# Patient Record
Sex: MALE | Race: WHITE | NOT HISPANIC OR LATINO | Employment: OTHER | URBAN - METROPOLITAN AREA
[De-identification: names, ages, dates, MRNs, and addresses within clinical notes are randomized per-mention and may not be internally consistent; named-entity substitution may affect disease eponyms.]

---

## 2019-02-23 ENCOUNTER — HOSPITAL ENCOUNTER (EMERGENCY)
Facility: HOSPITAL | Age: 44
Discharge: HOME | End: 2019-02-24
Attending: EMERGENCY MEDICINE
Payer: MEDICAID

## 2019-02-23 DIAGNOSIS — F10.20 ALCOHOL USE DISORDER, SEVERE, DEPENDENCE: ICD-10-CM

## 2019-02-23 DIAGNOSIS — F10.920 ALCOHOLIC INTOXICATION WITHOUT COMPLICATION: ICD-10-CM

## 2019-02-23 DIAGNOSIS — F41.1 GAD (GENERALIZED ANXIETY DISORDER): ICD-10-CM

## 2019-02-23 DIAGNOSIS — F10.930 ALCOHOL WITHDRAWAL SYNDROME WITHOUT COMPLICATION: ICD-10-CM

## 2019-02-23 DIAGNOSIS — F39 AFFECTIVE DISORDER: ICD-10-CM

## 2019-02-23 DIAGNOSIS — Z72.0 TOBACCO ABUSE: ICD-10-CM

## 2019-02-23 DIAGNOSIS — R74.8 ELEVATED LIVER ENZYMES: ICD-10-CM

## 2019-02-23 DIAGNOSIS — F10.10 ALCOHOL ABUSE: ICD-10-CM

## 2019-02-23 DIAGNOSIS — R45.851 SUICIDAL IDEATION: Primary | ICD-10-CM

## 2019-02-23 LAB
ALBUMIN SERPL BCP-MCNC: 3.5 G/DL
ALP SERPL-CCNC: 142 U/L
ALT SERPL W/O P-5'-P-CCNC: 64 U/L
AMMONIA PLAS-SCNC: 49 UMOL/L
AMPHET+METHAMPHET UR QL: NEGATIVE
ANION GAP SERPL CALC-SCNC: 14 MMOL/L
APTT BLDCRRT: 30.8 SEC
AST SERPL-CCNC: 201 U/L
BARBITURATES UR QL SCN>200 NG/ML: NEGATIVE
BASOPHILS # BLD AUTO: 0.07 K/UL
BASOPHILS NFR BLD: 1.9 %
BENZODIAZ UR QL SCN>200 NG/ML: NEGATIVE
BILIRUB SERPL-MCNC: 3.8 MG/DL
BILIRUB UR QL STRIP: NEGATIVE
BUN SERPL-MCNC: 14 MG/DL
BZE UR QL SCN: NEGATIVE
CALCIUM SERPL-MCNC: 9.5 MG/DL
CANNABINOIDS UR QL SCN: NEGATIVE
CHLORIDE SERPL-SCNC: 108 MMOL/L
CLARITY UR: CLEAR
CO2 SERPL-SCNC: 21 MMOL/L
COLOR UR: YELLOW
CREAT SERPL-MCNC: 0.7 MG/DL
CREAT UR-MCNC: 16.9 MG/DL
DIFFERENTIAL METHOD: ABNORMAL
EOSINOPHIL # BLD AUTO: 0 K/UL
EOSINOPHIL NFR BLD: 0.5 %
ERYTHROCYTE [DISTWIDTH] IN BLOOD BY AUTOMATED COUNT: 13.5 %
EST. GFR  (AFRICAN AMERICAN): >60 ML/MIN/1.73 M^2
EST. GFR  (NON AFRICAN AMERICAN): >60 ML/MIN/1.73 M^2
ETHANOL SERPL-MCNC: 430 MG/DL
GLUCOSE SERPL-MCNC: 109 MG/DL
GLUCOSE UR QL STRIP: NEGATIVE
HCT VFR BLD AUTO: 42.2 %
HGB BLD-MCNC: 14.4 G/DL
HGB UR QL STRIP: NEGATIVE
INR PPP: 1.2
KETONES UR QL STRIP: NEGATIVE
LEUKOCYTE ESTERASE UR QL STRIP: NEGATIVE
LYMPHOCYTES # BLD AUTO: 1.6 K/UL
LYMPHOCYTES NFR BLD: 42.4 %
MCH RBC QN AUTO: 35 PG
MCHC RBC AUTO-ENTMCNC: 34.1 G/DL
MCV RBC AUTO: 102 FL
METHADONE UR QL SCN>300 NG/ML: NEGATIVE
MONOCYTES # BLD AUTO: 0.6 K/UL
MONOCYTES NFR BLD: 16.1 %
NEUTROPHILS # BLD AUTO: 1.5 K/UL
NEUTROPHILS NFR BLD: 39.1 %
NITRITE UR QL STRIP: NEGATIVE
OPIATES UR QL SCN: NEGATIVE
PCP UR QL SCN>25 NG/ML: NEGATIVE
PH UR STRIP: 7 [PH] (ref 5–8)
PLATELET # BLD AUTO: 59 K/UL
PMV BLD AUTO: 11.7 FL
POTASSIUM SERPL-SCNC: 4.3 MMOL/L
PROT SERPL-MCNC: 7.7 G/DL
PROT UR QL STRIP: NEGATIVE
PROTHROMBIN TIME: 12.6 SEC
RBC # BLD AUTO: 4.12 M/UL
SODIUM SERPL-SCNC: 143 MMOL/L
SP GR UR STRIP: <=1.005 (ref 1–1.03)
TOXICOLOGY INFORMATION: ABNORMAL
URN SPEC COLLECT METH UR: ABNORMAL
UROBILINOGEN UR STRIP-ACNC: 1 EU/DL
WBC # BLD AUTO: 3.73 K/UL

## 2019-02-23 PROCEDURE — 85025 COMPLETE CBC W/AUTO DIFF WBC: CPT

## 2019-02-23 PROCEDURE — 86703 HIV-1/HIV-2 1 RESULT ANTBDY: CPT

## 2019-02-23 PROCEDURE — 25000003 PHARM REV CODE 250: Performed by: EMERGENCY MEDICINE

## 2019-02-23 PROCEDURE — 80307 DRUG TEST PRSMV CHEM ANLYZR: CPT

## 2019-02-23 PROCEDURE — 80053 COMPREHEN METABOLIC PANEL: CPT

## 2019-02-23 PROCEDURE — 96361 HYDRATE IV INFUSION ADD-ON: CPT

## 2019-02-23 PROCEDURE — 82140 ASSAY OF AMMONIA: CPT

## 2019-02-23 PROCEDURE — 85610 PROTHROMBIN TIME: CPT

## 2019-02-23 PROCEDURE — 85730 THROMBOPLASTIN TIME PARTIAL: CPT

## 2019-02-23 PROCEDURE — 80320 DRUG SCREEN QUANTALCOHOLS: CPT

## 2019-02-23 PROCEDURE — 99285 EMERGENCY DEPT VISIT HI MDM: CPT | Mod: 25

## 2019-02-23 PROCEDURE — 81003 URINALYSIS AUTO W/O SCOPE: CPT | Mod: 59

## 2019-02-23 RX ORDER — CYCLOBENZAPRINE HCL 10 MG
10 TABLET ORAL 3 TIMES DAILY PRN
Status: ON HOLD | COMMUNITY
End: 2020-09-22

## 2019-02-23 RX ORDER — ALPRAZOLAM 1 MG/1
1 TABLET ORAL 3 TIMES DAILY PRN
Status: ON HOLD | COMMUNITY
End: 2020-09-22

## 2019-02-23 RX ORDER — LIDOCAINE AND PRILOCAINE 25; 25 MG/G; MG/G
CREAM TOPICAL ONCE
Status: ON HOLD | COMMUNITY
End: 2020-09-22

## 2019-02-23 RX ORDER — DICLOFENAC SODIUM 1 MG/ML
1 SOLUTION/ DROPS OPHTHALMIC 4 TIMES DAILY
Status: ON HOLD | COMMUNITY
End: 2020-09-22

## 2019-02-23 RX ORDER — FLUTICASONE PROPIONATE 50 UG/1
POWDER, METERED RESPIRATORY (INHALATION)
Status: ON HOLD | COMMUNITY
End: 2020-09-22

## 2019-02-23 RX ORDER — LORAZEPAM 0.5 MG/1
0.5 TABLET ORAL
Status: COMPLETED | OUTPATIENT
Start: 2019-02-23 | End: 2019-02-23

## 2019-02-23 RX ORDER — LANOLIN ALCOHOL/MO/W.PET/CERES
100 CREAM (GRAM) TOPICAL DAILY
Status: ON HOLD | COMMUNITY
End: 2020-10-21 | Stop reason: HOSPADM

## 2019-02-23 RX ORDER — NADOLOL 40 MG/1
40 TABLET ORAL DAILY
Status: ON HOLD | COMMUNITY
End: 2020-09-22

## 2019-02-23 RX ORDER — KETOROLAC TROMETHAMINE 10 MG/1
10 TABLET, FILM COATED ORAL EVERY 6 HOURS
Status: ON HOLD | COMMUNITY
End: 2020-09-22

## 2019-02-23 RX ORDER — MONTELUKAST SODIUM 10 MG/1
10 TABLET ORAL NIGHTLY
Status: ON HOLD | COMMUNITY
End: 2020-09-22

## 2019-02-23 RX ORDER — NAPROXEN 500 MG/1
500 TABLET ORAL 2 TIMES DAILY
Status: ON HOLD | COMMUNITY
End: 2020-09-22

## 2019-02-23 RX ORDER — ONDANSETRON 4 MG/1
4 TABLET, FILM COATED ORAL 2 TIMES DAILY
Status: ON HOLD | COMMUNITY
End: 2020-09-22

## 2019-02-23 RX ORDER — LOSARTAN POTASSIUM 25 MG/1
25 TABLET ORAL DAILY
Status: ON HOLD | COMMUNITY
End: 2020-09-22

## 2019-02-23 RX ORDER — AZELASTINE HYDROCHLORIDE 0.5 MG/ML
2 SOLUTION/ DROPS OPHTHALMIC 2 TIMES DAILY
Status: ON HOLD | COMMUNITY
End: 2020-09-22

## 2019-02-23 RX ADMIN — LORAZEPAM 0.5 MG: 0.5 TABLET ORAL at 06:02

## 2019-02-23 RX ADMIN — SODIUM CHLORIDE 1000 ML: 0.9 INJECTION, SOLUTION INTRAVENOUS at 04:02

## 2019-02-23 NOTE — ED NOTES
Pt's name called to dispo to draw HIV screen, but there was no answer. Will recall pt's name again shortly.

## 2019-02-23 NOTE — ED PROVIDER NOTES
SCRIBE #1 NOTE: I, Na Silva, am scribing for, and in the presence of, Daniel Helm Jr., MD. I have scribed the HPI, ROS, and PEx.     SCRIBE #2 NOTE: I, Kadie Castro, am scribing for, and in the presence of,  Stephane Jerry MD. I have scribed the remaining portions of the note not scribed by Scribe #1.     History      Chief Complaint   Patient presents with    Cirrhosis     hx alcoholix cirrhosis of liver. +sclera jaundice    Drug / Alcohol Assessment     hx alcohol abuse. had drink before coming to ER       Review of patient's allergies indicates:   Allergen Reactions    Latex, natural rubber         HPI   HPI    2/23/2019, 3:26 PM   History obtained from the patient      History of Present Illness: Jordan Altman is a 43 y.o. male patient with PMHx alcoholic cirrhosis of liver and alcohol abuse who presents to the Emergency Department for alcohol assessment. Patient states he had medical detoxification done 2 yrs ago at [facility] and wants another one today. Patient is asymptomatic. Patient denies any fever, chills, SOB, n/v/d, abd pain, CP, back pain, diaphoresis, and all other sxs at this time. No prior Tx reported. Patient admits to drinking alcohol today. No further complaints or concerns at this time.          Arrival mode: Personal vehicle    PCP: Primary Doctor No       Past Medical History:  Past Medical History:   Diagnosis Date    Hypertension        Past Surgical History:  History reviewed. No pertinent surgical history.      Family History:  History reviewed. No pertinent family history.    Social History:  Social History     Tobacco Use    Smoking status: Current Some Day Smoker    Smokeless tobacco: Current User   Substance and Sexual Activity    Alcohol use: Yes     Alcohol/week: 3.0 - 6.0 oz     Types: 5 - 10 Shots of liquor per week    Drug use: Yes     Frequency: 3.0 times per week     Types: Marijuana    Sexual activity: Yes       ROS   Review of Systems   Constitutional:  Negative for activity change, chills, diaphoresis and fever.   HENT: Negative for congestion, rhinorrhea, sneezing, sore throat and trouble swallowing.    Eyes: Negative for pain.   Respiratory: Negative for cough, chest tightness, shortness of breath, wheezing and stridor.    Cardiovascular: Negative for chest pain, palpitations and leg swelling.   Gastrointestinal: Negative for abdominal distention, abdominal pain, constipation, diarrhea, nausea and vomiting.   Genitourinary: Negative for difficulty urinating, dysuria, frequency and urgency.   Musculoskeletal: Negative for arthralgias, back pain, myalgias, neck pain and neck stiffness.   Skin: Negative for pallor, rash and wound.   Neurological: Negative for dizziness, syncope, weakness, light-headedness, numbness and headaches.   Hematological: Does not bruise/bleed easily.   All other systems reviewed and are negative.    Physical Exam      Initial Vitals [02/23/19 1501]   BP Pulse Resp Temp SpO2   (!) 172/96 101 18 98.2 °F (36.8 °C) (!) 94 %      MAP       --          Physical Exam  Nursing Notes and Vital Signs Reviewed.  Constitutional: Patient is in no acute distress. Well-developed and well-nourished. Smells of alcohol.  Head: Atraumatic. Normocephalic.  Eyes: PERRL. EOM intact. Conjunctivae are not pale. No scleral icterus.  ENT: Mucous membranes are moist. Oropharynx is clear and symmetric.    Neck: Supple. Full ROM. No lymphadenopathy.  Cardiovascular: Regular rate. Regular rhythm. No murmurs, rubs, or gallops. Distal pulses are 2+ and symmetric.  Pulmonary/Chest: No respiratory distress. Clear to auscultation bilaterally. No wheezing or rales.  Abdominal: Soft and non-distended.  There is no tenderness.  No rebound, guarding, or rigidity. Good bowel sounds.  Genitourinary: No CVA tenderness  Musculoskeletal: Moves all extremities. No obvious deformities. No edema. No calf tenderness.  Skin: Warm and dry.  Neurological:  Alert, awake, and appropriate.   "Normal speech. No acute focal neurological deficits are appreciated.  Psychiatric: Normal affect. Good eye contact. Appropriate in content.      ED Course    Procedures  ED Vital Signs:  Vitals:    02/23/19 1501 02/23/19 1602 02/23/19 1718 02/23/19 1750   BP: (!) 172/96 137/82 (!) 157/89 119/67   Pulse: 101 87 76 82   Resp: 18      Temp: 98.2 °F (36.8 °C)      TempSrc: Oral      SpO2: (!) 94% (!) 94% 96% 95%   Weight: 114.9 kg (253 lb 6.7 oz)      Height: 5' 11" (1.803 m)       02/23/19 1820 02/23/19 2300 02/24/19 0255   BP: 136/81 132/77 (!) 159/86   Pulse: 82 81 67   Resp: 16 17    Temp:  98.1 °F (36.7 °C) 98.5 °F (36.9 °C)   TempSrc:  Oral Oral   SpO2: 95% 97% 97%   Weight:      Height:          Abnormal Lab Results:  Labs Reviewed   CBC W/ AUTO DIFFERENTIAL - Abnormal; Notable for the following components:       Result Value    WBC 3.73 (*)     RBC 4.12 (*)      (*)     MCH 35.0 (*)     Platelets 59 (*)     Gran # (ANC) 1.5 (*)     Mono% 16.1 (*)     All other components within normal limits   COMPREHENSIVE METABOLIC PANEL - Abnormal; Notable for the following components:    CO2 21 (*)     Total Bilirubin 3.8 (*)     Alkaline Phosphatase 142 (*)      (*)     ALT 64 (*)     All other components within normal limits   PROTIME-INR - Abnormal; Notable for the following components:    Prothrombin Time 12.6 (*)     All other components within normal limits   ALCOHOL,MEDICAL (ETHANOL) - Abnormal; Notable for the following components:    Alcohol, Medical, Serum 430 (*)     All other components within normal limits    Narrative:     ALC critical result(s) called and verbal readback obtained from Moon Rizo RN, 02/23/2019 18:08   URINALYSIS - Abnormal; Notable for the following components:    Specific Gravity, UA <=1.005 (*)     All other components within normal limits   DRUG SCREEN PANEL, URINE EMERGENCY - Abnormal; Notable for the following components:    Creatinine, Random Ur 16.9 (*)     All " other components within normal limits   ALCOHOL,MEDICAL (ETHANOL) - Abnormal; Notable for the following components:    Alcohol, Medical, Serum 227 (*)     All other components within normal limits   APTT   AMMONIA   HIV 1 / 2 ANTIBODY        All Lab Results:  Results for orders placed or performed during the hospital encounter of 02/23/19   CBC auto differential   Result Value Ref Range    WBC 3.73 (L) 3.90 - 12.70 K/uL    RBC 4.12 (L) 4.60 - 6.20 M/uL    Hemoglobin 14.4 14.0 - 18.0 g/dL    Hematocrit 42.2 40.0 - 54.0 %     (H) 82 - 98 fL    MCH 35.0 (H) 27.0 - 31.0 pg    MCHC 34.1 32.0 - 36.0 g/dL    RDW 13.5 11.5 - 14.5 %    Platelets 59 (L) 150 - 350 K/uL    MPV 11.7 9.2 - 12.9 fL    Gran # (ANC) 1.5 (L) 1.8 - 7.7 K/uL    Lymph # 1.6 1.0 - 4.8 K/uL    Mono # 0.6 0.3 - 1.0 K/uL    Eos # 0.0 0.0 - 0.5 K/uL    Baso # 0.07 0.00 - 0.20 K/uL    Gran% 39.1 38.0 - 73.0 %    Lymph% 42.4 18.0 - 48.0 %    Mono% 16.1 (H) 4.0 - 15.0 %    Eosinophil% 0.5 0.0 - 8.0 %    Basophil% 1.9 0.0 - 1.9 %    Differential Method Automated    Comprehensive metabolic panel   Result Value Ref Range    Sodium 143 136 - 145 mmol/L    Potassium 4.3 3.5 - 5.1 mmol/L    Chloride 108 95 - 110 mmol/L    CO2 21 (L) 23 - 29 mmol/L    Glucose 109 70 - 110 mg/dL    BUN, Bld 14 6 - 20 mg/dL    Creatinine 0.7 0.5 - 1.4 mg/dL    Calcium 9.5 8.7 - 10.5 mg/dL    Total Protein 7.7 6.0 - 8.4 g/dL    Albumin 3.5 3.5 - 5.2 g/dL    Total Bilirubin 3.8 (H) 0.1 - 1.0 mg/dL    Alkaline Phosphatase 142 (H) 55 - 135 U/L     (H) 10 - 40 U/L    ALT 64 (H) 10 - 44 U/L    Anion Gap 14 8 - 16 mmol/L    eGFR if African American >60 >60 mL/min/1.73 m^2    eGFR if non African American >60 >60 mL/min/1.73 m^2   APTT   Result Value Ref Range    aPTT 30.8 21.0 - 32.0 sec   Protime-INR   Result Value Ref Range    Prothrombin Time 12.6 (H) 9.0 - 12.5 sec    INR 1.2 0.8 - 1.2   Ethanol   Result Value Ref Range    Alcohol, Medical, Serum 430 (HH) <10 mg/dL   Ammonia    Result Value Ref Range    Ammonia 49 10 - 50 umol/L   Urinalysis   Result Value Ref Range    Specimen UA Urine, Clean Catch     Color, UA Yellow Yellow, Straw, Mela    Appearance, UA Clear Clear    pH, UA 7.0 5.0 - 8.0    Specific Gravity, UA <=1.005 (A) 1.005 - 1.030    Protein, UA Negative Negative    Glucose, UA Negative Negative    Ketones, UA Negative Negative    Bilirubin (UA) Negative Negative    Occult Blood UA Negative Negative    Nitrite, UA Negative Negative    Urobilinogen, UA 1.0 <2.0 EU/dL    Leukocytes, UA Negative Negative   Drug screen panel, emergency   Result Value Ref Range    Benzodiazepines Negative     Methadone metabolites Negative     Cocaine (Metab.) Negative     Opiate Scrn, Ur Negative     Barbiturate Screen, Ur Negative     Amphetamine Screen, Ur Negative     THC Negative     Phencyclidine Negative     Creatinine, Random Ur 16.9 (L) 23.0 - 375.0 mg/dL    Toxicology Information SEE COMMENT    Ethanol   Result Value Ref Range    Alcohol, Medical, Serum 227 (H) <10 mg/dL         Imaging Results:  Imaging Results    None                 The Emergency Provider reviewed the vital signs and test results, which are outlined above.    ED Discussion     4:00 PM: Dr. Helm transfers care of pt to Dr. Jerry, pending lab results.    6:00 PM: Dr. Jerry evaluated pt at this time. Pt reports he is suicidal and wants help detoxing. D/w pt all pertinent results. D/w pt any concerns expressed at this time. Answered all questions. Pt expresses understanding at this time.    6:30 PM: The PEC hold has been issued by Dr. Jerry at this time for SI.    6:33 PM: Pt transferred to [place] [address].    ED Medication(s):  Medications   lorazepam (ATIVAN) injection 1 mg (not administered)   sodium chloride 0.9% bolus 1,000 mL (0 mLs Intravenous Stopped 2/23/19 1903)   LORazepam tablet 0.5 mg (0.5 mg Oral Given 2/23/19 1823)             Medical Decision Making              Scribe Attestation:   Scribe #1: I performed  the above scribed service and the documentation accurately describes the services I performed. I attest to the accuracy of the note.    Attending:   Physician Attestation Statement for Scribe #1: I, Daniel Helm Jr., MD, personally performed the services described in this documentation, as scribed by Na Silva, in my presence, and it is both accurate and complete.       Scribe Attestation:   Scribe #2: I performed the above scribed service and the documentation accurately describes the services I performed. I attest to the accuracy of the note.    Attending Attestation:           Physician Attestation for Scribe:    Physician Attestation Statement for Scribe #2: I, Stephane Jerry MD, reviewed documentation, as scribed by Kadie Castro in my presence, and it is both accurate and complete. I also acknowledge and confirm the content of the note done by Scribe #1.          Clinical Impression       ICD-10-CM ICD-9-CM   1. Suicidal ideation R45.851 V62.84   2. Alcohol abuse F10.10 305.00   3. Alcoholic intoxication without complication F10.920 305.00       Disposition:   Disposition: Discharged  Condition: Stable         Stephane Jerry Jr., MD  02/24/19 1611

## 2019-02-24 VITALS
HEIGHT: 71 IN | OXYGEN SATURATION: 100 % | DIASTOLIC BLOOD PRESSURE: 80 MMHG | TEMPERATURE: 99 F | BODY MASS INDEX: 35.48 KG/M2 | SYSTOLIC BLOOD PRESSURE: 145 MMHG | WEIGHT: 253.44 LBS | RESPIRATION RATE: 18 BRPM | HEART RATE: 62 BPM

## 2019-02-24 LAB
ETHANOL SERPL-MCNC: 106 MG/DL
ETHANOL SERPL-MCNC: 227 MG/DL

## 2019-02-24 PROCEDURE — 96367 TX/PROPH/DG ADDL SEQ IV INF: CPT

## 2019-02-24 PROCEDURE — 96366 THER/PROPH/DIAG IV INF ADDON: CPT

## 2019-02-24 PROCEDURE — 80320 DRUG SCREEN QUANTALCOHOLS: CPT

## 2019-02-24 PROCEDURE — 99283 PR EMERGENCY DEPT VISIT,LEVEL III: ICD-10-PCS | Mod: ,,, | Performed by: PSYCHIATRY & NEUROLOGY

## 2019-02-24 PROCEDURE — 99283 EMERGENCY DEPT VISIT LOW MDM: CPT | Mod: ,,, | Performed by: PSYCHIATRY & NEUROLOGY

## 2019-02-24 PROCEDURE — 80320 DRUG SCREEN QUANTALCOHOLS: CPT | Mod: 91

## 2019-02-24 PROCEDURE — 25000003 PHARM REV CODE 250: Performed by: EMERGENCY MEDICINE

## 2019-02-24 PROCEDURE — 96375 TX/PRO/DX INJ NEW DRUG ADDON: CPT

## 2019-02-24 PROCEDURE — 96365 THER/PROPH/DIAG IV INF INIT: CPT

## 2019-02-24 PROCEDURE — 63600175 PHARM REV CODE 636 W HCPCS: Performed by: EMERGENCY MEDICINE

## 2019-02-24 RX ORDER — THIAMINE HCL 100 MG
100 TABLET ORAL DAILY
Status: DISCONTINUED | OUTPATIENT
Start: 2019-02-24 | End: 2019-02-24 | Stop reason: HOSPADM

## 2019-02-24 RX ORDER — ONDANSETRON 2 MG/ML
4 INJECTION INTRAMUSCULAR; INTRAVENOUS
Status: COMPLETED | OUTPATIENT
Start: 2019-02-24 | End: 2019-02-24

## 2019-02-24 RX ORDER — DIAZEPAM 5 MG/1
10 TABLET ORAL
Status: COMPLETED | OUTPATIENT
Start: 2019-02-24 | End: 2019-02-24

## 2019-02-24 RX ORDER — OXAZEPAM 30 MG/1
CAPSULE, GELATIN COATED ORAL
Qty: 10 CAPSULE | Refills: 0 | Status: ON HOLD | OUTPATIENT
Start: 2019-02-24 | End: 2020-09-22

## 2019-02-24 RX ORDER — IBUPROFEN 600 MG/1
600 TABLET ORAL
Status: COMPLETED | OUTPATIENT
Start: 2019-02-24 | End: 2019-02-24

## 2019-02-24 RX ADMIN — DIAZEPAM 10 MG: 5 TABLET ORAL at 12:02

## 2019-02-24 RX ADMIN — Medication 100 MG: at 07:02

## 2019-02-24 RX ADMIN — DIAZEPAM 10 MG: 5 TABLET ORAL at 07:02

## 2019-02-24 RX ADMIN — IBUPROFEN 600 MG: 600 TABLET, FILM COATED ORAL at 03:02

## 2019-02-24 RX ADMIN — THIAMINE HYDROCHLORIDE: 100 INJECTION, SOLUTION INTRAMUSCULAR; INTRAVENOUS at 08:02

## 2019-02-24 RX ADMIN — PROMETHAZINE HYDROCHLORIDE 12.5 MG: 25 INJECTION INTRAMUSCULAR; INTRAVENOUS at 11:02

## 2019-02-24 RX ADMIN — LORAZEPAM 1 MG: 2 INJECTION INTRAMUSCULAR; INTRAVENOUS at 03:02

## 2019-02-24 RX ADMIN — ONDANSETRON 4 MG: 2 INJECTION INTRAMUSCULAR; INTRAVENOUS at 07:02

## 2019-02-24 NOTE — ED NOTES
Pt resting in bed, no acute distress noted. Bed in low and locked position. Room secured per PEC protocol. Pt's belongings secured and pt in grey gown and yellow socks. Pt being directly monitored by nitza Mcdonald at this time. Will continue to monitor.

## 2019-02-24 NOTE — ED NOTES
[facility] called stating Dr Martines will call in approx 5-10 min to tele psych pt/. Dr Arce IS aware of pts alcohol level

## 2019-02-24 NOTE — ED NOTES
Received report from MILADIS Buitrago Pt. In NAD, VSS, RR equal and unlabored. Will continue to monitor pt.

## 2019-02-24 NOTE — PROVIDER PROGRESS NOTES - EMERGENCY DEPT.
Encounter Date: 2/23/2019    ED Physician Progress Notes       SCRIBE NOTE: Na BUSTOS, am scribing for, and in the presence of,  Claudia Cortez DO.  Physician Statement: IClaudia DO, personally performed the services described in this documentation as scribed by Na Silva in my presence, and it is both accurate and complete.          7:03 AM: Dr. Cortez evaluated patient at this time. Patient still smells of alcohol. Patient denies SI but states he is depressed. Currently c/o nausea but no vomiting. Slight hand tremors. No diaphoresis. Regular rate. Regular rhythm. No murmurs, rubs, or gallops. Distal pulses are 2+ and symmetric. No respiratory distress. Clear to auscultation bilaterally. No wheezing or rales. Ciwa-ar score of 7. Patient had total bili 6.5, sgot of 361, and sgpt of 130 in 10/2017.     8:17 AM: Re-evaluated pt. Pt is resting comfortably and is in no acute distress. Pt states he is feeling better. D/w pt all pertinent results. D/w pt any concerns expressed at this time. Answered all questions. Pt expresses understanding at this time.    11:52 AM: Re-evaluated patient. Patient was easily awakened and oriented. He had 1 episode of emesis in the ED. Patient still has hand tremors. No further concerns or complaints at this time.    2:15 PM: Dr. Cortez discussed the pt's case with DAVID Gomez (hospital medicine) who states she does not feel patient wants admission. She recommends Serax.     2:37 PM: Re-evaluated patient at this time. Patient is alert, awake, and oriented. Still has some shaking. Patient c/o back pain and requesting meds.     3:21 PM:  Patient evaluated by Psychiatry.  Patient denies suicidal ideation.  Therefore patient no longer a threat to himself.  Psychiatrist recommends treatment for alcohol detoxification.  Did discuss with Hospital Medicine who recommends outpatient treatment.  Patient is demonstrating mild symptoms of alcohol withdrawal.  When  to detail about signs and symptoms to return to the emergency department.  Patient felt comfortable with treatment plan.  I did discuss with him that the psychiatrist did recommend a SSRI after patient treated for alcohol withdrawal.  Patient verbalized importance of following up with outpatient psychiatrist start SSRI.  Re-evaluated patient at this time. No diaphoresis, is able to tolerate PO, no tremors, and appears cognitively intact. Educated patient about signs and symptoms which suggest he return to the ED. Discussed with pt all pertinent ED information and results. Discussed pt dx and plan of tx. All questions and concerns were addressed at this time. Pt expresses understanding of information and instructions, and is comfortable with plan to discharge. Pt is stable for discharge.    I discussed with patient that evaluation in the ED does not suggest any emergent or life threatening medical conditions requiring immediate intervention beyond what was provided in the ED, and I believe patient is safe for discharge.  Regardless, an unremarkable evaluation in the ED does not preclude the development or presence of a serious of life threatening condition. As such, patient was instructed to return immediately for any worsening or change in current symptoms.    Current Discharge Medication List      START taking these medications    Details   oxazepam (SERAX) 30 MG capsule Day 1: Take 1 tablet mouth every 6 hours as needed for anxiety/sweating/tremors..  Day 2:  Take 1 tablet by mouth every 8 hours as needed for anxiety/sweating/tremors.  Day 3:  Take 1 tablet by mouth every 12 hours as needed for anxiety/sweating/tremors.  Day 4:  Take 1 tablet by mouth as night.  Qty: 10 capsule, Refills: 0           Follow-up Information     [facility] In 2 days.    Why:  Return to emergency department for:  Vomiting, sweating, hallucinations, bugs crawling on skin, headache, difficulty concentrating, unable to take  medications or other concerns.  Contact information:  [address] [facility]  [place] [id number] [phone]                   Additional MDM:   Smoking Cessation: The patient is a smoker. The patient was counseled on smoking cessation for: 3 minutes. The patient was counseled on tobacco related  health complications.       Scribe Attestation:   Scribe #1: I performed the above scribed service and the documentation accurately describes the services I performed. I attest to the accuracy of the note.    Attending:   Physician Attestation Statement for Scribe #1: I, Claudia Cortez DO, personally performed the services described in this documentation, as scribed by Na Silva, in my presence, and it is both accurate and complete.       ICD-10-CM ICD-9-CM   1. Suicidal ideation R45.851 V62.84   2. Alcohol abuse F10.10 305.00   3. Alcoholic intoxication without complication F10.920 305.00   4. Alcohol use disorder, severe, dependence F10.20 303.90   5. Alcohol withdrawal syndrome without complication F10.230 291.81   6. Affective disorder F39 296.90   7. MILDRED (generalized anxiety disorder) F41.1 300.02   8. Elevated liver enzymes R74.8 790.5   9. Tobacco abuse Z72.0 305.1     Disposition:   Disposition: Discharged  Condition: Stable

## 2019-02-24 NOTE — ED NOTES
Called placed to [facility] regarding telepsych. Representative ([place]) stated that she will contact the physician and call us back.

## 2019-02-24 NOTE — CONSULTS
Tele-Consultation to Emergency Department from Psychiatry    Please see previous notes:    Patient agreeable to consultation via telepsychiatry.    Consultation started: 2/24/2019 at 1247 pm  The chief complaint leading to psychiatric consultation is: SI and ETOHism  This consultation was requested by Stephane Liang MD, the Emergency Department attending physician.  The location of the consulting psychiatrist is [place].  The patient location is [facility].  The patient arrived at the ED at: yesterday around 1800    Also present with the patient at the time of the consultation: tech    Patient Identification:  Jordan Altman is a 43 y.o. male.    Patient information was obtained from patient and past medical records.  Patient presented voluntarily to the Emergency Department ambulatory.    History of Present Illness:  43 year old gentleman with etohic cirrohsis and a hx of requiring inpatient etoh detox presented to the ED yesterday requesting the same plus reporting SI.  Initial BAL was over 400 so he has been monitored for that over 12 hours now  - trending down - current etoh pending but should be under 100 at this point based on trend.  Has needed benzo to manage tremor and emesis.  Vitals ok for now.  Received valium 10mg this am and at noon.    On interview:  His brother is at the bedside.  He states that he is here to get help with his alcoholism and because his niece attempted suicide several days ago.  He denies having SI himself.  No hx of suicide attempts.  No hx of psych hospitalization.  No AH.  Has tinnitus.  Hospitalization at [facility] 2 years ago on the medical side for etoh detox followed by a 2 month 5 day per week IOP for etoh use disorder.  Achieved 8 months of sobriety with that.    Drinking 5th per day of vodka he states.  No other substances.  Drinking this much for at least one year.  No hx of seizure.  No hx of DTs.  States that after he stopped drinking last time he had tremor for  several months after stopped drinking 2 years ago.  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    Has depression since he had to get rid of his dog a little over a year ago - the pit bull wasn't allowed at their apartment.  No medication for depression.  Hx of 2 trials.  States that both made him lethargic and he stopped them and stated that he didn't benefit.  States that he also has anxiety.  Worry thoughts and physically experienced anxiety.  'certain coping mechanisms weren't working for him'.  No therapist or psychiatrist.    He made an agreement with his niece that he'd try to stop drinking if she tried to stay alive.  That was the impetous for presenting now. He is interested in 28 day programs for rehab and his insurance company is looking into those now.  His brother endorses the need for this strongly.  The patient doesn't feel the need for inpatient psych.  He is open to trying an rx for low mood again 'but I can't promise, I don't like taking medication'.  We discussed the time course, side effects and what to expect from an SRI trial.      Psychiatric History:   Hospitalization: No  Medication Trials: Yes  Suicide Attempts: no  Violence: no  Depression: yes  Jacklyn: no  AH's: no  Delusions: no    Review of Systems:  Negative except nausea and tremor    Past Medical History:   Past Medical History:   Diagnosis Date    Hypertension         Seizures: no  Head trauma/l.o.c.: no  Wish to become pregnant[if female of childbearing age]: no    Allergies:    Review of patient's allergies indicates:   Allergen Reactions    Latex, natural rubber        Medications in ER:   Medications   lorazepam (ATIVAN) injection 1 mg (not administered)   thiamine tablet 100 mg (100 mg Oral Given 2/24/19 4779)   diazePAM tablet 10 mg (not administered)   sodium chloride 0.9% bolus 1,000 mL (0 mLs Intravenous Stopped 2/23/19 1903)   LORazepam tablet 0.5 mg (0.5 mg Oral Given 2/23/19 1823)   sodium  "chloride 0.9% 1,000 mL with mvi, adult no.4 with vit K 3,300 unit- 150 mcg/10 mL 10 mL, thiamine 100 mg, folic acid 1 mg infusion ( Intravenous New Bag 2/24/19 0805)   ondansetron injection 4 mg (4 mg Intravenous Given 2/24/19 0734)   diazePAM tablet 10 mg (10 mg Oral Given 2/24/19 0734)   promethazine (PHENERGAN) 12.5 mg in dextrose 5 % 50 mL IVPB (0 mg Intravenous Stopped 2/24/19 1152)       Medications at home: see chart    Substance Abuse History:   Alchohol: see above  Drug: denies     Legal History:   Past charges/incarcerations: dui x 1   Pending charges: no    Family Psychiatric History: unk    Social History:    w/ children.  His wife is supporting him currently.  She and the children aren't living w/ him currently but they are together as a couple still.      Current Evaluation:     Constitutional  Vitals:  Vitals:    02/23/19 1501 02/23/19 1602 02/23/19 1718 02/23/19 1750   BP: (!) 172/96 137/82 (!) 157/89 119/67   Pulse: 101 87 76 82   Resp: 18      Temp: 98.2 °F (36.8 °C)      TempSrc: Oral      SpO2: (!) 94% (!) 94% 96% 95%   Weight: 114.9 kg (253 lb 6.7 oz)      Height: 5' 11" (1.803 m)       02/23/19 1820 02/23/19 2300 02/24/19 0255 02/24/19 0712   BP: 136/81 132/77 (!) 159/86 (!) 169/93   Pulse: 82 81 67 73   Resp: 16 17  20   Temp:  98.1 °F (36.7 °C) 98.5 °F (36.9 °C)    TempSrc:  Oral Oral    SpO2: 95% 97% 97% 95%   Weight:       Height:        02/24/19 1117 02/24/19 1130 02/24/19 1132 02/24/19 1134   BP: (!) 166/88 (!) 158/92 (!) 159/92 (!) 150/96   Pulse: 80 72 84 109   Resp: 20      Temp: 98.4 °F (36.9 °C)      TempSrc: Oral      SpO2: 95%      Weight:       Height:          General:  unremarkable, age appropriate     Musculoskeletal  Muscle Strength/Tone:   moving arms normally   Gait & Station:   sitting on stretcher     Psychiatric  Level of Consciousness: alert  Orientation: oriented to person, place and time  Grooming: in hospital gown  Psychomotor Behavior: no agitation  Speech: " normal in rate, rhythm and volume  Language: uses words appropriately  Mood: depressed  Affect: fairly midline  Thought Process: linear and goal directed  Associations: intact  Thought Content: denies si and hi  Memory: intact  Attention: intact to interview  Fund of Knowledge: appears adequate  Insight: fair  Judgement: fair    Relevant Elements of Neurological Exam: no abnormality of posture noted    Assessment - Diagnosis - Goals:     Diagnosis/Impression: severe ETOH use disorder, MILDRED, etoh induced depressive d/o.  Chart states he made a suicidal statemtnt while etoh level was over 400.  He is not suicidal now and is convincing regarding this.  The mood disorder doesn't require inpatient psych hospitalization.  An SRI trial is warrented - delay start until finished w/ benzos for etoh withdrawal.  28 day rehab or IOP (first favored) is recommended based on his willingness and availability.  Will need medical clearance / finish inpatient portion of etoh withdrawal protocol prior.  Might need med admit for that.      Rec: as above.  Would discharge w/ lexapro 5mg x 1 week then 10mg daily after medically cleared and done w/ withdrawal portion requiring hospital level of care.  Then rehab.       Time with patient: 30 min    More than 50% of the time was spent counseling/coordinating care    Laboratory Data:   Labs Reviewed   CBC W/ AUTO DIFFERENTIAL - Abnormal; Notable for the following components:       Result Value    WBC 3.73 (*)     RBC 4.12 (*)      (*)     MCH 35.0 (*)     Platelets 59 (*)     Gran # (ANC) 1.5 (*)     Mono% 16.1 (*)     All other components within normal limits   COMPREHENSIVE METABOLIC PANEL - Abnormal; Notable for the following components:    CO2 21 (*)     Total Bilirubin 3.8 (*)     Alkaline Phosphatase 142 (*)      (*)     ALT 64 (*)     All other components within normal limits   PROTIME-INR - Abnormal; Notable for the following components:    Prothrombin Time 12.6 (*)      All other components within normal limits   ALCOHOL,MEDICAL (ETHANOL) - Abnormal; Notable for the following components:    Alcohol, Medical, Serum 430 (*)     All other components within normal limits    Narrative:     ALC critical result(s) called and verbal readback obtained from Moon Rizo RN, 02/23/2019 18:08   URINALYSIS - Abnormal; Notable for the following components:    Specific Gravity, UA <=1.005 (*)     All other components within normal limits   DRUG SCREEN PANEL, URINE EMERGENCY - Abnormal; Notable for the following components:    Creatinine, Random Ur 16.9 (*)     All other components within normal limits   ALCOHOL,MEDICAL (ETHANOL) - Abnormal; Notable for the following components:    Alcohol, Medical, Serum 227 (*)     All other components within normal limits   APTT   AMMONIA   HIV 1 / 2 ANTIBODY   ALCOHOL,MEDICAL (ETHANOL)         Consulting clinician was informed of the encounter and consult note.    Consultation ended: 2/24/2019 at **

## 2019-02-24 NOTE — ED NOTES
Pt belongings inventory:  Black tennis shoes, cellular device in wallet case containing 4 assorted cards, also cash total value of $8.   Red and black backpack containin. clipboard case with documents,   2. clipboard notepad with writing,   3. packing envelopes with assorted paperwork.  4. Assorted toiletries  5. Set of keys  6. Prayer beads  7. Medications including: 15 Micrograms of flonase  Nasal spray, Azelastine HCL 0.05% optomic solution, diclofenac sodium topical gel, 1% 100g, Lidocaine 2.5% and prilocane 2.5% cream 30 grams, 5 tablets of prilosec 20 mg, tums, Vicks vapo inhaler decongestant, Olbas inhaler decongestant, Banana Bag oral solution 0.23 oz,  8. Black [place] pocketknife  9. Assorted writing utensils  10. Black hat  11. Brown hooded sweatshirt  12. Small white container with assorted bandages  13. Pair of cargo shorts  14. Blue shirt  15. Pair of socks  16. Blue emu super strength topical cream  17. two white chargers and a surge protector power strip  18. Black head phones  19. Black glasses case containing black rimmed glasses  20. Grey shorts  21. Green towel  22. Black shorts  23. Black boxer underwear  24. Black t-shirt  25. Maroon underwear  26. Secondary cellular device  27. Portable battery pack  28. Red and black speaker  29. Three pairs of socks    All items to be locked in [facility] locker# 28

## 2019-02-24 NOTE — ED NOTES
Pt resting in bed, no acute distress noted. Bed in low and locked position. Room secured per [facility] protocol. Pt's belongings secured and pt in grey gown and yellow socks. Pt being directly monitored by nitza Moser at this time. Will continue to monitor.

## 2019-02-24 NOTE — DISCHARGE INSTRUCTIONS
[facility]  [address] [place] [address]    Phone [phone] for appointment.  Call for appointment 1-2 days.    Gastroenterology for alcoholic hepatitis.  Return to emergency department for yellowing of skin, confusion, bleeding, or other concerns

## 2019-02-25 LAB — HIV 1+2 AB+HIV1 P24 AG SERPL QL IA: NEGATIVE

## 2020-03-28 ENCOUNTER — HOSPITAL ENCOUNTER (EMERGENCY)
Facility: HOSPITAL | Age: 45
Discharge: HOME | End: 2020-03-28
Attending: EMERGENCY MEDICINE
Payer: MEDICAID

## 2020-03-28 VITALS
WEIGHT: 286.69 LBS | DIASTOLIC BLOOD PRESSURE: 83 MMHG | HEART RATE: 65 BPM | RESPIRATION RATE: 18 BRPM | HEIGHT: 72 IN | TEMPERATURE: 99 F | SYSTOLIC BLOOD PRESSURE: 141 MMHG | OXYGEN SATURATION: 98 % | BODY MASS INDEX: 38.83 KG/M2

## 2020-03-28 DIAGNOSIS — R07.81 RIB PAIN: ICD-10-CM

## 2020-03-28 DIAGNOSIS — S29.011A MUSCLE STRAIN OF CHEST WALL, INITIAL ENCOUNTER: Primary | ICD-10-CM

## 2020-03-28 DIAGNOSIS — S22.32XA CLOSED FRACTURE OF ONE RIB OF LEFT SIDE, INITIAL ENCOUNTER: ICD-10-CM

## 2020-03-28 PROCEDURE — 99284 EMERGENCY DEPT VISIT MOD MDM: CPT | Mod: 25

## 2020-03-28 RX ORDER — CYCLOBENZAPRINE HCL 10 MG
10 TABLET ORAL 3 TIMES DAILY PRN
Qty: 15 TABLET | Refills: 0 | Status: SHIPPED | OUTPATIENT
Start: 2020-03-28 | End: 2020-04-02

## 2020-03-28 RX ORDER — DICLOFENAC SODIUM 50 MG/1
50 TABLET, DELAYED RELEASE ORAL 2 TIMES DAILY
Qty: 10 TABLET | Refills: 0 | Status: SHIPPED | OUTPATIENT
Start: 2020-03-28 | End: 2020-04-02

## 2020-03-28 RX ORDER — TRAMADOL HYDROCHLORIDE 50 MG/1
50 TABLET ORAL EVERY 6 HOURS PRN
Qty: 12 TABLET | Refills: 0 | Status: ON HOLD | OUTPATIENT
Start: 2020-03-28 | End: 2020-09-22

## 2020-03-28 NOTE — ED PROVIDER NOTES
SCRIBE #1 NOTE: I, Moon Nola, am scribing for, and in the presence of, James Mcdonnell NP. I have scribed the entire note.       History     Chief Complaint   Patient presents with    Muscle Pain     pulled something on left abd side helping his son move yesterday     Review of patient's allergies indicates:   Allergen Reactions    Latex, natural rubber          History of Present Illness     HPI    3/28/2020, 2:38 PM  History obtained from the patient      History of Present Illness: Jordan Altman is a 44 y.o. male patient with a PMHx of HTN who presents to the Emergency Department for evaluation of pain to mid left chest wall which onset suddenly today. Pt reports he was helping his family move heavy boxes today. Symptoms are constant and moderate in severity. Sxs exacerbated by certain movements and by palpation to chest wall. No mitigating factors reported. Pt wants to make sure sxs are due to possible muscle strain and not a fractured rib. No associated sxs reported. Patient denies any fever, chills, numbness, weakness, n/v/d, abd pain, SOB, palpitations, and all other sxs at this time. No prior tx. No further complaints or concerns at this time.       Arrival mode: Personal vehicle     PCP: SHAHEEN Jaime        Past Medical History:  Past Medical History:   Diagnosis Date    Hypertension        Past Surgical History:  Past surgical history reviewed. No pertinent past surgical history.     Family History:  Family history reviewed. No pertinent family history.    Social History:  Social History     Tobacco Use    Smoking status: Current Some Day Smoker    Smokeless tobacco: Current User   Substance and Sexual Activity    Alcohol use: Yes     Alcohol/week: 112.0 standard drinks     Types: 112 Shots of liquor per week     Comment: fifth of vodka a day     Drug use: Yes     Frequency: 3.0 times per week     Types: Marijuana    Sexual activity: Yes        Review of Systems     Review of  Systems   Constitutional: Negative for chills and fever.   HENT: Negative for sore throat.    Respiratory: Negative for shortness of breath.    Cardiovascular: Negative for chest pain and palpitations.   Gastrointestinal: Negative for abdominal pain, diarrhea, nausea and vomiting.   Genitourinary: Negative for dysuria.   Musculoskeletal: Negative for back pain.        (+) pain to mid left chest wall   Skin: Negative for rash.   Neurological: Negative for weakness and numbness.   Hematological: Does not bruise/bleed easily.   All other systems reviewed and are negative.     Physical Exam     Initial Vitals [03/28/20 1434]   BP Pulse Resp Temp SpO2   (!) 159/86 68 18 99.1 °F (37.3 °C) 96 %      MAP       --          Physical Exam  Nursing Notes and Vital Signs Reviewed.  Constitutional: Patient is in no acute distress. Well-developed and well-nourished.  Head: Atraumatic. Normocephalic.  Eyes: PERRL. EOM intact. Conjunctivae are not pale. No scleral icterus.  ENT: Mucous membranes are moist. Oropharynx is clear and symmetric.    Neck: Supple. Full ROM. No lymphadenopathy.  Cardiovascular: Regular rate. Regular rhythm. No murmurs, rubs, or gallops. Distal pulses are 2+ and symmetric.  Pulmonary/Chest: No respiratory distress. Clear to auscultation bilaterally. No wheezing or rales.  Abdominal: Soft and non-distended.  There is no tenderness.  No rebound, guarding, or rigidity. Good bowel sounds.  Genitourinary: No CVA tenderness  Musculoskeletal: Moves all extremities. No obvious deformities. No edema. No calf tenderness. Left mid chest wall ttp. No crepitus.   Skin: Warm and dry.  Neurological:  Alert, awake, and appropriate.  Normal speech.  No acute focal neurological deficits are appreciated.  Psychiatric: Normal affect. Good eye contact. Appropriate in content.     ED Course   Procedures  ED Vital Signs:  Vitals:    03/28/20 1434   BP: (!) 159/86   Pulse: 68   Resp: 18   Temp: 99.1 °F (37.3 °C)   TempSrc: Oral    SpO2: 96%   Weight: 130 kg (286 lb 11.3 oz)   Height: 6' (1.829 m)       Abnormal Lab Results:  Labs Reviewed   HIV 1 / 2 ANTIBODY        Imaging Results:  Imaging Results          X-Ray Ribs 2 View Left (Final result)  Result time 03/28/20 15:14:14    Final result by Ottoniel North Jr., MD (03/28/20 15:14:14)                 Impression:      Questionable fracture of the left anterior 6th rib.      Electronically signed by: Ottoniel North Jr., MD  Date:    03/28/2020  Time:    15:14             Narrative:    EXAMINATION:  XR RIBS 2 VIEW LEFT    CLINICAL HISTORY:  Pleurodynia    TECHNIQUE:  Two views of the left ribs were performed.    COMPARISON:  None.    FINDINGS:  Slight offset of the cortex on one view of the anterolateral 6th rib.  Remaining ribs are intact.  Left lung is clear.                               X-Ray Chest PA And Lateral (Final result)  Result time 03/28/20 15:18:02    Final result by Ottoniel North Jr., MD (03/28/20 15:18:02)                 Impression:      1. No acute chest findings.  2.  Large calcified subcarinal lymph nodes likely from previous granulomatous infection.      Electronically signed by: Ottoniel North Jr., MD  Date:    03/28/2020  Time:    15:18             Narrative:    EXAMINATION:  XR CHEST PA AND LATERAL    CLINICAL HISTORY:  Pleurodynia    COMPARISON:  None    FINDINGS:  Scarring in the left lower lobe with likely chronic blunting left costophrenic angle.  Remaining lungs are clear.  Cardiac silhouette is normal in size.  Large calcified subcarinal lymph node may be from previous granulomatous infection..  The bones and remaining soft tissues are within normal limits.  No effusion or pneumothorax.                                    The Emergency Provider reviewed the vital signs and test results, which are outlined above.     ED Discussion     3:36 PM: Reassessed pt at this time. Discussed with pt all pertinent ED information and results. Discussed pt dx and plan of tx.  Gave pt all f/u and return to the ED instructions. All questions and concerns were addressed at this time. Pt expresses understanding of information and instructions, and is comfortable with plan to discharge. Pt is stable for discharge.    I discussed with patient and/or family/caretaker that evaluation in the ED does not suggest any emergent or life threatening medical conditions requiring immediate intervention beyond what was provided in the ED, and I believe patient is safe for discharge.  Regardless, an unremarkable evaluation in the ED does not preclude the development or presence of a serious of life threatening condition. As such, patient was instructed to return immediately for any worsening or change in current symptoms.    Pt provided with Incentive spirometer. Instructions provided and pt verbalized understanding      Medical Decision Making:   Clinical Tests:   Radiological Study: Reviewed and Ordered           ED Medication(s):  Medications - No data to display    New Prescriptions    CYCLOBENZAPRINE (FLEXERIL) 10 MG TABLET    Take 1 tablet (10 mg total) by mouth 3 (three) times daily as needed.    DICLOFENAC (VOLTAREN) 50 MG EC TABLET    Take 1 tablet (50 mg total) by mouth 2 (two) times daily. for 5 days    TRAMADOL (ULTRAM) 50 MG TABLET    Take 1 tablet (50 mg total) by mouth every 6 (six) hours as needed.       Follow-up Information     SHAHEEN Jaime.    Specialty:  Family Medicine  Why:  As needed  Contact information:  [id number] [facility] MEDICINE & AFTER HOURS  [facility] [address] [phone]                       Scribe Attestation:   Scribe #1: I performed the above scribed service and the documentation accurately describes the services I performed. I attest to the accuracy of the note.     Attending:   Physician Attestation Statement for Scribe #1: I, James Mcdonnell NP, personally performed the services described in this documentation, as scribed by Moon  Nola, in my presence, and it is both accurate and complete.           Clinical Impression       ICD-10-CM ICD-9-CM   1. Muscle strain of chest wall, initial encounter S29.011A 848.8   2. Rib pain R07.81 786.50   3. Closed fracture of one rib of left side, initial encounter S22.32XA 807.01       Disposition:   Disposition: Discharged  Condition: Stable       James Mcdonnell NP  03/28/20 0399

## 2020-09-09 ENCOUNTER — TELEPHONE (OUTPATIENT)
Dept: TRANSPLANT | Facility: CLINIC | Age: 45
End: 2020-09-09

## 2020-09-09 NOTE — TELEPHONE ENCOUNTER
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    Referral completed and forwarded to [facility].          Insurance: [employer] in EPIC

## 2020-09-10 ENCOUNTER — TELEPHONE (OUTPATIENT)
Dept: TRANSPLANT | Facility: CLINIC | Age: 45
End: 2020-09-10

## 2020-09-10 NOTE — TELEPHONE ENCOUNTER
----- Message from Luis Thurston sent at 9/10/2020  2:15 PM CDT -----    Returned call to pt to let him know that Dr. Titus would like him to start liver txp work up asap. He said that he will call his SO and will let me know. Also told him that per his insur co he will need to get est with a dentist office. Per pt sobriety date as of 8/4/2020.  .  ----- Message -----  From: Stephane Rivero  Sent: 9/10/2020   1:48 PM CDT  To: [facility] Liver Referral Pool    Pt returning missed call          [phone] (M)

## 2020-09-11 ENCOUNTER — TELEPHONE (OUTPATIENT)
Dept: GASTROENTEROLOGY | Facility: CLINIC | Age: 45
End: 2020-09-11

## 2020-09-11 NOTE — TELEPHONE ENCOUNTER
----- Message from Rhea Davis sent at 9/11/2020  8:49 AM CDT -----  Regarding: RE: Appointment with Dr. Titus  Early October. She sent him for urgent eval from [facility] with MELD  37. He hasn't scheduled yet for full testing he is calling back to give dates.  Schedule him, and if we need something sooner,I am sure we can get him in with her.   ----- Message -----  From: Vee Friedman LPN  Sent: 9/10/2020   2:50 PM CDT  To: Rhea Davis  Subject: Appointment with Dr. Tacho Wilkerson,    I hope that your day has been going well.    I see that you have been speaking with patient. How soon do I need to schedule a clinic appointment for patient to be seen by Dr. Titus?    Is October too far? If so, I will see where we can fit him in.    Thanks  ----- Message -----  From: Luis Thurston  Sent: 9/10/2020   2:23 PM CDT  To: Tacho YEAGER Staff    This is a pt of Dr. Titus at [facility] that she would like to start liver txp work up. He needs an appt to see her. Can yall get him vivek?

## 2020-09-15 DIAGNOSIS — Z76.82 ORGAN TRANSPLANT CANDIDATE: Primary | ICD-10-CM

## 2020-09-15 DIAGNOSIS — K76.0 FATTY LIVER: ICD-10-CM

## 2020-09-15 NOTE — PROGRESS NOTES
Pt called again re need to set up testing  Phone stated call can not be complete at this time.     lvm on brother's phone.       Pt needs to be booked for stat eval.

## 2020-09-16 ENCOUNTER — TELEPHONE (OUTPATIENT)
Dept: TRANSPLANT | Facility: CLINIC | Age: 45
End: 2020-09-16

## 2020-09-17 ENCOUNTER — TELEPHONE (OUTPATIENT)
Dept: TRANSPLANT | Facility: CLINIC | Age: 45
End: 2020-09-17

## 2020-09-17 NOTE — TELEPHONE ENCOUNTER
----- Message from Luis Thurston sent at 9/17/2020  5:09 PM CDT -----    Message sent to addiction psychiatry for an appt re: liver txp work up.  .

## 2020-09-18 NOTE — PLAN OF CARE
(Physician in Lead of Transfers)   Outside Transfer Acceptance Note / Regional Referral Center    Name: Jordan Altman    Transferring Physician:     Accepting Physician: ELVIA Matson MD    Date of Acceptance:  September 18, 2020     Transferring Facility: [facility]    Destination Facility and Admitting Physician:  [facility]/Med-tele    Reason for Transfer:  Needs hepatology evaluation (not available at current facility)    Report from Transferring Physician/Hospital course:  45-year-old male with a history of alcoholic cirrhosis and hypertension who presented to the emergency department on September 17 with shortness of breath.  He had an admission in August of this year for alcoholic hepatitis and another admission August 25-September 1 with decompensated cirrhosis complicated by peritonitis.  The day prior to presentation he developed an acute onset of shortness of breath associated with diffuse abdominal pain worse in the right upper quadrant.  He developed increasing abdominal swelling.  This was associated with nausea and vomiting.  He had no confusion and no chest pain.  He has been taking lactulose, Lasix, and spironolactone.  He was admitted with decompensated cirrhosis, acute kidney injury, hyponatremia, thrombocytopenia, anemia, and metabolic acidosis.  Labs on September 17 noted white blood cell count 14.6, hemoglobin 12.7, hematocrit 39.1, platelet count 82, , ALT 69, total bilirubin 34.5, INR 2.3, sodium 126, potassium 4.1, CO2 18, chloride 93, BUN 40, creatinine 2.03.  Chest x-ray on September 17 showed no airspace disease detected.  No pleural effusion or pneumothorax identified.  Mildly diminished lung volumes.  EKG had no ischemic changes noted.  He was seen by Gastroenterology during his stay.  They felt that there were multiple possible etiologies for the right upper quadrant pain and dyspnea.  There was concern for possible hepatorenal  syndrome, and he was treated with albumin.  His MELD score noted on their consultation was 37. He is currently on antibiotics. [facility] spoke with Dr. Montanez. Hospital Medicine was asked to admit.    Labs today included , ALT 50, total bilirubin 31.3, BUN 46, creatinine 1.93, glucose 68, sodium 125, potassium 4.8, CO2 15, chloride 95, phosphorus 4.4.    He is currently stable in a med/surg bed.  The sending physician felt the patient was stable for transfer.    VS:  Temperature 97.3°, pulse 98, blood pressure 119/60, oxygen saturation 97%    Labs: see above, COVID negative on September 17 (visible in care everywhere)    Radiographs: see above and results in Care Everywhere    To Do List: Admit to   Recheck labs to follow renal function, electrolytes, liver enzymes, coagulation status and cell counts  Consult hepatology      Upon patient arrival to floor, please send SecureChat to [facility] or call extension [phone] (if no answer, this will flip to a beeper, so enter your call back number) for Hospital Medicine admit team assignment and for additional admit orders for the patient.  Do not page the attending physician associated with the patient on arrival (this physician may not be on duty at the time of arrival).  Rather, always call [phone] to reach the triage physician for orders and team assignment.      ELVIA Matson MD  Hospital Medicine Staff  Cell: [phone]

## 2020-09-19 ENCOUNTER — HOSPITAL ENCOUNTER (INPATIENT)
Facility: HOSPITAL | Age: 45
LOS: 13 days | Discharge: HOME | DRG: 432 | End: 2020-10-02
Attending: INTERNAL MEDICINE | Admitting: INTERNAL MEDICINE
Payer: MEDICAID

## 2020-09-19 DIAGNOSIS — Z01.818 ENCOUNTER FOR PRE-TRANSPLANT EVALUATION FOR LIVER TRANSPLANT: ICD-10-CM

## 2020-09-19 DIAGNOSIS — R07.9 CHEST PAIN: ICD-10-CM

## 2020-09-19 DIAGNOSIS — K80.50 CHOLEDOCHOLITHIASIS: ICD-10-CM

## 2020-09-19 DIAGNOSIS — K72.90 LIVER FAILURE: ICD-10-CM

## 2020-09-19 DIAGNOSIS — F10.20 ALCOHOL USE DISORDER, SEVERE, DEPENDENCE: Chronic | ICD-10-CM

## 2020-09-19 DIAGNOSIS — Z76.82 ORGAN TRANSPLANT CANDIDATE: ICD-10-CM

## 2020-09-19 DIAGNOSIS — K74.60 DECOMPENSATED HEPATIC CIRRHOSIS: ICD-10-CM

## 2020-09-19 DIAGNOSIS — Z01.818 PRE-TRANSPLANT EVALUATION FOR LIVER TRANSPLANT: ICD-10-CM

## 2020-09-19 DIAGNOSIS — K70.31 ASCITES DUE TO ALCOHOLIC CIRRHOSIS: ICD-10-CM

## 2020-09-19 DIAGNOSIS — I27.20 PULMONARY HTN: Primary | ICD-10-CM

## 2020-09-19 DIAGNOSIS — K72.90 DECOMPENSATED HEPATIC CIRRHOSIS: ICD-10-CM

## 2020-09-19 PROBLEM — K76.82 HEPATIC ENCEPHALOPATHY: Status: ACTIVE | Noted: 2020-09-19

## 2020-09-19 PROBLEM — E87.1 HYPONATREMIA: Status: ACTIVE | Noted: 2020-09-19

## 2020-09-19 PROBLEM — N17.9 AKI (ACUTE KIDNEY INJURY): Status: ACTIVE | Noted: 2020-09-19

## 2020-09-19 PROBLEM — D69.6 THROMBOCYTOPENIA: Status: ACTIVE | Noted: 2020-09-19

## 2020-09-19 PROBLEM — D68.9 COAGULOPATHY: Status: ACTIVE | Noted: 2020-09-19

## 2020-09-19 PROBLEM — K76.6 PORTAL HYPERTENSION: Status: ACTIVE | Noted: 2020-09-19

## 2020-09-19 LAB
25(OH)D3+25(OH)D2 SERPL-MCNC: 38 NG/ML (ref 30–96)
A1AT SERPL-MCNC: 92 MG/DL (ref 100–190)
ABO + RH BLD: NORMAL
AFP SERPL-MCNC: 1.4 NG/ML (ref 0–8.4)
ALBUMIN SERPL BCP-MCNC: 3.7 G/DL (ref 3.5–5.2)
ALP SERPL-CCNC: 127 U/L (ref 55–135)
ALT SERPL W/O P-5'-P-CCNC: 41 U/L (ref 10–44)
AMMONIA PLAS-SCNC: 58 UMOL/L (ref 10–50)
AMPHET+METHAMPHET UR QL: NEGATIVE
ANION GAP SERPL CALC-SCNC: 10 MMOL/L (ref 8–16)
ANION GAP SERPL CALC-SCNC: 12 MMOL/L (ref 8–16)
AST SERPL-CCNC: 85 U/L (ref 10–40)
BARBITURATES UR QL SCN>200 NG/ML: NEGATIVE
BASOPHILS # BLD AUTO: 0.03 K/UL (ref 0–0.2)
BASOPHILS NFR BLD: 0.3 % (ref 0–1.9)
BENZODIAZ UR QL SCN>200 NG/ML: NEGATIVE
BILIRUB SERPL-MCNC: 28.6 MG/DL (ref 0.1–1)
BILIRUB UR QL STRIP: ABNORMAL
BLD GP AB SCN CELLS X3 SERPL QL: NORMAL
BUN SERPL-MCNC: 60 MG/DL (ref 6–20)
BUN SERPL-MCNC: 62 MG/DL (ref 6–20)
BZE UR QL SCN: NEGATIVE
CALCIUM SERPL-MCNC: 8.6 MG/DL (ref 8.7–10.5)
CALCIUM SERPL-MCNC: 8.9 MG/DL (ref 8.7–10.5)
CANNABINOIDS UR QL SCN: NEGATIVE
CERULOPLASMIN SERPL-MCNC: 15 MG/DL (ref 15–45)
CHLORIDE SERPL-SCNC: 93 MMOL/L (ref 95–110)
CHLORIDE SERPL-SCNC: 95 MMOL/L (ref 95–110)
CLARITY UR REFRACT.AUTO: CLEAR
CO2 SERPL-SCNC: 18 MMOL/L (ref 23–29)
CO2 SERPL-SCNC: 20 MMOL/L (ref 23–29)
COLOR UR AUTO: ABNORMAL
CREAT SERPL-MCNC: 2.3 MG/DL (ref 0.5–1.4)
CREAT SERPL-MCNC: 2.4 MG/DL (ref 0.5–1.4)
CREAT UR-MCNC: 95 MG/DL (ref 23–375)
DIFFERENTIAL METHOD: ABNORMAL
EOSINOPHIL # BLD AUTO: 0.2 K/UL (ref 0–0.5)
EOSINOPHIL NFR BLD: 1.9 % (ref 0–8)
ERYTHROCYTE [DISTWIDTH] IN BLOOD BY AUTOMATED COUNT: 16.9 % (ref 11.5–14.5)
EST. GFR  (AFRICAN AMERICAN): 35.8 ML/MIN/1.73 M^2
EST. GFR  (AFRICAN AMERICAN): 37.7 ML/MIN/1.73 M^2
EST. GFR  (NON AFRICAN AMERICAN): 31 ML/MIN/1.73 M^2
EST. GFR  (NON AFRICAN AMERICAN): 32.6 ML/MIN/1.73 M^2
ETHANOL UR-MCNC: <10 MG/DL
FERRITIN SERPL-MCNC: 2907 NG/ML (ref 20–300)
GLUCOSE SERPL-MCNC: 111 MG/DL (ref 70–110)
GLUCOSE SERPL-MCNC: 81 MG/DL (ref 70–110)
GLUCOSE UR QL STRIP: NEGATIVE
HCT VFR BLD AUTO: 26 % (ref 40–54)
HGB BLD-MCNC: 9 G/DL (ref 14–18)
HGB UR QL STRIP: NEGATIVE
IMM GRANULOCYTES # BLD AUTO: 0.16 K/UL (ref 0–0.04)
IMM GRANULOCYTES NFR BLD AUTO: 1.7 % (ref 0–0.5)
INR PPP: 2.4 (ref 0.8–1.2)
KETONES UR QL STRIP: NEGATIVE
LEUKOCYTE ESTERASE UR QL STRIP: NEGATIVE
LIPASE SERPL-CCNC: 63 U/L (ref 4–60)
LYMPHOCYTES # BLD AUTO: 0.9 K/UL (ref 1–4.8)
LYMPHOCYTES NFR BLD: 9.3 % (ref 18–48)
MAGNESIUM SERPL-MCNC: 2.2 MG/DL (ref 1.6–2.6)
MCH RBC QN AUTO: 35 PG (ref 27–31)
MCHC RBC AUTO-ENTMCNC: 34.6 G/DL (ref 32–36)
MCV RBC AUTO: 101 FL (ref 82–98)
METHADONE UR QL SCN>300 NG/ML: NEGATIVE
MONOCYTES # BLD AUTO: 1.2 K/UL (ref 0.3–1)
MONOCYTES NFR BLD: 13 % (ref 4–15)
NEUTROPHILS # BLD AUTO: 6.8 K/UL (ref 1.8–7.7)
NEUTROPHILS NFR BLD: 73.8 % (ref 38–73)
NITRITE UR QL STRIP: NEGATIVE
NRBC BLD-RTO: 0 /100 WBC
OPIATES UR QL SCN: NORMAL
OSMOLALITY SERPL: 277 MOSM/KG (ref 280–300)
OSMOLALITY SERPL: 278 MOSM/KG (ref 280–300)
OSMOLALITY UR: 274 MOSM/KG (ref 50–1200)
PCP UR QL SCN>25 NG/ML: NEGATIVE
PH UR STRIP: 5 [PH] (ref 5–8)
PHOSPHATE SERPL-MCNC: 3.6 MG/DL (ref 2.7–4.5)
PLATELET # BLD AUTO: 42 K/UL (ref 150–350)
PMV BLD AUTO: 11.1 FL (ref 9.2–12.9)
POTASSIUM SERPL-SCNC: 4 MMOL/L (ref 3.5–5.1)
POTASSIUM SERPL-SCNC: 4.1 MMOL/L (ref 3.5–5.1)
PROCALCITONIN SERPL IA-MCNC: 1.76 NG/ML
PROT SERPL-MCNC: 5.5 G/DL (ref 6–8.4)
PROT UR QL STRIP: NEGATIVE
PROTHROMBIN TIME: 25.8 SEC (ref 9–12.5)
RBC # BLD AUTO: 2.57 M/UL (ref 4.6–6.2)
RPR SER QL: NORMAL
SODIUM SERPL-SCNC: 123 MMOL/L (ref 136–145)
SODIUM SERPL-SCNC: 125 MMOL/L (ref 136–145)
SODIUM UR-SCNC: 22 MMOL/L (ref 20–250)
SP GR UR STRIP: 1.01 (ref 1–1.03)
T4 FREE SERPL-MCNC: 0.82 NG/DL (ref 0.71–1.51)
TOXICOLOGY INFORMATION: NORMAL
TSH SERPL DL<=0.005 MIU/L-ACNC: 0.94 UIU/ML (ref 0.4–4)
URN SPEC COLLECT METH UR: ABNORMAL
VIT B12 SERPL-MCNC: >2000 PG/ML (ref 210–950)
WBC # BLD AUTO: 9.17 K/UL (ref 3.9–12.7)

## 2020-09-19 PROCEDURE — 99233 PR SUBSEQUENT HOSPITAL CARE,LEVL III: ICD-10-PCS | Mod: NTX,,, | Performed by: INTERNAL MEDICINE

## 2020-09-19 PROCEDURE — 82140 ASSAY OF AMMONIA: CPT | Mod: NTX

## 2020-09-19 PROCEDURE — 84100 ASSAY OF PHOSPHORUS: CPT | Mod: NTX

## 2020-09-19 PROCEDURE — 85025 COMPLETE CBC W/AUTO DIFF WBC: CPT | Mod: NTX

## 2020-09-19 PROCEDURE — 83930 ASSAY OF BLOOD OSMOLALITY: CPT | Mod: NTX

## 2020-09-19 PROCEDURE — 87040 BLOOD CULTURE FOR BACTERIA: CPT | Mod: 59,NTX

## 2020-09-19 PROCEDURE — 25000003 PHARM REV CODE 250: Mod: NTX | Performed by: HOSPITALIST

## 2020-09-19 PROCEDURE — 86235 NUCLEAR ANTIGEN ANTIBODY: CPT | Mod: NTX

## 2020-09-19 PROCEDURE — 82728 ASSAY OF FERRITIN: CPT | Mod: NTX

## 2020-09-19 PROCEDURE — 87799 DETECT AGENT NOS DNA QUANT: CPT | Mod: NTX

## 2020-09-19 PROCEDURE — 82607 VITAMIN B-12: CPT | Mod: NTX

## 2020-09-19 PROCEDURE — 80053 COMPREHEN METABOLIC PANEL: CPT | Mod: NTX

## 2020-09-19 PROCEDURE — 81003 URINALYSIS AUTO W/O SCOPE: CPT | Mod: NTX

## 2020-09-19 PROCEDURE — 82105 ALPHA-FETOPROTEIN SERUM: CPT | Mod: NTX

## 2020-09-19 PROCEDURE — 83735 ASSAY OF MAGNESIUM: CPT | Mod: NTX

## 2020-09-19 PROCEDURE — 82103 ALPHA-1-ANTITRYPSIN TOTAL: CPT | Mod: NTX

## 2020-09-19 PROCEDURE — P9047 ALBUMIN (HUMAN), 25%, 50ML: HCPCS | Mod: JG,NTX | Performed by: HOSPITALIST

## 2020-09-19 PROCEDURE — 80074 ACUTE HEPATITIS PANEL: CPT | Mod: NTX

## 2020-09-19 PROCEDURE — 63600175 PHARM REV CODE 636 W HCPCS: Mod: NTX | Performed by: HOSPITALIST

## 2020-09-19 PROCEDURE — 83935 ASSAY OF URINE OSMOLALITY: CPT | Mod: NTX

## 2020-09-19 PROCEDURE — 20600001 HC STEP DOWN PRIVATE ROOM: Mod: NTX

## 2020-09-19 PROCEDURE — 85610 PROTHROMBIN TIME: CPT | Mod: NTX

## 2020-09-19 PROCEDURE — 86905 BLOOD TYPING RBC ANTIGENS: CPT | Mod: NTX

## 2020-09-19 PROCEDURE — 86901 BLOOD TYPING SEROLOGIC RH(D): CPT | Mod: NTX

## 2020-09-19 PROCEDURE — 80307 DRUG TEST PRSMV CHEM ANLYZR: CPT | Mod: NTX

## 2020-09-19 PROCEDURE — 86703 HIV-1/HIV-2 1 RESULT ANTBDY: CPT | Mod: NTX

## 2020-09-19 PROCEDURE — 36415 COLL VENOUS BLD VENIPUNCTURE: CPT | Mod: NTX

## 2020-09-19 PROCEDURE — 82306 VITAMIN D 25 HYDROXY: CPT | Mod: NTX

## 2020-09-19 PROCEDURE — 80321 ALCOHOLS BIOMARKERS 1OR 2: CPT | Mod: NTX

## 2020-09-19 PROCEDURE — 99223 1ST HOSP IP/OBS HIGH 75: CPT | Mod: NTX,,, | Performed by: HOSPITALIST

## 2020-09-19 PROCEDURE — 99233 SBSQ HOSP IP/OBS HIGH 50: CPT | Mod: NTX,,, | Performed by: INTERNAL MEDICINE

## 2020-09-19 PROCEDURE — 84439 ASSAY OF FREE THYROXINE: CPT | Mod: NTX

## 2020-09-19 PROCEDURE — 83690 ASSAY OF LIPASE: CPT | Mod: NTX

## 2020-09-19 PROCEDURE — 86592 SYPHILIS TEST NON-TREP QUAL: CPT | Mod: NTX

## 2020-09-19 PROCEDURE — 82390 ASSAY OF CERULOPLASMIN: CPT | Mod: NTX

## 2020-09-19 PROCEDURE — 84300 ASSAY OF URINE SODIUM: CPT | Mod: NTX

## 2020-09-19 PROCEDURE — 86256 FLUORESCENT ANTIBODY TITER: CPT | Mod: 91,NTX

## 2020-09-19 PROCEDURE — 99223 PR INITIAL HOSPITAL CARE,LEVL III: ICD-10-PCS | Mod: NTX,,, | Performed by: HOSPITALIST

## 2020-09-19 PROCEDURE — 80048 BASIC METABOLIC PNL TOTAL CA: CPT | Mod: NTX

## 2020-09-19 PROCEDURE — 84145 PROCALCITONIN (PCT): CPT | Mod: NTX

## 2020-09-19 PROCEDURE — 84443 ASSAY THYROID STIM HORMONE: CPT | Mod: NTX

## 2020-09-19 RX ORDER — SODIUM CHLORIDE 0.9 % (FLUSH) 0.9 %
10 SYRINGE (ML) INJECTION
Status: DISCONTINUED | OUTPATIENT
Start: 2020-09-19 | End: 2020-09-29

## 2020-09-19 RX ORDER — IBUPROFEN 200 MG
24 TABLET ORAL
Status: DISCONTINUED | OUTPATIENT
Start: 2020-09-19 | End: 2020-10-02 | Stop reason: HOSPADM

## 2020-09-19 RX ORDER — IBUPROFEN 200 MG
16 TABLET ORAL
Status: DISCONTINUED | OUTPATIENT
Start: 2020-09-19 | End: 2020-10-02 | Stop reason: HOSPADM

## 2020-09-19 RX ORDER — NADOLOL 20 MG/1
40 TABLET ORAL DAILY
Status: DISCONTINUED | OUTPATIENT
Start: 2020-09-19 | End: 2020-09-19

## 2020-09-19 RX ORDER — GLUCAGON 1 MG
1 KIT INJECTION
Status: DISCONTINUED | OUTPATIENT
Start: 2020-09-19 | End: 2020-10-02 | Stop reason: HOSPADM

## 2020-09-19 RX ORDER — ONDANSETRON 2 MG/ML
4 INJECTION INTRAMUSCULAR; INTRAVENOUS EVERY 8 HOURS PRN
Status: DISCONTINUED | OUTPATIENT
Start: 2020-09-19 | End: 2020-10-02 | Stop reason: HOSPADM

## 2020-09-19 RX ORDER — ALBUMIN HUMAN 250 G/1000ML
25 SOLUTION INTRAVENOUS EVERY 8 HOURS
Status: COMPLETED | OUTPATIENT
Start: 2020-09-19 | End: 2020-09-20

## 2020-09-19 RX ORDER — FOLIC ACID 1 MG/1
1 TABLET ORAL DAILY
Status: DISCONTINUED | OUTPATIENT
Start: 2020-09-19 | End: 2020-10-02 | Stop reason: HOSPADM

## 2020-09-19 RX ORDER — CEFTRIAXONE 1 G/1
1 INJECTION, POWDER, FOR SOLUTION INTRAMUSCULAR; INTRAVENOUS
Status: DISCONTINUED | OUTPATIENT
Start: 2020-09-19 | End: 2020-09-20

## 2020-09-19 RX ORDER — ACETAMINOPHEN 500 MG
500 TABLET ORAL EVERY 6 HOURS PRN
Status: DISCONTINUED | OUTPATIENT
Start: 2020-09-19 | End: 2020-09-28

## 2020-09-19 RX ORDER — THIAMINE HCL 100 MG
100 TABLET ORAL DAILY
Status: DISCONTINUED | OUTPATIENT
Start: 2020-09-19 | End: 2020-10-02 | Stop reason: HOSPADM

## 2020-09-19 RX ORDER — IPRATROPIUM BROMIDE AND ALBUTEROL SULFATE 2.5; .5 MG/3ML; MG/3ML
3 SOLUTION RESPIRATORY (INHALATION) EVERY 4 HOURS PRN
Status: DISCONTINUED | OUTPATIENT
Start: 2020-09-19 | End: 2020-10-02 | Stop reason: HOSPADM

## 2020-09-19 RX ORDER — LACTULOSE 10 G/15ML
20 SOLUTION ORAL 3 TIMES DAILY
Status: DISCONTINUED | OUTPATIENT
Start: 2020-09-19 | End: 2020-09-23

## 2020-09-19 RX ORDER — OXYCODONE HYDROCHLORIDE 5 MG/1
5 TABLET ORAL EVERY 4 HOURS PRN
Status: DISCONTINUED | OUTPATIENT
Start: 2020-09-19 | End: 2020-09-26

## 2020-09-19 RX ADMIN — SODIUM CHLORIDE 500 ML: 0.9 INJECTION, SOLUTION INTRAVENOUS at 05:09

## 2020-09-19 RX ADMIN — LACTULOSE 20 G: 20 SOLUTION ORAL at 10:09

## 2020-09-19 RX ADMIN — OXYCODONE 5 MG: 5 TABLET ORAL at 08:09

## 2020-09-19 RX ADMIN — PHYTONADIONE 10 MG: 10 INJECTION, EMULSION INTRAMUSCULAR; INTRAVENOUS; SUBCUTANEOUS at 10:09

## 2020-09-19 RX ADMIN — LACTULOSE 20 G: 20 SOLUTION ORAL at 08:09

## 2020-09-19 RX ADMIN — LACTULOSE 20 G: 20 SOLUTION ORAL at 02:09

## 2020-09-19 RX ADMIN — FOLIC ACID 1 MG: 1 TABLET ORAL at 10:09

## 2020-09-19 RX ADMIN — CEFTRIAXONE SODIUM 1 G: 1 INJECTION, POWDER, FOR SOLUTION INTRAMUSCULAR; INTRAVENOUS at 05:09

## 2020-09-19 RX ADMIN — Medication 100 MG: at 10:09

## 2020-09-19 RX ADMIN — OXYCODONE 5 MG: 5 TABLET ORAL at 02:09

## 2020-09-19 RX ADMIN — ALBUMIN (HUMAN) 25 G: 12.5 SOLUTION INTRAVENOUS at 05:09

## 2020-09-19 RX ADMIN — ALBUMIN (HUMAN) 25 G: 12.5 SOLUTION INTRAVENOUS at 10:09

## 2020-09-19 RX ADMIN — ALBUMIN (HUMAN) 25 G: 12.5 SOLUTION INTRAVENOUS at 02:09

## 2020-09-19 NOTE — HPI
Mr Altman is a 45 year old man with history of ETOH ELSD decompensations including ascites, HE, SBP (8/2020), HTN, obesity, c. Diff, who is presenting from OSH due to decompensated ESLD.    He presented to OSH ([facility]) with 3 days of abdominal distension, pain and dyspnea. Was admitted to OSH on 9/17. Workup at OSH notable for JENN, hyponatremia.  On 9/17 labs with WBC 14, Hgb 12.7, , ALT 69, Tb 34, INR 2.3, Na 126, Crt 2.0. He was started on HRS therapy and empiric meropenem at transferred to [facility] for further evaluation.    He was hospitalized 8/2020 due to alcoholic hepatitis complicated by SBP and strep mitis bacteremia. Received steroids in hospital but not continued due to SBP. He was seen for hep follow-up 9/8 (Dr. Titus) with MELD of 36. Was referred for [facility] transplant evaluation (cleared, not yet started). Seen in follow-up 9/15 and doing well, endorsed ongoing distension and pruritis. Endorsed ongoing abstinence since 7/25/20.    Reports he presented outside hospital due to 3-4 days abdominal distention, pain and dyspnea.  Endorses some confusion though has been compliant with his lactulose.  Denies any fevers chills or sweats.  Denies any overt signs of bleeding.  Endorsed ongoing absence from alcohol.    Social History  - Herbal / OTC / Online medications: Denies  - APAP: Denies  - ETOH: 1-2 pint vodka/whiskey per day since 16 years old, quit 7/25/20  - Prior ETOH-related hospitalizations: See above  - Incarcerations/Law issues due to ETOH:  DUI 9 years ago.  - ETOH Rehab/AA: Previously completed rehab  - Drug use: Denies  - Smoking: Never smoker  - Occupation:   - Live with:  Girlfriend  - Family:  1 child 21 not living with them  - Functional status: Fully autonomous prior to admission    Past Surgical History  - No history of abdominal surgeries    Family History  - No family history of liver disease    EGD (2017) no varices

## 2020-09-19 NOTE — SUBJECTIVE & OBJECTIVE
Review of Systems   Constitutional: Positive for appetite change and fatigue. Negative for activity change, chills, diaphoresis, fever and unexpected weight change.   HENT: Negative for sore throat and trouble swallowing.    Eyes: Negative for visual disturbance.   Respiratory: Negative for chest tightness and shortness of breath.    Cardiovascular: Negative for chest pain and leg swelling.   Gastrointestinal: Positive for abdominal distention. Negative for abdominal pain, anal bleeding, blood in stool, constipation, diarrhea, nausea and vomiting.   Genitourinary: Negative for dysuria and hematuria.   Musculoskeletal: Negative for arthralgias and myalgias.   Skin: Negative for rash.   Neurological: Negative for dizziness, weakness, light-headedness and headaches.   Psychiatric/Behavioral: Positive for confusion. Negative for agitation.       Past Medical History:   Diagnosis Date    Decompensated hepatic cirrhosis     Hypertension     SBP (spontaneous bacterial peritonitis)        Past Surgical History:   Procedure Laterality Date    THORACENTESIS  2011       Family history of liver disease: No    Review of patient's allergies indicates:   Allergen Reactions    Latex, natural rubber        Tobacco Use    Smoking status: Current Some Day Smoker    Smokeless tobacco: Current User   Substance and Sexual Activity    Alcohol use: Yes     Alcohol/week: 112.0 standard drinks     Types: 112 Shots of liquor per week     Comment: fifth of vodka a day. LAST DRINK 7/25/2020 per pt     Drug use: Yes     Frequency: 3.0 times per week     Types: Marijuana    Sexual activity: Yes       Medications Prior to Admission   Medication Sig Dispense Refill Last Dose    ALPRAZolam (XANAX) 1 MG tablet Take 1 mg by mouth 3 (three) times daily as needed for Anxiety.       azelastine (OPTIVAR) 0.05 % ophthalmic solution 2 drops 2 (two) times daily.       cyclobenzaprine (FLEXERIL) 10 MG tablet Take 10 mg by mouth 3 (three) times  daily as needed for Muscle spasms.       diclofenac (VOLTAREN) 0.1 % ophthalmic solution 1 drop 4 (four) times daily.       fluticasone (FLOVENT DISKUS) 50 mcg/actuation DsDv Inhale into the lungs. Controller       ketorolac (TORADOL) 10 mg tablet Take 10 mg by mouth every 6 (six) hours.       lidocaine-prilocaine (EMLA) cream Apply topically once.       losartan (COZAAR) 25 MG tablet Take 25 mg by mouth once daily.       montelukast (SINGULAIR) 10 mg tablet Take 10 mg by mouth every evening.       nadolol (CORGARD) 40 MG tablet Take 40 mg by mouth once daily.       naproxen (NAPROSYN) 500 MG tablet Take 500 mg by mouth 2 (two) times daily.       ondansetron (ZOFRAN) 4 MG tablet Take 4 mg by mouth 2 (two) times daily.       oxazepam (SERAX) 30 MG capsule Day 1: Take 1 tablet mouth every 6 hours as needed for anxiety/sweating/tremors..  Day 2:  Take 1 tablet by mouth every 8 hours as needed for anxiety/sweating/tremors.  Day 3:  Take 1 tablet by mouth every 12 hours as needed for anxiety/sweating/tremors.  Day 4:  Take 1 tablet by mouth as night. 10 capsule 0     thiamine 100 MG tablet Take 100 mg by mouth once daily.       traMADoL (ULTRAM) 50 mg tablet Take 1 tablet (50 mg total) by mouth every 6 (six) hours as needed. 12 tablet 0        Objective:     Vital Signs (Most Recent):  Temp: 97.9 °F (36.6 °C) (09/19/20 1131)  Pulse: 91 (09/19/20 1131)  Resp: 16 (09/19/20 1131)  BP: (!) 125/56 (09/19/20 1131)  SpO2: 96 % (09/19/20 1131) Vital Signs (24h Range):  Temp:  [97.8 °F (36.6 °C)-98.7 °F (37.1 °C)] 97.9 °F (36.6 °C)  Pulse:  [88-95] 91  Resp:  [16-20] 16  SpO2:  [90 %-97 %] 96 %  BP: (109-136)/(53-80) 125/56     Weight: 120.2 kg (264 lb 15.9 oz) (09/19/20 0050)  Body mass index is 35.94 kg/m².    Physical Exam  Vitals signs and nursing note reviewed.   Constitutional:       General: He is not in acute distress.     Appearance: He is not diaphoretic.      Comments: Calm and cooperative, no distress.    HENT:      Head: Normocephalic and atraumatic.      Mouth/Throat:      Pharynx: No oropharyngeal exudate.   Eyes:      General: Scleral icterus present.      Conjunctiva/sclera: Conjunctivae normal.   Neck:      Vascular: No JVD.   Cardiovascular:      Rate and Rhythm: Normal rate and regular rhythm.      Heart sounds: Normal heart sounds.   Pulmonary:      Effort: Pulmonary effort is normal. No respiratory distress.      Breath sounds: Normal breath sounds.   Abdominal:      General: Bowel sounds are normal. There is distension.      Palpations: Abdomen is soft. There is no mass.      Tenderness: There is no abdominal tenderness. There is no guarding or rebound.      Comments: Distended, non-tender abdomen   Musculoskeletal:         General: No tenderness.   Lymphadenopathy:      Cervical: No cervical adenopathy.   Skin:     General: Skin is warm.      Capillary Refill: Capillary refill takes less than 2 seconds.      Coloration: Skin is jaundiced.      Comments: Many tattoos   Neurological:      Mental Status: He is alert and oriented to person, place, and time.      Comments: No asterixis   Psychiatric:         Behavior: Behavior normal.         Thought Content: Thought content normal.         Judgment: Judgment normal.         MELD-Na score: 38 at 9/19/2020  1:39 AM  MELD score: 37 at 9/19/2020  1:39 AM  Calculated from:  Serum Creatinine: 2.4 mg/dL at 9/19/2020  1:38 AM  Serum Sodium: 123 mmol/L (Rounded to 125 mmol/L) at 9/19/2020  1:38 AM  Total Bilirubin: 28.6 mg/dL at 9/19/2020  1:38 AM  INR(ratio): 2.4 at 9/19/2020  1:39 AM  Age: 45 years 4 months    Significant Labs:  Labs within the past month have been reviewed.    Significant Imaging:  Labs: Reviewed

## 2020-09-19 NOTE — Clinical Note
The PA catheter is repositioned to the main pulmonary artery. Hemodynamics performed. O2 saturation measured.

## 2020-09-19 NOTE — CONSULTS
[facility]  Hepatology  Consult Note    Patient Name: Jordan Altman  MRN: [id number]  Admission Date: 9/19/2020  Hospital Length of Stay: 0 days  Attending Provider: Lise Gonsalez MD   Primary Care Physician: SHAHEEN Jaime  Principal Problem:Decompensated hepatic cirrhosis    Inpatient consult to Hepatology  Consult performed by: Isauro Case MD  Consult ordered by: Arslan Patterson,         Subjective:     Transplant status: No    HPI:  Mr Altman is a 45 year old man with history of ETOH ELSD decompensations including ascites, HE, SBP (8/2020), HTN, obesity, c. Diff, who is presenting from OSH due to decompensated ESLD.    He presented to OSH ([facility]) with 3 days of abdominal distension, pain and dyspnea. Was admitted to OSH on 9/17. Workup at OSH notable for JENN, hyponatremia.  On 9/17 labs with WBC 14, Hgb 12.7, , ALT 69, Tb 34, INR 2.3, Na 126, Crt 2.0. He was started on HRS therapy and empiric meropenem at transferred [place] [facility] [place].    He was hospitalized 8/2020 due to alcoholic hepatitis complicated by SBP and strep mitis bacteremia. Received steroids in hospital but not continued due to SBP. He was seen for hep follow-up 9/8 (Dr. Titus) with MELD of 36. Was referred for [facility] [place] evaluation (cleared, not yet started). Seen in follow-up 9/15 and doing well, endorsed ongoing distension and pruritis. Endorsed ongoing abstinence since 7/25/20.    Reports he presented outside hospital due to 3-4 days abdominal distention, pain and dyspnea.  Endorses some confusion though has been compliant with his lactulose.  Denies any fevers chills or sweats.  Denies any overt signs of bleeding.  Endorsed ongoing absence from alcohol.    Social History  - Herbal / OTC / Online medications: Denies  - APAP: Denies  - ETOH: 1-2 pint vodka/whiskey per day since 16 years old, quit 7/25/20  - Prior ETOH-related hospitalizations: See above  -  Incarcerations/Law issues due to ETOH:  DUI 9 years ago.  - ETOH Rehab/AA: Previously completed rehab  - Drug use: Denies  - Smoking: Never smoker  - Occupation:   - Live with:  Girlfriend  - Family:  1 child 21 not living with them  - Functional status: Fully autonomous prior to admission    Past Surgical History  - No history of abdominal surgeries    Family History  - No family history of liver disease    EGD (2017) no varices      Review of Systems   Constitutional: Positive for appetite change and fatigue. Negative for activity change, chills, diaphoresis, fever and unexpected weight change.   HENT: Negative for sore throat and trouble swallowing.    Eyes: Negative for visual disturbance.   Respiratory: Negative for chest tightness and shortness of breath.    Cardiovascular: Negative for chest pain and leg swelling.   Gastrointestinal: Positive for abdominal distention. Negative for abdominal pain, anal bleeding, blood in stool, constipation, diarrhea, nausea and vomiting.   Genitourinary: Negative for dysuria and hematuria.   Musculoskeletal: Negative for arthralgias and myalgias.   Skin: Negative for rash.   Neurological: Negative for dizziness, weakness, light-headedness and headaches.   Psychiatric/Behavioral: Positive for confusion. Negative for agitation.       Past Medical History:   Diagnosis Date    Decompensated hepatic cirrhosis     Hypertension     SBP (spontaneous bacterial peritonitis)        Past Surgical History:   Procedure Laterality Date    THORACENTESIS  2011       Family history of liver disease: No    Review of patient's allergies indicates:   Allergen Reactions    Latex, natural rubber        Tobacco Use    Smoking status: Current Some Day Smoker    Smokeless tobacco: Current User   Substance and Sexual Activity    Alcohol use: Yes     Alcohol/week: 112.0 standard drinks     Types: 112 Shots of liquor per week     Comment: fifth of vodka a day. LAST DRINK 7/25/2020  per pt     Drug use: Yes     Frequency: 3.0 times per week     Types: Marijuana    Sexual activity: Yes       Medications Prior to Admission   Medication Sig Dispense Refill Last Dose    ALPRAZolam (XANAX) 1 MG tablet Take 1 mg by mouth 3 (three) times daily as needed for Anxiety.       azelastine (OPTIVAR) 0.05 % ophthalmic solution 2 drops 2 (two) times daily.       cyclobenzaprine (FLEXERIL) 10 MG tablet Take 10 mg by mouth 3 (three) times daily as needed for Muscle spasms.       diclofenac (VOLTAREN) 0.1 % ophthalmic solution 1 drop 4 (four) times daily.       fluticasone (FLOVENT DISKUS) 50 mcg/actuation DsDv Inhale into the lungs. Controller       ketorolac (TORADOL) 10 mg tablet Take 10 mg by mouth every 6 (six) hours.       lidocaine-prilocaine (EMLA) cream Apply topically once.       losartan (COZAAR) 25 MG tablet Take 25 mg by mouth once daily.       montelukast (SINGULAIR) 10 mg tablet Take 10 mg by mouth every evening.       nadolol (CORGARD) 40 MG tablet Take 40 mg by mouth once daily.       naproxen (NAPROSYN) 500 MG tablet Take 500 mg by mouth 2 (two) times daily.       ondansetron (ZOFRAN) 4 MG tablet Take 4 mg by mouth 2 (two) times daily.       oxazepam (SERAX) 30 MG capsule Day 1: Take 1 tablet mouth every 6 hours as needed for anxiety/sweating/tremors..  Day 2:  Take 1 tablet by mouth every 8 hours as needed for anxiety/sweating/tremors.  Day 3:  Take 1 tablet by mouth every 12 hours as needed for anxiety/sweating/tremors.  Day 4:  Take 1 tablet by mouth as night. 10 capsule 0     thiamine 100 MG tablet Take 100 mg by mouth once daily.       traMADoL (ULTRAM) 50 mg tablet Take 1 tablet (50 mg total) by mouth every 6 (six) hours as needed. 12 tablet 0        Objective:     Vital Signs (Most Recent):  Temp: 97.9 °F (36.6 °C) (09/19/20 1131)  Pulse: 91 (09/19/20 1131)  Resp: 16 (09/19/20 1131)  BP: (!) 125/56 (09/19/20 1131)  SpO2: 96 % (09/19/20 1131) Vital Signs (24h  Range):  Temp:  [97.8 °F (36.6 °C)-98.7 °F (37.1 °C)] 97.9 °F (36.6 °C)  Pulse:  [88-95] 91  Resp:  [16-20] 16  SpO2:  [90 %-97 %] 96 %  BP: (109-136)/(53-80) 125/56     Weight: 120.2 kg (264 lb 15.9 oz) (09/19/20 0050)  Body mass index is 35.94 kg/m².    Physical Exam  Vitals signs and nursing note reviewed.   Constitutional:       General: He is not in acute distress.     Appearance: He is not diaphoretic.      Comments: Calm and cooperative, no distress.   HENT:      Head: Normocephalic and atraumatic.      Mouth/Throat:      Pharynx: No oropharyngeal exudate.   Eyes:      General: Scleral icterus present.      Conjunctiva/sclera: Conjunctivae normal.   Neck:      Vascular: No JVD.   Cardiovascular:      Rate and Rhythm: Normal rate and regular rhythm.      Heart sounds: Normal heart sounds.   Pulmonary:      Effort: Pulmonary effort is normal. No respiratory distress.      Breath sounds: Normal breath sounds.   Abdominal:      General: Bowel sounds are normal. There is distension.      Palpations: Abdomen is soft. There is no mass.      Tenderness: There is no abdominal tenderness. There is no guarding or rebound.      Comments: Distended, non-tender abdomen   Musculoskeletal:         General: No tenderness.   Lymphadenopathy:      Cervical: No cervical adenopathy.   Skin:     General: Skin is warm.      Capillary Refill: Capillary refill takes less than 2 seconds.      Coloration: Skin is jaundiced.      Comments: Many tattoos   Neurological:      Mental Status: He is alert and oriented to person, place, and time.      Comments: No asterixis   Psychiatric:         Behavior: Behavior normal.         Thought Content: Thought content normal.         Judgment: Judgment normal.         MELD-Na score: 38 at 9/19/2020  1:39 AM  MELD score: 37 at 9/19/2020  1:39 AM  Calculated from:  Serum Creatinine: 2.4 mg/dL at 9/19/2020  1:38 AM  Serum Sodium: 123 mmol/L (Rounded to 125 mmol/L) at 9/19/2020  1:38 AM  Total  Bilirubin: 28.6 mg/dL at 9/19/2020  1:38 AM  INR(ratio): 2.4 at 9/19/2020  1:39 AM  Age: 45 years 4 months    Significant Labs:  Labs within the past month have been reviewed.    Significant Imaging:  Labs: Reviewed    Assessment/Plan:     * Decompensated hepatic cirrhosis  Mr Altman is a 45 year old man with history of ETOH ELSD decompensations including ascites, HE, SBP (8/2020), HTN, obesity, c. Diff, who is presenting from OSH due to decompensated ESLD.    Recently hospitalized (8/2020) due to alc hep (last drink 7/25), complicated by SBP and bacteremia (strep mitis) presenting from OSH due to concern for abdominal distension/pain and jaundice, with MELD 38 (36 on 9/8). Will workup for reversible causes including infection and may require inpatient transplant evaluation. Endorses abstinence since 7/2020 and appears enzymes and bili (Max 42 8/12) continue to decline.    Plan  - infectious workup, empiric antibiotics  - hx of SBP. diagnostic paracentesis.  - elevated INR. Trial of vitamin K x3 days  - JENN 2.4 (1.3 8/31, baseline normal 3/2020), concern for HRS. If not improving nephro consult. HRS therapy  - TTE  - mental status appropriate, continue lactulose  - ETOH abuse. Endorses abstinence since 7/2020. Addiction psych. [facility] sent  - HCC screening. RUQ pending, AFP negative  - variceal status. Unknown, last EGD reportedly negative 2017  - transplant. Pending outpatient evaluation, will likely require inpatient evaluation          Thank you for your consult. I will follow-up with patient. Please contact us if you have any additional questions.    Isauro Case MD  Hepatology  [facility]

## 2020-09-19 NOTE — PLAN OF CARE
Problem: Adult Inpatient Plan of Care  Goal: Plan of Care Review  Outcome: Ongoing, Progressing   POC reviewed with pt. VSS. Went for ultrasound today. No acute changes. Safety maintained. Will continue to monitor.

## 2020-09-19 NOTE — ASSESSMENT & PLAN NOTE
Mr Agapito is a 45 year old man with history of ETOH ELSD decompensations including ascites, HE, SBP (8/2020), HTN, obesity, c. Diff, who is presenting from OSH due to decompensated ESLD.    Recently hospitalized (8/2020) due to alc hep (last drink 7/25), complicated by SBP and bacteremia (strep mitis) presenting from OSH due to concern for abdominal distension/pain and jaundice, with MELD 38 (36 on 9/8). Will workup for reversible causes including infection and may require inpatient transplant evaluation. Endorses abstinence since 7/2020 and appears enzymes and bili (Max 42 8/12) continue to decline.    Plan  - infectious workup, empiric antibiotics  - hx of SBP. diagnostic paracentesis.  - elevated INR. Trial of vitamin K x3 days  - JENN 2.4 (1.3 8/31, baseline normal 3/2020), concern for HRS. If not improving nephro consult. HRS therapy  - TTE  - mental status appropriate, continue lactulose  - ETOH abuse. Endorses abstinence since 7/2020. Addiction psych. [facility] sent  - HCC screening. RUQ pending, AFP negative  - variceal status. Unknown, last EGD reportedly negative 2017  - transplant. Pending outpatient evaluation, will likely require inpatient evaluation

## 2020-09-19 NOTE — H&P
[facility]    History & Physical      Patient Name: Jordan Altman  MRN: [id number]  Admission Date: 9/19/2020  Attending Physician: Lise Gonsalez MD   Primary Care Provider: Zohra Walters, [facility] Medicine Team: Networked reference to record PCT  Arslan Patterson DO     Patient information was obtained from patient and ER records.     Subjective:     Principal Problem:Decompensated hepatic cirrhosis    Chief Complaint: No chief complaint on file.       HPI:     Mr. Altman is a 45 year old male with PMH of alcohol abuse, alcoholic cirrhosis diagnosed one year ago and complicated by ascites, portal hypertension, hepatic encephalopathy, thrombocytopenia, coagulopathy presents as a transfer from [facility] for management of acute decompensated cirrhosis. He was admitted to OSH 3 days ago with abdominal distention with pain and SOB. Patient reports he quit drinking in July of this year when he was admitted to hospital for alcoholic hepatitis. He had another admission August 25-September 1 with decompensated cirrhosis complicated by peritonitis. Patient notes 1.7 liter paracentesis last week on 9/15 and was negative for infection.     He has been taking lactulose, Lasix, and spironolactone.  He was found to have decompensated cirrhosis, acute kidney injury, hyponatremia, thrombocytopenia, anemia, and metabolic acidosis on admission.  Labs on September 17 noted white blood cell count 14.6, hemoglobin 12.7, hematocrit 39.1, platelet count 82, , ALT 69, total bilirubin 34.5, INR 2.3, sodium 126, potassium 4.1, CO2 18, chloride 93, BUN 40, creatinine 2.03. COVID negative.  Chest x-ray on September 17 showed no airspace disease or effusion. He was seen by Gastroenterology during his stay and was concern for possible hepatorenal syndrome, and he was treated with albumin. He was empirically started on meropenem.    During my interview patient has severe jaundice, appears  chronically ill but not in distress. He endorses pain across upper abdomen but denies fever, chills, chest pain, dark stool or bleeding from other sites. States he used to drink 1-2 pint of vodka, whisky daily since age 16 until July of this year. Denies tobacco, IVDU. He worked as  prior to COVID pandemic and has numerous tattoos all over his body.            Past Medical History:   Diagnosis Date    Decompensated hepatic cirrhosis     Hypertension     SBP (spontaneous bacterial peritonitis)        Past Surgical History:   Procedure Laterality Date    THORACENTESIS  2011       Review of patient's allergies indicates:   Allergen Reactions    Latex, natural rubber        Current Facility-Administered Medications on File Prior to Encounter   Medication    [DISCONTINUED] albumin human 25% bottle    [DISCONTINUED] GENERIC EXTERNAL MEDICATION    [DISCONTINUED] GENERIC EXTERNAL MEDICATION    [DISCONTINUED] GENERIC EXTERNAL MEDICATION    [DISCONTINUED] GENERIC EXTERNAL MEDICATION    [DISCONTINUED] GENERIC EXTERNAL MEDICATION    [DISCONTINUED] multivitamin with folic acid 400 mcg Tab    [DISCONTINUED] ondansetron disintegrating tablet    [DISCONTINUED] oxyCODONE immediate release tablet    [DISCONTINUED] thiamine mononitrate (vit B1) tablet     Current Outpatient Medications on File Prior to Encounter   Medication Sig    ALPRAZolam (XANAX) 1 MG tablet Take 1 mg by mouth 3 (three) times daily as needed for Anxiety.    azelastine (OPTIVAR) 0.05 % ophthalmic solution 2 drops 2 (two) times daily.    cyclobenzaprine (FLEXERIL) 10 MG tablet Take 10 mg by mouth 3 (three) times daily as needed for Muscle spasms.    diclofenac (VOLTAREN) 0.1 % ophthalmic solution 1 drop 4 (four) times daily.    fluticasone (FLOVENT DISKUS) 50 mcg/actuation DsDv Inhale into the lungs. Controller    ketorolac (TORADOL) 10 mg tablet Take 10 mg by mouth every 6 (six) hours.    lidocaine-prilocaine (EMLA) cream Apply  topically once.    losartan (COZAAR) 25 MG tablet Take 25 mg by mouth once daily.    montelukast (SINGULAIR) 10 mg tablet Take 10 mg by mouth every evening.    nadolol (CORGARD) 40 MG tablet Take 40 mg by mouth once daily.    naproxen (NAPROSYN) 500 MG tablet Take 500 mg by mouth 2 (two) times daily.    ondansetron (ZOFRAN) 4 MG tablet Take 4 mg by mouth 2 (two) times daily.    oxazepam (SERAX) 30 MG capsule Day 1: Take 1 tablet mouth every 6 hours as needed for anxiety/sweating/tremors..  Day 2:  Take 1 tablet by mouth every 8 hours as needed for anxiety/sweating/tremors.  Day 3:  Take 1 tablet by mouth every 12 hours as needed for anxiety/sweating/tremors.  Day 4:  Take 1 tablet by mouth as night.    thiamine 100 MG tablet Take 100 mg by mouth once daily.    traMADoL (ULTRAM) 50 mg tablet Take 1 tablet (50 mg total) by mouth every 6 (six) hours as needed.     Family History     Problem Relation (Age of Onset)    COPD Mother        Tobacco Use    Smoking status: Current Some Day Smoker    Smokeless tobacco: Current User   Substance and Sexual Activity    Alcohol use: Yes     Alcohol/week: 112.0 standard drinks     Types: 112 Shots of liquor per week     Comment: fifth of vodka a day. LAST DRINK 7/25/2020 per pt     Drug use: Yes     Frequency: 3.0 times per week     Types: Marijuana    Sexual activity: Yes     Review of Systems   Constitutional: Positive for fatigue and unexpected weight change (weight loss ). Negative for activity change, appetite change, chills, diaphoresis and fever.   HENT: Negative for congestion, dental problem, drooling, ear discharge, ear pain, facial swelling, hearing loss, mouth sores, nosebleeds, postnasal drip, rhinorrhea, sinus pressure, sneezing, sore throat, tinnitus, trouble swallowing and voice change.    Eyes: Negative for photophobia, pain, discharge, redness, itching and visual disturbance.   Respiratory: Positive for shortness of breath. Negative for apnea, cough,  choking, chest tightness, wheezing and stridor.    Cardiovascular: Positive for leg swelling. Negative for chest pain and palpitations.   Gastrointestinal: Positive for abdominal distention and abdominal pain. Negative for anal bleeding, blood in stool, constipation, diarrhea, nausea, rectal pain and vomiting.   Endocrine: Negative for cold intolerance, heat intolerance, polydipsia, polyphagia and polyuria.   Genitourinary: Negative for decreased urine volume, difficulty urinating, discharge, dysuria, enuresis, flank pain, frequency, genital sores, hematuria, penile pain, penile swelling, scrotal swelling, testicular pain and urgency.   Musculoskeletal: Negative for arthralgias, back pain, gait problem, joint swelling, myalgias, neck pain and neck stiffness.   Skin: Positive for color change (jaundice with itching ). Negative for pallor, rash and wound.   Allergic/Immunologic: Negative for environmental allergies, food allergies and immunocompromised state.   Neurological: Positive for weakness (generalized weakness ). Negative for dizziness, tremors, seizures, syncope, facial asymmetry, speech difficulty, light-headedness, numbness and headaches.   Hematological: Negative for adenopathy. Bruises/bleeds easily.   Psychiatric/Behavioral: Negative for agitation, behavioral problems, confusion, decreased concentration, dysphoric mood, hallucinations, self-injury, sleep disturbance and suicidal ideas. The patient is not nervous/anxious and is not hyperactive.      Objective:     Vital Signs (Most Recent):  Temp: 98.5 °F (36.9 °C) (09/19/20 0521)  Pulse: 88 (09/19/20 0521)  Resp: 20 (09/19/20 0521)  BP: (!) 112/53 (09/19/20 0521)  SpO2: (!) 94 % (09/19/20 0521) Vital Signs (24h Range):  Temp:  [97.3 °F (36.3 °C)-98.7 °F (37.1 °C)] 98.5 °F (36.9 °C)  Pulse:  [88-98] 88  Resp:  [16-20] 20  SpO2:  [90 %-97 %] 94 %  BP: (109-135)/(53-78) 112/53     Weight: 120.2 kg (264 lb 15.9 oz)  Body mass index is 35.94  kg/m².    Physical Exam  Constitutional:       General: He is not in acute distress.     Appearance: He is well-developed. He is obese. He is not diaphoretic.      Comments: Severely jaundice,  chronically ill appearing but not in distress.    HENT:      Head: Normocephalic and atraumatic.      Nose: Nose normal.      Mouth/Throat:      Pharynx: No oropharyngeal exudate.   Eyes:      General: Scleral icterus present.      Pupils: Pupils are equal, round, and reactive to light.   Neck:      Musculoskeletal: Normal range of motion and neck supple.      Thyroid: No thyromegaly.      Vascular: No JVD.      Trachea: No tracheal deviation.   Cardiovascular:      Rate and Rhythm: Normal rate and regular rhythm.      Heart sounds: Normal heart sounds. No murmur.   Pulmonary:      Effort: Pulmonary effort is normal. No respiratory distress.      Breath sounds: Normal breath sounds. No wheezing or rales.   Chest:      Chest wall: No tenderness.   Abdominal:      General: Bowel sounds are normal. There is distension.      Palpations: Abdomen is soft. There is no mass.      Tenderness: There is abdominal tenderness (Mild TTP across upper abdomen ). There is no guarding or rebound.   Musculoskeletal: Normal range of motion.         General: No tenderness.      Right lower leg: Edema present.      Left lower leg: Edema present.   Lymphadenopathy:      Cervical: No cervical adenopathy.   Skin:     General: Skin is warm and dry.      Coloration: Skin is jaundiced.      Findings: Bruising present. No erythema or rash.      Comments: Excoriation marks on back    Neurological:      Mental Status: He is alert and oriented to person, place, and time.      Cranial Nerves: No cranial nerve deficit.      Motor: No abnormal muscle tone.      Coordination: Coordination normal.      Deep Tendon Reflexes: Reflexes are normal and symmetric. Reflexes normal.      Comments: Negative asterixis    Psychiatric:         Thought Content: Thought  content normal.         Judgment: Judgment normal.           CRANIAL NERVES     CN III, IV, VI   Pupils are equal, round, and reactive to light.      Significant Labs:   Recent Results (from the past 24 hour(s))   Comprehensive metabolic panel    Collection Time: 09/19/20  1:38 AM   Result Value Ref Range    Sodium 123 (L) 136 - 145 mmol/L    Potassium 4.1 3.5 - 5.1 mmol/L    Chloride 93 (L) 95 - 110 mmol/L    CO2 18 (L) 23 - 29 mmol/L    Glucose 81 70 - 110 mg/dL    BUN, Bld 62 (H) 6 - 20 mg/dL    Creatinine 2.4 (H) 0.5 - 1.4 mg/dL    Calcium 8.9 8.7 - 10.5 mg/dL    Total Protein 5.5 (L) 6.0 - 8.4 g/dL    Albumin 3.7 3.5 - 5.2 g/dL    Total Bilirubin 28.6 (H) 0.1 - 1.0 mg/dL    Alkaline Phosphatase 127 55 - 135 U/L    AST 85 (H) 10 - 40 U/L    ALT 41 10 - 44 U/L    Anion Gap 12 8 - 16 mmol/L    eGFR if African American 35.8 (A) >60 mL/min/1.73 m^2    eGFR if non  31.0 (A) >60 mL/min/1.73 m^2   TSH    Collection Time: 09/19/20  1:38 AM   Result Value Ref Range    TSH 0.938 0.400 - 4.000 uIU/mL   CBC auto differential    Collection Time: 09/19/20  1:39 AM   Result Value Ref Range    WBC 9.17 3.90 - 12.70 K/uL    RBC 2.57 (L) 4.60 - 6.20 M/uL    Hemoglobin 9.0 (L) 14.0 - 18.0 g/dL    Hematocrit 26.0 (L) 40.0 - 54.0 %    Mean Corpuscular Volume 101 (H) 82 - 98 fL    Mean Corpuscular Hemoglobin 35.0 (H) 27.0 - 31.0 pg    Mean Corpuscular Hemoglobin Conc 34.6 32.0 - 36.0 g/dL    RDW 16.9 (H) 11.5 - 14.5 %    Platelets 42 (L) 150 - 350 K/uL    MPV 11.1 9.2 - 12.9 fL    Immature Granulocytes 1.7 (H) 0.0 - 0.5 %    Gran # (ANC) 6.8 1.8 - 7.7 K/uL    Immature Grans (Abs) 0.16 (H) 0.00 - 0.04 K/uL    Lymph # 0.9 (L) 1.0 - 4.8 K/uL    Mono # 1.2 (H) 0.3 - 1.0 K/uL    Eos # 0.2 0.0 - 0.5 K/uL    Baso # 0.03 0.00 - 0.20 K/uL    nRBC 0 0 /100 WBC    Gran% 73.8 (H) 38.0 - 73.0 %    Lymph% 9.3 (L) 18.0 - 48.0 %    Mono% 13.0 4.0 - 15.0 %    Eosinophil% 1.9 0.0 - 8.0 %    Basophil% 0.3 0.0 - 1.9 %    Differential  Method Automated    Magnesium    Collection Time: 09/19/20  1:39 AM   Result Value Ref Range    Magnesium 2.2 1.6 - 2.6 mg/dL   Phosphorus    Collection Time: 09/19/20  1:39 AM   Result Value Ref Range    Phosphorus 3.6 2.7 - 4.5 mg/dL   Protime-INR    Collection Time: 09/19/20  1:39 AM   Result Value Ref Range    Prothrombin Time 25.8 (H) 9.0 - 12.5 sec    INR 2.4 (H) 0.8 - 1.2   Type & Screen    Collection Time: 09/19/20  1:39 AM   Result Value Ref Range    Group & Rh A POS     Indirect Patito NEG    Procalcitonin    Collection Time: 09/19/20  1:39 AM   Result Value Ref Range    Procalcitonin 1.76 (H) <0.25 ng/mL   Lipase    Collection Time: 09/19/20  1:39 AM   Result Value Ref Range    Lipase 63 (H) 4 - 60 U/L   Ferritin    Collection Time: 09/19/20  1:39 AM   Result Value Ref Range    Ferritin 2,907 (H) 20.0 - 300.0 ng/mL   Ceruloplasmin    Collection Time: 09/19/20  1:39 AM   Result Value Ref Range    Ceruloplasmin 15.0 15.0 - 45.0 mg/dL   Alpha-1-antitrypsin    Collection Time: 09/19/20  1:39 AM   Result Value Ref Range    A-1 Antitrypsin, Ser 92 (L) 100 - 190 mg/dL   Osmolality, Serum    Collection Time: 09/19/20  1:39 AM   Result Value Ref Range    Osmolality 278 (L) 280 - 300 mOsm/kg   T4, free    Collection Time: 09/19/20  1:39 AM   Result Value Ref Range    Free T4 0.82 0.71 - 1.51 ng/dL   Vitamin B12    Collection Time: 09/19/20  1:39 AM   Result Value Ref Range    Vitamin B-12 >2000 (H) 210 - 950 pg/mL   Vitamin D    Collection Time: 09/19/20  1:39 AM   Result Value Ref Range    Vit D, 25-Hydroxy 38 30 - 96 ng/mL         Significant Imaging: I have reviewed and interpreted all pertinent imaging results/findings within the past 24 hours.       Assessment/Plan:     Active Diagnoses:    Diagnosis Date Noted POA    PRINCIPAL PROBLEM:  Decompensated hepatic cirrhosis [K72.90] 09/19/2020 Yes    Ascites due to alcoholic cirrhosis [K70.31] 09/19/2020 Yes    Thrombocytopenia [D69.6] 09/19/2020 Yes     Coagulopathy [D68.9] 09/19/2020 Yes    JENN (acute kidney injury) [N17.9] 09/19/2020 Yes    Portal hypertension [K76.6] 09/19/2020 Yes    Hepatic encephalopathy [K72.90] 09/19/2020 Yes    Hyponatremia [E87.1] 09/19/2020 Yes      Problems Resolved During this Admission:     #Decompensated alcoholic cirrhosis   Ascites   Hepatic encephalopathy   Portal hypertension   Coagulopathy  Thrombocytopenia     MELD-Na score: 38 at 9/19/2020  1:39 AM  MELD score: 37 at 9/19/2020  1:39 AM  Calculated from:  Serum Creatinine: 2.4 mg/dL at 9/19/2020  1:38 AM  Serum Sodium: 123 mmol/L (Rounded to 125 mmol/L) at 9/19/2020  1:38 AM  Total Bilirubin: 28.6 mg/dL at 9/19/2020  1:38 AM  INR(ratio): 2.4 at 9/19/2020  1:39 AM  Age: 45 years 4 months     -No signs of active bleeding or infection   -he is afebrile and has no leukocytosis   -pan cultures to r/o sepsis   -continue empiric ceftriaxone   -US liver with doppler  -need diagnostic and therapeutic paracentesis if positive for ascites   -he is awake, conversant and has no asterixis on exam   -continue lactulose and check ammonia with am labs   -vitamin K for coagulopathy   -check HIV, hep panel, EBV, CMV, ferritin, ceruloplasmin, ASM, AMA, antitrypsine to r/o other etiologies of liver disease  -check PETH   -monitor MELD closely with daily labs   -consult hepatology     #JENN   -likely hepatorenal syndrome (HRS)   -will continue albumin and give 500 cc NS fluid bolus for intravascular volume depletion   -hold home diuretics (lasix, spironolactone)  -will hold off midodrine for now given SBP ~120  -check UA, urine sodium, renal US   -monitor renal function closely and avoid nephrotoxic agents  -consider nephrology consult if does not improve with above     #Hyponatremia   -Na 123 and neuro intact   -likely due to decompensated cirrhosis and diuretic use  -check serum osm, urine osm, urine sodium, TSH  -hold diuretics   -monitor closely with daily labs     #Alcohol abuse   -completed  rehab in the past but relapsed   -quit drinking July of this year and committed to stay sober  -thiamine, folic acid daily   -check UDS   -addiction psychiatry consult        VTE Risk Mitigation (From admission, onward)         Ordered     IP VTE HIGH RISK PATIENT  Once      09/19/20 0116     Place sequential compression device  Until discontinued      09/19/20 0116                  Arslan Patterson DO  Department of Hospital Medicine   [facility]

## 2020-09-20 PROBLEM — D53.9 MACROCYTIC ANEMIA: Status: ACTIVE | Noted: 2020-09-20

## 2020-09-20 PROBLEM — D65 DIC (DISSEMINATED INTRAVASCULAR COAGULATION): Status: ACTIVE | Noted: 2020-09-20

## 2020-09-20 LAB
ALBUMIN SERPL BCP-MCNC: 3.4 G/DL (ref 3.5–5.2)
ALP SERPL-CCNC: 115 U/L (ref 55–135)
ALT SERPL W/O P-5'-P-CCNC: 33 U/L (ref 10–44)
ANION GAP SERPL CALC-SCNC: 9 MMOL/L (ref 8–16)
ANISOCYTOSIS BLD QL SMEAR: SLIGHT
ASCENDING AORTA: 3.11 CM
AST SERPL-CCNC: 74 U/L (ref 10–40)
AV INDEX (PROSTH): 0.65
AV MEAN GRADIENT: 11 MMHG
AV PEAK GRADIENT: 19 MMHG
AV VALVE AREA: 2.99 CM2
AV VELOCITY RATIO: 0.65
BASOPHILS # BLD AUTO: 0.04 K/UL (ref 0–0.2)
BASOPHILS NFR BLD: 0.6 % (ref 0–1.9)
BILIRUB SERPL-MCNC: 24 MG/DL (ref 0.1–1)
BSA FOR ECHO PROCEDURE: 2.47 M2
BUN SERPL-MCNC: 58 MG/DL (ref 6–20)
CALCIUM SERPL-MCNC: 8.4 MG/DL (ref 8.7–10.5)
CHLORIDE SERPL-SCNC: 100 MMOL/L (ref 95–110)
CO2 SERPL-SCNC: 20 MMOL/L (ref 23–29)
CREAT SERPL-MCNC: 2.2 MG/DL (ref 0.5–1.4)
CV ECHO LV RWT: 0.38 CM
DIFFERENTIAL METHOD: ABNORMAL
DOP CALC AO PEAK VEL: 2.17 M/S
DOP CALC AO VTI: 37.35 CM
DOP CALC LVOT AREA: 4.6 CM2
DOP CALC LVOT DIAMETER: 2.43 CM
DOP CALC LVOT PEAK VEL: 1.41 M/S
DOP CALC LVOT STROKE VOLUME: 111.8 CM3
DOP CALCLVOT PEAK VEL VTI: 24.12 CM
E WAVE DECELERATION TIME: 154.32 MSEC
E/A RATIO: 1.19
E/E' RATIO: 10.1 M/S
ECHO LV POSTERIOR WALL: 1.06 CM (ref 0.6–1.1)
EOSINOPHIL # BLD AUTO: 0.2 K/UL (ref 0–0.5)
EOSINOPHIL NFR BLD: 2.7 % (ref 0–8)
ERYTHROCYTE [DISTWIDTH] IN BLOOD BY AUTOMATED COUNT: 17.2 % (ref 11.5–14.5)
EST. GFR  (AFRICAN AMERICAN): 39.8 ML/MIN/1.73 M^2
EST. GFR  (NON AFRICAN AMERICAN): 34.4 ML/MIN/1.73 M^2
FIBRINOGEN PPP-MCNC: <70 MG/DL (ref 182–366)
FRACTIONAL SHORTENING: 41 % (ref 28–44)
GLUCOSE SERPL-MCNC: 103 MG/DL (ref 70–110)
HCT VFR BLD AUTO: 23.2 % (ref 40–54)
HGB BLD-MCNC: 8 G/DL (ref 14–18)
IMM GRANULOCYTES # BLD AUTO: 0.12 K/UL (ref 0–0.04)
IMM GRANULOCYTES NFR BLD AUTO: 1.8 % (ref 0–0.5)
INR PPP: 2.5 (ref 0.8–1.2)
INTERVENTRICULAR SEPTUM: 0.91 CM (ref 0.6–1.1)
IVRT: 51.38 MSEC
LA MAJOR: 6.26 CM
LA MINOR: 6.32 CM
LA WIDTH: 4.75 CM
LEFT ATRIUM SIZE: 4.31 CM
LEFT ATRIUM VOLUME INDEX: 45.7 ML/M2
LEFT ATRIUM VOLUME: 109.45 CM3
LEFT INTERNAL DIMENSION IN SYSTOLE: 3.3 CM (ref 2.1–4)
LEFT VENTRICLE DIASTOLIC VOLUME INDEX: 64.44 ML/M2
LEFT VENTRICLE DIASTOLIC VOLUME: 154.47 ML
LEFT VENTRICLE MASS INDEX: 90 G/M2
LEFT VENTRICLE SYSTOLIC VOLUME INDEX: 18.5 ML/M2
LEFT VENTRICLE SYSTOLIC VOLUME: 44.25 ML
LEFT VENTRICULAR INTERNAL DIMENSION IN DIASTOLE: 5.61 CM (ref 3.5–6)
LEFT VENTRICULAR MASS: 216.06 G
LV LATERAL E/E' RATIO: 9.64 M/S
LV SEPTAL E/E' RATIO: 10.6 M/S
LYMPHOCYTES # BLD AUTO: 0.7 K/UL (ref 1–4.8)
LYMPHOCYTES NFR BLD: 10.5 % (ref 18–48)
MAGNESIUM SERPL-MCNC: 2.3 MG/DL (ref 1.6–2.6)
MCH RBC QN AUTO: 35.1 PG (ref 27–31)
MCHC RBC AUTO-ENTMCNC: 34.5 G/DL (ref 32–36)
MCV RBC AUTO: 102 FL (ref 82–98)
MONOCYTES # BLD AUTO: 1 K/UL (ref 0.3–1)
MONOCYTES NFR BLD: 15 % (ref 4–15)
MV PEAK A VEL: 0.89 M/S
MV PEAK E VEL: 1.06 M/S
MV STENOSIS PRESSURE HALF TIME: 44.75 MS
MV VALVE AREA P 1/2 METHOD: 4.92 CM2
NEUTROPHILS # BLD AUTO: 4.5 K/UL (ref 1.8–7.7)
NEUTROPHILS NFR BLD: 69.4 % (ref 38–73)
NRBC BLD-RTO: 0 /100 WBC
PHOSPHATE SERPL-MCNC: 2.7 MG/DL (ref 2.7–4.5)
PISA TR MAX VEL: 4.19 M/S
PLATELET # BLD AUTO: 38 K/UL (ref 150–350)
PLATELET BLD QL SMEAR: ABNORMAL
PMV BLD AUTO: 11.1 FL (ref 9.2–12.9)
POIKILOCYTOSIS BLD QL SMEAR: SLIGHT
POTASSIUM SERPL-SCNC: 4.2 MMOL/L (ref 3.5–5.1)
PROT SERPL-MCNC: 4.9 G/DL (ref 6–8.4)
PROTHROMBIN TIME: 26.2 SEC (ref 9–12.5)
RA MAJOR: 4.94 CM
RA PRESSURE: 3 MMHG
RA WIDTH: 3.53 CM
RBC # BLD AUTO: 2.28 M/UL (ref 4.6–6.2)
RIGHT VENTRICULAR END-DIASTOLIC DIMENSION: 3.87 CM
RV TISSUE DOPPLER FREE WALL SYSTOLIC VELOCITY 1 (APICAL 4 CHAMBER VIEW): 19.26 CM/S
SINUS: 3.37 CM
SODIUM SERPL-SCNC: 129 MMOL/L (ref 136–145)
STJ: 3.14 CM
TDI LATERAL: 0.11 M/S
TDI SEPTAL: 0.1 M/S
TDI: 0.11 M/S
TR MAX PG: 70 MMHG
TRICUSPID ANNULAR PLANE SYSTOLIC EXCURSION: 2.95 CM
TV REST PULMONARY ARTERY PRESSURE: 73 MMHG
WBC # BLD AUTO: 6.55 K/UL (ref 3.9–12.7)

## 2020-09-20 PROCEDURE — 83735 ASSAY OF MAGNESIUM: CPT | Mod: NTX

## 2020-09-20 PROCEDURE — 20600001 HC STEP DOWN PRIVATE ROOM: Mod: NTX

## 2020-09-20 PROCEDURE — 84100 ASSAY OF PHOSPHORUS: CPT | Mod: NTX

## 2020-09-20 PROCEDURE — 63600175 PHARM REV CODE 636 W HCPCS: Mod: NTX | Performed by: HOSPITALIST

## 2020-09-20 PROCEDURE — 99233 SBSQ HOSP IP/OBS HIGH 50: CPT | Mod: NTX,,, | Performed by: HOSPITALIST

## 2020-09-20 PROCEDURE — 25000003 PHARM REV CODE 250: Mod: NTX | Performed by: HOSPITALIST

## 2020-09-20 PROCEDURE — 85025 COMPLETE CBC W/AUTO DIFF WBC: CPT | Mod: NTX

## 2020-09-20 PROCEDURE — 99233 PR SUBSEQUENT HOSPITAL CARE,LEVL III: ICD-10-PCS | Mod: NTX,,, | Performed by: HOSPITALIST

## 2020-09-20 PROCEDURE — 85610 PROTHROMBIN TIME: CPT | Mod: NTX

## 2020-09-20 PROCEDURE — P9047 ALBUMIN (HUMAN), 25%, 50ML: HCPCS | Mod: JG,NTX | Performed by: HOSPITALIST

## 2020-09-20 PROCEDURE — 85384 FIBRINOGEN ACTIVITY: CPT | Mod: NTX

## 2020-09-20 PROCEDURE — 80053 COMPREHEN METABOLIC PANEL: CPT | Mod: NTX

## 2020-09-20 PROCEDURE — 36415 COLL VENOUS BLD VENIPUNCTURE: CPT | Mod: NTX

## 2020-09-20 RX ORDER — HYDROXYZINE HYDROCHLORIDE 25 MG/1
25 TABLET, FILM COATED ORAL 3 TIMES DAILY PRN
Status: DISCONTINUED | OUTPATIENT
Start: 2020-09-20 | End: 2020-10-02 | Stop reason: HOSPADM

## 2020-09-20 RX ADMIN — Medication 100 MG: at 09:09

## 2020-09-20 RX ADMIN — HYDROXYZINE HYDROCHLORIDE 25 MG: 25 TABLET, FILM COATED ORAL at 02:09

## 2020-09-20 RX ADMIN — SODIUM CHLORIDE 500 ML: 0.9 INJECTION, SOLUTION INTRAVENOUS at 02:09

## 2020-09-20 RX ADMIN — LACTULOSE 20 G: 20 SOLUTION ORAL at 02:09

## 2020-09-20 RX ADMIN — LACTULOSE 20 G: 20 SOLUTION ORAL at 09:09

## 2020-09-20 RX ADMIN — FOLIC ACID 1 MG: 1 TABLET ORAL at 09:09

## 2020-09-20 RX ADMIN — CEFTRIAXONE SODIUM 1 G: 1 INJECTION, POWDER, FOR SOLUTION INTRAMUSCULAR; INTRAVENOUS at 05:09

## 2020-09-20 RX ADMIN — OXYCODONE 5 MG: 5 TABLET ORAL at 07:09

## 2020-09-20 RX ADMIN — LACTULOSE 20 G: 20 SOLUTION ORAL at 08:09

## 2020-09-20 RX ADMIN — ALBUMIN (HUMAN) 25 G: 12.5 SOLUTION INTRAVENOUS at 01:09

## 2020-09-20 RX ADMIN — OXYCODONE 5 MG: 5 TABLET ORAL at 08:09

## 2020-09-20 RX ADMIN — PHYTONADIONE 10 MG: 10 INJECTION, EMULSION INTRAMUSCULAR; INTRAVENOUS; SUBCUTANEOUS at 09:09

## 2020-09-20 RX ADMIN — OXYCODONE 5 MG: 5 TABLET ORAL at 01:09

## 2020-09-20 RX ADMIN — ALBUMIN (HUMAN) 25 G: 12.5 SOLUTION INTRAVENOUS at 10:09

## 2020-09-20 RX ADMIN — ALBUMIN (HUMAN) 25 G: 12.5 SOLUTION INTRAVENOUS at 05:09

## 2020-09-20 NOTE — PLAN OF CARE
Problem: Adult Inpatient Plan of Care  Goal: Plan of Care Review  Outcome: Ongoing, Progressing  Goal: Patient-Specific Goal (Individualization)  Outcome: Ongoing, Progressing  Goal: Absence of Hospital-Acquired Illness or Injury  Outcome: Ongoing, Progressing  Goal: Optimal Comfort and Wellbeing  Outcome: Ongoing, Progressing  Goal: Readiness for Transition of Care  Outcome: Ongoing, Progressing  Goal: Rounds/Family Conference  Outcome: Ongoing, Progressing     Problem: Fall Injury Risk  Goal: Absence of Fall and Fall-Related Injury  Outcome: Ongoing, Progressing     Problem: Electrolyte Imbalance (Acute Kidney Injury/Impairment)  Goal: Serum Electrolyte Balance  Outcome: Ongoing, Progressing     Problem: Fluid Imbalance (Acute Kidney Injury/Impairment)  Goal: Optimal Fluid Balance  Outcome: Ongoing, Progressing     Problem: Hematologic Alteration (Acute Kidney Injury/Impairment)  Goal: Hemoglobin, Hematocrit and Platelets Within Normal Range  Outcome: Ongoing, Progressing     Problem: Oral Intake Inadequate (Acute Kidney Injury/Impairment)  Goal: Optimal Nutrition Intake  Outcome: Ongoing, Progressing     Problem: Renal Function Impairment (Acute Kidney Injury/Impairment)  Goal: Effective Renal Function  Outcome: Ongoing, Progressing

## 2020-09-20 NOTE — PROGRESS NOTES
Progress Note  Hospital Medicine  [facility] [place] [facility]       Patient Name: Jordan Altman  MRN:  [id number]  Hospital Medicine Team: [facility] Lise Gonsalez MD  Date of Admission:  9/19/2020     Length of Stay:  LOS: 1 day   Expected Discharge Date: 9/23/2020  Principal Problem:  Decompensated hepatic cirrhosis     Subjective:     Interval History/Overnight Events:    Doing well  No acute issues  VSS  Labs improving  Na up to 129, Cr stable   Pt feeling better and more energetic      albumin human 25%  25 g Intravenous Q8H    folic acid  1 mg Oral Daily    lactulose  20 g Oral TID    phytonadione ((AQUA-MEPHYTON) IVPB  10 mg Intravenous Daily    thiamine  100 mg Oral Daily           acetaminophen, albuterol-ipratropium, dextrose 50%, dextrose 50%, glucagon (human recombinant), glucose, glucose, ondansetron, oxyCODONE, sodium chloride 0.9%    Review of Systems   Constitutional: Negative for chills, fatigue, fever.   HENT: Negative for sore throat, trouble swallowing.    Eyes: Negative for photophobia, visual disturbance.   Respiratory: Negative for cough, shortness of breath.    Cardiovascular: Negative for chest pain, palpitations, leg swelling.   Gastrointestinal: Negative for abdominal pain, constipation, diarrhea, nausea, vomiting.   Endocrine: Negative for cold intolerance, heat intolerance.   Genitourinary: Negative for dysuria, frequency.   Musculoskeletal: Negative for arthralgias, myalgias.   Skin: Negative for rash, wound, erythema   Neurological: Negative for dizziness, syncope, weakness, light-headedness.   Psychiatric/Behavioral: Negative for confusion, hallucinations, anxiety  All other systems reviewed and are negative.    Objective:     Temp:  [97.9 °F (36.6 °C)-98.8 °F (37.1 °C)]   Pulse:  [67-98]   Resp:  [13-20]   BP: (112-136)/(55-71)   SpO2:  [90 %-97 %]     Vitals:  Temp:  [97.9 °F (36.6 °C)-98.8 °F (37.1 °C)]   Pulse:  [67-98]   Resp:  [13-20]   BP:  (112-136)/(55-71)   SpO2:  [90 %-97 %]   I/O's:    Intake/Output Summary (Last 24 hours) at 9/20/2020 0903  Last data filed at 9/20/2020 0514  Gross per 24 hour   Intake --   Output 925 ml   Net -925 ml      Weight change: -0.45 kg (-15.9 oz)   Body mass index is 36.24 kg/m².       Physical Exam:  Constitutional:chronically ill looking,  non-distressed, not diaphoretic.   HENT: NC/AT, external ears normal  Eyes: PERRL, EOMI, conjunctiva normal, no discharge b/l, +scleral icterus   Neck: normal ROM, supple  CV: RRR, no m/r/g, no carotid bruits, +2 peripheral pulses.  Pulmonary/Chest wall: Breathing comfortably w/o distress, CTAB, no w/r/r, no crackles.  Decreased breath sounds at lung bases  GI: Soft, non-tender, (+) BS, (+) BM   +distended  Obese   Musculoskeletal: Normal ROM, no atrophy,  no pitting edema in LE bilaterally   Neurological: AAO x 4, no focal deficits noted   No asterixis   Skin: warm, dry   +pallor, jaundice, + spider angiomas, +bruising   Multiple tattoos   Psych: normal mood and affect, normal behavior, thought content and judgement.    Labs:    Chemistries:   Recent Labs   Lab 09/19/20 0138 09/19/20 0139 09/19/20  1222 09/20/20  0501   *  --  125* 129*   K 4.1  --  4.0 4.2   CL 93*  --  95 100   CO2 18*  --  20* 20*   BUN 62*  --  60* 58*   CREATININE 2.4*  --  2.3* 2.2*   CALCIUM 8.9  --  8.6* 8.4*   PROT 5.5*  --   --  4.9*   BILITOT 28.6*  --   --  24.0*   ALKPHOS 127  --   --  115   ALT 41  --   --  33   AST 85*  --   --  74*   MG  --  2.2  --  2.3   PHOS  --  3.6  --  2.7        WBC:   Recent Labs   Lab 09/19/20 0139 09/20/20  0501   WBC 9.17 6.55     Bands:     CBC/Anemia Labs: Coags:    Recent Labs   Lab 09/19/20 0139 09/20/20  0501   WBC 9.17 6.55   HGB 9.0* 8.0*   HCT 26.0* 23.2*   PLT 42* 38*   * 102*   RDW 16.9* 17.2*   FERRITIN 2,907*  --    YIKDUELN08 >2000*  --     Recent Labs   Lab 09/19/20 0139 09/20/20  0501   INR 2.4* 2.5*          Assessment and Plan      Hospital Course:    Mr. Jordan Altman was admitted to Hospital Medicine for management of  Decompensated hepatic cirrhosis     Active Hospital Problems    Diagnosis  POA    *Decompensated hepatic cirrhosis [K72.90]  Yes    Ascites due to alcoholic cirrhosis [K70.31]  Yes    Thrombocytopenia [D69.6]  Yes    Coagulopathy [D68.9]  Yes    JENN (acute kidney injury) [N17.9]  Yes    Portal hypertension [K76.6]  Yes    Hepatic encephalopathy [K72.90]  Yes    Hyponatremia [E87.1]  Yes      Resolved Hospital Problems   No resolved problems to display.       #Decompensated alcoholic cirrhosis   Ascites   Hx of SBP  Hepatic encephalopathy   Portal hypertension   Coagulopathy  Thrombocytopenia      MELD-Na score: 37 at 9/20/2020  5:01 AM  MELD score: 36 at 9/20/2020  5:01 AM  Calculated from:  Serum Creatinine: 2.2 mg/dL at 9/20/2020  5:01 AM  Serum Sodium: 129 mmol/L at 9/20/2020  5:01 AM  Total Bilirubin: 24.0 mg/dL at 9/20/2020  5:01 AM  INR(ratio): 2.5 at 9/20/2020  5:01 AM  Age: 45 years 4 months     -No signs of active bleeding or infection   -he is afebrile and has no leukocytosis   -pan cultures to r/o sepsis   -s/p empiric ceftriaxone   -US liver with doppler  -he is awake, conversant and has no asterixis on exam   -continue lactulose   -vitamin K for coagulopathy   -pending PETH   -monitor MELD closely with daily labs   -consult hepatology   - para on 9/21 to eval for SBP  - hepatology following  And will likely start OLT eval      #JENN   -concern for iATN from volume depletion vs hepatorenal syndrome (HRS)    -hold home diuretics (lasix, spironolactone)  -will hold off midodrine for now given SBP ~120  -reviewed UA, urine sodium, renal US   -monitor renal function closely and avoid nephrotoxic agents  -consider nephrology consult if does not improve with above   - para on 9/21 to eval for SBP     #Hyponatremia   -Na 123 and neuro intact   -likely due to decompensated cirrhosis and diuretic use  -hold  diuretics   -monitor closely with daily labs   - IV albumin and IV NS   - improving appropriately      #Alcohol abuse   -completed rehab in the past but relapsed   -quit drinking July of this year and committed to stay sober  -thiamine, folic acid daily   -check UDS   -addiction psychiatry consult     Diet:  Low Na with fluid restriction   GI PPx:  PO PPI  DVT PPx:  SCDs due to coagulopathy   Goals of Care:  Full     High Risk Conditions:  Decompensated cirrhosis      Disposition:    TBD    Patient's note was created using MModal Dictation.  Any errors in syntax may not have been identified and edited on initial review prior to signing this note.    Signing Physician:     Lise Gonsalez MD  Department of Hospital Medicine   [facility]  Pager [phone]  Spectra [phone]  9/20/2020

## 2020-09-20 NOTE — TREATMENT PLAN
Hepatology Treatment Plan    Jordan Altman is a 45 y.o. male admitted to hospital 9/19/2020 (Hospital Day: 2) due to Decompensated hepatic cirrhosis.     Interval History  - reports feeling well in AM  - denies abd pain, n/v. Tolerating PO, slept well  - denies f/c/s  - AFP neg  - INR stable 2.5, bili improved 28 -> 24, crt stable 2.2    Objective  Temp:  [97.8 °F (36.6 °C)-98.8 °F (37.1 °C)] 98.4 °F (36.9 °C) (09/20 0725)  Pulse:  [67-98] 98 (09/20 0731)  BP: (112-136)/(55-80) 132/62 (09/20 0725)  Resp:  [13-20] 18 (09/20 0730)  SpO2:  [90 %-97 %] 94 % (09/20 0731)    General: Alert, Oriented x3, no distress, watching tv  Neurologic: Asterixis absent  Abdomen: Normoactive bowel sounds. Non-distended. Normal tympany. Soft. Non-tender. No peritoneal signs.     Laboratory    Lab Results   Component Value Date    WBC 6.55 09/20/2020    HGB 8.0 (L) 09/20/2020    HCT 23.2 (L) 09/20/2020     (H) 09/20/2020    PLT 38 (LL) 09/20/2020       Lab Results   Component Value Date     (L) 09/20/2020    K 4.2 09/20/2020     09/20/2020    CO2 20 (L) 09/20/2020    BUN 58 (H) 09/20/2020    CREATININE 2.2 (H) 09/20/2020    CALCIUM 8.4 (L) 09/20/2020       Lab Results   Component Value Date    ALBUMIN 3.4 (L) 09/20/2020    ALT 33 09/20/2020    AST 74 (H) 09/20/2020    ALKPHOS 115 09/20/2020    BILITOT 24.0 (H) 09/20/2020       Lab Results   Component Value Date    INR 2.5 (H) 09/20/2020    INR 2.4 (H) 09/19/2020    INR 1.2 02/23/2019       MELD-Na score: 37 at 9/20/2020  5:01 AM  MELD score: 36 at 9/20/2020  5:01 AM  Calculated from:  Serum Creatinine: 2.2 mg/dL at 9/20/2020  5:01 AM  Serum Sodium: 129 mmol/L at 9/20/2020  5:01 AM  Total Bilirubin: 24.0 mg/dL at 9/20/2020  5:01 AM  INR(ratio): 2.5 at 9/20/2020  5:01 AM  Age: 45 years 4 months    Plan  - see consult note on 9/19  - continue empiric abx, para tomorrow  - JENN, on albumin, good maps and urine output  - please obtain daily CBC, BMP, LFT, INR  - Plan of  care was discussed with primary team    Thank you for involving us in the care of Jordan Lyle Agapito. Please call with any additional concerns or questions.    Isauro Case MD  Gastroenterology Fellow

## 2020-09-20 NOTE — PLAN OF CARE
HOSPITAL MEDICINE PLAN OF CARE    Patient admitted over night. Chart reviewed. Patient seen and evaluated on rounds today. Doing well with no acute distress.   Seen at bedside    Mental status at baseline   and Cr 2.3    Plan :  - cont IV albumin  - hepatology consulted   - OSH blood cx X1 with GRC resembling staph. Here bl cx NGTD, likely contaminant   Monitor off abx      Signing Physician:     Lise Gonsalez MD  Department of Hospital Medicine   [facility]  Pager [phone]  Spectra [phone]  9/19/2020

## 2020-09-20 NOTE — PLAN OF CARE
Problem: Adult Inpatient Plan of Care  Goal: Plan of Care Review  Outcome: Ongoing, Progressing    Plan of care reviewed with patient. AAOX4. VSS. No acute events this shift.  Medicated for abdominal pain x1, ambulated to restroom, bed in lowest position call light within reach monitoring ongoing.

## 2020-09-21 PROBLEM — F10.21 ALCOHOL USE DISORDER, SEVERE, IN EARLY REMISSION: Status: ACTIVE | Noted: 2020-09-21

## 2020-09-21 PROBLEM — F10.20 ALCOHOL USE DISORDER, SEVERE, DEPENDENCE: Chronic | Status: ACTIVE | Noted: 2020-09-21

## 2020-09-21 PROBLEM — Z01.818 ENCOUNTER FOR PRE-TRANSPLANT EVALUATION FOR LIVER TRANSPLANT: Status: ACTIVE | Noted: 2020-09-21

## 2020-09-21 PROBLEM — I27.20 PULMONARY HTN: Status: ACTIVE | Noted: 2020-09-21

## 2020-09-21 LAB
ALBUMIN SERPL BCP-MCNC: 3.9 G/DL (ref 3.5–5.2)
ALP SERPL-CCNC: 123 U/L (ref 55–135)
ALT SERPL W/O P-5'-P-CCNC: 35 U/L (ref 10–44)
ANION GAP SERPL CALC-SCNC: 10 MMOL/L (ref 8–16)
AST SERPL-CCNC: 75 U/L (ref 10–40)
BASOPHILS # BLD AUTO: 0.07 K/UL (ref 0–0.2)
BASOPHILS NFR BLD: 1 % (ref 0–1.9)
BILIRUB SERPL-MCNC: 24.9 MG/DL (ref 0.1–1)
BUN SERPL-MCNC: 42 MG/DL (ref 6–20)
CALCIUM SERPL-MCNC: 8.9 MG/DL (ref 8.7–10.5)
CHLORIDE SERPL-SCNC: 102 MMOL/L (ref 95–110)
CO2 SERPL-SCNC: 20 MMOL/L (ref 23–29)
CREAT SERPL-MCNC: 1.8 MG/DL (ref 0.5–1.4)
DIFFERENTIAL METHOD: ABNORMAL
EOSINOPHIL # BLD AUTO: 0.2 K/UL (ref 0–0.5)
EOSINOPHIL NFR BLD: 2.7 % (ref 0–8)
ERYTHROCYTE [DISTWIDTH] IN BLOOD BY AUTOMATED COUNT: 17.2 % (ref 11.5–14.5)
EST. GFR  (AFRICAN AMERICAN): 50.7 ML/MIN/1.73 M^2
EST. GFR  (NON AFRICAN AMERICAN): 43.8 ML/MIN/1.73 M^2
GLUCOSE SERPL-MCNC: 97 MG/DL (ref 70–110)
HAV IGM SERPL QL IA: NEGATIVE
HBV CORE IGM SERPL QL IA: NEGATIVE
HBV SURFACE AG SERPL QL IA: NEGATIVE
HCT VFR BLD AUTO: 26.2 % (ref 40–54)
HCV AB SERPL QL IA: NEGATIVE
HGB BLD-MCNC: 8.7 G/DL (ref 14–18)
HIV 1+2 AB+HIV1 P24 AG SERPL QL IA: NEGATIVE
IMM GRANULOCYTES # BLD AUTO: 0.15 K/UL (ref 0–0.04)
IMM GRANULOCYTES NFR BLD AUTO: 2.1 % (ref 0–0.5)
INR PPP: 2.3 (ref 0.8–1.2)
LYMPHOCYTES # BLD AUTO: 0.7 K/UL (ref 1–4.8)
LYMPHOCYTES NFR BLD: 10.2 % (ref 18–48)
MCH RBC QN AUTO: 34.7 PG (ref 27–31)
MCHC RBC AUTO-ENTMCNC: 33.2 G/DL (ref 32–36)
MCV RBC AUTO: 104 FL (ref 82–98)
MONOCYTES # BLD AUTO: 1.1 K/UL (ref 0.3–1)
MONOCYTES NFR BLD: 14.9 % (ref 4–15)
NEUTROPHILS # BLD AUTO: 4.9 K/UL (ref 1.8–7.7)
NEUTROPHILS NFR BLD: 69.1 % (ref 38–73)
NRBC BLD-RTO: 0 /100 WBC
PLATELET # BLD AUTO: 48 K/UL (ref 150–350)
PMV BLD AUTO: 10.7 FL (ref 9.2–12.9)
POTASSIUM SERPL-SCNC: 4.1 MMOL/L (ref 3.5–5.1)
PROT SERPL-MCNC: 5.6 G/DL (ref 6–8.4)
PROTHROMBIN TIME: 24.5 SEC (ref 9–12.5)
RBC # BLD AUTO: 2.51 M/UL (ref 4.6–6.2)
SODIUM SERPL-SCNC: 132 MMOL/L (ref 136–145)
WBC # BLD AUTO: 7.04 K/UL (ref 3.9–12.7)

## 2020-09-21 PROCEDURE — 20600001 HC STEP DOWN PRIVATE ROOM: Mod: NTX

## 2020-09-21 PROCEDURE — 99223 1ST HOSP IP/OBS HIGH 75: CPT | Mod: NTX,,, | Performed by: PSYCHIATRY & NEUROLOGY

## 2020-09-21 PROCEDURE — 63600175 PHARM REV CODE 636 W HCPCS: Mod: NTX | Performed by: HOSPITALIST

## 2020-09-21 PROCEDURE — 85025 COMPLETE CBC W/AUTO DIFF WBC: CPT | Mod: NTX

## 2020-09-21 PROCEDURE — 25000003 PHARM REV CODE 250: Mod: NTX | Performed by: HOSPITALIST

## 2020-09-21 PROCEDURE — 36415 COLL VENOUS BLD VENIPUNCTURE: CPT | Mod: NTX

## 2020-09-21 PROCEDURE — 99233 PR SUBSEQUENT HOSPITAL CARE,LEVL III: ICD-10-PCS | Mod: NTX,,, | Performed by: HOSPITALIST

## 2020-09-21 PROCEDURE — 94761 N-INVAS EAR/PLS OXIMETRY MLT: CPT | Mod: NTX

## 2020-09-21 PROCEDURE — 99233 SBSQ HOSP IP/OBS HIGH 50: CPT | Mod: NTX,,, | Performed by: HOSPITALIST

## 2020-09-21 PROCEDURE — 85610 PROTHROMBIN TIME: CPT | Mod: NTX

## 2020-09-21 PROCEDURE — 80053 COMPREHEN METABOLIC PANEL: CPT | Mod: NTX

## 2020-09-21 PROCEDURE — 99223 PR INITIAL HOSPITAL CARE,LEVL III: ICD-10-PCS | Mod: NTX,,, | Performed by: PSYCHIATRY & NEUROLOGY

## 2020-09-21 RX ORDER — CIPROFLOXACIN 500 MG/1
500 TABLET ORAL EVERY 24 HOURS
Status: DISCONTINUED | OUTPATIENT
Start: 2020-09-21 | End: 2020-10-02 | Stop reason: HOSPADM

## 2020-09-21 RX ORDER — CALCIUM CARBONATE 200(500)MG
500 TABLET,CHEWABLE ORAL DAILY PRN
Status: DISCONTINUED | OUTPATIENT
Start: 2020-09-21 | End: 2020-10-02 | Stop reason: HOSPADM

## 2020-09-21 RX ADMIN — LACTULOSE 20 G: 20 SOLUTION ORAL at 09:09

## 2020-09-21 RX ADMIN — PHYTONADIONE 10 MG: 10 INJECTION, EMULSION INTRAMUSCULAR; INTRAVENOUS; SUBCUTANEOUS at 08:09

## 2020-09-21 RX ADMIN — OXYCODONE 5 MG: 5 TABLET ORAL at 05:09

## 2020-09-21 RX ADMIN — LACTULOSE 20 G: 20 SOLUTION ORAL at 04:09

## 2020-09-21 RX ADMIN — OXYCODONE 5 MG: 5 TABLET ORAL at 10:09

## 2020-09-21 RX ADMIN — FOLIC ACID 1 MG: 1 TABLET ORAL at 08:09

## 2020-09-21 RX ADMIN — HYDROXYZINE HYDROCHLORIDE 25 MG: 25 TABLET, FILM COATED ORAL at 11:09

## 2020-09-21 RX ADMIN — OXYCODONE 5 MG: 5 TABLET ORAL at 04:09

## 2020-09-21 RX ADMIN — Medication 100 MG: at 08:09

## 2020-09-21 RX ADMIN — CALCIUM CARBONATE (ANTACID) CHEW TAB 500 MG 500 MG: 500 CHEW TAB at 04:09

## 2020-09-21 RX ADMIN — LACTULOSE 20 G: 20 SOLUTION ORAL at 08:09

## 2020-09-21 NOTE — H&P
Inpatient Radiology Pre-procedure Note    History of Present Illness:  Jordan Altman is a 45 y.o. male who presents for ultrasound guided paracentesis.  Admission H&P reviewed.  Past Medical History:   Diagnosis Date    Decompensated hepatic cirrhosis     Hypertension     SBP (spontaneous bacterial peritonitis)      Past Surgical History:   Procedure Laterality Date    THORACENTESIS  2011       Review of Systems:   As documented in primary team H&P    Home Meds:   Prior to Admission medications    Medication Sig Start Date End Date Taking? Authorizing Provider   ALPRAZolam (XANAX) 1 MG tablet Take 1 mg by mouth 3 (three) times daily as needed for Anxiety.    Historical Provider   azelastine (OPTIVAR) 0.05 % ophthalmic solution 2 drops 2 (two) times daily.    Historical Provider   cyclobenzaprine (FLEXERIL) 10 MG tablet Take 10 mg by mouth 3 (three) times daily as needed for Muscle spasms.    Historical Provider   diclofenac (VOLTAREN) 0.1 % ophthalmic solution 1 drop 4 (four) times daily.    Historical Provider   fluticasone (FLOVENT DISKUS) 50 mcg/actuation DsDv Inhale into the lungs. Controller    Historical Provider   ketorolac (TORADOL) 10 mg tablet Take 10 mg by mouth every 6 (six) hours.    Historical Provider   lidocaine-prilocaine (EMLA) cream Apply topically once.    Historical Provider   losartan (COZAAR) 25 MG tablet Take 25 mg by mouth once daily.    Historical Provider   montelukast (SINGULAIR) 10 mg tablet Take 10 mg by mouth every evening.    Historical Provider   nadolol (CORGARD) 40 MG tablet Take 40 mg by mouth once daily.    Historical Provider   naproxen (NAPROSYN) 500 MG tablet Take 500 mg by mouth 2 (two) times daily.    Historical Provider   ondansetron (ZOFRAN) 4 MG tablet Take 4 mg by mouth 2 (two) times daily.    Historical Provider   oxazepam (SERAX) 30 MG capsule Day 1: Take 1 tablet mouth every 6 hours as needed for anxiety/sweating/tremors..  Day 2:  Take 1 tablet by mouth every  8 hours as needed for anxiety/sweating/tremors.  Day 3:  Take 1 tablet by mouth every 12 hours as needed for anxiety/sweating/tremors.  Day 4:  Take 1 tablet by mouth as night. 2/24/19   Claudia Cortez,    thiamine 100 MG tablet Take 100 mg by mouth once daily.    Historical Provider   traMADoL (ULTRAM) 50 mg tablet Take 1 tablet (50 mg total) by mouth every 6 (six) hours as needed. 3/28/20   James Mcdonnell NP     Scheduled Meds:    folic acid  1 mg Oral Daily    lactulose  20 g Oral TID    thiamine  100 mg Oral Daily     Continuous Infusions:   PRN Meds:acetaminophen, albuterol-ipratropium, dextrose 50%, dextrose 50%, glucagon (human recombinant), glucose, glucose, hydrOXYzine HCL, ondansetron, oxyCODONE, sodium chloride 0.9%  Anticoagulants/Antiplatelets: no anticoagulation    Allergies:   Review of patient's allergies indicates:   Allergen Reactions    Latex, natural rubber      Sedation Hx: have not been any systemic reactions    Vitals:  Temp: 97.9 °F (36.6 °C) (09/21/20 0518)  Pulse: 93 (09/21/20 1105)  Resp: 18 (09/21/20 1105)  BP: (!) 140/69 (09/21/20 1105)  SpO2: 95 % (09/21/20 1105)     Physical Exam:  ASA: 3  Mallampati: n/a    General: no acute distress  Mental Status: alert and oriented to person, place and time  HEENT: normocephalic, atraumatic  Chest: unlabored breathing  Heart: regular heart rate  Abdomen: distended  Extremity: moves all extremities    Plan: ultrasound guided paracentesis  Sedation Plan: local    JEFFY Soto, FNP  Interventional Radiology  [phone] clinic

## 2020-09-21 NOTE — ASSESSMENT & PLAN NOTE
ASSESSMENT:     DIAGNOSES & PROBLEMS  Alcohol Use Disorder, Severe  Hepatic encephalopathy  Decompensated Hepatic cirrhosis    STRENGTHS AND LIABILITIES   Strength: Patient accepts guidance/feedback, Liability: Patient has poor health., Liability: Patient has poor judgment, Liability: Patient is unstable., Liability: Patient lacks coping skills.      MOTIVATION TO PURSUE RECOVERY: low    ACCEPTANCE OF ADDICTION: good    ABILITY TO ADHERE TO TREATMENT PLAN: low      PLAN:       TRANSPLANT SUITABILITY  From a psychiatric perspective, this patient presents as a HIGH risk for transplant. He may lower his risk with continued sobriety and participation in formal treatment programs such as rehab or IOP. Would also benefit from a 12 step meeting such as AA or smart recovery. Patient did not appear interested in rehab/IOP at this time, however is considering AA.     MANAGEMENT PLAN, TREATMENT GOALS, THERAPEUTIC TECHNIQUES/APPROACHES & CLINICAL REASONING    · Patient counseled on abstinence from alcohol and substances of abuse (illicit and prescription).  · Additional workup planned to address substance use disorder, in order to guide and refine ongoing management options, includes serial alcohol and drug laboratory testing (e.g. PETH, urine toxicology).  · Relapse prevention and motivational interviewing provided.  · Education provided on 12 step recovery programs.      PRESCRIPTION DRUG MANAGEMENT  - The risks and benefits of medication were discussed with this patient.  - Possible expectable adverse effects of any current or proposed individual psychotropic agents were discussed with this patient.  - Counseling was provided on the importance of full compliance with medication regimens.      In cases of emergency, daily coverage provided by Acute/ER Psych MD, NP, or MARCELLA, with contact numbers located in [facility] On Call Schedule    Case discussed with staff addiction psychiatrist: ARNDA GARCIA,  MD      Labs/Imaging/Studies:   Recent Lab Results       09/21/20  0516        Albumin 3.9     Alkaline Phosphatase 123     ALT 35     Anion Gap 10     AST 75     Baso # 0.07     Basophil% 1.0     BILIRUBIN TOTAL 24.9  Comment:  For infants and newborns, interpretation of results should be based  on gestational age, weight and in agreement with clinical  observations.  Premature Infant recommended reference ranges:  Up to 24 hours.............<8.0 mg/dL  Up to 48 hours............<12.0 mg/dL  3-5 days..................<15.0 mg/dL  6-29 days.................<15.0 mg/dL       BUN, Bld 42     Calcium 8.9     Chloride 102     CO2 20     Creatinine 1.8     Differential Method Automated     eGFR if African American 50.7     eGFR if non  43.8  Comment:  Calculation used to obtain the estimated glomerular filtration  rate (eGFR) is the CKD-EPI equation.        Eos # 0.2     Eosinophil% 2.7     Glucose 97     Gran # (ANC) 4.9     Gran% 69.1     Hematocrit 26.2     Hemoglobin 8.7     Immature Grans (Abs) 0.15  Comment:  Mild elevation in immature granulocytes is non specific and   can be seen in a variety of conditions including stress response,   acute inflammation, trauma and pregnancy. Correlation with other   laboratory and clinical findings is essential.       Immature Granulocytes 2.1     INR 2.3  Comment:  Coumadin Therapy:  2.0 - 3.0 for INR for all indicators except mechanical heart valves  and antiphospholipid syndromes which should use 2.5 - 3.5.       Lymph # 0.7     Lymph% 10.2     MCH 34.7     MCHC 33.2          Mono # 1.1     Mono% 14.9     MPV 10.7     nRBC 0     Platelets 48     Potassium 4.1     PROTEIN TOTAL 5.6     Protime 24.5     RBC 2.51     RDW 17.2     Sodium 132     WBC 7.04

## 2020-09-21 NOTE — PLAN OF CARE
Pt arrived to IR MPU room 4 for para, no acute distress noted. Orders and labs reviewed on chart. Awaiting consent.

## 2020-09-21 NOTE — PLAN OF CARE
Problem: Adult Inpatient Plan of Care  Goal: Plan of Care Review  Outcome: Ongoing, Progressing    Plan of care reviewed with patient. Aaox4. VSS. Medicated for pain x2.  Patient remains free of falls, bed in lowest position call light within reach monitoring ongoing

## 2020-09-21 NOTE — PLAN OF CARE
Patient alert and orientedx4, patient has c/o abd pain and given Prn medication with moderate relief. No acute distress noted, paracentesis not done today due to not enough fluid in abd. Ongoing assessment being made, bedside monitor on, alarms on and audible. Resp even and unlabored, no acute distress noted. Call light within reach, bed locked and lowest position, side rails upx2.

## 2020-09-21 NOTE — PLAN OF CARE
Per Robyn Garza NP no fluid seen via ultrasound to safely proceed with paracentesis. Report called to MILADIS Louie. Awaiting transport.

## 2020-09-21 NOTE — CONSULTS
[facility]  Psychiatry  Consult Note    Patient Name: Jordan Altman  MRN: [id number]   Code Status: Full Code  Admission Date: 9/19/2020  Hospital Length of Stay: 2 days  Attending Physician: Lise Gonsalez MD  Primary Care Provider: SHAHEEN Jaime    Current Legal Status: Formal Voluntary Admission (FVA)    Patient information was obtained from patient, past medical records and ER records.   Consults  Subjective:     Principal Problem:Decompensated hepatic cirrhosis    Chief Complaint:  Liver transplant eval     HPI:   9/21/2020 11:00 AM  Jordan Altman  1975  [id number]      ADDICTION CONSULT INITIAL EVALUATION     DEPARTMENT:  Psychiatry  SITE: [facility] [address]    DATE OF ADMISSION: 9/19/2020  1:03 AM  LENGTH OF STAY: 2 days    EXAMINING PRACTITIONER: Jered Marie    CONSULT REQUESTED BY: Lise Gonsalez MD    SUBJECTIVE     CHIEF COMPLAINT  Jordan Altman is a 45 y.o. male who is seen today for an initial psychiatric evaluation by the addiction psychiatry consult service.  Jordan Altman presents with the chief complaint of: pre-transplant evaluation    HISTORY OF PRESENT ILLNESS  Per Primary MD:  Mr. Altman is a 45 year old male with PMH of alcohol abuse, alcoholic cirrhosis diagnosed one year ago and complicated by ascites, portal hypertension, hepatic encephalopathy, thrombocytopenia, coagulopathy presents as a transfer from [facility] for management of acute decompensated cirrhosis. He was admitted to OSH 3 days ago with abdominal distention with pain and SOB. Patient reports he quit drinking in July of this year when he was admitted to hospital for alcoholic hepatitis. He had another admission August 25-September 1 with decompensated cirrhosis complicated by peritonitis. Patient notes 1.7 liter paracentesis last week on 9/15 and was negative for infection.      He has been taking lactulose, Lasix, and spironolactone.  He  was found to have decompensated cirrhosis, acute kidney injury, hyponatremia, thrombocytopenia, anemia, and metabolic acidosis on admission.  Labs on September 17 noted white blood cell count 14.6, hemoglobin 12.7, hematocrit 39.1, platelet count 82, , ALT 69, total bilirubin 34.5, INR 2.3, sodium 126, potassium 4.1, CO2 18, chloride 93, BUN 40, creatinine 2.03. COVID negative.  Chest x-ray on September 17 showed no airspace disease or effusion. He was seen by Gastroenterology during his stay and was concern for possible hepatorenal syndrome, and he was treated with albumin. He was empirically started on meropenem.     During my interview patient has severe jaundice, appears chronically ill but not in distress. He endorses pain across upper abdomen but denies fever, chills, chest pain, dark stool or bleeding from other sites. States he used to drink 1-2 pint of vodka, whisky daily since age 16 until July of this year. Denies tobacco, IVDU. He worked as  prior to COVID pandemic and has numerous tattoos all over his body.        Per Addiction Psych MD:  Seen at bedside. Patient is jaundiced, but a&o in NAD. Reports a long history of heavy drinking starting age 16. 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 He reports completing rehab in 2019 followed by 6 months of sobriety, attributing his relapse to the pandemic. Prior to that he detoxed at [facility] in 2017 followed by a 2 month 5 day per week IOP for etoh use disorder.  Achieved 8 months of sobriety with that. At his heaviest he was drinking a fifth of vodka/day. Recently he started back to drinking 1-2 pints/day again, quitting July 25 2020 after hospitalization for ascites 2/2 alcoholic cirrhosis.    On my interview he denies any mental health history or current symptoms aside from occasional anxiety which he is able to manage without medication. Per chart review he has a history of depression with 2x med  trials which he discontinued due to side effects.    SUBSTANCE ABUSE HISTORY  Substance(s) of Choice: Alcohol  Substances Used: Alcohol, Hx of marijuana  History of IVDU?: No  Use of Alcohol: Quit ~2month  Average Consumption: 1-2pints/d  Last Drink: July 25th  Use of Medications for Alcohol/Opioid Use Disorder: No  History of Complicated Withdrawal: No  History of Detox: Yes, [facility] 2017  Rehab History: Yes, [facility]  AA/NA involvement: No  Tobacco: Never  Spouse/Partner Consumption: N/A  Patient Aware of Biomedical Complications: Yes    DSM-5 SUBSTANCE USE DISORDER CRITERIA   Mild (1-3), Moderate (4-5), Severe (?6)  1. Often take in larger amounts or over a longer period of time than was intended.  2. Persistent desire or unsuccessful efforts to cut down or control use.  3. Great deal of time spent in activities necessary to obtain substance, use, or recover from effects.  4. Craving/strong desire for substance or urge to use.  5. Use resulting in failure to fulfill major role obligations at home, work or school.  6. Social, occupational, recreational activities decreased because of use.  7. Continued use despite having persistent or recurrent social or interpersonal problems cause or exaserbated by the substance.  8. Recurrent use in situations in which it is physically hazardous.  9. Use despite physical or psychological problems that are likely to have been caused or exacerbated by the substance.  10. Tolerance, as defined by either of the following.   A. A need for markedly increased amounts of substance to achieve intoxication or desired effect. -OR-    B. A markedly diminished effect with continued use of the same amount of substance.  11. Withdrawal, as manifested by the following.   A. The characteristic withdrawal syndrome for substance. -AND-   B. Substance is taken to relieve or avoid withdrawal symptoms.    ARE THE CRITERIA MET FOR DSM-5 SUBSTANCE USE DISORDER: Yes, Severe      TRANSPLANT  EVALUATION  Date first informed of impending organ failure - Dx alcoholic cirrhosis Dec 2017  When was the subject of transplantation first broached - September 2020  Pt made the following lifestyle adjustments and how - Not drinking currently  Does the patient accept/understand the connection between substance use and subsequent organ failure - Yes  Date of last use of substances - 8/25/2020  Understands need for lifetime medication - Yes  Understands need for lifetime sobriety - Yes  View of AA/NA - Interested, not involved w/ in it  Social support - Girlfriend, adult son (21)    PSYCHIATRIC HISTORY  Reported Diagnose(s): Denies (MILDRED per chart review)  Previous Medication Trials: None (2x antidepressants per chart review)  Previous Psychiatric Hospitalizations: None  Outpatient Psychiatrist?: No    SUICIDE/HOMICIDE RISK ASSESSMENT  Current/active suicidal ideation/plan/intent: No  Previous suicide attempts: No  Current/active homicidal ideation/plan/intent: No      HISTORY OF TRAUMA, ABUSE & VIOLENCE  Trauma: Denies  Physical Abuse: No  Sexual Abuse: No  Violent Conduct: No    Access to Gun: No    FAMILY PSYCHIATRIC HISTORY   Denies    SOCIAL HISTORY  . Adult son (age 21). . Lives with girlfriend. Long hx of heavy alcohol abuse with periods of sobriety 8 months and 6 months. Now sober since July 25, 2020.    PAST MEDICAL HISTORY   Past Medical History:   Diagnosis Date    Decompensated hepatic cirrhosis     Hypertension     SBP (spontaneous bacterial peritonitis)      PSYCHOSOCIAL FACTORS  Stressors (Psychosocial and Environmental): financial, health and drug and alcohol.       PSYCHIATRIC ROS  Psychiatric Review Of Systems - Is patient experiencing or having changes in:    Symptoms of Depression: diminished mood or loss of interest/anhedonia; irritability, diminished energy, change in sleep, change in appetite, diminished concentration or cognition or indecisiveness, PMA/R, excessive guilt  or hopelessness or worthlessness, suicidal ideations    Symptoms of MILDRED: excessive anxiety/worry/fear, more days than not, about numerous issues, difficult to control, with restlessness, fatigue, poor concentration, irritability, muscle tension, sleep disturbance; causes functionally impairing distress     Symptoms of magda or hypomania: elevated, expansive, or irritable mood with increased energy or activity; with inflated self-esteem or grandiosity, decreased need for sleep, increased rate of speech, FOI or racing thoughts, distractibility, increased goal directed activity or PMA, risky/disinhibited behavior    Symptoms of psychosis: hallucinations, delusions, disorganized thinking, disorganized behavior or abnormal motor behavior, or negative symptoms (diminshed emotional expression, avolition, anhedonia, alogia, asociality     Sleep: initiation, maintenance, early morning awakening with inability to return to sleep    Suicidal/Homicidal ideations: active/passive ideations, organized plans, future intentions    Symptoms of Panic Disorder: recurrent panic attacks, precipitated or un-precipitated, source of worry and/or behavioral changes secondary; with or without agoraphobia    Symptoms of PTSD: h/o trauma; re-experiencing/intrusive symptoms, avoidant behavior, negative alterations in cognition or mood, or hyperarousal symptoms; with or without dissociative symptoms     Substance Use: intoxication, withdrawal, tolerance, used in larger amounts or duration than intended, unsuccessful attempts to limit or quit, increased time engaging in or seeking out, cravings or strong desire to use, failure to fulfill obligations, negative consequences in social/interpersonal/occupational,/recreational areas, use in dangerous situations, medical or psychological consequences     MEDICAL ROS  Complete review of systems performed covering Constitutional, Eyes, ENT/Mouth, Cardiovascular, Respiratory, Gastrointestinal,  Genitourinary, Musculoskeletal, Skin, Neurologic, Endocrine, Heme/Lymph, and Allergy/Immune.     Complete review of systems was negative with the exception of the following positive symptoms: fatigue, jaundice, swelling, pruritis      ALLERGIES  Latex, natural rubber      MEDICATIONS    Psychotropics:  None    Infusions:      Scheduled:   folic acid  1 mg Oral Daily    lactulose  20 g Oral TID    thiamine  100 mg Oral Daily       PRN:  acetaminophen, albuterol-ipratropium, dextrose 50%, dextrose 50%, glucagon (human recombinant), glucose, glucose, hydrOXYzine HCL, ondansetron, oxyCODONE, sodium chloride 0.9%    Home Medications:  Prior to Admission medications    Medication Sig Start Date End Date Taking? Authorizing Provider   ALPRAZolam (XANAX) 1 MG tablet Take 1 mg by mouth 3 (three) times daily as needed for Anxiety.    Historical Provider   azelastine (OPTIVAR) 0.05 % ophthalmic solution 2 drops 2 (two) times daily.    Historical Provider   cyclobenzaprine (FLEXERIL) 10 MG tablet Take 10 mg by mouth 3 (three) times daily as needed for Muscle spasms.    Historical Provider   diclofenac (VOLTAREN) 0.1 % ophthalmic solution 1 drop 4 (four) times daily.    Historical Provider   fluticasone (FLOVENT DISKUS) 50 mcg/actuation DsDv Inhale into the lungs. Controller    Historical Provider   ketorolac (TORADOL) 10 mg tablet Take 10 mg by mouth every 6 (six) hours.    Historical Provider   lidocaine-prilocaine (EMLA) cream Apply topically once.    Historical Provider   losartan (COZAAR) 25 MG tablet Take 25 mg by mouth once daily.    Historical Provider   montelukast (SINGULAIR) 10 mg tablet Take 10 mg by mouth every evening.    Historical Provider   nadolol (CORGARD) 40 MG tablet Take 40 mg by mouth once daily.    Historical Provider   naproxen (NAPROSYN) 500 MG tablet Take 500 mg by mouth 2 (two) times daily.    Historical Provider   ondansetron (ZOFRAN) 4 MG tablet Take 4 mg by mouth 2 (two) times daily.     Historical Provider   oxazepam (SERAX) 30 MG capsule Day 1: Take 1 tablet mouth every 6 hours as needed for anxiety/sweating/tremors..  Day 2:  Take 1 tablet by mouth every 8 hours as needed for anxiety/sweating/tremors.  Day 3:  Take 1 tablet by mouth every 12 hours as needed for anxiety/sweating/tremors.  Day 4:  Take 1 tablet by mouth as night. 2/24/19   Claudia Cortez,    thiamine 100 MG tablet Take 100 mg by mouth once daily.    Historical Provider   traMADoL (ULTRAM) 50 mg tablet Take 1 tablet (50 mg total) by mouth every 6 (six) hours as needed. 3/28/20   James Mcdonnell NP         OBJECTIVE:     EXAMINATION    Vitals:    09/21/20 0518 09/21/20 0600 09/21/20 0725 09/21/20 1003   BP:       BP Location:       Patient Position:       Pulse:   92    Resp:    18   Temp: 97.9 °F (36.6 °C)      TempSrc: Oral      SpO2:       Weight:  121 kg (266 lb 12.1 oz)     Height:           PAIN   0/10    CONSTITUTIONAL  General Appearance and Manner: Chronically ill appearing, jaundiced    MUSCULOSKELETAL  Abnormal Involuntary Movements: 0  Gait and Station: Not assessed    PSYCHIATRIC   Orientation: A&Ox4  Speech: RRR&Volume  Language: Fluent english  Mood: Good  Affect: Euthymic  Thought Process: Linear, Goal directed  Associations: Intact  Thought Content: Denies SI/HI/AVH  Memory: Short and long term memory intact  Attention and Concentration: Intact, non-distractible  Fund of Knowledge: Appropriate  Insight: Fair  Judgment: Poor      Hospital Course: No notes on file    No new subjective & objective note has been filed under this hospital service since the last note was generated.    Assessment/Plan:     Alcohol use disorder, severe, in early remission      ASSESSMENT:     DIAGNOSES & PROBLEMS  Alcohol Use Disorder, Severe  Hepatic encephalopathy  Decompensated Hepatic cirrhosis    STRENGTHS AND LIABILITIES   Strength: Patient accepts guidance/feedback, Liability: Patient has poor health., Liability: Patient has  poor judgment, Liability: Patient is unstable., Liability: Patient lacks coping skills.      MOTIVATION TO PURSUE RECOVERY: low    ACCEPTANCE OF ADDICTION: good    ABILITY TO ADHERE TO TREATMENT PLAN: low      PLAN:       TRANSPLANT SUITABILITY  From a psychiatric perspective, this patient presents as a HIGH risk for transplant. He may lower his risk with continued sobriety and participation in formal treatment programs such as rehab or IOP. Would also benefit from a 12 step meeting such as AA or smart recovery. Patient did not appear interested in rehab/IOP at this time, however is considering AA.     MANAGEMENT PLAN, TREATMENT GOALS, THERAPEUTIC TECHNIQUES/APPROACHES & CLINICAL REASONING    · Patient counseled on abstinence from alcohol and substances of abuse (illicit and prescription).  · Additional workup planned to address substance use disorder, in order to guide and refine ongoing management options, includes serial alcohol and drug laboratory testing (e.g. PETH, urine toxicology).  · Relapse prevention and motivational interviewing provided.  · Education provided on 12 step recovery programs.      PRESCRIPTION DRUG MANAGEMENT  - The risks and benefits of medication were discussed with this patient.  - Possible expectable adverse effects of any current or proposed individual psychotropic agents were discussed with this patient.  - Counseling was provided on the importance of full compliance with medication regimens.      In cases of emergency, daily coverage provided by Acute/ER Psych MD, NP, or SW, with contact numbers located in [facility] On Call Schedule    Case discussed with staff addiction psychiatrist: RANDA GARCIA MD      Labs/Imaging/Studies:   Recent Lab Results       09/21/20  0516        Albumin 3.9     Alkaline Phosphatase 123     ALT 35     Anion Gap 10     AST 75     Baso # 0.07     Basophil% 1.0     BILIRUBIN TOTAL 24.9  Comment:  For infants and newborns, interpretation of  results should be based  on gestational age, weight and in agreement with clinical  observations.  Premature Infant recommended reference ranges:  Up to 24 hours.............<8.0 mg/dL  Up to 48 hours............<12.0 mg/dL  3-5 days..................<15.0 mg/dL  6-29 days.................<15.0 mg/dL       BUN, Bld 42     Calcium 8.9     Chloride 102     CO2 20     Creatinine 1.8     Differential Method Automated     eGFR if African American 50.7     eGFR if non  43.8  Comment:  Calculation used to obtain the estimated glomerular filtration  rate (eGFR) is the CKD-EPI equation.        Eos # 0.2     Eosinophil% 2.7     Glucose 97     Gran # (ANC) 4.9     Gran% 69.1     Hematocrit 26.2     Hemoglobin 8.7     Immature Grans (Abs) 0.15  Comment:  Mild elevation in immature granulocytes is non specific and   can be seen in a variety of conditions including stress response,   acute inflammation, trauma and pregnancy. Correlation with other   laboratory and clinical findings is essential.       Immature Granulocytes 2.1     INR 2.3  Comment:  Coumadin Therapy:  2.0 - 3.0 for INR for all indicators except mechanical heart valves  and antiphospholipid syndromes which should use 2.5 - 3.5.       Lymph # 0.7     Lymph% 10.2     MCH 34.7     MCHC 33.2          Mono # 1.1     Mono% 14.9     MPV 10.7     nRBC 0     Platelets 48     Potassium 4.1     PROTEIN TOTAL 5.6     Protime 24.5     RBC 2.51     RDW 17.2     Sodium 132     WBC 7.04                  Total Time:  45 minutes     Jered Marie MD   Psychiatry  [facility]

## 2020-09-21 NOTE — PLAN OF CARE
CM attempted to see pt - not in room. Per chart, appears to be having a paracentesis.     SHARAD HEBERT [id number] - assisting 8WT SHARAD Thakkar [id number]

## 2020-09-21 NOTE — HPI
9/21/2020 11:00 AM  Jordan Altman  1975  [id number]      ADDICTION CONSULT INITIAL EVALUATION     DEPARTMENT:  Psychiatry  SITE: [facility], [address]    DATE OF ADMISSION: 9/19/2020  1:03 AM  LENGTH OF STAY: 2 days    EXAMINING PRACTITIONER: Jered Marie    CONSULT REQUESTED BY: Lise Gonsalez MD    SUBJECTIVE     CHIEF COMPLAINT  Jordan Altman is a 45 y.o. male who is seen today for an initial psychiatric evaluation by the addiction psychiatry consult service.  Jordan Altman presents with the chief complaint of: pre-transplant evaluation    HISTORY OF PRESENT ILLNESS  Per Primary MD:  Mr. Altman is a 45 year old male with PMH of alcohol abuse, alcoholic cirrhosis diagnosed one year ago and complicated by ascites, portal hypertension, hepatic encephalopathy, thrombocytopenia, coagulopathy presents as a transfer from [facility] for management of acute decompensated cirrhosis. He was admitted to OSH 3 days ago with abdominal distention with pain and SOB. Patient reports he quit drinking in July of this year when he was admitted to hospital for alcoholic hepatitis. He had another admission August 25-September 1 with decompensated cirrhosis complicated by peritonitis. Patient notes 1.7 liter paracentesis last week on 9/15 and was negative for infection.      He has been taking lactulose, Lasix, and spironolactone.  He was found to have decompensated cirrhosis, acute kidney injury, hyponatremia, thrombocytopenia, anemia, and metabolic acidosis on admission.  Labs on September 17 noted white blood cell count 14.6, hemoglobin 12.7, hematocrit 39.1, platelet count 82, , ALT 69, total bilirubin 34.5, INR 2.3, sodium 126, potassium 4.1, CO2 18, chloride 93, BUN 40, creatinine 2.03. COVID negative.  Chest x-ray on September 17 showed no airspace disease or effusion. He was seen by Gastroenterology during his stay and was concern for possible hepatorenal syndrome,  and he was treated with albumin. He was empirically started on meropenem.     During my interview patient has severe jaundice, appears chronically ill but not in distress. He endorses pain across upper abdomen but denies fever, chills, chest pain, dark stool or bleeding from other sites. States he used to drink 1-2 pint of vodka, whisky daily since age 16 until July of this year. Denies tobacco, IVDU. He worked as  prior to COVID pandemic and has numerous tattoos all over his body.        Per Addiction Psych MD:  Seen at bedside. Patient is jaundiced, but a&o in NAD. Reports a long history of heavy drinking starting age 16. 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 He reports completing rehab in 2019 followed by 6 months of sobriety, attributing his relapse to the pandemic. Prior to that he detoxed at [facility] in 2017 followed by a 2 month 5 day per week IOP for etoh use disorder.  Achieved 8 months of sobriety with that. At his heaviest he was drinking a fifth of vodka/day. Recently he started back to drinking 1-2 pints/day again, quitting July 25 2020 after hospitalization for ascites 2/2 alcoholic cirrhosis.    On my interview he denies any mental health history or current symptoms aside from occasional anxiety which he is able to manage without medication. Per chart review he has a history of depression with 2x med trials which he discontinued due to side effects.    SUBSTANCE ABUSE HISTORY  Substance(s) of Choice: Alcohol  Substances Used: Alcohol, Hx of marijuana  History of IVDU?: No  Use of Alcohol: Quit ~2month  Average Consumption: 1-2pints/d  Last Drink: July 25th  Use of Medications for Alcohol/Opioid Use Disorder: No  History of Complicated Withdrawal: No  History of Detox: Yes, [facility] 2017  Rehab History: Yes, [facility]  AA/NA involvement: No  Tobacco: Never  Spouse/Partner Consumption: N/A  Patient Aware of Biomedical Complications: Yes    DSM-5  SUBSTANCE USE DISORDER CRITERIA   Mild (1-3), Moderate (4-5), Severe (?6)  1. Often take in larger amounts or over a longer period of time than was intended.  2. Persistent desire or unsuccessful efforts to cut down or control use.  3. Great deal of time spent in activities necessary to obtain substance, use, or recover from effects.  4. Craving/strong desire for substance or urge to use.  5. Use resulting in failure to fulfill major role obligations at home, work or school.  6. Social, occupational, recreational activities decreased because of use.  7. Continued use despite having persistent or recurrent social or interpersonal problems cause or exaserbated by the substance.  8. Recurrent use in situations in which it is physically hazardous.  9. Use despite physical or psychological problems that are likely to have been caused or exacerbated by the substance.  10. Tolerance, as defined by either of the following.   A. A need for markedly increased amounts of substance to achieve intoxication or desired effect. -OR-    B. A markedly diminished effect with continued use of the same amount of substance.  11. Withdrawal, as manifested by the following.   A. The characteristic withdrawal syndrome for substance. -AND-   B. Substance is taken to relieve or avoid withdrawal symptoms.    ARE THE CRITERIA MET FOR DSM-5 SUBSTANCE USE DISORDER: Yes, Severe      TRANSPLANT EVALUATION  Date first informed of impending organ failure - Dx alcoholic cirrhosis Dec 2017  When was the subject of transplantation first broached - September 2020  Pt made the following lifestyle adjustments and how - Not drinking currently  Does the patient accept/understand the connection between substance use and subsequent organ failure - Yes  Date of last use of substances - 8/25/2020  Understands need for lifetime medication - Yes  Understands need for lifetime sobriety - Yes  View of AA/NA - Interested, not involved w/ in it  Social support -  Girlfriend, adult son (21)    PSYCHIATRIC HISTORY  Reported Diagnose(s): Denies (MILDRED per chart review)  Previous Medication Trials: None (2x antidepressants per chart review)  Previous Psychiatric Hospitalizations: None  Outpatient Psychiatrist?: No    SUICIDE/HOMICIDE RISK ASSESSMENT  Current/active suicidal ideation/plan/intent: No  Previous suicide attempts: No  Current/active homicidal ideation/plan/intent: No      HISTORY OF TRAUMA, ABUSE & VIOLENCE  Trauma: Denies  Physical Abuse: No  Sexual Abuse: No  Violent Conduct: No    Access to Gun: No    FAMILY PSYCHIATRIC HISTORY   Denies    SOCIAL HISTORY  . Adult son (age 21). . Lives with girlfriend. Long hx of heavy alcohol abuse with periods of sobriety 8 months and 6 months. Now sober since July 25, 2020.    PAST MEDICAL HISTORY   Past Medical History:   Diagnosis Date    Decompensated hepatic cirrhosis     Hypertension     SBP (spontaneous bacterial peritonitis)      PSYCHOSOCIAL FACTORS  Stressors (Psychosocial and Environmental): financial, health and drug and alcohol.       PSYCHIATRIC ROS  Psychiatric Review Of Systems - Is patient experiencing or having changes in:    Symptoms of Depression: diminished mood or loss of interest/anhedonia; irritability, diminished energy, change in sleep, change in appetite, diminished concentration or cognition or indecisiveness, PMA/R, excessive guilt or hopelessness or worthlessness, suicidal ideations    Symptoms of MILDRED: excessive anxiety/worry/fear, more days than not, about numerous issues, difficult to control, with restlessness, fatigue, poor concentration, irritability, muscle tension, sleep disturbance; causes functionally impairing distress     Symptoms of magda or hypomania: elevated, expansive, or irritable mood with increased energy or activity; with inflated self-esteem or grandiosity, decreased need for sleep, increased rate of speech, FOI or racing thoughts, distractibility, increased  goal directed activity or PMA, risky/disinhibited behavior    Symptoms of psychosis: hallucinations, delusions, disorganized thinking, disorganized behavior or abnormal motor behavior, or negative symptoms (diminshed emotional expression, avolition, anhedonia, alogia, asociality     Sleep: initiation, maintenance, early morning awakening with inability to return to sleep    Suicidal/Homicidal ideations: active/passive ideations, organized plans, future intentions    Symptoms of Panic Disorder: recurrent panic attacks, precipitated or un-precipitated, source of worry and/or behavioral changes secondary; with or without agoraphobia    Symptoms of PTSD: h/o trauma; re-experiencing/intrusive symptoms, avoidant behavior, negative alterations in cognition or mood, or hyperarousal symptoms; with or without dissociative symptoms     Substance Use: intoxication, withdrawal, tolerance, used in larger amounts or duration than intended, unsuccessful attempts to limit or quit, increased time engaging in or seeking out, cravings or strong desire to use, failure to fulfill obligations, negative consequences in social/interpersonal/occupational,/recreational areas, use in dangerous situations, medical or psychological consequences     MEDICAL ROS  Complete review of systems performed covering Constitutional, Eyes, ENT/Mouth, Cardiovascular, Respiratory, Gastrointestinal, Genitourinary, Musculoskeletal, Skin, Neurologic, Endocrine, Heme/Lymph, and Allergy/Immune.     Complete review of systems was negative with the exception of the following positive symptoms: fatigue, jaundice, swelling, pruritis      ALLERGIES  Latex, natural rubber      MEDICATIONS    Psychotropics:  None    Infusions:      Scheduled:   folic acid  1 mg Oral Daily    lactulose  20 g Oral TID    thiamine  100 mg Oral Daily       PRN:  acetaminophen, albuterol-ipratropium, dextrose 50%, dextrose 50%, glucagon (human recombinant), glucose, glucose, hydrOXYzine  HCL, ondansetron, oxyCODONE, sodium chloride 0.9%    Home Medications:  Prior to Admission medications    Medication Sig Start Date End Date Taking? Authorizing Provider   ALPRAZolam (XANAX) 1 MG tablet Take 1 mg by mouth 3 (three) times daily as needed for Anxiety.    Historical Provider   azelastine (OPTIVAR) 0.05 % ophthalmic solution 2 drops 2 (two) times daily.    Historical Provider   cyclobenzaprine (FLEXERIL) 10 MG tablet Take 10 mg by mouth 3 (three) times daily as needed for Muscle spasms.    Historical Provider   diclofenac (VOLTAREN) 0.1 % ophthalmic solution 1 drop 4 (four) times daily.    Historical Provider   fluticasone (FLOVENT DISKUS) 50 mcg/actuation DsDv Inhale into the lungs. Controller    Historical Provider   ketorolac (TORADOL) 10 mg tablet Take 10 mg by mouth every 6 (six) hours.    Historical Provider   lidocaine-prilocaine (EMLA) cream Apply topically once.    Historical Provider   losartan (COZAAR) 25 MG tablet Take 25 mg by mouth once daily.    Historical Provider   montelukast (SINGULAIR) 10 mg tablet Take 10 mg by mouth every evening.    Historical Provider   nadolol (CORGARD) 40 MG tablet Take 40 mg by mouth once daily.    Historical Provider   naproxen (NAPROSYN) 500 MG tablet Take 500 mg by mouth 2 (two) times daily.    Historical Provider   ondansetron (ZOFRAN) 4 MG tablet Take 4 mg by mouth 2 (two) times daily.    Historical Provider   oxazepam (SERAX) 30 MG capsule Day 1: Take 1 tablet mouth every 6 hours as needed for anxiety/sweating/tremors..  Day 2:  Take 1 tablet by mouth every 8 hours as needed for anxiety/sweating/tremors.  Day 3:  Take 1 tablet by mouth every 12 hours as needed for anxiety/sweating/tremors.  Day 4:  Take 1 tablet by mouth as night. 2/24/19   Claudia Cortez,    thiamine 100 MG tablet Take 100 mg by mouth once daily.    Historical Provider   traMADoL (ULTRAM) 50 mg tablet Take 1 tablet (50 mg total) by mouth every 6 (six) hours as needed. 3/28/20    James Mcdonnell NP         OBJECTIVE:     EXAMINATION    Vitals:    09/21/20 0518 09/21/20 0600 09/21/20 0725 09/21/20 1003   BP:       BP Location:       Patient Position:       Pulse:   92    Resp:    18   Temp: 97.9 °F (36.6 °C)      TempSrc: Oral      SpO2:       Weight:  121 kg (266 lb 12.1 oz)     Height:           PAIN   0/10    CONSTITUTIONAL  General Appearance and Manner: Chronically ill appearing, jaundiced    MUSCULOSKELETAL  Abnormal Involuntary Movements: 0  Gait and Station: Not assessed    PSYCHIATRIC   Orientation: A&Ox4  Speech: RRR&Volume  Language: Fluent english  Mood: Good  Affect: Euthymic  Thought Process: Linear, Goal directed  Associations: Intact  Thought Content: Denies SI/HI/AVH  Memory: Short and long term memory intact  Attention and Concentration: Intact, non-distractible  Fund of Knowledge: Appropriate  Insight: Fair  Judgment: Poor

## 2020-09-22 LAB
ALBUMIN SERPL BCP-MCNC: 3.8 G/DL (ref 3.5–5.2)
ALP SERPL-CCNC: 143 U/L (ref 55–135)
ALT SERPL W/O P-5'-P-CCNC: 39 U/L (ref 10–44)
ANION GAP SERPL CALC-SCNC: 13 MMOL/L (ref 8–16)
AST SERPL-CCNC: 81 U/L (ref 10–40)
BASOPHILS # BLD AUTO: 0.07 K/UL (ref 0–0.2)
BASOPHILS NFR BLD: 0.9 % (ref 0–1.9)
BILIRUB SERPL-MCNC: 24.6 MG/DL (ref 0.1–1)
BUN SERPL-MCNC: 36 MG/DL (ref 6–20)
CALCIUM SERPL-MCNC: 9 MG/DL (ref 8.7–10.5)
CHLORIDE SERPL-SCNC: 102 MMOL/L (ref 95–110)
CO2 SERPL-SCNC: 16 MMOL/L (ref 23–29)
CREAT SERPL-MCNC: 1.7 MG/DL (ref 0.5–1.4)
DIFFERENTIAL METHOD: ABNORMAL
EOSINOPHIL # BLD AUTO: 0.2 K/UL (ref 0–0.5)
EOSINOPHIL NFR BLD: 2.5 % (ref 0–8)
ERYTHROCYTE [DISTWIDTH] IN BLOOD BY AUTOMATED COUNT: 17.4 % (ref 11.5–14.5)
EST. GFR  (AFRICAN AMERICAN): 54.3 ML/MIN/1.73 M^2
EST. GFR  (NON AFRICAN AMERICAN): 47 ML/MIN/1.73 M^2
GLUCOSE SERPL-MCNC: 119 MG/DL (ref 70–110)
HCT VFR BLD AUTO: 27.2 % (ref 40–54)
HGB BLD-MCNC: 8.9 G/DL (ref 14–18)
IMM GRANULOCYTES # BLD AUTO: 0.13 K/UL (ref 0–0.04)
IMM GRANULOCYTES NFR BLD AUTO: 1.6 % (ref 0–0.5)
INR PPP: 2.2 (ref 0.8–1.2)
LYMPHOCYTES # BLD AUTO: 0.8 K/UL (ref 1–4.8)
LYMPHOCYTES NFR BLD: 9.3 % (ref 18–48)
MAGNESIUM SERPL-MCNC: 2.1 MG/DL (ref 1.6–2.6)
MCH RBC QN AUTO: 34.4 PG (ref 27–31)
MCHC RBC AUTO-ENTMCNC: 32.7 G/DL (ref 32–36)
MCV RBC AUTO: 105 FL (ref 82–98)
MITOCHONDRIA AB TITR SER IF: NORMAL {TITER}
MONOCYTES # BLD AUTO: 1 K/UL (ref 0.3–1)
MONOCYTES NFR BLD: 12.5 % (ref 4–15)
NEUTROPHILS # BLD AUTO: 5.9 K/UL (ref 1.8–7.7)
NEUTROPHILS NFR BLD: 73.2 % (ref 38–73)
NRBC BLD-RTO: 0 /100 WBC
PHOSPHATE SERPL-MCNC: 2.5 MG/DL (ref 2.7–4.5)
PLATELET # BLD AUTO: 51 K/UL (ref 150–350)
PMV BLD AUTO: 10.6 FL (ref 9.2–12.9)
POTASSIUM SERPL-SCNC: 4.3 MMOL/L (ref 3.5–5.1)
PROT SERPL-MCNC: 5.6 G/DL (ref 6–8.4)
PROTHROMBIN TIME: 23.8 SEC (ref 9–12.5)
RBC # BLD AUTO: 2.59 M/UL (ref 4.6–6.2)
SARS-COV-2 RDRP RESP QL NAA+PROBE: NEGATIVE
SMOOTH MUSCLE AB TITR SER IF: NORMAL {TITER}
SODIUM SERPL-SCNC: 131 MMOL/L (ref 136–145)
WBC # BLD AUTO: 8.05 K/UL (ref 3.9–12.7)

## 2020-09-22 PROCEDURE — 99232 PR SUBSEQUENT HOSPITAL CARE,LEVL II: ICD-10-PCS | Mod: NTX,,, | Performed by: HOSPITALIST

## 2020-09-22 PROCEDURE — 25000003 PHARM REV CODE 250: Mod: NTX | Performed by: HOSPITALIST

## 2020-09-22 PROCEDURE — 80053 COMPREHEN METABOLIC PANEL: CPT | Mod: NTX

## 2020-09-22 PROCEDURE — U0002 COVID-19 LAB TEST NON-CDC: HCPCS | Mod: NTX

## 2020-09-22 PROCEDURE — 83735 ASSAY OF MAGNESIUM: CPT | Mod: NTX

## 2020-09-22 PROCEDURE — 85025 COMPLETE CBC W/AUTO DIFF WBC: CPT | Mod: NTX

## 2020-09-22 PROCEDURE — 99232 SBSQ HOSP IP/OBS MODERATE 35: CPT | Mod: NTX,,, | Performed by: HOSPITALIST

## 2020-09-22 PROCEDURE — 84100 ASSAY OF PHOSPHORUS: CPT | Mod: NTX

## 2020-09-22 PROCEDURE — 36415 COLL VENOUS BLD VENIPUNCTURE: CPT | Mod: NTX

## 2020-09-22 PROCEDURE — 85610 PROTHROMBIN TIME: CPT | Mod: NTX

## 2020-09-22 PROCEDURE — 20600001 HC STEP DOWN PRIVATE ROOM: Mod: NTX

## 2020-09-22 RX ORDER — SPIRONOLACTONE 100 MG/1
100 TABLET, FILM COATED ORAL DAILY
Status: ON HOLD | COMMUNITY
End: 2020-10-02 | Stop reason: HOSPADM

## 2020-09-22 RX ORDER — ERGOCALCIFEROL 1.25 MG/1
50000 CAPSULE ORAL
Status: ON HOLD | COMMUNITY
End: 2020-10-21 | Stop reason: HOSPADM

## 2020-09-22 RX ORDER — CIPROFLOXACIN 500 MG/1
500 TABLET ORAL DAILY
Status: ON HOLD | COMMUNITY
End: 2020-10-02 | Stop reason: SDUPTHER

## 2020-09-22 RX ORDER — POLYETHYLENE GLYCOL 3350 17 G/17G
17 POWDER, FOR SOLUTION ORAL DAILY
Status: ON HOLD | COMMUNITY
End: 2020-10-21 | Stop reason: HOSPADM

## 2020-09-22 RX ORDER — IBUPROFEN 200 MG
400-800 TABLET ORAL EVERY 6 HOURS PRN
Status: ON HOLD | COMMUNITY
End: 2020-10-02 | Stop reason: HOSPADM

## 2020-09-22 RX ORDER — LACTULOSE 10 G/15ML
SOLUTION ORAL; RECTAL 3 TIMES DAILY
Status: ON HOLD | COMMUNITY
End: 2020-10-02 | Stop reason: SDUPTHER

## 2020-09-22 RX ORDER — FOLIC ACID 1 MG/1
1 TABLET ORAL DAILY
Status: ON HOLD | COMMUNITY
End: 2020-10-21 | Stop reason: HOSPADM

## 2020-09-22 RX ORDER — ALBUTEROL SULFATE 90 UG/1
2 AEROSOL, METERED RESPIRATORY (INHALATION) EVERY 6 HOURS PRN
COMMUNITY

## 2020-09-22 RX ORDER — FAMOTIDINE 20 MG/1
20 TABLET, FILM COATED ORAL DAILY
Status: ON HOLD | COMMUNITY
End: 2020-10-02 | Stop reason: HOSPADM

## 2020-09-22 RX ORDER — CETIRIZINE HYDROCHLORIDE 10 MG/1
10 TABLET ORAL DAILY PRN
COMMUNITY
End: 2020-12-11

## 2020-09-22 RX ORDER — FUROSEMIDE 40 MG/1
40 TABLET ORAL DAILY
Status: ON HOLD | COMMUNITY
End: 2020-10-02 | Stop reason: HOSPADM

## 2020-09-22 RX ADMIN — OXYCODONE 5 MG: 5 TABLET ORAL at 04:09

## 2020-09-22 RX ADMIN — LACTULOSE 20 G: 20 SOLUTION ORAL at 02:09

## 2020-09-22 RX ADMIN — Medication 100 MG: at 08:09

## 2020-09-22 RX ADMIN — OXYCODONE 5 MG: 5 TABLET ORAL at 08:09

## 2020-09-22 RX ADMIN — HYDROXYZINE HYDROCHLORIDE 25 MG: 25 TABLET, FILM COATED ORAL at 02:09

## 2020-09-22 RX ADMIN — LACTULOSE 20 G: 20 SOLUTION ORAL at 08:09

## 2020-09-22 RX ADMIN — CIPROFLOXACIN 500 MG: 500 TABLET, FILM COATED ORAL at 08:09

## 2020-09-22 RX ADMIN — FOLIC ACID 1 MG: 1 TABLET ORAL at 08:09

## 2020-09-22 RX ADMIN — OXYCODONE 5 MG: 5 TABLET ORAL at 02:09

## 2020-09-22 NOTE — PLAN OF CARE
Patient alert and oriented x4, no acute distress noted, patient has c/o abd discomfort and given PRN pain medication. Patient also had c/o itchiness and given PRN medication with relief. Call light within reach, bed locked and in lowest position. Ongoing assessment being made.

## 2020-09-22 NOTE — PLAN OF CARE
09/22/20 1303   Post-Acute Status   Post-Acute Authorization Other   Other Status No Post-Acute Service Needs   Discharge Delays None known at this time   Discharge Plan   Discharge Plan A Home   Discharge Plan B Home with family

## 2020-09-22 NOTE — PHARMACY MED REC
"Admission Medication Reconciliation - Pharmacy Consult Note    The home medication history was taken by Rae Anaya, Pharmacy Tech.     You may go to "Admission" then "Reconcile Home Medications" tabs to review and/or act upon these items.     Potentially problematic discrepancies with current MAR  o Patient IS taking the following which was not ordered upon admit  o Famotidine 20mg PO daily    Ruthann Inman, PharmD, BCPS  [phone]                    .    .            "

## 2020-09-22 NOTE — PROGRESS NOTES
Progress Note  Hospital Medicine  [facility]       Patient Name: Jordan Altman  MRN:  [id number]  Hospital Medicine Team: [facility] Lise Gonsalez MD  Date of Admission:  9/19/2020     Length of Stay:  LOS: 2 days   Expected Discharge Date: 9/25/2020  Principal Problem:  Decompensated hepatic cirrhosis     Subjective:     Interval History/Overnight Events:    Doing well  No acute issues  VSS  Labs improving  Na up to 132 and Cr down to 1.8  IR paracentesis attempted but insufficient ascites identified for safe paracentesis  Placed on PO cipro for SBP ppx, given hx of SBP  Psychiatry consulted   RHC ordered given high PA pressures      ciprofloxacin HCl  500 mg Oral Q24H    folic acid  1 mg Oral Daily    lactulose  20 g Oral TID    thiamine  100 mg Oral Daily           acetaminophen, albuterol-ipratropium, calcium carbonate, dextrose 50%, dextrose 50%, glucagon (human recombinant), glucose, glucose, hydrOXYzine HCL, ondansetron, oxyCODONE, sodium chloride 0.9%    Review of Systems   Constitutional: Negative for chills, fatigue, fever.   HENT: Negative for sore throat, trouble swallowing.    Eyes: Negative for photophobia, visual disturbance.   Respiratory: Negative for cough, shortness of breath.    Cardiovascular: Negative for chest pain, palpitations, leg swelling.   Gastrointestinal: Negative for abdominal pain, constipation, diarrhea, nausea, vomiting.   Endocrine: Negative for cold intolerance, heat intolerance.   Genitourinary: Negative for dysuria, frequency.   Musculoskeletal: Negative for arthralgias, myalgias.   Skin: Negative for rash, wound, erythema   Neurological: Negative for dizziness, syncope, weakness, light-headedness.   Psychiatric/Behavioral: Negative for confusion, hallucinations, anxiety  All other systems reviewed and are negative.    Objective:     Temp:  [97.9 °F (36.6 °C)-98.4 °F (36.9 °C)]   Pulse:  [86-95]   Resp:  [15-19]   BP:  (112-145)/(52-69)   SpO2:  [89 %-96 %]         Weight change: 1.251 kg (2 lb 12.1 oz)   Body mass index is 36.18 kg/m².       Physical Exam:  Constitutional:chronically ill looking,  non-distressed, not diaphoretic.   HENT: NC/AT, external ears normal  Eyes: PERRL, EOMI, conjunctiva normal, no discharge b/l, +scleral icterus   Neck: normal ROM, supple  CV: RRR, no m/r/g, no carotid bruits, +2 peripheral pulses.  Pulmonary/Chest wall: Breathing comfortably w/o distress, CTAB, no w/r/r, no crackles.  Decreased breath sounds at lung bases  GI: Soft, non-tender, (+) BS, (+) BM   +distended  Obese   Musculoskeletal: Normal ROM, no atrophy,  no pitting edema in LE bilaterally   Neurological: AAO x 4, no focal deficits noted   No asterixis   Skin: warm, dry   +pallor, jaundice, + spider angiomas, +bruising   Multiple tattoos   Psych: normal mood and affect, normal behavior, thought content and judgement.    Labs:    Chemistries:   Recent Labs   Lab 09/19/20 0138 09/19/20 0139 09/19/20  1222 09/20/20  0501 09/21/20  0516   *  --  125* 129* 132*   K 4.1  --  4.0 4.2 4.1   CL 93*  --  95 100 102   CO2 18*  --  20* 20* 20*   BUN 62*  --  60* 58* 42*   CREATININE 2.4*  --  2.3* 2.2* 1.8*   CALCIUM 8.9  --  8.6* 8.4* 8.9   PROT 5.5*  --   --  4.9* 5.6*   BILITOT 28.6*  --   --  24.0* 24.9*   ALKPHOS 127  --   --  115 123   ALT 41  --   --  33 35   AST 85*  --   --  74* 75*   MG  --  2.2  --  2.3  --    PHOS  --  3.6  --  2.7  --         WBC:   Recent Labs   Lab 09/19/20 0139 09/20/20  0501 09/21/20  0516   WBC 9.17 6.55 7.04     Bands:     CBC/Anemia Labs: Coags:    Recent Labs   Lab 09/19/20  0139 09/20/20  0501 09/21/20  0516   WBC 9.17 6.55 7.04   HGB 9.0* 8.0* 8.7*   HCT 26.0* 23.2* 26.2*   PLT 42* 38* 48*   * 102* 104*   RDW 16.9* 17.2* 17.2*   FERRITIN 2,907*  --   --    [id number] >2000*  --   --     Recent Labs   Lab 09/19/20  0139 09/20/20  0501 09/21/20  0516   INR 2.4* 2.5* 2.3*          Assessment and  Plan     Hospital Course:    Mr. Jordan Altman was admitted to Hospital Medicine for management of  Decompensated hepatic cirrhosis     Active Hospital Problems    Diagnosis  POA    *Decompensated hepatic cirrhosis [K72.90]  Yes    Pulmonary HTN [I27.20]  Yes     PA pressure 73 on echo 9/19/20      Alcohol use disorder, severe, dependence [F10.20]  Yes     Chronic    Encounter for pre-transplant evaluation for liver transplant [Z01.818]  Not Applicable    Macrocytic anemia [D53.9]  Yes    DIC (disseminated intravascular coagulation) [D65]  Yes    Hypertension [I10]  Yes    Hx of SBP (spontaneous bacterial peritonitis) [K65.2]  Yes    Ascites due to alcoholic cirrhosis [K70.31]  Yes    Thrombocytopenia [D69.6]  Yes    Coagulopathy [D68.9]  Yes    JENN (acute kidney injury) [N17.9]  Yes    Portal hypertension [K76.6]  Yes    Chronic Hepatic encephalopathy [K72.90]  Yes    Hyponatremia [E87.1]  Yes      Resolved Hospital Problems   No resolved problems to display.       #Decompensated alcoholic cirrhosis   Ascites   Hx of SBP  Hepatic encephalopathy   Portal hypertension      MELD-Na score: 35 at 9/21/2020  5:16 AM  MELD score: 34 at 9/21/2020  5:16 AM  Calculated from:  Serum Creatinine: 1.8 mg/dL at 9/21/2020  5:16 AM  Serum Sodium: 132 mmol/L at 9/21/2020  5:16 AM  Total Bilirubin: 24.9 mg/dL at 9/21/2020  5:16 AM  INR(ratio): 2.3 at 9/21/2020  5:16 AM  Age: 45 years 4 months     -No signs of active bleeding or infection   -he is afebrile and has no leukocytosis   -cx negative   -US liver with doppler- reviewed   -he is awake, conversant and has no asterixis on exam   -continue lactulose   -vitamin K for coagulopathy   -pending PET   -monitor MELD closely with daily labs   -consult hepatology   - para on 9/21 - IR paracentesis attempted but insufficient ascites identified for safe paracentesis  - PO cipro daily for SBP ppx given hx of SBP  - hepatology following  And will likely start OLT eval   -  psychiatry consulted - high risk      #JENN   -concern for iATN from volume depletion vs hepatorenal syndrome (HRS)    -hold home diuretics (lasix, spironolactone)  -will hold off midodrine for now given SBP ~120  -reviewed UA, urine sodium, renal US   -monitor renal function closely and avoid nephrotoxic agents  -consider nephrology consult if does not improve with above   - improving  Cr down to 1.8     Portal hypertension  - PA pressures of 73 on echo from 9/19.  CVP 3.  No significant volume overload  - RHC Ordered - for OLT eval      #Hyponatremia   -Na 123 and neuro intact   -likely due to decompensated cirrhosis and diuretic use  -hold diuretics   -monitor closely with daily labs   - IV albumin and IV NS   - improving appropriately      #Alcohol abuse   -completed rehab in the past but relapsed   -quit drinking July of this year and committed to stay sober  -thiamine, folic acid daily   -check UDS   -addiction psychiatry consult     Coagulopathy  Thrombocytopenia   Macrocytic anemia  - 2/2 liver disease  - stable  Monitor     Diet:  Low Na with fluid restriction   GI PPx:  PO PPI  DVT PPx:  SCDs due to coagulopathy   Goals of Care:  Full     High Risk Conditions:  Decompensated cirrhosis      Disposition:    TBD    Patient's note was created using MModal Dictation.  Any errors in syntax may not have been identified and edited on initial review prior to signing this note.    Signing Physician:     Lise Gonsalez MD  Department of [facility]   [facility]  Pager [phone]  Spectra [phone]  9/21/2020

## 2020-09-22 NOTE — PLAN OF CARE
CM to bedside - pt provided assessment info. Pt w/ no DME in place, lives w/ friend Valeria. Pt's listed address is his soon to be ex spouse - he is temporarily living in a hotel: [facility] [address]  [address] [place] [address]. Pt will likely d/c home w/ no needs.     CM provided patient anticipated JACK which was written on the whiteboard and will be update by nursing staff.   Patient was not provided a Discharge Planning booklet - office out. Patient verbalized understanding.    SHARAD HEBERT [id number] - assisting 8WT SHARAD Thakkar [id number]     09/22/20 1309   Discharge Assessment   Assessment Type Discharge Planning Assessment   Confirmed/corrected address and phone number on facesheet? Yes   Assessment information obtained from? Patient   Expected Length of Stay (days) 5   Communicated expected length of stay with patient/caregiver yes   Prior to hospitilization cognitive status: Alert/Oriented   Prior to hospitalization functional status: Independent   Current cognitive status: Alert/Oriented   Current Functional Status: Independent   Facility Arrived From: Transferring Facility: [facility]   Lives With friend(s)   Able to Return to Prior Arrangements yes   Is patient able to care for self after discharge? Unable to determine at this time (comments)   Who are your caregiver(s) and their phone number(s)? friend - Valeria [phone]   Patient's perception of discharge disposition home or selfcare   Readmission Within the Last 30 Days no previous admission in last 30 days   Patient currently being followed by outpatient case management? No   Patient currently receives any other outside agency services? No   Equipment Currently Used at Home none   Do you have any problems affording any of your prescribed medications? No   Is the patient taking medications as prescribed? yes   Does the patient have transportation home? Yes  (pt's friend Valeria will pickup pt at d/c)   Transportation  Anticipated family or friend will provide   Dialysis Name and Scheduled days N/A   Does the patient receive services at the [facility]mad[facility] Clinic? No   Discharge Plan A Home with family   Discharge Plan B Home with family;Home Health   DME Needed Upon Discharge  other (see comments)  (TBD)   Patient/Family in Agreement with Plan yes

## 2020-09-23 LAB
ALBUMIN SERPL BCP-MCNC: 3 G/DL (ref 3.5–5.2)
ALP SERPL-CCNC: 123 U/L (ref 55–135)
ALT SERPL W/O P-5'-P-CCNC: 33 U/L (ref 10–44)
ANION GAP SERPL CALC-SCNC: 9 MMOL/L (ref 8–16)
AST SERPL-CCNC: 75 U/L (ref 10–40)
BASOPHILS # BLD AUTO: 0.05 K/UL (ref 0–0.2)
BASOPHILS NFR BLD: 0.6 % (ref 0–1.9)
BILIRUB SERPL-MCNC: 22.8 MG/DL (ref 0.1–1)
BUN SERPL-MCNC: 41 MG/DL (ref 6–20)
CALCIUM SERPL-MCNC: 8.6 MG/DL (ref 8.7–10.5)
CHLORIDE SERPL-SCNC: 105 MMOL/L (ref 95–110)
CMV DNA SERPL NAA+PROBE-ACNC: NORMAL IU/ML
CO2 SERPL-SCNC: 16 MMOL/L (ref 23–29)
CREAT SERPL-MCNC: 1.6 MG/DL (ref 0.5–1.4)
DIFFERENTIAL METHOD: ABNORMAL
EOSINOPHIL # BLD AUTO: 0.2 K/UL (ref 0–0.5)
EOSINOPHIL NFR BLD: 1.7 % (ref 0–8)
ERYTHROCYTE [DISTWIDTH] IN BLOOD BY AUTOMATED COUNT: 17.5 % (ref 11.5–14.5)
EST. GFR  (AFRICAN AMERICAN): 58.4 ML/MIN/1.73 M^2
EST. GFR  (NON AFRICAN AMERICAN): 50.6 ML/MIN/1.73 M^2
GLUCOSE SERPL-MCNC: 117 MG/DL (ref 70–110)
HCT VFR BLD AUTO: 23.8 % (ref 40–54)
HGB BLD-MCNC: 7.9 G/DL (ref 14–18)
IMM GRANULOCYTES # BLD AUTO: 0.12 K/UL (ref 0–0.04)
IMM GRANULOCYTES NFR BLD AUTO: 1.3 % (ref 0–0.5)
INR PPP: 2.5 (ref 0.8–1.2)
LYMPHOCYTES # BLD AUTO: 0.7 K/UL (ref 1–4.8)
LYMPHOCYTES NFR BLD: 7.8 % (ref 18–48)
MCH RBC QN AUTO: 34.5 PG (ref 27–31)
MCHC RBC AUTO-ENTMCNC: 33.2 G/DL (ref 32–36)
MCV RBC AUTO: 104 FL (ref 82–98)
MONOCYTES # BLD AUTO: 1.2 K/UL (ref 0.3–1)
MONOCYTES NFR BLD: 13.3 % (ref 4–15)
NEUTROPHILS # BLD AUTO: 6.8 K/UL (ref 1.8–7.7)
NEUTROPHILS NFR BLD: 75.3 % (ref 38–73)
NRBC BLD-RTO: 0 /100 WBC
PLATELET # BLD AUTO: 46 K/UL (ref 150–350)
PMV BLD AUTO: 10.5 FL (ref 9.2–12.9)
POTASSIUM SERPL-SCNC: 4.5 MMOL/L (ref 3.5–5.1)
PROT SERPL-MCNC: 4.8 G/DL (ref 6–8.4)
PROTHROMBIN TIME: 26.2 SEC (ref 9–12.5)
RBC # BLD AUTO: 2.29 M/UL (ref 4.6–6.2)
SODIUM SERPL-SCNC: 130 MMOL/L (ref 136–145)
WBC # BLD AUTO: 8.99 K/UL (ref 3.9–12.7)

## 2020-09-23 PROCEDURE — 25000003 PHARM REV CODE 250: Mod: NTX | Performed by: HOSPITALIST

## 2020-09-23 PROCEDURE — 93451 PR RIGHT HEART CATH O2 SATURATION & CARDIAC OUTPUT: ICD-10-PCS | Mod: 26,NTX,, | Performed by: INTERNAL MEDICINE

## 2020-09-23 PROCEDURE — 36415 COLL VENOUS BLD VENIPUNCTURE: CPT | Mod: NTX

## 2020-09-23 PROCEDURE — 80053 COMPREHEN METABOLIC PANEL: CPT | Mod: NTX

## 2020-09-23 PROCEDURE — 93451 RIGHT HEART CATH: CPT | Mod: 26,NTX,, | Performed by: INTERNAL MEDICINE

## 2020-09-23 PROCEDURE — 93451 RIGHT HEART CATH: CPT | Mod: NTX | Performed by: INTERNAL MEDICINE

## 2020-09-23 PROCEDURE — 99232 SBSQ HOSP IP/OBS MODERATE 35: CPT | Mod: NTX,,, | Performed by: HOSPITALIST

## 2020-09-23 PROCEDURE — 25000003 PHARM REV CODE 250: Mod: NTX | Performed by: INTERNAL MEDICINE

## 2020-09-23 PROCEDURE — C1894 INTRO/SHEATH, NON-LASER: HCPCS | Mod: NTX | Performed by: INTERNAL MEDICINE

## 2020-09-23 PROCEDURE — 85610 PROTHROMBIN TIME: CPT | Mod: NTX

## 2020-09-23 PROCEDURE — C1751 CATH, INF, PER/CENT/MIDLINE: HCPCS | Mod: NTX | Performed by: INTERNAL MEDICINE

## 2020-09-23 PROCEDURE — 99232 PR SUBSEQUENT HOSPITAL CARE,LEVL II: ICD-10-PCS | Mod: NTX,,, | Performed by: HOSPITALIST

## 2020-09-23 PROCEDURE — 20600001 HC STEP DOWN PRIVATE ROOM: Mod: NTX

## 2020-09-23 PROCEDURE — 85025 COMPLETE CBC W/AUTO DIFF WBC: CPT | Mod: NTX

## 2020-09-23 RX ORDER — LIDOCAINE HYDROCHLORIDE 20 MG/ML
INJECTION, SOLUTION EPIDURAL; INFILTRATION; INTRACAUDAL; PERINEURAL
Status: DISCONTINUED | OUTPATIENT
Start: 2020-09-23 | End: 2020-09-23 | Stop reason: HOSPADM

## 2020-09-23 RX ORDER — SODIUM CHLORIDE 9 MG/ML
INJECTION, SOLUTION INTRAVENOUS
Status: DISCONTINUED | OUTPATIENT
Start: 2020-09-23 | End: 2020-09-28

## 2020-09-23 RX ORDER — LACTULOSE 10 G/15ML
30 SOLUTION ORAL 3 TIMES DAILY
Status: DISCONTINUED | OUTPATIENT
Start: 2020-09-23 | End: 2020-09-24

## 2020-09-23 RX ADMIN — OXYCODONE 5 MG: 5 TABLET ORAL at 03:09

## 2020-09-23 RX ADMIN — RIFAXIMIN 550 MG: 550 TABLET ORAL at 08:09

## 2020-09-23 RX ADMIN — HYDROXYZINE HYDROCHLORIDE 25 MG: 25 TABLET, FILM COATED ORAL at 04:09

## 2020-09-23 RX ADMIN — Medication 100 MG: at 08:09

## 2020-09-23 RX ADMIN — LACTULOSE 30 G: 20 SOLUTION ORAL at 08:09

## 2020-09-23 RX ADMIN — LACTULOSE 20 G: 20 SOLUTION ORAL at 08:09

## 2020-09-23 RX ADMIN — OXYCODONE 5 MG: 5 TABLET ORAL at 08:09

## 2020-09-23 RX ADMIN — FOLIC ACID 1 MG: 1 TABLET ORAL at 08:09

## 2020-09-23 RX ADMIN — CIPROFLOXACIN 500 MG: 500 TABLET, FILM COATED ORAL at 08:09

## 2020-09-23 RX ADMIN — OXYCODONE 5 MG: 5 TABLET ORAL at 01:09

## 2020-09-23 NOTE — PROCEDURES
Patient with cirrhosis referred for RHC as part of his evaluation for advanced options.  Primary team spoke with healthcare power of  for permission.  Upon patient's arrival he was communicative and seemed to understand the procedure and the risks of pneumothorax, bleeding, infection, etc with special focus on the increased risk in his case of bleeding due to his liver disease and low platelet count.     He was agreeable to proceed and Consent signed.    RHC Lab Post Procedure Note    Patient tolerated the procedure well.   RIJ entered first attempt with U/S guidance, anterior puncture only  PA 52/24 (33)  PW 16/15/13  PA 52/24 (33)  RA 15/12/11  Vinny CO 11.4/CI 4.76 compatible with high flow state due to cirrhosis and contributed to by anemia as well  PVR normal @ 1.8 Wood units  Hemostasis obtained without difficulty  There were no complications.   Please see full report in EPIC for details.

## 2020-09-23 NOTE — PLAN OF CARE
Patient alert and oriented x4, no acute distress noted for this nurses shift. Patient went down to cath lab and has an incision to the RIJ, slight redness noted on return and patient is to remain sitting upright for 4 hours. Girlfriend at bedside earlier, call light within reach, patient has c/o abd pain and given PRN pain medication. Ongoing assessment being made, resp even and unlabored, VSS.

## 2020-09-23 NOTE — PROGRESS NOTES
Progress Note  Hospital Medicine  [facility]       Patient Name: Jordan Altman  MRN:  [id number]  Hospital Medicine Team: [facility] MED L Lise Gonsalez MD  Date of Admission:  9/19/2020     Length of Stay:  LOS: 3 days   Expected Discharge Date: 9/25/2020  Principal Problem:  Decompensated hepatic cirrhosis     Subjective:     Interval History/Overnight Events:    Doing well  No acute issues  VSS  Labs improving     RHC ordered given high PA pressures - planned for 9/23     ciprofloxacin HCl  500 mg Oral Q24H    folic acid  1 mg Oral Daily    lactulose  20 g Oral TID    thiamine  100 mg Oral Daily           acetaminophen, albuterol-ipratropium, calcium carbonate, dextrose 50%, dextrose 50%, glucagon (human recombinant), glucose, glucose, hydrOXYzine HCL, ondansetron, oxyCODONE, sodium chloride 0.9%    Review of Systems   Constitutional: Negative for chills, fatigue, fever.   HENT: Negative for sore throat, trouble swallowing.    Eyes: Negative for photophobia, visual disturbance.   Respiratory: Negative for cough, shortness of breath.    Cardiovascular: Negative for chest pain, palpitations, leg swelling.   Gastrointestinal: Negative for abdominal pain, constipation, diarrhea, nausea, vomiting.   Endocrine: Negative for cold intolerance, heat intolerance.   Genitourinary: Negative for dysuria, frequency.   Musculoskeletal: Negative for arthralgias, myalgias.   Skin: Negative for rash, wound, erythema   Neurological: Negative for dizziness, syncope, weakness, light-headedness.   Psychiatric/Behavioral: Negative for confusion, hallucinations, anxiety  All other systems reviewed and are negative.    Objective:     Temp:  [98.2 °F (36.8 °C)-98.8 °F (37.1 °C)]   Pulse:  [86-95]   Resp:  [12-28]   BP: (103-143)/(59-69)   SpO2:  [93 %-96 %]         Weight change:    Body mass index is 36.18 kg/m².       Physical Exam:  Constitutional:chronically ill looking,  non-distressed, not  diaphoretic.   HENT: NC/AT, external ears normal  Eyes: PERRL, EOMI, conjunctiva normal, no discharge b/l, +scleral icterus   Neck: normal ROM, supple  CV: RRR, no m/r/g, no carotid bruits, +2 peripheral pulses.  Pulmonary/Chest wall: Breathing comfortably w/o distress, CTAB, no w/r/r, no crackles.  Decreased breath sounds at lung bases  GI: Soft, non-tender, (+) BS, (+) BM   +distended  Obese   Musculoskeletal: Normal ROM, no atrophy,  no pitting edema in LE bilaterally   Neurological: AAO x 4, no focal deficits noted   No asterixis   Skin: warm, dry   +pallor, jaundice, + spider angiomas, +bruising   Multiple tattoos   Psych: normal mood and affect, normal behavior, thought content and judgement.    Labs:    Chemistries:   Recent Labs   Lab 09/19/20 0139 09/20/20 0501 09/21/20 0516 09/22/20  0538   NA  --    < > 129* 132* 131*   K  --    < > 4.2 4.1 4.3   CL  --    < > 100 102 102   CO2  --    < > 20* 20* 16*   BUN  --    < > 58* 42* 36*   CREATININE  --    < > 2.2* 1.8* 1.7*   CALCIUM  --    < > 8.4* 8.9 9.0   PROT  --   --  4.9* 5.6* 5.6*   BILITOT  --   --  24.0* 24.9* 24.6*   ALKPHOS  --   --  115 123 143*   ALT  --   --  33 35 39   AST  --   --  74* 75* 81*   MG 2.2  --  2.3  --  2.1   PHOS 3.6  --  2.7  --  2.5*    < > = values in this interval not displayed.        WBC:   Recent Labs   Lab 09/19/20 0139 09/20/20 0501 09/21/20  0516 09/22/20  0538   WBC 9.17 6.55 7.04 8.05     Bands:     CBC/Anemia Labs: Coags:    Recent Labs   Lab 09/19/20 0139 09/20/20 0501 09/21/20 0516 09/22/20  0538   WBC 9.17 6.55 7.04 8.05   HGB 9.0* 8.0* 8.7* 8.9*   HCT 26.0* 23.2* 26.2* 27.2*   PLT 42* 38* 48* 51*   * 102* 104* 105*   RDW 16.9* 17.2* 17.2* 17.4*   FERRITIN 2,907*  --   --   --    [id number] >2000*  --   --   --     Recent Labs   Lab 09/20/20  0501 09/21/20  0516 09/22/20  0538   INR 2.5* 2.3* 2.2*          Assessment and Plan     Hospital Course:    Mr. Jordan Altman was admitted to Hospital  Medicine for management of  Decompensated hepatic cirrhosis     Active Hospital Problems    Diagnosis  POA    *Decompensated hepatic cirrhosis [K72.90]  Yes    Pulmonary HTN [I27.20]  Yes     PA pressure 73 on echo 9/19/20      Alcohol use disorder, severe, dependence [F10.20]  Yes     Chronic    Encounter for pre-transplant evaluation for liver transplant [Z01.818]  Not Applicable    Macrocytic anemia [D53.9]  Yes    DIC (disseminated intravascular coagulation) [D65]  Yes    Hypertension [I10]  Yes    Hx of SBP (spontaneous bacterial peritonitis) [K65.2]  Yes    Ascites due to alcoholic cirrhosis [K70.31]  Yes    Thrombocytopenia [D69.6]  Yes    Coagulopathy [D68.9]  Yes    JENN (acute kidney injury) [N17.9]  Yes    Portal hypertension [K76.6]  Yes    Chronic Hepatic encephalopathy [K72.90]  Yes    Hyponatremia [E87.1]  Yes      Resolved Hospital Problems   No resolved problems to display.       #Decompensated alcoholic cirrhosis   Ascites   Hx of SBP  Hepatic encephalopathy   Portal hypertension      MELD-Na score: 34 at 9/22/2020  5:38 AM  MELD score: 32 at 9/22/2020  5:38 AM  Calculated from:  Serum Creatinine: 1.7 mg/dL at 9/22/2020  5:38 AM  Serum Sodium: 131 mmol/L at 9/22/2020  5:38 AM  Total Bilirubin: 24.6 mg/dL at 9/22/2020  5:38 AM  INR(ratio): 2.2 at 9/22/2020  5:38 AM  Age: 45 years 4 months     -No signs of active bleeding or infection   -he is afebrile and has no leukocytosis   -cx negative   -US liver with doppler- reviewed   -he is awake, conversant and has no asterixis on exam   -continue lactulose   -vitamin K for coagulopathy   -pending PET   -monitor MELD closely with daily labs   -consult hepatology   - para on 9/21 - IR paracentesis attempted but insufficient ascites identified for safe paracentesis  - PO cipro daily for SBP ppx given hx of SBP  - hepatology following  And will likely start OLT eval   - psychiatry consulted - high risk      #JENN   -concern for iATN from volume  depletion vs hepatorenal syndrome (HRS)    -hold home diuretics (lasix, spironolactone)  -will hold off midodrine for now given SBP ~120  -reviewed UA, urine sodium, renal US   -monitor renal function closely and avoid nephrotoxic agents  -consider nephrology consult if does not improve with above   - improving  Cr down to 1.8     Portal hypertension  - PA pressures of 73 on echo from 9/19.  CVP 3.  No significant volume overload  - RHC Ordered - for OLT eval- planned for 9/23      #Hyponatremia   -Na 123 and neuro intact   -likely due to decompensated cirrhosis and diuretic use  -hold diuretics   -monitor closely with daily labs   - IV albumin and IV NS   - improving appropriately      #Alcohol abuse   -completed rehab in the past but relapsed   -quit drinking July of this year and committed to stay sober  -thiamine, folic acid daily   -check UDS   -addiction psychiatry consult     Coagulopathy  Thrombocytopenia   Macrocytic anemia  - 2/2 liver disease  - stable  Monitor     Diet:  Low Na with fluid restriction   GI PPx:  PO PPI  DVT PPx:  SCDs due to coagulopathy   Goals of Care:  Full     High Risk Conditions:  Decompensated cirrhosis      Disposition:    TBD    Patient's note was created using MModal Dictation.  Any errors in syntax may not have been identified and edited on initial review prior to signing this note.    Signing Physician:     Lise Gonsalez MD  Department of Hospital Medicine   [facility]  Pager [phone] [place] [phone]  9/22/2020

## 2020-09-23 NOTE — PROGRESS NOTES
"       Progress Note  Hospital Medicine  [facility]       Patient Name: Jordan Altman  MRN:  [id number]  Hospital Medicine Team: [facility] [place] [facility] MED L Lise Gonsalez MD  Date of Admission:  9/19/2020     Length of Stay:  LOS: 4 days   Expected Discharge Date: 9/25/2020  Principal Problem:  Decompensated hepatic cirrhosis     Subjective:     Interval History/Overnight Events:    Doing well    Creatinine 1.6 and sodium 130.  Overall improving.  Patient with slow mentation today, has had only 1-2 bowel movements today.  Positive for asterixis.  Patient's lactulose increase in rifaximin started.  Underwent RHC with favorable results, with PA pressure acceptable for liver transplant.-  "PVR is normal thus elevated mean PA pressure of 33 mm Hg is due to high cardiac output related to cirrhosis and contributed to by anemia.These hemodynamic are acceptable for hepatic transplant listing."  Initiating liver transplant testing     ciprofloxacin HCl  500 mg Oral Q24H    folic acid  1 mg Oral Daily    lactulose  30 g Oral TID    rifAXImin  550 mg Oral BID    thiamine  100 mg Oral Daily        sodium chloride 0.9%         sodium chloride 0.9%, acetaminophen, albuterol-ipratropium, calcium carbonate, dextrose 50%, dextrose 50%, glucagon (human recombinant), glucose, glucose, hydrOXYzine HCL, ondansetron, oxyCODONE, sodium chloride 0.9%    Review of Systems   Constitutional: Negative for chills, fatigue, fever.   HENT: Negative for sore throat, trouble swallowing.    Eyes: Negative for photophobia, visual disturbance.   Respiratory: Negative for cough, shortness of breath.    Cardiovascular: Negative for chest pain, palpitations, leg swelling.   Gastrointestinal: Negative for abdominal pain, constipation, diarrhea, nausea, vomiting.   Endocrine: Negative for cold intolerance, heat intolerance.   Genitourinary: Negative for dysuria, frequency.   Musculoskeletal: Negative for arthralgias, myalgias. "   Skin: Negative for rash, wound, erythema   Neurological: Negative for dizziness, syncope, weakness, light-headedness.   Psychiatric/Behavioral: Negative for confusion, hallucinations, anxiety  All other systems reviewed and are negative.    Objective:     Temp:  [97.6 °F (36.4 °C)-98.5 °F (36.9 °C)]   Pulse:  [90-91]   Resp:  [14-20]   BP: (105-123)/(53-71)   SpO2:  [94 %-95 %]         Weight change:    Body mass index is 35.94 kg/m².       Physical Exam:  Constitutional:chronically ill looking,  non-distressed, not diaphoretic.   HENT: NC/AT, external ears normal  Eyes: PERRL, EOMI, conjunctiva normal, no discharge b/l, +scleral icterus   Neck: normal ROM, supple  CV: RRR, no m/r/g, no carotid bruits, +2 peripheral pulses.  Pulmonary/Chest wall: Breathing comfortably w/o distress, CTAB, no w/r/r, no crackles.  Decreased breath sounds at lung bases  GI: Soft, non-tender, (+) BS, (+) BM   +distended  Obese   Musculoskeletal: Normal ROM, no atrophy,  no pitting edema in LE bilaterally   Neurological: AAO x 4, no focal deficits noted   +asterixis   Skin: warm, dry   +pallor, jaundice, + spider angiomas, +bruising   Multiple tattoos   Psych: normal mood and affect, normal behavior, thought content and judgement.    Labs:    Chemistries:   Recent Labs   Lab 09/19/20  0139  09/20/20  0501 09/21/20  0516 09/22/20  0538 09/23/20  0351   NA  --    < > 129* 132* 131* 130*   K  --    < > 4.2 4.1 4.3 4.5   CL  --    < > 100 102 102 105   CO2  --    < > 20* 20* 16* 16*   BUN  --    < > 58* 42* 36* 41*   CREATININE  --    < > 2.2* 1.8* 1.7* 1.6*   CALCIUM  --    < > 8.4* 8.9 9.0 8.6*   PROT  --   --  4.9* 5.6* 5.6* 4.8*   BILITOT  --   --  24.0* 24.9* 24.6* 22.8*   ALKPHOS  --   --  115 123 143* 123   ALT  --   --  33 35 39 33   AST  --   --  74* 75* 81* 75*   MG 2.2  --  2.3  --  2.1  --    PHOS 3.6  --  2.7  --  2.5*  --     < > = values in this interval not displayed.        WBC:   Recent Labs   Lab 09/19/20  0139  09/20/20  0501 09/21/20  0516 09/22/20  0538 09/23/20  0352   WBC 9.17 6.55 7.04 8.05 8.99     Bands:     CBC/Anemia Labs: Coags:    Recent Labs   Lab 09/19/20  0139  09/21/20  0516 09/22/20  0538 09/23/20  0352   WBC 9.17   < > 7.04 8.05 8.99   HGB 9.0*   < > 8.7* 8.9* 7.9*   HCT 26.0*   < > 26.2* 27.2* 23.8*   PLT 42*   < > 48* 51* 46*   *   < > 104* 105* 104*   RDW 16.9*   < > 17.2* 17.4* 17.5*   FERRITIN 2,907*  --   --   --   --    [id number] >2000*  --   --   --   --     < > = values in this interval not displayed.    Recent Labs   Lab 09/21/20  0516 09/22/20  0538 09/23/20  0352   INR 2.3* 2.2* 2.5*          Assessment and Plan     Hospital Course:    Mr. Jordan Altman was admitted to Hospital Medicine for management of  Decompensated hepatic cirrhosis     Active Hospital Problems    Diagnosis  POA    *Decompensated hepatic cirrhosis [K72.90]  Yes    Pulmonary HTN [I27.20]  Yes     PA pressure 73 on echo 9/19/20      Alcohol use disorder, severe, dependence [F10.20]  Yes     Chronic    Encounter for pre-transplant evaluation for liver transplant [Z01.818]  Not Applicable    Macrocytic anemia [D53.9]  Yes    DIC (disseminated intravascular coagulation) [D65]  Yes    Hypertension [I10]  Yes    Hx of SBP (spontaneous bacterial peritonitis) [K65.2]  Yes    Ascites due to alcoholic cirrhosis [K70.31]  Yes    Thrombocytopenia [D69.6]  Yes    Coagulopathy [D68.9]  Yes    JENN (acute kidney injury) [N17.9]  Yes    Portal hypertension [K76.6]  Yes    Chronic Hepatic encephalopathy [K72.90]  Yes    Hyponatremia [E87.1]  Yes      Resolved Hospital Problems   No resolved problems to display.       Decompensated alcoholic cirrhosis with Ascites   Hx of SBP     MELD-Na score: 35 at 9/23/2020  3:52 AM  MELD score: 33 at 9/23/2020  3:52 AM  Calculated from:  Serum Creatinine: 1.6 mg/dL at 9/23/2020  3:51 AM  Serum Sodium: 130 mmol/L at 9/23/2020  3:51 AM  Total Bilirubin: 22.8 mg/dL at 9/23/2020   3:51 AM  INR(ratio): 2.5 at 9/23/2020  3:52 AM  Age: 45 years 4 months     -No signs of active bleeding or infection   -he is afebrile and has no leukocytosis   -cx negative   -US liver with doppler- reviewed   -he is awake, conversant and has no asterixis on exam   -vitamin K for coagulopathy   -pending PET   -hepatology following  - para on 9/21 - IR paracentesis attempted but insufficient ascites identified for safe paracentesis  - PO cipro daily for SBP ppx given hx of SBP  - psychiatry consulted - high risk   - liver transplant evaluation started on 09/30/2020 after right heart catheterization showed acceptable PA pressures    Hepatic encephalopathy   - patient previously on lactulose for chronic hepatic encephalopathy  - 9/23- Patient with slow mentation today, has had only 1-2 bowel movements .  Positive for asterixis.  Patient's lactulose increase in rifaximin started.  - may consider repeat infectious workup if encephalopathy this on improved with higher lactulose doses and addition of rifaximin     JENN   -concern for iATN from volume depletion vs hepatorenal syndrome (HRS)    -hold home diuretics (lasix, spironolactone)  -will hold off midodrine for now given SBP ~120  -reviewed UA, urine sodium, renal US   -monitor renal function closely and avoid nephrotoxic agents  -consider nephrology consult if does not improve with above   - improving  Cr down to 1.8--> 1.6     Portal hypertension  - PA pressures of 73 on echo from 9/19.  CVP 3.  No significant volume overload  -s/p RHC on 9/12 -with favorable results, with PA pressure acceptable for liver transplant.      Hyponatremia   -Na 123 and neuro intact   -likely due to decompensated cirrhosis and diuretic use  -hold diuretics   -monitor closely with daily labs   - IV albumin and IV NS   - improving appropriately - sodium 130     Alcohol abuse   -completed rehab in the past but relapsed   -quit drinking July of this year and committed to stay  sober  -thiamine, folic acid daily   -check UDS   -addiction psychiatry consult     Coagulopathy  Thrombocytopenia   Macrocytic anemia  - 2/2 liver disease  - stable  Monitor     Diet:  Low Na with fluid restriction   GI PPx:  PO PPI  DVT PPx:  SCDs due to coagulopathy   Goals of Care:  Full     High Risk Conditions:  Decompensated cirrhosis      Disposition:    TBD- undergoing OLT testing     Patient's note was created using MModal Dictation.  Any errors in syntax may not have been identified and edited on initial review prior to signing this note.    Signing Physician:     Lise Gonsalez MD  Department of Hospital Medicine   [facility]  Pager [phone]  Spectra [phone]  9/23/2020

## 2020-09-23 NOTE — PLAN OF CARE
Problem: Adult Inpatient Plan of Care  Goal: Plan of Care Review  Outcome: Ongoing, Progressing     Problem: Fall Injury Risk  Goal: Absence of Fall and Fall-Related Injury  Outcome: Ongoing, Progressing  Intervention: Promote Injury-Free Environment  Flowsheets (Taken 9/23/2020 3998)  Safety Promotion/Fall Prevention:   assistive device/personal item within reach   commode/urinal/bedpan at bedside   high risk medications identified   lighting adjusted   medications reviewed   nonskid shoes/socks when out of bed   side rails raised x 2     Problem: Pain Chronic (Persistent)  Goal: Acceptable Pain Control and Functional Ability  Outcome: Ongoing, Progressing  Intervention: Develop Pain Management Plan  Flowsheets (Taken 9/23/2020 8152)  Pain Management Interventions:   around-the-clock dosing utilized   pain management plan reviewed with patient/caregiver   pillow support provided   position adjusted   quiet environment facilitated   relaxation techniques promoted     No acute events overnight. No acute distress, respirations even and unlabored. VSS. Pain controlled with PO medications - see emar for further details. Pt has remained NPO since midnight for heart cath today. Fall precautions in place. Will continue to monitor.

## 2020-09-24 ENCOUNTER — DOCUMENTATION ONLY (OUTPATIENT)
Dept: PHARMACY | Facility: HOSPITAL | Age: 45
End: 2020-09-24

## 2020-09-24 LAB
A1 AG RBC QL: NORMAL
ABO + RH BLD: NORMAL
ALBUMIN SERPL BCP-MCNC: 3.3 G/DL (ref 3.5–5.2)
ALP SERPL-CCNC: 132 U/L (ref 55–135)
ALT SERPL W/O P-5'-P-CCNC: 38 U/L (ref 10–44)
ANION GAP SERPL CALC-SCNC: 10 MMOL/L (ref 8–16)
ASCENDING AORTA: 3.48 CM
AST SERPL-CCNC: 82 U/L (ref 10–40)
BACTERIA BLD CULT: NORMAL
BACTERIA BLD CULT: NORMAL
BASOPHILS # BLD AUTO: 0.05 K/UL (ref 0–0.2)
BASOPHILS NFR BLD: 0.5 % (ref 0–1.9)
BILIRUB SERPL-MCNC: 26 MG/DL (ref 0.1–1)
BLD GP AB SCN CELLS X3 SERPL QL: NORMAL
BSA FOR ECHO PROCEDURE: 2.45 M2
BUN SERPL-MCNC: 46 MG/DL (ref 6–20)
CALCIUM SERPL-MCNC: 9.2 MG/DL (ref 8.7–10.5)
CHLORIDE SERPL-SCNC: 104 MMOL/L (ref 95–110)
CO2 SERPL-SCNC: 18 MMOL/L (ref 23–29)
CREAT SERPL-MCNC: 1.8 MG/DL (ref 0.5–1.4)
CV ECHO LV RWT: 0.36 CM
CV STRESS BASE HR: 95 BPM
DIASTOLIC BLOOD PRESSURE: 69 MMHG
DIFFERENTIAL METHOD: ABNORMAL
DOP CALC LVOT AREA: 4.9 CM2
DOP CALC LVOT DIAMETER: 2.5 CM
DOP CALC LVOT PEAK VEL: 1.78 M/S
DOP CALC LVOT STROKE VOLUME: 165.49 CM3
DOP CALCLVOT PEAK VEL VTI: 33.73 CM
E WAVE DECELERATION TIME: 177.77 MSEC
E/A RATIO: 1.13
E/E' RATIO: 9.76 M/S
EBV DNA BY PCR: NORMAL IU/ML
ECHO LV POSTERIOR WALL: 1.07 CM (ref 0.6–1.1)
EOSINOPHIL # BLD AUTO: 0.1 K/UL (ref 0–0.5)
EOSINOPHIL NFR BLD: 0.8 % (ref 0–8)
ERYTHROCYTE [DISTWIDTH] IN BLOOD BY AUTOMATED COUNT: 17.8 % (ref 11.5–14.5)
EST. GFR  (AFRICAN AMERICAN): 50.7 ML/MIN/1.73 M^2
EST. GFR  (NON AFRICAN AMERICAN): 43.8 ML/MIN/1.73 M^2
FRACTIONAL SHORTENING: 34 % (ref 28–44)
GLUCOSE SERPL-MCNC: 85 MG/DL (ref 70–110)
HBV CORE AB SERPL QL IA: NEGATIVE
HBV SURFACE AB SER-ACNC: NEGATIVE M[IU]/ML
HCT VFR BLD AUTO: 25.1 % (ref 40–54)
HEPATITIS A ANTIBODY, IGG: NEGATIVE
HGB BLD-MCNC: 8.4 G/DL (ref 14–18)
IMM GRANULOCYTES # BLD AUTO: 0.12 K/UL (ref 0–0.04)
IMM GRANULOCYTES NFR BLD AUTO: 1.3 % (ref 0–0.5)
INR PPP: 2.3 (ref 0.8–1.2)
INTERVENTRICULAR SEPTUM: 1.11 CM (ref 0.6–1.1)
LA MAJOR: 6.38 CM
LA MINOR: 6.16 CM
LA WIDTH: 4.6 CM
LEFT ATRIUM SIZE: 4.61 CM
LEFT ATRIUM VOLUME INDEX: 47.4 ML/M2
LEFT ATRIUM VOLUME: 112.98 CM3
LEFT INTERNAL DIMENSION IN SYSTOLE: 3.95 CM (ref 2.1–4)
LEFT VENTRICLE DIASTOLIC VOLUME INDEX: 88.76 ML/M2
LEFT VENTRICLE DIASTOLIC VOLUME: 211.74 ML
LEFT VENTRICLE MASS INDEX: 116 G/M2
LEFT VENTRICLE SYSTOLIC VOLUME INDEX: 28.4 ML/M2
LEFT VENTRICLE SYSTOLIC VOLUME: 67.76 ML
LEFT VENTRICULAR INTERNAL DIMENSION IN DIASTOLE: 6 CM (ref 3.5–6)
LEFT VENTRICULAR MASS: 276.28 G
LV LATERAL E/E' RATIO: 9.38 M/S
LV SEPTAL E/E' RATIO: 10.17 M/S
LYMPHOCYTES # BLD AUTO: 0.6 K/UL (ref 1–4.8)
LYMPHOCYTES NFR BLD: 6.1 % (ref 18–48)
MAGNESIUM SERPL-MCNC: 2 MG/DL (ref 1.6–2.6)
MCH RBC QN AUTO: 34.7 PG (ref 27–31)
MCHC RBC AUTO-ENTMCNC: 33.5 G/DL (ref 32–36)
MCV RBC AUTO: 104 FL (ref 82–98)
MONOCYTES # BLD AUTO: 1 K/UL (ref 0.3–1)
MONOCYTES NFR BLD: 10.4 % (ref 4–15)
MV PEAK A VEL: 1.08 M/S
MV PEAK E VEL: 1.22 M/S
MV STENOSIS PRESSURE HALF TIME: 51.55 MS
MV VALVE AREA P 1/2 METHOD: 4.27 CM2
NEUTROPHILS # BLD AUTO: 7.5 K/UL (ref 1.8–7.7)
NEUTROPHILS NFR BLD: 80.9 % (ref 38–73)
NRBC BLD-RTO: 0 /100 WBC
OHS CV CPX 1 MINUTE RECOVERY HEART RATE: 134 BPM
OHS CV CPX 85 PERCENT MAX PREDICTED HEART RATE MALE: 147
OHS CV CPX MAX PREDICTED HEART RATE: 173
OHS CV CPX PATIENT IS FEMALE: 0
OHS CV CPX PATIENT IS MALE: 1
OHS CV CPX PEAK DIASTOLIC BLOOD PRESSURE: 39 MMHG
OHS CV CPX PEAK HEAR RATE: 136 BPM
OHS CV CPX PEAK RATE PRESSURE PRODUCT: ABNORMAL
OHS CV CPX PEAK SYSTOLIC BLOOD PRESSURE: 84 MMHG
OHS CV CPX PERCENT MAX PREDICTED HEART RATE ACHIEVED: 79
OHS CV CPX RATE PRESSURE PRODUCT PRESENTING: ABNORMAL
PHOSPHATE SERPL-MCNC: 3.3 MG/DL (ref 2.7–4.5)
PHOSPHATIDYLETHANOL (PETH): NEGATIVE NG/ML
PLATELET # BLD AUTO: 49 K/UL (ref 150–350)
PMV BLD AUTO: 11.4 FL (ref 9.2–12.9)
POTASSIUM SERPL-SCNC: 4.5 MMOL/L (ref 3.5–5.1)
PROT SERPL-MCNC: 5.1 G/DL (ref 6–8.4)
PROTHROMBIN TIME: 24.8 SEC (ref 9–12.5)
PULM VEIN S/D RATIO: 0.88
PV PEAK D VEL: 1.09 M/S
PV PEAK S VEL: 0.96 M/S
RA MAJOR: 5.05 CM
RA PRESSURE: 3 MMHG
RA WIDTH: 3.28 CM
RBC # BLD AUTO: 2.42 M/UL (ref 4.6–6.2)
RIGHT VENTRICULAR END-DIASTOLIC DIMENSION: 4.58 CM
SINUS: 3.72 CM
SODIUM SERPL-SCNC: 132 MMOL/L (ref 136–145)
STJ: 2.93 CM
SYSTOLIC BLOOD PRESSURE: 155 MMHG
TDI LATERAL: 0.13 M/S
TDI SEPTAL: 0.12 M/S
TDI: 0.13 M/S
TRICUSPID ANNULAR PLANE SYSTOLIC EXCURSION: 2.22 CM
WBC # BLD AUTO: 9.25 K/UL (ref 3.9–12.7)

## 2020-09-24 PROCEDURE — 63600175 PHARM REV CODE 636 W HCPCS: Mod: NTX | Performed by: HOSPITALIST

## 2020-09-24 PROCEDURE — 25000003 PHARM REV CODE 250: Mod: NTX | Performed by: HOSPITALIST

## 2020-09-24 PROCEDURE — 86706 HEP B SURFACE ANTIBODY: CPT | Mod: NTX

## 2020-09-24 PROCEDURE — 97802 MEDICAL NUTRITION INDIV IN: CPT | Mod: NTX

## 2020-09-24 PROCEDURE — 86682 HELMINTH ANTIBODY: CPT | Mod: NTX

## 2020-09-24 PROCEDURE — 99233 SBSQ HOSP IP/OBS HIGH 50: CPT | Mod: NTX,,, | Performed by: HOSPITALIST

## 2020-09-24 PROCEDURE — 85025 COMPLETE CBC W/AUTO DIFF WBC: CPT | Mod: NTX

## 2020-09-24 PROCEDURE — 80053 COMPREHEN METABOLIC PANEL: CPT | Mod: NTX

## 2020-09-24 PROCEDURE — 86790 VIRUS ANTIBODY NOS: CPT | Mod: NTX

## 2020-09-24 PROCEDURE — 63600150 PHARM REV CODE 636: Mod: NTX | Performed by: HOSPITALIST

## 2020-09-24 PROCEDURE — 20600001 HC STEP DOWN PRIVATE ROOM: Mod: NTX

## 2020-09-24 PROCEDURE — 94761 N-INVAS EAR/PLS OXIMETRY MLT: CPT | Mod: NTX

## 2020-09-24 PROCEDURE — 86787 VARICELLA-ZOSTER ANTIBODY: CPT | Mod: NTX

## 2020-09-24 PROCEDURE — 83735 ASSAY OF MAGNESIUM: CPT | Mod: NTX

## 2020-09-24 PROCEDURE — 86905 BLOOD TYPING RBC ANTIGENS: CPT | Mod: NTX

## 2020-09-24 PROCEDURE — 86480 TB TEST CELL IMMUN MEASURE: CPT | Mod: NTX

## 2020-09-24 PROCEDURE — 86644 CMV ANTIBODY: CPT | Mod: NTX

## 2020-09-24 PROCEDURE — 99233 SBSQ HOSP IP/OBS HIGH 50: CPT | Mod: NTX,,, | Performed by: NURSE PRACTITIONER

## 2020-09-24 PROCEDURE — 36415 COLL VENOUS BLD VENIPUNCTURE: CPT | Mod: NTX

## 2020-09-24 PROCEDURE — 99233 PR SUBSEQUENT HOSPITAL CARE,LEVL III: ICD-10-PCS | Mod: NTX,,, | Performed by: NURSE PRACTITIONER

## 2020-09-24 PROCEDURE — 99233 PR SUBSEQUENT HOSPITAL CARE,LEVL III: ICD-10-PCS | Mod: NTX,,, | Performed by: HOSPITALIST

## 2020-09-24 PROCEDURE — 85610 PROTHROMBIN TIME: CPT | Mod: NTX

## 2020-09-24 PROCEDURE — 86704 HEP B CORE ANTIBODY TOTAL: CPT | Mod: NTX

## 2020-09-24 PROCEDURE — 86850 RBC ANTIBODY SCREEN: CPT | Mod: NTX

## 2020-09-24 PROCEDURE — 84100 ASSAY OF PHOSPHORUS: CPT | Mod: NTX

## 2020-09-24 RX ORDER — DOBUTAMINE HYDROCHLORIDE 100 MG/100ML
40 INJECTION INTRAVENOUS
Status: COMPLETED | OUTPATIENT
Start: 2020-09-24 | End: 2020-09-24

## 2020-09-24 RX ORDER — LACTULOSE 10 G/15ML
30 SOLUTION ORAL 3 TIMES DAILY
Status: DISCONTINUED | OUTPATIENT
Start: 2020-09-24 | End: 2020-09-25

## 2020-09-24 RX ORDER — ATROPINE SULFATE 0.1 MG/ML
0.5 INJECTION INTRAVENOUS
Status: COMPLETED | OUTPATIENT
Start: 2020-09-24 | End: 2020-09-24

## 2020-09-24 RX ADMIN — DOBUTAMINE IN DEXTROSE 40 MCG/KG/MIN: 100 INJECTION, SOLUTION INTRAVENOUS at 03:09

## 2020-09-24 RX ADMIN — RIFAXIMIN 550 MG: 550 TABLET ORAL at 07:09

## 2020-09-24 RX ADMIN — CIPROFLOXACIN 500 MG: 500 TABLET, FILM COATED ORAL at 08:09

## 2020-09-24 RX ADMIN — HYDROXYZINE HYDROCHLORIDE 25 MG: 25 TABLET, FILM COATED ORAL at 08:09

## 2020-09-24 RX ADMIN — LACTULOSE 30 G: 20 SOLUTION ORAL at 07:09

## 2020-09-24 RX ADMIN — OXYCODONE 5 MG: 5 TABLET ORAL at 07:09

## 2020-09-24 RX ADMIN — Medication 100 MG: at 08:09

## 2020-09-24 RX ADMIN — FOLIC ACID 1 MG: 1 TABLET ORAL at 08:09

## 2020-09-24 RX ADMIN — RIFAXIMIN 550 MG: 550 TABLET ORAL at 08:09

## 2020-09-24 RX ADMIN — OXYCODONE 5 MG: 5 TABLET ORAL at 12:09

## 2020-09-24 RX ADMIN — LACTULOSE 30 G: 20 SOLUTION ORAL at 08:09

## 2020-09-24 RX ADMIN — ATROPINE SULFATE 0.5 MG: 0.1 INJECTION PARENTERAL at 04:09

## 2020-09-24 NOTE — CONSULTS
Palliative Medicine Consult.    Consult received. Will review chart and discuss with team prior to seeing patient. Thank you for the consult.    JEFFY Renteria, FNP-C  Palliative Medicine ext. [phone]

## 2020-09-24 NOTE — SUBJECTIVE & OBJECTIVE
Interval History: IR paracentesis attempted on 9/21 but no ascites identified for safe paracentesis. Patient is s/p RHC on 9/23. Liver transplant evaluation initiated. On rounds today patient found to be sleeping, easily aroused but drowsy. Oriented x 3 but inattentive and slow to respond to questions. Difficult to get patient to engage in meaningful conversation. Returned later to see if patient was more alert but he was off the floor.    Past Medical History:   Diagnosis Date    Decompensated hepatic cirrhosis     Hypertension     SBP (spontaneous bacterial peritonitis)        Past Surgical History:   Procedure Laterality Date    RIGHT HEART CATHETERIZATION Right 9/23/2020    Procedure: INSERTION, CATHETER, RIGHT HEART;  Surgeon: Mikel Costa Jr., MD;  Location: [facility] CATH LAB;  Service: Cardiology;  Laterality: Right;    THORACENTESIS  2011       Review of patient's allergies indicates:   Allergen Reactions    Latex, natural rubber        Medications:  Continuous Infusions:   sodium chloride 0.9%       Scheduled Meds:   ciprofloxacin HCl  500 mg Oral Q24H    folic acid  1 mg Oral Daily    lactulose  30 g Oral TID    rifAXImin  550 mg Oral BID    thiamine  100 mg Oral Daily     PRN Meds:sodium chloride 0.9%, acetaminophen, albuterol-ipratropium, calcium carbonate, dextrose 50%, dextrose 50%, glucagon (human recombinant), glucose, glucose, hydrOXYzine HCL, ondansetron, oxyCODONE, sodium chloride 0.9%    Family History     Problem Relation (Age of Onset)    COPD Mother        Tobacco Use    Smoking status: Current Some Day Smoker    Smokeless tobacco: Current User   Substance and Sexual Activity    Alcohol use: Yes     Alcohol/week: 112.0 standard drinks     Types: 112 Shots of liquor per week     Comment: fifth of vodka a day. LAST DRINK 7/25/2020 per pt     Drug use: Yes     Frequency: 3.0 times per week     Types: Marijuana    Sexual activity: Yes       Review of Systems   Unable to perform  ROS: Mental status change     Objective:     Vital Signs (Most Recent):  Temp: 97.9 °F (36.6 °C) (09/24/20 0845)  Pulse: 106 (09/24/20 0845)  Resp: 14 (09/24/20 0845)  BP: (!) 155/81 (09/24/20 0845)  SpO2: 98 % (09/24/20 0845) Vital Signs (24h Range):  Temp:  [97.6 °F (36.4 °C)-98.1 °F (36.7 °C)] 97.9 °F (36.6 °C)  Pulse:  [] 106  Resp:  [14-20] 14  SpO2:  [94 %-98 %] 98 %  BP: (136-155)/(66-81) 155/81     Weight: 118.8 kg (261 lb 14.5 oz)  Body mass index is 35.52 kg/m².    Physical Exam  Vitals signs and nursing note reviewed.   Constitutional:       General: He is not in acute distress.     Appearance: He is ill-appearing.   HENT:      Head: Normocephalic and atraumatic.   Eyes:      General: Scleral icterus present.      Extraocular Movements: Extraocular movements intact.   Neck:      Musculoskeletal: Normal range of motion and neck supple.   Cardiovascular:      Rate and Rhythm: Normal rate and regular rhythm.   Pulmonary:      Effort: Pulmonary effort is normal. No respiratory distress.   Abdominal:      General: There is distension.      Palpations: Abdomen is soft.   Musculoskeletal: Normal range of motion.         General: No deformity.   Skin:     General: Skin is warm and dry.   Neurological:      Mental Status: He is oriented to person, place, and time. He is lethargic.   Psychiatric:         Attention and Perception: He is inattentive.         Behavior: Behavior is slowed.         Review of Symptoms        Bowel Management Plan (BMP):  Yes      Comments:  Patient unable to participate in full ESAS or ROS at this time due to significant lethargy and slowed mentation          OME in 24 hours:  15    Performance Status:  50    ECOG Performance Status Grade:  3 - Confined to bed or chair 50% of waking hours    Living Arrangements:  Lives with friend    Psychosocial/Cultural: Patient is  and lives with his girlfriend. He has a 21 year old son. He was working as a  until the  COVID-19 pandemic. His goal is to get a liver transplant.    Spiritual:  F - Carissa and Belief:  Faith  I - Importance:  TBD  C - Community:  TBD  A - Address in Care:  TBD      Advance Care Planning   Advance Directives:   Living Will: No    LaPOST: No    Do Not Resuscitate Status: No    Medical Power of : No      Decision Making:  Patient answered questions         Significant Labs: All pertinent labs within the past 24 hours have been reviewed.  CBC:   Recent Labs   Lab 09/24/20  0546   WBC 9.25   HGB 8.4*   HCT 25.1*   *   PLT 49*     BMP:  Recent Labs   Lab 09/24/20  0546   GLU 85   *   K 4.5      CO2 18*   BUN 46*   CREATININE 1.8*   CALCIUM 9.2   MG 2.0     LFT:  Lab Results   Component Value Date    AST 82 (H) 09/24/2020    ALKPHOS 132 09/24/2020    BILITOT 26.0 (H) 09/24/2020     Albumin:   Albumin   Date Value Ref Range Status   09/24/2020 3.3 (L) 3.5 - 5.2 g/dL Final     Protein:   Total Protein   Date Value Ref Range Status   09/24/2020 5.1 (L) 6.0 - 8.4 g/dL Final     Lactic acid:   No results found for: LACTATE    Significant Imaging: I have reviewed all pertinent imaging results/findings within the past 24 hours.

## 2020-09-24 NOTE — CONSULTS
[facility]  Adult Nutrition  Consult Note    SUMMARY     Recommendations    1. Continue current Low sodium diet, fluid restriction per MD. Add Boost Plus ONS BID if PO intake inadequate.   2. RD to monitor & follow-up.    Goals: Meet % EEN, EPN by RD f/u date  Nutrition Goal Status: new  Communication of RD Recs: reviewed with RN    Reason for Assessment    Reason For Assessment: consult  Diagnosis: other (see comments)(Hepatic cirrhosis, Liver tx work-up)  Relevant Medical History: Etoh abuse  Interdisciplinary Rounds: did not attend    General Information Comments: Pt lethargic/not feeling well at time of visit. Reports fair appetite, consuming ~ 50% of meals. RN documentation confirms PO intake. Pt reports good appetite & stable wt PTA (unsure of accuracy). Per chart review, UBW ~ 250#. NFPE deferred 2/2 pt not feeling well; RD unable to assess for malnutrition - will monitor energy intake & weight changes. Low sodium diet education complete & paperwork provided. Pt likely to need review of diet 2/2 lethargy.   Nutrition Discharge Planning: Adequate PO intake    Nutrition/Diet History    Patient Reported Diet/Restrictions/Preferences: general  Factors Affecting Nutritional Intake: decreased appetite    Anthropometrics    Temp: 97.6 °F (36.4 °C)  Height Method: Stated  Height: 6' (182.9 cm)  Height (inches): 72 in  Weight Method: Bed Scale  Weight: 118.8 kg (261 lb 14.5 oz)  Weight (lb): 261.91 lb  Ideal Body Weight (IBW), Male: 178 lb  % Ideal Body Weight, Male (lb): 147.14 %  BMI (Calculated): 35.5  BMI Grade: 35 - 39.9 - obesity - grade II  Usual Body Weight (UBW), kg: (250# per chart review.)    Lab/Procedures/Meds    Pertinent Labs Reviewed: reviewed  Pertinent Labs Comments: Na 132, BUN 46, Creat 1.8, GFR 43.8, Bili 26  Pertinent Medications Reviewed: reviewed  Pertinent Medications Comments: Folic acid, Thiamine, Lactulose    Estimated/Assessed Needs    Weight Used For Calorie  Calculations: 118.8 kg (261 lb 14.5 oz)     Energy Calorie Requirements (kcal): 2101 kcal/d  Energy Need Method: [place]-St Jeor(1.0 PAL)     Protein Requirements: 119 g/d (1 g/kg)  Weight Used For Protein Calculations: 118.8 kg (261 lb 14.5 oz)     Estimated Fluid Requirement Method: other (see comments)(Per MD or 1 mL/kcal)  RDA Method (mL): 2101    Nutrition Prescription Ordered    Current Diet Order: Low Na  Nutrition Order Comments: 1500 mL FR    Evaluation of Received Nutrient/Fluid Intake    Comments: LBM: 9/23    Tolerance: tolerating    Nutrition Risk    Level of Risk/Frequency of Follow-up: (1x/week)     Assessment and Plan    Nutrition Problem  Increased nutrient needs    Related to (etiology):   Physiological causes    Signs and Symptoms (as evidenced by):   Liver tx work-up    Interventions(treatment strategy):  Collaboration of nutrition care w/ other providers     Nutrition Diagnosis Status:   New     Monitor and Evaluation    Food and Nutrient Intake: energy intake, food and beverage intake  Food and Nutrient Adminstration: diet order  Physical Activity and Function: nutrition-related ADLs and IADLs  Anthropometric Measurements: weight, weight change  Biochemical Data, Medical Tests and Procedures: inflammatory profile, lipid profile, glucose/endocrine profile, gastrointestinal profile, electrolyte and renal panel  Nutrition-Focused Physical Findings: overall appearance     Nutrition Follow-Up    RD Follow-up?: Yes   The patient is a 1y2m Female complaining of fever.

## 2020-09-24 NOTE — HPI
Mr. Altman is a 44 y/o male with PMHx of alcohol abuse, alcoholic cirrhosis diagnosed one year ago and complicated by ascites, portal hypertension, hepatic encephalopathy, thrombocytopenia, and coagulopathy who presented to [facility] on 9/19 as a transfer from [facility] for management of acute decompensated cirrhosis. He was originally admitted to OSH on 9/17 for abdominal distention with pain and SOB. He was found to have decompensated cirrhosis, acute kidney injury, hyponatremia, thrombocytopenia, anemia, and metabolic acidosis. He was started on HRS therapy and empiric Meropenem and transferred to [facility] for further evaluation.    Patient states he used to drink 1-2 pints of vodka, whisky daily since age 16. He reports that he quit drinking in July of this year when he was admitted to hospital for alcoholic hepatitis. He had another admission 8/25-9/1 with decompensated cirrhosis complicated by peritonitis. He had a paracentesis on 9/15 with 1.7 L taken off, negative for infection. He has been taking Lactulose, Lasix, and Spironolactone.

## 2020-09-24 NOTE — PLAN OF CARE
Plan of care reviewed with patient. No acute change overnight. Safety maintained, call light in reach, bed in lowest position, will continue to monitor.

## 2020-09-24 NOTE — PROGRESS NOTES
PHARM.D. PRE-TRANSPLANT NOTE:    This patient's medication therapy was evaluated as part of his pre-transplant evaluation.      The following general pharmacologic concerns were noted: none    The following concerns for post-operative pain management were noted: none    The following pharmacologic concerns related to HCV therapy were noted: none      This patient's medication profile was reviewed for considerations for DAA Hepatitis C therapy:    [x]  No current inducers of CYP 3A4 or PGP  [x]  No amiodarone on this patient's EMR profile in the last 24 months  [x]  No past or current atrial fibrillation on this patient's EMR profile       No current facility-administered medications for this visit.      No current outpatient medications on file.     Facility-Administered Medications Ordered in Other Visits   Medication Dose Route Frequency Provider Last Rate Last Dose    0.9%  NaCl infusion    Continuous PRN Mikel Costa Jr., MD   250 mL at 09/23/20 1422    acetaminophen tablet 500 mg  500 mg Oral Q6H PRN Arup K. Yesenia, DO        albuterol-ipratropium 2.5 mg-0.5 mg/3 mL nebulizer solution 3 mL  3 mL Nebulization Q4H PRN Arup K. Yesenia, DO        calcium carbonate 200 mg calcium (500 mg) chewable tablet 500 mg  500 mg Oral Daily PRN Lise Gonsalez MD   500 mg at 09/21/20 1621    ciprofloxacin HCl tablet 500 mg  500 mg Oral Q24H Lise Gonsalez MD   500 mg at 09/24/20 0816    dextrose 50% injection 12.5 g  12.5 g Intravenous PRN Arup K. Yesenia, DO        dextrose 50% injection 25 g  25 g Intravenous PRN Arup K. Yesenia, DO        folic acid tablet 1 mg  1 mg Oral Daily Arup K. Yesenia, DO   1 mg at 09/24/20 0816    glucagon (human recombinant) injection 1 mg  1 mg Intramuscular PRN Arup K. Yesenia, DO        glucose chewable tablet 16 g  16 g Oral PRN Arup K. Yesenia, DO        glucose chewable tablet 24 g  24 g Oral PRN Arup K. Yesenia, DO        hydrOXYzine HCL tablet 25 mg  25 mg Oral TID PRN Lise RAYA  MD Wallace   25 mg at 09/24/20 0835    lactulose 20 gram/30 mL solution Soln 30 g  30 g Oral TID Lise Gonsalez MD   30 g at 09/24/20 0817    ondansetron injection 4 mg  4 mg Intravenous Q8H PRN Arslan Patterson,         oxyCODONE immediate release tablet 5 mg  5 mg Oral Q4H PRN Arslan Patterson DO   5 mg at 09/24/20 1248    rifAXIMin tablet 550 mg  550 mg Oral BID Lise Gonsalez MD   550 mg at 09/24/20 0816    sodium chloride 0.9% flush 10 mL  10 mL Intravenous PRN Arslan Patterson DO        thiamine tablet 100 mg  100 mg Oral Daily Arslan Patterson DO   100 mg at 09/24/20 0816         I am available for consultation and can be contacted, as needed by the other members of the Liver Transplant team.

## 2020-09-24 NOTE — PLAN OF CARE
Recommendations     1. Continue current Low sodium diet, fluid restriction per MD. Add Boost Plus ONS BID if PO intake inadequate.   2. RD to monitor & follow-up.     Goals: Meet % EEN, EPN by RD f/u date  Nutrition Goal Status: new  Communication of RD Recs: reviewed with RN

## 2020-09-24 NOTE — NURSING NOTE
Patient verified by 2 identifiers and allergies reviewed.  20g IV in place to Lt FA.  DSE explained to patient & consent obtained.  Pt C/O chest pressure, lightheadedness nausea & dizziness associated w/ hypotension- SBP in the 70's.  Testing aborted and peak images obtained @ Dobutamine 40mcg/kg/min & Atropine 0.5mg.  Pts symptoms resolved during the recovery period w/ return of SBP >90.  IV flushed post testing.  Post study discharge instructions reviewed with patient, patient verbalized understanding.  Patient transferred to radiology via stretcher accompanied by escort in stable condition.  Pt's nurseYvrose updates on pt status & secure chat sent to Dr. Villa.

## 2020-09-24 NOTE — CONSULTS
Consult Note  Liver Transplant Surgery    Consult Requested By: Hepatology  Reason for Consult: Liver transplant evaluation    SUBJECTIVE:     History of Present Illness: Patient is a 45 y.o. male with liver disease due to alcoholic liver disease.  Symptoms and complications of liver disease include ascites (recurrent paracentesis needed), edema, encephalopathy and fatigue.     Mr Altman is a 45 year old man with history of ETOH ELSD decompensations including ascites, HE, SBP (8/2020), HTN, obesity, c. Diff, who is presenting from OSH due to decompensated ESLD.     He presented to OSH ([facility]) with 3 days of abdominal distension, pain and dyspnea. Was admitted to OSH on 9/17. Workup at OSH notable for JENN, hyponatremia.  On 9/17 labs with WBC 14, Hgb 12.7, , ALT 69, Tb 34, INR 2.3, Na 126, Crt 2.0. He was started on HRS therapy and empiric meropenem at transferred to [facility] for further evaluation.     He was hospitalized 8/2020 due to alcoholic hepatitis complicated by SBP and strep mitis bacteremia. Received steroids in hospital but not continued due to SBP. He was seen for hep follow-up 9/8 (Dr. Titus) with MELD of 36. Was referred for [facility] transplant evaluation (cleared, not yet started). Seen in follow-up 9/15 and doing well, endorsed ongoing distension and pruritis. Endorsed ongoing abstinence since 7/25/20.    Scheduled Meds:   ciprofloxacin HCl  500 mg Oral Q24H    folic acid  1 mg Oral Daily    lactulose  30 g Oral TID    rifAXImin  550 mg Oral BID    thiamine  100 mg Oral Daily     Continuous Infusions:   sodium chloride 0.9%       PRN Meds:sodium chloride 0.9%, acetaminophen, albuterol-ipratropium, calcium carbonate, dextrose 50%, dextrose 50%, glucagon (human recombinant), glucose, glucose, hydrOXYzine HCL, ondansetron, oxyCODONE, sodium chloride 0.9%    Review of patient's allergies indicates:   Allergen Reactions    Latex, natural rubber        Past Medical History:   Diagnosis  Date    Decompensated hepatic cirrhosis     Hypertension     SBP (spontaneous bacterial peritonitis)      Past Surgical History:   Procedure Laterality Date    RIGHT HEART CATHETERIZATION Right 9/23/2020    Procedure: INSERTION, CATHETER, RIGHT HEART;  Surgeon: Mikel Costa Jr., MD;  Location: [facility] CATH LAB;  Service: Cardiology;  Laterality: Right;    THORACENTESIS  2011     Family History   Problem Relation Age of Onset    COPD Mother      Social History     Tobacco Use    Smoking status: Current Some Day Smoker    Smokeless tobacco: Current User   Substance Use Topics    Alcohol use: Yes     Alcohol/week: 112.0 standard drinks     Types: 112 Shots of liquor per week     Comment: fifth of vodka a day. LAST DRINK 7/25/2020 per pt     Drug use: Yes     Frequency: 3.0 times per week     Types: Marijuana        OBJECTIVE:     Vital Signs (Most Recent)  Temp: 97.6 °F (36.4 °C) (09/24/20 1100)  Pulse: 97 (09/24/20 1200)  Resp: 20 (09/24/20 1248)  BP: (!) 156/82 (09/24/20 1200)  SpO2: 95 % (09/24/20 1200)    Vital Signs Range (Last 24H):  Temp:  [97.6 °F (36.4 °C)-98.1 °F (36.7 °C)]   Pulse:  []   Resp:  [14-20]   BP: (136-156)/(66-82)   SpO2:  [94 %-98 %]     Physical Exam  Vitals signs and nursing note reviewed.   Constitutional:       General: He is not in acute distress.     Appearance: He is not diaphoretic.      Comments: Calm and cooperative, no distress.   HENT:      Head: Normocephalic and atraumatic.      Mouth/Throat:      Pharynx: No oropharyngeal exudate.   Eyes:      General: Scleral icterus present.      Conjunctiva/sclera: Conjunctivae normal.   Neck:      Vascular: No JVD.   Cardiovascular:      Rate and Rhythm: Normal rate and regular rhythm.      Heart sounds: Normal heart sounds.   Pulmonary:      Effort: Pulmonary effort is normal. No respiratory distress.      Breath sounds: Normal breath sounds.   Abdominal:      General: Bowel sounds are normal. There is distension.       Palpations: Abdomen is soft. There is no mass.      Tenderness: There is no abdominal tenderness. There is no guarding or rebound.      Comments: Distended, non-tender abdomen   Musculoskeletal:         General: No tenderness.   Lymphadenopathy:      Cervical: No cervical adenopathy.   Skin:     General: Skin is warm.      Capillary Refill: Capillary refill takes less than 2 seconds.      Coloration: Skin is jaundiced.      Comments: Many tattoos   Neurological:      Mental Status: He is alert and oriented to person, place, and time.      Comments: No asterixis   Psychiatric:         Behavior: Behavior normal.         Thought Content: Thought content normal.         Judgment: Judgment normal.     ASSESSMENT/PLAN:     Patient Active Problem List   Diagnosis    Decompensated hepatic cirrhosis    Ascites due to alcoholic cirrhosis    Thrombocytopenia    Coagulopathy    JENN (acute kidney injury)    Portal hypertension    Chronic Hepatic encephalopathy    Hyponatremia    Macrocytic anemia    DIC (disseminated intravascular coagulation)    Hypertension    Hx of SBP (spontaneous bacterial peritonitis)    Pulmonary HTN    Alcohol use disorder, severe, dependence    Encounter for pre-transplant evaluation for liver transplant       Transplant Candidacy: Patient is a 45 y.o. year old male with alcoholic liver disease being evaluated for possible OLT.  In my opinion, he is a suitable liver transplant candidate.  He will be presented to selection committee after all tests and evaluations are complete.    Surgical Complexity A - will need intraoperative dialysis  Medical Complexity A    UNOS Patient Status  Functional Status: 20% - Very sick, hospitalization necessary: active treatment necessary  Physical Capacity: Not Applicable (< 1 year old or hospitalized)    Counseling: I discussed with the patient the benefits of liver transplantation.  We discussed the evaluation and listing procedures.  We discussed the  MELD system and the associated waiting times.  We discussed national and center specific survival results.  We discussed the option of being multiply listed in different OPOs.   We discussed the risks, benefits and potential complications related to surgery including the risks related to anesthesia, bleeding, infection, primary non-function of the allograft, the risk of reoperation as well as the risk of death.  We discussed the typical post-operative course, length of hospitalization, the long-term use of immunosuppressive therapy as well as the need for long-term routine follow-up.    PHS: I discussed the use of organs from donors with PHS increased-risk behavior, including the testing protocols utilized, as well as data from the literature regarding the likelihood of transmission of hepatitis or HIV.  The patient that he is willing to consider such grafts.  DCD: I discussed the use of organs recovered by donation after cardiac death (DCD), including slightly decreased graft survival and greater risk of arterial and biliary complications. The potential advantage to the recipient is possibly receiving a transplant sooner by accepting such an organ. The patient that he is willing to consider such grafts.  HBcAb: I discussed the use of organs from donors with HBcAb in conjunction with long term use of HBV antiviral drugs, such as lamivudine. The small but measurable risk of hepatitis B seroconversion was discussed as well as the potentially life long need to continue antiviral drugs. The patient that he is willing to consider such grafts.  HCV Non-viremic recipient: I discussed the use of HCV-positive organs in naive recipients, including the risk of viral transmission to the patients or others, potential insurance barriers for antiviral medication coverage, risk for fibrosing cholestatic hepatitis, death or graft loss. The potential advantage to the recipient is the possibility of receiving a transplant sooner with  decreased mortality risk by accepting such an organ. The patient that he is willing to consider such grafts.  LDLT: I discussed the nature of living donor liver transplant, including donor risks and more frequent recipient complications. The patient that he is willing to consider such grafts.

## 2020-09-25 ENCOUNTER — TELEPHONE (OUTPATIENT)
Dept: ORTHOPEDICS | Facility: HOSPITAL | Age: 45
End: 2020-09-25

## 2020-09-25 PROBLEM — Z71.89 COUNSELING REGARDING ADVANCED CARE PLANNING AND GOALS OF CARE: Status: ACTIVE | Noted: 2020-09-24

## 2020-09-25 PROBLEM — Z51.5 ENCOUNTER FOR PALLIATIVE CARE: Status: ACTIVE | Noted: 2020-09-24

## 2020-09-25 LAB
ALBUMIN SERPL BCP-MCNC: 3.4 G/DL (ref 3.5–5.2)
ALP SERPL-CCNC: 134 U/L (ref 55–135)
ALT SERPL W/O P-5'-P-CCNC: 36 U/L (ref 10–44)
AMMONIA PLAS-SCNC: 97 UMOL/L (ref 10–50)
ANION GAP SERPL CALC-SCNC: 15 MMOL/L (ref 8–16)
APPEARANCE FLD: NORMAL
AST SERPL-CCNC: 75 U/L (ref 10–40)
BASOPHILS # BLD AUTO: 0.05 K/UL (ref 0–0.2)
BASOPHILS NFR BLD: 0.5 % (ref 0–1.9)
BILIRUB SERPL-MCNC: 26.1 MG/DL (ref 0.1–1)
BILIRUB UR QL STRIP: ABNORMAL
BODY FLD TYPE: NORMAL
BODY FLD TYPE: NORMAL
BUN SERPL-MCNC: 42 MG/DL (ref 6–20)
CALCIUM SERPL-MCNC: 9.5 MG/DL (ref 8.7–10.5)
CHLORIDE SERPL-SCNC: 106 MMOL/L (ref 95–110)
CLARITY UR REFRACT.AUTO: CLEAR
CO2 SERPL-SCNC: 14 MMOL/L (ref 23–29)
COLOR FLD: YELLOW
COLOR UR AUTO: ABNORMAL
CREAT SERPL-MCNC: 1.6 MG/DL (ref 0.5–1.4)
CRP SERPL-MCNC: 32.5 MG/L (ref 0–8.2)
CRYSTALS FLD MICRO: NEGATIVE
CV PHARM DOSE: 0.4 MG
CV STRESS BASE HR: 96 BPM
DIASTOLIC BLOOD PRESSURE: 57 MMHG
DIFFERENTIAL METHOD: ABNORMAL
END DIASTOLIC INDEX-HIGH: 170 ML/M2
END SYSTOLIC INDEX-HIGH: 70 ML/M2
EOSINOPHIL # BLD AUTO: 0.2 K/UL (ref 0–0.5)
EOSINOPHIL NFR BLD: 1.7 % (ref 0–8)
ERYTHROCYTE [DISTWIDTH] IN BLOOD BY AUTOMATED COUNT: 18 % (ref 11.5–14.5)
ERYTHROCYTE [SEDIMENTATION RATE] IN BLOOD BY WESTERGREN METHOD: <2 MM/HR (ref 0–23)
EST. GFR  (AFRICAN AMERICAN): 58.4 ML/MIN/1.73 M^2
EST. GFR  (NON AFRICAN AMERICAN): 50.6 ML/MIN/1.73 M^2
GAMMA INTERFERON BACKGROUND BLD IA-ACNC: 0.03 IU/ML
GLUCOSE SERPL-MCNC: 89 MG/DL (ref 70–110)
GLUCOSE UR QL STRIP: NEGATIVE
GRAM STN SPEC: NORMAL
GRAM STN SPEC: NORMAL
HCT VFR BLD AUTO: 26.8 % (ref 40–54)
HGB BLD-MCNC: 9 G/DL (ref 14–18)
HGB UR QL STRIP: NEGATIVE
IMM GRANULOCYTES # BLD AUTO: 0.13 K/UL (ref 0–0.04)
IMM GRANULOCYTES NFR BLD AUTO: 1.4 % (ref 0–0.5)
INR PPP: 2.3 (ref 0.8–1.2)
KETONES UR QL STRIP: NEGATIVE
LEUKOCYTE ESTERASE UR QL STRIP: NEGATIVE
LYMPHOCYTES # BLD AUTO: 0.9 K/UL (ref 1–4.8)
LYMPHOCYTES NFR BLD: 9.8 % (ref 18–48)
LYMPHOCYTES NFR FLD MANUAL: 3 %
M TB IFN-G CD4+ BCKGRND COR BLD-ACNC: 0 IU/ML
MCH RBC QN AUTO: 35 PG (ref 27–31)
MCHC RBC AUTO-ENTMCNC: 33.6 G/DL (ref 32–36)
MCV RBC AUTO: 104 FL (ref 82–98)
MITOGEN IGNF BCKGRD COR BLD-ACNC: 0.16 IU/ML
MONOCYTES # BLD AUTO: 1 K/UL (ref 0.3–1)
MONOCYTES NFR BLD: 10.4 % (ref 4–15)
MONOS+MACROS NFR FLD MANUAL: 7 %
NEUTROPHILS # BLD AUTO: 7.1 K/UL (ref 1.8–7.7)
NEUTROPHILS NFR BLD: 76.2 % (ref 38–73)
NEUTROPHILS NFR FLD MANUAL: 90 %
NITRITE UR QL STRIP: NEGATIVE
NRBC BLD-RTO: 0 /100 WBC
OHS CV CPX 85 PERCENT MAX PREDICTED HEART RATE MALE: 147
OHS CV CPX MAX PREDICTED HEART RATE: 173
OHS CV CPX PATIENT IS FEMALE: 0
OHS CV CPX PATIENT IS MALE: 1
OHS CV CPX PEAK DIASTOLIC BLOOD PRESSURE: 55 MMHG
OHS CV CPX PEAK HEAR RATE: 97 BPM
OHS CV CPX PEAK RATE PRESSURE PRODUCT: NORMAL
OHS CV CPX PEAK SYSTOLIC BLOOD PRESSURE: 149 MMHG
OHS CV CPX PERCENT MAX PREDICTED HEART RATE ACHIEVED: 56
OHS CV CPX RATE PRESSURE PRODUCT PRESENTING: NORMAL
PH UR STRIP: 6 [PH] (ref 5–8)
PLATELET # BLD AUTO: 55 K/UL (ref 150–350)
PMV BLD AUTO: 11.7 FL (ref 9.2–12.9)
POTASSIUM SERPL-SCNC: 4.2 MMOL/L (ref 3.5–5.1)
PROT SERPL-MCNC: 5.3 G/DL (ref 6–8.4)
PROT UR QL STRIP: NEGATIVE
PROTHROMBIN TIME: 24.2 SEC (ref 9–12.5)
RBC # BLD AUTO: 2.57 M/UL (ref 4.6–6.2)
RETIRED EF AND QEF - SEE NOTES: 51 %
SODIUM SERPL-SCNC: 135 MMOL/L (ref 136–145)
SP GR UR STRIP: 1.01 (ref 1–1.03)
SUMMED DIFFERENCE: 0
SUMMED REST SCORE: 0
SUMMED STRESS SCORE: 0
SYSTOLIC BLOOD PRESSURE: 156 MMHG
TB GOLD PLUS: ABNORMAL
TB2 - NIL: 0.01 IU/ML
URATE SERPL-MCNC: 7.9 MG/DL (ref 3.4–7)
URN SPEC COLLECT METH UR: ABNORMAL
VARICELLA INTERPRETATION: POSITIVE
VARICELLA ZOSTER IGG: 4.18 ISR (ref 0–0.9)
WBC # BLD AUTO: 9.26 K/UL (ref 3.9–12.7)
WBC # FLD: 138 /CU MM

## 2020-09-25 PROCEDURE — A9585 GADOBUTROL INJECTION: HCPCS | Mod: NTX | Performed by: HOSPITALIST

## 2020-09-25 PROCEDURE — 87206 SMEAR FLUORESCENT/ACID STAI: CPT | Mod: NTX

## 2020-09-25 PROCEDURE — 25000003 PHARM REV CODE 250: Mod: NTX | Performed by: HOSPITALIST

## 2020-09-25 PROCEDURE — 84550 ASSAY OF BLOOD/URIC ACID: CPT | Mod: NTX

## 2020-09-25 PROCEDURE — 87070 CULTURE OTHR SPECIMN AEROBIC: CPT | Mod: NTX

## 2020-09-25 PROCEDURE — 81003 URINALYSIS AUTO W/O SCOPE: CPT | Mod: NTX

## 2020-09-25 PROCEDURE — 87075 CULTR BACTERIA EXCEPT BLOOD: CPT | Mod: NTX

## 2020-09-25 PROCEDURE — 80053 COMPREHEN METABOLIC PANEL: CPT | Mod: NTX

## 2020-09-25 PROCEDURE — 99233 SBSQ HOSP IP/OBS HIGH 50: CPT | Mod: NTX,,, | Performed by: NURSE PRACTITIONER

## 2020-09-25 PROCEDURE — 99497 PR ADVNCD CARE PLAN 30 MIN: ICD-10-PCS | Mod: NTX,,, | Performed by: NURSE PRACTITIONER

## 2020-09-25 PROCEDURE — 89060 EXAM SYNOVIAL FLUID CRYSTALS: CPT | Mod: NTX

## 2020-09-25 PROCEDURE — 87015 SPECIMEN INFECT AGNT CONCNTJ: CPT | Mod: NTX

## 2020-09-25 PROCEDURE — 87116 MYCOBACTERIA CULTURE: CPT | Mod: NTX

## 2020-09-25 PROCEDURE — 25500020 PHARM REV CODE 255: Mod: NTX | Performed by: HOSPITALIST

## 2020-09-25 PROCEDURE — 87102 FUNGUS ISOLATION CULTURE: CPT | Mod: NTX

## 2020-09-25 PROCEDURE — 99233 SBSQ HOSP IP/OBS HIGH 50: CPT | Mod: NTX,,, | Performed by: HOSPITALIST

## 2020-09-25 PROCEDURE — 86140 C-REACTIVE PROTEIN: CPT | Mod: NTX

## 2020-09-25 PROCEDURE — 85652 RBC SED RATE AUTOMATED: CPT | Mod: NTX

## 2020-09-25 PROCEDURE — 99233 PR SUBSEQUENT HOSPITAL CARE,LEVL III: ICD-10-PCS | Mod: NTX,,, | Performed by: NURSE PRACTITIONER

## 2020-09-25 PROCEDURE — 89051 BODY FLUID CELL COUNT: CPT | Mod: NTX

## 2020-09-25 PROCEDURE — 99233 PR SUBSEQUENT HOSPITAL CARE,LEVL III: ICD-10-PCS | Mod: NTX,,, | Performed by: HOSPITALIST

## 2020-09-25 PROCEDURE — 87205 SMEAR GRAM STAIN: CPT | Mod: NTX

## 2020-09-25 PROCEDURE — 63600175 PHARM REV CODE 636 W HCPCS: Mod: NTX | Performed by: HOSPITALIST

## 2020-09-25 PROCEDURE — 85610 PROTHROMBIN TIME: CPT | Mod: NTX

## 2020-09-25 PROCEDURE — 82140 ASSAY OF AMMONIA: CPT | Mod: NTX

## 2020-09-25 PROCEDURE — 99497 ADVNCD CARE PLAN 30 MIN: CPT | Mod: NTX,,, | Performed by: NURSE PRACTITIONER

## 2020-09-25 PROCEDURE — 85025 COMPLETE CBC W/AUTO DIFF WBC: CPT | Mod: NTX

## 2020-09-25 PROCEDURE — 20600001 HC STEP DOWN PRIVATE ROOM: Mod: NTX

## 2020-09-25 PROCEDURE — 36415 COLL VENOUS BLD VENIPUNCTURE: CPT | Mod: NTX

## 2020-09-25 RX ORDER — HYDROMORPHONE HYDROCHLORIDE 1 MG/ML
1 INJECTION, SOLUTION INTRAMUSCULAR; INTRAVENOUS; SUBCUTANEOUS EVERY 8 HOURS PRN
Status: DISCONTINUED | OUTPATIENT
Start: 2020-09-25 | End: 2020-09-26

## 2020-09-25 RX ORDER — REGADENOSON 0.08 MG/ML
0.4 INJECTION, SOLUTION INTRAVENOUS ONCE
Status: COMPLETED | OUTPATIENT
Start: 2020-09-25 | End: 2020-09-25

## 2020-09-25 RX ORDER — LACTULOSE 10 G/15ML
45 SOLUTION ORAL 3 TIMES DAILY
Status: DISCONTINUED | OUTPATIENT
Start: 2020-09-25 | End: 2020-09-26

## 2020-09-25 RX ORDER — DICLOFENAC SODIUM 10 MG/G
4 GEL TOPICAL 3 TIMES DAILY
Status: DISCONTINUED | OUTPATIENT
Start: 2020-09-25 | End: 2020-09-26

## 2020-09-25 RX ORDER — DOCUSATE SODIUM 100 MG/1
100 CAPSULE, LIQUID FILLED ORAL DAILY
Status: DISCONTINUED | OUTPATIENT
Start: 2020-09-25 | End: 2020-10-02 | Stop reason: HOSPADM

## 2020-09-25 RX ORDER — ACETAMINOPHEN 500 MG
1000 TABLET ORAL 3 TIMES DAILY
Status: DISCONTINUED | OUTPATIENT
Start: 2020-09-25 | End: 2020-09-26

## 2020-09-25 RX ORDER — GADOBUTROL 604.72 MG/ML
10 INJECTION INTRAVENOUS
Status: COMPLETED | OUTPATIENT
Start: 2020-09-25 | End: 2020-09-25

## 2020-09-25 RX ORDER — POLYETHYLENE GLYCOL 3350 17 G/17G
17 POWDER, FOR SOLUTION ORAL 2 TIMES DAILY
Status: DISCONTINUED | OUTPATIENT
Start: 2020-09-25 | End: 2020-09-30

## 2020-09-25 RX ORDER — ZINC SULFATE 50(220)MG
220 CAPSULE ORAL DAILY
Status: DISCONTINUED | OUTPATIENT
Start: 2020-09-26 | End: 2020-10-02 | Stop reason: HOSPADM

## 2020-09-25 RX ADMIN — CIPROFLOXACIN 500 MG: 500 TABLET, FILM COATED ORAL at 08:09

## 2020-09-25 RX ADMIN — LACTULOSE 45 G: 20 SOLUTION ORAL at 02:09

## 2020-09-25 RX ADMIN — RIFAXIMIN 550 MG: 550 TABLET ORAL at 08:09

## 2020-09-25 RX ADMIN — OXYCODONE 5 MG: 5 TABLET ORAL at 08:09

## 2020-09-25 RX ADMIN — HYDROMORPHONE HYDROCHLORIDE 1 MG: 1 INJECTION, SOLUTION INTRAMUSCULAR; INTRAVENOUS; SUBCUTANEOUS at 02:09

## 2020-09-25 RX ADMIN — DICLOFENAC 4 G: 10 GEL TOPICAL at 05:09

## 2020-09-25 RX ADMIN — GADOBUTROL 10 ML: 604.72 INJECTION INTRAVENOUS at 08:09

## 2020-09-25 RX ADMIN — LACTULOSE 45 G: 20 SOLUTION ORAL at 08:09

## 2020-09-25 RX ADMIN — OXYCODONE 5 MG: 5 TABLET ORAL at 12:09

## 2020-09-25 RX ADMIN — OXYCODONE 5 MG: 5 TABLET ORAL at 07:09

## 2020-09-25 RX ADMIN — DOCUSATE SODIUM 100 MG: 100 CAPSULE, LIQUID FILLED ORAL at 02:09

## 2020-09-25 RX ADMIN — POLYETHYLENE GLYCOL 3350 17 G: 17 POWDER, FOR SOLUTION ORAL at 10:09

## 2020-09-25 RX ADMIN — FOLIC ACID 1 MG: 1 TABLET ORAL at 08:09

## 2020-09-25 RX ADMIN — LACTULOSE 30 G: 20 SOLUTION ORAL at 08:09

## 2020-09-25 RX ADMIN — ACETAMINOPHEN 1000 MG: 500 TABLET ORAL at 08:09

## 2020-09-25 RX ADMIN — DICLOFENAC 4 G: 10 GEL TOPICAL at 08:09

## 2020-09-25 RX ADMIN — OXYCODONE 5 MG: 5 TABLET ORAL at 01:09

## 2020-09-25 RX ADMIN — REGADENOSON 0.4 MG: 0.08 INJECTION, SOLUTION INTRAVENOUS at 11:09

## 2020-09-25 RX ADMIN — Medication 100 MG: at 08:09

## 2020-09-25 NOTE — PROCEDURES
During ultrasound evaluation, no ascites identified for safe paracentesis. No paracentesis performed.     Roxane Antonio PA-C  Interventional Radiology  Clinic [phone]

## 2020-09-25 NOTE — PLAN OF CARE
Patient undergoing a liver transplant evaluation.         09/25/20 1253   Discharge Reassessment   Assessment Type Discharge Planning Reassessment   Provided patient/caregiver education on the expected discharge date and the discharge plan Yes   Discharge Plan A Home   Discharge Plan B Home   DME Needed Upon Discharge  other (see comments)  (TBD)   Anticipated Discharge Disposition Home   Post-Acute Status   Post-Acute Authorization Other   Other Status No Post-Acute Service Needs   Discharge Delays None known at this time

## 2020-09-25 NOTE — PLAN OF CARE
Problem: Adult Inpatient Plan of Care  Goal: Plan of Care Review  Outcome: Ongoing, Progressing  Goal: Patient-Specific Goal (Individualization)  Outcome: Ongoing, Progressing  Flowsheets (Taken 9/25/2020 1826)  Individualized Care Needs: pain control to left knee  Anxieties, Fears or Concerns: pain  Patient-Specific Goals (Include Timeframe): discharge  Goal: Optimal Comfort and Wellbeing  Outcome: Ongoing, Progressing  Intervention: Provide Person-Centered Care  Flowsheets (Taken 9/25/2020 1826)  Trust Relationship/Rapport:   care explained   choices provided   emotional support provided   empathic listening provided   questions answered   questions encouraged   thoughts/feelings acknowledged     Problem: Fall Injury Risk  Goal: Absence of Fall and Fall-Related Injury  Outcome: Ongoing, Progressing  Intervention: Promote Injury-Free Environment  Flowsheets (Taken 9/25/2020 1826)  Safety Promotion/Fall Prevention:   assistive device/personal item within reach   bed alarm set   side rails raised x 2   medications reviewed  Environmental Safety Modification: assistive device/personal items within reach     Problem: Coping Ineffective  Goal: Effective Coping  Outcome: Ongoing, Progressing  Intervention: Support and Enhance Coping Strategies  Flowsheets (Taken 9/25/2020 1826)  Supportive Measures:   active listening utilized   relaxation techniques promoted   self-care encouraged  Environmental Support:   calm environment promoted   rest periods encouraged     Pt arouses to voice, oriented x4, VSS. Pt c/o severe pain to left knee today, given PRN Oxy with no relief, so then given 1mg Dilaudid IV. Pt has been asleep since and denies pain. Pt went for nuclear stress test today. Went for para after but no fluid present to drain. Will be getting MRI abdomen and fluid aspiration to left knee. Plan of care reviewed with pt. Will continue to monitor.

## 2020-09-25 NOTE — PLAN OF CARE
Advance Care Planning   [facility]  Palliative Care   Follow Up Note:    Patient Name: Jordan Altman  MRN: [id number]  Admission Date: 9/19/2020  Hospital Length of Stay: 6 days  Code Status: Full Code   Attending Provider: Willie Villa MD  Palliative Care Provider: JEFFY Renteria, SHAHEEN-C  Primary Care Physician: SHAHEEN Jaime  Principal Problem:Decompensated hepatic cirrhosis      Psychosocial Assessment for Liver Transplant being completed when this  and [facility] [place] [facility] JEFFY Hearn arrived.    Pt is  from his wife and has an adult son (21 yrs). Pt stated that he is currently living with his girlfriend Valeria Mcrae) in [place].     Power of   [facility] initiated the process of advance care planning today and explained the importance of this process to the patient.  Introduced the concept of advance directives to the patient, as well. Then the patient received detailed information about the importance of designating a Health Care Power of  (HCPOA). He was also instructed to communicate with this person about their wishes for future healthcare, should he become sick and lose decision-making capacity. The patient has not previously appointed a HCPOA. After our discussion, the patient has decided to complete a HCPOA and has appointed his brother and NAME:Didier Page. Phone #: [phone]. Pt wants his Girlfriend Valeria Tucker to be his secondary: #[phone].       Form Scanned into chart.    Will continue to follow and support pt as needed.    Valeria Castro, KATIANA, [facility]-

## 2020-09-25 NOTE — PROGRESS NOTES
Progress Note  Hospital Medicine  [facility], [place]       Patient Name: Jordan Altman  MRN:  [id number]  Hospital Medicine Team: [facility] Willie Villa MD  Date of Admission:  9/19/2020     Length of Stay:  LOS: 5 days   Expected Discharge Date: 9/29/2020  Principal Problem:  Decompensated hepatic cirrhosis     Subjective:     Interval History/Overnight Events:  Patient's mentation a little slow today, had 2 BMs last night 3 today, was complaining of left knee pain which is knee, getting X rays; went for dobutamine stress test and became hypotensive and had to be stopped, planning for nuclear stress in the AM; under going liver transplant evaluation ;  - will doing infectious work up     ciprofloxacin HCl  500 mg Oral Q24H    folic acid  1 mg Oral Daily    lactulose  30 g Oral TID    rifAXImin  550 mg Oral BID    thiamine  100 mg Oral Daily        sodium chloride 0.9%         sodium chloride 0.9%, acetaminophen, albuterol-ipratropium, calcium carbonate, dextrose 50%, dextrose 50%, glucagon (human recombinant), glucose, glucose, hydrOXYzine HCL, ondansetron, oxyCODONE, sodium chloride 0.9%    Review of Systems   Constitutional: Negative for chills, fatigue, fever.   HENT: Negative for sore throat, trouble swallowing.    Eyes: Negative for photophobia, visual disturbance.   Respiratory: Negative for cough, shortness of breath.    Cardiovascular: Negative for chest pain, palpitations, leg swelling.   Gastrointestinal: Negative for abdominal pain, constipation, diarrhea, nausea, vomiting.   Endocrine: Negative for cold intolerance, heat intolerance.   Genitourinary: Negative for dysuria, frequency.   Musculoskeletal: Negative for arthralgias, myalgias.   Skin: Negative for rash, wound, erythema   Neurological: Negative for dizziness, syncope, weakness, light-headedness.   Psychiatric/Behavioral: Negative for confusion, hallucinations, anxiety  All other systems reviewed and are  negative.    Objective:     Temp:  [97.6 °F (36.4 °C)-98.1 °F (36.7 °C)]   Pulse:  []   Resp:  [14-20]   BP: (128-156)/(66-82)   SpO2:  [94 %-98 %]         Weight change: -1.4 kg (-3 lb 1.4 oz)   Body mass index is 35.4 kg/m².       Physical Exam:  Constitutional:chronically ill looking,  non-distressed, not diaphoretic.   HENT: NC/AT, external ears normal  Eyes: PERRL, EOMI, conjunctiva normal, no discharge b/l, +scleral icterus   Neck: normal ROM, supple  CV: RRR, no m/r/g, no carotid bruits, +2 peripheral pulses.  Pulmonary/Chest wall: Breathing comfortably w/o distress, CTAB, no w/r/r, no crackles.  Decreased breath sounds at lung bases  GI: Soft, non-tender, (+) BS, (+) BM   +distended  Obese   Musculoskeletal: Normal ROM, no atrophy,  no pitting edema in LE bilaterally   Neurological: AAO x 4, no focal deficits noted   +asterixis   Skin: warm, dry   +pallor, jaundice, + spider angiomas, +bruising   Multiple tattoos   Psych: normal mood and affect, normal behavior, thought content and judgement.    Labs:    Chemistries:   Recent Labs   Lab 09/20/20  0501  09/22/20  0538 09/23/20  0351 09/24/20  0546   *   < > 131* 130* 132*   K 4.2   < > 4.3 4.5 4.5      < > 102 105 104   CO2 20*   < > 16* 16* 18*   BUN 58*   < > 36* 41* 46*   CREATININE 2.2*   < > 1.7* 1.6* 1.8*   CALCIUM 8.4*   < > 9.0 8.6* 9.2   PROT 4.9*   < > 5.6* 4.8* 5.1*   BILITOT 24.0*   < > 24.6* 22.8* 26.0*   ALKPHOS 115   < > 143* 123 132   ALT 33   < > 39 33 38   AST 74*   < > 81* 75* 82*   MG 2.3  --  2.1  --  2.0   PHOS 2.7  --  2.5*  --  3.3    < > = values in this interval not displayed.        WBC:   Recent Labs   Lab 09/20/20  0501 09/21/20  0516 09/22/20  0538 09/23/20  0352 09/24/20  0546   WBC 6.55 7.04 8.05 8.99 9.25     Bands:     CBC/Anemia Labs: Coags:    Recent Labs   Lab 09/19/20  0139  09/22/20  0538 09/23/20  0352 09/24/20  0546   WBC 9.17   < > 8.05 8.99 9.25   HGB 9.0*   < > 8.9* 7.9* 8.4*   HCT 26.0*   < >  27.2* 23.8* 25.1*   PLT 42*   < > 51* 46* 49*   *   < > 105* 104* 104*   RDW 16.9*   < > 17.4* 17.5* 17.8*   FERRITIN 2,907*  --   --   --   --    [id number] >2000*  --   --   --   --     < > = values in this interval not displayed.    Recent Labs   Lab 09/22/20  0538 09/23/20  0352 09/24/20  0546   INR 2.2* 2.5* 2.3*          Assessment and Plan     Hospital Course:    Mr. Jordan Altman was admitted to Hospital Medicine for management of  Decompensated hepatic cirrhosis     Active Hospital Problems    Diagnosis  POA    *Decompensated hepatic cirrhosis [K72.90]  Yes    Pulmonary HTN [I27.20]  Yes     PA pressure 73 on echo 9/19/20      Alcohol use disorder, severe, dependence [F10.20]  Yes     Chronic    Encounter for pre-transplant evaluation for liver transplant [Z01.818]  Not Applicable    Macrocytic anemia [D53.9]  Yes    DIC (disseminated intravascular coagulation) [D65]  Yes    Hypertension [I10]  Yes    Hx of SBP (spontaneous bacterial peritonitis) [K65.2]  Yes    Ascites due to alcoholic cirrhosis [K70.31]  Yes    Thrombocytopenia [D69.6]  Yes    Coagulopathy [D68.9]  Yes    JENN (acute kidney injury) [N17.9]  Yes    Portal hypertension [K76.6]  Yes    Chronic Hepatic encephalopathy [K72.90]  Yes    Hyponatremia [E87.1]  Yes      Resolved Hospital Problems   No resolved problems to display.       Decompensated alcoholic cirrhosis with Ascites   Hx of SBP     MELD-Na score: 35 at 9/24/2020  5:46 AM  MELD score: 34 at 9/24/2020  5:46 AM  Calculated from:  Serum Creatinine: 1.8 mg/dL at 9/24/2020  5:46 AM  Serum Sodium: 132 mmol/L at 9/24/2020  5:46 AM  Total Bilirubin: 26.0 mg/dL at 9/24/2020  5:46 AM  INR(ratio): 2.3 at 9/24/2020  5:46 AM  Age: 45 years 4 months     -No signs of active bleeding or infection   -he is afebrile and has no leukocytosis   -cx negative   -US liver with doppler- reviewed   -he is awake, conversant and has no asterixis on exam   -vitamin K for coagulopathy    -pending [facility]   -hepatology following  - para on 9/21 - IR paracentesis attempted but insufficient ascites identified for safe paracentesis  - PO cipro daily for SBP ppx given hx of SBP  - psychiatry consulted - high risk   - liver transplant evaluation started on 09/30/2020 after right heart catheterization showed acceptable PA pressures    Hepatic encephalopathy   - patient previously on lactulose for chronic hepatic encephalopathy  - still a little slow today, increase lactulose and infectious work up     JENN   -concern for iATN from volume depletion vs hepatorenal syndrome (HRS)    -hold home diuretics (lasix, spironolactone)  -will hold off midodrine for now given SBP ~120  -reviewed UA, urine sodium, renal US   -monitor renal function closely and avoid nephrotoxic agents  -consider nephrology consult if does not improve with above   - improving  Cr down to 1.8--> 1.6     Portal hypertension  - PA pressures of 73 on echo from 9/19.  CVP 3.  No significant volume overload  -s/p RHC on 9/12 -with favorable results, with PA pressure acceptable for liver transplant.      Hyponatremia   -Na 123 and neuro intact   -likely due to decompensated cirrhosis and diuretic use  -hold diuretics   -monitor closely with daily labs   - IV albumin and IV NS   - improving appropriately - sodium 130     Alcohol abuse   -completed rehab in the past but relapsed   -quit drinking July of this year and committed to stay sober  -thiamine, folic acid daily   -check UDS   -addiction psychiatry consult     Coagulopathy  Thrombocytopenia   Macrocytic anemia  - 2/2 liver disease  - stable  Monitor     Diet:  Low Na with fluid restriction   GI PPx:  PO PPI  DVT PPx:  SCDs due to coagulopathy   Goals of Care:  Full     High Risk Conditions:  Decompensated cirrhosis      Disposition:    TBD- undergoing OLT testing

## 2020-09-25 NOTE — ASSESSMENT & PLAN NOTE
Impression:  Palliative care consulted for goals of care discussion/advanced care planning for this 46 y/o male being followed by Hospital Medicine, Hepatology, and Liver Transplant Surgery for Decompensated alcoholic cirrhosis with ascites, Hepatic encephalopathy, JENN, Portal hypertension, Hyponatremia, Coagulopathy, Thrombocytopenia, Macrocytic anemia, and Alcohol abuse. Liver transplant evaluation initiated. On rounds today patient found to be sleeping, easily aroused but drowsy. Oriented x 3 but inattentive and slow to respond to questions. Difficult to get patient to engage in meaningful conversation. Returned later to see if patient was more alert but he was off the floor.    Plan/Recommendations:  1. See goals of care discussion below.  2. Liver transplant evaluation initiated; continue current plan of care.  3. Palliative care will follow up.    Decompensated alcoholic cirrhosis with ascites/Hepatic encephalopathy/JENN/Portal hypertension/Hyponatremia/Coagulopathy/Thrombocytopenia/Macrocytic anemia/Alcohol abuse - management per primary team and other consultants    Goals of Care/Advance Care Planning:  Conference conducted with patient to discuss his current clinical status, goals of care, code status, long term expected outcomes and prognostic awareness. Patient is oriented x 3 but inattentive and slow to respond to questions. Attempted to have a discussion concerning the patients values and wishes but it is difficult to get patient to engage in meaningful conversation at this time. He is able to state that his goal is to get a liver transplant.    Patient has no advanced directives on file in Epic and is currently a full code. Asked patient who he would want to make his medical decisions in the event he were unable to speak for himself; patient states that he would want his brother Didier to be his decision maker. Will follow up with patient regarding completion of advanced directives when he is more  alert.

## 2020-09-25 NOTE — SUBJECTIVE & OBJECTIVE
Interval History: IR paracentesis attempted on 9/21 and again on 9/25 but no ascites identified for safe paracentesis. Liver transplant evaluation initiated. Patient is s/p RHC on 9/23. Nuclear stress today was negative for ischemia. Orthopedics consulted for L knee pain. Patient is AAOx4 on exam today; reports that he has been feeling a little confused although he answers all questions appropriately.    Past Medical History:   Diagnosis Date    Decompensated hepatic cirrhosis     Hypertension     SBP (spontaneous bacterial peritonitis)        Past Surgical History:   Procedure Laterality Date    RIGHT HEART CATHETERIZATION Right 9/23/2020    Procedure: INSERTION, CATHETER, RIGHT HEART;  Surgeon: Mikel Costa Jr., MD;  Location: [facility] CATH LAB;  Service: Cardiology;  Laterality: Right;    THORACENTESIS  2011       Review of patient's allergies indicates:   Allergen Reactions    Latex, natural rubber        Medications:  Continuous Infusions:   sodium chloride 0.9%       Scheduled Meds:   acetaminophen  1,000 mg Oral TID    ciprofloxacin HCl  500 mg Oral Q24H    docusate sodium  100 mg Oral Daily    folic acid  1 mg Oral Daily    lactulose  45 g Oral TID    rifAXImin  550 mg Oral BID    thiamine  100 mg Oral Daily     PRN Meds:sodium chloride 0.9%, acetaminophen, albuterol-ipratropium, calcium carbonate, dextrose 50%, dextrose 50%, glucagon (human recombinant), glucose, glucose, HYDROmorphone, hydrOXYzine HCL, ondansetron, oxyCODONE, sodium chloride 0.9%    Family History     Problem Relation (Age of Onset)    COPD Mother        Tobacco Use    Smoking status: Current Some Day Smoker    Smokeless tobacco: Current User   Substance and Sexual Activity    Alcohol use: Yes     Alcohol/week: 112.0 standard drinks     Types: 112 Shots of liquor per week     Comment: fifth of vodka a day. LAST DRINK 7/25/2020 per pt     Drug use: Yes     Frequency: 3.0 times per week     Types: Marijuana    Sexual  activity: Yes       Review of Systems   Constitutional: Negative for appetite change and fatigue.   HENT: Negative for congestion and trouble swallowing.    Respiratory: Negative for cough and shortness of breath.    Cardiovascular: Negative for chest pain and palpitations.   Gastrointestinal: Positive for abdominal distention. Negative for abdominal pain, nausea and vomiting.   Genitourinary: Negative for difficulty urinating and flank pain.   Musculoskeletal: Positive for arthralgias. Negative for myalgias.   Skin: Negative for rash and wound.   Neurological: Negative for dizziness and light-headedness.   Psychiatric/Behavioral: Negative for agitation and behavioral problems.     Objective:     Vital Signs (Most Recent):  Temp: 98.4 °F (36.9 °C) (09/25/20 1315)  Pulse: 100 (09/25/20 1315)  Resp: 19 (09/25/20 1315)  BP: 127/75 (09/25/20 1315)  SpO2: 97 % (09/25/20 1315) Vital Signs (24h Range):  Temp:  [98.1 °F (36.7 °C)-98.8 °F (37.1 °C)] 98.4 °F (36.9 °C)  Pulse:  [] 100  Resp:  [16-20] 19  SpO2:  [95 %-97 %] 97 %  BP: (114-155)/(56-76) 127/75     Weight: 118.5 kg (261 lb 3.9 oz)  Body mass index is 35.43 kg/m².    Physical Exam  Vitals signs and nursing note reviewed.   Constitutional:       General: He is not in acute distress.     Appearance: He is ill-appearing.   HENT:      Head: Normocephalic and atraumatic.   Eyes:      General: Scleral icterus present.      Extraocular Movements: Extraocular movements intact.   Neck:      Musculoskeletal: Normal range of motion and neck supple.   Cardiovascular:      Rate and Rhythm: Normal rate and regular rhythm.   Pulmonary:      Effort: Pulmonary effort is normal. No respiratory distress.   Abdominal:      General: There is distension.      Palpations: Abdomen is soft.   Musculoskeletal:         General: No deformity.      Left knee: Tenderness found.   Skin:     General: Skin is warm and dry.   Neurological:      Mental Status: He is alert and oriented to  person, place, and time.   Psychiatric:         Mood and Affect: Mood normal.         Behavior: Behavior normal.         Review of Symptoms    Symptom Assessment (ESAS 0-10 Scale)  Pain:  6  Dyspnea:  0  Anxiety:  0  Nausea:  0  Depression:  0  Anorexia:  0  Fatigue:  0  Insomnia:  0  Restlessness:  0  Agitation:  0     CAM / Delirium:  Negative  Constipation:  Negative  Diarrhea:  Negative    Bowel Management Plan (BMP):  Yes      Pain Assessment:  Location(s): leg (knee)    Leg       Leg Location:  Left       Quality:  Throbbing       Quantity:  6/10 in intensity       Chronicity:  Onset 2 day(s) ago       Aggravating Factors:  Movement       Alleviating Factors:  Recumbency       Associated Symptoms:  None    OME in 24 hours:  20    Performance Status:  50    ECOG Performance Status Grade:  3 - Confined to bed or chair 50% of waking hours    Living Arrangements:  Lives with friend    Psychosocial/Cultural: Patient is  from his wife and lives with his girlfriend Marnie. He has a 21 year old son. He has two brothers. His mother is still living but his father is . He was working as a  until the COVID-19 pandemic. His goal is to get a liver transplant.    Spiritual:  F - Carissa and Belief:  Mosque      Advance Care Planning   Advance Directives:   Living Will: No    LaPOST: No    Do Not Resuscitate Status: No    Medical Power of : Yes    Agent's Name:  Didier Page   Agent's Contact Number:  [phone]    Decision Making:  Patient answered questions         Significant Labs: All pertinent labs within the past 24 hours have been reviewed.  CBC:   Recent Labs   Lab 20  0431   WBC 9.26   HGB 9.0*   HCT 26.8*   *   PLT 55*     BMP:  Recent Labs   Lab 20  0431   GLU 89   *   K 4.2      CO2 14*   BUN 42*   CREATININE 1.6*   CALCIUM 9.5     LFT:  Lab Results   Component Value Date    AST 75 (H) 2020    ALKPHOS 134 2020    BILITOT 26.1 (H)  09/25/2020     Albumin:   Albumin   Date Value Ref Range Status   09/25/2020 3.4 (L) 3.5 - 5.2 g/dL Final     Protein:   Total Protein   Date Value Ref Range Status   09/25/2020 5.3 (L) 6.0 - 8.4 g/dL Final     Lactic acid:   No results found for: LACTATE    Significant Imaging: I have reviewed all pertinent imaging results/findings within the past 24 hours.

## 2020-09-25 NOTE — CONSULTS
[facility]  Palliative Medicine  Consult Note    Patient Name: Jordan Altman  MRN: [id number]  Admission Date: 9/19/2020  Hospital Length of Stay: 6 days  Code Status: Full Code   Attending Provider: Willie Villa MD  Consulting Provider: Robyn Dixon NP  Primary Care Physician: SHAHEEN Jaime  Principal Problem:Decompensated hepatic cirrhosis    Patient information was obtained from patient, past medical records and Dr. Villa.      Consults  Assessment/Plan:     Encounter for palliative care  Impression:  Palliative care consulted for goals of care discussion/advanced care planning for this 46 y/o male being followed by [facility] Medicine, Hepatology, and Liver Transplant Surgery for Decompensated alcoholic cirrhosis with ascites, Hepatic encephalopathy, JENN, Portal hypertension, Hyponatremia, Coagulopathy, Thrombocytopenia, Macrocytic anemia, and Alcohol abuse. Liver transplant evaluation initiated. On rounds today patient found to be sleeping, easily aroused but drowsy. Oriented x 3 but inattentive and slow to respond to questions. Difficult to get patient to engage in meaningful conversation. Returned later to see if patient was more alert but he was off the floor.    Plan/Recommendations:  1. See goals of care discussion below.  2. Liver transplant evaluation initiated; continue current plan of care.  3. Palliative care will follow up.    Decompensated alcoholic cirrhosis with ascites/Hepatic encephalopathy/JENN/Portal hypertension/Hyponatremia/Coagulopathy/Thrombocytopenia/Macrocytic anemia/Alcohol abuse - management per primary team and other consultants    Goals of Care/Advance Care Planning:  Conference conducted with patient to discuss his current clinical status, goals of care, code status, long term expected outcomes and prognostic awareness. Patient is oriented x 3 but inattentive and slow to respond to questions. Attempted to have a discussion concerning the patients  values and wishes but it is difficult to get patient to engage in meaningful conversation at this time. He is able to state that his goal is to get a liver transplant.    Patient has no advanced directives on file in Epic and is currently a full code. Asked patient who he would want to make his medical decisions in the event he were unable to speak for himself; patient states that he would want his brother Didier to be his decision maker. Will follow up with patient regarding completion of advanced directives when he is more alert.         Thank you for your consult. I will follow-up with patient. Please contact us if you have any additional questions.    Subjective:     HPI:   Mr. Altman is a 46 y/o male with PMHx of alcohol abuse, alcoholic cirrhosis diagnosed one year ago and complicated by ascites, portal hypertension, hepatic encephalopathy, thrombocytopenia, and coagulopathy who presented to [facility] on 9[place]19 as a transfer from [facility] for management of acute decompensated cirrhosis. He was originally admitted to OSH on 9/17 for abdominal distention with pain and SOB. He was found to have decompensated cirrhosis, acute kidney injury, hyponatremia, thrombocytopenia, anemia, and metabolic acidosis. He was started on HRS therapy and empiric Meropenem and transferred to [facility] for further evaluation.    Patient states he used to drink 1-2 pints of vodka, whisky daily since age 16. He reports that he quit drinking in July of this year when he was admitted to hospital for alcoholic hepatitis. He had another admission 8/25-9/1 with decompensated cirrhosis complicated by peritonitis. He had a paracentesis on 9/15 with 1.7 L taken off, negative for infection. He has been taking Lactulose, Lasix, and Spironolactone.    Hospital Course:  No notes on file    Interval History: IR paracentesis attempted on 9/21 but no ascites identified for safe paracentesis. Patient is s/p RHC on 9/23. Liver transplant  evaluation initiated. On rounds today patient found to be sleeping, easily aroused but drowsy. Oriented x 3 but inattentive and slow to respond to questions. Difficult to get patient to engage in meaningful conversation. Returned later to see if patient was more alert but he was off the floor.    Past Medical History:   Diagnosis Date    Decompensated hepatic cirrhosis     Hypertension     SBP (spontaneous bacterial peritonitis)        Past Surgical History:   Procedure Laterality Date    RIGHT HEART CATHETERIZATION Right 9/23/2020    Procedure: INSERTION, CATHETER, RIGHT HEART;  Surgeon: Mikel Costa Jr., MD;  Location: [facility] CATH LAB;  Service: Cardiology;  Laterality: Right;    THORACENTESIS  2011       Review of patient's allergies indicates:   Allergen Reactions    Latex, natural rubber        Medications:  Continuous Infusions:   sodium chloride 0.9%       Scheduled Meds:   ciprofloxacin HCl  500 mg Oral Q24H    folic acid  1 mg Oral Daily    lactulose  30 g Oral TID    rifAXImin  550 mg Oral BID    thiamine  100 mg Oral Daily     PRN Meds:sodium chloride 0.9%, acetaminophen, albuterol-ipratropium, calcium carbonate, dextrose 50%, dextrose 50%, glucagon (human recombinant), glucose, glucose, hydrOXYzine HCL, ondansetron, oxyCODONE, sodium chloride 0.9%    Family History     Problem Relation (Age of Onset)    COPD Mother        Tobacco Use    Smoking status: Current Some Day Smoker    Smokeless tobacco: Current User   Substance and Sexual Activity    Alcohol use: Yes     Alcohol/week: 112.0 standard drinks     Types: 112 Shots of liquor per week     Comment: fifth of vodka a day. LAST DRINK 7/25/2020 per pt     Drug use: Yes     Frequency: 3.0 times per week     Types: Marijuana    Sexual activity: Yes       Review of Systems   Unable to perform ROS: Mental status change     Objective:     Vital Signs (Most Recent):  Temp: 97.9 °F (36.6 °C) (09/24/20 0845)  Pulse: 106 (09/24/20  0845)  Resp: 14 (09/24/20 0845)  BP: (!) 155/81 (09/24/20 0845)  SpO2: 98 % (09/24/20 0845) Vital Signs (24h Range):  Temp:  [97.6 °F (36.4 °C)-98.1 °F (36.7 °C)] 97.9 °F (36.6 °C)  Pulse:  [] 106  Resp:  [14-20] 14  SpO2:  [94 %-98 %] 98 %  BP: (136-155)/(66-81) 155/81     Weight: 118.8 kg (261 lb 14.5 oz)  Body mass index is 35.52 kg/m².    Physical Exam  Vitals signs and nursing note reviewed.   Constitutional:       General: He is not in acute distress.     Appearance: He is ill-appearing.   HENT:      Head: Normocephalic and atraumatic.   Eyes:      General: Scleral icterus present.      Extraocular Movements: Extraocular movements intact.   Neck:      Musculoskeletal: Normal range of motion and neck supple.   Cardiovascular:      Rate and Rhythm: Normal rate and regular rhythm.   Pulmonary:      Effort: Pulmonary effort is normal. No respiratory distress.   Abdominal:      General: There is distension.      Palpations: Abdomen is soft.   Musculoskeletal: Normal range of motion.         General: No deformity.   Skin:     General: Skin is warm and dry.   Neurological:      Mental Status: He is oriented to person, place, and time. He is lethargic.   Psychiatric:         Attention and Perception: He is inattentive.         Behavior: Behavior is slowed.         Review of Symptoms        Bowel Management Plan (BMP):  Yes      Comments:  Patient unable to participate in full ESAS or ROS at this time due to significant lethargy and slowed mentation          OME in 24 hours:  15    Performance Status:  50    ECOG Performance Status Grade:  3 - Confined to bed or chair 50% of waking hours    Living Arrangements:  Lives with friend    Psychosocial/Cultural: Patient is  and lives with his girlfriend. He has a 21 year old son. He was working as a  until the COVID-19 pandemic. His goal is to get a liver transplant.    Spiritual:  F - Carissa and Belief:  Orthodoxy  I - Importance:  TBD  C -  Community:  [facility]  A - Address in Care:  [facility]      Advance Care Planning   Advance Directives:   Living Will: No    LaPOST: No    Do Not Resuscitate Status: No    Medical Power of : No      Decision Making:  Patient answered questions         Significant Labs: All pertinent labs within the past 24 hours have been reviewed.  CBC:   Recent Labs   Lab 09/24/20  0546   WBC 9.25   HGB 8.4*   HCT 25.1*   *   PLT 49*     BMP:  Recent Labs   Lab 09/24/20  0546   GLU 85   *   K 4.5      CO2 18*   BUN 46*   CREATININE 1.8*   CALCIUM 9.2   MG 2.0     LFT:  Lab Results   Component Value Date    AST 82 (H) 09/24/2020    ALKPHOS 132 09/24/2020    BILITOT 26.0 (H) 09/24/2020     Albumin:   Albumin   Date Value Ref Range Status   09/24/2020 3.3 (L) 3.5 - 5.2 g/dL Final     Protein:   Total Protein   Date Value Ref Range Status   09/24/2020 5.1 (L) 6.0 - 8.4 g/dL Final     Lactic acid:   No results found for: LACTATE    Significant Imaging: I have reviewed all pertinent imaging results/findings within the past 24 hours.        Robyn Dixon NP  Palliative Medicine  [facility]

## 2020-09-25 NOTE — PT/OT/SLP PROGRESS
Physical Therapy      Patient Name:  Jordan Altman   MRN:  [id number]    Patient not seen today secondary to CT/MRI, Unavailable (Comment)(off floor due to paracentesis, MRI, ECHO). Initial evaluation attempted at 11:20 AM. Spoke with RN prior to session, and she said pt is going to be off the floor majority of the day due to paracentesis, MRI, and ECHO procedures. Will follow-up tomorrow, as appropriate.    Ling Momin, SPT   9/25/2020

## 2020-09-25 NOTE — H&P
Inpatient Radiology Pre-procedure Note    History of Present Illness:  Jordan Altman is a 45 y.o. male who presents for ultrasound guided paracentesis.  Admission H&P reviewed.  Past Medical History:   Diagnosis Date    Decompensated hepatic cirrhosis     Hypertension     SBP (spontaneous bacterial peritonitis)      Past Surgical History:   Procedure Laterality Date    RIGHT HEART CATHETERIZATION Right 9/23/2020    Procedure: INSERTION, CATHETER, RIGHT HEART;  Surgeon: Mikel Costa Jr., MD;  Location: [facility] CATH LAB;  Service: Cardiology;  Laterality: Right;    THORACENTESIS  2011       Review of Systems:   As documented in primary team H&P    Home Meds:   Prior to Admission medications    Medication Sig Start Date End Date Taking? Authorizing Provider   albuterol (PROVENTIL/VENTOLIN HFA) 90 mcg/actuation inhaler Inhale 2 puffs into the lungs every 6 (six) hours as needed for Wheezing or Shortness of Breath. Rescue   Yes Historical Provider   aspirin/acetaminophen (GOODY'S BODY PAIN ORAL) Take 1 Package by mouth daily as needed (pain).   Yes Historical Provider   cetirizine (ZYRTEC) 10 MG tablet Take 10 mg by mouth once daily.   Yes Historical Provider   ciprofloxacin HCl (CIPRO) 500 MG tablet Take 500 mg by mouth once daily.   Yes Historical Provider   ergocalciferol (VITAMIN D2) 50,000 unit Cap Take 50,000 Units by mouth every Monday.   Yes Historical Provider   famotidine (PEPCID) 20 MG tablet Take 20 mg by mouth once daily.   Yes Historical Provider   folic acid (FOLVITE) 1 MG tablet Take 1 mg by mouth once daily.   Yes Historical Provider   furosemide (LASIX) 40 MG tablet Take 40 mg by mouth once daily.   Yes Historical Provider   ibuprofen (ADVIL,MOTRIN) 200 MG tablet Take 400-800 mg by mouth every 6 (six) hours as needed for Pain.   Yes Historical Provider   lactulose (CHRONULAC) 10 gram/15 mL solution Take by mouth 3 (three) times daily.   Yes Historical Provider   polyethylene glycol  (GLYCOLAX) 17 gram/dose powder Take 17 g by mouth once daily.   Yes Historical Provider   spironolactone (ALDACTONE) 100 MG tablet Take 100 mg by mouth once daily.   Yes Historical Provider   thiamine 100 MG tablet Take 100 mg by mouth once daily.   Yes Historical Provider     Scheduled Meds:    ciprofloxacin HCl  500 mg Oral Q24H    folic acid  1 mg Oral Daily    lactulose  30 g Oral TID    rifAXImin  550 mg Oral BID    thiamine  100 mg Oral Daily     Continuous Infusions:    sodium chloride 0.9%       PRN Meds:sodium chloride 0.9%, acetaminophen, albuterol-ipratropium, calcium carbonate, dextrose 50%, dextrose 50%, glucagon (human recombinant), glucose, glucose, hydrOXYzine HCL, ondansetron, oxyCODONE, sodium chloride 0.9%  Anticoagulants/Antiplatelets: aspirin    Allergies:   Review of patient's allergies indicates:   Allergen Reactions    Latex, natural rubber      Sedation Hx: have not been any systemic reactions    Vitals:  Temp: 98.8 °F (37.1 °C) (09/25/20 0745)  Pulse: 104 (09/25/20 0745)  Resp: 20 (09/25/20 0825)  BP: 123/76 (09/25/20 0745)  SpO2: 95 % (09/25/20 0745)     Physical Exam:  ASA: 3  Mallampati: n/a    General: no acute distress  Mental Status: alert and oriented to person, place and time  HEENT: normocephalic, atraumatic  Chest: unlabored breathing  Heart: regular heart rate  Abdomen: nondistended  Extremity: moves all extremities    Plan: ultrasound guided paracentesis  Sedation Plan: local    Roxane Antonio PA-C  Interventional Radiology  Clinic [phone]

## 2020-09-25 NOTE — TELEPHONE ENCOUNTER
Pt called, POD4 from surgery stating that he almost out of oxycodone and his pain is not well controlled without it. Told patient I can send him 12 tablets to get him through the weekend since he just recently had his surgery. Advised to call clinic on Monday if any issues, and to use tylenol, robaxin and advil as best he can to control his pain in addition to the narcotic.    Yuriy Galarza MD  PGY-2 Orthopaedic Surgery

## 2020-09-25 NOTE — PLAN OF CARE
Problem: Adult Inpatient Plan of Care  Goal: Plan of Care Review  Outcome: Ongoing, Progressing  Goal: Patient-Specific Goal (Individualization)  Outcome: Ongoing, Progressing  Flowsheets (Taken 9/24/2020 1911)  Individualized Care Needs: stand by assist  Anxieties, Fears or Concerns: concerned about not getting beetter  Patient-Specific Goals (Include Timeframe): discharge  Goal: Optimal Comfort and Wellbeing  Outcome: Ongoing, Progressing  Intervention: Provide Person-Centered Care  Flowsheets (Taken 9/24/2020 1911)  Trust Relationship/Rapport:   care explained   choices provided   emotional support provided   empathic listening provided   questions answered   questions encouraged   thoughts/feelings acknowledged     Problem: Oral Intake Inadequate (Acute Kidney Injury/Impairment)  Goal: Optimal Nutrition Intake  Outcome: Ongoing, Progressing  Intervention: Promote and Optimize Nutrition  Flowsheets (Taken 9/24/2020 1911)  Oral Nutrition Promotion:   calorie dense foods provided   rest periods promoted   safe use of adaptive equipment encouraged   Pt oriented but delayed responses, VSS, reported pain to left knee,  PRN oxy given. Pt off floor for most of day. Pt went for stress test and x-ray of left knee. Results pending. Will continue to monitor.

## 2020-09-25 NOTE — PLAN OF CARE
Plan of care reviewed with patient. No acute changes overnight. PRN pain medication given as ordered. Safety maintained, call light in reach, bed in lowest position, will continue to monitor.

## 2020-09-25 NOTE — PROGRESS NOTES
Transplant Recipient Adult Psychosocial Assessment - Inpatient Evaluation (addendum)    Evaluation completed with patient and atient's significant other/caregiver, Valeria Tucker.     Jordan Altman  [address]  [place] [address]  Telephone Information:   Mobile [phone]   Mobile [phone]   Home  There is no home phone number on file.  Work  There is no work phone number on file.  E-mail  No e-mail address on record    Sex: male  YOB: 1975  Age: 45 y.o.    Encounter Date: 2020  U[place] Citizen: yes  Primary Language: English   Needed: no    Emergency Contact:  Name: Didier Page  Relationship: brother  Address: [place]  Phone Numbers:  [phone] (mobile)    Family/Social Support:   Number of dependents/: none  Marital history: patient  from wife about a year. Patient currently with significant other, Valeria Tucker.   Other family dynamics: Patient reported father is  and mother alive and well. Patient has two brother (Cornelia and Didier).     Household Composition:  Patient and significant other, Valeria, live in house together.     Do you and your caregivers have access to reliable transportation? yes     PRIMARY CAREGIVER: Valeria Tucker will be primary caregiver, phone number [phone].      provided in-depth information to patient and caregiver regarding pre- and post-transplant caregiver role.   strongly encourages patient and caregiver to have concrete plan regarding post-transplant care giving, including back-up caregiver(s) to ensure care giving needs are met as needed.    Patient and Caregiver states understanding all aspects of caregiver role/commitment and is able/willing/committed to being caregiver to the fullest extent necessary.    Patient and Caregiver verbalizes understanding of the education provided today and caregiver responsibilities.         remains available. Patient and  Caregiver agree to contact  in a timely manner if concerns arise.      Able to take time off work without financial concerns: yes. Caregiver reported that she has been working on saving funds in order to stay locally for about two months. Caregiver reports that patient's brother and additional family to step in when needed.    Additional Significant Others who will Assist with Transplant:  Name: Didier Page (ph#[phone])  Age: 40  City: [place] State: [place]  Relationship: brother  Does person drive? Yes  Caregiver confirmed. Caregiver will be available to assist as backup to allow for primary to return to work if necessary.     Name: Cornelia Page (ph#[phone])  Age: 38  City: [place] State: [place]  Relationship: brother  Does person drive? Yes    Living Will: no  Healthcare Power of : no  Advance Directives on file: <<no information> per medical record.  Verbally reviewed LW/HCPA information.   provided patient with copy of LW/HCPA documents and provided education on completion of forms.    Living Donors: N/A    Highest Education Level: Attended College/Technical School  Reading Ability: college  Reports difficulty with: comprehension, learning and memory due to ESLD  Learns Best By:  Combination of written, verbal, and demonstration     Status: no  VA Benefits: no     Working for Income: No  If no, reason not working: Demands of Treatment  Patient was employed as a .    Spouse/Significant Other Employment: works as a  with varying income.     Disabled: no. SW contacted [facility] via email to initial disability application.     Monthly Income:  Salary/Wages: $0, significant other salary unknown at this time. Caregiver reported saving income to support she and pt for about 2 to 3 months.     Able to afford all costs now and if transplanted, including medications: yes     Patient and Caregiver verbalizes understanding of personal responsibilities  related to transplant costs and the importance of having a financial plan to ensure that patients transplant costs are fully covered.      provided fundraising information/education.  Patient and Caregiver verbalizes understanding.   remains available.    Insurance:   Payor/Plan Subscr  Sex Relation Sub. Ins. ID Effective Group Num   1. MEDICAID - [place]* TANIYA CHAVEZ 1975 Male  [id number]* [id number]                                    [address]     Primary Insurance (for UNOS reporting): Public Insurance - Medicaid  Secondary Insurance (for UNOS reporting): None  Patient and Caregiver verbalizes clear understanding that patient may experience difficulty obtaining and/or be denied insurance coverage post-surgery. This includes and is not limited to disability insurance, life insurance, health insurance, burial insurance, long term care insurance, and other insurances.    Patient and Caregiver also reports understanding that future health concerns related to or unrelated to transplantation may not be covered by patient's insurance.  Resources and information provided and reviewed.      Patient and Caregiver provides verbal permission to release any necessary information to outside resources for patient care and discharge planning.  Resources and information provided are reviewed.      Dialysis Adherence:  Patient and Caregiver reports that patient does not require dialysis at this time.    Infusion Service: patient utilizing? no  Home Health: patient utilizing? no  DME: no  Pulmonary/Cardiac Rehab: none   ADLS:  Patient reported no difficulties with ADLS prior to hospitalization.    Adherence:   Patient with low compliance. Was informed of ESLD liver disease over a year ago and continued ETOH use.  Adherence education and counseling provided.     Per History Section:  Past Medical History:   Diagnosis Date    Decompensated hepatic cirrhosis     Hypertension     SBP (spontaneous  "bacterial peritonitis)      Social History     Tobacco Use    Smoking status: Current Some Day Smoker    Smokeless tobacco: Current User   Substance Use Topics    Alcohol use: Yes     Alcohol/week: 112.0 standard drinks     Types: 112 Shots of liquor per week     Comment: fifth of vodka a day. LAST DRINK 7/25/2020 per pt      Social History     Substance and Sexual Activity   Drug Use Yes    Frequency: 3.0 times per week    Types: Marijuana     Social History     Substance and Sexual Activity   Sexual Activity Yes       Per Today's Psychosocial:  Tobacco: none, patient denies any use.  Alcohol: Patient diagnosed with alcoholic cirrhosis a year ago. Patient continued ETOH use despite known liver disease. Patient reported last ETOH use 7/25/20. Patient reported prior to quitting he would drink a pint or more of whiskey a day. Patient reports completing rehab at [facility] in [place] when being informed of his diagnosis. He reports six to seven months of sobriety. Pt stated due to lack of work and pandemic her started to drink again. Patient interested in completing IOP and AA to assist with maintaining sobriety. Resources for IOP and AA provided to patient.    Illicit Drugs/Non-prescribed Medications: none, patient denies any use. Patient reported using THC in the past. Reports using it "months ago."    Patient and Caregiver states clear understanding of the potential impact of substance use as it relates to transplant candidacy and is aware of possible random substance screening.  Substance abstinence/cessation counseling, education and resources provided and reviewed.     Arrests/DWI/Treatment/Rehab: yes. Pt participated in rehab at [facility] in [place] in 2019. Pt sober for about 6months and relapse due to covid-19 and lack of employment.    Psychiatric History:    Mental Health: pt denies  Psychiatrist/Counselor: none  Medications: pt denies  Suicide/Homicide Issues: pt denies past or current SI/HI "   Safety at home: Pt denies having any past or present concerns regarding safety at home including but not limited to physical/emotional/sexual abuse, neglect, or exploitation.    Knowledge: Patient and Caregiver states having clear understanding and realistic expectations regarding the potential risks and potential benefits of organ transplantation and organ donation, agrees to discuss with health care team members and support system members and to utilize available resources and express questions and/or concerns in order to further facilitate the pt informed decision-making.  Resources and information provided and reviewed.     Patient and Caregiver is aware of [facility]'s affiliation and/or partnership with agencies in home health care, LTAC, SNF, [facility], and other hospitals and clinics.    Understanding: Patient and Caregiver reports having a clear understanding of the many lifetime commitments involved with being a transplant recipient, including costs, compliance, medications, lab work, procedures, appointments, concrete and financial planning, preparedness, timely and appropriate communication of concerns, abstinence (ETOH, tobacco, illicit non-prescribed drugs), adherence to all health care team recommendations, support system and caregiver involvement, appropriate and timely resource utilization and follow-through, mental health counseling as needed/recommended, and patient and caregiver responsibilities.  Social Service Handbook, resources and detailed educational information provided and reviewed.  Educational information provided.    Patient and Caregiver also reports current and expected compliance with health care regime and states having a clear understanding of the importance of compliance.      Patient and Caregiver reports a clear understanding that risks and benefits may be involved with organ transplantation and with organ donation.      Patient and Caregiver also reports clear understanding that  psychosocial risk factors may affect patient, and include but are not limited to feelings of depression, generalized anxiety, anxiety regarding dependence on others, post traumatic stress disorder, feelings of guilt and other emotional and/or mental concerns, and/or exacerbation of existing mental health concerns.  Detailed resources provided and discussed.     Patient and Caregiver agrees to access appropriate resources in a timely manner as needed and/or as recommended, and to communicate concerns appropriately.  Patient and Caregiver also reports a clear understanding of treatment options available.      reviewed education, provided additional information, and answered questions.    Feelings or Concerns: pt denied having any feelings or concerns regarding transplant    Coping: Identify Patient & Caregiver Strategies to [place]:   1. Currently & Pre-transplant - pt reports that he likes to watch television or talk with family to assist with coping.   2. At the time of surgery - pt reports that he likes to watch television or talk with family to assist with coping.   3. During post-Transplant & Recovery Period - pt reports that he likes to watch television or talk with family to assist with coping.      Goals: patient reported that he would like to gain his health back.  Patient referred to Vocational Rehabilitation.    Interview Behavior: Patient and Caregiver presents as alert and oriented x 4, good eye contact, recall good, concentration/judgement good, average intelligence, calm, communicative, cooperative and asking and answering questions appropriately.          Transplant Social Work - Candidacy  Assessment/Plan:     Psychosocial Suitability:  Based on psychosocial risk factors, patient presents as high risk, due to recent ETOH use . As previously stated, patient diagnosed with alcoholic cirrhosis a year ago. Patient continued ETOH use despite known liver disease. Patient reported last ETOH use  7/25/20. Patient reports completing rehab at [facility] in [place] when being informed of his diagnosis. He reports about six to seven months of sobriety. Pt stated due to lack of work and pandemic he relapsed. Patient agreeable to completing IOP and AA to assist with maintaining sobriety. Resources for IOP and AA provided to patient and his caregiver. Lastly, patient with no income at this time. Significant other and family assist with patient's financial needs. Disability application to be initiated with [facility]. SW emailed [facility] to inform them of referral.    Protective factors include: suitable and established caregiver plan, pt's insight into needing treatment, motivation for lifetime sobriety, pt's transparency about his past substance abuse history, adequate insurance, and willingness to enroll in substance abuse treatment with a provider/    Recommendations/Additional Comments:   -addiction psychiatry evaluation and follow recomendations  -once patient well enough, enrollment in substance abuse program with pt's agreement to sign a VIVIENNE. Resources provided.  -serial PETH and toxicology screens due to extensive history  -local lodging  -fundraising  -complete disability application    Gisele Flower LMSW

## 2020-09-26 PROBLEM — M25.562 LEFT KNEE PAIN: Status: ACTIVE | Noted: 2020-09-26

## 2020-09-26 LAB
ALBUMIN SERPL BCP-MCNC: 3.2 G/DL (ref 3.5–5.2)
ALP SERPL-CCNC: 134 U/L (ref 55–135)
ALT SERPL W/O P-5'-P-CCNC: 36 U/L (ref 10–44)
ANION GAP SERPL CALC-SCNC: 12 MMOL/L (ref 8–16)
AST SERPL-CCNC: 80 U/L (ref 10–40)
BASOPHILS # BLD AUTO: 0.07 K/UL (ref 0–0.2)
BASOPHILS NFR BLD: 0.6 % (ref 0–1.9)
BILIRUB SERPL-MCNC: 26.9 MG/DL (ref 0.1–1)
BUN SERPL-MCNC: 42 MG/DL (ref 6–20)
CALCIUM SERPL-MCNC: 9.5 MG/DL (ref 8.7–10.5)
CHLORIDE SERPL-SCNC: 108 MMOL/L (ref 95–110)
CO2 SERPL-SCNC: 16 MMOL/L (ref 23–29)
CREAT SERPL-MCNC: 2 MG/DL (ref 0.5–1.4)
DIFFERENTIAL METHOD: ABNORMAL
EOSINOPHIL # BLD AUTO: 0.1 K/UL (ref 0–0.5)
EOSINOPHIL NFR BLD: 1.1 % (ref 0–8)
ERYTHROCYTE [DISTWIDTH] IN BLOOD BY AUTOMATED COUNT: 18.3 % (ref 11.5–14.5)
EST. GFR  (AFRICAN AMERICAN): 44.6 ML/MIN/1.73 M^2
EST. GFR  (NON AFRICAN AMERICAN): 38.6 ML/MIN/1.73 M^2
GLUCOSE SERPL-MCNC: 110 MG/DL (ref 70–110)
HCT VFR BLD AUTO: 25.1 % (ref 40–54)
HGB BLD-MCNC: 8.3 G/DL (ref 14–18)
IMM GRANULOCYTES # BLD AUTO: 0.12 K/UL (ref 0–0.04)
IMM GRANULOCYTES NFR BLD AUTO: 1 % (ref 0–0.5)
INR PPP: 2.3 (ref 0.8–1.2)
LYMPHOCYTES # BLD AUTO: 0.8 K/UL (ref 1–4.8)
LYMPHOCYTES NFR BLD: 6.8 % (ref 18–48)
MAGNESIUM SERPL-MCNC: 2.2 MG/DL (ref 1.6–2.6)
MCH RBC QN AUTO: 34.4 PG (ref 27–31)
MCHC RBC AUTO-ENTMCNC: 33.1 G/DL (ref 32–36)
MCV RBC AUTO: 104 FL (ref 82–98)
MONOCYTES # BLD AUTO: 1.3 K/UL (ref 0.3–1)
MONOCYTES NFR BLD: 11.3 % (ref 4–15)
NEUTROPHILS # BLD AUTO: 9.1 K/UL (ref 1.8–7.7)
NEUTROPHILS NFR BLD: 79.2 % (ref 38–73)
NRBC BLD-RTO: 0 /100 WBC
PATH INTERP FLD-IMP: NORMAL
PHOSPHATE SERPL-MCNC: 3.5 MG/DL (ref 2.7–4.5)
PLATELET # BLD AUTO: 54 K/UL (ref 150–350)
PMV BLD AUTO: 10.7 FL (ref 9.2–12.9)
POTASSIUM SERPL-SCNC: 4.3 MMOL/L (ref 3.5–5.1)
PROT SERPL-MCNC: 5.2 G/DL (ref 6–8.4)
PROTHROMBIN TIME: 24.8 SEC (ref 9–12.5)
RBC # BLD AUTO: 2.41 M/UL (ref 4.6–6.2)
SODIUM SERPL-SCNC: 136 MMOL/L (ref 136–145)
STRONGYLOIDES ANTIBODY IGG: NEGATIVE
WBC # BLD AUTO: 11.49 K/UL (ref 3.9–12.7)

## 2020-09-26 PROCEDURE — 84100 ASSAY OF PHOSPHORUS: CPT | Mod: NTX

## 2020-09-26 PROCEDURE — 99232 SBSQ HOSP IP/OBS MODERATE 35: CPT | Mod: NTX,,, | Performed by: ORTHOPAEDIC SURGERY

## 2020-09-26 PROCEDURE — 83735 ASSAY OF MAGNESIUM: CPT | Mod: NTX

## 2020-09-26 PROCEDURE — 99233 PR SUBSEQUENT HOSPITAL CARE,LEVL III: ICD-10-PCS | Mod: NTX,,, | Performed by: HOSPITALIST

## 2020-09-26 PROCEDURE — 80053 COMPREHEN METABOLIC PANEL: CPT | Mod: NTX

## 2020-09-26 PROCEDURE — 20600001 HC STEP DOWN PRIVATE ROOM: Mod: NTX

## 2020-09-26 PROCEDURE — 99233 SBSQ HOSP IP/OBS HIGH 50: CPT | Mod: NTX,,, | Performed by: HOSPITALIST

## 2020-09-26 PROCEDURE — 99232 PR SUBSEQUENT HOSPITAL CARE,LEVL II: ICD-10-PCS | Mod: NTX,,, | Performed by: ORTHOPAEDIC SURGERY

## 2020-09-26 PROCEDURE — 85610 PROTHROMBIN TIME: CPT | Mod: NTX

## 2020-09-26 PROCEDURE — P9047 ALBUMIN (HUMAN), 25%, 50ML: HCPCS | Mod: JG,NTX | Performed by: HOSPITALIST

## 2020-09-26 PROCEDURE — 25000003 PHARM REV CODE 250: Mod: NTX | Performed by: HOSPITALIST

## 2020-09-26 PROCEDURE — 85025 COMPLETE CBC W/AUTO DIFF WBC: CPT | Mod: NTX

## 2020-09-26 PROCEDURE — 63600175 PHARM REV CODE 636 W HCPCS: Mod: JG,NTX | Performed by: HOSPITALIST

## 2020-09-26 RX ORDER — ALBUMIN HUMAN 250 G/1000ML
25 SOLUTION INTRAVENOUS 3 TIMES DAILY
Status: COMPLETED | OUTPATIENT
Start: 2020-09-26 | End: 2020-09-26

## 2020-09-26 RX ORDER — ACETAMINOPHEN 500 MG
1000 TABLET ORAL 3 TIMES DAILY
Status: DISCONTINUED | OUTPATIENT
Start: 2020-09-26 | End: 2020-09-27

## 2020-09-26 RX ORDER — LACTULOSE 10 G/15ML
45 SOLUTION ORAL EVERY 6 HOURS
Status: DISCONTINUED | OUTPATIENT
Start: 2020-09-26 | End: 2020-09-28

## 2020-09-26 RX ORDER — LACTULOSE 10 G/15ML
200 SOLUTION ORAL; RECTAL ONCE
Status: COMPLETED | OUTPATIENT
Start: 2020-09-26 | End: 2020-09-26

## 2020-09-26 RX ORDER — OXYCODONE HYDROCHLORIDE 5 MG/1
5 TABLET ORAL EVERY 4 HOURS PRN
Status: DISCONTINUED | OUTPATIENT
Start: 2020-09-26 | End: 2020-10-02 | Stop reason: HOSPADM

## 2020-09-26 RX ORDER — TRAMADOL HYDROCHLORIDE 50 MG/1
50 TABLET ORAL EVERY 6 HOURS PRN
Status: DISCONTINUED | OUTPATIENT
Start: 2020-09-26 | End: 2020-10-02 | Stop reason: HOSPADM

## 2020-09-26 RX ADMIN — OXYCODONE 5 MG: 5 TABLET ORAL at 05:09

## 2020-09-26 RX ADMIN — ACETAMINOPHEN 1000 MG: 500 TABLET ORAL at 03:09

## 2020-09-26 RX ADMIN — LACTULOSE 200 G: 10 SOLUTION ORAL at 12:09

## 2020-09-26 RX ADMIN — CIPROFLOXACIN 500 MG: 500 TABLET, FILM COATED ORAL at 10:09

## 2020-09-26 RX ADMIN — LACTULOSE 45 G: 20 SOLUTION ORAL at 05:09

## 2020-09-26 RX ADMIN — DOCUSATE SODIUM 100 MG: 100 CAPSULE, LIQUID FILLED ORAL at 09:09

## 2020-09-26 RX ADMIN — RIFAXIMIN 550 MG: 550 TABLET ORAL at 08:09

## 2020-09-26 RX ADMIN — ACETAMINOPHEN 1000 MG: 500 TABLET ORAL at 08:09

## 2020-09-26 RX ADMIN — POLYETHYLENE GLYCOL 3350 17 G: 17 POWDER, FOR SOLUTION ORAL at 08:09

## 2020-09-26 RX ADMIN — OXYCODONE 5 MG: 5 TABLET ORAL at 12:09

## 2020-09-26 RX ADMIN — RIFAXIMIN 550 MG: 550 TABLET ORAL at 09:09

## 2020-09-26 RX ADMIN — ZINC SULFATE 220 MG (50 MG) CAPSULE 220 MG: CAPSULE at 09:09

## 2020-09-26 RX ADMIN — LACTULOSE 45 G: 20 SOLUTION ORAL at 09:09

## 2020-09-26 RX ADMIN — TRAMADOL HYDROCHLORIDE 50 MG: 50 TABLET, FILM COATED ORAL at 05:09

## 2020-09-26 RX ADMIN — LACTULOSE 45 G: 20 SOLUTION ORAL at 03:09

## 2020-09-26 RX ADMIN — HYDROXYZINE HYDROCHLORIDE 25 MG: 25 TABLET, FILM COATED ORAL at 10:09

## 2020-09-26 RX ADMIN — ALBUMIN (HUMAN) 25 G: 12.5 SOLUTION INTRAVENOUS at 03:09

## 2020-09-26 RX ADMIN — ALBUMIN (HUMAN) 25 G: 12.5 SOLUTION INTRAVENOUS at 09:09

## 2020-09-26 RX ADMIN — CALCIUM CARBONATE (ANTACID) CHEW TAB 500 MG 500 MG: 500 CHEW TAB at 10:09

## 2020-09-26 RX ADMIN — POLYETHYLENE GLYCOL 3350 17 G: 17 POWDER, FOR SOLUTION ORAL at 09:09

## 2020-09-26 RX ADMIN — HYDROXYZINE HYDROCHLORIDE 25 MG: 25 TABLET, FILM COATED ORAL at 05:09

## 2020-09-26 RX ADMIN — OXYCODONE 5 MG: 5 TABLET ORAL at 09:09

## 2020-09-26 RX ADMIN — Medication 100 MG: at 09:09

## 2020-09-26 RX ADMIN — FOLIC ACID 1 MG: 1 TABLET ORAL at 09:09

## 2020-09-26 NOTE — PROGRESS NOTES
Progress Note  Hospital Medicine  [facility]       Patient Name: Jordan Altman  MRN:  [id number]  Hospital Medicine Team: [facility] HOSP MED L Willie Villa MD  Date of Admission:  9/19/2020     Length of Stay:  LOS: 6 days   Expected Discharge Date: 9/29/2020  Principal Problem:  Decompensated hepatic cirrhosis     Subjective:     Interval History/Overnight Events:  Patient is complaining of severe left knee pain, there is swelling, decreased ROM and 10/10, left knee x ray negative, orthopedics consulted for further evaluation, ESR, CRP and uric acid ordered;  - patient went for nuclear stress which was negative for ischemia   - MRI abdomen pending; under going liver evaluation   - patient has not had a BM today and only 1 yesterday, lactulose increased and colace added; needs to have 4 BMS     acetaminophen  1,000 mg Oral TID    ciprofloxacin HCl  500 mg Oral Q24H    diclofenac sodium  4 g Topical (Top) TID    docusate sodium  100 mg Oral Daily    folic acid  1 mg Oral Daily    lactulose  45 g Oral TID    rifAXImin  550 mg Oral BID    thiamine  100 mg Oral Daily        sodium chloride 0.9%         sodium chloride 0.9%, acetaminophen, albuterol-ipratropium, calcium carbonate, dextrose 50%, dextrose 50%, glucagon (human recombinant), glucose, glucose, HYDROmorphone, hydrOXYzine HCL, ondansetron, oxyCODONE, sodium chloride 0.9%    Review of Systems   Constitutional: Negative for chills, fatigue, fever.   HENT: Negative for sore throat, trouble swallowing.    Eyes: Negative for photophobia, visual disturbance.   Respiratory: Negative for cough, shortness of breath.    Cardiovascular: Negative for chest pain, palpitations, leg swelling.   Gastrointestinal: Negative for abdominal pain, constipation, diarrhea, nausea, vomiting.   Endocrine: Negative for cold intolerance, heat intolerance.   Genitourinary: Negative for dysuria, frequency.   Musculoskeletal: Negative for arthralgias,  myalgias.   Skin: Negative for rash, wound, erythema   Neurological: Negative for dizziness, syncope, weakness, light-headedness.   Psychiatric/Behavioral: Negative for confusion, hallucinations, anxiety  All other systems reviewed and are negative.    Objective:     Temp:  [98.1 °F (36.7 °C)-98.8 °F (37.1 °C)]   Pulse:  []   Resp:  [12-20]   BP: (104-132)/(56-76)   SpO2:  [95 %-97 %]         Weight change: 0 kg (0 lb)   Body mass index is 35.43 kg/m².       Physical Exam:  Constitutional:chronically ill looking,  non-distressed, not diaphoretic.   HENT: NC/AT, external ears normal  Eyes: PERRL, EOMI, conjunctiva normal, no discharge b/l, +scleral icterus   Neck: normal ROM, supple  CV: RRR, no m/r/g, no carotid bruits, +2 peripheral pulses.  Pulmonary/Chest wall: Breathing comfortably w/o distress, CTAB, no w/r/r, no crackles.  Decreased breath sounds at lung bases  GI: Soft, non-tender, (+) BS, (+) BM   +distended  Obese   Musculoskeletal: Normal ROM, no atrophy,  no pitting edema in LE bilaterally   Neurological: AAO x 4, no focal deficits noted   +asterixis   Skin: warm, dry   +pallor, jaundice, + spider angiomas, +bruising   Multiple tattoos   Psych: normal mood and affect, normal behavior, thought content and judgement.    Labs:    Chemistries:   Recent Labs   Lab 09/20/20  0501  09/22/20  0538 09/23/20  0351 09/24/20  0546 09/25/20  0431   *   < > 131* 130* 132* 135*   K 4.2   < > 4.3 4.5 4.5 4.2      < > 102 105 104 106   CO2 20*   < > 16* 16* 18* 14*   BUN 58*   < > 36* 41* 46* 42*   CREATININE 2.2*   < > 1.7* 1.6* 1.8* 1.6*   CALCIUM 8.4*   < > 9.0 8.6* 9.2 9.5   PROT 4.9*   < > 5.6* 4.8* 5.1* 5.3*   BILITOT 24.0*   < > 24.6* 22.8* 26.0* 26.1*   ALKPHOS 115   < > 143* 123 132 134   ALT 33   < > 39 33 38 36   AST 74*   < > 81* 75* 82* 75*   MG 2.3  --  2.1  --  2.0  --    PHOS 2.7  --  2.5*  --  3.3  --     < > = values in this interval not displayed.        WBC:   Recent Labs   Lab  09/21/20  0516 09/22/20  0538 09/23/20  0352 09/24/20  0546 09/25/20  0431   WBC 7.04 8.05 8.99 9.25 9.26     Bands:     CBC/Anemia Labs: Coags:    Recent Labs   Lab 09/19/20  0139  09/23/20  0352 09/24/20  0546 09/25/20  0431   WBC 9.17   < > 8.99 9.25 9.26   HGB 9.0*   < > 7.9* 8.4* 9.0*   HCT 26.0*   < > 23.8* 25.1* 26.8*   PLT 42*   < > 46* 49* 55*   *   < > 104* 104* 104*   RDW 16.9*   < > 17.5* 17.8* 18.0*   FERRITIN 2,907*  --   --   --   --    [id number] >2000*  --   --   --   --     < > = values in this interval not displayed.    Recent Labs   Lab 09/23/20  0352 09/24/20  0546 09/25/20  0431   INR 2.5* 2.3* 2.3*          Assessment and Plan     Hospital Course:    Mr. Jordan lAtman was admitted to Hospital Medicine for management of  Decompensated hepatic cirrhosis     Active Hospital Problems    Diagnosis  POA    *Decompensated hepatic cirrhosis [K72.90]  Yes    Encounter for palliative care [Z51.5]  Not Applicable    Counseling regarding advanced care planning and goals of care [Z71.89]  Not Applicable    Pulmonary HTN [I27.20]  Yes     PA pressure 73 on echo 9/19/20      Alcohol use disorder, severe, dependence [F10.20]  Yes     Chronic    Encounter for pre-transplant evaluation for liver transplant [Z01.818]  Not Applicable    Macrocytic anemia [D53.9]  Yes    DIC (disseminated intravascular coagulation) [D65]  Yes    Hypertension [I10]  Yes    Hx of SBP (spontaneous bacterial peritonitis) [K65.2]  Yes    Ascites due to alcoholic cirrhosis [K70.31]  Yes    Thrombocytopenia [D69.6]  Yes    Coagulopathy [D68.9]  Yes    JENN (acute kidney injury) [N17.9]  Yes    Portal hypertension [K76.6]  Yes    Chronic Hepatic encephalopathy [K72.90]  Yes    Hyponatremia [E87.1]  Yes      Resolved Hospital Problems   No resolved problems to display.       Decompensated alcoholic cirrhosis with Ascites   Hx of SBP     MELD-Na score: 33 at 9/25/2020  4:31 AM  MELD score: 33 at 9/25/2020  4:31  AM  Calculated from:  Serum Creatinine: 1.6 mg/dL at 9/25/2020  4:31 AM  Serum Sodium: 135 mmol/L at 9/25/2020  4:31 AM  Total Bilirubin: 26.1 mg/dL at 9/25/2020  4:31 AM  INR(ratio): 2.3 at 9/25/2020  4:31 AM  Age: 45 years 4 months     -No signs of active bleeding or infection   -he is afebrile and has no leukocytosis   -cx negative   -US liver with doppler- reviewed   -he is awake, conversant and has no asterixis on exam   -vitamin K for coagulopathy   -pending PET   -hepatology following  - para on 9/21 - IR paracentesis attempted but insufficient ascites identified for safe paracentesis  - PO cipro daily for SBP ppx given hx of SBP  - psychiatry consulted - high risk   - liver transplant evaluation started on 09/30/2020 after right heart catheterization showed acceptable PA pressures  - nuclear stress test negative for ischemia    Acute on chronic Hepatic encephalopathy   - increasing lactulose and adding miralax and cont rifaximin and add zinc      JENN   -concern for iATN from volume depletion vs hepatorenal syndrome (HRS)    -hold home diuretics (lasix, spironolactone)  -will hold off midodrine for now given SBP ~120  -reviewed UA, urine sodium, renal US   -monitor renal function closely and avoid nephrotoxic agents  -consider nephrology consult if does not improve with above   - improving  Cr down to 1.8--> 1.6     Portal hypertension  - PA pressures of 73 on echo from 9/19.  CVP 3.  No significant volume overload  -s/p RHC on 9/12 -with favorable results, with PA pressure acceptable for liver transplant.      Hyponatremia    -likely due to decompensated cirrhosis and diuretic use  -hold diuretics   -monitor closely with daily labs   - IV albumin and IV NS   - improving appropriately - sodium 130     Alcohol abuse   -completed rehab in the past but relapsed   -quit drinking July of this year and committed to stay sober  -thiamine, folic acid daily   -check UDS   -addiction psychiatry consult      Coagulopathy  Thrombocytopenia   Macrocytic anemia  - 2/2 liver disease  - stable  Monitor     Diet:  Low Na with fluid restriction   GI PPx:  PO PPI  DVT PPx:  SCDs due to coagulopathy   Goals of Care:  Full     High Risk Conditions:  Decompensated cirrhosis      Disposition:    TBD- undergoing OLT testing

## 2020-09-26 NOTE — PROGRESS NOTES
Progress Note  Hospital Medicine  [facility], [place]       Patient Name: Jordan Altman  MRN:  [id number]  Hospital Medicine Team: [facility] [place] [facility] Willie Villa MD  Date of Admission:  9/19/2020     Length of Stay:  LOS: 7 days   Expected Discharge Date: 9/29/2020  Principal Problem:  Left knee pain     Subjective:     Interval History/Overnight Events:  Patient still encephalopathic this morning, only 1 BM yesterday, lactulose enema ordered and had a moderate BM, goal is to have 4 BMs daily, lactulose increased to q6, miralax 17 BID and narcotics stopped   - patient's MRI abdomen shows new filling defect in CBD and 14 mm, will need EUS/ERCP on Monday; under going liver transplant evaluation and plan for presentation Wednesday;   - discussed MELD score with patient today and what his pertains to him     acetaminophen  1,000 mg Oral TID    ciprofloxacin HCl  500 mg Oral Q24H    docusate sodium  100 mg Oral Daily    folic acid  1 mg Oral Daily    lactulose  45 g Oral TID    polyethylene glycol  17 g Oral BID    rifAXImin  550 mg Oral BID    thiamine  100 mg Oral Daily    zinc sulfate  220 mg Oral Daily        sodium chloride 0.9%         sodium chloride 0.9%, acetaminophen, albuterol-ipratropium, calcium carbonate, dextrose 50%, dextrose 50%, glucagon (human recombinant), glucose, glucose, hydrOXYzine HCL, ondansetron, oxyCODONE, sodium chloride 0.9%, traMADoL    Review of Systems   Constitutional: Negative for chills, fatigue, fever.   HENT: Negative for sore throat, trouble swallowing.    Eyes: Negative for photophobia, visual disturbance.   Respiratory: Negative for cough, shortness of breath.    Cardiovascular: Negative for chest pain, palpitations, leg swelling.   Gastrointestinal: Negative for abdominal pain, constipation, diarrhea, nausea, vomiting.   Endocrine: Negative for cold intolerance, heat intolerance.   Genitourinary: Negative for dysuria, frequency.    Musculoskeletal: Negative for arthralgias, myalgias.   Skin: Negative for rash, wound, erythema   Neurological: Negative for dizziness, syncope, weakness, light-headedness.   Psychiatric/Behavioral: Negative for confusion, hallucinations, anxiety  All other systems reviewed and are negative.    Objective:     Temp:  [97.9 °F (36.6 °C)-98.4 °F (36.9 °C)]   Pulse:  [91-98]   Resp:  [11-20]   BP: (119-134)/(73-80)   SpO2:  [93 %-99 %]         Weight change:    Body mass index is 35.43 kg/m².       Physical Exam:  Constitutional:chronically ill looking,  non-distressed, not diaphoretic.   HENT: NC/AT, external ears normal  Eyes: PERRL, EOMI, conjunctiva normal, no discharge b/l, +scleral icterus   Neck: normal ROM, supple  CV: RRR, no m/r/g, no carotid bruits, +2 peripheral pulses.  Pulmonary/Chest wall: Breathing comfortably w/o distress, CTAB, no w/r/r, no crackles.  Decreased breath sounds at lung bases  GI: Soft, non-tender, (+) BS, (+) BM   +distended  Obese   Musculoskeletal: Normal ROM, no atrophy,  no pitting edema in LE bilaterally   Neurological: AAO x 4, no focal deficits noted   +asterixis   Skin: warm, dry   +pallor, jaundice, + spider angiomas, +bruising   Multiple tattoos   Psych: normal mood and affect, normal behavior, thought content and judgement.    Labs:    Chemistries:   Recent Labs   Lab 09/22/20  0538  09/24/20  0546 09/25/20  0431 09/26/20  0342   *   < > 132* 135* 136   K 4.3   < > 4.5 4.2 4.3      < > 104 106 108   CO2 16*   < > 18* 14* 16*   BUN 36*   < > 46* 42* 42*   CREATININE 1.7*   < > 1.8* 1.6* 2.0*   CALCIUM 9.0   < > 9.2 9.5 9.5   PROT 5.6*   < > 5.1* 5.3* 5.2*   BILITOT 24.6*   < > 26.0* 26.1* 26.9*   ALKPHOS 143*   < > 132 134 134   ALT 39   < > 38 36 36   AST 81*   < > 82* 75* 80*   MG 2.1  --  2.0  --  2.2   PHOS 2.5*  --  3.3  --  3.5    < > = values in this interval not displayed.        WBC:   Recent Labs   Lab 09/22/20  0538 09/23/20  0352 09/24/20  0546  09/25/20  0431 09/26/20  0342   WBC 8.05 8.99 9.25 9.26 11.49     Bands:     CBC/Anemia Labs: Coags:    Recent Labs   Lab 09/24/20  0546 09/25/20  0431 09/26/20  0342   WBC 9.25 9.26 11.49   HGB 8.4* 9.0* 8.3*   HCT 25.1* 26.8* 25.1*   PLT 49* 55* 54*   * 104* 104*   RDW 17.8* 18.0* 18.3*    Recent Labs   Lab 09/24/20  0546 09/25/20  0431 09/26/20  0342   INR 2.3* 2.3* 2.3*          Assessment and Plan     Hospital Course:    Mr. Jordan Altman was admitted to Hospital Medicine for management of  Left knee pain     Active Hospital Problems    Diagnosis  POA    *Left knee pain [M25.562]  Yes    Encounter for palliative care [Z51.5]  Not Applicable    Counseling regarding advanced care planning and goals of care [Z71.89]  Not Applicable    Pulmonary HTN [I27.20]  Yes     PA pressure 73 on echo 9/19/20      Alcohol use disorder, severe, dependence [F10.20]  Yes     Chronic    Encounter for pre-transplant evaluation for liver transplant [Z01.818]  Not Applicable    Macrocytic anemia [D53.9]  Yes    DIC (disseminated intravascular coagulation) [D65]  Yes    Hypertension [I10]  Yes    Hx of SBP (spontaneous bacterial peritonitis) [K65.2]  Yes    Decompensated hepatic cirrhosis [K72.90]  Yes    Ascites due to alcoholic cirrhosis [K70.31]  Yes    Thrombocytopenia [D69.6]  Yes    Coagulopathy [D68.9]  Yes    JENN (acute kidney injury) [N17.9]  Yes    Portal hypertension [K76.6]  Yes    Chronic Hepatic encephalopathy [K72.90]  Yes    Hyponatremia [E87.1]  Yes      Resolved Hospital Problems   No resolved problems to display.       Decompensated alcoholic cirrhosis with Ascites   Hx of SBP     MELD-Na score: 35 at 9/26/2020  3:42 AM  MELD score: 35 at 9/26/2020  3:42 AM  Calculated from:  Serum Creatinine: 2.0 mg/dL at 9/26/2020  3:42 AM  Serum Sodium: 136 mmol/L at 9/26/2020  3:42 AM  Total Bilirubin: 26.9 mg/dL at 9/26/2020  3:42 AM  INR(ratio): 2.3 at 9/26/2020  3:42 AM  Age: 45 years 4  months     -No signs of active bleeding or infection   -he is afebrile and has no leukocytosis   -cx negative   -US liver with doppler- reviewed   -he is awake, conversant and has no asterixis on exam   -vitamin K for coagulopathy   -pending PET   -hepatology following  - para on 9/21 - IR paracentesis attempted but insufficient ascites identified for safe paracentesis  - PO cipro daily for SBP ppx given hx of SBP  - psychiatry consulted - high risk   - liver transplant evaluation started on 09/30/2020 after right heart catheterization showed acceptable PA pressures  - nuclear stress test negative for ischemia  - MRI abdomen shows new filling defect; on masses    Acute on chronic Hepatic encephalopathy   - increasing lactulose and adding miralax and cont rifaximin and add zinc   - gave 1 lactulose enema today with moderate result    # Choledocholithiasis   - new filling defect on MRI ab, plan for AES consult Monday for EUS/ERCP     JENN   -concern for iATN from volume depletion vs hepatorenal syndrome (HRS)    -hold home diuretics (lasix, spironolactone)  -will hold off midodrine for now given SBP ~120  -reviewed UA, urine sodium, renal US   -monitor renal function closely and avoid nephrotoxic agents  -consider nephrology consult if does not improve with above   - Cr rising today, giving 2 doses of albumin      Portal hypertension  - PA pressures of 73 on echo from 9/19.  CVP 3.  No significant volume overload  -s/p RHC on 9/12 -with favorable results, with PA pressure acceptable for liver transplant.      Hyponatremia    -likely due to decompensated cirrhosis and diuretic use  -hold diuretics   -monitor closely with daily labs   - IV albumin and IV NS   - improving appropriately - sodium 130     Alcohol abuse   -completed rehab in the past but relapsed   -quit drinking July of this year and committed to stay sober  -thiamine, folic acid daily   -check UDS   -addiction psychiatry consult      Coagulopathy  Thrombocytopenia   Macrocytic anemia  - 2/2 liver disease  - stable  Monitor     Diet:  Low Na with fluid restriction   GI PPx:  PO PPI  DVT PPx:  SCDs due to coagulopathy   Goals of Care:  Full     High Risk Conditions:  Decompensated cirrhosis      Disposition:    TBD- undergoing OLT testing ; presentation Wednesday

## 2020-09-26 NOTE — ASSESSMENT & PLAN NOTE
Jordan Altman is a 45 y.o. male with decompensated hepatic cirrhosis with L knee pain. Orthopaedics consulted to r/o septic L knee. The pain does appear to be chronic and intermittent. He does not have a significant joint effusion, erythema, or warmth about his L knee particularly concerning for septic arthritis. His CRP is 32.1 and ESR is <2. However, given the patient's hx of decompensated cirrhosis, decision was made to aspirate the L knee joint to r/o septic arthritis. The results are shown below and not concerning for septic arthritis.    -WBAT to LLE  -No acute orthopaedic intervention indicated at this time  -We will continue to follow cultures.    Procedure Note: Left knee aspiration  Patient was explained risks, benefits, and alternatives to treatment and verbalized consent to proceed. Time out was performed and patient name, , site, and procedure were confirmed. Skin was sterilely prepared with alcohol solution. 18 gauge needle on a 60 cc syringe was used for aspiration through superolateral approach. 5 cc of straw colored fluid collected. Fluid and cultures were obtained and sent for analysis. Aspiration site was covered with a bandaid.    Joint Fluid Analysis:  WBC: 138  Segs: 90%  Crystals: negative  Gram Stain: negative  Cultures pending

## 2020-09-26 NOTE — SUBJECTIVE & OBJECTIVE
Past Medical History:   Diagnosis Date    Decompensated hepatic cirrhosis     Hypertension     SBP (spontaneous bacterial peritonitis)        Past Surgical History:   Procedure Laterality Date    RIGHT HEART CATHETERIZATION Right 9/23/2020    Procedure: INSERTION, CATHETER, RIGHT HEART;  Surgeon: Mikel Costa Jr., MD;  Location: [facility] CATH LAB;  Service: Cardiology;  Laterality: Right;    THORACENTESIS  2011       Review of patient's allergies indicates:   Allergen Reactions    Latex, natural rubber        Current Facility-Administered Medications   Medication    0.9%  NaCl infusion    acetaminophen tablet 1,000 mg    acetaminophen tablet 500 mg    albuterol-ipratropium 2.5 mg-0.5 mg/3 mL nebulizer solution 3 mL    calcium carbonate 200 mg calcium (500 mg) chewable tablet 500 mg    ciprofloxacin HCl tablet 500 mg    dextrose 50% injection 12.5 g    dextrose 50% injection 25 g    diclofenac sodium 1 % gel 4 g    docusate sodium capsule 100 mg    folic acid tablet 1 mg    glucagon (human recombinant) injection 1 mg    glucose chewable tablet 16 g    glucose chewable tablet 24 g    HYDROmorphone injection 1 mg    hydrOXYzine HCL tablet 25 mg    lactulose 20 gram/30 mL solution Soln 45 g    ondansetron injection 4 mg    oxyCODONE immediate release tablet 5 mg    polyethylene glycol packet 17 g    rifAXIMin tablet 550 mg    sodium chloride 0.9% flush 10 mL    thiamine tablet 100 mg    [START ON 9/26/2020] zinc sulfate capsule 220 mg     Family History     Problem Relation (Age of Onset)    COPD Mother        Tobacco Use    Smoking status: Current Some Day Smoker    Smokeless tobacco: Current User   Substance and Sexual Activity    Alcohol use: Yes     Alcohol/week: 112.0 standard drinks     Types: 112 Shots of liquor per week     Comment: fifth of vodka a day. LAST DRINK 7/25/2020 per pt     Drug use: Yes     Frequency: 3.0 times per week     Types: Marijuana    Sexual activity:  Yes     ROSper primary  Objective:     Vital Signs (Most Recent):  Temp: 97.9 °F (36.6 °C) (09/25/20 2040)  Pulse: 95 (09/25/20 2040)  Resp: 18 (09/25/20 2040)  BP: 130/80 (09/25/20 2040)  SpO2: 97 % (09/25/20 2040) Vital Signs (24h Range):  Temp:  [97.9 °F (36.6 °C)-98.8 °F (37.1 °C)] 97.9 °F (36.6 °C)  Pulse:  [] 95  Resp:  [12-20] 18  SpO2:  [95 %-97 %] 97 %  BP: (104-132)/(56-80) 130/80     Weight: 118.5 kg (261 lb 3.9 oz)  Height: 6' (182.9 cm)  Body mass index is 35.43 kg/m².      Intake/Output Summary (Last 24 hours) at 9/25/2020 3554  Last data filed at 9/25/2020 1800  Gross per 24 hour   Intake --   Output 1800 ml   Net -1800 ml       Ortho/SPM Exam   PE:  Gen:  No acute distress  CV:  Peripherally well-perfused.    Lungs:  Normal respiratory effort.  Head/Neck:  Normocephalic.  Atraumatic.     LLE:  Skin intact, multiple tattoos present around knee  No obvious joint effusion present  TTP about lateral aspect of L knee  Compartments soft  FROM of R knee (0-120) with mild pain through ROM  SILT Sa/Maxwell/DP/SP/T  Motor intact EHL/FHL/TA/Gastroc  2+ DP, 2+ PT      All other joints (shoulder/elbow/wrist/hip/knee/ankle) were examined and had full ROM and were non-tender to palpation.      Significant Labs:   CRP:   Recent Labs   Lab 09/25/20  1612   CRP 32.5*     All pertinent labs within the past 24 hours have been reviewed.    Significant Imaging: I have reviewed all pertinent imaging results/findings.XR of R knee showing no acute fractures or abnormalities

## 2020-09-26 NOTE — PLAN OF CARE
Problem: Adult Inpatient Plan of Care  Goal: Plan of Care Review  Outcome: Ongoing, Progressing  Goal: Patient-Specific Goal (Individualization)  Outcome: Ongoing, Progressing  Flowsheets (Taken 9/26/2020 1725)  Individualized Care Needs: need more BMs. Lactulose dose adjusted  Anxieties, Fears or Concerns: pain  Patient-Specific Goals (Include Timeframe): discharge  Goal: Optimal Comfort and Wellbeing  Outcome: Ongoing, Progressing  Intervention: Provide Person-Centered Care  Flowsheets (Taken 9/26/2020 1727)  Trust Relationship/Rapport:   care explained   choices provided   emotional support provided   empathic listening provided   questions answered   questions encouraged   thoughts/feelings acknowledged     Problem: Fall Injury Risk  Goal: Absence of Fall and Fall-Related Injury  Outcome: Ongoing, Progressing  Intervention: Promote Injury-Free Environment  Flowsheets (Taken 9/26/2020 1727)  Safety Promotion/Fall Prevention:   assistive device/personal item within reach   bed alarm set   side rails raised x 2   medications reviewed  Environmental Safety Modification:   assistive device/personal items within reach   clutter free environment maintained   lighting adjusted     Problem: Pain Chronic (Persistent)  Goal: Acceptable Pain Control and Functional Ability  Outcome: Ongoing, Progressing  Intervention: Develop Pain Management Plan  Flowsheets (Taken 9/26/2020 1727)  Pain Management Interventions:   awakened for pain meds per patient request   care clustered   pain management plan reviewed with patient/caregiver   pillow support provided   position adjusted     Pt AAOx4, VSS, reports pain to left knee, controlled with PRN pain meds. Pt given lactulose enema today. Pt only had 1 BM. Lactulose dose frequency adjusted. Plan of care reviewed with pt. Will continue to monitor.

## 2020-09-26 NOTE — PLAN OF CARE
Problem: Fall Injury Risk  Goal: Absence of Fall and Fall-Related Injury  Outcome: Ongoing, Progressing  Intervention: Identify and Manage Contributors to Fall Injury Risk  Flowsheets (Taken 9/26/2020 0459)  Self-Care Promotion:   independence encouraged   BADL personal objects within reach   BADL personal routines maintained   safe use of adaptive equipment encouraged  Medication Review/Management: medications reviewed  Intervention: Promote Injury-Free Environment  Flowsheets (Taken 9/26/2020 0459)  Safety Promotion/Fall Prevention:   commode/urinal/bedpan at bedside   nonskid shoes/socks when out of bed   side rails raised x 3   assistive device/personal item within reach   bed alarm set  Environmental Safety Modification:   assistive device/personal items within reach   clutter free environment maintained     Problem: Pain Chronic (Persistent)  Goal: Acceptable Pain Control and Functional Ability  Outcome: Ongoing, Progressing  Intervention: Develop Pain Management Plan  Flowsheets (Taken 9/26/2020 0459)  Pain Management Interventions:   awakened for pain meds per patient request   care clustered   pain management plan reviewed with patient/caregiver  Intervention: Manage Persistent Pain  Flowsheets (Taken 9/26/2020 0459)  Sleep/Rest Enhancement:   noise level reduced   regular sleep/rest pattern promoted   relaxation techniques promoted  Bowel Elimination Promotion:   adequate fluid intake promoted   commode/bedpan at bedside   ambulation promoted   privacy promoted  Medication Review/Management: medications reviewed  Intervention: Optimize Psychosocial Wellbeing  Flowsheets (Taken 9/26/2020 0459)  Supportive Measures:   active listening utilized   self-care encouraged  Diversional Activities: television   POC discussed with patient. AAOx4 VSS. No acute changes overnight. Pt c/o pain and given Oxycodone for pain. Moderate relief obtained. WCTM
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
## 2020-09-27 LAB
ALBUMIN SERPL BCP-MCNC: 3.2 G/DL (ref 3.5–5.2)
ALP SERPL-CCNC: 117 U/L (ref 55–135)
ALT SERPL W/O P-5'-P-CCNC: 30 U/L (ref 10–44)
ANION GAP SERPL CALC-SCNC: 15 MMOL/L (ref 8–16)
AST SERPL-CCNC: 74 U/L (ref 10–40)
BASOPHILS # BLD AUTO: 0.06 K/UL (ref 0–0.2)
BASOPHILS NFR BLD: 0.6 % (ref 0–1.9)
BILIRUB SERPL-MCNC: 24.2 MG/DL (ref 0.1–1)
BUN SERPL-MCNC: 43 MG/DL (ref 6–20)
CALCIUM SERPL-MCNC: 9.1 MG/DL (ref 8.7–10.5)
CHLORIDE SERPL-SCNC: 108 MMOL/L (ref 95–110)
CO2 SERPL-SCNC: 12 MMOL/L (ref 23–29)
CREAT SERPL-MCNC: 2 MG/DL (ref 0.5–1.4)
DIFFERENTIAL METHOD: ABNORMAL
EOSINOPHIL # BLD AUTO: 0.2 K/UL (ref 0–0.5)
EOSINOPHIL NFR BLD: 2.2 % (ref 0–8)
ERYTHROCYTE [DISTWIDTH] IN BLOOD BY AUTOMATED COUNT: 18 % (ref 11.5–14.5)
EST. GFR  (AFRICAN AMERICAN): 44.6 ML/MIN/1.73 M^2
EST. GFR  (NON AFRICAN AMERICAN): 38.6 ML/MIN/1.73 M^2
GLUCOSE SERPL-MCNC: 101 MG/DL (ref 70–110)
HCT VFR BLD AUTO: 24.5 % (ref 40–54)
HGB BLD-MCNC: 8.1 G/DL (ref 14–18)
IMM GRANULOCYTES # BLD AUTO: 0.07 K/UL (ref 0–0.04)
IMM GRANULOCYTES NFR BLD AUTO: 0.7 % (ref 0–0.5)
INR PPP: 2.5 (ref 0.8–1.2)
LYMPHOCYTES # BLD AUTO: 0.7 K/UL (ref 1–4.8)
LYMPHOCYTES NFR BLD: 7.3 % (ref 18–48)
MCH RBC QN AUTO: 34.3 PG (ref 27–31)
MCHC RBC AUTO-ENTMCNC: 33.1 G/DL (ref 32–36)
MCV RBC AUTO: 104 FL (ref 82–98)
MONOCYTES # BLD AUTO: 0.8 K/UL (ref 0.3–1)
MONOCYTES NFR BLD: 8.6 % (ref 4–15)
NEUTROPHILS # BLD AUTO: 7.6 K/UL (ref 1.8–7.7)
NEUTROPHILS NFR BLD: 80.6 % (ref 38–73)
NRBC BLD-RTO: 0 /100 WBC
PLATELET # BLD AUTO: 42 K/UL (ref 150–350)
PMV BLD AUTO: 11.6 FL (ref 9.2–12.9)
POTASSIUM SERPL-SCNC: 3.7 MMOL/L (ref 3.5–5.1)
PROT SERPL-MCNC: 4.9 G/DL (ref 6–8.4)
PROTHROMBIN TIME: 26.4 SEC (ref 9–12.5)
RBC # BLD AUTO: 2.36 M/UL (ref 4.6–6.2)
SARS-COV-2 RNA RESP QL NAA+PROBE: NOT DETECTED
SODIUM SERPL-SCNC: 135 MMOL/L (ref 136–145)
WBC # BLD AUTO: 9.46 K/UL (ref 3.9–12.7)

## 2020-09-27 PROCEDURE — 99233 SBSQ HOSP IP/OBS HIGH 50: CPT | Mod: NTX,,, | Performed by: HOSPITALIST

## 2020-09-27 PROCEDURE — 80053 COMPREHEN METABOLIC PANEL: CPT | Mod: NTX

## 2020-09-27 PROCEDURE — 94761 N-INVAS EAR/PLS OXIMETRY MLT: CPT | Mod: NTX

## 2020-09-27 PROCEDURE — 99233 PR SUBSEQUENT HOSPITAL CARE,LEVL III: ICD-10-PCS | Mod: NTX,,, | Performed by: HOSPITALIST

## 2020-09-27 PROCEDURE — 36415 COLL VENOUS BLD VENIPUNCTURE: CPT | Mod: NTX

## 2020-09-27 PROCEDURE — 25000003 PHARM REV CODE 250: Mod: NTX | Performed by: HOSPITALIST

## 2020-09-27 PROCEDURE — 20600001 HC STEP DOWN PRIVATE ROOM: Mod: NTX

## 2020-09-27 PROCEDURE — U0003 INFECTIOUS AGENT DETECTION BY NUCLEIC ACID (DNA OR RNA); SEVERE ACUTE RESPIRATORY SYNDROME CORONAVIRUS 2 (SARS-COV-2) (CORONAVIRUS DISEASE [COVID-19]), AMPLIFIED PROBE TECHNIQUE, MAKING USE OF HIGH THROUGHPUT TECHNOLOGIES AS DESCRIBED BY CMS-2020-01-R: HCPCS | Mod: NTX

## 2020-09-27 PROCEDURE — 94640 AIRWAY INHALATION TREATMENT: CPT | Mod: NTX

## 2020-09-27 PROCEDURE — 97161 PT EVAL LOW COMPLEX 20 MIN: CPT | Mod: NTX

## 2020-09-27 PROCEDURE — 25000242 PHARM REV CODE 250 ALT 637 W/ HCPCS: Mod: NTX | Performed by: HOSPITALIST

## 2020-09-27 PROCEDURE — 85025 COMPLETE CBC W/AUTO DIFF WBC: CPT | Mod: NTX

## 2020-09-27 PROCEDURE — 85610 PROTHROMBIN TIME: CPT | Mod: NTX

## 2020-09-27 RX ORDER — ACETAMINOPHEN 500 MG
1000 TABLET ORAL 2 TIMES DAILY
Status: DISCONTINUED | OUTPATIENT
Start: 2020-09-28 | End: 2020-09-29

## 2020-09-27 RX ADMIN — CALCIUM CARBONATE (ANTACID) CHEW TAB 500 MG 500 MG: 500 CHEW TAB at 09:09

## 2020-09-27 RX ADMIN — TRAMADOL HYDROCHLORIDE 50 MG: 50 TABLET, FILM COATED ORAL at 08:09

## 2020-09-27 RX ADMIN — DOCUSATE SODIUM 100 MG: 100 CAPSULE, LIQUID FILLED ORAL at 08:09

## 2020-09-27 RX ADMIN — RIFAXIMIN 550 MG: 550 TABLET ORAL at 08:09

## 2020-09-27 RX ADMIN — ZINC SULFATE 220 MG (50 MG) CAPSULE 220 MG: CAPSULE at 08:09

## 2020-09-27 RX ADMIN — RIFAXIMIN 550 MG: 550 TABLET ORAL at 10:09

## 2020-09-27 RX ADMIN — Medication 100 MG: at 08:09

## 2020-09-27 RX ADMIN — FOLIC ACID 1 MG: 1 TABLET ORAL at 08:09

## 2020-09-27 RX ADMIN — ACETAMINOPHEN 1000 MG: 500 TABLET ORAL at 08:09

## 2020-09-27 RX ADMIN — IPRATROPIUM BROMIDE AND ALBUTEROL SULFATE 3 ML: .5; 3 SOLUTION RESPIRATORY (INHALATION) at 09:09

## 2020-09-27 RX ADMIN — LACTULOSE 45 G: 20 SOLUTION ORAL at 06:09

## 2020-09-27 RX ADMIN — CIPROFLOXACIN 500 MG: 500 TABLET, FILM COATED ORAL at 08:09

## 2020-09-27 NOTE — PLAN OF CARE
Problem: Physical Therapy Goal  Goal: Physical Therapy Goal  Description: Goals to be met by: 10/7/20     Patient will increase functional independence with mobility by performin. Sit to stand transfer with [place]  2. Gait  x 100 feet with Modified [place] using LRAD, if needed.     Outcome: Ongoing, Progressing   Initial evaluation completed. Patient tolerated assessment well. Established POC and goals. Patient would continue to benefit from skilled PT services in order to improve functional mobility independence.       Lamar Vaca, PT, DPT  2020

## 2020-09-27 NOTE — PT/OT/SLP EVAL
Physical Therapy Evaluation    Patient Name:  Jordan Altman   MRN:  [id number]    Recommendations:     Discharge Recommendations:  home   Discharge Equipment Recommendations: none   Barriers to discharge: None    Assessment:     Jordan Altman is a 45 y.o. male admitted with a medical diagnosis of Left knee pain.  He presents with the following impairments/functional limitations:  weakness, impaired endurance, impaired functional mobilty, gait instability, impaired balance, decreased lower extremity function, pain . Patient tolerated session well. He reports L knee pain and would open to trial a SPC upon next visit.  Patient will benefit from PT services to address the mentioned deficits in order to promote an improve functional mobility status. Upon d/c, he is an appropriate candidate to return home.     Rehab Prognosis: Good; patient would benefit from acute skilled PT services to address these deficits and reach maximum level of function.    Recent Surgery: Procedure(s) (LRB):  INSERTION, CATHETER, RIGHT HEART (Right) 4 Days Post-Op    Plan:     During this hospitalization, patient to be seen 3 x/week to address the identified rehab impairments via gait training, therapeutic activities, therapeutic exercises, neuromuscular re-education and progress toward the following goals:    · Plan of Care Expires:  10/27/20    History:     Past Medical History:   Diagnosis Date    Decompensated hepatic cirrhosis     Hypertension     SBP (spontaneous bacterial peritonitis)        Past Surgical History:   Procedure Laterality Date    RIGHT HEART CATHETERIZATION Right 9/23/2020    Procedure: INSERTION, CATHETER, RIGHT HEART;  Surgeon: Mikel Costa Jr., MD;  Location: [facility] CATH LAB;  Service: Cardiology;  Laterality: Right;    THORACENTESIS  2011       Subjective     Chief Complaint: none   Patient/Family Comments/goals: to go home   Pain/Comfort:  · Pain Rating 1: 0/10  · Pain Rating Post-Intervention 1:  0/10    Patients cultural, spiritual, Hoahaoism conflicts given the current situation: no    Living Environment:  Patient's living environment is as follows:  Home type apt   1 or 2 stories  1st story    Number of YAN/ rails 0 YAN   AD used?/Owned?  none   Bathroom set-up  Tub/shower   Working? Yes -     Driving? yes   Individuals living with patient:  Girlfriend    Hobbies/Roles: None stated    Prior to admission, patients level of function was (I) for ADLs and mobility.  Equipment used at home: none.  DME owned (not currently used): none.  Upon discharge, patient will have assistance from girlfriend.    Objective:     Communicated with RN prior to session.  Patient found right sidelying with telemetry, peripheral IV  upon PT entry to room. See below for detailed functional assessment. Patient agreeable to participate in initial evaluation.    General Precautions: Standard, fall   Orthopedic Precautions:N/A   Braces: N/A     Exams:  · Cognitive Exam:  Patient is oriented to Person, Place, Time and Situation  · Fine Motor Coordination:  Test:  Intact Impaired  Comments    Rapid ankle DF/PF  X     · Postural Exam:  Patient presented with the following abnormalities:    · -       Rounded shoulders  · -       Forward head  · Sensation:    LEFT LE: -       Intact  light/touch LE RIGHT LE:-       Intact  light/touch LE      ROM and Strength   Right Lower Extremity  Left Lower Extremity    Hip Flexion WFL WFL   Knee Extension  WFL WFL   Knee Flexion  WFL WFL   Ankle DF WFL WFL   Ankle PF  WFL WFL     Functional Mobility:  · Bed Mobility:     · Scooting: independence  · Supine to Sit: independence  · Transfers:     · Sit to Stand:  supervision with no AD  · Toilet Transfer: supervision with  grab bars  using  Step Transfer  · Gait: 15ftx2 with SBA and no AD  ·  antalgic gait 2* L knee pain  ·  decreased LLE stance phase and foot flat at initial contact  ·  mildly unsteady, no LOB; decreased patricio    · Balance: sitting EOB - (I); static standing - SBA; dynamic standing - SBA      Therapeutic Activities and Exercises:  -Patient educated on the role and goal of PT services during acute care LOS. Question and concerns acknowledged and answered as appropriate.   -Will continue to educated as needed.    -White board updated in patients room to reflect level of assistance needed with nursing.     AM-PAC 6 CLICK MOBILITY  Total Score:20     Patient left sitting EOB  with all lines intact, call button in reach and RN notified.  White board updated in patient room to reflect level of function with nursing.     GOALS:   Multidisciplinary Problems     Physical Therapy Goals        Problem: Physical Therapy Goal    Goal Priority Disciplines Outcome Goal Variances Interventions   Physical Therapy Goal     PT, PT/OT Ongoing, Progressing     Description: Goals to be met by: 10/7/20     Patient will increase functional independence with mobility by performin. Sit to stand transfer with [place]  2. Gait  x 100 feet with Modified [place] using LRAD, if needed.                      Time Tracking:     PT Received On: 20  PT Start Time: 1140     PT Stop Time: 1150  PT Total Time (min): 10 min     Billable Minutes: Evaluation 10min      Lamar Vaca, PT  2020

## 2020-09-28 ENCOUNTER — DOCUMENTATION ONLY (OUTPATIENT)
Dept: TRANSPLANT | Facility: CLINIC | Age: 45
End: 2020-09-28

## 2020-09-28 LAB
ALBUMIN SERPL BCP-MCNC: 3.1 G/DL (ref 3.5–5.2)
ALP SERPL-CCNC: 157 U/L (ref 55–135)
ALT SERPL W/O P-5'-P-CCNC: 35 U/L (ref 10–44)
ANION GAP SERPL CALC-SCNC: 9 MMOL/L (ref 8–16)
AST SERPL-CCNC: 85 U/L (ref 10–40)
BACTERIA SPEC AEROBE CULT: NO GROWTH
BASOPHILS # BLD AUTO: 0.05 K/UL (ref 0–0.2)
BASOPHILS NFR BLD: 0.4 % (ref 0–1.9)
BILIRUB SERPL-MCNC: 24 MG/DL (ref 0.1–1)
BLD PROD TYP BPU: NORMAL
BLOOD UNIT EXPIRATION DATE: NORMAL
BLOOD UNIT TYPE CODE: 6200
BLOOD UNIT TYPE: NORMAL
BUN SERPL-MCNC: 46 MG/DL (ref 6–20)
CALCIUM SERPL-MCNC: 9.3 MG/DL (ref 8.7–10.5)
CHLORIDE SERPL-SCNC: 109 MMOL/L (ref 95–110)
CMV IGG SERPL QL IA: NORMAL
CO2 SERPL-SCNC: 17 MMOL/L (ref 23–29)
CODING SYSTEM: NORMAL
CREAT SERPL-MCNC: 2 MG/DL (ref 0.5–1.4)
DIFFERENTIAL METHOD: ABNORMAL
DISPENSE STATUS: NORMAL
EOSINOPHIL # BLD AUTO: 0.2 K/UL (ref 0–0.5)
EOSINOPHIL NFR BLD: 1.7 % (ref 0–8)
ERYTHROCYTE [DISTWIDTH] IN BLOOD BY AUTOMATED COUNT: 17.6 % (ref 11.5–14.5)
EST. GFR  (AFRICAN AMERICAN): 44.6 ML/MIN/1.73 M^2
EST. GFR  (NON AFRICAN AMERICAN): 38.6 ML/MIN/1.73 M^2
FIBRINOGEN PPP-MCNC: 116 MG/DL (ref 182–366)
GLUCOSE SERPL-MCNC: 73 MG/DL (ref 70–110)
HCT VFR BLD AUTO: 25.3 % (ref 40–54)
HGB BLD-MCNC: 8.4 G/DL (ref 14–18)
IMM GRANULOCYTES # BLD AUTO: 0.1 K/UL (ref 0–0.04)
IMM GRANULOCYTES NFR BLD AUTO: 0.8 % (ref 0–0.5)
INR PPP: 2.7 (ref 0.8–1.2)
LYMPHOCYTES # BLD AUTO: 0.9 K/UL (ref 1–4.8)
LYMPHOCYTES NFR BLD: 7.2 % (ref 18–48)
MAGNESIUM SERPL-MCNC: 2 MG/DL (ref 1.6–2.6)
MCH RBC QN AUTO: 34.1 PG (ref 27–31)
MCHC RBC AUTO-ENTMCNC: 33.2 G/DL (ref 32–36)
MCV RBC AUTO: 103 FL (ref 82–98)
MONOCYTES # BLD AUTO: 1.1 K/UL (ref 0.3–1)
MONOCYTES NFR BLD: 8.8 % (ref 4–15)
NEUTROPHILS # BLD AUTO: 9.6 K/UL (ref 1.8–7.7)
NEUTROPHILS NFR BLD: 81.1 % (ref 38–73)
NRBC BLD-RTO: 0 /100 WBC
PHOSPHATE SERPL-MCNC: 4.2 MG/DL (ref 2.7–4.5)
PLATELET # BLD AUTO: 53 K/UL (ref 150–350)
PMV BLD AUTO: 11.2 FL (ref 9.2–12.9)
POTASSIUM SERPL-SCNC: 3.9 MMOL/L (ref 3.5–5.1)
PROT SERPL-MCNC: 5.1 G/DL (ref 6–8.4)
PROTHROMBIN TIME: 29 SEC (ref 9–12.5)
RBC # BLD AUTO: 2.46 M/UL (ref 4.6–6.2)
SODIUM SERPL-SCNC: 135 MMOL/L (ref 136–145)
UNIT NUMBER: NORMAL
WBC # BLD AUTO: 11.87 K/UL (ref 3.9–12.7)

## 2020-09-28 PROCEDURE — 99233 PR SUBSEQUENT HOSPITAL CARE,LEVL III: ICD-10-PCS | Mod: NTX,,, | Performed by: HOSPITALIST

## 2020-09-28 PROCEDURE — 86965 POOLING BLOOD PLATELETS: CPT | Mod: NTX

## 2020-09-28 PROCEDURE — 85384 FIBRINOGEN ACTIVITY: CPT | Mod: NTX

## 2020-09-28 PROCEDURE — 80053 COMPREHEN METABOLIC PANEL: CPT | Mod: NTX

## 2020-09-28 PROCEDURE — 99233 SBSQ HOSP IP/OBS HIGH 50: CPT | Mod: NTX,,, | Performed by: HOSPITALIST

## 2020-09-28 PROCEDURE — 20600001 HC STEP DOWN PRIVATE ROOM: Mod: NTX

## 2020-09-28 PROCEDURE — 25000003 PHARM REV CODE 250: Mod: NTX | Performed by: HOSPITALIST

## 2020-09-28 PROCEDURE — 86481 TB AG RESPONSE T-CELL SUSP: CPT | Mod: NTX

## 2020-09-28 PROCEDURE — 85025 COMPLETE CBC W/AUTO DIFF WBC: CPT | Mod: NTX

## 2020-09-28 PROCEDURE — 99499 UNLISTED E&M SERVICE: CPT | Mod: NTX,,, | Performed by: NURSE PRACTITIONER

## 2020-09-28 PROCEDURE — 99499 NO LOS: ICD-10-PCS | Mod: NTX,,, | Performed by: NURSE PRACTITIONER

## 2020-09-28 PROCEDURE — 36415 COLL VENOUS BLD VENIPUNCTURE: CPT | Mod: NTX

## 2020-09-28 PROCEDURE — P9012 CRYOPRECIPITATE EACH UNIT: HCPCS | Mod: NTX

## 2020-09-28 PROCEDURE — 85610 PROTHROMBIN TIME: CPT | Mod: NTX

## 2020-09-28 PROCEDURE — 84100 ASSAY OF PHOSPHORUS: CPT | Mod: NTX

## 2020-09-28 PROCEDURE — 83735 ASSAY OF MAGNESIUM: CPT | Mod: NTX

## 2020-09-28 PROCEDURE — 36430 TRANSFUSION BLD/BLD COMPNT: CPT | Mod: NTX

## 2020-09-28 RX ORDER — HYDROCODONE BITARTRATE AND ACETAMINOPHEN 500; 5 MG/1; MG/1
TABLET ORAL
Status: DISCONTINUED | OUTPATIENT
Start: 2020-09-28 | End: 2020-09-29

## 2020-09-28 RX ORDER — SODIUM CHLORIDE 0.9 % (FLUSH) 0.9 %
10 SYRINGE (ML) INJECTION
Status: DISCONTINUED | OUTPATIENT
Start: 2020-09-28 | End: 2020-09-29

## 2020-09-28 RX ORDER — LACTULOSE 10 G/15ML
30 SOLUTION ORAL 3 TIMES DAILY
Status: DISCONTINUED | OUTPATIENT
Start: 2020-09-28 | End: 2020-09-30

## 2020-09-28 RX ADMIN — RIFAXIMIN 550 MG: 550 TABLET ORAL at 09:09

## 2020-09-28 RX ADMIN — ACETAMINOPHEN 1000 MG: 500 TABLET ORAL at 09:09

## 2020-09-28 RX ADMIN — ZINC SULFATE 220 MG (50 MG) CAPSULE 220 MG: CAPSULE at 08:09

## 2020-09-28 RX ADMIN — RIFAXIMIN 550 MG: 550 TABLET ORAL at 08:09

## 2020-09-28 RX ADMIN — Medication 100 MG: at 08:09

## 2020-09-28 RX ADMIN — POLYETHYLENE GLYCOL 3350 17 G: 17 POWDER, FOR SOLUTION ORAL at 08:09

## 2020-09-28 RX ADMIN — POLYETHYLENE GLYCOL 3350 17 G: 17 POWDER, FOR SOLUTION ORAL at 09:09

## 2020-09-28 RX ADMIN — DOCUSATE SODIUM 100 MG: 100 CAPSULE, LIQUID FILLED ORAL at 08:09

## 2020-09-28 RX ADMIN — ACETAMINOPHEN 1000 MG: 500 TABLET ORAL at 08:09

## 2020-09-28 RX ADMIN — LACTULOSE 30 G: 20 SOLUTION ORAL at 09:09

## 2020-09-28 RX ADMIN — CIPROFLOXACIN 500 MG: 500 TABLET, FILM COATED ORAL at 08:09

## 2020-09-28 RX ADMIN — LACTULOSE 30 G: 20 SOLUTION ORAL at 04:09

## 2020-09-28 RX ADMIN — FOLIC ACID 1 MG: 1 TABLET ORAL at 08:09

## 2020-09-28 NOTE — PROGRESS NOTES
Progress Note  Hospital Medicine  [facility], [place]       Patient Name: Jordan Altman  MRN:  [id number]  [place] [facility] [place] [place] Willie Villa MD  Date of Admission:  9/19/2020     Length of Stay:  LOS: 8 days   Expected Discharge Date: 9/29/2020  Principal Problem:  Decompensated hepatic cirrhosis     Subjective:     Interval History/Overnight Events:  Patient doing better today, had 6 BMs over night, HE finally improved; planning for possible EUS/ERCP for what is seen on MRI and impacted CBD stone, no evidence of cholangitis currently; otherwise ID consult in AM and plan for presentation Wednesday      [START ON 9/28/2020] acetaminophen  1,000 mg Oral BID    ciprofloxacin HCl  500 mg Oral Q24H    docusate sodium  100 mg Oral Daily    folic acid  1 mg Oral Daily    lactulose  45 g Oral Q6H    polyethylene glycol  17 g Oral BID    rifAXImin  550 mg Oral BID    thiamine  100 mg Oral Daily    zinc sulfate  220 mg Oral Daily        sodium chloride 0.9%         sodium chloride 0.9%, acetaminophen, albuterol-ipratropium, calcium carbonate, dextrose 50%, dextrose 50%, glucagon (human recombinant), glucose, glucose, hydrOXYzine HCL, ondansetron, oxyCODONE, sodium chloride 0.9%, traMADoL    Review of Systems   Constitutional: Negative for chills, fatigue, fever.   HENT: Negative for sore throat, trouble swallowing.    Eyes: Negative for photophobia, visual disturbance.   Respiratory: Negative for cough, shortness of breath.    Cardiovascular: Negative for chest pain, palpitations, leg swelling.   Gastrointestinal: Negative for abdominal pain, constipation, diarrhea, nausea, vomiting.   Endocrine: Negative for cold intolerance, heat intolerance.   Genitourinary: Negative for dysuria, frequency.   Musculoskeletal: Negative for arthralgias, myalgias.   Skin: Negative for rash, wound, erythema   Neurological: Negative for dizziness, syncope, weakness, light-headedness.    Psychiatric/Behavioral: Negative for confusion, hallucinations, anxiety  All other systems reviewed and are negative.    Objective:     Temp:  [97.3 °F (36.3 °C)-98.2 °F (36.8 °C)]   Pulse:  []   Resp:  [12-21]   BP: (109-137)/(62-78)   SpO2:  [93 %-97 %]         Weight change:    Body mass index is 35.43 kg/m².       Physical Exam:  Constitutional:chronically ill looking,  non-distressed, not diaphoretic.   HENT: NC/AT, external ears normal  Eyes: PERRL, EOMI, conjunctiva normal, no discharge b/l, +scleral icterus   Neck: normal ROM, supple  CV: RRR, no m/r/g, no carotid bruits, +2 peripheral pulses.  Pulmonary/Chest wall: Breathing comfortably w/o distress, CTAB, no w/r/r, no crackles.  Decreased breath sounds at lung bases  GI: Soft, non-tender, (+) BS, (+) BM   +distended  Obese   Musculoskeletal: Normal ROM, no atrophy,  no pitting edema in LE bilaterally   Neurological: AAO x 4, no focal deficits noted   +asterixis   Skin: warm, dry   +pallor, jaundice, + spider angiomas, +bruising   Multiple tattoos   Psych: normal mood and affect, normal behavior, thought content and judgement.    Labs:    Chemistries:   Recent Labs   Lab 09/22/20  0538  09/24/20  0546 09/25/20  0431 09/26/20  0342 09/27/20  0434   *   < > 132* 135* 136 135*   K 4.3   < > 4.5 4.2 4.3 3.7      < > 104 106 108 108   CO2 16*   < > 18* 14* 16* 12*   BUN 36*   < > 46* 42* 42* 43*   CREATININE 1.7*   < > 1.8* 1.6* 2.0* 2.0*   CALCIUM 9.0   < > 9.2 9.5 9.5 9.1   PROT 5.6*   < > 5.1* 5.3* 5.2* 4.9*   BILITOT 24.6*   < > 26.0* 26.1* 26.9* 24.2*   ALKPHOS 143*   < > 132 134 134 117   ALT 39   < > 38 36 36 30   AST 81*   < > 82* 75* 80* 74*   MG 2.1  --  2.0  --  2.2  --    PHOS 2.5*  --  3.3  --  3.5  --     < > = values in this interval not displayed.        WBC:   Recent Labs   Lab 09/23/20  0352 09/24/20  0546 09/25/20  0431 09/26/20  0342 09/27/20  0434   WBC 8.99 9.25 9.26 11.49 9.46     Bands:     CBC/Anemia Labs: Coags:     Recent Labs   Lab 09/25/20  0431 09/26/20  0342 09/27/20  0434   WBC 9.26 11.49 9.46   HGB 9.0* 8.3* 8.1*   HCT 26.8* 25.1* 24.5*   PLT 55* 54* 42*   * 104* 104*   RDW 18.0* 18.3* 18.0*    Recent Labs   Lab 09/25/20 0431 09/26/20  0342 09/27/20  0434   INR 2.3* 2.3* 2.5*          Assessment and Plan     Hospital Course:    Mr. Jordan Altman was admitted to Hospital Medicine for management of  Decompensated hepatic cirrhosis     Active Hospital Problems    Diagnosis  POA    *Decompensated hepatic cirrhosis [K72.90]  Yes    Left knee pain [M25.562]  Yes    Encounter for palliative care [Z51.5]  Not Applicable    Counseling regarding advanced care planning and goals of care [Z71.89]  Not Applicable    Pulmonary HTN [I27.20]  Yes     PA pressure 73 on echo 9/19/20      Alcohol use disorder, severe, dependence [F10.20]  Yes     Chronic    Encounter for pre-transplant evaluation for liver transplant [Z01.818]  Not Applicable    Macrocytic anemia [D53.9]  Yes    DIC (disseminated intravascular coagulation) [D65]  Yes    Hypertension [I10]  Yes    Hx of SBP (spontaneous bacterial peritonitis) [K65.2]  Yes    Ascites due to alcoholic cirrhosis [K70.31]  Yes    Thrombocytopenia [D69.6]  Yes    Coagulopathy [D68.9]  Yes    JENN (acute kidney injury) [N17.9]  Yes    Portal hypertension [K76.6]  Yes    Chronic Hepatic encephalopathy [K72.90]  Yes    Hyponatremia [E87.1]  Yes      Resolved Hospital Problems   No resolved problems to display.     Decompensated alcoholic cirrhosis with Ascites   Hx of SBP     MELD-Na score: 35 at 9/27/2020  4:34 AM  MELD score: 35 at 9/27/2020  4:34 AM  Calculated from:  Serum Creatinine: 2.0 mg/dL at 9/27/2020  4:34 AM  Serum Sodium: 135 mmol/L at 9/27/2020  4:34 AM  Total Bilirubin: 24.2 mg/dL at 9/27/2020  4:34 AM  INR(ratio): 2.5 at 9/27/2020  4:34 AM  Age: 45 years 4 months     -No signs of active bleeding or infection   -he is afebrile and has no leukocytosis    -cx negative   -US liver with doppler- reviewed   -vitamin K for coagulopathy   -pending [facility]   -hepatology following  - para on 9/21 - IR paracentesis attempted but insufficient ascites identified for safe paracentesis  - PO cipro daily for SBP ppx given hx of SBP  - psychiatry consulted - high risk   - liver transplant evaluation started on 09/30/2020 after right heart catheterization showed acceptable PA pressures  - nuclear stress test negative for ischemia  - MRI abdomen shows new filling defect; no masses    Acute on chronic Hepatic encephalopathy   - HE improved, cont lactulose and rifaximin and zinc    # Choledocholithiasis   - new filling defect on MRI ab, plan for AES consult Monday for EUS/ERCP     JENN   -concern for iATN from volume depletion vs hepatorenal syndrome (HRS)    -hold home diuretics (lasix, spironolactone)  -will hold off midodrine for now given SBP ~120  -reviewed UA, urine sodium, renal US   -monitor renal function closely and avoid nephrotoxic agents  -consider nephrology consult if does not improve with above   - Cr stable today,     Portal hypertension  - PA pressures of 73 on echo from 9/19.  CVP 3.  No significant volume overload  -s/p RHC on 9/12 -with favorable results, with PA pressure acceptable for liver transplant.      Hyponatremia    -likely due to decompensated cirrhosis and diuretic use  -hold diuretics   -monitor closely with daily labs   - IV albumin and IV NS   - improving appropriately - sodium 130     Alcohol abuse   -completed rehab in the past but relapsed   -quit drinking July of this year and committed to stay sober  -thiamine, folic acid daily   -check UDS   -addiction psychiatry consult     Coagulopathy  Thrombocytopenia   Macrocytic anemia  - 2/2 liver disease  - stable  Monitor     Diet:  Low Na with fluid restriction   GI PPx:  PO PPI  DVT PPx:  SCDs due to coagulopathy   Goals of Care:  Full     High Risk Conditions:  Decompensated cirrhosis       Disposition:    TBD- undergoing OLT testing ; presentation Wednesday

## 2020-09-28 NOTE — PLAN OF CARE
Problem: Adult Inpatient Plan of Care  Goal: Plan of Care Review  Outcome: Ongoing, Progressing  Goal: Patient-Specific Goal (Individualization)  Outcome: Ongoing, Progressing  Flowsheets (Taken 9/28/2020 1839)  Individualized Care Needs: monitor BMs  Anxieties, Fears or Concerns: none  Patient-Specific Goals (Include Timeframe): get on transplant list  Goal: Optimal Comfort and Wellbeing  Outcome: Ongoing, Progressing  Intervention: Provide Person-Centered Care  Flowsheets (Taken 9/28/2020 1839)  Trust Relationship/Rapport:   care explained   choices provided   emotional support provided   empathic listening provided   questions answered   questions encouraged   thoughts/feelings acknowledged     Problem: Oral Intake Inadequate (Acute Kidney Injury/Impairment)  Goal: Optimal Nutrition Intake  Outcome: Ongoing, Progressing  Intervention: Promote and Optimize Nutrition  Flowsheets (Taken 9/28/2020 1839)  Oral Nutrition Promotion:   calorie dense foods provided   physical activity promoted   rest periods promoted   safe use of adaptive equipment encouraged   social interaction promoted     Pt AAOx4, VSS, denies pain. No acute changes today. Pt will be NPO after midnight for scope tomorrow. Pt will be presented on Wednesday. Liver transplant coordinator spoke to pt and girlfriend today. Plan of care reviewed with pt. Will continue to monitor.

## 2020-09-28 NOTE — TREATMENT PLAN
GI Treatment Plan    Jordan Altman is a 45 y.o. male admitted to hospital 9/19/2020 (Hospital Day: 10) due to Decompensated hepatic cirrhosis.     Interval History  NAEON.  INR remains elevated, Bilirubin stable.    Objective  Temp:  [97.6 °F (36.4 °C)-98.2 °F (36.8 °C)] 97.6 °F (36.4 °C) (09/28 0716)  Pulse:  [] 98 (09/28 0716)  BP: (109-171)/(62-87) 169/87 (09/28 0716)  Resp:  [12-21] 20 (09/28 0716)  SpO2:  [93 %-96 %] 95 % (09/28 0716)    General: Alert, Oriented x3, no distress  Abdomen: Normoactive bowel sounds. Non-distended. Normal tympany. Soft. Non-tender. No peritoneal signs.  Skin: jaundice    Laboratory    Recent Labs   Lab 09/26/20  0342 09/27/20  0434 09/28/20  0712   HGB 8.3* 8.1* 8.4*       Lab Results   Component Value Date    WBC 11.87 09/28/2020    HGB 8.4 (L) 09/28/2020    HCT 25.3 (L) 09/28/2020     (H) 09/28/2020    PLT 53 (L) 09/28/2020       Lab Results   Component Value Date     (L) 09/28/2020    K 3.9 09/28/2020     09/28/2020    CO2 17 (L) 09/28/2020    BUN 46 (H) 09/28/2020    CREATININE 2.0 (H) 09/28/2020    CALCIUM 9.3 09/28/2020    ANIONGAP 9 09/28/2020    ESTGFRAFRICA 44.6 (A) 09/28/2020    EGFRNONAA 38.6 (A) 09/28/2020       Lab Results   Component Value Date    ALT 35 09/28/2020    AST 85 (H) 09/28/2020    ALKPHOS 157 (H) 09/28/2020    BILITOT 24.0 (H) 09/28/2020       Lab Results   Component Value Date    INR 2.7 (H) 09/28/2020    INR 2.5 (H) 09/27/2020    INR 2.3 (H) 09/26/2020       Plan  -We are unable to perform ERCP given elevated INR.  Please continue to trend and correct coagulopathy.  -Would need INR <2.0, ideally closer to 1.5 prior to procedure.  - Continue daily CBC, CMP, INR  - We will continue to follow.    Thank you for involving us in the care of Jordan Altman. Please call with any additional questions, concerns or changes in the patient's clinical status.    Teja Kellogg MD  Gastroenterology Fellow  Spectralink: [id number]

## 2020-09-28 NOTE — CONSULTS
Infectious Disease  Pre-Liver Transplant Evaluation Note:    Consulting Physician:  Dr. ODALYS Villa  Reason for Consult: Pre-LiverTransplant evaluation    Assessment, Plan and Counselin. Risks of Infection: No unusual risks of infection or significant barriers to transplantation were identified from the infectious disease standpoint given the available information at this time subject to the following.       -  Quantiferon Gold indeterminate.  T spot in process.  Please consult ID if  T spot positive   -  History of residence in coccidio[place] endemic area.  Have added coccidioides antibody.  Notify ID if positive.    -  Appears to have history of C Dif.  Avoid unnecessary antibiotic use.    -  Broken/cracked left upper molar.  X-ray mandible negative for abscess or evidence of osteomyelitis.  Recommend patient address dental issues if possible prior to transplant, though if unable, this is not a contraindication to transplant.    -  Please call with questions or re-consult ID as needed       2. Immunizations:  The patient's immunization history and serologies were reviewed.  Based on the patients immunization history and serologies, immunizations were ordered:       -  Flu, Prevnar, Tdap, Hepatitis A ordered   -  Heplisav -B (dose two of two) due on or after 10/8/20   -  Pneumovax 8 weeks after Prevnar ( on or after  20)   -  Second  dose of Hep A  6 months after first - on or after 3/30/21.    -  Recommend Shingrix vaccine series (two doses 2-6 months apart).  Patient advised this may not be covered by his insurance as he is under 50.  May need pre-authorization. Discussed this with patient and his girlfriend.    The patient was encouraged to contact us about any problems that may develop  after immunization and possible side effects were reviewed.    3. Counseling: I discussed with the patient and his girlfriend the risk for increased susceptibility to infections following transplantation including  increased risk for infection right after transplant and if rejection should occur.  The patient has been counseled on the importance of vaccinations including but not limited to a yearly flu vaccine. Counseled on importance of household members also receiving annual flu vaccine.     4. Specific guidance has been provided to the patient regarding the patients occupation, hobbies and activities to avoid future infectious complications including but not limited to avoiding undercooked meats and seafood, proper hygiene, and contact with animals.        Final determination of transplant candidacy will be made once evaluation is complete and reviewed by the Liver Transplant Selection Committee.      Thank you.   Please call for any questions or concerns.  JEFFY Hughes, ANP-C  [phone] pager, [phone]      Problem List:  Active Hospital Problems    Diagnosis  POA    *Decompensated hepatic cirrhosis [K72.90]  Yes    Left knee pain [M25.562]  Yes    Encounter for palliative care [Z51.5]  Not Applicable    Counseling regarding advanced care planning and goals of care [Z71.89]  Not Applicable    Pulmonary HTN [I27.20]  Yes     PA pressure 73 on echo 9/19/20      Alcohol use disorder, severe, dependence [F10.20]  Yes     Chronic    Encounter for pre-transplant evaluation for liver transplant [Z01.818]  Not Applicable    Macrocytic anemia [D53.9]  Yes    DIC (disseminated intravascular coagulation) [D65]  Yes    Hypertension [I10]  Yes    Hx of SBP (spontaneous bacterial peritonitis) [K65.2]  Yes    Ascites due to alcoholic cirrhosis [K70.31]  Yes    Thrombocytopenia [D69.6]  Yes    Coagulopathy [D68.9]  Yes    JENN (acute kidney injury) [N17.9]  Yes    Portal hypertension [K76.6]  Yes    Chronic Hepatic encephalopathy [K72.90]  Yes    Hyponatremia [E87.1]  Yes      Resolved Hospital Problems   No resolved problems to display.       Chief Complaint:  Pre-Liver  Evaluation    History of Present  Illness:  Mr. Jordan Chavez is a 45 year old man with ETOH cirrhosis c/b ascites, portal hypertension, encephalopathy, thrombocytopenia, and coagulopathy who presented from [facility] with acute decompensated cirrhosis.  Infectious disease is consulted for Pre-Liver Transplant Evaluation.   The infectious disease pre-transplant questionnaire was reviewed with the patient and his girlfriend    Patient denies recent fevers, chills, infective illnesses.     Reports paracentesis X 1 last month.  Per outside records, cell count suggestive of SBP, cultures negative.  Treated with ceftriaxone, then changed to clindamycin per outside GI.  Currently on cipro for SBP ppx.  Paracentesis attempted 9/21 - insufficient ascites       MRI abdomen shows cholecodolithiasis.  ERCP pending improvement in INR.    Currently on cipro for SBP ppx.        No history of diabetes.  No current or long term steroid use.   Denies splenectomy    Denies history of chronic, recurring infections of sinuses, throat, bladder/kidneys, intestines, skin, reproductive organs, teeth or gums.  History of C dif in 2017.     History of chickenpox.  No shingles.   Denies history of syphilis, gonorrhea, other STDs/viral hepatitis/ or other infective illnesses.     Denies known exposure to TB  History of living in [facility] coccidioides endemic area  Foreign travel - Short trip to [place]    Animal exposures: none  Occupational: Artist  Hobbies: Art      Immunization history:  Usual childhood vaccines  Flu:  Denies  Tdap - unknown.  Possible Td 2017  Hepatitis A - denies  Hepatitis B -  First heplisav-B 9/8/2020.  Needs 2nd dose  Pneumonia - denies  Shingles - denies      Serologies:  Results for JORDAN CHAVEZ (MRN [id number]) as of 9/28/2020 09:49   Ref. Range 9/19/2020 12:22 9/22/2020 16:44 9/24/2020 05:45 9/24/2020 05:46 9/27/2020 13:18   Hep A IgM Latest Ref Range: Negative  Negative       Hep B C IgM Latest Ref Range: Negative  Negative       Hep  B Core Total Ab Unknown    Negative    Hep B S Ab Unknown    Negative    Hepatitis B Surface Ag Latest Ref Range: Negative  Negative       Hepatitis C Ab Latest Ref Range: Negative  Negative       SARS-CoV2 (COVID-19) Qualitative PCR Latest Ref Range: Not Detected      Not Detected   SARS-CoV-2 RNA, Amplification, Qual Latest Ref Range: Negative   Negative      Mitogen - Nil Latest Ref Range: See text IU/mL   0.160     NIL Latest Ref Range: See text IU/mL   0.030     TB Gold Plus Unknown   Indeterminate (A)     TB1 - Nil Latest Ref Range: See text IU/mL   0.000     TB2 - Nil Latest Ref Range: See text IU/mL   0.010     Strongyloides Ab IgG Latest Ref Range: Negative     Negative    Varicella IgG Latest Ref Range: 0.00 - 0.90 ISR    4.18 (H)    Varicella Interpretation Latest Ref Range: Negative     Positive (A)      Results for TANIYA CHAVEZ (MRN [id number]) as of 9/28/2020 09:49   Ref. Range 9/24/2020 05:46   Hepatitis A Antibody IgG Unknown Negative     Micro:    Blood cx - 9/19/20 negative   Knee joint fluid left - NGTD.       Imaging:  MRI abd 9/24 - choledocholithiasis.   CXR 9/24 - bilateral ill-defined opacities, new since 9/19.  Multifocal infection v edema v hemorrhage  2D echo - no vegetation noted    Past Medical History:  Past Medical History:   Diagnosis Date    Decompensated hepatic cirrhosis     Hypertension     SBP (spontaneous bacterial peritonitis)        Past Surgical History:  Past Surgical History:   Procedure Laterality Date    RIGHT HEART CATHETERIZATION Right 9/23/2020    Procedure: INSERTION, CATHETER, RIGHT HEART;  Surgeon: Mikel Costa Jr., MD;  Location: [facility] CATH LAB;  Service: Cardiology;  Laterality: Right;    THORACENTESIS  2011       Family History:  Family History   Problem Relation Age of Onset    COPD Mother        Social History:  Social History     Socioeconomic History    Marital status:      Spouse name: Not on file    Number of children: Not  on file    Years of education: Not on file    Highest education level: Not on file   Occupational History    Not on file   Social Needs    Financial resource strain: Not on file    Food insecurity     Worry: Not on file     Inability: Not on file    Transportation needs     Medical: Not on file     Non-medical: Not on file   Tobacco Use    Smoking status: Current Some Day Smoker    Smokeless tobacco: Current User   Substance and Sexual Activity    Alcohol use: Yes     Alcohol/week: 112.0 standard drinks     Types: 112 Shots of liquor per week     Comment: fifth of vodka a day. LAST DRINK 7/25/2020 per pt     Drug use: Yes     Frequency: 3.0 times per week     Types: Marijuana    Sexual activity: Yes   Lifestyle    Physical activity     Days per week: Not on file     Minutes per session: Not on file    Stress: Not on file   Relationships    Social connections     Talks on phone: Not on file     Gets together: Not on file     Attends Buddhism service: Not on file     Active member of club or organization: Not on file     Attends meetings of clubs or organizations: Not on file     Relationship status: Not on file   Other Topics Concern    Not on file   Social History Narrative    Not on file       Allergies:  Review of patient's allergies indicates:   Allergen Reactions    Latex, natural rubber        Immunizations:    There is no immunization history on file for this patient.       Review of Systems   Constitution: Positive for chills, decreased appetite, malaise/fatigue and weight loss. Negative for diaphoresis, fever, night sweats and weight gain.   HENT: Negative for congestion, ear pain, hearing loss, hoarse voice, sore throat and tinnitus.    Eyes: Negative for blurred vision, redness and visual disturbance.   Cardiovascular: Negative for chest pain, leg swelling and palpitations.   Respiratory: Positive for shortness of breath. Negative for cough, hemoptysis, sputum production and wheezing.     Hematologic/Lymphatic: Negative for adenopathy. Bruises/bleeds easily.   Skin: Positive for dry skin and itching. Negative for rash and suspicious lesions.   Musculoskeletal: Positive for back pain and joint pain. Negative for myalgias and neck pain.   Gastrointestinal: Positive for abdominal pain, constipation, diarrhea and nausea. Negative for heartburn and vomiting.   Genitourinary: Negative for dysuria, flank pain, frequency, hematuria, hesitancy and urgency.   Neurological: Positive for numbness. Negative for dizziness, headaches, paresthesias and weakness.   Psychiatric/Behavioral: Positive for memory loss. Negative for depression. The patient does not have insomnia and is not nervous/anxious.    Allergic/Immunologic: Negative for persistent infections.     Pertinent Medications:  Antibiotics:   Antibiotics (From admission, onward)    Start     Stop Route Frequency Ordered    09/23/20 1200  rifAXIMin tablet 550 mg      -- Oral 2 times daily 09/23/20 1046    09/21/20 1715  ciprofloxacin HCl tablet 500 mg      -- Oral Every 24 hours 09/21/20 1710          Physical Exam:  VS (24h):   Vitals:    09/28/20 0716   BP: (!) 169/87   Pulse: 98   Resp: 20   Temp: 97.6 °F (36.4 °C)     Temp:  [97.6 °F (36.4 °C)-98.2 °F (36.8 °C)]     Physical Exam  Vitals signs and nursing note reviewed.   Constitutional:       General: He is not in acute distress.     Appearance: Normal appearance. He is well-developed. He is obese. He is ill-appearing. He is not toxic-appearing or diaphoretic.   HENT:      Head: Normocephalic and atraumatic.      Nose: Nose normal.      Mouth/Throat:      Mouth: Mucous membranes are moist. No lacerations or oral lesions.      Dentition: Abnormal dentition. Does not have dentures. No dental caries or dental abscesses.      Pharynx: Uvula midline. No oropharyngeal exudate.      Comments: Cracked left upper molar  Eyes:      General: Lids are normal. Scleral icterus present.      Conjunctiva/sclera:  Conjunctivae normal.   Neck:      Musculoskeletal: Neck supple.   Cardiovascular:      Rate and Rhythm: Normal rate and regular rhythm.      Heart sounds: Murmur present. No friction rub. No gallop.    Pulmonary:      Effort: Pulmonary effort is normal. No respiratory distress.      Breath sounds: Examination of the right-lower field reveals decreased breath sounds. Examination of the left-lower field reveals decreased breath sounds. Decreased breath sounds present. No wheezing, rhonchi or rales.   Abdominal:      General: Bowel sounds are normal. There is no distension.      Palpations: Abdomen is soft. There is no mass.      Tenderness: There is no abdominal tenderness. There is no guarding or rebound.   Lymphadenopathy:      Head:      Right side of head: No submental, submandibular, tonsillar, preauricular, posterior auricular or occipital adenopathy.      Left side of head: No submental, submandibular, tonsillar, preauricular, posterior auricular or occipital adenopathy.      Cervical: No cervical adenopathy.      Upper Body:      Right upper body: No supraclavicular or epitrochlear adenopathy.      Left upper body: No supraclavicular or epitrochlear adenopathy.      Lower Body: No right inguinal adenopathy. No left inguinal adenopathy.   Skin:     General: Skin is warm and dry.      Coloration: Skin is not pale.      Findings: No erythema, lesion or rash.      Comments: Multiple tattoos  Jaundiced  Scattered bruising   Neurological:      General: No focal deficit present.      Mental Status: He is alert and oriented to person, place, and time.      Cranial Nerves: No cranial nerve deficit.   Psychiatric:         Mood and Affect: Mood normal.         Behavior: Behavior normal.         Thought Content: Thought content normal.         Labs:  CBC:   Lab Results   Component Value Date    WBC 11.87 09/28/2020    WBC 9.46 09/27/2020    WBC 11.49 09/26/2020    WBC 9.26 09/25/2020    WBC 9.25 09/24/2020    HGB 8.4 (L)  09/28/2020    HCT 25.3 (L) 09/28/2020     (H) 09/28/2020    PLT 53 (L) 09/28/2020       BMP:   Recent Labs   Lab 09/28/20  0712   GLU 73   *   K 3.9      CO2 17*   BUN 46*   CREATININE 2.0*   CALCIUM 9.3   MG 2.0       LFT:   Lab Results   Component Value Date    ALT 35 09/28/2020    AST 85 (H) 09/28/2020    ALKPHOS 157 (H) 09/28/2020    BILITOT 24.0 (H) 09/28/2020       RPR:   Lab Results   Component Value Date    RPR Non-reactive 09/19/2020        Quantiferon Gold Tb: No results found for: QUANTIFERON    Hepatitis Serologies:   Lab Results   Component Value Date    HEPAIGM Negative 09/19/2020    HEPBIGM Negative 09/19/2020    HEPBCAB Negative 09/24/2020    HEPCAB Negative 09/19/2020        Microbiology x 7d:   Microbiology Results (last 7 days)     Procedure Component Value Units Date/Time    AFB Culture & Smear [[id number]] Collected: 09/25/20 1729    Order Status: Completed Specimen: Joint Fluid from Knee, Left Updated: 09/26/20 2127     AFB Culture & Smear Culture in progress    Aerobic culture [[id number]] Collected: 09/25/20 1729    Order Status: Completed Specimen: Joint Fluid from Knee, Left Updated: 09/26/20 0841     Aerobic Bacterial Culture No growth    Culture, Anaerobic [[id number]] Collected: 09/25/20 1729    Order Status: Completed Specimen: Joint Fluid from Knee, Left Updated: 09/26/20 0734     Anaerobic Culture Culture in progress    Gram stain [[id number]] Collected: 09/25/20 1729    Order Status: Completed Specimen: Joint Fluid from Knee, Left Updated: 09/25/20 2123     Gram Stain Result No WBC's      No organisms seen    Fungus culture [[id number]] Collected: 09/25/20 1729    Order Status: Sent Specimen: Joint Fluid from Knee, Left Updated: 09/25/20 1921    Culture, Anaerobe [[id number]] Collected: 09/25/20 1729    Order Status: Canceled Specimen: Joint Fluid from Knee, Left     Aerobic culture [[id number]] Collected: 09/25/20 1729    Order Status: Canceled Specimen: Joint Fluid from  Knee, Left     Blood culture [[id number]]     Order Status: Sent Specimen: Blood     Blood culture [[id number]]     Order Status: Sent Specimen: Blood     Blood culture [[id number]] Collected: 09/19/20 0138    Order Status: Completed Specimen: Blood Updated: 09/24/20 0612     Blood Culture, Routine No growth after 5 days.    Blood culture [[id number]] Collected: 09/19/20 0138    Order Status: Completed Specimen: Blood Updated: 09/24/20 0612     Blood Culture, Routine No growth after 5 days.             Assessment/Plan - Please see top of page

## 2020-09-28 NOTE — PROGRESS NOTES
PRE EDUCATION TEACHING NOTE    Jordan Altman was seen in the hospital today.  Handbook on pre-liver transplant information (see outline below) was given to the patient and time was allowed for questions.  Patient's significant other, Valeria accompanied him.  Informed consent signed and written information given on selection criteria.      LIVER TRANSPLANT WORK-UP EDUCATION  I. NATIONAL REGISTRY LISTING  A. Information for listing  B. Regions  C. Per UNOS, can be listed at more than one center  II. SURGERY  A. Length  B. Complications: bleeding, infection  C. Central lines, drains, Duran catheter, incision, endotracheal tube, NG tube  D. Transfusions, cell saver  III. SHORT TERM RECOVERY  A. ICU, PICU, Hospital stay  IV. LONG TERM RECOVERY  A. Labs at home  B. Clinic visits  C. Complications: infection, rejection, readmissions  D. Normal immunity and immunosuppression  E. Incidence of re-admit in 1st year  V. REJECTION  A. Incidence  B. Treatment: Solumedrol, Prograf, Thymoglobulin (actions and side effects)  VI. IMMUNOSUPPRESSIVES  A. Prednisone  B. Imuran/Cellcept  C. Cyclosporin/Prograf  D. Rapamune  E. Need for lifetime compliance  F. Actions and side effects  G. Costs  VII. RECURRENCE OF VIRAL HEPATITIS

## 2020-09-28 NOTE — CONSULTS
[facility] [address]  Infectious Disease  Consult Note    Patient Name: Jordan Altman  MRN: [id number]  Admission Date: 9/19/2020  Hospital Length of Stay: 9 days  Attending Physician: Willie Villa MD  Primary Care Provider: SHAHEEN Jaime     Isolation Status: No active isolations      Inpatient consult to Infectious Diseases  Consult performed by: JEFFY Wilkins, ANP  Consult ordered by: Willie Villa MD  Reason for consult: liver transplant evaluation  T-spot pending      ID Consult acknowledged.   Full consult and recommendations to follow today  In the interim, please call for any immediate concerns.     Thank you.   JEFFY Hughes, ANP-C  [phone] pager,  [phone]

## 2020-09-29 LAB
ABO + RH BLD: NORMAL
ALBUMIN SERPL BCP-MCNC: 3 G/DL (ref 3.5–5.2)
ALP SERPL-CCNC: 143 U/L (ref 55–135)
ALT SERPL W/O P-5'-P-CCNC: 33 U/L (ref 10–44)
ANION GAP SERPL CALC-SCNC: 11 MMOL/L (ref 8–16)
ANISOCYTOSIS BLD QL SMEAR: SLIGHT
AST SERPL-CCNC: 81 U/L (ref 10–40)
BASOPHILS # BLD AUTO: 0.07 K/UL (ref 0–0.2)
BASOPHILS NFR BLD: 0.7 % (ref 0–1.9)
BILIRUB SERPL-MCNC: 23.9 MG/DL (ref 0.1–1)
BLD GP AB SCN CELLS X3 SERPL QL: NORMAL
BLD PROD TYP BPU: NORMAL
BLOOD UNIT EXPIRATION DATE: NORMAL
BLOOD UNIT TYPE CODE: 600
BLOOD UNIT TYPE CODE: 6200
BLOOD UNIT TYPE CODE: 6200
BLOOD UNIT TYPE: NORMAL
BUN SERPL-MCNC: 46 MG/DL (ref 6–20)
CALCIUM SERPL-MCNC: 8.8 MG/DL (ref 8.7–10.5)
CHLORIDE SERPL-SCNC: 109 MMOL/L (ref 95–110)
CO2 SERPL-SCNC: 15 MMOL/L (ref 23–29)
CODING SYSTEM: NORMAL
COMPLEXED PSA SERPL-MCNC: 0.05 NG/ML (ref 0–4)
CREAT SERPL-MCNC: 1.7 MG/DL (ref 0.5–1.4)
DIFFERENTIAL METHOD: ABNORMAL
DISPENSE STATUS: NORMAL
EOSINOPHIL # BLD AUTO: 0.2 K/UL (ref 0–0.5)
EOSINOPHIL NFR BLD: 2.3 % (ref 0–8)
ERYTHROCYTE [DISTWIDTH] IN BLOOD BY AUTOMATED COUNT: 17.8 % (ref 11.5–14.5)
EST. GFR  (AFRICAN AMERICAN): 54.3 ML/MIN/1.73 M^2
EST. GFR  (NON AFRICAN AMERICAN): 47 ML/MIN/1.73 M^2
FIBRINOGEN PPP-MCNC: 119 MG/DL (ref 182–366)
GLUCOSE SERPL-MCNC: 90 MG/DL (ref 70–110)
HCT VFR BLD AUTO: 25.7 % (ref 40–54)
HGB BLD-MCNC: 8.6 G/DL (ref 14–18)
IMM GRANULOCYTES # BLD AUTO: 0.15 K/UL (ref 0–0.04)
IMM GRANULOCYTES NFR BLD AUTO: 1.5 % (ref 0–0.5)
INR PPP: 2.4 (ref 0.8–1.2)
LYMPHOCYTES # BLD AUTO: 0.9 K/UL (ref 1–4.8)
LYMPHOCYTES NFR BLD: 8.7 % (ref 18–48)
MCH RBC QN AUTO: 34.4 PG (ref 27–31)
MCHC RBC AUTO-ENTMCNC: 33.5 G/DL (ref 32–36)
MCV RBC AUTO: 103 FL (ref 82–98)
MONOCYTES # BLD AUTO: 1 K/UL (ref 0.3–1)
MONOCYTES NFR BLD: 9.6 % (ref 4–15)
NEUTROPHILS # BLD AUTO: 7.9 K/UL (ref 1.8–7.7)
NEUTROPHILS NFR BLD: 77.2 % (ref 38–73)
NRBC BLD-RTO: 0 /100 WBC
NUM UNITS TRANS FFP: NORMAL
NUM UNITS TRANS FFP: NORMAL
PLATELET # BLD AUTO: 51 K/UL (ref 150–350)
PLATELET BLD QL SMEAR: ABNORMAL
PMV BLD AUTO: 11.7 FL (ref 9.2–12.9)
POIKILOCYTOSIS BLD QL SMEAR: SLIGHT
POTASSIUM SERPL-SCNC: 3.8 MMOL/L (ref 3.5–5.1)
PROT SERPL-MCNC: 5 G/DL (ref 6–8.4)
PROTHROMBIN TIME: 25.4 SEC (ref 9–12.5)
RBC # BLD AUTO: 2.5 M/UL (ref 4.6–6.2)
SODIUM SERPL-SCNC: 135 MMOL/L (ref 136–145)
TOXIC GRANULES BLD QL SMEAR: PRESENT
UNIT NUMBER: NORMAL
WBC # BLD AUTO: 10.21 K/UL (ref 3.9–12.7)

## 2020-09-29 PROCEDURE — 25000003 PHARM REV CODE 250: Mod: NTX | Performed by: HOSPITALIST

## 2020-09-29 PROCEDURE — 86850 RBC ANTIBODY SCREEN: CPT | Mod: NTX

## 2020-09-29 PROCEDURE — P9012 CRYOPRECIPITATE EACH UNIT: HCPCS | Mod: NTX

## 2020-09-29 PROCEDURE — 97116 GAIT TRAINING THERAPY: CPT | Mod: NTX,CQ

## 2020-09-29 PROCEDURE — 99233 PR SUBSEQUENT HOSPITAL CARE,LEVL III: ICD-10-PCS | Mod: NTX,,, | Performed by: HOSPITALIST

## 2020-09-29 PROCEDURE — 99233 SBSQ HOSP IP/OBS HIGH 50: CPT | Mod: NTX,,, | Performed by: NURSE PRACTITIONER

## 2020-09-29 PROCEDURE — 94761 N-INVAS EAR/PLS OXIMETRY MLT: CPT | Mod: NTX

## 2020-09-29 PROCEDURE — 36415 COLL VENOUS BLD VENIPUNCTURE: CPT | Mod: NTX

## 2020-09-29 PROCEDURE — 36430 TRANSFUSION BLD/BLD COMPNT: CPT | Mod: NTX

## 2020-09-29 PROCEDURE — P9017 PLASMA 1 DONOR FRZ W/IN 8 HR: HCPCS | Mod: NTX

## 2020-09-29 PROCEDURE — 85025 COMPLETE CBC W/AUTO DIFF WBC: CPT | Mod: NTX

## 2020-09-29 PROCEDURE — 63600175 PHARM REV CODE 636 W HCPCS: Mod: NTX | Performed by: HOSPITALIST

## 2020-09-29 PROCEDURE — 20600001 HC STEP DOWN PRIVATE ROOM: Mod: NTX

## 2020-09-29 PROCEDURE — 80053 COMPREHEN METABOLIC PANEL: CPT | Mod: NTX

## 2020-09-29 PROCEDURE — 97530 THERAPEUTIC ACTIVITIES: CPT | Mod: NTX,CQ

## 2020-09-29 PROCEDURE — 99233 SBSQ HOSP IP/OBS HIGH 50: CPT | Mod: NTX,,, | Performed by: HOSPITALIST

## 2020-09-29 PROCEDURE — 86965 POOLING BLOOD PLATELETS: CPT | Mod: NTX

## 2020-09-29 PROCEDURE — 85384 FIBRINOGEN ACTIVITY: CPT | Mod: NTX

## 2020-09-29 PROCEDURE — 99233 PR SUBSEQUENT HOSPITAL CARE,LEVL III: ICD-10-PCS | Mod: NTX,,, | Performed by: NURSE PRACTITIONER

## 2020-09-29 PROCEDURE — 84153 ASSAY OF PSA TOTAL: CPT | Mod: NTX

## 2020-09-29 PROCEDURE — 85610 PROTHROMBIN TIME: CPT | Mod: NTX

## 2020-09-29 RX ADMIN — CIPROFLOXACIN 500 MG: 500 TABLET, FILM COATED ORAL at 12:09

## 2020-09-29 RX ADMIN — RIFAXIMIN 550 MG: 550 TABLET ORAL at 12:09

## 2020-09-29 RX ADMIN — RIFAXIMIN 550 MG: 550 TABLET ORAL at 09:09

## 2020-09-29 RX ADMIN — OXYCODONE HYDROCHLORIDE 5 MG: 5 TABLET ORAL at 07:09

## 2020-09-29 RX ADMIN — ONDANSETRON 4 MG: 2 INJECTION INTRAMUSCULAR; INTRAVENOUS at 07:09

## 2020-09-29 RX ADMIN — Medication 100 MG: at 12:09

## 2020-09-29 RX ADMIN — POLYETHYLENE GLYCOL 3350 17 G: 17 POWDER, FOR SOLUTION ORAL at 09:09

## 2020-09-29 RX ADMIN — ZINC SULFATE 220 MG (50 MG) CAPSULE 220 MG: CAPSULE at 12:09

## 2020-09-29 RX ADMIN — FOLIC ACID 1 MG: 1 TABLET ORAL at 12:09

## 2020-09-29 NOTE — TREATMENT PLAN
GI Treatment Plan    Jordan Altman is a 45 y.o. male admitted to hospital 9/19/2020 (Hospital Day: 11) due to Decompensated hepatic cirrhosis.     Interval History  NAEON.  INR remains elevated after cryo, Bilirubin stable.    Objective  Temp:  [97.6 °F (36.4 °C)-98.9 °F (37.2 °C)] 97.8 °F (36.6 °C) (09/29 0530)  Pulse:  [] 92 (09/29 0600)  BP: ()/(60-76) 110/67 (09/29 0600)  Resp:  [18-26] 20 (09/29 0600)  SpO2:  [91 %-99 %] 91 % (09/29 0600)    Laboratory    Recent Labs   Lab 09/27/20  0434 09/28/20  0712 09/29/20  0323   HGB 8.1* 8.4* 8.6*       Lab Results   Component Value Date    WBC 10.21 09/29/2020    HGB 8.6 (L) 09/29/2020    HCT 25.7 (L) 09/29/2020     (H) 09/29/2020    PLT 51 (L) 09/29/2020       Lab Results   Component Value Date     (L) 09/29/2020    K 3.8 09/29/2020     09/29/2020    CO2 15 (L) 09/29/2020    BUN 46 (H) 09/29/2020    CREATININE 1.7 (H) 09/29/2020    CALCIUM 8.8 09/29/2020    ANIONGAP 11 09/29/2020    ESTGFRAFRICA 54.3 (A) 09/29/2020    EGFRNONAA 47.0 (A) 09/29/2020       Lab Results   Component Value Date    ALT 33 09/29/2020    AST 81 (H) 09/29/2020    ALKPHOS 143 (H) 09/29/2020    BILITOT 23.9 (H) 09/29/2020       Lab Results   Component Value Date    INR 2.4 (H) 09/29/2020    INR 2.7 (H) 09/28/2020    INR 2.5 (H) 09/27/2020       Plan  -We are unable to perform ERCP given elevated INR.  Please continue to trend and correct coagulopathy.  -Would need INR <2.0, ideally closer to 1.5 prior to procedure.  - Continue daily CBC, CMP, INR  - We will continue to follow.    Thank you for involving us in the care of Jordan Altman. Please call with any additional questions, concerns or changes in the patient's clinical status.    Teja Kellogg MD  Gastroenterology Fellow  Spectralink: [id number]

## 2020-09-29 NOTE — PROGRESS NOTES
[facility]  Palliative Medicine  Progress Note    Patient Name: Jordan Altman  MRN: [id number]  Admission Date: 2020  Hospital Length of Stay: 6 days  Code Status: Full Code   Attending Provider: Willie Villa MD  Consulting Provider: Robyn Dixon NP  Primary Care Physician: SHAHEEN Jaime  Principal Problem:Decompensated hepatic cirrhosis    Patient information was obtained from patient, past medical records and Dr. Villa.      Assessment/Plan:     Encounter for palliative care  Impression:  Palliative care consulted for goals of care discussion/advanced care planning for this 46 y/o male being followed by [facility], Hepatology, Liver Transplant Surgery, and Orthopedics for Decompensated alcoholic cirrhosis with ascites, Hepatic encephalopathy, EJNN, Portal hypertension, Hyponatremia, Coagulopathy, Thrombocytopenia, Macrocytic anemia, Alcohol abuse, and Left knee pain. IR paracentesis attempted on  and again on  but no ascites identified for safe paracentesis. Liver transplant evaluation initiated. Patient is s/p RHC on . Nuclear stress today was negative for ischemia. Orthopedics consulted for L knee pain. Patient is AAOx4 on exam today; reports that he has been feeling a little confused although he answers all questions appropriately.    Plan/Recommendations:  1. See goals of care discussion below.  2. Liver transplant evaluation initiated; continue current plan of care.  3. MPOA paperwork completed today; patient declines to complete living will at this time.  4. Palliative care will follow up next week.    Decompensated alcoholic cirrhosis with ascites/Hepatic encephalopathy/JENN/Portal hypertension/Hyponatremia/Coagulopathy/Thrombocytopenia/Macrocytic anemia/Alcohol abuse - management per primary team and other consultants    L knee pain - taking Oxycodone 5 mg PO Q4H PRN pain, orthopedics consulted    Goals of Care/Advance Care Plannin/25 Follow  "up visit held today by this NP and PAULETTE Castro LCSW. Patient is AAOx4 on exam today; reports that he has been feeling a little confused although he answers all questions appropriately. Liver transplant evaluation is underway and patient's stated goal is to get a transplant.    During this visit, we engaged the patient in the advance care planning process. We reviewed the role for advance directives and their purpose in directing future healthcare if the patient's unable to speak for himself. At this point in time, the patient does have full decision-making capacity. We discussed different extreme health states that he could experience, and reviewed what kind of medical care he would want in those situations. The patient communicated that if he were comatose and had little chance of a meaningful recovery, he would not want machines/life-sustaining treatments used. He states, "I wouldn't want to be kept on machines if I wasn't going to be able to get off." Offered to assist patient with completion of a living will today but he declines to do so at this time. He would like to read through the advanced directives packet first and has my contact information to call with any questions.    Advance Care Planning     Power of   We initiated the process of advance care planning today and explained the importance of this process to the patient. Then the patient received detailed information about the importance of designating a Health Care Power of  (HCPOA). He was also instructed to communicate with this person about their wishes for future healthcare, should he become sick and lose decision-making capacity. The patient has not previously appointed a HCPOA. After our discussion, the patient has decided to complete a HCPOA and has appointed his brother Didier Page.    ________________________    9/24 Conference conducted with patient to discuss his current clinical status, goals of care, code status, long term " expected outcomes and prognostic awareness. Patient is oriented x 3 but inattentive and slow to respond to questions. Attempted to have a discussion concerning the patients values and wishes but it is difficult to get patient to engage in meaningful conversation at this time. He is able to state that his goal is to get a liver transplant.    Patient has no advanced directives on file in Epic and is currently a full code. Asked patient who he would want to make his medical decisions in the event he were unable to speak for himself; patient states that he would want his brother Didier to be his decision maker. Will follow up with patient regarding completion of advanced directives when he is more alert.         I will follow-up with patient. Please contact us if you have any additional questions.    Subjective:     Chief Complaint: No chief complaint on file.      HPI:   Mr. Altman is a 46 y/o male with PMHx of alcohol abuse, alcoholic cirrhosis diagnosed one year ago and complicated by ascites, portal hypertension, hepatic encephalopathy, thrombocytopenia, and coagulopathy who presented to [facility] on 9/19 as a transfer from [facility] for management of acute decompensated cirrhosis. He was originally admitted to OSH on 9/17 for abdominal distention with pain and SOB. He was found to have decompensated cirrhosis, acute kidney injury, hyponatremia, thrombocytopenia, anemia, and metabolic acidosis. He was started on HRS therapy and empiric Meropenem and transferred to [facility] for further evaluation.    Patient states he used to drink 1-2 pints of vodka, whisky daily since age 16. He reports that he quit drinking in July of this year when he was admitted to hospital for alcoholic hepatitis. He had another admission 8/25-9/1 with decompensated cirrhosis complicated by peritonitis. He had a paracentesis on 9/15 with 1.7 L taken off, negative for infection. He has been taking Lactulose, Lasix, and  Spironolactone.    Hospital Course:  No notes on file    Interval History: IR paracentesis attempted on 9/21 and again on 9/25 but no ascites identified for safe paracentesis. Liver transplant evaluation initiated. Patient is s/p RHC on 9/23. Nuclear stress today was negative for ischemia. Orthopedics consulted for L knee pain. Patient is AAOx4 on exam today; reports that he has been feeling a little confused although he answers all questions appropriately.    Past Medical History:   Diagnosis Date    Decompensated hepatic cirrhosis     Hypertension     SBP (spontaneous bacterial peritonitis)        Past Surgical History:   Procedure Laterality Date    RIGHT HEART CATHETERIZATION Right 9/23/2020    Procedure: INSERTION, CATHETER, RIGHT HEART;  Surgeon: Mikel Costa Jr., MD;  Location: [facility] CATH LAB;  Service: Cardiology;  Laterality: Right;    THORACENTESIS  2011       Review of patient's allergies indicates:   Allergen Reactions    Latex, natural rubber        Medications:  Continuous Infusions:   sodium chloride 0.9%       Scheduled Meds:   acetaminophen  1,000 mg Oral TID    ciprofloxacin HCl  500 mg Oral Q24H    docusate sodium  100 mg Oral Daily    folic acid  1 mg Oral Daily    lactulose  45 g Oral TID    rifAXImin  550 mg Oral BID    thiamine  100 mg Oral Daily     PRN Meds:sodium chloride 0.9%, acetaminophen, albuterol-ipratropium, calcium carbonate, dextrose 50%, dextrose 50%, glucagon (human recombinant), glucose, glucose, HYDROmorphone, hydrOXYzine HCL, ondansetron, oxyCODONE, sodium chloride 0.9%    Family History     Problem Relation (Age of Onset)    COPD Mother        Tobacco Use    Smoking status: Current Some Day Smoker    Smokeless tobacco: Current User   Substance and Sexual Activity    Alcohol use: Yes     Alcohol/week: 112.0 standard drinks     Types: 112 Shots of liquor per week     Comment: fifth of vodka a day. LAST DRINK 7/25/2020 per pt     Drug use: Yes      Frequency: 3.0 times per week     Types: Marijuana    Sexual activity: Yes       Review of Systems   Constitutional: Negative for appetite change and fatigue.   HENT: Negative for congestion and trouble swallowing.    Respiratory: Negative for cough and shortness of breath.    Cardiovascular: Negative for chest pain and palpitations.   Gastrointestinal: Positive for abdominal distention. Negative for abdominal pain, nausea and vomiting.   Genitourinary: Negative for difficulty urinating and flank pain.   Musculoskeletal: Positive for arthralgias. Negative for myalgias.   Skin: Negative for rash and wound.   Neurological: Negative for dizziness and light-headedness.   Psychiatric/Behavioral: Negative for agitation and behavioral problems.     Objective:     Vital Signs (Most Recent):  Temp: 98.4 °F (36.9 °C) (09/25/20 1315)  Pulse: 100 (09/25/20 1315)  Resp: 19 (09/25/20 1315)  BP: 127/75 (09/25/20 1315)  SpO2: 97 % (09/25/20 1315) Vital Signs (24h Range):  Temp:  [98.1 °F (36.7 °C)-98.8 °F (37.1 °C)] 98.4 °F (36.9 °C)  Pulse:  [] 100  Resp:  [16-20] 19  SpO2:  [95 %-97 %] 97 %  BP: (114-155)/(56-76) 127/75     Weight: 118.5 kg (261 lb 3.9 oz)  Body mass index is 35.43 kg/m².    Physical Exam  Vitals signs and nursing note reviewed.   Constitutional:       General: He is not in acute distress.     Appearance: He is ill-appearing.   HENT:      Head: Normocephalic and atraumatic.   Eyes:      General: Scleral icterus present.      Extraocular Movements: Extraocular movements intact.   Neck:      Musculoskeletal: Normal range of motion and neck supple.   Cardiovascular:      Rate and Rhythm: Normal rate and regular rhythm.   Pulmonary:      Effort: Pulmonary effort is normal. No respiratory distress.   Abdominal:      General: There is distension.      Palpations: Abdomen is soft.   Musculoskeletal:         General: No deformity.      Left knee: Tenderness found.   Skin:     General: Skin is warm and dry.    Neurological:      Mental Status: He is alert and oriented to person, place, and time.   Psychiatric:         Mood and Affect: Mood normal.         Behavior: Behavior normal.         Review of Symptoms    Symptom Assessment (ESAS 0-10 Scale)  Pain:  6  Dyspnea:  0  Anxiety:  0  Nausea:  0  Depression:  0  Anorexia:  0  Fatigue:  0  Insomnia:  0  Restlessness:  0  Agitation:  0     CAM / Delirium:  Negative  Constipation:  Negative  Diarrhea:  Negative    Bowel Management Plan (BMP):  Yes      Pain Assessment:  Location(s): leg (knee)    Leg       Leg Location:  Left       Quality:  Throbbing       Quantity:  6/10 in intensity       Chronicity:  Onset 2 day(s) ago       Aggravating Factors:  Movement       Alleviating Factors:  Recumbency       Associated Symptoms:  None    OME in 24 hours:  20    Performance Status:  50    ECOG Performance Status Grade:  3 - Confined to bed or chair 50% of waking hours    Living Arrangements:  Lives with friend    Psychosocial/Cultural: Patient is  from his wife and lives with his girlfriend Marnie. He has a 21 year old son. He has two brothers. His mother is still living but his father is . He was working as a  until the COVID-19 pandemic. His goal is to get a liver transplant.    Spiritual:  F - Carissa and Belief:  Pentecostalism      Advance Care Planning   Advance Directives:   Living Will: No    LaPOST: No    Do Not Resuscitate Status: No    Medical Power of : Yes    Agent's Name:  Didier Page   Agent's Contact Number:  [phone]    Decision Making:  Patient answered questions         Significant Labs: All pertinent labs within the past 24 hours have been reviewed.  CBC:   Recent Labs   Lab 20  0431   WBC 9.26   HGB 9.0*   HCT 26.8*   *   PLT 55*     BMP:  Recent Labs   Lab 20  043   GLU 89   *   K 4.2      CO2 14*   BUN 42*   CREATININE 1.6*   CALCIUM 9.5     LFT:  Lab Results   Component Value Date    AST 75  (H) 09/25/2020    ALKPHOS 134 09/25/2020    BILITOT 26.1 (H) 09/25/2020     Albumin:   Albumin   Date Value Ref Range Status   09/25/2020 3.4 (L) 3.5 - 5.2 g/dL Final     Protein:   Total Protein   Date Value Ref Range Status   09/25/2020 5.3 (L) 6.0 - 8.4 g/dL Final     Lactic acid:   No results found for: LACTATE    Significant Imaging: I have reviewed all pertinent imaging results/findings within the past 24 hours.      20 minutes spent in advanced care planning    Robyn Dixon NP  Palliative Medicine  [facility]

## 2020-09-29 NOTE — PLAN OF CARE
09/29/20 1838   Discharge Reassessment   Assessment Type Discharge Planning Reassessment   Discharge Plan A Home   Discharge Plan B Home   DME Needed Upon Discharge  other (see comments)  (TBD)   Anticipated Discharge Disposition Home   Post-Acute Status   Post-Acute Authorization Other   Other Status No Post-Acute Service Needs

## 2020-09-29 NOTE — PROGRESS NOTES
Progress Note  Hospital Medicine  [facility]       Patient Name: Jordan Altman  MRN:  [id number]  Hospital Medicine Team: [facility] MED L Willie Villa MD  Date of Admission:  9/19/2020     Length of Stay:  LOS: 9 days   Expected Discharge Date: 10/2/2020  Principal Problem:  Decompensated hepatic cirrhosis     Subjective:     Interval History/Overnight Events:  Patient continues to do well; under going liver transplant evaluation, planning for EUS/ERCP in AM due to impacted CBD stones; trying to correct coagulopathy prior to procedure with cryo in the AM; presentation Wednesday      acetaminophen  1,000 mg Oral BID    ciprofloxacin HCl  500 mg Oral Q24H    [START ON 9/29/2020] DIPH,PERTUSS(ACEL),TET VAC(PF) (ADULT)  0.5 mL Intramuscular Once    docusate sodium  100 mg Oral Daily    folic acid  1 mg Oral Daily    [START ON 9/29/2020] hepatitis A virus vaccine (PF)  1,440 Units Intramuscular Once    lactulose  30 g Oral TID    [START ON 9/29/2020] pneumoc 13-jovita conj-dip cr(PF)  0.5 mL Intramuscular Once    polyethylene glycol  17 g Oral BID    rifAXImin  550 mg Oral BID    thiamine  100 mg Oral Daily    zinc sulfate  220 mg Oral Daily           sodium chloride, sodium chloride, sodium chloride, albuterol-ipratropium, calcium carbonate, dextrose 50%, dextrose 50%, glucagon (human recombinant), glucose, glucose, hydrOXYzine HCL, [START ON 9/29/2020] influenza, ondansetron, oxyCODONE, sodium chloride 0.9%, sodium chloride 0.9%, sodium chloride 0.9%, sodium chloride 0.9%, traMADoL    Review of Systems   Constitutional: Negative for chills, fatigue, fever.   HENT: Negative for sore throat, trouble swallowing.    Eyes: Negative for photophobia, visual disturbance.   Respiratory: Negative for cough, shortness of breath.    Cardiovascular: Negative for chest pain, palpitations, leg swelling.   Gastrointestinal: Negative for abdominal pain, constipation, diarrhea, nausea,  vomiting.   Endocrine: Negative for cold intolerance, heat intolerance.   Genitourinary: Negative for dysuria, frequency.   Musculoskeletal: Negative for arthralgias, myalgias.   Skin: Negative for rash, wound, erythema   Neurological: Negative for dizziness, syncope, weakness, light-headedness.   Psychiatric/Behavioral: Negative for confusion, hallucinations, anxiety  All other systems reviewed and are negative.    Objective:     Temp:  [97.6 °F (36.4 °C)-98.9 °F (37.2 °C)]   Pulse:  []   Resp:  [18-21]   BP: (103-171)/(63-87)   SpO2:  [93 %-99 %]         Weight change:    Body mass index is 35.43 kg/m².       Physical Exam:  Constitutional:chronically ill looking,  non-distressed, not diaphoretic.   HENT: NC/AT, external ears normal  Eyes: PERRL, EOMI, conjunctiva normal, no discharge b/l, +scleral icterus   Neck: normal ROM, supple  CV: RRR, no m/r/g, no carotid bruits, +2 peripheral pulses.  Pulmonary/Chest wall: Breathing comfortably w/o distress, CTAB, no w/r/r, no crackles.  Decreased breath sounds at lung bases  GI: Soft, non-tender, (+) BS, (+) BM   +distended  Obese   Musculoskeletal: Normal ROM, no atrophy,  no pitting edema in LE bilaterally   Neurological: AAO x 4, no focal deficits noted   +asterixis   Skin: warm, dry   +pallor, jaundice, + spider angiomas, +bruising   Multiple tattoos   Psych: normal mood and affect, normal behavior, thought content and judgement.    Labs:    Chemistries:   Recent Labs   Lab 09/24/20  0546  09/26/20  0342 09/27/20  0434 09/28/20  0712   *   < > 136 135* 135*   K 4.5   < > 4.3 3.7 3.9      < > 108 108 109   CO2 18*   < > 16* 12* 17*   BUN 46*   < > 42* 43* 46*   CREATININE 1.8*   < > 2.0* 2.0* 2.0*   CALCIUM 9.2   < > 9.5 9.1 9.3   PROT 5.1*   < > 5.2* 4.9* 5.1*   BILITOT 26.0*   < > 26.9* 24.2* 24.0*   ALKPHOS 132   < > 134 117 157*   ALT 38   < > 36 30 35   AST 82*   < > 80* 74* 85*   MG 2.0  --  2.2  --  2.0   PHOS 3.3  --  3.5  --  4.2    < > =  values in this interval not displayed.        WBC:   Recent Labs   Lab 09/24/20  0546 09/25/20  0431 09/26/20  0342 09/27/20  0434 09/28/20  0712   WBC 9.25 9.26 11.49 9.46 11.87     Bands:     CBC/Anemia Labs: Coags:    Recent Labs   Lab 09/26/20  0342 09/27/20  0434 09/28/20  0712   WBC 11.49 9.46 11.87   HGB 8.3* 8.1* 8.4*   HCT 25.1* 24.5* 25.3*   PLT 54* 42* 53*   * 104* 103*   RDW 18.3* 18.0* 17.6*    Recent Labs   Lab 09/26/20  0342 09/27/20  0434 09/28/20  0712   INR 2.3* 2.5* 2.7*          Assessment and Plan     Hospital Course:    Mr. Jordan Altman was admitted to Hospital Medicine for management of  Decompensated hepatic cirrhosis     Active Hospital Problems    Diagnosis  POA    *Decompensated hepatic cirrhosis [K72.90]  Yes    Left knee pain [M25.562]  Yes    Encounter for palliative care [Z51.5]  Not Applicable    Counseling regarding advanced care planning and goals of care [Z71.89]  Not Applicable    Pulmonary HTN [I27.20]  Yes     PA pressure 73 on echo 9/19/20      Alcohol use disorder, severe, dependence [F10.20]  Yes     Chronic    Encounter for pre-transplant evaluation for liver transplant [Z01.818]  Not Applicable    Macrocytic anemia [D53.9]  Yes    DIC (disseminated intravascular coagulation) [D65]  Yes    Hypertension [I10]  Yes    Hx of SBP (spontaneous bacterial peritonitis) [K65.2]  Yes    Ascites due to alcoholic cirrhosis [K70.31]  Yes    Thrombocytopenia [D69.6]  Yes    Coagulopathy [D68.9]  Yes    JENN (acute kidney injury) [N17.9]  Yes    Portal hypertension [K76.6]  Yes    Chronic Hepatic encephalopathy [K72.90]  Yes    Hyponatremia [E87.1]  Yes      Resolved Hospital Problems   No resolved problems to display.     Decompensated alcoholic cirrhosis with Ascites   Hx of SBP     MELD-Na score: 36 at 9/28/2020  7:12 AM  MELD score: 36 at 9/28/2020  7:12 AM  Calculated from:  Serum Creatinine: 2.0 mg/dL at 9/28/2020  7:12 AM  Serum Sodium: 135 mmol/L at  9/28/2020  7:12 AM  Total Bilirubin: 24.0 mg/dL at 9/28/2020  7:12 AM  INR(ratio): 2.7 at 9/28/2020  7:12 AM  Age: 45 years 4 months     -No signs of active bleeding or infection   -he is afebrile and has no leukocytosis   -cx negative   -US liver with doppler- reviewed   -vitamin K for coagulopathy   -pending PETH   -hepatology following  - para on 9/21 - IR paracentesis attempted but insufficient ascites identified for safe paracentesis  - PO cipro daily for SBP ppx given hx of SBP  - psychiatry consulted - high risk   - liver transplant evaluation started on 09/30/2020 after right heart catheterization showed acceptable PA pressures  - nuclear stress test negative for ischemia  - MRI abdomen shows new filling defect; no masses    Acute on chronic Hepatic encephalopathy   - HE improved, cont lactulose and rifaximin and zinc    # Choledocholithiasis   - new filling defect on MRI ab, plan for AES consult Tuesday for EUS/ERCP     JENN   -concern for iATN from volume depletion vs hepatorenal syndrome (HRS)    -hold home diuretics (lasix, spironolactone)  -will hold off midodrine for now given SBP ~120  -reviewed UA, urine sodium, renal US   -monitor renal function closely and avoid nephrotoxic agents  -consider nephrology consult if does not improve with above   - Cr stable today,     Portal hypertension  - PA pressures of 73 on echo from 9/19.  CVP 3.  No significant volume overload  -s/p RHC on 9/12 -with favorable results, with PA pressure acceptable for liver transplant.      Hyponatremia    -likely due to decompensated cirrhosis and diuretic use  -hold diuretics   -monitor closely with daily labs   - IV albumin and IV NS   - improving appropriately - sodium 130     Alcohol abuse   -completed rehab in the past but relapsed   -quit drinking July of this year and committed to stay sober  -thiamine, folic acid daily   -check UDS   -addiction psychiatry consult     Coagulopathy  Thrombocytopenia   Macrocytic anemia  -  2/2 liver disease  - stable  Monitor     Diet:  Low Na with fluid restriction   GI PPx:  PO PPI  DVT PPx:  SCDs due to coagulopathy   Goals of Care:  Full     High Risk Conditions:  Decompensated cirrhosis      Disposition:    TBD- undergoing OLT testing ; presentation Wednesday

## 2020-09-29 NOTE — ASSESSMENT & PLAN NOTE
Impression:  Palliative care consulted for goals of care discussion/advanced care planning for this 44 y/o male being followed by Hospital Medicine, Hepatology, Liver Transplant Surgery, and Orthopedics for Decompensated alcoholic cirrhosis with ascites, Hepatic encephalopathy, JENN, Portal hypertension, Hyponatremia, Coagulopathy, Thrombocytopenia, Macrocytic anemia, Alcohol abuse, and Left knee pain. IR paracentesis attempted on  and again on  but no ascites identified for safe paracentesis. Liver transplant evaluation initiated. Patient is s/p RHC on . Nuclear stress today was negative for ischemia. Orthopedics consulted for L knee pain. Patient is AAOx4 on exam today; reports that he has been feeling a little confused although he answers all questions appropriately.    Plan/Recommendations:  1. See goals of care discussion below.  2. Liver transplant evaluation initiated; continue current plan of care.  3. MPOA paperwork completed today; patient declines to complete living will at this time.  4. Palliative care will follow up next week.    Decompensated alcoholic cirrhosis with ascites/Hepatic encephalopathy/JENN/Portal hypertension/Hyponatremia/Coagulopathy/Thrombocytopenia/Macrocytic anemia/Alcohol abuse - management per primary team and other consultants    L knee pain - taking Oxycodone 5 mg PO Q4H PRN pain, orthopedics consulted    Goals of Care/Advance Care Plannin/25 Follow up visit held today by this NP and PAULETTE Castro LCSW. Patient is AAOx4 on exam today; reports that he has been feeling a little confused although he answers all questions appropriately. Liver transplant evaluation is underway and patient's stated goal is to get a transplant.    During this visit, we engaged the patient in the advance care planning process. We reviewed the role for advance directives and their purpose in directing future healthcare if the patient's unable to speak for himself. At this point in time, the patient  "does have full decision-making capacity. We discussed different extreme health states that he could experience, and reviewed what kind of medical care he would want in those situations. The patient communicated that if he were comatose and had little chance of a meaningful recovery, he would not want machines/life-sustaining treatments used. He states, "I wouldn't want to be kept on machines if I wasn't going to be able to get off." Offered to assist patient with completion of a living will today but he declines to do so at this time. He would like to read through the advanced directives packet first and has my contact information to call with any questions.    Advance Care Planning     Power of   We initiated the process of advance care planning today and explained the importance of this process to the patient. Then the patient received detailed information about the importance of designating a Health Care Power of  (HCPOA). He was also instructed to communicate with this person about their wishes for future healthcare, should he become sick and lose decision-making capacity. The patient has not previously appointed a HCPOA. After our discussion, the patient has decided to complete a HCPOA and has appointed his brother Didier Page.     ________________________    9/24 Conference conducted with patient to discuss his current clinical status, goals of care, code status, long term expected outcomes and prognostic awareness. Patient is oriented x 3 but inattentive and slow to respond to questions. Attempted to have a discussion concerning the patients values and wishes but it is difficult to get patient to engage in meaningful conversation at this time. He is able to state that his goal is to get a liver transplant.    Patient has no advanced directives on file in Epic and is currently a full code. Asked patient who he would want to make his medical decisions in the event he were unable to speak for " himself; patient states that he would want his brother Didier to be his decision maker. Will follow up with patient regarding completion of advanced directives when he is more alert.

## 2020-09-29 NOTE — PT/OT/SLP PROGRESS
Physical Therapy Treatment    Patient Name:  Jordan Altman   MRN:  [id number]    Recommendations:     Discharge Recommendations:  home   Discharge Equipment Recommendations: none   Barriers to discharge: None    Assessment:     Jordan Altman is a 45 y.o. male admitted with a medical diagnosis of Decompensated hepatic cirrhosis.  He presents with the following impairments/functional limitations:  weakness, impaired endurance, impaired self care skills, impaired functional mobilty, gait instability, impaired balance, decreased lower extremity function, pain. Pt tolerates gait training with use of single point cane this day. Pt manages SPC well and demonstrates decreased antalgic gait. Pt will continue to benefit from therapy services to address impairments listed above.     Rehab Prognosis: Good; patient would benefit from acute skilled PT services to address these deficits and reach maximum level of function.    Recent Surgery: Procedure(s) (LRB):  INSERTION, CATHETER, RIGHT HEART (Right) 6 Days Post-Op    Plan:     During this hospitalization, patient to be seen 3 x/week to address the identified rehab impairments via gait training, therapeutic activities, therapeutic exercises, neuromuscular re-education and progress toward the following goals:    · Plan of Care Expires:  10/27/20    Subjective     Chief Complaint: back pain/stiffness  Pain/Comfort:  · Pain Rating 1: 4/10  · Location - Orientation 1: generalized  · Location 1: back  · Pain Addressed 1: Distraction, Reposition  · Pain Rating Post-Intervention 1: other (see comments)(did not rate)      Objective:     Communicated with NSG prior to session.  Patient found supine with telemetry upon PTA entry to room.     General Precautions: Standard, fall   Orthopedic Precautions:N/A   Braces: N/A     Functional Mobility:  · Bed Mobility:     · Supine to Sit: modified independence  · Sit to Supine: modified independence  · Transfers:     · Sit to Stand:   independence with no AD  · Gait: Pt ambulates ~300 ft with SPC in RUE and Supervision. Pt with decreased antalgic gait and mild relief of L knee pain reported. DURHAM and increased lateral sway noted. Single standing rest required.       AM-PAC 6 CLICK MOBILITY  Turning over in bed (including adjusting bedclothes, sheets and blankets)?: 4  Sitting down on and standing up from a chair with arms (e.g., wheelchair, bedside commode, etc.): 3  Moving from lying on back to sitting on the side of the bed?: 4  Moving to and from a bed to a chair (including a wheelchair)?: 3  Need to walk in hospital room?: 3  Climbing 3-5 steps with a railing?: 3  Basic Mobility Total Score: 20     Patient left sitting EOB with all lines intact and call button in reach..    GOALS:   Multidisciplinary Problems     Physical Therapy Goals        Problem: Physical Therapy Goal    Goal Priority Disciplines Outcome Goal Variances Interventions   Physical Therapy Goal     PT, PT/OT Ongoing, Progressing     Description: Goals to be met by: 10/7/20     Patient will increase functional independence with mobility by performin. Sit to stand transfer with [place]  2. Gait  x 100 feet with Modified [place] using LRAD, if needed.                      Time Tracking:     PT Received On: 20  PT Start Time: 1450     PT Stop Time: 1513  PT Total Time (min): 23 min     Billable Minutes: Gait Training 15 and Therapeutic Activity 8    Treatment Type: Treatment  PT/PTA: PTA     PTA Visit Number: 1     Ace Morgan, JAMES  2020

## 2020-09-29 NOTE — PHYSICIAN QUERY
PT Name: Jordan Altman  MR #: [id number]     Documentation Clarification      CDS/: Vandana Jacobs RN, CDS               Contact information: [email or web address]      This form is a permanent document in the medical record.     Query Date: September 29, 2020    By submitting this query, we are merely seeking further clarification of documentation. Please utilize your independent clinical judgment when addressing the question(s) below.    The Medical Record reflects the following:    Supporting Clinical Findings Location in Medical Record   45 year old male with PMH of alcohol abuse, alcoholic cirrhosis diagnosed one year ago and complicated by ascites, portal hypertension, hepatic encephalopathy, thrombocytopenia, coagulopathy presents as a transfer from [facility] for management of acute decompensated cirrhosis    JENN   -likely hepatorenal syndrome (HRS)   -will continue albumin and give 500 cc NS fluid bolus for intravascular volume depletion   -hold home diuretics (lasix, spironolactone)  -will hold off midodrine for now given SBP ~120  -check UA, urine sodium, renal US   -monitor renal function closely and avoid nephrotoxic agents  -consider nephrology consult if does not improve with above     concern for iATN from volume depletion vs hepatorenal syndrome (HRS)     Labs improving...Cr down to 1.8    concern for iATN from volume depletion vs hepatorenal syndrome (HRS)   Cr rising today, giving 2 doses of     BUN: 62 -- 58 --> 42 --> 36 --> 46 --> 42 --> 43 --> 46   Cr:  2.4 --> 1.8 --> 1.6 --> 1.8 --> 1.6 --> 2.0 --> 1.7   GFR: 31.0 --> 34.4 --> 47.0 --> 50.6 --> 43.8 --> 50.6 --> 38.6 -->47.0     BP: (109-135)/(53-78) 112/53  BP: (112-136)/(55-71)   BP: (112-145)/(52-69)   BP: (103-143)/(59-69)   BP: (105-123)/(53-71)  BP: (128-156)/(66-82)   BP: (104-132)/(56-76)   BP: (119-134)/(73-80)  BP: (109-137)/(62-78)  BP: (103-171)/(63-87)  H&P 9/19             H&P 9/19                      HM PN 9/20         HM PN 9/26       Labs 9/19- 9/29   Labs 9/19- 9/29   Labs 9/19- 9/29     H&P 9/19   HM PN 9/20   HM PN 9/21  HM PN 9/22  HM PN 9/23   HM PN 9/24   HM PN 9/25   HM PN 9/26   HM PN 9/27   HM PN 9/28                                                                               Provider, please clarify a diagnosis associated with the above clinical findings     [ x  ] ATN   [   ] HRS   [   ] Both ATN and HRS present    [   ] Other clarification (please specify): ____________   [  ] Clinically undetermined                                                                                                           Present on admission (POA) status:   [   ] Yes (Y)                          [  ] Clinically Undetermined (W)  [   ] No (N)                            [   ] Documentation insufficient to determine if condition is POA (U)

## 2020-09-29 NOTE — PLAN OF CARE
Patient alert and oriented x4, patient c/o nausea this am and given prn medication with relief. Plan of care went over with patient, 1 unit of plasma given, no acute distress noted, resp even and unlabored, ongoing assessment being made. Patient with multiple bowel movements, call light within reach, patient showered.

## 2020-09-30 ENCOUNTER — COMMITTEE REVIEW (OUTPATIENT)
Dept: TRANSPLANT | Facility: CLINIC | Age: 45
End: 2020-09-30

## 2020-09-30 LAB
ALBUMIN SERPL BCP-MCNC: 2.9 G/DL (ref 3.5–5.2)
ALP SERPL-CCNC: 130 U/L (ref 55–135)
ALT SERPL W/O P-5'-P-CCNC: 32 U/L (ref 10–44)
ANION GAP SERPL CALC-SCNC: 11 MMOL/L (ref 8–16)
AST SERPL-CCNC: 78 U/L (ref 10–40)
BASOPHILS # BLD AUTO: 0.06 K/UL (ref 0–0.2)
BASOPHILS NFR BLD: 0.8 % (ref 0–1.9)
BILIRUB SERPL-MCNC: 21.2 MG/DL (ref 0.1–1)
BUN SERPL-MCNC: 39 MG/DL (ref 6–20)
CALCIUM SERPL-MCNC: 8.8 MG/DL (ref 8.7–10.5)
CHLORIDE SERPL-SCNC: 110 MMOL/L (ref 95–110)
CO2 SERPL-SCNC: 15 MMOL/L (ref 23–29)
CREAT SERPL-MCNC: 1.6 MG/DL (ref 0.5–1.4)
DIFFERENTIAL METHOD: ABNORMAL
EOSINOPHIL # BLD AUTO: 0.3 K/UL (ref 0–0.5)
EOSINOPHIL NFR BLD: 4 % (ref 0–8)
ERYTHROCYTE [DISTWIDTH] IN BLOOD BY AUTOMATED COUNT: 18.1 % (ref 11.5–14.5)
EST. GFR  (AFRICAN AMERICAN): 58.4 ML/MIN/1.73 M^2
EST. GFR  (NON AFRICAN AMERICAN): 50.6 ML/MIN/1.73 M^2
GLUCOSE SERPL-MCNC: 92 MG/DL (ref 70–110)
HCT VFR BLD AUTO: 24.4 % (ref 40–54)
HGB BLD-MCNC: 8.2 G/DL (ref 14–18)
IMM GRANULOCYTES # BLD AUTO: 0.13 K/UL (ref 0–0.04)
IMM GRANULOCYTES NFR BLD AUTO: 1.7 % (ref 0–0.5)
INR PPP: 2.1 (ref 0.8–1.2)
LYMPHOCYTES # BLD AUTO: 0.9 K/UL (ref 1–4.8)
LYMPHOCYTES NFR BLD: 11.4 % (ref 18–48)
M TB IFN-G BLD-IMP: NEGATIVE
MAGNESIUM SERPL-MCNC: 1.8 MG/DL (ref 1.6–2.6)
MCH RBC QN AUTO: 34.5 PG (ref 27–31)
MCHC RBC AUTO-ENTMCNC: 33.6 G/DL (ref 32–36)
MCV RBC AUTO: 103 FL (ref 82–98)
MONOCYTES # BLD AUTO: 1 K/UL (ref 0.3–1)
MONOCYTES NFR BLD: 13.2 % (ref 4–15)
NEUTROPHILS # BLD AUTO: 5.2 K/UL (ref 1.8–7.7)
NEUTROPHILS NFR BLD: 68.9 % (ref 38–73)
NRBC BLD-RTO: 0 /100 WBC
PHOSPHATE SERPL-MCNC: 3.4 MG/DL (ref 2.7–4.5)
PLATELET # BLD AUTO: 47 K/UL (ref 150–350)
PMV BLD AUTO: 11.8 FL (ref 9.2–12.9)
POTASSIUM SERPL-SCNC: 3.8 MMOL/L (ref 3.5–5.1)
PROT SERPL-MCNC: 4.9 G/DL (ref 6–8.4)
PROTHROMBIN TIME: 22.5 SEC (ref 9–12.5)
RBC # BLD AUTO: 2.38 M/UL (ref 4.6–6.2)
SARS-COV-2 RDRP RESP QL NAA+PROBE: NEGATIVE
SODIUM SERPL-SCNC: 136 MMOL/L (ref 136–145)
WBC # BLD AUTO: 7.56 K/UL (ref 3.9–12.7)

## 2020-09-30 PROCEDURE — 20600001 HC STEP DOWN PRIVATE ROOM: Mod: NTX

## 2020-09-30 PROCEDURE — 99233 PR SUBSEQUENT HOSPITAL CARE,LEVL III: ICD-10-PCS | Mod: NTX,,, | Performed by: HOSPITALIST

## 2020-09-30 PROCEDURE — 99233 PR SUBSEQUENT HOSPITAL CARE,LEVL III: ICD-10-PCS | Mod: NTX,,, | Performed by: NURSE PRACTITIONER

## 2020-09-30 PROCEDURE — 25000003 PHARM REV CODE 250: Mod: NTX | Performed by: HOSPITALIST

## 2020-09-30 PROCEDURE — 90472 IMMUNIZATION ADMIN EACH ADD: CPT | Mod: NTX | Performed by: NURSE PRACTITIONER

## 2020-09-30 PROCEDURE — 99233 PR SUBSEQUENT HOSPITAL CARE,LEVL III: ICD-10-PCS | Mod: NTX,,, | Performed by: INTERNAL MEDICINE

## 2020-09-30 PROCEDURE — 85610 PROTHROMBIN TIME: CPT | Mod: NTX

## 2020-09-30 PROCEDURE — 99233 SBSQ HOSP IP/OBS HIGH 50: CPT | Mod: NTX,,, | Performed by: NURSE PRACTITIONER

## 2020-09-30 PROCEDURE — 94761 N-INVAS EAR/PLS OXIMETRY MLT: CPT | Mod: NTX

## 2020-09-30 PROCEDURE — 99497 PR ADVNCD CARE PLAN 30 MIN: ICD-10-PCS | Mod: NTX,,, | Performed by: NURSE PRACTITIONER

## 2020-09-30 PROCEDURE — 90471 IMMUNIZATION ADMIN: CPT | Mod: NTX | Performed by: NURSE PRACTITIONER

## 2020-09-30 PROCEDURE — 99233 SBSQ HOSP IP/OBS HIGH 50: CPT | Mod: NTX,,, | Performed by: INTERNAL MEDICINE

## 2020-09-30 PROCEDURE — U0002 COVID-19 LAB TEST NON-CDC: HCPCS | Mod: NTX

## 2020-09-30 PROCEDURE — 80053 COMPREHEN METABOLIC PANEL: CPT | Mod: NTX

## 2020-09-30 PROCEDURE — 90670 PCV13 VACCINE IM: CPT | Mod: NTX | Performed by: NURSE PRACTITIONER

## 2020-09-30 PROCEDURE — 63600175 PHARM REV CODE 636 W HCPCS: Mod: NTX | Performed by: NURSE PRACTITIONER

## 2020-09-30 PROCEDURE — 85025 COMPLETE CBC W/AUTO DIFF WBC: CPT | Mod: NTX

## 2020-09-30 PROCEDURE — 83735 ASSAY OF MAGNESIUM: CPT | Mod: NTX

## 2020-09-30 PROCEDURE — 99497 ADVNCD CARE PLAN 30 MIN: CPT | Mod: NTX,,, | Performed by: NURSE PRACTITIONER

## 2020-09-30 PROCEDURE — 84100 ASSAY OF PHOSPHORUS: CPT | Mod: NTX

## 2020-09-30 PROCEDURE — 99233 SBSQ HOSP IP/OBS HIGH 50: CPT | Mod: NTX,,, | Performed by: HOSPITALIST

## 2020-09-30 PROCEDURE — 90633 HEPA VACC PED/ADOL 2 DOSE IM: CPT | Mod: NTX | Performed by: NURSE PRACTITIONER

## 2020-09-30 RX ORDER — LACTULOSE 10 G/15ML
15 SOLUTION ORAL 3 TIMES DAILY
Status: DISCONTINUED | OUTPATIENT
Start: 2020-09-30 | End: 2020-10-02 | Stop reason: HOSPADM

## 2020-09-30 RX ADMIN — CIPROFLOXACIN 500 MG: 500 TABLET, FILM COATED ORAL at 08:09

## 2020-09-30 RX ADMIN — HEPATITIS A VACCINE 1440 UNITS: 720 INJECTION, SUSPENSION INTRAMUSCULAR at 06:09

## 2020-09-30 RX ADMIN — RIFAXIMIN 550 MG: 550 TABLET ORAL at 08:09

## 2020-09-30 RX ADMIN — PNEUMOCOCCAL 13-VALENT CONJUGATE VACCINE 0.5 ML: 2.2; 2.2; 2.2; 2.2; 2.2; 4.4; 2.2; 2.2; 2.2; 2.2; 2.2; 2.2; 2.2 INJECTION, SUSPENSION INTRAMUSCULAR at 02:09

## 2020-09-30 RX ADMIN — OXYCODONE HYDROCHLORIDE 5 MG: 5 TABLET ORAL at 08:09

## 2020-09-30 RX ADMIN — FOLIC ACID 1 MG: 1 TABLET ORAL at 08:09

## 2020-09-30 RX ADMIN — ZINC SULFATE 220 MG (50 MG) CAPSULE 220 MG: CAPSULE at 01:09

## 2020-09-30 RX ADMIN — LACTULOSE 15 G: 20 SOLUTION ORAL at 02:09

## 2020-09-30 RX ADMIN — LACTULOSE 15 G: 20 SOLUTION ORAL at 08:09

## 2020-09-30 RX ADMIN — Medication 100 MG: at 08:09

## 2020-09-30 NOTE — PROGRESS NOTES
Scanned patient clinicals to send to insurance company for listing clearance.  EMailed to , KeVitadonis

## 2020-09-30 NOTE — SUBJECTIVE & OBJECTIVE
Interval History: IR paracentesis attempted on 9/21 and again on 9/25 but no ascites identified for safe paracentesis. Liver transplant evaluation initiated. Patient is s/p RHC on 9/23. Nuclear stress on 9/25 was negative for ischemia. Orthopedics consulted for L knee pain which has improved. Scheduled for ERCP tomorrow as long as INR remains near or less than 2. Patient to be presented to transplant selection committee today. He is AAOx4 on exam this morning.    Past Medical History:   Diagnosis Date    Decompensated hepatic cirrhosis     Hypertension     SBP (spontaneous bacterial peritonitis)        Past Surgical History:   Procedure Laterality Date    RIGHT HEART CATHETERIZATION Right 9/23/2020    Procedure: INSERTION, CATHETER, RIGHT HEART;  Surgeon: Mikel Costa Jr., MD;  Location: [facility] CATH LAB;  Service: Cardiology;  Laterality: Right;    THORACENTESIS  2011       Review of patient's allergies indicates:   Allergen Reactions    Latex, natural rubber        Medications:  Continuous Infusions:    Scheduled Meds:   ciprofloxacin HCl  500 mg Oral Q24H    DIPH,PERTUSS(ACEL),TET VAC(PF) (ADULT)  0.5 mL Intramuscular Once    docusate sodium  100 mg Oral Daily    folic acid  1 mg Oral Daily    hepatitis A virus vaccine (PF)  1,440 Units Intramuscular Once    lactulose  15 g Oral TID    pneumoc 13-jovita conj-dip cr(PF)  0.5 mL Intramuscular Once    rifAXImin  550 mg Oral BID    thiamine  100 mg Oral Daily    zinc sulfate  220 mg Oral Daily     PRN Meds:albuterol-ipratropium, calcium carbonate, dextrose 50%, dextrose 50%, glucagon (human recombinant), glucose, glucose, hydrOXYzine HCL, influenza, ondansetron, oxyCODONE, traMADoL    Family History     Problem Relation (Age of Onset)    COPD Mother        Tobacco Use    Smoking status: Current Some Day Smoker    Smokeless tobacco: Current User   Substance and Sexual Activity    Alcohol use: Yes     Alcohol/week: 112.0 standard drinks     Types:  112 Shots of liquor per week     Comment: fifth of vodka a day. LAST DRINK 7/25/2020 per pt     Drug use: Yes     Frequency: 3.0 times per week     Types: Marijuana    Sexual activity: Yes       Review of Systems   Constitutional: Negative for appetite change and fatigue.   HENT: Negative for congestion and trouble swallowing.    Respiratory: Negative for cough and shortness of breath.    Cardiovascular: Negative for chest pain and palpitations.   Gastrointestinal: Positive for abdominal distention. Negative for abdominal pain, nausea and vomiting.   Genitourinary: Negative for difficulty urinating and flank pain.   Musculoskeletal: Positive for arthralgias (L knee, improved). Negative for myalgias.   Skin: Negative for rash and wound.   Neurological: Negative for dizziness and light-headedness.   Psychiatric/Behavioral: Negative for agitation and behavioral problems.     Objective:     Vital Signs (Most Recent):  Temp: 98 °F (36.7 °C) (09/30/20 0735)  Pulse: 94 (09/30/20 0735)  Resp: 20 (09/30/20 0832)  BP: 114/71 (09/30/20 0735)  SpO2: 97 % (09/30/20 0735) Vital Signs (24h Range):  Temp:  [97.8 °F (36.6 °C)-98.1 °F (36.7 °C)] 98 °F (36.7 °C)  Pulse:  [] 94  Resp:  [18-25] 20  SpO2:  [91 %-97 %] 97 %  BP: (114-138)/(71-79) 114/71     Weight: 118.5 kg (261 lb 3.9 oz)  Body mass index is 35.43 kg/m².    Physical Exam  Vitals signs and nursing note reviewed.   Constitutional:       General: He is not in acute distress.     Appearance: He is ill-appearing.   HENT:      Head: Normocephalic and atraumatic.   Eyes:      General: Scleral icterus present.      Extraocular Movements: Extraocular movements intact.   Neck:      Musculoskeletal: Normal range of motion and neck supple.   Cardiovascular:      Rate and Rhythm: Normal rate and regular rhythm.   Pulmonary:      Effort: Pulmonary effort is normal. No respiratory distress.   Abdominal:      General: There is distension.      Palpations: Abdomen is soft.    Musculoskeletal:         General: No deformity.      Left knee: Tenderness found.   Skin:     General: Skin is warm and dry.   Neurological:      Mental Status: He is alert and oriented to person, place, and time.   Psychiatric:         Mood and Affect: Mood normal.         Behavior: Behavior normal.         Review of Symptoms    Symptom Assessment (ESAS 0-10 Scale)  Pain:  0  Dyspnea:  0  Anxiety:  0  Nausea:  0  Depression:  0  Anorexia:  0  Fatigue:  0  Insomnia:  0  Restlessness:  0  Agitation:  0     CAM / Delirium:  Negative  Constipation:  Negative  Diarrhea:  Negative    Bowel Management Plan (BMP):  Yes      Comments:  Denies pain at this time, took PRN Oxycodone this am    Location Choices: knee.      OME in 24 hours:  10    Performance Status:  50    ECOG Performance Status Grade:  3 - Confined to bed or chair 50% of waking hours    Living Arrangements:  Lives with friend    Psychosocial/Cultural: Patient is  from his wife and lives with his girlfriend Marnie. He has a 21 year old son. He has two brothers. His mother is still living but his father is . He was working as a  until the COVID-19 pandemic. His goal is to get a liver transplant.    Spiritual:  F - Carissa and Belief:  Synagogue      Advance Care Planning   Advance Directives:   Living Will: No    LaPOST: No    Do Not Resuscitate Status: No    Medical Power of : Yes    Agent's Name:  Didier Page   Agent's Contact Number:  [phone]    Decision Making:  Patient answered questions         Significant Labs: All pertinent labs within the past 24 hours have been reviewed.  CBC:   Recent Labs   Lab 20   WBC 7.56   HGB 8.2*   HCT 24.4*   *   PLT 47*     BMP:  Recent Labs   Lab 20   GLU  --  92   NA  --  136   K  --  3.8   CL  --  110   CO2  --  15*   BUN  --  39*   CREATININE  --  1.6*   CALCIUM  --  8.8   MG 1.8  --      LFT:  Lab Results   Component Value Date     AST 78 (H) 09/30/2020    ALKPHOS 130 09/30/2020    BILITOT 21.2 (H) 09/30/2020     Albumin:   Albumin   Date Value Ref Range Status   09/30/2020 2.9 (L) 3.5 - 5.2 g/dL Final     Protein:   Total Protein   Date Value Ref Range Status   09/30/2020 4.9 (L) 6.0 - 8.4 g/dL Final     Lactic acid:   No results found for: LACTATE    Significant Imaging: I have reviewed all pertinent imaging results/findings within the past 24 hours.

## 2020-09-30 NOTE — ASSESSMENT & PLAN NOTE
Mr Altman is a 45 year old man with history of ETOH ELSD decompensations including ascites, HE, SBP (8/2020), HTN, obesity, c. Diff, who is presenting from OSH due to decompensated ESLD.    Recently hospitalized (8/2020) due to alc hep (last drink 7/25), complicated by SBP and bacteremia (strep mitis) presenting from OSH due to concern for abdominal distension/pain and jaundice, with MELD 38 (36 on 9/8).   Endorses abstinence since 7/2020 and appears enzymes and bili (Max 42 8/12) continue to decline. MELD now is 31. He continues to improve clinically with improvement of LFTs and clinical improvement. Txp evaluation completed. We will present case today at committee meeting. We discussed with the patient the importance of joining IOP following discharge. We are also planning a possible ERCP to evaluate distal CBD obstruction pending improvement of INR    Plan  - Strep bacteremia, completed treatment. Currently on Cipro for hx of SBP ppx.   - CBD dilation with possible choledocholithiasis: Plan for possible ERCP eval pending improvement of INR.   - elevated INR. FFP as indicated for procedure.   - JENN improving with fluids and treatment of infection.  - mental status appropriate, continue lactulose with target 3-4 BM/day  - ETOH abuse. Endorses abstinence since 7/2020. Addiction psych consulted. peth negative on 9/19. IOP recommended  - HCC screening. RUQ negative, AFP negative  - variceal status. Unknown, last EGD reportedly negative 2017  - transplant. Completed eval, will discuss at committee meeting today

## 2020-09-30 NOTE — COMMITTEE REVIEW
Jordan Altman's case presented to selection committee.  Patient has been accepted for liver transplant due to Alcoholic Cirrhosis and complications of end stage liver disease including hyperbilirubinemia, hypoalbuminemia, coagulopathy, ascites, severe malnutrition, encephalopathy, jaundice, portal hypertension, thrombocytopenia and edema, JENN, chronic anemia, weakness with a MELD score of 31.  Patient has no absolute contraindications for liver transplant.  Patient will be listed pending financial approval.    Patient will accept HBcAb positive livers.  Patient will accept HCVAB positive livers.  Patient will accept DCD livers.  Patient will accept HCV SHAYE positive livers  Patient will accept HBV SHAYE positive livers  I was present at the committee meeting and attest to the decision of the committee.    Krisotpher Richardsonondcharbel  10/01/2020

## 2020-09-30 NOTE — PROGRESS NOTES
Progress Note  Hospital Medicine  [facility] [place] [facility]       Patient Name: Jordan Altman  MRN:  [id number]  Hospital Medicine Team: [facility] HOSP MED L Willie Villa MD  Date of Admission:  9/19/2020     Length of Stay:  LOS: 10 days   Expected Discharge Date: 10/2/2020  Principal Problem:  Decompensated hepatic cirrhosis     Subjective:     Interval History/Overnight Events:  Patient continues to do well; under going liver transplant evaluation, presentation tomorrow, will decide when to do EUS/ERCP, likely not until thursday     ciprofloxacin HCl  500 mg Oral Q24H    [START ON 9/30/2020] DIPH,PERTUSS(ACEL),TET VAC(PF) (ADULT)  0.5 mL Intramuscular Once    docusate sodium  100 mg Oral Daily    folic acid  1 mg Oral Daily    [START ON 9/30/2020] hepatitis A virus vaccine (PF)  1,440 Units Intramuscular Once    lactulose  30 g Oral TID    [START ON 9/30/2020] pneumoc 13-jovita conj-dip cr(PF)  0.5 mL Intramuscular Once    polyethylene glycol  17 g Oral BID    rifAXImin  550 mg Oral BID    thiamine  100 mg Oral Daily    zinc sulfate  220 mg Oral Daily           albuterol-ipratropium, calcium carbonate, dextrose 50%, dextrose 50%, glucagon (human recombinant), glucose, glucose, hydrOXYzine HCL, influenza, ondansetron, oxyCODONE, traMADoL    Review of Systems   Constitutional: Negative for chills, fatigue, fever.   HENT: Negative for sore throat, trouble swallowing.    Eyes: Negative for photophobia, visual disturbance.   Respiratory: Negative for cough, shortness of breath.    Cardiovascular: Negative for chest pain, palpitations, leg swelling.   Gastrointestinal: Negative for abdominal pain, constipation, diarrhea, nausea, vomiting.   Endocrine: Negative for cold intolerance, heat intolerance.   Genitourinary: Negative for dysuria, frequency.   Musculoskeletal: Negative for arthralgias, myalgias.   Skin: Negative for rash, wound, erythema   Neurological: Negative for dizziness, syncope,  weakness, light-headedness.   Psychiatric/Behavioral: Negative for confusion, hallucinations, anxiety  All other systems reviewed and are negative.    Objective:     Temp:  [97.6 °F (36.4 °C)-98.1 °F (36.7 °C)]   Pulse:  []   Resp:  [18-26]   BP: ()/(60-79)   SpO2:  [91 %-97 %]         Weight change:    Body mass index is 35.43 kg/m².       Physical Exam:  Constitutional:chronically ill looking,  non-distressed, not diaphoretic.   HENT: NC/AT, external ears normal  Eyes: PERRL, EOMI, conjunctiva normal, no discharge b/l, +scleral icterus   Neck: normal ROM, supple  CV: RRR, no m/r/g, no carotid bruits, +2 peripheral pulses.  Pulmonary/Chest wall: Breathing comfortably w/o distress, CTAB, no w/r/r, no crackles.  Decreased breath sounds at lung bases  GI: Soft, non-tender, (+) BS, (+) BM   +distended  Obese   Musculoskeletal: Normal ROM, no atrophy,  no pitting edema in LE bilaterally   Neurological: AAO x 4, no focal deficits noted   +asterixis   Skin: warm, dry   +pallor, jaundice, + spider angiomas, +bruising   Multiple tattoos   Psych: normal mood and affect, normal behavior, thought content and judgement.    Labs:    Chemistries:   Recent Labs   Lab 09/24/20  0546  09/26/20  0342 09/27/20  0434 09/28/20  0712 09/29/20  0323   *   < > 136 135* 135* 135*   K 4.5   < > 4.3 3.7 3.9 3.8      < > 108 108 109 109   CO2 18*   < > 16* 12* 17* 15*   BUN 46*   < > 42* 43* 46* 46*   CREATININE 1.8*   < > 2.0* 2.0* 2.0* 1.7*   CALCIUM 9.2   < > 9.5 9.1 9.3 8.8   PROT 5.1*   < > 5.2* 4.9* 5.1* 5.0*   BILITOT 26.0*   < > 26.9* 24.2* 24.0* 23.9*   ALKPHOS 132   < > 134 117 157* 143*   ALT 38   < > 36 30 35 33   AST 82*   < > 80* 74* 85* 81*   MG 2.0  --  2.2  --  2.0  --    PHOS 3.3  --  3.5  --  4.2  --     < > = values in this interval not displayed.        WBC:   Recent Labs   Lab 09/25/20  0431 09/26/20  0342 09/27/20  0434 09/28/20  0712 09/29/20  0323   WBC 9.26 11.49 9.46 11.87 10.21     Bands:      CBC/Anemia Labs: Coags:    Recent Labs   Lab 09/27/20  0434 09/28/20  0712 09/29/20  0323   WBC 9.46 11.87 10.21   HGB 8.1* 8.4* 8.6*   HCT 24.5* 25.3* 25.7*   PLT 42* 53* 51*   * 103* 103*   RDW 18.0* 17.6* 17.8*    Recent Labs   Lab 09/27/20  0434 09/28/20  0712 09/29/20  0323   INR 2.5* 2.7* 2.4*          Assessment and Plan     Hospital Course:    Mr. Jordan Altman was admitted to Hospital Medicine for management of  Decompensated hepatic cirrhosis     Active Hospital Problems    Diagnosis  POA    *Decompensated hepatic cirrhosis [K72.90]  Yes    Organ transplant candidate [Z76.82]  Not Applicable    Left knee pain [M25.562]  Yes    Encounter for palliative care [Z51.5]  Not Applicable    Counseling regarding advanced care planning and goals of care [Z71.89]  Not Applicable    Pulmonary HTN [I27.20]  Yes     PA pressure 73 on echo 9/19/20      Alcohol use disorder, severe, dependence [F10.20]  Yes     Chronic    Encounter for pre-transplant evaluation for liver transplant [Z01.818]  Not Applicable    Macrocytic anemia [D53.9]  Yes    DIC (disseminated intravascular coagulation) [D65]  Yes    Hypertension [I10]  Yes    Hx of SBP (spontaneous bacterial peritonitis) [K65.2]  Yes    Ascites due to alcoholic cirrhosis [K70.31]  Yes    Thrombocytopenia [D69.6]  Yes    Coagulopathy [D68.9]  Yes    JENN (acute kidney injury) [N17.9]  Yes    Portal hypertension [K76.6]  Yes    Chronic Hepatic encephalopathy [K72.90]  Yes    Hyponatremia [E87.1]  Yes      Resolved Hospital Problems   No resolved problems to display.     Decompensated alcoholic cirrhosis with Ascites   Hx of SBP     MELD-Na score: 33 at 9/29/2020  3:23 AM  MELD score: 33 at 9/29/2020  3:23 AM  Calculated from:  Serum Creatinine: 1.7 mg/dL at 9/29/2020  3:23 AM  Serum Sodium: 135 mmol/L at 9/29/2020  3:23 AM  Total Bilirubin: 23.9 mg/dL at 9/29/2020  3:23 AM  INR(ratio): 2.4 at 9/29/2020  3:23 AM  Age: 45 years 4  months     -No signs of active bleeding or infection   -he is afebrile and has no leukocytosis   -cx negative   -US liver with doppler- reviewed   -vitamin K for coagulopathy   -pending PET   -hepatology following  - para on 9/21 - IR paracentesis attempted but insufficient ascites identified for safe paracentesis  - PO cipro daily for SBP ppx given hx of SBP  - psychiatry consulted - high risk   - liver transplant evaluation started on 09/30/2020 after right heart catheterization showed acceptable PA pressures  - nuclear stress test negative for ischemia  - MRI abdomen shows new filling defect; no masses    Acute on chronic Hepatic encephalopathy   - HE improved, cont lactulose and rifaximin and zinc    # Choledocholithiasis   - new filling defect on MRI ab, EUS/ERCP this week     JENN   -concern for iATN from volume depletion vs hepatorenal syndrome (HRS)    -hold home diuretics (lasix, spironolactone)  -will hold off midodrine for now given SBP ~120  -reviewed UA, urine sodium, renal US   -monitor renal function closely and avoid nephrotoxic agents  -consider nephrology consult if does not improve with above   - Cr stable today,     Portal hypertension  - PA pressures of 73 on echo from 9/19.  CVP 3.  No significant volume overload  -s/p RHC on 9/12 -with favorable results, with PA pressure acceptable for liver transplant.      Hyponatremia    -likely due to decompensated cirrhosis and diuretic use  -hold diuretics   -monitor closely with daily labs   - IV albumin and IV NS   - improving appropriately - sodium 130     Alcohol abuse   -completed rehab in the past but relapsed   -quit drinking July of this year and committed to stay sober  -thiamine, folic acid daily   -check UDS   -addiction psychiatry consult     Coagulopathy  Thrombocytopenia   Macrocytic anemia  - 2/2 liver disease  - stable  Monitor     Diet:  Low Na with fluid restriction   GI PPx:  PO PPI  DVT PPx:  SCDs due to coagulopathy   Goals of Care:   Full     High Risk Conditions:  Decompensated cirrhosis      Disposition:    TBD- undergoing OLT testing ; presentation Wednesday

## 2020-09-30 NOTE — PROGRESS NOTES
Progress Note  Hospital Medicine  [facility] [place] [facility]       Patient Name: Jordan Altman  MRN:  [id number]  Hospital Medicine Team: [facility] MED L Willie Villa MD  Date of Admission:  9/19/2020     Length of Stay:  LOS: 11 days   Expected Discharge Date: 10/2/2020  Principal Problem:  Decompensated hepatic cirrhosis     Subjective:     Interval History/Overnight Events:  Patient continues to do well; under going liver transplant evaluation, presentation today and EUS/ERCP tomorrow; patient's numbers are improving and mentation wise he is doing well    - patient approved for liver transplant, pending listing      ciprofloxacin HCl  500 mg Oral Q24H    DIPH,PERTUSS(ACEL),TET VAC(PF) (ADULT)  0.5 mL Intramuscular Once    docusate sodium  100 mg Oral Daily    folic acid  1 mg Oral Daily    hepatitis A virus vaccine (PF)  1,440 Units Intramuscular Once    lactulose  15 g Oral TID    pneumoc 13-jovita conj-dip cr(PF)  0.5 mL Intramuscular Once    rifAXImin  550 mg Oral BID    thiamine  100 mg Oral Daily    zinc sulfate  220 mg Oral Daily           albuterol-ipratropium, calcium carbonate, dextrose 50%, dextrose 50%, glucagon (human recombinant), glucose, glucose, hydrOXYzine HCL, influenza, ondansetron, oxyCODONE, traMADoL    Review of Systems   Constitutional: Negative for chills, fatigue, fever.   HENT: Negative for sore throat, trouble swallowing.    Eyes: Negative for photophobia, visual disturbance.   Respiratory: Negative for cough, shortness of breath.    Cardiovascular: Negative for chest pain, palpitations, leg swelling.   Gastrointestinal: Negative for abdominal pain, constipation, diarrhea, nausea, vomiting.   Endocrine: Negative for cold intolerance, heat intolerance.   Genitourinary: Negative for dysuria, frequency.   Musculoskeletal: Negative for arthralgias, myalgias.   Skin: Negative for rash, wound, erythema   Neurological: Negative for dizziness, syncope, weakness,  light-headedness.   Psychiatric/Behavioral: Negative for confusion, hallucinations, anxiety  All other systems reviewed and are negative.    Objective:     Temp:  [98 °F (36.7 °C)-98.1 °F (36.7 °C)]   Pulse:  [88-95]   Resp:  [18-25]   BP: (114-127)/(71-77)   SpO2:  [91 %-97 %]         Weight change:    Body mass index is 35.43 kg/m².       Physical Exam:  Constitutional:chronically ill looking,  non-distressed, not diaphoretic.   HENT: NC/AT, external ears normal  Eyes: PERRL, EOMI, conjunctiva normal, no discharge b/l, +scleral icterus   Neck: normal ROM, supple  CV: RRR, no m/r/g, no carotid bruits, +2 peripheral pulses.  Pulmonary/Chest wall: Breathing comfortably w/o distress, CTAB, no w/r/r, no crackles.  Decreased breath sounds at lung bases  GI: Soft, non-tender, (+) BS, (+) BM   +distended  Obese   Musculoskeletal: Normal ROM, no atrophy,  no pitting edema in LE bilaterally   Neurological: AAO x 4, no focal deficits noted   +asterixis   Skin: warm, dry   +pallor, jaundice, + spider angiomas, +bruising   Multiple tattoos   Psych: normal mood and affect, normal behavior, thought content and judgement.    Labs:    Chemistries:   Recent Labs   Lab 09/26/20  0342  09/28/20  0712 09/29/20  0323 09/30/20  0440 09/30/20  0441      < > 135* 135*  --  136   K 4.3   < > 3.9 3.8  --  3.8      < > 109 109  --  110   CO2 16*   < > 17* 15*  --  15*   BUN 42*   < > 46* 46*  --  39*   CREATININE 2.0*   < > 2.0* 1.7*  --  1.6*   CALCIUM 9.5   < > 9.3 8.8  --  8.8   PROT 5.2*   < > 5.1* 5.0*  --  4.9*   BILITOT 26.9*   < > 24.0* 23.9*  --  21.2*   ALKPHOS 134   < > 157* 143*  --  130   ALT 36   < > 35 33  --  32   AST 80*   < > 85* 81*  --  78*   MG 2.2  --  2.0  --  1.8  --    PHOS 3.5  --  4.2  --  3.4  --     < > = values in this interval not displayed.        WBC:   Recent Labs   Lab 09/26/20  0342 09/27/20  0434 09/28/20  0712 09/29/20  0323 09/30/20  0441   WBC 11.49 9.46 11.87 10.21 7.56     Bands:      CBC/Anemia Labs: Coags:    Recent Labs   Lab 09/28/20  0712 09/29/20  0323 09/30/20  0441   WBC 11.87 10.21 7.56   HGB 8.4* 8.6* 8.2*   HCT 25.3* 25.7* 24.4*   PLT 53* 51* 47*   * 103* 103*   RDW 17.6* 17.8* 18.1*    Recent Labs   Lab 09/28/20  0712 09/29/20  0323 09/30/20  0441   INR 2.7* 2.4* 2.1*          Assessment and Plan     Hospital Course:    Mr. Jordan Altman was admitted to Hospital Medicine for management of  Decompensated hepatic cirrhosis     Active Hospital Problems    Diagnosis  POA    *Decompensated hepatic cirrhosis [K72.90]  Yes    Organ transplant candidate [Z76.82]  Not Applicable    Left knee pain [M25.562]  Yes    Encounter for palliative care [Z51.5]  Not Applicable    Counseling regarding advanced care planning and goals of care [Z71.89]  Not Applicable    Pulmonary HTN [I27.20]  Yes     PA pressure 73 on echo 9/19/20      Alcohol use disorder, severe, dependence [F10.20]  Yes     Chronic    Encounter for pre-transplant evaluation for liver transplant [Z01.818]  Not Applicable    Macrocytic anemia [D53.9]  Yes    DIC (disseminated intravascular coagulation) [D65]  Yes    Hypertension [I10]  Yes    Hx of SBP (spontaneous bacterial peritonitis) [K65.2]  Yes    Ascites due to alcoholic cirrhosis [K70.31]  Yes    Thrombocytopenia [D69.6]  Yes    Coagulopathy [D68.9]  Yes    JENN (acute kidney injury) [N17.9]  Yes    Portal hypertension [K76.6]  Yes    Chronic Hepatic encephalopathy [K72.90]  Yes    Hyponatremia [E87.1]  Yes      Resolved Hospital Problems   No resolved problems to display.     Decompensated alcoholic cirrhosis with Ascites   Hx of SBP     MELD-Na score: 31 at 9/30/2020  4:41 AM  MELD score: 31 at 9/30/2020  4:41 AM  Calculated from:  Serum Creatinine: 1.6 mg/dL at 9/30/2020  4:41 AM  Serum Sodium: 136 mmol/L at 9/30/2020  4:41 AM  Total Bilirubin: 21.2 mg/dL at 9/30/2020  4:41 AM  INR(ratio): 2.1 at 9/30/2020  4:41 AM  Age: 45 years 4  months     -No signs of active bleeding or infection   -he is afebrile and has no leukocytosis   -cx negative   -US liver with doppler- reviewed   -vitamin K for coagulopathy   -pending PET   -hepatology following  - para on 9/21 - IR paracentesis attempted but insufficient ascites identified for safe paracentesis  - PO cipro daily for SBP ppx given hx of SBP  - psychiatry consulted - high risk   - liver transplant evaluation started on 09/30/2020 after right heart catheterization showed acceptable PA pressures  - nuclear stress test negative for ischemia  - MRI abdomen shows new filling defect; no masses    Acute on chronic Hepatic encephalopathy   - HE improved, cont lactulose and rifaximin and zinc    # Choledocholithiasis   - new filling defect on MRI ab, EUS/ERCP this week     JENN   -concern for iATN from volume depletion vs hepatorenal syndrome (HRS)    -hold home diuretics (lasix, spironolactone)  -will hold off midodrine for now given SBP ~120  -reviewed UA, urine sodium, renal US   -monitor renal function closely and avoid nephrotoxic agents  -consider nephrology consult if does not improve with above   - Cr stable today,     Portal hypertension  - PA pressures of 73 on echo from 9/19.  CVP 3.  No significant volume overload  -s/p RHC on 9/12 -with favorable results, with PA pressure acceptable for liver transplant.      Hyponatremia    -likely due to decompensated cirrhosis and diuretic use  -hold diuretics   -monitor closely with daily labs   - IV albumin and IV NS   - improving appropriately - sodium 130     Alcohol abuse   -completed rehab in the past but relapsed   -quit drinking July of this year and committed to stay sober  -thiamine, folic acid daily   -check UDS   -addiction psychiatry consult     Coagulopathy  Thrombocytopenia   Macrocytic anemia  - 2/2 liver disease  - stable  Monitor     # Dental Evaluation- on oral exam patient does not have any signs of infection and no teeth pain; patient's  X rays on maxilla and mandible do not show any abscess in his teeth; no contra-indication for liver transplant;     Diet:  Low Na with fluid restriction   GI PPx:  PO PPI  DVT PPx:  SCDs due to coagulopathy   Goals of Care:  Full     High Risk Conditions:  Decompensated cirrhosis      Disposition:    TBD- undergoing OLT testing ; presentation Wednesday

## 2020-09-30 NOTE — PLAN OF CARE
VSS. A/Ox4.  No complaints of pain.  NPO after MN for possible ERCP in AM.  No acute events overnight. Safety maintained.  All questions answered. Patient updated on plan of care.  Will continue to monitor.

## 2020-09-30 NOTE — PROGRESS NOTES
[facility]  Hepatology  Progress Note    Patient Name: Jordan Altman  MRN: [id number]  Admission Date: 9/19/2020  Hospital Length of Stay: 11 days  Attending Provider: Willie Villa MD   Primary Care Physician: SHAHEEN Jaime  Principal Problem:Decompensated hepatic cirrhosis    Subjective:     Transplant status: No    HPI: Mr Altman is a 45 year old man with history of ETOH ELSD decompensations including ascites, HE, SBP (8/2020), HTN, obesity, c. Diff, who is presenting from OSH due to decompensated ESLD.    He presented to OSH ([facility]) with 3 days of abdominal distension, pain and dyspnea. Was admitted to OSH on 9/17. Workup at OSH notable for JENN, hyponatremia.  On 9/17 labs with WBC 14, Hgb 12.7, , ALT 69, Tb 34, INR 2.3, Na 126, Crt 2.0. He was started on HRS therapy and empiric meropenem at transferred to [facility] for further evaluation.    He was hospitalized 8/2020 due to alcoholic hepatitis complicated by SBP and strep mitis bacteremia. Received steroids in hospital but not continued due to SBP. He was seen for hep follow-up 9/8 (Dr. Titus) with MELD of 36. Was referred for [facility] transplant evaluation (cleared, not yet started). Seen in follow-up 9/15 and doing well, endorsed ongoing distension and pruritis. Endorsed ongoing abstinence since 7/25/20.    Reports he presented outside hospital due to 3-4 days abdominal distention, pain and dyspnea.  Endorses some confusion though has been compliant with his lactulose.  Denies any fevers chills or sweats.  Denies any overt signs of bleeding.  Endorsed ongoing absence from alcohol.    Social History  - Herbal / OTC / Online medications: Denies  - APAP: Denies  - ETOH: 1-2 pint vodka/whiskey per day since 16 years old, quit 7/25/20  - Prior ETOH-related hospitalizations: See above  - Incarcerations/Law issues due to ETOH:  DUI 9 years ago.  - ETOH Rehab/AA: Previously completed rehab  - Drug use: Denies  - Smoking: Never  smoker  - Occupation:   - Live with:  Girlfriend  - Family:  1 child 21 not living with them  - Functional status: Fully autonomous prior to admission    Past Surgical History  - No history of abdominal surgeries    Family History  - No family history of liver disease    EGD (2017) no varices      Interval History: Evaluation completed.   Patient feeling well, appears mildly confused  INR improved to 2.1    Current Facility-Administered Medications   Medication    albuterol-ipratropium 2.5 mg-0.5 mg/3 mL nebulizer solution 3 mL    calcium carbonate 200 mg calcium (500 mg) chewable tablet 500 mg    ciprofloxacin HCl tablet 500 mg    dextrose 50% injection 12.5 g    dextrose 50% injection 25 g    DIPH,PERTUSS(ACEL),TET VAC(PF)(ADULT)(ADACEL)(TDaP)    docusate sodium capsule 100 mg    folic acid tablet 1 mg    glucagon (human recombinant) injection 1 mg    glucose chewable tablet 16 g    glucose chewable tablet 24 g    hepatitis A virus vaccine (PF) (HAVRIX) 1,440 Units    hydrOXYzine HCL tablet 25 mg    influenza (QUADRIVALENT PF) vaccine 0.5 mL    lactulose 20 gram/30 mL solution Soln 15 g    ondansetron injection 4 mg    oxyCODONE immediate release tablet 5 mg    pneumoc 13-jovita conj-dip cr(PF) (PREVNAR 13 (PF)) 0.5 mL    rifAXIMin tablet 550 mg    thiamine tablet 100 mg    traMADoL tablet 50 mg    zinc sulfate capsule 220 mg       Objective:     Vital Signs (Most Recent):  Temp: 98 °F (36.7 °C) (09/30/20 0735)  Pulse: 94 (09/30/20 0735)  Resp: 20 (09/30/20 0832)  BP: 114/71 (09/30/20 0735)  SpO2: 97 % (09/30/20 0735) Vital Signs (24h Range):  Temp:  [97.8 °F (36.6 °C)-98.1 °F (36.7 °C)] 98 °F (36.7 °C)  Pulse:  [] 94  Resp:  [18-25] 20  SpO2:  [91 %-97 %] 97 %  BP: (114-138)/(71-79) 114/71     Weight: 118.5 kg (261 lb 3.9 oz) (09/25/20 0600)  Body mass index is 35.43 kg/m².    Physical Exam  Vitals signs and nursing note reviewed.   Constitutional:       General: He is not in  acute distress.  HENT:      Head: Normocephalic.   Eyes:      General: Scleral icterus present.      Conjunctiva/sclera: Conjunctivae normal.   Neck:      Musculoskeletal: Normal range of motion and neck supple.   Cardiovascular:      Rate and Rhythm: Regular rhythm. Tachycardia present.   Pulmonary:      Effort: Pulmonary effort is normal.      Breath sounds: Normal breath sounds.   Abdominal:      General: Bowel sounds are normal. There is distension.      Palpations: Abdomen is soft. There is no mass.      Tenderness: There is no abdominal tenderness. There is no guarding.   Musculoskeletal: Normal range of motion.   Skin:     General: Skin is warm and dry.      Coloration: Skin is jaundiced.   Neurological:      Mental Status: He is oriented to person, place, and time. He is lethargic.      Comments: +ve mild asterixis         MELD-Na score: 31 at 9/30/2020  4:41 AM  MELD score: 31 at 9/30/2020  4:41 AM  Calculated from:  Serum Creatinine: 1.6 mg/dL at 9/30/2020  4:41 AM  Serum Sodium: 136 mmol/L at 9/30/2020  4:41 AM  Total Bilirubin: 21.2 mg/dL at 9/30/2020  4:41 AM  INR(ratio): 2.1 at 9/30/2020  4:41 AM  Age: 45 years 4 months    Significant Labs:  CBC:   Recent Labs   Lab 09/30/20  0441   WBC 7.56   RBC 2.38*   HGB 8.2*   HCT 24.4*   PLT 47*     BMP:   Recent Labs   Lab 09/30/20  0441   GLU 92      K 3.8      CO2 15*   BUN 39*   CREATININE 1.6*   CALCIUM 8.8     CMP:   Recent Labs   Lab 09/30/20  0441   GLU 92   CALCIUM 8.8   ALBUMIN 2.9*   PROT 4.9*      K 3.8   CO2 15*      BUN 39*   CREATININE 1.6*   ALKPHOS 130   ALT 32   AST 78*   BILITOT 21.2*     Coagulation:   Recent Labs   Lab 09/30/20  0441   INR 2.1*       Significant Imaging:  Labs: Reviewed  US: Reviewed    Assessment/Plan:     * Decompensated hepatic cirrhosis  Mr Altman is a 45 year old man with history of ETOH ELSD decompensations including ascites, HE, SBP (8/2020), HTN, obesity, c. Diff, who is presenting from OSH due  to decompensated ESLD.    Recently hospitalized (8/2020) due to alc hep (last drink 7/25), complicated by SBP and bacteremia (strep mitis) presenting from OSH due to concern for abdominal distension/pain and jaundice, with MELD 38 (36 on 9/8).   Endorses abstinence since 7/2020 and appears enzymes and bili (Max 42 8/12) continue to decline. MELD now is 31. He continues to improve clinically with improvement of LFTs and clinical improvement. Txp evaluation completed. We will present case today at committee meeting. We discussed with the patient the importance of joining IOP following discharge. We are also planning a possible ERCP to evaluate distal CBD obstruction pending improvement of INR    Plan  - Strep bacteremia, completed treatment. Currently on Cipro for hx of SBP ppx.   - CBD dilation with possible choledocholithiasis: Plan for possible ERCP eval pending improvement of INR.   - elevated INR. FFP as indicated for procedure.   - JENN improving with fluids and treatment of infection.  - mental status appropriate, continue lactulose with target 3-4 BM/day  - ETOH abuse. Endorses abstinence since 7/2020. Addiction psych consulted. peth negative on 9/19. IOP recommended  - HCC screening. RUQ negative, AFP negative  - variceal status. Unknown, last EGD reportedly negative 2017  - transplant. Completed eval, will discuss at committee meeting today        Thank you for your consult. I will follow-up with patient. Please contact us if you have any additional questions.    Jam Lozada MD  Hepatology  [facility]

## 2020-09-30 NOTE — SUBJECTIVE & OBJECTIVE
Interval History: Evaluation completed.   Patient feeling well, appears mildly confused  INR improved to 2.1    Current Facility-Administered Medications   Medication    albuterol-ipratropium 2.5 mg-0.5 mg/3 mL nebulizer solution 3 mL    calcium carbonate 200 mg calcium (500 mg) chewable tablet 500 mg    ciprofloxacin HCl tablet 500 mg    dextrose 50% injection 12.5 g    dextrose 50% injection 25 g    DIPH,PERTUSS(ACEL),TET VAC(PF)(ADULT)(ADACEL)(TDaP)    docusate sodium capsule 100 mg    folic acid tablet 1 mg    glucagon (human recombinant) injection 1 mg    glucose chewable tablet 16 g    glucose chewable tablet 24 g    hepatitis A virus vaccine (PF) (HAVRIX) 1,440 Units    hydrOXYzine HCL tablet 25 mg    influenza (QUADRIVALENT PF) vaccine 0.5 mL    lactulose 20 gram/30 mL solution Soln 15 g    ondansetron injection 4 mg    oxyCODONE immediate release tablet 5 mg    pneumoc 13-jovita conj-dip cr(PF) (PREVNAR 13 (PF)) 0.5 mL    rifAXIMin tablet 550 mg    thiamine tablet 100 mg    traMADoL tablet 50 mg    zinc sulfate capsule 220 mg       Objective:     Vital Signs (Most Recent):  Temp: 98 °F (36.7 °C) (09/30/20 0735)  Pulse: 94 (09/30/20 0735)  Resp: 20 (09/30/20 0832)  BP: 114/71 (09/30/20 0735)  SpO2: 97 % (09/30/20 0735) Vital Signs (24h Range):  Temp:  [97.8 °F (36.6 °C)-98.1 °F (36.7 °C)] 98 °F (36.7 °C)  Pulse:  [] 94  Resp:  [18-25] 20  SpO2:  [91 %-97 %] 97 %  BP: (114-138)/(71-79) 114/71     Weight: 118.5 kg (261 lb 3.9 oz) (09/25/20 0600)  Body mass index is 35.43 kg/m².    Physical Exam  Vitals signs and nursing note reviewed.   Constitutional:       General: He is not in acute distress.  HENT:      Head: Normocephalic.   Eyes:      General: Scleral icterus present.      Conjunctiva/sclera: Conjunctivae normal.   Neck:      Musculoskeletal: Normal range of motion and neck supple.   Cardiovascular:      Rate and Rhythm: Regular rhythm. Tachycardia present.   Pulmonary:       Effort: Pulmonary effort is normal.      Breath sounds: Normal breath sounds.   Abdominal:      General: Bowel sounds are normal. There is distension.      Palpations: Abdomen is soft. There is no mass.      Tenderness: There is no abdominal tenderness. There is no guarding.   Musculoskeletal: Normal range of motion.   Skin:     General: Skin is warm and dry.      Coloration: Skin is jaundiced.   Neurological:      Mental Status: He is oriented to person, place, and time. He is lethargic.      Comments: +ve mild asterixis         MELD-Na score: 31 at 9/30/2020  4:41 AM  MELD score: 31 at 9/30/2020  4:41 AM  Calculated from:  Serum Creatinine: 1.6 mg/dL at 9/30/2020  4:41 AM  Serum Sodium: 136 mmol/L at 9/30/2020  4:41 AM  Total Bilirubin: 21.2 mg/dL at 9/30/2020  4:41 AM  INR(ratio): 2.1 at 9/30/2020  4:41 AM  Age: 45 years 4 months    Significant Labs:  CBC:   Recent Labs   Lab 09/30/20  0441   WBC 7.56   RBC 2.38*   HGB 8.2*   HCT 24.4*   PLT 47*     BMP:   Recent Labs   Lab 09/30/20  0441   GLU 92      K 3.8      CO2 15*   BUN 39*   CREATININE 1.6*   CALCIUM 8.8     CMP:   Recent Labs   Lab 09/30/20  0441   GLU 92   CALCIUM 8.8   ALBUMIN 2.9*   PROT 4.9*      K 3.8   CO2 15*      BUN 39*   CREATININE 1.6*   ALKPHOS 130   ALT 32   AST 78*   BILITOT 21.2*     Coagulation:   Recent Labs   Lab 09/30/20  0441   INR 2.1*       Significant Imaging:  Labs: Reviewed  US: Reviewed

## 2020-09-30 NOTE — PLAN OF CARE
Plan of care reviewed and updated with pt. No complaints pain or sob. VSS. 3 BM's today. Scheduled for ERCP tomorrow, instructed pt NPO after midnight. No distress, continue to monitor.

## 2020-09-30 NOTE — TREATMENT PLAN
GI Treatment Plan    Jordan Altman is a 45 y.o. male admitted to hospital 9/19/2020 (Hospital Day: 12) due to Decompensated hepatic cirrhosis.     Interval History  NAEON.  INR improved to 2.1 this AM.  S/p 2u cryo and 2u FFP over past 48 hours.      Objective  Temp:  [97.8 °F (36.6 °C)-98.1 °F (36.7 °C)] 98 °F (36.7 °C) (09/30 0735)  Pulse:  [] 94 (09/30 0735)  BP: (114-138)/(71-79) 114/71 (09/30 0735)  Resp:  [18-25] 20 (09/30 0832)  SpO2:  [91 %-97 %] 97 % (09/30 0735)    Laboratory    Recent Labs   Lab 09/28/20  0712 09/29/20  0323 09/30/20  0441   HGB 8.4* 8.6* 8.2*       Lab Results   Component Value Date    WBC 7.56 09/30/2020    HGB 8.2 (L) 09/30/2020    HCT 24.4 (L) 09/30/2020     (H) 09/30/2020    PLT 47 (L) 09/30/2020       Lab Results   Component Value Date     09/30/2020    K 3.8 09/30/2020     09/30/2020    CO2 15 (L) 09/30/2020    BUN 39 (H) 09/30/2020    CREATININE 1.6 (H) 09/30/2020    CALCIUM 8.8 09/30/2020    ANIONGAP 11 09/30/2020    ESTGFRAFRICA 58.4 (A) 09/30/2020    EGFRNONAA 50.6 (A) 09/30/2020       Lab Results   Component Value Date    ALT 32 09/30/2020    AST 78 (H) 09/30/2020    ALKPHOS 130 09/30/2020    BILITOT 21.2 (H) 09/30/2020       Lab Results   Component Value Date    INR 2.1 (H) 09/30/2020    INR 2.4 (H) 09/29/2020    INR 2.7 (H) 09/28/2020       Plan  -We are unable to perform ERCP given elevated INR.  Please continue to trend and correct coagulopathy.  - We will plan for ERCP tomorrow as long as INR remains near or less than 2  -Would need INR <2.0, ideally closer to 1.5 prior to procedure.  - Continue daily CBC, CMP, INR  - We will continue to follow.    Thank you for involving us in the care of Jordan Altman. Please call with any additional questions, concerns or changes in the patient's clinical status.    Teja Kellogg MD  Gastroenterology Fellow  Spectralink: [id number]

## 2020-10-01 LAB
ALBUMIN SERPL BCP-MCNC: 2.8 G/DL (ref 3.5–5.2)
ALP SERPL-CCNC: 142 U/L (ref 55–135)
ALT SERPL W/O P-5'-P-CCNC: 33 U/L (ref 10–44)
ANION GAP SERPL CALC-SCNC: 11 MMOL/L (ref 8–16)
AST SERPL-CCNC: 81 U/L (ref 10–40)
BASOPHILS # BLD AUTO: 0.08 K/UL (ref 0–0.2)
BASOPHILS NFR BLD: 1 % (ref 0–1.9)
BILIRUB SERPL-MCNC: 21 MG/DL (ref 0.1–1)
BUN SERPL-MCNC: 34 MG/DL (ref 6–20)
CALCIUM SERPL-MCNC: 8.8 MG/DL (ref 8.7–10.5)
CHLORIDE SERPL-SCNC: 110 MMOL/L (ref 95–110)
CO2 SERPL-SCNC: 14 MMOL/L (ref 23–29)
CREAT SERPL-MCNC: 1.4 MG/DL (ref 0.5–1.4)
DIFFERENTIAL METHOD: ABNORMAL
EOSINOPHIL # BLD AUTO: 0.3 K/UL (ref 0–0.5)
EOSINOPHIL NFR BLD: 3.8 % (ref 0–8)
ERYTHROCYTE [DISTWIDTH] IN BLOOD BY AUTOMATED COUNT: 18.4 % (ref 11.5–14.5)
EST. GFR  (AFRICAN AMERICAN): >60 ML/MIN/1.73 M^2
EST. GFR  (NON AFRICAN AMERICAN): 59.4 ML/MIN/1.73 M^2
GLUCOSE SERPL-MCNC: 96 MG/DL (ref 70–110)
HCT VFR BLD AUTO: 24.9 % (ref 40–54)
HGB BLD-MCNC: 8.5 G/DL (ref 14–18)
IMM GRANULOCYTES # BLD AUTO: 0.2 K/UL (ref 0–0.04)
IMM GRANULOCYTES NFR BLD AUTO: 2.5 % (ref 0–0.5)
INR PPP: 2.2 (ref 0.8–1.2)
LYMPHOCYTES # BLD AUTO: 1 K/UL (ref 1–4.8)
LYMPHOCYTES NFR BLD: 11.9 % (ref 18–48)
MCH RBC QN AUTO: 34.7 PG (ref 27–31)
MCHC RBC AUTO-ENTMCNC: 34.1 G/DL (ref 32–36)
MCV RBC AUTO: 102 FL (ref 82–98)
MONOCYTES # BLD AUTO: 1.1 K/UL (ref 0.3–1)
MONOCYTES NFR BLD: 13.4 % (ref 4–15)
NEUTROPHILS # BLD AUTO: 5.4 K/UL (ref 1.8–7.7)
NEUTROPHILS NFR BLD: 67.4 % (ref 38–73)
NRBC BLD-RTO: 0 /100 WBC
PLATELET # BLD AUTO: 49 K/UL (ref 150–350)
PMV BLD AUTO: 11.6 FL (ref 9.2–12.9)
POTASSIUM SERPL-SCNC: 4.1 MMOL/L (ref 3.5–5.1)
PROT SERPL-MCNC: 4.8 G/DL (ref 6–8.4)
PROTHROMBIN TIME: 23.8 SEC (ref 9–12.5)
RBC # BLD AUTO: 2.45 M/UL (ref 4.6–6.2)
SODIUM SERPL-SCNC: 135 MMOL/L (ref 136–145)
WBC # BLD AUTO: 7.97 K/UL (ref 3.9–12.7)

## 2020-10-01 PROCEDURE — 97110 THERAPEUTIC EXERCISES: CPT | Mod: NTX,CQ

## 2020-10-01 PROCEDURE — 25000003 PHARM REV CODE 250: Mod: NTX | Performed by: HOSPITALIST

## 2020-10-01 PROCEDURE — 99233 PR SUBSEQUENT HOSPITAL CARE,LEVL III: ICD-10-PCS | Mod: NTX,,, | Performed by: HOSPITALIST

## 2020-10-01 PROCEDURE — 85610 PROTHROMBIN TIME: CPT | Mod: NTX

## 2020-10-01 PROCEDURE — 80053 COMPREHEN METABOLIC PANEL: CPT | Mod: NTX

## 2020-10-01 PROCEDURE — 90686 IIV4 VACC NO PRSV 0.5 ML IM: CPT | Mod: NTX | Performed by: NURSE PRACTITIONER

## 2020-10-01 PROCEDURE — 90471 IMMUNIZATION ADMIN: CPT | Mod: NTX | Performed by: NURSE PRACTITIONER

## 2020-10-01 PROCEDURE — 85025 COMPLETE CBC W/AUTO DIFF WBC: CPT | Mod: NTX

## 2020-10-01 PROCEDURE — 99233 SBSQ HOSP IP/OBS HIGH 50: CPT | Mod: NTX,,, | Performed by: HOSPITALIST

## 2020-10-01 PROCEDURE — 63600175 PHARM REV CODE 636 W HCPCS: Mod: NTX | Performed by: NURSE PRACTITIONER

## 2020-10-01 PROCEDURE — 36415 COLL VENOUS BLD VENIPUNCTURE: CPT | Mod: NTX

## 2020-10-01 PROCEDURE — 20600001 HC STEP DOWN PRIVATE ROOM: Mod: NTX

## 2020-10-01 PROCEDURE — 97116 GAIT TRAINING THERAPY: CPT | Mod: NTX,CQ

## 2020-10-01 PROCEDURE — 97803 MED NUTRITION INDIV SUBSEQ: CPT | Mod: NTX

## 2020-10-01 RX ORDER — HYDROCODONE BITARTRATE AND ACETAMINOPHEN 500; 5 MG/1; MG/1
TABLET ORAL
Status: DISCONTINUED | OUTPATIENT
Start: 2020-10-01 | End: 2020-10-01

## 2020-10-01 RX ORDER — HYDROCODONE BITARTRATE AND ACETAMINOPHEN 500; 5 MG/1; MG/1
TABLET ORAL
Status: DISCONTINUED | OUTPATIENT
Start: 2020-10-01 | End: 2020-10-02 | Stop reason: HOSPADM

## 2020-10-01 RX ADMIN — INFLUENZA VIRUS VACCINE 0.5 ML: 15; 15; 15; 15 SUSPENSION INTRAMUSCULAR at 01:10

## 2020-10-01 RX ADMIN — HYDROXYZINE HYDROCHLORIDE 25 MG: 25 TABLET, FILM COATED ORAL at 01:10

## 2020-10-01 RX ADMIN — Medication 100 MG: at 09:10

## 2020-10-01 RX ADMIN — CIPROFLOXACIN 500 MG: 500 TABLET, FILM COATED ORAL at 09:10

## 2020-10-01 RX ADMIN — TRAMADOL HYDROCHLORIDE 50 MG: 50 TABLET, FILM COATED ORAL at 09:10

## 2020-10-01 RX ADMIN — OXYCODONE HYDROCHLORIDE 5 MG: 5 TABLET ORAL at 08:10

## 2020-10-01 RX ADMIN — CALCIUM CARBONATE (ANTACID) CHEW TAB 500 MG 500 MG: 500 CHEW TAB at 01:10

## 2020-10-01 RX ADMIN — TRAMADOL HYDROCHLORIDE 50 MG: 50 TABLET, FILM COATED ORAL at 04:10

## 2020-10-01 RX ADMIN — DOCUSATE SODIUM 100 MG: 100 CAPSULE, LIQUID FILLED ORAL at 09:10

## 2020-10-01 RX ADMIN — ZINC SULFATE 220 MG (50 MG) CAPSULE 220 MG: CAPSULE at 12:10

## 2020-10-01 RX ADMIN — RIFAXIMIN 550 MG: 550 TABLET ORAL at 09:10

## 2020-10-01 RX ADMIN — OXYCODONE HYDROCHLORIDE 5 MG: 5 TABLET ORAL at 12:10

## 2020-10-01 RX ADMIN — RIFAXIMIN 550 MG: 550 TABLET ORAL at 08:10

## 2020-10-01 RX ADMIN — FOLIC ACID 1 MG: 1 TABLET ORAL at 09:10

## 2020-10-01 NOTE — PROGRESS NOTES
[facility]  Palliative Medicine  Progress Note    Patient Name: Jordan Altman  MRN: [id number]  Admission Date: 9/19/2020  Hospital Length of Stay: 11 days  Code Status: Full Code   Attending Provider: Willie Villa MD  Consulting Provider: Robyn Dixon NP  Primary Care Physician: SHAHEEN Jaime  Principal Problem:Decompensated hepatic cirrhosis    Patient information was obtained from patient, past medical records and Dr. Villa.      Assessment/Plan:     Encounter for palliative care  Impression:  Palliative care consulted for goals of care discussion/advanced care planning for this 44 y/o male being followed by [facility] Medicine, Hepatology, Liver Transplant Surgery, and Orthopedics for Decompensated alcoholic cirrhosis with ascites, Hepatic encephalopathy, JENN, Portal hypertension, Hyponatremia, Coagulopathy, Thrombocytopenia, Macrocytic anemia, Alcohol abuse, and Left knee pain. IR paracentesis attempted on 9/21 and again on 9/25 but no ascites identified for safe paracentesis. Liver transplant evaluation initiated. Patient is s/p RHC on 9/23. Nuclear stress on 9/25 was negative for ischemia. Orthopedics consulted for L knee pain which has improved. Scheduled for ERCP tomorrow as long as INR remains near or less than 2. Patient to be presented to transplant selection committee today. He is AAOx4 on exam this morning.    Plan/Recommendations:  1. See goals of care discussion below.  2. Continue current plan of care.  3. Patient to be presented to transplant selection committee today.  4. Palliative care will follow up; further goals of care discussions will depend on the outcome of transplant selection committee meeting.    Decompensated alcoholic cirrhosis with ascites/Hepatic encephalopathy/JENN/Portal hypertension/Hyponatremia/Coagulopathy/Thrombocytopenia/Macrocytic anemia/Alcohol abuse - management per primary team and other consultants    L knee pain - taking Oxycodone 5  "mg PO Q4H PRN pain, orthopedics consulted    Goals of Care/Advance Care Plannin/30 Follow up visit held with patient. He is AAOx4 on exam this morning with no acute complaints. Patient to be presented to transplant selection committee today. His stated goal is to get a liver transplant. I shared in his hope that he will able to get listed for a liver but also encouraged him to start thinking about the "what ifs" in the event he is not able to get his liver. Patient says that he has not really thought about that possibility. Discussed how some patients will choose to focus on comfort and quality of life if aggressive treatment is no longer a possibility. Explained that there are different programs that we can explore such as outpatient palliative care or home hospice. Patient would be open to discussing these options more if he is unable to get his liver. Further goals of care conversations will depend on the outcome of transplant selection committee meeting.  ________________________     Follow up visit held today by this NP and PAULETTE Castro LCSW. Patient is AAOx4 on exam today; reports that he has been feeling a little confused although he answers all questions appropriately. Liver transplant evaluation is underway and patient's stated goal is to get a transplant.    During this visit, we engaged the patient in the advance care planning process. We reviewed the role for advance directives and their purpose in directing future healthcare if the patient's unable to speak for himself. At this point in time, the patient does have full decision-making capacity. We discussed different extreme health states that he could experience, and reviewed what kind of medical care he would want in those situations. The patient communicated that if he were comatose and had little chance of a meaningful recovery, he would not want machines/life-sustaining treatments used. He states, "I wouldn't want to be kept on machines if I " "wasn't going to be able to get off." Offered to assist patient with completion of a living will today but he declines to do so at this time. He would like to read through the advanced directives packet first and has my contact information to call with any questions.    Advance Care Planning     Power of   We initiated the process of advance care planning today and explained the importance of this process to the patient. Then the patient received detailed information about the importance of designating a Health Care Power of  (HCPOA). He was also instructed to communicate with this person about their wishes for future healthcare, should he become sick and lose decision-making capacity. The patient has not previously appointed a HCPOA. After our discussion, the patient has decided to complete a HCPOA and has appointed his brother Didier Page.    ________________________    9/24 Conference conducted with patient to discuss his current clinical status, goals of care, code status, long term expected outcomes and prognostic awareness. Patient is oriented x 3 but inattentive and slow to respond to questions. Attempted to have a discussion concerning the patients values and wishes but it is difficult to get patient to engage in meaningful conversation at this time. He is able to state that his goal is to get a liver transplant.    Patient has no advanced directives on file in Epic and is currently a full code. Asked patient who he would want to make his medical decisions in the event he were unable to speak for himself; patient states that he would want his brother Didier to be his decision maker. Will follow up with patient regarding completion of advanced directives when he is more alert.         I will follow-up with patient. Please contact us if you have any additional questions.    Subjective:     Chief Complaint: No chief complaint on file.      HPI:   Mr. Altman is a 44 y/o male with PMHx of alcohol " abuse, alcoholic cirrhosis diagnosed one year ago and complicated by ascites, portal hypertension, hepatic encephalopathy, thrombocytopenia, and coagulopathy who presented to [facility] on 9/19 as a transfer from [facility] for management of acute decompensated cirrhosis. He was originally admitted to OSH on 9/17 for abdominal distention with pain and SOB. He was found to have decompensated cirrhosis, acute kidney injury, hyponatremia, thrombocytopenia, anemia, and metabolic acidosis. He was started on HRS therapy and empiric Meropenem and transferred to [facility] for further evaluation.    Patient states he used to drink 1-2 pints of vodka, whisky daily since age 16. He reports that he quit drinking in July of this year when he was admitted to hospital for alcoholic hepatitis. He had another admission 8/25-9/1 with decompensated cirrhosis complicated by peritonitis. He had a paracentesis on 9/15 with 1.7 L taken off, negative for infection. He has been taking Lactulose, Lasix, and Spironolactone.    Hospital Course:  No notes on file    Interval History: IR paracentesis attempted on 9/21 and again on 9/25 but no ascites identified for safe paracentesis. Liver transplant evaluation initiated. Patient is s/p RHC on 9/23. Nuclear stress on 9/25 was negative for ischemia. Orthopedics consulted for L knee pain which has improved. Scheduled for ERCP tomorrow as long as INR remains near or less than 2. Patient to be presented to transplant selection committee today. He is AAOx4 on exam this morning.    Past Medical History:   Diagnosis Date    Decompensated hepatic cirrhosis     Hypertension     SBP (spontaneous bacterial peritonitis)        Past Surgical History:   Procedure Laterality Date    RIGHT HEART CATHETERIZATION Right 9/23/2020    Procedure: INSERTION, CATHETER, RIGHT HEART;  Surgeon: Mikel Costa Jr., MD;  [facility] CATH LAB;  Service: Cardiology;  Laterality: Right;    THORACENTESIS   2011       Review of patient's allergies indicates:   Allergen Reactions    Latex, natural rubber        Medications:  Continuous Infusions:    Scheduled Meds:   ciprofloxacin HCl  500 mg Oral Q24H    DIPH,PERTUSS(ACEL),TET VAC(PF) (ADULT)  0.5 mL Intramuscular Once    docusate sodium  100 mg Oral Daily    folic acid  1 mg Oral Daily    hepatitis A virus vaccine (PF)  1,440 Units Intramuscular Once    lactulose  15 g Oral TID    pneumoc 13-jovita conj-dip cr(PF)  0.5 mL Intramuscular Once    rifAXImin  550 mg Oral BID    thiamine  100 mg Oral Daily    zinc sulfate  220 mg Oral Daily     PRN Meds:albuterol-ipratropium, calcium carbonate, dextrose 50%, dextrose 50%, glucagon (human recombinant), glucose, glucose, hydrOXYzine HCL, influenza, ondansetron, oxyCODONE, traMADoL    Family History     Problem Relation (Age of Onset)    COPD Mother        Tobacco Use    Smoking status: Current Some Day Smoker    Smokeless tobacco: Current User   Substance and Sexual Activity    Alcohol use: Yes     Alcohol/week: 112.0 standard drinks     Types: 112 Shots of liquor per week     Comment: fifth of vodka a day. LAST DRINK 7/25/2020 per pt     Drug use: Yes     Frequency: 3.0 times per week     Types: Marijuana    Sexual activity: Yes       Review of Systems   Constitutional: Negative for appetite change and fatigue.   HENT: Negative for congestion and trouble swallowing.    Respiratory: Negative for cough and shortness of breath.    Cardiovascular: Negative for chest pain and palpitations.   Gastrointestinal: Positive for abdominal distention. Negative for abdominal pain, nausea and vomiting.   Genitourinary: Negative for difficulty urinating and flank pain.   Musculoskeletal: Positive for arthralgias (L knee, improved). Negative for myalgias.   Skin: Negative for rash and wound.   Neurological: Negative for dizziness and light-headedness.   Psychiatric/Behavioral: Negative for agitation and behavioral problems.      Objective:     Vital Signs (Most Recent):  Temp: 98 °F (36.7 °C) (09/30/20 0735)  Pulse: 94 (09/30/20 0735)  Resp: 20 (09/30/20 0832)  BP: 114/71 (09/30/20 0735)  SpO2: 97 % (09/30/20 0735) Vital Signs (24h Range):  Temp:  [97.8 °F (36.6 °C)-98.1 °F (36.7 °C)] 98 °F (36.7 °C)  Pulse:  [] 94  Resp:  [18-25] 20  SpO2:  [91 %-97 %] 97 %  BP: (114-138)/(71-79) 114/71     Weight: 118.5 kg (261 lb 3.9 oz)  Body mass index is 35.43 kg/m².    Physical Exam  Vitals signs and nursing note reviewed.   Constitutional:       General: He is not in acute distress.     Appearance: He is ill-appearing.   HENT:      Head: Normocephalic and atraumatic.   Eyes:      General: Scleral icterus present.      Extraocular Movements: Extraocular movements intact.   Neck:      Musculoskeletal: Normal range of motion and neck supple.   Cardiovascular:      Rate and Rhythm: Normal rate and regular rhythm.   Pulmonary:      Effort: Pulmonary effort is normal. No respiratory distress.   Abdominal:      General: There is distension.      Palpations: Abdomen is soft.   Musculoskeletal:         General: No deformity.      Left knee: Tenderness found.   Skin:     General: Skin is warm and dry.   Neurological:      Mental Status: He is alert and oriented to person, place, and time.   Psychiatric:         Mood and Affect: Mood normal.         Behavior: Behavior normal.         Review of Symptoms    Symptom Assessment (ESAS 0-10 Scale)  Pain:  0  Dyspnea:  0  Anxiety:  0  Nausea:  0  Depression:  0  Anorexia:  0  Fatigue:  0  Insomnia:  0  Restlessness:  0  Agitation:  0     CAM / Delirium:  Negative  Constipation:  Negative  Diarrhea:  Negative    Bowel Management Plan (BMP):  Yes      Comments:  Denies pain at this time, took PRN Oxycodone this am    Location Choices: knee.      OME in 24 hours:  10    Performance Status:  50    ECOG Performance Status Grade:  3 - Confined to bed or chair 50% of waking hours    Living Arrangements:  Lives  with friend    Psychosocial/Cultural: Patient is  from his wife and lives with his girlfriend Marnie. He has a 21 year old son. He has two brothers. His mother is still living but his father is . He was working as a  until the COVID-19 pandemic. His goal is to get a liver transplant.    Spiritual:  F - Carissa and Belief:  Restorationist      Advance Care Planning   Advance Directives:   Living Will: No    LaPOST: No    Do Not Resuscitate Status: No    Medical Power of : Yes    Agent's Name:  Didier Page   Agent's Contact Number:  [phone]    Decision Making:  Patient answered questions         Significant Labs: All pertinent labs within the past 24 hours have been reviewed.  CBC:   Recent Labs   Lab 20  0441   WBC 7.56   HGB 8.2*   HCT 24.4*   *   PLT 47*     BMP:  Recent Labs   Lab 20  0440 20  0441   GLU  --  92   NA  --  136   K  --  3.8   CL  --  110   CO2  --  15*   BUN  --  39*   CREATININE  --  1.6*   CALCIUM  --  8.8   MG 1.8  --      LFT:  Lab Results   Component Value Date    AST 78 (H) 2020    ALKPHOS 130 2020    BILITOT 21.2 (H) 2020     Albumin:   Albumin   Date Value Ref Range Status   2020 2.9 (L) 3.5 - 5.2 g/dL Final     Protein:   Total Protein   Date Value Ref Range Status   2020 4.9 (L) 6.0 - 8.4 g/dL Final     Lactic acid:   No results found for: LACTATE    Significant Imaging: I have reviewed all pertinent imaging results/findings within the past 24 hours.      17 minutes spent in advanced care planning    Robyn Dixon NP  Palliative Medicine  [facility]

## 2020-10-01 NOTE — PLAN OF CARE
Problem: Adult Inpatient Plan of Care  Goal: Plan of Care Review  Outcome: Ongoing, Progressing   POC reviewed with pt. VSS. ERCP cancelled today. No acute changes. Safety maintained. Will continue to monitor.

## 2020-10-01 NOTE — ASSESSMENT & PLAN NOTE
"Impression:  Palliative care consulted for goals of care discussion/advanced care planning for this 46 y/o male being followed by Hospital Medicine, Hepatology, Liver Transplant Surgery, and Orthopedics for Decompensated alcoholic cirrhosis with ascites, Hepatic encephalopathy, JENN, Portal hypertension, Hyponatremia, Coagulopathy, Thrombocytopenia, Macrocytic anemia, Alcohol abuse, and Left knee pain. IR paracentesis attempted on  and again on  but no ascites identified for safe paracentesis. Liver transplant evaluation initiated. Patient is s/p RHC on . Nuclear stress on  was negative for ischemia. Orthopedics consulted for L knee pain which has improved. Scheduled for ERCP tomorrow as long as INR remains near or less than 2. Patient to be presented to transplant selection committee today. He is AAOx4 on exam this morning.    Plan/Recommendations:  1. See goals of care discussion below.  2. Continue current plan of care.  3. Patient to be presented to transplant selection committee today.  4. Palliative care will follow up; further goals of care discussions will depend on the outcome of transplant selection committee meeting.    Decompensated alcoholic cirrhosis with ascites/Hepatic encephalopathy/JENN/Portal hypertension/Hyponatremia/Coagulopathy/Thrombocytopenia/Macrocytic anemia/Alcohol abuse - management per primary team and other consultants    L knee pain - taking Oxycodone 5 mg PO Q4H PRN pain, orthopedics consulted    Goals of Care/Advance Care Plannin/30 Follow up visit held with patient. He is AAOx4 on exam this morning with no acute complaints. Patient to be presented to transplant selection committee today. His stated goal is to get a liver transplant. I shared in his hope that he will able to get listed for a liver but also encouraged him to start thinking about the "what ifs" in the event he is not able to get his liver. Patient says that he has not really thought about that " "possibility. Discussed how some patients will choose to focus on comfort and quality of life if aggressive treatment is no longer a possibility. Explained that there are different programs that we can explore such as outpatient palliative care or home hospice. Patient would be open to discussing these options more if he is unable to get his liver. Further goals of care conversations will depend on the outcome of transplant selection committee meeting.  ________________________    9/25 Follow up visit held today by this NP and PAULETTE Castro LCSW. Patient is AAOx4 on exam today; reports that he has been feeling a little confused although he answers all questions appropriately. Liver transplant evaluation is underway and patient's stated goal is to get a transplant.    During this visit, we engaged the patient in the advance care planning process. We reviewed the role for advance directives and their purpose in directing future healthcare if the patient's unable to speak for himself. At this point in time, the patient does have full decision-making capacity. We discussed different extreme health states that he could experience, and reviewed what kind of medical care he would want in those situations. The patient communicated that if he were comatose and had little chance of a meaningful recovery, he would not want machines/life-sustaining treatments used. He states, "I wouldn't want to be kept on machines if I wasn't going to be able to get off." Offered to assist patient with completion of a living will today but he declines to do so at this time. He would like to read through the advanced directives packet first and has my contact information to call with any questions.    Advance Care Planning     Power of   We initiated the process of advance care planning today and explained the importance of this process to the patient. Then the patient received detailed information about the importance of designating a Health " Care Power of  (HCPOA). He was also instructed to communicate with this person about their wishes for future healthcare, should he become sick and lose decision-making capacity. The patient has not previously appointed a HCPOA. After our discussion, the patient has decided to complete a HCPOA and has appointed his brother Didier Page.     ________________________    9/24 Conference conducted with patient to discuss his current clinical status, goals of care, code status, long term expected outcomes and prognostic awareness. Patient is oriented x 3 but inattentive and slow to respond to questions. Attempted to have a discussion concerning the patients values and wishes but it is difficult to get patient to engage in meaningful conversation at this time. He is able to state that his goal is to get a liver transplant.    Patient has no advanced directives on file in Epic and is currently a full code. Asked patient who he would want to make his medical decisions in the event he were unable to speak for himself; patient states that he would want his brother Didier to be his decision maker. Will follow up with patient regarding completion of advanced directives when he is more alert.

## 2020-10-01 NOTE — PLAN OF CARE
VSS. A/Ox4.  No complaints of pain.  Patient has been NPO since MN for possible ERCP in the AM.  No acute events overnight. Safety maintained.  All questions answered. Patient updated on plan of care.  Will continue to monitor.

## 2020-10-01 NOTE — PLAN OF CARE
Recommendations     1. Continue current Low sodium diet, add Boost Plus ONS to aid in caloric intake.  2. RD to monitor & follow-up.     Goals: Meet % EEN, EPN by RD f/u date  Nutrition Goal Status: goal met  Communication of RD Recs: reviewed with RN

## 2020-10-01 NOTE — TREATMENT PLAN
GI Treatment Plan    Jordan Altman is a 45 y.o. male admitted to hospital 9/19/2020 (Hospital Day: 13) due to Decompensated hepatic cirrhosis.     Interval History  NAEON.  INR 2.2 this AM.  After discussion with Hepatology, will forgo ERCP and he will be listed for transplant.  Will attempt to remove stone at that time.    Objective  Temp:  [97.7 °F (36.5 °C)-98.6 °F (37 °C)] 98.1 °F (36.7 °C) (10/01 0800)  Pulse:  [] 93 (10/01 0800)  BP: (118-153)/(65-86) 118/79 (10/01 0800)  Resp:  [19-23] 23 (10/01 0800)  SpO2:  [93 %-97 %] 93 % (10/01 0800)    Laboratory    Recent Labs   Lab 09/29/20  0323 09/30/20  0441 10/01/20  0352   HGB 8.6* 8.2* 8.5*       Lab Results   Component Value Date    WBC 7.97 10/01/2020    HGB 8.5 (L) 10/01/2020    HCT 24.9 (L) 10/01/2020     (H) 10/01/2020    PLT 49 (L) 10/01/2020       Lab Results   Component Value Date     (L) 10/01/2020    K 4.1 10/01/2020     10/01/2020    CO2 14 (L) 10/01/2020    BUN 34 (H) 10/01/2020    CREATININE 1.4 10/01/2020    CALCIUM 8.8 10/01/2020    ANIONGAP 11 10/01/2020    ESTGFRAFRICA >60.0 10/01/2020    EGFRNONAA 59.4 (A) 10/01/2020       Lab Results   Component Value Date    ALT 33 10/01/2020    AST 81 (H) 10/01/2020    ALKPHOS 142 (H) 10/01/2020    BILITOT 21.0 (H) 10/01/2020       Lab Results   Component Value Date    INR 2.2 (H) 10/01/2020    INR 2.1 (H) 09/30/2020    INR 2.4 (H) 09/29/2020       Plan  - We will forgo ERCP at this time given patient is without evidence of cholangitis and he is currently being listed for liver transplant.  Stone removal will be attempted at that time.  - We are signing-off. Please call with any questions.     Thank you for involving us in the care of Jordan Altman. Please call with any additional questions, concerns or changes in the patient's clinical status.    Teja Kellogg MD  Gastroenterology Fellow

## 2020-10-01 NOTE — TREATMENT PLAN
Brief hepatology note.    Patient's case presented at committee meeting. Patient approved for listing. Pending financial approval.   Please continue supportive measures at this time.     Jam Lozada, PGY-VI  Gastroenterology Fellow   [phone]

## 2020-10-01 NOTE — PT/OT/SLP PROGRESS
"Physical Therapy Treatment    Patient Name:  Jordan Altman   MRN:  [id number]    Recommendations:     Discharge Recommendations:  home   Discharge Equipment Recommendations: none   Barriers to discharge: None    Assessment:     Jordan Altman is a 45 y.o. male admitted with a medical diagnosis of Decompensated hepatic cirrhosis.  He presents with the following impairments/functional limitations:  weakness, impaired endurance, gait instability.  Patient making good progress with mobility as evidenced by his ability to ambulate in brown with SC with slight sway, unsteadiness but no significant LOB.    Rehab Prognosis: Good; patient would benefit from acute skilled PT services to address these deficits and reach maximum level of function.    Recent Surgery: Procedure(s) (LRB):  INSERTION, CATHETER, RIGHT HEART (Right) 8 Days Post-Op    Plan:     During this hospitalization, patient to be seen 3 x/week to address the identified rehab impairments via gait training, therapeutic activities, therapeutic exercises and progress toward the following goals:    · Plan of Care Expires:  10/27/20    Subjective     Chief Complaint: none  Patient/Family Comments/goals: "I feel like I have no energy".   Pain/Comfort:  · Pain Rating 1: 0/10      Objective:     Communicated with nurse prior to session.  Patient found supine with telemetry upon PT entry to room.     General Precautions: Standard, fall   Orthopedic Precautions:N/A   Braces: N/A     Functional Mobility:  · Bed Mobility:     · Supine to Sit: independence  · Transfers:     · Sit to Stand:  independence with straight cane  · Gait: 300 ft with SC with slight unsteadiness with S.       AM-PAC 6 CLICK MOBILITY  Turning over in bed (including adjusting bedclothes, sheets and blankets)?: 4  Sitting down on and standing up from a chair with arms (e.g., wheelchair, bedside commode, etc.): 3  Moving from lying on back to sitting on the side of the bed?: 4  Moving to and from a " bed to a chair (including a wheelchair)?: 3  Need to walk in hospital room?: 3  Climbing 3-5 steps with a railing?: 3  Basic Mobility Total Score: 20       Therapeutic Activities and Exercises:   white board updated  Patient would like to try using a SBQC for next therapy visit.   instr patient with AROM to BLEs and UEs to improve energy level,strength, endurance.       Patient left up in chair with all lines intact, call button in reach and nurse notified..    GOALS:   Multidisciplinary Problems     Physical Therapy Goals        Problem: Physical Therapy Goal    Goal Priority Disciplines Outcome Goal Variances Interventions   Physical Therapy Goal     PT, PT/OT Ongoing, Progressing     Description: Goals to be met by: 10/7/20     Patient will increase functional independence with mobility by performin. Sit to stand transfer with [place]  2. Gait  x 100 feet with Modified [place] using LRAD, if needed.                      Time Tracking:     PT Received On: 10/01/20  PT Start Time: 0239     PT Stop Time: 0304  PT Total Time (min): 25 min     Billable Minutes: Gait Training 15 and Therapeutic Exercise 10    Treatment Type: Treatment  PT/PTA: PTA     PTA Visit Number: 2     Michelle White, JAMES  10/01/2020

## 2020-10-01 NOTE — PROGRESS NOTES
[facility]  Adult Nutrition  Consult Note    SUMMARY     Recommendations    1. Continue current Low sodium diet, add Boost Plus ONS to aid in caloric intake.  2. RD to monitor & follow-up.    Goals: Meet % EEN, EPN by RD f/u date  Nutrition Goal Status: goal met  Communication of RD Recs: reviewed with RN    Reason for Assessment    Reason For Assessment: RD follow-up  Diagnosis: other (see comments)(Hepatic cirrhosis, Liver tx work-up)  Relevant Medical History: Etoh abuse  Interdisciplinary Rounds: did not attend    General Information Comments: At time of visit this AM, pt NPO for ERCP. Low sodium diet now resumed. Pt tolerating diet, consuming ~ 50% of meals. Pt drinking Boost ONS at home, will honor. PTA, pt reports fair appetite & UBW of 260#. Chart review confirms weight history. NFPE not warranted, pt w/ no indicators of malnutrition. Low sodium diet education reinforced.   Nutrition Discharge Planning: Adequate PO intake    Nutrition/Diet History    Patient Reported Diet/Restrictions/Preferences: general  Spiritual, Cultural Beliefs, Yarsanism Practices, Values that Affect Care: no  Factors Affecting Nutritional Intake: decreased appetite    Anthropometrics    Temp: 98.1 °F (36.7 °C)  Height Method: Stated  Height: 6' (182.9 cm)  Height (inches): 72 in  Weight Method: Bed Scale  Weight: 118.5 kg (261 lb 3.9 oz)  Weight (lb): 261.25 lb  Ideal Body Weight (IBW), Male: 178 lb  % Ideal Body Weight, Male (lb): 146.77 %  BMI (Calculated): 35.4  BMI Grade: 35 - 39.9 - obesity - grade II  Usual Body Weight (UBW), kg: (250# per chart review.)    Lab/Procedures/Meds    Pertinent Labs Reviewed: reviewed  Pertinent Labs Comments: Na 135, Bili 21  Pertinent Medications Reviewed: reviewed  Pertinent Medications Comments: Folic acid, Thiamine, Lactulose    Estimated/Assessed Needs    Weight Used For Calorie Calculations: 118.5 kg (261 lb 3.9 oz)     Energy Calorie Requirements (kcal): 2101  kcal/d  Energy Need Method: [place](1.0 PAL)     Protein Requirements: 119 g/d (1 g/kg)  Weight Used For Protein Calculations: 118.8 kg (261 lb 14.5 oz)     Estimated Fluid Requirement Method: other (see comments)(Per MD or 1 mL/kcal)  RDA Method (mL): 2101    Nutrition Prescription Ordered    Current Diet Order: Low Na    Evaluation of Received Nutrient/Fluid Intake    Comments: LBM: 10/1    Tolerance: tolerating    Nutrition Risk    Level of Risk/Frequency of Follow-up: (1x/week)     Assessment and Plan    Nutrition Problem  Increased nutrient needs     Related to (etiology):   Physiological causes     Signs and Symptoms (as evidenced by):   Liver tx work-up     Interventions(treatment strategy):  Collaboration of nutrition care w/ other providers      Nutrition Diagnosis Status:   Continues     Monitor and Evaluation    Food and Nutrient Intake: energy intake, food and beverage intake  Food and Nutrient Adminstration: diet order  Physical Activity and Function: nutrition-related ADLs and IADLs  Anthropometric Measurements: weight, weight change  Biochemical Data, Medical Tests and Procedures: inflammatory profile, lipid profile, glucose/endocrine profile, gastrointestinal profile, electrolyte and renal panel  Nutrition-Focused Physical Findings: overall appearance     Nutrition Follow-Up    RD Follow-up?: Yes

## 2020-10-02 ENCOUNTER — TELEPHONE (OUTPATIENT)
Dept: TRANSPLANT | Facility: HOSPITAL | Age: 45
End: 2020-10-02

## 2020-10-02 ENCOUNTER — TELEPHONE (OUTPATIENT)
Dept: TRANSPLANT | Facility: CLINIC | Age: 45
End: 2020-10-02

## 2020-10-02 VITALS
OXYGEN SATURATION: 91 % | WEIGHT: 261.25 LBS | BODY MASS INDEX: 35.38 KG/M2 | SYSTOLIC BLOOD PRESSURE: 132 MMHG | DIASTOLIC BLOOD PRESSURE: 63 MMHG | TEMPERATURE: 98 F | RESPIRATION RATE: 18 BRPM | HEIGHT: 72 IN | HEART RATE: 89 BPM

## 2020-10-02 DIAGNOSIS — Z76.82 ORGAN TRANSPLANT CANDIDATE: Primary | ICD-10-CM

## 2020-10-02 LAB
ALBUMIN SERPL BCP-MCNC: 2.8 G/DL (ref 3.5–5.2)
ALP SERPL-CCNC: 157 U/L (ref 55–135)
ALT SERPL W/O P-5'-P-CCNC: 32 U/L (ref 10–44)
ANION GAP SERPL CALC-SCNC: 11 MMOL/L (ref 8–16)
AST SERPL-CCNC: 84 U/L (ref 10–40)
BACTERIA SPEC ANAEROBE CULT: NORMAL
BASOPHILS # BLD AUTO: 0.11 K/UL (ref 0–0.2)
BASOPHILS NFR BLD: 1.4 % (ref 0–1.9)
BILIRUB SERPL-MCNC: 20.3 MG/DL (ref 0.1–1)
BUN SERPL-MCNC: 25 MG/DL (ref 6–20)
CALCIUM SERPL-MCNC: 8.6 MG/DL (ref 8.7–10.5)
CHLORIDE SERPL-SCNC: 109 MMOL/L (ref 95–110)
CO2 SERPL-SCNC: 14 MMOL/L (ref 23–29)
CREAT SERPL-MCNC: 1.2 MG/DL (ref 0.5–1.4)
DIFFERENTIAL METHOD: ABNORMAL
EOSINOPHIL # BLD AUTO: 0.3 K/UL (ref 0–0.5)
EOSINOPHIL NFR BLD: 3.9 % (ref 0–8)
ERYTHROCYTE [DISTWIDTH] IN BLOOD BY AUTOMATED COUNT: 18.9 % (ref 11.5–14.5)
EST. GFR  (AFRICAN AMERICAN): >60 ML/MIN/1.73 M^2
EST. GFR  (NON AFRICAN AMERICAN): >60 ML/MIN/1.73 M^2
GLUCOSE SERPL-MCNC: 90 MG/DL (ref 70–110)
HCT VFR BLD AUTO: 24.9 % (ref 40–54)
HGB BLD-MCNC: 8.5 G/DL (ref 14–18)
IMM GRANULOCYTES # BLD AUTO: 0.19 K/UL (ref 0–0.04)
IMM GRANULOCYTES NFR BLD AUTO: 2.4 % (ref 0–0.5)
INR PPP: 2.2 (ref 0.8–1.2)
LYMPHOCYTES # BLD AUTO: 1 K/UL (ref 1–4.8)
LYMPHOCYTES NFR BLD: 12.9 % (ref 18–48)
MAGNESIUM SERPL-MCNC: 1.6 MG/DL (ref 1.6–2.6)
MCH RBC QN AUTO: 34.3 PG (ref 27–31)
MCHC RBC AUTO-ENTMCNC: 34.1 G/DL (ref 32–36)
MCV RBC AUTO: 100 FL (ref 82–98)
MONOCYTES # BLD AUTO: 1.2 K/UL (ref 0.3–1)
MONOCYTES NFR BLD: 14.6 % (ref 4–15)
NEUTROPHILS # BLD AUTO: 5.1 K/UL (ref 1.8–7.7)
NEUTROPHILS NFR BLD: 64.8 % (ref 38–73)
NRBC BLD-RTO: 0 /100 WBC
PHOSPHATE SERPL-MCNC: 3.2 MG/DL (ref 2.7–4.5)
PLATELET # BLD AUTO: 50 K/UL (ref 150–350)
PMV BLD AUTO: 10.7 FL (ref 9.2–12.9)
POTASSIUM SERPL-SCNC: 4.1 MMOL/L (ref 3.5–5.1)
PROT SERPL-MCNC: 4.9 G/DL (ref 6–8.4)
PROTHROMBIN TIME: 23.4 SEC (ref 9–12.5)
RBC # BLD AUTO: 2.48 M/UL (ref 4.6–6.2)
SODIUM SERPL-SCNC: 134 MMOL/L (ref 136–145)
WBC # BLD AUTO: 7.92 K/UL (ref 3.9–12.7)

## 2020-10-02 PROCEDURE — 25000003 PHARM REV CODE 250: Mod: NTX | Performed by: HOSPITALIST

## 2020-10-02 PROCEDURE — 80053 COMPREHEN METABOLIC PANEL: CPT | Mod: NTX

## 2020-10-02 PROCEDURE — 36415 COLL VENOUS BLD VENIPUNCTURE: CPT | Mod: NTX

## 2020-10-02 PROCEDURE — 85610 PROTHROMBIN TIME: CPT | Mod: NTX

## 2020-10-02 PROCEDURE — 99239 HOSP IP/OBS DSCHRG MGMT >30: CPT | Mod: NTX,,, | Performed by: HOSPITALIST

## 2020-10-02 PROCEDURE — 83735 ASSAY OF MAGNESIUM: CPT | Mod: NTX

## 2020-10-02 PROCEDURE — 85025 COMPLETE CBC W/AUTO DIFF WBC: CPT | Mod: NTX

## 2020-10-02 PROCEDURE — 94761 N-INVAS EAR/PLS OXIMETRY MLT: CPT | Mod: NTX

## 2020-10-02 PROCEDURE — 99239 PR HOSPITAL DISCHARGE DAY,>30 MIN: ICD-10-PCS | Mod: NTX,,, | Performed by: HOSPITALIST

## 2020-10-02 PROCEDURE — 84100 ASSAY OF PHOSPHORUS: CPT | Mod: NTX

## 2020-10-02 RX ORDER — HYDROXYZINE HYDROCHLORIDE 25 MG/1
25 TABLET, FILM COATED ORAL 3 TIMES DAILY PRN
Qty: 30 TABLET | Refills: 1 | Status: ON HOLD | OUTPATIENT
Start: 2020-10-02 | End: 2020-10-21 | Stop reason: HOSPADM

## 2020-10-02 RX ORDER — LACTULOSE 10 G/15ML
15 SOLUTION ORAL; RECTAL 3 TIMES DAILY
Refills: 0
Start: 2020-10-02 | End: 2020-10-02 | Stop reason: SDUPTHER

## 2020-10-02 RX ORDER — CIPROFLOXACIN 500 MG/1
500 TABLET ORAL DAILY
Qty: 30 TABLET | Refills: 2 | Status: ON HOLD | OUTPATIENT
Start: 2020-10-02 | End: 2020-10-21 | Stop reason: HOSPADM

## 2020-10-02 RX ORDER — LACTULOSE 10 G/15ML
15 SOLUTION ORAL; RECTAL 3 TIMES DAILY
Qty: 2070 ML | Refills: 2 | Status: ON HOLD | OUTPATIENT
Start: 2020-10-02 | End: 2020-10-21 | Stop reason: HOSPADM

## 2020-10-02 RX ADMIN — ZINC SULFATE 220 MG (50 MG) CAPSULE 220 MG: CAPSULE at 12:10

## 2020-10-02 RX ADMIN — DOCUSATE SODIUM 100 MG: 100 CAPSULE, LIQUID FILLED ORAL at 08:10

## 2020-10-02 RX ADMIN — RIFAXIMIN 550 MG: 550 TABLET ORAL at 08:10

## 2020-10-02 RX ADMIN — CIPROFLOXACIN 500 MG: 500 TABLET, FILM COATED ORAL at 08:10

## 2020-10-02 RX ADMIN — FOLIC ACID 1 MG: 1 TABLET ORAL at 08:10

## 2020-10-02 RX ADMIN — OXYCODONE HYDROCHLORIDE 5 MG: 5 TABLET ORAL at 08:10

## 2020-10-02 RX ADMIN — LACTULOSE 15 G: 20 SOLUTION ORAL at 08:10

## 2020-10-02 RX ADMIN — Medication 100 MG: at 08:10

## 2020-10-02 RX ADMIN — LACTULOSE 15 G: 20 SOLUTION ORAL at 02:10

## 2020-10-02 NOTE — PLAN OF CARE
"RN received a call from Didier (brother, POA) [phone], regarding patient's discharge and living situation    Per patient, he had been recently  and has been living in a motel with his girlfriend.  His significant other had to give up the motel room common move in with her friend.  He is unable to afford any further housing at this time.     Called and spoke with the brother Didier (above).  Didier currently takes care and provides housing for patient's son.  Brother stated that there isn't enough room in his place of living.  I explained that the housing issue must be resolved prior to any transplantation.  I also stressed the importance of adequate transportation as well as finances to afford medications.  Brother stated that those are not a problem, and he will "take care of everything. "  Explained that our pre transplant patients are not candidates for apartments in the vicinity of the hospital.      Transplant team and social work have  been notified    Family will be coming by to  the patient and resolve the living situation     Patient's note was created using MModal Dictation.  Any errors in syntax may not have been identified and edited on initial review prior to signing this note.    Signing Physician:     Lise Gonsalez MD  Department of Hospital Medicine   [facility]  Pager [phone] Zffpfdb [phone]  10/2/2020      "

## 2020-10-02 NOTE — TELEPHONE ENCOUNTER
(MARCELLA) received a telephone call from Tx Coordinator Yessenia VELEZ to advise the patient (pt) was scheduled to tentatively discharge today; however, had spoken with the pt's brother, Didier, who indicated the pt was homeless.      SW contacted the pt to discuss the above stated information.  Pt advised pt thought checking into the [facility] to await transplant would be an option.  SW provided appropriate education regarding post transplant lodging.  SW inquired about the reported home pt and pt's girlfriend, Valeria, lived within as stated during pt's initial assessment with MARCELLA Jaquez dated 9/18/20.  Pt stated pt's girlfriend currently resided with friends in a one bedroom apartment of which the pt would not be able to discharge too.  Pt advised pt would check into a hotel with money saved while working on a permanent housing plan.  SW explored the pt moving in with family members such as:  Brothers or mother, to which pt advised pt's mother resides in [place] and pt's brothers are not able to assist with housing due to having families of their own.  Pt verified pt and girlfriend do have a vehicle to utilize for appointments and personal needs.  Pt stated Valeria was in route to the hospital to transport the pt to a hotel in [place], [place] upon discharge.  Pt will be scheduled to meet with Social Work on Wednesday, 10/7/20, to discuss plans for housing.    MARCELLA contacted Yessenia via telephone to provide updated information as stated above.  SW remains available.

## 2020-10-02 NOTE — TELEPHONE ENCOUNTER
Called Valeria, patient's primary caregiver.  She is at bedside with patient awaiting release from the hospital. Reviewed discharge follow up plan, awaiting financial clearance for listing, need to meet with social work to confirm living arrangements.  Reviewed low na, high protein diet. Discussed outpatient coordinator, Yulisa to be point of contact and importance of calling with problems.  Denied further questions at this time.

## 2020-10-02 NOTE — PROGRESS NOTES
Progress Note  Hospital Medicine  [facility], [place]       Patient Name: Jordan Altman  MRN:  [id number]  Hospital Medicine Team: [facility] Lise Gonsalez MD  Date of Admission:  9/19/2020     Length of Stay:  LOS: 12 days   Expected Discharge Date: 10/2/2020  Principal Problem:  Decompensated hepatic cirrhosis     Subjective:     Interval History/Overnight Events:    Patient continues to do well; no acute issues   EUS/ERCP today cancelled as deemed not necessary by hepatology   Cr improving today 1.4  MELD 31     patient approved for liver transplant, pending insurance approval and listing for liver txp      ciprofloxacin HCl  500 mg Oral Q24H    DIPH,PERTUSS(ACEL),TET VAC(PF) (ADULT)  0.5 mL Intramuscular Once    docusate sodium  100 mg Oral Daily    folic acid  1 mg Oral Daily    lactulose  15 g Oral TID    rifAXImin  550 mg Oral BID    thiamine  100 mg Oral Daily    zinc sulfate  220 mg Oral Daily           sodium chloride, albuterol-ipratropium, calcium carbonate, dextrose 50%, dextrose 50%, glucagon (human recombinant), glucose, glucose, hydrOXYzine HCL, ondansetron, oxyCODONE, traMADoL    Review of Systems   Constitutional: Negative for chills, fatigue, fever.   HENT: Negative for sore throat, trouble swallowing.    Eyes: Negative for photophobia, visual disturbance.   Respiratory: Negative for cough, shortness of breath.    Cardiovascular: Negative for chest pain, palpitations, leg swelling.   Gastrointestinal: Negative for abdominal pain, constipation, diarrhea, nausea, vomiting.   Endocrine: Negative for cold intolerance, heat intolerance.   Genitourinary: Negative for dysuria, frequency.   Musculoskeletal: Negative for arthralgias, myalgias.   Skin: Negative for rash, wound, erythema   Neurological: Negative for dizziness, syncope, weakness, light-headedness.   Psychiatric/Behavioral: Negative for confusion, hallucinations, anxiety  All other systems reviewed  and are negative.    Objective:     Temp:  [98.1 °F (36.7 °C)-98.8 °F (37.1 °C)]   Pulse:  [86-99]   Resp:  [16-23]   BP: (108-153)/(65-85)   SpO2:  [93 %-97 %]         Weight change:    Body mass index is 35.43 kg/m².       Physical Exam:  Constitutional:chronically ill looking,  non-distressed, not diaphoretic.   HENT: NC/AT, external ears normal  Eyes: PERRL, EOMI, conjunctiva normal, no discharge b/l, +scleral icterus   Neck: normal ROM, supple  CV: RRR, no m/r/g, no carotid bruits, +2 peripheral pulses.  Pulmonary/Chest wall: Breathing comfortably w/o distress, CTAB, no w/r/r, no crackles.  Decreased breath sounds at lung bases  GI: Soft, non-tender, (+) BS, (+) BM  Obese   Musculoskeletal: Normal ROM, no atrophy,  no pitting edema in LE bilaterally   Neurological: AAO x 4, no focal deficits noted   No asterixis   Skin: warm, dry   +pallor, jaundice, + spider angiomas, +bruising   Multiple tattoos   Psych: normal mood and affect, normal behavior, thought content and judgement.    Labs:    Chemistries:   Recent Labs   Lab 09/26/20  0342  09/28/20  0712 09/29/20  0323 09/30/20  0440 09/30/20  0441 10/01/20  0352      < > 135* 135*  --  136 135*   K 4.3   < > 3.9 3.8  --  3.8 4.1      < > 109 109  --  110 110   CO2 16*   < > 17* 15*  --  15* 14*   BUN 42*   < > 46* 46*  --  39* 34*   CREATININE 2.0*   < > 2.0* 1.7*  --  1.6* 1.4   CALCIUM 9.5   < > 9.3 8.8  --  8.8 8.8   PROT 5.2*   < > 5.1* 5.0*  --  4.9* 4.8*   BILITOT 26.9*   < > 24.0* 23.9*  --  21.2* 21.0*   ALKPHOS 134   < > 157* 143*  --  130 142*   ALT 36   < > 35 33  --  32 33   AST 80*   < > 85* 81*  --  78* 81*   MG 2.2  --  2.0  --  1.8  --   --    PHOS 3.5  --  4.2  --  3.4  --   --     < > = values in this interval not displayed.        WBC:   Recent Labs   Lab 09/27/20  0434 09/28/20  0712 09/29/20  0323 09/30/20  0441 10/01/20  0352   WBC 9.46 11.87 10.21 7.56 7.97     Bands:     CBC/Anemia Labs: Coags:    Recent Labs   Lab  09/29/20 0323 09/30/20  0441 10/01/20  0352   WBC 10.21 7.56 7.97   HGB 8.6* 8.2* 8.5*   HCT 25.7* 24.4* 24.9*   PLT 51* 47* 49*   * 103* 102*   RDW 17.8* 18.1* 18.4*    Recent Labs   Lab 09/29/20 0323 09/30/20 0441 10/01/20  0352   INR 2.4* 2.1* 2.2*          Assessment and Plan     Hospital Course:    Mr. Jordan Altman was admitted to Hospital Medicine for management of  Decompensated hepatic cirrhosis     Active Hospital Problems    Diagnosis  POA    *Decompensated hepatic cirrhosis [K72.90, K74.60]  Yes    Organ transplant candidate [Z76.82]  Not Applicable    Left knee pain [M25.562]  Yes    Encounter for palliative care [Z51.5]  Not Applicable    Counseling regarding advanced care planning and goals of care [Z71.89]  Not Applicable    Pulmonary HTN [I27.20]  Yes     PA pressure 73 on echo 9/19/20      Alcohol use disorder, severe, dependence [F10.20]  Yes     Chronic    Encounter for pre-transplant evaluation for liver transplant [Z01.818]  Not Applicable    Macrocytic anemia [D53.9]  Yes    DIC (disseminated intravascular coagulation) [D65]  Yes    Hypertension [I10]  Yes    Hx of SBP (spontaneous bacterial peritonitis) [K65.2]  Yes    Ascites due to alcoholic cirrhosis [K70.31]  Yes    Thrombocytopenia [D69.6]  Yes    Coagulopathy [D68.9]  Yes    JENN (acute kidney injury) [N17.9]  Yes    Portal hypertension [K76.6]  Yes    Chronic Hepatic encephalopathy [K72.90]  Yes    Hyponatremia [E87.1]  Yes      Resolved Hospital Problems   No resolved problems to display.     Decompensated alcoholic cirrhosis with Ascites   Hx of SBP     MELD-Na score: 31 at 10/1/2020  3:52 AM  MELD score: 30 at 10/1/2020  3:52 AM  Calculated from:  Serum Creatinine: 1.4 mg/dL at 10/1/2020  3:52 AM  Serum Sodium: 135 mmol/L at 10/1/2020  3:52 AM  Total Bilirubin: 21.0 mg/dL at 10/1/2020  3:52 AM  INR(ratio): 2.2 at 10/1/2020  3:52 AM  Age: 45 years 4 months     -No signs of active bleeding or infection    -he is afebrile and has no leukocytosis   -cx negative   -US liver with doppler- reviewed   -vitamin K for coagulopathy   -PETH 9/19 negative   -hepatology following  - para on 9/21 - IR paracentesis attempted but insufficient ascites identified for safe paracentesis  - PO cipro daily for SBP ppx given hx of SBP  - psychiatry consulted - high risk   -s/p  liver transplant evaluation. Approved for OLT on 9/30.   - pending financial approval for listing for liver txp  - will monitor to determine if pt requires staying in the hospital until his transplantation     Acute on chronic Hepatic encephalopathy   - HE improved, cont lactulose and rifaximin and zinc     Choledocholithiasis   - new filling defect on MRI ab,   - EUS/ERCP was planned for 10/1- EUS/ERCP cancelled as deemed not necessary by hepatology      JENN   -concern for iATN from volume depletion vs hepatorenal syndrome (HRS)    -hold home diuretics (lasix, spironolactone)  -will hold off midodrine for now given SBP ~120  -reviewed UA, urine sodium, renal US   -monitor renal function closely and avoid nephrotoxic agents  -consider nephrology consult if does not improve with above   - Cr stable and down to 1.4 on 10/1,     Portal hypertension  - PA pressures of 73 on echo from 9/19.  CVP 3.  No significant volume overload  -s/p RHC on 9/12 -with favorable results, with PA pressure acceptable for liver transplant.      Hyponatremia    -likely due to decompensated cirrhosis and diuretic use  -hold diuretics   -monitor closely with daily labs   - IV albumin and IV NS   -improved / resolved      Alcohol abuse   -completed rehab in the past but relapsed   -quit drinking July of this year and committed to stay sober  -thiamine, folic acid daily   - PETH on admission negative   -addiction psychiatry consulted- high risk     Coagulopathy  Thrombocytopenia   Macrocytic anemia  - 2/2 liver disease  - stable  Monitor       Diet:  Low Na with fluid restriction   GI PPx:   PO PPI  DVT PPx:  SCDs due to coagulopathy   Goals of Care:  Full     High Risk Conditions:  Decompensated cirrhosis      Disposition:      - pending financial approval for listing for liver txp  - will monitor to determine if pt requires staying in the hospital until his transplantation     Patient's note was created using MModal Dictation.  Any errors in syntax may not have been identified and edited on initial review prior to signing this note.    Signing Physician:     Lise Gonsalez MD  Department of Hospital Medicine   [facility]  Pager [phone]  Spectra [phone]  10/1/2020

## 2020-10-02 NOTE — DISCHARGE INSTRUCTIONS
Cirrhosis Counseling  - strict abstinence of alcohol use  - compliance with lactulose and rifaximin if you have developed hepatic encephalopathy (confusion due to high ammonia level)  - avoid non-steroidal anti-inflammatory drugs (NSAIDs) such as ibuprofen, Motrin, naprosyn, Alleve due to the risk of kidney damage  - can take acetaminophen (Tylenol), no more than 2000 mg per day  - low sodium (salt) 2 gram per day diet  - 1.5 liter per day fluid restriction   - nutrition: 25-30kcal (calorie per body body weight in kilogram) per day  - no need to restrict protein in diet  - high protein diet: 120 grams per day to prevent muscle mass loss. Recommended at least 1 protein shake daily using Premier Protein shakes    - avoid fasting    - alcohol abstinence and completion of alcohol meetings/ AA/ rehab  - Late night snack with 50 gram of complex carbohydrates and protein  - resistance exercises for muscle strength  - avoid raw seafoods due to the risk of fatal Vibrio vulnificus infection

## 2020-10-02 NOTE — PLAN OF CARE
VSS, jaundice, pt c/o Lt knee pain, current pain meds effective. Lt upper arm IV SL. O2 2L NC PRN @ HS. Safety maintained. Pt denies needs/concerns at this time, will continue to monitor.

## 2020-10-04 NOTE — PLAN OF CARE
Patient discharged on Friday 10/2.      Future Appointments   Date Time Provider Department [place]   10/5/2020 11:35 AM LABORATORY, [facility]   10/7/2020 11:00 AM Valeriano Marrero MD [facility]   10/8/2020 11:05 AM LABORATORY, [facility]   10/12/2020 11:05 AM LABORATORY, [facility]   10/15/2020 11:10 AM LABORATORY, [facility]   10/19/2020 11:25 AM LABORATORY, [facility]   10/22/2020 11:05 AM LABORATORY, [facility]         10/04/20 1548   Final Note   Assessment Type Final Discharge Note   Anticipated Discharge Disposition Home   What phone number can be called within the next 1-3 days to see how you are doing after discharge? [phone]   Hospital Follow Up  Appt(s) scheduled? Yes   Post-Acute Status   Post-Acute Authorization Other   Other Status No Post-Acute Service Needs

## 2020-10-05 ENCOUNTER — LAB VISIT (OUTPATIENT)
Dept: LAB | Facility: HOSPITAL | Age: 45
End: 2020-10-05
Attending: STUDENT IN AN ORGANIZED HEALTH CARE EDUCATION/TRAINING PROGRAM
Payer: MEDICAID

## 2020-10-05 ENCOUNTER — PATIENT OUTREACH (OUTPATIENT)
Dept: ADMINISTRATIVE | Facility: CLINIC | Age: 45
End: 2020-10-05

## 2020-10-05 DIAGNOSIS — Z76.82 ORGAN TRANSPLANT CANDIDATE: ICD-10-CM

## 2020-10-05 LAB
ACANTHOCYTES BLD QL SMEAR: PRESENT
ALBUMIN SERPL BCP-MCNC: 3 G/DL (ref 3.5–5.2)
ALP SERPL-CCNC: 177 U/L (ref 55–135)
ALT SERPL W/O P-5'-P-CCNC: 39 U/L (ref 10–44)
ANION GAP SERPL CALC-SCNC: 9 MMOL/L (ref 8–16)
ANISOCYTOSIS BLD QL SMEAR: ABNORMAL
AST SERPL-CCNC: 94 U/L (ref 10–40)
BASOPHILS # BLD AUTO: 0.17 K/UL (ref 0–0.2)
BASOPHILS NFR BLD: 1.5 % (ref 0–1.9)
BILIRUB SERPL-MCNC: 23.5 MG/DL (ref 0.1–1)
BUN SERPL-MCNC: 20 MG/DL (ref 6–20)
BURR CELLS BLD QL SMEAR: ABNORMAL
CALCIUM SERPL-MCNC: 8.9 MG/DL (ref 8.7–10.5)
CHLORIDE SERPL-SCNC: 108 MMOL/L (ref 95–110)
CO2 SERPL-SCNC: 16 MMOL/L (ref 23–29)
CREAT SERPL-MCNC: 1.2 MG/DL (ref 0.5–1.4)
DIFFERENTIAL METHOD: ABNORMAL
EOSINOPHIL # BLD AUTO: 0.4 K/UL (ref 0–0.5)
EOSINOPHIL NFR BLD: 3 % (ref 0–8)
ERYTHROCYTE [DISTWIDTH] IN BLOOD BY AUTOMATED COUNT: 20.3 % (ref 11.5–14.5)
EST. GFR  (AFRICAN AMERICAN): >60 ML/MIN/1.73 M^2
EST. GFR  (NON AFRICAN AMERICAN): >60 ML/MIN/1.73 M^2
GLUCOSE SERPL-MCNC: 130 MG/DL (ref 70–110)
HCT VFR BLD AUTO: 28.8 % (ref 40–54)
HGB BLD-MCNC: 9.6 G/DL (ref 14–18)
IMM GRANULOCYTES # BLD AUTO: 0.28 K/UL (ref 0–0.04)
IMM GRANULOCYTES NFR BLD AUTO: 2.4 % (ref 0–0.5)
INR PPP: 2.1 (ref 0.8–1.2)
LYMPHOCYTES # BLD AUTO: 1.4 K/UL (ref 1–4.8)
LYMPHOCYTES NFR BLD: 11.9 % (ref 18–48)
MCH RBC QN AUTO: 34.3 PG (ref 27–31)
MCHC RBC AUTO-ENTMCNC: 33.3 G/DL (ref 32–36)
MCV RBC AUTO: 103 FL (ref 82–98)
MONOCYTES # BLD AUTO: 1.5 K/UL (ref 0.3–1)
MONOCYTES NFR BLD: 13.1 % (ref 4–15)
NEUTROPHILS # BLD AUTO: 8.2 K/UL (ref 1.8–7.7)
NEUTROPHILS NFR BLD: 70.5 % (ref 38–73)
NRBC BLD-RTO: 0 /100 WBC
PLATELET # BLD AUTO: 68 K/UL (ref 150–350)
PLATELET BLD QL SMEAR: ABNORMAL
PMV BLD AUTO: 11 FL (ref 9.2–12.9)
POIKILOCYTOSIS BLD QL SMEAR: SLIGHT
POLYCHROMASIA BLD QL SMEAR: ABNORMAL
POTASSIUM SERPL-SCNC: 4.3 MMOL/L (ref 3.5–5.1)
PROT SERPL-MCNC: 5.5 G/DL (ref 6–8.4)
PROTHROMBIN TIME: 21.7 SEC (ref 9–12.5)
RBC # BLD AUTO: 2.8 M/UL (ref 4.6–6.2)
SODIUM SERPL-SCNC: 133 MMOL/L (ref 136–145)
TOXIC GRANULES BLD QL SMEAR: PRESENT
WBC # BLD AUTO: 11.62 K/UL (ref 3.9–12.7)

## 2020-10-05 PROCEDURE — 85025 COMPLETE CBC W/AUTO DIFF WBC: CPT | Mod: TXP

## 2020-10-05 PROCEDURE — 85610 PROTHROMBIN TIME: CPT | Mod: TXP

## 2020-10-05 PROCEDURE — 36415 COLL VENOUS BLD VENIPUNCTURE: CPT | Mod: TXP

## 2020-10-05 PROCEDURE — 80053 COMPREHEN METABOLIC PANEL: CPT | Mod: TXP

## 2020-10-05 NOTE — DISCHARGE SUMMARY
DISCHARGE SUMMARY  Hospital Medicine    Team: [facility]    Patient Name: Jordan Altman  YOB: 1975    Admit Date: 9/19/2020    Discharge Date: 10/02/2020    Discharge Attending Physician: ELVIN Gonsalez MD    Chief Complaint: Decompensated cirrhosis / liver failure , JENN    Princilpal Diagnoses:  Active Hospital Problems    Diagnosis  POA    *Decompensated hepatic cirrhosis [K72.90, K74.60]  Yes    Organ transplant candidate [Z76.82]  Not Applicable    Left knee pain [M25.562]  Yes    Encounter for palliative care [Z51.5]  Not Applicable    Counseling regarding advanced care planning and goals of care [Z71.89]  Not Applicable    Pulmonary HTN [I27.20]  Yes     PA pressure 73 on echo 9/19/20      Alcohol use disorder, severe, dependence [F10.20]  Yes     Chronic    Encounter for pre-transplant evaluation for liver transplant [Z01.818]  Not Applicable    Macrocytic anemia [D53.9]  Yes    DIC (disseminated intravascular coagulation) [D65]  Yes    Hypertension [I10]  Yes    Hx of SBP (spontaneous bacterial peritonitis) [K65.2]  Yes    Ascites due to alcoholic cirrhosis [K70.31]  Yes    Thrombocytopenia [D69.6]  Yes    Coagulopathy [D68.9]  Yes    JENN (acute kidney injury) [N17.9]  Yes    Portal hypertension [K76.6]  Yes    Chronic Hepatic encephalopathy [K72.90]  Yes    Hyponatremia [E87.1]  Yes      Resolved Hospital Problems   No resolved problems to display.       Discharged Condition: Admit problems have stabilized       HOSPITAL COURSE:      Initial Presentation:    As per admitting provider,     45 year old male with PMH of alcohol abuse, alcoholic cirrhosis diagnosed one year ago and complicated by ascites, portal hypertension, hepatic encephalopathy, thrombocytopenia, coagulopathy presents [facility] for management of acute decompensated cirrhosis. He was admitted to OSH 3 days ago with abdominal distention with pain and SOB. Patient  reports he quit drinking in July of this year when he was admitted to hospital for alcoholic hepatitis. He had another admission August 25-September 1 with decompensated cirrhosis complicated by peritonitis. Patient notes 1.7 liter paracentesis last week on 9/15 and was negative for infection.      He has been taking lactulose, Lasix, and spironolactone.  He was found to have decompensated cirrhosis, acute kidney injury, hyponatremia, thrombocytopenia, anemia, and metabolic acidosis on admission.  Labs on September 17 noted white blood cell count 14.6, hemoglobin 12.7, hematocrit 39.1, platelet count 82, , ALT 69, total bilirubin 34.5, INR 2.3, sodium 126, potassium 4.1, CO2 18, chloride 93, BUN 40, creatinine 2.03. COVID negative.  Chest x-ray on September 17 showed no airspace disease or effusion. He was seen by Gastroenterology during his stay and was concern for possible hepatorenal syndrome, and he was treated with albumin. He was empirically started on meropenem.     During my interview patient has severe jaundice, appears chronically ill but not in distress. He endorses pain across upper abdomen but denies fever, chills, chest pain, dark stool or bleeding from other sites. States he used to drink 1-2 pint of vodka, whisky daily since age 16 until July of this year. Denies tobacco, IVDU. He worked as  prior to COVID pandemic and has numerous tattoos all over his body.          Course of Principle Problem for Admission:    Decompensated alcoholic cirrhosis with Ascites   Hx of SBP     MELD-Na score: 31 at 10/1/2020  3:52 AM  MELD score: 30 at 10/1/2020  3:52 AM  Calculated from:  Serum Creatinine: 1.4 mg/dL at 10/1/2020  3:52 AM  Serum Sodium: 135 mmol/L at 10/1/2020  3:52 AM  Total Bilirubin: 21.0 mg/dL at 10/1/2020  3:52 AM  INR(ratio): 2.2 at 10/1/2020  3:52 AM  Age: 45 years 4 months     -No signs of active bleeding or infection   -he is afebrile and has no leukocytosis   -cx negative    -US liver with doppler- reviewed   -vitamin K for coagulopathy   -PETH 9/19 negative   -hepatology following  - para on 9/21 - IR paracentesis attempted but insufficient ascites identified for safe paracentesis  - PO cipro daily for SBP ppx given hx of SBP  - psychiatry consulted - high risk   -s/p  liver transplant evaluation. Approved for OLT on 9/30.   - pending financial approval for listing for liver txp- needs re assessment by txp SW given lack of living arrangements  -- d/c with no diuretics, given recent JENN and no issues with edema/ anasarca at this time       On the day of d/c, patient was doing well with no acute issues. Addressing living arrangement with family and sig other, as patient currently does not have a home and is unable to afford lodging     Physical Exam:  Constitutional:chronically ill looking,  non-distressed, not diaphoretic.   HENT: NC/AT, external ears normal  Eyes: PERRL, EOMI, conjunctiva normal, no discharge b/l, +scleral icterus   Neck: normal ROM, supple  CV: RRR, no m/r/g, no carotid bruits, +2 peripheral pulses.  Pulmonary/Chest wall: Breathing comfortably w/o distress, CTAB, no w/r/r, no crackles.  Decreased breath sounds at lung bases  GI: Soft, non-tender, (+) BS, (+) BM  Obese   Musculoskeletal: Normal ROM, no atrophy,  no pitting edema in LE bilaterally   Neurological: AAO x 4, no focal deficits noted   No asterixis   Skin: warm, dry   +pallor, jaundice, + spider angiomas, +bruising   Multiple tattoos   Psych: normal mood and affect, normal behavior, thought content and judgement.    Other Medical Problems Addressed in the Hospital:    Acute on chronic Hepatic encephalopathy   - HE improved, cont lactulose and rifaximin and zinc      Choledocholithiasis   - new filling defect on MRI ab,   - EUS/ERCP was planned for 10/1- EUS/ERCP cancelled as deemed not necessary by hepatology      JENN   -concern for iATN from volume depletion vs hepatorenal syndrome (HRS)    -hold home  diuretics (lasix, spironolactone)  -will hold off midodrine for now given SBP ~120  -reviewed UA, urine sodium, renal US   -monitor renal function closely and avoid nephrotoxic agents  -consider nephrology consult if does not improve with above   - Cr stable and down to 1.4 on 10/1,  - d/c with no diuretics, given recent JENN and no issues with edema/ anasarca at this time      Portal hypertension  - PA pressures of 73 on echo from 9/19.  CVP 3.  No significant volume overload  -s/p RHC on 9/12 -with favorable results, with PA pressure acceptable for liver transplant.     Hyponatremia    -likely due to decompensated cirrhosis and diuretic use  -hold diuretics   -monitor closely with daily labs   - IV albumin and IV NS   -improved / resolved      Alcohol abuse   -completed rehab in the past but relapsed   -quit drinking July of this year and committed to stay sober  -thiamine, folic acid daily   - PETH on admission negative   -addiction psychiatry consulted- high risk      Coagulopathy  Thrombocytopenia   Macrocytic anemia  - 2/2 liver disease  - stable  Monitor          CONSULTS: hepatology, ID, orthopedics     Labs:    Chemistries:   Recent Labs   Lab 09/28/20  0712  09/30/20  0440 09/30/20  0441 10/01/20  0352 10/02/20  0419   *   < >  --  136 135* 134*   K 3.9   < >  --  3.8 4.1 4.1      < >  --  110 110 109   CO2 17*   < >  --  15* 14* 14*   BUN 46*   < >  --  39* 34* 25*   CREATININE 2.0*   < >  --  1.6* 1.4 1.2   CALCIUM 9.3   < >  --  8.8 8.8 8.6*   PROT 5.1*   < >  --  4.9* 4.8* 4.9*   BILITOT 24.0*   < >  --  21.2* 21.0* 20.3*   ALKPHOS 157*   < >  --  130 142* 157*   ALT 35   < >  --  32 33 32   AST 85*   < >  --  78* 81* 84*   MG 2.0  --  1.8  --   --  1.6   PHOS 4.2  --  3.4  --   --  3.2    < > = values in this interval not displayed.        WBC:   Recent Labs   Lab 09/28/20  0712 09/29/20  0323 09/30/20  0441 10/01/20  0352 10/02/20  0420   WBC 11.87 10.21 7.56 7.97 7.92     Bands:      CBC/Anemia Labs: Coags:    Recent Labs   Lab 09/30/20  0441 10/01/20  0352 10/02/20  0420   WBC 7.56 7.97 7.92   HGB 8.2* 8.5* 8.5*   HCT 24.4* 24.9* 24.9*   PLT 47* 49* 50*   * 102* 100*   RDW 18.1* 18.4* 18.9*    Recent Labs   Lab 09/30/20  0441 10/01/20  0352 10/02/20  0420   INR 2.1* 2.2* 2.2*          Disposition:  Home   With family - patient currently does not have a permanent living arrangement       Future Scheduled Appointments:  Future Appointments   Date Time Provider Department Center   10/5/2020 11:35 AM LABORATORY, [facility]   10/7/2020 11:00 AM Valeriano Marrero MD [facility]   10/8/2020 11:05 AM LABORATORY, [facility]   10/12/2020 11:05 AM LABORATORY, [facility]   10/15/2020 11:10 AM LABORATORY, [facility]   10/19/2020 11:25 AM LABORATORY, [facility]   10/22/2020 11:05 AM LABORATORY, [facility]       Follow-up Plans from This Hospitalization:      Discharge Medication List:       Jordan Altman   Home Medication Instructions BABAK:[id number]    Printed on:10/04/20 2222   Medication Information                      albuterol (PROVENTIL/VENTOLIN HFA) 90 mcg/actuation inhaler  Inhale 2 puffs into the lungs every 6 (six) hours as needed for Wheezing or Shortness of Breath. Rescue             cetirizine (ZYRTEC) 10 MG tablet  Take 10 mg by mouth once daily.             ciprofloxacin HCl (CIPRO) 500 MG tablet  Take 1 tablet (500 mg total) by mouth once daily.             ergocalciferol (VITAMIN D2) 50,000 unit Cap  Take 50,000 Units by mouth every Monday.             folic acid (FOLVITE) 1 MG tablet  Take 1 mg by mouth once daily.             hydrOXYzine HCL (ATARAX) 25 MG tablet  Take 1 tablet (25 mg total) by mouth 3 (three) times daily as needed for Itching.             lactulose (CHRONULAC) 10 gram/15 mL solution  Take 23 mLs (15.3333 g total) by mouth 3 (three) times daily. TITRATE TO 3-4  BOWEL MOVEMENTS DAILY.             polyethylene glycol (GLYCOLAX) 17 gram/dose powder  Take 17 g by mouth once daily.             rifAXIMin (XIFAXAN) 550 mg Tab  Take 1 tablet (550 mg total) by mouth 2 (two) times daily.             thiamine 100 MG tablet  Take 100 mg by mouth once daily.                   At the time of discharge patient was told to take all medications as prescribed, to keep all followup appointments, and to call their primary care physician or return to the emergency room if they have any worsening or concerning symptoms.    Time spent on the discharge of the patient including review of hospital course with the patient. reviewing discharge medications and arranging follow-up care 45 minutes.  Patient was seen and examined on the date of discharge and determined to be suitable for discharge.        Signing Physician:  Lise Gonsalez MD

## 2020-10-05 NOTE — PATIENT INSTRUCTIONS
Cirrhosis    The liver is found on the right side of your belly (abdomen). It is just below the rib cage. The liver has many important jobs. It removes toxins from the blood. It also helps your blood clot to stop bleeding. Cirrhosis happens when the liver is scarred or injured. This damage is permanent. It can cause your liver to stop working (liver failure).   The two most common causes of cirrhosis are long-term heavy alcohol use and having hepatitis B or C. Other things that can damage the liver include toxins, certain medicines, and certain viruses.  Common symptoms of cirrhosis include:  · Tiredness or weakness  · Loss of appetite  · Nausea and vomiting  · Easy bleeding and bruising  · Swelling of the belly (abdomen)  · Weight loss  · Yellowing of the eyes or skin (jaundice)  · Itching  · Confusion  Treatment helps ease symptoms and prevent more liver damage. You may also get treatment to fight the hepatitis virus. Quitting alcohol will help slow down the disease getting worse. It may also prevent more complications. If cirrhosis gets worse and becomes life threatening, you may need a liver transplant.   Home care  · Don't take medicines that can make liver damage worse. Your healthcare provider will tell you if any of the medicines you now take need to be changed. Talk with your provider before taking any medicine not prescribed. These include dietary supplements and herbs. Some of these may make liver damage worse.  · Talk with your healthcare provider about medicines that have acetaminophen or NSAIDs such as ibuprofen and naproxen. These can also harm your liver.   · Stop drinking alcohol. If you find it hard to stop drinking, seek professional help. Consider joining Alcoholics Anonymous or another type of treatment program for support.  · If you use IV drugs, you are at high risk for hepatitis B and C. Seek help to stop.   · Be sure to ask your healthcare provider about recommended vaccines. These include  vaccines for viruses that can cause liver disease.  Follow-up care  Follow up with your healthcare provider or as advised by our staff.  For more information and to learn about support groups for people with liver disease, contact:  · American [employer] Foundation, www.liverfoundation.org, 286.818.1772  · [employer], www.hepfi.org, 205.242.6946  When to seek medical advice  Call your healthcare provider right away if you have any of the following:  · Rapid weight gain with increased size of your belly (abdomen) or leg swelling  · Yellow color of your skin or eyes (jaundice) gets worse  · Excess bleeding from cuts or injuries  Date Last Reviewed: 6/22/2015  © 0832-7573 [employer]. [address] [place] [address], [place] [address]. All rights reserved. This information is not intended as a substitute for professional medical care. Always follow your healthcare professional's instructions.        Discharge Instructions for Cirrhosis of the Liver  You have been diagnosed with cirrhosis of the liver. This is a long-term (chronic) problem. It occurs when liver tissue is destroyed and replaced by scar tissue. Causes of cirrhosis include:  · Infection such as viral hepatitis  · Chronic alcoholism  · The bodys immune system attacks healthy cells (autoimmune disorders)  · Obesity  · Medicine side effects  · Genetic diseases  Sometimes the exact cause is unknown. You may not have any symptoms at first. Or your symptoms may be mild. But they usually get worse. Cirrhosis is likely to occur if you have a history of long-term alcohol abuse. Cirrhosis cant be cured. But it can be treated.   Home care  Alcohol  · People with liver disease should not drink alcohol. If you stop drinking, you may feel better and live longer.  · If you are a chronic alcohol user, you will have withdrawal symptoms. Talk with your healthcare provider for more information.    · If alcohol is a problem, ask your provider about  medicine that can help you quit drinking.    · Find a local Alcoholics Anonymous support group online at www.aa.org.  Diet  · Ask your provider what kind of diet you should follow. You may be asked to limit or not eat certain foods. Do not limit your protein intake.  · Weigh yourself daily and keep a weight log. If you have a sudden change in weight, call your provider.  · Cut back on salt:  ¨ Limit canned, dried, packaged, and fast foods.  ¨ Dont add salt to your food at the table.  ¨ Season foods with herbs instead of salt when you cook.  Medicines, supplements, and vaccines  · Take your medicines exactly as directed.  · Talk to your provider before taking vitamins, over-the-counter medicines, or herbal supplements. Many herbal supplements may be poisonous (toxic) to the liver.  · Avoid aspirin and other blood-thinning medicines.  · Discuss vitamin supplements and deficiencies with your provider.  · Ask your provider about getting vaccinations for viruses that can cause liver diseases.  Follow-up care  Follow up with your healthcare provider, or as advised. You will likely have the following tests:  · Lab tests  · Blood tests for liver cancer  · Ultrasound of your liver every 6 months  · Endoscopy to check for swollen veins (varices) in your digestive tract  When to call your provider  Call your healthcare provider right away if you have any of the following:  · Fever of 100.4°F (38.0°C) or higher  · Extreme tiredness (fatigue), weakness, or lack of appetite  · Vomiting (with or without blood)  · Yellowing of your skin or eyes (jaundice)  · Itching  · Swelling in your belly or legs  · Black or tarry stools  · Skin that bruises easily  · Confusion or trouble thinking clearly   Date Last Reviewed: 8/1/2016  © 0104-3861 [employer]. [address] [place] [address], [place] [address]. All rights reserved. This information is not intended as a substitute for professional medical care. Always follow your  healthcare professional's instructions.

## 2020-10-07 ENCOUNTER — TELEPHONE (OUTPATIENT)
Dept: TRANSPLANT | Facility: CLINIC | Age: 45
End: 2020-10-07

## 2020-10-07 ENCOUNTER — OFFICE VISIT (OUTPATIENT)
Dept: TRANSPLANT | Facility: CLINIC | Age: 45
End: 2020-10-07
Payer: MEDICAID

## 2020-10-07 VITALS
DIASTOLIC BLOOD PRESSURE: 58 MMHG | TEMPERATURE: 98 F | WEIGHT: 250.69 LBS | RESPIRATION RATE: 16 BRPM | SYSTOLIC BLOOD PRESSURE: 120 MMHG | BODY MASS INDEX: 33.95 KG/M2 | HEIGHT: 72 IN | OXYGEN SATURATION: 94 % | HEART RATE: 90 BPM

## 2020-10-07 DIAGNOSIS — K70.31 ALCOHOLIC CIRRHOSIS OF LIVER WITH ASCITES: Primary | ICD-10-CM

## 2020-10-07 DIAGNOSIS — Z76.82 ORGAN TRANSPLANT CANDIDATE: ICD-10-CM

## 2020-10-07 DIAGNOSIS — K76.82 HEPATIC ENCEPHALOPATHY: ICD-10-CM

## 2020-10-07 PROCEDURE — 99999 PR PBB SHADOW E&M-EST. PATIENT-LVL III: CPT | Mod: PBBFAC,TXP,, | Performed by: STUDENT IN AN ORGANIZED HEALTH CARE EDUCATION/TRAINING PROGRAM

## 2020-10-07 PROCEDURE — 99215 OFFICE O/P EST HI 40 MIN: CPT | Mod: S$PBB,TXP,, | Performed by: STUDENT IN AN ORGANIZED HEALTH CARE EDUCATION/TRAINING PROGRAM

## 2020-10-07 PROCEDURE — 99999 PR PBB SHADOW E&M-EST. PATIENT-LVL III: ICD-10-PCS | Mod: PBBFAC,TXP,, | Performed by: STUDENT IN AN ORGANIZED HEALTH CARE EDUCATION/TRAINING PROGRAM

## 2020-10-07 PROCEDURE — 99215 PR OFFICE/OUTPT VISIT, EST, LEVL V, 40-54 MIN: ICD-10-PCS | Mod: S$PBB,TXP,, | Performed by: STUDENT IN AN ORGANIZED HEALTH CARE EDUCATION/TRAINING PROGRAM

## 2020-10-07 PROCEDURE — 99213 OFFICE O/P EST LOW 20 MIN: CPT | Mod: PBBFAC,TXP | Performed by: STUDENT IN AN ORGANIZED HEALTH CARE EDUCATION/TRAINING PROGRAM

## 2020-10-07 NOTE — TELEPHONE ENCOUNTER
SW spoke with patient this afternoon in regards to his housing situation currently and plans for more permanent housing.   Per patient, he and his girlfriend remain in a hotel in [place].  Patient reports that when he is released from transplant lodging after transplant, he will be moving to an apartment in [place].  Patient reports that his brother is assisting him in securing an apartment to rent.   SW reminded the patient that once he is released from transplant lodging he will need his own housing to discharge to.  Pt then voiced that the apartment in [place] should be ready to move into in the next few weeks.   SW asked patient to call with new address of apartment if he hasn't been transplant when he is moving in.   Patient reports having transplant contact information.   Patient with no further psychosocial needs at this time.  SW remains available.

## 2020-10-07 NOTE — LETTER
October 7, 2020        Theresa Titus  [address]  [facility] [address]  Phone: [phone]  Fax: [phone]             [facility]  [address]  [facility] [address]  Phone: [phone]   Patient: Jordan Altman   MR Number: [id number]   YOB: 1975   Date of Visit: 10/7/2020       Dear Dr. Theresa Titus    Thank you for referring Jordan lAtman to me for evaluation. Attached you will find relevant portions of my assessment and plan of care.    If you have questions, please do not hesitate to call me. I look forward to following Jordan Altman along with you.    Sincerely,    Valeriano Marrero MD    Enclosure    If you would like to receive this communication electronically, please contact [email or web address] or [phone] to request Snapt[employer] Link access.    [employer] Link is a tool which provides read-only access to select patient information with whom you have a relationship. Its easy to use and provides real time access to review your patients record including encounter summaries, notes, results, and demographic information.    If you feel you have received this communication in error or would no longer like to receive these types of communications, please e-mail [email or web address]

## 2020-10-07 NOTE — PROGRESS NOTES
Transplant Hepatology   Transplant Evaluation Consult Note    Referring provider: No ref. provider found  PCP: SHAHEEN Jaime    Chief complaint: follow up of alcohol related cirrhosis    HPI: Jordan Altman is a 45 y.o. male who presents to Transplant Hepatology Clinic for follow up of alcohol related cirrhosis. He  is accompanied by his girlfriend.    He has a longstanding history of alcohol abuse but quit drinking in July 2020 after a recent relapse. He was discharged from [facility] days ago after 2 week hospitalization for decompensated cirrhosis and JENN. His renal function normalized with diuretic cessation and empiric HRS treatment. Due to persistently high MELD score, he underwent inpatient liver transplant evaluation and was approved for transplant.      Since discharge she has had ongoing fatigue and jaundice.  He reports mildly increased abdominal distention but denies lower extremity edema.  He reports compliance with lactulose and rifaximin without recent encephalopathy. He denies recent hematemesis, coffee ground emesis, melena, hematochezia.      Past Medical History:   Diagnosis Date    Decompensated hepatic cirrhosis     Hypertension     SBP (spontaneous bacterial peritonitis)        Past Surgical History:   Procedure Laterality Date    ERCP N/A 10/1/2020    Procedure: ERCP (ENDOSCOPIC RETROGRADE CHOLANGIOPANCREATOGRAPHY);  Surgeon: Marcos Ramirez MD;  Location: [facility] ENDO ([address] [place] [address]);  Service: Endoscopy;  Laterality: N/A;    RIGHT HEART CATHETERIZATION Right 9/23/2020    Procedure: INSERTION, CATHETER, RIGHT HEART;  Surgeon: Mikel Costa Jr., MD;  Location: [facility] CATH LAB;  Service: Cardiology;  Laterality: Right;    THORACENTESIS  2011       Family History   Problem Relation Age of Onset    COPD Mother        Social History     Tobacco Use    Smoking status: Current Some Day Smoker    Smokeless tobacco: Current User   Substance Use Topics    Alcohol use: Yes     Alcohol/week:  112.0 standard drinks     Types: 112 Shots of liquor per week     Comment: fifth of vodka a day. LAST DRINK 7/25/2020 per pt     Drug use: Yes     Frequency: 3.0 times per week     Types: Marijuana       Current Outpatient Medications   Medication Sig Dispense Refill    albuterol (PROVENTIL/VENTOLIN HFA) 90 mcg/actuation inhaler Inhale 2 puffs into the lungs every 6 (six) hours as needed for Wheezing or Shortness of Breath. Rescue      cetirizine (ZYRTEC) 10 MG tablet Take 10 mg by mouth once daily.      ciprofloxacin HCl (CIPRO) 500 MG tablet Take 1 tablet (500 mg total) by mouth once daily. 30 tablet 2    ergocalciferol (VITAMIN D2) 50,000 unit Cap Take 50,000 Units by mouth every Monday.      folic acid (FOLVITE) 1 MG tablet Take 1 mg by mouth once daily.      hydrOXYzine HCL (ATARAX) 25 MG tablet Take 1 tablet (25 mg total) by mouth 3 (three) times daily as needed for Itching. 30 tablet 1    lactulose (CHRONULAC) 10 gram/15 mL solution Take 23 mLs (15.3333 g total) by mouth 3 (three) times daily. TITRATE TO 3-4 BOWEL MOVEMENTS DAILY. 2070 mL 2    rifAXIMin (XIFAXAN) 550 mg Tab Take 1 tablet (550 mg total) by mouth 2 (two) times daily. 60 tablet 2    thiamine 100 MG tablet Take 100 mg by mouth once daily.      polyethylene glycol (GLYCOLAX) 17 gram/dose powder Take 17 g by mouth once daily.       No current facility-administered medications for this visit.        Review of patient's allergies indicates:   Allergen Reactions    Latex, natural rubber        Review of Systems   Constitutional: Negative for fever and weight loss.   HENT: Negative for congestion and sore throat.    Eyes: Negative for blurred vision and double vision.   Respiratory: Negative for cough and shortness of breath.    Cardiovascular: Negative for chest pain and leg swelling.   Gastrointestinal: Negative for abdominal pain, blood in stool, constipation, diarrhea, melena, nausea and vomiting.   Genitourinary: Negative for dysuria  and hematuria.   Musculoskeletal: Positive for joint pain (knee). Negative for myalgias.   Skin: Positive for itching. Negative for rash.   Neurological: Negative for tremors, seizures and headaches.   Endo/Heme/Allergies: Does not bruise/bleed easily.   Psychiatric/Behavioral: Negative for depression. The patient is not nervous/anxious.        Vitals:    10/07/20 1102   BP: (!) 120/58   Pulse: 90   Resp: 16   Temp: 97.9 °F (36.6 °C)   TempSrc: Oral   SpO2: (!) 94%   Weight: 113.7 kg (250 lb 10.6 oz)   Height: 6' (1.829 m)       Physical Exam  Vitals signs reviewed.   Constitutional:       General: He is not in acute distress.     Appearance: He is well-developed.   HENT:      Head: Normocephalic and atraumatic.   Eyes:      General: Scleral icterus present.      Extraocular Movements: Extraocular movements intact.      Conjunctiva/sclera: Conjunctivae normal.   Cardiovascular:      Rate and Rhythm: Normal rate and regular rhythm.   Pulmonary:      Effort: Pulmonary effort is normal. No respiratory distress.      Breath sounds: Normal breath sounds. No wheezing, rhonchi or rales.   Abdominal:      General: Bowel sounds are normal. There is no distension.      Palpations: Abdomen is soft.      Tenderness: There is no abdominal tenderness.   Musculoskeletal:         General: No swelling or deformity.      Right lower leg: No edema.      Left lower leg: No edema.   Skin:     General: Skin is warm and dry.      Coloration: Skin is jaundiced.   Neurological:      General: No focal deficit present.      Mental Status: He is alert and oriented to person, place, and time.   Psychiatric:         Mood and Affect: Mood and affect normal.         Behavior: Behavior normal. Behavior is cooperative.         LABS: I personally reviewed pertinent laboratory findings.    Lab Results   Component Value Date    ALT 39 10/05/2020    AST 94 (H) 10/05/2020    ALKPHOS 177 (H) 10/05/2020    BILITOT 23.5 (H) 10/05/2020       Lab Results    Component Value Date    WBC 11.62 10/05/2020    HGB 9.6 (L) 10/05/2020    HCT 28.8 (L) 10/05/2020     (H) 10/05/2020    PLT 68 (L) 10/05/2020       Lab Results   Component Value Date     (L) 10/05/2020    K 4.3 10/05/2020     10/05/2020    CO2 16 (L) 10/05/2020    BUN 20 10/05/2020    CREATININE 1.2 10/05/2020    CALCIUM 8.9 10/05/2020    ANIONGAP 9 10/05/2020    ESTGFRAFRICA >60 10/05/2020    EGFRNONAA >60 10/05/2020       Lab Results   Component Value Date    INR 2.1 (H) 10/05/2020    INR 2.2 (H) 10/02/2020    INR 2.2 (H) 10/01/2020       Lab Results   Component Value Date    SMOOTHMUSCAB Negative 1:40 09/19/2020       Lab Results   Component Value Date    FERRITIN 2,907 (H) 09/19/2020       Lab Results   Component Value Date    HEPAIGM Negative 09/19/2020    HEPBIGM Negative 09/19/2020    HEPBCAB Negative 09/24/2020    HEPCAB Negative 09/19/2020       Lab Results   Component Value Date    TSH 0.938 09/19/2020       Imaging:   I personally reviewed recent imaging studies available on the chart and from outside medical records.      Assessment:  45 y.o. male who presents to Transplant Hepatology Clinic for follow up of alcohol related cirrhosis. He is decompensated with ascites, HE, and jaundice.    1. Alcoholic cirrhosis of liver with ascites    2. Hepatic encephalopathy        MELD-Na score: 30 at 10/5/2020 11:37 AM  MELD score: 28 at 10/5/2020 11:37 AM  Calculated from:  Serum Creatinine: 1.2 mg/dL at 10/5/2020 11:37 AM  Serum Sodium: 133 mmol/L at 10/5/2020 11:37 AM  Total Bilirubin: 23.5 mg/dL at 10/5/2020 11:37 AM  INR(ratio): 2.1 at 10/5/2020 11:37 AM  Age: 45 years 4 months    UNOS Patient Status  Functional Status: 50% - Requires considerable assistance and frequent medical care  Physical Capacity: Limited Mobility    Transplant Candidacy: He is listed for liver transplantation.    Recommendations:  - Patient congratulated on alcohol cessation and counseled on continued abstinence.  Last drink 07/2020. PETH negative 09/2020.    - Ascites/Edema: 2 gm Na diet. Currently controlled off of diuretics. Continue Cipro for secondary prophylaxis of SBP.    - Encephalopathy: Continue lactulose. Titrate to maintain 3-4 bowel movements per day. Continue rifaximin 550mg BID    - Variceal screening: EGD in 2017 without varices. Discussed with selection committee and will hold off on repeat EGD given likely transplant soon.    - HCC screening: MRI in 09/2020 without HCC. AFP 1.4 in 09/2020. Repeat abdominal US and AFP every 6 months.    - Immunizations: He is not immune to HAV or HBV but is currently undergoing vaccination series.    Return to clinic in 1 month.    A total of 40 minutes were spent face-to-face with the patient during this encounter, and over half of that time was spent on counseling and coordination of care. We discussed in depth the nature of his liver disease and the management plan and liver transplant evaluation in details. I also educated him about lifestyle modifications which may stop or slow progression of liver disease. I have provided him with an opportunity to ask questions and have all questions answered to his satisfaction.    I have sent communication to the referring physician and/or primary care provider.    Valeriano Marrero MD  Staff Physician  Hepatology and Liver Transplant  [facility]  [facility] [place]

## 2020-10-07 NOTE — TELEPHONE ENCOUNTER
LIVER WAIT LISTING NOTE    **NOTE:   IF ANY EXTERNAL LABS ARE USED FOR LISTING THE VALUES AND DATES MUST BE ENTERED IN EPIC TO GENERATE THIS NOTE**    Date of Financial clearance to list: 10/6/2020    N/[facility]:        Organ: Liver  Name:       Jordan Altman    : 1975          Gender:     male      MRN#: [id number]                                 State of Permanent Residence:  [address] [place] [address]  Ethnicity: /White   Race:      White    CLINICAL INFORMATION       ABO  ABO Blood Group:   A POS     ABO Confirmation: (THESE DATES MUST BE PRIOR TO THE LIST DATE AND SUPPORTED BY SEPARATE LAB REPORTS)    Internal Results    Lab Results   Component Value Date    GROUPTRH A POS 2020    GROUPTRH A POS 2020     No results found for: ABO    External Results    ABO Date 1:  ABO Result 1:  ABO Date 2:  ABO Result 2:     Are either of these ABO results based on External Labs? no  (If Yes, STOP and go to source document in Media Tab for verification).    VITALS  Height:    Ht Readings from Last 1 Encounters:   10/01/20 6' (1.829 m)     Weight:    Wt Readings from Last 1 Encounters:   10/01/20 118.5 kg (261 lb 3.9 oz)       LIVER ORGAN INFORMATION  Candidate Medical Urgency Status: MELD/PELD    Number of Previous Transplants: 0    MELD/PELD Data Collection:  Had dialysis twice, or 24 hours of CVVHD, within 1 week prior to the serum creatinine test: No  Encephalopathy: 1 - 2 Date: 10/5/2020  Ascites: moderate Date: 10/5/2020          MELD Score:  MELD-Na score: 30 at 10/5/2020 11:37 AM  MELD score: 28 at 10/5/2020 11:37 AM  Calculated from:  Serum Creatinine: 1.2 mg/dL at 10/5/2020 11:37 AM  Serum Sodium: 133 mmol/L at 10/5/2020 11:37 AM  Total Bilirubin: 23.5 mg/dL at 10/5/2020 11:37 AM  INR(ratio): 2.1 at 10/5/2020 11:37 AM  Age: 45 years 4 months  Lab Results   Component Value Date    ALBUMIN 3.0 (L) 10/05/2020     No results found for: EXTINR, EXTCREATININ, EXTSODIUM,  "EXTBILITOTAL, EXTALBUMIN    Additional Organs: none  Kidney: No    If Kidney is "Yes" above, check Diagnosis and enter the medical eligibility below for a simultaneous liver/kidney:    Diagnosis: Chronic kidney disease (CKD) with measured or calculated GFR less than or equal to 60 ml/min for greater than 90 consecutive days. At least one of the following must qualify for CKD:  Date Begun Dialysis CrCl (ml/min)  Must be < or = 30 eGFR (ml/min) Must be < or = 30    Yes/no:                    Diagnosis: Sustained acute kidney injury (must be confirmed at least once every 7 days). Please select at least one of the following criteria:  Date of test or treatment Dialysis received CrCl (ml/min) Must be < or = 30 eGFR (ml/min) Must be < or = 25 Number of days since previous test or treatment (must be less than or equal to 7 days)    Yes/No:                  Diagnosis: Metabolic disease, Check all diagnosis that apply:     Hyperoxaluria    Atypical hemolytic uremic syndrome (HUS) from mutations in factor H or factor I    Familial non-neuropathic systemic amyloidosis    Methylmalonic aciduria     Transplant nephrologist confirming candidate's most recent diagnosis for SLK: N/A               ## Please submit a separate Kidney Listing note for combined Liver/Kidney patients. ##    Will Recipient Accept?   Accept HBcAB Positive Organ:  yes  Accept HBV SHAYE Positive Organ:  yes  Accept HCV Antibody Positive Organ: yes   Accept HCV SHAYE Positive Organ:  yes                        Local: No                           Import: No  Accept DCD Organ:    yes  Minimum acceptable donor age:  5 years  Maximum acceptable donor age:  99 years  Minimum acceptable donor weight:  40 lbs    Maximum acceptable donor weight:  440 lb  Maximum miles the organ or  Recovery team will travel:   5000 miles    TCR Information    Citizenship: [place] Citizen   Country of permanent residence:   Year of entry to the [place]:   Highest education level: Attended " College/Technical School    Patient on Life Support: No  Functional Status: 60% - requires occasional assistance but is able to care for needs   Working for income: No  If no, reason not working: Demands of Treatment  Previous Pancreas Islet Infusion - No  Source of payment: Public Insurance - Medicaid  Diabetes: No  Any previous malignancy: No  Neoadjuvant Therapy: No  Has candidate ever had a diagnosis of HCC: No    Liver Medical Factors  Previous abdominal surgery: No  Spontaneous Bacterial Peritonitis: Yes  History of Portal Vein Thrombosis: No  Transjugular Intrahepatic Portosystemic Shunt: No

## 2020-10-07 NOTE — TELEPHONE ENCOUNTER
Called pt to inform him that he has been added to the liver transplant waiting list w/ MELD score 30.  Patient voiced understanding that he is now actively listed for transplant.  Discussed the need to remain available at all times by phone for offers.  Reminded him that the call will not always come from an [facility] number, especially if after hours.  No questions/ concerns regarding listing status noted.

## 2020-10-08 ENCOUNTER — LAB VISIT (OUTPATIENT)
Dept: LAB | Facility: HOSPITAL | Age: 45
End: 2020-10-08
Attending: STUDENT IN AN ORGANIZED HEALTH CARE EDUCATION/TRAINING PROGRAM
Payer: MEDICAID

## 2020-10-08 ENCOUNTER — TELEPHONE (OUTPATIENT)
Dept: TRANSPLANT | Facility: CLINIC | Age: 45
End: 2020-10-08

## 2020-10-08 DIAGNOSIS — Z76.82 ORGAN TRANSPLANT CANDIDATE: ICD-10-CM

## 2020-10-08 LAB
ALBUMIN SERPL BCP-MCNC: 2.8 G/DL (ref 3.5–5.2)
ALP SERPL-CCNC: 188 U/L (ref 55–135)
ALT SERPL W/O P-5'-P-CCNC: 44 U/L (ref 10–44)
ANION GAP SERPL CALC-SCNC: 11 MMOL/L (ref 8–16)
AST SERPL-CCNC: 105 U/L (ref 10–40)
BASOPHILS # BLD AUTO: 0.14 K/UL (ref 0–0.2)
BASOPHILS NFR BLD: 1.3 % (ref 0–1.9)
BILIRUB SERPL-MCNC: 24.6 MG/DL (ref 0.1–1)
BUN SERPL-MCNC: 23 MG/DL (ref 6–20)
CALCIUM SERPL-MCNC: 8.6 MG/DL (ref 8.7–10.5)
CHLORIDE SERPL-SCNC: 107 MMOL/L (ref 95–110)
CO2 SERPL-SCNC: 15 MMOL/L (ref 23–29)
CREAT SERPL-MCNC: 1.2 MG/DL (ref 0.5–1.4)
DIFFERENTIAL METHOD: ABNORMAL
EOSINOPHIL # BLD AUTO: 0.2 K/UL (ref 0–0.5)
EOSINOPHIL NFR BLD: 2.1 % (ref 0–8)
ERYTHROCYTE [DISTWIDTH] IN BLOOD BY AUTOMATED COUNT: 19.6 % (ref 11.5–14.5)
EST. GFR  (AFRICAN AMERICAN): >60 ML/MIN/1.73 M^2
EST. GFR  (NON AFRICAN AMERICAN): >60 ML/MIN/1.73 M^2
GLUCOSE SERPL-MCNC: 95 MG/DL (ref 70–110)
HCT VFR BLD AUTO: 28.7 % (ref 40–54)
HGB BLD-MCNC: 9.7 G/DL (ref 14–18)
IMM GRANULOCYTES # BLD AUTO: 0.19 K/UL (ref 0–0.04)
IMM GRANULOCYTES NFR BLD AUTO: 1.8 % (ref 0–0.5)
INR PPP: 1.9 (ref 0.8–1.2)
LYMPHOCYTES # BLD AUTO: 1.3 K/UL (ref 1–4.8)
LYMPHOCYTES NFR BLD: 11.9 % (ref 18–48)
MCH RBC QN AUTO: 34.5 PG (ref 27–31)
MCHC RBC AUTO-ENTMCNC: 33.8 G/DL (ref 32–36)
MCV RBC AUTO: 102 FL (ref 82–98)
MONOCYTES # BLD AUTO: 1.3 K/UL (ref 0.3–1)
MONOCYTES NFR BLD: 12.4 % (ref 4–15)
NEUTROPHILS # BLD AUTO: 7.8 K/UL (ref 1.8–7.7)
NEUTROPHILS NFR BLD: 72.3 % (ref 38–73)
NRBC BLD-RTO: 0 /100 WBC
PLATELET # BLD AUTO: 86 K/UL (ref 150–350)
PMV BLD AUTO: 10.9 FL (ref 9.2–12.9)
POTASSIUM SERPL-SCNC: 4 MMOL/L (ref 3.5–5.1)
PROT SERPL-MCNC: 5.4 G/DL (ref 6–8.4)
PROTHROMBIN TIME: 20.5 SEC (ref 9–12.5)
RBC # BLD AUTO: 2.81 M/UL (ref 4.6–6.2)
SODIUM SERPL-SCNC: 133 MMOL/L (ref 136–145)
WBC # BLD AUTO: 10.77 K/UL (ref 3.9–12.7)

## 2020-10-08 PROCEDURE — 80053 COMPREHEN METABOLIC PANEL: CPT | Mod: TXP

## 2020-10-08 PROCEDURE — 85025 COMPLETE CBC W/AUTO DIFF WBC: CPT | Mod: TXP

## 2020-10-08 PROCEDURE — 36415 COLL VENOUS BLD VENIPUNCTURE: CPT | Mod: TXP

## 2020-10-08 PROCEDURE — 85610 PROTHROMBIN TIME: CPT | Mod: TXP

## 2020-10-08 NOTE — TELEPHONE ENCOUNTER
Patient's phone call returned.  Informed patient that prescription sent electronically to [facility] on yesterday, but will call them to confirm that prescription received and no issues with filling.  Called [facility].  Spoke w/ tech.  PA required. Fax number provided to fax info.  Called patient to inform him of above and that he will be kept updated on PA decision.  Understanding expressed.  Per pt's request, updated preferred pharmacy.  Denies the need for any further assistance.    =================================    ----- Message from Stephane Rivero sent at 10/8/2020  3:13 PM CDT -----  Regarding: Refill  Pt calling to speak with Dr Marrero regarding Rx  ([email or web address])        [phone] (M)

## 2020-10-10 ENCOUNTER — HOSPITAL ENCOUNTER (EMERGENCY)
Facility: HOSPITAL | Age: 45
End: 2020-10-11
Attending: FAMILY MEDICINE
Payer: MEDICAID

## 2020-10-10 ENCOUNTER — TELEPHONE (OUTPATIENT)
Dept: TRANSPLANT | Facility: CLINIC | Age: 45
End: 2020-10-10

## 2020-10-10 DIAGNOSIS — R10.9 ABDOMINAL PAIN: ICD-10-CM

## 2020-10-10 DIAGNOSIS — K92.2 GASTROINTESTINAL HEMORRHAGE, UNSPECIFIED GASTROINTESTINAL HEMORRHAGE TYPE: Primary | ICD-10-CM

## 2020-10-10 LAB
ALBUMIN SERPL BCP-MCNC: 2.9 G/DL (ref 3.5–5.2)
ALP SERPL-CCNC: 194 U/L (ref 55–135)
ALT SERPL W/O P-5'-P-CCNC: 44 U/L (ref 10–44)
AMYLASE SERPL-CCNC: 121 U/L (ref 20–110)
ANION GAP SERPL CALC-SCNC: 10 MMOL/L (ref 8–16)
AST SERPL-CCNC: 115 U/L (ref 10–40)
BASOPHILS # BLD AUTO: 0.14 K/UL (ref 0–0.2)
BASOPHILS NFR BLD: 1.3 % (ref 0–1.9)
BILIRUB SERPL-MCNC: 25.5 MG/DL (ref 0.1–1)
BILIRUB UR QL STRIP: ABNORMAL
BNP SERPL-MCNC: 46 PG/ML (ref 0–99)
BUN SERPL-MCNC: 30 MG/DL (ref 6–20)
CALCIUM SERPL-MCNC: 8.7 MG/DL (ref 8.7–10.5)
CHLORIDE SERPL-SCNC: 105 MMOL/L (ref 95–110)
CLARITY UR: CLEAR
CO2 SERPL-SCNC: 14 MMOL/L (ref 23–29)
COLOR UR: YELLOW
CREAT SERPL-MCNC: 1.5 MG/DL (ref 0.5–1.4)
DIFFERENTIAL METHOD: ABNORMAL
EOSINOPHIL # BLD AUTO: 0.1 K/UL (ref 0–0.5)
EOSINOPHIL NFR BLD: 1.2 % (ref 0–8)
ERYTHROCYTE [DISTWIDTH] IN BLOOD BY AUTOMATED COUNT: 19 % (ref 11.5–14.5)
EST. GFR  (AFRICAN AMERICAN): >60 ML/MIN/1.73 M^2
EST. GFR  (NON AFRICAN AMERICAN): 55 ML/MIN/1.73 M^2
GLUCOSE SERPL-MCNC: 112 MG/DL (ref 70–110)
GLUCOSE UR QL STRIP: ABNORMAL
HCT VFR BLD AUTO: 27.1 % (ref 40–54)
HGB BLD-MCNC: 9.1 G/DL (ref 14–18)
HGB UR QL STRIP: NEGATIVE
IMM GRANULOCYTES # BLD AUTO: 0.18 K/UL (ref 0–0.04)
IMM GRANULOCYTES NFR BLD AUTO: 1.7 % (ref 0–0.5)
INR PPP: 1.9 (ref 0.8–1.2)
KETONES UR QL STRIP: ABNORMAL
LEUKOCYTE ESTERASE UR QL STRIP: NEGATIVE
LIPASE SERPL-CCNC: 64 U/L (ref 4–60)
LYMPHOCYTES # BLD AUTO: 1.1 K/UL (ref 1–4.8)
LYMPHOCYTES NFR BLD: 10.7 % (ref 18–48)
MCH RBC QN AUTO: 34.2 PG (ref 27–31)
MCHC RBC AUTO-ENTMCNC: 33.6 G/DL (ref 32–36)
MCV RBC AUTO: 102 FL (ref 82–98)
MONOCYTES # BLD AUTO: 1.5 K/UL (ref 0.3–1)
MONOCYTES NFR BLD: 14.3 % (ref 4–15)
NEUTROPHILS # BLD AUTO: 7.6 K/UL (ref 1.8–7.7)
NEUTROPHILS NFR BLD: 70.8 % (ref 38–73)
NITRITE UR QL STRIP: NEGATIVE
NRBC BLD-RTO: 0 /100 WBC
OB PNL STL: POSITIVE
PH UR STRIP: 6 [PH] (ref 5–8)
PLATELET # BLD AUTO: 101 K/UL (ref 150–350)
PMV BLD AUTO: 11.4 FL (ref 9.2–12.9)
POTASSIUM SERPL-SCNC: 4.1 MMOL/L (ref 3.5–5.1)
PROT SERPL-MCNC: 5.5 G/DL (ref 6–8.4)
PROT UR QL STRIP: ABNORMAL
PROTHROMBIN TIME: 19.2 SEC (ref 9–12.5)
RBC # BLD AUTO: 2.66 M/UL (ref 4.6–6.2)
SARS-COV-2 RDRP RESP QL NAA+PROBE: NEGATIVE
SODIUM SERPL-SCNC: 129 MMOL/L (ref 136–145)
SP GR UR STRIP: 1.02 (ref 1–1.03)
TROPONIN I SERPL DL<=0.01 NG/ML-MCNC: 0.01 NG/ML (ref 0–0.03)
URN SPEC COLLECT METH UR: ABNORMAL
UROBILINOGEN UR STRIP-ACNC: 1 EU/DL
WBC # BLD AUTO: 10.67 K/UL (ref 3.9–12.7)

## 2020-10-10 PROCEDURE — 93010 EKG 12-LEAD: ICD-10-PCS | Mod: NTX,,, | Performed by: INTERNAL MEDICINE

## 2020-10-10 PROCEDURE — 25000003 PHARM REV CODE 250: Mod: NTX | Performed by: FAMILY MEDICINE

## 2020-10-10 PROCEDURE — 93005 ELECTROCARDIOGRAM TRACING: CPT | Mod: NTX

## 2020-10-10 PROCEDURE — 99285 EMERGENCY DEPT VISIT HI MDM: CPT | Mod: 25,NTX

## 2020-10-10 PROCEDURE — 85610 PROTHROMBIN TIME: CPT | Mod: NTX

## 2020-10-10 PROCEDURE — 81003 URINALYSIS AUTO W/O SCOPE: CPT | Mod: NTX

## 2020-10-10 PROCEDURE — U0002 COVID-19 LAB TEST NON-CDC: HCPCS | Mod: NTX

## 2020-10-10 PROCEDURE — 82272 OCCULT BLD FECES 1-3 TESTS: CPT | Mod: NTX

## 2020-10-10 PROCEDURE — 93010 ELECTROCARDIOGRAM REPORT: CPT | Mod: NTX,,, | Performed by: INTERNAL MEDICINE

## 2020-10-10 PROCEDURE — 80053 COMPREHEN METABOLIC PANEL: CPT | Mod: NTX

## 2020-10-10 PROCEDURE — 82150 ASSAY OF AMYLASE: CPT | Mod: NTX

## 2020-10-10 PROCEDURE — 84484 ASSAY OF TROPONIN QUANT: CPT | Mod: NTX

## 2020-10-10 PROCEDURE — 83880 ASSAY OF NATRIURETIC PEPTIDE: CPT | Mod: NTX

## 2020-10-10 PROCEDURE — 85025 COMPLETE CBC W/AUTO DIFF WBC: CPT | Mod: NTX

## 2020-10-10 PROCEDURE — 83690 ASSAY OF LIPASE: CPT | Mod: NTX

## 2020-10-10 RX ORDER — ALPRAZOLAM 0.5 MG/1
1 TABLET ORAL
Status: COMPLETED | OUTPATIENT
Start: 2020-10-10 | End: 2020-10-10

## 2020-10-10 RX ADMIN — ALPRAZOLAM 1 MG: 0.5 TABLET ORAL at 07:10

## 2020-10-10 NOTE — ED NOTES
Pt resting in bed. Pt requesting something for itching. Pt seems anxious. Pt provided extra blanket, lights turned out, and pt assisted to comfortable resting position. MD notified of pt's complaints. VSS, pt in NAD. Bed in lowest position, rails up X2, and call light in reach. Will continue to monitor.

## 2020-10-10 NOTE — ED PROVIDER NOTES
SCRIBE #1 NOTE: I, Bruna Mcdaniel, am scribing for, and in the presence of, Robyn Li MD. I have scribed the entire note.       History     Chief Complaint   Patient presents with    abdominal swelling     hx of liver failure, waiting for transplant, abdominal swelling started x 1 day ago, also noted black stools and severe left knee pain.     Review of patient's allergies indicates:   Allergen Reactions    Latex, natural rubber          History of Present Illness     HPI    10/10/2020, 4:13 PM  History obtained from the patient      History of Present Illness: Jordan Altman is a 45 y.o. male patient with a PMHx of HTN, SBP, hepatic cirrhosis, liver failure, who presents to the Emergency Department for evaluation of abd distention which onset gradually yesterday. Pt is currently on the transplant list. Symptoms are constant and moderate in severity. No mitigating or exacerbating factors reported. Associated sxs include SOB, constipation, and hematochezia. He is also c/o L knee pain which has been chronic, denies any recent injury to knee. Patient denies any fever, chills, dysuria, hematuria, N/V/D, frequency, urgency and all other sxs at this time. No further complaints or concerns at this time.         Arrival mode: Personal vehicle      PCP: SHAHEEN Jaime        Past Medical History:  Past Medical History:   Diagnosis Date    Decompensated hepatic cirrhosis     Hypertension     SBP (spontaneous bacterial peritonitis)        Past Surgical History:  Past Surgical History:   Procedure Laterality Date    ERCP N/A 10/1/2020    Procedure: ERCP (ENDOSCOPIC RETROGRADE CHOLANGIOPANCREATOGRAPHY);  Surgeon: Marcos Ramirez MD;  Location: [facility] ENDO ([address] [place] [address]);  Service: Endoscopy;  Laterality: N/A;    RIGHT HEART CATHETERIZATION Right 9/23/2020    Procedure: INSERTION, CATHETER, RIGHT HEART;  Surgeon: Mikel Costa Jr., MD;  Location: [facility] CATH LAB;  Service: Cardiology;  Laterality: Right;     THORACENTESIS  2011         Family History:  Family History   Problem Relation Age of Onset    COPD Mother        Social History:  Social History     Tobacco Use    Smoking status: Current Some Day Smoker    Smokeless tobacco: Current User   Substance and Sexual Activity    Alcohol use: Yes     Alcohol/week: 112.0 standard drinks     Types: 112 Shots of liquor per week     Comment: fifth of vodka a day. LAST DRINK 7/25/2020 per pt     Drug use: Yes     Frequency: 3.0 times per week     Types: Marijuana    Sexual activity: Yes        Review of Systems     Review of Systems   Constitutional: Negative for chills and fever.   HENT: Negative for sore throat.    Respiratory: Positive for shortness of breath. Negative for cough.    Cardiovascular: Negative for chest pain.   Gastrointestinal: Positive for abdominal distention, blood in stool and constipation. Negative for diarrhea, nausea and vomiting.   Genitourinary: Negative for dysuria, frequency, hematuria and urgency.   Musculoskeletal: Positive for arthralgias (L knee). Negative for back pain.   Skin: Negative for rash.   Neurological: Negative for weakness.   Hematological: Does not bruise/bleed easily.   All other systems reviewed and are negative.       Physical Exam     Initial Vitals [10/10/20 1549]   BP Pulse Resp Temp SpO2   (!) 124/58 93 (!) 24 97.9 °F (36.6 °C) (!) 93 %      MAP       --          Physical Exam  Nursing Notes and Vital Signs Reviewed.  Constitutional: Patient is in no acute distress. Ill appearing  Head: Atraumatic. Normocephalic.  Eyes: PERRL. EOM intact. Conjunctivae brightly icteric bilateral.   ENT: Mucous membranes are moist. Oropharynx is clear and symmetric.    Neck: Supple. Full ROM. No lymphadenopathy.  Cardiovascular: Regular rate. Regular rhythm. No murmurs, rubs, or gallops. Distal pulses are 2+ and symmetric.  Pulmonary/Chest: No respiratory distress, tachypnic. Breath sounds diminished at bilateral lung  bases.  Abdominal: Soft.  There is diffuse tenderness.  Ascites with positive fluid wave. Rectal: Good rectal tone, no obvious melena or hematochezia, no external hemorrhoids.  Genitourinary: No CVA tenderness  Musculoskeletal: Moves all extremities. No obvious deformities. No edema. No calf tenderness.   Left Knee:  No obvious deformity.  No increased warmth, erythema, induration or fluctuance.  No ligament laxity. DP and PT pulses are 2+.  Normal capillary refill.  Distal sensation is intact. Left knee without effusion or bony deformity, full ROM. NVT  Skin: Warm and dry, extremely jaundiced.  Neurological:  Alert, awake, and appropriate.  Normal speech.  No acute focal neurological deficits are appreciated.  Psychiatric: Normal affect. Good eye contact. Appropriate in content.     ED Course   Procedures  ED Vital Signs:  Vitals:    10/10/20 1549 10/10/20 1601 10/10/20 1700 10/10/20 1735   BP: (!) 124/58 136/63 134/61 117/61   Pulse: 93 88 85 86   Resp: (!) 24 (!) 22 (!) 24 (!) 22   Temp: 97.9 °F (36.6 °C)      TempSrc: Oral      SpO2: (!) 93% 99% 98% 98%   Height: 6' (1.829 m)       10/10/20 1829 10/10/20 1900 10/10/20 1930 10/10/20 2030   BP: 134/67 135/60 138/65 (!) 104/59   Pulse: 94 91 91 89   Resp: 20 19 20 20   Temp:       TempSrc:       SpO2: 96% 97% 96% 96%   Height:        10/10/20 2216 10/10/20 2350   BP: (!) 115/54 106/60   Pulse: 85 83   Resp: 20 16   Temp: 98 °F (36.7 °C) 97.9 °F (36.6 °C)   TempSrc: Oral Oral   SpO2: 96% 95%   Height:         Abnormal Lab Results:  Labs Reviewed   CBC W/ AUTO DIFFERENTIAL - Abnormal; Notable for the following components:       Result Value    RBC 2.66 (*)     Hemoglobin 9.1 (*)     Hematocrit 27.1 (*)     Mean Corpuscular Volume 102 (*)     Mean Corpuscular Hemoglobin 34.2 (*)     RDW 19.0 (*)     Platelets 101 (*)     Immature Granulocytes 1.7 (*)     Immature Grans (Abs) 0.18 (*)     Mono # 1.5 (*)     Lymph% 10.7 (*)     All other components within normal limits    COMPREHENSIVE METABOLIC PANEL - Abnormal; Notable for the following components:    Sodium 129 (*)     CO2 14 (*)     Glucose 112 (*)     BUN, Bld 30 (*)     Creatinine 1.5 (*)     Total Protein 5.5 (*)     Albumin 2.9 (*)     Total Bilirubin 25.5 (*)     Alkaline Phosphatase 194 (*)      (*)     eGFR if non  55 (*)     All other components within normal limits   URINALYSIS - Abnormal; Notable for the following components:    Protein, UA Trace (*)     Glucose, UA Trace (*)     Ketones, UA Trace (*)     Bilirubin (UA) 3+ (*)     All other components within normal limits   PROTIME-INR - Abnormal; Notable for the following components:    Prothrombin Time 19.2 (*)     INR 1.9 (*)     All other components within normal limits   AMYLASE - Abnormal; Notable for the following components:    Amylase 121 (*)     All other components within normal limits   LIPASE - Abnormal; Notable for the following components:    Lipase 64 (*)     All other components within normal limits   OCCULT BLOOD X 1, STOOL - Abnormal; Notable for the following components:    Occult Blood Positive (*)     All other components within normal limits   TROPONIN I   B-TYPE NATRIURETIC PEPTIDE   SARS-COV-2 RNA AMPLIFICATION, QUAL        All Lab Results:  Results for orders placed or performed during the hospital encounter of 10/10/20   CBC auto differential   Result Value Ref Range    WBC 10.67 3.90 - 12.70 K/uL    RBC 2.66 (L) 4.60 - 6.20 M/uL    Hemoglobin 9.1 (L) 14.0 - 18.0 g/dL    Hematocrit 27.1 (L) 40.0 - 54.0 %    Mean Corpuscular Volume 102 (H) 82 - 98 fL    Mean Corpuscular Hemoglobin 34.2 (H) 27.0 - 31.0 pg    Mean Corpuscular Hemoglobin Conc 33.6 32.0 - 36.0 g/dL    RDW 19.0 (H) 11.5 - 14.5 %    Platelets 101 (L) 150 - 350 K/uL    MPV 11.4 9.2 - 12.9 fL    Immature Granulocytes 1.7 (H) 0.0 - 0.5 %    Gran # (ANC) 7.6 1.8 - 7.7 K/uL    Immature Grans (Abs) 0.18 (H) 0.00 - 0.04 K/uL    Lymph # 1.1 1.0 - 4.8 K/uL    Mono #  1.5 (H) 0.3 - 1.0 K/uL    Eos # 0.1 0.0 - 0.5 K/uL    Baso # 0.14 0.00 - 0.20 K/uL    nRBC 0 0 /100 WBC    Gran% 70.8 38.0 - 73.0 %    Lymph% 10.7 (L) 18.0 - 48.0 %    Mono% 14.3 4.0 - 15.0 %    Eosinophil% 1.2 0.0 - 8.0 %    Basophil% 1.3 0.0 - 1.9 %    Differential Method Automated    Comprehensive metabolic panel   Result Value Ref Range    Sodium 129 (L) 136 - 145 mmol/L    Potassium 4.1 3.5 - 5.1 mmol/L    Chloride 105 95 - 110 mmol/L    CO2 14 (L) 23 - 29 mmol/L    Glucose 112 (H) 70 - 110 mg/dL    BUN, Bld 30 (H) 6 - 20 mg/dL    Creatinine 1.5 (H) 0.5 - 1.4 mg/dL    Calcium 8.7 8.7 - 10.5 mg/dL    Total Protein 5.5 (L) 6.0 - 8.4 g/dL    Albumin 2.9 (L) 3.5 - 5.2 g/dL    Total Bilirubin 25.5 (H) 0.1 - 1.0 mg/dL    Alkaline Phosphatase 194 (H) 55 - 135 U/L     (H) 10 - 40 U/L    ALT 44 10 - 44 U/L    Anion Gap 10 8 - 16 mmol/L    eGFR if African American >60 >60 mL/min/1.73 m^2    eGFR if non African American 55 (A) >60 mL/min/1.73 m^2   Urinalysis - Clean Catch   Result Value Ref Range    Specimen UA Urine, Clean Catch     Color, UA Yellow Yellow, Straw, Mela    Appearance, UA Clear Clear    pH, UA 6.0 5.0 - 8.0    Specific Gravity, UA 1.020 1.005 - 1.030    Protein, UA Trace (A) Negative    Glucose, UA Trace (A) Negative    Ketones, UA Trace (A) Negative    Bilirubin (UA) 3+ (A) Negative    Occult Blood UA Negative Negative    Nitrite, UA Negative Negative    Urobilinogen, UA 1.0 <2.0 EU/dL    Leukocytes, UA Negative Negative   Troponin I   Result Value Ref Range    Troponin I 0.012 0.000 - 0.026 ng/mL   B-Type natriuretic peptide (BNP)   Result Value Ref Range    BNP 46 0 - 99 pg/mL   Protime-INR   Result Value Ref Range    Prothrombin Time 19.2 (H) 9.0 - 12.5 sec    INR 1.9 (H) 0.8 - 1.2   Amylase   Result Value Ref Range    Amylase 121 (H) 20 - 110 U/L   Lipase   Result Value Ref Range    Lipase 64 (H) 4 - 60 U/L   Occult blood x 1, stool    Specimen: Stool   Result Value Ref Range    Occult Blood  Positive (A) Negative   COVID-19 Rapid Screening   Result Value Ref Range    SARS-CoV-2 RNA, Amplification, Qual Negative Negative         Imaging Results:  Imaging Results    None          The EKG was ordered, reviewed, and independently interpreted by the ED provider.  Interpretation time: 1602  Rate: 89 BPM  Rhythm: normal sinus rhythm  Interpretation: LAD. Possible anterolateral infarct. No STEMI.             The Emergency Provider reviewed the vital signs and test results, which are outlined above.     ED Discussion       6:37 PM: Re-evaluated pt. Informed pt that there are no transplant services available at this time. I have discussed test results, shared treatment plan, and the need for transfer with patient. All historical, clinical, radiographic, and laboratory findings were reviewed with the patient in detail. Patient will be transferred by [facility] services with BLS care required en route. Patient understands that there could be unforeseen motor vehicle accidents or loss of vital signs that could result in potential death or permanent disability. Pt expresses understanding at this time and agree with all information. All questions answered. Pt and family have no further questions or concerns at this time. Pt is ready for transfer.     6:40 PM: Consult with Dr. Montanez (Transplant) at [facility] in [place] concerning pt. There are no transplant services, which the patient requires, offered at [facility] at this time. Dr. Montanez expresses understanding and will accept transfer. This is an ED-to-ED transfer.   Accepting Facility: [facility] in [place]  Accepting Physician: Dr. Montanez         Medical Decision Making:   Clinical Tests:   Lab Tests: Ordered and Reviewed  Radiological Study: Ordered and Reviewed  Medical Tests: Ordered and Reviewed           ED Medication(s):  Medications   ALPRAZolam tablet 1 mg (1 mg Oral Given 10/10/20 1912)       Discharge Medication List as of  10/11/2020 12:10 AM                  Scribe Attestation:   Scribe #1: I performed the above scribed service and the documentation accurately describes the services I performed. I attest to the accuracy of the note.     Attending:   Physician Attestation Statement for Scribe #1: I, Robyn Li MD, personally performed the services described in this documentation, as scribed by Bruna Mcdaniel, in my presence, and it is both accurate and complete.           Clinical Impression       ICD-10-CM ICD-9-CM   1. Gastrointestinal hemorrhage, unspecified gastrointestinal hemorrhage type  K92.2 578.9   2. Abdominal pain  R10.9 789.00       Disposition:   Disposition: Transferred  Condition: Stable         Robyn iL MD  10/12/20 2048

## 2020-10-10 NOTE — ED NOTES
Pt resting in bed comfortably, VSS, pt in NAD. Call light in reach, bed in lowest position, rails up X2. Pt denies any other needs at this time. Will continue to monitor.

## 2020-10-10 NOTE — TELEPHONE ENCOUNTER
I received a call via transfer center, from DANIELA Proctor MD from [facility], regarding this patient listed 3 days ago on 10/7/20 for liver transplant with MELD 30.  He has decompensated alcoholic cirrhosis, with ascites, h/o hep encephalopathy, SBP.  He presented to the ER today with abd distention and passing black stools x 1 day,  No n/v.  His BP stable around 117/61, pulse 86, H/H 9/27, Plt ct 101, INR 1.9.  He has chronic knee pain, but does not take NSAIDs.   Dr. Li stated that patient reported to her that an EGD (no report in Epic) was done 2 yrs ago at [facility], patient was told he had no varices or ulcers.  At that time, he was also told he had stones in his GB.  During recent transplant eval , he was told he needed an ERCP, and procedure was ordered on 9/30/20, but has not been performed.  I have accepted the patient for admission to LTS at [facility], his MELD today is 33, so I will recertify him, with this new MELD.     Review of patient's chart shows he quit drinking in July 2020, when he was admitted for alcoholic hepatitis.  Subsequently, he had two more admissions, one in 8/25/20- 9/1/20, for peritonitis and again on 9/19/20 - 10/2/20 for abd pain and distention, ascites (fluid negative for infection, but patient on cipro for prophylaxis), acute on chronic encephalopathy, JENN (ATN vs HRS), hyponatremia (125), PA pressure of 73 on ECHO 9/19, CVP 3, no significant volume overload.  Had RHC 9/12 with favorable results, PA pressures: 52 systolic, 24 diastolic, mean 33.  He was evaluated for liver transplant when MELD was around 30, and he was discharged on 10/2/20.  Subsequently he was listed for liver transplant on 10/7/20.      He is now being admitted for management of possible GI bleed with melena.     Ananya Montanez MD  Transplant Hepatology.

## 2020-10-11 ENCOUNTER — HOSPITAL ENCOUNTER (INPATIENT)
Facility: HOSPITAL | Age: 45
LOS: 16 days | Discharge: HOME | DRG: 005 | End: 2020-10-27
Attending: INTERNAL MEDICINE | Admitting: INTERNAL MEDICINE
Payer: MEDICAID

## 2020-10-11 VITALS
TEMPERATURE: 98 F | RESPIRATION RATE: 16 BRPM | OXYGEN SATURATION: 95 % | SYSTOLIC BLOOD PRESSURE: 106 MMHG | DIASTOLIC BLOOD PRESSURE: 60 MMHG | HEIGHT: 72 IN | BODY MASS INDEX: 34 KG/M2 | HEART RATE: 83 BPM

## 2020-10-11 DIAGNOSIS — E83.39 HYPERPHOSPHATEMIA: ICD-10-CM

## 2020-10-11 DIAGNOSIS — A41.89 GRAM POSITIVE SEPSIS: ICD-10-CM

## 2020-10-11 DIAGNOSIS — G89.29 CHRONIC PAIN OF LEFT KNEE: ICD-10-CM

## 2020-10-11 DIAGNOSIS — N17.9 AKI (ACUTE KIDNEY INJURY): ICD-10-CM

## 2020-10-11 DIAGNOSIS — K72.90 DECOMPENSATED HEPATIC CIRRHOSIS: ICD-10-CM

## 2020-10-11 DIAGNOSIS — R73.9 HYPERGLYCEMIA: ICD-10-CM

## 2020-10-11 DIAGNOSIS — Z79.01 ANTICOAGULATED: ICD-10-CM

## 2020-10-11 DIAGNOSIS — R53.1 WEAKNESS: ICD-10-CM

## 2020-10-11 DIAGNOSIS — K92.2 GI BLEED: ICD-10-CM

## 2020-10-11 DIAGNOSIS — Z79.60 LONG-TERM USE OF IMMUNOSUPPRESSANT MEDICATION: ICD-10-CM

## 2020-10-11 DIAGNOSIS — E66.09 CLASS 1 OBESITY DUE TO EXCESS CALORIES WITH SERIOUS COMORBIDITY AND BODY MASS INDEX (BMI) OF 34.0 TO 34.9 IN ADULT: ICD-10-CM

## 2020-10-11 DIAGNOSIS — Z91.89 AT RISK FOR OPPORTUNISTIC INFECTIONS: ICD-10-CM

## 2020-10-11 DIAGNOSIS — D69.6 THROMBOCYTOPENIA: ICD-10-CM

## 2020-10-11 DIAGNOSIS — Z29.89 PROPHYLACTIC IMMUNOTHERAPY: ICD-10-CM

## 2020-10-11 DIAGNOSIS — K74.60 DECOMPENSATED HEPATIC CIRRHOSIS: ICD-10-CM

## 2020-10-11 DIAGNOSIS — K92.2 GASTROINTESTINAL HEMORRHAGE, UNSPECIFIED GASTROINTESTINAL HEMORRHAGE TYPE: ICD-10-CM

## 2020-10-11 DIAGNOSIS — R53.81 DEBILITY: ICD-10-CM

## 2020-10-11 DIAGNOSIS — T38.0X5A ADVERSE EFFECT OF CORTICOSTEROIDS, INITIAL ENCOUNTER: ICD-10-CM

## 2020-10-11 DIAGNOSIS — D63.8 ANEMIA OF CHRONIC DISEASE: ICD-10-CM

## 2020-10-11 DIAGNOSIS — Z01.818 PRE-OP EVALUATION: ICD-10-CM

## 2020-10-11 DIAGNOSIS — R73.9 ACUTE HYPERGLYCEMIA: ICD-10-CM

## 2020-10-11 DIAGNOSIS — E87.20 ACIDOSIS: ICD-10-CM

## 2020-10-11 DIAGNOSIS — Z94.4 STATUS POST LIVER TRANSPLANT: Primary | ICD-10-CM

## 2020-10-11 DIAGNOSIS — M25.562 CHRONIC PAIN OF LEFT KNEE: ICD-10-CM

## 2020-10-11 LAB
ALBUMIN SERPL BCP-MCNC: 2.6 G/DL (ref 3.5–5.2)
ALP SERPL-CCNC: 190 U/L (ref 55–135)
ALT SERPL W/O P-5'-P-CCNC: 42 U/L (ref 10–44)
ANION GAP SERPL CALC-SCNC: 12 MMOL/L (ref 8–16)
AST SERPL-CCNC: 105 U/L (ref 10–40)
BASOPHILS # BLD AUTO: 0.11 K/UL (ref 0–0.2)
BASOPHILS NFR BLD: 1.3 % (ref 0–1.9)
BILIRUB SERPL-MCNC: 24.7 MG/DL (ref 0.1–1)
BUN SERPL-MCNC: 32 MG/DL (ref 6–20)
CALCIUM SERPL-MCNC: 8.6 MG/DL (ref 8.7–10.5)
CHLORIDE SERPL-SCNC: 104 MMOL/L (ref 95–110)
CO2 SERPL-SCNC: 14 MMOL/L (ref 23–29)
CREAT SERPL-MCNC: 1.6 MG/DL (ref 0.5–1.4)
DIFFERENTIAL METHOD: ABNORMAL
EOSINOPHIL # BLD AUTO: 0.2 K/UL (ref 0–0.5)
EOSINOPHIL NFR BLD: 2.8 % (ref 0–8)
ERYTHROCYTE [DISTWIDTH] IN BLOOD BY AUTOMATED COUNT: 18.8 % (ref 11.5–14.5)
EST. GFR  (AFRICAN AMERICAN): 58.4 ML/MIN/1.73 M^2
EST. GFR  (NON AFRICAN AMERICAN): 50.6 ML/MIN/1.73 M^2
GLUCOSE SERPL-MCNC: 119 MG/DL (ref 70–110)
HCT VFR BLD AUTO: 25.5 % (ref 40–54)
HGB BLD-MCNC: 8.5 G/DL (ref 14–18)
IMM GRANULOCYTES # BLD AUTO: 0.14 K/UL (ref 0–0.04)
IMM GRANULOCYTES NFR BLD AUTO: 1.6 % (ref 0–0.5)
INR PPP: 1.8 (ref 0.8–1.2)
LYMPHOCYTES # BLD AUTO: 1.2 K/UL (ref 1–4.8)
LYMPHOCYTES NFR BLD: 13.9 % (ref 18–48)
MAGNESIUM SERPL-MCNC: 2 MG/DL (ref 1.6–2.6)
MCH RBC QN AUTO: 34.6 PG (ref 27–31)
MCHC RBC AUTO-ENTMCNC: 33.3 G/DL (ref 32–36)
MCV RBC AUTO: 104 FL (ref 82–98)
MONOCYTES # BLD AUTO: 1.2 K/UL (ref 0.3–1)
MONOCYTES NFR BLD: 14.3 % (ref 4–15)
NEUTROPHILS # BLD AUTO: 5.7 K/UL (ref 1.8–7.7)
NEUTROPHILS NFR BLD: 66.1 % (ref 38–73)
NRBC BLD-RTO: 0 /100 WBC
PHOSPHATE SERPL-MCNC: 4.3 MG/DL (ref 2.7–4.5)
PLATELET # BLD AUTO: 82 K/UL (ref 150–350)
PMV BLD AUTO: 11.3 FL (ref 9.2–12.9)
POTASSIUM SERPL-SCNC: 4 MMOL/L (ref 3.5–5.1)
PROT SERPL-MCNC: 5.1 G/DL (ref 6–8.4)
PROTHROMBIN TIME: 19.8 SEC (ref 9–12.5)
RBC # BLD AUTO: 2.46 M/UL (ref 4.6–6.2)
SODIUM SERPL-SCNC: 130 MMOL/L (ref 136–145)
WBC # BLD AUTO: 8.59 K/UL (ref 3.9–12.7)

## 2020-10-11 PROCEDURE — C9113 INJ PANTOPRAZOLE SODIUM, VIA: HCPCS | Mod: NTX | Performed by: NURSE PRACTITIONER

## 2020-10-11 PROCEDURE — 83735 ASSAY OF MAGNESIUM: CPT | Mod: NTX

## 2020-10-11 PROCEDURE — 87040 BLOOD CULTURE FOR BACTERIA: CPT | Mod: NTX

## 2020-10-11 PROCEDURE — 99233 PR SUBSEQUENT HOSPITAL CARE,LEVL III: ICD-10-PCS | Mod: NTX,,, | Performed by: NURSE PRACTITIONER

## 2020-10-11 PROCEDURE — P9047 ALBUMIN (HUMAN), 25%, 50ML: HCPCS | Mod: JG,NTX | Performed by: NURSE PRACTITIONER

## 2020-10-11 PROCEDURE — 99233 SBSQ HOSP IP/OBS HIGH 50: CPT | Mod: NTX,,, | Performed by: NURSE PRACTITIONER

## 2020-10-11 PROCEDURE — 20600001 HC STEP DOWN PRIVATE ROOM: Mod: NTX

## 2020-10-11 PROCEDURE — 25000003 PHARM REV CODE 250: Mod: NTX | Performed by: NURSE PRACTITIONER

## 2020-10-11 PROCEDURE — 85610 PROTHROMBIN TIME: CPT | Mod: NTX

## 2020-10-11 PROCEDURE — 84100 ASSAY OF PHOSPHORUS: CPT | Mod: NTX

## 2020-10-11 PROCEDURE — 85025 COMPLETE CBC W/AUTO DIFF WBC: CPT | Mod: NTX

## 2020-10-11 PROCEDURE — 80053 COMPREHEN METABOLIC PANEL: CPT | Mod: NTX

## 2020-10-11 PROCEDURE — 63600175 PHARM REV CODE 636 W HCPCS: Mod: NTX | Performed by: NURSE PRACTITIONER

## 2020-10-11 RX ORDER — ALBUMIN HUMAN 250 G/1000ML
25 SOLUTION INTRAVENOUS ONCE
Status: COMPLETED | OUTPATIENT
Start: 2020-10-11 | End: 2020-10-11

## 2020-10-11 RX ORDER — SODIUM BICARBONATE 650 MG/1
1300 TABLET ORAL 3 TIMES DAILY
Status: DISCONTINUED | OUTPATIENT
Start: 2020-10-11 | End: 2020-10-13

## 2020-10-11 RX ORDER — MIDODRINE HYDROCHLORIDE 5 MG/1
5 TABLET ORAL 3 TIMES DAILY
Status: DISCONTINUED | OUTPATIENT
Start: 2020-10-11 | End: 2020-10-14

## 2020-10-11 RX ORDER — ONDANSETRON 2 MG/ML
4 INJECTION INTRAMUSCULAR; INTRAVENOUS EVERY 6 HOURS PRN
Status: DISCONTINUED | OUTPATIENT
Start: 2020-10-11 | End: 2020-10-27 | Stop reason: HOSPADM

## 2020-10-11 RX ORDER — LACTULOSE 10 G/15ML
20 SOLUTION ORAL 3 TIMES DAILY
Status: DISCONTINUED | OUTPATIENT
Start: 2020-10-11 | End: 2020-10-14

## 2020-10-11 RX ORDER — ALBUMIN HUMAN 250 G/1000ML
12.5 SOLUTION INTRAVENOUS ONCE
Status: DISCONTINUED | OUTPATIENT
Start: 2020-10-11 | End: 2020-10-11

## 2020-10-11 RX ORDER — TALC
6 POWDER (GRAM) TOPICAL NIGHTLY PRN
Status: DISCONTINUED | OUTPATIENT
Start: 2020-10-11 | End: 2020-10-27 | Stop reason: HOSPADM

## 2020-10-11 RX ORDER — PANTOPRAZOLE SODIUM 40 MG/10ML
40 INJECTION, POWDER, LYOPHILIZED, FOR SOLUTION INTRAVENOUS 2 TIMES DAILY
Status: DISCONTINUED | OUTPATIENT
Start: 2020-10-11 | End: 2020-10-18

## 2020-10-11 RX ORDER — ACETAMINOPHEN 325 MG/1
650 TABLET ORAL EVERY 8 HOURS PRN
Status: DISCONTINUED | OUTPATIENT
Start: 2020-10-11 | End: 2020-10-27 | Stop reason: HOSPADM

## 2020-10-11 RX ORDER — SODIUM CHLORIDE 0.9 % (FLUSH) 0.9 %
10 SYRINGE (ML) INJECTION
Status: DISCONTINUED | OUTPATIENT
Start: 2020-10-11 | End: 2020-10-27 | Stop reason: HOSPADM

## 2020-10-11 RX ORDER — THIAMINE HCL 100 MG
100 TABLET ORAL DAILY
Status: DISCONTINUED | OUTPATIENT
Start: 2020-10-11 | End: 2020-10-19

## 2020-10-11 RX ORDER — CEFTRIAXONE 1 G/1
1 INJECTION, POWDER, FOR SOLUTION INTRAMUSCULAR; INTRAVENOUS
Status: DISCONTINUED | OUTPATIENT
Start: 2020-10-11 | End: 2020-10-16

## 2020-10-11 RX ORDER — FOLIC ACID 1 MG/1
1 TABLET ORAL DAILY
Status: DISCONTINUED | OUTPATIENT
Start: 2020-10-11 | End: 2020-10-19

## 2020-10-11 RX ORDER — HYDROXYZINE HYDROCHLORIDE 25 MG/1
25 TABLET, FILM COATED ORAL 3 TIMES DAILY PRN
Status: DISCONTINUED | OUTPATIENT
Start: 2020-10-11 | End: 2020-10-14

## 2020-10-11 RX ADMIN — SODIUM BICARBONATE 650 MG TABLET 1300 MG: at 07:10

## 2020-10-11 RX ADMIN — MIDODRINE HYDROCHLORIDE 5 MG: 5 TABLET ORAL at 12:10

## 2020-10-11 RX ADMIN — LACTULOSE 20 G: 20 SOLUTION ORAL at 08:10

## 2020-10-11 RX ADMIN — PANTOPRAZOLE SODIUM 40 MG: 40 INJECTION, POWDER, LYOPHILIZED, FOR SOLUTION INTRAVENOUS at 07:10

## 2020-10-11 RX ADMIN — PANTOPRAZOLE SODIUM 40 MG: 40 INJECTION, POWDER, LYOPHILIZED, FOR SOLUTION INTRAVENOUS at 08:10

## 2020-10-11 RX ADMIN — MELATONIN TAB 3 MG 6 MG: 3 TAB at 02:10

## 2020-10-11 RX ADMIN — ALBUMIN (HUMAN) 25 G: 12.5 SOLUTION INTRAVENOUS at 10:10

## 2020-10-11 RX ADMIN — LACTULOSE 20 G: 20 SOLUTION ORAL at 07:10

## 2020-10-11 RX ADMIN — RIFAXIMIN 550 MG: 550 TABLET ORAL at 08:10

## 2020-10-11 RX ADMIN — RIFAXIMIN 550 MG: 550 TABLET ORAL at 07:10

## 2020-10-11 RX ADMIN — ACETAMINOPHEN 650 MG: 325 TABLET ORAL at 02:10

## 2020-10-11 RX ADMIN — FOLIC ACID 1 MG: 1 TABLET ORAL at 09:10

## 2020-10-11 RX ADMIN — CEFTRIAXONE SODIUM 1 G: 1 INJECTION, POWDER, FOR SOLUTION INTRAMUSCULAR; INTRAVENOUS at 04:10

## 2020-10-11 RX ADMIN — MIDODRINE HYDROCHLORIDE 5 MG: 5 TABLET ORAL at 07:10

## 2020-10-11 RX ADMIN — LACTULOSE 20 G: 20 SOLUTION ORAL at 02:10

## 2020-10-11 RX ADMIN — Medication 100 MG: at 08:10

## 2020-10-11 RX ADMIN — SODIUM BICARBONATE 650 MG TABLET 1300 MG: at 02:10

## 2020-10-11 RX ADMIN — HYDROXYZINE HYDROCHLORIDE 25 MG: 25 TABLET, FILM COATED ORAL at 02:10

## 2020-10-11 RX ADMIN — MIDODRINE HYDROCHLORIDE 5 MG: 5 TABLET ORAL at 03:10

## 2020-10-11 NOTE — ASSESSMENT & PLAN NOTE
- Listed for liver transplant 2/2 ETOH.    MELD-Na score: 32 at 10/11/2020  3:21 AM  MELD score: 30 at 10/11/2020  3:21 AM  Calculated from:  Serum Creatinine: 1.6 mg/dL at 10/11/2020  3:21 AM  Serum Sodium: 130 mmol/L at 10/11/2020  3:21 AM  Total Bilirubin: 24.7 mg/dL at 10/11/2020  3:21 AM  INR(ratio): 1.8 at 10/11/2020  3:21 AM  Age: 45 years 5 months

## 2020-10-11 NOTE — SUBJECTIVE & OBJECTIVE
Past Medical History:   Diagnosis Date    Decompensated hepatic cirrhosis     Hypertension     SBP (spontaneous bacterial peritonitis)        Past Surgical History:   Procedure Laterality Date    ERCP N/A 10/1/2020    Procedure: ERCP (ENDOSCOPIC RETROGRADE CHOLANGIOPANCREATOGRAPHY);  Surgeon: Marcos Ramirez MD;  Location: [facility] ENDO ([address] [place] [address]);  Service: Endoscopy;  Laterality: N/A;    RIGHT HEART CATHETERIZATION Right 9/23/2020    Procedure: INSERTION, CATHETER, RIGHT HEART;  Surgeon: Mikel Costa Jr., MD;  Location: [facility] CATH LAB;  Service: Cardiology;  Laterality: Right;    THORACENTESIS  2011       Review of patient's allergies indicates:   Allergen Reactions    Latex, natural rubber        Family History     Problem Relation (Age of Onset)    COPD Mother        Tobacco Use    Smoking status: Current Some Day Smoker    Smokeless tobacco: Current User   Substance and Sexual Activity    Alcohol use: Yes     Alcohol/week: 112.0 standard drinks     Types: 112 Shots of liquor per week     Comment: fifth of vodka a day. LAST DRINK 7/25/2020 per pt     Drug use: Yes     Frequency: 3.0 times per week     Types: Marijuana    Sexual activity: Yes       PTA Medications   Medication Sig    albuterol (PROVENTIL/VENTOLIN HFA) 90 mcg/actuation inhaler Inhale 2 puffs into the lungs every 6 (six) hours as needed for Wheezing or Shortness of Breath. Rescue    cetirizine (ZYRTEC) 10 MG tablet Take 10 mg by mouth once daily.    ciprofloxacin HCl (CIPRO) 500 MG tablet Take 1 tablet (500 mg total) by mouth once daily.    ergocalciferol (VITAMIN D2) 50,000 unit Cap Take 50,000 Units by mouth every Monday.    folic acid (FOLVITE) 1 MG tablet Take 1 mg by mouth once daily.    hydrOXYzine HCL (ATARAX) 25 MG tablet Take 1 tablet (25 mg total) by mouth 3 (three) times daily as needed for Itching.    lactulose (CHRONULAC) 10 gram/15 mL solution Take 23 mLs (15.3333 g total) by mouth 3 (three) times daily. TITRATE  TO 3-4 BOWEL MOVEMENTS DAILY.    polyethylene glycol (GLYCOLAX) 17 gram/dose powder Take 17 g by mouth once daily.    rifAXIMin (XIFAXAN) 550 mg Tab Take 1 tablet (550 mg total) by mouth 2 (two) times daily.    thiamine 100 MG tablet Take 100 mg by mouth once daily.       Review of Systems   Constitutional: Negative for activity change, appetite change, chills, fatigue and fever.   HENT: Negative for congestion and facial swelling.    Eyes: Negative for pain, discharge and visual disturbance.   Respiratory: Negative for cough, chest tightness, shortness of breath and wheezing.    Cardiovascular: Negative for chest pain, palpitations and leg swelling.   Gastrointestinal: Positive for abdominal distention, abdominal pain and blood in stool. Negative for constipation, diarrhea, nausea and vomiting.   Endocrine: Negative.    Genitourinary: Negative for decreased urine volume, difficulty urinating and hematuria.   Musculoskeletal: Negative for back pain.   Skin: Negative for pallor, rash and wound.   Allergic/Immunologic: Negative for immunocompromised state.   Neurological: Negative for dizziness, tremors, weakness and light-headedness.   Hematological: Negative.    Psychiatric/Behavioral: Negative for agitation, confusion and dysphoric mood. The patient is not nervous/anxious.      Objective:     Vital Signs (Most Recent):  Temp: 97.6 °F (36.4 °C) (10/11/20 0215)  Pulse: 86 (10/11/20 0330)  Resp: 17 (10/11/20 0330)  BP: (!) 118/56 (10/11/20 0330)  SpO2: 96 % (10/11/20 0330) Vital Signs (24h Range):  Temp:  [97.6 °F (36.4 °C)-98 °F (36.7 °C)] 97.6 °F (36.4 °C)  Pulse:  [79-94] 86  Resp:  [16-24] 17  SpO2:  [93 %-99 %] 96 %  BP: (104-147)/(54-70) 118/56     Weight: 114.6 kg (252 lb 10.4 oz)  Body mass index is 34.27 kg/m².      Intake/Output Summary (Last 24 hours) at 10/11/2020 0648  Last data filed at 10/11/2020 0400  Gross per 24 hour   Intake --   Output 0 ml   Net 0 ml       Physical Exam  Vitals signs and  nursing note reviewed.   Constitutional:       Appearance: He is well-developed.   HENT:      Head: Normocephalic and atraumatic.   Eyes:      Pupils: Pupils are equal, round, and reactive to light.   Neck:      Musculoskeletal: Normal range of motion and neck supple.      Vascular: No JVD.   Cardiovascular:      Rate and Rhythm: Normal rate and regular rhythm.      Heart sounds: Normal heart sounds. No murmur. No friction rub.   Pulmonary:      Effort: Pulmonary effort is normal. No respiratory distress.      Breath sounds: Normal breath sounds. No wheezing or rales.   Abdominal:      General: Bowel sounds are normal. There is no distension.      Palpations: Abdomen is soft.      Tenderness: There is no abdominal tenderness.   Musculoskeletal: Normal range of motion.   Skin:     General: Skin is warm and dry.   Neurological:      Mental Status: He is alert and oriented to person, place, and time.   Psychiatric:         Behavior: Behavior normal.         Laboratory:  CBC:   Recent Labs   Lab 10/11/20  0321   WBC 8.59   RBC 2.46*   HGB 8.5*   HCT 25.5*   PLT 82*   *   MCH 34.6*   MCHC 33.3     CMP:   Recent Labs   Lab 10/11/20  0321   *   CALCIUM 8.6*   ALBUMIN 2.6*   PROT 5.1*   *   K 4.0   CO2 14*      BUN 32*   CREATININE 1.6*   ALKPHOS 190*   ALT 42   *   BILITOT 24.7*     Labs within the past 24 hours have been reviewed.    Diagnostic Results:  I have personally reviewed all pertinent imaging studies.

## 2020-10-11 NOTE — HPI
Mr. 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 He also c/o chronic left knee pain and denies any recent injury. Occult blood test (+) at OSH but HCT stable. Pt denies fever, chills, nausea, vomiting, chest pain, sob, hematuria or changes in bladder function. Will admit to LTS for further evaluation.

## 2020-10-11 NOTE — TELEPHONE ENCOUNTER
PATIENT NAME: Jordan Altman  [facility] #: [id number]    Lab Results   Component Value Date    CREATININE 1.5 (H) 10/10/2020     (L) 10/10/2020    BILITOT 25.5 (H) 10/10/2020    ALBUMIN 2.9 (L) 10/10/2020    INR 1.9 (H) 10/10/2020       Encephalopathy: 1 - 2  Ascites: moderate  Dialysis: no     MELD-Na score: 33 at 10/10/2020  4:25 PM  MELD score: 30 at 10/10/2020  4:25 PM  Calculated from:  Serum Creatinine: 1.5 mg/dL at 10/10/2020  4:25 PM  Serum Sodium: 129 mmol/L at 10/10/2020  4:25 PM  Total Bilirubin: 25.5 mg/dL at 10/10/2020  4:25 PM  INR(ratio): 1.9 at 10/10/2020  4:25 PM  Age: 45 years 5 months    Recertification requestor: Ananya Montanez

## 2020-10-11 NOTE — ASSESSMENT & PLAN NOTE
- c/o black tarry stools.  - occult blood (+).  - H&H stable.  - continue PPI.  - continue antibiotics.  - Monitor.

## 2020-10-11 NOTE — H&P
[facility]  Liver Transplant  History & Physical    Patient Name: Jordan Altman  MRN: [id number]  Admission Date: 10/11/2020  Code Status: Full Code  Primary Care Provider: SHAHEEN Jaime    Subjective:     History of Present Illness:  Mr. 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 He also c/o chronic left knee pain and denies any recent injury. Occult blood test (+) at OSH but HCT stable. Pt denies fever, chills, nausea, vomiting, chest pain, sob, hematuria or changes in bladder function. Will admit to LTS for further evaluation.     Past Medical History:   Diagnosis Date    Decompensated hepatic cirrhosis     Hypertension     SBP (spontaneous bacterial peritonitis)        Past Surgical History:   Procedure Laterality Date    ERCP N/A 10/1/2020    Procedure: ERCP (ENDOSCOPIC RETROGRADE CHOLANGIOPANCREATOGRAPHY);  Surgeon: Marcos Ramirez MD;  Location: [facility] ENDO ([address] [place] [address]);  Service: Endoscopy;  Laterality: N/A;    RIGHT HEART CATHETERIZATION Right 9/23/2020    Procedure: INSERTION, CATHETER, RIGHT HEART;  Surgeon: Mikel Costa Jr., MD;  Location: [facility] CATH LAB;  Service: Cardiology;  Laterality: Right;    THORACENTESIS  2011       Review of patient's allergies indicates:   Allergen Reactions    Latex, natural rubber        Family History     Problem Relation (Age of Onset)    COPD Mother        Tobacco Use    Smoking status: Current Some Day Smoker    Smokeless tobacco: Current User   Substance and Sexual Activity    Alcohol use: Yes     Alcohol/week: 112.0 standard drinks     Types: 112 Shots of liquor per week     Comment: fifth of vodka a day. LAST DRINK 7/25/2020 per pt     Drug use: Yes     Frequency: 3.0 times per week     Types: Marijuana    Sexual activity: Yes       PTA Medications    Medication Sig    albuterol (PROVENTIL/VENTOLIN HFA) 90 mcg/actuation inhaler Inhale 2 puffs into the lungs every 6 (six) hours as needed for Wheezing or Shortness of Breath. Rescue    cetirizine (ZYRTEC) 10 MG tablet Take 10 mg by mouth once daily.    ciprofloxacin HCl (CIPRO) 500 MG tablet Take 1 tablet (500 mg total) by mouth once daily.    ergocalciferol (VITAMIN D2) 50,000 unit Cap Take 50,000 Units by mouth every Monday.    folic acid (FOLVITE) 1 MG tablet Take 1 mg by mouth once daily.    hydrOXYzine HCL (ATARAX) 25 MG tablet Take 1 tablet (25 mg total) by mouth 3 (three) times daily as needed for Itching.    lactulose (CHRONULAC) 10 gram/15 mL solution Take 23 mLs (15.3333 g total) by mouth 3 (three) times daily. TITRATE TO 3-4 BOWEL MOVEMENTS DAILY.    polyethylene glycol (GLYCOLAX) 17 gram/dose powder Take 17 g by mouth once daily.    rifAXIMin (XIFAXAN) 550 mg Tab Take 1 tablet (550 mg total) by mouth 2 (two) times daily.    thiamine 100 MG tablet Take 100 mg by mouth once daily.       Review of Systems   Constitutional: Negative for activity change, appetite change, chills, fatigue and fever.   HENT: Negative for congestion and facial swelling.    Eyes: Negative for pain, discharge and visual disturbance.   Respiratory: Negative for cough, chest tightness, shortness of breath and wheezing.    Cardiovascular: Negative for chest pain, palpitations and leg swelling.   Gastrointestinal: Positive for abdominal distention, abdominal pain and blood in stool. Negative for constipation, diarrhea, nausea and vomiting.   Endocrine: Negative.    Genitourinary: Negative for decreased urine volume, difficulty urinating and hematuria.   Musculoskeletal: Negative for back pain.   Skin: Negative for pallor, rash and wound.   Allergic/Immunologic: Negative for immunocompromised state.   Neurological: Negative for dizziness, tremors, weakness and light-headedness.   Hematological: Negative.     Psychiatric/Behavioral: Negative for agitation, confusion and dysphoric mood. The patient is not nervous/anxious.      Objective:     Vital Signs (Most Recent):  Temp: 97.6 °F (36.4 °C) (10/11/20 0215)  Pulse: 86 (10/11/20 0330)  Resp: 17 (10/11/20 0330)  BP: (!) 118/56 (10/11/20 0330)  SpO2: 96 % (10/11/20 0330) Vital Signs (24h Range):  Temp:  [97.6 °F (36.4 °C)-98 °F (36.7 °C)] 97.6 °F (36.4 °C)  Pulse:  [79-94] 86  Resp:  [16-24] 17  SpO2:  [93 %-99 %] 96 %  BP: (104-147)/(54-70) 118/56     Weight: 114.6 kg (252 lb 10.4 oz)  Body mass index is 34.27 kg/m².      Intake/Output Summary (Last 24 hours) at 10/11/2020 0638  Last data filed at 10/11/2020 0400  Gross per 24 hour   Intake --   Output 0 ml   Net 0 ml       Physical Exam  Vitals signs and nursing note reviewed.   Constitutional:       Appearance: He is well-developed.   HENT:      Head: Normocephalic and atraumatic.   Eyes:      Pupils: Pupils are equal, round, and reactive to light.   Neck:      Musculoskeletal: Normal range of motion and neck supple.      Vascular: No JVD.   Cardiovascular:      Rate and Rhythm: Normal rate and regular rhythm.      Heart sounds: Normal heart sounds. No murmur. No friction rub.   Pulmonary:      Effort: Pulmonary effort is normal. No respiratory distress.      Breath sounds: Normal breath sounds. No wheezing or rales.   Abdominal:      General: Bowel sounds are normal. There is no distension.      Palpations: Abdomen is soft.      Tenderness: There is no abdominal tenderness.   Musculoskeletal: Normal range of motion.   Skin:     General: Skin is warm and dry.   Neurological:      Mental Status: He is alert and oriented to person, place, and time.   Psychiatric:         Behavior: Behavior normal.         Laboratory:  CBC:   Recent Labs   Lab 10/11/20  0321   WBC 8.59   RBC 2.46*   HGB 8.5*   HCT 25.5*   PLT 82*   *   MCH 34.6*   MCHC 33.3     CMP:   Recent Labs   Lab 10/11/20  0321   *   CALCIUM 8.6*    ALBUMIN 2.6*   PROT 5.1*   *   K 4.0   CO2 14*      BUN 32*   CREATININE 1.6*   ALKPHOS 190*   ALT 42   *   BILITOT 24.7*     Labs within the past 24 hours have been reviewed.    Diagnostic Results:  I have personally reviewed all pertinent imaging studies.    Assessment/Plan:     * GI bleed  - c/o black tarry stools.  - occult blood (+).  - H&H stable.  - continue PPI.  - continue antibiotics.  - Monitor.      Decompensated hepatic cirrhosis  - Listed for liver transplant 2/2 ETOH.    MELD-Na score: 32 at 10/11/2020  3:21 AM  MELD score: 30 at 10/11/2020  3:21 AM  Calculated from:  Serum Creatinine: 1.6 mg/dL at 10/11/2020  3:21 AM  Serum Sodium: 130 mmol/L at 10/11/2020  3:21 AM  Total Bilirubin: 24.7 mg/dL at 10/11/2020  3:21 AM  INR(ratio): 1.8 at 10/11/2020  3:21 AM  Age: 45 years 5 months        JENN (acute kidney injury)  - Cr slowly trending up.  - Encourage increase po fluid intake.      Left knee pain  - Chronic.  - No injuries reported.  - Tylenol PRN.          MELD-Na score: 32 at 10/11/2020  3:21 AM  MELD score: 30 at 10/11/2020  3:21 AM  Calculated from:  Serum Creatinine: 1.6 mg/dL at 10/11/2020  3:21 AM  Serum Sodium: 130 mmol/L at 10/11/2020  3:21 AM  Total Bilirubin: 24.7 mg/dL at 10/11/2020  3:21 AM  INR(ratio): 1.8 at 10/11/2020  3:21 AM  Age: 45 years 5 months      Discharge Planning: not a candidate for dc at this time.       Awa Sun NP  Liver Transplant  [facility]

## 2020-10-11 NOTE — PLAN OF CARE
NAEON, pt sleeping most of the day, easily aroused.  2 doses lactulose given, BMx1- no blood noted in stool.  SBP low, team aware, new order for midodrine given, blood pressure slightly improved. WCTM.

## 2020-10-11 NOTE — PLAN OF CARE
AAOx4, VSS, VISI  Transferred from [facility] for GI bleed, repeat H/H @0400= 8.5/25.5, plan to start IV Protonix, no BM since arrival  No acute changes overnight, call light within reach, will cont to monitor

## 2020-10-12 ENCOUNTER — ANESTHESIA EVENT (OUTPATIENT)
Dept: ENDOSCOPY | Facility: HOSPITAL | Age: 45
DRG: 005 | End: 2020-10-12
Payer: MEDICAID

## 2020-10-12 PROBLEM — A41.89 GRAM POSITIVE SEPSIS: Status: ACTIVE | Noted: 2020-10-12

## 2020-10-12 LAB
ALBUMIN SERPL BCP-MCNC: 2.5 G/DL (ref 3.5–5.2)
ALP SERPL-CCNC: 179 U/L (ref 55–135)
ALT SERPL W/O P-5'-P-CCNC: 35 U/L (ref 10–44)
ANION GAP SERPL CALC-SCNC: 10 MMOL/L (ref 8–16)
AST SERPL-CCNC: 88 U/L (ref 10–40)
BASOPHILS # BLD AUTO: 0.09 K/UL (ref 0–0.2)
BASOPHILS NFR BLD: 1.5 % (ref 0–1.9)
BILIRUB SERPL-MCNC: 18.7 MG/DL (ref 0.1–1)
BUN SERPL-MCNC: 36 MG/DL (ref 6–20)
CALCIUM SERPL-MCNC: 8.3 MG/DL (ref 8.7–10.5)
CHLORIDE SERPL-SCNC: 107 MMOL/L (ref 95–110)
CO2 SERPL-SCNC: 13 MMOL/L (ref 23–29)
CREAT SERPL-MCNC: 1.6 MG/DL (ref 0.5–1.4)
DIFFERENTIAL METHOD: ABNORMAL
EOSINOPHIL # BLD AUTO: 0.3 K/UL (ref 0–0.5)
EOSINOPHIL NFR BLD: 5 % (ref 0–8)
ERYTHROCYTE [DISTWIDTH] IN BLOOD BY AUTOMATED COUNT: 18.7 % (ref 11.5–14.5)
EST. GFR  (AFRICAN AMERICAN): 58.4 ML/MIN/1.73 M^2
EST. GFR  (NON AFRICAN AMERICAN): 50.6 ML/MIN/1.73 M^2
GLUCOSE SERPL-MCNC: 94 MG/DL (ref 70–110)
HCT VFR BLD AUTO: 22.9 % (ref 40–54)
HGB BLD-MCNC: 7.5 G/DL (ref 14–18)
IMM GRANULOCYTES # BLD AUTO: 0.08 K/UL (ref 0–0.04)
IMM GRANULOCYTES NFR BLD AUTO: 1.3 % (ref 0–0.5)
INR PPP: 2 (ref 0.8–1.2)
LYMPHOCYTES # BLD AUTO: 0.8 K/UL (ref 1–4.8)
LYMPHOCYTES NFR BLD: 13.4 % (ref 18–48)
MAGNESIUM SERPL-MCNC: 2 MG/DL (ref 1.6–2.6)
MCH RBC QN AUTO: 34.9 PG (ref 27–31)
MCHC RBC AUTO-ENTMCNC: 32.8 G/DL (ref 32–36)
MCV RBC AUTO: 107 FL (ref 82–98)
MONOCYTES # BLD AUTO: 0.9 K/UL (ref 0.3–1)
MONOCYTES NFR BLD: 15.5 % (ref 4–15)
NEUTROPHILS # BLD AUTO: 3.8 K/UL (ref 1.8–7.7)
NEUTROPHILS NFR BLD: 63.3 % (ref 38–73)
NRBC BLD-RTO: 0 /100 WBC
PHOSPHATE SERPL-MCNC: 4.4 MG/DL (ref 2.7–4.5)
PLATELET # BLD AUTO: 70 K/UL (ref 150–350)
PMV BLD AUTO: 10.8 FL (ref 9.2–12.9)
POTASSIUM SERPL-SCNC: 4.2 MMOL/L (ref 3.5–5.1)
PROT SERPL-MCNC: 4.6 G/DL (ref 6–8.4)
PROTHROMBIN TIME: 21.9 SEC (ref 9–12.5)
RBC # BLD AUTO: 2.15 M/UL (ref 4.6–6.2)
SODIUM SERPL-SCNC: 130 MMOL/L (ref 136–145)
WBC # BLD AUTO: 5.95 K/UL (ref 3.9–12.7)

## 2020-10-12 PROCEDURE — 84100 ASSAY OF PHOSPHORUS: CPT | Mod: NTX

## 2020-10-12 PROCEDURE — 99233 SBSQ HOSP IP/OBS HIGH 50: CPT | Mod: NTX,,, | Performed by: FAMILY MEDICINE

## 2020-10-12 PROCEDURE — 36415 COLL VENOUS BLD VENIPUNCTURE: CPT | Mod: NTX

## 2020-10-12 PROCEDURE — 99233 PR SUBSEQUENT HOSPITAL CARE,LEVL III: ICD-10-PCS | Mod: NTX,,, | Performed by: FAMILY MEDICINE

## 2020-10-12 PROCEDURE — 25000003 PHARM REV CODE 250: Mod: NTX | Performed by: NURSE PRACTITIONER

## 2020-10-12 PROCEDURE — C9113 INJ PANTOPRAZOLE SODIUM, VIA: HCPCS | Mod: NTX | Performed by: NURSE PRACTITIONER

## 2020-10-12 PROCEDURE — 85025 COMPLETE CBC W/AUTO DIFF WBC: CPT | Mod: NTX

## 2020-10-12 PROCEDURE — 80053 COMPREHEN METABOLIC PANEL: CPT | Mod: NTX

## 2020-10-12 PROCEDURE — 20600001 HC STEP DOWN PRIVATE ROOM: Mod: NTX

## 2020-10-12 PROCEDURE — 99233 PR SUBSEQUENT HOSPITAL CARE,LEVL III: ICD-10-PCS | Mod: NTX,,, | Performed by: NURSE PRACTITIONER

## 2020-10-12 PROCEDURE — 83735 ASSAY OF MAGNESIUM: CPT | Mod: NTX

## 2020-10-12 PROCEDURE — 63600175 PHARM REV CODE 636 W HCPCS: Mod: NTX | Performed by: NURSE PRACTITIONER

## 2020-10-12 PROCEDURE — 99233 SBSQ HOSP IP/OBS HIGH 50: CPT | Mod: NTX,,, | Performed by: NURSE PRACTITIONER

## 2020-10-12 PROCEDURE — 85610 PROTHROMBIN TIME: CPT | Mod: NTX

## 2020-10-12 RX ORDER — CHOLESTYRAMINE 4 G/9G
1 POWDER, FOR SUSPENSION ORAL 2 TIMES DAILY
Status: DISCONTINUED | OUTPATIENT
Start: 2020-10-12 | End: 2020-10-14

## 2020-10-12 RX ORDER — CHOLESTYRAMINE 4 G/9G
1 POWDER, FOR SUSPENSION ORAL 2 TIMES DAILY
Status: DISCONTINUED | OUTPATIENT
Start: 2020-10-12 | End: 2020-10-12

## 2020-10-12 RX ADMIN — MIDODRINE HYDROCHLORIDE 5 MG: 5 TABLET ORAL at 02:10

## 2020-10-12 RX ADMIN — HYDROXYZINE HYDROCHLORIDE 25 MG: 25 TABLET, FILM COATED ORAL at 04:10

## 2020-10-12 RX ADMIN — RIFAXIMIN 550 MG: 550 TABLET ORAL at 08:10

## 2020-10-12 RX ADMIN — PANTOPRAZOLE SODIUM 40 MG: 40 INJECTION, POWDER, LYOPHILIZED, FOR SOLUTION INTRAVENOUS at 08:10

## 2020-10-12 RX ADMIN — SODIUM BICARBONATE 650 MG TABLET 1300 MG: at 08:10

## 2020-10-12 RX ADMIN — CHOLESTYRAMINE 4 G: 4 POWDER, FOR SUSPENSION ORAL at 08:10

## 2020-10-12 RX ADMIN — Medication 100 MG: at 08:10

## 2020-10-12 RX ADMIN — FOLIC ACID 1 MG: 1 TABLET ORAL at 08:10

## 2020-10-12 RX ADMIN — LACTULOSE 20 G: 20 SOLUTION ORAL at 08:10

## 2020-10-12 RX ADMIN — SODIUM BICARBONATE 650 MG TABLET 1300 MG: at 01:10

## 2020-10-12 RX ADMIN — CEFTRIAXONE SODIUM 1 G: 1 INJECTION, POWDER, FOR SOLUTION INTRAMUSCULAR; INTRAVENOUS at 02:10

## 2020-10-12 RX ADMIN — LACTULOSE 20 G: 20 SOLUTION ORAL at 01:10

## 2020-10-12 RX ADMIN — MIDODRINE HYDROCHLORIDE 5 MG: 5 TABLET ORAL at 08:10

## 2020-10-12 RX ADMIN — CHOLESTYRAMINE 4 G: 4 POWDER, FOR SUSPENSION ORAL at 01:10

## 2020-10-12 RX ADMIN — VANCOMYCIN HYDROCHLORIDE 1500 MG: 1.5 INJECTION, POWDER, LYOPHILIZED, FOR SOLUTION INTRAVENOUS at 11:10

## 2020-10-12 NOTE — ASSESSMENT & PLAN NOTE
- Listed for liver transplant 2/2 ETOH.    MELD-Na score: 32 at 10/12/2020  6:24 AM  MELD score: 30 at 10/12/2020  6:24 AM  Calculated from:  Serum Creatinine: 1.6 mg/dL at 10/12/2020  6:24 AM  Serum Sodium: 130 mmol/L at 10/12/2020  6:24 AM  Total Bilirubin: 18.7 mg/dL at 10/12/2020  6:24 AM  INR(ratio): 2.0 at 10/12/2020  6:24 AM  Age: 45 years 5 months

## 2020-10-12 NOTE — HPI
Valdo Altman is a 44 yo M with a medical history including HTN, SBP, hepatic cirrhosis and ESLD secondary to ETOH use. He is currently listed for a liver transplant. He presented to OSH  on 10/11/2020 with complaints of abdominal distention and black, tarry stools x 1 day. Occult stool test (+) in  Emergency room. Transferred to [facility] under liver transplant team for further evaluation.     He denies abd pains before or at time of dark stools. No associated SOB, dizziness, lightheadedness. He denies NSAID use, uses Tylenol PRN. No known history of esophageal or gastric varices - reports an EGD approx 2 years ago in [facility], normal to his knowledge. Today, he reports abdominal distention. Denies further episodes of melena, no nausea, no vomiting. Vital signs remain stable, he is afebrile. Hgb 7.5 today, down from 8.5 on 10/11.

## 2020-10-12 NOTE — ASSESSMENT & PLAN NOTE
- c/o black tarry stools.  - occult blood (+).  - H&H with slight decrease today.   - continue PPI.  - continue antibiotics.  - GI consulted for possible EGD to further assess.   - Monitor.

## 2020-10-12 NOTE — PROGRESS NOTES
Pt sleeps often, but has been more active today than yesterday. He is Ox4; afebrile; vital signs stable. He had 6 BMs today with no black stool or red blood. Appetite is poor, but he is eating about 25% of meals. He is up independently. He is NPO at midnight for an EGD.

## 2020-10-12 NOTE — SUBJECTIVE & OBJECTIVE
Past Medical History:   Diagnosis Date    Decompensated hepatic cirrhosis     Hypertension     SBP (spontaneous bacterial peritonitis)        Past Surgical History:   Procedure Laterality Date    ERCP N/A 10/1/2020    Procedure: ERCP (ENDOSCOPIC RETROGRADE CHOLANGIOPANCREATOGRAPHY);  Surgeon: Marcos Ramirez MD;  Location: [facility] ENDO ([address] [place] [address]);  Service: Endoscopy;  Laterality: N/A;    RIGHT HEART CATHETERIZATION Right 9/23/2020    Procedure: INSERTION, CATHETER, RIGHT HEART;  Surgeon: Mikel Costa Jr., MD;  Location: [facility] CATH LAB;  Service: Cardiology;  Laterality: Right;    THORACENTESIS  2011       Review of patient's allergies indicates:   Allergen Reactions    Latex, natural rubber      Family History     Problem Relation (Age of Onset)    COPD Mother        Tobacco Use    Smoking status: Current Some Day Smoker    Smokeless tobacco: Current User   Substance and Sexual Activity    Alcohol use: Yes     Alcohol/week: 112.0 standard drinks     Types: 112 Shots of liquor per week     Comment: fifth of vodka a day. LAST DRINK 7/25/2020 per pt     Drug use: Yes     Frequency: 3.0 times per week     Types: Marijuana    Sexual activity: Yes     Review of Systems   Constitutional: Negative for fever.   Respiratory: Negative for shortness of breath.    Gastrointestinal: Positive for abdominal distention and blood in stool. Negative for abdominal pain, nausea and vomiting.   Neurological: Positive for dizziness. Negative for light-headedness.     Objective:     Vital Signs (Most Recent):  Temp: 98.1 °F (36.7 °C) (10/12/20 1141)  Pulse: 85 (10/12/20 1141)  Resp: 17 (10/12/20 1141)  BP: 120/60 (10/12/20 1141)  SpO2: (!) 94 % (10/12/20 1141) Vital Signs (24h Range):  Temp:  [97.8 °F (36.6 °C)-98.1 °F (36.7 °C)] 98.1 °F (36.7 °C)  Pulse:  [78-89] 85  Resp:  [17-24] 17  SpO2:  [92 %-98 %] 94 %  BP: ()/(60-76) 120/60     Weight: 114.6 kg (252 lb 10.4 oz) (10/11/20 0215)  Body mass index is 34.27  kg/m².      Intake/Output Summary (Last 24 hours) at 10/12/2020 1429  Last data filed at 10/11/2020 1500  Gross per 24 hour   Intake 240 ml   Output --   Net 240 ml       Lines/Drains/Airways     Peripheral Intravenous Line                 Peripheral IV - Single Lumen 10/11/20 1200 20 G Distal;Left;Posterior Wrist 1 day                Physical Exam  Constitutional:       General: He is not in acute distress.  HENT:      Head: Normocephalic and atraumatic.   Eyes:      General: Scleral icterus present.   Cardiovascular:      Rate and Rhythm: Normal rate and regular rhythm.   Pulmonary:      Effort: Pulmonary effort is normal. No respiratory distress.      Breath sounds: Normal breath sounds. No stridor. No wheezing.   Abdominal:      General: Bowel sounds are normal. There is distension.      Palpations: Abdomen is soft.      Tenderness: There is no abdominal tenderness.      Comments: rounded   Skin:     General: Skin is warm and dry.      Capillary Refill: Capillary refill takes less than 2 seconds.      Coloration: Skin is jaundiced.   Neurological:      General: No focal deficit present.      Mental Status: He is alert and oriented to person, place, and time.         Labs reviewed.    Significant Labs:  CBC:   Recent Labs   Lab 10/10/20  1625 10/11/20  0321 10/12/20  0624   WBC 10.67 8.59 5.95   HGB 9.1* 8.5* 7.5*   HCT 27.1* 25.5* 22.9*   * 82* 70*     CMP:   Recent Labs   Lab 10/12/20  0624   GLU 94   CALCIUM 8.3*   ALBUMIN 2.5*   PROT 4.6*   *   K 4.2   CO2 13*      BUN 36*   CREATININE 1.6*   ALKPHOS 179*   ALT 35   AST 88*   BILITOT 18.7*     Coagulation:   Recent Labs   Lab 10/12/20  0624   INR 2.0*     Liver Function Test:   Recent Labs   Lab 10/10/20  1625 10/11/20  0321 10/12/20  0624   ALT 44 42 35   * 105* 88*   ALKPHOS 194* 190* 179*   BILITOT 25.5* 24.7* 18.7*   PROT 5.5* 5.1* 4.6*   ALBUMIN 2.9* 2.6* 2.5*       Significant Imaging:  No new imaging results within the past 24  hours. All prior imaging studies have been reviewed.

## 2020-10-12 NOTE — ASSESSMENT & PLAN NOTE
- EGD on 10/13/2020, 2nd floor  - NPO after midnight  - continue to monitor H&H  - monitor for overt signs of bleeding   - Continue PPI IV BID   - Continue antbx coverage for potential variceal bleed  - Transfuse for Hb <7, resuscitation per primary team  - Avoid NSAID, AC or Anti-plt  - Further recs pending pending findings.  - please contact with any questions or concerns.

## 2020-10-12 NOTE — PROGRESS NOTES
[facility]  Liver Transplant  Progress Note    Patient Name: Jordan Altman  MRN: [id number]  Admission Date: 10/11/2020  Hospital Length of Stay: 1 days  Code Status: Full Code  Primary Care Provider: SHAHEEN Jaime  Post-Operative Day:     ORGAN:     Disease Etiology: Alcoholic Cirrhosis  Donor Type:     CDC High Risk:     Donor CMV Status:   Donor CMV Status:   Donor HBcAB:     Donor HCV Status:     Donor HBV SHAYE: Organ record is missing.  Donor HCV SHAYE: Organ record is missing.  Whole or Partial:   Biliary Anastomosis:   Arterial Anatomy:   Subjective:     History of Present Illness:  Mr. 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 He also c/o chronic left knee pain and denies any recent injury. Occult blood test (+) at OSH but HCT stable. Pt denies fever, chills, nausea, vomiting, chest pain, sob, hematuria or changes in bladder function. Will admit to LTS for further evaluation.     Hospital Course:  Interval history: no acute events overnight. Pt reports feeling well this am and no melena noted on recent BMs. Despite no melena, pt noted with drop in H&H. Given his this decrease in H&H and hypotension requiring midodrine, will plan for a GI consult to further assess with a possible EGD. Pt listed with MELD 33. Of note, pt noted with positive blood cxs for GPC. Will plan for one dose of vanc at this time and f/u on final cx results.      Past Medical History:   Diagnosis Date    Decompensated hepatic cirrhosis     Hypertension     SBP (spontaneous bacterial peritonitis)        Past Surgical History:   Procedure Laterality Date    ERCP N/A 10/1/2020    Procedure: ERCP (ENDOSCOPIC RETROGRADE CHOLANGIOPANCREATOGRAPHY);  Surgeon: Marcos Ramirez MD;  Location: [address]);  Service: Endoscopy;   Laterality: N/A;    RIGHT HEART CATHETERIZATION Right 9/23/2020    Procedure: INSERTION, CATHETER, RIGHT HEART;  Surgeon: Mikel Costa Jr., MD;  Location: [facility] CATH LAB;  Service: Cardiology;  Laterality: Right;    THORACENTESIS  2011       Review of patient's allergies indicates:   Allergen Reactions    Latex, natural rubber        Family History     Problem Relation (Age of Onset)    COPD Mother        Tobacco Use    Smoking status: Current Some Day Smoker    Smokeless tobacco: Current User   Substance and Sexual Activity    Alcohol use: Yes     Alcohol/week: 112.0 standard drinks     Types: 112 Shots of liquor per week     Comment: fifth of vodka a day. LAST DRINK 7/25/2020 per pt     Drug use: Yes     Frequency: 3.0 times per week     Types: Marijuana    Sexual activity: Yes       PTA Medications   Medication Sig    albuterol (PROVENTIL/VENTOLIN HFA) 90 mcg/actuation inhaler Inhale 2 puffs into the lungs every 6 (six) hours as needed for Wheezing or Shortness of Breath. Rescue    cetirizine (ZYRTEC) 10 MG tablet Take 10 mg by mouth once daily.    ciprofloxacin HCl (CIPRO) 500 MG tablet Take 1 tablet (500 mg total) by mouth once daily.    ergocalciferol (VITAMIN D2) 50,000 unit Cap Take 50,000 Units by mouth every Monday.    folic acid (FOLVITE) 1 MG tablet Take 1 mg by mouth once daily.    hydrOXYzine HCL (ATARAX) 25 MG tablet Take 1 tablet (25 mg total) by mouth 3 (three) times daily as needed for Itching.    lactulose (CHRONULAC) 10 gram/15 mL solution Take 23 mLs (15.3333 g total) by mouth 3 (three) times daily. TITRATE TO 3-4 BOWEL MOVEMENTS DAILY.    polyethylene glycol (GLYCOLAX) 17 gram/dose powder Take 17 g by mouth once daily.    rifAXIMin (XIFAXAN) 550 mg Tab Take 1 tablet (550 mg total) by mouth 2 (two) times daily.    thiamine 100 MG tablet Take 100 mg by mouth once daily.       Review of Systems   Constitutional: Negative for activity change, appetite change, chills, fatigue  and fever.   HENT: Negative for congestion and facial swelling.    Eyes: Negative for pain, discharge and visual disturbance.   Respiratory: Negative for cough, chest tightness, shortness of breath and wheezing.    Cardiovascular: Negative for chest pain, palpitations and leg swelling.   Gastrointestinal: Positive for abdominal distention and abdominal pain. Negative for blood in stool, constipation, diarrhea, nausea and vomiting.   Endocrine: Negative.    Genitourinary: Negative for decreased urine volume, difficulty urinating and hematuria.   Musculoskeletal: Negative for back pain.   Skin: Negative for pallor, rash and wound.   Allergic/Immunologic: Negative for immunocompromised state.   Neurological: Negative for dizziness, tremors, weakness and light-headedness.   Hematological: Negative.    Psychiatric/Behavioral: Negative for agitation, confusion and dysphoric mood. The patient is nervous/anxious.      Objective:     Vital Signs (Most Recent):  Temp: 98.1 °F (36.7 °C) (10/12/20 1141)  Pulse: 85 (10/12/20 1141)  Resp: 17 (10/12/20 1141)  BP: 120/60 (10/12/20 1141)  SpO2: (!) 94 % (10/12/20 1141) Vital Signs (24h Range):  Temp:  [97.8 °F (36.6 °C)-98.1 °F (36.7 °C)] 98.1 °F (36.7 °C)  Pulse:  [78-89] 85  Resp:  [17-24] 17  SpO2:  [92 %-98 %] 94 %  BP: ()/(60-76) 120/60     Weight: 114.6 kg (252 lb 10.4 oz)  Body mass index is 34.27 kg/m².      Intake/Output Summary (Last 24 hours) at 10/12/2020 1421  Last data filed at 10/11/2020 1500  Gross per 24 hour   Intake 240 ml   Output --   Net 240 ml       Physical Exam  Vitals signs and nursing note reviewed.   Constitutional:       Appearance: He is well-developed.   HENT:      Head: Normocephalic and atraumatic.   Eyes:      Pupils: Pupils are equal, round, and reactive to light.   Neck:      Musculoskeletal: Normal range of motion and neck supple.      Vascular: No JVD.   Cardiovascular:      Rate and Rhythm: Normal rate and regular rhythm.      Heart  sounds: Normal heart sounds. No murmur. No friction rub.   Pulmonary:      Effort: Pulmonary effort is normal. No respiratory distress.      Breath sounds: Normal breath sounds. No wheezing or rales.   Abdominal:      General: Bowel sounds are normal. There is no distension.      Palpations: Abdomen is soft.      Tenderness: There is no abdominal tenderness.   Musculoskeletal: Normal range of motion.   Skin:     General: Skin is warm and dry.   Neurological:      Mental Status: He is alert and oriented to person, place, and time.   Psychiatric:         Behavior: Behavior normal.         Laboratory:  CBC:   Recent Labs   Lab 10/12/20  0624   WBC 5.95   RBC 2.15*   HGB 7.5*   HCT 22.9*   PLT 70*   *   MCH 34.9*   MCHC 32.8     CMP:   Recent Labs   Lab 10/12/20  0624   GLU 94   CALCIUM 8.3*   ALBUMIN 2.5*   PROT 4.6*   *   K 4.2   CO2 13*      BUN 36*   CREATININE 1.6*   ALKPHOS 179*   ALT 35   AST 88*   BILITOT 18.7*     Labs within the past 24 hours have been reviewed.    Diagnostic Results:  I have personally reviewed all pertinent imaging studies.    Assessment/Plan:     * GI bleed  - c/o black tarry stools.  - occult blood (+).  - H&H with slight decrease today.   - continue PPI.  - continue antibiotics.  - GI consulted for possible EGD to further assess.   - Monitor.      Gram positive sepsis  - blood cx with GPC   - Plan for one dose of Vanc at this time.   - f/u on cx final result        Left knee pain  - Chronic.  - No injuries reported.  - Tylenol PRN.  - pain noted with improvement today.     JENN (acute kidney injury)  - Cr slowly trending up.  - Encourage increase po fluid intake.  - Strict I&Os.        Decompensated hepatic cirrhosis  - Listed for liver transplant 2/2 ETOH.    MELD-Na score: 32 at 10/12/2020  6:24 AM  MELD score: 30 at 10/12/2020  6:24 AM  Calculated from:  Serum Creatinine: 1.6 mg/dL at 10/12/2020  6:24 AM  Serum Sodium: 130 mmol/L at 10/12/2020  6:24 AM  Total  Bilirubin: 18.7 mg/dL at 10/12/2020  6:24 AM  INR(ratio): 2.0 at 10/12/2020  6:24 AM  Age: 45 years 5 months            VTE Risk Mitigation (From admission, onward)         Ordered     IP VTE LOW RISK PATIENT  Once      10/11/20 0217     Place JELENA hose  Until discontinued      10/11/20 0217                The patients clinical status was discussed at multidisplinary rounds, involving transplant surgery, transplant medicine, pharmacy, nursing, nutrition, and social work    Discharge Planning:  No Patient Care Coordination Note on file.      Joe Virk NP  Liver Transplant  [facility]

## 2020-10-12 NOTE — PROGRESS NOTES
Admit Note     Met with patient to assess needs. Patient is a 45 y.o. single male, admitted for GI bleed.     Patient admitted from ED on 10/11/2020 .  At this time, patient presents as alert and oriented x 4, pleasant, good eye contact, well groomed, recall good, concentration/judgement good, average intelligence, calm, communicative, cooperative and asking and answering questions appropriately.      Household/Family Systems     Patient resides with patient's significant other, at [address] [address].  Support system includes Valeria Damon(significant other), Didier Page(brother), and Cornelia Pgae(brother).  Patient does not have dependents that are need of being cared for.     Patients primary caregiver is Valeria Tucker patients significant other, phone number [phone].  Confirmed patients contact information is [phone] (home);   Telephone Information:   Mobile [phone]   Mobile [phone]   .    During admission, patient's caregiver plans to stay at home.  Confirmed patient and patients caregivers do have access to reliable transportation.    Cognitive Status/Learning     Patient reports reading ability as college and states patient does not have difficulty with reading, writing, seeing, hearing, comprehension, learning and memory.  Patient reports patient learns best by combination of written, verbal, and demonstration.   Needed: No.   Highest education level: Attended College/Technical School    Vocation/Disability   Working for Income: No  If no, reason not working: Demands of Treatment  Patient was employed as .    Adherence     Patient reports a high level of adherence to patients health care regimen.  Adherence counseling and education provided. Patient verbalizes understanding.    Substance Use    Patient reports the following substance usage.    Tobacco: none, patient denies any use.  Alcohol: Patient diagnosed with alcoholic  "cirrhosis a year ago. Patient continued ETOH use despite known liver disease. Patient reported last ETOH use 20. Patient reported prior to quitting he would drink a pint or more of whiskey a day. Patient reports completing rehab at [facility] in [place] when being informed of his diagnosis. He reports six to seven months of sobriety. Pt stated due to lack of work and pandemic her started to drink again. Patient interested in completing IOP and AA to assist with maintaining sobriety. Resources for IOP and AA provided to patient.     Illicit Drugs/Non-prescribed Medications: none, patient denies any use. Patient reported using THC in the past. Reports using it "months ago."    Services Utilizing/ADLS    Infusion Service: Prior to admission, patient utilizing? no  Home Health: Prior to admission, patient utilizing? no  DME: Prior to admission, no  Pulmonary/Cardiac Rehab: Prior to admission, no  Dialysis:  Prior to admission, no.   Transplant Specialty Pharmacy:  Prior to admission, no.    Prior to admission, patient reports patient was independent with ADLS and was driving.  Patient reports patient is able to care for self at this time..  Patient indicates a willingness to care for self once medically cleared to do so.    Insurance/Medications    Insured by   Payor/Plan Subscr  Sex Relation Sub. Ins. ID Effective Group Num   1. [place]ICA[place] - [place]* TANIYA CHAVEZ JULIANO 1975 Male  [id number]* [id number]                                    [address]      Primary Insurance (for UNOS reporting): Public Insurance - Medicaid  Secondary Insurance (for UNOS reporting): None    Patient reports patient is able to obtain and afford medications at this time and at time of discharge.    Living Will/Healthcare Power of     Patient states patient has a LW and/or HCPA.   provided education regarding LW and HCPA and the completion of forms.    Coping/Mental Health    Patient is coping well with the aid of  " family members.  Patient denies mental health difficulties.     Discharge Planning    At time of discharge, patient plans to return to patient's home under the care of Valeria Tucker.  Patients significant other will transport patient.  Per rounds today, expected discharge date has not been medically determined at this time. Patient and patients caregiver  verbalize understanding and are involved in treatment planning and discharge process.    Additional Concerns    Patient is being followed for needs, education, resources, information, emotional support, supportive counseling, and for supportive and skilled discharge plan of care.  providing ongoing psychosocial support, education, resources and d/c planning as needed.  SW remains available.  provided resource list, patient choice, psychosocial and supportive counseling, resources, education, assistance and discharge planning with patient and caregiver involvement, ongoing SW availability and services as appropriate.  remains available. Patient denies additional needs and/or concerns at this time. Patient verbalizes understanding and agreement with information reviewed, social work availability, and how to access available resources as needed.

## 2020-10-12 NOTE — ANESTHESIA PREPROCEDURE EVALUATION
[facility]  Anesthesia Pre-Operative Evaluation         Patient Name: Jordan Altman  YOB: 1975  MRN: [id number]    SUBJECTIVE:     Pre-operative evaluation for Procedure(s) (LRB):  EGD (ESOPHAGOGASTRODUODENOSCOPY) (N/A)     10/12/2020    Jordan Altman is a 45 y.o. 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  He is currently listed for a liver transplant.  Patient initially presented to an OSH on 10/11/20 for worsening abdominal pain, abdominal distention, and dark tarry stools. Blood culture (+) for GPCs, given one dose of IV vancomycin.  Concern for GI bleed given history.  Patient now presents for the above procedure(s).    Stress Echo 9/24/20:  · The stress echo portion of this study is negative for myocardial ischemia.  · The ECG portion of this study is negative for myocardial ischemia.  · There were no arrhythmias during stress.  · The left ventricle is mildly enlarged with hyperdynamic systolic function. The estimated ejection fraction is 75%.  · Slightly increased transaortic velocity (2.3m/s) secondary to hyperdynamic heart function and high cardiac output.  · There is left ventricular eccentric hypertrophy.  · Normal left ventricular diastolic function.  · Normal right ventricular systolic function.  · Moderate left atrial enlargement.  · Normal central venous pressure (3 mmHg).     RHC 9/23/20:  -Right atrial pressure is mildly elevated.  -Pulmonary capillary wedge pressure is mildly elevated (borderline).  -Pulmonary artery pressure is mildly elevated.  -Pulmonary vascular resistance is normal.  -Systemic vascular resistance is 553.  -Vinny cardiac output and index is elevated compatible with high output state attributed to his cirrhosis and contributed to by his anemia.  -These hemodynamic are acceptable for liver transplant listing.    LDA:        Peripheral IV - Single Lumen 10/11/20 1200 20 G  Distal;Left;Posterior Wrist (Active)   Site Assessment Clean;Dry;Intact;No redness;No swelling 10/11/20 2000   Line Status Saline locked 10/11/20 2000   Dressing Status Clean;Dry;Intact 10/11/20 2000   Number of days: 1       Prev airway: None documented.    Drips: None documented.      Patient Active Problem List   Diagnosis    Decompensated hepatic cirrhosis    Ascites due to alcoholic cirrhosis    Thrombocytopenia    Coagulopathy    JENN (acute kidney injury)    Portal hypertension    Chronic Hepatic encephalopathy    Hyponatremia    Macrocytic anemia    DIC (disseminated intravascular coagulation)    Hypertension    Hx of SBP (spontaneous bacterial peritonitis)    Pulmonary HTN    Alcohol use disorder, severe, dependence    Encounter for pre-transplant evaluation for liver transplant    Encounter for palliative care    Counseling regarding advanced care planning and goals of care    Left knee pain    Organ transplant candidate    GI bleed    Gram positive sepsis       Review of patient's allergies indicates:   Allergen Reactions    Latex, natural rubber        Current Inpatient Medications:   cefTRIAXone (ROCEPHIN) IVPB  1 g Intravenous Q24H    cholestyramine  1 packet Oral BID    folic acid  1 mg Oral Daily    lactulose  20 g Oral TID    midodrine  5 mg Oral TID    pantoprazole  40 mg Intravenous BID    rifAXIMin  550 mg Oral BID    sodium bicarbonate  1,300 mg Oral TID    thiamine  100 mg Oral Daily       No current facility-administered medications on file prior to encounter.      Current Outpatient Medications on File Prior to Encounter   Medication Sig Dispense Refill    albuterol (PROVENTIL/VENTOLIN HFA) 90 mcg/actuation inhaler Inhale 2 puffs into the lungs every 6 (six) hours as needed for Wheezing or Shortness of Breath. Rescue      cetirizine (ZYRTEC) 10 MG tablet Take 10 mg by mouth once daily.      ciprofloxacin HCl (CIPRO) 500 MG tablet Take 1 tablet (500 mg total)  by mouth once daily. 30 tablet 2    ergocalciferol (VITAMIN D2) 50,000 unit Cap Take 50,000 Units by mouth every Monday.      folic acid (FOLVITE) 1 MG tablet Take 1 mg by mouth once daily.      hydrOXYzine HCL (ATARAX) 25 MG tablet Take 1 tablet (25 mg total) by mouth 3 (three) times daily as needed for Itching. 30 tablet 1    lactulose (CHRONULAC) 10 gram/15 mL solution Take 23 mLs (15.3333 g total) by mouth 3 (three) times daily. TITRATE TO 3-4 BOWEL MOVEMENTS DAILY. 2070 mL 2    polyethylene glycol (GLYCOLAX) 17 gram/dose powder Take 17 g by mouth once daily.      rifAXIMin (XIFAXAN) 550 mg Tab Take 1 tablet (550 mg total) by mouth 2 (two) times daily. 60 tablet 2    thiamine 100 MG tablet Take 100 mg by mouth once daily.         Past Surgical History:   Procedure Laterality Date    ERCP N/A 10/1/2020    Procedure: ERCP (ENDOSCOPIC RETROGRADE CHOLANGIOPANCREATOGRAPHY);  Surgeon: Marcos Ramirez MD;  Location: [facility] ENDO ([address] [place] [address]);  Service: Endoscopy;  Laterality: N/A;    RIGHT HEART CATHETERIZATION Right 9/23/2020    Procedure: INSERTION, CATHETER, RIGHT HEART;  Surgeon: Mikel Costa Jr., MD;  Location: [facility] CATH LAB;  Service: Cardiology;  Laterality: Right;    THORACENTESIS  2011       Social History     Socioeconomic History    Marital status:      Spouse name: Not on file    Number of children: Not on file    Years of education: Not on file    Highest education level: Not on file   Occupational History    Not on file   Social Needs    Financial resource strain: Not on file    Food insecurity     Worry: Not on file     Inability: Not on file    Transportation needs     Medical: Not on file     Non-medical: Not on file   Tobacco Use    Smoking status: Current Some Day Smoker    Smokeless tobacco: Current User   Substance and Sexual Activity    Alcohol use: Yes     Alcohol/week: 112.0 standard drinks     Types: 112 Shots of liquor per week     Comment: fifth of crystal a  day. LAST DRINK 7/25/2020 per pt     Drug use: Yes     Frequency: 3.0 times per week     Types: Marijuana    Sexual activity: Yes   Lifestyle    Physical activity     Days per week: Not on file     Minutes per session: Not on file    Stress: Not on file   Relationships    Social connections     Talks on phone: Not on file     Gets together: Not on file     Attends Caodaism service: Not on file     Active member of club or organization: Not on file     Attends meetings of clubs or organizations: Not on file     Relationship status: Not on file   Other Topics Concern    Not on file   Social History Narrative    Not on file       OBJECTIVE:     Vital Signs Range (Last 24H):  Temp:  [36.5 °C (97.7 °F)-36.7 °C (98.1 °F)]   Pulse:  [78-89]   Resp:  [17-24]   BP: ()/(49-76)   SpO2:  [92 %-98 %]       Significant Labs:  Lab Results   Component Value Date    WBC 5.95 10/12/2020    HGB 7.5 (L) 10/12/2020    HCT 22.9 (L) 10/12/2020    PLT 70 (L) 10/12/2020    ALT 35 10/12/2020    AST 88 (H) 10/12/2020     (L) 10/12/2020    K 4.2 10/12/2020     10/12/2020    CREATININE 1.6 (H) 10/12/2020    BUN 36 (H) 10/12/2020    CO2 13 (L) 10/12/2020    TSH 0.938 09/19/2020    PSA 0.05 09/29/2020    INR 2.0 (H) 10/12/2020       Diagnostic Studies: No relevant studies.    EKG:   Results for orders placed or performed during the hospital encounter of 10/10/20   EKG 12-lead    Collection Time: 10/10/20  4:02 PM    Narrative    Test Reason : R10.9,    Vent. Rate : 089 BPM     Atrial Rate : 089 BPM     P-R Int : 150 ms          QRS Dur : 096 ms      QT Int : 398 ms       P-R-T Axes : 001 -39 020 degrees     QTc Int : 484 ms    Normal sinus rhythm  Left axis deviation  Possible Anterolateral infarct (cited on or before 10-OCT-2020)  Abnormal ECG  When compared with ECG of 25-SEP-2020 11:22,  Previous ECG has undetermined rhythm, needs review  The axis Shifted left  Questionable change in initial forces of Lateral leads  T  wave inversion now evident in Inferior leads  Confirmed by HORACE CERDA MD ([id number]) on 10/11/2020 11:31:52 AM    Referred By: [email or web address]   SELF           Confirmed By:HORACE CERDA MD       ECHOCARDIOGRAM:  TTE:  Results for orders placed or performed during the hospital encounter of 09/19/20   Echo Color Flow Doppler? Yes   Result Value Ref Range    Ascending aorta 3.11 cm    STJ 3.14 cm    AV mean gradient 11 mmHg    Ao peak iam 2.17 m/s    Ao VTI 37.35 cm    IVRT 51.38 msec    IVS 0.91 0.6 - 1.1 cm    LA size 4.31 cm    Left Atrium Major Axis 6.26 cm    Left Atrium Minor Axis 6.32 cm    LVIDd 5.61 3.5 - 6.0 cm    LVIDs 3.30 2.1 - 4.0 cm    LVOT diameter 2.43 cm    LVOT peak VTI 24.12 cm    Posterior Wall 1.06 0.6 - 1.1 cm    MV Peak A Iam 0.89 m/s    E wave decelartion time 154.32 msec    MV Peak E Iam 1.06 m/s    RA Major Axis 4.94 cm    RA Width 3.53 cm    RVDD 3.87 cm    Sinus 3.37 cm    TAPSE 2.95 cm    TR Max Iam 4.19 m/s    TDI LATERAL 0.11 m/s    TDI SEPTAL 0.10 m/s    LA WIDTH 4.75 cm    MV stenosis pressure 1/2 time 44.75 ms    LV Diastolic Volume 154.47 mL    LV Systolic Volume 44.25 mL    RV S' 19.26 cm/s    LVOT peak iam 1.41 m/s    LV LATERAL E/E' RATIO 9.64 m/s    LV SEPTAL E/E' RATIO 10.60 m/s    FS 41 %    LA volume 109.45 cm3    LV mass 216.06 g    Left Ventricle Relative Wall Thickness 0.38 cm    AV valve area 2.99 cm2    AV Velocity Ratio 0.65     AV index (prosthetic) 0.65     MV valve area p 1/2 method 4.92 cm2    E/A ratio 1.19     Mean e' 0.11 m/s    LVOT area 4.6 cm2    LVOT stroke volume 111.80 cm3    AV peak gradient 19 mmHg    E/E' ratio 10.10 m/s    LV Systolic Volume Index 18.5 mL/m2    LV Diastolic Volume Index 64.44 mL/m2    LA Volume Index 45.7 mL/m2    LV Mass Index 90 g/m2    Triscuspid Valve Regurgitation Peak Gradient 70 mmHg    BSA 2.47 m2    Right Atrial Pressure (from IVC) 3 mmHg    TV rest pulmonary artery pressure 73 mmHg    Narrative    · Moderate left atrial enlargement.  ·  Normal left ventricular systolic function. The estimated ejection   fraction is 65%.  · Indeterminate left ventricular diastolic function.  · Normal right ventricular systolic function.  · Mild tricuspid regurgitation.  · Normal central venous pressure (3 mmHg).  · Pulmonary hypertension present.  · The estimated PA systolic pressure is 73 mmHg.        STRESS:  Results for orders placed or performed during the hospital encounter of 09/19/20   Stress Echo Which stress agent will be used? Pharmacological; Color Flow Doppler? No   Result Value Ref Range    Ascending aorta 3.48 cm    STJ 2.93 cm    IVS 1.11 (A) 0.6 - 1.1 cm    LA size 4.61 cm    Left Atrium Major Axis 6.38 cm    Left Atrium Minor Axis 6.16 cm    LVIDd 6.00 (A) 3.5 - 6.0 cm    LVIDs 3.95 2.1 - 4.0 cm    LVOT diameter 2.50 cm    LVOT peak VTI 33.73 cm    Posterior Wall 1.07 0.6 - 1.1 cm    MV Peak A Iam 1.08 m/s    E wave decelartion time 177.77 msec    MV Peak E Iam 1.22 m/s    PV Peak D Iam 1.09 m/s    PV Peak S Iam 0.96 m/s    RA Major Axis 5.05 cm    RA Width 3.28 cm    RVDD 4.58 cm    Sinus 3.72 cm    TAPSE 2.22 cm    TDI LATERAL 0.13 m/s    TDI SEPTAL 0.12 m/s    LA WIDTH 4.60 cm    MV stenosis pressure 1/2 time 51.55 ms    LV Diastolic Volume 211.74 mL    LV Systolic Volume 67.76 mL    LVOT peak iam 1.78 m/s    LV LATERAL E/E' RATIO 9.38 m/s    LV SEPTAL E/E' RATIO 10.17 m/s    FS 34 %    LA volume 112.98 cm3    LV mass 276.28 g    Left Ventricle Relative Wall Thickness 0.36 cm    MV valve area p 1/2 method 4.27 cm2    E/A ratio 1.13     Mean e' 0.13 m/s    Pulm vein S/D ratio 0.88     LVOT area 4.9 cm2    LVOT stroke volume 165.49 cm3    E/E' ratio 9.76 m/s    LV Systolic Volume Index 28.4 mL/m2    LV Diastolic Volume Index 88.76 mL/m2    LA Volume Index 47.4 mL/m2    LV Mass Index 116 g/m2    BSA 2.45 m2    Systolic blood pressure 155 mmHg    Diastolic blood pressure 69 mmHg    HR at rest 95 bpm    RPP 14,725     Peak  bpm    Peak Systolic  BP 84 mmHg    Peak Diatolic BP 39 mmHg    Peak RPP 11,424     Max Predicted      85% Max Predicted      % Max HR Achieved 79     1 Minute Recovery  bpm    [facility] CV CPX PATIENT IS MALE 1     [facility] CV CPX PATIENT IS FEMALE 0     Right Atrial Pressure (from IVC) 3 mmHg    Narrative    · The stress echo portion of this study is negative for myocardial   ischemia.  · The ECG portion of this study is negative for myocardial ischemia.  · There were no arrhythmias during stress.  · The left ventricle is mildly enlarged with hyperdynamic systolic   function. The estimated ejection fraction is 75%.  · Slightly increased transaortic velocity (2.3m/s) secondary to   hyperdynamic heart function and high cardiac output.  · There is left ventricular eccentric hypertrophy.  · Normal left ventricular diastolic function.  · Normal right ventricular systolic function.  · Moderate left atrial enlargement.  · Normal central venous pressure (3 mmHg).        ASSESSMENT/PLAN:         Anesthesia Evaluation          Review of Systems  Anesthesia Hx:  No problems with previous Anesthesia Denies Hx of Anesthetic complications History of prior surgery of interest to airway management or planning: Denies Family Hx of Anesthesia complications.   Denies Personal Hx of Anesthesia complications.   Social:  Alcohol Use    Hematology/Oncology:     Oncology Normal   Hematology Comments: thrombocytopenia   EENT/Dental:EENT/Dental Normal   Cardiovascular:   Hypertension Pulm HTN s/p RHC   Pulmonary:  Pulmonary Normal    Renal/:   Chronic Renal Disease, ARF    Hepatic/GI:   Liver Disease, ESLD 2/2 to ETOH cirrhosis    Gastric varices   Neurological:  Neurology Normal    Endocrine:  Endocrine Normal    Psych:   Psychiatric History             Anesthesia Plan  Type of Anesthesia, risks & benefits discussed:  Anesthesia Type:  general, MAC  Patient's Preference:   Intra-op Monitoring Plan: standard ASA monitors  Intra-op Monitoring Plan  Comments:   Post Op Pain Control Plan: per primary service following discharge from PACU, IV/PO Opioids PRN and multimodal analgesia  Post Op Pain Control Plan Comments:   Induction:   IV  Beta Blocker:  Patient is not currently on a Beta-Blocker (No further documentation required).       Informed Consent: Patient understands risks and agrees with Anesthesia plan.  Questions answered. Anesthesia consent signed with patient.  ASA Score: 3     Day of Surgery Review of History & Physical: I have interviewed and examined the patient. I have reviewed the patient's H&P dated:    H&P update referred to the provider.         Ready For Surgery From Anesthesia Perspective.

## 2020-10-12 NOTE — PLAN OF CARE
AAOX4. VSS. Afebrile. Not showing any new signs of infection.  Pt tolerating all treatments and therapies well at this time.  Pt denies c/o pain.  Fall precautions in place.  Bed in low, locked position, non skid socks on while out of bed.  Pt remain free of falls during shift.  Call bell within reach.  Pt instructed to push call bell when assistance needed.  Pt verbalized understanding.  Will continue to monitor.

## 2020-10-12 NOTE — HOSPITAL COURSE
ESLD 2/2 ETOH. Admitted 10/11 with suspected GIB from OSH. 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 Post op course significant for PVT requiring TB to OR POD#1 for thrombectomy. Hepatic artery also revised (shortened) due to kink. OR POD#2 for abdominal closure, bx, and reconstruction of bile duct. US 10/17 satisfactory. Oliguric JENN requiring CRRT post op. Nephrology following. Stepped down 10/19 afternoon. Drains and bourne out. CT without contrast 10/22 reviewed, free fluid in mid upper abdomen/left upper abs- subcutaneous hematoma mid chevron noted, staples intact and no oozing.     Interval History: No acute events overnight. Pt feeling well today. Off heparin gtt now transitioned to Eliquis as of 10/25. H&H stable. LFTs improving daily. Last US stable. Repeat US today for surveillance. Cr improving, 1.6 today. Good UOP. Working well with therapy. PT still recommending Rehab. Possible transfer tomorrow. Monitor.

## 2020-10-12 NOTE — CONSULTS
[facility]  Gastroenterology  Consult Note    Patient Name: Jordan Altman  MRN: [id number]  Admission Date: 10/11/2020  Hospital Length of Stay: 1 days  Code Status: Full Code   Attending Provider: Ananya Montanez MD   Consulting Provider: Tamie Abdullahi DNP  Primary Care Physician: SHAHEEN Jaime  Principal Problem:GI bleed    Consults  Subjective:     HPI:  Valdo Altman is a 44 yo M with a medical history including HTN, SBP, hepatic cirrhosis and ESLD secondary to ETOH use. He is currently listed for a liver transplant. He presented to [facility]2020 with complaints of abdominal distention and black, tarry stools x 1 day. Occult stool test (+) in  Emergency room. [place] [facility] [place] for further evaluation.     He denies abd pains before or at time of dark stools. No associated SOB, dizziness, lightheadedness. He denies NSAID use, uses Tylenol PRN. No known history of esophageal or gastric varices - reports an EGD approx 2 years ago in [facility], normal to his knowledge. Today, he reports abdominal distention. Denies further episodes of melena, no nausea, no vomiting. Vital signs remain stable, he is afebrile. Hgb 7.5 today, down from 8.5 on 10/11.     Past Medical History:   Diagnosis Date    Decompensated hepatic cirrhosis     Hypertension     SBP (spontaneous bacterial peritonitis)        Past Surgical History:   Procedure Laterality Date    ERCP N/A 10/1/2020    Procedure: ERCP (ENDOSCOPIC RETROGRADE CHOLANGIOPANCREATOGRAPHY);  Surgeon: Marcos Ramirez MD;  Location: [facility] ENDO ([address] [place] [address]);  Service: Endoscopy;  Laterality: N/A;    RIGHT HEART CATHETERIZATION Right 9/23/2020    Procedure: INSERTION, CATHETER, RIGHT HEART;  Surgeon: Mikel Costa Jr., MD;  Location: [facility] CATH LAB;  Service: Cardiology;  Laterality: Right;    THORACENTESIS  2011       Review of patient's allergies indicates:   Allergen Reactions    Latex, natural  rubber      Family History     Problem Relation (Age of Onset)    COPD Mother        Tobacco Use    Smoking status: Current Some Day Smoker    Smokeless tobacco: Current User   Substance and Sexual Activity    Alcohol use: Yes     Alcohol/week: 112.0 standard drinks     Types: 112 Shots of liquor per week     Comment: fifth of vodka a day. LAST DRINK 7/25/2020 per pt     Drug use: Yes     Frequency: 3.0 times per week     Types: Marijuana    Sexual activity: Yes     Review of Systems   Constitutional: Negative for fever.   Respiratory: Negative for shortness of breath.    Gastrointestinal: Positive for abdominal distention and blood in stool. Negative for abdominal pain, nausea and vomiting.   Neurological: Positive for dizziness. Negative for light-headedness.     Objective:     Vital Signs (Most Recent):  Temp: 98.1 °F (36.7 °C) (10/12/20 1141)  Pulse: 85 (10/12/20 1141)  Resp: 17 (10/12/20 1141)  BP: 120/60 (10/12/20 1141)  SpO2: (!) 94 % (10/12/20 1141) Vital Signs (24h Range):  Temp:  [97.8 °F (36.6 °C)-98.1 °F (36.7 °C)] 98.1 °F (36.7 °C)  Pulse:  [78-89] 85  Resp:  [17-24] 17  SpO2:  [92 %-98 %] 94 %  BP: ()/(60-76) 120/60     Weight: 114.6 kg (252 lb 10.4 oz) (10/11/20 0215)  Body mass index is 34.27 kg/m².      Intake/Output Summary (Last 24 hours) at 10/12/2020 1429  Last data filed at 10/11/2020 1500  Gross per 24 hour   Intake 240 ml   Output --   Net 240 ml       Lines/Drains/Airways     Peripheral Intravenous Line                 Peripheral IV - Single Lumen 10/11/20 1200 20 G Distal;Left;Posterior Wrist 1 day                Physical Exam  Constitutional:       General: He is not in acute distress.  HENT:      Head: Normocephalic and atraumatic.   Eyes:      General: Scleral icterus present.   Cardiovascular:      Rate and Rhythm: Normal rate and regular rhythm.   Pulmonary:      Effort: Pulmonary effort is normal. No respiratory distress.      Breath sounds: Normal breath sounds. No  stridor. No wheezing.   Abdominal:      General: Bowel sounds are normal. There is distension.      Palpations: Abdomen is soft.      Tenderness: There is no abdominal tenderness.      Comments: rounded   Skin:     General: Skin is warm and dry.      Capillary Refill: Capillary refill takes less than 2 seconds.      Coloration: Skin is jaundiced.   Neurological:      General: No focal deficit present.      Mental Status: He is alert and oriented to person, place, and time.         Labs reviewed.    Significant Labs:  CBC:   Recent Labs   Lab 10/10/20  1625 10/11/20  0321 10/12/20  0624   WBC 10.67 8.59 5.95   HGB 9.1* 8.5* 7.5*   HCT 27.1* 25.5* 22.9*   * 82* 70*     CMP:   Recent Labs   Lab 10/12/20  0624   GLU 94   CALCIUM 8.3*   ALBUMIN 2.5*   PROT 4.6*   *   K 4.2   CO2 13*      BUN 36*   CREATININE 1.6*   ALKPHOS 179*   ALT 35   AST 88*   BILITOT 18.7*     Coagulation:   Recent Labs   Lab 10/12/20  0624   INR 2.0*     Liver Function Test:   Recent Labs   Lab 10/10/20  1625 10/11/20  0321 10/12/20  0624   ALT 44 42 35   * 105* 88*   ALKPHOS 194* 190* 179*   BILITOT 25.5* 24.7* 18.7*   PROT 5.5* 5.1* 4.6*   ALBUMIN 2.9* 2.6* 2.5*       Significant Imaging:  No new imaging results within the past 24 hours. All prior imaging studies have been reviewed.    Assessment/Plan:     * GI bleed  - EGD on 10/13/2020, 2nd floor  - NPO after midnight  - continue to monitor H&H  - monitor for overt signs of bleeding   - obtain 2nd, large bore PIV  - Continue PPI IV BID   - Continue antbx coverage for potential variceal bleed  - Transfuse for Hb <7, resuscitation per primary team  - Avoid NSAID, AC or Anti-plt  - Further recs pending pending findings.  - please contact with any questions or concerns.        Thank you for your consult. We will follow-up with patient. Please contact us if you have any additional questions.    Tamie Abdullahi DNP  Gastroenterology  [facility]

## 2020-10-12 NOTE — SUBJECTIVE & OBJECTIVE
Past Medical History:   Diagnosis Date    Decompensated hepatic cirrhosis     Hypertension     SBP (spontaneous bacterial peritonitis)        Past Surgical History:   Procedure Laterality Date    ERCP N/A 10/1/2020    Procedure: ERCP (ENDOSCOPIC RETROGRADE CHOLANGIOPANCREATOGRAPHY);  Surgeon: Marcos Ramirez MD;  Location: [facility] ENDO ([address] [place] [address]);  Service: Endoscopy;  Laterality: N/A;    RIGHT HEART CATHETERIZATION Right 9/23/2020    Procedure: INSERTION, CATHETER, RIGHT HEART;  Surgeon: Mikel Costa Jr., MD;  Location: [facility] CATH LAB;  Service: Cardiology;  Laterality: Right;    THORACENTESIS  2011       Review of patient's allergies indicates:   Allergen Reactions    Latex, natural rubber        Family History     Problem Relation (Age of Onset)    COPD Mother        Tobacco Use    Smoking status: Current Some Day Smoker    Smokeless tobacco: Current User   Substance and Sexual Activity    Alcohol use: Yes     Alcohol/week: 112.0 standard drinks     Types: 112 Shots of liquor per week     Comment: fifth of vodka a day. LAST DRINK 7/25/2020 per pt     Drug use: Yes     Frequency: 3.0 times per week     Types: Marijuana    Sexual activity: Yes       PTA Medications   Medication Sig    albuterol (PROVENTIL/VENTOLIN HFA) 90 mcg/actuation inhaler Inhale 2 puffs into the lungs every 6 (six) hours as needed for Wheezing or Shortness of Breath. Rescue    cetirizine (ZYRTEC) 10 MG tablet Take 10 mg by mouth once daily.    ciprofloxacin HCl (CIPRO) 500 MG tablet Take 1 tablet (500 mg total) by mouth once daily.    ergocalciferol (VITAMIN D2) 50,000 unit Cap Take 50,000 Units by mouth every Monday.    folic acid (FOLVITE) 1 MG tablet Take 1 mg by mouth once daily.    hydrOXYzine HCL (ATARAX) 25 MG tablet Take 1 tablet (25 mg total) by mouth 3 (three) times daily as needed for Itching.    lactulose (CHRONULAC) 10 gram/15 mL solution Take 23 mLs (15.3333 g total) by mouth 3 (three) times daily. TITRATE  TO 3-4 BOWEL MOVEMENTS DAILY.    polyethylene glycol (GLYCOLAX) 17 gram/dose powder Take 17 g by mouth once daily.    rifAXIMin (XIFAXAN) 550 mg Tab Take 1 tablet (550 mg total) by mouth 2 (two) times daily.    thiamine 100 MG tablet Take 100 mg by mouth once daily.       Review of Systems   Constitutional: Negative for activity change, appetite change, chills, fatigue and fever.   HENT: Negative for congestion and facial swelling.    Eyes: Negative for pain, discharge and visual disturbance.   Respiratory: Negative for cough, chest tightness, shortness of breath and wheezing.    Cardiovascular: Negative for chest pain, palpitations and leg swelling.   Gastrointestinal: Positive for abdominal distention and abdominal pain. Negative for blood in stool, constipation, diarrhea, nausea and vomiting.   Endocrine: Negative.    Genitourinary: Negative for decreased urine volume, difficulty urinating and hematuria.   Musculoskeletal: Negative for back pain.   Skin: Negative for pallor, rash and wound.   Allergic/Immunologic: Negative for immunocompromised state.   Neurological: Negative for dizziness, tremors, weakness and light-headedness.   Hematological: Negative.    Psychiatric/Behavioral: Negative for agitation, confusion and dysphoric mood. The patient is nervous/anxious.      Objective:     Vital Signs (Most Recent):  Temp: 98.1 °F (36.7 °C) (10/12/20 1141)  Pulse: 85 (10/12/20 1141)  Resp: 17 (10/12/20 1141)  BP: 120/60 (10/12/20 1141)  SpO2: (!) 94 % (10/12/20 1141) Vital Signs (24h Range):  Temp:  [97.8 °F (36.6 °C)-98.1 °F (36.7 °C)] 98.1 °F (36.7 °C)  Pulse:  [78-89] 85  Resp:  [17-24] 17  SpO2:  [92 %-98 %] 94 %  BP: ()/(60-76) 120/60     Weight: 114.6 kg (252 lb 10.4 oz)  Body mass index is 34.27 kg/m².      Intake/Output Summary (Last 24 hours) at 10/12/2020 1421  Last data filed at 10/11/2020 1500  Gross per 24 hour   Intake 240 ml   Output --   Net 240 ml       Physical Exam  Vitals signs and  nursing note reviewed.   Constitutional:       Appearance: He is well-developed.   HENT:      Head: Normocephalic and atraumatic.   Eyes:      Pupils: Pupils are equal, round, and reactive to light.   Neck:      Musculoskeletal: Normal range of motion and neck supple.      Vascular: No JVD.   Cardiovascular:      Rate and Rhythm: Normal rate and regular rhythm.      Heart sounds: Normal heart sounds. No murmur. No friction rub.   Pulmonary:      Effort: Pulmonary effort is normal. No respiratory distress.      Breath sounds: Normal breath sounds. No wheezing or rales.   Abdominal:      General: Bowel sounds are normal. There is no distension.      Palpations: Abdomen is soft.      Tenderness: There is no abdominal tenderness.   Musculoskeletal: Normal range of motion.   Skin:     General: Skin is warm and dry.   Neurological:      Mental Status: He is alert and oriented to person, place, and time.   Psychiatric:         Behavior: Behavior normal.         Laboratory:  CBC:   Recent Labs   Lab 10/12/20  0624   WBC 5.95   RBC 2.15*   HGB 7.5*   HCT 22.9*   PLT 70*   *   MCH 34.9*   MCHC 32.8     CMP:   Recent Labs   Lab 10/12/20  0624   GLU 94   CALCIUM 8.3*   ALBUMIN 2.5*   PROT 4.6*   *   K 4.2   CO2 13*      BUN 36*   CREATININE 1.6*   ALKPHOS 179*   ALT 35   AST 88*   BILITOT 18.7*     Labs within the past 24 hours have been reviewed.    Diagnostic Results:  I have personally reviewed all pertinent imaging studies.

## 2020-10-13 ENCOUNTER — TELEPHONE (OUTPATIENT)
Dept: TRANSPLANT | Facility: CLINIC | Age: 45
End: 2020-10-13

## 2020-10-13 ENCOUNTER — ANESTHESIA EVENT (OUTPATIENT)
Dept: SURGERY | Facility: HOSPITAL | Age: 45
DRG: 005 | End: 2020-10-13
Payer: MEDICAID

## 2020-10-13 ENCOUNTER — DOCUMENTATION ONLY (OUTPATIENT)
Dept: TRANSPLANT | Facility: HOSPITAL | Age: 45
End: 2020-10-13

## 2020-10-13 ENCOUNTER — ANESTHESIA (OUTPATIENT)
Dept: TRANSPLANT | Facility: HOSPITAL | Age: 45
End: 2020-10-13

## 2020-10-13 ENCOUNTER — ANESTHESIA (OUTPATIENT)
Dept: ENDOSCOPY | Facility: HOSPITAL | Age: 45
DRG: 005 | End: 2020-10-13
Payer: MEDICAID

## 2020-10-13 ENCOUNTER — ANESTHESIA EVENT (OUTPATIENT)
Dept: TRANSPLANT | Facility: HOSPITAL | Age: 45
End: 2020-10-13

## 2020-10-13 DIAGNOSIS — K20.90 ESOPHAGITIS: Primary | ICD-10-CM

## 2020-10-13 PROBLEM — E87.20 ACIDOSIS: Status: ACTIVE | Noted: 2020-10-13

## 2020-10-13 LAB
ABO + RH BLD: NORMAL
ABO + RH BLD: NORMAL
ALBUMIN SERPL BCP-MCNC: 2.2 G/DL (ref 3.5–5.2)
ALBUMIN SERPL BCP-MCNC: 2.5 G/DL (ref 3.5–5.2)
ALP SERPL-CCNC: 184 U/L (ref 55–135)
ALP SERPL-CCNC: 184 U/L (ref 55–135)
ALT SERPL W/O P-5'-P-CCNC: 33 U/L (ref 10–44)
ALT SERPL W/O P-5'-P-CCNC: 38 U/L (ref 10–44)
ANION GAP SERPL CALC-SCNC: 10 MMOL/L (ref 8–16)
ANION GAP SERPL CALC-SCNC: 8 MMOL/L (ref 8–16)
APTT BLDCRRT: 46.6 SEC (ref 21–32)
AST SERPL-CCNC: 82 U/L (ref 10–40)
AST SERPL-CCNC: 96 U/L (ref 10–40)
BASOPHILS # BLD AUTO: 0.07 K/UL (ref 0–0.2)
BASOPHILS # BLD AUTO: 0.09 K/UL (ref 0–0.2)
BASOPHILS NFR BLD: 1.6 % (ref 0–1.9)
BASOPHILS NFR BLD: 1.7 % (ref 0–1.9)
BILIRUB DIRECT SERPL-MCNC: 11.7 MG/DL (ref 0.1–0.3)
BILIRUB SERPL-MCNC: 17.6 MG/DL (ref 0.1–1)
BILIRUB SERPL-MCNC: 19.6 MG/DL (ref 0.1–1)
BILIRUB UR QL STRIP: NEGATIVE
BLD GP AB SCN CELLS X3 SERPL QL: NORMAL
BLD GP AB SCN CELLS X3 SERPL QL: NORMAL
BLD PROD TYP BPU: NORMAL
BLOOD UNIT EXPIRATION DATE: NORMAL
BLOOD UNIT TYPE CODE: 6200
BLOOD UNIT TYPE: NORMAL
BUN SERPL-MCNC: 32 MG/DL (ref 6–20)
BUN SERPL-MCNC: 35 MG/DL (ref 6–20)
CALCIUM SERPL-MCNC: 8.4 MG/DL (ref 8.7–10.5)
CALCIUM SERPL-MCNC: 8.4 MG/DL (ref 8.7–10.5)
CHLORIDE SERPL-SCNC: 107 MMOL/L (ref 95–110)
CHLORIDE SERPL-SCNC: 108 MMOL/L (ref 95–110)
CLARITY UR REFRACT.AUTO: CLEAR
CO2 SERPL-SCNC: 14 MMOL/L (ref 23–29)
CO2 SERPL-SCNC: 17 MMOL/L (ref 23–29)
CODING SYSTEM: NORMAL
COLOR UR AUTO: NORMAL
CREAT SERPL-MCNC: 1.5 MG/DL (ref 0.5–1.4)
CREAT SERPL-MCNC: 1.6 MG/DL (ref 0.5–1.4)
DIFFERENTIAL METHOD: ABNORMAL
DIFFERENTIAL METHOD: ABNORMAL
DISPENSE STATUS: NORMAL
EOSINOPHIL # BLD AUTO: 0.2 K/UL (ref 0–0.5)
EOSINOPHIL # BLD AUTO: 0.3 K/UL (ref 0–0.5)
EOSINOPHIL NFR BLD: 4 % (ref 0–8)
EOSINOPHIL NFR BLD: 5.9 % (ref 0–8)
ERYTHROCYTE [DISTWIDTH] IN BLOOD BY AUTOMATED COUNT: 18.5 % (ref 11.5–14.5)
ERYTHROCYTE [DISTWIDTH] IN BLOOD BY AUTOMATED COUNT: 18.8 % (ref 11.5–14.5)
EST. GFR  (AFRICAN AMERICAN): 58.4 ML/MIN/1.73 M^2
EST. GFR  (AFRICAN AMERICAN): >60 ML/MIN/1.73 M^2
EST. GFR  (NON AFRICAN AMERICAN): 50.6 ML/MIN/1.73 M^2
EST. GFR  (NON AFRICAN AMERICAN): 54.7 ML/MIN/1.73 M^2
ESTIMATED AVG GLUCOSE: ABNORMAL MG/DL (ref 68–131)
FIBRINOGEN PPP-MCNC: 92 MG/DL (ref 182–366)
GLUCOSE SERPL-MCNC: 138 MG/DL (ref 70–110)
GLUCOSE SERPL-MCNC: 80 MG/DL (ref 70–110)
GLUCOSE UR QL STRIP: NEGATIVE
HBA1C MFR BLD HPLC: <4 % (ref 4–5.6)
HCT VFR BLD AUTO: 21.7 % (ref 40–54)
HCT VFR BLD AUTO: 25.4 % (ref 40–54)
HGB BLD-MCNC: 7.2 G/DL (ref 14–18)
HGB BLD-MCNC: 8.5 G/DL (ref 14–18)
HGB UR QL STRIP: NEGATIVE
IMM GRANULOCYTES # BLD AUTO: 0.07 K/UL (ref 0–0.04)
IMM GRANULOCYTES # BLD AUTO: 0.08 K/UL (ref 0–0.04)
IMM GRANULOCYTES NFR BLD AUTO: 1.5 % (ref 0–0.5)
IMM GRANULOCYTES NFR BLD AUTO: 1.6 % (ref 0–0.5)
INR PPP: 1.8 (ref 0.8–1.2)
INR PPP: 2 (ref 0.8–1.2)
KETONES UR QL STRIP: NEGATIVE
LEUKOCYTE ESTERASE UR QL STRIP: NEGATIVE
LYMPHOCYTES # BLD AUTO: 0.7 K/UL (ref 1–4.8)
LYMPHOCYTES # BLD AUTO: 0.7 K/UL (ref 1–4.8)
LYMPHOCYTES NFR BLD: 12.7 % (ref 18–48)
LYMPHOCYTES NFR BLD: 15.5 % (ref 18–48)
MAGNESIUM SERPL-MCNC: 1.8 MG/DL (ref 1.6–2.6)
MAGNESIUM SERPL-MCNC: 2 MG/DL (ref 1.6–2.6)
MCH RBC QN AUTO: 34.1 PG (ref 27–31)
MCH RBC QN AUTO: 34.6 PG (ref 27–31)
MCHC RBC AUTO-ENTMCNC: 33.2 G/DL (ref 32–36)
MCHC RBC AUTO-ENTMCNC: 33.5 G/DL (ref 32–36)
MCV RBC AUTO: 102 FL (ref 82–98)
MCV RBC AUTO: 104 FL (ref 82–98)
MONOCYTES # BLD AUTO: 0.6 K/UL (ref 0.3–1)
MONOCYTES # BLD AUTO: 0.7 K/UL (ref 0.3–1)
MONOCYTES NFR BLD: 12.3 % (ref 4–15)
MONOCYTES NFR BLD: 14.4 % (ref 4–15)
NEUTROPHILS # BLD AUTO: 2.7 K/UL (ref 1.8–7.7)
NEUTROPHILS # BLD AUTO: 3.7 K/UL (ref 1.8–7.7)
NEUTROPHILS NFR BLD: 61 % (ref 38–73)
NEUTROPHILS NFR BLD: 67.8 % (ref 38–73)
NITRITE UR QL STRIP: NEGATIVE
NRBC BLD-RTO: 0 /100 WBC
NRBC BLD-RTO: 0 /100 WBC
PH UR STRIP: 6 [PH] (ref 5–8)
PHOSPHATE SERPL-MCNC: 4.3 MG/DL (ref 2.7–4.5)
PLATELET # BLD AUTO: 65 K/UL (ref 150–350)
PLATELET # BLD AUTO: 78 K/UL (ref 150–350)
PMV BLD AUTO: 11.1 FL (ref 9.2–12.9)
PMV BLD AUTO: 11.1 FL (ref 9.2–12.9)
POTASSIUM SERPL-SCNC: 3.7 MMOL/L (ref 3.5–5.1)
POTASSIUM SERPL-SCNC: 3.9 MMOL/L (ref 3.5–5.1)
PROT SERPL-MCNC: 4.3 G/DL (ref 6–8.4)
PROT SERPL-MCNC: 4.9 G/DL (ref 6–8.4)
PROT UR QL STRIP: NEGATIVE
PROTHROMBIN TIME: 19.5 SEC (ref 9–12.5)
PROTHROMBIN TIME: 21.9 SEC (ref 9–12.5)
RBC # BLD AUTO: 2.08 M/UL (ref 4.6–6.2)
RBC # BLD AUTO: 2.49 M/UL (ref 4.6–6.2)
SARS-COV-2 RDRP RESP QL NAA+PROBE: NEGATIVE
SODIUM SERPL-SCNC: 131 MMOL/L (ref 136–145)
SODIUM SERPL-SCNC: 133 MMOL/L (ref 136–145)
SP GR UR STRIP: 1.01 (ref 1–1.03)
TRANS ERYTHROCYTES VOL PATIENT: NORMAL ML
URN SPEC COLLECT METH UR: NORMAL
WBC # BLD AUTO: 4.44 K/UL (ref 3.9–12.7)
WBC # BLD AUTO: 5.45 K/UL (ref 3.9–12.7)

## 2020-10-13 PROCEDURE — P9021 RED BLOOD CELLS UNIT: HCPCS

## 2020-10-13 PROCEDURE — 43235 EGD DIAGNOSTIC BRUSH WASH: CPT | Mod: ,,, | Performed by: INTERNAL MEDICINE

## 2020-10-13 PROCEDURE — 85384 FIBRINOGEN ACTIVITY: CPT

## 2020-10-13 PROCEDURE — 80053 COMPREHEN METABOLIC PANEL: CPT | Mod: NTX

## 2020-10-13 PROCEDURE — C9113 INJ PANTOPRAZOLE SODIUM, VIA: HCPCS | Mod: NTX | Performed by: NURSE PRACTITIONER

## 2020-10-13 PROCEDURE — 37000009 HC ANESTHESIA EA ADD 15 MINS: Performed by: INTERNAL MEDICINE

## 2020-10-13 PROCEDURE — 84100 ASSAY OF PHOSPHORUS: CPT | Mod: NTX

## 2020-10-13 PROCEDURE — 86901 BLOOD TYPING SEROLOGIC RH(D): CPT | Mod: 91

## 2020-10-13 PROCEDURE — 87522 HEPATITIS C REVRS TRNSCRPJ: CPT

## 2020-10-13 PROCEDURE — 93010 EKG 12-LEAD: ICD-10-PCS | Mod: ,,, | Performed by: INTERNAL MEDICINE

## 2020-10-13 PROCEDURE — 43235 EGD DIAGNOSTIC BRUSH WASH: CPT | Performed by: INTERNAL MEDICINE

## 2020-10-13 PROCEDURE — 93010 ELECTROCARDIOGRAM REPORT: CPT | Mod: ,,, | Performed by: INTERNAL MEDICINE

## 2020-10-13 PROCEDURE — 86703 HIV-1/HIV-2 1 RESULT ANTBDY: CPT

## 2020-10-13 PROCEDURE — 85610 PROTHROMBIN TIME: CPT | Mod: 91

## 2020-10-13 PROCEDURE — U0002 COVID-19 LAB TEST NON-CDC: HCPCS

## 2020-10-13 PROCEDURE — D9220A PRA ANESTHESIA: ICD-10-PCS | Mod: ANES,NTX,, | Performed by: ANESTHESIOLOGY

## 2020-10-13 PROCEDURE — 25000003 PHARM REV CODE 250: Mod: NTX | Performed by: NURSE ANESTHETIST, CERTIFIED REGISTERED

## 2020-10-13 PROCEDURE — 25000003 PHARM REV CODE 250: Mod: NTX | Performed by: NURSE PRACTITIONER

## 2020-10-13 PROCEDURE — 83735 ASSAY OF MAGNESIUM: CPT | Mod: NTX

## 2020-10-13 PROCEDURE — 87517 HEPATITIS B DNA QUANT: CPT

## 2020-10-13 PROCEDURE — 87340 HEPATITIS B SURFACE AG IA: CPT

## 2020-10-13 PROCEDURE — 83735 ASSAY OF MAGNESIUM: CPT | Mod: 91

## 2020-10-13 PROCEDURE — D9220A PRA ANESTHESIA: Mod: ANES,NTX,, | Performed by: ANESTHESIOLOGY

## 2020-10-13 PROCEDURE — 86920 COMPATIBILITY TEST SPIN: CPT

## 2020-10-13 PROCEDURE — 82248 BILIRUBIN DIRECT: CPT

## 2020-10-13 PROCEDURE — 37000008 HC ANESTHESIA 1ST 15 MINUTES: Performed by: INTERNAL MEDICINE

## 2020-10-13 PROCEDURE — 85025 COMPLETE CBC W/AUTO DIFF WBC: CPT | Mod: NTX

## 2020-10-13 PROCEDURE — 99233 PR SUBSEQUENT HOSPITAL CARE,LEVL III: ICD-10-PCS | Mod: NTX,,, | Performed by: NURSE PRACTITIONER

## 2020-10-13 PROCEDURE — 63600175 PHARM REV CODE 636 W HCPCS: Mod: NTX | Performed by: NURSE PRACTITIONER

## 2020-10-13 PROCEDURE — 99233 SBSQ HOSP IP/OBS HIGH 50: CPT | Mod: NTX,,, | Performed by: NURSE PRACTITIONER

## 2020-10-13 PROCEDURE — 85730 THROMBOPLASTIN TIME PARTIAL: CPT

## 2020-10-13 PROCEDURE — 36415 COLL VENOUS BLD VENIPUNCTURE: CPT | Mod: NTX

## 2020-10-13 PROCEDURE — D9220A PRA ANESTHESIA: Mod: CRNA,NTX,, | Performed by: NURSE ANESTHETIST, CERTIFIED REGISTERED

## 2020-10-13 PROCEDURE — 20600001 HC STEP DOWN PRIVATE ROOM: Mod: NTX

## 2020-10-13 PROCEDURE — 93005 ELECTROCARDIOGRAM TRACING: CPT | Mod: NTX

## 2020-10-13 PROCEDURE — 81003 URINALYSIS AUTO W/O SCOPE: CPT

## 2020-10-13 PROCEDURE — 80053 COMPREHEN METABOLIC PANEL: CPT | Mod: 91

## 2020-10-13 PROCEDURE — 43235 PR EGD, FLEX, DIAGNOSTIC: ICD-10-PCS | Mod: ,,, | Performed by: INTERNAL MEDICINE

## 2020-10-13 PROCEDURE — 85610 PROTHROMBIN TIME: CPT | Mod: NTX

## 2020-10-13 PROCEDURE — 83036 HEMOGLOBIN GLYCOSYLATED A1C: CPT

## 2020-10-13 PROCEDURE — D9220A PRA ANESTHESIA: ICD-10-PCS | Mod: CRNA,NTX,, | Performed by: NURSE ANESTHETIST, CERTIFIED REGISTERED

## 2020-10-13 PROCEDURE — 63600175 PHARM REV CODE 636 W HCPCS: Mod: NTX | Performed by: NURSE ANESTHETIST, CERTIFIED REGISTERED

## 2020-10-13 PROCEDURE — 87086 URINE CULTURE/COLONY COUNT: CPT

## 2020-10-13 RX ORDER — DEXMEDETOMIDINE HYDROCHLORIDE 100 UG/ML
INJECTION, SOLUTION INTRAVENOUS
Status: DISCONTINUED | OUTPATIENT
Start: 2020-10-13 | End: 2020-10-13

## 2020-10-13 RX ORDER — LIDOCAINE HCL/PF 100 MG/5ML
SYRINGE (ML) INTRAVENOUS
Status: DISCONTINUED | OUTPATIENT
Start: 2020-10-13 | End: 2020-10-13

## 2020-10-13 RX ORDER — HYDROCODONE BITARTRATE AND ACETAMINOPHEN 500; 5 MG/1; MG/1
TABLET ORAL
Status: DISCONTINUED | OUTPATIENT
Start: 2020-10-13 | End: 2020-10-16

## 2020-10-13 RX ORDER — PROPOFOL 10 MG/ML
VIAL (ML) INTRAVENOUS
Status: DISCONTINUED | OUTPATIENT
Start: 2020-10-13 | End: 2020-10-13

## 2020-10-13 RX ORDER — METHYLPREDNISOLONE SODIUM SUCCINATE 500 MG/8ML
500 INJECTION INTRAMUSCULAR; INTRAVENOUS
Status: DISCONTINUED | OUTPATIENT
Start: 2020-10-13 | End: 2020-10-14 | Stop reason: HOSPADM

## 2020-10-13 RX ORDER — SODIUM BICARBONATE 650 MG/1
1950 TABLET ORAL 3 TIMES DAILY
Status: DISCONTINUED | OUTPATIENT
Start: 2020-10-13 | End: 2020-10-16

## 2020-10-13 RX ORDER — SODIUM CHLORIDE 0.9 % (FLUSH) 0.9 %
10 SYRINGE (ML) INJECTION
Status: CANCELLED | OUTPATIENT
Start: 2020-10-13

## 2020-10-13 RX ORDER — KETAMINE HCL IN 0.9 % NACL 50 MG/5 ML
SYRINGE (ML) INTRAVENOUS
Status: DISCONTINUED | OUTPATIENT
Start: 2020-10-13 | End: 2020-10-13

## 2020-10-13 RX ORDER — SODIUM CHLORIDE 9 MG/ML
INJECTION, SOLUTION INTRAVENOUS CONTINUOUS PRN
Status: DISCONTINUED | OUTPATIENT
Start: 2020-10-13 | End: 2020-10-13

## 2020-10-13 RX ORDER — PROPOFOL 10 MG/ML
VIAL (ML) INTRAVENOUS CONTINUOUS PRN
Status: DISCONTINUED | OUTPATIENT
Start: 2020-10-13 | End: 2020-10-13

## 2020-10-13 RX ORDER — MUPIROCIN 20 MG/G
OINTMENT TOPICAL
Status: DISCONTINUED | OUTPATIENT
Start: 2020-10-13 | End: 2020-10-14 | Stop reason: HOSPADM

## 2020-10-13 RX ADMIN — DEXMEDETOMIDINE HYDROCHLORIDE 8 MCG: 100 INJECTION, SOLUTION, CONCENTRATE INTRAVENOUS at 01:10

## 2020-10-13 RX ADMIN — FOLIC ACID 1 MG: 1 TABLET ORAL at 08:10

## 2020-10-13 RX ADMIN — CEFTRIAXONE SODIUM 1 G: 1 INJECTION, POWDER, FOR SOLUTION INTRAMUSCULAR; INTRAVENOUS at 03:10

## 2020-10-13 RX ADMIN — RIFAXIMIN 550 MG: 550 TABLET ORAL at 08:10

## 2020-10-13 RX ADMIN — MIDODRINE HYDROCHLORIDE 5 MG: 5 TABLET ORAL at 09:10

## 2020-10-13 RX ADMIN — ACETAMINOPHEN 650 MG: 325 TABLET ORAL at 08:10

## 2020-10-13 RX ADMIN — SODIUM CHLORIDE: 0.9 INJECTION, SOLUTION INTRAVENOUS at 12:10

## 2020-10-13 RX ADMIN — LACTULOSE 20 G: 20 SOLUTION ORAL at 03:10

## 2020-10-13 RX ADMIN — PROPOFOL 50 MG: 10 INJECTION, EMULSION INTRAVENOUS at 01:10

## 2020-10-13 RX ADMIN — SODIUM BICARBONATE 650 MG TABLET 1300 MG: at 08:10

## 2020-10-13 RX ADMIN — SODIUM BICARBONATE 650 MG TABLET 1950 MG: at 09:10

## 2020-10-13 RX ADMIN — RIFAXIMIN 550 MG: 550 TABLET ORAL at 09:10

## 2020-10-13 RX ADMIN — MIDODRINE HYDROCHLORIDE 5 MG: 5 TABLET ORAL at 08:10

## 2020-10-13 RX ADMIN — Medication 100 MG: at 08:10

## 2020-10-13 RX ADMIN — Medication 20 MG: at 01:10

## 2020-10-13 RX ADMIN — CHOLESTYRAMINE 4 G: 4 POWDER, FOR SUSPENSION ORAL at 08:10

## 2020-10-13 RX ADMIN — PANTOPRAZOLE SODIUM 40 MG: 40 INJECTION, POWDER, LYOPHILIZED, FOR SOLUTION INTRAVENOUS at 09:10

## 2020-10-13 RX ADMIN — Medication 100 MG: at 01:10

## 2020-10-13 RX ADMIN — PANTOPRAZOLE SODIUM 40 MG: 40 INJECTION, POWDER, LYOPHILIZED, FOR SOLUTION INTRAVENOUS at 08:10

## 2020-10-13 RX ADMIN — SODIUM BICARBONATE 650 MG TABLET 1950 MG: at 03:10

## 2020-10-13 RX ADMIN — PROPOFOL 70 MG: 10 INJECTION, EMULSION INTRAVENOUS at 01:10

## 2020-10-13 RX ADMIN — PROPOFOL 30 MG: 10 INJECTION, EMULSION INTRAVENOUS at 01:10

## 2020-10-13 RX ADMIN — HYDROXYZINE HYDROCHLORIDE 25 MG: 25 TABLET, FILM COATED ORAL at 02:10

## 2020-10-13 RX ADMIN — PROPOFOL 200 MCG/KG/MIN: 10 INJECTION, EMULSION INTRAVENOUS at 01:10

## 2020-10-13 NOTE — PLAN OF CARE
Pt resting, currently receiving 1 unit PRBCs- tolerating well, no issues, VSS.  PIV x1. Pt spoke with transplant coordinator and is set to receive LT- Pt in good spirits.  I/O low for the day b/c Pt was NPO waiting for procedure.  URSZULA KHANNA.

## 2020-10-13 NOTE — ASSESSMENT & PLAN NOTE
- c/o black tarry stools.  - occult blood (+).  - H&H with slight decrease again today.   - continue PPI.  - continue antibiotics.  - GI consulted for EGD to further assess.   - Monitor.

## 2020-10-13 NOTE — ANESTHESIA PREPROCEDURE EVALUATION
[facility]  Anesthesia Pre-Operative Evaluation         Patient Name: Jordan Altman  YOB: 1975  MRN: [id number]    SUBJECTIVE:     Pre-operative evaluation for Procedure(s) (LRB):  TRANSPLANT, LIVER (N/A)     10/13/2020    Jordan Altman is a 45 y.o. 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  He is currently listed for a liver transplant.  Patient initially presented to an OSH on 10/11/20 for worsening abdominal pain, abdominal distention, and dark tarry stools. Blood culture (+) for GPCs, given one dose of IV vancomycin.  Concern for GI bleed given history, now s/p EGD 10/13. Grade 1 esophageal varices seen on EGD with no indication for banding, transfused 1u pRBCs.    Patient has been approved for liver transplant.      Stress Echo 9/24/20:  · The stress echo portion of this study is negative for myocardial ischemia.  · The ECG portion of this study is negative for myocardial ischemia.  · There were no arrhythmias during stress.  · The left ventricle is mildly enlarged with hyperdynamic systolic function. The estimated ejection fraction is 75%.  · Slightly increased transaortic velocity (2.3m/s) secondary to hyperdynamic heart function and high cardiac output.  · There is left ventricular eccentric hypertrophy.  · Normal left ventricular diastolic function.  · Normal right ventricular systolic function.  · Moderate left atrial enlargement.  · Normal central venous pressure (3 mmHg).     RHC 9/23/20:  -Right atrial pressure is mildly elevated.  -Pulmonary capillary wedge pressure is mildly elevated (borderline).  -Pulmonary artery pressure is mildly elevated.  -Pulmonary vascular resistance is normal.  -Systemic vascular resistance is 553.  -Vinny cardiac output and index is elevated compatible with high output state attributed to his cirrhosis and contributed to by his anemia.  -These hemodynamic are acceptable  for liver transplant listing.    Patient now presents for the above procedure(s).      LDA:        Peripheral IV - Single Lumen 10/11/20 1200 20 G Distal;Left;Posterior Wrist (Active)       Prev airway: None documented.    Drips: None.      Patient Active Problem List   Diagnosis    Decompensated hepatic cirrhosis    Ascites due to alcoholic cirrhosis    Thrombocytopenia    Coagulopathy    JENN (acute kidney injury)    Portal hypertension    Chronic Hepatic encephalopathy    Hyponatremia    Macrocytic anemia    DIC (disseminated intravascular coagulation)    Hypertension    Hx of SBP (spontaneous bacterial peritonitis)    Pulmonary HTN    Alcohol use disorder, severe, dependence    Encounter for pre-transplant evaluation for liver transplant    Encounter for palliative care    Counseling regarding advanced care planning and goals of care    Left knee pain    Organ transplant candidate    GI bleed    Acidosis    Gram positive sepsis       Review of patient's allergies indicates:   Allergen Reactions    Latex, natural rubber        Current Inpatient Medications:   cefTRIAXone (ROCEPHIN) IVPB  1 g Intravenous Q24H    cholestyramine  1 packet Oral BID    folic acid  1 mg Oral Daily    lactulose  20 g Oral TID    midodrine  5 mg Oral TID    pantoprazole  40 mg Intravenous BID    rifAXIMin  550 mg Oral BID    sodium bicarbonate  1,950 mg Oral TID    thiamine  100 mg Oral Daily       No current facility-administered medications on file prior to encounter.      Current Outpatient Medications on File Prior to Encounter   Medication Sig Dispense Refill    albuterol (PROVENTIL/VENTOLIN HFA) 90 mcg/actuation inhaler Inhale 2 puffs into the lungs every 6 (six) hours as needed for Wheezing or Shortness of Breath. Rescue      cetirizine (ZYRTEC) 10 MG tablet Take 10 mg by mouth once daily.      ciprofloxacin HCl (CIPRO) 500 MG tablet Take 1 tablet (500 mg total) by mouth once daily. 30 tablet 2     ergocalciferol (VITAMIN D2) 50,000 unit Cap Take 50,000 Units by mouth every Monday.      folic acid (FOLVITE) 1 MG tablet Take 1 mg by mouth once daily.      hydrOXYzine HCL (ATARAX) 25 MG tablet Take 1 tablet (25 mg total) by mouth 3 (three) times daily as needed for Itching. 30 tablet 1    lactulose (CHRONULAC) 10 gram/15 mL solution Take 23 mLs (15.3333 g total) by mouth 3 (three) times daily. TITRATE TO 3-4 BOWEL MOVEMENTS DAILY. 2070 mL 2    polyethylene glycol (GLYCOLAX) 17 gram/dose powder Take 17 g by mouth once daily.      rifAXIMin (XIFAXAN) 550 mg Tab Take 1 tablet (550 mg total) by mouth 2 (two) times daily. 60 tablet 2    thiamine 100 MG tablet Take 100 mg by mouth once daily.         Past Surgical History:   Procedure Laterality Date    ERCP N/A 10/1/2020    Procedure: ERCP (ENDOSCOPIC RETROGRADE CHOLANGIOPANCREATOGRAPHY);  Surgeon: Marcos Ramirez MD;  Location: [facility] ENDO ([address] [place] [address]);  Service: Endoscopy;  Laterality: N/A;    RIGHT HEART CATHETERIZATION Right 9/23/2020    Procedure: INSERTION, CATHETER, RIGHT HEART;  Surgeon: Mikel Costa Jr., MD;  Location: [facility] CATH LAB;  Service: Cardiology;  Laterality: Right;    THORACENTESIS  2011       Social History     Socioeconomic History    Marital status:      Spouse name: Not on file    Number of children: Not on file    Years of education: Not on file    Highest education level: Not on file   Occupational History    Not on file   Social Needs    Financial resource strain: Not on file    Food insecurity     Worry: Not on file     Inability: Not on file    Transportation needs     Medical: Not on file     Non-medical: Not on file   Tobacco Use    Smoking status: Current Some Day Smoker    Smokeless tobacco: Current User   Substance and Sexual Activity    Alcohol use: Yes     Alcohol/week: 112.0 standard drinks     Types: 112 Shots of liquor per week     Comment: fifth of vodka a day. LAST DRINK 7/25/2020 per pt      Drug use: Yes     Frequency: 3.0 times per week     Types: Marijuana    Sexual activity: Yes   Lifestyle    Physical activity     Days per week: Not on file     Minutes per session: Not on file    Stress: Not on file   Relationships    Social connections     Talks on phone: Not on file     Gets together: Not on file     Attends Tenriism service: Not on file     Active member of club or organization: Not on file     Attends meetings of clubs or organizations: Not on file     Relationship status: Not on file   Other Topics Concern    Not on file   Social History Narrative    Not on file       OBJECTIVE:     Vital Signs Range (Last 24H):  Temp:  [36.5 °C (97.7 °F)-36.8 °C (98.3 °F)]   Pulse:  [83-91]   Resp:  [15-22]   BP: (101-136)/(55-83)   SpO2:  [90 %-98 %]       Significant Labs:  Lab Results   Component Value Date    WBC 4.44 10/13/2020    HGB 7.2 (L) 10/13/2020    HCT 21.7 (L) 10/13/2020    PLT 65 (L) 10/13/2020    ALT 33 10/13/2020    AST 82 (H) 10/13/2020     (L) 10/13/2020    K 3.9 10/13/2020     10/13/2020    CREATININE 1.5 (H) 10/13/2020    BUN 35 (H) 10/13/2020    CO2 14 (L) 10/13/2020    TSH 0.938 09/19/2020    PSA 0.05 09/29/2020    INR 2.0 (H) 10/13/2020       EKG:   NSR      ASSESSMENT/PLAN:         Anesthesia Evaluation          Review of Systems  Anesthesia Hx:  No problems with previous Anesthesia Denies Hx of Anesthetic complications History of prior surgery of interest to airway management or planning: Denies Family Hx of Anesthesia complications.   Denies Personal Hx of Anesthesia complications.   Social:  Alcohol Use    Hematology/Oncology:     Oncology Normal   Hematology Comments: thrombocytopenia   EENT/Dental:EENT/Dental Normal   Cardiovascular:   Hypertension Pulm HTN s/p RHC   Pulmonary:  Pulmonary Normal    Renal/:   Chronic Renal Disease, ARF    Hepatic/GI:   Liver Disease, ESLD 2/2 to ETOH cirrhosis    Gastric varices   Neurological:  Neurology Normal     Endocrine:  Endocrine Normal    Psych:   Psychiatric History          Physical Exam  General:  Obesity    Airway/Jaw/Neck:  Airway Findings: Mouth Opening: Normal Mallampati: II  Improves to I with phonation.  TM Distance: Normal, at least 6 cm  Jaw/Neck Findings:  Neck ROM: Normal ROM      Dental:  Dental Findings: In tact   Chest/Lungs:  Chest/Lungs Findings: Clear to auscultation     Heart/Vascular:  Heart Findings: Rate: Normal  Rhythm: Regular Rhythm        Mental Status:  Mental Status Findings:  Cooperative, Alert and Oriented         Anesthesia Plan  Type of Anesthesia, risks & benefits discussed:  Anesthesia Type:  general  Patient's Preference:   Intra-op Monitoring Plan: arterial line, central line, Kenedy-Jose, cardiac output and standard ASA monitors  Intra-op Monitoring Plan Comments:   Post Op Pain Control Plan: IV/PO Opioids PRN and multimodal analgesia  Post Op Pain Control Plan Comments: As per surgeon's plan  Induction:   IV  Beta Blocker:  Patient is not currently on a Beta-Blocker (No further documentation required).       Informed Consent: Patient understands risks and agrees with Anesthesia plan.  Questions answered. Anesthesia consent signed with patient.  ASA Score: 4     Day of Surgery Review of History & Physical:    H&P update referred to the surgeon.         Ready For Surgery From Anesthesia Perspective.

## 2020-10-13 NOTE — NURSING TRANSFER
Nursing Transfer Note      10/13/2020     Transfer to [id number] from [facility]    Transfer via stretcher    Transfer with N/A    Transported by PCT    Medicines sent: N/A    Chart send with patient: Yes    Notified: Laura REDDING    Patient reassessed at: 10/13/2020 1500    Upon arrival to floor: Unit secretary notified

## 2020-10-13 NOTE — ANESTHESIA POSTPROCEDURE EVALUATION
Anesthesia Post Evaluation    Patient: Jordan Altman    Procedure(s) Performed: Procedure(s) (LRB):  EGD (ESOPHAGOGASTRODUODENOSCOPY) (N/A)    Final Anesthesia Type: general    Patient location during evaluation: PACU  Patient participation: Yes- Able to Participate  Level of consciousness: awake and alert and oriented  Post-procedure vital signs: reviewed and stable  Pain management: adequate  Airway patency: patent    PONV status at discharge: No PONV  Anesthetic complications: no      Cardiovascular status: blood pressure returned to baseline, hemodynamically stable and stable  Respiratory status: unassisted, room air and spontaneous ventilation  Hydration status: euvolemic  Follow-up not needed.          Vitals Value Taken Time   /56 10/13/20 1501   Temp 36.6 °C (97.9 °F) 10/13/20 1350   Pulse 84 10/13/20 1505   Resp 18 10/13/20 1505   SpO2 90 % 10/13/20 1505   Vitals shown include unvalidated device data.      No case tracking events are documented in the log.      Pain/Trina Score: Pain Rating Prior to Med Admin: 6 (10/13/2020  8:22 AM)  Pain Rating Post Med Admin: 3 (10/13/2020  9:22 AM)  Trina Score: 10 (10/13/2020  2:00 PM)

## 2020-10-13 NOTE — ASSESSMENT & PLAN NOTE
- Cr level noted with slight improvement this am.   - Encourage increase po fluid intake.  - Strict I&Os.

## 2020-10-13 NOTE — PROGRESS NOTES
[facility]  Liver Transplant  Progress Note    Patient Name: Jordan Altman  MRN: [id number]  Admission Date: 10/11/2020  Hospital Length of Stay: 2 days  Code Status: Full Code  Primary Care Provider: SHAHEEN Jaime  Post-Operative Day:     ORGAN:     Disease Etiology: Alcoholic Cirrhosis  Donor Type:     CDC High Risk:     Donor CMV Status:   Donor CMV Status:   Donor HBcAB:     Donor HCV Status:     Donor HBV SHAYE: Organ record is missing.  Donor HCV SHAYE: Organ record is missing.  Whole or Partial:   Biliary Anastomosis:   Arterial Anatomy:   Subjective:     History of Present Illness:  Mr. 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 He also c/o chronic left knee pain and denies any recent injury. Occult blood test (+) at OSH but HCT stable. Pt denies fever, chills, nausea, vomiting, chest pain, sob, hematuria or changes in bladder function. Will admit to LTS for further evaluation.     Hospital Course:  Interval history: no acute events overnight. Pt reports feeling well this am and no melena noted on recent BMs. Despite no melena, pt noted with drop in H&H again this am. Given his this decrease in H&H and hypotension requiring midodrine, GI consulted and will plan for EGD this am. Pt listed with MELD 33. Of note, pt noted with positive blood cxs for GPC. One dose of vanc given 10/12 and will f/u on final cxs. Monitor.       Past Medical History:   Diagnosis Date    Decompensated hepatic cirrhosis     Hypertension     SBP (spontaneous bacterial peritonitis)        Past Surgical History:   Procedure Laterality Date    ERCP N/A 10/1/2020    Procedure: ERCP (ENDOSCOPIC RETROGRADE CHOLANGIOPANCREATOGRAPHY);  Surgeon: Marcos Ramirez MD;  Location: [address];  Service: Endoscopy;  Laterality: N/A;     RIGHT HEART CATHETERIZATION Right 9/23/2020    Procedure: INSERTION, CATHETER, RIGHT HEART;  Surgeon: Mikel Costa Jr., MD;  Location: [facility] CATH LAB;  Service: Cardiology;  Laterality: Right;    THORACENTESIS  2011       Review of patient's allergies indicates:   Allergen Reactions    Latex, natural rubber        Family History     Problem Relation (Age of Onset)    COPD Mother        Tobacco Use    Smoking status: Current Some Day Smoker    Smokeless tobacco: Current User   Substance and Sexual Activity    Alcohol use: Yes     Alcohol/week: 112.0 standard drinks     Types: 112 Shots of liquor per week     Comment: fifth of vodka a day. LAST DRINK 7/25/2020 per pt     Drug use: Yes     Frequency: 3.0 times per week     Types: Marijuana    Sexual activity: Yes       PTA Medications   Medication Sig    albuterol (PROVENTIL/VENTOLIN HFA) 90 mcg/actuation inhaler Inhale 2 puffs into the lungs every 6 (six) hours as needed for Wheezing or Shortness of Breath. Rescue    cetirizine (ZYRTEC) 10 MG tablet Take 10 mg by mouth once daily.    ciprofloxacin HCl (CIPRO) 500 MG tablet Take 1 tablet (500 mg total) by mouth once daily.    ergocalciferol (VITAMIN D2) 50,000 unit Cap Take 50,000 Units by mouth every Monday.    folic acid (FOLVITE) 1 MG tablet Take 1 mg by mouth once daily.    hydrOXYzine HCL (ATARAX) 25 MG tablet Take 1 tablet (25 mg total) by mouth 3 (three) times daily as needed for Itching.    lactulose (CHRONULAC) 10 gram/15 mL solution Take 23 mLs (15.3333 g total) by mouth 3 (three) times daily. TITRATE TO 3-4 BOWEL MOVEMENTS DAILY.    polyethylene glycol (GLYCOLAX) 17 gram/dose powder Take 17 g by mouth once daily.    rifAXIMin (XIFAXAN) 550 mg Tab Take 1 tablet (550 mg total) by mouth 2 (two) times daily.    thiamine 100 MG tablet Take 100 mg by mouth once daily.       Review of Systems   Constitutional: Negative for activity change, appetite change, chills, fatigue and fever.    HENT: Negative for congestion and facial swelling.    Eyes: Negative for pain, discharge and visual disturbance.   Respiratory: Negative for cough, chest tightness, shortness of breath and wheezing.    Cardiovascular: Negative for chest pain, palpitations and leg swelling.   Gastrointestinal: Positive for abdominal distention and abdominal pain. Negative for blood in stool, constipation, diarrhea, nausea and vomiting.   Endocrine: Negative.    Genitourinary: Negative for decreased urine volume, difficulty urinating and hematuria.   Musculoskeletal: Negative for back pain.   Skin: Negative for pallor, rash and wound.   Allergic/Immunologic: Negative for immunocompromised state.   Neurological: Negative for dizziness, tremors, weakness and light-headedness.   Hematological: Negative.    Psychiatric/Behavioral: Negative for agitation, confusion and dysphoric mood. The patient is nervous/anxious.      Objective:     Vital Signs (Most Recent):  Temp: 98.1 °F (36.7 °C) (10/13/20 1120)  Pulse: 86 (10/13/20 1120)  Resp: 18 (10/13/20 1120)  BP: 126/65 (10/13/20 1120)  SpO2: 96 % (10/13/20 1120) Vital Signs (24h Range):  Temp:  [97.7 °F (36.5 °C)-98.3 °F (36.8 °C)] 98.1 °F (36.7 °C)  Pulse:  [79-90] 86  Resp:  [15-21] 18  SpO2:  [92 %-97 %] 96 %  BP: (103-132)/(49-83) 126/65     Weight: 114.6 kg (252 lb 10.4 oz)  Body mass index is 34.27 kg/m².      Intake/Output Summary (Last 24 hours) at 10/13/2020 1131  Last data filed at 10/13/2020 0732  Gross per 24 hour   Intake 540 ml   Output 275 ml   Net 265 ml       Physical Exam  Vitals signs and nursing note reviewed.   Constitutional:       Appearance: He is well-developed.   HENT:      Head: Normocephalic and atraumatic.   Eyes:      Pupils: Pupils are equal, round, and reactive to light.   Neck:      Musculoskeletal: Normal range of motion and neck supple.      Vascular: No JVD.   Cardiovascular:      Rate and Rhythm: Normal rate and regular rhythm.      Heart sounds: Normal  heart sounds. No murmur. No friction rub.   Pulmonary:      Effort: Pulmonary effort is normal. No respiratory distress.      Breath sounds: Normal breath sounds. No wheezing or rales.   Abdominal:      General: Bowel sounds are normal. There is no distension.      Palpations: Abdomen is soft.      Tenderness: There is no abdominal tenderness.   Musculoskeletal: Normal range of motion.   Skin:     General: Skin is warm and dry.   Neurological:      Mental Status: He is alert and oriented to person, place, and time.   Psychiatric:         Behavior: Behavior normal.         Laboratory:  CBC:   Recent Labs   Lab 10/13/20  0613   WBC 4.44   RBC 2.08*   HGB 7.2*   HCT 21.7*   PLT 65*   *   MCH 34.6*   MCHC 33.2     CMP:   Recent Labs   Lab 10/13/20  0613   GLU 80   CALCIUM 8.4*   ALBUMIN 2.2*   PROT 4.3*   *   K 3.9   CO2 14*      BUN 35*   CREATININE 1.5*   ALKPHOS 184*   ALT 33   AST 82*   BILITOT 17.6*     Labs within the past 24 hours have been reviewed.    Diagnostic Results:  I have personally reviewed all pertinent imaging studies.    Assessment/Plan:     * GI bleed  - c/o black tarry stools.  - occult blood (+).  - H&H with slight decrease again today.   - continue PPI.  - continue antibiotics.  - GI consulted for EGD to further assess.   - Monitor.      Acidosis  - PO replacement increased 10/12.   - Monitor.       Left knee pain  - Chronic.  - No injuries reported.  - Tylenol PRN.  - pain noted with improvement today.     JENN (acute kidney injury)  - Cr level noted with slight improvement this am.   - Encourage increase po fluid intake.  - Strict I&Os.        Decompensated hepatic cirrhosis  - Listed for liver transplant 2/2 ETOH.  - blood cx with GPC   - one dose of vanc given 10/12  - Per cx results, organism is a probable contaminant. No further treatment at this time.       MELD-Na score: 31 at 10/13/2020  6:13 AM  MELD score: 29 at 10/13/2020  6:13 AM  Calculated from:  Serum Creatinine:  1.5 mg/dL at 10/13/2020  6:13 AM  Serum Sodium: 131 mmol/L at 10/13/2020  6:13 AM  Total Bilirubin: 17.6 mg/dL at 10/13/2020  6:13 AM  INR(ratio): 2.0 at 10/13/2020  6:13 AM  Age: 45 years 5 months            VTE Risk Mitigation (From admission, onward)         Ordered     IP VTE LOW RISK PATIENT  Once      10/11/20 0217     Place JELENA hose  Until discontinued      10/11/20 0217                The patients clinical status was discussed at multidisplinary rounds, involving transplant surgery, transplant medicine, pharmacy, nursing, nutrition, and social work    Discharge Planning:  No Patient Care Coordination Note on file.      Joe Virk NP  Liver Transplant  [facility]

## 2020-10-13 NOTE — TREATMENT PLAN
GI Post Procedure Treatment Plan    Interval history:  EGD completed.  Grade 1 esophageal varices seen.  No indication for banding.  Portal hypertensive gastropathy.  Grade B esophagitis.    Plan:  - s/p EGD, see full procedure note  - advance diet as tolerated  - PPI b.i.d. for 12 weeks  - repeat endoscopy in 12 weeks a follow-up on healing (will order).  - we will sign off

## 2020-10-13 NOTE — SUBJECTIVE & OBJECTIVE
Past Medical History:   Diagnosis Date    Decompensated hepatic cirrhosis     Hypertension     SBP (spontaneous bacterial peritonitis)        Past Surgical History:   Procedure Laterality Date    ERCP N/A 10/1/2020    Procedure: ERCP (ENDOSCOPIC RETROGRADE CHOLANGIOPANCREATOGRAPHY);  Surgeon: Marcos Ramirez MD;  Location: [facility] ENDO ([address] [place] [address]);  Service: Endoscopy;  Laterality: N/A;    RIGHT HEART CATHETERIZATION Right 9/23/2020    Procedure: INSERTION, CATHETER, RIGHT HEART;  Surgeon: Mikel Costa Jr., MD;  Location: [facility] CATH LAB;  Service: Cardiology;  Laterality: Right;    THORACENTESIS  2011       Review of patient's allergies indicates:   Allergen Reactions    Latex, natural rubber        Family History     Problem Relation (Age of Onset)    COPD Mother        Tobacco Use    Smoking status: Current Some Day Smoker    Smokeless tobacco: Current User   Substance and Sexual Activity    Alcohol use: Yes     Alcohol/week: 112.0 standard drinks     Types: 112 Shots of liquor per week     Comment: fifth of vodka a day. LAST DRINK 7/25/2020 per pt     Drug use: Yes     Frequency: 3.0 times per week     Types: Marijuana    Sexual activity: Yes       PTA Medications   Medication Sig    albuterol (PROVENTIL/VENTOLIN HFA) 90 mcg/actuation inhaler Inhale 2 puffs into the lungs every 6 (six) hours as needed for Wheezing or Shortness of Breath. Rescue    cetirizine (ZYRTEC) 10 MG tablet Take 10 mg by mouth once daily.    ciprofloxacin HCl (CIPRO) 500 MG tablet Take 1 tablet (500 mg total) by mouth once daily.    ergocalciferol (VITAMIN D2) 50,000 unit Cap Take 50,000 Units by mouth every Monday.    folic acid (FOLVITE) 1 MG tablet Take 1 mg by mouth once daily.    hydrOXYzine HCL (ATARAX) 25 MG tablet Take 1 tablet (25 mg total) by mouth 3 (three) times daily as needed for Itching.    lactulose (CHRONULAC) 10 gram/15 mL solution Take 23 mLs (15.3333 g total) by mouth 3 (three) times daily. TITRATE  TO 3-4 BOWEL MOVEMENTS DAILY.    polyethylene glycol (GLYCOLAX) 17 gram/dose powder Take 17 g by mouth once daily.    rifAXIMin (XIFAXAN) 550 mg Tab Take 1 tablet (550 mg total) by mouth 2 (two) times daily.    thiamine 100 MG tablet Take 100 mg by mouth once daily.       Review of Systems   Constitutional: Negative for activity change, appetite change, chills, fatigue and fever.   HENT: Negative for congestion and facial swelling.    Eyes: Negative for pain, discharge and visual disturbance.   Respiratory: Negative for cough, chest tightness, shortness of breath and wheezing.    Cardiovascular: Negative for chest pain, palpitations and leg swelling.   Gastrointestinal: Positive for abdominal distention and abdominal pain. Negative for blood in stool, constipation, diarrhea, nausea and vomiting.   Endocrine: Negative.    Genitourinary: Negative for decreased urine volume, difficulty urinating and hematuria.   Musculoskeletal: Negative for back pain.   Skin: Negative for pallor, rash and wound.   Allergic/Immunologic: Negative for immunocompromised state.   Neurological: Negative for dizziness, tremors, weakness and light-headedness.   Hematological: Negative.    Psychiatric/Behavioral: Negative for agitation, confusion and dysphoric mood. The patient is nervous/anxious.      Objective:     Vital Signs (Most Recent):  Temp: 98.1 °F (36.7 °C) (10/13/20 1120)  Pulse: 86 (10/13/20 1120)  Resp: 18 (10/13/20 1120)  BP: 126/65 (10/13/20 1120)  SpO2: 96 % (10/13/20 1120) Vital Signs (24h Range):  Temp:  [97.7 °F (36.5 °C)-98.3 °F (36.8 °C)] 98.1 °F (36.7 °C)  Pulse:  [79-90] 86  Resp:  [15-21] 18  SpO2:  [92 %-97 %] 96 %  BP: (103-132)/(49-83) 126/65     Weight: 114.6 kg (252 lb 10.4 oz)  Body mass index is 34.27 kg/m².      Intake/Output Summary (Last 24 hours) at 10/13/2020 1131  Last data filed at 10/13/2020 0732  Gross per 24 hour   Intake 540 ml   Output 275 ml   Net 265 ml       Physical Exam  Vitals signs and  nursing note reviewed.   Constitutional:       Appearance: He is well-developed.   HENT:      Head: Normocephalic and atraumatic.   Eyes:      Pupils: Pupils are equal, round, and reactive to light.   Neck:      Musculoskeletal: Normal range of motion and neck supple.      Vascular: No JVD.   Cardiovascular:      Rate and Rhythm: Normal rate and regular rhythm.      Heart sounds: Normal heart sounds. No murmur. No friction rub.   Pulmonary:      Effort: Pulmonary effort is normal. No respiratory distress.      Breath sounds: Normal breath sounds. No wheezing or rales.   Abdominal:      General: Bowel sounds are normal. There is no distension.      Palpations: Abdomen is soft.      Tenderness: There is no abdominal tenderness.   Musculoskeletal: Normal range of motion.   Skin:     General: Skin is warm and dry.   Neurological:      Mental Status: He is alert and oriented to person, place, and time.   Psychiatric:         Behavior: Behavior normal.         Laboratory:  CBC:   Recent Labs   Lab 10/13/20  0613   WBC 4.44   RBC 2.08*   HGB 7.2*   HCT 21.7*   PLT 65*   *   MCH 34.6*   MCHC 33.2     CMP:   Recent Labs   Lab 10/13/20  0613   GLU 80   CALCIUM 8.4*   ALBUMIN 2.2*   PROT 4.3*   *   K 3.9   CO2 14*      BUN 35*   CREATININE 1.5*   ALKPHOS 184*   ALT 33   AST 82*   BILITOT 17.6*     Labs within the past 24 hours have been reviewed.    Diagnostic Results:  I have personally reviewed all pertinent imaging studies.

## 2020-10-13 NOTE — ASSESSMENT & PLAN NOTE
- Listed for liver transplant 2/2 ETOH.  - blood cx with GPC   - one dose of vanc given 10/12  - Per cx results, organism is a probable contaminant. No further treatment at this time.       MELD-Na score: 31 at 10/13/2020  6:13 AM  MELD score: 29 at 10/13/2020  6:13 AM  Calculated from:  Serum Creatinine: 1.5 mg/dL at 10/13/2020  6:13 AM  Serum Sodium: 131 mmol/L at 10/13/2020  6:13 AM  Total Bilirubin: 17.6 mg/dL at 10/13/2020  6:13 AM  INR(ratio): 2.0 at 10/13/2020  6:13 AM  Age: 45 years 5 months

## 2020-10-13 NOTE — TRANSFER OF CARE
Anesthesia Transfer of Care Note    Patient: Jordan Altman    Procedure(s) Performed: Procedure(s) (LRB):  EGD (ESOPHAGOGASTRODUODENOSCOPY) (N/A)    Patient location: [facility]    Anesthesia Type: general    Transport from OR: Transported from OR on room air with adequate spontaneous ventilation    Post pain: adequate analgesia    Post assessment: no apparent anesthetic complications and tolerated procedure well    Post vital signs: stable    Level of consciousness: awake, alert and oriented    Nausea/Vomiting: no nausea/vomiting    Complications: none    Transfer of care protocol was followed      Last vitals:   Visit Vitals  /65 (Patient Position: Lying)   Pulse 86   Temp 36.7 °C (98.1 °F) (Temporal)   Resp 18   Ht 6' (1.829 m)   Wt 114.6 kg (252 lb 10.4 oz)   SpO2 96%   BMI 34.27 kg/m²

## 2020-10-13 NOTE — TELEPHONE ENCOUNTER
"DONOR IS PHS INCREASED RISK BEHAVIOR AND HEPATITIS C POSITIVE     Notified by Kai Peraza, , of liver offer with donor information.  Donor and recipient information read back and verified.  Spoke with Jordan Altman and identified no acute medical issues during telephone assessment verified by Dr. Smith.  Patient has not instructed NPO.    Patient verbalized understanding that he/she has been called as primary recipient.    The donor's reported social history includes PHS increased risk behavior.  ELY DETERMINED TO BE hepatitis c antibody positive / SHAYE negative or   IF DONOR IS DEFINITELY DETERMINED TO BE HEPATITIS C ANTIBODY POSITIVE / SHAYE NEGATIVE OR POSITIVE IN ADDITION TO THE PHS INCREASED RISK BEHAVIORS:    This donor is Hep C Shaye +        The risk of getting HIV or other hepatitis viruses from this donor is very small compared to the risk of dying while waiting for another liver. The risk of clinical illness from any transmitted infection is also small due to the availability of highly effective oral drugs for all three of these viruses. The risk of clinical illness from any transmitted infection is much less than this due to the availability of highly effective oral drugs for all three of these viruses. While the patient has the right to decline any organ for any reason, available scientific data do not support turning down an organ based on Hepatitis C or behavioral risk factors as the risks associated with waiting longer for the transplant are many times greater. Informed patient that Dr. Zavaleta thinks that this is a good liver for the patient.     Patient was informed that if he turned down the offer A transplant doctor will call you after we hang up".  Patient was also informed that if he turned down this donor, he would not be punished or removed from the transplant list, that he would remain on the list at his current status/MELD score. However, by waiting on the list longer " rather than receiving this transplant, he will face a greater risk of death than any risk from the slight possibility of transmitted infection.    Patient was also informed that he would have the opportunity to see and talk to the surgeon when he got to the hospital and before he went down to the operating room.    Patient understands risk and agrees to proceed with transplant.

## 2020-10-13 NOTE — [employer] PATIENT INSTRUCTIONS
Discharge Summary/Instructions after an Endoscopic Procedure  Patient Name: Jordan Altman  Patient MRN: [id number]  Patient YOB: 1975 Tuesday, October 13, 2020  Prasanth Jerry MD  RESTRICTIONS:  During your procedure today, you received medications for sedation.  These   medications may affect your judgment, balance and coordination.  Therefore,   for 24 hours, you have the following restrictions:   - DO NOT drive a car, operate machinery, make legal/financial decisions,   sign important papers or drink alcohol.    ACTIVITY:  Today: no heavy lifting, straining or running due to procedural   sedation/anesthesia.  The following day: return to full activity including work.  DIET:  Eat and drink normally unless instructed otherwise.     TREATMENT FOR COMMON SIDE EFFECTS:  - Mild abdominal pain, nausea, belching, bloating or excessive gas:  rest,   eat lightly and use a heating pad.  - Sore Throat: treat with throat lozenges and/or gargle with warm salt   water.  - Because air was used during the procedure, expelling large amounts of air   from your rectum or belching is normal.  - If a bowel prep was taken, you may not have a bowel movement for 1-3 days.    This is normal.  SYMPTOMS TO WATCH FOR AND REPORT TO YOUR PHYSICIAN:  1. Abdominal pain or bloating, other than gas cramps.  2. Chest pain.  3. Back pain.  4. Signs of infection such as: chills or fever occurring within 24 hours   after the procedure.  5. Rectal bleeding, which would show as bright red, maroon, or black stools.   (A tablespoon of blood from the rectum is not serious, especially if   hemorrhoids are present.)  6. Vomiting.  7. Weakness or dizziness.  GO DIRECTLY TO THE NEAREST EMERGENCY ROOM IF YOU HAVE ANY OF THE FOLLOWING:      Difficulty breathing              Chills and/or fever over 101 F   Persistent vomiting and/or vomiting blood   Severe abdominal pain   Severe chest pain   Black, tarry stools   Bleeding- more than one  tablespoon   Any other symptom or condition that you feel may need urgent attention  Your doctor recommends these additional instructions:  If any biopsies were taken, your doctors clinic will contact you in 1 to 2   weeks with any results.  - Patient has a contact number available for emergencies.  The signs and   symptoms of potential delayed complications were discussed with the   patient.  Return to normal activities tomorrow.  Written discharge   instructions were provided to the patient.   - Return patient to [facility] for ongoing care.   - Advance diet as tolerated.   - Continue present medications.   - Use a proton pump inhibitor PO BID.   - Repeat upper endoscopy in 12 weeks to check healing.   For questions, problems or results please call your physician - Prasanth Jerry MD at Work:  [phone].  [facility], EMERGENCY ROOM PHONE NUMBER: [phone]  IF A COMPLICATION OR EMERGENCY SITUATION ARISES AND YOU ARE UNABLE TO REACH   YOUR PHYSICIAN - GO DIRECTLY TO THE EMERGENCY ROOM.  Prasanth Jerry MD  10/13/2020 1:49:38 PM  This report has been verified and signed electronically.  [employer]

## 2020-10-14 ENCOUNTER — ANESTHESIA (OUTPATIENT)
Dept: SURGERY | Facility: HOSPITAL | Age: 45
DRG: 005 | End: 2020-10-14
Payer: MEDICAID

## 2020-10-14 PROBLEM — T38.0X5A ADVERSE EFFECT OF CORTICOSTEROIDS: Status: ACTIVE | Noted: 2020-10-14

## 2020-10-14 PROBLEM — R73.9 HYPERGLYCEMIA: Status: ACTIVE | Noted: 2020-10-14

## 2020-10-14 PROBLEM — E66.09 CLASS 1 OBESITY DUE TO EXCESS CALORIES WITH SERIOUS COMORBIDITY AND BODY MASS INDEX (BMI) OF 34.0 TO 34.9 IN ADULT: Status: ACTIVE | Noted: 2020-10-14

## 2020-10-14 PROBLEM — Z94.4 STATUS POST LIVER TRANSPLANT: Status: ACTIVE | Noted: 2020-10-14

## 2020-10-14 PROBLEM — Z29.89 PROPHYLACTIC IMMUNOTHERAPY: Status: ACTIVE | Noted: 2020-10-14

## 2020-10-14 LAB
ALBUMIN SERPL BCP-MCNC: 2 G/DL (ref 3.5–5.2)
ALBUMIN SERPL BCP-MCNC: 2 G/DL (ref 3.5–5.2)
ALBUMIN SERPL BCP-MCNC: 2.1 G/DL (ref 3.5–5.2)
ALBUMIN SERPL BCP-MCNC: 2.2 G/DL (ref 3.5–5.2)
ALLENS TEST: ABNORMAL
ALP SERPL-CCNC: 79 U/L (ref 55–135)
ALP SERPL-CCNC: 79 U/L (ref 55–135)
ALT SERPL W/O P-5'-P-CCNC: 604 U/L (ref 10–44)
ALT SERPL W/O P-5'-P-CCNC: 604 U/L (ref 10–44)
ALT SERPL W/O P-5'-P-CCNC: 690 U/L (ref 10–44)
AMYLASE SERPL-CCNC: 56 U/L (ref 20–110)
ANION GAP SERPL CALC-SCNC: 14 MMOL/L (ref 8–16)
ANION GAP SERPL CALC-SCNC: 14 MMOL/L (ref 8–16)
ANION GAP SERPL CALC-SCNC: 9 MMOL/L (ref 8–16)
APPEARANCE FLD: CLEAR
APTT BLDCRRT: 37.5 SEC (ref 21–32)
APTT BLDCRRT: 38.2 SEC (ref 21–32)
APTT BLDCRRT: 46.1 SEC (ref 21–32)
APTT BLDCRRT: 50.6 SEC (ref 21–32)
APTT BLDCRRT: 92 SEC (ref 21–32)
AST SERPL-CCNC: 1215 U/L (ref 10–40)
AST SERPL-CCNC: 1215 U/L (ref 10–40)
AST SERPL-CCNC: 1494 U/L (ref 10–40)
AST SERPL-CCNC: 1652 U/L (ref 10–40)
AST SERPL-CCNC: 770 U/L (ref 10–40)
BACTERIA BLD CULT: ABNORMAL
BACTERIA UR CULT: NO GROWTH
BASOPHILS # BLD AUTO: 0.01 K/UL (ref 0–0.2)
BASOPHILS # BLD AUTO: 0.01 K/UL (ref 0–0.2)
BASOPHILS # BLD AUTO: 0.02 K/UL (ref 0–0.2)
BASOPHILS NFR BLD: 0.1 % (ref 0–1.9)
BASOPHILS NFR BLD: 0.2 % (ref 0–1.9)
BASOPHILS NFR BLD: 0.2 % (ref 0–1.9)
BILIRUB SERPL-MCNC: 10.8 MG/DL (ref 0.1–1)
BILIRUB SERPL-MCNC: 10.8 MG/DL (ref 0.1–1)
BLD PROD TYP BPU: NORMAL
BLOOD UNIT EXPIRATION DATE: NORMAL
BLOOD UNIT TYPE CODE: 5100
BLOOD UNIT TYPE CODE: 600
BLOOD UNIT TYPE CODE: 6200
BLOOD UNIT TYPE CODE: 7300
BLOOD UNIT TYPE CODE: NORMAL
BLOOD UNIT TYPE: NORMAL
BODY FLD TYPE: NORMAL
BUN SERPL-MCNC: 15 MG/DL (ref 6–20)
BUN SERPL-MCNC: 15 MG/DL (ref 6–20)
BUN SERPL-MCNC: 18 MG/DL (ref 6–20)
CA-I BLDV-SCNC: 0.97 MMOL/L (ref 1.06–1.42)
CA-I BLDV-SCNC: 1.05 MMOL/L (ref 1.06–1.42)
CA-I BLDV-SCNC: 1.08 MMOL/L (ref 1.06–1.42)
CA-I BLDV-SCNC: 1.29 MMOL/L (ref 1.06–1.42)
CALCIUM SERPL-MCNC: 7.9 MG/DL (ref 8.7–10.5)
CALCIUM SERPL-MCNC: 8.2 MG/DL (ref 8.7–10.5)
CALCIUM SERPL-MCNC: 8.2 MG/DL (ref 8.7–10.5)
CHLORIDE SERPL-SCNC: 102 MMOL/L (ref 95–110)
CHLORIDE SERPL-SCNC: 102 MMOL/L (ref 95–110)
CHLORIDE SERPL-SCNC: 103 MMOL/L (ref 95–110)
CO2 SERPL-SCNC: 22 MMOL/L (ref 23–29)
CO2 SERPL-SCNC: 22 MMOL/L (ref 23–29)
CO2 SERPL-SCNC: 24 MMOL/L (ref 23–29)
CODING SYSTEM: NORMAL
COLOR FLD: YELLOW
CREAT SERPL-MCNC: 0.9 MG/DL (ref 0.5–1.4)
CREAT SERPL-MCNC: 0.9 MG/DL (ref 0.5–1.4)
CREAT SERPL-MCNC: 1.4 MG/DL (ref 0.5–1.4)
DELSYS: ABNORMAL
DIFFERENTIAL METHOD: ABNORMAL
DISPENSE STATUS: NORMAL
EOSINOPHIL # BLD AUTO: 0 K/UL (ref 0–0.5)
EOSINOPHIL NFR BLD: 0 % (ref 0–8)
EOSINOPHIL NFR BLD: 0 % (ref 0–8)
EOSINOPHIL NFR BLD: 0.2 % (ref 0–8)
ERYTHROCYTE [DISTWIDTH] IN BLOOD BY AUTOMATED COUNT: 18 % (ref 11.5–14.5)
ERYTHROCYTE [DISTWIDTH] IN BLOOD BY AUTOMATED COUNT: 18.4 % (ref 11.5–14.5)
ERYTHROCYTE [DISTWIDTH] IN BLOOD BY AUTOMATED COUNT: 18.7 % (ref 11.5–14.5)
ERYTHROCYTE [SEDIMENTATION RATE] IN BLOOD BY WESTERGREN METHOD: 16 MM/H
EST. GFR  (AFRICAN AMERICAN): >60 ML/MIN/1.73 M^2
EST. GFR  (NON AFRICAN AMERICAN): 59.4 ML/MIN/1.73 M^2
EST. GFR  (NON AFRICAN AMERICAN): >60 ML/MIN/1.73 M^2
EST. GFR  (NON AFRICAN AMERICAN): >60 ML/MIN/1.73 M^2
FIBRINOGEN PPP-MCNC: 175 MG/DL (ref 182–366)
FIBRINOGEN PPP-MCNC: 223 MG/DL (ref 182–366)
FIBRINOGEN PPP-MCNC: 229 MG/DL (ref 182–366)
FIBRINOGEN PPP-MCNC: 85 MG/DL (ref 182–366)
FIBRINOGEN PPP-MCNC: <70 MG/DL (ref 182–366)
FIO2: 50
FIO2: 60
FIO2: 60
GLUCOSE SERPL-MCNC: 164 MG/DL (ref 70–110)
GLUCOSE SERPL-MCNC: 190 MG/DL (ref 70–110)
GLUCOSE SERPL-MCNC: 190 MG/DL (ref 70–110)
GLUCOSE SERPL-MCNC: 196 MG/DL (ref 70–110)
GLUCOSE SERPL-MCNC: 202 MG/DL (ref 70–110)
GLUCOSE SERPL-MCNC: 217 MG/DL (ref 70–110)
GLUCOSE SERPL-MCNC: 95 MG/DL (ref 70–110)
GRAM STN SPEC: NORMAL
GRAM STN SPEC: NORMAL
HBV SURFACE AG SERPL QL IA: NEGATIVE
HCO3 UR-SCNC: 22.9 MMOL/L (ref 24–28)
HCO3 UR-SCNC: 23.5 MMOL/L (ref 24–28)
HCO3 UR-SCNC: 23.9 MMOL/L (ref 24–28)
HCT VFR BLD AUTO: 22.2 % (ref 40–54)
HCT VFR BLD AUTO: 23.5 % (ref 40–54)
HCT VFR BLD AUTO: 23.8 % (ref 40–54)
HCT VFR BLD AUTO: 24.3 % (ref 40–54)
HCT VFR BLD AUTO: 24.3 % (ref 40–54)
HCT VFR BLD AUTO: 25.2 % (ref 40–54)
HCT VFR BLD AUTO: 27.9 % (ref 40–54)
HCT VFR BLD CALC: 27 %PCV (ref 36–54)
HGB BLD-MCNC: 7.6 G/DL (ref 14–18)
HGB BLD-MCNC: 8 G/DL (ref 14–18)
HGB BLD-MCNC: 8.3 G/DL (ref 14–18)
HGB BLD-MCNC: 8.3 G/DL (ref 14–18)
HGB BLD-MCNC: 8.4 G/DL (ref 14–18)
HGB BLD-MCNC: 8.7 G/DL (ref 14–18)
HGB BLD-MCNC: 9.4 G/DL (ref 14–18)
HIV 1+2 AB+HIV1 P24 AG SERPL QL IA: NEGATIVE
IMM GRANULOCYTES # BLD AUTO: 0.07 K/UL (ref 0–0.04)
IMM GRANULOCYTES # BLD AUTO: 0.11 K/UL (ref 0–0.04)
IMM GRANULOCYTES # BLD AUTO: 0.12 K/UL (ref 0–0.04)
IMM GRANULOCYTES NFR BLD AUTO: 1.2 % (ref 0–0.5)
IMM GRANULOCYTES NFR BLD AUTO: 1.3 % (ref 0–0.5)
IMM GRANULOCYTES NFR BLD AUTO: 1.6 % (ref 0–0.5)
INR PPP: 1.3 (ref 0.8–1.2)
INR PPP: 1.3 (ref 0.8–1.2)
INR PPP: 1.4 (ref 0.8–1.2)
INR PPP: 1.5 (ref 0.8–1.2)
INR PPP: 2 (ref 0.8–1.2)
INR PPP: 2.3 (ref 0.8–1.2)
LACTATE SERPL-SCNC: 2.4 MMOL/L (ref 0.5–2.2)
LDH SERPL L TO P-CCNC: 1490 U/L (ref 110–260)
LYMPHOCYTES # BLD AUTO: 0.2 K/UL (ref 1–4.8)
LYMPHOCYTES # BLD AUTO: 0.3 K/UL (ref 1–4.8)
LYMPHOCYTES # BLD AUTO: 0.3 K/UL (ref 1–4.8)
LYMPHOCYTES NFR BLD: 3.2 % (ref 18–48)
LYMPHOCYTES NFR BLD: 3.7 % (ref 18–48)
LYMPHOCYTES NFR BLD: 5.3 % (ref 18–48)
LYMPHOCYTES NFR FLD MANUAL: 19 %
MAGNESIUM SERPL-MCNC: 1.7 MG/DL (ref 1.6–2.6)
MAGNESIUM SERPL-MCNC: 1.8 MG/DL (ref 1.6–2.6)
MAGNESIUM SERPL-MCNC: 1.8 MG/DL (ref 1.6–2.6)
MAGNESIUM SERPL-MCNC: 1.9 MG/DL (ref 1.6–2.6)
MCH RBC QN AUTO: 32.2 PG (ref 27–31)
MCH RBC QN AUTO: 32.5 PG (ref 27–31)
MCH RBC QN AUTO: 32.8 PG (ref 27–31)
MCHC RBC AUTO-ENTMCNC: 33.7 G/DL (ref 32–36)
MCHC RBC AUTO-ENTMCNC: 34.2 G/DL (ref 32–36)
MCHC RBC AUTO-ENTMCNC: 34.2 G/DL (ref 32–36)
MCV RBC AUTO: 95 FL (ref 82–98)
MCV RBC AUTO: 96 FL (ref 82–98)
MCV RBC AUTO: 96 FL (ref 82–98)
MESOTHL CELL NFR FLD MANUAL: 15 %
MODE: ABNORMAL
MONOCYTES # BLD AUTO: 0.4 K/UL (ref 0.3–1)
MONOCYTES # BLD AUTO: 0.4 K/UL (ref 0.3–1)
MONOCYTES # BLD AUTO: 0.8 K/UL (ref 0.3–1)
MONOCYTES NFR BLD: 5.2 % (ref 4–15)
MONOCYTES NFR BLD: 7.2 % (ref 4–15)
MONOCYTES NFR BLD: 8.5 % (ref 4–15)
MONOS+MACROS NFR FLD MANUAL: 57 %
NEUTROPHILS # BLD AUTO: 4.9 K/UL (ref 1.8–7.7)
NEUTROPHILS # BLD AUTO: 6.4 K/UL (ref 1.8–7.7)
NEUTROPHILS # BLD AUTO: 7.7 K/UL (ref 1.8–7.7)
NEUTROPHILS NFR BLD: 86.1 % (ref 38–73)
NEUTROPHILS NFR BLD: 86.1 % (ref 38–73)
NEUTROPHILS NFR BLD: 89.9 % (ref 38–73)
NEUTROPHILS NFR FLD MANUAL: 9 %
NRBC BLD-RTO: 0 /100 WBC
NUM UNITS TRANS FFP: NORMAL
NUM UNITS TRANS WBC-POOR PLATPHERESIS: NORMAL
PCO2 BLDA: 41.6 MMHG (ref 35–45)
PCO2 BLDA: 45.3 MMHG (ref 35–45)
PCO2 BLDA: 47 MMHG (ref 35–45)
PEEP: 5
PH SMN: 7.31 [PH] (ref 7.35–7.45)
PH SMN: 7.32 [PH] (ref 7.35–7.45)
PH SMN: 7.35 [PH] (ref 7.35–7.45)
PLATELET # BLD AUTO: 28 K/UL (ref 150–350)
PLATELET # BLD AUTO: 45 K/UL (ref 150–350)
PLATELET # BLD AUTO: 49 K/UL (ref 150–350)
PLATELET # BLD AUTO: 57 K/UL (ref 150–350)
PLATELET # BLD AUTO: 64 K/UL (ref 150–350)
PLATELET # BLD AUTO: 64 K/UL (ref 150–350)
PLATELET # BLD AUTO: 68 K/UL (ref 150–350)
PLATELET BLD QL SMEAR: ABNORMAL
PMV BLD AUTO: 10.2 FL (ref 9.2–12.9)
PMV BLD AUTO: 10.4 FL (ref 9.2–12.9)
PMV BLD AUTO: 10.8 FL (ref 9.2–12.9)
PMV BLD AUTO: 11.2 FL (ref 9.2–12.9)
PMV BLD AUTO: 12.3 FL (ref 9.2–12.9)
PMV BLD AUTO: 9.7 FL (ref 9.2–12.9)
PMV BLD AUTO: 9.8 FL (ref 9.2–12.9)
PO2 BLDA: 41 MMHG (ref 40–60)
PO2 BLDA: 73 MMHG (ref 80–100)
PO2 BLDA: 79 MMHG (ref 80–100)
POC BE: -2 MMOL/L
POC BE: -3 MMOL/L
POC BE: -3 MMOL/L
POC IONIZED CALCIUM: 1.21 MMOL/L (ref 1.06–1.42)
POC SATURATED O2: 71 % (ref 95–100)
POC SATURATED O2: 93 % (ref 95–100)
POC SATURATED O2: 95 % (ref 95–100)
POC TCO2: 24 MMOL/L (ref 23–27)
POC TCO2: 25 MMOL/L (ref 23–27)
POC TCO2: 25 MMOL/L (ref 24–29)
POCT GLUCOSE: 159 MG/DL (ref 70–110)
POCT GLUCOSE: 161 MG/DL (ref 70–110)
POCT GLUCOSE: 169 MG/DL (ref 70–110)
POCT GLUCOSE: 170 MG/DL (ref 70–110)
POCT GLUCOSE: 174 MG/DL (ref 70–110)
POCT GLUCOSE: 177 MG/DL (ref 70–110)
POCT GLUCOSE: 183 MG/DL (ref 70–110)
POCT GLUCOSE: 185 MG/DL (ref 70–110)
POCT GLUCOSE: 186 MG/DL (ref 70–110)
POCT GLUCOSE: 208 MG/DL (ref 70–110)
POOLED CRYOPPT GVN BPU: NORMAL
POOLED CRYOPPT GVN BPU: NORMAL
POTASSIUM BLD-SCNC: 3.8 MMOL/L (ref 3.5–5.1)
POTASSIUM SERPL-SCNC: 3.7 MMOL/L (ref 3.5–5.1)
POTASSIUM SERPL-SCNC: 3.8 MMOL/L (ref 3.5–5.1)
POTASSIUM SERPL-SCNC: 3.8 MMOL/L (ref 3.5–5.1)
POTASSIUM SERPL-SCNC: 3.9 MMOL/L (ref 3.5–5.1)
POTASSIUM SERPL-SCNC: 3.9 MMOL/L (ref 3.5–5.1)
POTASSIUM SERPL-SCNC: 4 MMOL/L (ref 3.5–5.1)
POTASSIUM SERPL-SCNC: 4 MMOL/L (ref 3.5–5.1)
PROT SERPL-MCNC: 3.7 G/DL (ref 6–8.4)
PROT SERPL-MCNC: 3.7 G/DL (ref 6–8.4)
PROTHROMBIN TIME: 14.4 SEC (ref 9–12.5)
PROTHROMBIN TIME: 14.7 SEC (ref 9–12.5)
PROTHROMBIN TIME: 15 SEC (ref 9–12.5)
PROTHROMBIN TIME: 15 SEC (ref 9–12.5)
PROTHROMBIN TIME: 15.1 SEC (ref 9–12.5)
PROTHROMBIN TIME: 16.5 SEC (ref 9–12.5)
PROTHROMBIN TIME: 21 SEC (ref 9–12.5)
PROTHROMBIN TIME: 23.6 SEC (ref 9–12.5)
RBC # BLD AUTO: 2.32 M/UL (ref 4.6–6.2)
RBC # BLD AUTO: 2.55 M/UL (ref 4.6–6.2)
RBC # BLD AUTO: 2.92 M/UL (ref 4.6–6.2)
SAMPLE: ABNORMAL
SITE: ABNORMAL
SODIUM BLD-SCNC: 136 MMOL/L (ref 136–145)
SODIUM SERPL-SCNC: 134 MMOL/L (ref 136–145)
SODIUM SERPL-SCNC: 134 MMOL/L (ref 136–145)
SODIUM SERPL-SCNC: 135 MMOL/L (ref 136–145)
SODIUM SERPL-SCNC: 136 MMOL/L (ref 136–145)
SODIUM SERPL-SCNC: 137 MMOL/L (ref 136–145)
SODIUM SERPL-SCNC: 138 MMOL/L (ref 136–145)
SODIUM SERPL-SCNC: 138 MMOL/L (ref 136–145)
SP02: 98
SP02: 98
TRANS PLATPHERESIS VOL PATIENT: NORMAL ML
UNIT NUMBER: NORMAL
UNIT NUMBER: NORMAL
VT: 400
VT: 400
VT: 450
WBC # BLD AUTO: 5.45 K/UL (ref 3.9–12.7)
WBC # BLD AUTO: 7.09 K/UL (ref 3.9–12.7)
WBC # BLD AUTO: 8.9 K/UL (ref 3.9–12.7)
WBC # FLD: 95 /CU MM

## 2020-10-14 PROCEDURE — 47143 PR TRANSPLANT,PREP DONOR LIVER, WHOLE: ICD-10-PCS | Mod: 51,,, | Performed by: TRANSPLANT SURGERY

## 2020-10-14 PROCEDURE — 84460 ALANINE AMINO (ALT) (SGPT): CPT

## 2020-10-14 PROCEDURE — 36000930 HC OR TIME LEV VII 1ST 15 MIN: Performed by: SURGERY

## 2020-10-14 PROCEDURE — 85610 PROTHROMBIN TIME: CPT

## 2020-10-14 PROCEDURE — P9047 ALBUMIN (HUMAN), 25%, 50ML: HCPCS | Mod: JG | Performed by: ANESTHESIOLOGY

## 2020-10-14 PROCEDURE — 86965 POOLING BLOOD PLATELETS: CPT

## 2020-10-14 PROCEDURE — 63600175 PHARM REV CODE 636 W HCPCS: Performed by: SURGERY

## 2020-10-14 PROCEDURE — 94010 BREATHING CAPACITY TEST: CPT

## 2020-10-14 PROCEDURE — P9035 PLATELET PHERES LEUKOREDUCED: HCPCS

## 2020-10-14 PROCEDURE — D9220A PRA ANESTHESIA: ICD-10-PCS | Mod: ANES,,, | Performed by: SURGERY

## 2020-10-14 PROCEDURE — 85025 COMPLETE CBC W/AUTO DIFF WBC: CPT

## 2020-10-14 PROCEDURE — 63600175 PHARM REV CODE 636 W HCPCS: Performed by: STUDENT IN AN ORGANIZED HEALTH CARE EDUCATION/TRAINING PROGRAM

## 2020-10-14 PROCEDURE — 99291 CRITICAL CARE FIRST HOUR: CPT | Mod: ,,, | Performed by: ANESTHESIOLOGY

## 2020-10-14 PROCEDURE — 99900035 HC TECH TIME PER 15 MIN (STAT)

## 2020-10-14 PROCEDURE — 63600175 PHARM REV CODE 636 W HCPCS: Performed by: NURSE PRACTITIONER

## 2020-10-14 PROCEDURE — 99900017 HC EXTUBATION W/PARAMETERS (STAT)

## 2020-10-14 PROCEDURE — 82947 ASSAY GLUCOSE BLOOD QUANT: CPT | Mod: 91

## 2020-10-14 PROCEDURE — 87070 CULTURE OTHR SPECIMN AEROBIC: CPT

## 2020-10-14 PROCEDURE — 88307 TISSUE EXAM BY PATHOLOGIST: CPT | Performed by: PATHOLOGY

## 2020-10-14 PROCEDURE — 27000191 HC C-V MONITORING

## 2020-10-14 PROCEDURE — 27000221 HC OXYGEN, UP TO 24 HOURS

## 2020-10-14 PROCEDURE — 82040 ASSAY OF SERUM ALBUMIN: CPT | Mod: 91

## 2020-10-14 PROCEDURE — 85730 THROMBOPLASTIN TIME PARTIAL: CPT | Mod: 91

## 2020-10-14 PROCEDURE — 63600175 PHARM REV CODE 636 W HCPCS: Mod: NTX | Performed by: NURSE PRACTITIONER

## 2020-10-14 PROCEDURE — 87075 CULTR BACTERIA EXCEPT BLOOD: CPT

## 2020-10-14 PROCEDURE — C9113 INJ PANTOPRAZOLE SODIUM, VIA: HCPCS | Performed by: NURSE PRACTITIONER

## 2020-10-14 PROCEDURE — 27100088 HC CELL SAVER

## 2020-10-14 PROCEDURE — D9220A PRA ANESTHESIA: ICD-10-PCS | Mod: CRNA,,, | Performed by: NURSE ANESTHETIST, CERTIFIED REGISTERED

## 2020-10-14 PROCEDURE — 85610 PROTHROMBIN TIME: CPT | Mod: 91

## 2020-10-14 PROCEDURE — 88307 TISSUE EXAM BY PATHOLOGIST: CPT | Mod: 26,,, | Performed by: PATHOLOGY

## 2020-10-14 PROCEDURE — 84450 TRANSFERASE (AST) (SGOT): CPT | Mod: 91

## 2020-10-14 PROCEDURE — 27201041 HC RESERVOIR, CARDIOTOMY

## 2020-10-14 PROCEDURE — 85520 HEPARIN ASSAY: CPT

## 2020-10-14 PROCEDURE — 47135 TRANSPLANTATION OF LIVER: CPT | Mod: ,,, | Performed by: SURGERY

## 2020-10-14 PROCEDURE — P9021 RED BLOOD CELLS UNIT: HCPCS

## 2020-10-14 PROCEDURE — 94150 VITAL CAPACITY TEST: CPT

## 2020-10-14 PROCEDURE — 25000003 PHARM REV CODE 250: Performed by: NURSE PRACTITIONER

## 2020-10-14 PROCEDURE — 25000003 PHARM REV CODE 250: Performed by: STUDENT IN AN ORGANIZED HEALTH CARE EDUCATION/TRAINING PROGRAM

## 2020-10-14 PROCEDURE — 27201423 OPTIME MED/SURG SUP & DEVICES STERILE SUPPLY: Performed by: SURGERY

## 2020-10-14 PROCEDURE — 85014 HEMATOCRIT: CPT

## 2020-10-14 PROCEDURE — 83605 ASSAY OF LACTIC ACID: CPT

## 2020-10-14 PROCEDURE — 25000003 PHARM REV CODE 250: Performed by: SURGERY

## 2020-10-14 PROCEDURE — 93503 INSERT/PLACE HEART CATHETER: CPT | Mod: 59,,, | Performed by: SURGERY

## 2020-10-14 PROCEDURE — 20000000 HC ICU ROOM

## 2020-10-14 PROCEDURE — 88309 TISSUE EXAM BY PATHOLOGIST: CPT | Mod: 26,,, | Performed by: PATHOLOGY

## 2020-10-14 PROCEDURE — 84132 ASSAY OF SERUM POTASSIUM: CPT | Mod: 91

## 2020-10-14 PROCEDURE — 94761 N-INVAS EAR/PLS OXIMETRY MLT: CPT

## 2020-10-14 PROCEDURE — 87205 SMEAR GRAM STAIN: CPT

## 2020-10-14 PROCEDURE — 36000931 HC OR TIME LEV VII EA ADD 15 MIN: Performed by: SURGERY

## 2020-10-14 PROCEDURE — 37000009 HC ANESTHESIA EA ADD 15 MINS: Performed by: SURGERY

## 2020-10-14 PROCEDURE — 94002 VENT MGMT INPAT INIT DAY: CPT

## 2020-10-14 PROCEDURE — 81300000 HC LIVER ACQUISITION CHARGE

## 2020-10-14 PROCEDURE — P9012 CRYOPRECIPITATE EACH UNIT: HCPCS

## 2020-10-14 PROCEDURE — 85002 BLEEDING TIME TEST: CPT

## 2020-10-14 PROCEDURE — 25000003 PHARM REV CODE 250: Performed by: NURSE ANESTHETIST, CERTIFIED REGISTERED

## 2020-10-14 PROCEDURE — D9220A PRA ANESTHESIA: Mod: ANES,,, | Performed by: SURGERY

## 2020-10-14 PROCEDURE — 80100014 HC HEMODIALYSIS 1:1: Mod: NTX

## 2020-10-14 PROCEDURE — 36620 INSERTION CATHETER ARTERY: CPT | Mod: 59,,, | Performed by: ANESTHESIOLOGY

## 2020-10-14 PROCEDURE — 47135 PR TRANSPLANT LIVER,ALLOTRANSPLANT: ICD-10-PCS | Mod: ,,, | Performed by: SURGERY

## 2020-10-14 PROCEDURE — 88309 TISSUE EXAM BY PATHOLOGIST: CPT | Performed by: PATHOLOGY

## 2020-10-14 PROCEDURE — 93503 PR INSERT/PLACE FLOW DIRECT CATH: ICD-10-PCS | Mod: 59,,, | Performed by: SURGERY

## 2020-10-14 PROCEDURE — 85018 HEMOGLOBIN: CPT

## 2020-10-14 PROCEDURE — 80048 BASIC METABOLIC PNL TOTAL CA: CPT

## 2020-10-14 PROCEDURE — 85384 FIBRINOGEN ACTIVITY: CPT | Mod: 91

## 2020-10-14 PROCEDURE — 88307 PR  SURG PATH,LEVEL V: ICD-10-PCS | Mod: 26,,, | Performed by: PATHOLOGY

## 2020-10-14 PROCEDURE — 47135 PR TRANSPLANT LIVER,ALLOTRANSPLANT: ICD-10-PCS | Mod: 80,,, | Performed by: TRANSPLANT SURGERY

## 2020-10-14 PROCEDURE — 83615 LACTATE (LD) (LDH) ENZYME: CPT

## 2020-10-14 PROCEDURE — 85049 AUTOMATED PLATELET COUNT: CPT | Mod: 91

## 2020-10-14 PROCEDURE — P9045 ALBUMIN (HUMAN), 5%, 250 ML: HCPCS | Mod: JG | Performed by: ANESTHESIOLOGY

## 2020-10-14 PROCEDURE — 82150 ASSAY OF AMYLASE: CPT

## 2020-10-14 PROCEDURE — 94003 VENT MGMT INPAT SUBQ DAY: CPT

## 2020-10-14 PROCEDURE — 99232 PR SUBSEQUENT HOSPITAL CARE,LEVL II: ICD-10-PCS | Mod: ,,, | Performed by: NURSE PRACTITIONER

## 2020-10-14 PROCEDURE — 99900026 HC AIRWAY MAINTENANCE (STAT)

## 2020-10-14 PROCEDURE — P9045 ALBUMIN (HUMAN), 5%, 250 ML: HCPCS | Mod: JG | Performed by: SURGERY

## 2020-10-14 PROCEDURE — D9220A PRA ANESTHESIA: Mod: CRNA,,, | Performed by: NURSE ANESTHETIST, CERTIFIED REGISTERED

## 2020-10-14 PROCEDURE — 89051 BODY FLUID CELL COUNT: CPT

## 2020-10-14 PROCEDURE — 36556 INSERT NON-TUNNEL CV CATH: CPT | Mod: 59,,, | Performed by: ANESTHESIOLOGY

## 2020-10-14 PROCEDURE — 63600175 PHARM REV CODE 636 W HCPCS: Mod: JG | Performed by: STUDENT IN AN ORGANIZED HEALTH CARE EDUCATION/TRAINING PROGRAM

## 2020-10-14 PROCEDURE — P9037 PLATE PHERES LEUKOREDU IRRAD: HCPCS

## 2020-10-14 PROCEDURE — 99291 PR CRITICAL CARE, E/M 30-74 MINUTES: ICD-10-PCS | Mod: ,,, | Performed by: ANESTHESIOLOGY

## 2020-10-14 PROCEDURE — 82330 ASSAY OF CALCIUM: CPT | Mod: 91

## 2020-10-14 PROCEDURE — 81300032 HC LIVER TRANSPORT, FLIGHT WITHIN 701-1000 MILES

## 2020-10-14 PROCEDURE — 80053 COMPREHEN METABOLIC PANEL: CPT

## 2020-10-14 PROCEDURE — 63600175 PHARM REV CODE 636 W HCPCS: Performed by: ANESTHESIOLOGY

## 2020-10-14 PROCEDURE — 87102 FUNGUS ISOLATION CULTURE: CPT

## 2020-10-14 PROCEDURE — 36415 COLL VENOUS BLD VENIPUNCTURE: CPT

## 2020-10-14 PROCEDURE — 84295 ASSAY OF SERUM SODIUM: CPT | Mod: 91

## 2020-10-14 PROCEDURE — P9017 PLASMA 1 DONOR FRZ W/IN 8 HR: HCPCS

## 2020-10-14 PROCEDURE — 63600175 PHARM REV CODE 636 W HCPCS: Performed by: NURSE ANESTHETIST, CERTIFIED REGISTERED

## 2020-10-14 PROCEDURE — 99232 SBSQ HOSP IP/OBS MODERATE 35: CPT | Mod: ,,, | Performed by: NURSE PRACTITIONER

## 2020-10-14 PROCEDURE — 88313 SPECIAL STAINS GROUP 2: CPT | Performed by: PATHOLOGY

## 2020-10-14 PROCEDURE — 47135 TRANSPLANTATION OF LIVER: CPT | Mod: 80,,, | Performed by: TRANSPLANT SURGERY

## 2020-10-14 PROCEDURE — 83735 ASSAY OF MAGNESIUM: CPT | Mod: 91

## 2020-10-14 PROCEDURE — 37799 UNLISTED PX VASCULAR SURGERY: CPT

## 2020-10-14 PROCEDURE — 36620 ARTERIAL: ICD-10-PCS | Mod: 59,,, | Performed by: ANESTHESIOLOGY

## 2020-10-14 PROCEDURE — 88313 SPECIAL STAINS GROUP 2: CPT | Mod: 26,,, | Performed by: PATHOLOGY

## 2020-10-14 PROCEDURE — 88313 PR  SPECIAL STAINS,GROUP II: ICD-10-PCS | Mod: 26,,, | Performed by: PATHOLOGY

## 2020-10-14 PROCEDURE — P9045 ALBUMIN (HUMAN), 5%, 250 ML: HCPCS | Mod: JG | Performed by: STUDENT IN AN ORGANIZED HEALTH CARE EDUCATION/TRAINING PROGRAM

## 2020-10-14 PROCEDURE — 84450 TRANSFERASE (AST) (SGOT): CPT

## 2020-10-14 PROCEDURE — 82803 BLOOD GASES ANY COMBINATION: CPT

## 2020-10-14 PROCEDURE — C1729 CATH, DRAINAGE: HCPCS | Performed by: SURGERY

## 2020-10-14 PROCEDURE — 84295 ASSAY OF SERUM SODIUM: CPT

## 2020-10-14 PROCEDURE — 36556 CENTRAL LINE: ICD-10-PCS | Mod: 59,,, | Performed by: ANESTHESIOLOGY

## 2020-10-14 PROCEDURE — 25000003 PHARM REV CODE 250: Performed by: ANESTHESIOLOGY

## 2020-10-14 PROCEDURE — 88309 PR  SURG PATH,LEVEL VI: ICD-10-PCS | Mod: 26,,, | Performed by: PATHOLOGY

## 2020-10-14 PROCEDURE — 82330 ASSAY OF CALCIUM: CPT

## 2020-10-14 PROCEDURE — 37000008 HC ANESTHESIA 1ST 15 MINUTES: Performed by: SURGERY

## 2020-10-14 PROCEDURE — 84132 ASSAY OF SERUM POTASSIUM: CPT

## 2020-10-14 RX ORDER — PROTAMINE SULFATE 10 MG/ML
INJECTION, SOLUTION INTRAVENOUS
Status: DISCONTINUED | OUTPATIENT
Start: 2020-10-14 | End: 2020-10-14

## 2020-10-14 RX ORDER — METHYLPREDNISOLONE SOD SUCC 125 MG
100 VIAL (EA) INJECTION EVERY 12 HOURS
Status: COMPLETED | OUTPATIENT
Start: 2020-10-15 | End: 2020-10-15

## 2020-10-14 RX ORDER — MUPIROCIN 20 MG/G
1 OINTMENT TOPICAL 2 TIMES DAILY
Status: DISCONTINUED | OUTPATIENT
Start: 2020-10-14 | End: 2020-10-19

## 2020-10-14 RX ORDER — ALBUMIN HUMAN 50 G/1000ML
SOLUTION INTRAVENOUS
Status: COMPLETED | OUTPATIENT
Start: 2020-10-14 | End: 2020-10-14

## 2020-10-14 RX ORDER — HEPARIN SODIUM 5000 [USP'U]/ML
5000 INJECTION, SOLUTION INTRAVENOUS; SUBCUTANEOUS EVERY 8 HOURS
Status: DISCONTINUED | OUTPATIENT
Start: 2020-10-14 | End: 2020-10-15

## 2020-10-14 RX ORDER — ALBUMIN HUMAN 250 G/1000ML
SOLUTION INTRAVENOUS CONTINUOUS PRN
Status: DISCONTINUED | OUTPATIENT
Start: 2020-10-14 | End: 2020-10-14

## 2020-10-14 RX ORDER — HEPARIN SODIUM 1000 [USP'U]/ML
INJECTION, SOLUTION INTRAVENOUS; SUBCUTANEOUS
Status: DISCONTINUED | OUTPATIENT
Start: 2020-10-14 | End: 2020-10-14 | Stop reason: HOSPADM

## 2020-10-14 RX ORDER — FENTANYL CITRATE 50 UG/ML
INJECTION, SOLUTION INTRAMUSCULAR; INTRAVENOUS
Status: DISCONTINUED | OUTPATIENT
Start: 2020-10-14 | End: 2020-10-14

## 2020-10-14 RX ORDER — METHYLPREDNISOLONE SOD SUCC 125 MG
80 VIAL (EA) INJECTION EVERY 12 HOURS
Status: COMPLETED | OUTPATIENT
Start: 2020-10-16 | End: 2020-10-16

## 2020-10-14 RX ORDER — FUROSEMIDE 10 MG/ML
120 INJECTION INTRAMUSCULAR; INTRAVENOUS ONCE
Status: COMPLETED | OUTPATIENT
Start: 2020-10-14 | End: 2020-10-14

## 2020-10-14 RX ORDER — PHENYLEPHRINE HYDROCHLORIDE 10 MG/ML
INJECTION INTRAVENOUS
Status: DISCONTINUED | OUTPATIENT
Start: 2020-10-14 | End: 2020-10-14

## 2020-10-14 RX ORDER — VALGANCICLOVIR 450 MG/1
450 TABLET, FILM COATED ORAL DAILY
Status: DISCONTINUED | OUTPATIENT
Start: 2020-10-24 | End: 2020-10-20

## 2020-10-14 RX ORDER — VECURONIUM BROMIDE FOR INJECTION 1 MG/ML
INJECTION, POWDER, LYOPHILIZED, FOR SOLUTION INTRAVENOUS
Status: DISCONTINUED | OUTPATIENT
Start: 2020-10-14 | End: 2020-10-14

## 2020-10-14 RX ORDER — SULFAMETHOXAZOLE AND TRIMETHOPRIM 400; 80 MG/1; MG/1
1 TABLET ORAL EVERY MORNING
Status: DISCONTINUED | OUTPATIENT
Start: 2020-10-21 | End: 2020-10-20

## 2020-10-14 RX ORDER — ALBUMIN HUMAN 50 G/1000ML
SOLUTION INTRAVENOUS CONTINUOUS PRN
Status: DISCONTINUED | OUTPATIENT
Start: 2020-10-14 | End: 2020-10-14

## 2020-10-14 RX ORDER — MANNITOL 250 MG/ML
INJECTION, SOLUTION INTRAVENOUS
Status: DISCONTINUED | OUTPATIENT
Start: 2020-10-14 | End: 2020-10-14

## 2020-10-14 RX ORDER — HYDROMORPHONE HYDROCHLORIDE 1 MG/ML
0.5 INJECTION, SOLUTION INTRAMUSCULAR; INTRAVENOUS; SUBCUTANEOUS
Status: DISCONTINUED | OUTPATIENT
Start: 2020-10-14 | End: 2020-10-18

## 2020-10-14 RX ORDER — FUROSEMIDE 10 MG/ML
120 INJECTION INTRAMUSCULAR; INTRAVENOUS ONCE
Status: DISCONTINUED | OUTPATIENT
Start: 2020-10-14 | End: 2020-10-14

## 2020-10-14 RX ORDER — NOREPINEPHRINE BITARTRATE 1 MG/ML
INJECTION, SOLUTION INTRAVENOUS
Status: DISCONTINUED | OUTPATIENT
Start: 2020-10-14 | End: 2020-10-14

## 2020-10-14 RX ORDER — VERAPAMIL HYDROCHLORIDE 2.5 MG/ML
INJECTION, SOLUTION INTRAVENOUS
Status: DISCONTINUED | OUTPATIENT
Start: 2020-10-14 | End: 2020-10-14 | Stop reason: HOSPADM

## 2020-10-14 RX ORDER — ROCURONIUM BROMIDE 10 MG/ML
INJECTION, SOLUTION INTRAVENOUS
Status: DISCONTINUED | OUTPATIENT
Start: 2020-10-14 | End: 2020-10-14

## 2020-10-14 RX ORDER — FLUCONAZOLE 2 MG/ML
INJECTION, SOLUTION INTRAVENOUS
Status: DISCONTINUED | OUTPATIENT
Start: 2020-10-14 | End: 2020-10-14

## 2020-10-14 RX ORDER — METHYLPREDNISOLONE SOD SUCC 125 MG
60 VIAL (EA) INJECTION EVERY 12 HOURS
Status: COMPLETED | OUTPATIENT
Start: 2020-10-17 | End: 2020-10-17

## 2020-10-14 RX ORDER — DEXTROSE MONOHYDRATE AND SODIUM CHLORIDE 5; .45 G/100ML; G/100ML
INJECTION, SOLUTION INTRAVENOUS CONTINUOUS
Status: DISCONTINUED | OUTPATIENT
Start: 2020-10-14 | End: 2020-10-17

## 2020-10-14 RX ORDER — ONDANSETRON 2 MG/ML
INJECTION INTRAMUSCULAR; INTRAVENOUS
Status: DISCONTINUED | OUTPATIENT
Start: 2020-10-14 | End: 2020-10-14

## 2020-10-14 RX ORDER — ACETAZOLAMIDE 500 MG/5ML
500 INJECTION, POWDER, LYOPHILIZED, FOR SOLUTION INTRAVENOUS ONCE
Status: DISCONTINUED | OUTPATIENT
Start: 2020-10-14 | End: 2020-10-14

## 2020-10-14 RX ORDER — PREDNISONE 20 MG/1
20 TABLET ORAL DAILY
Status: DISCONTINUED | OUTPATIENT
Start: 2020-10-20 | End: 2020-10-25

## 2020-10-14 RX ORDER — FUROSEMIDE 10 MG/ML
INJECTION INTRAMUSCULAR; INTRAVENOUS
Status: DISCONTINUED | OUTPATIENT
Start: 2020-10-14 | End: 2020-10-14

## 2020-10-14 RX ORDER — ALBUMIN HUMAN 50 G/1000ML
25 SOLUTION INTRAVENOUS ONCE
Status: COMPLETED | OUTPATIENT
Start: 2020-10-14 | End: 2020-10-14

## 2020-10-14 RX ORDER — MIDAZOLAM HYDROCHLORIDE 1 MG/ML
INJECTION INTRAMUSCULAR; INTRAVENOUS
Status: DISCONTINUED | OUTPATIENT
Start: 2020-10-14 | End: 2020-10-14

## 2020-10-14 RX ORDER — PROPOFOL 10 MG/ML
0-50 INJECTION, EMULSION INTRAVENOUS CONTINUOUS
Status: DISCONTINUED | OUTPATIENT
Start: 2020-10-14 | End: 2020-10-18

## 2020-10-14 RX ORDER — MYCOPHENOLATE MOFETIL 200 MG/ML
1000 POWDER, FOR SUSPENSION ORAL 2 TIMES DAILY
Status: DISCONTINUED | OUTPATIENT
Start: 2020-10-14 | End: 2020-10-18

## 2020-10-14 RX ORDER — NYSTATIN 100000 [USP'U]/ML
500000 SUSPENSION ORAL
Status: DISCONTINUED | OUTPATIENT
Start: 2020-10-14 | End: 2020-10-16

## 2020-10-14 RX ORDER — FLUCONAZOLE 2 MG/ML
400 INJECTION, SOLUTION INTRAVENOUS
Status: DISCONTINUED | OUTPATIENT
Start: 2020-10-14 | End: 2020-10-16

## 2020-10-14 RX ORDER — HEPARIN SODIUM 1000 [USP'U]/ML
INJECTION, SOLUTION INTRAVENOUS; SUBCUTANEOUS
Status: DISCONTINUED | OUTPATIENT
Start: 2020-10-14 | End: 2020-10-14

## 2020-10-14 RX ORDER — LIDOCAINE HCL/PF 100 MG/5ML
SYRINGE (ML) INTRAVENOUS
Status: DISCONTINUED | OUTPATIENT
Start: 2020-10-14 | End: 2020-10-14

## 2020-10-14 RX ORDER — PROPOFOL 10 MG/ML
VIAL (ML) INTRAVENOUS
Status: DISCONTINUED | OUTPATIENT
Start: 2020-10-14 | End: 2020-10-14

## 2020-10-14 RX ADMIN — ALBUMIN (HUMAN) 25 G: 12.5 SOLUTION INTRAVENOUS at 03:10

## 2020-10-14 RX ADMIN — HEPARIN SODIUM 5000 UNITS: 5000 INJECTION INTRAVENOUS; SUBCUTANEOUS at 03:10

## 2020-10-14 RX ADMIN — FLUCONAZOLE 400 MG: 2 INJECTION, SOLUTION INTRAVENOUS at 07:10

## 2020-10-14 RX ADMIN — METHYLPREDNISOLONE SODIUM SUCCINATE 500 MG: 500 INJECTION, POWDER, FOR SOLUTION INTRAMUSCULAR; INTRAVENOUS at 07:10

## 2020-10-14 RX ADMIN — NOREPINEPHRINE BITARTRATE 32 MCG: 1 INJECTION, SOLUTION, CONCENTRATE INTRAVENOUS at 09:10

## 2020-10-14 RX ADMIN — FENTANYL CITRATE 100 MCG: 50 INJECTION, SOLUTION INTRAMUSCULAR; INTRAVENOUS at 07:10

## 2020-10-14 RX ADMIN — NOREPINEPHRINE BITARTRATE 0.02 MCG/KG/MIN: 1 INJECTION, SOLUTION, CONCENTRATE INTRAVENOUS at 06:10

## 2020-10-14 RX ADMIN — MANNITOL 25 G: 250 INJECTION, SOLUTION INTRAVENOUS at 06:10

## 2020-10-14 RX ADMIN — HYDROMORPHONE HYDROCHLORIDE 0.5 MG: 1 INJECTION, SOLUTION INTRAMUSCULAR; INTRAVENOUS; SUBCUTANEOUS at 11:10

## 2020-10-14 RX ADMIN — PHENYLEPHRINE HYDROCHLORIDE 200 MCG: 10 INJECTION INTRAVENOUS at 07:10

## 2020-10-14 RX ADMIN — CALCIUM CHLORIDE 500 MG: 100 INJECTION, SOLUTION INTRAVENOUS at 11:10

## 2020-10-14 RX ADMIN — HEPARIN SODIUM 2500 UNITS: 1000 INJECTION, SOLUTION INTRAVENOUS; SUBCUTANEOUS at 08:10

## 2020-10-14 RX ADMIN — ROCURONIUM BROMIDE 30 MG: 10 INJECTION, SOLUTION INTRAVENOUS at 05:10

## 2020-10-14 RX ADMIN — CALCIUM CHLORIDE 500 MG: 100 INJECTION, SOLUTION INTRAVENOUS at 12:10

## 2020-10-14 RX ADMIN — PHENYLEPHRINE HYDROCHLORIDE 200 MCG: 10 INJECTION INTRAVENOUS at 05:10

## 2020-10-14 RX ADMIN — CEFTRIAXONE SODIUM 1 G: 1 INJECTION, POWDER, FOR SOLUTION INTRAMUSCULAR; INTRAVENOUS at 03:10

## 2020-10-14 RX ADMIN — ROCURONIUM BROMIDE 20 MG: 10 INJECTION, SOLUTION INTRAVENOUS at 12:10

## 2020-10-14 RX ADMIN — PHENYLEPHRINE HYDROCHLORIDE 200 MCG: 10 INJECTION INTRAVENOUS at 08:10

## 2020-10-14 RX ADMIN — HEPARIN SODIUM 5000 UNITS: 5000 INJECTION INTRAVENOUS; SUBCUTANEOUS at 09:10

## 2020-10-14 RX ADMIN — PHYTONADIONE 10 MG: 10 INJECTION, EMULSION INTRAMUSCULAR; INTRAVENOUS; SUBCUTANEOUS at 10:10

## 2020-10-14 RX ADMIN — PROPOFOL 40 MCG/KG/MIN: 10 INJECTION, EMULSION INTRAVENOUS at 08:10

## 2020-10-14 RX ADMIN — PANTOPRAZOLE SODIUM 40 MG: 40 INJECTION, POWDER, LYOPHILIZED, FOR SOLUTION INTRAVENOUS at 08:10

## 2020-10-14 RX ADMIN — ROCURONIUM BROMIDE 30 MG: 10 INJECTION, SOLUTION INTRAVENOUS at 11:10

## 2020-10-14 RX ADMIN — MIDAZOLAM HYDROCHLORIDE 2 MG: 1 INJECTION, SOLUTION INTRAMUSCULAR; INTRAVENOUS at 04:10

## 2020-10-14 RX ADMIN — FENTANYL CITRATE 50 MCG: 50 INJECTION, SOLUTION INTRAMUSCULAR; INTRAVENOUS at 11:10

## 2020-10-14 RX ADMIN — ALBUMIN (HUMAN): 25 SOLUTION INTRAVENOUS at 07:10

## 2020-10-14 RX ADMIN — CALCIUM CHLORIDE 500 MG: 100 INJECTION, SOLUTION INTRAVENOUS at 09:10

## 2020-10-14 RX ADMIN — VASOPRESSIN 0.04 UNITS/MIN: 20 INJECTION INTRAVENOUS at 06:10

## 2020-10-14 RX ADMIN — AMPICILLIN SODIUM AND SULBACTAM SODIUM 3 G: 2; 1 INJECTION, POWDER, FOR SOLUTION INTRAMUSCULAR; INTRAVENOUS at 09:10

## 2020-10-14 RX ADMIN — SODIUM CHLORIDE, SODIUM GLUCONATE, SODIUM ACETATE, POTASSIUM CHLORIDE, MAGNESIUM CHLORIDE, SODIUM PHOSPHATE, DIBASIC, AND POTASSIUM PHOSPHATE: .53; .5; .37; .037; .03; .012; .00082 INJECTION, SOLUTION INTRAVENOUS at 11:10

## 2020-10-14 RX ADMIN — FUROSEMIDE 120 MG: 10 INJECTION, SOLUTION INTRAMUSCULAR; INTRAVENOUS at 03:10

## 2020-10-14 RX ADMIN — AMPICILLIN SODIUM AND SULBACTAM SODIUM 3 G: 2; 1 INJECTION, POWDER, FOR SOLUTION INTRAMUSCULAR; INTRAVENOUS at 08:10

## 2020-10-14 RX ADMIN — CHLOROTHIAZIDE SODIUM 500 MG: 500 INJECTION INTRAVENOUS at 04:10

## 2020-10-14 RX ADMIN — SODIUM CHLORIDE 9 UNITS/HR: 9 INJECTION, SOLUTION INTRAVENOUS at 05:10

## 2020-10-14 RX ADMIN — LIDOCAINE HYDROCHLORIDE 60 MG: 20 INJECTION, SOLUTION INTRAVENOUS at 04:10

## 2020-10-14 RX ADMIN — VECURONIUM BROMIDE FOR INJECTION 5 MG: 1 INJECTION, POWDER, LYOPHILIZED, FOR SOLUTION INTRAVENOUS at 06:10

## 2020-10-14 RX ADMIN — ROCURONIUM BROMIDE 50 MG: 10 INJECTION, SOLUTION INTRAVENOUS at 04:10

## 2020-10-14 RX ADMIN — FENTANYL CITRATE 150 MCG: 50 INJECTION, SOLUTION INTRAMUSCULAR; INTRAVENOUS at 04:10

## 2020-10-14 RX ADMIN — MANNITOL 25 G: 250 INJECTION, SOLUTION INTRAVENOUS at 09:10

## 2020-10-14 RX ADMIN — AMPICILLIN SODIUM AND SULBACTAM SODIUM 3 G: 2; 1 INJECTION, POWDER, FOR SOLUTION INTRAMUSCULAR; INTRAVENOUS at 07:10

## 2020-10-14 RX ADMIN — SODIUM CHLORIDE, SODIUM GLUCONATE, SODIUM ACETATE, POTASSIUM CHLORIDE, MAGNESIUM CHLORIDE, SODIUM PHOSPHATE, DIBASIC, AND POTASSIUM PHOSPHATE: .53; .5; .37; .037; .03; .012; .00082 INJECTION, SOLUTION INTRAVENOUS at 06:10

## 2020-10-14 RX ADMIN — TACROLIMUS 1 MG: 1 CAPSULE, GELATIN COATED ORAL at 06:10

## 2020-10-14 RX ADMIN — VECURONIUM BROMIDE FOR INJECTION 3 MG: 1 INJECTION, POWDER, LYOPHILIZED, FOR SOLUTION INTRAVENOUS at 08:10

## 2020-10-14 RX ADMIN — SODIUM BICARBONATE 650 MG TABLET 1950 MG: at 09:10

## 2020-10-14 RX ADMIN — ROCURONIUM BROMIDE 20 MG: 10 INJECTION, SOLUTION INTRAVENOUS at 06:10

## 2020-10-14 RX ADMIN — SODIUM CHLORIDE, SODIUM GLUCONATE, SODIUM ACETATE, POTASSIUM CHLORIDE, MAGNESIUM CHLORIDE, SODIUM PHOSPHATE, DIBASIC, AND POTASSIUM PHOSPHATE: .53; .5; .37; .037; .03; .012; .00082 INJECTION, SOLUTION INTRAVENOUS at 04:10

## 2020-10-14 RX ADMIN — PHYTONADIONE 10 MG: 10 INJECTION, EMULSION INTRAMUSCULAR; INTRAVENOUS; SUBCUTANEOUS at 03:10

## 2020-10-14 RX ADMIN — FUROSEMIDE 100 MG: 10 INJECTION, SOLUTION INTRAMUSCULAR; INTRAVENOUS at 09:10

## 2020-10-14 RX ADMIN — PROPOFOL 170 MG: 10 INJECTION, EMULSION INTRAVENOUS at 04:10

## 2020-10-14 RX ADMIN — CALCIUM CHLORIDE 1000 MG: 100 INJECTION, SOLUTION INTRAVENOUS at 10:10

## 2020-10-14 RX ADMIN — VECURONIUM BROMIDE FOR INJECTION 2 MG: 1 INJECTION, POWDER, LYOPHILIZED, FOR SOLUTION INTRAVENOUS at 10:10

## 2020-10-14 RX ADMIN — ALBUMIN (HUMAN): 12.5 SOLUTION INTRAVENOUS at 07:10

## 2020-10-14 RX ADMIN — AMPICILLIN SODIUM AND SULBACTAM SODIUM 3 G: 2; 1 INJECTION, POWDER, FOR SOLUTION INTRAMUSCULAR; INTRAVENOUS at 12:10

## 2020-10-14 RX ADMIN — PROTAMINE SULFATE 25 MG: 10 INJECTION, SOLUTION INTRAVENOUS at 11:10

## 2020-10-14 RX ADMIN — FUROSEMIDE 100 MG: 10 INJECTION, SOLUTION INTRAMUSCULAR; INTRAVENOUS at 06:10

## 2020-10-14 RX ADMIN — PROPOFOL 35 MCG/KG/MIN: 10 INJECTION, EMULSION INTRAVENOUS at 03:10

## 2020-10-14 RX ADMIN — MUPIROCIN 1 G: 20 OINTMENT TOPICAL at 08:10

## 2020-10-14 RX ADMIN — SODIUM CHLORIDE, SODIUM GLUCONATE, SODIUM ACETATE, POTASSIUM CHLORIDE, MAGNESIUM CHLORIDE, SODIUM PHOSPHATE, DIBASIC, AND POTASSIUM PHOSPHATE: .53; .5; .37; .037; .03; .012; .00082 INJECTION, SOLUTION INTRAVENOUS at 05:10

## 2020-10-14 RX ADMIN — SODIUM CHLORIDE 3 UNITS/HR: 9 INJECTION, SOLUTION INTRAVENOUS at 11:10

## 2020-10-14 RX ADMIN — ROCURONIUM BROMIDE 20 MG: 10 INJECTION, SOLUTION INTRAVENOUS at 11:10

## 2020-10-14 RX ADMIN — NOREPINEPHRINE BITARTRATE 32 MCG: 1 INJECTION, SOLUTION, CONCENTRATE INTRAVENOUS at 08:10

## 2020-10-14 RX ADMIN — ALBUMIN (HUMAN) 25 G: 12.5 SOLUTION INTRAVENOUS at 06:10

## 2020-10-14 RX ADMIN — NYSTATIN 500000 UNITS: 100000 SUSPENSION ORAL at 08:10

## 2020-10-14 RX ADMIN — MYCOPHENOLATE MOFETIL 1000 MG: 200 POWDER, FOR SUSPENSION ORAL at 09:10

## 2020-10-14 NOTE — ASSESSMENT & PLAN NOTE
Lab Results   Component Value Date    CREATININE 1.6 (H) 10/13/2020     Avoid insulin stacking  Titrate insulin slowly

## 2020-10-14 NOTE — HPI
Reason for Consult: Management of Hyperglycemia     Surgical Procedure and Date: Liver Transplant 10/14/20    Lab Results   Component Value Date    HGBA1C <4.0 (A) 10/13/2020     HPI:   Patient is a 45 y.o. 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  Patient is now s/p liver transplant and admitted to SICU. No personal or family hx of DM per chart review. Endocrinology consulted for management of hyperglycemia.

## 2020-10-14 NOTE — PLAN OF CARE
Primary team with concern for volume overload s/p liver transplant. Discussed with primary team: Recommend giving lasix 120mg IV and duril 500mg IV together for volume management since pt is still producing urine. Complete labs not yet resulted/current renal function not known. Will fulfill consult if pt does not respond to high-dose diuretics and/or pt with worsening renal function. Plan discussed with nephrology staff, Dr Hartley.      Please call for questions of concerns.     JEFFY Osorio, AGNP-C  Nephrology  Pager: [phone]

## 2020-10-14 NOTE — ANESTHESIA PROCEDURE NOTES
Intubation  Performed by: Usha Braden CRNA  Authorized by: Cm Peacock Jr., MD     Intubation:     Induction:  Intravenous    Intubated:  Postinduction    Mask Ventilation:  Easy mask    Attempts:  1    Attempted By:  CRNA    Method of Intubation:  Direct    Blade:  Marie 2    Laryngeal View Grade: Grade I - full view of chords      Difficult Airway Encountered?: No      Complications:  None    Airway Device Size:  7.5    Style/Cuff Inflation:  Cuffed (inflated to minimal occlusive pressure)    Tube secured:  21    Secured at:  The lips    Placement Verified By:  Capnometry    Complicating Factors:  None    Findings Post-Intubation:  BS equal bilateral

## 2020-10-14 NOTE — ANESTHESIA POSTPROCEDURE EVALUATION
Anesthesia Post Evaluation    Patient: Jordan Altman    Procedure(s) Performed: Procedure(s) (LRB):  TRANSPLANT, LIVER (N/A)    Final Anesthesia Type: general    Patient location during evaluation: ICU  Patient participation: No - Unable to Participate, Intubation  Level of consciousness: sedated  Post-procedure vital signs: reviewed and stable  Pain management: adequate  Airway patency: patent    PONV status at discharge: No PONV  Anesthetic complications: no      Cardiovascular status: hemodynamically stable  Respiratory status: ETT, intubated and ventilator  Hydration status: euvolemic  Follow-up not needed.          Vitals Value Taken Time   /55 10/14/20 1445   Temp 36.8 °C (98.2 °F) 10/14/20 1600   Pulse 85 10/14/20 1709   Resp 16 10/14/20 1709   SpO2 99 % 10/14/20 1709   Vitals shown include unvalidated device data.      No case tracking events are documented in the log.      Pain/Trina Score: Pain Rating Prior to Med Admin: 6 (10/13/2020  8:22 AM)  Pain Rating Post Med Admin: 3 (10/13/2020  9:22 AM)  Trina Score: 10 (10/13/2020  2:00 PM)

## 2020-10-14 NOTE — OP NOTE
Certification of Assistant at Surgery       Surgery Date: 10/14/2020     Participating Surgeons:  Surgeon(s) and Role:     * Curtis Zavaleta MD - Primary     * Mayito Lynne Jr., MD - Assisting    Procedures:  Procedure(s) (LRB):  TRANSPLANT, LIVER (N/A)    Assistant Surgeon's Certification of Necessity:  I understand that section 1842 (b) (6) (d) of the Social Security Act generally prohibits Medicare Part B reasonable charge payment for the services of assistants at surgery in teaching hospitals when qualified residents are available to furnish such services. I certify that the services for which payment is claimed were medically necessary, and that no qualified resident was available to perform the services. I further understand that these services are subject to post-payment review by the Medicare carrier.      Mayito Lynne Jr, MD    10/14/2020  10:11 AM

## 2020-10-14 NOTE — ASSESSMENT & PLAN NOTE
BG goal 140 - 180     Continue IV insulin infusion protocol  Requires intensive BG monitoring while on protocol     ** Please call Endocrine for any BG related issues **  ** Please notify Endocrine for any change and/or advance in diet**    Discharge planning: [facility]

## 2020-10-14 NOTE — HPI
45 year old male with PMH of alcohol abuse, alcoholic cirrhosis (diagnosed one year ago and complicated by ascites), portal hypertension, hepatic encephalopathy, thrombocytopenia, coagulopathy presented on 9/19 as a transfer from [facility] for management of acute decompensated cirrhosis.

## 2020-10-14 NOTE — ANESTHESIA PROCEDURE NOTES
Arterial    Diagnosis: esld    Patient location during procedure: done in OR  Procedure start time: 10/14/2020 5:01 AM  Timeout: 10/14/2020 5:00 AM  Procedure end time: 10/14/2020 5:06 AM    Staffing  Authorizing Provider: Cm Peacock Jr., MD  Performing Provider: Yogesh Alexander CRNA    Anesthesiologist was present at the time of the procedure.    Preanesthetic Checklist  Completed: patient identified, site marked, surgical consent, pre-op evaluation, timeout performed, IV checked, risks and benefits discussed, monitors and equipment checked and anesthesia consent givenArterial  Skin Prep: chlorhexidine gluconate  Local Infiltration: none  Orientation: left  Location: radial  Catheter Size: 20 G  Catheter placement by Anatomical landmarks. Heme positive aspiration all ports.Insertion Attempts: 1  Assessment  Dressing: secured with tape and tegaderm

## 2020-10-14 NOTE — SUBJECTIVE & OBJECTIVE
Interval HPI:   Overnight events: Transferred from OR to SICU intubated s/p liver transplant. BG slightly above goal ranges on IV intensive insulin protocol with infusion rate at 3.5 u/hr. Received Solu Medrol 500 mg IV this morning.   Eating:   NPO  Nausea: No  Hypoglycemia and intervention: No  Fever: No  TPN and/or TF: No  If yes, type of TF/TPN and rate: none    PMH, PSH, FH, SH reviewed     ROS:  Unable to obtain due to: Sedation,Intubation,Altered mental status,Critical illness,Reviewed ROS from note dated 10/11/12 by Awa Sun NP    Review of Systems    Current Medications and/or Treatments Impacting Glycemic Control  Immunotherapy:    Immunosuppressants         Stop Route Frequency     tacrolimus (PROGRAF) 1 mg/mL oral syringe      -- Oral 2 times daily     mycophenolate mofetil 200 mg/mL suspension 1,000 mg      -- Oral 2 times daily        Steroids:   Hormones (From admission, onward)    Start     Stop Route Frequency Ordered    10/20/20 0900  predniSONE tablet 20 mg  (methylprednisolone taper panel)      -- Oral Daily 10/14/20 1339    10/19/20 0900  methylPREDNISolone sodium succinate injection 20 mg  (methylprednisolone taper panel)      10/20 0859 IV Every 12 hours 10/14/20 1339    10/18/20 0900  methylPREDNISolone sodium succinate injection 40 mg  (methylprednisolone taper panel)      10/19 0859 IV Every 12 hours 10/14/20 1339    10/17/20 0900  methylPREDNISolone sodium succinate injection 60 mg  (methylprednisolone taper panel)      10/18 0859 IV Every 12 hours 10/14/20 1339    10/16/20 0900  methylPREDNISolone sodium succinate injection 80 mg  (methylprednisolone taper panel)      10/17 0859 IV Every 12 hours 10/14/20 1339    10/15/20 0900  methylPREDNISolone sodium succinate injection 100 mg  (methylprednisolone taper panel)      10/16 0859 IV Every 12 hours 10/14/20 1339    10/11/20 0217  melatonin tablet 6 mg      -- Oral Nightly PRN 10/11/20 0217        Pressors:    Autonomic Drugs (From  admission, onward)    None        Hyperglycemia/Diabetes Medications:   Antihyperglycemics (From admission, onward)    Start     Stop Route Frequency Ordered    10/14/20 1445  insulin regular 100 Units in sodium chloride 0.9% 100 mL infusion     Question:  Insulin Rate Adjustment (DO NOT MODIFY ANSWER)  Answer:  [id number]    -- IV Continuous 10/14/20 1339             PHYSICAL EXAMINATION:  Vitals:    10/14/20 1400   BP:    Pulse: 89   Resp: (!) 22   Temp:      Body mass index is 34.27 kg/m².    Physical Exam   Constitutional:  Well developed, well nourished, NAD.  ENT: External ears no masses with nose patent  Neck:  Supple; trachea midline  Cardiovascular: Normal heart sounds, no LE edema.     Lungs:  Normal effort; lungs anterior bilaterally clear to auscultation.  Intubated on a ventilator.  Abdomen:  Soft, no masses, no hernias. Hypoactive BS noted.  MS: No clubbing or cyanosis of nails noted; unable to assess gait.  Skin: No rashes, lesions, or ulcers; no nodules.  Chevron abdominal incision with dressing; ADAM x 2 to RLQ.  Psychiatric: MAY  Neurological: MAY

## 2020-10-14 NOTE — OP NOTE
Operative Report    Date of Procedure: 10/14/2020    Surgeon: Mayito Lynne Jr, MD  First Assistant: José Luis ANDERSON    Pre-operative Diagnosis: Allograft liver for transplantation  Post-operative Diagnosis: Same    Procedure(s) Performed:   1. Back Table Preparation of Liver with needle biopsy .    Anesthesia: Not applicable  Estimated Blood Loss: Not applicable  Fluids Administered: Not applicable    Findings: Estimated steatosis 0-10%, normal vascular anatomy.  Drains: Not applicable  Specimens: Core biopsy of allograft liver      Preamble  Indications: This report describes only the backbench preparation of the liver prior to transplantation.  The transplant operation itself is described in a separate report.    ABO Confirmation: Immediately following arrival of the donor organ and prior to implantation, a formal ABO confirmation was done according to hospital and UNOS policies.  I confirmed the UNOS ID number of the donor organ and the donor and recipient ABO types, directly verifying these data by comparison with the UNOS Match Run report.  This confirmation was personally done by an attending surgeon and circulating nurse, and is officially documented elsewhere.    Time-Out: A complete time out was carried out prior to the procedure, with confirmation of patient identity, correct procedure, correct operative site, appropriate antibiotic prophylaxis, review of any known allergies, and presence of all needed equipment.    Procedure in Detail  The liver was recovered from the transport cooler and inspected for vascular anatomy and overall suitability for transplantation, with findings as noted above.  The remnant of the diaphragm was dissected away.  The phrenic veins and adrenal vein were identified and ligated or sutured as appropriate.  The portal vein and hepatic artery were mobilized toward the hilum of the liver, and extrahepatic branches were ligated.  No vascular reconstruction was required.  The  vessels were tested for leaks.  A needle biopsy was obtained for permanent section histology using a spring-loaded biopsy device. The liver was kept in ice temperature organ preservation solution until the time of implantation.

## 2020-10-14 NOTE — ANESTHESIA PROCEDURE NOTES
Pratt Jose Line    Diagnosis: ESLD  Patient location during procedure: done in OR  Procedure start time: 10/14/2020 5:30 AM  Timeout: 10/14/2020 5:20 AM  Procedure end time: 10/14/2020 6:00 AM    Staffing  Authorizing Provider: Cm Peacock Jr., MD  Performing Provider: Isauro Jose MD    Anesthesiologist was present at the time of the procedure.  Preanesthetic Checklist  Completed: patient identified, surgical consent, pre-op evaluation, timeout performed, IV checked, risks and benefits discussed, monitors and equipment checked and anesthesia consent given  Pratt Jose Line  Skin Prep: chlorhexidine gluconate and isopropyl alcohol  Location: right,  internal jugular vein  Vessel Caliber: large, patent, compressibility normal  Introducer: 9.5 Fr, manometry used.  Device: CCO/Oximetric Catheter  Catheter Size: 7.5 Fr  Catheter placement by yes. Heme positive aspiration all ports. PAC floated with balloon up until wedgedSterile sheath used  Locked at: 50 cm.Insertion Attempts: 1  Indication: intravenous therapy  Ultrasound Guidance  Needle advanced into vessel with real time Ultrasound guidance.  Guidewire confirmed in vessel.  Sterile sheath used.  Assessment  Central Line Bundle Protocol followed. Hand hygiene before procedure, surgical cap worn, surgical mask worn, sterile surgical gloves worn, large sterile drape used.  Verification: blood return and ultrasound  Dressing: secured with tape and tegaderm, secured with tape, sutured in place and taped and tegaderm  Patient: Tolerated Well

## 2020-10-14 NOTE — OP NOTE
Operative Report    Date of Procedure: 10/14/2020  Date of Transplant (UNOS): 10/14/20    Surgeons:  Surgeon(s) and Role:     * Curtis Zavaleta MD - Primary     * Mayito Lynne Jr., MD - Assisting  Estela Dong MD Fellow    First Assistant Attestation:  The presence of an additional attending surgeon functioning as first assistant was required due to the complexity of the procedure relative to any available residents. I certify that no resident was available who was qualified to serve as first assistant. Duties performed by the assistant included assisting the primary surgeon.    Pre-operative Diagnosis (UNOS): End Stage Liver Disease due to Alcoholic Cirrhosis,  and Chronic hepatic failure without coma K72.10    Post-operative Diagnosis: Same; portal vein status:  patent    Principal Procedure Perfomed: Orthotopic Liver Transplant  (whole liver, DBD donor, biliary reconstruction end to end donor common bile duct to recipient common bile duct  )  Additional Procedures Perfomed:   None    Anesthesia: General endotracheal  Findings: cirrhosis, portal hypertension and ascites    Preamble  Indications and Patient Counseling: The patient is a 45 y.o. year-old male with Alcoholic Cirrhosis,  (UNOS terminology) who has been evaluated for a liver transplant.  The procedure was thoroughly discussed with the patient, including potential risks, complications, and alternatives. Specific complications mentioned included death, graft non-function, bleeding, infection, rejection, renal failure, respiratory failure, and neurologic deficits, as well as the possibility of other complications not specifically mentioned.    Donor Risk Factors:  Prior to the operation, the patient was advised of any donor-specific risk factors requiring specific disclosure. Factors in this case included PHS increased risk behavior or donor HCV+.      Specific PHS Increased Risk Behavior criteria for the organ donor include:  People who have  injected drugs by IV, IM, or subQ route for nonmedical reasons in the preceding 12 months    All questions were answered, the patient voiced appropriate understanding, and he agreed to proceed with the planned procedure.    ABO Confirmation: Immediately following arrival of the donor organ and prior to implantation, a formal ABO confirmation was done according to hospital and UNOS policies.  I confirmed the UNOS ID number of the donor organ ([id number]) and the donor and recipient ABO types, directly verifying these data by comparison with the UNOS Match Run report ([id number].  This confirmation was personally done by an attending surgeon and circulating nurse, and is officially documented elsewhere.    Time-Out: A complete time out was carried out prior to incision, with confirmation of patient identity, correct procedure, correct operative site, appropriate antibiotic prophylaxis, review of any known allergies, and presence of all needed equipment.    Procedure in Detail  Following the induction of general endotracheal anesthesia, appropriate arterial and venous lines were placed by the anesthesia team.  Care was taken to pad all pressure points and avoid any potential traction injuries from positioning. The urinary bladder was catheterized.  Sequential compression boots and Eddie Huggers were used. The chest, abdomen, and upper thighs were sterilely prepped and draped.     The abdomen was entered via a wide bilateral subcostal incision. 2,000 mL of ascites was removed. The round ligament was ligated and divided. The falciform, left triangular, and gastrohepatic ligaments were divided using the electocautery, with 2-0 silk ties as needed. The Mijares self-retaining retractor was placed to provide exposure. Adhesions between the abdominal viscera and the abdominal wall and the undersurface of the liver were divided as needed using cautery dissection and silk ties or suture ligatures. Hilar dissection was then carried  out, with ligation of the right and left hepatic arteries as well as the cystic duct and the common hepatic duct. The recipient arterial anatomy was found to be: standard. The portal vein was exposed circumferentially from its bifurcation down to the superior border of the pancreas. The portal vein was found to be patent.  Attention was next directed to the right side of the liver where the right triangular ligament was taken down, exposing the bare area of the liver, and the IVC. The infrahepatic IVC was isolated, followed by mobilization of the retrohepatic cava, division of the right adrenal vein, and isolation of the suprahepatic IVC. The patient was given 2500 units of heparin prior to caval cross-clamping. . After assuring adequate stability after cross-clamping, the native liver was excised and the allograft liver was brought to the operative field.  The liver was perfused with cold 5% albumin during implantation to displace the organ preservation solution.  IVC reconstruction technique consisted of end to end ivc using 3-0 and 4-0 polypropylene as appropriate.  The donor portal vein was then anastomosed to the recipient portal inflow site (end portal vein) with 5-0 polypropylene. Once this was completed, anesthesia was notified and the liver was re-perfused. Copious irrigation with warm saline and temporary finger occlusion of portal inflow were used as needed to avoid any problems with hypothermia. Temporary packing, electocautery, and polypropylene suture ligatures were used as appropriate to provide hemostasis. Once this was satisfactory, attention was directed to the arterial reconstruction. This liver did not come from a DCD donor. Arterial inflow was provided to the graft by anastomosing the recipient right-left hepatic branch patch to the donor  common hepatic artery using 6-0 polypropylene. The liver was assessed for adequacy of perfusion, which was satisfactory. The gallbladder was then removed from  the allograft liver, and a temporary packing period was carried out to help assure hemostatis.  Attention was next directed toward the bile duct. The donor bile duct was prepared and assessed for adequate vascular supply. Biliary reconstruction was then performed by anastomosing the recipient common bile duct to the donor duct using 6-0 PDS sutures.  The biliary stent used was: none.  A final check for hemostasis was made. 2 19f Venkatesh drains were placed through separate incisions below the transverse abdominal incision and placed in the usual positions. The cavity was irrigated with saline. The abdomen was closed in layers using running #1 PDS suture. The incision was irrigated and the skin was closed with staples. At the end of the case all instrument, needle, and sponge counts were correct. The patient was taken to the ICU in good condition.     Fluids Administered:   Crystalloid (mL) 6,500   Colloid (mL) 600   RBC (Units) 7   FFP (Units) 7   Cryoprecipitate (Units)  3   Platelets (Units) 3   Cell Saver (CCs) 2,020      Specimens: Explant liver  Drains: 19f Venkatesh drains x 2    Additional Findings:    Estimated Blood Loss: 6,000 mL  Ascites Evacuated: 2,000 mL    Ischemic Times:  Time of portal reperfusion: 10/14/2020  9:35 AM  Time of arterial reperfusion: 10/14/2020 10:11 AM  Anastomosis (warm ischemia) time: 32 minutes  Cold ischemia time: 366 minutes    Surgical Data:  Graft type (whole vs. partial): whole liver  IVC reconstruction: end to end ivc  Portal vein status: patent  Portal graft performed? no  Donor arterial anatomy: standard  Donor arterial inflow: common hepatic artery  Recipient arterial anatomy: standard  Recipient arterial inflow: right-left hepatic branch patch  Arterial graft performed? no  Biliary reconstruction: end to end (donor common bile duct to recipient common bile duct)  Biliary stent: none  Disposition of Vessels from donor of transplanted organ: discarded  Damage during procurement:  no  Procurement damage comments: none  Donor Factors:  UNOS ID: )[id number]  UNOS Match Run: [id number]  Donor type: donation after brain death  Donor with reported PHS increased risk behavior: yes  Donor CMV serologic status: Positive  Donor with known cancer: no  Donor HCV serologic status: Positive  Donor HBcAb: Negative  Donor HBV SHAYE: Negative  Donor HCV SHAYE: Positive  Liver weight: 1785 grams

## 2020-10-14 NOTE — PLAN OF CARE
Pt admitted to SICU status post Liver transplant. Current gtts: Propofol, Insulin. Follows commands, arouses to voice. A/C 50%, 5 peep.   OGT to LIWS, Duran catheter with minimal urine output. Albumin 1L given Post op. REfer to flowsheet for vs and assessment.

## 2020-10-14 NOTE — SIGNIFICANT EVENT
Pre-operative Discussion Note  Liver Transplant Surgery    This discussion occurred in patient's room at 4;45 pm on October 13, 2020.    Jordan Altman is a 45 y.o. male admitted for liver transplant.  I discussed the planned procedure in detail, including expected hospital course and outcomes, benefits, risks, and potential complications.  Complications discussed included death, graft failure, bleeding, infection, rejection, and neurologic problems.  I discussed the risks of anesthesia, as well as the potential need for re-operation.  The possibility of other complications not specifically mentioned was also discussed.  Also, I discussed the need for lifelong immunosuppression and the possibility of serious complications from immunosuppressive drugs.    The discussion included the risks that the patient will incur if he elects to not have the proposed procedure.    Relevant donor-specific risk factors were disclosed and discussed with the patient, including:   PHS: I discussed the use of organs from donors with PHS increased-risk behavior, including the testing protocols utilized, as well as data from the literature regarding the likelihood of transmission of hepatitis or HIV.  The patient that he is willing to consider such grafts.  HCV Non-viremic recipient: I discussed the use of HCV-positive organs in naive recipients, including the risk of viral transmission to the patients or others, potential insurance barriers for antiviral medication coverage, risk for fibrosing cholestatic hepatitis, death or graft loss. The potential advantage to the recipient is the possibility of receiving a transplant sooner with decreased mortality risk by accepting such an organ. The patient that he is willing to consider such grafts.    Specific PHS Increased Risk Behavior criteria for the organ donor include:  People who have had sex with a person that has injected drugs by IV, IM, or subQ route for nonmedical reasons in the  preceding 12 months    HCV Infection Transmission: I emphasized that there is potential for HCV transmission. If transmission to the recipient occurs, there is a risk for transmission to patient contacts, fibrosing cholestatic hepatitis, death or graft loss. Should he/she acquire HCV infection, although current treatments are highly effective at cure, this is not guranteed. There may also be insurance barriers to anti-viral medication coverage.    Hepatocellular Carcinoma Recurrence: Not applicable.    COVID-19: I discussed the possibility of COVID-19 transmission with the patient. Although SHAYE testing is available for the virus using a technique which in theory should be very accurate, there is no data yet regarding the likelihood of a  false-negative test leading to virus transmission. Based on accuracy of testing for other viruses, it is expected that this risk is extremely small.     I also discussed that transplant immunosuppression will increase susceptibility to COVID-19 and other viruses, and that although we use stringent precautions to protect patients from infection, it is possible for a transplant recipient to contract this infection. If COVID-19 infection should occur, it would be a serious matter with a significant risk of death.    All questions were answered.  The patient and available family members voice understanding and agree to proceed with the transplant.    UNOS Patient Status  Note on scores:  ICU = 10 = total assistance  TSU = 20-30 = partial assistance  Outpatient admitted for transplant requiring medical care in last year = 40-50 = partial assistance  Scores 60 or higher indicate no assistance, meaning no need for medical care in last year. This would be very unusual for a transplant candidate.    Functional Status: 20% - Very sick, hospitalization necessary: active treatment necessary  Physical Capacity: Not Applicable (< 1 year old or hospitalized)

## 2020-10-14 NOTE — ANESTHESIA PROCEDURE NOTES
Central Line    Diagnosis: ESLD  Patient location during procedure: done in OR  Procedure start time: 10/14/2020 5:30 AM  Timeout: 10/14/2020 5:20 AM  Procedure end time: 10/14/2020 6:00 AM    Staffing  Authorizing Provider: Cm Peacock Jr., MD  Performing Provider: Isauro Jose MD    Staffing  Anesthesiologist: Cm Peacock Jr., MD  Other anesthesia staff: Isauro Jose MD  Performed: other anesthesia staff   Anesthesiologist was present at the time of the procedure.  Preanesthetic Checklist  Completed: patient identified, surgical consent, pre-op evaluation, timeout performed, IV checked, risks and benefits discussed, monitors and equipment checked and anesthesia consent given  Indication   Indication: hemodynamic monitoring, vascular access, med administration, hemodialysis     Anesthesia   general anesthesia    Central Line   Skin Prep: skin prepped with ChloraPrep, skin prep agent completely dried prior to procedure  maximum sterile barriers used during central venous catheter insertion  hand hygiene performed prior to central venous catheter insertion  Location: right, internal jugular.   Catheter type: triple lumen  Catheter Size: 12 Fr  Inserted Catheter Length: 15 cm  Ultrasound: vascular probe with ultrasound  Vessel Caliber: large, patent, compressibility normal  Needle advanced into vessel with real time Ultrasound guidance.  Guidewire confirmed in vessel.   Manometry: Venous cannualation confirmed by visual estimation of blood vessel pressure using manometry.  Insertion Attempts: 1   Securement:line sutured, chlorhexidine patch, sterile dressing applied and blood return through all ports    Post-Procedure   Adverse Events:none    Guidewire Guidewire removed intact.

## 2020-10-14 NOTE — ANESTHESIA PROCEDURE NOTES
Central Line    Diagnosis: esld  Patient location during procedure: done in OR  Procedure start time: 10/14/2020 5:00 AM  Procedure end time: 10/14/2020 5:15 AM    Staffing  Authorizing Provider: Cm Peacock Jr., MD  Performing Provider: Cm Peacock Jr., MD    Staffing  Anesthesiologist: Cm Peacock Jr., MD  Performed: anesthesiologist   Anesthesiologist was present at the time of the procedure.  Preanesthetic Checklist  Completed: patient identified, site marked, surgical consent, pre-op evaluation, timeout performed, IV checked, risks and benefits discussed, monitors and equipment checked and anesthesia consent given  Indication   Indication: vascular access, hemodialysis     Anesthesia   general anesthesia    Central Line   Skin Prep: skin prepped with ChloraPrep, skin prep agent completely dried prior to procedure  maximum sterile barriers used during central venous catheter insertion  hand hygiene performed prior to central venous catheter insertion  Location: right, femoral.   Catheter type: dialysis  Catheter Size: 12 Fr (11.5)  Inserted Catheter Length: 19.5 cm  Ultrasound: vascular probe with ultrasound  Vessel Caliber: medium, patent, compressibility normal  Needle advanced into vessel with real time Ultrasound guidance.  Guidewire confirmed in vessel.   Manometry: Venous cannualation confirmed by visual estimation of blood vessel pressure using manometry.  Insertion Attempts: 1   Securement:line sutured, chlorhexidine patch, sterile dressing applied and blood return through all ports    Post-Procedure   Adverse Events:none    Guidewire Guidewire removed intact.

## 2020-10-14 NOTE — CONSULTS
[facility]  Endocrinology  Diabetes Consult Note    Consult Requested by: Moon Moreno MD   Reason for admit: Status post liver transplant    HISTORY OF PRESENT ILLNESS:  Reason for Consult: Management of Hyperglycemia     Surgical Procedure and Date: Liver Transplant 10/14/20    Lab Results   Component Value Date    HGBA1C <4.0 (A) 10/13/2020     HPI:   Patient is a 45 y.o. 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  Patient is now s/p liver transplant and admitted to SICU. No personal or family hx of DM per chart review. Endocrinology consulted for management of hyperglycemia.           Interval HPI:   Overnight events: Transferred from OR to SICU intubated s/p liver transplant. BG slightly above goal ranges on IV intensive insulin protocol with infusion rate at 3.5 u/hr. Received Solu Medrol 500 mg IV this morning.   Eating:   NPO  Nausea: No  Hypoglycemia and intervention: No  Fever: No  TPN and/or TF: No  If yes, type of TF/TPN and rate: none    PMH, PSH, FH, SH reviewed     ROS:  Unable to obtain due to: Sedation,Intubation,Altered mental status,Critical illness,Reviewed ROS from note dated 10/11/12 by Awa Sun NP    Review of Systems    Current Medications and/or Treatments Impacting Glycemic Control  Immunotherapy:    Immunosuppressants         Stop Route Frequency     tacrolimus (PROGRAF) 1 mg/mL oral syringe      -- Oral 2 times daily     mycophenolate mofetil 200 mg/mL suspension 1,000 mg      -- Oral 2 times daily        Steroids:   Hormones (From admission, onward)    Start     Stop Route Frequency Ordered    10/20/20 0900  predniSONE tablet 20 mg  (methylprednisolone taper panel)      -- Oral Daily 10/14/20 1339    10/19/20 0900  methylPREDNISolone sodium succinate injection 20 mg  (methylprednisolone taper panel)      10/20 0859 IV Every 12 hours 10/14/20 1339    10/18/20 0900  methylPREDNISolone sodium  succinate injection 40 mg  (methylprednisolone taper panel)      10/19 0859 IV Every 12 hours 10/14/20 1339    10/17/20 0900  methylPREDNISolone sodium succinate injection 60 mg  (methylprednisolone taper panel)      10/18 0859 IV Every 12 hours 10/14/20 1339    10/16/20 0900  methylPREDNISolone sodium succinate injection 80 mg  (methylprednisolone taper panel)      10/17 0859 IV Every 12 hours 10/14/20 1339    10/15/20 0900  methylPREDNISolone sodium succinate injection 100 mg  (methylprednisolone taper panel)      10/16 0859 IV Every 12 hours 10/14/20 1339    10/11/20 0217  melatonin tablet 6 mg      -- Oral Nightly PRN 10/11/20 0217        Pressors:    Autonomic Drugs (From admission, onward)    None        Hyperglycemia/Diabetes Medications:   Antihyperglycemics (From admission, onward)    Start     Stop Route Frequency Ordered    10/14/20 1445  insulin regular 100 Units in sodium chloride 0.9% 100 mL infusion     Question:  Insulin Rate Adjustment (DO NOT MODIFY ANSWER)  Answer:  \\[email or web address] [id number]    -- IV Continuous 10/14/20 1339             PHYSICAL EXAMINATION:  Vitals:    10/14/20 1400   BP:    Pulse: 89   Resp: (!) 22   Temp:      Body mass index is 34.27 kg/m².    Physical Exam   Constitutional:  Well developed, well nourished, NAD.  ENT: External ears no masses with nose patent  Neck:  Supple; trachea midline  Cardiovascular: Normal heart sounds, no LE edema.     Lungs:  Normal effort; lungs anterior bilaterally clear to auscultation.  Intubated on a ventilator.  Abdomen:  Soft, no masses, no hernias. Hypoactive BS noted.  MS: No clubbing or cyanosis of nails noted; unable to assess gait.  Skin: No rashes, lesions, or ulcers; no nodules.  Chevron abdominal incision with dressing; ADAM x 2 to RLQ.  Psychiatric: MAY  Neurological: MAY        Labs Reviewed and Include   Recent Labs   Lab 10/14/20  1402   *  190*   CALCIUM 8.2*  8.2*   ALBUMIN  2.2*  2.2*   PROT 3.7*  3.7*     138   K 4.0  4.0   CO2 22*  22*     102   BUN 15  15   CREATININE 0.9  0.9   ALKPHOS 79  79   *  604*   AST 1,215*  1,215*   BILITOT 10.8*  10.8*     Lab Results   Component Value Date    WBC 8.90 10/14/2020    HGB 9.4 (L) 10/14/2020    HCT 27 (L) 10/14/2020    MCV 96 10/14/2020    PLT 57 (L) 10/14/2020     No results for input(s): TSH, FREET4 in the last 168 hours.  Lab Results   Component Value Date    HGBA1C <4.0 (A) 10/13/2020       Nutritional status:   Body mass index is 34.27 kg/m².  Lab Results   Component Value Date    ALBUMIN 2.2 (L) 10/14/2020    ALBUMIN 2.2 (L) 10/14/2020    ALBUMIN 2.1 (L) 10/14/2020     No results found for: PREALBUMIN    Estimated Creatinine Clearance: 132.6 mL/min (based on SCr of 0.9 mg/dL).    Accu-Checks  Recent Labs     10/14/20  1400 10/14/20  1503   POCTGLUCOSE 186* 185*        ASSESSMENT and PLAN    * Status post liver transplant  Managed per primary team  Optimize BG control      Hyperglycemia  BG goal 140 - 180     Continue IV insulin infusion protocol  Requires intensive BG monitoring while on protocol     ** Please call Endocrine for any BG related issues **  ** Please notify Endocrine for any change and/or advance in diet**    Discharge planning: TBD        JENN (acute kidney injury)  Lab Results   Component Value Date    CREATININE 1.6 (H) 10/13/2020     Avoid insulin stacking  Titrate insulin slowly      Class 1 obesity due to excess calories with serious comorbidity and body mass index (BMI) of 34.0 to 34.9 in adult  Body mass index is 34.27 kg/m².  May increase insulin resistance.       Adverse effect of corticosteroids  Glucocorticoids markedly increase glucoses. Expect steroid taper to help with glucose control.      Prophylactic immunotherapy  May increase insulin resistance.             Plan discussed with RN at bedside.     Sloane Patterson NP  Endocrinology  [facility]

## 2020-10-14 NOTE — PROGRESS NOTES
TRANSPLANT NOTE:    Admit Date: 10/11/2020    ORGAN:   LIVER  Disease Etiology: Alcoholic Cirrhosis  Donor CMV Status: Positive  Donor HCV Status: Positive  Donor HBcAb: Negative  Donor HBV SHAYE: Negative  Donor HCV SHAYE: Positive  Whole or Partial: Whole Liver  Biliary Anastomosis:   Arterial Anatomy: Standard    Jordan Altman is a 45 y.o. male s/p    Donation after Brain Death liver transplant on 10/14/2020 (Liver) for Alcoholic Cirrhosis.  This patient will follow the Steroid Induction protocol.  This patients immunosuppression will include a steroid taper over 5 weeks, Cellcept for 3 months and Prograf maintenance.  Opportunistic infection prophylaxis will include Valcyte for 6 months (CMV D+ R-), Bactrim for 6 months, and nystatin.  I have reviewed the pre-op medications and those have been restarted those, as appropriate.

## 2020-10-14 NOTE — H&P
[facility]  General Surgery  History & Physical    Patient Name: Jordan Altman  MRN: [id number]  Admission Date: 10/11/2020  Attending Physician: Moon Moreno MD   Primary Care Provider: SHAHEEN Jaime    Patient information was obtained from past medical records.     Subjective:     Chief Complaint/Reason for Admission: s/p DBD liver transplant for alcoholic cirrhosis    History of Present Illness:  Patient is a 45 y.o. male admitted 9/19 for decompensated alcoholic cirrhosis, now s/p DBD liver transplant. Hospital stay was complicated by GI bleed.    Surgery proceeded relatively uneventfully; patient somewhat oozy during the case. Fluids received as below:    Crystalloid (mL) 6,500   Colloid (mL) 600   RBC (Units) 7   FFP (Units) 7   Cryoprecipitate (Units)  3   Platelets (Units) 3   Cell Saver (CCs) 2,020       Current Facility-Administered Medications:     0.9%  NaCl infusion (for blood administration), , Intravenous, Q24H PRN, Joe Virk NP    0.9%  NaCl infusion (for blood administration), , Intravenous, Q24H PRN, Joe Virk NP    0.9%  NaCl infusion (for blood administration), , Intravenous, Q24H PRN, Joe Virk NP    acetaminophen tablet 650 mg, 650 mg, Oral, Q8H PRN, Awa Sun NP, 650 mg at 10/13/20 0822    ampicillin-sulbactam 3 g in sodium chloride 0.9 % 100 mL IVPB (ready to mix system), 3 g, Intravenous, Q6H, Estela Ken MD    cefTRIAXone injection 1 g, 1 g, Intravenous, Q24H, Awa Sun NP, 1 g at 10/14/20 0308    dextrose 5 % and 0.45 % NaCl infusion, , Intravenous, Continuous, Estela Ken MD    dextrose 50% injection 12.5 g, 12.5 g, Intravenous, PRN, Estela Ken MD    dextrose 50% injection 25 g, 25 g, Intravenous, PRN, Estela Ken MD    fluconazole (DIFLUCAN) IVPB 400 mg 200 mL, 400 mg, Intravenous, Q24H, Estela Ken MD    folic acid tablet 1 mg, 1 mg, Oral, Daily,  Awa Sun NP, 1 mg at 10/13/20 0811    heparin (porcine) injection 5,000 Units, 5,000 Units, Subcutaneous, Q8H, Estela Ken MD    HYDROmorphone injection 0.5 mg, 0.5 mg, Intravenous, Q1H PRN, Estela Ken MD    insulin regular 100 Units in sodium chloride 0.9% 100 mL infusion, 3.5 Units/hr, Intravenous, Continuous, Estela Ken MD    melatonin tablet 6 mg, 6 mg, Oral, Nightly PRN, Awa Sun NP, 6 mg at 10/11/20 0227    [START ON 10/15/2020] methylPREDNISolone sodium succinate injection 100 mg, 100 mg, Intravenous, Q12H **FOLLOWED BY** [START ON 10/16/2020] methylPREDNISolone sodium succinate injection 80 mg, 80 mg, Intravenous, Q12H **FOLLOWED BY** [START ON 10/17/2020] methylPREDNISolone sodium succinate injection 60 mg, 60 mg, Intravenous, Q12H **FOLLOWED BY** [START ON 10/18/2020] methylPREDNISolone sodium succinate injection 40 mg, 40 mg, Intravenous, Q12H **FOLLOWED BY** [START ON 10/19/2020] methylPREDNISolone sodium succinate injection 20 mg, 20 mg, Intravenous, Q12H **FOLLOWED BY** [START ON 10/20/2020] predniSONE tablet 20 mg, 20 mg, Oral, Daily, Estela Ken MD    mupirocin 2 % ointment 1 g, 1 g, Nasal, BID, Estela Ken MD    mycophenolate mofetil 200 mg/mL suspension 1,000 mg, 1,000 mg, Oral, BID, Estela Ken MD    nystatin 100,000 unit/mL suspension 500,000 Units, 500,000 Units, Mouth/Throat, TID PC, Estela Ken MD    ondansetron injection 4 mg, 4 mg, Intravenous, Q6H PRN, Awa Sun NP    pantoprazole injection 40 mg, 40 mg, Intravenous, BID, Awa Sun NP, 40 mg at 10/13/20 2100    phytonadione vitamin k (AQUA-MEPHYTON) 10 mg in dextrose 5 % 50 mL IVPB, 10 mg, Intravenous, Q8H, Estela Ken MD    propofol (DIPRIVAN) 10 mg/mL infusion, 0-50 mcg/kg/min, Intravenous, Continuous, Estela Ken MD    sodium bicarbonate tablet 1,950 mg, 1,950 mg, Oral, TID, Joe Virk NP,  1,950 mg at 10/13/20 2100    sodium chloride 0.9% flush 10 mL, 10 mL, Intravenous, PRN, Awa Sun NP    [START ON 10/21/2020] sulfamethoxazole-trimethoprim 400-80mg per tablet 1 tablet, 1 tablet, Oral, Daily AM, Estela Ken MD    tacrolimus (PROGRAF) 1 mg/mL oral syringe, 1 mg, Oral, BID, Estela Ken MD    thiamine tablet 100 mg, 100 mg, Oral, Daily, Awa Sun NP, 100 mg at 10/13/20 0810    [START ON 10/24/2020] valGANciclovir tablet 450 mg, 450 mg, Oral, Daily, Estela Ken MD    Review of patient's allergies indicates:   Allergen Reactions    Latex, natural rubber        Past Medical History:   Diagnosis Date    Decompensated hepatic cirrhosis     Hypertension     SBP (spontaneous bacterial peritonitis)      Past Surgical History:   Procedure Laterality Date    ERCP N/A 10/1/2020    Procedure: ERCP (ENDOSCOPIC RETROGRADE CHOLANGIOPANCREATOGRAPHY);  Surgeon: Marcos Ramirez MD;  Location: [address]);  Service: Endoscopy;  Laterality: N/A;    ESOPHAGOGASTRODUODENOSCOPY N/A 10/13/2020    Procedure: EGD (ESOPHAGOGASTRODUODENOSCOPY);  Surgeon: Prasanth Jerry MD;  Location: [address]);  Service: Endoscopy;  Laterality: N/A;    RIGHT HEART CATHETERIZATION Right 9/23/2020    Procedure: INSERTION, CATHETER, RIGHT HEART;  Surgeon: Mikel Costa Jr., MD;  Location: [facility] CATH LAB;  Service: Cardiology;  Laterality: Right;    THORACENTESIS  2011     Family History     Problem Relation (Age of Onset)    COPD Mother        Tobacco Use    Smoking status: Current Some Day Smoker    Smokeless tobacco: Current User   Substance and Sexual Activity    Alcohol use: Yes     Alcohol/week: 112.0 standard drinks     Types: 112 Shots of liquor per week     Comment: fifth of vodka a day. LAST DRINK 7/25/2020 per pt     Drug use: Yes     Frequency: 3.0 times per week     Types: Marijuana    Sexual activity: Yes     Review of Systems   Unable to  perform ROS: Acuity of condition     Objective:     Vital Signs (Most Recent):  Temp: 98 °F (36.7 °C) (10/14/20 0404)  Pulse: 89 (10/14/20 1400)  Resp: (!) 22 (10/14/20 1400)  BP: 129/60 (10/14/20 0404)  SpO2: 98 % (10/14/20 1400) Vital Signs (24h Range):  Temp:  [97.6 °F (36.4 °C)-98 °F (36.7 °C)] 98 °F (36.7 °C)  Pulse:  [79-89] 89  Resp:  [15-26] 22  SpO2:  [90 %-99 %] 98 %  BP: (101-136)/(56-75) 129/60  Arterial Line BP: (122)/(44) 122/44     Weight: 114.6 kg (252 lb 10.4 oz)  Body mass index is 34.27 kg/m².    Neuro: Comfortably sedated on propofol. GCS 15 off sedation  Resp: Mechanically ventilated via ETT, chest expansion symmetric  CVS: 2+ distal pulses, RRR on monitor  : Duran catheter in place with urine drainage in collection bag  Abdomen: Chevron incision with overlying dressing CDI. Two ADAM drain in right abdomen, 1 with minimal sanguinous output, 2 with SS output. Right femoral lines with some oozing noted.    Significant Labs:  CBC:   Recent Labs   Lab 10/13/20  1955  10/14/20  1248 10/14/20  1410   WBC 5.45  --   --   --    RBC 2.49*  --   --   --    HGB 8.5*   < > 8.4*  --    HCT 25.4*   < > 24.3* 27*   PLT 78*   < > 68*  --    *  --   --   --    MCH 34.1*  --   --   --    MCHC 33.5  --   --   --     < > = values in this interval not displayed.     CMP:   Recent Labs   Lab 10/13/20  1955  10/14/20  1020  10/14/20  1248   *   < > 202*   < > 196*   CALCIUM 8.4*  --   --   --   --    ALBUMIN 2.5*   < > 2.0*   < > 2.1*   PROT 4.9*  --   --   --   --    *   < > 134*   < > 137   K 3.7   < > 3.8   < > 3.9   CO2 17*  --   --   --   --      --   --   --   --    BUN 32*  --   --   --   --    CREATININE 1.6*  --   --   --   --    ALKPHOS 184*  --   --   --   --    ALT 38  --  690*  --   --    AST 96*  --  770*  --   --    BILITOT 19.6*  --   --   --   --     < > = values in this interval not displayed.       Significant Diagnostics:  I have reviewed all pertinent imaging  results/findings within the past 24 hours.    Assessment/Plan:       Neuro/Psych:   -- Sedation: Propofol   -- Pain: Dilaudid 0.5 mg             Cards:   -- HDS  -- Maintain SBP > 100  -- Pressors as needed      Pulm:   -- Goal O2 sat > 90%  -- Wean mechanical ventilation as able      Renal:  -- Keep bourne for strict I/O  -- BUN/Cr pending post-op  -- Nephrology consulted given large intra-operative volumes. Per recs, 120 mg Lasix and 500 mg Diamox. Will follow up UOP      FEN / GI:   -- Replace lytes as needed  -- Nutrition: NPO while intubated  -- Follow up post-op labs  -- Protonix BID 2/2 recent GIB      ID:   -- Tm: afebrile; WBC 8.9  -- Abx Rocephin, fluconazole, Bactrim, valganciclovir      Heme/Onc:   -- H/H 9.4 / 27.9  -- q4 hour CBC      Endo:   -- Gluc goal 140-180  -- Post-op tacrolimus and prednisone taper      PPx:   Feeding: NPO  Analgesia/Sedation: Dilaudid pushes / Propofol  Thromboembolic prevention: Heparin  HOB >30: Yes  Stress Ulcer ppx: Protonix BID  Glucose control: Critical care goal 140-180 g/dl, ISS        Dispo/Code Status/Palliative:   -- SICU / Full Code    Johnny Galan II, MD  General Surgery  [facility]

## 2020-10-14 NOTE — ANESTHESIA PROCEDURE NOTES
Arterial    Diagnosis: esld    Patient location during procedure: done in OR  Procedure start time: 10/14/2020 5:15 AM  Procedure end time: 10/14/2020 5:30 AM    Staffing  Authorizing Provider: Cm Peacock Jr., MD  Performing Provider: Cm Peacock Jr., MD    Anesthesiologist was present at the time of the procedure.    Preanesthetic Checklist  Completed: patient identified, site marked, surgical consent, pre-op evaluation, timeout performed, IV checked, risks and benefits discussed, monitors and equipment checked and anesthesia consent givenArterial  Skin Prep: chlorhexidine gluconate  Local Infiltration: none  Orientation: right  Location: femoral  Assessment  Dressing: secured with tape and tegaderm and sutured in place and taped

## 2020-10-15 ENCOUNTER — ANESTHESIA EVENT (OUTPATIENT)
Dept: SURGERY | Facility: HOSPITAL | Age: 45
DRG: 005 | End: 2020-10-15
Payer: MEDICAID

## 2020-10-15 ENCOUNTER — ANESTHESIA (OUTPATIENT)
Dept: SURGERY | Facility: HOSPITAL | Age: 45
DRG: 005 | End: 2020-10-15
Payer: MEDICAID

## 2020-10-15 LAB
ALBUMIN SERPL BCP-MCNC: 2.5 G/DL (ref 3.5–5.2)
ALBUMIN SERPL BCP-MCNC: 2.6 G/DL (ref 3.5–5.2)
ALBUMIN SERPL BCP-MCNC: 2.7 G/DL (ref 3.5–5.2)
ALLENS TEST: ABNORMAL
ALLENS TEST: NORMAL
ALP SERPL-CCNC: 66 U/L (ref 55–135)
ALT SERPL W/O P-5'-P-CCNC: 2416 U/L (ref 10–44)
ANION GAP SERPL CALC-SCNC: 12 MMOL/L (ref 8–16)
ANION GAP SERPL CALC-SCNC: 8 MMOL/L (ref 8–16)
ANION GAP SERPL CALC-SCNC: 9 MMOL/L (ref 8–16)
ANION GAP SERPL CALC-SCNC: 9 MMOL/L (ref 8–16)
ANISOCYTOSIS BLD QL SMEAR: ABNORMAL
ANISOCYTOSIS BLD QL SMEAR: SLIGHT
APTT BLDCRRT: 31.3 SEC (ref 21–32)
APTT BLDCRRT: 35.8 SEC (ref 21–32)
APTT BLDCRRT: 38.8 SEC (ref 21–32)
AST SERPL-CCNC: 1189 U/L (ref 10–40)
AST SERPL-CCNC: 1856 U/L (ref 10–40)
AST SERPL-CCNC: 2613 U/L (ref 10–40)
AST SERPL-CCNC: 2762 U/L (ref 10–40)
AST SERPL-CCNC: 3701 U/L (ref 10–40)
AST SERPL-CCNC: 3834 U/L (ref 10–40)
AST SERPL-CCNC: 3834 U/L (ref 10–40)
BASOPHILS # BLD AUTO: 0 K/UL (ref 0–0.2)
BASOPHILS # BLD AUTO: 0.01 K/UL (ref 0–0.2)
BASOPHILS NFR BLD: 0 % (ref 0–1.9)
BASOPHILS NFR BLD: 0.2 % (ref 0–1.9)
BILIRUB DIRECT SERPL-MCNC: 3.6 MG/DL (ref 0.1–0.3)
BILIRUB SERPL-MCNC: 4.7 MG/DL (ref 0.1–1)
BLD PROD TYP BPU: NORMAL
BLOOD UNIT EXPIRATION DATE: NORMAL
BLOOD UNIT TYPE CODE: 600
BLOOD UNIT TYPE CODE: 6200
BLOOD UNIT TYPE: NORMAL
BUN SERPL-MCNC: 23 MG/DL (ref 6–20)
BUN SERPL-MCNC: 24 MG/DL (ref 6–20)
BUN SERPL-MCNC: 28 MG/DL (ref 6–20)
BUN SERPL-MCNC: 32 MG/DL (ref 6–20)
BURR CELLS BLD QL SMEAR: ABNORMAL
CA-I BLDV-SCNC: 0.98 MMOL/L (ref 1.06–1.42)
CALCIUM SERPL-MCNC: 7.5 MG/DL (ref 8.7–10.5)
CALCIUM SERPL-MCNC: 7.8 MG/DL (ref 8.7–10.5)
CALCIUM SERPL-MCNC: 8.1 MG/DL (ref 8.7–10.5)
CALCIUM SERPL-MCNC: 8.2 MG/DL (ref 8.7–10.5)
CHLORIDE SERPL-SCNC: 102 MMOL/L (ref 95–110)
CHLORIDE SERPL-SCNC: 103 MMOL/L (ref 95–110)
CHLORIDE SERPL-SCNC: 103 MMOL/L (ref 95–110)
CHLORIDE SERPL-SCNC: 104 MMOL/L (ref 95–110)
CO2 SERPL-SCNC: 21 MMOL/L (ref 23–29)
CO2 SERPL-SCNC: 24 MMOL/L (ref 23–29)
CO2 SERPL-SCNC: 24 MMOL/L (ref 23–29)
CO2 SERPL-SCNC: 25 MMOL/L (ref 23–29)
CODING SYSTEM: NORMAL
CREAT SERPL-MCNC: 1.9 MG/DL (ref 0.5–1.4)
CREAT SERPL-MCNC: 2.1 MG/DL (ref 0.5–1.4)
CREAT SERPL-MCNC: 2.1 MG/DL (ref 0.5–1.4)
CREAT SERPL-MCNC: 2.2 MG/DL (ref 0.5–1.4)
DELSYS: ABNORMAL
DELSYS: NORMAL
DIFFERENTIAL METHOD: ABNORMAL
DISPENSE STATUS: NORMAL
EOSINOPHIL # BLD AUTO: 0 K/UL (ref 0–0.5)
EOSINOPHIL NFR BLD: 0 % (ref 0–8)
ERYTHROCYTE [DISTWIDTH] IN BLOOD BY AUTOMATED COUNT: 17.3 % (ref 11.5–14.5)
ERYTHROCYTE [DISTWIDTH] IN BLOOD BY AUTOMATED COUNT: 17.4 % (ref 11.5–14.5)
ERYTHROCYTE [DISTWIDTH] IN BLOOD BY AUTOMATED COUNT: 17.7 % (ref 11.5–14.5)
ERYTHROCYTE [DISTWIDTH] IN BLOOD BY AUTOMATED COUNT: 19.3 % (ref 11.5–14.5)
ERYTHROCYTE [DISTWIDTH] IN BLOOD BY AUTOMATED COUNT: 19.4 % (ref 11.5–14.5)
ERYTHROCYTE [DISTWIDTH] IN BLOOD BY AUTOMATED COUNT: 19.4 % (ref 11.5–14.5)
ERYTHROCYTE [DISTWIDTH] IN BLOOD BY AUTOMATED COUNT: 19.6 % (ref 11.5–14.5)
ERYTHROCYTE [SEDIMENTATION RATE] IN BLOOD BY WESTERGREN METHOD: 16 MM/H
EST. GFR  (AFRICAN AMERICAN): 39.8 ML/MIN/1.73 M^2
EST. GFR  (AFRICAN AMERICAN): 42.1 ML/MIN/1.73 M^2
EST. GFR  (AFRICAN AMERICAN): 42.1 ML/MIN/1.73 M^2
EST. GFR  (AFRICAN AMERICAN): 47.5 ML/MIN/1.73 M^2
EST. GFR  (NON AFRICAN AMERICAN): 34.4 ML/MIN/1.73 M^2
EST. GFR  (NON AFRICAN AMERICAN): 36.4 ML/MIN/1.73 M^2
EST. GFR  (NON AFRICAN AMERICAN): 36.4 ML/MIN/1.73 M^2
EST. GFR  (NON AFRICAN AMERICAN): 41.1 ML/MIN/1.73 M^2
FACT X PPP CHRO-ACNC: <0.1 IU/ML (ref 0.3–0.7)
FACT X PPP CHRO-ACNC: <0.1 IU/ML (ref 0.3–0.7)
FIBRINOGEN PPP-MCNC: 150 MG/DL (ref 182–366)
FIO2: 100
FIO2: 100
FIO2: 40
FIO2: 40
GLUCOSE SERPL-MCNC: 101 MG/DL (ref 70–110)
GLUCOSE SERPL-MCNC: 125 MG/DL (ref 70–110)
GLUCOSE SERPL-MCNC: 142 MG/DL (ref 70–110)
GLUCOSE SERPL-MCNC: 152 MG/DL (ref 70–110)
GLUCOSE SERPL-MCNC: 156 MG/DL (ref 70–110)
GLUCOSE SERPL-MCNC: 163 MG/DL (ref 70–110)
GLUCOSE SERPL-MCNC: 177 MG/DL (ref 70–110)
GLUCOSE SERPL-MCNC: 195 MG/DL (ref 70–110)
GLUCOSE SERPL-MCNC: 199 MG/DL (ref 70–110)
GLUCOSE SERPL-MCNC: 211 MG/DL (ref 70–110)
GLUCOSE SERPL-MCNC: 93 MG/DL (ref 70–110)
GLUCOSE SERPL-MCNC: 98 MG/DL (ref 70–110)
HCO3 UR-SCNC: 17.1 MMOL/L (ref 24–28)
HCO3 UR-SCNC: 19.8 MMOL/L (ref 24–28)
HCO3 UR-SCNC: 19.8 MMOL/L (ref 24–28)
HCO3 UR-SCNC: 20.7 MMOL/L (ref 24–28)
HCO3 UR-SCNC: 22.3 MMOL/L (ref 24–28)
HCO3 UR-SCNC: 23 MMOL/L (ref 24–28)
HCO3 UR-SCNC: 23.1 MMOL/L (ref 24–28)
HCO3 UR-SCNC: 23.3 MMOL/L (ref 24–28)
HCO3 UR-SCNC: 24.1 MMOL/L (ref 24–28)
HCO3 UR-SCNC: 24.2 MMOL/L (ref 24–28)
HCO3 UR-SCNC: 24.3 MMOL/L (ref 24–28)
HCO3 UR-SCNC: 27.1 MMOL/L (ref 24–28)
HCT VFR BLD AUTO: 21.7 % (ref 40–54)
HCT VFR BLD AUTO: 21.8 % (ref 40–54)
HCT VFR BLD AUTO: 21.8 % (ref 40–54)
HCT VFR BLD AUTO: 22 % (ref 40–54)
HCT VFR BLD AUTO: 22.4 % (ref 40–54)
HCT VFR BLD AUTO: 23.4 % (ref 40–54)
HCT VFR BLD AUTO: 23.4 % (ref 40–54)
HCT VFR BLD AUTO: 23.7 % (ref 40–54)
HCT VFR BLD CALC: 21 %PCV (ref 36–54)
HCT VFR BLD CALC: 21 %PCV (ref 36–54)
HCT VFR BLD CALC: 22 %PCV (ref 36–54)
HCT VFR BLD CALC: 23 %PCV (ref 36–54)
HCT VFR BLD CALC: 23 %PCV (ref 36–54)
HCT VFR BLD CALC: 24 %PCV (ref 36–54)
HCT VFR BLD CALC: 28 %PCV (ref 36–54)
HGB BLD-MCNC: 7.3 G/DL (ref 14–18)
HGB BLD-MCNC: 7.4 G/DL (ref 14–18)
HGB BLD-MCNC: 7.5 G/DL (ref 14–18)
HGB BLD-MCNC: 7.6 G/DL (ref 14–18)
HGB BLD-MCNC: 7.8 G/DL (ref 14–18)
HGB BLD-MCNC: 7.9 G/DL (ref 14–18)
HYPOCHROMIA BLD QL SMEAR: ABNORMAL
IMM GRANULOCYTES # BLD AUTO: 0.03 K/UL (ref 0–0.04)
IMM GRANULOCYTES # BLD AUTO: 0.05 K/UL (ref 0–0.04)
IMM GRANULOCYTES # BLD AUTO: 0.06 K/UL (ref 0–0.04)
IMM GRANULOCYTES NFR BLD AUTO: 0.6 % (ref 0–0.5)
IMM GRANULOCYTES NFR BLD AUTO: 0.9 % (ref 0–0.5)
IMM GRANULOCYTES NFR BLD AUTO: 1.1 % (ref 0–0.5)
INR PPP: 1.1 (ref 0.8–1.2)
INR PPP: 1.2 (ref 0.8–1.2)
INR PPP: 1.3 (ref 0.8–1.2)
LACTATE SERPL-SCNC: 0.7 MMOL/L (ref 0.5–2.2)
LDH SERPL L TO P-CCNC: 0.77 MMOL/L (ref 0.36–1.25)
LYMPHOCYTES # BLD AUTO: 0.2 K/UL (ref 1–4.8)
LYMPHOCYTES # BLD AUTO: 0.2 K/UL (ref 1–4.8)
LYMPHOCYTES # BLD AUTO: 0.3 K/UL (ref 1–4.8)
LYMPHOCYTES NFR BLD: 4.3 % (ref 18–48)
LYMPHOCYTES NFR BLD: 4.5 % (ref 18–48)
LYMPHOCYTES NFR BLD: 5.1 % (ref 18–48)
LYMPHOCYTES NFR BLD: 5.2 % (ref 18–48)
LYMPHOCYTES NFR BLD: 5.4 % (ref 18–48)
MAGNESIUM SERPL-MCNC: 2 MG/DL (ref 1.6–2.6)
MAGNESIUM SERPL-MCNC: 2.1 MG/DL (ref 1.6–2.6)
MCH RBC QN AUTO: 30.2 PG (ref 27–31)
MCH RBC QN AUTO: 30.7 PG (ref 27–31)
MCH RBC QN AUTO: 31.1 PG (ref 27–31)
MCH RBC QN AUTO: 32 PG (ref 27–31)
MCH RBC QN AUTO: 32 PG (ref 27–31)
MCH RBC QN AUTO: 32.2 PG (ref 27–31)
MCH RBC QN AUTO: 32.5 PG (ref 27–31)
MCHC RBC AUTO-ENTMCNC: 32.5 G/DL (ref 32–36)
MCHC RBC AUTO-ENTMCNC: 33.3 G/DL (ref 32–36)
MCHC RBC AUTO-ENTMCNC: 33.3 G/DL (ref 32–36)
MCHC RBC AUTO-ENTMCNC: 33.5 G/DL (ref 32–36)
MCHC RBC AUTO-ENTMCNC: 33.6 G/DL (ref 32–36)
MCV RBC AUTO: 92 FL (ref 82–98)
MCV RBC AUTO: 93 FL (ref 82–98)
MCV RBC AUTO: 93 FL (ref 82–98)
MCV RBC AUTO: 96 FL (ref 82–98)
MCV RBC AUTO: 97 FL (ref 82–98)
MODE: ABNORMAL
MODE: NORMAL
MONOCYTES # BLD AUTO: 0.4 K/UL (ref 0.3–1)
MONOCYTES # BLD AUTO: 0.5 K/UL (ref 0.3–1)
MONOCYTES # BLD AUTO: 0.5 K/UL (ref 0.3–1)
MONOCYTES # BLD AUTO: 0.6 K/UL (ref 0.3–1)
MONOCYTES # BLD AUTO: 0.6 K/UL (ref 0.3–1)
MONOCYTES NFR BLD: 10.3 % (ref 4–15)
MONOCYTES NFR BLD: 10.3 % (ref 4–15)
MONOCYTES NFR BLD: 7 % (ref 4–15)
MONOCYTES NFR BLD: 8.1 % (ref 4–15)
MONOCYTES NFR BLD: 8.3 % (ref 4–15)
MONOCYTES NFR BLD: 8.5 % (ref 4–15)
MONOCYTES NFR BLD: 9.1 % (ref 4–15)
NEUTROPHILS # BLD AUTO: 4.2 K/UL (ref 1.8–7.7)
NEUTROPHILS # BLD AUTO: 4.6 K/UL (ref 1.8–7.7)
NEUTROPHILS # BLD AUTO: 4.8 K/UL (ref 1.8–7.7)
NEUTROPHILS # BLD AUTO: 4.8 K/UL (ref 1.8–7.7)
NEUTROPHILS NFR BLD: 84.1 % (ref 38–73)
NEUTROPHILS NFR BLD: 84.1 % (ref 38–73)
NEUTROPHILS NFR BLD: 84.6 % (ref 38–73)
NEUTROPHILS NFR BLD: 85.4 % (ref 38–73)
NEUTROPHILS NFR BLD: 85.6 % (ref 38–73)
NEUTROPHILS NFR BLD: 86.5 % (ref 38–73)
NEUTROPHILS NFR BLD: 87.8 % (ref 38–73)
NRBC BLD-RTO: 0 /100 WBC
NUM UNITS TRANS FFP: NORMAL
OVALOCYTES BLD QL SMEAR: ABNORMAL
PCO2 BLDA: 35.8 MMHG (ref 35–45)
PCO2 BLDA: 36.2 MMHG (ref 35–45)
PCO2 BLDA: 37.5 MMHG (ref 35–45)
PCO2 BLDA: 37.9 MMHG (ref 35–45)
PCO2 BLDA: 40.8 MMHG (ref 35–45)
PCO2 BLDA: 40.8 MMHG (ref 35–45)
PCO2 BLDA: 42.6 MMHG (ref 35–45)
PCO2 BLDA: 43.1 MMHG (ref 35–45)
PCO2 BLDA: 43.9 MMHG (ref 35–45)
PCO2 BLDA: 47.6 MMHG (ref 35–45)
PCO2 BLDA: 47.6 MMHG (ref 35–45)
PCO2 BLDA: 47.9 MMHG (ref 35–45)
PEEP: 5
PH SMN: 7.27 [PH] (ref 7.35–7.45)
PH SMN: 7.28 [PH] (ref 7.35–7.45)
PH SMN: 7.29 [PH] (ref 7.35–7.45)
PH SMN: 7.29 [PH] (ref 7.35–7.45)
PH SMN: 7.31 [PH] (ref 7.35–7.45)
PH SMN: 7.31 [PH] (ref 7.35–7.45)
PH SMN: 7.34 [PH] (ref 7.35–7.45)
PH SMN: 7.34 [PH] (ref 7.35–7.45)
PH SMN: 7.36 [PH] (ref 7.35–7.45)
PH SMN: 7.38 [PH] (ref 7.35–7.45)
PH SMN: 7.42 [PH] (ref 7.35–7.45)
PH SMN: 7.43 [PH] (ref 7.35–7.45)
PHOSPHATE SERPL-MCNC: 5.8 MG/DL (ref 2.7–4.5)
PHOSPHATE SERPL-MCNC: 6.6 MG/DL (ref 2.7–4.5)
PHOSPHATE SERPL-MCNC: 6.8 MG/DL (ref 2.7–4.5)
PLATELET # BLD AUTO: 31 K/UL (ref 150–350)
PLATELET # BLD AUTO: 34 K/UL (ref 150–350)
PLATELET # BLD AUTO: 34 K/UL (ref 150–350)
PLATELET # BLD AUTO: 37 K/UL (ref 150–350)
PLATELET # BLD AUTO: 40 K/UL (ref 150–350)
PLATELET # BLD AUTO: 44 K/UL (ref 150–350)
PLATELET # BLD AUTO: 49 K/UL (ref 150–350)
PLATELET # BLD AUTO: 55 K/UL (ref 150–350)
PLATELET BLD QL SMEAR: ABNORMAL
PMV BLD AUTO: 10.8 FL (ref 9.2–12.9)
PMV BLD AUTO: 11.2 FL (ref 9.2–12.9)
PMV BLD AUTO: 11.5 FL (ref 9.2–12.9)
PMV BLD AUTO: 11.7 FL (ref 9.2–12.9)
PMV BLD AUTO: 12.1 FL (ref 9.2–12.9)
PMV BLD AUTO: 12.1 FL (ref 9.2–12.9)
PMV BLD AUTO: 12.2 FL (ref 9.2–12.9)
PMV BLD AUTO: 9.9 FL (ref 9.2–12.9)
PO2 BLDA: 219 MMHG (ref 80–100)
PO2 BLDA: 234 MMHG (ref 80–100)
PO2 BLDA: 247 MMHG (ref 80–100)
PO2 BLDA: 248 MMHG (ref 80–100)
PO2 BLDA: 278 MMHG (ref 80–100)
PO2 BLDA: 299 MMHG (ref 80–100)
PO2 BLDA: 329 MMHG (ref 80–100)
PO2 BLDA: 39 MMHG (ref 40–60)
PO2 BLDA: 419 MMHG (ref 80–100)
PO2 BLDA: 73 MMHG (ref 80–100)
PO2 BLDA: 74 MMHG (ref 80–100)
PO2 BLDA: 85 MMHG (ref 80–100)
POC BE: -2 MMOL/L
POC BE: -3 MMOL/L
POC BE: -3 MMOL/L
POC BE: -6 MMOL/L
POC BE: -6 MMOL/L
POC BE: -7 MMOL/L
POC BE: -9 MMOL/L
POC BE: 0 MMOL/L
POC BE: 3 MMOL/L
POC IONIZED CALCIUM: 1.02 MMOL/L (ref 1.06–1.42)
POC IONIZED CALCIUM: 1.02 MMOL/L (ref 1.06–1.42)
POC IONIZED CALCIUM: 1.09 MMOL/L (ref 1.06–1.42)
POC IONIZED CALCIUM: 1.09 MMOL/L (ref 1.06–1.42)
POC IONIZED CALCIUM: 1.12 MMOL/L (ref 1.06–1.42)
POC IONIZED CALCIUM: 1.14 MMOL/L (ref 1.06–1.42)
POC IONIZED CALCIUM: 1.18 MMOL/L (ref 1.06–1.42)
POC IONIZED CALCIUM: 1.21 MMOL/L (ref 1.06–1.42)
POC IONIZED CALCIUM: 1.33 MMOL/L (ref 1.06–1.42)
POC SATURATED O2: 100 % (ref 95–100)
POC SATURATED O2: 69 % (ref 95–100)
POC SATURATED O2: 93 % (ref 95–100)
POC SATURATED O2: 94 % (ref 95–100)
POC SATURATED O2: 95 % (ref 95–100)
POC TCO2: 18 MMOL/L (ref 23–27)
POC TCO2: 21 MMOL/L (ref 23–27)
POC TCO2: 21 MMOL/L (ref 23–27)
POC TCO2: 22 MMOL/L (ref 23–27)
POC TCO2: 23 MMOL/L (ref 23–27)
POC TCO2: 24 MMOL/L (ref 23–27)
POC TCO2: 25 MMOL/L (ref 23–27)
POC TCO2: 26 MMOL/L (ref 23–27)
POC TCO2: 26 MMOL/L (ref 24–29)
POC TCO2: 28 MMOL/L (ref 23–27)
POCT GLUCOSE: 137 MG/DL (ref 70–110)
POCT GLUCOSE: 139 MG/DL (ref 70–110)
POCT GLUCOSE: 144 MG/DL (ref 70–110)
POCT GLUCOSE: 146 MG/DL (ref 70–110)
POCT GLUCOSE: 149 MG/DL (ref 70–110)
POCT GLUCOSE: 149 MG/DL (ref 70–110)
POCT GLUCOSE: 150 MG/DL (ref 70–110)
POCT GLUCOSE: 152 MG/DL (ref 70–110)
POCT GLUCOSE: 153 MG/DL (ref 70–110)
POCT GLUCOSE: 156 MG/DL (ref 70–110)
POCT GLUCOSE: 157 MG/DL (ref 70–110)
POCT GLUCOSE: 157 MG/DL (ref 70–110)
POCT GLUCOSE: 159 MG/DL (ref 70–110)
POCT GLUCOSE: 159 MG/DL (ref 70–110)
POCT GLUCOSE: 160 MG/DL (ref 70–110)
POCT GLUCOSE: 160 MG/DL (ref 70–110)
POCT GLUCOSE: 162 MG/DL (ref 70–110)
POCT GLUCOSE: 163 MG/DL (ref 70–110)
POCT GLUCOSE: 166 MG/DL (ref 70–110)
POCT GLUCOSE: 183 MG/DL (ref 70–110)
POIKILOCYTOSIS BLD QL SMEAR: SLIGHT
POLYCHROMASIA BLD QL SMEAR: ABNORMAL
POTASSIUM BLD-SCNC: 3.6 MMOL/L (ref 3.5–5.1)
POTASSIUM BLD-SCNC: 3.7 MMOL/L (ref 3.5–5.1)
POTASSIUM BLD-SCNC: 3.8 MMOL/L (ref 3.5–5.1)
POTASSIUM BLD-SCNC: 3.9 MMOL/L (ref 3.5–5.1)
POTASSIUM BLD-SCNC: 5 MMOL/L (ref 3.5–5.1)
POTASSIUM SERPL-SCNC: 3.9 MMOL/L (ref 3.5–5.1)
POTASSIUM SERPL-SCNC: 4 MMOL/L (ref 3.5–5.1)
POTASSIUM SERPL-SCNC: 4.1 MMOL/L (ref 3.5–5.1)
POTASSIUM SERPL-SCNC: 4.1 MMOL/L (ref 3.5–5.1)
POTASSIUM SERPL-SCNC: 4.4 MMOL/L (ref 3.5–5.1)
PROT SERPL-MCNC: 4.2 G/DL (ref 6–8.4)
PROTHROMBIN TIME: 12.5 SEC (ref 9–12.5)
PROTHROMBIN TIME: 13 SEC (ref 9–12.5)
PROTHROMBIN TIME: 13 SEC (ref 9–12.5)
PROTHROMBIN TIME: 13.6 SEC (ref 9–12.5)
PROTHROMBIN TIME: 13.8 SEC (ref 9–12.5)
PROTHROMBIN TIME: 13.8 SEC (ref 9–12.5)
PROTHROMBIN TIME: 14.6 SEC (ref 9–12.5)
PROTHROMBIN TIME: 14.6 SEC (ref 9–12.5)
PS: 10
RBC # BLD AUTO: 2.28 M/UL (ref 4.6–6.2)
RBC # BLD AUTO: 2.28 M/UL (ref 4.6–6.2)
RBC # BLD AUTO: 2.3 M/UL (ref 4.6–6.2)
RBC # BLD AUTO: 2.31 M/UL (ref 4.6–6.2)
RBC # BLD AUTO: 2.51 M/UL (ref 4.6–6.2)
RBC # BLD AUTO: 2.52 M/UL (ref 4.6–6.2)
RBC # BLD AUTO: 2.57 M/UL (ref 4.6–6.2)
SAMPLE: ABNORMAL
SAMPLE: NORMAL
SCHISTOCYTES BLD QL SMEAR: ABNORMAL
SCHISTOCYTES BLD QL SMEAR: PRESENT
SITE: ABNORMAL
SITE: NORMAL
SODIUM BLD-SCNC: 135 MMOL/L (ref 136–145)
SODIUM BLD-SCNC: 136 MMOL/L (ref 136–145)
SODIUM BLD-SCNC: 137 MMOL/L (ref 136–145)
SODIUM BLD-SCNC: 137 MMOL/L (ref 136–145)
SODIUM BLD-SCNC: 138 MMOL/L (ref 136–145)
SODIUM SERPL-SCNC: 135 MMOL/L (ref 136–145)
SODIUM SERPL-SCNC: 136 MMOL/L (ref 136–145)
SODIUM SERPL-SCNC: 137 MMOL/L (ref 136–145)
SP02: 100
SP02: 100
SP02: 98
TACROLIMUS BLD-MCNC: 1.5 NG/ML (ref 5–15)
TRANS ERYTHROCYTES VOL PATIENT: NORMAL ML
TRANS PLATPHERESIS VOL PATIENT: NORMAL ML
VT: 450
WBC # BLD AUTO: 4.93 K/UL (ref 3.9–12.7)
WBC # BLD AUTO: 5.33 K/UL (ref 3.9–12.7)
WBC # BLD AUTO: 5.41 K/UL (ref 3.9–12.7)
WBC # BLD AUTO: 5.41 K/UL (ref 3.9–12.7)
WBC # BLD AUTO: 5.51 K/UL (ref 3.9–12.7)
WBC # BLD AUTO: 5.51 K/UL (ref 3.9–12.7)
WBC # BLD AUTO: 5.56 K/UL (ref 3.9–12.7)

## 2020-10-15 PROCEDURE — 85014 HEMATOCRIT: CPT

## 2020-10-15 PROCEDURE — 36430 TRANSFUSION BLD/BLD COMPNT: CPT

## 2020-10-15 PROCEDURE — 84132 ASSAY OF SERUM POTASSIUM: CPT

## 2020-10-15 PROCEDURE — 84295 ASSAY OF SERUM SODIUM: CPT

## 2020-10-15 PROCEDURE — 37000008 HC ANESTHESIA 1ST 15 MINUTES: Performed by: SURGERY

## 2020-10-15 PROCEDURE — 82947 ASSAY GLUCOSE BLOOD QUANT: CPT

## 2020-10-15 PROCEDURE — 27100025 HC TUBING, SET FLUID WARMER: Performed by: ANESTHESIOLOGY

## 2020-10-15 PROCEDURE — 85730 THROMBOPLASTIN TIME PARTIAL: CPT

## 2020-10-15 PROCEDURE — 83605 ASSAY OF LACTIC ACID: CPT

## 2020-10-15 PROCEDURE — P9035 PLATELET PHERES LEUKOREDUCED: HCPCS

## 2020-10-15 PROCEDURE — 63600175 PHARM REV CODE 636 W HCPCS: Performed by: NURSE PRACTITIONER

## 2020-10-15 PROCEDURE — 82330 ASSAY OF CALCIUM: CPT

## 2020-10-15 PROCEDURE — 94003 VENT MGMT INPAT SUBQ DAY: CPT

## 2020-10-15 PROCEDURE — 99233 SBSQ HOSP IP/OBS HIGH 50: CPT | Mod: ,,, | Performed by: INTERNAL MEDICINE

## 2020-10-15 PROCEDURE — 85610 PROTHROMBIN TIME: CPT

## 2020-10-15 PROCEDURE — 25000003 PHARM REV CODE 250: Performed by: SURGERY

## 2020-10-15 PROCEDURE — P9021 RED BLOOD CELLS UNIT: HCPCS

## 2020-10-15 PROCEDURE — 37000009 HC ANESTHESIA EA ADD 15 MINS: Performed by: SURGERY

## 2020-10-15 PROCEDURE — 37799 UNLISTED PX VASCULAR SURGERY: CPT

## 2020-10-15 PROCEDURE — 82803 BLOOD GASES ANY COMBINATION: CPT

## 2020-10-15 PROCEDURE — 84100 ASSAY OF PHOSPHORUS: CPT

## 2020-10-15 PROCEDURE — 85730 THROMBOPLASTIN TIME PARTIAL: CPT | Mod: 91

## 2020-10-15 PROCEDURE — 85002 BLEEDING TIME TEST: CPT

## 2020-10-15 PROCEDURE — 27201423 OPTIME MED/SURG SUP & DEVICES STERILE SUPPLY: Performed by: SURGERY

## 2020-10-15 PROCEDURE — 35840 EXPLORE ABDOMINAL VESSELS: CPT | Mod: ,,, | Performed by: SURGERY

## 2020-10-15 PROCEDURE — 99233 SBSQ HOSP IP/OBS HIGH 50: CPT | Mod: ,,, | Performed by: NURSE PRACTITIONER

## 2020-10-15 PROCEDURE — 99233 PR SUBSEQUENT HOSPITAL CARE,LEVL III: ICD-10-PCS | Mod: ,,, | Performed by: NURSE PRACTITIONER

## 2020-10-15 PROCEDURE — D9220A PRA ANESTHESIA: Mod: ANES,,, | Performed by: ANESTHESIOLOGY

## 2020-10-15 PROCEDURE — 25000003 PHARM REV CODE 250: Performed by: NURSE PRACTITIONER

## 2020-10-15 PROCEDURE — 36000706: Performed by: SURGERY

## 2020-10-15 PROCEDURE — 85610 PROTHROMBIN TIME: CPT | Mod: 91

## 2020-10-15 PROCEDURE — 80069 RENAL FUNCTION PANEL: CPT

## 2020-10-15 PROCEDURE — 85049 AUTOMATED PLATELET COUNT: CPT

## 2020-10-15 PROCEDURE — 83735 ASSAY OF MAGNESIUM: CPT

## 2020-10-15 PROCEDURE — D9220A PRA ANESTHESIA: ICD-10-PCS | Mod: CRNA,,, | Performed by: NURSE ANESTHETIST, CERTIFIED REGISTERED

## 2020-10-15 PROCEDURE — 35840 PR EXPLOR POSTOP BLEED,INFEC,CLOT-ABD: ICD-10-PCS | Mod: 80,,, | Performed by: TRANSPLANT SURGERY

## 2020-10-15 PROCEDURE — 84100 ASSAY OF PHOSPHORUS: CPT | Mod: 91

## 2020-10-15 PROCEDURE — 36000707: Performed by: SURGERY

## 2020-10-15 PROCEDURE — 99900035 HC TECH TIME PER 15 MIN (STAT)

## 2020-10-15 PROCEDURE — 86022 PLATELET ANTIBODIES: CPT

## 2020-10-15 PROCEDURE — 63600175 PHARM REV CODE 636 W HCPCS: Performed by: STUDENT IN AN ORGANIZED HEALTH CARE EDUCATION/TRAINING PROGRAM

## 2020-10-15 PROCEDURE — 63600175 PHARM REV CODE 636 W HCPCS: Performed by: SURGERY

## 2020-10-15 PROCEDURE — 85025 COMPLETE CBC W/AUTO DIFF WBC: CPT | Mod: 91

## 2020-10-15 PROCEDURE — 85520 HEPARIN ASSAY: CPT | Mod: 91

## 2020-10-15 PROCEDURE — 84450 TRANSFERASE (AST) (SGOT): CPT | Mod: 91

## 2020-10-15 PROCEDURE — 99233 PR SUBSEQUENT HOSPITAL CARE,LEVL III: ICD-10-PCS | Mod: ,,, | Performed by: INTERNAL MEDICINE

## 2020-10-15 PROCEDURE — 82040 ASSAY OF SERUM ALBUMIN: CPT

## 2020-10-15 PROCEDURE — 85384 FIBRINOGEN ACTIVITY: CPT

## 2020-10-15 PROCEDURE — 35840 EXPLORE ABDOMINAL VESSELS: CPT | Mod: 80,,, | Performed by: TRANSPLANT SURGERY

## 2020-10-15 PROCEDURE — 63600175 PHARM REV CODE 636 W HCPCS: Performed by: NURSE ANESTHETIST, CERTIFIED REGISTERED

## 2020-10-15 PROCEDURE — D9220A PRA ANESTHESIA: Mod: CRNA,,, | Performed by: NURSE ANESTHETIST, CERTIFIED REGISTERED

## 2020-10-15 PROCEDURE — 80053 COMPREHEN METABOLIC PANEL: CPT

## 2020-10-15 PROCEDURE — 80048 BASIC METABOLIC PNL TOTAL CA: CPT

## 2020-10-15 PROCEDURE — 99291 PR CRITICAL CARE, E/M 30-74 MINUTES: ICD-10-PCS | Mod: ,,, | Performed by: ANESTHESIOLOGY

## 2020-10-15 PROCEDURE — 20000000 HC ICU ROOM

## 2020-10-15 PROCEDURE — 99291 CRITICAL CARE FIRST HOUR: CPT | Mod: ,,, | Performed by: ANESTHESIOLOGY

## 2020-10-15 PROCEDURE — 27000221 HC OXYGEN, UP TO 24 HOURS

## 2020-10-15 PROCEDURE — 85520 HEPARIN ASSAY: CPT

## 2020-10-15 PROCEDURE — 25000003 PHARM REV CODE 250: Performed by: NURSE ANESTHETIST, CERTIFIED REGISTERED

## 2020-10-15 PROCEDURE — 80048 BASIC METABOLIC PNL TOTAL CA: CPT | Mod: 91

## 2020-10-15 PROCEDURE — D9220A PRA ANESTHESIA: ICD-10-PCS | Mod: ANES,,, | Performed by: ANESTHESIOLOGY

## 2020-10-15 PROCEDURE — 99900026 HC AIRWAY MAINTENANCE (STAT)

## 2020-10-15 PROCEDURE — 36415 COLL VENOUS BLD VENIPUNCTURE: CPT

## 2020-10-15 PROCEDURE — 83735 ASSAY OF MAGNESIUM: CPT | Mod: 91

## 2020-10-15 PROCEDURE — P9045 ALBUMIN (HUMAN), 5%, 250 ML: HCPCS | Mod: JG | Performed by: NURSE ANESTHETIST, CERTIFIED REGISTERED

## 2020-10-15 PROCEDURE — C9113 INJ PANTOPRAZOLE SODIUM, VIA: HCPCS | Performed by: NURSE PRACTITIONER

## 2020-10-15 PROCEDURE — 35840 PR EXPLOR POSTOP BLEED,INFEC,CLOT-ABD: ICD-10-PCS | Mod: ,,, | Performed by: SURGERY

## 2020-10-15 PROCEDURE — 82248 BILIRUBIN DIRECT: CPT

## 2020-10-15 PROCEDURE — 80197 ASSAY OF TACROLIMUS: CPT

## 2020-10-15 PROCEDURE — 85018 HEMOGLOBIN: CPT

## 2020-10-15 PROCEDURE — 94761 N-INVAS EAR/PLS OXIMETRY MLT: CPT

## 2020-10-15 RX ORDER — DEXMEDETOMIDINE HYDROCHLORIDE 100 UG/ML
INJECTION, SOLUTION INTRAVENOUS
Status: DISCONTINUED | OUTPATIENT
Start: 2020-10-15 | End: 2020-10-15

## 2020-10-15 RX ORDER — VASOPRESSIN 20 [USP'U]/ML
INJECTION, SOLUTION INTRAMUSCULAR; SUBCUTANEOUS
Status: DISCONTINUED | OUTPATIENT
Start: 2020-10-15 | End: 2020-10-15

## 2020-10-15 RX ORDER — KETAMINE HCL IN 0.9 % NACL 50 MG/5 ML
SYRINGE (ML) INTRAVENOUS
Status: DISCONTINUED | OUTPATIENT
Start: 2020-10-15 | End: 2020-10-15

## 2020-10-15 RX ORDER — LIDOCAINE HYDROCHLORIDE 20 MG/ML
INJECTION INTRAVENOUS
Status: DISCONTINUED | OUTPATIENT
Start: 2020-10-15 | End: 2020-10-15

## 2020-10-15 RX ORDER — PROPOFOL 10 MG/ML
VIAL (ML) INTRAVENOUS
Status: DISCONTINUED | OUTPATIENT
Start: 2020-10-15 | End: 2020-10-15

## 2020-10-15 RX ORDER — ROCURONIUM BROMIDE 10 MG/ML
INJECTION, SOLUTION INTRAVENOUS
Status: DISCONTINUED | OUTPATIENT
Start: 2020-10-15 | End: 2020-10-15

## 2020-10-15 RX ORDER — ALBUMIN HUMAN 50 G/1000ML
SOLUTION INTRAVENOUS CONTINUOUS PRN
Status: DISCONTINUED | OUTPATIENT
Start: 2020-10-15 | End: 2020-10-15

## 2020-10-15 RX ORDER — HYDROCODONE BITARTRATE AND ACETAMINOPHEN 500; 5 MG/1; MG/1
TABLET ORAL
Status: DISCONTINUED | OUTPATIENT
Start: 2020-10-15 | End: 2020-10-16

## 2020-10-15 RX ORDER — MIDAZOLAM HYDROCHLORIDE 1 MG/ML
INJECTION, SOLUTION INTRAMUSCULAR; INTRAVENOUS
Status: DISCONTINUED | OUTPATIENT
Start: 2020-10-15 | End: 2020-10-15

## 2020-10-15 RX ORDER — FENTANYL CITRATE 50 UG/ML
INJECTION, SOLUTION INTRAMUSCULAR; INTRAVENOUS
Status: DISCONTINUED | OUTPATIENT
Start: 2020-10-15 | End: 2020-10-15

## 2020-10-15 RX ORDER — HEPARIN SODIUM 1000 [USP'U]/ML
INJECTION, SOLUTION INTRAVENOUS; SUBCUTANEOUS
Status: DISCONTINUED | OUTPATIENT
Start: 2020-10-15 | End: 2020-10-15

## 2020-10-15 RX ORDER — HEPARIN SOD,PORCINE/0.9 % NACL 250/250 ML
INTRAVENOUS SOLUTION INTRAVENOUS CONTINUOUS PRN
Status: DISCONTINUED | OUTPATIENT
Start: 2020-10-15 | End: 2020-10-15

## 2020-10-15 RX ORDER — HEPARIN SODIUM 1000 [USP'U]/ML
INJECTION, SOLUTION INTRAVENOUS; SUBCUTANEOUS
Status: DISCONTINUED | OUTPATIENT
Start: 2020-10-15 | End: 2020-10-15 | Stop reason: HOSPADM

## 2020-10-15 RX ORDER — HEPARIN SODIUM,PORCINE/D5W 25000/250
16.3 INTRAVENOUS SOLUTION INTRAVENOUS CONTINUOUS
Status: DISCONTINUED | OUTPATIENT
Start: 2020-10-15 | End: 2020-10-23

## 2020-10-15 RX ADMIN — ALBUMIN (HUMAN): 12.5 SOLUTION INTRAVENOUS at 09:10

## 2020-10-15 RX ADMIN — Medication 30 MG: at 11:10

## 2020-10-15 RX ADMIN — HEPARIN SODIUM 500 UNITS/HR: 1000 INJECTION, SOLUTION INTRAVENOUS; SUBCUTANEOUS at 11:10

## 2020-10-15 RX ADMIN — FOLIC ACID 1 MG: 1 TABLET ORAL at 02:10

## 2020-10-15 RX ADMIN — Medication 20 MG: at 09:10

## 2020-10-15 RX ADMIN — FENTANYL CITRATE 50 MCG: 50 INJECTION, SOLUTION INTRAMUSCULAR; INTRAVENOUS at 09:10

## 2020-10-15 RX ADMIN — AMPICILLIN SODIUM AND SULBACTAM SODIUM 3 G: 2; 1 INJECTION, POWDER, FOR SOLUTION INTRAMUSCULAR; INTRAVENOUS at 08:10

## 2020-10-15 RX ADMIN — HEPARIN SODIUM AND DEXTROSE 5.3 UNITS/KG/HR: 10000; 5 INJECTION INTRAVENOUS at 02:10

## 2020-10-15 RX ADMIN — TACROLIMUS 1 MG: 1 CAPSULE, GELATIN COATED ORAL at 06:10

## 2020-10-15 RX ADMIN — MYCOPHENOLATE MOFETIL 1000 MG: 200 POWDER, FOR SUSPENSION ORAL at 09:10

## 2020-10-15 RX ADMIN — MYCOPHENOLATE MOFETIL 1000 MG: 200 POWDER, FOR SUSPENSION ORAL at 02:10

## 2020-10-15 RX ADMIN — VASOPRESSIN 1 UNITS: 20 INJECTION INTRAVENOUS at 09:10

## 2020-10-15 RX ADMIN — AMPICILLIN SODIUM AND SULBACTAM SODIUM 3 G: 2; 1 INJECTION, POWDER, FOR SOLUTION INTRAMUSCULAR; INTRAVENOUS at 02:10

## 2020-10-15 RX ADMIN — PROPOFOL 130 MG: 10 INJECTION, EMULSION INTRAVENOUS at 09:10

## 2020-10-15 RX ADMIN — PROPOFOL 50 MCG/KG/MIN: 10 INJECTION, EMULSION INTRAVENOUS at 11:10

## 2020-10-15 RX ADMIN — PROPOFOL 50 MCG/KG/MIN: 10 INJECTION, EMULSION INTRAVENOUS at 08:10

## 2020-10-15 RX ADMIN — METHYLPREDNISOLONE SODIUM SUCCINATE 100 MG: 125 INJECTION, POWDER, FOR SOLUTION INTRAMUSCULAR; INTRAVENOUS at 08:10

## 2020-10-15 RX ADMIN — LIDOCAINE HYDROCHLORIDE 80 MG: 20 INJECTION, SOLUTION INTRAVENOUS at 09:10

## 2020-10-15 RX ADMIN — MIDAZOLAM HYDROCHLORIDE 1 MG: 1 INJECTION, SOLUTION INTRAMUSCULAR; INTRAVENOUS at 08:10

## 2020-10-15 RX ADMIN — ROCURONIUM BROMIDE 30 MG: 10 INJECTION, SOLUTION INTRAVENOUS at 10:10

## 2020-10-15 RX ADMIN — ROCURONIUM BROMIDE 50 MG: 10 INJECTION, SOLUTION INTRAVENOUS at 09:10

## 2020-10-15 RX ADMIN — HYDROMORPHONE HYDROCHLORIDE 0.5 MG: 1 INJECTION, SOLUTION INTRAMUSCULAR; INTRAVENOUS; SUBCUTANEOUS at 02:10

## 2020-10-15 RX ADMIN — VASOPRESSIN 0.08 UNITS/MIN: 20 INJECTION INTRAVENOUS at 09:10

## 2020-10-15 RX ADMIN — SODIUM CHLORIDE 9 UNITS/HR: 9 INJECTION, SOLUTION INTRAVENOUS at 12:10

## 2020-10-15 RX ADMIN — HEPARIN SODIUM 5000 UNITS: 5000 INJECTION INTRAVENOUS; SUBCUTANEOUS at 06:10

## 2020-10-15 RX ADMIN — NYSTATIN 500000 UNITS: 100000 SUSPENSION ORAL at 02:10

## 2020-10-15 RX ADMIN — TACROLIMUS 1 MG: 1 CAPSULE, GELATIN COATED ORAL at 08:10

## 2020-10-15 RX ADMIN — SODIUM CHLORIDE, SODIUM GLUCONATE, SODIUM ACETATE, POTASSIUM CHLORIDE, MAGNESIUM CHLORIDE, SODIUM PHOSPHATE, DIBASIC, AND POTASSIUM PHOSPHATE: .53; .5; .37; .037; .03; .012; .00082 INJECTION, SOLUTION INTRAVENOUS at 10:10

## 2020-10-15 RX ADMIN — DEXMEDETOMIDINE HYDROCHLORIDE 30 MCG: 100 INJECTION, SOLUTION, CONCENTRATE INTRAVENOUS at 11:10

## 2020-10-15 RX ADMIN — HYDROMORPHONE HYDROCHLORIDE 0.5 MG: 1 INJECTION, SOLUTION INTRAMUSCULAR; INTRAVENOUS; SUBCUTANEOUS at 12:10

## 2020-10-15 RX ADMIN — NYSTATIN 500000 UNITS: 100000 SUSPENSION ORAL at 07:10

## 2020-10-15 RX ADMIN — MUPIROCIN 1 G: 20 OINTMENT TOPICAL at 08:10

## 2020-10-15 RX ADMIN — SODIUM CHLORIDE, SODIUM GLUCONATE, SODIUM ACETATE, POTASSIUM CHLORIDE, MAGNESIUM CHLORIDE, SODIUM PHOSPHATE, DIBASIC, AND POTASSIUM PHOSPHATE: .53; .5; .37; .037; .03; .012; .00082 INJECTION, SOLUTION INTRAVENOUS at 09:10

## 2020-10-15 RX ADMIN — FLUCONAZOLE 400 MG: 2 INJECTION, SOLUTION INTRAVENOUS at 06:10

## 2020-10-15 RX ADMIN — PHYTONADIONE 10 MG: 10 INJECTION, EMULSION INTRAMUSCULAR; INTRAVENOUS; SUBCUTANEOUS at 06:10

## 2020-10-15 RX ADMIN — PROPOFOL 40 MCG/KG/MIN: 10 INJECTION, EMULSION INTRAVENOUS at 04:10

## 2020-10-15 RX ADMIN — CEFTRIAXONE SODIUM 1 G: 1 INJECTION, POWDER, FOR SOLUTION INTRAMUSCULAR; INTRAVENOUS at 03:10

## 2020-10-15 RX ADMIN — Medication 100 MG: at 02:10

## 2020-10-15 RX ADMIN — PANTOPRAZOLE SODIUM 40 MG: 40 INJECTION, POWDER, LYOPHILIZED, FOR SOLUTION INTRAVENOUS at 02:10

## 2020-10-15 RX ADMIN — HEPARIN SODIUM 2000 UNITS: 1000 INJECTION, SOLUTION INTRAVENOUS; SUBCUTANEOUS at 09:10

## 2020-10-15 RX ADMIN — HYDROMORPHONE HYDROCHLORIDE 0.5 MG: 1 INJECTION, SOLUTION INTRAMUSCULAR; INTRAVENOUS; SUBCUTANEOUS at 10:10

## 2020-10-15 RX ADMIN — PANTOPRAZOLE SODIUM 40 MG: 40 INJECTION, POWDER, LYOPHILIZED, FOR SOLUTION INTRAVENOUS at 08:10

## 2020-10-15 NOTE — ANESTHESIA PROCEDURE NOTES
Intubation  Performed by: Aundrea Gage CRNA  Authorized by: Tk Chowdhury Jr., MD     Intubation:     Induction:  Intravenous    Intubated:  Postinduction    Mask Ventilation:  Easy with oral airway    Attempts:  1    Attempted By:  CRNA    Method of Intubation:  Video laryngoscopy    Blade:  Glidescope 3    Laryngeal View Grade: Grade I - full view of chords      Difficult Airway Encountered?: No      Complications:  None    Airway Device:  Oral endotracheal tube    Airway Device Size:  7.5    Style/Cuff Inflation:  Cuffed (inflated to minimal occlusive pressure)    Tube secured:  21    Secured at:  The lips    Placement Verified By:  Capnometry    Complicating Factors:  None    Findings Post-Intubation:  BS equal bilateral and atraumatic/condition of teeth unchanged

## 2020-10-15 NOTE — OP NOTE
Operative Report    Date of Procedure: 10/15/2020    Surgeons:  Surgeon(s) and Role:     * Curtis Zavaleta MD - Primary     * Mayito Lynne Jr., MD - Assisting     * Estela Ken MD - Fellow    First Assistant Attestation:  The presence of an additional attending surgeon functioning as first assistant was required due to the complexity of the procedure relative to any available residents. I certify that no resident was available who was qualified to serve as first assistant. Duties performed by the assistant included assisting the primary surgeon.    Pre-operative Diagnosis: portal vein thrombosis and poor hepatic artery wave forms on ultraosund  Post-operative Diagnosis: partial portal vein thrombus and kink of hepatic artery    Procedure(s) Perfomed: Exploration of abdomen, Hepatic artery reconstruction and portal vein thrombectomy and portal vein anastomosis with intraoperative ultrasound  Anesthesia: General endotracheal  Estimated Blood Loss: 800 mL  Blood Products Administered: 3 Platelets, 5 u prbc  Fluids Administered: 1 L albumin  Findings: clot in portal vein, hepatic arterial kink  Specimens: none  Drains: 19f Venkatesh drains x 2    Preamble  Indications and Patient Counseling: The procedure was thoroughly explained to the patient and/or family members as appropriate, including potential risks, complications, and alternatives.  The risks associated with not undergoing the proposed procedure were also discussed.  All questions were answered, and the patient and/or family members voiced understanding and agreed to proceed with the operation.    Time-Out: A complete time out was carried out prior to incision, with confirmation of patient identity, correct procedure, correct operative site, appropriate antibiotic prophylaxis, review of any known allergies, and presence of all needed equipment.    Procedure in Detail  Following the induction of general endotracheal anesthesia, appropriate arterial  and venous lines were placed by the anesthesia team.  Care was taken to pad all pressure points and avoid any potential traction injuries from positioning. The urinary bladder was catheterized.  Sequential compression boots and Eddie Huggers were used. The abdomen was sterilely prepped and draped.    The abdomen was entered by re-opening the liver transplant incision.  Cultures were obtained of any subcutaneous and peritoneal fluid or clot as appropriate.  Significant peritoneal fluid was not present.  Significant hematoma was not present. There was minimal active bleeding. The bile duct was assessed, and there was not visible evidence of a bile leak.  The hepatic artery was palpated, and the thrill was good. The liver itself was soft to palpation, and had a well-perfused appearance.  Additional intraabdominal findings included clot at portal vein    In order to facilitate access to the portal vein, bile duct anastomosis was taken down..    There was palpable clot in the portal vein. We clamped above and below the anastomosis. We opened the previous anastomosis and identified fresh soft red clot. This was removed. There was good back bleeding from the liver and the fogatry was passed. Intravenous heparin was given. We then injected TPA into the donor portal vein and reapplied the clamp. The recipient portal vein was declotted with both Carey and suction. The two ends of the portal vein were freshened up and an anastomosis was created with 2 5-0 prolene sutures with growth factor on the final tie. There was good flow after the removal of clamps. Portal vein was clamped from 9:42 until 10:02 am.    We then turned our attention to the hepatic artery. The donor vessel length was long and appeared to kink - so we clamped the hepatic artery above and below the anastomosis and repeated the arterial anastomosis using 6-0 prolene after resecting approximately a centimeter of the hepatic artery. There was excellent flow on  unclamping. Liver was pink. Hepatic artery was clamped from 10:07-10:15 am.    Intraoperative ultrasound showed improved flow in the portal vein and liver.  The hepatic arterial wave forms continued to show parvus tardus configuration with high velocity. We attempted to improve the flow by dissecting free the adventitial tissue on the donor and recipient hepatic artery - this did not improve the ultrasound findings.    In order to optimize the arterial flow we clamped the hepatic artery above and below the anastomosis and repeated the arterial anastomosis using 6-0 prolene after resecting the donor hepatic artery to above the gda. There was excellent flow on unclamping. Liver was pink and soft. Hepatic artery was clamped from 10:34-10:42 am.    Intraoperative ultrasound showed improved arterial flow in the hepatic artery and liver parenchyma.    The patient appeared oozy from the recent TPA and heparin.  We placed a 8 fr feeding tube in the bile duct and secured it in place.  The distal bile duct was closed with running 5-0 prolene.    We placed (5)  Five laparotomy pads in the abdomen.    After the above exploration, all identifiable bleeding sites were addressed using electrocautery, argon beam coagulation, suture ligatures, and topical hemostatic agents as appropriate.  A needle biopsy of the liver was not obtained.       A final check for hemostasis was made.  2 19f Venkatesh drains were placed through separate incisions below the transverse abdominal incision and placed in the usual positions. The cavity was irrigated with saline.     The abdomen with Ommvenhera wound vac system.  At the end of the case all instrument, needle, and sponge counts were correct noting the 5 lap pads in the abdomen. The patient was taken from the OR in stable condition.     The plan will be to watch the patient closely while anticoagulating. Patient will likely need hemodialysis. We will plan for repeat ultrasounds and re-exploration in  24-48hrs.    Details of the operation were reviewed with the patients caretaker, all questions were answered.

## 2020-10-15 NOTE — SUBJECTIVE & OBJECTIVE
Interval HPI:   Overnight events:  BG is within goal overnight with stable, but elevated, insulin infusion rates.   Diet NPO Except for: Sips with Medication  Day of Surgery    Eating:   NPO  Nausea: No  Hypoglycemia and intervention: No  Fever: No  TPN and/or TF: No  If yes, type of TF/TPN and rate: None    BP (!) 103/48   Pulse 81   Temp 99.7 °F (37.6 °C) (Core ([place]))   Resp 16   Ht 6' (1.829 m)   Wt 120 kg (264 lb 8.8 oz)   SpO2 100%   BMI 35.88 kg/m²     Labs Reviewed and Include    Recent Labs   Lab 10/15/20  0352 10/15/20  0810 10/15/20  1044   * 142* 163*   CALCIUM 8.2* 8.1*  --    ALBUMIN 2.6*  --  2.5*   PROT 4.2*  --   --     135* 136   K 4.0 4.1 4.4   CO2 21* 24  --     102  --    BUN 23* 24*  --    CREATININE 1.9* 2.1*  --    ALKPHOS 66  --   --    ALT 2,416*  --   --    AST 3,834*  3,834* 3,701*  --    BILITOT 4.7*  --   --      Lab Results   Component Value Date    WBC 5.41 10/15/2020    HGB 7.9 (L) 10/15/2020    HCT 23.7 (L) 10/15/2020    MCV 92 10/15/2020    PLT 49 (L) 10/15/2020     No results for input(s): TSH, FREET4 in the last 168 hours.  Lab Results   Component Value Date    HGBA1C <4.0 (A) 10/13/2020       Nutritional status:   Body mass index is 35.88 kg/m².  Lab Results   Component Value Date    ALBUMIN 2.5 (L) 10/15/2020    ALBUMIN 2.6 (L) 10/15/2020    ALBUMIN 2.2 (L) 10/14/2020    ALBUMIN 2.2 (L) 10/14/2020     No results found for: PREALBUMIN    Estimated Creatinine Clearance: 58.2 mL/min (A) (based on SCr of 2.1 mg/dL (H)).    Accu-Checks  Recent Labs     10/14/20  8193 10/15/20  0056 10/15/20  0204 10/15/20  0247 10/15/20  0350 10/15/20  0451 10/15/20  0550 10/15/20  0723 10/15/20  0812 10/15/20  1236   POCTGLUCOSE 160* 163* 152* 139* 137* 144* 149* 149* 166* 183*       Current Medications and/or Treatments Impacting Glycemic Control  Immunotherapy:    Immunosuppressants         Stop Route Frequency     tacrolimus (PROGRAF) 1 mg/mL oral syringe      --  Oral 2 times daily     mycophenolate mofetil 200 mg/mL suspension 1,000 mg      -- Oral 2 times daily        Steroids:   Hormones (From admission, onward)    Start     Stop Route Frequency Ordered    10/20/20 0900  predniSONE tablet 20 mg  (methylprednisolone taper panel)      -- Oral Daily 10/14/20 1339    10/19/20 0900  methylPREDNISolone sodium succinate injection 20 mg  (methylprednisolone taper panel)      10/20 0859 IV Every 12 hours 10/14/20 1339    10/18/20 0900  methylPREDNISolone sodium succinate injection 40 mg  (methylprednisolone taper panel)      10/19 0859 IV Every 12 hours 10/14/20 1339    10/17/20 0900  methylPREDNISolone sodium succinate injection 60 mg  (methylprednisolone taper panel)      10/18 0859 IV Every 12 hours 10/14/20 1339    10/16/20 0900  methylPREDNISolone sodium succinate injection 80 mg  (methylprednisolone taper panel)      10/17 0859 IV Every 12 hours 10/14/20 1339    10/15/20 0900  methylPREDNISolone sodium succinate injection 100 mg  (methylprednisolone taper panel)      10/16 0859 IV Every 12 hours 10/14/20 1339    10/11/20 0217  melatonin tablet 6 mg      -- Oral Nightly PRN 10/11/20 0217        Pressors:    Autonomic Drugs (From admission, onward)    None        Hyperglycemia/Diabetes Medications:   Antihyperglycemics (From admission, onward)    Start     Stop Route Frequency Ordered    10/14/20 1600  insulin regular 100 Units in sodium chloride 0.9% 100 mL infusion     Question:  Insulin Rate Adjustment (DO NOT MODIFY ANSWER)  Answer:  [email or web address]    -- IV Continuous 10/14/20 1448

## 2020-10-15 NOTE — PLAN OF CARE
Progress Note  Transplant Surgery    Admit Date: 10/11/2020  Post-operative Day: 1  Hospital Day: 5    ORGAN:   LIVER  Disease Etiology: Alcoholic Cirrhosis  Donor Type:   Donation after Brain Death  CDC High Risk:   Yes  Donor CMV Status:   Donor CMV Status: Positive  Donor HBcAB:   Negative  Donor HBV SHAYE: Negative  Donor HCV SHAYE: Positive  Donor HCV Status:   Positive  Whole or Partial: Whole Liver  Biliary Anastomosis: End to End  Arterial Anatomy: Standard         Follow-up For: Procedure(s) (LRB):  LAPAROTOMY, EXPLORATORY, AFTER LIVER TRANSPLANT, Possible redo of artery, possible portal thrombectomy. IntraOperative US. (N/A)  THROMBECTOMY portal vein  REVISION portal vein anastomosis Revision of hepatic artery x2 (N/A)  PLACEMENT abdominal wound vac (N/A)      ASSESSMENT/PLAN:     I conducted multidisciplinary rounds in conjunction with the ICU/Critical Care attending staff, fellows, and residents, with involvement of ancillary services as appropriate. The patient's general condition was reviewed, specifically including allograft function, immunosuppressive management, dietary and nutritional status, pharmacy concerns, and status of expected transition to transplant stepdown care.    For details see the critical care note.    Moon Moreno MD

## 2020-10-15 NOTE — NURSING
Pt admitted to [address] from OR per anesthesia. Dr. Galan at bedside aware of pts arrival to SICU. See chart for all orders received. Unable to turn pt at this time. Abdomen open, increased output from wound vac

## 2020-10-15 NOTE — PROGRESS NOTES
Patient successfully extubated per orders to 4LNC. All VSS at this time. Patient answers questions appropriately. Will continue to monitor.

## 2020-10-15 NOTE — CONSULTS
[facility]  Adult Nutrition  Consult Note    SUMMARY     Recommendations     1.) If pt remains intubated, initaite EN of Peptamen Intense VHP; begin at 10mL/hr and advance 5-10mL Q4hr as tolerated to goal rate at 35mL/hr.   2.) If able to extubate, advance diet as tolerated to low sodium per SLP texture recommendations.     Goals: Pt to meet >75% of estimated energy and protein needs over the course of 7 days.   Nutrition Goal Status: new  Communication of RD Recs: (POC\)    Reason for Assessment    Reason For Assessment: consult(s/p liver transplant)  Diagnosis: (s/p liver transplant)  Relevant Medical History: HTN, Decompensated hepatic cirrhosis, liver transplant  Interdisciplinary Rounds: did not attend  General Information Comments: Pt s/p liver transplant, pt in OR during RD visit for ex lap. Pt receiving HD treatments. GI- LBM 10/13. RD will monitor need for NFPE.  Nutrition Discharge Planning: Post transplant nutrition education to be completed on U    Nutrition Risk Screen    Nutrition Risk Screen: no indicators present    Nutrition/Diet History    Patient Reported Diet/Restrictions/Preferences: general  Spiritual, Cultural Beliefs, Yarsanism Practices, Values that Affect Care: no  Factors Affecting Nutritional Intake: NPO    Anthropometrics    Temp: 99.3 °F (37.4 °C)  Height Method: Stated  Height: 6' (182.9 cm)  Height (inches): 72 in  Weight Method: Bed Scale  Weight: 120 kg (264 lb 8.8 oz)  Weight (lb): 264.55 lb  Ideal Body Weight (IBW), Male: 178 lb  % Ideal Body Weight, Male (lb): 141.94 %  BMI (Calculated): 35.9       Lab/Procedures/Meds    Pertinent Labs Reviewed: reviewed  Pertinent Labs Comments: Hgb 7.5, Hct 22.4, Na 135, BUN 24, Cr 2.1, GFR 36.4, Ca 8.1  Pertinent Medications Comments: ceftriaxone, diflucan, folic acid, methylprednisolone, mycophenolate, pantoprazole, tacrolimus, thiamine, insulin    Physical Findings/Assessment     Edema 2+ generalized  Skin gluteal  cleft shallow open ulcer    Estimated/Assessed Needs    Weight Used For Calorie Calculations: 120 kg (264 lb 8.8 oz)  Energy Calorie Requirements (kcal): 6853-4476  Energy Need Method: Kcal/kg(11-14kcal/kg)  Protein Requirements: 121-145gm (1.5-1.8gm/kg IBW)  Weight Used For Protein Calculations: 80.9 kg (178 lb 5.6 oz)(IBW)        RDA Method (mL): 1320         Nutrition Prescription Ordered    Current Diet Order: NPO (10/13)    Evaluation of Received Nutrient/Fluid Intake    Other Calories (kcal): 908(Propofol)  Total Calories (kcal): 1316(Propofol + IVF)  I/O: I: 02732; O: 75817; +6.0L since admission  Comments: LBM 10/13  Tolerance: (RD to monitor)  % Intake of Estimated Energy Needs: 99  % Meal Intake: 0    Nutrition Risk    Level of Risk/Frequency of Follow-up: high(2x weekly)     Assessment and Plan    Nutrition Problem  Inadequate protein intake    Related to (etiology):   NPO    Signs and Symptoms (as evidenced by):   NPO    Interventions/Recommendations (treatment strategy):  1.) Collaboration with other providers    Nutrition Diagnosis Status:   new         Monitor and Evaluation    Food and Nutrient Intake: energy intake, food and beverage intake, enteral nutrition intake  Food and Nutrient Adminstration: diet order, enteral and parenteral nutrition administration  Anthropometric Measurements: weight, weight change  Biochemical Data, Medical Tests and Procedures: electrolyte and renal panel, gastrointestinal profile  Nutrition-Focused Physical Findings: overall appearance, skin       Nutrition Follow-Up    RD Follow-up?: Yes

## 2020-10-15 NOTE — ANESTHESIA POSTPROCEDURE EVALUATION
Anesthesia Post Evaluation    Patient: Jordan Altman    Procedure(s) Performed: Procedure(s) (LRB):  LAPAROTOMY, EXPLORATORY, AFTER LIVER TRANSPLANT, Possible redo of artery, possible portal thrombectomy. IntraOperative US. (N/A)  THROMBECTOMY portal vein  REVISION portal vein anastomosis Revision of hepatic artery x2 (N/A)  PLACEMENT abdominal wound vac (N/A)    Final Anesthesia Type: general    Patient location during evaluation: ICU  Patient participation: No - Unable to Participate, Intubation  Level of consciousness: sedated  Post-procedure vital signs: reviewed and stable  Pain management: adequate  Airway patency: patent    PONV status at discharge: No PONV  Anesthetic complications: no      Cardiovascular status: hemodynamically stable  Respiratory status: ETT, ventilator and intubated  Hydration status: euvolemic  Follow-up not needed.          Vitals Value Taken Time   /62 10/15/20 1501   Temp 37.2 °C (99 °F) 10/15/20 1500   Pulse 81 10/15/20 1512   Resp 19 10/15/20 1512   SpO2 99 % 10/15/20 1512   Vitals shown include unvalidated device data.      No case tracking events are documented in the log.      Pain/Trina Score: Pain Rating Prior to Med Admin: 6 (10/15/2020  2:05 PM)  Pain Rating Post Med Admin: 0 (10/15/2020  2:35 PM)

## 2020-10-15 NOTE — SIGNIFICANT EVENT
Patient had ultrasound of liver allograft this morning.    Reviewed images with staff radiology.  Ultrasound shows near complete thombus in portal vein, tardus parvus at hepatic artery.     Given these findings have booked patient as class A for exploratory laparotomy, likely declot of portal vein with intraoperative ultrasound.    Reviewed findings with patients next of kin, all questions answered.

## 2020-10-15 NOTE — PT/OT/SLP DISCHARGE
Physical Therapy Discharge Summary    Name: Jordan Altman  MRN: [id number]   Principal Problem: Status post liver transplant     Patient Discharged from acute Physical Therapy on 10/15/2020.     Assessment:     Patient was sent to OR for decreased perfusion to liver. Reconsult when medically appropriate.    Objective:     GOALS:   Multidisciplinary Problems     Physical Therapy Goals     Not on file                Reasons for Discontinuation of Therapy Services  Patient is unable to continue work toward goals because of medical or psychosocial complications.      Plan:     Patient Discharged to: SICU.    Ace Amanda, [facility]  10/15/2020

## 2020-10-15 NOTE — HPI
"Valdo Altman is a 46 yo M with a medical history including HTN, SBP, hepatic cirrhosis and ESLD secondary to ETOH use. He is currently listed for a liver transplant. He presented to [facility] on 10/11/2020 with complaints of abdominal distention and black, tarry stools x 1 day. Occult stool test (+) in  Emergency room. Transferred to [facility] under liver transplant team for further evaluation. Pt s/p orthotopic liver transplant (whole liver, DBD donor) on 10/14/2020, requiring intraoperative SLED. 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 Underwent ex lap w portal vein thrombectomy, revision of hepatic artery and placement of abd wound vac on 10/15..." Pt with minimal UOP since transplant; received high-dose IV diuretics (120mg lasix and 500mg diuril) on 10/15, with minimal UOP. Pt with a rough baseline sCr of 1.2-1.4. Nephrology consulted for JENN and possible need for RRT.     -HPI was obtained from chart review as pt is currently intubated.   "

## 2020-10-15 NOTE — ASSESSMENT & PLAN NOTE
"Pt appears to be CKD 3 at baseline. Presented to OSH with abdominal pain and found to have GIB and hepatic cirrhosis from ETOH. Pt s/p orthotopic liver transplant (whole liver, DBD donor) on 10/14/2020, requiring intraoperative SLED. On 10/15/2020, "Liver ultrasound showed concerns for potential hepatic artery stenosis with possible thrombosis, and it also showed near complete thrombosis of the main portal and r/left portal veins with reversal of flow with respiration. Underwent ex lap w portal vein thrombectomy, revision of hepatic artery and placement of abd wound vac on 10/15..." Pt with minimal UOP since transplant; received high-dose IV diuretics (120mg lasix and 500mg diuril) on 10/15, with minimal UOP. Pt with a rough baseline sCr of 1.2-1.4. Pt noted to have 45cc of clear/straw-colored urine in urine meter s/p ex-lap. Spoke with bedside RN, who indicated that the 45cc of urine was for the past hour since the patient had returned from the OR.     JENN likely 2/2 iATN from intraoperative hypotension and acute blood loss anemia     Plan/Recommendations:     -No urgent indication for RRT at this time  -Monitor UOP closely   -Follow-up with post operative labs and assess for urgent electrolyte derangements/acid-base abnormalities/evidence of worsening renal function   -Strict I/O's  -Favor MAP >65  -Maintain Hgb >7.0  -Renally dose meds and avoid nephrotoxic medications   -Will continue to follow closely   -Plan discussed with staff, Dr Hartley   "

## 2020-10-15 NOTE — PLAN OF CARE
SICU PLAN OF CARE NOTE    Dx: Status post liver transplant    Vital Signs: BP (!) 103/48   Pulse 78   Temp 97.6 °F (36.4 °C) (Core ([place]))   Resp (!) 9   Ht 6' (1.829 m)   Wt 114.6 kg (252 lb 10.4 oz)   SpO2 97%   BMI 34.27 kg/m²     Cardiac: NSR, HR 70-80s    Resp: 4LNC    Neuro: Arouses to Voice, Follows Commands, and Moves All Extremities. AAOX4.    Gtts: Insulin @ 4units/hr, MIVF @ 100 ml/hr    Urine Output: Urinary Catheter 52 cc/shift    Drains: ADAM 1: 240 ml/shift serosanguinous output. ADAM 2:  370 ml/shift serosanguinous output.     Diet: NPO     Labs/Accuchecks: Accuchecks Q1h. Q4h CBC, INR, AST.    Skin: Right groin skin tear with foam dressing in place. Abdominal dermatitis, gluteal blister. Wound care following.     Shift Events: Patient extubated to 4LNC. 1 pack of platelets given.

## 2020-10-15 NOTE — PLAN OF CARE
Plan of care reviewed with pt. Pt went to OR today for ex lap. Heparin gtt started for portal vein thrombus. Pt remained intubated. Vent settings AC 40% peep5. Abd remains open with wound vac. Plan for Or tomorrow. Nephrology consulted for increase Cr, low uop. Pt followed commands and moved all extremities. Pain controlled with prn medication. Propofol at 40mcg/kg/min

## 2020-10-15 NOTE — SUBJECTIVE & OBJECTIVE
Interval History/Significant Events:   NAEON  Oozing from right femoral lines, femoral arterial non-functioning. Pt given pack of platelets and femoral lines removed  Extubated overnight to nasal cannula  Oliguric overnight    Follow-up For: Procedure(s) (LRB):  TRANSPLANT, LIVER (N/A)    Post-Operative Day: 1 Day Post-Op    Objective:     Vital Signs (Most Recent):  Temp: 97.6 °F (36.4 °C) (10/15/20 0600)  Pulse: 78 (10/15/20 0600)  Resp: 10 (10/15/20 0600)  BP: (!) 103/48 (10/14/20 2130)  SpO2: 98 % (10/15/20 0600) Vital Signs (24h Range):  Temp:  [97.6 °F (36.4 °C)-98.6 °F (37 °C)] 97.6 °F (36.4 °C)  Pulse:  [73-94] 78  Resp:  [8-26] 10  SpO2:  [95 %-100 %] 98 %  BP: (103-110)/(48-55) 103/48  Arterial Line BP: ()/(18-73) 138/68     Weight: 120 kg (264 lb 8.8 oz)  Body mass index is 35.88 kg/m².      Intake/Output Summary (Last 24 hours) at 10/15/2020 0718  Last data filed at 10/15/2020 0600  Gross per 24 hour   Intake 43342 ml   Output 29903 ml   Net 2935 ml     Neuro: Awake, alert, oriented x 3  Resp: Respirations even and unlabored on NC, chest rise symmetric  CVS: 2+ distal pulses, RRR on monitor  : Duran catheter in place with minimal yellow urine drainage  Abdomen: S/aTTP/ND. Chevron incision closed with overlying dressing CDI. ADAM drains with SS output  MSK: Moving all four extremities spontaneously  Lines/Drains: Left radial arterial line, right IJ Cachorro introducer, right IJ trialysis, Duran, ADAM x2    Lines/Drains/Airways     Central Venous Catheter Line            Percutaneous Central Line Insertion/Assessment - Triple Lumen  10/14/20 0530 1 day    Pulmonary Artery Catheter Assessment  10/14/20 0530 1 day          Drain                 Urethral Catheter 10/14/20 0535 Straight-tip;Non-latex 16 Fr. 1 day         Closed/Suction Drain 10/14/20 1314 Lateral;Right Abdomen Bulb 19 Fr. less than 1 day         Closed/Suction Drain 10/14/20 1316 Right;Medial Abdomen Bulb 19 Fr. less than 1 day           Arterial Line            Arterial Line 10/14/20 0501 Left Radial 1 day          Peripheral Intravenous Line                 Peripheral IV - Single Lumen 10/11/20 1200 20 G Distal;Left;Posterior Wrist 3 days         Peripheral IV - Single Lumen 10/14/20 0535 18 G Right Hand 1 day         LEONEL 10/14/20 0516 Left Antecubital 1 day                Significant Labs:    CBC/Anemia Profile:  Recent Labs   Lab 10/14/20  2007 10/14/20  2358 10/15/20  0352   WBC 5.45 4.93 5.51  5.51   HGB 7.6* 7.4* 7.3*  7.3*   HCT 22.2* 22.0* 21.8*  21.8*   PLT 28* 37* 34*  34*   MCV 96 96 96  96   RDW 18.7* 19.3* 19.4*  19.4*        Chemistries:  Recent Labs   Lab 10/13/20  1955  10/14/20  1020 10/14/20  1130 10/14/20  1248 10/14/20  1402  10/14/20  2007 10/14/20  2358 10/15/20  0352   *   < > 134* 135* 137 138  138  --  136  --  137   K 3.7   < > 3.8 3.8 3.9 4.0  4.0  --  3.9  --  4.0     --   --   --   --  102  102  --  103  --  104   CO2 17*  --   --   --   --  22*  22*  --  24  --  21*   BUN 32*  --   --   --   --  15  15  --  18  --  23*   CREATININE 1.6*  --   --   --   --  0.9  0.9  --  1.4  --  1.9*   CALCIUM 8.4*  --   --   --   --  8.2*  8.2*  --  7.9*  --  8.2*   ALBUMIN 2.5*   < > 2.0* 2.0* 2.1* 2.2*  2.2*  --   --   --  2.6*   PROT 4.9*  --   --   --   --  3.7*  3.7*  --   --   --  4.2*   BILITOT 19.6*  --   --   --   --  10.8*  10.8*  --   --   --  4.7*   ALKPHOS 184*  --   --   --   --  79  79  --   --   --  66   ALT 38  --  690*  --   --  604*  604*  --   --   --  2,416*   AST 96*  --  770*  --   --  1,215*  1,215*   < > 1,652* 2,762* 3,834*  3,834*   MG 2.0   < > 1.7 1.8 1.8  --   --   --   --  2.0   PHOS  --   --   --   --   --   --   --   --   --  5.8*    < > = values in this interval not displayed.       Lactic Acid:   Recent Labs   Lab 10/14/20  1402   LACTATE 2.4*     POCT Glucose:   Recent Labs   Lab 10/15/20  0451 10/15/20  0550 10/15/20  0723   POCTGLUCOSE 144* 149* 149*     All  pertinent labs within the past 24 hours have been reviewed.    Significant Imaging:  I have reviewed all pertinent imaging results/findings within the past 24 hours.

## 2020-10-15 NOTE — SUBJECTIVE & OBJECTIVE
Past Medical History:   Diagnosis Date    Decompensated hepatic cirrhosis     Hypertension     SBP (spontaneous bacterial peritonitis)        Past Surgical History:   Procedure Laterality Date    ERCP N/A 10/1/2020    Procedure: ERCP (ENDOSCOPIC RETROGRADE CHOLANGIOPANCREATOGRAPHY);  Surgeon: Marcos Ramirez MD;  Location: [facility] ENDO (2ND FLR);  Service: Endoscopy;  Laterality: N/A;    ESOPHAGOGASTRODUODENOSCOPY N/A 10/13/2020    Procedure: EGD (ESOPHAGOGASTRODUODENOSCOPY);  Surgeon: Prasanth Jerry MD;  Location: [facility] ENDO (2ND FLR);  Service: Endoscopy;  Laterality: N/A;    LIVER TRANSPLANT N/A 10/14/2020    Procedure: TRANSPLANT, LIVER;  Surgeon: Curtis Zavaleta MD;  Location: [facility] OR 2ND FLR;  Service: Transplant;  Laterality: N/A;  Intra op HD    RIGHT HEART CATHETERIZATION Right 9/23/2020    Procedure: INSERTION, CATHETER, RIGHT HEART;  Surgeon: Mikel Costa Jr., MD;  Location: [facility] CATH LAB;  Service: Cardiology;  Laterality: Right;    THORACENTESIS  2011       Review of patient's allergies indicates:   Allergen Reactions    Latex, natural rubber      Current Facility-Administered Medications   Medication Frequency    0.9%  NaCl infusion (for blood administration) Q24H PRN    0.9%  NaCl infusion (for blood administration) Q24H PRN    0.9%  NaCl infusion (for blood administration) Q24H PRN    acetaminophen tablet 650 mg Q8H PRN    cefTRIAXone injection 1 g Q24H    dextrose 5 % and 0.45 % NaCl infusion Continuous    dextrose 50% injection 12.5 g PRN    dextrose 50% injection 25 g PRN    fluconazole (DIFLUCAN) IVPB 400 mg 200 mL Q24H    folic acid tablet 1 mg Daily    heparin 25,000 units in dextrose 5% (100 units/ml) IV bolus from bag - ADDITIONAL PRN BOLUS - 30 units/kg PRN    heparin 25,000 units in dextrose 5% (100 units/ml) IV bolus from bag - ADDITIONAL PRN BOLUS - 60 units/kg PRN    heparin 25,000 units in dextrose 5% 250 mL (100 units/mL) infusion LOW INTENSITY nomogram  Continuous    HYDROmorphone injection 0.5 mg Q1H PRN    insulin regular 100 Units in sodium chloride 0.9% 100 mL infusion Continuous    melatonin tablet 6 mg Nightly PRN    methylPREDNISolone sodium succinate injection 100 mg Q12H    Followed by    [START ON 10/16/2020] methylPREDNISolone sodium succinate injection 80 mg Q12H    Followed by    [START ON 10/17/2020] methylPREDNISolone sodium succinate injection 60 mg Q12H    Followed by    [START ON 10/18/2020] methylPREDNISolone sodium succinate injection 40 mg Q12H    Followed by    [START ON 10/19/2020] methylPREDNISolone sodium succinate injection 20 mg Q12H    Followed by    [START ON 10/20/2020] predniSONE tablet 20 mg Daily    mupirocin 2 % ointment 1 g BID    mycophenolate mofetil 200 mg/mL suspension 1,000 mg BID    nystatin 100,000 unit/mL suspension 500,000 Units TID PC    ondansetron injection 4 mg Q6H PRN    pantoprazole injection 40 mg BID    propofol (DIPRIVAN) 10 mg/mL infusion Continuous    sodium bicarbonate tablet 1,950 mg TID    sodium chloride 0.9% flush 10 mL PRN    [START ON 10/21/2020] sulfamethoxazole-trimethoprim 400-80mg per tablet 1 tablet Daily AM    tacrolimus (PROGRAF) 1 mg/mL oral syringe BID    thiamine tablet 100 mg Daily    [START ON 10/24/2020] valGANciclovir tablet 450 mg Daily     Family History     Problem Relation (Age of Onset)    COPD Mother        Tobacco Use    Smoking status: Current Some Day Smoker    Smokeless tobacco: Current User   Substance and Sexual Activity    Alcohol use: Yes     Alcohol/week: 112.0 standard drinks     Types: 112 Shots of liquor per week     Comment: fifth of vodka a day. LAST DRINK 7/25/2020 per pt     Drug use: Yes     Frequency: 3.0 times per week     Types: Marijuana    Sexual activity: Yes     Review of Systems   Unable to perform ROS: Intubated     Objective:     Vital Signs (Most Recent):  Temp: 99 °F (37.2 °C) (10/15/20 1500)  Pulse: 80 (10/15/20 1500)  Resp: 16  (10/15/20 1500)  BP: 106/62 (10/15/20 1500)  SpO2: 98 % (10/15/20 1500)  O2 Device (Oxygen Therapy): nasal cannula (10/15/20 0600) Vital Signs (24h Range):  Temp:  [97.6 °F (36.4 °C)-99.7 °F (37.6 °C)] 99 °F (37.2 °C)  Pulse:  [73-87] 80  Resp:  [8-24] 16  SpO2:  [90 %-100 %] 98 %  BP: (103-118)/(48-69) 106/62  Arterial Line BP: (101-149)/(46-74) 111/52     Weight: 120 kg (264 lb 8.8 oz) (10/15/20 0600)  Body mass index is 35.88 kg/m².  Body surface area is 2.47 meters squared.    I/O last 3 completed shifts:  In: 43009 [I.V.:8841; Blood:6759; NG/GT:100]  Out: 87140 [Urine:953; Drains:2810; Other:2252; Blood:6000]    Physical Exam  Constitutional:       Appearance: He is ill-appearing.   HENT:      Head: Normocephalic and atraumatic.      Nose: Nose normal.      Mouth/Throat:      Mouth: Mucous membranes are moist.      Pharynx: Oropharynx is clear.   Eyes:      Conjunctiva/sclera: Conjunctivae normal.      Pupils: Pupils are equal, round, and reactive to light.   Cardiovascular:      Rate and Rhythm: Normal rate.   Pulmonary:      Breath sounds: Examination of the right-lower field reveals decreased breath sounds. Examination of the left-lower field reveals decreased breath sounds. Decreased breath sounds and rales present.      Comments: Intubated and mechanically ventilated   Abdominal:      Palpations: Abdomen is soft.      Comments: Midline wound vac to abdomen with serosanguinous drainage in canister    Musculoskeletal:      Right lower leg: Edema present.      Left lower leg: Edema present.   Skin:     General: Skin is warm and dry.      Coloration: Skin is jaundiced.   Neurological:      Comments: Sedated          Significant Labs:  CBC:   Recent Labs   Lab 10/15/20  1245   WBC 5.41   RBC 2.57*   HGB 7.9*   HCT 23.7*   PLT 49*   MCV 92   MCH 30.7   MCHC 33.3     CMP:   Recent Labs   Lab 10/15/20  0352 10/15/20  0810 10/15/20  1044 10/15/20  1245   * 142* 163*  --    CALCIUM 8.2* 8.1*  --   --     ALBUMIN 2.6*  --  2.5*  --    PROT 4.2*  --   --   --     135* 136  --    K 4.0 4.1 4.4  --    CO2 21* 24  --   --     102  --   --    BUN 23* 24*  --   --    CREATININE 1.9* 2.1*  --   --    ALKPHOS 66  --   --   --    ALT 2,416*  --   --   --    AST 3,834*  3,834* 3,701*  --  2,613*   BILITOT 4.7*  --   --   --      All labs within the past 24 hours have been reviewed.

## 2020-10-15 NOTE — ANESTHESIA PREPROCEDURE EVALUATION
[facility]  Anesthesia Pre-Operative Evaluation         Patient Name: Jordan Altman  YOB: 1975  MRN: [id number]    SUBJECTIVE:     Pre-operative evaluation for Procedure(s) (LRB):  LAPAROTOMY, EXPLORATORY, AFTER LIVER TRANSPLANT, Possible redo of artery, possible portal thrombectomy. IntraOperative US. (N/A)     10/15/2020    Jordan Altman is a 45 y.o. 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  Patient is POD1` liver transplant.   Surgical case was uneventful.  Patient was extubated around 8 hours after the case and is oxygenating well on a few L of NC.  He is otherwise HD stable and not on pressors.  Liver enzymes continued to uptrend post operatively (3800).  Liver ultrasound showed concerns for potential hepatic artery stenosis with possible thrombosis, and it also showed near complete thrombosis of the main portal and r/left portal veins with reversal of flow with respiration. Underwent ex lap w portal vein thrombectomy, revision of hepatic artery and placement of abd wound vac on 10/15. Transplant team wants to take back to OR 10/16 for the above procedures.     Fluids given during liver transplant (10/14/20):  Crystalloid (mL) 6,500   Colloid (mL) 600   RBC (Units) 7   FFP (Units) 7   Cryoprecipitate (Units)  3   Platelets (Units) 3   Cell Saver (CCs) 2,020        Stress Echo 9/24/20:  · The stress echo portion of this study is negative for myocardial ischemia.  · The ECG portion of this study is negative for myocardial ischemia.  · There were no arrhythmias during stress.  · The left ventricle is mildly enlarged with hyperdynamic systolic function. The estimated ejection fraction is 75%.  · Slightly increased transaortic velocity (2.3m/s) secondary to hyperdynamic heart function and high cardiac output.  · There is left ventricular eccentric hypertrophy.  · Normal left ventricular diastolic  function.  · Normal right ventricular systolic function.  · Moderate left atrial enlargement.  · Normal central venous pressure (3 mmHg).     RHC 9/23/20:  -Right atrial pressure is mildly elevated.  -Pulmonary capillary wedge pressure is mildly elevated (borderline).  -Pulmonary artery pressure is mildly elevated.  -Pulmonary vascular resistance is normal.  -Systemic vascular resistance is 553.  -Vinny cardiac output and index is elevated compatible with high output state attributed to his cirrhosis and contributed to by his anemia.  -These hemodynamic are acceptable for liver transplant listing.     Prior airway:  Intubation:     Induction:  Intravenous    Intubated:  Postinduction    Mask Ventilation:  Easy with oral airway    Attempts:  1    Attempted By:  CRNA    Method of Intubation:  Video laryngoscopy    Blade:  Glidescope 3    Laryngeal View Grade: Grade I - full view of chords      Difficult Airway Encountered?: No      Complications:  None    Airway Device:  Oral endotracheal tube    Airway Device Size:  7.5    Style/Cuff Inflation:  Cuffed (inflated to minimal occlusive pressure)    Tube secured:  21    Secured at:  The lips    Placement Verified By:  Capnometry    Complicating Factors:  None    Findings Post-Intubation:  BS equal bilateral and atraumatic/condition of teeth unchanged    LDA:   Pulmonary Artery Catheter Assessment  10/14/20 0530 (Active)   Verification by X-ray Yes 10/15/20 0315   Site Assessment No drainage;No redness;No swelling;No warmth 10/15/20 0315   Line Securement Device Secured with sutures 10/15/20 0315   Dressing Type Biopatch in place;Central line dressing with pants 10/15/20 0315   Dressing Status Clean;Dry;Intact 10/15/20 0315   Dressing Intervention Integrity maintained 10/15/20 0315   Date on Dressing 10/14/20 10/15/20 0315   Dressing Due to be Changed 10/21/20 10/15/20 0315   Balloon Patency/Care flushed w/o difficulty 10/15/20 0315   Thermistor Patency/Care flushed w/o  difficulty 10/15/20 0315   Proximal Patency/Care infusing 10/15/20 0315   Distal Patency/Care infusing 10/15/20 0315   Extra Patency/Care infusing 10/15/20 0315   Current Insertion Depth (cm) 58 cm 10/15/20 0315   Waveform Normal 10/15/20 0315   Line Necessity Review Hemodynamic instability;Incompatible infusions 10/15/20 0315   Number of days: 1       Percutaneous Central Line Insertion/Assessment - Triple Lumen  10/14/20 0530 (Active)   Verification by X-ray Yes 10/15/20 0315   Site Assessment No drainage;No redness;No swelling;No warmth 10/15/20 0315   Line Securement Device Secured with sutures 10/15/20 0315   Dressing Type Biopatch in place;Central line dressing with pants 10/15/20 0315   Dressing Status Clean;Dry;Intact 10/15/20 0315   Dressing Intervention Integrity maintained 10/15/20 0315   Date on Dressing 10/14/20 10/15/20 0315   Dressing Due to be Changed 10/21/20 10/15/20 0315   Distal Patency/Care infusing 10/15/20 0315   Medial 1 Patency/Care infusing 10/15/20 0315   Proximal 1 Patency/Care infusing 10/15/20 0315   Waveform Normal 10/15/20 0315   Line Necessity Review Hemodynamic instability;Incompatible infusions 10/15/20 0315   Number of days: 1            Peripheral IV - Single Lumen 10/11/20 1200 20 G Distal;Left;Posterior Wrist (Active)   Site Assessment Clean;Dry;Intact;No redness;No swelling 10/15/20 0315   Line Status Saline locked 10/15/20 0315   Dressing Status Clean;Dry;Intact 10/15/20 0315   Dressing Intervention Integrity maintained 10/15/20 0315   Dressing Change Due 10/15/20 10/15/20 0315   Site Change Due 10/15/20 10/15/20 0315   Reason Not Rotated Not due 10/15/20 0315   Number of days: 3            Peripheral IV - Single Lumen 10/14/20 0535 18 G Right Hand (Active)   Site Assessment Clean;Dry;Intact;No redness;No swelling 10/15/20 0315   Line Status Infusing 10/15/20 0315   Dressing Status Clean;Dry;Intact 10/15/20 0315   Dressing Intervention Integrity maintained 10/15/20 0315    Dressing Change Due 10/18/20 10/15/20 0315   Site Change Due 10/18/20 10/15/20 0315   Reason Not Rotated Not due 10/15/20 0315   Number of days: 1            LEONEL 10/14/20 0516 Left Antecubital (Active)   Site Assessment Clean;Dry;Intact;No redness;No swelling 10/15/20 0315   Line Status Blood return noted;Flushed 10/15/20 0315   Dressing Status Clean;Dry;Intact 10/15/20 0315   Dressing Intervention Integrity maintained 10/15/20 0315   Dressing Change Due 10/18/20 10/15/20 0315   Site Change Due 10/18/20 10/15/20 0315   Reason Not Rotated Not due 10/15/20 0315   Number of days: 1       Arterial Line 10/14/20 0501 Left Radial (Active)   Site Assessment Clean;Dry;Intact;No redness;No swelling 10/15/20 0315   Line Status Pulsatile blood flow 10/15/20 0315   Art Line Waveform Appropriate 10/15/20 0315   Arterial Line Interventions Zeroed and calibrated;Leveled;Flushed per protocol;Connections checked and tightened 10/15/20 0315   Color/Movement/Sensation Capillary refill less than 3 sec 10/15/20 0315   Dressing Type Biopatch in place;Central line dressing with pants 10/15/20 0315   Dressing Status Clean;Dry;Intact 10/15/20 0315   Dressing Intervention Integrity maintained 10/15/20 0315   Dressing Change Due 10/18/20 10/15/20 0315   Tubing Change Due 10/18/20 10/15/20 0315   Number of days: 1            Closed/Suction Drain 10/14/20 1314 Lateral;Right Abdomen Bulb 19 Fr. (Active)   Site Description Unable to view;Other (Comment) 10/15/20 0315   Dressing Type Gauze 10/15/20 0315   Dressing Status Clean;Dry;Intact 10/15/20 0315   Dressing Intervention Integrity maintained 10/15/20 0315   Drainage Dark red 10/15/20 0315   Status To bulb suction 10/15/20 0315   Output (mL) 10 mL 10/15/20 0705   Number of days: 0            Closed/Suction Drain 10/14/20 1316 Right;Medial Abdomen Bulb 19 Fr. (Active)   Site Description Unable to view;Other (Comment) 10/15/20 0315   Dressing Type Gauze 10/15/20 0315   Dressing Status  "Clean;Dry;Intact 10/15/20 0315   Dressing Intervention Integrity maintained 10/15/20 0315   Drainage Serosanguineous 10/15/20 0315   Status To bulb suction 10/15/20 0315   Output (mL) 15 mL 10/15/20 0705   Number of days: 0            Urethral Catheter 10/14/20 0535 Straight-tip;Non-latex 16 Fr. (Active)   Site Assessment Clean;Intact 10/15/20 0315   Collection Container Urimeter 10/15/20 0315   Securement Method secured to top of thigh w/ adhesive device 10/15/20 0315   Catheter Care Performed yes 10/15/20 0315   Reason for Continuing Urinary Catheterization Post operative;Critically ill in ICU requiring intensive monitoring 10/15/20 0315   CAUTI Prevention Bundle StatLock in place w 1" slack;Intact seal between catheter & drainage tubing;Drainage bag/urimeter off the floor;Green sheeting clip in use;No dependent loops or kinks;Drainage bag/urimeter not overfilled (<2/3 full);Drainage bag/urimeter below bladder 10/15/20 0315   Output (mL) 10 mL 10/15/20 0800   Number of days: 1       Drips:       Patient Active Problem List   Diagnosis    Decompensated hepatic cirrhosis    Ascites due to alcoholic cirrhosis    Thrombocytopenia    Coagulopathy    JENN (acute kidney injury)    Portal hypertension    Chronic Hepatic encephalopathy    Hyponatremia    Macrocytic anemia    DIC (disseminated intravascular coagulation)    Hypertension    Hx of SBP (spontaneous bacterial peritonitis)    Pulmonary HTN    Alcohol use disorder, severe, dependence    Encounter for pre-transplant evaluation for liver transplant    Encounter for palliative care    Counseling regarding advanced care planning and goals of care    Left knee pain    Organ transplant candidate    GI bleed    Acidosis    Gram positive sepsis    Status post liver transplant    Acute hyperglycemia    Class 1 obesity due to excess calories with serious comorbidity and body mass index (BMI) of 34.0 to 34.9 in adult    Adverse effect of " corticosteroids    Prophylactic immunotherapy       Review of patient's allergies indicates:   Allergen Reactions    Latex, natural rubber        Current Inpatient Medications:      Current Facility-Administered Medications on File Prior to Visit   Medication Dose Route Frequency Provider Last Rate Last Dose    0.9%  NaCl infusion (for blood administration)   Intravenous Q24H PRN Joe Virk, NP        0.9%  NaCl infusion (for blood administration)   Intravenous Q24H PRN Joe Virk NP        0.9%  NaCl infusion (for blood administration)   Intravenous Q24H PRN Joe Virk NP        acetaminophen tablet 650 mg  650 mg Oral Q8H PRN Awa Sun NP   650 mg at 10/13/20 0822    cefTRIAXone injection 1 g  1 g Intravenous Q24H Awa Sun NP   1 g at 10/15/20 0315    dextrose 5 % and 0.45 % NaCl infusion   Intravenous Continuous Estela Ken MD   Stopped at 10/15/20 1133    dextrose 50% injection 12.5 g  12.5 g Intravenous PRN Sloane Patterson NP        dextrose 50% injection 25 g  25 g Intravenous PRN Sloane Patterson NP        fluconazole (DIFLUCAN) IVPB 400 mg 200 mL  400 mg Intravenous Q24H Estela Ken  mL/hr at 10/15/20 0608 400 mg at 10/15/20 0608    folic acid tablet 1 mg  1 mg Oral Daily Awa Sun NP   1 mg at 10/13/20 0811    heparin (porcine) injection 5,000 Units  5,000 Units Subcutaneous Q8H Estela Ken MD   5,000 Units at 10/15/20 0620    heparin 25,000 units in dextrose 5% (100 units/ml) IV bolus from bag - ADDITIONAL PRN BOLUS - 30 units/kg  30 Units/kg (Adjusted) Intravenous PRN Johnny Galan II, MD        heparin 25,000 units in dextrose 5% (100 units/ml) IV bolus from bag - ADDITIONAL PRN BOLUS - 60 units/kg  60 Units/kg (Adjusted) Intravenous PRN Johnny Galan II, MD        heparin 25,000 units in dextrose 5% 250 mL (100 units/mL) infusion LOW INTENSITY nomogram  5.3 Units/kg/hr (Adjusted) Intravenous Continuous  Johnny Galan II, MD        HYDROmorphone injection 0.5 mg  0.5 mg Intravenous Q1H PRN Estela Ken MD   0.5 mg at 10/15/20 0025    insulin regular 100 Units in sodium chloride 0.9% 100 mL infusion  1 Units/hr Intravenous Continuous Sloane Patterson NP 4 mL/hr at 10/15/20 1300 4 Units/hr at 10/15/20 1300    melatonin tablet 6 mg  6 mg Oral Nightly PRN Awa Sun NP   6 mg at 10/11/20 0227    methylPREDNISolone sodium succinate injection 100 mg  100 mg Intravenous Q12H Estela Ken MD   100 mg at 10/15/20 0835    Followed by    [START ON 10/16/2020] methylPREDNISolone sodium succinate injection 80 mg  80 mg Intravenous Q12H Estela Kne MD        Followed by    [START ON 10/17/2020] methylPREDNISolone sodium succinate injection 60 mg  60 mg Intravenous Q12H Estela Ken MD        Followed by    [START ON 10/18/2020] methylPREDNISolone sodium succinate injection 40 mg  40 mg Intravenous Q12H Estela Ken MD        Followed by    [START ON 10/19/2020] methylPREDNISolone sodium succinate injection 20 mg  20 mg Intravenous Q12H Estela Ken MD        Followed by    [START ON 10/20/2020] predniSONE tablet 20 mg  20 mg Oral Daily Estela Ken MD        mupirocin 2 % ointment 1 g  1 g Nasal BID Estela Ken MD   1 g at 10/14/20 2035    mycophenolate mofetil 200 mg/mL suspension 1,000 mg  1,000 mg Oral BID Estela Ken MD   1,000 mg at 10/14/20 2108    nystatin 100,000 unit/mL suspension 500,000 Units  500,000 Units Mouth/Throat TID PC Estela Ken MD   500,000 Units at 10/14/20 2000    ondansetron injection 4 mg  4 mg Intravenous Q6H PRN Awa Sun NP        pantoprazole injection 40 mg  40 mg Intravenous BID Awa Sun NP   40 mg at 10/14/20 2033    propofol (DIPRIVAN) 10 mg/mL infusion  0-50 mcg/kg/min Intravenous Continuous Estela Ken MD 24.1 mL/hr at 10/15/20 1300 35 mcg/kg/min at  10/15/20 1300    sodium bicarbonate tablet 1,950 mg  1,950 mg Oral TID Joe JAYLON Moose, NP   1,950 mg at 10/14/20 2100    sodium chloride 0.9% flush 10 mL  10 mL Intravenous PRN Awa Sun NP        [START ON 10/21/2020] sulfamethoxazole-trimethoprim 400-80mg per tablet 1 tablet  1 tablet Oral Daily AM Estela Ken MD        tacrolimus (PROGRAF) 1 mg/mL oral syringe  1 mg Oral BID Estela Ken MD   1 mg at 10/15/20 0800    thiamine tablet 100 mg  100 mg Oral Daily Awa Sun NP   100 mg at 10/13/20 0810    [START ON 10/24/2020] valGANciclovir tablet 450 mg  450 mg Oral Daily Estela Ken MD         Current Outpatient Medications on File Prior to Visit   Medication Sig Dispense Refill    albuterol (PROVENTIL/VENTOLIN HFA) 90 mcg/actuation inhaler Inhale 2 puffs into the lungs every 6 (six) hours as needed for Wheezing or Shortness of Breath. Rescue      cetirizine (ZYRTEC) 10 MG tablet Take 10 mg by mouth once daily.      ciprofloxacin HCl (CIPRO) 500 MG tablet Take 1 tablet (500 mg total) by mouth once daily. 30 tablet 2    ergocalciferol (VITAMIN D2) 50,000 unit Cap Take 50,000 Units by mouth every Monday.      folic acid (FOLVITE) 1 MG tablet Take 1 mg by mouth once daily.      hydrOXYzine HCL (ATARAX) 25 MG tablet Take 1 tablet (25 mg total) by mouth 3 (three) times daily as needed for Itching. 30 tablet 1    lactulose (CHRONULAC) 10 gram/15 mL solution Take 23 mLs (15.3333 g total) by mouth 3 (three) times daily. TITRATE TO 3-4 BOWEL MOVEMENTS DAILY. 2070 mL 2    polyethylene glycol (GLYCOLAX) 17 gram/dose powder Take 17 g by mouth once daily.      rifAXIMin (XIFAXAN) 550 mg Tab Take 1 tablet (550 mg total) by mouth 2 (two) times daily. 60 tablet 2    thiamine 100 MG tablet Take 100 mg by mouth once daily.         Past Surgical History:   Procedure Laterality Date    ERCP N/A 10/1/2020    Procedure: ERCP (ENDOSCOPIC RETROGRADE  CHOLANGIOPANCREATOGRAPHY);  Surgeon: Marcos Ramirez MD;  Location: [facility] ENDO (2ND FLR);  Service: Endoscopy;  Laterality: N/A;    ESOPHAGOGASTRODUODENOSCOPY N/A 10/13/2020    Procedure: EGD (ESOPHAGOGASTRODUODENOSCOPY);  Surgeon: Prasanth Jerry MD;  Location: [facility] ENDO (2ND FLR);  Service: Endoscopy;  Laterality: N/A;    LIVER TRANSPLANT N/A 10/14/2020    Procedure: TRANSPLANT, LIVER;  Surgeon: Curtis Zavaleta MD;  Location: [facility] OR 2ND FLR;  Service: Transplant;  Laterality: N/A;  Intra op HD    RIGHT HEART CATHETERIZATION Right 9/23/2020    Procedure: INSERTION, CATHETER, RIGHT HEART;  Surgeon: Mikel Costa Jr., MD;  Location: [facility] CATH LAB;  Service: Cardiology;  Laterality: Right;    THORACENTESIS  2011       Social History     Socioeconomic History    Marital status:      Spouse name: Not on file    Number of children: Not on file    Years of education: Not on file    Highest education level: Not on file   Occupational History    Not on file   Social Needs    Financial resource strain: Not on file    Food insecurity     Worry: Not on file     Inability: Not on file    Transportation needs     Medical: Not on file     Non-medical: Not on file   Tobacco Use    Smoking status: Current Some Day Smoker    Smokeless tobacco: Current User   Substance and Sexual Activity    Alcohol use: Yes     Alcohol/week: 112.0 standard drinks     Types: 112 Shots of liquor per week     Comment: fifth of vodka a day. LAST DRINK 7/25/2020 per pt     Drug use: Yes     Frequency: 3.0 times per week     Types: Marijuana    Sexual activity: Yes   Lifestyle    Physical activity     Days per week: Not on file     Minutes per session: Not on file    Stress: Not on file   Relationships    Social connections     Talks on phone: Not on file     Gets together: Not on file     Attends Congregational service: Not on file     Active member of club or organization: Not on file     Attends meetings of clubs or  organizations: Not on file     Relationship status: Not on file   Other Topics Concern    Not on file   Social History Narrative    Not on file       OBJECTIVE:     Vital Signs Range (Last 24H):  Temp:  [36.4 °C (97.6 °F)-37.6 °C (99.7 °F)]   Pulse:  [73-94]   Resp:  [8-26]   BP: (103-118)/(48-69)   SpO2:  [90 %-100 %]   Arterial Line BP: ()/(18-74)       Significant Labs:  Lab Results   Component Value Date    WBC 5.41 10/15/2020    HGB 7.9 (L) 10/15/2020    HCT 23.7 (L) 10/15/2020    PLT 49 (L) 10/15/2020    ALT 2,416 (H) 10/15/2020    AST 3,701 (H) 10/15/2020     10/15/2020    K 4.4 10/15/2020     10/15/2020    CREATININE 2.1 (H) 10/15/2020    BUN 24 (H) 10/15/2020    CO2 24 10/15/2020    TSH 0.938 09/19/2020    PSA 0.05 09/29/2020    INR 1.2 10/15/2020    HGBA1C <4.0 (A) 10/13/2020       Diagnostic Studies: No relevant studies.    EKG:   Results for orders placed or performed during the hospital encounter of 10/11/20   EKG 12-lead    Collection Time: 10/13/20  6:30 PM    Narrative    Test Reason : Z01.818,    Vent. Rate : 079 BPM     Atrial Rate : 000 BPM     P-R Int : 000 ms          QRS Dur : 092 ms      QT Int : 404 ms       P-R-T Axes : 000 -21 020 degrees     QTc Int : 463 ms    Normal sinus rhythm with occasional Premature ventricular complexes  Low voltage QRS - limb leads only  Abnormal ECG  When compared with ECG of 10-OCT-2020 16:02,  The axis is less leftward  Borderline criteria for Anterolateral infarct are no longer Present  Confirmed by Florentin Love MD ([id number]) on 10/14/2020 3:07:15 PM    Referred By: FRIDA SMITH           Confirmed By:Florentin Love MD       ECHOCARDIOGRAM:  TTE:  Results for orders placed or performed during the hospital encounter of 09/19/20   Echo Color Flow Doppler? Yes   Result Value Ref Range    Ascending aorta 3.11 cm    STJ 3.14 cm    AV mean gradient 11 mmHg    Ao peak mahesh 2.17 m/s    Ao VTI 37.35 cm    IVRT 51.38 msec    IVS 0.91 0.6 - 1.1 cm     LA size 4.31 cm    Left Atrium Major Axis 6.26 cm    Left Atrium Minor Axis 6.32 cm    LVIDd 5.61 3.5 - 6.0 cm    LVIDs 3.30 2.1 - 4.0 cm    LVOT diameter 2.43 cm    LVOT peak VTI 24.12 cm    Posterior Wall 1.06 0.6 - 1.1 cm    MV Peak A Iam 0.89 m/s    E wave decelartion time 154.32 msec    MV Peak E Iam 1.06 m/s    RA Major Axis 4.94 cm    RA Width 3.53 cm    RVDD 3.87 cm    Sinus 3.37 cm    TAPSE 2.95 cm    TR Max Iam 4.19 m/s    TDI LATERAL 0.11 m/s    TDI SEPTAL 0.10 m/s    LA WIDTH 4.75 cm    MV stenosis pressure 1/2 time 44.75 ms    LV Diastolic Volume 154.47 mL    LV Systolic Volume 44.25 mL    RV S' 19.26 cm/s    LVOT peak iam 1.41 m/s    LV LATERAL E/E' RATIO 9.64 m/s    LV SEPTAL E/E' RATIO 10.60 m/s    FS 41 %    LA volume 109.45 cm3    LV mass 216.06 g    Left Ventricle Relative Wall Thickness 0.38 cm    AV valve area 2.99 cm2    AV Velocity Ratio 0.65     AV index (prosthetic) 0.65     MV valve area p 1/2 method 4.92 cm2    E/A ratio 1.19     Mean e' 0.11 m/s    LVOT area 4.6 cm2    LVOT stroke volume 111.80 cm3    AV peak gradient 19 mmHg    E/E' ratio 10.10 m/s    LV Systolic Volume Index 18.5 mL/m2    LV Diastolic Volume Index 64.44 mL/m2    LA Volume Index 45.7 mL/m2    LV Mass Index 90 g/m2    Triscuspid Valve Regurgitation Peak Gradient 70 mmHg    BSA 2.47 m2    Right Atrial Pressure (from IVC) 3 mmHg    TV rest pulmonary artery pressure 73 mmHg    Narrative    · Moderate left atrial enlargement.  · Normal left ventricular systolic function. The estimated ejection   fraction is 65%.  · Indeterminate left ventricular diastolic function.  · Normal right ventricular systolic function.  · Mild tricuspid regurgitation.  · Normal central venous pressure (3 mmHg).  · Pulmonary hypertension present.  · The estimated PA systolic pressure is 73 mmHg.        STRESS:  Results for orders placed or performed during the hospital encounter of 09/19/20   Stress Echo Which stress agent will be used?  Pharmacological; Color Flow Doppler? No   Result Value Ref Range    Ascending aorta 3.48 cm    STJ 2.93 cm    IVS 1.11 (A) 0.6 - 1.1 cm    LA size 4.61 cm    Left Atrium Major Axis 6.38 cm    Left Atrium Minor Axis 6.16 cm    LVIDd 6.00 (A) 3.5 - 6.0 cm    LVIDs 3.95 2.1 - 4.0 cm    LVOT diameter 2.50 cm    LVOT peak VTI 33.73 cm    Posterior Wall 1.07 0.6 - 1.1 cm    MV Peak A Iam 1.08 m/s    E wave decelartion time 177.77 msec    MV Peak E Iam 1.22 m/s    PV Peak D Iam 1.09 m/s    PV Peak S Iam 0.96 m/s    RA Major Axis 5.05 cm    RA Width 3.28 cm    RVDD 4.58 cm    Sinus 3.72 cm    TAPSE 2.22 cm    TDI LATERAL 0.13 m/s    TDI SEPTAL 0.12 m/s    LA WIDTH 4.60 cm    MV stenosis pressure 1/2 time 51.55 ms    LV Diastolic Volume 211.74 mL    LV Systolic Volume 67.76 mL    LVOT peak iam 1.78 m/s    LV LATERAL E/E' RATIO 9.38 m/s    LV SEPTAL E/E' RATIO 10.17 m/s    FS 34 %    LA volume 112.98 cm3    LV mass 276.28 g    Left Ventricle Relative Wall Thickness 0.36 cm    MV valve area p 1/2 method 4.27 cm2    E/A ratio 1.13     Mean e' 0.13 m/s    Pulm vein S/D ratio 0.88     LVOT area 4.9 cm2    LVOT stroke volume 165.49 cm3    E/E' ratio 9.76 m/s    LV Systolic Volume Index 28.4 mL/m2    LV Diastolic Volume Index 88.76 mL/m2    LA Volume Index 47.4 mL/m2    LV Mass Index 116 g/m2    BSA 2.45 m2    Systolic blood pressure 155 mmHg    Diastolic blood pressure 69 mmHg    HR at rest 95 bpm    RPP 14,725     Peak  bpm    Peak Systolic BP 84 mmHg    Peak Diatolic BP 39 mmHg    Peak RPP 11,424     Max Predicted      85% Max Predicted      % Max HR Achieved 79     1 Minute Recovery  bpm    [facility] CV CPX PATIENT IS MALE 1     [facility] CV CPX PATIENT IS FEMALE 0     Right Atrial Pressure (from IVC) 3 mmHg    Narrative    · The stress echo portion of this study is negative for myocardial   ischemia.  · The ECG portion of this study is negative for myocardial ischemia.  · There were no arrhythmias during stress.  ·  The left ventricle is mildly enlarged with hyperdynamic systolic   function. The estimated ejection fraction is 75%.  · Slightly increased transaortic velocity (2.3m/s) secondary to   hyperdynamic heart function and high cardiac output.  · There is left ventricular eccentric hypertrophy.  · Normal left ventricular diastolic function.  · Normal right ventricular systolic function.  · Moderate left atrial enlargement.  · Normal central venous pressure (3 mmHg).            ASSESSMENT/PLAN:         Pre-op Assessment    I have reviewed the Patient Summary Reports.     I have reviewed the Nursing Notes. I have reviewed the NPO Status.      Review of Systems  Anesthesia Hx:  No problems with previous Anesthesia Denies Hx of Anesthetic complications  History of prior surgery of interest to airway management or planning: liver transplant. Previous anesthesia: General Denies Family Hx of Anesthesia complications.   Denies Personal Hx of Anesthesia complications.   Social:  Alcohol Use    Hematology/Oncology:     Oncology Normal   Hematology Comments: thrombocytopenia   EENT/Dental:EENT/Dental Normal   Cardiovascular:   Hypertension Pulm HTN s/p RHC   Pulmonary:  Pulmonary Normal    Renal/:   Chronic Renal Disease, ARF    Hepatic/GI:   Liver Disease, ESLD 2/2 to ETOH cirrhosis  Gastric varices  Liver transplant on 10/14/20   Neurological:  Neurology Normal    Endocrine:  Endocrine Normal    Psych:   Psychiatric History          Physical Exam  General:  Obesity    Airway/Jaw/Neck:  Airway Findings: Mouth Opening: Normal Mallampati: II  Improves to I with phonation.  TM Distance: Normal, at least 6 cm  Jaw/Neck Findings:  Neck ROM: Normal ROM     Eyes/Ears/Nose:Scleral icterus    Dental:  Dental Findings: In tact   Chest/Lungs:  Chest/Lungs Findings: Clear to auscultation     Heart/Vascular:  Heart Findings: Rate: Normal  Rhythm: Regular Rhythm        Mental Status:  Mental Status Findings:  Cooperative, Alert and Oriented          Anesthesia Plan  Type of Anesthesia, risks & benefits discussed:  Anesthesia Type:  general  Patient's Preference:   Intra-op Monitoring Plan: standard ASA monitors, arterial line and central line  Intra-op Monitoring Plan Comments:   Post Op Pain Control Plan: per primary service following discharge from PACU, IV/PO Opioids PRN and multimodal analgesia  Post Op Pain Control Plan Comments:   Induction:   IV  Beta Blocker:  Patient is not currently on a Beta-Blocker (No further documentation required).       Informed Consent: Patient understands risks and agrees with Anesthesia plan.  Questions answered. Anesthesia consent signed with patient.  ASA Score: 4     Day of Surgery Review of History & Physical: I have interviewed and examined the patient. I have reviewed the patient's H&P dated: 10/15/20.   H&P update referred to the provider.         Ready For Surgery From Anesthesia Perspective.

## 2020-10-15 NOTE — PROGRESS NOTES
Notified Dr. Galan and Dr. Glaser of increased AST to 3834 from 2762. Dr. Moreno to be notified. STAT liver US ordered. Will continue to monitor closely.

## 2020-10-15 NOTE — PROGRESS NOTES
Notified Dr. Glaser of UOP this hour of 5ml, CVP 4 flat. Also notified of most recent ABG. No new orders at this time. Will continue to monitor closely.

## 2020-10-15 NOTE — NURSING
Pt transferred to OR per anesthesia for ex-lap. All vitals stable. Will resume care when pt returns to [id number]

## 2020-10-15 NOTE — TRANSFER OF CARE
Anesthesia Transfer of Care Note    Patient: Jordan Altman    Procedure(s) Performed: Procedure(s) (LRB):  LAPAROTOMY, EXPLORATORY, AFTER LIVER TRANSPLANT, Possible redo of artery, possible portal thrombectomy. IntraOperative US. (N/A)  THROMBECTOMY portal vein  REVISION portal vein anastomosis Revision of hepatic artery x2 (N/A)  PLACEMENT abdominal wound vac (N/A)    Patient location: ICU    Anesthesia Type: general    Transport from OR: Upon arrival to PACU/ICU, patient attached to ventilator and auscultated to confirm bilateral breath sounds and adequate TV. Continuous ECG monitoring in transport. Continuous SpO2 monitoring in transport. Continuos invasive BP monitoring in transport    Post pain: adequate analgesia    Post assessment: no apparent anesthetic complications    Post vital signs: stable    Level of consciousness: sedated    Nausea/Vomiting: no nausea/vomiting    Complications: none    Transfer of care protocol was followed      Last vitals:   Visit Vitals  BP (!) 103/48   Pulse 78   Temp 36.6 °C (97.9 °F) (Core ([place]-Jose))   Resp 11   Ht 6' (1.829 m)   Wt 120 kg (264 lb 8.8 oz)   SpO2 98%   BMI 35.88 kg/m²

## 2020-10-15 NOTE — NURSING
Dr. Galan at bedside aware of wound vac output. Received orders to draw 1700 labs now. Will continue to monitor

## 2020-10-15 NOTE — PROGRESS NOTES
[facility]  Critical Care - Surgery  Progress Note    Patient Name: Jordan Altman  MRN: [id number]  Admission Date: 10/11/2020  Hospital Length of Stay: 4 days  Code Status: Full Code  Attending Provider: Moon Moreno MD  Primary Care Provider: SHAHEEN Jaime   Principal Problem: Status post liver transplant    Subjective:     Hospital/ICU Course:  No notes on file    Interval History/Significant Events:   NAEON  Oozing from right femoral lines, femoral arterial non-functioning. Pt given pack of platelets and femoral lines removed  Extubated overnight to nasal cannula  Oliguric overnight    Follow-up For: Procedure(s) (LRB):  TRANSPLANT, LIVER (N/A)    Post-Operative Day: 1 Day Post-Op    Objective:     Vital Signs (Most Recent):  Temp: 97.6 °F (36.4 °C) (10/15/20 0600)  Pulse: 78 (10/15/20 0600)  Resp: 10 (10/15/20 0600)  BP: (!) 103/48 (10/14/20 2130)  SpO2: 98 % (10/15/20 0600) Vital Signs (24h Range):  Temp:  [97.6 °F (36.4 °C)-98.6 °F (37 °C)] 97.6 °F (36.4 °C)  Pulse:  [73-94] 78  Resp:  [8-26] 10  SpO2:  [95 %-100 %] 98 %  BP: (103-110)/(48-55) 103/48  Arterial Line BP: ()/(18-73) 138/68     Weight: 120 kg (264 lb 8.8 oz)  Body mass index is 35.88 kg/m².      Intake/Output Summary (Last 24 hours) at 10/15/2020 0718  Last data filed at 10/15/2020 0600  Gross per 24 hour   Intake 08234 ml   Output 46720 ml   Net 2935 ml     Neuro: Awake, alert, oriented x 3  Resp: Respirations even and unlabored on NC, chest rise symmetric  CVS: 2+ distal pulses, RRR on monitor  : Duran catheter in place with minimal yellow urine drainage  Abdomen: S/aTTP/ND. Chevron incision closed with overlying dressing CDI. ADAM drains with SS output  MSK: Moving all four extremities spontaneously  Lines/Drains: Left radial arterial line, right IJ Cachorro introducer, right IJ trialysis, Duran, ADAM x2    Lines/Drains/Airways     Central Venous Catheter Line            Percutaneous Central Line  Insertion/Assessment - Triple Lumen  10/14/20 0530 1 day    Pulmonary Artery Catheter Assessment  10/14/20 0530 1 day          Drain                 Urethral Catheter 10/14/20 0535 Straight-tip;Non-latex 16 Fr. 1 day         Closed/Suction Drain 10/14/20 1314 Lateral;Right Abdomen Bulb 19 Fr. less than 1 day         Closed/Suction Drain 10/14/20 1316 Right;Medial Abdomen Bulb 19 Fr. less than 1 day          Arterial Line            Arterial Line 10/14/20 0501 Left Radial 1 day          Peripheral Intravenous Line                 Peripheral IV - Single Lumen 10/11/20 1200 20 G Distal;Left;Posterior Wrist 3 days         Peripheral IV - Single Lumen 10/14/20 0535 18 G Right Hand 1 day         LEONEL 10/14/20 0516 Left Antecubital 1 day                Significant Labs:    CBC/Anemia Profile:  Recent Labs   Lab 10/14/20  2007 10/14/20  2358 10/15/20  0352   WBC 5.45 4.93 5.51  5.51   HGB 7.6* 7.4* 7.3*  7.3*   HCT 22.2* 22.0* 21.8*  21.8*   PLT 28* 37* 34*  34*   MCV 96 96 96  96   RDW 18.7* 19.3* 19.4*  19.4*        Chemistries:  Recent Labs   Lab 10/13/20  1955  10/14/20  1020 10/14/20  1130 10/14/20  1248 10/14/20  1402  10/14/20  2007 10/14/20  2358 10/15/20  0352   *   < > 134* 135* 137 138  138  --  136  --  137   K 3.7   < > 3.8 3.8 3.9 4.0  4.0  --  3.9  --  4.0     --   --   --   --  102  102  --  103  --  104   CO2 17*  --   --   --   --  22*  22*  --  24  --  21*   BUN 32*  --   --   --   --  15  15  --  18  --  23*   CREATININE 1.6*  --   --   --   --  0.9  0.9  --  1.4  --  1.9*   CALCIUM 8.4*  --   --   --   --  8.2*  8.2*  --  7.9*  --  8.2*   ALBUMIN 2.5*   < > 2.0* 2.0* 2.1* 2.2*  2.2*  --   --   --  2.6*   PROT 4.9*  --   --   --   --  3.7*  3.7*  --   --   --  4.2*   BILITOT 19.6*  --   --   --   --  10.8*  10.8*  --   --   --  4.7*   ALKPHOS 184*  --   --   --   --  79  79  --   --   --  66   ALT 38  --  690*  --   --  604*  604*  --   --   --  2,416*   AST 96*  --  770*  --    --  1,215*  1,215*   < > 1,652* 2,762* 3,834*  3,834*   MG 2.0   < > 1.7 1.8 1.8  --   --   --   --  2.0   PHOS  --   --   --   --   --   --   --   --   --  5.8*    < > = values in this interval not displayed.       Lactic Acid:   Recent Labs   Lab 10/14/20  1402   LACTATE 2.4*     POCT Glucose:   Recent Labs   Lab 10/15/20  0451 10/15/20  0550 10/15/20  0723   POCTGLUCOSE 144* 149* 149*     All pertinent labs within the past 24 hours have been reviewed.    Significant Imaging:  I have reviewed all pertinent imaging results/findings within the past 24 hours.    Assessment/Plan:       Neuro/Psych:   -- Sedation: Propofol   -- Pain: Dilaudid 0.5 mg             Cards:   -- HDS  -- Maintain SBP > 100  -- Pressors as needed      Pulm:   -- Goal O2 sat > 90%  -- Wean ventilation as able after OR      Renal:  -- Keep bourne for strict I/O  -- BUN/Cr 23 / 1.9  -- 120 mg Lasix and 500 mg Diamox yesterday afternoon with minimal UOP  -- Nephrology consulted      FEN / GI:   -- Replace lytes as needed  -- Nutrition: NPO   -- Protonix BID 2/2 recent GIB  -- Increasing AST and concerning portal findings on US  -- OR this AM to evaluate      ID:   -- Tm: afebrile; WBC 5.5  -- Abx Rocephin, fluconazole, Bactrim, valganciclovir      Heme/Onc:   -- H/H 7.3 / 21.8  -- q4 hour CBC      Endo:   -- Gluc goal 140-180  -- Post-op tacrolimus and prednisone taper      PPx:   Feeding: NPO  Analgesia/Sedation: Dilaudid pushes / None  Thromboembolic prevention: Heparin  HOB >30: Yes  Stress Ulcer ppx: Protonix BID  Glucose control: Critical care goal 140-180 g/dl, ISS         Dispo/Code Status/Palliative:   -- SICU / Full Code  -- OR this AM to evaluate liver flow     Critical care was time spent personally by me on the following activities: development of treatment plan with patient or surrogate and bedside caregivers, discussions with consultants, evaluation of patient's response to treatment, examination of patient, ordering and  performing treatments and interventions, ordering and review of laboratory studies, ordering and review of radiographic studies, pulse oximetry, re-evaluation of patient's condition.  This critical care time did not overlap with that of any other provider or involve time for any procedures.     Johnny Galan II, MD  Critical Care - Surgery  [facility]

## 2020-10-15 NOTE — ANESTHESIA PREPROCEDURE EVALUATION
[facility]  Anesthesia Pre-Operative Evaluation         Patient Name: Jordan Altman  YOB: 1975  MRN: [id number]    SUBJECTIVE:     Pre-operative evaluation for Procedure(s) (LRB):  LAPAROTOMY, EXPLORATORY, AFTER LIVER TRANSPLANT, Possible redo of artery, possible portal thrombectomy. IntraOperative US. (N/A)     10/15/2020    Jordan Altman is a 45 y.o. 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  Patient is POD1` liver transplant.   Surgical case was uneventful.  Patient was extubated around 8 hours after the case and is oxygenating well on a few L of NC.  He is otherwise HD stable and not on pressors.  Liver enzymes continued to uptrend post operatively (3800).  Liver ultrasound showed concerns for potential hepatic artery stenosis with possible thrombosis, and it also showed near complete thrombosis of the main portal and r/left portal veins with reversal of flow with respiration.     Fluids given during liver transplant (10/14/20):  Crystalloid (mL) 6,500   Colloid (mL) 600   RBC (Units) 7   FFP (Units) 7   Cryoprecipitate (Units)  3   Platelets (Units) 3   Cell Saver (CCs) 2,020        Stress Echo 9/24/20:  · The stress echo portion of this study is negative for myocardial ischemia.  · The ECG portion of this study is negative for myocardial ischemia.  · There were no arrhythmias during stress.  · The left ventricle is mildly enlarged with hyperdynamic systolic function. The estimated ejection fraction is 75%.  · Slightly increased transaortic velocity (2.3m/s) secondary to hyperdynamic heart function and high cardiac output.  · There is left ventricular eccentric hypertrophy.  · Normal left ventricular diastolic function.  · Normal right ventricular systolic function.  · Moderate left atrial enlargement.  · Normal central venous pressure (3 mmHg).     RHC 9/23/20:  -Right atrial pressure is mildly  elevated.  -Pulmonary capillary wedge pressure is mildly elevated (borderline).  -Pulmonary artery pressure is mildly elevated.  -Pulmonary vascular resistance is normal.  -Systemic vascular resistance is 553.  -Vinny cardiac output and index is elevated compatible with high output state attributed to his cirrhosis and contributed to by his anemia.  -These hemodynamic are acceptable for liver transplant listing.     Prior airway:  10/14/20; Method of Intubation: Direct laryngoscopy; Inserted by: CRNA; Airway Device: Endotracheal Tube; Mask Ventilation: Easy; Intubated: Postinduction; Blade: Marie #2; Airway Device Size: 7.5; Style: Cuffed; Cuff Inflation: Minimal occlusive pressure; Placement Verified By: Capnometry, Auscultation, ETT Condensation; Grade: Grade I; Complicating Factors: None    LDA:   Pulmonary Artery Catheter Assessment  10/14/20 0530 (Active)   Verification by X-ray Yes 10/15/20 0315   Site Assessment No drainage;No redness;No swelling;No warmth 10/15/20 0315   Line Securement Device Secured with sutures 10/15/20 0315   Dressing Type Biopatch in place;Central line dressing with pants 10/15/20 0315   Dressing Status Clean;Dry;Intact 10/15/20 0315   Dressing Intervention Integrity maintained 10/15/20 0315   Date on Dressing 10/14/20 10/15/20 0315   Dressing Due to be Changed 10/21/20 10/15/20 0315   Balloon Patency/Care flushed w/o difficulty 10/15/20 0315   Thermistor Patency/Care flushed w/o difficulty 10/15/20 0315   Proximal Patency/Care infusing 10/15/20 0315   Distal Patency/Care infusing 10/15/20 0315   Extra Patency/Care infusing 10/15/20 0315   Current Insertion Depth (cm) 58 cm 10/15/20 0315   Waveform Normal 10/15/20 0315   Line Necessity Review Hemodynamic instability;Incompatible infusions 10/15/20 0315   Number of days: 1       Percutaneous Central Line Insertion/Assessment - Triple Lumen  10/14/20 0530 (Active)   Verification by X-ray Yes 10/15/20 0315   Site Assessment No drainage;No  redness;No swelling;No warmth 10/15/20 0315   Line Securement Device Secured with sutures 10/15/20 0315   Dressing Type Biopatch in place;Central line dressing with pants 10/15/20 0315   Dressing Status Clean;Dry;Intact 10/15/20 0315   Dressing Intervention Integrity maintained 10/15/20 0315   Date on Dressing 10/14/20 10/15/20 0315   Dressing Due to be Changed 10/21/20 10/15/20 0315   Distal Patency/Care infusing 10/15/20 0315   Medial 1 Patency/Care infusing 10/15/20 0315   Proximal 1 Patency/Care infusing 10/15/20 0315   Waveform Normal 10/15/20 0315   Line Necessity Review Hemodynamic instability;Incompatible infusions 10/15/20 0315   Number of days: 1            Peripheral IV - Single Lumen 10/11/20 1200 20 G Distal;Left;Posterior Wrist (Active)   Site Assessment Clean;Dry;Intact;No redness;No swelling 10/15/20 0315   Line Status Saline locked 10/15/20 0315   Dressing Status Clean;Dry;Intact 10/15/20 0315   Dressing Intervention Integrity maintained 10/15/20 0315   Dressing Change Due 10/15/20 10/15/20 0315   Site Change Due 10/15/20 10/15/20 0315   Reason Not Rotated Not due 10/15/20 0315   Number of days: 3            Peripheral IV - Single Lumen 10/14/20 0535 18 G Right Hand (Active)   Site Assessment Clean;Dry;Intact;No redness;No swelling 10/15/20 0315   Line Status Infusing 10/15/20 0315   Dressing Status Clean;Dry;Intact 10/15/20 0315   Dressing Intervention Integrity maintained 10/15/20 0315   Dressing Change Due 10/18/20 10/15/20 0315   Site Change Due 10/18/20 10/15/20 0315   Reason Not Rotated Not due 10/15/20 0315   Number of days: 1            LEONEL 10/14/20 0516 Left Antecubital (Active)   Site Assessment Clean;Dry;Intact;No redness;No swelling 10/15/20 0315   Line Status Blood return noted;Flushed 10/15/20 0315   Dressing Status Clean;Dry;Intact 10/15/20 0315   Dressing Intervention Integrity maintained 10/15/20 0315   Dressing Change Due 10/18/20 10/15/20 0315   Site Change Due 10/18/20 10/15/20  0315   Reason Not Rotated Not due 10/15/20 0315   Number of days: 1       Arterial Line 10/14/20 0501 Left Radial (Active)   Site Assessment Clean;Dry;Intact;No redness;No swelling 10/15/20 0315   Line Status Pulsatile blood flow 10/15/20 0315   Art Line Waveform Appropriate 10/15/20 0315   Arterial Line Interventions Zeroed and calibrated;Leveled;Flushed per protocol;Connections checked and tightened 10/15/20 0315   Color/Movement/Sensation Capillary refill less than 3 sec 10/15/20 0315   Dressing Type Biopatch in place;Central line dressing with pants 10/15/20 0315   Dressing Status Clean;Dry;Intact 10/15/20 0315   Dressing Intervention Integrity maintained 10/15/20 0315   Dressing Change Due 10/18/20 10/15/20 0315   Tubing Change Due 10/18/20 10/15/20 0315   Number of days: 1            Closed/Suction Drain 10/14/20 1314 Lateral;Right Abdomen Bulb 19 Fr. (Active)   Site Description Unable to view;Other (Comment) 10/15/20 0315   Dressing Type Gauze 10/15/20 0315   Dressing Status Clean;Dry;Intact 10/15/20 0315   Dressing Intervention Integrity maintained 10/15/20 0315   Drainage Dark red 10/15/20 0315   Status To bulb suction 10/15/20 0315   Output (mL) 10 mL 10/15/20 0705   Number of days: 0            Closed/Suction Drain 10/14/20 1316 Right;Medial Abdomen Bulb 19 Fr. (Active)   Site Description Unable to view;Other (Comment) 10/15/20 0315   Dressing Type Gauze 10/15/20 0315   Dressing Status Clean;Dry;Intact 10/15/20 0315   Dressing Intervention Integrity maintained 10/15/20 0315   Drainage Serosanguineous 10/15/20 0315   Status To bulb suction 10/15/20 0315   Output (mL) 15 mL 10/15/20 0705   Number of days: 0            Urethral Catheter 10/14/20 0535 Straight-tip;Non-latex 16 Fr. (Active)   Site Assessment Clean;Intact 10/15/20 0315   Collection Container Urimeter 10/15/20 0315   Securement Method secured to top of thigh w/ adhesive device 10/15/20 0315   Catheter Care Performed yes 10/15/20 0315   Reason  "for Continuing Urinary Catheterization Post operative;Critically ill in ICU requiring intensive monitoring 10/15/20 0315   CAUTI Prevention Bundle StatLock in place w 1" slack;Intact seal between catheter & drainage tubing;Drainage bag/urimeter off the floor;Green sheeting clip in use;No dependent loops or kinks;Drainage bag/urimeter not overfilled (<2/3 full);Drainage bag/urimeter below bladder 10/15/20 0315   Output (mL) 10 mL 10/15/20 0800   Number of days: 1       Drips:    dextrose 5 % and 0.45 % NaCl 100 mL/hr at 10/15/20 0800    insulin (HUMAN R) infusion (adults) 4 Units/hr (10/15/20 0800)    propofoL Stopped (10/14/20 2300)       Patient Active Problem List   Diagnosis    Decompensated hepatic cirrhosis    Ascites due to alcoholic cirrhosis    Thrombocytopenia    Coagulopathy    JENN (acute kidney injury)    Portal hypertension    Chronic Hepatic encephalopathy    Hyponatremia    Macrocytic anemia    DIC (disseminated intravascular coagulation)    Hypertension    Hx of SBP (spontaneous bacterial peritonitis)    Pulmonary HTN    Alcohol use disorder, severe, dependence    Encounter for pre-transplant evaluation for liver transplant    Encounter for palliative care    Counseling regarding advanced care planning and goals of care    Left knee pain    Organ transplant candidate    GI bleed    Acidosis    Gram positive sepsis    Status post liver transplant    Hyperglycemia    Class 1 obesity due to excess calories with serious comorbidity and body mass index (BMI) of 34.0 to 34.9 in adult    Adverse effect of corticosteroids    Prophylactic immunotherapy       Review of patient's allergies indicates:   Allergen Reactions    Latex, natural rubber        Current Inpatient Medications:   ampicillin-sulbactim (UNASYN) IVPB  3 g Intravenous Q6H    cefTRIAXone (ROCEPHIN) IVPB  1 g Intravenous Q24H    fluconazole (DIFLUCAN) IVPB  400 mg Intravenous Q24H    folic acid  1 mg Oral Daily "    heparin (porcine)  5,000 Units Subcutaneous Q8H    methylPREDNISolone sodium succinate  100 mg Intravenous Q12H    Followed by    [START ON 10/16/2020] methylPREDNISolone sodium succinate  80 mg Intravenous Q12H    Followed by    [START ON 10/17/2020] methylPREDNISolone sodium succinate  60 mg Intravenous Q12H    Followed by    [START ON 10/18/2020] methylPREDNISolone sodium succinate  40 mg Intravenous Q12H    Followed by    [START ON 10/19/2020] methylPREDNISolone sodium succinate  20 mg Intravenous Q12H    Followed by    [START ON 10/20/2020] predniSONE  20 mg Oral Daily    mupirocin  1 g Nasal BID    mycophenolate mofetil  1,000 mg Oral BID    nystatin  500,000 Units Mouth/Throat TID PC    pantoprazole  40 mg Intravenous BID    sodium bicarbonate  1,950 mg Oral TID    [START ON 10/21/2020] sulfamethoxazole-trimethoprim 400-80mg  1 tablet Oral Daily AM    tacrolimus  1 mg Oral BID    thiamine  100 mg Oral Daily    [START ON 10/24/2020] valGANciclovir  450 mg Oral Daily       No current facility-administered medications on file prior to encounter.      Current Outpatient Medications on File Prior to Encounter   Medication Sig Dispense Refill    albuterol (PROVENTIL/VENTOLIN HFA) 90 mcg/actuation inhaler Inhale 2 puffs into the lungs every 6 (six) hours as needed for Wheezing or Shortness of Breath. Rescue      cetirizine (ZYRTEC) 10 MG tablet Take 10 mg by mouth once daily.      ciprofloxacin HCl (CIPRO) 500 MG tablet Take 1 tablet (500 mg total) by mouth once daily. 30 tablet 2    ergocalciferol (VITAMIN D2) 50,000 unit Cap Take 50,000 Units by mouth every Monday.      folic acid (FOLVITE) 1 MG tablet Take 1 mg by mouth once daily.      hydrOXYzine HCL (ATARAX) 25 MG tablet Take 1 tablet (25 mg total) by mouth 3 (three) times daily as needed for Itching. 30 tablet 1    lactulose (CHRONULAC) 10 gram/15 mL solution Take 23 mLs (15.3333 g total) by mouth 3 (three) times daily. TITRATE TO  3-4 BOWEL MOVEMENTS DAILY. 2070 mL 2    polyethylene glycol (GLYCOLAX) 17 gram/dose powder Take 17 g by mouth once daily.      rifAXIMin (XIFAXAN) 550 mg Tab Take 1 tablet (550 mg total) by mouth 2 (two) times daily. 60 tablet 2    thiamine 100 MG tablet Take 100 mg by mouth once daily.         Past Surgical History:   Procedure Laterality Date    ERCP N/A 10/1/2020    Procedure: ERCP (ENDOSCOPIC RETROGRADE CHOLANGIOPANCREATOGRAPHY);  Surgeon: Marcos Ramirez MD;  Location: [facility] (2ND FLR);  Service: Endoscopy;  Laterality: N/A;    ESOPHAGOGASTRODUODENOSCOPY N/A 10/13/2020    Procedure: EGD (ESOPHAGOGASTRODUODENOSCOPY);  Surgeon: Prasanth Jerry MD;  [facility]  Service: Endoscopy;  Laterality: N/A;    LIVER TRANSPLANT N/A 10/14/2020    Procedure: TRANSPLANT, LIVER;  Surgeon: Curtis Zavaleta MD;  [facility];  Service: Transplant;  Laterality: N/A;  Intra op HD    RIGHT HEART CATHETERIZATION Right 9/23/2020    Procedure: INSERTION, CATHETER, RIGHT HEART;  Surgeon: Mikel Costa Jr., MD;  Location: [facility] CATH LAB;  Service: Cardiology;  Laterality: Right;    THORACENTESIS  2011       Social History     Socioeconomic History    Marital status:      Spouse name: Not on file    Number of children: Not on file    Years of education: Not on file    Highest education level: Not on file   Occupational History    Not on file   Social Needs    Financial resource strain: Not on file    Food insecurity     Worry: Not on file     Inability: Not on file    Transportation needs     Medical: Not on file     Non-medical: Not on file   Tobacco Use    Smoking status: Current Some Day Smoker    Smokeless tobacco: Current User   Substance and Sexual Activity    Alcohol use: Yes     Alcohol/week: 112.0 standard drinks     Types: 112 Shots of liquor per week     Comment: fifth of vodka a day. LAST DRINK 7/25/2020 per pt     Drug use: Yes     Frequency: 3.0 times  per week     Types: Marijuana    Sexual activity: Yes   Lifestyle    Physical activity     Days per week: Not on file     Minutes per session: Not on file    Stress: Not on file   Relationships    Social connections     Talks on phone: Not on file     Gets together: Not on file     Attends [employer] service: Not on file     Active member of club or organization: Not on file     Attends meetings of clubs or organizations: Not on file     Relationship status: Not on file   Other Topics Concern    Not on file   Social History Narrative    Not on file       OBJECTIVE:     Vital Signs Range (Last 24H):  Temp:  [36.4 °C (97.6 °F)-37 °C (98.6 °F)]   Pulse:  [73-94]   Resp:  [8-26]   BP: (103-110)/(48-55)   SpO2:  [95 %-100 %]   Arterial Line BP: ()/(18-74)       Significant Labs:  Lab Results   Component Value Date    WBC 5.51 10/15/2020    WBC 5.51 10/15/2020    HGB 7.3 (L) 10/15/2020    HGB 7.3 (L) 10/15/2020    HCT 21.8 (L) 10/15/2020    HCT 21.8 (L) 10/15/2020    PLT 34 (LL) 10/15/2020    PLT 34 (LL) 10/15/2020    ALT 2,416 (H) 10/15/2020    AST 3,834 (H) 10/15/2020    AST 3,834 (H) 10/15/2020     10/15/2020    K 4.0 10/15/2020     10/15/2020    CREATININE 1.9 (H) 10/15/2020    BUN 23 (H) 10/15/2020    CO2 21 (L) 10/15/2020    TSH 0.938 09/19/2020    PSA 0.05 09/29/2020    INR 1.3 (H) 10/15/2020    INR 1.3 (H) 10/15/2020    HGBA1C <4.0 (A) 10/13/2020       Diagnostic Studies: No relevant studies.    EKG:   Results for orders placed or performed during the hospital encounter of 10/11/20   EKG 12-lead    Collection Time: 10/13/20  6:30 PM    Narrative    Test Reason : Z01.818,    Vent. Rate : 079 BPM     Atrial Rate : 000 BPM     P-R Int : 000 ms          QRS Dur : 092 ms      QT Int : 404 ms       P-R-T Axes : 000 -21 020 degrees     QTc Int : 463 ms    Normal sinus rhythm with occasional Premature ventricular complexes  Low voltage QRS - limb leads only  Abnormal ECG  When compared with ECG of  10-OCT-2020 16:02,  The axis is less leftward  Borderline criteria for Anterolateral infarct are no longer Present  Confirmed by Florentin Love MD ([id number]) on 10/14/2020 3:07:15 PM    Referred By: FRIDA SMITH           Confirmed By:Florentin Love MD       ECHOCARDIOGRAM:  TTE:  Results for orders placed or performed during the hospital encounter of 09/19/20   Echo Color Flow Doppler? Yes   Result Value Ref Range    Ascending aorta 3.11 cm    STJ 3.14 cm    AV mean gradient 11 mmHg    Ao peak mahesh 2.17 m/s    Ao VTI 37.35 cm    IVRT 51.38 msec    IVS 0.91 0.6 - 1.1 cm    LA size 4.31 cm    Left Atrium Major Axis 6.26 cm    Left Atrium Minor Axis 6.32 cm    LVIDd 5.61 3.5 - 6.0 cm    LVIDs 3.30 2.1 - 4.0 cm    LVOT diameter 2.43 cm    LVOT peak VTI 24.12 cm    Posterior Wall 1.06 0.6 - 1.1 cm    MV Peak A Mahesh 0.89 m/s    E wave decelartion time 154.32 msec    MV Peak E Mahesh 1.06 m/s    RA Major Axis 4.94 cm    RA Width 3.53 cm    RVDD 3.87 cm    Sinus 3.37 cm    TAPSE 2.95 cm    TR Max Mahesh 4.19 m/s    TDI LATERAL 0.11 m/s    TDI SEPTAL 0.10 m/s    LA WIDTH 4.75 cm    MV stenosis pressure 1/2 time 44.75 ms    LV Diastolic Volume 154.47 mL    LV Systolic Volume 44.25 mL    RV S' 19.26 cm/s    LVOT peak mahesh 1.41 m/s    LV LATERAL E/E' RATIO 9.64 m/s    LV SEPTAL E/E' RATIO 10.60 m/s    FS 41 %    LA volume 109.45 cm3    LV mass 216.06 g    Left Ventricle Relative Wall Thickness 0.38 cm    AV valve area 2.99 cm2    AV Velocity Ratio 0.65     AV index (prosthetic) 0.65     MV valve area p 1/2 method 4.92 cm2    E/A ratio 1.19     Mean e' 0.11 m/s    LVOT area 4.6 cm2    LVOT stroke volume 111.80 cm3    AV peak gradient 19 mmHg    E/E' ratio 10.10 m/s    LV Systolic Volume Index 18.5 mL/m2    LV Diastolic Volume Index 64.44 mL/m2    LA Volume Index 45.7 mL/m2    LV Mass Index 90 g/m2    Triscuspid Valve Regurgitation Peak Gradient 70 mmHg    BSA 2.47 m2    Right Atrial Pressure (from IVC) 3 mmHg    TV rest pulmonary artery  pressure 73 mmHg    Narrative    · Moderate left atrial enlargement.  · Normal left ventricular systolic function. The estimated ejection   fraction is 65%.  · Indeterminate left ventricular diastolic function.  · Normal right ventricular systolic function.  · Mild tricuspid regurgitation.  · Normal central venous pressure (3 mmHg).  · Pulmonary hypertension present.  · The estimated PA systolic pressure is 73 mmHg.        STRESS:  Results for orders placed or performed during the hospital encounter of 09/19/20   Stress Echo Which stress agent will be used? Pharmacological; Color Flow Doppler? No   Result Value Ref Range    Ascending aorta 3.48 cm    STJ 2.93 cm    IVS 1.11 (A) 0.6 - 1.1 cm    LA size 4.61 cm    Left Atrium Major Axis 6.38 cm    Left Atrium Minor Axis 6.16 cm    LVIDd 6.00 (A) 3.5 - 6.0 cm    LVIDs 3.95 2.1 - 4.0 cm    LVOT diameter 2.50 cm    LVOT peak VTI 33.73 cm    Posterior Wall 1.07 0.6 - 1.1 cm    MV Peak A Iam 1.08 m/s    E wave decelartion time 177.77 msec    MV Peak E Iam 1.22 m/s    PV Peak D Iam 1.09 m/s    PV Peak S Iam 0.96 m/s    RA Major Axis 5.05 cm    RA Width 3.28 cm    RVDD 4.58 cm    Sinus 3.72 cm    TAPSE 2.22 cm    TDI LATERAL 0.13 m/s    TDI SEPTAL 0.12 m/s    LA WIDTH 4.60 cm    MV stenosis pressure 1/2 time 51.55 ms    LV Diastolic Volume 211.74 mL    LV Systolic Volume 67.76 mL    LVOT peak iam 1.78 m/s    LV LATERAL E/E' RATIO 9.38 m/s    LV SEPTAL E/E' RATIO 10.17 m/s    FS 34 %    LA volume 112.98 cm3    LV mass 276.28 g    Left Ventricle Relative Wall Thickness 0.36 cm    MV valve area p 1/2 method 4.27 cm2    E/A ratio 1.13     Mean e' 0.13 m/s    Pulm vein S/D ratio 0.88     LVOT area 4.9 cm2    LVOT stroke volume 165.49 cm3    E/E' ratio 9.76 m/s    LV Systolic Volume Index 28.4 mL/m2    LV Diastolic Volume Index 88.76 mL/m2    LA Volume Index 47.4 mL/m2    LV Mass Index 116 g/m2    BSA 2.45 m2    Systolic blood pressure 155 mmHg    Diastolic blood pressure 69 mmHg     HR at rest 95 bpm    RPP 14,725     Peak  bpm    Peak Systolic BP 84 mmHg    Peak Diatolic BP 39 mmHg    Peak RPP 11,424     Max Predicted      85% Max Predicted      % Max HR Achieved 79     1 Minute Recovery  bpm    [facility] CV CPX PATIENT IS MALE 1     [facility] CV CPX PATIENT IS FEMALE 0     Right Atrial Pressure (from IVC) 3 mmHg    Narrative    · The stress echo portion of this study is negative for myocardial   ischemia.  · The ECG portion of this study is negative for myocardial ischemia.  · There were no arrhythmias during stress.  · The left ventricle is mildly enlarged with hyperdynamic systolic   function. The estimated ejection fraction is 75%.  · Slightly increased transaortic velocity (2.3m/s) secondary to   hyperdynamic heart function and high cardiac output.  · There is left ventricular eccentric hypertrophy.  · Normal left ventricular diastolic function.  · Normal right ventricular systolic function.  · Moderate left atrial enlargement.  · Normal central venous pressure (3 mmHg).            ASSESSMENT/PLAN:         Anesthesia Evaluation    I have reviewed the Patient Summary Reports.    I have reviewed the Nursing Notes. I have reviewed the NPO Status.      Review of Systems  Anesthesia Hx:  No problems with previous Anesthesia Denies Hx of Anesthetic complications  History of prior surgery of interest to airway management or planning: liver transplant. Previous anesthesia: General Denies Family Hx of Anesthesia complications.   Denies Personal Hx of Anesthesia complications.   Social:  Alcohol Use    Hematology/Oncology:     Oncology Normal   Hematology Comments: thrombocytopenia   EENT/Dental:EENT/Dental Normal   Cardiovascular:   Hypertension Pulm HTN s/p RHC   Pulmonary:  Pulmonary Normal    Renal/:   Chronic Renal Disease, ARF    Hepatic/GI:   Liver Disease, ESLD 2/2 to ETOH cirrhosis  Gastric varices  Liver transplant on 10/14/20   Neurological:  Neurology Normal     Endocrine:  Endocrine Normal    Psych:   Psychiatric History          Physical Exam  General:  Obesity    Airway/Jaw/Neck:  Airway Findings: Mouth Opening: Normal Mallampati: II  Improves to I with phonation.  TM Distance: Normal, at least 6 cm  Jaw/Neck Findings:  Neck ROM: Normal ROM     Eyes/Ears/Nose:Scleral icterus    Dental:  Dental Findings: In tact   Chest/Lungs:  Chest/Lungs Findings: Clear to auscultation     Heart/Vascular:  Heart Findings: Rate: Normal  Rhythm: Regular Rhythm        Mental Status:  Mental Status Findings:  Cooperative, Alert and Oriented         Anesthesia Plan  Type of Anesthesia, risks & benefits discussed:  Anesthesia Type:  general  Patient's Preference:   Intra-op Monitoring Plan: standard ASA monitors, arterial line and central line  Intra-op Monitoring Plan Comments:   Post Op Pain Control Plan: per primary service following discharge from PACU, IV/PO Opioids PRN and multimodal analgesia  Post Op Pain Control Plan Comments:   Induction:   IV  Beta Blocker:  Patient is not currently on a Beta-Blocker (No further documentation required).       Informed Consent: Patient understands risks and agrees with Anesthesia plan.  Questions answered. Anesthesia consent signed with patient.  ASA Score: 4  emergent   Day of Surgery Review of History & Physical: I have interviewed and examined the patient. I have reviewed the patient's H&P dated:    H&P update referred to the provider.         Ready For Surgery From Anesthesia Perspective.

## 2020-10-15 NOTE — OP NOTE
Certification of Assistant at Surgery       Surgery Date: 10/15/2020     Participating Surgeons:  Surgeon(s) and Role:     * Curtis Zavaleta MD - Primary     * Mayito Lynne Jr., MD - Assisting     * Estela Ken MD - Fellow    Procedures:  Procedure(s) (LRB):  LAPAROTOMY, EXPLORATORY, AFTER LIVER TRANSPLANT, Possible redo of artery, possible portal thrombectomy. IntraOperative US. (N/A)    Assistant Surgeon's Certification of Necessity:  I understand that section 1842 (b) (6) (d) of the Social Security Act generally prohibits Medicare Part B reasonable charge payment for the services of assistants at surgery in teaching hospitals when qualified residents are available to furnish such services. I certify that the services for which payment is claimed were medically necessary, and that no qualified resident was available to perform the services. I further understand that these services are subject to post-payment review by the Medicare carrier.      Mayito Lynne Jr, MD    10/15/2020  11:20 AM

## 2020-10-15 NOTE — PLAN OF CARE
Recommendations      1.) If pt remains intubated, initaite EN of Peptamen Intense VHP; begin at 10mL/hr and advance 5-10mL Q4hr as tolerated to goal rate at 35mL/hr.   2.) If able to extubate, advance diet as tolerated to low sodium per SLP texture recommendations.      Goals: Pt to meet >75% of estimated energy and protein needs over the course of 7 days.   Nutrition Goal Status: new  Communication of RD Recs: (POC)

## 2020-10-15 NOTE — CONSULTS
"[facility]  Nephrology  Consult Note    Patient Name: Jordan Altman  MRN: [id number]  Admission Date: 10/11/2020  Hospital Length of Stay: 4 days  Attending Provider: Moon Moreno MD   Primary Care Physician: SHAHEEN Jaime  Principal Problem:Status post liver transplant    Inpatient consult to Nephrology  Consult performed by: Jp Carlisle NP  Consult ordered by: Johnny Galan II, MD  Reason for consult: EJNN         Subjective:     HPI: Valdo Altman is a 44 yo M with a medical history including HTN, SBP, hepatic cirrhosis and ESLD secondary to ETOH use. He is currently listed for a liver transplant. He presented to [facility] on 10/11/2020 with complaints of abdominal distention and black, tarry stools x 1 day. Occult stool test (+) in  Emergency room. [facility] [facility] [place] team for further evaluation. Pt s/p orthotopic liver transplant (whole liver, DBD donor) on 10/14/2020, requiring intraoperative SLED. 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 Underwent ex lap w portal vein thrombectomy, revision of hepatic artery and placement of abd wound vac on 10/15..." Pt with minimal UOP since transplant; received high-dose IV diuretics (120mg lasix and 500mg diuril) on 10/15, with minimal UOP. Pt with a rough baseline sCr of 1.2-1.4. Nephrology consulted for JENN and possible need for RRT.     -HPI was obtained from chart review as pt is currently intubated.     Past Medical History:   Diagnosis Date    Decompensated hepatic cirrhosis     Hypertension     SBP (spontaneous bacterial peritonitis)        Past Surgical History:   Procedure Laterality Date    ERCP N/A 10/1/2020    Procedure: ERCP (ENDOSCOPIC RETROGRADE " CHOLANGIOPANCREATOGRAPHY);  Surgeon: Marcos Ramirez MD;  Location: [facility] ENDO (2ND FLR);  Service: Endoscopy;  Laterality: N/A;    ESOPHAGOGASTRODUODENOSCOPY N/A 10/13/2020    Procedure: EGD (ESOPHAGOGASTRODUODENOSCOPY);  Surgeon: Prasanth Jerry MD;  Location: [facility] ENDO (2ND FLR);  Service: Endoscopy;  Laterality: N/A;    LIVER TRANSPLANT N/A 10/14/2020    Procedure: TRANSPLANT, LIVER;  Surgeon: Curtis Zavaleta MD;  Location: [facility] OR 2ND FLR;  Service: Transplant;  Laterality: N/A;  Intra op HD    RIGHT HEART CATHETERIZATION Right 9/23/2020    Procedure: INSERTION, CATHETER, RIGHT HEART;  Surgeon: Mikel Costa Jr., MD;  Location: [facility] CATH LAB;  Service: Cardiology;  Laterality: Right;    THORACENTESIS  2011       Review of patient's allergies indicates:   Allergen Reactions    Latex, natural rubber      Current Facility-Administered Medications   Medication Frequency    0.9%  NaCl infusion (for blood administration) Q24H PRN    0.9%  NaCl infusion (for blood administration) Q24H PRN    0.9%  NaCl infusion (for blood administration) Q24H PRN    acetaminophen tablet 650 mg Q8H PRN    cefTRIAXone injection 1 g Q24H    dextrose 5 % and 0.45 % NaCl infusion Continuous    dextrose 50% injection 12.5 g PRN    dextrose 50% injection 25 g PRN    fluconazole (DIFLUCAN) IVPB 400 mg 200 mL Q24H    folic acid tablet 1 mg Daily    heparin 25,000 units in dextrose 5% (100 units/ml) IV bolus from bag - ADDITIONAL PRN BOLUS - 30 units/kg PRN    heparin 25,000 units in dextrose 5% (100 units/ml) IV bolus from bag - ADDITIONAL PRN BOLUS - 60 units/kg PRN    heparin 25,000 units in dextrose 5% 250 mL (100 units/mL) infusion LOW INTENSITY nomogram Continuous    HYDROmorphone injection 0.5 mg Q1H PRN    insulin regular 100 Units in sodium chloride 0.9% 100 mL infusion Continuous    melatonin tablet 6 mg Nightly PRN    methylPREDNISolone sodium succinate injection 100 mg Q12H    Followed by    [START  ON 10/16/2020] methylPREDNISolone sodium succinate injection 80 mg Q12H    Followed by    [START ON 10/17/2020] methylPREDNISolone sodium succinate injection 60 mg Q12H    Followed by    [START ON 10/18/2020] methylPREDNISolone sodium succinate injection 40 mg Q12H    Followed by    [START ON 10/19/2020] methylPREDNISolone sodium succinate injection 20 mg Q12H    Followed by    [START ON 10/20/2020] predniSONE tablet 20 mg Daily    mupirocin 2 % ointment 1 g BID    mycophenolate mofetil 200 mg/mL suspension 1,000 mg BID    nystatin 100,000 unit/mL suspension 500,000 Units TID PC    ondansetron injection 4 mg Q6H PRN    pantoprazole injection 40 mg BID    propofol (DIPRIVAN) 10 mg/mL infusion Continuous    sodium bicarbonate tablet 1,950 mg TID    sodium chloride 0.9% flush 10 mL PRN    [START ON 10/21/2020] sulfamethoxazole-trimethoprim 400-80mg per tablet 1 tablet Daily AM    tacrolimus (PROGRAF) 1 mg/mL oral syringe BID    thiamine tablet 100 mg Daily    [START ON 10/24/2020] valGANciclovir tablet 450 mg Daily     Family History     Problem Relation (Age of Onset)    COPD Mother        Tobacco Use    Smoking status: Current Some Day Smoker    Smokeless tobacco: Current User   Substance and Sexual Activity    Alcohol use: Yes     Alcohol/week: 112.0 standard drinks     Types: 112 Shots of liquor per week     Comment: fifth of vodka a day. LAST DRINK 7/25/2020 per pt     Drug use: Yes     Frequency: 3.0 times per week     Types: Marijuana    Sexual activity: Yes     Review of Systems   Unable to perform ROS: Intubated     Objective:     Vital Signs (Most Recent):  Temp: 99 °F (37.2 °C) (10/15/20 1500)  Pulse: 80 (10/15/20 1500)  Resp: 16 (10/15/20 1500)  BP: 106/62 (10/15/20 1500)  SpO2: 98 % (10/15/20 1500)  O2 Device (Oxygen Therapy): nasal cannula (10/15/20 0600) Vital Signs (24h Range):  Temp:  [97.6 °F (36.4 °C)-99.7 °F (37.6 °C)] 99 °F (37.2 °C)  Pulse:  [73-87] 80  Resp:  [8-24] 16  SpO2:   [90 %-100 %] 98 %  BP: (103-118)/(48-69) 106/62  Arterial Line BP: (101-149)/(46-74) 111/52     Weight: 120 kg (264 lb 8.8 oz) (10/15/20 0600)  Body mass index is 35.88 kg/m².  Body surface area is 2.47 meters squared.    I/O last 3 completed shifts:  In: 13308 [I.V.:8841; Blood:6759; NG/GT:100]  Out: 75952 [Urine:953; Drains:2810; Other:2252; Blood:6000]    Physical Exam  Constitutional:       Appearance: He is ill-appearing.   HENT:      Head: Normocephalic and atraumatic.      Nose: Nose normal.      Mouth/Throat:      Mouth: Mucous membranes are moist.      Pharynx: Oropharynx is clear.   Eyes:      Conjunctiva/sclera: Conjunctivae normal.      Pupils: Pupils are equal, round, and reactive to light.   Cardiovascular:      Rate and Rhythm: Normal rate.   Pulmonary:      Breath sounds: Examination of the right-lower field reveals decreased breath sounds. Examination of the left-lower field reveals decreased breath sounds. Decreased breath sounds and rales present.      Comments: Intubated and mechanically ventilated   Abdominal:      Palpations: Abdomen is soft.      Comments: Midline wound vac to abdomen with serosanguinous drainage in canister    Musculoskeletal:      Right lower leg: Edema present.      Left lower leg: Edema present.   Skin:     General: Skin is warm and dry.      Coloration: Skin is jaundiced.   Neurological:      Comments: Sedated          Significant Labs:  CBC:   Recent Labs   Lab 10/15/20  1245   WBC 5.41   RBC 2.57*   HGB 7.9*   HCT 23.7*   PLT 49*   MCV 92   MCH 30.7   MCHC 33.3     CMP:   Recent Labs   Lab 10/15/20  0352 10/15/20  0810 10/15/20  1044 10/15/20  1245   * 142* 163*  --    CALCIUM 8.2* 8.1*  --   --    ALBUMIN 2.6*  --  2.5*  --    PROT 4.2*  --   --   --     135* 136  --    K 4.0 4.1 4.4  --    CO2 21* 24  --   --     102  --   --    BUN 23* 24*  --   --    CREATININE 1.9* 2.1*  --   --    ALKPHOS 66  --   --   --    ALT 2,416*  --   --   --    AST  "3,834*  3,834* 3,701*  --  2,613*   BILITOT 4.7*  --   --   --      All labs within the past 24 hours have been reviewed.      Assessment/Plan:     * Status post liver transplant  -Management per primary team     JENN (acute kidney injury)  Pt appears to be CKD 3 at baseline. Presented to OSH with abdominal pain and found to have GIB and hepatic cirrhosis from ETOH. Pt s/p orthotopic liver transplant (whole liver, DBD donor) on 10/14/2020, requiring intraoperative SLED. On 10/15/2020, "Liver ultrasound showed concerns for potential hepatic artery stenosis with possible thrombosis, and it also showed near complete thrombosis of the main portal and r/left portal veins with reversal of flow with respiration. Underwent ex lap w portal vein thrombectomy, revision of hepatic artery and placement of abd wound vac on 10/15..." Pt with minimal UOP since transplant; received high-dose IV diuretics (120mg lasix and 500mg diuril) on 10/15, with minimal UOP. Pt with a rough baseline sCr of 1.2-1.4. Pt noted to have 45cc of clear/straw-colored urine in urine meter s/p ex-lap. Spoke with bedside RN, who indicated that the 45cc of urine was for the past hour since the patient had returned from the OR.     JENN likely 2/2 iATN from intraoperative hypotension and acute blood loss anemia     Plan/Recommendations:     -No urgent indication for RRT at this time  -Monitor UOP closely   -Follow-up with post operative labs and assess for urgent electrolyte derangements/acid-base abnormalities/evidence of worsening renal function   -Strict I/O's  -Favor MAP >65  -Maintain Hgb >7.0  -Renally dose meds and avoid nephrotoxic medications   -Will continue to follow closely   -Plan discussed with staff, Dr Hartley         Thank you for your consult. I will follow-up with patient. Please contact us if you have any additional questions.    Jp Carlisle NP  Nephrology  [facility]  "

## 2020-10-16 ENCOUNTER — ANESTHESIA (OUTPATIENT)
Dept: SURGERY | Facility: HOSPITAL | Age: 45
DRG: 005 | End: 2020-10-16
Payer: MEDICAID

## 2020-10-16 LAB
ALBUMIN SERPL BCP-MCNC: 1.9 G/DL (ref 3.5–5.2)
ALBUMIN SERPL BCP-MCNC: 2.2 G/DL (ref 3.5–5.2)
ALBUMIN SERPL BCP-MCNC: 2.8 G/DL (ref 3.5–5.2)
ALLENS TEST: ABNORMAL
ALLENS TEST: ABNORMAL
ALP SERPL-CCNC: 53 U/L (ref 55–135)
ALP SERPL-CCNC: 54 U/L (ref 55–135)
ALT SERPL W/O P-5'-P-CCNC: 1039 U/L (ref 10–44)
ALT SERPL W/O P-5'-P-CCNC: 899 U/L (ref 10–44)
ANION GAP SERPL CALC-SCNC: 11 MMOL/L (ref 8–16)
ANION GAP SERPL CALC-SCNC: 11 MMOL/L (ref 8–16)
ANION GAP SERPL CALC-SCNC: 9 MMOL/L (ref 8–16)
ANISOCYTOSIS BLD QL SMEAR: SLIGHT
APTT BLDCRRT: 30.1 SEC (ref 21–32)
APTT BLDCRRT: 35.3 SEC (ref 21–32)
AST SERPL-CCNC: 295 U/L (ref 10–40)
AST SERPL-CCNC: 358 U/L (ref 10–40)
AST SERPL-CCNC: 398 U/L (ref 10–40)
AST SERPL-CCNC: 398 U/L (ref 10–40)
AST SERPL-CCNC: 430 U/L (ref 10–40)
AST SERPL-CCNC: 436 U/L (ref 10–40)
AST SERPL-CCNC: 612 U/L (ref 10–40)
AST SERPL-CCNC: 809 U/L (ref 10–40)
BACTERIA BLD CULT: NORMAL
BASO STIPL BLD QL SMEAR: ABNORMAL
BASOPHILS # BLD AUTO: 0 K/UL (ref 0–0.2)
BASOPHILS # BLD AUTO: 0.01 K/UL (ref 0–0.2)
BASOPHILS # BLD AUTO: 0.02 K/UL (ref 0–0.2)
BASOPHILS # BLD AUTO: 0.02 K/UL (ref 0–0.2)
BASOPHILS NFR BLD: 0 % (ref 0–1.9)
BASOPHILS NFR BLD: 0.1 % (ref 0–1.9)
BASOPHILS NFR BLD: 0.1 % (ref 0–1.9)
BASOPHILS NFR BLD: 0.2 % (ref 0–1.9)
BILIRUB SERPL-MCNC: 2.4 MG/DL (ref 0.1–1)
BILIRUB SERPL-MCNC: 2.9 MG/DL (ref 0.1–1)
BLD PROD TYP BPU: NORMAL
BLOOD UNIT EXPIRATION DATE: NORMAL
BLOOD UNIT TYPE CODE: 5100
BLOOD UNIT TYPE CODE: 600
BLOOD UNIT TYPE CODE: 6200
BLOOD UNIT TYPE CODE: 7300
BLOOD UNIT TYPE: NORMAL
BUN SERPL-MCNC: 24 MG/DL (ref 6–20)
BUN SERPL-MCNC: 43 MG/DL (ref 6–20)
BUN SERPL-MCNC: 44 MG/DL (ref 6–20)
BURR CELLS BLD QL SMEAR: ABNORMAL
CALCIUM SERPL-MCNC: 7 MG/DL (ref 8.7–10.5)
CALCIUM SERPL-MCNC: 7.6 MG/DL (ref 8.7–10.5)
CALCIUM SERPL-MCNC: 7.7 MG/DL (ref 8.7–10.5)
CHLORIDE SERPL-SCNC: 102 MMOL/L (ref 95–110)
CHLORIDE SERPL-SCNC: 103 MMOL/L (ref 95–110)
CHLORIDE SERPL-SCNC: 103 MMOL/L (ref 95–110)
CO2 SERPL-SCNC: 22 MMOL/L (ref 23–29)
CO2 SERPL-SCNC: 22 MMOL/L (ref 23–29)
CO2 SERPL-SCNC: 25 MMOL/L (ref 23–29)
CODING SYSTEM: NORMAL
CREAT SERPL-MCNC: 2 MG/DL (ref 0.5–1.4)
CREAT SERPL-MCNC: 3.2 MG/DL (ref 0.5–1.4)
CREAT SERPL-MCNC: 3.2 MG/DL (ref 0.5–1.4)
DELSYS: ABNORMAL
DELSYS: ABNORMAL
DIFFERENTIAL METHOD: ABNORMAL
DISPENSE STATUS: NORMAL
EOSINOPHIL # BLD AUTO: 0 K/UL (ref 0–0.5)
EOSINOPHIL NFR BLD: 0 % (ref 0–8)
ERYTHROCYTE [DISTWIDTH] IN BLOOD BY AUTOMATED COUNT: 16.3 % (ref 11.5–14.5)
ERYTHROCYTE [DISTWIDTH] IN BLOOD BY AUTOMATED COUNT: 16.3 % (ref 11.5–14.5)
ERYTHROCYTE [DISTWIDTH] IN BLOOD BY AUTOMATED COUNT: 16.5 % (ref 11.5–14.5)
ERYTHROCYTE [DISTWIDTH] IN BLOOD BY AUTOMATED COUNT: 16.7 % (ref 11.5–14.5)
ERYTHROCYTE [DISTWIDTH] IN BLOOD BY AUTOMATED COUNT: 16.8 % (ref 11.5–14.5)
ERYTHROCYTE [DISTWIDTH] IN BLOOD BY AUTOMATED COUNT: 17.4 % (ref 11.5–14.5)
ERYTHROCYTE [DISTWIDTH] IN BLOOD BY AUTOMATED COUNT: 18.1 % (ref 11.5–14.5)
ERYTHROCYTE [SEDIMENTATION RATE] IN BLOOD BY WESTERGREN METHOD: 20 MM/H
ERYTHROCYTE [SEDIMENTATION RATE] IN BLOOD BY WESTERGREN METHOD: 20 MM/H
EST. GFR  (AFRICAN AMERICAN): 25.3 ML/MIN/1.73 M^2
EST. GFR  (AFRICAN AMERICAN): 25.3 ML/MIN/1.73 M^2
EST. GFR  (AFRICAN AMERICAN): 44.6 ML/MIN/1.73 M^2
EST. GFR  (NON AFRICAN AMERICAN): 21.9 ML/MIN/1.73 M^2
EST. GFR  (NON AFRICAN AMERICAN): 21.9 ML/MIN/1.73 M^2
EST. GFR  (NON AFRICAN AMERICAN): 38.6 ML/MIN/1.73 M^2
FACT X PPP CHRO-ACNC: <0.1 IU/ML (ref 0.3–0.7)
FIO2: 40
FIO2: 40
GLUCOSE SERPL-MCNC: 133 MG/DL (ref 70–110)
GLUCOSE SERPL-MCNC: 142 MG/DL (ref 70–110)
GLUCOSE SERPL-MCNC: 146 MG/DL (ref 70–110)
GLUCOSE SERPL-MCNC: 160 MG/DL (ref 70–110)
GLUCOSE SERPL-MCNC: 177 MG/DL (ref 70–110)
GLUCOSE SERPL-MCNC: 191 MG/DL (ref 70–110)
GLUCOSE SERPL-MCNC: 194 MG/DL (ref 70–110)
GLUCOSE SERPL-MCNC: 196 MG/DL (ref 70–110)
HCO3 UR-SCNC: 19.7 MMOL/L (ref 24–28)
HCO3 UR-SCNC: 20.2 MMOL/L (ref 24–28)
HCO3 UR-SCNC: 20.8 MMOL/L (ref 24–28)
HCO3 UR-SCNC: 20.9 MMOL/L (ref 24–28)
HCO3 UR-SCNC: 21.3 MMOL/L (ref 24–28)
HCO3 UR-SCNC: 22.5 MMOL/L (ref 24–28)
HCO3 UR-SCNC: 22.8 MMOL/L (ref 24–28)
HCT VFR BLD AUTO: 23.4 % (ref 40–54)
HCT VFR BLD AUTO: 25.8 % (ref 40–54)
HCT VFR BLD AUTO: 26.6 % (ref 40–54)
HCT VFR BLD AUTO: 27.9 % (ref 40–54)
HCT VFR BLD AUTO: 29 % (ref 40–54)
HCT VFR BLD CALC: 17 %PCV (ref 36–54)
HCT VFR BLD CALC: 18 %PCV (ref 36–54)
HCT VFR BLD CALC: 19 %PCV (ref 36–54)
HCT VFR BLD CALC: 19 %PCV (ref 36–54)
HCT VFR BLD CALC: 23 %PCV (ref 36–54)
HCV RNA SERPL NAA+PROBE-LOG IU: <1.08 LOG (10) IU/ML
HCV RNA SERPL QL NAA+PROBE: NOT DETECTED IU/ML
HCV RNA SPEC NAA+PROBE-ACNC: <12 IU/ML
HEPARIN INDUCED THROMBOCYTOPENIA: 0.22 OD (ref 0–0.4)
HGB BLD-MCNC: 8 G/DL (ref 14–18)
HGB BLD-MCNC: 8.7 G/DL (ref 14–18)
HGB BLD-MCNC: 8.7 G/DL (ref 14–18)
HGB BLD-MCNC: 8.8 G/DL (ref 14–18)
HGB BLD-MCNC: 8.8 G/DL (ref 14–18)
HGB BLD-MCNC: 9.5 G/DL (ref 14–18)
HGB BLD-MCNC: 9.6 G/DL (ref 14–18)
IMM GRANULOCYTES # BLD AUTO: 0.07 K/UL (ref 0–0.04)
IMM GRANULOCYTES # BLD AUTO: 0.1 K/UL (ref 0–0.04)
IMM GRANULOCYTES # BLD AUTO: 0.21 K/UL (ref 0–0.04)
IMM GRANULOCYTES # BLD AUTO: 0.23 K/UL (ref 0–0.04)
IMM GRANULOCYTES # BLD AUTO: 0.25 K/UL (ref 0–0.04)
IMM GRANULOCYTES NFR BLD AUTO: 1.1 % (ref 0–0.5)
IMM GRANULOCYTES NFR BLD AUTO: 1.5 % (ref 0–0.5)
IMM GRANULOCYTES NFR BLD AUTO: 1.9 % (ref 0–0.5)
IMM GRANULOCYTES NFR BLD AUTO: 2.2 % (ref 0–0.5)
IMM GRANULOCYTES NFR BLD AUTO: 2.3 % (ref 0–0.5)
INR PPP: 1 (ref 0.8–1.2)
INR PPP: 1 (ref 0.8–1.2)
INR PPP: 1.1 (ref 0.8–1.2)
LYMPHOCYTES # BLD AUTO: 0.3 K/UL (ref 1–4.8)
LYMPHOCYTES # BLD AUTO: 0.4 K/UL (ref 1–4.8)
LYMPHOCYTES # BLD AUTO: 0.4 K/UL (ref 1–4.8)
LYMPHOCYTES # BLD AUTO: 0.5 K/UL (ref 1–4.8)
LYMPHOCYTES NFR BLD: 3.8 % (ref 18–48)
LYMPHOCYTES NFR BLD: 3.9 % (ref 18–48)
LYMPHOCYTES NFR BLD: 4 % (ref 18–48)
LYMPHOCYTES NFR BLD: 4.3 % (ref 18–48)
LYMPHOCYTES NFR BLD: 4.4 % (ref 18–48)
LYMPHOCYTES NFR BLD: 4.5 % (ref 18–48)
LYMPHOCYTES NFR BLD: 4.5 % (ref 18–48)
MAGNESIUM SERPL-MCNC: 2 MG/DL (ref 1.6–2.6)
MAGNESIUM SERPL-MCNC: 2.1 MG/DL (ref 1.6–2.6)
MAGNESIUM SERPL-MCNC: 2.1 MG/DL (ref 1.6–2.6)
MCH RBC QN AUTO: 29.6 PG (ref 27–31)
MCH RBC QN AUTO: 30.3 PG (ref 27–31)
MCH RBC QN AUTO: 30.6 PG (ref 27–31)
MCH RBC QN AUTO: 31.1 PG (ref 27–31)
MCH RBC QN AUTO: 31.4 PG (ref 27–31)
MCHC RBC AUTO-ENTMCNC: 32.7 G/DL (ref 32–36)
MCHC RBC AUTO-ENTMCNC: 32.8 G/DL (ref 32–36)
MCHC RBC AUTO-ENTMCNC: 33.7 G/DL (ref 32–36)
MCHC RBC AUTO-ENTMCNC: 34.1 G/DL (ref 32–36)
MCHC RBC AUTO-ENTMCNC: 34.1 G/DL (ref 32–36)
MCHC RBC AUTO-ENTMCNC: 34.2 G/DL (ref 32–36)
MCHC RBC AUTO-ENTMCNC: 34.4 G/DL (ref 32–36)
MCV RBC AUTO: 89 FL (ref 82–98)
MCV RBC AUTO: 90 FL (ref 82–98)
MCV RBC AUTO: 92 FL (ref 82–98)
MCV RBC AUTO: 93 FL (ref 82–98)
MODE: ABNORMAL
MODE: ABNORMAL
MONOCYTES # BLD AUTO: 0.5 K/UL (ref 0.3–1)
MONOCYTES # BLD AUTO: 0.6 K/UL (ref 0.3–1)
MONOCYTES # BLD AUTO: 1 K/UL (ref 0.3–1)
MONOCYTES # BLD AUTO: 1.2 K/UL (ref 0.3–1)
MONOCYTES # BLD AUTO: 1.8 K/UL (ref 0.3–1)
MONOCYTES NFR BLD: 10.3 % (ref 4–15)
MONOCYTES NFR BLD: 10.5 % (ref 4–15)
MONOCYTES NFR BLD: 14.4 % (ref 4–15)
MONOCYTES NFR BLD: 8.5 % (ref 4–15)
MONOCYTES NFR BLD: 8.6 % (ref 4–15)
MONOCYTES NFR BLD: 8.6 % (ref 4–15)
MONOCYTES NFR BLD: 8.7 % (ref 4–15)
NEUTROPHILS # BLD AUTO: 5.2 K/UL (ref 1.8–7.7)
NEUTROPHILS # BLD AUTO: 5.7 K/UL (ref 1.8–7.7)
NEUTROPHILS # BLD AUTO: 5.7 K/UL (ref 1.8–7.7)
NEUTROPHILS # BLD AUTO: 5.9 K/UL (ref 1.8–7.7)
NEUTROPHILS # BLD AUTO: 7.7 K/UL (ref 1.8–7.7)
NEUTROPHILS # BLD AUTO: 9.4 K/UL (ref 1.8–7.7)
NEUTROPHILS # BLD AUTO: 9.9 K/UL (ref 1.8–7.7)
NEUTROPHILS NFR BLD: 79.5 % (ref 38–73)
NEUTROPHILS NFR BLD: 83.2 % (ref 38–73)
NEUTROPHILS NFR BLD: 83.5 % (ref 38–73)
NEUTROPHILS NFR BLD: 85.2 % (ref 38–73)
NEUTROPHILS NFR BLD: 85.2 % (ref 38–73)
NEUTROPHILS NFR BLD: 85.5 % (ref 38–73)
NEUTROPHILS NFR BLD: 85.9 % (ref 38–73)
NRBC BLD-RTO: 0 /100 WBC
NRBC BLD-RTO: 1 /100 WBC
NRBC BLD-RTO: 1 /100 WBC
NUM UNITS TRANS FFP: NORMAL
OVALOCYTES BLD QL SMEAR: ABNORMAL
PCO2 BLDA: 36.7 MMHG (ref 35–45)
PCO2 BLDA: 38.4 MMHG (ref 35–45)
PCO2 BLDA: 38.6 MMHG (ref 35–45)
PCO2 BLDA: 41.2 MMHG (ref 35–45)
PCO2 BLDA: 41.4 MMHG (ref 35–45)
PCO2 BLDA: 42.4 MMHG (ref 35–45)
PCO2 BLDA: 46.6 MMHG (ref 35–45)
PEEP: 5
PEEP: 5
PH SMN: 7.3 [PH] (ref 7.35–7.45)
PH SMN: 7.3 [PH] (ref 7.35–7.45)
PH SMN: 7.31 [PH] (ref 7.35–7.45)
PH SMN: 7.32 [PH] (ref 7.35–7.45)
PH SMN: 7.34 [PH] (ref 7.35–7.45)
PH SMN: 7.35 [PH] (ref 7.35–7.45)
PH SMN: 7.35 [PH] (ref 7.35–7.45)
PHOSPHATE SERPL-MCNC: 5.3 MG/DL (ref 2.7–4.5)
PHOSPHATE SERPL-MCNC: 8.1 MG/DL (ref 2.7–4.5)
PHOSPHATE SERPL-MCNC: 8.8 MG/DL (ref 2.7–4.5)
PLATELET # BLD AUTO: 33 K/UL (ref 150–350)
PLATELET # BLD AUTO: 34 K/UL (ref 150–350)
PLATELET # BLD AUTO: 34 K/UL (ref 150–350)
PLATELET # BLD AUTO: 43 K/UL (ref 150–350)
PLATELET # BLD AUTO: 59 K/UL (ref 150–350)
PLATELET # BLD AUTO: 61 K/UL (ref 150–350)
PLATELET # BLD AUTO: 64 K/UL (ref 150–350)
PLATELET BLD QL SMEAR: ABNORMAL
PMV BLD AUTO: 11.2 FL (ref 9.2–12.9)
PMV BLD AUTO: 11.9 FL (ref 9.2–12.9)
PMV BLD AUTO: 12.1 FL (ref 9.2–12.9)
PMV BLD AUTO: 12.4 FL (ref 9.2–12.9)
PMV BLD AUTO: 12.5 FL (ref 9.2–12.9)
PMV BLD AUTO: 12.5 FL (ref 9.2–12.9)
PMV BLD AUTO: 12.7 FL (ref 9.2–12.9)
PO2 BLDA: 122 MMHG (ref 80–100)
PO2 BLDA: 170 MMHG (ref 80–100)
PO2 BLDA: 36 MMHG (ref 40–60)
PO2 BLDA: 36 MMHG (ref 40–60)
PO2 BLDA: 83 MMHG (ref 80–100)
PO2 BLDA: 90 MMHG (ref 80–100)
PO2 BLDA: 98 MMHG (ref 80–100)
POC BE: -3 MMOL/L
POC BE: -4 MMOL/L
POC BE: -4 MMOL/L
POC BE: -5 MMOL/L
POC BE: -6 MMOL/L
POC IONIZED CALCIUM: 1.02 MMOL/L (ref 1.06–1.42)
POC IONIZED CALCIUM: 1.02 MMOL/L (ref 1.06–1.42)
POC IONIZED CALCIUM: 1.03 MMOL/L (ref 1.06–1.42)
POC IONIZED CALCIUM: 1.11 MMOL/L (ref 1.06–1.42)
POC IONIZED CALCIUM: 1.2 MMOL/L (ref 1.06–1.42)
POC SATURATED O2: 64 % (ref 95–100)
POC SATURATED O2: 66 % (ref 95–100)
POC SATURATED O2: 95 % (ref 95–100)
POC SATURATED O2: 97 % (ref 95–100)
POC SATURATED O2: 97 % (ref 95–100)
POC SATURATED O2: 98 % (ref 95–100)
POC SATURATED O2: 99 % (ref 95–100)
POC TCO2: 21 MMOL/L (ref 23–27)
POC TCO2: 21 MMOL/L (ref 23–27)
POC TCO2: 22 MMOL/L (ref 23–27)
POC TCO2: 22 MMOL/L (ref 23–27)
POC TCO2: 22 MMOL/L (ref 24–29)
POC TCO2: 24 MMOL/L (ref 23–27)
POC TCO2: 24 MMOL/L (ref 24–29)
POCT GLUCOSE: 130 MG/DL (ref 70–110)
POCT GLUCOSE: 131 MG/DL (ref 70–110)
POCT GLUCOSE: 137 MG/DL (ref 70–110)
POCT GLUCOSE: 138 MG/DL (ref 70–110)
POCT GLUCOSE: 139 MG/DL (ref 70–110)
POCT GLUCOSE: 146 MG/DL (ref 70–110)
POCT GLUCOSE: 149 MG/DL (ref 70–110)
POCT GLUCOSE: 149 MG/DL (ref 70–110)
POCT GLUCOSE: 151 MG/DL (ref 70–110)
POCT GLUCOSE: 160 MG/DL (ref 70–110)
POCT GLUCOSE: 161 MG/DL (ref 70–110)
POCT GLUCOSE: 162 MG/DL (ref 70–110)
POCT GLUCOSE: 163 MG/DL (ref 70–110)
POCT GLUCOSE: 170 MG/DL (ref 70–110)
POCT GLUCOSE: 172 MG/DL (ref 70–110)
POCT GLUCOSE: 174 MG/DL (ref 70–110)
POCT GLUCOSE: 177 MG/DL (ref 70–110)
POCT GLUCOSE: 183 MG/DL (ref 70–110)
POCT GLUCOSE: 195 MG/DL (ref 70–110)
POCT GLUCOSE: 197 MG/DL (ref 70–110)
POCT GLUCOSE: 200 MG/DL (ref 70–110)
POIKILOCYTOSIS BLD QL SMEAR: ABNORMAL
POIKILOCYTOSIS BLD QL SMEAR: SLIGHT
POIKILOCYTOSIS BLD QL SMEAR: SLIGHT
POLYCHROMASIA BLD QL SMEAR: ABNORMAL
POTASSIUM BLD-SCNC: 4.1 MMOL/L (ref 3.5–5.1)
POTASSIUM BLD-SCNC: 4.2 MMOL/L (ref 3.5–5.1)
POTASSIUM BLD-SCNC: 4.2 MMOL/L (ref 3.5–5.1)
POTASSIUM BLD-SCNC: 4.3 MMOL/L (ref 3.5–5.1)
POTASSIUM BLD-SCNC: 4.3 MMOL/L (ref 3.5–5.1)
POTASSIUM SERPL-SCNC: 4.1 MMOL/L (ref 3.5–5.1)
POTASSIUM SERPL-SCNC: 4.2 MMOL/L (ref 3.5–5.1)
POTASSIUM SERPL-SCNC: 4.4 MMOL/L (ref 3.5–5.1)
PROT SERPL-MCNC: 3.4 G/DL (ref 6–8.4)
PROT SERPL-MCNC: 3.6 G/DL (ref 6–8.4)
PROTHROMBIN TIME: 11.1 SEC (ref 9–12.5)
PROTHROMBIN TIME: 11.4 SEC (ref 9–12.5)
PROTHROMBIN TIME: 11.9 SEC (ref 9–12.5)
PROTHROMBIN TIME: 12 SEC (ref 9–12.5)
PROTHROMBIN TIME: 12.3 SEC (ref 9–12.5)
PROTHROMBIN TIME: 12.3 SEC (ref 9–12.5)
PROTHROMBIN TIME: 12.4 SEC (ref 9–12.5)
RBC # BLD AUTO: 2.55 M/UL (ref 4.6–6.2)
RBC # BLD AUTO: 2.8 M/UL (ref 4.6–6.2)
RBC # BLD AUTO: 2.87 M/UL (ref 4.6–6.2)
RBC # BLD AUTO: 3.14 M/UL (ref 4.6–6.2)
RBC # BLD AUTO: 3.21 M/UL (ref 4.6–6.2)
SAMPLE: ABNORMAL
SCHISTOCYTES BLD QL SMEAR: PRESENT
SCHISTOCYTES BLD QL SMEAR: PRESENT
SITE: ABNORMAL
SITE: ABNORMAL
SODIUM BLD-SCNC: 134 MMOL/L (ref 136–145)
SODIUM BLD-SCNC: 134 MMOL/L (ref 136–145)
SODIUM BLD-SCNC: 135 MMOL/L (ref 136–145)
SODIUM BLD-SCNC: 136 MMOL/L (ref 136–145)
SODIUM BLD-SCNC: 136 MMOL/L (ref 136–145)
SODIUM SERPL-SCNC: 135 MMOL/L (ref 136–145)
SODIUM SERPL-SCNC: 136 MMOL/L (ref 136–145)
SODIUM SERPL-SCNC: 137 MMOL/L (ref 136–145)
SP02: 100
TRANS PLATPHERESIS VOL PATIENT: NORMAL ML
TRANS PLATPHERESIS VOL PATIENT: NORMAL ML
VT: 450
VT: 450
WBC # BLD AUTO: 11.31 K/UL (ref 3.9–12.7)
WBC # BLD AUTO: 12.41 K/UL (ref 3.9–12.7)
WBC # BLD AUTO: 6.1 K/UL (ref 3.9–12.7)
WBC # BLD AUTO: 6.64 K/UL (ref 3.9–12.7)
WBC # BLD AUTO: 6.64 K/UL (ref 3.9–12.7)
WBC # BLD AUTO: 6.84 K/UL (ref 3.9–12.7)
WBC # BLD AUTO: 9.22 K/UL (ref 3.9–12.7)

## 2020-10-16 PROCEDURE — 36415 COLL VENOUS BLD VENIPUNCTURE: CPT

## 2020-10-16 PROCEDURE — D9220A PRA ANESTHESIA: ICD-10-PCS | Mod: CRNA,,, | Performed by: NURSE ANESTHETIST, CERTIFIED REGISTERED

## 2020-10-16 PROCEDURE — 80053 COMPREHEN METABOLIC PANEL: CPT | Mod: 91

## 2020-10-16 PROCEDURE — 99900035 HC TECH TIME PER 15 MIN (STAT)

## 2020-10-16 PROCEDURE — C9113 INJ PANTOPRAZOLE SODIUM, VIA: HCPCS | Performed by: NURSE PRACTITIONER

## 2020-10-16 PROCEDURE — 99233 SBSQ HOSP IP/OBS HIGH 50: CPT | Mod: ,,, | Performed by: NURSE PRACTITIONER

## 2020-10-16 PROCEDURE — 88300 SURGICAL PATH GROSS: CPT | Performed by: PATHOLOGY

## 2020-10-16 PROCEDURE — 20000000 HC ICU ROOM

## 2020-10-16 PROCEDURE — 84450 TRANSFERASE (AST) (SGOT): CPT | Mod: 91

## 2020-10-16 PROCEDURE — 88300 PR  SURG PATH,GROSS,LEVEL I: ICD-10-PCS | Mod: 26,59,, | Performed by: PATHOLOGY

## 2020-10-16 PROCEDURE — 27100025 HC TUBING, SET FLUID WARMER: Performed by: SURGERY

## 2020-10-16 PROCEDURE — 27000221 HC OXYGEN, UP TO 24 HOURS

## 2020-10-16 PROCEDURE — 85520 HEPARIN ASSAY: CPT

## 2020-10-16 PROCEDURE — 63600175 PHARM REV CODE 636 W HCPCS: Performed by: STUDENT IN AN ORGANIZED HEALTH CARE EDUCATION/TRAINING PROGRAM

## 2020-10-16 PROCEDURE — 99291 PR CRITICAL CARE, E/M 30-74 MINUTES: ICD-10-PCS | Mod: ,,, | Performed by: ANESTHESIOLOGY

## 2020-10-16 PROCEDURE — 25000003 PHARM REV CODE 250: Performed by: NURSE ANESTHETIST, CERTIFIED REGISTERED

## 2020-10-16 PROCEDURE — 94003 VENT MGMT INPAT SUBQ DAY: CPT

## 2020-10-16 PROCEDURE — 90945 DIALYSIS ONE EVALUATION: CPT

## 2020-10-16 PROCEDURE — 88300 SURGICAL PATH GROSS: CPT | Mod: 26,59,, | Performed by: PATHOLOGY

## 2020-10-16 PROCEDURE — 99233 SBSQ HOSP IP/OBS HIGH 50: CPT | Mod: ,,, | Performed by: INTERNAL MEDICINE

## 2020-10-16 PROCEDURE — 49002 PR REOPEN RECENT ABD EXPLORATORY: ICD-10-PCS | Mod: ,,, | Performed by: SURGERY

## 2020-10-16 PROCEDURE — 63600175 PHARM REV CODE 636 W HCPCS: Performed by: SURGERY

## 2020-10-16 PROCEDURE — P9035 PLATELET PHERES LEUKOREDUCED: HCPCS

## 2020-10-16 PROCEDURE — 99233 PR SUBSEQUENT HOSPITAL CARE,LEVL III: ICD-10-PCS | Mod: ,,, | Performed by: INTERNAL MEDICINE

## 2020-10-16 PROCEDURE — 85610 PROTHROMBIN TIME: CPT | Mod: 91

## 2020-10-16 PROCEDURE — 25000003 PHARM REV CODE 250: Performed by: ANESTHESIOLOGY

## 2020-10-16 PROCEDURE — 63600175 PHARM REV CODE 636 W HCPCS: Performed by: NURSE PRACTITIONER

## 2020-10-16 PROCEDURE — 82803 BLOOD GASES ANY COMBINATION: CPT

## 2020-10-16 PROCEDURE — 84100 ASSAY OF PHOSPHORUS: CPT | Mod: 91

## 2020-10-16 PROCEDURE — 25000003 PHARM REV CODE 250: Performed by: NURSE PRACTITIONER

## 2020-10-16 PROCEDURE — 88307 TISSUE EXAM BY PATHOLOGIST: CPT | Performed by: PATHOLOGY

## 2020-10-16 PROCEDURE — 85520 HEPARIN ASSAY: CPT | Mod: 91

## 2020-10-16 PROCEDURE — 36000707: Performed by: SURGERY

## 2020-10-16 PROCEDURE — 27201423 OPTIME MED/SURG SUP & DEVICES STERILE SUPPLY: Performed by: SURGERY

## 2020-10-16 PROCEDURE — P9016 RBC LEUKOCYTES REDUCED: HCPCS

## 2020-10-16 PROCEDURE — 88307 TISSUE EXAM BY PATHOLOGIST: CPT | Mod: 26,,, | Performed by: PATHOLOGY

## 2020-10-16 PROCEDURE — 25000003 PHARM REV CODE 250: Performed by: SURGERY

## 2020-10-16 PROCEDURE — 99291 CRITICAL CARE FIRST HOUR: CPT | Mod: ,,, | Performed by: ANESTHESIOLOGY

## 2020-10-16 PROCEDURE — 84100 ASSAY OF PHOSPHORUS: CPT

## 2020-10-16 PROCEDURE — 80069 RENAL FUNCTION PANEL: CPT

## 2020-10-16 PROCEDURE — D9220A PRA ANESTHESIA: Mod: CRNA,,, | Performed by: NURSE ANESTHETIST, CERTIFIED REGISTERED

## 2020-10-16 PROCEDURE — D9220A PRA ANESTHESIA: ICD-10-PCS | Mod: ANES,,, | Performed by: SURGERY

## 2020-10-16 PROCEDURE — 83735 ASSAY OF MAGNESIUM: CPT | Mod: 91

## 2020-10-16 PROCEDURE — P9047 ALBUMIN (HUMAN), 25%, 50ML: HCPCS | Mod: JG | Performed by: STUDENT IN AN ORGANIZED HEALTH CARE EDUCATION/TRAINING PROGRAM

## 2020-10-16 PROCEDURE — 37000009 HC ANESTHESIA EA ADD 15 MINS: Performed by: SURGERY

## 2020-10-16 PROCEDURE — 36430 TRANSFUSION BLD/BLD COMPNT: CPT

## 2020-10-16 PROCEDURE — 88313 SPECIAL STAINS GROUP 2: CPT | Performed by: PATHOLOGY

## 2020-10-16 PROCEDURE — 47001 PR NEEDLE BIOPSY LIVER,W OTHR PROC: ICD-10-PCS | Mod: ,,, | Performed by: SURGERY

## 2020-10-16 PROCEDURE — 85730 THROMBOPLASTIN TIME PARTIAL: CPT | Mod: 91

## 2020-10-16 PROCEDURE — 88307 PR  SURG PATH,LEVEL V: ICD-10-PCS | Mod: 26,,, | Performed by: PATHOLOGY

## 2020-10-16 PROCEDURE — 83735 ASSAY OF MAGNESIUM: CPT

## 2020-10-16 PROCEDURE — 63600175 PHARM REV CODE 636 W HCPCS: Mod: JG | Performed by: STUDENT IN AN ORGANIZED HEALTH CARE EDUCATION/TRAINING PROGRAM

## 2020-10-16 PROCEDURE — 88313 SPECIAL STAINS GROUP 2: CPT | Mod: 26,,, | Performed by: PATHOLOGY

## 2020-10-16 PROCEDURE — 63600175 PHARM REV CODE 636 W HCPCS: Performed by: ANESTHESIOLOGY

## 2020-10-16 PROCEDURE — 99900026 HC AIRWAY MAINTENANCE (STAT)

## 2020-10-16 PROCEDURE — 49002 REOPENING OF ABDOMEN: CPT | Mod: ,,, | Performed by: SURGERY

## 2020-10-16 PROCEDURE — 37000008 HC ANESTHESIA 1ST 15 MINUTES: Performed by: SURGERY

## 2020-10-16 PROCEDURE — 94761 N-INVAS EAR/PLS OXIMETRY MLT: CPT

## 2020-10-16 PROCEDURE — 88313 PR  SPECIAL STAINS,GROUP II: ICD-10-PCS | Mod: 26,,, | Performed by: PATHOLOGY

## 2020-10-16 PROCEDURE — 85025 COMPLETE CBC W/AUTO DIFF WBC: CPT | Mod: 91

## 2020-10-16 PROCEDURE — D9220A PRA ANESTHESIA: Mod: ANES,,, | Performed by: SURGERY

## 2020-10-16 PROCEDURE — 63600175 PHARM REV CODE 636 W HCPCS: Performed by: NURSE ANESTHETIST, CERTIFIED REGISTERED

## 2020-10-16 PROCEDURE — 99233 PR SUBSEQUENT HOSPITAL CARE,LEVL III: ICD-10-PCS | Mod: ,,, | Performed by: NURSE PRACTITIONER

## 2020-10-16 PROCEDURE — 36000706: Performed by: SURGERY

## 2020-10-16 PROCEDURE — 47001 NDL BIOPSY LVR TM OTH MAJ PX: CPT | Mod: ,,, | Performed by: SURGERY

## 2020-10-16 RX ORDER — PROCHLORPERAZINE EDISYLATE 5 MG/ML
5 INJECTION INTRAMUSCULAR; INTRAVENOUS ONCE
Status: COMPLETED | OUTPATIENT
Start: 2020-10-16 | End: 2020-10-16

## 2020-10-16 RX ORDER — SULFAMETHOXAZOLE AND TRIMETHOPRIM 400; 80 MG/1; MG/1
1 TABLET ORAL EVERY MORNING
Qty: 30 TABLET | Refills: 5 | OUTPATIENT
Start: 2020-10-14 | End: 2020-11-21

## 2020-10-16 RX ORDER — NOREPINEPHRINE BITARTRATE/D5W 4MG/250ML
0.02 PLASTIC BAG, INJECTION (ML) INTRAVENOUS CONTINUOUS
Status: DISCONTINUED | OUTPATIENT
Start: 2020-10-16 | End: 2020-10-16

## 2020-10-16 RX ORDER — MAGNESIUM SULFATE HEPTAHYDRATE 40 MG/ML
2 INJECTION, SOLUTION INTRAVENOUS
Status: ACTIVE | OUTPATIENT
Start: 2020-10-16 | End: 2020-10-17

## 2020-10-16 RX ORDER — KETAMINE HCL IN 0.9 % NACL 50 MG/5 ML
SYRINGE (ML) INTRAVENOUS
Status: DISCONTINUED | OUTPATIENT
Start: 2020-10-16 | End: 2020-10-16

## 2020-10-16 RX ORDER — MYCOPHENOLATE MOFETIL 250 MG/1
1000 CAPSULE ORAL 2 TIMES DAILY
Qty: 240 CAPSULE | Refills: 2 | Status: SHIPPED | OUTPATIENT
Start: 2020-10-16 | End: 2020-12-23 | Stop reason: SINTOL

## 2020-10-16 RX ORDER — HEPARIN SODIUM 1000 [USP'U]/ML
INJECTION, SOLUTION INTRAVENOUS; SUBCUTANEOUS
Status: DISCONTINUED | OUTPATIENT
Start: 2020-10-16 | End: 2020-10-16 | Stop reason: HOSPADM

## 2020-10-16 RX ORDER — MIDAZOLAM HYDROCHLORIDE 1 MG/ML
INJECTION, SOLUTION INTRAMUSCULAR; INTRAVENOUS
Status: DISCONTINUED | OUTPATIENT
Start: 2020-10-16 | End: 2020-10-16

## 2020-10-16 RX ORDER — HYDROCODONE BITARTRATE AND ACETAMINOPHEN 500; 5 MG/1; MG/1
TABLET ORAL CONTINUOUS
Status: ACTIVE | OUTPATIENT
Start: 2020-10-16 | End: 2020-10-17

## 2020-10-16 RX ORDER — FLUCONAZOLE 2 MG/ML
200 INJECTION, SOLUTION INTRAVENOUS
Status: DISCONTINUED | OUTPATIENT
Start: 2020-10-17 | End: 2020-10-18

## 2020-10-16 RX ORDER — FLUCONAZOLE 200 MG/1
200 TABLET ORAL DAILY
Qty: 30 TABLET | Refills: 0 | Status: SHIPPED | OUTPATIENT
Start: 2020-10-14 | End: 2020-11-26

## 2020-10-16 RX ORDER — PROPOFOL 10 MG/ML
VIAL (ML) INTRAVENOUS CONTINUOUS PRN
Status: DISCONTINUED | OUTPATIENT
Start: 2020-10-16 | End: 2020-10-16

## 2020-10-16 RX ORDER — TACROLIMUS 1 MG/1
6 CAPSULE ORAL EVERY 12 HOURS
Qty: 360 CAPSULE | Refills: 11 | Status: SHIPPED | OUTPATIENT
Start: 2020-10-16 | End: 2020-10-27 | Stop reason: HOSPADM

## 2020-10-16 RX ORDER — VALGANCICLOVIR 450 MG/1
450 TABLET, FILM COATED ORAL DAILY
Qty: 30 TABLET | Refills: 5 | Status: SHIPPED | OUTPATIENT
Start: 2020-10-14 | End: 2020-12-23 | Stop reason: SINTOL

## 2020-10-16 RX ORDER — FENTANYL CITRATE 50 UG/ML
INJECTION, SOLUTION INTRAMUSCULAR; INTRAVENOUS
Status: DISCONTINUED | OUTPATIENT
Start: 2020-10-16 | End: 2020-10-16

## 2020-10-16 RX ORDER — HYDROCODONE BITARTRATE AND ACETAMINOPHEN 500; 5 MG/1; MG/1
TABLET ORAL
Status: DISCONTINUED | OUTPATIENT
Start: 2020-10-16 | End: 2020-10-19

## 2020-10-16 RX ORDER — PROPOFOL 10 MG/ML
VIAL (ML) INTRAVENOUS
Status: DISCONTINUED | OUTPATIENT
Start: 2020-10-16 | End: 2020-10-16

## 2020-10-16 RX ORDER — ALBUMIN HUMAN 250 G/1000ML
25 SOLUTION INTRAVENOUS ONCE
Status: COMPLETED | OUTPATIENT
Start: 2020-10-16 | End: 2020-10-16

## 2020-10-16 RX ORDER — ROCURONIUM BROMIDE 10 MG/ML
INJECTION, SOLUTION INTRAVENOUS
Status: DISCONTINUED | OUTPATIENT
Start: 2020-10-16 | End: 2020-10-16

## 2020-10-16 RX ORDER — NOREPINEPHRINE BITARTRATE/D5W 4MG/250ML
0.02 PLASTIC BAG, INJECTION (ML) INTRAVENOUS CONTINUOUS
Status: DISCONTINUED | OUTPATIENT
Start: 2020-10-16 | End: 2020-10-18

## 2020-10-16 RX ORDER — PREDNISONE 5 MG/1
TABLET ORAL
Qty: 100 TABLET | Refills: 0 | Status: SHIPPED | OUTPATIENT
Start: 2020-10-16 | End: 2020-10-21

## 2020-10-16 RX ADMIN — FLUCONAZOLE 400 MG: 2 INJECTION, SOLUTION INTRAVENOUS at 06:10

## 2020-10-16 RX ADMIN — MUPIROCIN 1 G: 20 OINTMENT TOPICAL at 08:10

## 2020-10-16 RX ADMIN — PROCHLORPERAZINE EDISYLATE 5 MG: 5 INJECTION INTRAMUSCULAR; INTRAVENOUS at 08:10

## 2020-10-16 RX ADMIN — ROCURONIUM BROMIDE 50 MG: 10 INJECTION, SOLUTION INTRAVENOUS at 10:10

## 2020-10-16 RX ADMIN — TACROLIMUS 1 MG: 1 CAPSULE, GELATIN COATED ORAL at 08:10

## 2020-10-16 RX ADMIN — SODIUM BICARBONATE 650 MG TABLET 1950 MG: at 08:10

## 2020-10-16 RX ADMIN — PANTOPRAZOLE SODIUM 40 MG: 40 INJECTION, POWDER, LYOPHILIZED, FOR SOLUTION INTRAVENOUS at 09:10

## 2020-10-16 RX ADMIN — PROPOFOL 50 MCG/KG/MIN: 10 INJECTION, EMULSION INTRAVENOUS at 10:10

## 2020-10-16 RX ADMIN — PANTOPRAZOLE SODIUM 40 MG: 40 INJECTION, POWDER, LYOPHILIZED, FOR SOLUTION INTRAVENOUS at 08:10

## 2020-10-16 RX ADMIN — MYCOPHENOLATE MOFETIL 1000 MG: 200 POWDER, FOR SUSPENSION ORAL at 09:10

## 2020-10-16 RX ADMIN — PROPOFOL 50 MCG/KG/MIN: 10 INJECTION, EMULSION INTRAVENOUS at 05:10

## 2020-10-16 RX ADMIN — PROPOFOL 40 MG: 10 INJECTION, EMULSION INTRAVENOUS at 10:10

## 2020-10-16 RX ADMIN — SODIUM CHLORIDE 5.6 UNITS/HR: 9 INJECTION, SOLUTION INTRAVENOUS at 09:10

## 2020-10-16 RX ADMIN — Medication 100 MG: at 08:10

## 2020-10-16 RX ADMIN — MUPIROCIN 1 G: 20 OINTMENT TOPICAL at 09:10

## 2020-10-16 RX ADMIN — ROCURONIUM BROMIDE 10 MG: 10 INJECTION, SOLUTION INTRAVENOUS at 12:10

## 2020-10-16 RX ADMIN — FENTANYL CITRATE 100 MCG: 50 INJECTION, SOLUTION INTRAMUSCULAR; INTRAVENOUS at 01:10

## 2020-10-16 RX ADMIN — AMPICILLIN SODIUM AND SULBACTAM SODIUM 3 G: 2; 1 INJECTION, POWDER, FOR SOLUTION INTRAMUSCULAR; INTRAVENOUS at 05:10

## 2020-10-16 RX ADMIN — PROPOFOL 50 MCG/KG/MIN: 10 INJECTION, EMULSION INTRAVENOUS at 12:10

## 2020-10-16 RX ADMIN — PROPOFOL 50 MCG/KG/MIN: 10 INJECTION, EMULSION INTRAVENOUS at 02:10

## 2020-10-16 RX ADMIN — METHYLPREDNISOLONE SODIUM SUCCINATE 80 MG: 125 INJECTION, POWDER, FOR SOLUTION INTRAMUSCULAR; INTRAVENOUS at 09:10

## 2020-10-16 RX ADMIN — FENTANYL CITRATE 100 MCG: 50 INJECTION, SOLUTION INTRAMUSCULAR; INTRAVENOUS at 12:10

## 2020-10-16 RX ADMIN — METHYLPREDNISOLONE SODIUM SUCCINATE 80 MG: 125 INJECTION, POWDER, FOR SOLUTION INTRAMUSCULAR; INTRAVENOUS at 08:10

## 2020-10-16 RX ADMIN — Medication 30 MG: at 10:10

## 2020-10-16 RX ADMIN — AMPICILLIN SODIUM AND SULBACTAM SODIUM 3 G: 2; 1 INJECTION, POWDER, FOR SOLUTION INTRAMUSCULAR; INTRAVENOUS at 11:10

## 2020-10-16 RX ADMIN — SODIUM CHLORIDE, SODIUM GLUCONATE, SODIUM ACETATE, POTASSIUM CHLORIDE, MAGNESIUM CHLORIDE, SODIUM PHOSPHATE, DIBASIC, AND POTASSIUM PHOSPHATE: .53; .5; .37; .037; .03; .012; .00082 INJECTION, SOLUTION INTRAVENOUS at 10:10

## 2020-10-16 RX ADMIN — ALBUMIN (HUMAN) 25 G: 12.5 SOLUTION INTRAVENOUS at 04:10

## 2020-10-16 RX ADMIN — SODIUM CHLORIDE: 0.9 INJECTION, SOLUTION INTRAVENOUS at 03:10

## 2020-10-16 RX ADMIN — PROPOFOL 50 MCG/KG/MIN: 10 INJECTION, EMULSION INTRAVENOUS at 09:10

## 2020-10-16 RX ADMIN — FOLIC ACID 1 MG: 1 TABLET ORAL at 08:10

## 2020-10-16 RX ADMIN — FENTANYL CITRATE 100 MCG: 50 INJECTION, SOLUTION INTRAMUSCULAR; INTRAVENOUS at 10:10

## 2020-10-16 RX ADMIN — Medication 20 MG: at 12:10

## 2020-10-16 RX ADMIN — SODIUM CHLORIDE 1 MCG/KG/MIN: 9 INJECTION, SOLUTION INTRAVENOUS at 10:10

## 2020-10-16 RX ADMIN — MIDAZOLAM HYDROCHLORIDE 2 MG: 1 INJECTION, SOLUTION INTRAMUSCULAR; INTRAVENOUS at 10:10

## 2020-10-16 RX ADMIN — Medication 0.02 MCG/KG/MIN: at 04:10

## 2020-10-16 RX ADMIN — MYCOPHENOLATE MOFETIL 1000 MG: 200 POWDER, FOR SUSPENSION ORAL at 10:10

## 2020-10-16 RX ADMIN — TACROLIMUS 1 MG: 1 CAPSULE, GELATIN COATED ORAL at 05:10

## 2020-10-16 RX ADMIN — CEFTRIAXONE SODIUM 1 G: 1 INJECTION, POWDER, FOR SOLUTION INTRAMUSCULAR; INTRAVENOUS at 02:10

## 2020-10-16 NOTE — PROGRESS NOTES
[facility]  Endocrinology  Progress Note    Admit Date: 10/11/2020     Reason for Consult: Management of Hyperglycemia     Surgical Procedure and Date: Liver Transplant 10/14/20    Lab Results   Component Value Date    HGBA1C <4.0 (A) 10/13/2020     HPI:   Patient is a 45 y.o. 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  Patient is now s/p liver transplant and admitted to SICU. No personal or family hx of DM per chart review. Endocrinology consulted for management of hyperglycemia.           Interval HPI:   Overnight events:  BG is within goal overnight with stable, but elevated, insulin infusion rates.   Diet NPO Except for: Sips with Medication  Day of Surgery    Eating:   NPO  Nausea: No  Hypoglycemia and intervention: No  Fever: No  TPN and/or TF: No  If yes, type of TF/TPN and rate: None    BP (!) 103/48   Pulse 81   Temp 99.7 °F (37.6 °C) (Core ([place]))   Resp 16   Ht 6' (1.829 m)   Wt 120 kg (264 lb 8.8 oz)   SpO2 100%   BMI 35.88 kg/m²     Labs Reviewed and Include    Recent Labs   Lab 10/15/20  0352 10/15/20  0810 10/15/20  1044   * 142* 163*   CALCIUM 8.2* 8.1*  --    ALBUMIN 2.6*  --  2.5*   PROT 4.2*  --   --     135* 136   K 4.0 4.1 4.4   CO2 21* 24  --     102  --    BUN 23* 24*  --    CREATININE 1.9* 2.1*  --    ALKPHOS 66  --   --    ALT 2,416*  --   --    AST 3,834*  3,834* 3,701*  --    BILITOT 4.7*  --   --      Lab Results   Component Value Date    WBC 5.41 10/15/2020    HGB 7.9 (L) 10/15/2020    HCT 23.7 (L) 10/15/2020    MCV 92 10/15/2020    PLT 49 (L) 10/15/2020     No results for input(s): TSH, FREET4 in the last 168 hours.  Lab Results   Component Value Date    HGBA1C <4.0 (A) 10/13/2020       Nutritional status:   Body mass index is 35.88 kg/m².  Lab Results   Component Value Date    ALBUMIN 2.5 (L) 10/15/2020    ALBUMIN 2.6 (L) 10/15/2020    ALBUMIN 2.2 (L) 10/14/2020    ALBUMIN 2.2  (L) 10/14/2020     No results found for: PREALBUMIN    Estimated Creatinine Clearance: 58.2 mL/min (A) (based on SCr of 2.1 mg/dL (H)).    Accu-Checks  Recent Labs     10/14/20  2353 10/15/20  0056 10/15/20  0204 10/15/20  0247 10/15/20  0350 10/15/20  0451 10/15/20  0550 10/15/20  0723 10/15/20  0812 10/15/20  1236   POCTGLUCOSE 160* 163* 152* 139* 137* 144* 149* 149* 166* 183*       Current Medications and/or Treatments Impacting Glycemic Control  Immunotherapy:    Immunosuppressants         Stop Route Frequency     tacrolimus (PROGRAF) 1 mg/mL oral syringe      -- Oral 2 times daily     mycophenolate mofetil 200 mg/mL suspension 1,000 mg      -- Oral 2 times daily        Steroids:   Hormones (From admission, onward)    Start     Stop Route Frequency Ordered    10/20/20 0900  predniSONE tablet 20 mg  (methylprednisolone taper panel)      -- Oral Daily 10/14/20 1339    10/19/20 0900  methylPREDNISolone sodium succinate injection 20 mg  (methylprednisolone taper panel)      10/20 0859 IV Every 12 hours 10/14/20 1339    10/18/20 0900  methylPREDNISolone sodium succinate injection 40 mg  (methylprednisolone taper panel)      10/19 0859 IV Every 12 hours 10/14/20 1339    10/17/20 0900  methylPREDNISolone sodium succinate injection 60 mg  (methylprednisolone taper panel)      10/18 0859 IV Every 12 hours 10/14/20 1339    10/16/20 0900  methylPREDNISolone sodium succinate injection 80 mg  (methylprednisolone taper panel)      10/17 0859 IV Every 12 hours 10/14/20 1339    10/15/20 0900  methylPREDNISolone sodium succinate injection 100 mg  (methylprednisolone taper panel)      10/16 0859 IV Every 12 hours 10/14/20 1339    10/11/20 0217  melatonin tablet 6 mg      -- Oral Nightly PRN 10/11/20 0217        Pressors:    Autonomic Drugs (From admission, onward)    None        Hyperglycemia/Diabetes Medications:   Antihyperglycemics (From admission, onward)    Start     Stop Route Frequency Ordered    10/14/20 1600  insulin  regular 100 Units in sodium chloride 0.9% 100 mL infusion     Question:  Insulin Rate Adjustment (DO NOT MODIFY ANSWER)  Answer:  [email or web address]    -- IV Continuous 10/14/20 1448          ASSESSMENT and PLAN    * Status post liver transplant  Managed per primary team  Optimize BG control      Acute hyperglycemia  BG goal 140 - 180     Continue IV insulin infusion protocol  Requires intensive BG monitoring while on protocol     ** Please call Endocrine for any BG related issues **  ** Please notify Endocrine for any change and/or advance in diet**    Discharge planning:   TBD        Adverse effect of corticosteroids  Glucocorticoids markedly increase glucoses. Expect steroid taper to help with glucose control.          Teja Powers, NP  Endocrinology  [facility]

## 2020-10-16 NOTE — ASSESSMENT & PLAN NOTE
JENN likely 2/2 iATN from intraoperative hypotension and acute blood loss anemia     Plan/Recommendations:     -12hr SLED for volume management and metabolic clearance, UFG of 300-400cc/hr as tolerated   -Strict I/O's  -Labs per CRRT protocol   -Favor MAP >65  -Maintain Hgb >7.0  -Renally dose meds and avoid nephrotoxic medications   -Will continue to follow closely   -Plan discussed with staff, Dr Hartley

## 2020-10-16 NOTE — PLAN OF CARE
Progress Note  Transplant Surgery    Admit Date: 10/11/2020  Post-operative Day: 2  Hospital Day: 6    ORGAN:   LIVER  Disease Etiology: Alcoholic Cirrhosis  Donor Type:   Donation after Brain Death  CDC High Risk:   Yes  Donor CMV Status:   Donor CMV Status: Positive  Donor HBcAB:   Negative  Donor HBV SHAYE: Negative  Donor HCV SHAYE: Positive  Donor HCV Status:   Positive  Whole or Partial: Whole Liver  Biliary Anastomosis: End to End  Arterial Anatomy: Standard         Follow-up For: Procedure(s) (LRB):  LAPAROTOMY, EXPLORATORY, BILIARY ANASTOMOSIS, ABDOMINAL CLOSURE (N/A)      ASSESSMENT/PLAN:     I conducted multidisciplinary rounds in conjunction with the ICU/Critical Care attending staff, fellows, and residents, with involvement of ancillary services as appropriate. The patient's general condition was reviewed, specifically including allograft function, immunosuppressive management, dietary and nutritional status, pharmacy concerns, and status of expected transition to transplant stepdown care.    For details see the critical care note.    Moon Moreno MD

## 2020-10-16 NOTE — ANESTHESIA POSTPROCEDURE EVALUATION
Anesthesia Post Evaluation    Patient: Jordan Altman    Procedure(s) Performed: Procedure(s) (LRB):  LAPAROTOMY, EXPLORATORY, BILIARY ANASTOMOSIS, ABDOMINAL CLOSURE (N/A)    Final Anesthesia Type: general    Patient location during evaluation: ICU  Patient participation: No - Unable to Participate, Intubation  Level of consciousness: sedated  Post-procedure vital signs: reviewed and stable  Pain management: adequate  Airway patency: patent    PONV status at discharge: No PONV  Anesthetic complications: no      Cardiovascular status: blood pressure returned to baseline  Respiratory status: ETT  Hydration status: euvolemic  Follow-up not needed.          Vitals Value Taken Time   /59 10/16/20 1419   Temp 36 °C (96.8 °F) 10/16/20 1330   Pulse 82 10/16/20 1419   Resp 21 10/16/20 1419   SpO2 100 % 10/16/20 1419   Vitals shown include unvalidated device data.      No case tracking events are documented in the log.      Pain/Trina Score: Pain Rating Prior to Med Admin: 6 (10/15/2020  2:05 PM)  Pain Rating Post Med Admin: 0 (10/15/2020  2:35 PM)

## 2020-10-16 NOTE — PROGRESS NOTES
[facility]  Nephrology  Progress Note    Patient Name: Jordan Altman  MRN: [id number]  Admission Date: 10/11/2020  Hospital Length of Stay: 5 days  Attending Provider: Moon Moreno MD   Primary Care Physician: SHAHEEN Jaime  Principal Problem:Status post liver transplant      Interval History: Patient seen and examined at bedside. Oliguric and with worsening renal function. Received multiple blood products within the past 48hours. Remains intubated and sedated.     Objective:     Vital Signs (Most Recent):  Temp: 96.8 °F (36 °C) (10/16/20 1330)  Pulse: 81 (10/16/20 1413)  Resp: 20 (10/16/20 1413)  BP: 103/63 (10/16/20 1000)  SpO2: 100 % (10/16/20 1413)  O2 Device (Oxygen Therapy): ventilator (10/16/20 0704) Vital Signs (24h Range):  Temp:  [96.7 °F (35.9 °C)-99 °F (37.2 °C)] 96.8 °F (36 °C)  Pulse:  [73-85] 81  Resp:  [16-23] 20  SpO2:  [96 %-100 %] 100 %  BP: ()/(57-71) 103/63  Arterial Line BP: ()/(52-65) 111/56     Weight: 121.2 kg (267 lb 3.2 oz) (10/16/20 0517)  Body mass index is 36.24 kg/m².  Body surface area is 2.48 meters squared.    I/O last 3 completed shifts:  In: 8470.3 [I.V.:5244.3; Blood:2966; Other:160; NG/GT:100]  Out: 5286 [Urine:372; Drains:1484; Other:3430]    Physical Exam  Constitutional:       Appearance: He is ill-appearing.   HENT:      Head: Normocephalic and atraumatic.      Nose: Nose normal.      Mouth/Throat:      Mouth: Mucous membranes are moist.      Pharynx: Oropharynx is clear.   Eyes:      Conjunctiva/sclera: Conjunctivae normal.      Pupils: Pupils are equal, round, and reactive to light.   Cardiovascular:      Rate and Rhythm: Normal rate.   Pulmonary:      Breath sounds: Examination of the right-lower field reveals decreased breath sounds. Examination of the left-lower field reveals decreased breath sounds. Decreased breath sounds and rales present.      Comments: Intubated and mechanically ventilated   Abdominal:      Palpations:  Abdomen is soft.      Comments: Midline wound vac to abdomen with serosanguinous drainage in canister    Musculoskeletal:      Right lower leg: Edema present.      Left lower leg: Edema present.   Skin:     General: Skin is warm and dry.      Coloration: Skin is jaundiced.   Neurological:      Comments: Sedated          Significant Labs:  CBC:   Recent Labs   Lab 10/16/20  1338   WBC 9.22   RBC 2.87*   HGB 8.7*   HCT 26.6*   PLT 61*   MCV 93   MCH 30.3   MCHC 32.7     CMP:   Recent Labs   Lab 10/16/20  1338   *   CALCIUM 7.0*   ALBUMIN 1.9*   PROT 3.4*      K 4.2   CO2 22*      BUN 43*   CREATININE 3.2*   ALKPHOS 53*   *   *  398*   BILITOT 2.4*     All labs within the past 24 hours have been reviewed.      Assessment/Plan:     * Status post liver transplant  -Management per primary team     JENN (acute kidney injury)    JENN likely 2/2 iATN from intraoperative hypotension and acute blood loss anemia     Plan/Recommendations:     -12hr SLED for volume management and metabolic clearance, UFG of 300-400cc/hr as tolerated   -Strict I/O's  -Labs per CRRT protocol   -Favor MAP >65  -Maintain Hgb >7.0  -Renally dose meds and avoid nephrotoxic medications   -Will continue to follow closely   -Plan discussed with staff, Dr Hartley         Thank you for your consult. I will follow-up with patient. Please contact us if you have any additional questions.    Jp Carlisle NP  Nephrology  [facility]

## 2020-10-16 NOTE — PLAN OF CARE
Plan of care notation:    Pt to OR for scheduled surgical procedure per plan.  Pt remains intubated, current ventilator settings: A/C rate 20, PEEP 5, FiO2 40%.  Propofol IV for sedation.  Heparin IV at 5.3 units/kg/hr, with anti-Xa monitoring per orders. Accu-checks performed hourly, with Insulin IV titrated per nomogram.  CRRT initiated today as noted after pt returns to SICU from surgical procedure.  100 ml 25% Albumin IV administered per orders.  Norepinephrine IV at 0.02 mcg/kg/min, which provides relief of hypotension as noted. Labs sent per orders.  Pt turned and heels elevated per protocol.  Plan of care reviewed with pt's family via telephone, and verbalization of understanding obtained.  Emotional support offered.

## 2020-10-16 NOTE — SUBJECTIVE & OBJECTIVE
Interval HPI:   Overnight events:   BG is above goal while on intensive IV insulin infusion requiring frequent rate changes. Patient remains intubated in SICU.   Diet NPO Except for: Sips with Medication  Day of Surgery laparotomy    Eating:   NPO  Nausea: No  Hypoglycemia and intervention: No  Fever: No  TPN and/or TF: No  If yes, type of TF/TPN and rate: None    /63 (BP Location: Right arm, Patient Position: Lying)   Pulse 78   Temp 96.8 °F (36 °C) (Core ([place]))   Resp 20   Ht 6' (1.829 m)   Wt 121.2 kg (267 lb 3.2 oz)   SpO2 100%   BMI 36.24 kg/m²     Labs Reviewed and Include    Recent Labs   Lab 10/16/20  0940   *   CALCIUM 7.6*   ALBUMIN 2.2*   PROT 3.6*   *   K 4.1   CO2 22*      BUN 44*   CREATININE 3.2*   ALKPHOS 54*   ALT 1,039*   *  430*   BILITOT 2.9*     Lab Results   Component Value Date    WBC 6.84 10/16/2020    HGB 8.0 (L) 10/16/2020    HCT 23.4 (L) 10/16/2020    MCV 92 10/16/2020    PLT 43 (L) 10/16/2020     No results for input(s): TSH, FREET4 in the last 168 hours.  Lab Results   Component Value Date    HGBA1C <4.0 (A) 10/13/2020       Nutritional status:   Body mass index is 36.24 kg/m².  Lab Results   Component Value Date    ALBUMIN 2.2 (L) 10/16/2020    ALBUMIN 2.7 (L) 10/15/2020    ALBUMIN 2.5 (L) 10/15/2020     No results found for: PREALBUMIN    Estimated Creatinine Clearance: 38.3 mL/min (A) (based on SCr of 3.2 mg/dL (H)).    Accu-Checks  Recent Labs     10/16/20  0103 10/16/20  0204 10/16/20  0308 10/16/20  0416 10/16/20  0456 10/16/20  0553 10/16/20  0723 10/16/20  0811 10/16/20  0922 10/16/20  0951   POCTGLUCOSE 151* 162* 172* 174* 163* 170* 183* 177* 197* 200*       Current Medications and/or Treatments Impacting Glycemic Control  Immunotherapy:    Immunosuppressants         Stop Route Frequency     tacrolimus (PROGRAF) 1 mg/mL oral syringe      -- Oral 2 times daily     mycophenolate mofetil 200 mg/mL suspension 1,000 mg      -- Oral 2  times daily        Steroids:   Hormones (From admission, onward)    Start     Stop Route Frequency Ordered    10/20/20 0900  predniSONE tablet 20 mg  (methylprednisolone taper panel)      -- Oral Daily 10/14/20 1339    10/19/20 0900  methylPREDNISolone sodium succinate injection 20 mg  (methylprednisolone taper panel)      10/20 0859 IV Every 12 hours 10/14/20 1339    10/18/20 0900  methylPREDNISolone sodium succinate injection 40 mg  (methylprednisolone taper panel)      10/19 0859 IV Every 12 hours 10/14/20 1339    10/17/20 0900  methylPREDNISolone sodium succinate injection 60 mg  (methylprednisolone taper panel)      10/18 0859 IV Every 12 hours 10/14/20 1339    10/16/20 0900  methylPREDNISolone sodium succinate injection 80 mg  (methylprednisolone taper panel)      10/17 0859 IV Every 12 hours 10/14/20 1339    10/11/20 0217  melatonin tablet 6 mg      -- Oral Nightly PRN 10/11/20 0217        Pressors:    Autonomic Drugs (From admission, onward)    None        Hyperglycemia/Diabetes Medications:   Antihyperglycemics (From admission, onward)    Start     Stop Route Frequency Ordered    10/14/20 1600  insulin regular 100 Units in sodium chloride 0.9% 100 mL infusion     Question:  Insulin Rate Adjustment (DO NOT MODIFY ANSWER)  Answer:  [email or web address]    -- IV Continuous 10/14/20 1448

## 2020-10-16 NOTE — SUBJECTIVE & OBJECTIVE
Interval History/Significant Events:   S/p OR takeback yesterday morning with revision of arterial anastomosis x2, thrombectomy of portal vein, takedown of biliary anastomosis. Left open with Abthera  Overnight, patient with some bleeding from Abthera and ADAM drains. Transfused 3 units  On heparin drip due to thrombus in portal vein. Was not titrated due to bleeding    Follow-up For: Procedure(s) (LRB):  LAPAROTOMY, EXPLORATORY, AFTER LIVER TRANSPLANT, Possible redo of artery, possible portal thrombectomy. IntraOperative US. (N/A)  THROMBECTOMY portal vein  REVISION portal vein anastomosis Revision of hepatic artery x2 (N/A)  PLACEMENT abdominal wound vac (N/A)    Post-Operative Day: 1 Day Post-Op    Objective:     Vital Signs (Most Recent):  Temp: 96.9 °F (36.1 °C) (10/16/20 0300)  Pulse: 78 (10/16/20 0600)  Resp: 20 (10/16/20 0600)  BP: (!) 95/59 (10/16/20 0600)  SpO2: 98 % (10/16/20 0600) Vital Signs (24h Range):  Temp:  [96.9 °F (36.1 °C)-99.7 °F (37.6 °C)] 96.9 °F (36.1 °C)  Pulse:  [73-87] 78  Resp:  [10-23] 20  SpO2:  [90 %-100 %] 98 %  BP: ()/(57-71) 95/59  Arterial Line BP: ()/(52-74) 99/57     Weight: 121.2 kg (267 lb 3.2 oz)  Body mass index is 36.24 kg/m².    Intake/Output Summary (Last 24 hours) at 10/16/2020 0621  Last data filed at 10/16/2020 0600  Gross per 24 hour   Intake 6142.3 ml   Output 4256 ml   Net 1886.3 ml     Neuro: Comfortably sedated   Resp: Mechanically ventilated via ETT, chest rise symmetric  CVS: 2+ distal pulses, RRR on monitor  : Duran catheter in place with low volume yellow urine drainage  Abdomen: Soft and non-distended. Abthera in place with serosanguinous drainage with noticeable sanguinous character. ADAM drains with similar appearing output. Bili drain with bile in bag.    Vents:  Vent Mode: A/C (10/16/20 0347)  Ventilator Initiated: Yes (10/14/20 1357)  Set Rate: 20 BPM (10/16/20 0347)  Vt Set: 450 mL (10/16/20 0347)  Pressure Support: 10 cmH20 (10/14/20  2343)  PEEP/CPAP: 5 cmH20 (10/16/20 0347)  Oxygen Concentration (%): 40 (10/16/20 0347)  Peak Airway Pressure: 21 cmH2O (10/16/20 0347)  Plateau Pressure: 0 cmH20 (10/14/20 2343)  Total Ve: 9.07 mL (10/16/20 0347)  Negative Inspiratory Force (cm H2O): -19 (10/14/20 2343)  F/VT Ratio<105 (RSBI): (!) 44.05 (10/16/20 0347)    Lines/Drains/Airways     Central Venous Catheter Line            Percutaneous Central Line Insertion/Assessment - Triple Lumen  10/14/20 0530 2 days    Pulmonary Artery Catheter Assessment  10/14/20 0530 2 days          Drain                 Urethral Catheter 10/14/20 0535 Straight-tip;Non-latex 16 Fr. 2 days         Closed/Suction Drain 10/14/20 1314 Lateral;Right Abdomen Bulb 19 Fr. 1 day         Closed/Suction Drain 10/14/20 1316 Right;Medial Abdomen Bulb 19 Fr. 1 day         Drain/Device  10/15/20 1110 Right upper upper quadrant other (see comments) less than 1 day         NG/OG Tube 10/15/20 1300 Center mouth less than 1 day          Airway                 Airway - Non-Surgical 10/15/20 0936 less than 1 day          Arterial Line            Arterial Line 10/14/20 0501 Left Radial 2 days          Peripheral Intravenous Line                 Peripheral IV - Single Lumen 10/14/20 0535 18 G Right Hand 2 days         LEONEL 10/14/20 0516 Left Antecubital 2 days                Significant Labs:    CBC/Anemia Profile:  Recent Labs   Lab 10/15/20  1615  10/15/20  2054 10/16/20  0103 10/16/20  0458   WBC 5.33  --  5.41 6.10 6.64  6.64   HGB 7.8*  --  7.6* 8.7* 8.8*  8.8*   HCT 23.4*   < > 23.4* 25.8* 25.8*  25.8*   PLT 44*  --  40* 33*  --    MCV 93  --  93 92 92  92   RDW 17.7*  --  17.3* 16.5* 16.3*  16.3*    < > = values in this interval not displayed.        Chemistries:  Recent Labs   Lab 10/14/20  1020  10/14/20  1402  10/15/20  0352 10/15/20  0810 10/15/20  1044 10/15/20  1245 10/15/20  1615 10/15/20  2054 10/16/20  0103 10/16/20  0457   *   < > 138  138   < > 137 135* 136 136 136  --    --   --    K 3.8   < > 4.0  4.0   < > 4.0 4.1 4.4 4.1 3.9  --   --   --    CL  --   --  102  102   < > 104 102  --  103 103  --   --   --    CO2  --   --  22*  22*   < > 21* 24  --  24 25  --   --   --    BUN  --   --  15  15   < > 23* 24*  --  28* 32*  --   --   --    CREATININE  --   --  0.9  0.9   < > 1.9* 2.1*  --  2.1* 2.2*  --   --   --    CALCIUM  --   --  8.2*  8.2*   < > 8.2* 8.1*  --  7.8* 7.5*  --   --   --    ALBUMIN 2.0*   < > 2.2*  2.2*  --  2.6*  --  2.5* 2.7*  --   --   --   --    PROT  --   --  3.7*  3.7*  --  4.2*  --   --   --   --   --   --   --    BILITOT  --   --  10.8*  10.8*  --  4.7*  --   --   --   --   --   --   --    ALKPHOS  --   --  79  79  --  66  --   --   --   --   --   --   --    *  --  604*  604*  --  2,416*  --   --   --   --   --   --   --    *  --  1,215*  1,215*   < > 3,834*  3,834* 3,701*  --  2,613* 1,856* 1,189* 809* 612*   MG 1.7   < >  --   --  2.0  --  2.0 2.0 2.1  --   --  2.1   PHOS  --   --   --    < > 5.8*  --   --  6.6* 6.8*  --   --  8.1*    < > = values in this interval not displayed.       ABGs:   Recent Labs   Lab 10/16/20  0426   PH 7.342*   PCO2 41.4   HCO3 22.5*   POCSATURATED 66*   BE -3     POCT Glucose:   Recent Labs   Lab 10/16/20  0416 10/16/20  0456 10/16/20  0553   POCTGLUCOSE 174* 163* 170*     All pertinent labs within the past 24 hours have been reviewed.    Significant Imaging:  I have reviewed all pertinent imaging results/findings within the past 24 hours.

## 2020-10-16 NOTE — PLAN OF CARE
Pt remains intubated/sedated, AC, 40% 5peep.  Remains without vasopressor requirement, but was transfused with 3 units PRBC.  Wound vac and ADAM outputs as noted, bright sanguinous in color.  Scant UOP noted as well.  Continues with q1h accuchecks with no titration of insulin gtt required.  Pt follows commands during sedation vacation.  Abdomen remains open with ABthera in place at 125mmHg.  No family at bedside to discuss plan of care and unable to evaluate pt's understanding of plan of care.  Positive reinforcement provided.

## 2020-10-16 NOTE — PROGRESS NOTES
Went to see patient in ICU.  Patient went back to OR for closure.  Left [facility] After My Liver Transplant at patient's bedside.  Called patient's brother Didier to make him aware of book.  No answer.

## 2020-10-16 NOTE — PROGRESS NOTES
Social Work Transplant Note:     presented to pt's bedside for follow-up and continuity. Patient observed intubated and sedated. SW contacted pt's significant other, Valeria Tucker (ph#[phone]), and was required to leave a voice message.  also contacted pt's brother, Didier Page ph#[phone]). Didier reported reeving medical updates from team and aware pt to return to OR today, 10/16/20. Caregiver inquired about [place] apartments and the process of how to obtain.  provided information on the process and informed caregiver patient has been added to wait list with $0 fee. Caregiver aware of items needed for the apartment and denied having any further questions.     Pt's brother aware of teams recommendation of patient's need to complete an Intensive Outpatient Program (IOP) and AA following hospital discharge. Social work team to provide Virtual resources to patient and his caregivers near discharge. Caregiver expressed appreciation and stated patient with great family support. He went on to say that they have recently leased out an apartment in [place] for patient and his significant other, with the location being closer to family. Caregiver reporting adequate coping and denied having any questions. SW will continue to follow patient for resources, education, support and dc planning as appropriate. SW remains available.

## 2020-10-16 NOTE — NURSING
Anesthesia service presents to bedside to transport pt to OR for planned surgical procedure.  Prior to departure, surgical and anesthesia informed consents obtained and verified. Pre-op checklist completed. Plan of care reviewed with pt's family via telephone, and verbalization of understanding obtained.  Emotional support offered.

## 2020-10-16 NOTE — NURSING
Pt returns from surgical procedure to SICU [id number] via bed, escorted by Anesthesia service. Pt's family notified of pt's return, and plan of care reviewed.  Respiratory therapy at bedside, providing care.  Dr. Abimael Zavaleta at bedside, aware of all patient data.

## 2020-10-16 NOTE — TRANSFER OF CARE
Anesthesia Transfer of Care Note    Patient: Jordan Altman    Procedure(s) Performed: Procedure(s) (LRB):  LAPAROTOMY, EXPLORATORY, BILIARY ANASTOMOSIS, ABDOMINAL CLOSURE (N/A)    Patient location: ICU    Anesthesia Type: general    Transport from OR: Transported from OR intubated on 100% O2 by AMBU with adequate controlled ventilation. Upon arrival to PACU/ICU, patient attached to ventilator and auscultated to confirm bilateral breath sounds and adequate TV. Continuous ECG monitoring in transport. Continuous SpO2 monitoring in transport. Continuos invasive BP monitoring in transport. Continuous CVP monitoring in transport. Continuous PA pressure monitoring in transport    Post pain: adequate analgesia    Post assessment: no apparent anesthetic complications and tolerated procedure well    Post vital signs: stable    Level of consciousness: sedated    Nausea/Vomiting: no nausea/vomiting    Complications: none    Transfer of care protocol was followed      Last vitals:   Visit Vitals  /63 (BP Location: Right arm, Patient Position: Lying)   Pulse 78   Temp 36 °C (96.8 °F) (Core ([place]))   Resp 20   Ht 6' (1.829 m)   Wt 121.2 kg (267 lb 3.2 oz)   SpO2 100%   BMI 36.24 kg/m²

## 2020-10-16 NOTE — NURSING
SBP low-to-mid 90's, intermittently dropping to upper 80's.  Dr. Abimael Zavaleta presents to bedside to assess.  100 ml 25% Albumin IV administered per orders; however, pt remains hypotensive.  Dr. Pillai also presents to bedside to assess.  Order received to initiate Norepinephrine IV and titrate to maintain SBP > 100.

## 2020-10-16 NOTE — OP NOTE
Operative Report     Date of Procedure: 10/16/2020     Surgeons:  Surgeon(s) and Role:     * Curtis Zavaleta MD - Primary     * Estela Ken MD - Fellow       *Estevan Higginbotham MD - Resident     First Assistant Attestation:  The presence of an additional attending surgeon functioning as first assistant was required due to the complexity of the procedure relative to any available residents. I certify that no resident was available who was qualified to serve as first assistant. Duties performed by the assistant included assisting the primary surgeon.     Pre-operative Diagnosis: abdominal packing  Post-operative Diagnosis: abdominal packing       Procedure(s) Perfomed: Exploration of abdomen, removal of 5 laparotomy pads,left lobe liver biopsy,  reconstruction of bile duct, intraoperative ultrasound  Anesthesia: General endotracheal  Estimated Blood Loss: 500 mL  Fluids Administered: 1 L albumin  Findings: good flow in portal vein and hepatic artery on ultrasound  Specimens: Left lobe liver biopsy  Drains: 19f Venkatesh drains x 2     Preamble  Indications and Patient Counseling: The procedure was thoroughly explained to the patient and/or family members as appropriate, including potential risks, complications, and alternatives.  The risks associated with not undergoing the proposed procedure were also discussed.  All questions were answered, and the patient and/or family members voiced understanding and agreed to proceed with the operation.     Time-Out: A complete time out was carried out prior to incision, with confirmation of patient identity, correct procedure, correct operative site, appropriate antibiotic prophylaxis, review of any known allergies, and presence of all needed equipment.     Procedure in Detail  Following the induction of general endotracheal anesthesia, appropriate arterial and venous lines were placed by the anesthesia team.  Care was taken to pad all pressure points and avoid any potential  traction injuries from positioning. The urinary bladder was catheterized.  Sequential compression boots and Eddie Huggers were used. The abdomen was sterilely prepped and draped.     The abdomen was entered by removing the Abthera dressing system.       Significant peritoneal fluid was present.  Some hematoma was present. There was minimal active bleeding.  The hepatic artery was palpated, and the thrill was good. The liver itself was soft to palpation, and had a well-perfused appearance.      5 (five) laparotomy pads were removed.     Intraoperative ultrasound was performed - it showed flow in the main, left and right portal vein. No clot was visualized in the portal vein. Hepatic artery was also ultrasounded and showed improved RIs from day before.     Left lobe liver biopsy was performed.    The pediatric feeding tube in the donor bile duct was removed.  The donor and recipient bile ducts were freshened up by resecting edges.    The bile duct was reconstructed using running 6-0 pds.     After the above exploration, all identifiable bleeding sites were addressed using electrocautery, argon beam coagulation, suture ligatures, and topical hemostatic agents as appropriate.       The liver was suspended by placing 2 x 4-0 prolene in the midline and one 2-0 MH       A final check for hemostasis was made.  2 19f Venkatesh drains were repositioned below the transverse abdominal incision and placed in the usual positions. The cavity was irrigated with saline.      The abdomen with was closed in two layers using looped #1 pds.  At the end of the case all instrument, needle, and sponge counts were correct. Skin was closed with clips. The patient was taken from the OR to the ICU in stable condition.      The plan will be to watch the patient closely while anticoagulating. Patient will likely need hemodialysis.      Details of the operation were reviewed with the patients caretaker, all questions were answered.

## 2020-10-16 NOTE — PROGRESS NOTES
[facility]  Critical Care - Surgery  Progress Note    Patient Name: Jordan Altman  MRN: [id number]  Admission Date: 10/11/2020  Hospital Length of Stay: 5 days  Code Status: Full Code  Attending Provider: Moon Moreno MD  Primary Care Provider: SHAHEEN Jaime   Principal Problem: Status post liver transplant    Subjective:     Hospital/ICU Course:  No notes on file    Interval History/Significant Events:   S/p OR takeback yesterday morning with revision of arterial anastomosis x2, thrombectomy of portal vein, takedown of biliary anastomosis. Left open with Abthera  Overnight, patient with some bleeding from Abthera and ADAM drains. Transfused 3 units  On heparin drip due to thrombus in portal vein. Was not titrated due to bleeding    Follow-up For: Procedure(s) (LRB):  LAPAROTOMY, EXPLORATORY, AFTER LIVER TRANSPLANT, Possible redo of artery, possible portal thrombectomy. IntraOperative US. (N/A)  THROMBECTOMY portal vein  REVISION portal vein anastomosis Revision of hepatic artery x2 (N/A)  PLACEMENT abdominal wound vac (N/A)    Post-Operative Day: 1 Day Post-Op    Objective:     Vital Signs (Most Recent):  Temp: 96.9 °F (36.1 °C) (10/16/20 0300)  Pulse: 78 (10/16/20 0600)  Resp: 20 (10/16/20 0600)  BP: (!) 95/59 (10/16/20 0600)  SpO2: 98 % (10/16/20 0600) Vital Signs (24h Range):  Temp:  [96.9 °F (36.1 °C)-99.7 °F (37.6 °C)] 96.9 °F (36.1 °C)  Pulse:  [73-87] 78  Resp:  [10-23] 20  SpO2:  [90 %-100 %] 98 %  BP: ()/(57-71) 95/59  Arterial Line BP: ()/(52-74) 99/57     Weight: 121.2 kg (267 lb 3.2 oz)  Body mass index is 36.24 kg/m².    Intake/Output Summary (Last 24 hours) at 10/16/2020 0621  Last data filed at 10/16/2020 0600  Gross per 24 hour   Intake 6142.3 ml   Output 4256 ml   Net 1886.3 ml     Neuro: Comfortably sedated   Resp: Mechanically ventilated via ETT, chest rise symmetric  CVS: 2+ distal pulses, RRR on monitor  : Duran catheter in place with low volume  yellow urine drainage  Abdomen: Soft and non-distended. Abthera in place with serosanguinous drainage with noticeable sanguinous character. ADAM drains with similar appearing output. Bili drain with bile in bag.    Vents:  Vent Mode: A/C (10/16/20 0347)  Ventilator Initiated: Yes (10/14/20 1357)  Set Rate: 20 BPM (10/16/20 0347)  Vt Set: 450 mL (10/16/20 0347)  Pressure Support: 10 cmH20 (10/14/20 2343)  PEEP/CPAP: 5 cmH20 (10/16/20 0347)  Oxygen Concentration (%): 40 (10/16/20 0347)  Peak Airway Pressure: 21 cmH2O (10/16/20 0347)  Plateau Pressure: 0 cmH20 (10/14/20 2343)  Total Ve: 9.07 mL (10/16/20 0347)  Negative Inspiratory Force (cm H2O): -19 (10/14/20 2343)  F/VT Ratio<105 (RSBI): (!) 44.05 (10/16/20 0347)    Lines/Drains/Airways     Central Venous Catheter Line            Percutaneous Central Line Insertion/Assessment - Triple Lumen  10/14/20 0530 2 days    Pulmonary Artery Catheter Assessment  10/14/20 0530 2 days          Drain                 Urethral Catheter 10/14/20 0535 Straight-tip;Non-latex 16 Fr. 2 days         Closed/Suction Drain 10/14/20 1314 Lateral;Right Abdomen Bulb 19 Fr. 1 day         Closed/Suction Drain 10/14/20 1316 Right;Medial Abdomen Bulb 19 Fr. 1 day         Drain/Device  10/15/20 1110 Right upper upper quadrant other (see comments) less than 1 day         NG/OG Tube 10/15/20 1300 Center mouth less than 1 day          Airway                 Airway - Non-Surgical 10/15/20 0936 less than 1 day          Arterial Line            Arterial Line 10/14/20 0501 Left Radial 2 days          Peripheral Intravenous Line                 Peripheral IV - Single Lumen 10/14/20 0535 18 G Right Hand 2 days         LEONEL 10/14/20 0516 Left Antecubital 2 days                Significant Labs:    CBC/Anemia Profile:  Recent Labs   Lab 10/15/20  1615  10/15/20  2054 10/16/20  0103 10/16/20  0458   WBC 5.33  --  5.41 6.10 6.64  6.64   HGB 7.8*  --  7.6* 8.7* 8.8*  8.8*   HCT 23.4*   < > 23.4* 25.8* 25.8*   25.8*   PLT 44*  --  40* 33*  --    MCV 93  --  93 92 92  92   RDW 17.7*  --  17.3* 16.5* 16.3*  16.3*    < > = values in this interval not displayed.        Chemistries:  Recent Labs   Lab 10/14/20  1020  10/14/20  1402  10/15/20  0352 10/15/20  0810 10/15/20  1044 10/15/20  1245 10/15/20  1615 10/15/20  2054 10/16/20  0103 10/16/20  0457   *   < > 138  138   < > 137 135* 136 136 136  --   --   --    K 3.8   < > 4.0  4.0   < > 4.0 4.1 4.4 4.1 3.9  --   --   --    CL  --   --  102  102   < > 104 102  --  103 103  --   --   --    CO2  --   --  22*  22*   < > 21* 24  --  24 25  --   --   --    BUN  --   --  15  15   < > 23* 24*  --  28* 32*  --   --   --    CREATININE  --   --  0.9  0.9   < > 1.9* 2.1*  --  2.1* 2.2*  --   --   --    CALCIUM  --   --  8.2*  8.2*   < > 8.2* 8.1*  --  7.8* 7.5*  --   --   --    ALBUMIN 2.0*   < > 2.2*  2.2*  --  2.6*  --  2.5* 2.7*  --   --   --   --    PROT  --   --  3.7*  3.7*  --  4.2*  --   --   --   --   --   --   --    BILITOT  --   --  10.8*  10.8*  --  4.7*  --   --   --   --   --   --   --    ALKPHOS  --   --  79  79  --  66  --   --   --   --   --   --   --    *  --  604*  604*  --  2,416*  --   --   --   --   --   --   --    *  --  1,215*  1,215*   < > 3,834*  3,834* 3,701*  --  2,613* 1,856* 1,189* 809* 612*   MG 1.7   < >  --   --  2.0  --  2.0 2.0 2.1  --   --  2.1   PHOS  --   --   --    < > 5.8*  --   --  6.6* 6.8*  --   --  8.1*    < > = values in this interval not displayed.       ABGs:   Recent Labs   Lab 10/16/20  0426   PH 7.342*   PCO2 41.4   HCO3 22.5*   POCSATURATED 66*   BE -3     POCT Glucose:   Recent Labs   Lab 10/16/20  0416 10/16/20  0456 10/16/20  0553   POCTGLUCOSE 174* 163* 170*     All pertinent labs within the past 24 hours have been reviewed.    Significant Imaging:  I have reviewed all pertinent imaging results/findings within the past 24 hours.    Assessment/Plan:       Neuro/Psych:   -- Sedation: Propofol   --  Pain: Dilaudid 0.5 mg PRN q1  -- Reevaluate after OR             Cards:   -- HDS  -- Maintain SBP > 100  -- Pressors as needed, on none this morning      Pulm:   -- Goal O2 sat > 90%  -- Wean ventilation as able after OR      Renal:  -- Keep bourne for strict I/O  -- Minimal urine output yesterday  -- BUN/Cr pending this morning  -- Nephrology consulted      FEN / GI:   -- Replace lytes as needed  -- Nutrition: NPO   -- Protonix BID 2/2 recent GIB  -- AST decreasing after takeback yesterday  -- OR this AM for washout, biliary anastomosis, and hopefully closure      ID:   -- Tm: afebrile; WBC 6.6  -- Abx  Zosyn, fluconazole, Bactrim, valganciclovir      Heme/Onc:   -- H/H 8.8 / 25.8  -- q4 hour CBC  -- Continue heparin gtt for thrombus  -- Follow up HIT lab      Endo:   -- Gluc goal 140-180  -- Tacrolimus and prednisone taper      PPx:   Feeding: NPO  Analgesia/Sedation: Dilaudid pushes / None  Thromboembolic prevention: Heparin gtt  HOB >30: Yes  Stress Ulcer ppx: Protonix BID  Glucose control: Critical care goal 140-180 g/dl, ISS         Dispo/Code Status/Palliative:   -- SICU / Full Code  -- OR for washout, biliary anastomosis, and hopefully closure      Critical care was time spent personally by me on the following activities: development of treatment plan with patient or surrogate and bedside caregivers, discussions with consultants, evaluation of patient's response to treatment, examination of patient, ordering and performing treatments and interventions, ordering and review of laboratory studies, ordering and review of radiographic studies, pulse oximetry, re-evaluation of patient's condition.  This critical care time did not overlap with that of any other provider or involve time for any procedures.     Johnny Galan II, MD  Critical Care - Surgery  [facility]

## 2020-10-16 NOTE — PROGRESS NOTES
[facility]  Endocrinology  Progress Note    Admit Date: 10/11/2020     Reason for Consult: Management of Hyperglycemia     Surgical Procedure and Date: Liver Transplant 10/14/20    Lab Results   Component Value Date    HGBA1C <4.0 (A) 10/13/2020     HPI:   Patient is a 45 y.o. 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  Patient is now s/p liver transplant and admitted to SICU. No personal or family hx of DM per chart review. Endocrinology consulted for management of hyperglycemia.           Interval HPI:   Overnight events:   BG is above goal while on intensive IV insulin infusion requiring frequent rate changes. Patient remains intubated in SICU.   Diet NPO Except for: Sips with Medication  Day of Surgery laparotomy    Eating:   NPO  Nausea: No  Hypoglycemia and intervention: No  Fever: No  TPN and/or TF: No  If yes, type of TF/TPN and rate: None    /63 (BP Location: Right arm, Patient Position: Lying)   Pulse 78   Temp 96.8 °F (36 °C) (Core ([place]))   Resp 20   Ht 6' (1.829 m)   Wt 121.2 kg (267 lb 3.2 oz)   SpO2 100%   BMI 36.24 kg/m²     Labs Reviewed and Include    Recent Labs   Lab 10/16/20  0940   *   CALCIUM 7.6*   ALBUMIN 2.2*   PROT 3.6*   *   K 4.1   CO2 22*      BUN 44*   CREATININE 3.2*   ALKPHOS 54*   ALT 1,039*   *  430*   BILITOT 2.9*     Lab Results   Component Value Date    WBC 6.84 10/16/2020    HGB 8.0 (L) 10/16/2020    HCT 23.4 (L) 10/16/2020    MCV 92 10/16/2020    PLT 43 (L) 10/16/2020     No results for input(s): TSH, FREET4 in the last 168 hours.  Lab Results   Component Value Date    HGBA1C <4.0 (A) 10/13/2020       Nutritional status:   Body mass index is 36.24 kg/m².  Lab Results   Component Value Date    ALBUMIN 2.2 (L) 10/16/2020    ALBUMIN 2.7 (L) 10/15/2020    ALBUMIN 2.5 (L) 10/15/2020     No results found for: PREALBUMIN    Estimated Creatinine Clearance: 38.3  mL/min (A) (based on SCr of 3.2 mg/dL (H)).    Accu-Checks  Recent Labs     10/16/20  0103 10/16/20  0204 10/16/20  0308 10/16/20  0416 10/16/20  0456 10/16/20  0553 10/16/20  0723 10/16/20  0811 10/16/20  0922 10/16/20  0951   POCTGLUCOSE 151* 162* 172* 174* 163* 170* 183* 177* 197* 200*       Current Medications and/or Treatments Impacting Glycemic Control  Immunotherapy:    Immunosuppressants         Stop Route Frequency     tacrolimus (PROGRAF) 1 mg/mL oral syringe      -- Oral 2 times daily     mycophenolate mofetil 200 mg/mL suspension 1,000 mg      -- Oral 2 times daily        Steroids:   Hormones (From admission, onward)    Start     Stop Route Frequency Ordered    10/20/20 0900  predniSONE tablet 20 mg  (methylprednisolone taper panel)      -- Oral Daily 10/14/20 1339    10/19/20 0900  methylPREDNISolone sodium succinate injection 20 mg  (methylprednisolone taper panel)      10/20 0859 IV Every 12 hours 10/14/20 1339    10/18/20 0900  methylPREDNISolone sodium succinate injection 40 mg  (methylprednisolone taper panel)      10/19 0859 IV Every 12 hours 10/14/20 1339    10/17/20 0900  methylPREDNISolone sodium succinate injection 60 mg  (methylprednisolone taper panel)      10/18 0859 IV Every 12 hours 10/14/20 1339    10/16/20 0900  methylPREDNISolone sodium succinate injection 80 mg  (methylprednisolone taper panel)      10/17 0859 IV Every 12 hours 10/14/20 1339    10/11/20 0217  melatonin tablet 6 mg      -- Oral Nightly PRN 10/11/20 0217        Pressors:    Autonomic Drugs (From admission, onward)    None        Hyperglycemia/Diabetes Medications:   Antihyperglycemics (From admission, onward)    Start     Stop Route Frequency Ordered    10/14/20 1600  insulin regular 100 Units in sodium chloride 0.9% 100 mL infusion     Question:  Insulin Rate Adjustment (DO NOT MODIFY ANSWER)  Answer:  [email or web address]    -- IV Continuous 10/14/20 1449           ASSESSMENT and PLAN    * Status post liver transplant  Managed per primary team  Optimize BG control      Acute hyperglycemia  BG goal 140 - 180     Continue IV insulin infusion protocol  Requires intensive BG monitoring while on protocol     ** Please call Endocrine for any BG related issues **  ** Please notify Endocrine for any change and/or advance in diet**    Discharge planning:   TBD        Adverse effect of corticosteroids  Glucocorticoids markedly increase glucoses. Expect steroid taper to help with glucose control.          Teja Powers, NP  Endocrinology  [facility]

## 2020-10-16 NOTE — SUBJECTIVE & OBJECTIVE
Interval History: Patient seen and examined at bedside. Oliguric and with worsening renal function. Received multiple blood products within the past 48hours. Remains intubated and sedated.     Objective:     Vital Signs (Most Recent):  Temp: 96.8 °F (36 °C) (10/16/20 1330)  Pulse: 81 (10/16/20 1413)  Resp: 20 (10/16/20 1413)  BP: 103/63 (10/16/20 1000)  SpO2: 100 % (10/16/20 1413)  O2 Device (Oxygen Therapy): ventilator (10/16/20 0704) Vital Signs (24h Range):  Temp:  [96.7 °F (35.9 °C)-99 °F (37.2 °C)] 96.8 °F (36 °C)  Pulse:  [73-85] 81  Resp:  [16-23] 20  SpO2:  [96 %-100 %] 100 %  BP: ()/(57-71) 103/63  Arterial Line BP: ()/(52-65) 111/56     Weight: 121.2 kg (267 lb 3.2 oz) (10/16/20 0517)  Body mass index is 36.24 kg/m².  Body surface area is 2.48 meters squared.    I/O last 3 completed shifts:  In: 8470.3 [I.V.:5244.3; Blood:2966; Other:160; NG/GT:100]  Out: 5286 [Urine:372; Drains:1484; Other:3430]    Physical Exam  Constitutional:       Appearance: He is ill-appearing.   HENT:      Head: Normocephalic and atraumatic.      Nose: Nose normal.      Mouth/Throat:      Mouth: Mucous membranes are moist.      Pharynx: Oropharynx is clear.   Eyes:      Conjunctiva/sclera: Conjunctivae normal.      Pupils: Pupils are equal, round, and reactive to light.   Cardiovascular:      Rate and Rhythm: Normal rate.   Pulmonary:      Breath sounds: Examination of the right-lower field reveals decreased breath sounds. Examination of the left-lower field reveals decreased breath sounds. Decreased breath sounds and rales present.      Comments: Intubated and mechanically ventilated   Abdominal:      Palpations: Abdomen is soft.      Comments: Midline wound vac to abdomen with serosanguinous drainage in canister    Musculoskeletal:      Right lower leg: Edema present.      Left lower leg: Edema present.   Skin:     General: Skin is warm and dry.      Coloration: Skin is jaundiced.   Neurological:      Comments:  Sedated          Significant Labs:  CBC:   Recent Labs   Lab 10/16/20  1338   WBC 9.22   RBC 2.87*   HGB 8.7*   HCT 26.6*   PLT 61*   MCV 93   MCH 30.3   MCHC 32.7     CMP:   Recent Labs   Lab 10/16/20  1338   *   CALCIUM 7.0*   ALBUMIN 1.9*   PROT 3.4*      K 4.2   CO2 22*      BUN 43*   CREATININE 3.2*   ALKPHOS 53*   *   *  398*   BILITOT 2.4*     All labs within the past 24 hours have been reviewed.

## 2020-10-17 LAB
ABO + RH BLD: NORMAL
ALBUMIN SERPL BCP-MCNC: 2.3 G/DL (ref 3.5–5.2)
ALBUMIN SERPL BCP-MCNC: 2.5 G/DL (ref 3.5–5.2)
ALBUMIN SERPL BCP-MCNC: 2.6 G/DL (ref 3.5–5.2)
ALLENS TEST: ABNORMAL
ALP SERPL-CCNC: 61 U/L (ref 55–135)
ALT SERPL W/O P-5'-P-CCNC: 773 U/L (ref 10–44)
ANION GAP SERPL CALC-SCNC: 10 MMOL/L (ref 8–16)
ANION GAP SERPL CALC-SCNC: 12 MMOL/L (ref 8–16)
ANION GAP SERPL CALC-SCNC: 12 MMOL/L (ref 8–16)
ANION GAP SERPL CALC-SCNC: 7 MMOL/L (ref 8–16)
ANION GAP SERPL CALC-SCNC: 9 MMOL/L (ref 8–16)
APTT BLDCRRT: 27.9 SEC (ref 21–32)
AST SERPL-CCNC: 125 U/L (ref 10–40)
AST SERPL-CCNC: 125 U/L (ref 10–40)
AST SERPL-CCNC: 194 U/L (ref 10–40)
AST SERPL-CCNC: 194 U/L (ref 10–40)
AST SERPL-CCNC: 240 U/L (ref 10–40)
BACTERIA SPEC AEROBE CULT: NO GROWTH
BASOPHILS # BLD AUTO: 0.01 K/UL (ref 0–0.2)
BASOPHILS # BLD AUTO: 0.02 K/UL (ref 0–0.2)
BASOPHILS NFR BLD: 0.1 % (ref 0–1.9)
BASOPHILS NFR BLD: 0.2 % (ref 0–1.9)
BILIRUB DIRECT SERPL-MCNC: 2.4 MG/DL (ref 0.1–0.3)
BILIRUB SERPL-MCNC: 3.1 MG/DL (ref 0.1–1)
BLD GP AB SCN CELLS X3 SERPL QL: NORMAL
BLD PROD TYP BPU: NORMAL
BLOOD GROUP ANTIBODIES SERPL: NORMAL
BLOOD UNIT EXPIRATION DATE: NORMAL
BLOOD UNIT TYPE CODE: 6200
BLOOD UNIT TYPE: NORMAL
BUN SERPL-MCNC: 16 MG/DL (ref 6–20)
BUN SERPL-MCNC: 17 MG/DL (ref 6–20)
BUN SERPL-MCNC: 17 MG/DL (ref 6–20)
BUN SERPL-MCNC: 27 MG/DL (ref 6–20)
BUN SERPL-MCNC: 27 MG/DL (ref 6–20)
CALCIUM SERPL-MCNC: 7.6 MG/DL (ref 8.7–10.5)
CALCIUM SERPL-MCNC: 7.6 MG/DL (ref 8.7–10.5)
CALCIUM SERPL-MCNC: 7.7 MG/DL (ref 8.7–10.5)
CHLORIDE SERPL-SCNC: 104 MMOL/L (ref 95–110)
CHLORIDE SERPL-SCNC: 104 MMOL/L (ref 95–110)
CHLORIDE SERPL-SCNC: 105 MMOL/L (ref 95–110)
CHLORIDE SERPL-SCNC: 105 MMOL/L (ref 95–110)
CHLORIDE SERPL-SCNC: 106 MMOL/L (ref 95–110)
CO2 SERPL-SCNC: 22 MMOL/L (ref 23–29)
CO2 SERPL-SCNC: 22 MMOL/L (ref 23–29)
CO2 SERPL-SCNC: 24 MMOL/L (ref 23–29)
CO2 SERPL-SCNC: 24 MMOL/L (ref 23–29)
CO2 SERPL-SCNC: 27 MMOL/L (ref 23–29)
CODING SYSTEM: NORMAL
CREAT SERPL-MCNC: 1.6 MG/DL (ref 0.5–1.4)
CREAT SERPL-MCNC: 1.7 MG/DL (ref 0.5–1.4)
CREAT SERPL-MCNC: 1.7 MG/DL (ref 0.5–1.4)
CREAT SERPL-MCNC: 2.4 MG/DL (ref 0.5–1.4)
CREAT SERPL-MCNC: 2.4 MG/DL (ref 0.5–1.4)
DAT IGG-SP REAG RBC-IMP: NORMAL
DELSYS: ABNORMAL
DIFFERENTIAL METHOD: ABNORMAL
DISPENSE STATUS: NORMAL
EOSINOPHIL # BLD AUTO: 0 K/UL (ref 0–0.5)
EOSINOPHIL NFR BLD: 0 % (ref 0–8)
ERYTHROCYTE [DISTWIDTH] IN BLOOD BY AUTOMATED COUNT: 17.9 % (ref 11.5–14.5)
ERYTHROCYTE [DISTWIDTH] IN BLOOD BY AUTOMATED COUNT: 18.6 % (ref 11.5–14.5)
ERYTHROCYTE [DISTWIDTH] IN BLOOD BY AUTOMATED COUNT: 18.6 % (ref 11.5–14.5)
ERYTHROCYTE [DISTWIDTH] IN BLOOD BY AUTOMATED COUNT: 19 % (ref 11.5–14.5)
ERYTHROCYTE [DISTWIDTH] IN BLOOD BY AUTOMATED COUNT: 19 % (ref 11.5–14.5)
ERYTHROCYTE [SEDIMENTATION RATE] IN BLOOD BY WESTERGREN METHOD: 20 MM/H
EST. GFR  (AFRICAN AMERICAN): 35.8 ML/MIN/1.73 M^2
EST. GFR  (AFRICAN AMERICAN): 35.8 ML/MIN/1.73 M^2
EST. GFR  (AFRICAN AMERICAN): 54.3 ML/MIN/1.73 M^2
EST. GFR  (AFRICAN AMERICAN): 54.3 ML/MIN/1.73 M^2
EST. GFR  (AFRICAN AMERICAN): 58.4 ML/MIN/1.73 M^2
EST. GFR  (NON AFRICAN AMERICAN): 31 ML/MIN/1.73 M^2
EST. GFR  (NON AFRICAN AMERICAN): 31 ML/MIN/1.73 M^2
EST. GFR  (NON AFRICAN AMERICAN): 47 ML/MIN/1.73 M^2
EST. GFR  (NON AFRICAN AMERICAN): 47 ML/MIN/1.73 M^2
EST. GFR  (NON AFRICAN AMERICAN): 50.6 ML/MIN/1.73 M^2
FACT X PPP CHRO-ACNC: 0.2 IU/ML (ref 0.3–0.7)
FACT X PPP CHRO-ACNC: <0.1 IU/ML (ref 0.3–0.7)
FIO2: 40
GLUCOSE SERPL-MCNC: 129 MG/DL (ref 70–110)
GLUCOSE SERPL-MCNC: 152 MG/DL (ref 70–110)
GLUCOSE SERPL-MCNC: 154 MG/DL (ref 70–110)
GLUCOSE SERPL-MCNC: 164 MG/DL (ref 70–110)
GLUCOSE SERPL-MCNC: 164 MG/DL (ref 70–110)
HCO3 UR-SCNC: 23.6 MMOL/L (ref 24–28)
HCT VFR BLD AUTO: 26.3 % (ref 40–54)
HCT VFR BLD AUTO: 26.3 % (ref 40–54)
HCT VFR BLD AUTO: 27.6 % (ref 40–54)
HCT VFR BLD AUTO: 27.6 % (ref 40–54)
HCT VFR BLD AUTO: 28.1 % (ref 40–54)
HGB BLD-MCNC: 8.7 G/DL (ref 14–18)
HGB BLD-MCNC: 8.7 G/DL (ref 14–18)
HGB BLD-MCNC: 9.4 G/DL (ref 14–18)
IMM GRANULOCYTES # BLD AUTO: 0.15 K/UL (ref 0–0.04)
IMM GRANULOCYTES # BLD AUTO: 0.15 K/UL (ref 0–0.04)
IMM GRANULOCYTES # BLD AUTO: 0.18 K/UL (ref 0–0.04)
IMM GRANULOCYTES # BLD AUTO: 0.18 K/UL (ref 0–0.04)
IMM GRANULOCYTES # BLD AUTO: 0.19 K/UL (ref 0–0.04)
IMM GRANULOCYTES NFR BLD AUTO: 1.8 % (ref 0–0.5)
IMM GRANULOCYTES NFR BLD AUTO: 1.8 % (ref 0–0.5)
IMM GRANULOCYTES NFR BLD AUTO: 1.9 % (ref 0–0.5)
INR PPP: 1 (ref 0.8–1.2)
LYMPHOCYTES # BLD AUTO: 0.4 K/UL (ref 1–4.8)
LYMPHOCYTES NFR BLD: 4 % (ref 18–48)
LYMPHOCYTES NFR BLD: 4.4 % (ref 18–48)
LYMPHOCYTES NFR BLD: 4.4 % (ref 18–48)
MAGNESIUM SERPL-MCNC: 1.9 MG/DL (ref 1.6–2.6)
MAGNESIUM SERPL-MCNC: 1.9 MG/DL (ref 1.6–2.6)
MAGNESIUM SERPL-MCNC: 2 MG/DL (ref 1.6–2.6)
MAGNESIUM SERPL-MCNC: 2.1 MG/DL (ref 1.6–2.6)
MAGNESIUM SERPL-MCNC: 2.1 MG/DL (ref 1.6–2.6)
MCH RBC QN AUTO: 30 PG (ref 27–31)
MCH RBC QN AUTO: 30.5 PG (ref 27–31)
MCH RBC QN AUTO: 30.5 PG (ref 27–31)
MCH RBC QN AUTO: 31 PG (ref 27–31)
MCH RBC QN AUTO: 31 PG (ref 27–31)
MCHC RBC AUTO-ENTMCNC: 33.1 G/DL (ref 32–36)
MCHC RBC AUTO-ENTMCNC: 33.1 G/DL (ref 32–36)
MCHC RBC AUTO-ENTMCNC: 33.5 G/DL (ref 32–36)
MCHC RBC AUTO-ENTMCNC: 34.1 G/DL (ref 32–36)
MCHC RBC AUTO-ENTMCNC: 34.1 G/DL (ref 32–36)
MCV RBC AUTO: 90 FL (ref 82–98)
MCV RBC AUTO: 91 FL (ref 82–98)
MCV RBC AUTO: 91 FL (ref 82–98)
MCV RBC AUTO: 92 FL (ref 82–98)
MCV RBC AUTO: 92 FL (ref 82–98)
MODE: ABNORMAL
MONOCYTES # BLD AUTO: 1 K/UL (ref 0.3–1)
MONOCYTES # BLD AUTO: 1.2 K/UL (ref 0.3–1)
MONOCYTES # BLD AUTO: 1.2 K/UL (ref 0.3–1)
MONOCYTES NFR BLD: 11.7 % (ref 4–15)
MONOCYTES NFR BLD: 11.7 % (ref 4–15)
MONOCYTES NFR BLD: 12.1 % (ref 4–15)
MONOCYTES NFR BLD: 12.1 % (ref 4–15)
MONOCYTES NFR BLD: 9.5 % (ref 4–15)
NEUTROPHILS # BLD AUTO: 6.7 K/UL (ref 1.8–7.7)
NEUTROPHILS # BLD AUTO: 6.7 K/UL (ref 1.8–7.7)
NEUTROPHILS # BLD AUTO: 7.9 K/UL (ref 1.8–7.7)
NEUTROPHILS # BLD AUTO: 7.9 K/UL (ref 1.8–7.7)
NEUTROPHILS # BLD AUTO: 8.4 K/UL (ref 1.8–7.7)
NEUTROPHILS NFR BLD: 81.9 % (ref 38–73)
NEUTROPHILS NFR BLD: 81.9 % (ref 38–73)
NEUTROPHILS NFR BLD: 82 % (ref 38–73)
NEUTROPHILS NFR BLD: 82 % (ref 38–73)
NEUTROPHILS NFR BLD: 84.4 % (ref 38–73)
NRBC BLD-RTO: 1 /100 WBC
NUM UNITS TRANS PACKED RBC: NORMAL
PCO2 BLDA: 39.5 MMHG (ref 35–45)
PEEP: 5
PH SMN: 7.38 [PH] (ref 7.35–7.45)
PHOSPHATE SERPL-MCNC: 4.1 MG/DL (ref 2.7–4.5)
PHOSPHATE SERPL-MCNC: 4.8 MG/DL (ref 2.7–4.5)
PHOSPHATE SERPL-MCNC: 4.8 MG/DL (ref 2.7–4.5)
PHOSPHATE SERPL-MCNC: 6.3 MG/DL (ref 2.7–4.5)
PHOSPHATE SERPL-MCNC: 6.3 MG/DL (ref 2.7–4.5)
PLATELET # BLD AUTO: 40 K/UL (ref 150–350)
PLATELET # BLD AUTO: 40 K/UL (ref 150–350)
PLATELET # BLD AUTO: 53 K/UL (ref 150–350)
PLATELET # BLD AUTO: 53 K/UL (ref 150–350)
PLATELET # BLD AUTO: 55 K/UL (ref 150–350)
PMV BLD AUTO: 12.1 FL (ref 9.2–12.9)
PMV BLD AUTO: 12.3 FL (ref 9.2–12.9)
PO2 BLDA: 41 MMHG (ref 40–60)
POC BE: -1 MMOL/L
POC SATURATED O2: 75 % (ref 95–100)
POC TCO2: 25 MMOL/L (ref 24–29)
POCT GLUCOSE: 123 MG/DL (ref 70–110)
POCT GLUCOSE: 144 MG/DL (ref 70–110)
POCT GLUCOSE: 146 MG/DL (ref 70–110)
POCT GLUCOSE: 146 MG/DL (ref 70–110)
POCT GLUCOSE: 148 MG/DL (ref 70–110)
POCT GLUCOSE: 150 MG/DL (ref 70–110)
POCT GLUCOSE: 151 MG/DL (ref 70–110)
POCT GLUCOSE: 158 MG/DL (ref 70–110)
POCT GLUCOSE: 158 MG/DL (ref 70–110)
POCT GLUCOSE: 159 MG/DL (ref 70–110)
POCT GLUCOSE: 159 MG/DL (ref 70–110)
POCT GLUCOSE: 161 MG/DL (ref 70–110)
POCT GLUCOSE: 162 MG/DL (ref 70–110)
POCT GLUCOSE: 163 MG/DL (ref 70–110)
POCT GLUCOSE: 168 MG/DL (ref 70–110)
POCT GLUCOSE: 168 MG/DL (ref 70–110)
POCT GLUCOSE: 169 MG/DL (ref 70–110)
POCT GLUCOSE: 171 MG/DL (ref 70–110)
POTASSIUM SERPL-SCNC: 4.5 MMOL/L (ref 3.5–5.1)
POTASSIUM SERPL-SCNC: 4.6 MMOL/L (ref 3.5–5.1)
POTASSIUM SERPL-SCNC: 4.6 MMOL/L (ref 3.5–5.1)
PROT SERPL-MCNC: 4.4 G/DL (ref 6–8.4)
PROTHROMBIN TIME: 10.9 SEC (ref 9–12.5)
PROTHROMBIN TIME: 11 SEC (ref 9–12.5)
PROTHROMBIN TIME: 11 SEC (ref 9–12.5)
RBC # BLD AUTO: 2.85 M/UL (ref 4.6–6.2)
RBC # BLD AUTO: 2.85 M/UL (ref 4.6–6.2)
RBC # BLD AUTO: 3.03 M/UL (ref 4.6–6.2)
RBC # BLD AUTO: 3.03 M/UL (ref 4.6–6.2)
RBC # BLD AUTO: 3.13 M/UL (ref 4.6–6.2)
SAMPLE: ABNORMAL
SITE: ABNORMAL
SODIUM SERPL-SCNC: 137 MMOL/L (ref 136–145)
SODIUM SERPL-SCNC: 138 MMOL/L (ref 136–145)
SODIUM SERPL-SCNC: 139 MMOL/L (ref 136–145)
SODIUM SERPL-SCNC: 139 MMOL/L (ref 136–145)
SODIUM SERPL-SCNC: 140 MMOL/L (ref 136–145)
TACROLIMUS BLD-MCNC: 10.2 NG/ML (ref 5–15)
VT: 450
WBC # BLD AUTO: 8.19 K/UL (ref 3.9–12.7)
WBC # BLD AUTO: 8.19 K/UL (ref 3.9–12.7)
WBC # BLD AUTO: 9.67 K/UL (ref 3.9–12.7)
WBC # BLD AUTO: 9.67 K/UL (ref 3.9–12.7)
WBC # BLD AUTO: 9.96 K/UL (ref 3.9–12.7)

## 2020-10-17 PROCEDURE — 84450 TRANSFERASE (AST) (SGOT): CPT | Mod: 91

## 2020-10-17 PROCEDURE — 90945 DIALYSIS ONE EVALUATION: CPT

## 2020-10-17 PROCEDURE — 99232 SBSQ HOSP IP/OBS MODERATE 35: CPT | Mod: ,,, | Performed by: INTERNAL MEDICINE

## 2020-10-17 PROCEDURE — 25000003 PHARM REV CODE 250: Performed by: SURGERY

## 2020-10-17 PROCEDURE — 27000221 HC OXYGEN, UP TO 24 HOURS

## 2020-10-17 PROCEDURE — 86850 RBC ANTIBODY SCREEN: CPT

## 2020-10-17 PROCEDURE — 99232 PR SUBSEQUENT HOSPITAL CARE,LEVL II: ICD-10-PCS | Mod: ,,, | Performed by: INTERNAL MEDICINE

## 2020-10-17 PROCEDURE — 85730 THROMBOPLASTIN TIME PARTIAL: CPT

## 2020-10-17 PROCEDURE — 94003 VENT MGMT INPAT SUBQ DAY: CPT

## 2020-10-17 PROCEDURE — 80069 RENAL FUNCTION PANEL: CPT | Mod: 91

## 2020-10-17 PROCEDURE — 94010 BREATHING CAPACITY TEST: CPT

## 2020-10-17 PROCEDURE — 85610 PROTHROMBIN TIME: CPT | Mod: 91

## 2020-10-17 PROCEDURE — 99232 PR SUBSEQUENT HOSPITAL CARE,LEVL II: ICD-10-PCS | Mod: ,,, | Performed by: NURSE PRACTITIONER

## 2020-10-17 PROCEDURE — 99232 SBSQ HOSP IP/OBS MODERATE 35: CPT | Mod: ,,, | Performed by: NURSE PRACTITIONER

## 2020-10-17 PROCEDURE — 82248 BILIRUBIN DIRECT: CPT

## 2020-10-17 PROCEDURE — 84450 TRANSFERASE (AST) (SGOT): CPT

## 2020-10-17 PROCEDURE — 85025 COMPLETE CBC W/AUTO DIFF WBC: CPT | Mod: 91

## 2020-10-17 PROCEDURE — 80100008 HC CRRT DAILY MAINTENANCE

## 2020-10-17 PROCEDURE — 85610 PROTHROMBIN TIME: CPT

## 2020-10-17 PROCEDURE — 63600175 PHARM REV CODE 636 W HCPCS: Performed by: SURGERY

## 2020-10-17 PROCEDURE — 63600175 PHARM REV CODE 636 W HCPCS: Performed by: NURSE PRACTITIONER

## 2020-10-17 PROCEDURE — 80053 COMPREHEN METABOLIC PANEL: CPT

## 2020-10-17 PROCEDURE — 80197 ASSAY OF TACROLIMUS: CPT

## 2020-10-17 PROCEDURE — 99900017 HC EXTUBATION W/PARAMETERS (STAT)

## 2020-10-17 PROCEDURE — 86880 COOMBS TEST DIRECT: CPT

## 2020-10-17 PROCEDURE — 86860 RBC ANTIBODY ELUTION: CPT

## 2020-10-17 PROCEDURE — 85520 HEPARIN ASSAY: CPT

## 2020-10-17 PROCEDURE — 83735 ASSAY OF MAGNESIUM: CPT | Mod: 91

## 2020-10-17 PROCEDURE — 99900026 HC AIRWAY MAINTENANCE (STAT)

## 2020-10-17 PROCEDURE — 99291 PR CRITICAL CARE, E/M 30-74 MINUTES: ICD-10-PCS | Mod: ,,, | Performed by: ANESTHESIOLOGY

## 2020-10-17 PROCEDURE — C9113 INJ PANTOPRAZOLE SODIUM, VIA: HCPCS | Performed by: NURSE PRACTITIONER

## 2020-10-17 PROCEDURE — 94761 N-INVAS EAR/PLS OXIMETRY MLT: CPT

## 2020-10-17 PROCEDURE — 25000003 PHARM REV CODE 250: Performed by: NURSE PRACTITIONER

## 2020-10-17 PROCEDURE — 85520 HEPARIN ASSAY: CPT | Mod: 91

## 2020-10-17 PROCEDURE — 25000003 PHARM REV CODE 250: Performed by: HOSPITALIST

## 2020-10-17 PROCEDURE — 99291 CRITICAL CARE FIRST HOUR: CPT | Mod: ,,, | Performed by: ANESTHESIOLOGY

## 2020-10-17 PROCEDURE — 20000000 HC ICU ROOM

## 2020-10-17 PROCEDURE — 94150 VITAL CAPACITY TEST: CPT

## 2020-10-17 PROCEDURE — 86870 RBC ANTIBODY IDENTIFICATION: CPT

## 2020-10-17 PROCEDURE — 86922 COMPATIBILITY TEST ANTIGLOB: CPT

## 2020-10-17 PROCEDURE — 99900035 HC TECH TIME PER 15 MIN (STAT)

## 2020-10-17 PROCEDURE — 63600175 PHARM REV CODE 636 W HCPCS: Performed by: STUDENT IN AN ORGANIZED HEALTH CARE EDUCATION/TRAINING PROGRAM

## 2020-10-17 PROCEDURE — 83735 ASSAY OF MAGNESIUM: CPT

## 2020-10-17 PROCEDURE — 82803 BLOOD GASES ANY COMBINATION: CPT

## 2020-10-17 RX ORDER — GLUCAGON 1 MG
1 KIT INJECTION
Status: DISCONTINUED | OUTPATIENT
Start: 2020-10-17 | End: 2020-10-19

## 2020-10-17 RX ORDER — INSULIN ASPART 100 [IU]/ML
0-5 INJECTION, SOLUTION INTRAVENOUS; SUBCUTANEOUS
Status: DISCONTINUED | OUTPATIENT
Start: 2020-10-17 | End: 2020-10-19

## 2020-10-17 RX ORDER — IBUPROFEN 200 MG
24 TABLET ORAL
Status: DISCONTINUED | OUTPATIENT
Start: 2020-10-17 | End: 2020-10-19

## 2020-10-17 RX ORDER — IBUPROFEN 200 MG
16 TABLET ORAL
Status: DISCONTINUED | OUTPATIENT
Start: 2020-10-17 | End: 2020-10-19

## 2020-10-17 RX ORDER — HYDROCODONE BITARTRATE AND ACETAMINOPHEN 500; 5 MG/1; MG/1
TABLET ORAL CONTINUOUS
Status: ACTIVE | OUTPATIENT
Start: 2020-10-17 | End: 2020-10-18

## 2020-10-17 RX ORDER — MAGNESIUM SULFATE HEPTAHYDRATE 40 MG/ML
2 INJECTION, SOLUTION INTRAVENOUS
Status: ACTIVE | OUTPATIENT
Start: 2020-10-17 | End: 2020-10-18

## 2020-10-17 RX ADMIN — SODIUM CHLORIDE: 0.9 INJECTION, SOLUTION INTRAVENOUS at 03:10

## 2020-10-17 RX ADMIN — TACROLIMUS 1 MG: 1 CAPSULE, GELATIN COATED ORAL at 08:10

## 2020-10-17 RX ADMIN — MUPIROCIN 1 G: 20 OINTMENT TOPICAL at 08:10

## 2020-10-17 RX ADMIN — PROPOFOL 50 MCG/KG/MIN: 10 INJECTION, EMULSION INTRAVENOUS at 12:10

## 2020-10-17 RX ADMIN — HYDROMORPHONE HYDROCHLORIDE 0.5 MG: 1 INJECTION, SOLUTION INTRAMUSCULAR; INTRAVENOUS; SUBCUTANEOUS at 03:10

## 2020-10-17 RX ADMIN — FLUCONAZOLE 200 MG: 2 INJECTION, SOLUTION INTRAVENOUS at 08:10

## 2020-10-17 RX ADMIN — MYCOPHENOLATE MOFETIL 1000 MG: 200 POWDER, FOR SUSPENSION ORAL at 09:10

## 2020-10-17 RX ADMIN — HEPARIN SODIUM AND DEXTROSE 5.3 UNITS/KG/HR: 10000; 5 INJECTION INTRAVENOUS at 09:10

## 2020-10-17 RX ADMIN — FOLIC ACID 1 MG: 1 TABLET ORAL at 08:10

## 2020-10-17 RX ADMIN — Medication 100 MG: at 08:10

## 2020-10-17 RX ADMIN — SODIUM CHLORIDE 0.6 UNITS/HR: 9 INJECTION, SOLUTION INTRAVENOUS at 03:10

## 2020-10-17 RX ADMIN — SODIUM CHLORIDE: 0.9 INJECTION, SOLUTION INTRAVENOUS at 04:10

## 2020-10-17 RX ADMIN — PROPOFOL 50 MCG/KG/MIN: 10 INJECTION, EMULSION INTRAVENOUS at 03:10

## 2020-10-17 RX ADMIN — METHYLPREDNISOLONE SODIUM SUCCINATE 60 MG: 125 INJECTION, POWDER, FOR SOLUTION INTRAMUSCULAR; INTRAVENOUS at 08:10

## 2020-10-17 RX ADMIN — PROPOFOL 50 MCG/KG/MIN: 10 INJECTION, EMULSION INTRAVENOUS at 06:10

## 2020-10-17 RX ADMIN — PANTOPRAZOLE SODIUM 40 MG: 40 INJECTION, POWDER, LYOPHILIZED, FOR SOLUTION INTRAVENOUS at 08:10

## 2020-10-17 RX ADMIN — HYDROMORPHONE HYDROCHLORIDE 0.5 MG: 1 INJECTION, SOLUTION INTRAMUSCULAR; INTRAVENOUS; SUBCUTANEOUS at 05:10

## 2020-10-17 RX ADMIN — HYDROMORPHONE HYDROCHLORIDE 0.5 MG: 1 INJECTION, SOLUTION INTRAMUSCULAR; INTRAVENOUS; SUBCUTANEOUS at 08:10

## 2020-10-17 NOTE — PROGRESS NOTES
"[facility]  Nephrology  Progress Note    Patient Name: Jordan Altman  MRN: [id number]  Admission Date: 10/11/2020  Hospital Length of Stay: 6 days  Attending Provider: Moon Moreno MD   Primary Care Physician: SHAHEEN Jaime  Principal Problem:Status post liver transplant    Subjective:     HPI: Vadlo Altman is a 44 yo M with a medical history including HTN, SBP, hepatic cirrhosis and ESLD secondary to ETOH use. He is currently listed for a liver transplant. He presented to [facility] on 10/11/2020 with complaints of abdominal distention and black, tarry stools x 1 day. Occult stool test (+) in [facility] Emergency room. Transferred to [facility] under liver transplant team for further evaluation. Pt s/p orthotopic liver transplant (whole liver, DBD donor) on 10/14/2020, requiring intraoperative SLED. 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 Underwent ex lap w portal vein thrombectomy, revision of hepatic artery and placement of abd wound vac on 10/15..." Pt with minimal UOP since transplant; received high-dose IV diuretics (120mg lasix and 500mg diuril) on 10/15, with minimal UOP. Pt with a rough baseline sCr of 1.2-1.4. Nephrology consulted for JENN and possible need for RRT.     -HPI was obtained from chart review as pt is currently intubated.     Interval History: Patient seen and examined at bedside, no acute events overnight. Still anuric and positive ~600cc.     Review of patient's allergies indicates:   Allergen Reactions    Latex, natural rubber      Current Facility-Administered Medications   Medication Frequency    0.9%  NaCl infusion (CRRT USE ONLY) Continuous    0.9%  NaCl infusion (CRRT USE ONLY) Continuous    0.9%  NaCl infusion (for blood administration) " Q24H PRN    acetaminophen tablet 650 mg Q8H PRN    dextrose 50% injection 12.5 g PRN    dextrose 50% injection 25 g PRN    fluconazole (DIFLUCAN) IVPB 200 mg 100 mL Q24H    folic acid tablet 1 mg Daily    glucagon (human recombinant) injection 1 mg PRN    glucose chewable tablet 16 g PRN    glucose chewable tablet 24 g PRN    heparin 25,000 units in dextrose 5% (100 units/ml) IV bolus from bag - ADDITIONAL PRN BOLUS - 30 units/kg PRN    heparin 25,000 units in dextrose 5% (100 units/ml) IV bolus from bag - ADDITIONAL PRN BOLUS - 60 units/kg PRN    heparin 25,000 units in dextrose 5% 250 mL (100 units/mL) infusion LOW INTENSITY nomogram Continuous    HYDROmorphone injection 0.5 mg Q1H PRN    insulin aspart U-100 pen 0-5 Units PRN    insulin regular 100 Units in sodium chloride 0.9% 100 mL infusion Continuous    magnesium sulfate 2g in water 50mL IVPB (premix) PRN    magnesium sulfate 2g in water 50mL IVPB (premix) PRN    melatonin tablet 6 mg Nightly PRN    methylPREDNISolone sodium succinate injection 60 mg Q12H    Followed by    [START ON 10/18/2020] methylPREDNISolone sodium succinate injection 40 mg Q12H    Followed by    [START ON 10/19/2020] methylPREDNISolone sodium succinate injection 20 mg Q12H    Followed by    [START ON 10/20/2020] predniSONE tablet 20 mg Daily    mupirocin 2 % ointment 1 g BID    mycophenolate mofetil 200 mg/mL suspension 1,000 mg BID    norepinephrine 4 mg in dextrose 5% 250 mL infusion (premix) (titrating) Continuous    ondansetron injection 4 mg Q6H PRN    pantoprazole injection 40 mg BID    propofol (DIPRIVAN) 10 mg/mL infusion Continuous    sodium chloride 0.9% flush 10 mL PRN    sodium phosphate 20.01 mmol in dextrose 5 % 250 mL IVPB PRN    sodium phosphate 20.01 mmol in dextrose 5 % 250 mL IVPB PRN    sodium phosphate 30 mmol in dextrose 5 % 250 mL IVPB PRN    sodium phosphate 30 mmol in dextrose 5 % 250 mL IVPB PRN    sodium phosphate 39.99 mmol in  dextrose 5 % 250 mL IVPB PRN    sodium phosphate 39.99 mmol in dextrose 5 % 250 mL IVPB PRN    [START ON 10/21/2020] sulfamethoxazole-trimethoprim 400-80mg per tablet 1 tablet Daily AM    [START ON 10/18/2020] tacrolimus (PROGRAF) 1 mg/mL oral syringe BID    thiamine tablet 100 mg Daily    [START ON 10/24/2020] valGANciclovir tablet 450 mg Daily       Objective:     Vital Signs (Most Recent):  Temp: 98 °F (36.7 °C) (10/17/20 1000)  Pulse: 86 (10/17/20 1000)  Resp: 16 (10/17/20 1000)  BP: 116/70 (10/17/20 1000)  SpO2: 100 % (10/17/20 1000)  O2 Device (Oxygen Therapy): ventilator (10/17/20 0737) Vital Signs (24h Range):  Temp:  [96.8 °F (36 °C)-98.1 °F (36.7 °C)] 98 °F (36.7 °C)  Pulse:  [80-90] 86  Resp:  [14-37] 16  SpO2:  [96 %-100 %] 100 %  BP: (105-116)/(60-70) 116/70  Arterial Line BP: ()/(54-75) 138/74     Weight: 120 kg (264 lb 8.8 oz) (10/17/20 0441)  Body mass index is 35.88 kg/m².  Body surface area is 2.47 meters squared.    I/O last 3 completed shifts:  In: 8147 [I.V.:6099; Blood:1788; Other:160; IV Piggyback:100]  Out: 8480 [Urine:81; Drains:975; Other:7424]    Physical Exam  Constitutional:       Appearance: He is ill-appearing.   HENT:      Head: Normocephalic and atraumatic.      Nose: Nose normal.      Mouth/Throat:      Mouth: Mucous membranes are moist.      Pharynx: Oropharynx is clear.   Eyes:      Conjunctiva/sclera: Conjunctivae normal.      Pupils: Pupils are equal, round, and reactive to light.   Cardiovascular:      Rate and Rhythm: Normal rate.   Pulmonary:      Breath sounds: Examination of the right-lower field reveals decreased breath sounds. Examination of the left-lower field reveals decreased breath sounds. Decreased breath sounds and rales present.      Comments: Intubated and mechanically ventilated   Abdominal:      Palpations: Abdomen is soft.      Comments: Midline wound vac to abdomen with serosanguinous drainage in canister    Musculoskeletal:      Right lower leg:  Edema present.      Left lower leg: Edema present.   Skin:     General: Skin is warm and dry.      Coloration: Skin is jaundiced.   Neurological:      Comments: Sedated          Significant Labs:  CBC:   Recent Labs   Lab 10/17/20  0439   WBC 9.67  9.67   RBC 3.03*  3.03*   HGB 9.4*  9.4*   HCT 27.6*  27.6*   PLT 53*  53*   MCV 91  91   MCH 31.0  31.0   MCHC 34.1  34.1     CMP:   Recent Labs   Lab 10/17/20  0439   *  152*   CALCIUM 7.6*  7.6*   ALBUMIN 2.6*  2.5*   PROT 4.4*     138   K 4.5  4.5   CO2 24  24     104   BUN 17  17   CREATININE 1.7*  1.7*   ALKPHOS 61   *   *  194*   BILITOT 3.1*     Coagulation:   Recent Labs   Lab 10/17/20  0439   INR 1.0  1.0   APTT 27.9     LFTs:   Recent Labs   Lab 10/17/20  0439   *   *  194*   ALKPHOS 61   BILITOT 3.1*   PROT 4.4*   ALBUMIN 2.6*  2.5*     All labs within the past 24 hours have been reviewed.     Significant Imaging:  Labs: Reviewed    Assessment/Plan:     * Status post liver transplant  -Management per primary team     JENN (acute kidney injury)    JENN likely 2/2 iATN from intraoperative hypotension and acute blood loss anemia     Plan/Recommendations:     -12hr SLED for volume management and metabolic clearance, UFG of 400-450cc/hr as tolerated   -Strict I/O's  -Labs per CRRT protocol   -Favor MAP >65  -Maintain Hgb >7.0  -Renally dose meds and avoid nephrotoxic medications   -Will continue to follow closely   -Plan discussed with staff, Dr Silva        Thank you for your consult. I will follow-up with patient. Please contact us if you have any additional questions.    Gonzalez Parsons MD  Nephrology  [facility]

## 2020-10-17 NOTE — ASSESSMENT & PLAN NOTE
  Neuro/Psych:   -- Sedation: Propofol   -- Pain: Dilaudid 0.5 mg PRN q1     Cards:   -- HDS  -- Maintain SBP > 100  -- Pressors as needed, on none this morning      Pulm:   -- Goal O2 sat > 90%  -- wean to extubate after liver U/S      Renal:  -- Keep bourne for strict I/O  -- Minimal urine output   -- BUN/Cr pending this morning  -- Nephrology consulted; will likely need ongoing dialysis      FEN / GI:   -- Replace lytes as needed  -- Nutrition: NPO   -- Protonix BID 2/2 recent GIB  - concentrate gtts when possible      ID:   -- Tm: afebrile; WBC 6.6  -- Abx  Zosyn, fluconazole, Bactrim, valganciclovir      Heme/Onc:   -- H/H stable  - afternoon CBC 10/17 f/u  -- Continue heparin gtt for thrombus  -- Follow up HIT lab      Endo:   -- Gluc goal 140-180  -- Tacrolimus and prednisone taper       PPx:   Feeding: NPO  Analgesia/Sedation: Dilaudid pushes / propofol  Thromboembolic prevention: Heparin gtt  HOB >30: Yes  Stress Ulcer ppx: Protonix BID  Glucose control: Critical care goal 140-180 g/dl, ISS

## 2020-10-17 NOTE — PLAN OF CARE
Pt remains intubated and sedated.  Follows commands during sedation vacation.  CRRT x12h run without difficulty.  VSS, levophed titrated to maintain SBP) 100.  No UOP noted.  ADAM output as charted.  Insulin gtt titrated per nomogram with q1h accuchecks.  No family at bedside of via telephone to discuss plan of care.  Unable to determine pt's comprehension of education or plan of care.

## 2020-10-17 NOTE — PROGRESS NOTES
12 hour SLED treatment complete. Blood rinsed back without resistance. Right IJ trialysis flushed with normal saline and saline locked. See flow sheet for details.

## 2020-10-17 NOTE — SUBJECTIVE & OBJECTIVE
Interval History: Patient seen and examined at bedside, no acute events overnight. Still anuric and positive ~600cc.     Review of patient's allergies indicates:   Allergen Reactions    Latex, natural rubber      Current Facility-Administered Medications   Medication Frequency    0.9%  NaCl infusion (CRRT USE ONLY) Continuous    0.9%  NaCl infusion (CRRT USE ONLY) Continuous    0.9%  NaCl infusion (for blood administration) Q24H PRN    acetaminophen tablet 650 mg Q8H PRN    dextrose 50% injection 12.5 g PRN    dextrose 50% injection 25 g PRN    fluconazole (DIFLUCAN) IVPB 200 mg 100 mL Q24H    folic acid tablet 1 mg Daily    glucagon (human recombinant) injection 1 mg PRN    glucose chewable tablet 16 g PRN    glucose chewable tablet 24 g PRN    heparin 25,000 units in dextrose 5% (100 units/ml) IV bolus from bag - ADDITIONAL PRN BOLUS - 30 units/kg PRN    heparin 25,000 units in dextrose 5% (100 units/ml) IV bolus from bag - ADDITIONAL PRN BOLUS - 60 units/kg PRN    heparin 25,000 units in dextrose 5% 250 mL (100 units/mL) infusion LOW INTENSITY nomogram Continuous    HYDROmorphone injection 0.5 mg Q1H PRN    insulin aspart U-100 pen 0-5 Units PRN    insulin regular 100 Units in sodium chloride 0.9% 100 mL infusion Continuous    magnesium sulfate 2g in water 50mL IVPB (premix) PRN    magnesium sulfate 2g in water 50mL IVPB (premix) PRN    melatonin tablet 6 mg Nightly PRN    methylPREDNISolone sodium succinate injection 60 mg Q12H    Followed by    [START ON 10/18/2020] methylPREDNISolone sodium succinate injection 40 mg Q12H    Followed by    [START ON 10/19/2020] methylPREDNISolone sodium succinate injection 20 mg Q12H    Followed by    [START ON 10/20/2020] predniSONE tablet 20 mg Daily    mupirocin 2 % ointment 1 g BID    mycophenolate mofetil 200 mg/mL suspension 1,000 mg BID    norepinephrine 4 mg in dextrose 5% 250 mL infusion (premix) (titrating) Continuous    ondansetron  injection 4 mg Q6H PRN    pantoprazole injection 40 mg BID    propofol (DIPRIVAN) 10 mg/mL infusion Continuous    sodium chloride 0.9% flush 10 mL PRN    sodium phosphate 20.01 mmol in dextrose 5 % 250 mL IVPB PRN    sodium phosphate 20.01 mmol in dextrose 5 % 250 mL IVPB PRN    sodium phosphate 30 mmol in dextrose 5 % 250 mL IVPB PRN    sodium phosphate 30 mmol in dextrose 5 % 250 mL IVPB PRN    sodium phosphate 39.99 mmol in dextrose 5 % 250 mL IVPB PRN    sodium phosphate 39.99 mmol in dextrose 5 % 250 mL IVPB PRN    [START ON 10/21/2020] sulfamethoxazole-trimethoprim 400-80mg per tablet 1 tablet Daily AM    [START ON 10/18/2020] tacrolimus (PROGRAF) 1 mg/mL oral syringe BID    thiamine tablet 100 mg Daily    [START ON 10/24/2020] valGANciclovir tablet 450 mg Daily       Objective:     Vital Signs (Most Recent):  Temp: 98 °F (36.7 °C) (10/17/20 1000)  Pulse: 86 (10/17/20 1000)  Resp: 16 (10/17/20 1000)  BP: 116/70 (10/17/20 1000)  SpO2: 100 % (10/17/20 1000)  O2 Device (Oxygen Therapy): ventilator (10/17/20 0737) Vital Signs (24h Range):  Temp:  [96.8 °F (36 °C)-98.1 °F (36.7 °C)] 98 °F (36.7 °C)  Pulse:  [80-90] 86  Resp:  [14-37] 16  SpO2:  [96 %-100 %] 100 %  BP: (105-116)/(60-70) 116/70  Arterial Line BP: ()/(54-75) 138/74     Weight: 120 kg (264 lb 8.8 oz) (10/17/20 0441)  Body mass index is 35.88 kg/m².  Body surface area is 2.47 meters squared.    I/O last 3 completed shifts:  In: 8147 [I.V.:6099; Blood:1788; Other:160; IV Piggyback:100]  Out: 8480 [Urine:81; Drains:975; Other:7124]    Physical Exam  Constitutional:       Appearance: He is ill-appearing.   HENT:      Head: Normocephalic and atraumatic.      Nose: Nose normal.      Mouth/Throat:      Mouth: Mucous membranes are moist.      Pharynx: Oropharynx is clear.   Eyes:      Conjunctiva/sclera: Conjunctivae normal.      Pupils: Pupils are equal, round, and reactive to light.   Cardiovascular:      Rate and Rhythm: Normal rate.    Pulmonary:      Breath sounds: Examination of the right-lower field reveals decreased breath sounds. Examination of the left-lower field reveals decreased breath sounds. Decreased breath sounds and rales present.      Comments: Intubated and mechanically ventilated   Abdominal:      Palpations: Abdomen is soft.      Comments: Midline wound vac to abdomen with serosanguinous drainage in canister    Musculoskeletal:      Right lower leg: Edema present.      Left lower leg: Edema present.   Skin:     General: Skin is warm and dry.      Coloration: Skin is jaundiced.   Neurological:      Comments: Sedated          Significant Labs:  CBC:   Recent Labs   Lab 10/17/20  0439   WBC 9.67  9.67   RBC 3.03*  3.03*   HGB 9.4*  9.4*   HCT 27.6*  27.6*   PLT 53*  53*   MCV 91  91   MCH 31.0  31.0   MCHC 34.1  34.1     CMP:   Recent Labs   Lab 10/17/20  0439   *  152*   CALCIUM 7.6*  7.6*   ALBUMIN 2.6*  2.5*   PROT 4.4*     138   K 4.5  4.5   CO2 24  24     104   BUN 17  17   CREATININE 1.7*  1.7*   ALKPHOS 61   *   *  194*   BILITOT 3.1*     Coagulation:   Recent Labs   Lab 10/17/20  0439   INR 1.0  1.0   APTT 27.9     LFTs:   Recent Labs   Lab 10/17/20  0439   *   *  194*   ALKPHOS 61   BILITOT 3.1*   PROT 4.4*   ALBUMIN 2.6*  2.5*     All labs within the past 24 hours have been reviewed.     Significant Imaging:  Labs: Reviewed

## 2020-10-17 NOTE — NURSING
Discussed labs with Dr. Potter. Will adjust heparin gtt according to nomogram. Will continue to monitor.

## 2020-10-17 NOTE — PLAN OF CARE
SICU PLAN OF CARE NOTE    Dx: Status post liver transplant    Shift Events: Extubated, liver u/s    Goals of Care: comfort, increase physical activity, advance diet    Neuro: alert, follows commands    Vital Signs: /80 (BP Location: Right arm, Patient Position: Lying)   Pulse 93   Temp 98.6 °F (37 °C) (Oral)   Resp 16   Ht 6' (1.829 m)   Wt 120 kg (264 lb 8.8 oz)   SpO2 (!) 88%   BMI 35.88 kg/m²     Respiratory: room air    Diet: sips of clears    Gtts: Insulin, heparin    Urine Output: scant    Drains: ADAM drains x2 with minimal output    Labs/Accuchecks: accuchecks q4h, anti XA q6h    Skin: pale, multiple tatoos, incision with staples, dressing intact, wound to gluteal fold---applying triad cream twice daily.

## 2020-10-17 NOTE — PROGRESS NOTES
[facility]  Critical Care - Surgery  Progress Note    Patient Name: Jordan Altman  MRN: [id number]  Admission Date: 10/11/2020  Hospital Length of Stay: 6 days  Code Status: Full Code  Attending Provider: Moon Moreno MD  Primary Care Provider: SHAHEEN Jaime   Principal Problem: Status post liver transplant    Subjective:     Hospital/ICU Course:  No notes on file    Follow-up For: Procedure(s) (LRB):  LAPAROTOMY, EXPLORATORY, BILIARY ANASTOMOSIS, ABDOMINAL CLOSURE (N/A)    Post-Operative Day: 1 Day Post-Op    Objective:     Vital Signs (Most Recent):  Temp: 97.9 °F (36.6 °C) (10/17/20 0300)  Pulse: 83 (10/17/20 0745)  Resp: 20 (10/17/20 0745)  BP: 109/69 (10/16/20 1915)  SpO2: 100 % (10/17/20 0745) Vital Signs (24h Range):  Temp:  [96.8 °F (36 °C)-98.1 °F (36.7 °C)] 97.9 °F (36.6 °C)  Pulse:  [78-88] 83  Resp:  [20-37] 20  SpO2:  [96 %-100 %] 100 %  BP: (103-109)/(63-69) 109/69  Arterial Line BP: ()/(54-68) 115/63     Weight: 120 kg (264 lb 8.8 oz)  Body mass index is 35.88 kg/m².      Intake/Output Summary (Last 24 hours) at 10/17/2020 0907  Last data filed at 10/17/2020 0800  Gross per 24 hour   Intake 6547.71 ml   Output 5994 ml   Net 553.71 ml       Physical Exam    Vents:  Vent Mode: A/C (10/17/20 0737)  Ventilator Initiated: Yes (10/14/20 1357)  Set Rate: 20 BPM (10/17/20 0737)  Vt Set: 450 mL (10/17/20 0737)  Pressure Support: 10 cmH20 (10/14/20 7133)  PEEP/CPAP: 5 cmH20 (10/17/20 0737)  Oxygen Concentration (%): 40 (10/17/20 0737)  Peak Airway Pressure: 16 cmH2O (10/17/20 0737)  Plateau Pressure: 0 cmH20 (10/14/20 2343)  Total Ve: 9 mL (10/17/20 0737)  Negative Inspiratory Force (cm H2O): -19 (10/14/20 2343)  F/VT Ratio<105 (RSBI): (!) 44.44 (10/17/20 0737)    Lines/Drains/Airways     Central Venous Catheter Line            Introducer 10/14/20 0530 right internal jugular 3 days    Percutaneous Central Line Insertion/Assessment - Double Lumen  10/14/20 0530 right  internal jugular 3 days    Percutaneous Central Line Insertion/Assessment - Triple Lumen  10/14/20 0530 3 days    Pulmonary Artery Catheter Assessment  10/14/20 0530 3 days          Drain                 Urethral Catheter 10/14/20 0535 Straight-tip;Non-latex 16 Fr. 3 days         Closed/Suction Drain 10/14/20 1314 Lateral;Right Abdomen Bulb 19 Fr. 2 days         Closed/Suction Drain 10/14/20 1316 Right;Medial Abdomen Bulb 19 Fr. 2 days         NG/OG Tube 10/15/20 1300 Center mouth 1 day          Airway                 Airway - Non-Surgical 10/15/20 0936 1 day          Arterial Line            Arterial Line 10/14/20 0501 Left Radial 3 days          Peripheral Intravenous Line                 Peripheral IV - Single Lumen 10/14/20 0535 18 G Right Hand 3 days         LEONEL 10/14/20 0516 Left Antecubital 3 days                Significant Labs:    CBC/Anemia Profile:  Recent Labs   Lab 10/16/20  2102 10/17/20  0104 10/17/20  0439   WBC 11.31 9.96 9.67  9.67   HGB 9.5* 9.4* 9.4*  9.4*   HCT 29.0* 28.1* 27.6*  27.6*   PLT 59* 55* 53*  53*   MCV 90 90 91  91   RDW 18.1* 17.9* 18.6*  18.6*        Chemistries:  Recent Labs   Lab 10/16/20  0940 10/16/20  1338  10/16/20  2100 10/17/20  0104 10/17/20  0439   * 136  --  137  --  137  138   K 4.1 4.2  --  4.4  --  4.5  4.5    103  --  103  --  104  104   CO2 22* 22*  --  25  --  24  24   BUN 44* 43*  --  24*  --  17  17   CREATININE 3.2* 3.2*  --  2.0*  --  1.7*  1.7*   CALCIUM 7.6* 7.0*  --  7.7*  --  7.6*  7.6*   ALBUMIN 2.2* 1.9*  --  2.8*  --  2.6*  2.5*   PROT 3.6* 3.4*  --   --   --  4.4*   BILITOT 2.9* 2.4*  --   --   --  3.1*   ALKPHOS 54* 53*  --   --   --  61   ALT 1,039* 899*  --   --   --  773*   *  430* 398*  398*   < > 295* 240* 194*  194*   MG  --  2.1  --  2.0  --  1.9  1.9   PHOS  --  8.8*  --  5.3*  --  4.8*  4.8*    < > = values in this interval not displayed.       All pertinent labs within the past 24 hours have been  reviewed.    Significant Imaging:  I have reviewed all pertinent imaging results/findings within the past 24 hours.    Assessment/Plan:     * Status post liver transplant    Neuro/Psych:   -- Sedation: Propofol   -- Pain: Dilaudid 0.5 mg PRN q1     Cards:   -- HDS  -- Maintain SBP > 100  -- Pressors as needed, on none this morning      Pulm:   -- Goal O2 sat > 90%  -- wean to extubate after liver U/S      Renal:  -- Keep bourne for strict I/O  -- Minimal urine output   -- BUN/Cr pending this morning  -- Nephrology consulted; will likely need ongoing dialysis      FEN / GI:   -- Replace lytes as needed  -- Nutrition: NPO   -- Protonix BID 2/2 recent GIB  - concentrate gtts when possible      ID:   -- Tm: afebrile; WBC 6.6  -- Abx  Zosyn, fluconazole, Bactrim, valganciclovir      Heme/Onc:   -- H/H stable  - afternoon CBC 10/17 f/u  -- Continue heparin gtt for thrombus  -- Follow up HIT lab      Endo:   -- Gluc goal 140-180  -- Tacrolimus and prednisone taper       PPx:   Feeding: NPO  Analgesia/Sedation: Dilaudid pushes / propofol  Thromboembolic prevention: Heparin gtt  HOB >30: Yes  Stress Ulcer ppx: Protonix BID  Glucose control: Critical care goal 140-180 g/dl, ISS       Critical care was time spent personally by me on the following activities: development of treatment plan with patient or surrogate and bedside caregivers, discussions with consultants, evaluation of patient's response to treatment, examination of patient, ordering and performing treatments and interventions, ordering and review of laboratory studies, ordering and review of radiographic studies, pulse oximetry, re-evaluation of patient's condition.  This critical care time did not overlap with that of any other provider or involve time for any procedures.     Leo Hunt MD  Critical Care - Surgery  [facility]

## 2020-10-17 NOTE — SUBJECTIVE & OBJECTIVE
Interval HPI:   Overnight events:  BG is now within goal with stable IV insulin infusion rates.   Diet NPO Except for: Sips with Medication  1 Day Post-Op    Eating:   NPO  Nausea: No  Hypoglycemia and intervention: No  Fever: No  TPN and/or TF: No  If yes, type of TF/TPN and rate: None    /70   Pulse 86   Temp 98 °F (36.7 °C) (Oral)   Resp 16   Ht 6' (1.829 m)   Wt 120 kg (264 lb 8.8 oz)   SpO2 100%   BMI 35.88 kg/m²     Labs Reviewed and Include    Recent Labs   Lab 10/17/20  0439   *  152*   CALCIUM 7.6*  7.6*   ALBUMIN 2.6*  2.5*   PROT 4.4*     138   K 4.5  4.5   CO2 24  24     104   BUN 17 17   CREATININE 1.7*  1.7*   ALKPHOS 61   *   *  194*   BILITOT 3.1*     Lab Results   Component Value Date    WBC 9.67 10/17/2020    WBC 9.67 10/17/2020    HGB 9.4 (L) 10/17/2020    HGB 9.4 (L) 10/17/2020    HCT 27.6 (L) 10/17/2020    HCT 27.6 (L) 10/17/2020    MCV 91 10/17/2020    MCV 91 10/17/2020    PLT 53 (L) 10/17/2020    PLT 53 (L) 10/17/2020     No results for input(s): TSH, FREET4 in the last 168 hours.  Lab Results   Component Value Date    HGBA1C <4.0 (A) 10/13/2020       Nutritional status:   Body mass index is 35.88 kg/m².  Lab Results   Component Value Date    ALBUMIN 2.6 (L) 10/17/2020    ALBUMIN 2.5 (L) 10/17/2020    ALBUMIN 2.8 (L) 10/16/2020     No results found for: PREALBUMIN    Estimated Creatinine Clearance: 71.9 mL/min (A) (based on SCr of 1.7 mg/dL (H)).    Accu-Checks  Recent Labs     10/17/20  0059 10/17/20  0206 10/17/20  0303 10/17/20  0355 10/17/20  0507 10/17/20  0611 10/17/20  0732 10/17/20  0809 10/17/20  0853 10/17/20  1011   POCTGLUCOSE 150* 151* 146* 146* 163* 161* 158* 158* 171* 168*       Current Medications and/or Treatments Impacting Glycemic Control  Immunotherapy:    Immunosuppressants         Stop Route Frequency     tacrolimus (PROGRAF) 1 mg/mL oral syringe      -- Oral 2 times daily     mycophenolate mofetil 200 mg/mL  suspension 1,000 mg      -- Oral 2 times daily        Steroids:   Hormones (From admission, onward)    Start     Stop Route Frequency Ordered    10/20/20 0900  predniSONE tablet 20 mg  (methylprednisolone taper panel)      -- Oral Daily 10/14/20 1339    10/19/20 0900  methylPREDNISolone sodium succinate injection 20 mg  (methylprednisolone taper panel)      10/20 0859 IV Every 12 hours 10/14/20 1339    10/18/20 0900  methylPREDNISolone sodium succinate injection 40 mg  (methylprednisolone taper panel)      10/19 0859 IV Every 12 hours 10/14/20 1339    10/17/20 0900  methylPREDNISolone sodium succinate injection 60 mg  (methylprednisolone taper panel)      10/18 0859 IV Every 12 hours 10/14/20 1339    10/11/20 0217  melatonin tablet 6 mg      -- Oral Nightly PRN 10/11/20 0217        Pressors:    Autonomic Drugs (From admission, onward)    Start     Stop Route Frequency Ordered    10/16/20 1800  norepinephrine 4 mg in dextrose 5% 250 mL infusion (premix) (titrating)     Question Answer Comment   Titrate by: (in mcg/kg/min) 0.01    Titrate interval: (in minutes) 5    Titrate to maintain: (MAP or SBP) SBP    Greater than: (in mmHg) 100    Maximum dose: (in mcg/kg/min) 3        -- IV Continuous 10/16/20 1657        Hyperglycemia/Diabetes Medications:   Antihyperglycemics (From admission, onward)    Start     Stop Route Frequency Ordered    10/14/20 1600  insulin regular 100 Units in sodium chloride 0.9% 100 mL infusion     Question:  Insulin Rate Adjustment (DO NOT MODIFY ANSWER)  Answer:  [email or web address]    -- IV Continuous 10/14/20 1448

## 2020-10-17 NOTE — ASSESSMENT & PLAN NOTE
BG goal 140 - 180     - Discontinue IIP  - Start  transition IV insulin infusion with stepdown parameters. Initial rate starting at 0.6 units/hr. Patient now has stable IV insulin infusion rates with adequate glycemic control.   - Low Dose SQ Insulin Correction Scale.  - BG monitoring every 4 hours while NPO/intubated.     ** Please call Endocrine for any BG related issues **  ** Please notify Endocrine for any change and/or advance in diet**    Discharge planning:   TBD. Please notify endocrinology prior to discharge.

## 2020-10-17 NOTE — PROGRESS NOTES
[facility]  Endocrinology  Progress Note    Admit Date: 10/11/2020     Reason for Consult: Management of Hyperglycemia     Surgical Procedure and Date: Liver Transplant 10/14/20    Lab Results   Component Value Date    HGBA1C <4.0 (A) 10/13/2020     HPI:   Patient is a 45 y.o. 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  Patient is now s/p liver transplant and admitted to SICU. No personal or family hx of DM per chart review. Endocrinology consulted for management of hyperglycemia.           Interval HPI:   Overnight events:  BG is now within goal with stable IV insulin infusion rates.   Diet NPO Except for: Sips with Medication  1 Day Post-Op    Eating:   NPO  Nausea: No  Hypoglycemia and intervention: No  Fever: No  TPN and/or TF: No  If yes, type of TF/TPN and rate: None    /70   Pulse 86   Temp 98 °F (36.7 °C) (Oral)   Resp 16   Ht 6' (1.829 m)   Wt 120 kg (264 lb 8.8 oz)   SpO2 100%   BMI 35.88 kg/m²     Labs Reviewed and Include    Recent Labs   Lab 10/17/20  0439   *  152*   CALCIUM 7.6*  7.6*   ALBUMIN 2.6*  2.5*   PROT 4.4*     138   K 4.5  4.5   CO2 24  24     104   BUN 17  17   CREATININE 1.7*  1.7*   ALKPHOS 61   *   *  194*   BILITOT 3.1*     Lab Results   Component Value Date    WBC 9.67 10/17/2020    WBC 9.67 10/17/2020    HGB 9.4 (L) 10/17/2020    HGB 9.4 (L) 10/17/2020    HCT 27.6 (L) 10/17/2020    HCT 27.6 (L) 10/17/2020    MCV 91 10/17/2020    MCV 91 10/17/2020    PLT 53 (L) 10/17/2020    PLT 53 (L) 10/17/2020     No results for input(s): TSH, FREET4 in the last 168 hours.  Lab Results   Component Value Date    HGBA1C <4.0 (A) 10/13/2020       Nutritional status:   Body mass index is 35.88 kg/m².  Lab Results   Component Value Date    ALBUMIN 2.6 (L) 10/17/2020    ALBUMIN 2.5 (L) 10/17/2020    ALBUMIN 2.8 (L) 10/16/2020     No results found for:  PREALBUMIN    Estimated Creatinine Clearance: 71.9 mL/min (A) (based on SCr of 1.7 mg/dL (H)).    Accu-Checks  Recent Labs     10/17/20  0059 10/17/20  0206 10/17/20  0303 10/17/20  0355 10/17/20  0507 10/17/20  0611 10/17/20  0732 10/17/20  0809 10/17/20  0853 10/17/20  1011   POCTGLUCOSE 150* 151* 146* 146* 163* 161* 158* 158* 171* 168*       Current Medications and/or Treatments Impacting Glycemic Control  Immunotherapy:    Immunosuppressants         Stop Route Frequency     tacrolimus (PROGRAF) 1 mg/mL oral syringe      -- Oral 2 times daily     mycophenolate mofetil 200 mg/mL suspension 1,000 mg      -- Oral 2 times daily        Steroids:   Hormones (From admission, onward)    Start     Stop Route Frequency Ordered    10/20/20 0900  predniSONE tablet 20 mg  (methylprednisolone taper panel)      -- Oral Daily 10/14/20 1339    10/19/20 0900  methylPREDNISolone sodium succinate injection 20 mg  (methylprednisolone taper panel)      10/20 0859 IV Every 12 hours 10/14/20 1339    10/18/20 0900  methylPREDNISolone sodium succinate injection 40 mg  (methylprednisolone taper panel)      10/19 0859 IV Every 12 hours 10/14/20 1339    10/17/20 0900  methylPREDNISolone sodium succinate injection 60 mg  (methylprednisolone taper panel)      10/18 0859 IV Every 12 hours 10/14/20 1339    10/11/20 0217  melatonin tablet 6 mg      -- Oral Nightly PRN 10/11/20 0217        Pressors:    Autonomic Drugs (From admission, onward)    Start     Stop Route Frequency Ordered    10/16/20 1800  norepinephrine 4 mg in dextrose 5% 250 mL infusion (premix) (titrating)     Question Answer Comment   Titrate by: (in mcg/kg/min) 0.01    Titrate interval: (in minutes) 5    Titrate to maintain: (MAP or SBP) SBP    Greater than: (in mmHg) 100    Maximum dose: (in mcg/kg/min) 3        -- IV Continuous 10/16/20 1657        Hyperglycemia/Diabetes Medications:   Antihyperglycemics (From admission, onward)    Start     Stop Route Frequency Ordered     10/14/20 1600  insulin regular 100 Units in sodium chloride 0.9% 100 mL infusion     Question:  Insulin Rate Adjustment (DO NOT MODIFY ANSWER)  Answer:  [email or web address]    -- IV Continuous 10/14/20 1448          ASSESSMENT and PLAN    * Status post liver transplant  Managed per primary team  Optimize BG control      Acute hyperglycemia  BG goal 140 - 180     - Discontinue IIP  - Start  transition IV insulin infusion with stepdown parameters. Initial rate starting at 0.6 units/hr. Patient now has stable IV insulin infusion rates with adequate glycemic control.   - Low Dose SQ Insulin Correction Scale.  - BG monitoring every 4 hours while NPO/intubated.     ** Please call Endocrine for any BG related issues **  ** Please notify Endocrine for any change and/or advance in diet**    Discharge planning:   TBD. Please notify endocrinology prior to discharge.           Adverse effect of corticosteroids  Glucocorticoids markedly increase glucoses. Expect steroid taper to help with glucose control.          Teja Powers, NP  Endocrinology  [facility]

## 2020-10-17 NOTE — SUBJECTIVE & OBJECTIVE
Follow-up For: Procedure(s) (LRB):  LAPAROTOMY, EXPLORATORY, BILIARY ANASTOMOSIS, ABDOMINAL CLOSURE (N/A)    Post-Operative Day: 1 Day Post-Op    Objective:     Vital Signs (Most Recent):  Temp: 97.9 °F (36.6 °C) (10/17/20 0300)  Pulse: 83 (10/17/20 0745)  Resp: 20 (10/17/20 0745)  BP: 109/69 (10/16/20 1915)  SpO2: 100 % (10/17/20 0745) Vital Signs (24h Range):  Temp:  [96.8 °F (36 °C)-98.1 °F (36.7 °C)] 97.9 °F (36.6 °C)  Pulse:  [78-88] 83  Resp:  [20-37] 20  SpO2:  [96 %-100 %] 100 %  BP: (103-109)/(63-69) 109/69  Arterial Line BP: ()/(54-68) 115/63     Weight: 120 kg (264 lb 8.8 oz)  Body mass index is 35.88 kg/m².      Intake/Output Summary (Last 24 hours) at 10/17/2020 0907  Last data filed at 10/17/2020 0800  Gross per 24 hour   Intake 6547.71 ml   Output 5994 ml   Net 553.71 ml       Physical Exam    Vents:  Vent Mode: A/C (10/17/20 0737)  Ventilator Initiated: Yes (10/14/20 1357)  Set Rate: 20 BPM (10/17/20 0737)  Vt Set: 450 mL (10/17/20 0737)  Pressure Support: 10 cmH20 (10/14/20 2343)  PEEP/CPAP: 5 cmH20 (10/17/20 0737)  Oxygen Concentration (%): 40 (10/17/20 0737)  Peak Airway Pressure: 16 cmH2O (10/17/20 0737)  Plateau Pressure: 0 cmH20 (10/14/20 2343)  Total Ve: 9 mL (10/17/20 0737)  Negative Inspiratory Force (cm H2O): -19 (10/14/20 2343)  F/VT Ratio<105 (RSBI): (!) 44.44 (10/17/20 0737)    Lines/Drains/Airways     Central Venous Catheter Line            Introducer 10/14/20 0530 right internal jugular 3 days    Percutaneous Central Line Insertion/Assessment - Double Lumen  10/14/20 0530 right internal jugular 3 days    Percutaneous Central Line Insertion/Assessment - Triple Lumen  10/14/20 0530 3 days    Pulmonary Artery Catheter Assessment  10/14/20 0530 3 days          Drain                 Urethral Catheter 10/14/20 0535 Straight-tip;Non-latex 16 Fr. 3 days         Closed/Suction Drain 10/14/20 1314 Lateral;Right Abdomen Bulb 19 Fr. 2 days         Closed/Suction Drain 10/14/20 1316  Right;Medial Abdomen Bulb 19 Fr. 2 days         NG/OG Tube 10/15/20 1300 Center mouth 1 day          Airway                 Airway - Non-Surgical 10/15/20 0936 1 day          Arterial Line            Arterial Line 10/14/20 0501 Left Radial 3 days          Peripheral Intravenous Line                 Peripheral IV - Single Lumen 10/14/20 0535 18 G Right Hand 3 days         LEONEL 10/14/20 0516 Left Antecubital 3 days                Significant Labs:    CBC/Anemia Profile:  Recent Labs   Lab 10/16/20  2102 10/17/20  0104 10/17/20  0439   WBC 11.31 9.96 9.67  9.67   HGB 9.5* 9.4* 9.4*  9.4*   HCT 29.0* 28.1* 27.6*  27.6*   PLT 59* 55* 53*  53*   MCV 90 90 91  91   RDW 18.1* 17.9* 18.6*  18.6*        Chemistries:  Recent Labs   Lab 10/16/20  0940 10/16/20  1338  10/16/20  2100 10/17/20  0104 10/17/20  0439   * 136  --  137  --  137  138   K 4.1 4.2  --  4.4  --  4.5  4.5    103  --  103  --  104  104   CO2 22* 22*  --  25  --  24  24   BUN 44* 43*  --  24*  --  17  17   CREATININE 3.2* 3.2*  --  2.0*  --  1.7*  1.7*   CALCIUM 7.6* 7.0*  --  7.7*  --  7.6*  7.6*   ALBUMIN 2.2* 1.9*  --  2.8*  --  2.6*  2.5*   PROT 3.6* 3.4*  --   --   --  4.4*   BILITOT 2.9* 2.4*  --   --   --  3.1*   ALKPHOS 54* 53*  --   --   --  61   ALT 1,039* 899*  --   --   --  773*   *  430* 398*  398*   < > 295* 240* 194*  194*   MG  --  2.1  --  2.0  --  1.9  1.9   PHOS  --  8.8*  --  5.3*  --  4.8*  4.8*    < > = values in this interval not displayed.       All pertinent labs within the past 24 hours have been reviewed.    Significant Imaging:  I have reviewed all pertinent imaging results/findings within the past 24 hours.

## 2020-10-17 NOTE — ASSESSMENT & PLAN NOTE
JENN likely 2/2 iATN from intraoperative hypotension and acute blood loss anemia     Plan/Recommendations:     -12hr SLED for volume management and metabolic clearance, UFG of 400-450cc/hr as tolerated   -Strict I/O's  -Labs per CRRT protocol   -Favor MAP >65  -Maintain Hgb >7.0  -Renally dose meds and avoid nephrotoxic medications   -Will continue to follow closely   -Plan discussed with staff, Dr Silva

## 2020-10-18 LAB
ALBUMIN SERPL BCP-MCNC: 2.4 G/DL (ref 3.5–5.2)
ALP SERPL-CCNC: 74 U/L (ref 55–135)
ALT SERPL W/O P-5'-P-CCNC: 550 U/L (ref 10–44)
ANION GAP SERPL CALC-SCNC: 6 MMOL/L (ref 8–16)
ANION GAP SERPL CALC-SCNC: 6 MMOL/L (ref 8–16)
ANION GAP SERPL CALC-SCNC: 8 MMOL/L (ref 8–16)
ANION GAP SERPL CALC-SCNC: 8 MMOL/L (ref 8–16)
ANISOCYTOSIS BLD QL SMEAR: SLIGHT
APTT BLDCRRT: 55.2 SEC (ref 21–32)
AST SERPL-CCNC: 82 U/L (ref 10–40)
BASOPHILS # BLD AUTO: 0.02 K/UL (ref 0–0.2)
BASOPHILS NFR BLD: 0.2 % (ref 0–1.9)
BILIRUB DIRECT SERPL-MCNC: 2.6 MG/DL (ref 0.1–0.3)
BILIRUB SERPL-MCNC: 3.3 MG/DL (ref 0.1–1)
BLD PROD TYP BPU: NORMAL
BLOOD UNIT EXPIRATION DATE: NORMAL
BLOOD UNIT TYPE CODE: 6200
BLOOD UNIT TYPE: NORMAL
BUN SERPL-MCNC: 11 MG/DL (ref 6–20)
BUN SERPL-MCNC: 11 MG/DL (ref 6–20)
BUN SERPL-MCNC: 22 MG/DL (ref 6–20)
BUN SERPL-MCNC: 22 MG/DL (ref 6–20)
CALCIUM SERPL-MCNC: 7.5 MG/DL (ref 8.7–10.5)
CALCIUM SERPL-MCNC: 7.5 MG/DL (ref 8.7–10.5)
CALCIUM SERPL-MCNC: 8.1 MG/DL (ref 8.7–10.5)
CALCIUM SERPL-MCNC: 8.1 MG/DL (ref 8.7–10.5)
CHLORIDE SERPL-SCNC: 104 MMOL/L (ref 95–110)
CHLORIDE SERPL-SCNC: 104 MMOL/L (ref 95–110)
CHLORIDE SERPL-SCNC: 105 MMOL/L (ref 95–110)
CHLORIDE SERPL-SCNC: 105 MMOL/L (ref 95–110)
CO2 SERPL-SCNC: 25 MMOL/L (ref 23–29)
CO2 SERPL-SCNC: 25 MMOL/L (ref 23–29)
CO2 SERPL-SCNC: 29 MMOL/L (ref 23–29)
CO2 SERPL-SCNC: 29 MMOL/L (ref 23–29)
CODING SYSTEM: NORMAL
CREAT SERPL-MCNC: 1.1 MG/DL (ref 0.5–1.4)
CREAT SERPL-MCNC: 1.1 MG/DL (ref 0.5–1.4)
CREAT SERPL-MCNC: 2 MG/DL (ref 0.5–1.4)
CREAT SERPL-MCNC: 2 MG/DL (ref 0.5–1.4)
DIFFERENTIAL METHOD: ABNORMAL
DISPENSE STATUS: NORMAL
EOSINOPHIL # BLD AUTO: 0 K/UL (ref 0–0.5)
EOSINOPHIL NFR BLD: 0 % (ref 0–8)
ERYTHROCYTE [DISTWIDTH] IN BLOOD BY AUTOMATED COUNT: 19.3 % (ref 11.5–14.5)
EST. GFR  (AFRICAN AMERICAN): 44.6 ML/MIN/1.73 M^2
EST. GFR  (AFRICAN AMERICAN): 44.6 ML/MIN/1.73 M^2
EST. GFR  (AFRICAN AMERICAN): >60 ML/MIN/1.73 M^2
EST. GFR  (AFRICAN AMERICAN): >60 ML/MIN/1.73 M^2
EST. GFR  (NON AFRICAN AMERICAN): 38.6 ML/MIN/1.73 M^2
EST. GFR  (NON AFRICAN AMERICAN): 38.6 ML/MIN/1.73 M^2
EST. GFR  (NON AFRICAN AMERICAN): >60 ML/MIN/1.73 M^2
EST. GFR  (NON AFRICAN AMERICAN): >60 ML/MIN/1.73 M^2
FACT X PPP CHRO-ACNC: 0.14 IU/ML (ref 0.3–0.7)
FACT X PPP CHRO-ACNC: 0.26 IU/ML (ref 0.3–0.7)
FACT X PPP CHRO-ACNC: 0.36 IU/ML (ref 0.3–0.7)
FACT X PPP CHRO-ACNC: 0.42 IU/ML (ref 0.3–0.7)
GLUCOSE SERPL-MCNC: 107 MG/DL (ref 70–110)
GLUCOSE SERPL-MCNC: 107 MG/DL (ref 70–110)
GLUCOSE SERPL-MCNC: 120 MG/DL (ref 70–110)
GLUCOSE SERPL-MCNC: 120 MG/DL (ref 70–110)
HCT VFR BLD AUTO: 25.2 % (ref 40–54)
HGB BLD-MCNC: 8 G/DL (ref 14–18)
HYPOCHROMIA BLD QL SMEAR: ABNORMAL
IMM GRANULOCYTES # BLD AUTO: 0.15 K/UL (ref 0–0.04)
IMM GRANULOCYTES NFR BLD AUTO: 1.7 % (ref 0–0.5)
INR PPP: 1 (ref 0.8–1.2)
LYMPHOCYTES # BLD AUTO: 0.3 K/UL (ref 1–4.8)
LYMPHOCYTES NFR BLD: 3.5 % (ref 18–48)
MAGNESIUM SERPL-MCNC: 2 MG/DL (ref 1.6–2.6)
MCH RBC QN AUTO: 30.4 PG (ref 27–31)
MCHC RBC AUTO-ENTMCNC: 31.7 G/DL (ref 32–36)
MCV RBC AUTO: 96 FL (ref 82–98)
MONOCYTES # BLD AUTO: 1.1 K/UL (ref 0.3–1)
MONOCYTES NFR BLD: 12.9 % (ref 4–15)
NEUTROPHILS # BLD AUTO: 7 K/UL (ref 1.8–7.7)
NEUTROPHILS NFR BLD: 81.7 % (ref 38–73)
NRBC BLD-RTO: 1 /100 WBC
OVALOCYTES BLD QL SMEAR: ABNORMAL
PHOSPHATE SERPL-MCNC: 3.5 MG/DL (ref 2.7–4.5)
PHOSPHATE SERPL-MCNC: 3.5 MG/DL (ref 2.7–4.5)
PHOSPHATE SERPL-MCNC: 3.9 MG/DL (ref 2.7–4.5)
PHOSPHATE SERPL-MCNC: 3.9 MG/DL (ref 2.7–4.5)
PLATELET # BLD AUTO: 35 K/UL (ref 150–350)
PMV BLD AUTO: 11.3 FL (ref 9.2–12.9)
POCT GLUCOSE: 108 MG/DL (ref 70–110)
POCT GLUCOSE: 109 MG/DL (ref 70–110)
POCT GLUCOSE: 115 MG/DL (ref 70–110)
POCT GLUCOSE: 129 MG/DL (ref 70–110)
POCT GLUCOSE: 95 MG/DL (ref 70–110)
POIKILOCYTOSIS BLD QL SMEAR: SLIGHT
POLYCHROMASIA BLD QL SMEAR: ABNORMAL
POTASSIUM SERPL-SCNC: 4.1 MMOL/L (ref 3.5–5.1)
POTASSIUM SERPL-SCNC: 4.1 MMOL/L (ref 3.5–5.1)
POTASSIUM SERPL-SCNC: 4.5 MMOL/L (ref 3.5–5.1)
POTASSIUM SERPL-SCNC: 4.5 MMOL/L (ref 3.5–5.1)
PROT SERPL-MCNC: 4.3 G/DL (ref 6–8.4)
PROTHROMBIN TIME: 10.7 SEC (ref 9–12.5)
RBC # BLD AUTO: 2.63 M/UL (ref 4.6–6.2)
SODIUM SERPL-SCNC: 137 MMOL/L (ref 136–145)
SODIUM SERPL-SCNC: 137 MMOL/L (ref 136–145)
SODIUM SERPL-SCNC: 140 MMOL/L (ref 136–145)
SODIUM SERPL-SCNC: 140 MMOL/L (ref 136–145)
TACROLIMUS BLD-MCNC: 5.8 NG/ML (ref 5–15)
TRANS PLATPHERESIS VOL PATIENT: NORMAL ML
WBC # BLD AUTO: 8.61 K/UL (ref 3.9–12.7)

## 2020-10-18 PROCEDURE — 99232 PR SUBSEQUENT HOSPITAL CARE,LEVL II: ICD-10-PCS | Mod: ,,, | Performed by: NURSE PRACTITIONER

## 2020-10-18 PROCEDURE — 80053 COMPREHEN METABOLIC PANEL: CPT

## 2020-10-18 PROCEDURE — 63600175 PHARM REV CODE 636 W HCPCS: Performed by: TRANSPLANT SURGERY

## 2020-10-18 PROCEDURE — 25000003 PHARM REV CODE 250: Performed by: HOSPITALIST

## 2020-10-18 PROCEDURE — P9035 PLATELET PHERES LEUKOREDUCED: HCPCS

## 2020-10-18 PROCEDURE — 99232 SBSQ HOSP IP/OBS MODERATE 35: CPT | Mod: ,,, | Performed by: NURSE PRACTITIONER

## 2020-10-18 PROCEDURE — 63600175 PHARM REV CODE 636 W HCPCS: Performed by: STUDENT IN AN ORGANIZED HEALTH CARE EDUCATION/TRAINING PROGRAM

## 2020-10-18 PROCEDURE — 25000003 PHARM REV CODE 250: Performed by: STUDENT IN AN ORGANIZED HEALTH CARE EDUCATION/TRAINING PROGRAM

## 2020-10-18 PROCEDURE — 99232 PR SUBSEQUENT HOSPITAL CARE,LEVL II: ICD-10-PCS | Mod: ,,, | Performed by: INTERNAL MEDICINE

## 2020-10-18 PROCEDURE — 80197 ASSAY OF TACROLIMUS: CPT

## 2020-10-18 PROCEDURE — 85610 PROTHROMBIN TIME: CPT

## 2020-10-18 PROCEDURE — 99291 CRITICAL CARE FIRST HOUR: CPT | Mod: ,,, | Performed by: ANESTHESIOLOGY

## 2020-10-18 PROCEDURE — 99232 SBSQ HOSP IP/OBS MODERATE 35: CPT | Mod: ,,, | Performed by: INTERNAL MEDICINE

## 2020-10-18 PROCEDURE — 25000003 PHARM REV CODE 250: Performed by: NURSE PRACTITIONER

## 2020-10-18 PROCEDURE — 85520 HEPARIN ASSAY: CPT | Mod: 91

## 2020-10-18 PROCEDURE — 85730 THROMBOPLASTIN TIME PARTIAL: CPT

## 2020-10-18 PROCEDURE — 94761 N-INVAS EAR/PLS OXIMETRY MLT: CPT

## 2020-10-18 PROCEDURE — 83735 ASSAY OF MAGNESIUM: CPT

## 2020-10-18 PROCEDURE — 99900035 HC TECH TIME PER 15 MIN (STAT)

## 2020-10-18 PROCEDURE — 85025 COMPLETE CBC W/AUTO DIFF WBC: CPT

## 2020-10-18 PROCEDURE — 90945 DIALYSIS ONE EVALUATION: CPT

## 2020-10-18 PROCEDURE — 20000000 HC ICU ROOM

## 2020-10-18 PROCEDURE — 63600175 PHARM REV CODE 636 W HCPCS: Performed by: SURGERY

## 2020-10-18 PROCEDURE — 99291 PR CRITICAL CARE, E/M 30-74 MINUTES: ICD-10-PCS | Mod: ,,, | Performed by: ANESTHESIOLOGY

## 2020-10-18 PROCEDURE — 80069 RENAL FUNCTION PANEL: CPT | Mod: 91

## 2020-10-18 PROCEDURE — 27000221 HC OXYGEN, UP TO 24 HOURS

## 2020-10-18 PROCEDURE — 85520 HEPARIN ASSAY: CPT

## 2020-10-18 PROCEDURE — 82248 BILIRUBIN DIRECT: CPT

## 2020-10-18 PROCEDURE — 80100008 HC CRRT DAILY MAINTENANCE

## 2020-10-18 PROCEDURE — 83735 ASSAY OF MAGNESIUM: CPT | Mod: 91

## 2020-10-18 PROCEDURE — 27200188 HC TRANSDUCER, ART ADULT/PEDS

## 2020-10-18 RX ORDER — PANTOPRAZOLE SODIUM 40 MG/1
40 TABLET, DELAYED RELEASE ORAL DAILY
Status: DISCONTINUED | OUTPATIENT
Start: 2020-10-18 | End: 2020-10-27 | Stop reason: HOSPADM

## 2020-10-18 RX ORDER — OXYCODONE HYDROCHLORIDE 5 MG/1
5 TABLET ORAL EVERY 6 HOURS PRN
Status: DISCONTINUED | OUTPATIENT
Start: 2020-10-18 | End: 2020-10-27 | Stop reason: HOSPADM

## 2020-10-18 RX ORDER — MYCOPHENOLATE MOFETIL 250 MG/1
1000 CAPSULE ORAL 2 TIMES DAILY
Status: DISCONTINUED | OUTPATIENT
Start: 2020-10-18 | End: 2020-10-27 | Stop reason: HOSPADM

## 2020-10-18 RX ORDER — TACROLIMUS 0.5 MG/1
0.5 CAPSULE ORAL 2 TIMES DAILY
Status: DISCONTINUED | OUTPATIENT
Start: 2020-10-18 | End: 2020-10-20

## 2020-10-18 RX ORDER — DIPHENHYDRAMINE HYDROCHLORIDE 50 MG/ML
12.5 INJECTION INTRAMUSCULAR; INTRAVENOUS ONCE
Status: COMPLETED | OUTPATIENT
Start: 2020-10-18 | End: 2020-10-18

## 2020-10-18 RX ORDER — HYDROCODONE BITARTRATE AND ACETAMINOPHEN 500; 5 MG/1; MG/1
TABLET ORAL
Status: DISCONTINUED | OUTPATIENT
Start: 2020-10-18 | End: 2020-10-19

## 2020-10-18 RX ORDER — FLUCONAZOLE 200 MG/1
200 TABLET ORAL DAILY
Status: DISCONTINUED | OUTPATIENT
Start: 2020-10-18 | End: 2020-10-27 | Stop reason: HOSPADM

## 2020-10-18 RX ORDER — HYDROMORPHONE HYDROCHLORIDE 1 MG/ML
0.5 INJECTION, SOLUTION INTRAMUSCULAR; INTRAVENOUS; SUBCUTANEOUS
Status: DISCONTINUED | OUTPATIENT
Start: 2020-10-18 | End: 2020-10-19

## 2020-10-18 RX ORDER — MAGNESIUM SULFATE HEPTAHYDRATE 40 MG/ML
2 INJECTION, SOLUTION INTRAVENOUS
Status: DISCONTINUED | OUTPATIENT
Start: 2020-10-18 | End: 2020-10-19

## 2020-10-18 RX ORDER — HYDROCODONE BITARTRATE AND ACETAMINOPHEN 500; 5 MG/1; MG/1
TABLET ORAL CONTINUOUS
Status: DISCONTINUED | OUTPATIENT
Start: 2020-10-18 | End: 2020-10-19

## 2020-10-18 RX ORDER — OXYCODONE HYDROCHLORIDE 10 MG/1
10 TABLET ORAL EVERY 6 HOURS PRN
Status: DISCONTINUED | OUTPATIENT
Start: 2020-10-18 | End: 2020-10-27 | Stop reason: HOSPADM

## 2020-10-18 RX ADMIN — MYCOPHENOLATE MOFETIL 1000 MG: 250 CAPSULE ORAL at 09:10

## 2020-10-18 RX ADMIN — HEPARIN SODIUM AND DEXTROSE 11.3 UNITS/KG/HR: 10000; 5 INJECTION INTRAVENOUS at 03:10

## 2020-10-18 RX ADMIN — HYDROMORPHONE HYDROCHLORIDE 0.5 MG: 1 INJECTION, SOLUTION INTRAMUSCULAR; INTRAVENOUS; SUBCUTANEOUS at 12:10

## 2020-10-18 RX ADMIN — MUPIROCIN 1 G: 20 OINTMENT TOPICAL at 08:10

## 2020-10-18 RX ADMIN — Medication 100 MG: at 08:10

## 2020-10-18 RX ADMIN — FOLIC ACID 1 MG: 1 TABLET ORAL at 08:10

## 2020-10-18 RX ADMIN — HYDROMORPHONE HYDROCHLORIDE 0.5 MG: 1 INJECTION, SOLUTION INTRAMUSCULAR; INTRAVENOUS; SUBCUTANEOUS at 03:10

## 2020-10-18 RX ADMIN — DIPHENHYDRAMINE HYDROCHLORIDE 12.5 MG: 50 INJECTION, SOLUTION INTRAMUSCULAR; INTRAVENOUS at 10:10

## 2020-10-18 RX ADMIN — OXYCODONE HYDROCHLORIDE 5 MG: 5 TABLET ORAL at 08:10

## 2020-10-18 RX ADMIN — TACROLIMUS 0.5 MG: 0.5 CAPSULE ORAL at 08:10

## 2020-10-18 RX ADMIN — METHYLPREDNISOLONE SODIUM SUCCINATE 40 MG: 40 INJECTION, POWDER, FOR SOLUTION INTRAMUSCULAR; INTRAVENOUS at 08:10

## 2020-10-18 RX ADMIN — OXYCODONE HYDROCHLORIDE 10 MG: 10 TABLET ORAL at 10:10

## 2020-10-18 RX ADMIN — MUPIROCIN 1 G: 20 OINTMENT TOPICAL at 09:10

## 2020-10-18 RX ADMIN — FLUCONAZOLE 200 MG: 200 TABLET ORAL at 08:10

## 2020-10-18 RX ADMIN — TACROLIMUS 0.5 MG: 0.5 CAPSULE ORAL at 05:10

## 2020-10-18 RX ADMIN — PANTOPRAZOLE SODIUM 40 MG: 40 TABLET, DELAYED RELEASE ORAL at 08:10

## 2020-10-18 RX ADMIN — HYDROMORPHONE HYDROCHLORIDE 0.5 MG: 1 INJECTION, SOLUTION INTRAMUSCULAR; INTRAVENOUS; SUBCUTANEOUS at 07:10

## 2020-10-18 RX ADMIN — OXYCODONE HYDROCHLORIDE 5 MG: 5 TABLET ORAL at 05:10

## 2020-10-18 RX ADMIN — MYCOPHENOLATE MOFETIL 1000 MG: 250 CAPSULE ORAL at 08:10

## 2020-10-18 RX ADMIN — HYDROMORPHONE HYDROCHLORIDE 0.5 MG: 1 INJECTION, SOLUTION INTRAMUSCULAR; INTRAVENOUS; SUBCUTANEOUS at 01:10

## 2020-10-18 RX ADMIN — METHYLPREDNISOLONE SODIUM SUCCINATE 40 MG: 40 INJECTION, POWDER, FOR SOLUTION INTRAMUSCULAR; INTRAVENOUS at 09:10

## 2020-10-18 RX ADMIN — HEPARIN SODIUM AND DEXTROSE 14.3 UNITS/KG/HR: 10000; 5 INJECTION INTRAVENOUS at 08:10

## 2020-10-18 RX ADMIN — SODIUM CHLORIDE: 0.9 INJECTION, SOLUTION INTRAVENOUS at 11:10

## 2020-10-18 RX ADMIN — SODIUM CHLORIDE: 0.9 INJECTION, SOLUTION INTRAVENOUS at 01:10

## 2020-10-18 NOTE — SUBJECTIVE & OBJECTIVE
Interval History: Patient seen and examined at bedside, no acute events overnight. Extubated overnight. Ran SLED for 12 hrs overnight. Negative 2 liters over the past 24 hrs.     Review of patient's allergies indicates:   Allergen Reactions    Latex, natural rubber      Current Facility-Administered Medications   Medication Frequency    0.9%  NaCl infusion (CRRT USE ONLY) Continuous    0.9%  NaCl infusion (for blood administration) Q24H PRN    0.9%  NaCl infusion (for blood administration) Q24H PRN    acetaminophen tablet 650 mg Q8H PRN    dextrose 50% injection 12.5 g PRN    dextrose 50% injection 25 g PRN    fluconazole tablet 200 mg Daily    folic acid tablet 1 mg Daily    glucagon (human recombinant) injection 1 mg PRN    glucose chewable tablet 16 g PRN    glucose chewable tablet 24 g PRN    heparin 25,000 units in dextrose 5% (100 units/ml) IV bolus from bag - ADDITIONAL PRN BOLUS - 30 units/kg PRN    heparin 25,000 units in dextrose 5% (100 units/ml) IV bolus from bag - ADDITIONAL PRN BOLUS - 60 units/kg PRN    heparin 25,000 units in dextrose 5% 250 mL (100 units/mL) infusion LOW INTENSITY nomogram Continuous    HYDROmorphone injection 0.5 mg Q1H PRN    insulin aspart U-100 pen 0-5 Units PRN    magnesium sulfate 2g in water 50mL IVPB (premix) PRN    melatonin tablet 6 mg Nightly PRN    methylPREDNISolone sodium succinate injection 40 mg Q12H    Followed by    [START ON 10/19/2020] methylPREDNISolone sodium succinate injection 20 mg Q12H    Followed by    [START ON 10/20/2020] predniSONE tablet 20 mg Daily    mupirocin 2 % ointment 1 g BID    mycophenolate capsule 1,000 mg BID    ondansetron injection 4 mg Q6H PRN    oxyCODONE immediate release tablet 5 mg Q6H PRN    oxyCODONE immediate release tablet Tab 10 mg Q6H PRN    pantoprazole EC tablet 40 mg Daily    sodium chloride 0.9% flush 10 mL PRN    sodium phosphate 20.01 mmol in dextrose 5 % 250 mL IVPB PRN    sodium phosphate  30 mmol in dextrose 5 % 250 mL IVPB PRN    sodium phosphate 39.99 mmol in dextrose 5 % 250 mL IVPB PRN    [START ON 10/21/2020] sulfamethoxazole-trimethoprim 400-80mg per tablet 1 tablet Daily AM    tacrolimus capsule 0.5 mg BID    thiamine tablet 100 mg Daily    [START ON 10/24/2020] valGANciclovir tablet 450 mg Daily       Objective:     Vital Signs (Most Recent):  Temp: 97.2 °F (36.2 °C) (10/18/20 0940)  Pulse: 91 (10/18/20 0945)  Resp: 17 (10/18/20 0945)  BP: 130/80 (10/17/20 1700)  SpO2: 100 % (10/18/20 0945)  O2 Device (Oxygen Therapy): nasal cannula (10/18/20 0324) Vital Signs (24h Range):  Temp:  [97.2 °F (36.2 °C)-98.6 °F (37 °C)] 97.2 °F (36.2 °C)  Pulse:  [83-98] 91  Resp:  [8-30] 17  SpO2:  [87 %-100 %] 100 %  BP: (130)/(80) 130/80  Arterial Line BP: (119-164)/(60-78) 152/66     Weight: 118 kg (260 lb 2.3 oz) (10/18/20 0456)  Body mass index is 35.28 kg/m².  Body surface area is 2.45 meters squared.    I/O last 3 completed shifts:  In: 6524.9 [P.O.:240; I.V.:6127.9; IV Piggyback:157]  Out: 9441 [Urine:15; Drains:694; Other:8732]    Physical Exam  Constitutional:       Appearance: He is ill-appearing.   HENT:      Head: Normocephalic and atraumatic.      Nose: Nose normal.      Mouth/Throat:      Mouth: Mucous membranes are moist.      Pharynx: Oropharynx is clear.   Eyes:      Conjunctiva/sclera: Conjunctivae normal.      Pupils: Pupils are equal, round, and reactive to light.   Cardiovascular:      Rate and Rhythm: Normal rate.   Pulmonary:      Breath sounds: Examination of the right-lower field reveals decreased breath sounds. Examination of the left-lower field reveals decreased breath sounds. Decreased breath sounds and rales present.      Comments: On nasal canula   Abdominal:      Palpations: Abdomen is soft.      Comments: Midline wound vac to abdomen with serosanguinous drainage in canister    Musculoskeletal:      Right lower leg: Edema present.      Left lower leg: Edema present.    Skin:     General: Skin is warm and dry.      Coloration: Skin is jaundiced.   Neurological:      Comments: Sedated          Significant Labs:  CBC:   Recent Labs   Lab 10/18/20  0427   WBC 8.61   RBC 2.63*   HGB 8.0*   HCT 25.2*   PLT 35*   MCV 96   MCH 30.4   MCHC 31.7*     CMP:   Recent Labs   Lab 10/18/20  0412   *  120*   CALCIUM 7.5*  7.5*   ALBUMIN 2.4*  2.4*   PROT 4.3*     140   K 4.5  4.5   CO2 29  29     105   BUN 11  11   CREATININE 1.1  1.1   ALKPHOS 74   *   AST 82*   BILITOT 3.3*     Coagulation:   Recent Labs   Lab 10/18/20  0427   INR 1.0   APTT 55.2*     LFTs:   Recent Labs   Lab 10/18/20  0412   *   AST 82*   ALKPHOS 74   BILITOT 3.3*   PROT 4.3*   ALBUMIN 2.4*  2.4*     All labs within the past 24 hours have been reviewed.     Significant Imaging:  Labs: Reviewed

## 2020-10-18 NOTE — SUBJECTIVE & OBJECTIVE
Interval History/Significant Events: extubated 10/17 doing well on RA, intermittently on levophed 0.02 overnight with CRRT.     Follow-up For: Procedure(s) (LRB):  LAPAROTOMY, EXPLORATORY, BILIARY ANASTOMOSIS, ABDOMINAL CLOSURE (N/A)    Post-Operative Day: 2 Days Post-Op    Objective:     Vital Signs (Most Recent):  Temp: 97.2 °F (36.2 °C) (10/18/20 0940)  Pulse: 91 (10/18/20 0945)  Resp: 17 (10/18/20 0945)  BP: 130/80 (10/17/20 1700)  SpO2: 100 % (10/18/20 0945) Vital Signs (24h Range):  Temp:  [97.2 °F (36.2 °C)-98.6 °F (37 °C)] 97.2 °F (36.2 °C)  Pulse:  [83-98] 91  Resp:  [8-30] 17  SpO2:  [87 %-100 %] 100 %  BP: (113-130)/(74-80) 130/80  Arterial Line BP: (119-164)/(63-78) 158/64     Weight: 118 kg (260 lb 2.3 oz)  Body mass index is 35.28 kg/m².      Intake/Output Summary (Last 24 hours) at 10/18/2020 1023  Last data filed at 10/18/2020 1000  Gross per 24 hour   Intake 3566.92 ml   Output 5700 ml   Net -2133.08 ml       Physical Exam  Constitutional:       Appearance: He is ill-appearing.   HENT:      Head: Normocephalic and atraumatic.      Nose: Nose normal.      Mouth/Throat:      Mouth: Mucous membranes are moist.      Pharynx: Oropharynx is clear.   Eyes:      Conjunctiva/sclera: Conjunctivae normal.      Pupils: Pupils are equal, round, and reactive to light.   Cardiovascular:      Rate and Rhythm: Normal rate.   Pulmonary:      Effort: Pulmonary effort is normal.      Breath sounds: Normal breath sounds.      Comments: Intubated and mechanically ventilated   Abdominal:      Palpations: Abdomen is soft.      Comments: Midline wound vac to abdomen with serosanguinous drainage in canister    Musculoskeletal:      Right lower leg: Edema present.      Left lower leg: Edema present.   Skin:     General: Skin is warm and dry.      Coloration: Skin is jaundiced.   Neurological:      Comments: Sedated            Lines/Drains/Airways     Central Venous Catheter Line            Introducer 10/14/20 0530 right  internal jugular 4 days    Percutaneous Central Line Insertion/Assessment - Double Lumen  10/14/20 0530 right internal jugular 4 days    Percutaneous Central Line Insertion/Assessment - Triple Lumen  10/14/20 0530 4 days          Drain                 Urethral Catheter 10/14/20 0535 Straight-tip;Non-latex 16 Fr. 4 days         Closed/Suction Drain 10/14/20 1314 Lateral;Right Abdomen Bulb 19 Fr. 3 days         Closed/Suction Drain 10/14/20 1316 Right;Medial Abdomen Bulb 19 Fr. 3 days          Arterial Line            Arterial Line 10/14/20 0501 Left Radial 4 days          Peripheral Intravenous Line                 Peripheral IV - Single Lumen 10/14/20 0535 18 G Right Hand 4 days         LEONEL 10/14/20 0516 Left Antecubital 4 days                Significant Labs:    CBC/Anemia Profile:  Recent Labs   Lab 10/17/20  0439 10/17/20  1415 10/18/20  0427   WBC 9.67  9.67 8.19  8.19 8.61   HGB 9.4*  9.4* 8.7*  8.7* 8.0*   HCT 27.6*  27.6* 26.3*  26.3* 25.2*   PLT 53*  53* 40*  40* 35*   MCV 91  91 92  92 96   RDW 18.6*  18.6* 19.0*  19.0* 19.3*        Chemistries:  Recent Labs   Lab 10/16/20  1338  10/17/20  0439 10/17/20  1415 10/17/20  2201 10/18/20  0412      < > 137  138 139  139 140 140  140   K 4.2   < > 4.5  4.5 4.6  4.6 4.5 4.5  4.5      < > 104  104 105  105 106 105  105   CO2 22*   < > 24  24 22*  22* 27 29  29   BUN 43*   < > 17  17 27*  27* 16 11  11   CREATININE 3.2*   < > 1.7*  1.7* 2.4*  2.4* 1.6* 1.1  1.1   CALCIUM 7.0*   < > 7.6*  7.6* 7.7*  7.7* 7.7* 7.5*  7.5*   ALBUMIN 1.9*   < > 2.6*  2.5* 2.5*  2.5* 2.3* 2.4*  2.4*   PROT 3.4*  --  4.4*  --   --  4.3*   BILITOT 2.4*  --  3.1*  --   --  3.3*   ALKPHOS 53*  --  61  --   --  74   *  --  773*  --   --  550*   *  398*   < > 194*  194* 125*  125*  --  82*   MG 2.1   < > 1.9  1.9 2.1  2.1 2.0 2.0  2.0   PHOS 8.8*   < > 4.8*  4.8* 6.3*  6.3* 4.1 3.5  3.5    < > = values in this interval not  displayed.       All pertinent labs within the past 24 hours have been reviewed.    Significant Imaging:  I have reviewed and interpreted all pertinent imaging results/findings within the past 24 hours.

## 2020-10-18 NOTE — PROGRESS NOTES
[facility]  Endocrinology  Progress Note    Admit Date: 10/11/2020     Reason for Consult: Management of Hyperglycemia     Surgical Procedure and Date: Liver Transplant 10/14/20    Lab Results   Component Value Date    HGBA1C <4.0 (A) 10/13/2020     HPI:   Patient is a 45 y.o. 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  Patient is now s/p liver transplant and admitted to SICU. No personal or family hx of DM per chart review. Endocrinology consulted for management of hyperglycemia.           Interval HPI:   Overnight events:  BG is below goal, and patient has trended downward on IV insulin infusion parameters overnight.   Diet NPO Except for: Sips with Medication  2 Days Post-Op    Eating:   NPO  Nausea: No  Hypoglycemia and intervention: No  Fever: No  TPN and/or TF: No  If yes, type of TF/TPN and rate: None    /80 (BP Location: Right arm, Patient Position: Lying)   Pulse 98   Temp 97.3 °F (36.3 °C) (Oral)   Resp (!) 24   Ht 6' (1.829 m)   Wt 118 kg (260 lb 2.3 oz)   SpO2 99%   BMI 35.28 kg/m²     Labs Reviewed and Include    Recent Labs   Lab 10/18/20  0412   *  120*   CALCIUM 7.5*  7.5*   ALBUMIN 2.4*  2.4*   PROT 4.3*     140   K 4.5  4.5   CO2 29  29     105   BUN 11  11   CREATININE 1.1  1.1   ALKPHOS 74   *   AST 82*   BILITOT 3.3*     Lab Results   Component Value Date    WBC 8.61 10/18/2020    HGB 8.0 (L) 10/18/2020    HCT 25.2 (L) 10/18/2020    MCV 96 10/18/2020    PLT 35 (LL) 10/18/2020     No results for input(s): TSH, FREET4 in the last 168 hours.  Lab Results   Component Value Date    HGBA1C <4.0 (A) 10/13/2020       Nutritional status:   Body mass index is 35.28 kg/m².  Lab Results   Component Value Date    ALBUMIN 2.4 (L) 10/18/2020    ALBUMIN 2.4 (L) 10/18/2020    ALBUMIN 2.3 (L) 10/17/2020     No results found for: PREALBUMIN    Estimated Creatinine Clearance: 110.1 mL/min  (based on SCr of 1.1 mg/dL).    Accu-Checks  Recent Labs     10/17/20  0853 10/17/20  1011 10/17/20  1120 10/17/20  1210 10/17/20  1300 10/17/20  1414 10/17/20  1611 10/17/20  2052 10/17/20  2326 10/18/20  0408   POCTGLUCOSE 171* 168* 169* 162* 159* 159* 148* 123* 168* 129*       Current Medications and/or Treatments Impacting Glycemic Control  Immunotherapy:    Immunosuppressants         Stop Route Frequency     tacrolimus capsule 0.5 mg      -- Oral 2 times daily     mycophenolate capsule 1,000 mg      -- Oral 2 times daily        Steroids:   Hormones (From admission, onward)    Start     Stop Route Frequency Ordered    10/20/20 0900  predniSONE tablet 20 mg  (methylprednisolone taper panel)      -- Oral Daily 10/14/20 1339    10/19/20 0900  methylPREDNISolone sodium succinate injection 20 mg  (methylprednisolone taper panel)      10/20 0859 IV Every 12 hours 10/14/20 1339    10/18/20 0900  methylPREDNISolone sodium succinate injection 40 mg  (methylprednisolone taper panel)      10/19 0859 IV Every 12 hours 10/14/20 1339    10/11/20 0217  melatonin tablet 6 mg      -- Oral Nightly PRN 10/11/20 0217        Pressors:    Autonomic Drugs (From admission, onward)    None        Hyperglycemia/Diabetes Medications:   Antihyperglycemics (From admission, onward)    Start     Stop Route Frequency Ordered    10/17/20 1215  insulin regular 100 Units in sodium chloride 0.9% 100 mL infusion      -- IV Continuous 10/17/20 1103    10/17/20 1203  insulin aspart U-100 pen 0-5 Units      -- SubQ As needed (PRN) 10/17/20 1103          ASSESSMENT and PLAN    * Status post liver transplant  Managed per primary team  Optimize BG control      Acute hyperglycemia  BG goal 140 - 180       - Discontinue IV insulin infusion orders. Patient has stepped down on IV insulin parameters overnight.   - Low Dose SQ Insulin Correction Scale.  - BG monitoring every 4 hours while NPO     ** Please call Endocrine for any BG related issues **  ** Please  notify Endocrine for any change and/or advance in diet**    Discharge planning:   TBD. Please notify endocrinology prior to discharge.           Adverse effect of corticosteroids  Glucocorticoids markedly increase glucoses. Expect steroid taper to help with glucose control.          Teja Powers NP  Endocrinology  [facility]

## 2020-10-18 NOTE — ASSESSMENT & PLAN NOTE
JENN likely 2/2 iATN from intraoperative hypotension and acute blood loss anemia     Plan/Recommendations:     -completed 12 hours of SLED overnight. Still edematous, will do 12 hrs of SCUF overnight  -Strict I/O's  -Labs per CRRT protocol   -Favor MAP >65  -Maintain Hgb >7.0  -Renally dose meds and avoid nephrotoxic medications   -Will continue to follow closely   -Plan discussed with staff, Dr Silva

## 2020-10-18 NOTE — PLAN OF CARE
Progress Note  Transplant Surgery    Admit Date: 10/11/2020  Post-operative Day: 4  Hospital Day: 8    ORGAN:   LIVER  Disease Etiology: Alcoholic Cirrhosis  Donor Type:   Donation after Brain Death  CDC High Risk:   Yes  Donor CMV Status:   Donor CMV Status: Positive  Donor HBcAB:   Negative  Donor HBV SHAYE: Negative  Donor HCV SHAYE: Positive  Donor HCV Status:   Positive  Whole or Partial: Whole Liver  Biliary Anastomosis: End to End  Arterial Anatomy: Standard         Follow-up For: Procedure(s) (LRB):  LAPAROTOMY, EXPLORATORY, BILIARY ANASTOMOSIS, ABDOMINAL CLOSURE (N/A)      ASSESSMENT/PLAN:     I conducted multidisciplinary rounds in conjunction with the ICU/Critical Care attending staff, fellows, and residents, with involvement of ancillary services as appropriate. The patient's general condition was reviewed, specifically including allograft function, immunosuppressive management, dietary and nutritional status, pharmacy concerns, and status of expected transition to transplant stepdown care.    For details see the critical care note.    Moon Moreno MD

## 2020-10-18 NOTE — SUBJECTIVE & OBJECTIVE
Interval HPI:   Overnight events:  BG is below goal, and patient has trended downward on IV insulin infusion parameters overnight.   Diet NPO Except for: Sips with Medication  2 Days Post-Op    Eating:   NPO  Nausea: No  Hypoglycemia and intervention: No  Fever: No  TPN and/or TF: No  If yes, type of TF/TPN and rate: None    /80 (BP Location: Right arm, Patient Position: Lying)   Pulse 98   Temp 97.3 °F (36.3 °C) (Oral)   Resp (!) 24   Ht 6' (1.829 m)   Wt 118 kg (260 lb 2.3 oz)   SpO2 99%   BMI 35.28 kg/m²     Labs Reviewed and Include    Recent Labs   Lab 10/18/20  0412   *  120*   CALCIUM 7.5*  7.5*   ALBUMIN 2.4*  2.4*   PROT 4.3*     140   K 4.5  4.5   CO2 29  29     105   BUN 11  11   CREATININE 1.1  1.1   ALKPHOS 74   *   AST 82*   BILITOT 3.3*     Lab Results   Component Value Date    WBC 8.61 10/18/2020    HGB 8.0 (L) 10/18/2020    HCT 25.2 (L) 10/18/2020    MCV 96 10/18/2020    PLT 35 (LL) 10/18/2020     No results for input(s): TSH, FREET4 in the last 168 hours.  Lab Results   Component Value Date    HGBA1C <4.0 (A) 10/13/2020       Nutritional status:   Body mass index is 35.28 kg/m².  Lab Results   Component Value Date    ALBUMIN 2.4 (L) 10/18/2020    ALBUMIN 2.4 (L) 10/18/2020    ALBUMIN 2.3 (L) 10/17/2020     No results found for: PREALBUMIN    Estimated Creatinine Clearance: 110.1 mL/min (based on SCr of 1.1 mg/dL).    Accu-Checks  Recent Labs     10/17/20  0853 10/17/20  1011 10/17/20  1120 10/17/20  1210 10/17/20  1300 10/17/20  1414 10/17/20  1611 10/17/20  2052 10/17/20  2326 10/18/20  0408   POCTGLUCOSE 171* 168* 169* 162* 159* 159* 148* 123* 168* 129*       Current Medications and/or Treatments Impacting Glycemic Control  Immunotherapy:    Immunosuppressants         Stop Route Frequency     tacrolimus capsule 0.5 mg      -- Oral 2 times daily     mycophenolate capsule 1,000 mg      -- Oral 2 times daily        Steroids:   Hormones (From admission,  onward)    Start     Stop Route Frequency Ordered    10/20/20 0900  predniSONE tablet 20 mg  (methylprednisolone taper panel)      -- Oral Daily 10/14/20 1339    10/19/20 0900  methylPREDNISolone sodium succinate injection 20 mg  (methylprednisolone taper panel)      10/20 0859 IV Every 12 hours 10/14/20 1339    10/18/20 0900  methylPREDNISolone sodium succinate injection 40 mg  (methylprednisolone taper panel)      10/19 0859 IV Every 12 hours 10/14/20 1339    10/11/20 0217  melatonin tablet 6 mg      -- Oral Nightly PRN 10/11/20 0217        Pressors:    Autonomic Drugs (From admission, onward)    None        Hyperglycemia/Diabetes Medications:   Antihyperglycemics (From admission, onward)    Start     Stop Route Frequency Ordered    10/17/20 1215  insulin regular 100 Units in sodium chloride 0.9% 100 mL infusion      -- IV Continuous 10/17/20 1103    10/17/20 1203  insulin aspart U-100 pen 0-5 Units      -- SubQ As needed (PRN) 10/17/20 1103

## 2020-10-18 NOTE — PLAN OF CARE
Pt extubated and continues to progress.  CRRT in progress overnight without difficutly.  Pt with severe pain control issues temporarily assisted with PRN modalities.  Follows commands and weakly moves all extremities.  Plan of care reviewed and discussed with pt, questions encouraged and addressed.  Understanding verbalized.

## 2020-10-18 NOTE — PROGRESS NOTES
[facility]  Critical Care - Surgery  Progress Note    Patient Name: Jordan Altman  MRN: [id number]  Admission Date: 10/11/2020  Hospital Length of Stay: 7 days  Code Status: Full Code  Attending Provider: Moon Moreno MD  Primary Care Provider: SHAHEEN Jaime   Principal Problem: Status post liver transplant    Subjective:     Hospital/ICU Course:  No notes on file    Interval History/Significant Events: extubated 10/17 doing well on RA, intermittently on levophed 0.02 overnight with CRRT.     Follow-up For: Procedure(s) (LRB):  LAPAROTOMY, EXPLORATORY, BILIARY ANASTOMOSIS, ABDOMINAL CLOSURE (N/A)    Post-Operative Day: 2 Days Post-Op    Objective:     Vital Signs (Most Recent):  Temp: 97.2 °F (36.2 °C) (10/18/20 0940)  Pulse: 91 (10/18/20 0945)  Resp: 17 (10/18/20 0945)  BP: 130/80 (10/17/20 1700)  SpO2: 100 % (10/18/20 0945) Vital Signs (24h Range):  Temp:  [97.2 °F (36.2 °C)-98.6 °F (37 °C)] 97.2 °F (36.2 °C)  Pulse:  [83-98] 91  Resp:  [8-30] 17  SpO2:  [87 %-100 %] 100 %  BP: (113-130)/(74-80) 130/80  Arterial Line BP: (119-164)/(63-78) 158/64     Weight: 118 kg (260 lb 2.3 oz)  Body mass index is 35.28 kg/m².      Intake/Output Summary (Last 24 hours) at 10/18/2020 1023  Last data filed at 10/18/2020 1000  Gross per 24 hour   Intake 3566.92 ml   Output 5700 ml   Net -2133.08 ml       Physical Exam  Constitutional:       Appearance: He is ill-appearing.   HENT:      Head: Normocephalic and atraumatic.      Nose: Nose normal.      Mouth/Throat:      Mouth: Mucous membranes are moist.      Pharynx: Oropharynx is clear.   Eyes:      Conjunctiva/sclera: Conjunctivae normal.      Pupils: Pupils are equal, round, and reactive to light.   Cardiovascular:      Rate and Rhythm: Normal rate.   Pulmonary:      Effort: Pulmonary effort is normal.      Breath sounds: Normal breath sounds.      Comments: Intubated and mechanically ventilated   Abdominal:      Palpations: Abdomen is soft.       Comments: Midline wound vac to abdomen with serosanguinous drainage in canister    Musculoskeletal:      Right lower leg: Edema present.      Left lower leg: Edema present.   Skin:     General: Skin is warm and dry.      Coloration: Skin is jaundiced.   Neurological:      Comments: Sedated            Lines/Drains/Airways     Central Venous Catheter Line            Introducer 10/14/20 0530 right internal jugular 4 days    Percutaneous Central Line Insertion/Assessment - Double Lumen  10/14/20 0530 right internal jugular 4 days    Percutaneous Central Line Insertion/Assessment - Triple Lumen  10/14/20 0530 4 days          Drain                 Urethral Catheter 10/14/20 0535 Straight-tip;Non-latex 16 Fr. 4 days         Closed/Suction Drain 10/14/20 1314 Lateral;Right Abdomen Bulb 19 Fr. 3 days         Closed/Suction Drain 10/14/20 1316 Right;Medial Abdomen Bulb 19 Fr. 3 days          Arterial Line            Arterial Line 10/14/20 0501 Left Radial 4 days          Peripheral Intravenous Line                 Peripheral IV - Single Lumen 10/14/20 0535 18 G Right Hand 4 days         LEONEL 10/14/20 0516 Left Antecubital 4 days                Significant Labs:    CBC/Anemia Profile:  Recent Labs   Lab 10/17/20  0439 10/17/20  1415 10/18/20  0427   WBC 9.67  9.67 8.19  8.19 8.61   HGB 9.4*  9.4* 8.7*  8.7* 8.0*   HCT 27.6*  27.6* 26.3*  26.3* 25.2*   PLT 53*  53* 40*  40* 35*   MCV 91  91 92  92 96   RDW 18.6*  18.6* 19.0*  19.0* 19.3*        Chemistries:  Recent Labs   Lab 10/16/20  1338  10/17/20  0439 10/17/20  1415 10/17/20  2201 10/18/20  0412      < > 137  138 139  139 140 140  140   K 4.2   < > 4.5  4.5 4.6  4.6 4.5 4.5  4.5      < > 104  104 105  105 106 105  105   CO2 22*   < > 24  24 22*  22* 27 29  29   BUN 43*   < > 17  17 27*  27* 16 11  11   CREATININE 3.2*   < > 1.7*  1.7* 2.4*  2.4* 1.6* 1.1  1.1   CALCIUM 7.0*   < > 7.6*  7.6* 7.7*  7.7* 7.7* 7.5*  7.5*   ALBUMIN  1.9*   < > 2.6*  2.5* 2.5*  2.5* 2.3* 2.4*  2.4*   PROT 3.4*  --  4.4*  --   --  4.3*   BILITOT 2.4*  --  3.1*  --   --  3.3*   ALKPHOS 53*  --  61  --   --  74   *  --  773*  --   --  550*   *  398*   < > 194*  194* 125*  125*  --  82*   MG 2.1   < > 1.9  1.9 2.1  2.1 2.0 2.0  2.0   PHOS 8.8*   < > 4.8*  4.8* 6.3*  6.3* 4.1 3.5  3.5    < > = values in this interval not displayed.       All pertinent labs within the past 24 hours have been reviewed.    Significant Imaging:  I have reviewed and interpreted all pertinent imaging results/findings within the past 24 hours.    Assessment/Plan:     * Status post liver transplant    Neuro/Psych: l   -- Pain: Dilaudid 0.5 mg PO q1     Cards:   -- HDS  -- Maintain SBP > 100  -- Pressors as needed to meet goals, turned off at 6am this morning       Pulm:   -- Goal O2 sat > 90% on RA   -- extubated 10/17 without issues       Renal:  -- Keep bourne for strict I/O  -- BUN/Cr:11/1.1   -- Nephrology consulted; received CRRT SLED 12 hours       FEN / GI:   -- Replace lytes as needed  -- Nutrition: NPO   -- Protonix BID 2/2 recent GIB  - concentrate gtts when possible      ID:   -- Tm: afebrile; WBC 6.6  -- Abx  Zosyn (d/c'ed), fluconazole, Bactrim, valganciclovir      Heme/Onc:   -- H/H stable  - afternoon CBC 10/17 f/u  -- Continue heparin gtt for thrombus  -- Follow up HIT lab      Endo:   -- Gluc goal 140-180  -- Tacrolimus and prednisone taper       PPx:   Feeding: NPO  Analgesia/Sedation: Dilaudid pushes   Thromboembolic prevention: Heparin gtt  HOB >30: Yes  Stress Ulcer ppx: Protonix BID  Glucose control: Critical care goal 140-180 g/dl, ISS       Critical care was time spent personally by me on the following activities: development of treatment plan with patient or surrogate and bedside caregivers, discussions with consultants, evaluation of patient's response to treatment, examination of patient, ordering and performing treatments and  interventions, ordering and review of laboratory studies, ordering and review of radiographic studies, pulse oximetry, re-evaluation of patient's condition.  This critical care time did not overlap with that of any other provider or involve time for any procedures.     Melita Murphy MD  Critical Care - Surgery  [facility]

## 2020-10-18 NOTE — PLAN OF CARE
Progress Note  Transplant Surgery    Admit Date: 10/11/2020  Post-operative Day: 3  Hospital Day: 7    ORGAN:   LIVER  Disease Etiology: Alcoholic Cirrhosis  Donor Type:   Donation after Brain Death  CDC High Risk:   Yes  Donor CMV Status:   Donor CMV Status: Positive  Donor HBcAB:   Negative  Donor HBV SHAYE: Negative  Donor HCV SHAYE: Positive  Donor HCV Status:   Positive  Whole or Partial: Whole Liver  Biliary Anastomosis: End to End  Arterial Anatomy: Standard         Follow-up For: Procedure(s) (LRB):  LAPAROTOMY, EXPLORATORY, BILIARY ANASTOMOSIS, ABDOMINAL CLOSURE (N/A)      ASSESSMENT/PLAN:     I conducted multidisciplinary rounds in conjunction with the ICU/Critical Care attending staff, fellows, and residents, with involvement of ancillary services as appropriate. The patient's general condition was reviewed, specifically including allograft function, immunosuppressive management, dietary and nutritional status, pharmacy concerns, and status of expected transition to transplant stepdown care.    For details see the critical care note.    Moon Moreno MD

## 2020-10-18 NOTE — PROGRESS NOTES
"[facility]  Nephrology  Progress Note    Patient Name: Jordan Altman  MRN: [id number]  Admission Date: 10/11/2020  Hospital Length of Stay: 7 days  Attending Provider: Moon Moreno MD   Primary Care Physician: SHAHEEN Jaime  Principal Problem:Status post liver transplant    Subjective:     HPI: Valdo Altman is a 44 yo M with a medical history including HTN, SBP, hepatic cirrhosis and ESLD secondary to ETOH use. He is currently listed for a liver transplant. He presented to [facility] on 10/11/2020 with complaints of abdominal distention and black, tarry stools x 1 day. Occult stool test (+) in [facility] Emergency room. [place] [facility] [place] team for further evaluation. Pt s/p orthotopic liver transplant (whole liver, DBD donor) on 10/14/2020, requiring intraoperative SLED. 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 Underwent ex lap w portal vein thrombectomy, revision of hepatic artery and placement of abd wound vac on 10/15..." Pt with minimal UOP since transplant; received high-dose IV diuretics (120mg lasix and 500mg diuril) on 10/15, with minimal UOP. Pt with a rough baseline sCr of 1.2-1.4. Nephrology consulted for JNEN and possible need for RRT.     -HPI was obtained from chart review as pt is currently intubated.     Interval History: Patient seen and examined at bedside, no acute events overnight. Extubated overnight. Ran SLED for 12 hrs overnight. Negative 2 liters over the past 24 hrs.     Review of patient's allergies indicates:   Allergen Reactions    Latex, natural rubber      Current Facility-Administered Medications   Medication Frequency    0.9%  NaCl infusion (CRRT USE ONLY) Continuous    0.9%  NaCl infusion (for blood " administration) Q24H PRN    0.9%  NaCl infusion (for blood administration) Q24H PRN    acetaminophen tablet 650 mg Q8H PRN    dextrose 50% injection 12.5 g PRN    dextrose 50% injection 25 g PRN    fluconazole tablet 200 mg Daily    folic acid tablet 1 mg Daily    glucagon (human recombinant) injection 1 mg PRN    glucose chewable tablet 16 g PRN    glucose chewable tablet 24 g PRN    heparin 25,000 units in dextrose 5% (100 units/ml) IV bolus from bag - ADDITIONAL PRN BOLUS - 30 units/kg PRN    heparin 25,000 units in dextrose 5% (100 units/ml) IV bolus from bag - ADDITIONAL PRN BOLUS - 60 units/kg PRN    heparin 25,000 units in dextrose 5% 250 mL (100 units/mL) infusion LOW INTENSITY nomogram Continuous    HYDROmorphone injection 0.5 mg Q1H PRN    insulin aspart U-100 pen 0-5 Units PRN    magnesium sulfate 2g in water 50mL IVPB (premix) PRN    melatonin tablet 6 mg Nightly PRN    methylPREDNISolone sodium succinate injection 40 mg Q12H    Followed by    [START ON 10/19/2020] methylPREDNISolone sodium succinate injection 20 mg Q12H    Followed by    [START ON 10/20/2020] predniSONE tablet 20 mg Daily    mupirocin 2 % ointment 1 g BID    mycophenolate capsule 1,000 mg BID    ondansetron injection 4 mg Q6H PRN    oxyCODONE immediate release tablet 5 mg Q6H PRN    oxyCODONE immediate release tablet Tab 10 mg Q6H PRN    pantoprazole EC tablet 40 mg Daily    sodium chloride 0.9% flush 10 mL PRN    sodium phosphate 20.01 mmol in dextrose 5 % 250 mL IVPB PRN    sodium phosphate 30 mmol in dextrose 5 % 250 mL IVPB PRN    sodium phosphate 39.99 mmol in dextrose 5 % 250 mL IVPB PRN    [START ON 10/21/2020] sulfamethoxazole-trimethoprim 400-80mg per tablet 1 tablet Daily AM    tacrolimus capsule 0.5 mg BID    thiamine tablet 100 mg Daily    [START ON 10/24/2020] valGANciclovir tablet 450 mg Daily       Objective:     Vital Signs (Most Recent):  Temp: 97.2 °F (36.2 °C) (10/18/20 0940)  Pulse:  91 (10/18/20 0945)  Resp: 17 (10/18/20 0945)  BP: 130/80 (10/17/20 1700)  SpO2: 100 % (10/18/20 0945)  O2 Device (Oxygen Therapy): nasal cannula (10/18/20 0324) Vital Signs (24h Range):  Temp:  [97.2 °F (36.2 °C)-98.6 °F (37 °C)] 97.2 °F (36.2 °C)  Pulse:  [83-98] 91  Resp:  [8-30] 17  SpO2:  [87 %-100 %] 100 %  BP: (130)/(80) 130/80  Arterial Line BP: (119-164)/(60-78) 152/66     Weight: 118 kg (260 lb 2.3 oz) (10/18/20 0456)  Body mass index is 35.28 kg/m².  Body surface area is 2.45 meters squared.    I/O last 3 completed shifts:  In: 6524.9 [P.O.:240; I.V.:6127.9; IV Piggyback:157]  Out: 9441 [Urine:15; Drains:694; Other:8732]    Physical Exam  Constitutional:       Appearance: He is ill-appearing.   HENT:      Head: Normocephalic and atraumatic.      Nose: Nose normal.      Mouth/Throat:      Mouth: Mucous membranes are moist.      Pharynx: Oropharynx is clear.   Eyes:      Conjunctiva/sclera: Conjunctivae normal.      Pupils: Pupils are equal, round, and reactive to light.   Cardiovascular:      Rate and Rhythm: Normal rate.   Pulmonary:      Breath sounds: Examination of the right-lower field reveals decreased breath sounds. Examination of the left-lower field reveals decreased breath sounds. Decreased breath sounds and rales present.      Comments: On nasal canula   Abdominal:      Palpations: Abdomen is soft.      Comments: Midline wound vac to abdomen with serosanguinous drainage in canister    Musculoskeletal:      Right lower leg: Edema present.      Left lower leg: Edema present.   Skin:     General: Skin is warm and dry.      Coloration: Skin is jaundiced.   Neurological:      Comments: Sedated          Significant Labs:  CBC:   Recent Labs   Lab 10/18/20  0427   WBC 8.61   RBC 2.63*   HGB 8.0*   HCT 25.2*   PLT 35*   MCV 96   MCH 30.4   MCHC 31.7*     CMP:   Recent Labs   Lab 10/18/20  0412   *  120*   CALCIUM 7.5*  7.5*   ALBUMIN 2.4*  2.4*   PROT 4.3*     140   K 4.5  4.5   CO2 29   29     105   BUN 11  11   CREATININE 1.1  1.1   ALKPHOS 74   *   AST 82*   BILITOT 3.3*     Coagulation:   Recent Labs   Lab 10/18/20  0427   INR 1.0   APTT 55.2*     LFTs:   Recent Labs   Lab 10/18/20  0412   *   AST 82*   ALKPHOS 74   BILITOT 3.3*   PROT 4.3*   ALBUMIN 2.4*  2.4*     All labs within the past 24 hours have been reviewed.     Significant Imaging:  Labs: Reviewed    Assessment/Plan:     * Status post liver transplant  -Management per primary team     JENN (acute kidney injury)    JENN likely 2/2 iATN from intraoperative hypotension and acute blood loss anemia     Plan/Recommendations:     -completed 12 hours of SLED overnight. Still edematous, will do 12 hrs of SCUF overnight  -Strict I/O's  -Labs per CRRT protocol   -Favor MAP >65  -Maintain Hgb >7.0  -Renally dose meds and avoid nephrotoxic medications   -Will continue to follow closely   -Plan discussed with staff, Dr Silva        Thank you for your consult. I will follow-up with patient. Please contact us if you have any additional questions.    Gonzalez Parsons MD  Nephrology  [facility]

## 2020-10-18 NOTE — ASSESSMENT & PLAN NOTE
BG goal 140 - 180       - Discontinue IV insulin infusion orders. Patient has stepped down on IV insulin parameters overnight.   - Low Dose SQ Insulin Correction Scale.  - BG monitoring every 4 hours while NPO     ** Please call Endocrine for any BG related issues **  ** Please notify Endocrine for any change and/or advance in diet**    Discharge planning:   TBD. Please notify endocrinology prior to discharge.

## 2020-10-18 NOTE — ASSESSMENT & PLAN NOTE
  Neuro/Psych: l   -- Pain: Dilaudid 0.5 mg PO q1     Cards:   -- HDS  -- Maintain SBP > 100  -- Pressors as needed to meet goals, turned off at 6am this morning       Pulm:   -- Goal O2 sat > 90% on RA   -- extubated 10/17 without issues       Renal:  -- Keep bourne for strict I/O  -- BUN/Cr:11/1.1   -- Nephrology consulted; received CRRT SLED 12 hours       FEN / GI:   -- Replace lytes as needed  -- Nutrition: NPO   -- Protonix BID 2/2 recent GIB  - concentrate gtts when possible      ID:   -- Tm: afebrile; WBC 6.6  -- Abx  Zosyn (d/c'ed), fluconazole, Bactrim, valganciclovir      Heme/Onc:   -- H/H stable  - afternoon CBC 10/17 f/u  -- Continue heparin gtt for thrombus  -- Follow up HIT lab      Endo:   -- Gluc goal 140-180  -- Tacrolimus and prednisone taper       PPx:   Feeding: NPO  Analgesia/Sedation: Dilaudid pushes   Thromboembolic prevention: Heparin gtt  HOB >30: Yes  Stress Ulcer ppx: Protonix BID  Glucose control: Critical care goal 140-180 g/dl, ISS

## 2020-10-19 LAB
ALBUMIN SERPL BCP-MCNC: 2.3 G/DL (ref 3.5–5.2)
ALBUMIN SERPL BCP-MCNC: 2.4 G/DL (ref 3.5–5.2)
ALBUMIN SERPL BCP-MCNC: 2.4 G/DL (ref 3.5–5.2)
ALP SERPL-CCNC: 79 U/L (ref 55–135)
ALT SERPL W/O P-5'-P-CCNC: 371 U/L (ref 10–44)
ANION GAP SERPL CALC-SCNC: 11 MMOL/L (ref 8–16)
ANION GAP SERPL CALC-SCNC: 9 MMOL/L (ref 8–16)
ANION GAP SERPL CALC-SCNC: 9 MMOL/L (ref 8–16)
ANISOCYTOSIS BLD QL SMEAR: SLIGHT
AST SERPL-CCNC: 43 U/L (ref 10–40)
BACTERIA SPEC ANAEROBE CULT: NORMAL
BASOPHILS # BLD AUTO: 0.01 K/UL (ref 0–0.2)
BASOPHILS NFR BLD: 0.2 % (ref 0–1.9)
BILIRUB DIRECT SERPL-MCNC: 2.8 MG/DL (ref 0.1–0.3)
BILIRUB SERPL-MCNC: 3.6 MG/DL (ref 0.1–1)
BUN SERPL-MCNC: 25 MG/DL (ref 6–20)
CALCIUM SERPL-MCNC: 7.9 MG/DL (ref 8.7–10.5)
CALCIUM SERPL-MCNC: 8 MG/DL (ref 8.7–10.5)
CALCIUM SERPL-MCNC: 8 MG/DL (ref 8.7–10.5)
CHLORIDE SERPL-SCNC: 103 MMOL/L (ref 95–110)
CHLORIDE SERPL-SCNC: 104 MMOL/L (ref 95–110)
CHLORIDE SERPL-SCNC: 104 MMOL/L (ref 95–110)
CO2 SERPL-SCNC: 21 MMOL/L (ref 23–29)
CO2 SERPL-SCNC: 23 MMOL/L (ref 23–29)
CO2 SERPL-SCNC: 23 MMOL/L (ref 23–29)
CREAT SERPL-MCNC: 2.1 MG/DL (ref 0.5–1.4)
CREAT SERPL-MCNC: 2.1 MG/DL (ref 0.5–1.4)
CREAT SERPL-MCNC: 2.2 MG/DL (ref 0.5–1.4)
DIFFERENTIAL METHOD: ABNORMAL
EOSINOPHIL # BLD AUTO: 0 K/UL (ref 0–0.5)
EOSINOPHIL NFR BLD: 0.2 % (ref 0–8)
ERYTHROCYTE [DISTWIDTH] IN BLOOD BY AUTOMATED COUNT: 19.1 % (ref 11.5–14.5)
EST. GFR  (AFRICAN AMERICAN): 39.8 ML/MIN/1.73 M^2
EST. GFR  (AFRICAN AMERICAN): 42.1 ML/MIN/1.73 M^2
EST. GFR  (AFRICAN AMERICAN): 42.1 ML/MIN/1.73 M^2
EST. GFR  (NON AFRICAN AMERICAN): 34.4 ML/MIN/1.73 M^2
EST. GFR  (NON AFRICAN AMERICAN): 36.4 ML/MIN/1.73 M^2
EST. GFR  (NON AFRICAN AMERICAN): 36.4 ML/MIN/1.73 M^2
FACT X PPP CHRO-ACNC: 0.3 IU/ML (ref 0.3–0.7)
FACT X PPP CHRO-ACNC: 0.39 IU/ML (ref 0.3–0.7)
FINAL PATHOLOGIC DIAGNOSIS: NORMAL
GLUCOSE SERPL-MCNC: 145 MG/DL (ref 70–110)
GLUCOSE SERPL-MCNC: 147 MG/DL (ref 70–110)
GLUCOSE SERPL-MCNC: 147 MG/DL (ref 70–110)
GROSS: NORMAL
HCT VFR BLD AUTO: 23.9 % (ref 40–54)
HGB BLD-MCNC: 7.6 G/DL (ref 14–18)
IMM GRANULOCYTES # BLD AUTO: 0.13 K/UL (ref 0–0.04)
IMM GRANULOCYTES NFR BLD AUTO: 2.3 % (ref 0–0.5)
INR PPP: 0.9 (ref 0.8–1.2)
LYMPHOCYTES # BLD AUTO: 0.2 K/UL (ref 1–4.8)
LYMPHOCYTES NFR BLD: 3 % (ref 18–48)
Lab: NORMAL
MAGNESIUM SERPL-MCNC: 2.1 MG/DL (ref 1.6–2.6)
MCH RBC QN AUTO: 30.6 PG (ref 27–31)
MCHC RBC AUTO-ENTMCNC: 31.8 G/DL (ref 32–36)
MCV RBC AUTO: 96 FL (ref 82–98)
MONOCYTES # BLD AUTO: 0.3 K/UL (ref 0.3–1)
MONOCYTES NFR BLD: 5.5 % (ref 4–15)
NEUTROPHILS # BLD AUTO: 5 K/UL (ref 1.8–7.7)
NEUTROPHILS NFR BLD: 88.8 % (ref 38–73)
NRBC BLD-RTO: 0 /100 WBC
PHOSPHATE SERPL-MCNC: 4.9 MG/DL (ref 2.7–4.5)
PLATELET # BLD AUTO: 29 K/UL (ref 150–350)
PLATELET BLD QL SMEAR: ABNORMAL
PMV BLD AUTO: 12.1 FL (ref 9.2–12.9)
POCT GLUCOSE: 134 MG/DL (ref 70–110)
POCT GLUCOSE: 136 MG/DL (ref 70–110)
POCT GLUCOSE: 137 MG/DL (ref 70–110)
POCT GLUCOSE: 149 MG/DL (ref 70–110)
POIKILOCYTOSIS BLD QL SMEAR: SLIGHT
POTASSIUM SERPL-SCNC: 4.8 MMOL/L (ref 3.5–5.1)
PROT SERPL-MCNC: 4.4 G/DL (ref 6–8.4)
PROTHROMBIN TIME: 10.3 SEC (ref 9–12.5)
RBC # BLD AUTO: 2.48 M/UL (ref 4.6–6.2)
SODIUM SERPL-SCNC: 135 MMOL/L (ref 136–145)
SODIUM SERPL-SCNC: 136 MMOL/L (ref 136–145)
SODIUM SERPL-SCNC: 136 MMOL/L (ref 136–145)
TACROLIMUS BLD-MCNC: 4.7 NG/ML (ref 5–15)
WBC # BLD AUTO: 5.67 K/UL (ref 3.9–12.7)

## 2020-10-19 PROCEDURE — 85520 HEPARIN ASSAY: CPT | Mod: 91

## 2020-10-19 PROCEDURE — 63600175 PHARM REV CODE 636 W HCPCS: Performed by: STUDENT IN AN ORGANIZED HEALTH CARE EDUCATION/TRAINING PROGRAM

## 2020-10-19 PROCEDURE — 99900035 HC TECH TIME PER 15 MIN (STAT)

## 2020-10-19 PROCEDURE — 25000003 PHARM REV CODE 250: Performed by: STUDENT IN AN ORGANIZED HEALTH CARE EDUCATION/TRAINING PROGRAM

## 2020-10-19 PROCEDURE — 90935 HEMODIALYSIS ONE EVALUATION: CPT | Mod: ,,, | Performed by: INTERNAL MEDICINE

## 2020-10-19 PROCEDURE — 80053 COMPREHEN METABOLIC PANEL: CPT

## 2020-10-19 PROCEDURE — 80069 RENAL FUNCTION PANEL: CPT

## 2020-10-19 PROCEDURE — 63600175 PHARM REV CODE 636 W HCPCS: Performed by: SURGERY

## 2020-10-19 PROCEDURE — 85610 PROTHROMBIN TIME: CPT

## 2020-10-19 PROCEDURE — 83735 ASSAY OF MAGNESIUM: CPT

## 2020-10-19 PROCEDURE — 20600001 HC STEP DOWN PRIVATE ROOM

## 2020-10-19 PROCEDURE — 90945 DIALYSIS ONE EVALUATION: CPT

## 2020-10-19 PROCEDURE — 80100008 HC CRRT DAILY MAINTENANCE

## 2020-10-19 PROCEDURE — 99291 PR CRITICAL CARE, E/M 30-74 MINUTES: ICD-10-PCS | Mod: 24,,, | Performed by: SURGERY

## 2020-10-19 PROCEDURE — 94761 N-INVAS EAR/PLS OXIMETRY MLT: CPT

## 2020-10-19 PROCEDURE — 27000221 HC OXYGEN, UP TO 24 HOURS

## 2020-10-19 PROCEDURE — 82248 BILIRUBIN DIRECT: CPT

## 2020-10-19 PROCEDURE — 80197 ASSAY OF TACROLIMUS: CPT

## 2020-10-19 PROCEDURE — 85025 COMPLETE CBC W/AUTO DIFF WBC: CPT

## 2020-10-19 PROCEDURE — 99291 CRITICAL CARE FIRST HOUR: CPT | Mod: 24,,, | Performed by: SURGERY

## 2020-10-19 PROCEDURE — 85520 HEPARIN ASSAY: CPT

## 2020-10-19 PROCEDURE — 25000003 PHARM REV CODE 250: Performed by: PHYSICIAN ASSISTANT

## 2020-10-19 PROCEDURE — 25000003 PHARM REV CODE 250: Performed by: NURSE PRACTITIONER

## 2020-10-19 PROCEDURE — 25000003 PHARM REV CODE 250: Performed by: HOSPITALIST

## 2020-10-19 PROCEDURE — 90935 PR HEMODIALYSIS, ONE EVALUATION: ICD-10-PCS | Mod: ,,, | Performed by: INTERNAL MEDICINE

## 2020-10-19 RX ORDER — IBUPROFEN 200 MG
24 TABLET ORAL
Status: DISCONTINUED | OUTPATIENT
Start: 2020-10-19 | End: 2020-10-20

## 2020-10-19 RX ORDER — DIPHENHYDRAMINE HCL 25 MG
25 CAPSULE ORAL EVERY 6 HOURS PRN
Status: DISCONTINUED | OUTPATIENT
Start: 2020-10-19 | End: 2020-10-27 | Stop reason: HOSPADM

## 2020-10-19 RX ORDER — LABETALOL HCL 20 MG/4 ML
10 SYRINGE (ML) INTRAVENOUS ONCE
Status: COMPLETED | OUTPATIENT
Start: 2020-10-19 | End: 2020-10-19

## 2020-10-19 RX ORDER — INSULIN ASPART 100 [IU]/ML
0-5 INJECTION, SOLUTION INTRAVENOUS; SUBCUTANEOUS
Status: DISCONTINUED | OUTPATIENT
Start: 2020-10-19 | End: 2020-10-20

## 2020-10-19 RX ORDER — IBUPROFEN 200 MG
16 TABLET ORAL
Status: DISCONTINUED | OUTPATIENT
Start: 2020-10-19 | End: 2020-10-20

## 2020-10-19 RX ADMIN — FLUCONAZOLE 200 MG: 200 TABLET ORAL at 08:10

## 2020-10-19 RX ADMIN — DIPHENHYDRAMINE HYDROCHLORIDE 25 MG: 25 CAPSULE ORAL at 03:10

## 2020-10-19 RX ADMIN — OXYCODONE HYDROCHLORIDE 5 MG: 5 TABLET ORAL at 03:10

## 2020-10-19 RX ADMIN — TACROLIMUS 0.5 MG: 0.5 CAPSULE ORAL at 05:10

## 2020-10-19 RX ADMIN — METHYLPREDNISOLONE SODIUM SUCCINATE 20 MG: 40 INJECTION, POWDER, FOR SOLUTION INTRAMUSCULAR; INTRAVENOUS at 08:10

## 2020-10-19 RX ADMIN — MUPIROCIN 1 G: 20 OINTMENT TOPICAL at 08:10

## 2020-10-19 RX ADMIN — TACROLIMUS 0.5 MG: 0.5 CAPSULE ORAL at 08:10

## 2020-10-19 RX ADMIN — OXYCODONE HYDROCHLORIDE 10 MG: 10 TABLET ORAL at 06:10

## 2020-10-19 RX ADMIN — Medication 100 MG: at 08:10

## 2020-10-19 RX ADMIN — MYCOPHENOLATE MOFETIL 1000 MG: 250 CAPSULE ORAL at 08:10

## 2020-10-19 RX ADMIN — Medication 10 MG: at 09:10

## 2020-10-19 RX ADMIN — FOLIC ACID 1 MG: 1 TABLET ORAL at 08:10

## 2020-10-19 RX ADMIN — SODIUM CHLORIDE: 0.9 INJECTION, SOLUTION INTRAVENOUS at 04:10

## 2020-10-19 RX ADMIN — PANTOPRAZOLE SODIUM 40 MG: 40 TABLET, DELAYED RELEASE ORAL at 08:10

## 2020-10-19 RX ADMIN — HYDROMORPHONE HYDROCHLORIDE 0.5 MG: 1 INJECTION, SOLUTION INTRAMUSCULAR; INTRAVENOUS; SUBCUTANEOUS at 02:10

## 2020-10-19 NOTE — PLAN OF CARE
Recommendations     1.) Continue renal diet.   2.) RD will monitor need for ONS     Goals: Pt to meet >75% of estimated energy and protein needs over the course of 7 days.   Nutrition Goal Status: progressing towards goal  Communication of RD Recs: (POC\)

## 2020-10-19 NOTE — PROGRESS NOTES
[facility]  Critical Care - Surgery  Progress Note    Patient Name: Jordan Altman  MRN: [id number]  Admission Date: 10/11/2020  Hospital Length of Stay: 8 days  Code Status: Full Code  Attending Provider: Moon Moreno MD  Primary Care Provider: SHAHEEN Jaime   Principal Problem: Status post liver transplant    Subjective:     Hospital/ICU Course:  No notes on file    Interval History/Significant Events:   NAEON  Lateral drain was removed yesterday  Tolerated CRRT  Received 1 PLT yesterday before removal of drain and lines  Tolerating CLD    Follow-up For: Procedure(s) (LRB):  LAPAROTOMY, EXPLORATORY, BILIARY ANASTOMOSIS, ABDOMINAL CLOSURE (N/A)    Post-Operative Day: 3 Days Post-Op    Objective:     Vital Signs (Most Recent):  Temp: 98.4 °F (36.9 °C) (10/19/20 0700)  Pulse: 74 (10/19/20 0750)  Resp: 15 (10/19/20 0750)  BP: 130/80 (10/17/20 1700)  SpO2: 98 % (10/19/20 0750) Vital Signs (24h Range):  Temp:  [97.2 °F (36.2 °C)-98.4 °F (36.9 °C)] 98.4 °F (36.9 °C)  Pulse:  [74-98] 74  Resp:  [10-31] 15  SpO2:  [88 %-100 %] 98 %  Arterial Line BP: (134-171)/() 134/69     Weight: 105 kg (231 lb 7.7 oz)  Body mass index is 31.39 kg/m².      Intake/Output Summary (Last 24 hours) at 10/19/2020 0840  Last data filed at 10/19/2020 0800  Gross per 24 hour   Intake 4964.75 ml   Output 8000 ml   Net -3035.25 ml     Neuro: Awake, alert, oriented x 3  Resp: Respirations even and unlabored on 2L NC, chest rise symmetric  CVS: 2+ distal pulses, RRR on monitor  : Duran catheter in place with some dark yellow urine drainage  Abdomen: S/aTTP/ND. Chevron incision closed with overlying dressing CDI. Right ADAM drain in place with SS output.  MSK: Moving all four extremities spontaneously    Lines/Drains/Airways     Central Venous Catheter Line            Percutaneous Central Line Insertion/Assessment - Triple Lumen  10/14/20 0530 5 days    Trialysis (Dialysis) Catheter 10/14/20 5 days          Drain                  Urethral Catheter 10/14/20 0535 Straight-tip;Non-latex 16 Fr. 5 days         Closed/Suction Drain 10/14/20 1316 Right;Medial Abdomen Bulb 19 Fr. 4 days          Arterial Line            Arterial Line 10/14/20 0501 Left Radial 5 days          Peripheral Intravenous Line                 LEONEL 10/14/20 0516 Left Antecubital 5 days                Significant Labs:    CBC/Anemia Profile:  Recent Labs   Lab 10/17/20  1415 10/18/20  0427 10/19/20  0359   WBC 8.19  8.19 8.61 5.67   HGB 8.7*  8.7* 8.0* 7.6*   HCT 26.3*  26.3* 25.2* 23.9*   PLT 40*  40* 35* 29*   MCV 92  92 96 96   RDW 19.0*  19.0* 19.3* 19.1*        Chemistries:  Recent Labs   Lab 10/17/20  1415  10/18/20  0412 10/18/20  2204 10/19/20  0359     139   < > 140  140 137  137 135*  136  136   K 4.6  4.6   < > 4.5  4.5 4.1  4.1 4.8  4.8  4.8     105   < > 105  105 104  104 103  104  104   CO2 22*  22*   < > 29  29 25  25 21*  23  23   BUN 27*  27*   < > 11  11 22*  22* 25*  25*  25*   CREATININE 2.4*  2.4*   < > 1.1  1.1 2.0*  2.0* 2.2*  2.1*  2.1*   CALCIUM 7.7*  7.7*   < > 7.5*  7.5* 8.1*  8.1* 7.9*  8.0*  8.0*   ALBUMIN 2.5*  2.5*   < > 2.4*  2.4* 2.4*  2.4* 2.3*  2.4*  2.4*   PROT  --   --  4.3*  --  4.4*   BILITOT  --   --  3.3*  --  3.6*   ALKPHOS  --   --  74  --  79   ALT  --   --  550*  --  371*   *  125*  --  82*  --  43*   MG 2.1  2.1   < > 2.0  2.0 2.0  2.0 2.1  2.1  2.1   PHOS 6.3*  6.3*   < > 3.5  3.5 3.9  3.9 4.9*  4.9*  4.9*    < > = values in this interval not displayed.       Coagulation:   Recent Labs   Lab 10/18/20  0427 10/19/20  0359   INR 1.0 0.9   APTT 55.2*  --      POCT Glucose:   Recent Labs   Lab 10/18/20  2204 10/19/20  0400 10/19/20  0754   POCTGLUCOSE 115* 149* 137*     All pertinent labs within the past 24 hours have been reviewed.    Significant Imaging:  I have reviewed all pertinent imaging results/findings within the past 24  hours.    Assessment/Plan:     Status post liver transplant    Neuro/Psych: l   -- Pain: PRN oxycodone      Cards:   -- HDS  -- Maintain SBP > 100  -- Has been off pressors >24 hours      Pulm:   -- Goal O2 sat > 90% on RA   -- extubated 10/17 without issues    -- Wean NC as able      Renal:  -- OK to discontinue Duran  -- BUN/Cr: 25 / 2.1  -- Nephrology consulted; received CRRT SLED 12 hours  -- Per Nephrology, will hold on dialyzing today and does not need HD trial before stepping down       FEN / GI:   -- Replace lytes as needed  -- Nutrition: Renal diet  -- Protonix BID 2/2 recent GIB  -- Remove medial ADAM drain      ID:   -- Tm: afebrile; WBC 5.6  -- Abx fluconazole, Bactrim, valganciclovir ppx      Heme/Onc:   -- H/H stable at 7.6 / 23.9  -- Continue heparin gtt for thrombus, anti-Xa therapeutic this morning  -- HIT negative      Endo:   -- Gluc goal 140-180  -- Tacrolimus and prednisone taper       PPx:   Feeding: Cardiac diet  Analgesia/Sedation: Oxycodone PRN  Thromboembolic prevention: Heparin gtt  HOB >30: Yes  Stress Ulcer ppx: Protonix BID  Glucose control: Critical care goal 140-180 g/dl, ISS    Dispo:   - Remove ADAM drain and arterial line  - OK for TSU     Critical care was time spent personally by me on the following activities: development of treatment plan with patient or surrogate and bedside caregivers, discussions with consultants, evaluation of patient's response to treatment, examination of patient, ordering and performing treatments and interventions, ordering and review of laboratory studies, ordering and review of radiographic studies, pulse oximetry, re-evaluation of patient's condition.  This critical care time did not overlap with that of any other provider or involve time for any procedures.     Johnny Galan II, MD  Critical Care - Surgery  [facility]

## 2020-10-19 NOTE — PROGRESS NOTES
Pt transferred from ICU to U [id number].  Report received from FRANCESCA Leigh RN.  Upon arrival to floor pt AAOx4, VSS and placed on VISI monitor. Incision site with staples, slight SS leaking to left side.  Dressing changed.    Old ADAM site with sutures and large amount of SS leaking.  Dressing over site changed.  Pt complaining of pain 5/10 to abdominal incision site.  Denies any other issues.  Pt oriented to room and call bell.  Bed lowered and locked in place. Call bell in reach.  Pt instructed to call for assistance if needed.  Will monitor.

## 2020-10-19 NOTE — NURSING
Patient transported to room 75450 on 2L NC and transport monitor. VSS. Heparin gtt @ 16.3 units/hr. Stage 2 to sacrum cleansed and triad cream applied. New dsg placed to right abdomen where ADAM drains were removed. No other skin breakdown noted. Patient belongings and chart sent with patient.

## 2020-10-19 NOTE — PLAN OF CARE
Pt remains AAO x 4. NC 2L. Denies SOB. Resp 12-20. O2 sat>95%.  5-7 flat. NSR. -160 mmHg maintained. Diminished breath sounds. Abd soft, tender. Chevron incision edges well approximated, staples intact. Scant drainage. Lateral nupur drain removed by MD. Remaining nupur drain c minimal serosanguinous drainage noted. Tolerating clear liquids well. UO increasing, concentrated. Remains on CRRT  ml/hr. Tolerating well. Remains on heparin gtt.  Turned/repositioned q 2 hours. Bathed, linen changed. No new skin breakdown noted. Sacrum pink, red, cream applied. See chart and doc flowsheet for details. Will continue to monitor.

## 2020-10-19 NOTE — PROCEDURES
[facility] SC[facility]F NOTE     Patient currently on SCUF for volume management.     Patient seen and evaluated on SCUF, tolerating treatment, see HD flowsheet for vitals and assessments.      Ultrafiltration goal is 500     Assessment/Plan:  Net negative 2L/24h.  UOP of 156 ml.  Electrolytes stable, oxygenating well on 2L NC.  Normotensive.    Will plan to hold RRT  Re-evaluate in AM for further needs.  No need for HD trial is primary team plans to stepdown.      JEFFY Campbell, FNP-BC  Nephrology  Pager:  [phone]

## 2020-10-19 NOTE — SUBJECTIVE & OBJECTIVE
Interval History/Significant Events:   NAEON  Lateral drain was removed yesterday  Tolerated CRRT  Received 1 PLT yesterday before removal of drain and lines  Tolerating CLD    Follow-up For: Procedure(s) (LRB):  LAPAROTOMY, EXPLORATORY, BILIARY ANASTOMOSIS, ABDOMINAL CLOSURE (N/A)    Post-Operative Day: 3 Days Post-Op    Objective:     Vital Signs (Most Recent):  Temp: 98.4 °F (36.9 °C) (10/19/20 0700)  Pulse: 74 (10/19/20 0750)  Resp: 15 (10/19/20 0750)  BP: 130/80 (10/17/20 1700)  SpO2: 98 % (10/19/20 0750) Vital Signs (24h Range):  Temp:  [97.2 °F (36.2 °C)-98.4 °F (36.9 °C)] 98.4 °F (36.9 °C)  Pulse:  [74-98] 74  Resp:  [10-31] 15  SpO2:  [88 %-100 %] 98 %  Arterial Line BP: (134-171)/() 134/69     Weight: 105 kg (231 lb 7.7 oz)  Body mass index is 31.39 kg/m².      Intake/Output Summary (Last 24 hours) at 10/19/2020 0840  Last data filed at 10/19/2020 0800  Gross per 24 hour   Intake 4964.75 ml   Output 8000 ml   Net -3035.25 ml     Neuro: Awake, alert, oriented x 3  Resp: Respirations even and unlabored on 2L NC, chest rise symmetric  CVS: 2+ distal pulses, RRR on monitor  : Duran catheter in place with some dark yellow urine drainage  Abdomen: S/aTTP/ND. Chevron incision closed with overlying dressing CDI. Right ADAM drain in place with SS output.  MSK: Moving all four extremities spontaneously    Lines/Drains/Airways     Central Venous Catheter Line            Percutaneous Central Line Insertion/Assessment - Triple Lumen  10/14/20 0530 5 days    Trialysis (Dialysis) Catheter 10/14/20 5 days          Drain                 Urethral Catheter 10/14/20 0535 Straight-tip;Non-latex 16 Fr. 5 days         Closed/Suction Drain 10/14/20 1316 Right;Medial Abdomen Bulb 19 Fr. 4 days          Arterial Line            Arterial Line 10/14/20 0501 Left Radial 5 days          Peripheral Intravenous Line                 LEONEL 10/14/20 0516 Left Antecubital 5 days                Significant Labs:    CBC/Anemia  Profile:  Recent Labs   Lab 10/17/20  1415 10/18/20  0427 10/19/20  0359   WBC 8.19  8.19 8.61 5.67   HGB 8.7*  8.7* 8.0* 7.6*   HCT 26.3*  26.3* 25.2* 23.9*   PLT 40*  40* 35* 29*   MCV 92  92 96 96   RDW 19.0*  19.0* 19.3* 19.1*        Chemistries:  Recent Labs   Lab 10/17/20  1415  10/18/20  0412 10/18/20  2204 10/19/20  0359     139   < > 140  140 137  137 135*  136  136   K 4.6  4.6   < > 4.5  4.5 4.1  4.1 4.8  4.8  4.8     105   < > 105  105 104  104 103  104  104   CO2 22*  22*   < > 29  29 25  25 21*  23  23   BUN 27*  27*   < > 11  11 22*  22* 25*  25*  25*   CREATININE 2.4*  2.4*   < > 1.1  1.1 2.0*  2.0* 2.2*  2.1*  2.1*   CALCIUM 7.7*  7.7*   < > 7.5*  7.5* 8.1*  8.1* 7.9*  8.0*  8.0*   ALBUMIN 2.5*  2.5*   < > 2.4*  2.4* 2.4*  2.4* 2.3*  2.4*  2.4*   PROT  --   --  4.3*  --  4.4*   BILITOT  --   --  3.3*  --  3.6*   ALKPHOS  --   --  74  --  79   ALT  --   --  550*  --  371*   *  125*  --  82*  --  43*   MG 2.1  2.1   < > 2.0  2.0 2.0  2.0 2.1  2.1  2.1   PHOS 6.3*  6.3*   < > 3.5  3.5 3.9  3.9 4.9*  4.9*  4.9*    < > = values in this interval not displayed.       Coagulation:   Recent Labs   Lab 10/18/20  0427 10/19/20  0359   INR 1.0 0.9   APTT 55.2*  --      POCT Glucose:   Recent Labs   Lab 10/18/20  2204 10/19/20  0400 10/19/20  0754   POCTGLUCOSE 115* 149* 137*     All pertinent labs within the past 24 hours have been reviewed.    Significant Imaging:  I have reviewed all pertinent imaging results/findings within the past 24 hours.

## 2020-10-19 NOTE — PROGRESS NOTES
[facility]  Adult Nutrition  Progress Note    SUMMARY       Recommendations    1.) Continue renal diet.   2.) RD will monitor need for ONS    Goals: Pt to meet >75% of estimated energy and protein needs over the course of 7 days.   Nutrition Goal Status: progressing towards goal  Communication of RD Recs: (POC\)    Reason for Assessment    Reason For Assessment: RD follow-up  Diagnosis: (s/p liver transplant)  Relevant Medical History: HTN, Decompensated hepatic cirrhosis, liver transplant  Interdisciplinary Rounds: did not attend  General Information Comments: Pt s/p liver transplant. Pt extubated. Pt reports 10# weight loss prior to admission and only able to consume snacks throughout the day. Pt diet recently advanced, however tolerated clear liquids well. GI- LBM 10/13, pt denies N/V. RD unable to complete NFPE at time of visit due to nsg providing care.   Nutrition Discharge Planning: Post transplant nutrition education to be completed on TSU    Nutrition Risk Screen    Nutrition Risk Screen: no indicators present      Anthropometrics    Temp: 98.4 °F (36.9 °C)  Height Method: Stated  Height: 6' (182.9 cm)  Height (inches): 72 in  Weight Method: Bed Scale  Weight: 105 kg (231 lb 7.7 oz)  Weight (lb): 231.49 lb  Ideal Body Weight (IBW), Male: 178 lb  % Ideal Body Weight, Male (lb): 141.94 %  BMI (Calculated): 31.4       Lab/Procedures/Meds    Pertinent Labs Reviewed: reviewed  Pertinent Labs Comments: Na 135, BUN 25, Cr 2.2, GFR 34.4, Glucose 145, Ca 7.9, Phosphorus 4.9, Alb 2.3  Pertinent Medications Comments: folic acid, methylprednisone, mycophenolate, pantoprazole, tacrolimus, thiamine    Physical Findings/Assessment     Edema 3+ generalized    Estimated/Assessed Needs    Weight Used For Calorie Calculations: 105 kg (231 lb 7.7 oz)  Energy Calorie Requirements (kcal): 2453  Energy Need Method: [place](x1.25PAL)  Protein Requirements: 121-145gm (1.5-1.8gm/kg IBW)  Weight Used For  Protein Calculations: 80.9 kg (178 lb 5.6 oz)(IBW)        RDA Method (mL): 2453         Nutrition Prescription Ordered    Current Diet Order: Renal    Evaluation of Received Nutrient/Fluid Intake    Other Calories (kcal): 0  Total Calories (kcal): 0  I/O: I: 4964; o: 7063; +4.0L since admission  Comments: LBM 10/13  Tolerance: tolerating  % Intake of Estimated Energy Needs: 50  % Meal Intake: 100    Nutrition Risk    Level of Risk/Frequency of Follow-up: high(2x weekly)     Assessment and Plan     Nutrition Problem  Inadequate energy intake    Related to (etiology):   Clear liquid diet    Signs and Symptoms (as evidenced by):   Clear liquid diet does not provide estimated energy needs.     Interventions/Recommendations (treatment strategy):  1.) Collaboration with other providers  2.) Mineral modified diet    Nutrition Diagnosis Status:   new        Monitor and Evaluation    Food and Nutrient Intake: energy intake, food and beverage intake, enteral nutrition intake  Food and Nutrient Adminstration: diet order, enteral and parenteral nutrition administration  Anthropometric Measurements: weight, weight change  Biochemical Data, Medical Tests and Procedures: electrolyte and renal panel, gastrointestinal profile  Nutrition-Focused Physical Findings: overall appearance, skin     Nutrition Follow-Up    RD Follow-up?: Yes

## 2020-10-19 NOTE — CARE UPDATE
BG goal 140 - 180     BG is within or below goal without use of insulin therapy. Endo will continue to follow and assess glycemic needs with diet advancement.     Diet renal  3 Days Post-Op    - Low Dose SQ Insulin Correction Scale PRN BG excursions.   - BG Monitoring AC/HS     ** Please call Endocrine for any BG related issues **  ** Please notify Endocrine for any change and/or advance in diet**    Discharge Planning:   TBD. Please notify endocrinology prior to discharge.     Lab Results   Component Value Date    HGBA1C <4.0 (A) 10/13/2020

## 2020-10-19 NOTE — PROGRESS NOTES
Pt on continuous heparin gtt at 16.3 units/kg/hr, DW 94.6 kg, drip rate 15.4 ml/hr.   Last anti-xa drawn at 1205 - 0.30 IU/mL.  Previous one done at 0600 0.39 IU/mL.  Both therapeutic.  Will schedule for next anti-xa to be drawn with AM labs as per orders based on nomogram.

## 2020-10-19 NOTE — PLAN OF CARE
Progress Note  Transplant Surgery    Admit Date: 10/11/2020  Post-operative Day: 5  Hospital Day: 9    ORGAN:   LIVER  Disease Etiology: Alcoholic Cirrhosis  Donor Type:   Donation after Brain Death  CDC High Risk:   Yes  Donor CMV Status:   Donor CMV Status: Positive  Donor HBcAB:   Negative  Donor HBV SHAYE: Negative  Donor HCV SHAYE: Positive  Donor HCV Status:   Positive  Whole or Partial: Whole Liver  Biliary Anastomosis: End to End  Arterial Anatomy: Standard         Follow-up For: Procedure(s) (LRB):  LAPAROTOMY, EXPLORATORY, BILIARY ANASTOMOSIS, ABDOMINAL CLOSURE (N/A)      ASSESSMENT/PLAN:     I conducted multidisciplinary rounds in conjunction with the ICU/Critical Care attending staff, fellows, and residents, with involvement of ancillary services as appropriate. The patient's general condition was reviewed, specifically including allograft function, immunosuppressive management, dietary and nutritional status, pharmacy concerns, and status of expected transition to transplant stepdown care.    For details see the critical care note.    Rogerio Malik MD

## 2020-10-20 PROBLEM — R53.1 WEAKNESS: Status: ACTIVE | Noted: 2020-10-20

## 2020-10-20 PROBLEM — Z79.60 LONG-TERM USE OF IMMUNOSUPPRESSANT MEDICATION: Status: ACTIVE | Noted: 2020-10-20

## 2020-10-20 PROBLEM — Z91.89 AT RISK FOR OPPORTUNISTIC INFECTIONS: Status: ACTIVE | Noted: 2020-10-20

## 2020-10-20 PROBLEM — R73.9 ACUTE HYPERGLYCEMIA: Status: RESOLVED | Noted: 2020-10-14 | Resolved: 2020-10-20

## 2020-10-20 PROBLEM — E83.39 HYPERPHOSPHATEMIA: Status: ACTIVE | Noted: 2020-10-20

## 2020-10-20 PROBLEM — M25.562 LEFT KNEE PAIN: Status: RESOLVED | Noted: 2020-09-26 | Resolved: 2020-10-20

## 2020-10-20 PROBLEM — K92.2 GI BLEED: Status: RESOLVED | Noted: 2020-10-11 | Resolved: 2020-10-20

## 2020-10-20 LAB
ACANTHOCYTES BLD QL SMEAR: PRESENT
ALBUMIN SERPL BCP-MCNC: 2.2 G/DL (ref 3.5–5.2)
ALP SERPL-CCNC: 81 U/L (ref 55–135)
ALT SERPL W/O P-5'-P-CCNC: 241 U/L (ref 10–44)
ANION GAP SERPL CALC-SCNC: 8 MMOL/L (ref 8–16)
ANISOCYTOSIS BLD QL SMEAR: SLIGHT
ANISOCYTOSIS BLD QL SMEAR: SLIGHT
APTT BLDCRRT: 44.7 SEC (ref 21–32)
AST SERPL-CCNC: 22 U/L (ref 10–40)
BASOPHILS # BLD AUTO: 0 K/UL (ref 0–0.2)
BASOPHILS # BLD AUTO: 0.01 K/UL (ref 0–0.2)
BASOPHILS NFR BLD: 0 % (ref 0–1.9)
BASOPHILS NFR BLD: 0.2 % (ref 0–1.9)
BILIRUB SERPL-MCNC: 2.9 MG/DL (ref 0.1–1)
BLD PROD TYP BPU: NORMAL
BLOOD UNIT EXPIRATION DATE: NORMAL
BLOOD UNIT TYPE CODE: 6200
BLOOD UNIT TYPE: NORMAL
BUN SERPL-MCNC: 41 MG/DL (ref 6–20)
CALCIUM SERPL-MCNC: 8.3 MG/DL (ref 8.7–10.5)
CHLORIDE SERPL-SCNC: 102 MMOL/L (ref 95–110)
CO2 SERPL-SCNC: 24 MMOL/L (ref 23–29)
CODING SYSTEM: NORMAL
CREAT SERPL-MCNC: 3 MG/DL (ref 0.5–1.4)
DACRYOCYTES BLD QL SMEAR: ABNORMAL
DIFFERENTIAL METHOD: ABNORMAL
DIFFERENTIAL METHOD: ABNORMAL
DISPENSE STATUS: NORMAL
EOSINOPHIL # BLD AUTO: 0 K/UL (ref 0–0.5)
EOSINOPHIL # BLD AUTO: 0.1 K/UL (ref 0–0.5)
EOSINOPHIL NFR BLD: 0.4 % (ref 0–8)
EOSINOPHIL NFR BLD: 1.1 % (ref 0–8)
ERYTHROCYTE [DISTWIDTH] IN BLOOD BY AUTOMATED COUNT: 19.5 % (ref 11.5–14.5)
ERYTHROCYTE [DISTWIDTH] IN BLOOD BY AUTOMATED COUNT: 19.6 % (ref 11.5–14.5)
EST. GFR  (AFRICAN AMERICAN): 27.3 ML/MIN/1.73 M^2
EST. GFR  (NON AFRICAN AMERICAN): 23.6 ML/MIN/1.73 M^2
FACT X PPP CHRO-ACNC: 0.28 IU/ML (ref 0.3–0.7)
FACT X PPP CHRO-ACNC: 0.29 IU/ML (ref 0.3–0.7)
GLUCOSE SERPL-MCNC: 149 MG/DL (ref 70–110)
HBV DNA SERPL NAA+PROBE-ACNC: <10 IU/ML
HBV DNA SERPL NAA+PROBE-LOG IU: <1 LOG (10) IU/ML
HBV DNA SERPL QL NAA+PROBE: NOT DETECTED
HCT VFR BLD AUTO: 21.5 % (ref 40–54)
HCT VFR BLD AUTO: 22.7 % (ref 40–54)
HGB BLD-MCNC: 6.7 G/DL (ref 14–18)
HGB BLD-MCNC: 7.2 G/DL (ref 14–18)
HYPOCHROMIA BLD QL SMEAR: ABNORMAL
HYPOCHROMIA BLD QL SMEAR: ABNORMAL
IMM GRANULOCYTES # BLD AUTO: 0.12 K/UL (ref 0–0.04)
IMM GRANULOCYTES # BLD AUTO: 0.21 K/UL (ref 0–0.04)
IMM GRANULOCYTES NFR BLD AUTO: 2.3 % (ref 0–0.5)
IMM GRANULOCYTES NFR BLD AUTO: 3.3 % (ref 0–0.5)
INR PPP: 0.9 (ref 0.8–1.2)
LYMPHOCYTES # BLD AUTO: 0.3 K/UL (ref 1–4.8)
LYMPHOCYTES # BLD AUTO: 0.5 K/UL (ref 1–4.8)
LYMPHOCYTES NFR BLD: 5.3 % (ref 18–48)
LYMPHOCYTES NFR BLD: 8.1 % (ref 18–48)
MAGNESIUM SERPL-MCNC: 2 MG/DL (ref 1.6–2.6)
MCH RBC QN AUTO: 30.3 PG (ref 27–31)
MCH RBC QN AUTO: 31 PG (ref 27–31)
MCHC RBC AUTO-ENTMCNC: 31.2 G/DL (ref 32–36)
MCHC RBC AUTO-ENTMCNC: 31.7 G/DL (ref 32–36)
MCV RBC AUTO: 97 FL (ref 82–98)
MCV RBC AUTO: 98 FL (ref 82–98)
MONOCYTES # BLD AUTO: 0.6 K/UL (ref 0.3–1)
MONOCYTES # BLD AUTO: 0.8 K/UL (ref 0.3–1)
MONOCYTES NFR BLD: 11.7 % (ref 4–15)
MONOCYTES NFR BLD: 12.3 % (ref 4–15)
NEUTROPHILS # BLD AUTO: 4.1 K/UL (ref 1.8–7.7)
NEUTROPHILS # BLD AUTO: 4.9 K/UL (ref 1.8–7.7)
NEUTROPHILS NFR BLD: 75.8 % (ref 38–73)
NEUTROPHILS NFR BLD: 79.5 % (ref 38–73)
NRBC BLD-RTO: 0 /100 WBC
NRBC BLD-RTO: 0 /100 WBC
OVALOCYTES BLD QL SMEAR: ABNORMAL
OVALOCYTES BLD QL SMEAR: ABNORMAL
PHOSPHATE SERPL-MCNC: 6.4 MG/DL (ref 2.7–4.5)
PLATELET # BLD AUTO: 30 K/UL (ref 150–350)
PLATELET # BLD AUTO: 37 K/UL (ref 150–350)
PLATELET BLD QL SMEAR: ABNORMAL
PLATELET BLD QL SMEAR: ABNORMAL
PMV BLD AUTO: 12.9 FL (ref 9.2–12.9)
PMV BLD AUTO: 13.2 FL (ref 9.2–12.9)
POCT GLUCOSE: 136 MG/DL (ref 70–110)
POIKILOCYTOSIS BLD QL SMEAR: SLIGHT
POIKILOCYTOSIS BLD QL SMEAR: SLIGHT
POLYCHROMASIA BLD QL SMEAR: ABNORMAL
POLYCHROMASIA BLD QL SMEAR: ABNORMAL
POTASSIUM SERPL-SCNC: 4.6 MMOL/L (ref 3.5–5.1)
PROT SERPL-MCNC: 4.4 G/DL (ref 6–8.4)
PROTHROMBIN TIME: 10.4 SEC (ref 9–12.5)
RBC # BLD AUTO: 2.21 M/UL (ref 4.6–6.2)
RBC # BLD AUTO: 2.32 M/UL (ref 4.6–6.2)
SCHISTOCYTES BLD QL SMEAR: PRESENT
SODIUM SERPL-SCNC: 134 MMOL/L (ref 136–145)
STOMATOCYTES BLD QL SMEAR: PRESENT
TACROLIMUS BLD-MCNC: 3.3 NG/ML (ref 5–15)
TRANS ERYTHROCYTES VOL PATIENT: NORMAL ML
WBC # BLD AUTO: 5.11 K/UL (ref 3.9–12.7)
WBC # BLD AUTO: 6.4 K/UL (ref 3.9–12.7)

## 2020-10-20 PROCEDURE — 85730 THROMBOPLASTIN TIME PARTIAL: CPT

## 2020-10-20 PROCEDURE — 25000003 PHARM REV CODE 250: Performed by: STUDENT IN AN ORGANIZED HEALTH CARE EDUCATION/TRAINING PROGRAM

## 2020-10-20 PROCEDURE — 63600175 PHARM REV CODE 636 W HCPCS: Performed by: PHYSICIAN ASSISTANT

## 2020-10-20 PROCEDURE — 80197 ASSAY OF TACROLIMUS: CPT

## 2020-10-20 PROCEDURE — 20600001 HC STEP DOWN PRIVATE ROOM

## 2020-10-20 PROCEDURE — 63600175 PHARM REV CODE 636 W HCPCS: Performed by: SURGERY

## 2020-10-20 PROCEDURE — 25000003 PHARM REV CODE 250: Performed by: PHYSICIAN ASSISTANT

## 2020-10-20 PROCEDURE — 36430 TRANSFUSION BLD/BLD COMPNT: CPT

## 2020-10-20 PROCEDURE — P9021 RED BLOOD CELLS UNIT: HCPCS

## 2020-10-20 PROCEDURE — 25000003 PHARM REV CODE 250: Performed by: NURSE PRACTITIONER

## 2020-10-20 PROCEDURE — 84100 ASSAY OF PHOSPHORUS: CPT

## 2020-10-20 PROCEDURE — 97803 MED NUTRITION INDIV SUBSEQ: CPT

## 2020-10-20 PROCEDURE — 83735 ASSAY OF MAGNESIUM: CPT

## 2020-10-20 PROCEDURE — 94761 N-INVAS EAR/PLS OXIMETRY MLT: CPT

## 2020-10-20 PROCEDURE — 36415 COLL VENOUS BLD VENIPUNCTURE: CPT

## 2020-10-20 PROCEDURE — 99233 SBSQ HOSP IP/OBS HIGH 50: CPT | Mod: 24,,, | Performed by: PHYSICIAN ASSISTANT

## 2020-10-20 PROCEDURE — 99233 PR SUBSEQUENT HOSPITAL CARE,LEVL III: ICD-10-PCS | Mod: 24,,, | Performed by: PHYSICIAN ASSISTANT

## 2020-10-20 PROCEDURE — 85610 PROTHROMBIN TIME: CPT

## 2020-10-20 PROCEDURE — 99233 PR SUBSEQUENT HOSPITAL CARE,LEVL III: ICD-10-PCS | Mod: ,,, | Performed by: INTERNAL MEDICINE

## 2020-10-20 PROCEDURE — 85025 COMPLETE CBC W/AUTO DIFF WBC: CPT

## 2020-10-20 PROCEDURE — 63600175 PHARM REV CODE 636 W HCPCS: Performed by: STUDENT IN AN ORGANIZED HEALTH CARE EDUCATION/TRAINING PROGRAM

## 2020-10-20 PROCEDURE — 80053 COMPREHEN METABOLIC PANEL: CPT

## 2020-10-20 PROCEDURE — 63600175 PHARM REV CODE 636 W HCPCS: Performed by: TRANSPLANT SURGERY

## 2020-10-20 PROCEDURE — 85520 HEPARIN ASSAY: CPT

## 2020-10-20 PROCEDURE — 99233 SBSQ HOSP IP/OBS HIGH 50: CPT | Mod: ,,, | Performed by: INTERNAL MEDICINE

## 2020-10-20 PROCEDURE — 85520 HEPARIN ASSAY: CPT | Mod: 91

## 2020-10-20 RX ORDER — FUROSEMIDE 10 MG/ML
120 INJECTION INTRAMUSCULAR; INTRAVENOUS ONCE
Status: COMPLETED | OUTPATIENT
Start: 2020-10-20 | End: 2020-10-20

## 2020-10-20 RX ORDER — TACROLIMUS 0.5 MG/1
0.5 CAPSULE ORAL ONCE
Status: COMPLETED | OUTPATIENT
Start: 2020-10-20 | End: 2020-10-20

## 2020-10-20 RX ORDER — BISACODYL 5 MG
10 TABLET, DELAYED RELEASE (ENTERIC COATED) ORAL NIGHTLY
Status: DISCONTINUED | OUTPATIENT
Start: 2020-10-20 | End: 2020-10-27 | Stop reason: HOSPADM

## 2020-10-20 RX ORDER — FUROSEMIDE 10 MG/ML
120 INJECTION INTRAMUSCULAR; INTRAVENOUS ONCE
Status: DISCONTINUED | OUTPATIENT
Start: 2020-10-20 | End: 2020-10-20

## 2020-10-20 RX ORDER — TACROLIMUS 1 MG/1
1 CAPSULE ORAL EVERY MORNING
Status: DISCONTINUED | OUTPATIENT
Start: 2020-10-21 | End: 2020-10-21

## 2020-10-20 RX ORDER — SEVELAMER CARBONATE 800 MG/1
800 TABLET, FILM COATED ORAL
Status: DISCONTINUED | OUTPATIENT
Start: 2020-10-20 | End: 2020-10-23

## 2020-10-20 RX ORDER — TACROLIMUS 0.5 MG/1
0.5 CAPSULE ORAL EVERY EVENING
Status: DISCONTINUED | OUTPATIENT
Start: 2020-10-20 | End: 2020-10-21

## 2020-10-20 RX ORDER — HYDROCODONE BITARTRATE AND ACETAMINOPHEN 500; 5 MG/1; MG/1
TABLET ORAL
Status: DISCONTINUED | OUTPATIENT
Start: 2020-10-20 | End: 2020-10-22

## 2020-10-20 RX ORDER — SULFAMETHOXAZOLE AND TRIMETHOPRIM 400; 80 MG/1; MG/1
1 TABLET ORAL
Status: DISCONTINUED | OUTPATIENT
Start: 2020-10-21 | End: 2020-10-23

## 2020-10-20 RX ORDER — VALGANCICLOVIR 450 MG/1
450 TABLET, FILM COATED ORAL
Status: DISCONTINUED | OUTPATIENT
Start: 2020-10-26 | End: 2020-10-23

## 2020-10-20 RX ORDER — DOCUSATE SODIUM 100 MG/1
100 CAPSULE, LIQUID FILLED ORAL 3 TIMES DAILY
Status: DISCONTINUED | OUTPATIENT
Start: 2020-10-20 | End: 2020-10-27 | Stop reason: HOSPADM

## 2020-10-20 RX ADMIN — ACETAMINOPHEN 650 MG: 325 TABLET ORAL at 08:10

## 2020-10-20 RX ADMIN — HEPARIN SODIUM AND DEXTROSE 20.3 UNITS/KG/HR: 10000; 5 INJECTION INTRAVENOUS at 09:10

## 2020-10-20 RX ADMIN — OXYCODONE HYDROCHLORIDE 10 MG: 10 TABLET ORAL at 02:10

## 2020-10-20 RX ADMIN — MELATONIN TAB 3 MG 6 MG: 3 TAB at 08:10

## 2020-10-20 RX ADMIN — TACROLIMUS 0.5 MG: 0.5 CAPSULE ORAL at 02:10

## 2020-10-20 RX ADMIN — ACETAMINOPHEN 650 MG: 325 TABLET ORAL at 04:10

## 2020-10-20 RX ADMIN — FLUCONAZOLE 200 MG: 200 TABLET ORAL at 08:10

## 2020-10-20 RX ADMIN — PANTOPRAZOLE SODIUM 40 MG: 40 TABLET, DELAYED RELEASE ORAL at 08:10

## 2020-10-20 RX ADMIN — MYCOPHENOLATE MOFETIL 1000 MG: 250 CAPSULE ORAL at 08:10

## 2020-10-20 RX ADMIN — TACROLIMUS 0.5 MG: 0.5 CAPSULE ORAL at 06:10

## 2020-10-20 RX ADMIN — HEPARIN SODIUM AND DEXTROSE 30 UNITS/KG/HR: 10000; 5 INJECTION INTRAVENOUS at 07:10

## 2020-10-20 RX ADMIN — SEVELAMER CARBONATE 800 MG: 800 TABLET, FILM COATED ORAL at 06:10

## 2020-10-20 RX ADMIN — TACROLIMUS 0.5 MG: 0.5 CAPSULE ORAL at 08:10

## 2020-10-20 RX ADMIN — DOCUSATE SODIUM 100 MG: 100 CAPSULE, LIQUID FILLED ORAL at 02:10

## 2020-10-20 RX ADMIN — DOCUSATE SODIUM 100 MG: 100 CAPSULE, LIQUID FILLED ORAL at 08:10

## 2020-10-20 RX ADMIN — FUROSEMIDE 120 MG: 10 INJECTION, SOLUTION INTRAMUSCULAR; INTRAVENOUS at 05:10

## 2020-10-20 RX ADMIN — OXYCODONE HYDROCHLORIDE 10 MG: 10 TABLET ORAL at 12:10

## 2020-10-20 RX ADMIN — PREDNISONE 20 MG: 20 TABLET ORAL at 08:10

## 2020-10-20 RX ADMIN — BISACODYL 10 MG: 5 TABLET, COATED ORAL at 08:10

## 2020-10-20 RX ADMIN — DIPHENHYDRAMINE HYDROCHLORIDE 25 MG: 25 CAPSULE ORAL at 08:10

## 2020-10-20 RX ADMIN — HEPARIN SODIUM AND DEXTROSE 16.3 UNITS/KG/HR: 10000; 5 INJECTION INTRAVENOUS at 04:10

## 2020-10-20 NOTE — NURSING
"Pt AAOx3, VSS. Pt c/o incisional pian- pain controlled with PRN oxy.   1U RBC given today. Lasix given post infusion. Pt voids per urinal.   Pt had bilateral US of LE today- no evidence on DVT in either extremity.   Pt had liver US today.   Pt up with assist. Reminded to call staff for assistance. Pt verbalized understanding.   Accuchecks discontinued today. Chevron incision KELLY staples intact.   Heparin drip paused this AM at 835, and restarted at 1400 continued at 17.3 ml/hr. Next blood draw at 8 pm tonight.   Pt needs Self meds reinforcement. Pt not motivated to pull meds stating "I cant do this".   Bed in lowest position, locked. Call bell within reach. Non skid socks on. WCTM.   "

## 2020-10-20 NOTE — SUBJECTIVE & OBJECTIVE
Interval History:   NAEON.  Stepped down from ICU.  C/o gas pains on examination this morning, otherwise in good spirits.  Oliguric.  Electrolytes stable.    Review of patient's allergies indicates:   Allergen Reactions    Latex, natural rubber      Current Facility-Administered Medications   Medication Frequency    0.9%  NaCl infusion (for blood administration) Q24H PRN    acetaminophen tablet 650 mg Q8H PRN    bisacodyL EC tablet 10 mg QHS    diphenhydrAMINE capsule 25 mg Q6H PRN    docusate sodium capsule 100 mg TID    fluconazole tablet 200 mg Daily    furosemide injection 120 mg Once    heparin 25,000 units in dextrose 5% (100 units/ml) IV bolus from bag - ADDITIONAL PRN BOLUS - 30 units/kg PRN    heparin 25,000 units in dextrose 5% (100 units/ml) IV bolus from bag - ADDITIONAL PRN BOLUS - 60 units/kg PRN    heparin 25,000 units in dextrose 5% 250 mL (100 units/mL) infusion LOW INTENSITY nomogram Continuous    melatonin tablet 6 mg Nightly PRN    mycophenolate capsule 1,000 mg BID    ondansetron injection 4 mg Q6H PRN    oxyCODONE immediate release tablet 5 mg Q6H PRN    oxyCODONE immediate release tablet Tab 10 mg Q6H PRN    pantoprazole EC tablet 40 mg Daily    predniSONE tablet 20 mg Daily    sodium chloride 0.9% flush 10 mL PRN    [START ON 10/21/2020] sulfamethoxazole-trimethoprim 400-80mg per tablet 1 tablet Every Mon, Wed, Fri    tacrolimus capsule 0.5 mg Daily PM    [START ON 10/21/2020] tacrolimus capsule 1 mg Daily AM    [START ON 10/26/2020] valGANciclovir tablet 450 mg Every Mon, Wed, Fri       Objective:     Vital Signs (Most Recent):  Temp: 98.2 °F (36.8 °C) (10/20/20 1411)  Pulse: 91 (10/20/20 1411)  Resp: 20 (10/20/20 1417)  BP: (!) 155/106 (10/20/20 1411)  SpO2: (!) 90 % (10/20/20 1411)  O2 Device (Oxygen Therapy): room air (10/20/20 1356) Vital Signs (24h Range):  Temp:  [98.1 °F (36.7 °C)-98.6 °F (37 °C)] 98.2 °F (36.8 °C)  Pulse:  [77-98] 91  Resp:  [12-20] 20  SpO2:   [90 %-96 %] 90 %  BP: (124-155)/() 155/106     Weight: 109 kg (240 lb 4.8 oz) (10/20/20 0630)  Body mass index is 32.59 kg/m².  Body surface area is 2.35 meters squared.    I/O last 3 completed shifts:  In: 5391.2 [P.O.:1552; I.V.:3839.2]  Out: 7312 [Urine:116; Drains:19; Other:7177]    Physical Exam  Vitals signs and nursing note reviewed.   Constitutional:       General: He is not in acute distress.     Appearance: He is not ill-appearing.   HENT:      Head: Normocephalic and atraumatic.      Nose: Nose normal.      Mouth/Throat:      Mouth: Mucous membranes are moist.      Pharynx: Oropharynx is clear.   Eyes:      Extraocular Movements: Extraocular movements intact.      Conjunctiva/sclera: Conjunctivae normal.   Cardiovascular:      Rate and Rhythm: Normal rate and regular rhythm.   Pulmonary:      Breath sounds: Examination of the right-lower field reveals decreased breath sounds. Examination of the left-lower field reveals decreased breath sounds. Decreased breath sounds present. No rales.      Comments: On nasal canula   Abdominal:      Palpations: Abdomen is soft.      Comments: Midline wound vac to abdomen with serosanguinous drainage in canister    Musculoskeletal:      Right lower leg: Edema present.      Left lower leg: Edema present.   Skin:     General: Skin is warm and dry.      Coloration: Skin is jaundiced.   Neurological:      Mental Status: He is alert and oriented to person, place, and time.         Significant Labs:  CBC:   Recent Labs   Lab 10/20/20  0937   WBC 6.40   RBC 2.32*   HGB 7.2*   HCT 22.7*   PLT 37*   MCV 98   MCH 31.0   MCHC 31.7*     CMP:   Recent Labs   Lab 10/20/20  0530   *   CALCIUM 8.3*   ALBUMIN 2.2*   PROT 4.4*   *   K 4.6   CO2 24      BUN 41*   CREATININE 3.0*   ALKPHOS 81   *   AST 22   BILITOT 2.9*

## 2020-10-20 NOTE — PROGRESS NOTES
2nd note:  SW presented to patient's bedside for follow-up and continuity of care. Patient was observed alone in room, lying in bed and aaox4. Patient reported feeling well today and reported gratitude for receiving transplant. SW reviewed transplant lodging information as pt inquired about lodging process. Pt denied having any further questions and reports adequate coping at this time. SW will continue to follow patient for resources, education, support and dc planning as appropriate.

## 2020-10-20 NOTE — PROGRESS NOTES
[facility]  Adult Nutrition  Progress Note    SUMMARY       Recommendations    1. Continue Renal diet   2. Add Novasource renal once daily    Goals: Pt to meet >75% of estimated energy and protein needs over the course of 7 days.   Nutrition Goal Status: progressing towards goal  Communication of RD Recs: (POC\)    Reason for Assessment    Reason For Assessment: RD follow-up  Diagnosis: (s/p liver transplant)  Relevant Medical History: HTN, Decompensated hepatic cirrhosis, liver transplant  Interdisciplinary Rounds: did not attend  General Information Comments: S/p Liver tx 10/14. Pt reports appetite fair. Appetite decreased PTA x 1 month. #, per pt. PO intake between 50-75% currently. Denies n/v/d/c. 8% wt loss in 1 month. NFPE completed 10/20, mild wasting seen. Pt meets chronic moderate malnutrition per ASPEN guidelines    Nutrition Discharge Planning: Post transplant nutrition education provided on 10/20. Food safety/drug interactions emphasized. General healthy/low salt diet recommended. Education material left. No other needs identified. Caregiver not present.    Nutrition Risk Screen    Nutrition Risk Screen: no indicators present    Nutrition/Diet History    Factors Affecting Nutritional Intake: None identified at this time    Anthropometrics    Temp: 98.2 °F (36.8 °C)  Height Method: Stated  Height: 6' (182.9 cm)  Height (inches): 72 in  Weight Method: Bed Scale  Weight: 109 kg (240 lb 4.8 oz)  Weight (lb): 240.3 lb  Ideal Body Weight (IBW), Male: 178 lb  % Ideal Body Weight, Male (lb): 141.94 %  BMI (Calculated): 32.6  Usual Body Weight (UBW), kg: (270# per pt)       Lab/Procedures/Meds    Pertinent Labs Reviewed: reviewed  Pertinent Labs Comments: Na 134, BUN 41, Cre 3.0, glu 149, Phos 6.4, , Bili tot 2.9  Pertinent Medications Reviewed: reviewed  Pertinent Medications Comments: docusate, lasix, methylprednisone, mycophenolate, pantoprazole,  tacrolimus    Estimated/Assessed Needs    Weight Used For Calorie Calculations: 105 kg (231 lb 7.7 oz)  Energy Calorie Requirements (kcal): 2453  Energy Need Method: [place]-St Jeor(x1.25PAL)  Protein Requirements: 121-145gm (1.5-1.8gm/kg IBW)  Weight Used For Protein Calculations: 80.9 kg (178 lb 5.6 oz)(IBW)        RDA Method (mL): 2453       Nutrition Prescription Ordered    Current Diet Order: Renal    Evaluation of Received Nutrient/Fluid Intake    I/O: I: 4964; o: 7063; +4.0L since admission  Comments: LBM: 10/13  Tolerance: tolerating  % Intake of Estimated Energy Needs: 25 - 50 %  % Meal Intake: 50 - 75 %    Nutrition Risk    Level of Risk/Frequency of Follow-up: low     Assessment and Plan    Nutrition Problem:  Moderate Protein-Calorie Malnutrition  Malnutrition in the context of Chronic Illness/Injury    Related to (etiology):  Decreased appetite    Signs and Symptoms (as evidenced by):  Energy Intake: <75% of estimated energy requirement for 1 month  Body Fat Depletion: mild depletion of triceps   Muscle Mass Depletion: mild depletion of temples and clavicle region   Weight Loss: 8% x 1 month   Fluid Accumulation: mild    Interventions(treatment strategy):  Collaboration with other providers  Sodium, potassium, phosphorus modified diet  Nutrition education    Nutrition Diagnosis Status:  New      Monitor and Evaluation    Food and Nutrient Intake: energy intake  Food and Nutrient Adminstration: diet order  Knowledge/Beliefs/Attitudes: food and nutrition knowledge/skill  Anthropometric Measurements: weight, weight change  Biochemical Data, Medical Tests and Procedures: electrolyte and renal panel, gastrointestinal profile  Nutrition-Focused Physical Findings: overall appearance, skin     Malnutrition Assessment  Malnutrition Type: chronic illness          Weight Loss (Malnutrition): greater than 5% in 1 month  Energy Intake (Malnutrition): less than 75% for greater than or equal to 1 month   Orbital  Region (Subcutaneous Fat Loss): well nourished  Upper Arm Region (Subcutaneous Fat Loss): mild depletion  Thoracic and Lumbar Region: well nourished   Amish Region (Muscle Loss): mild depletion  Clavicle Bone Region (Muscle Loss): mild depletion  Scapular Bone Region (Muscle Loss): well nourished  Dorsal Hand (Muscle Loss): well nourished  Patellar Region (Muscle Loss): well nourished  Anterior Thigh Region (Muscle Loss): well nourished  Posterior Calf Region (Muscle Loss): well nourished   Edema (Fluid Accumulation): (1-2+)             Nutrition Follow-Up    RD Follow-up?: Yes

## 2020-10-20 NOTE — PROGRESS NOTES
[facility]  Nephrology  Progress Note    Patient Name: Jordan Altman  MRN: [id number]  Admission Date: 10/11/2020  Hospital Length of Stay: 9 days  Attending Provider: Rogerio Malik MD   Primary Care Physician: SHAHEEN Jaime  Principal Problem:Status post liver transplant    Subjective:       Interval History:   NAEON.  Stepped down from ICU.  C/o gas pains on examination this morning, otherwise in good spirits.  Oliguric.  Electrolytes stable.    Review of patient's allergies indicates:   Allergen Reactions    Latex, natural rubber      Current Facility-Administered Medications   Medication Frequency    0.9%  NaCl infusion (for blood administration) Q24H PRN    acetaminophen tablet 650 mg Q8H PRN    bisacodyL EC tablet 10 mg QHS    diphenhydrAMINE capsule 25 mg Q6H PRN    docusate sodium capsule 100 mg TID    fluconazole tablet 200 mg Daily    furosemide injection 120 mg Once    heparin 25,000 units in dextrose 5% (100 units/ml) IV bolus from bag - ADDITIONAL PRN BOLUS - 30 units/kg PRN    heparin 25,000 units in dextrose 5% (100 units/ml) IV bolus from bag - ADDITIONAL PRN BOLUS - 60 units/kg PRN    heparin 25,000 units in dextrose 5% 250 mL (100 units/mL) infusion LOW INTENSITY nomogram Continuous    melatonin tablet 6 mg Nightly PRN    mycophenolate capsule 1,000 mg BID    ondansetron injection 4 mg Q6H PRN    oxyCODONE immediate release tablet 5 mg Q6H PRN    oxyCODONE immediate release tablet Tab 10 mg Q6H PRN    pantoprazole EC tablet 40 mg Daily    predniSONE tablet 20 mg Daily    sodium chloride 0.9% flush 10 mL PRN    [START ON 10/21/2020] sulfamethoxazole-trimethoprim 400-80mg per tablet 1 tablet Every Mon, Wed, Fri    tacrolimus capsule 0.5 mg Daily PM    [START ON 10/21/2020] tacrolimus capsule 1 mg Daily AM    [START ON 10/26/2020] valGANciclovir tablet 450 mg Every Mon, Wed, Fri       Objective:     Vital Signs (Most Recent):  Temp: 98.2 °F (36.8  °C) (10/20/20 1411)  Pulse: 91 (10/20/20 1411)  Resp: 20 (10/20/20 1417)  BP: (!) 155/106 (10/20/20 1411)  SpO2: (!) 90 % (10/20/20 1411)  O2 Device (Oxygen Therapy): room air (10/20/20 1356) Vital Signs (24h Range):  Temp:  [98.1 °F (36.7 °C)-98.6 °F (37 °C)] 98.2 °F (36.8 °C)  Pulse:  [77-98] 91  Resp:  [12-20] 20  SpO2:  [90 %-96 %] 90 %  BP: (124-155)/() 155/106     Weight: 109 kg (240 lb 4.8 oz) (10/20/20 0630)  Body mass index is 32.59 kg/m².  Body surface area is 2.35 meters squared.    I/O last 3 completed shifts:  In: 5391.2 [P.O.:1552; I.V.:3839.2]  Out: 7312 [Urine:116; Drains:19; Other:7177]    Physical Exam  Vitals signs and nursing note reviewed.   Constitutional:       General: He is not in acute distress.     Appearance: He is not ill-appearing.   HENT:      Head: Normocephalic and atraumatic.      Nose: Nose normal.      Mouth/Throat:      Mouth: Mucous membranes are moist.      Pharynx: Oropharynx is clear.   Eyes:      Extraocular Movements: Extraocular movements intact.      Conjunctiva/sclera: Conjunctivae normal.   Cardiovascular:      Rate and Rhythm: Normal rate and regular rhythm.   Pulmonary:      Breath sounds: Examination of the right-lower field reveals decreased breath sounds. Examination of the left-lower field reveals decreased breath sounds. Decreased breath sounds present. No rales.      Comments: On nasal canula   Abdominal:      Palpations: Abdomen is soft.      Comments: Midline wound vac to abdomen with serosanguinous drainage in canister    Musculoskeletal:      Right lower leg: Edema present.      Left lower leg: Edema present.   Skin:     General: Skin is warm and dry.      Coloration: Skin is jaundiced.   Neurological:      Mental Status: He is alert and oriented to person, place, and time.         Significant Labs:  CBC:   Recent Labs   Lab 10/20/20  0937   WBC 6.40   RBC 2.32*   HGB 7.2*   HCT 22.7*   PLT 37*   MCV 98   MCH 31.0   MCHC 31.7*     CMP:   Recent Labs    Lab 10/20/20  0530   *   CALCIUM 8.3*   ALBUMIN 2.2*   PROT 4.4*   *   K 4.6   CO2 24      BUN 41*   CREATININE 3.0*   ALKPHOS 81   *   AST 22   BILITOT 2.9*            Assessment/Plan:     JENN (acute kidney injury)    JENN likely 2/2 iATN from intraoperative hypotension and acute blood loss anemia     Plan/Recommendations:   -Hold RRT today  -anticipate need for HD tomorrow  -recommend lasix 120 mg IV x 1 today and assess response  -bladder scan to r/o urinary retention  -Strict I/O's  -Renally dose meds and avoid nephrotoxic medications   -Will continue to follow closely   -Plan discussed with staff, Dr Hartley    Hyperphosphatemia  -low phos diet  -renvela 800 mg PO TID with meals      Kenneth Campbell NP  Nephrology  [facility]

## 2020-10-20 NOTE — PLAN OF CARE
"Patient AAOx4, VSS on RA, sats >95%. Afebrile. Tele in place- SR. Accucheck orders maintained, no coverage given o/n. Chev inc KELLY w/ staples- free from signs of infection- painted x3. Old ADAM RLQ inc dsg CDI. Pt c/o pain, controlled by PRN medications. Hep gtt continued @ 15.4ml/hr. Last Anti-Xa therapeutic. Up w/ assistance. Voids per urinal- scant output recorded o/n. No BM this shift. Bed locked and lowered, call bell in reach, nonskid socks on when out of bed. Reminded pt to call for assistance, pt verbalized understanding. Hand hygiene performed before and after care. Self Meds taught but needs reinforcement. Pt stated "I cannot do this at all" & "I am a , I need to know pill colors." When RN stated that the pills can change colors due to different shipments pt stated "Oh well I definitely can't do this, I'm all screwed up." Unknown when support person will be available to come to hospital for teaching. NAD noted, WCTM.     "

## 2020-10-20 NOTE — ASSESSMENT & PLAN NOTE
- Oliguric JENN requiring CRRT post op (had renal dysfx pre op).  - Nephrology following. Last CRRT 10/18-10/19  - Minimal UOP.   - No HD today. Will lasix challenge with 120 mg IV after blood transfusion (d/w Nephrology)  - Strict I/O. Daily weights.

## 2020-10-20 NOTE — PROGRESS NOTES
[facility]  Liver Transplant  Progress Note    Patient Name: Jordan Altman  MRN: [id number]  Admission Date: 10/11/2020  Hospital Length of Stay: 9 days  Code Status: Full Code  Primary Care Provider: SHAHEEN Jaime  Post-Operative Day: 6    ORGAN:   LIVER  Disease Etiology: Alcoholic Cirrhosis  Donor Type:   Donation after Brain Death  CDC High Risk:   Yes  Donor CMV Status:   Donor CMV Status: Positive  Donor HBcAB:   Negative  Donor HCV Status:   Positive  Donor HBV SHAYE: Negative  Donor HCV SHAYE: Positive  Whole or Partial: Whole Liver  Biliary Anastomosis: End to End  Arterial Anatomy: Standard  Subjective:     History of Present Illness:  Mr. 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 He also c/o chronic left knee pain and denies any recent injury. Occult blood test (+) at OSH but HCT stable. Pt denies fever, chills, nausea, vomiting, chest pain, sob, hematuria or changes in bladder function. Will admit to LTS for further evaluation.     Hospital Course:  ESLD 2/2 ETOH. Admitted 10/11 with suspected GIB from OSH. 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 Post op course significant for PVT requiring TB to OR POD#1 for thrombectomy. Hepatic artery also revised (shortened) due to kink. OR POD#2 for abdominal closure, bx, and reconstruction of bile duct. US 10/17 satisfactory. Oliguric JENN requiring CRRT post op. Nephrology following. Stepped down 10/19 afternoon. Drains and bourne out.    Interval History: No acute events overnight. POD#6. H/H decreased this AM, but vitals remained stable without overt sign of bleeding. Heparin gtt paused and sent for liver US. Liver US satisfactory with one small fairly stable fluid collection. LFTs trending down. Resumed heparin gtt. Also obtained US BLE for R>L edema  which was negative for DVT. Transfusing 1u PRBC today with 120 mg IV lasix to follow (d/w Nephrology). Needs to get OOB. PT/OT consulted. Tolerating diet. Bowel regimen started. Monitor.    Scheduled Meds:   bisacodyL  10 mg Oral QHS    docusate sodium  100 mg Oral TID    fluconazole  200 mg Oral Daily    furosemide (LASIX) IV  120 mg Intravenous Once    mycophenolate  1,000 mg Oral BID    pantoprazole  40 mg Oral Daily    predniSONE  20 mg Oral Daily    sevelamer carbonate  800 mg Oral TID WM    [START ON 10/21/2020] sulfamethoxazole-trimethoprim 400-80mg  1 tablet Oral Every Mon, Wed, Fri    tacrolimus  0.5 mg Oral Daily PM    [START ON 10/21/2020] tacrolimus  1 mg Oral Daily AM    [START ON 10/26/2020] valGANciclovir  450 mg Oral Every Mon, Wed, Fri     Continuous Infusions:   heparin (porcine) in D5W 18.3 Units/kg/hr (10/20/20 0725)     PRN Meds:sodium chloride, acetaminophen, diphenhydrAMINE, heparin (PORCINE), heparin (PORCINE), melatonin, ondansetron, oxyCODONE, oxyCODONE, sodium chloride 0.9%    Review of Systems   Constitutional: Positive for activity change. Negative for appetite change, chills and fever.   HENT: Negative for facial swelling and trouble swallowing.    Eyes: Negative for photophobia and redness.   Respiratory: Negative for cough, shortness of breath, wheezing and stridor.    Cardiovascular: Positive for leg swelling. Negative for chest pain and palpitations.   Gastrointestinal: Positive for abdominal pain (expected post op). Negative for abdominal distention, blood in stool, nausea and vomiting.   Genitourinary: Positive for decreased urine volume.   Skin: Positive for wound.   Allergic/Immunologic: Positive for immunocompromised state.   Neurological: Positive for weakness and headaches. Negative for light-headedness.   Hematological: Bruises/bleeds easily.   Psychiatric/Behavioral: Negative for agitation, behavioral problems, confusion and dysphoric mood.     Objective:      Vital Signs (Most Recent):  Temp: 98.2 °F (36.8 °C) (10/20/20 1411)  Pulse: 91 (10/20/20 1411)  Resp: 20 (10/20/20 1417)  BP: (!) 155/106 (10/20/20 1411)  SpO2: (!) 90 % (10/20/20 1411) Vital Signs (24h Range):  Temp:  [98.1 °F (36.7 °C)-98.6 °F (37 °C)] 98.2 °F (36.8 °C)  Pulse:  [77-98] 91  Resp:  [12-20] 20  SpO2:  [90 %-96 %] 90 %  BP: (124-155)/() 155/106     Weight: 109 kg (240 lb 4.8 oz)  Body mass index is 32.59 kg/m².    Intake/Output - Last 3 Shifts       10/18 0700 - 10/19 0659 10/19 0700 - 10/20 0659 10/20 0700 - 10/21 0659    P.O. 1272 780     I.V. (mL/kg) 3449 (32.8) 1328.2 (12.2)     Blood 243.8      IV Piggyback       Total Intake(mL/kg) 4964.8 (47.3) 2108.2 (19.3)     Urine (mL/kg/hr) 156 (0.1) 20 (0)     Emesis/NG output  0     Drains 282      Other 6625 2222     Stool  0     Total Output 7063 2242     Net -2098.3 -133.8            Urine Occurrence  0 x     Stool Occurrence  0 x     Emesis Occurrence  0 x           Physical Exam  Vitals signs and nursing note reviewed.   Constitutional:       General: He is not in acute distress.  Eyes:      General: Scleral icterus present.         Right eye: No discharge.         Left eye: No discharge.   Cardiovascular:      Rate and Rhythm: Normal rate and regular rhythm.      Pulses: Normal pulses.      Heart sounds: No murmur.   Pulmonary:      Effort: Pulmonary effort is normal. No respiratory distress.      Breath sounds: No wheezing or rales.      Comments: Decreased RLL  Abdominal:      Palpations: Abdomen is soft.      Tenderness: There is abdominal tenderness (mild incisional). There is no guarding.      Comments: Chevron incision KELLY with staples no s/s/i  RLQ drain sites dressed - serous drainage    Musculoskeletal:      Right lower leg: Edema present.      Left lower leg: Edema present.   Skin:     General: Skin is warm and dry.      Capillary Refill: Capillary refill takes 2 to 3 seconds.      Coloration: Skin is jaundiced.   Neurological:       General: No focal deficit present.      Mental Status: He is alert and oriented to person, place, and time.   Psychiatric:         Mood and Affect: Mood normal.         Behavior: Behavior normal.         Thought Content: Thought content normal.         Laboratory:  Immunosuppressants         Stop Route Frequency     tacrolimus capsule 1 mg      -- Oral Every morning     tacrolimus capsule 0.5 mg      -- Oral Every evening     mycophenolate capsule 1,000 mg      -- Oral 2 times daily        BMP:   Recent Labs   Lab 10/20/20  0530   *   *   K 4.6      CO2 24   BUN 41*   CREATININE 3.0*   CALCIUM 8.3*     CMP:   Recent Labs   Lab 10/20/20  0530   *   CALCIUM 8.3*   ALBUMIN 2.2*   PROT 4.4*   *   K 4.6   CO2 24      BUN 41*   CREATININE 3.0*   ALKPHOS 81   *   AST 22   BILITOT 2.9*       Diagnostic Results:  Reviewed    Debility/Functional status: Patient debilitated by evidence of Muscle wasting and atrophy, Weakness, Other malaise, Limitation of activities due to disability and Other reduced mobility. Physical and occupational therapy ordered daily to evaluate and treat. Debility was: present on admission.    Assessment/Plan:     * Status post liver transplant  - s/p DBD OLTx 10/14 (CMV+/-, donor HCV SHAYE+).  - Post op course significant for PVT requiring TB to OR POD#1 for thrombectomy. Hepatic artery also revised (shortened) due to kink. - OR POD#2 for abdominal closure, bx, and reconstruction of bile duct.   - US 10/17 satisfactory.   - Oliguric JENN requiring CRRT post op. Nephrology following.   - Stepped down 10/19 afternoon. Drains and bourne out.  - US 10/20 (POD#6) due to decreased H/H. Liver US satisfactory with one small fairly stable fluid collection.  - Continue heparin gtt. Transfusing 1u PRBC 10/20.  - LFTs trending down.  - Tolerating diet. Pain well controlled. Continue bowel regimen. PT/OT consulted.    At risk for opportunistic infections  - Bactrim for  "PCP prophylaxis.  - Valcyte for CMV prophylaxis.  - Fluc for fungal prophylaxis.       Long-term use of immunosuppressant medication  - See "prophylactic immunotherapy."       Weakness  - Encourage OOBTC/ambulation with assistance. PT/OT consulted.      Hyperphosphatemia  - Continue binders  - Monitor      Anticoagulated  - Heparin gtt for PVT. Monitor closely.      Prophylactic immunotherapy  - Continue Prograf. Monitor trough daily and adjust dose as needed to achieve therapeutic level.  - Continue Cellcept.  - Continue steroids, per protocol.      Class 1 obesity due to excess calories with serious comorbidity and body mass index (BMI) of 34.0 to 34.9 in adult  - Monitor      Acidosis  - Management per nephrology with HD  - Monitor    JENN (acute kidney injury)  - Oliguric JENN requiring CRRT post op (had renal dysfx pre op).  - Nephrology following. Last CRRT 10/18-10/19  - Minimal UOP.   - No HD today. Will lasix challenge with 120 mg IV after blood transfusion (d/w Nephrology)  - Strict I/O. Daily weights.          Decompensated hepatic cirrhosis  - Listed for liver transplant 2/2 ETOH.  - Blood cx with GPC   - One dose of Vanc given 10/12  - Per cx results, organism is a probable contaminant. No further treatment at this time.       MELD-Na score: 23 at 10/20/2020  5:30 AM  MELD score: 21 at 10/20/2020  5:30 AM  Calculated from:  Serum Creatinine: 3.0 mg/dL at 10/20/2020  5:30 AM  Serum Sodium: 134 mmol/L at 10/20/2020  5:30 AM  Total Bilirubin: 2.9 mg/dL at 10/20/2020  5:30 AM  INR(ratio): 0.9 (Rounded to 1) at 10/20/2020  5:30 AM  Age: 45 years 5 months            VTE Risk Mitigation (From admission, onward)         Ordered     heparin 25,000 units in dextrose 5% 250 mL (100 units/mL) infusion LOW INTENSITY nomogram  Continuous     Question:  Heparin Infusion Adjustment (DO NOT MODIFY ANSWER)  Answer:  [email or web address]\[email or web address]\Images\Pharmacy\HeparinInfusions\heparin LOW INTENSITY nomogram with ANTI-XA [id number] "    10/15/20 1304     heparin 25,000 units in dextrose 5% (100 units/ml) IV bolus from bag - ADDITIONAL PRN BOLUS - 30 units/kg  As needed (PRN)     Question:  Heparin Infusion Adjustment (DO NOT MODIFY ANSWER)  Answer:  \\[email or web address]\[email or web address]\Images\Pharmacy\HeparinInfusions\heparin LOW INTENSITY nomogram with ANTI-XA [id number]    10/15/20 1304     heparin 25,000 units in dextrose 5% (100 units/ml) IV bolus from bag - ADDITIONAL PRN BOLUS - 60 units/kg  As needed (PRN)     Question:  Heparin Infusion Adjustment (DO NOT MODIFY ANSWER)  Answer:  \[email or web address]\Images\Pharmacy\HeparinInfusions\heparin LOW INTENSITY nomogram with ANTI-XA [id number]    10/15/20 1304     Place sequential compression device  Until discontinued      10/14/20 1339     IP VTE HIGH RISK PATIENT  Once      10/14/20 1339     Place JELENA hose  Until discontinued      10/11/20 0217                The patients clinical status was discussed at multidisplinary rounds, involving transplant surgery, transplant medicine, pharmacy, nursing, nutrition, and social work    Discharge Planning:  Not a candidate for dc at this time.    Ct Esteban PA-C  Liver Transplant  [facility]

## 2020-10-20 NOTE — ASSESSMENT & PLAN NOTE
JENN likely 2/2 iATN from intraoperative hypotension and acute blood loss anemia     Plan/Recommendations:   -Hold RRT today  -anticipate need for HD tomorrow  -recommend lasix 120 mg IV x 1 today and assess response  -bladder scan to r/o urinary retention  -Strict I/O's  -Renally dose meds and avoid nephrotoxic medications   -Will continue to follow closely   -Plan discussed with staff, Dr Hartley

## 2020-10-20 NOTE — CARE UPDATE
SIGN OFF    Discontinue BG monitoring. Will sign off. Please re-consult Endo as needed.    Patient has no Dx of DM, and is tolerating PO intake without BG excursions and/or complications.     Thank you for the consult.     ** Please call Endocrine for any BG related issues **    Lab Results   Component Value Date    HGBA1C <4.0 (A) 10/13/2020

## 2020-10-20 NOTE — ASSESSMENT & PLAN NOTE
- Listed for liver transplant 2/2 ETOH.  - Blood cx with GPC   - One dose of Vanc given 10/12  - Per cx results, organism is a probable contaminant. No further treatment at this time.       MELD-Na score: 23 at 10/20/2020  5:30 AM  MELD score: 21 at 10/20/2020  5:30 AM  Calculated from:  Serum Creatinine: 3.0 mg/dL at 10/20/2020  5:30 AM  Serum Sodium: 134 mmol/L at 10/20/2020  5:30 AM  Total Bilirubin: 2.9 mg/dL at 10/20/2020  5:30 AM  INR(ratio): 0.9 (Rounded to 1) at 10/20/2020  5:30 AM  Age: 45 years 5 months

## 2020-10-20 NOTE — PLAN OF CARE
Recommendations    1. Continue Renal diet   2. Add Novasource renal once daily    Goals: Pt to meet >75% of estimated energy and protein needs over the course of 7 days.   Nutrition Goal Status: progressing towards goal  Communication of RD Recs: (POC\)

## 2020-10-20 NOTE — ASSESSMENT & PLAN NOTE
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  - Post op course significant for PVT requiring TB to OR POD#1 for thrombectomy. Hepatic artery also revised (shortened) due to kink. - OR POD#2 for abdominal closure, bx, and reconstruction of bile duct.   - US 10/17 satisfactory.   - Oliguric JENN requiring CRRT post op. Nephrology following.   - Stepped down 10/19 afternoon. Drains and bourne out.  - US 10/20 (POD#6) due to decreased H/H. Liver US satisfactory with one small fairly stable fluid collection.  - Continue heparin gtt. Transfusing 1u PRBC 10/20.  - LFTs trending down.  - Tolerating diet. Pain well controlled. Continue bowel regimen. PT/OT consulted.

## 2020-10-21 PROBLEM — D63.8 ANEMIA OF CHRONIC DISEASE: Status: ACTIVE | Noted: 2020-10-21

## 2020-10-21 LAB
ALBUMIN SERPL BCP-MCNC: 2.4 G/DL (ref 3.5–5.2)
ALP SERPL-CCNC: 91 U/L (ref 55–135)
ALT SERPL W/O P-5'-P-CCNC: 191 U/L (ref 10–44)
ANION GAP SERPL CALC-SCNC: 11 MMOL/L (ref 8–16)
ANISOCYTOSIS BLD QL SMEAR: SLIGHT
APTT BLDCRRT: 47.8 SEC (ref 21–32)
AST SERPL-CCNC: 25 U/L (ref 10–40)
BASOPHILS # BLD AUTO: 0.01 K/UL (ref 0–0.2)
BASOPHILS NFR BLD: 0.2 % (ref 0–1.9)
BILIRUB SERPL-MCNC: 3.3 MG/DL (ref 0.1–1)
BUN SERPL-MCNC: 53 MG/DL (ref 6–20)
CALCIUM SERPL-MCNC: 8.5 MG/DL (ref 8.7–10.5)
CHLORIDE SERPL-SCNC: 103 MMOL/L (ref 95–110)
CO2 SERPL-SCNC: 22 MMOL/L (ref 23–29)
CREAT SERPL-MCNC: 2.8 MG/DL (ref 0.5–1.4)
DIFFERENTIAL METHOD: ABNORMAL
EOSINOPHIL # BLD AUTO: 0.3 K/UL (ref 0–0.5)
EOSINOPHIL NFR BLD: 3.9 % (ref 0–8)
ERYTHROCYTE [DISTWIDTH] IN BLOOD BY AUTOMATED COUNT: 19.2 % (ref 11.5–14.5)
EST. GFR  (AFRICAN AMERICAN): 29.7 ML/MIN/1.73 M^2
EST. GFR  (NON AFRICAN AMERICAN): 25.7 ML/MIN/1.73 M^2
FACT X PPP CHRO-ACNC: 0.41 IU/ML (ref 0.3–0.7)
FACT X PPP CHRO-ACNC: 0.41 IU/ML (ref 0.3–0.7)
FINAL PATHOLOGIC DIAGNOSIS: NORMAL
GLUCOSE SERPL-MCNC: 109 MG/DL (ref 70–110)
GROSS: NORMAL
HCT VFR BLD AUTO: 24 % (ref 40–54)
HCV AB SERPL QL IA: POSITIVE
HGB BLD-MCNC: 7.5 G/DL (ref 14–18)
HYPOCHROMIA BLD QL SMEAR: ABNORMAL
IMM GRANULOCYTES # BLD AUTO: 0.23 K/UL (ref 0–0.04)
IMM GRANULOCYTES NFR BLD AUTO: 3.6 % (ref 0–0.5)
INR PPP: 1 (ref 0.8–1.2)
LYMPHOCYTES # BLD AUTO: 0.5 K/UL (ref 1–4.8)
LYMPHOCYTES NFR BLD: 7 % (ref 18–48)
Lab: NORMAL
MAGNESIUM SERPL-MCNC: 2 MG/DL (ref 1.6–2.6)
MCH RBC QN AUTO: 30.6 PG (ref 27–31)
MCHC RBC AUTO-ENTMCNC: 31.3 G/DL (ref 32–36)
MCV RBC AUTO: 98 FL (ref 82–98)
MICROSCOPIC EXAM: NORMAL
MONOCYTES # BLD AUTO: 0.7 K/UL (ref 0.3–1)
MONOCYTES NFR BLD: 11.4 % (ref 4–15)
NEUTROPHILS # BLD AUTO: 4.7 K/UL (ref 1.8–7.7)
NEUTROPHILS NFR BLD: 73.9 % (ref 38–73)
NRBC BLD-RTO: 0 /100 WBC
OVALOCYTES BLD QL SMEAR: ABNORMAL
PHOSPHATE SERPL-MCNC: 5.9 MG/DL (ref 2.7–4.5)
PLATELET # BLD AUTO: 39 K/UL (ref 150–350)
PMV BLD AUTO: 13.5 FL (ref 9.2–12.9)
POIKILOCYTOSIS BLD QL SMEAR: SLIGHT
POLYCHROMASIA BLD QL SMEAR: ABNORMAL
POTASSIUM SERPL-SCNC: 4.6 MMOL/L (ref 3.5–5.1)
PROT SERPL-MCNC: 4.5 G/DL (ref 6–8.4)
PROTHROMBIN TIME: 10.6 SEC (ref 9–12.5)
RBC # BLD AUTO: 2.45 M/UL (ref 4.6–6.2)
SODIUM SERPL-SCNC: 136 MMOL/L (ref 136–145)
TACROLIMUS BLD-MCNC: 3.2 NG/ML (ref 5–15)
WBC # BLD AUTO: 6.39 K/UL (ref 3.9–12.7)

## 2020-10-21 PROCEDURE — 80053 COMPREHEN METABOLIC PANEL: CPT

## 2020-10-21 PROCEDURE — 63600175 PHARM REV CODE 636 W HCPCS: Performed by: SURGERY

## 2020-10-21 PROCEDURE — 97161 PT EVAL LOW COMPLEX 20 MIN: CPT

## 2020-10-21 PROCEDURE — 86803 HEPATITIS C AB TEST: CPT | Mod: 91

## 2020-10-21 PROCEDURE — 36415 COLL VENOUS BLD VENIPUNCTURE: CPT

## 2020-10-21 PROCEDURE — 99233 SBSQ HOSP IP/OBS HIGH 50: CPT | Mod: ,,, | Performed by: NURSE PRACTITIONER

## 2020-10-21 PROCEDURE — 85025 COMPLETE CBC W/AUTO DIFF WBC: CPT

## 2020-10-21 PROCEDURE — 87902 NFCT AGT GNTYP ALYS HEP C: CPT

## 2020-10-21 PROCEDURE — 63600175 PHARM REV CODE 636 W HCPCS: Mod: JG | Performed by: NURSE PRACTITIONER

## 2020-10-21 PROCEDURE — 63600175 PHARM REV CODE 636 W HCPCS: Performed by: STUDENT IN AN ORGANIZED HEALTH CARE EDUCATION/TRAINING PROGRAM

## 2020-10-21 PROCEDURE — P9047 ALBUMIN (HUMAN), 25%, 50ML: HCPCS | Mod: JG | Performed by: NURSE PRACTITIONER

## 2020-10-21 PROCEDURE — 99233 PR SUBSEQUENT HOSPITAL CARE,LEVL III: ICD-10-PCS | Mod: ,,, | Performed by: NURSE PRACTITIONER

## 2020-10-21 PROCEDURE — 86803 HEPATITIS C AB TEST: CPT

## 2020-10-21 PROCEDURE — 99233 PR SUBSEQUENT HOSPITAL CARE,LEVL III: ICD-10-PCS | Mod: 24,,, | Performed by: NURSE PRACTITIONER

## 2020-10-21 PROCEDURE — 97530 THERAPEUTIC ACTIVITIES: CPT

## 2020-10-21 PROCEDURE — 85520 HEPARIN ASSAY: CPT

## 2020-10-21 PROCEDURE — 25000003 PHARM REV CODE 250: Performed by: PHYSICIAN ASSISTANT

## 2020-10-21 PROCEDURE — 63600175 PHARM REV CODE 636 W HCPCS: Performed by: TRANSPLANT SURGERY

## 2020-10-21 PROCEDURE — 25000003 PHARM REV CODE 250: Performed by: STUDENT IN AN ORGANIZED HEALTH CARE EDUCATION/TRAINING PROGRAM

## 2020-10-21 PROCEDURE — 20600001 HC STEP DOWN PRIVATE ROOM

## 2020-10-21 PROCEDURE — 84100 ASSAY OF PHOSPHORUS: CPT

## 2020-10-21 PROCEDURE — 63600175 PHARM REV CODE 636 W HCPCS: Performed by: PHYSICIAN ASSISTANT

## 2020-10-21 PROCEDURE — 85520 HEPARIN ASSAY: CPT | Mod: 91

## 2020-10-21 PROCEDURE — 80197 ASSAY OF TACROLIMUS: CPT

## 2020-10-21 PROCEDURE — 87522 HEPATITIS C REVRS TRNSCRPJ: CPT

## 2020-10-21 PROCEDURE — 85610 PROTHROMBIN TIME: CPT

## 2020-10-21 PROCEDURE — 25000003 PHARM REV CODE 250: Performed by: NURSE PRACTITIONER

## 2020-10-21 PROCEDURE — 97165 OT EVAL LOW COMPLEX 30 MIN: CPT

## 2020-10-21 PROCEDURE — 99233 SBSQ HOSP IP/OBS HIGH 50: CPT | Mod: 24,,, | Performed by: NURSE PRACTITIONER

## 2020-10-21 PROCEDURE — 83735 ASSAY OF MAGNESIUM: CPT

## 2020-10-21 PROCEDURE — 85730 THROMBOPLASTIN TIME PARTIAL: CPT

## 2020-10-21 RX ORDER — BISACODYL 10 MG
10 SUPPOSITORY, RECTAL RECTAL DAILY PRN
Status: DISCONTINUED | OUTPATIENT
Start: 2020-10-21 | End: 2020-10-27 | Stop reason: HOSPADM

## 2020-10-21 RX ORDER — PANTOPRAZOLE SODIUM 40 MG/1
40 TABLET, DELAYED RELEASE ORAL DAILY
Qty: 30 TABLET | Refills: 3 | Status: SHIPPED | OUTPATIENT
Start: 2020-10-22 | End: 2020-12-02

## 2020-10-21 RX ORDER — DOCUSATE SODIUM 100 MG/1
100 CAPSULE, LIQUID FILLED ORAL 3 TIMES DAILY PRN
Refills: 0 | Status: ON HOLD | COMMUNITY
Start: 2020-10-21 | End: 2021-01-29 | Stop reason: HOSPADM

## 2020-10-21 RX ORDER — BISACODYL 5 MG
10 TABLET, DELAYED RELEASE (ENTERIC COATED) ORAL DAILY PRN
Refills: 0 | Status: ON HOLD | COMMUNITY
Start: 2020-10-21 | End: 2021-01-29 | Stop reason: HOSPADM

## 2020-10-21 RX ORDER — ALBUMIN HUMAN 250 G/1000ML
25 SOLUTION INTRAVENOUS EVERY 8 HOURS
Status: COMPLETED | OUTPATIENT
Start: 2020-10-21 | End: 2020-10-21

## 2020-10-21 RX ORDER — FUROSEMIDE 10 MG/ML
120 INJECTION INTRAMUSCULAR; INTRAVENOUS ONCE
Status: COMPLETED | OUTPATIENT
Start: 2020-10-21 | End: 2020-10-21

## 2020-10-21 RX ORDER — PREDNISONE 5 MG/1
TABLET ORAL
Qty: 70 TABLET | Refills: 0 | Status: SHIPPED | OUTPATIENT
Start: 2020-10-21 | End: 2020-12-11

## 2020-10-21 RX ADMIN — OXYCODONE HYDROCHLORIDE 10 MG: 10 TABLET ORAL at 08:10

## 2020-10-21 RX ADMIN — FUROSEMIDE 120 MG: 10 INJECTION, SOLUTION INTRAMUSCULAR; INTRAVENOUS at 11:10

## 2020-10-21 RX ADMIN — ACETAMINOPHEN 650 MG: 325 TABLET ORAL at 08:10

## 2020-10-21 RX ADMIN — FLUCONAZOLE 200 MG: 200 TABLET ORAL at 08:10

## 2020-10-21 RX ADMIN — TACROLIMUS 1.5 MG: 1 CAPSULE ORAL at 05:10

## 2020-10-21 RX ADMIN — DOCUSATE SODIUM 100 MG: 100 CAPSULE, LIQUID FILLED ORAL at 08:10

## 2020-10-21 RX ADMIN — ALBUMIN (HUMAN) 25 G: 12.5 SOLUTION INTRAVENOUS at 02:10

## 2020-10-21 RX ADMIN — DIPHENHYDRAMINE HYDROCHLORIDE 25 MG: 25 CAPSULE ORAL at 08:10

## 2020-10-21 RX ADMIN — TACROLIMUS 1 MG: 1 CAPSULE ORAL at 08:10

## 2020-10-21 RX ADMIN — HEPARIN SODIUM AND DEXTROSE 20.3 UNITS/KG/HR: 10000; 5 INJECTION INTRAVENOUS at 09:10

## 2020-10-21 RX ADMIN — PREDNISONE 20 MG: 20 TABLET ORAL at 08:10

## 2020-10-21 RX ADMIN — MYCOPHENOLATE MOFETIL 1000 MG: 250 CAPSULE ORAL at 08:10

## 2020-10-21 RX ADMIN — BISACODYL 10 MG: 5 TABLET, COATED ORAL at 08:10

## 2020-10-21 RX ADMIN — MELATONIN TAB 3 MG 6 MG: 3 TAB at 08:10

## 2020-10-21 RX ADMIN — DOCUSATE SODIUM 100 MG: 100 CAPSULE, LIQUID FILLED ORAL at 03:10

## 2020-10-21 RX ADMIN — SEVELAMER CARBONATE 800 MG: 800 TABLET, FILM COATED ORAL at 05:10

## 2020-10-21 RX ADMIN — SEVELAMER CARBONATE 800 MG: 800 TABLET, FILM COATED ORAL at 08:10

## 2020-10-21 RX ADMIN — ALBUMIN (HUMAN) 25 G: 12.5 SOLUTION INTRAVENOUS at 08:10

## 2020-10-21 RX ADMIN — PANTOPRAZOLE SODIUM 40 MG: 40 TABLET, DELAYED RELEASE ORAL at 08:10

## 2020-10-21 RX ADMIN — SEVELAMER CARBONATE 800 MG: 800 TABLET, FILM COATED ORAL at 11:10

## 2020-10-21 RX ADMIN — SULFAMETHOXAZOLE AND TRIMETHOPRIM 1 TABLET: 400; 80 TABLET ORAL at 08:10

## 2020-10-21 NOTE — SUBJECTIVE & OBJECTIVE
Interval History:  Received PRBC yesterday followed by lasix of 120.  Increase in UOP noted, making 600 ml with unmeasured occurrences.   SCr improved as well, down to 2.8 from 3.0.  Other electrolytes stable.    Review of patient's allergies indicates:   Allergen Reactions    Latex, natural rubber      Current Facility-Administered Medications   Medication Frequency    0.9%  NaCl infusion (for blood administration) Q24H PRN    acetaminophen tablet 650 mg Q8H PRN    albumin human 25% bottle 25 g Q8H    bisacodyL EC tablet 10 mg QHS    bisacodyL suppository 10 mg Daily PRN    diphenhydrAMINE capsule 25 mg Q6H PRN    docusate sodium capsule 100 mg TID    fluconazole tablet 200 mg Daily    heparin 25,000 units in dextrose 5% (100 units/ml) IV bolus from bag - ADDITIONAL PRN BOLUS - 30 units/kg PRN    heparin 25,000 units in dextrose 5% (100 units/ml) IV bolus from bag - ADDITIONAL PRN BOLUS - 60 units/kg PRN    heparin 25,000 units in dextrose 5% 250 mL (100 units/mL) infusion LOW INTENSITY nomogram Continuous    melatonin tablet 6 mg Nightly PRN    mycophenolate capsule 1,000 mg BID    ondansetron injection 4 mg Q6H PRN    oxyCODONE immediate release tablet 5 mg Q6H PRN    oxyCODONE immediate release tablet Tab 10 mg Q6H PRN    pantoprazole EC tablet 40 mg Daily    predniSONE tablet 20 mg Daily    sevelamer carbonate tablet 800 mg TID WM    sodium chloride 0.9% flush 10 mL PRN    sulfamethoxazole-trimethoprim 400-80mg per tablet 1 tablet Every Mon, Wed, Fri    tacrolimus capsule 1.5 mg BID    [START ON 10/26/2020] valGANciclovir tablet 450 mg Every Mon, Wed, Fri       Objective:     Vital Signs (Most Recent):  Temp: 97.6 °F (36.4 °C) (10/21/20 1233)  Pulse: 88 (10/21/20 1208)  Resp: 18 (10/21/20 1208)  BP: (!) 131/90 (10/21/20 1208)  SpO2: 95 % (10/21/20 1208)  O2 Device (Oxygen Therapy): room air (10/21/20 1208) Vital Signs (24h Range):  Temp:  [97.6 °F (36.4 °C)-98.6 °F (37 °C)] 97.6 °F (36.4  °C)  Pulse:  [] 88  Resp:  [10-20] 18  SpO2:  [90 %-98 %] 95 %  BP: (131-156)/() 131/90     Weight: 112 kg (246 lb 14.6 oz) (10/21/20 0615)  Body mass index is 33.49 kg/m².  Body surface area is 2.39 meters squared.    I/O last 3 completed shifts:  In: 1480 [P.O.:1230; Blood:250]  Out: 600 [Urine:600]    Physical Exam  Vitals signs and nursing note reviewed.   Constitutional:       General: He is not in acute distress.     Appearance: He is not ill-appearing.   HENT:      Head: Normocephalic and atraumatic.      Nose: Nose normal.      Mouth/Throat:      Mouth: Mucous membranes are moist.      Pharynx: Oropharynx is clear.   Eyes:      Extraocular Movements: Extraocular movements intact.      Conjunctiva/sclera: Conjunctivae normal.   Cardiovascular:      Rate and Rhythm: Normal rate and regular rhythm.   Pulmonary:      Breath sounds: Examination of the right-lower field reveals decreased breath sounds. Examination of the left-lower field reveals decreased breath sounds. Decreased breath sounds present. No rales.      Comments: On nasal canula   Abdominal:      Palpations: Abdomen is soft.   Musculoskeletal:      Right lower leg: Edema present.      Left lower leg: Edema present.   Skin:     General: Skin is warm and dry.      Coloration: Skin is jaundiced.   Neurological:      Mental Status: He is alert and oriented to person, place, and time.         Significant Labs:  CBC:   Recent Labs   Lab 10/21/20  0400   WBC 6.39   RBC 2.45*   HGB 7.5*   HCT 24.0*   PLT 39*   MCV 98   MCH 30.6   MCHC 31.3*     CMP:   Recent Labs   Lab 10/21/20  0400      CALCIUM 8.5*   ALBUMIN 2.4*   PROT 4.5*      K 4.6   CO2 22*      BUN 53*   CREATININE 2.8*   ALKPHOS 91   *   AST 25   BILITOT 3.3*

## 2020-10-21 NOTE — PT/OT/SLP EVAL
Physical Therapy Evaluation    Patient Name:  Jordan Altman   MRN:  [id number]    Recommendations:     Discharge Recommendations:  rehabilitation facility   Discharge Equipment Recommendations: (TBD)   Barriers to discharge: None    Assessment:     Jordan Altman is a 45 y.o. male admitted with a medical diagnosis of Status post liver transplant.  He presents with the following impairments/functional limitations:  weakness, impaired endurance, impaired self care skills, impaired functional mobilty, gait instability, impaired balance, decreased lower extremity function, decreased safety awareness, pain Pt. cooperative and tolerated treatment well, but requires significant assist for OOB activity.    Rehab Prognosis: Good; patient would benefit from acute skilled PT services to address these deficits and reach maximum level of function.    Recent Surgery: Procedure(s) (LRB):  LAPAROTOMY, EXPLORATORY, BILIARY ANASTOMOSIS, ABDOMINAL CLOSURE (N/A) 5 Days Post-Op    Plan:     During this hospitalization, patient to be seen 4 x/week to address the identified rehab impairments via gait training, therapeutic activities, therapeutic exercises and progress toward the following goals:    · Plan of Care Expires:  11/20/20    Subjective     Chief Complaint: abd. discomfort and LE weakness  Patient/Family Comments/goals: to get stronger  Pain/Comfort:  · Pain Rating 1: 5/10  · Location - Orientation 1: generalized  · Location 1: abdomen  · Pain Addressed 1: Pre-medicate for activity, Reposition, Cessation of Activity  · Pain Rating Post-Intervention 1: 5/10    Patients cultural, spiritual, Yarsani conflicts given the current situation: no    Living Environment:  Pt. Lives with fiance in ground level apartment with no YAN.  Prior to admission, patients level of function was indep.  Equipment used at home: none.  Upon discharge, patient will have assistance from [facility].    Objective:     Communicated with nursing prior to  session.  Patient found supine with peripheral IV, telemetry  upon PT entry to room.    General Precautions: Standard, fall   Orthopedic Precautions:N/A   Braces:       Exams:  · RLE ROM: WFL  · RLE Strength: WFL except decreased hip strength  · LLE ROM: WFL  · LLE Strength: WFL except decreased hip strength    Functional Mobility:  · Bed Mobility:     · Rolling Left:  contact guard assistance  · Scooting: contact guard assistance  · Supine to Sit: minimal assistance  · Sit to Supine: moderate assistance  · Transfers:     · Sit to Stand:  moderate assistance with hand-held assist  · Gait: 4 small side steps with HHA-Mod A. Pt. amb. with decreased step length/floor clearance  · Balance: fair sitting and poor standing    Therapeutic Activities and Exercises:   Discussed therapy needs, goals, LE therex, and POC. Pt. Performed sitting balance/tolerance along EOB with SBA.    AM-PAC 6 CLICK MOBILITY  Total Score:14     Patient left supine with all lines intact and call button in reach.    GOALS:   Multidisciplinary Problems     Physical Therapy Goals        Problem: Physical Therapy Goal    Goal Priority Disciplines Outcome Goal Variances Interventions   Physical Therapy Goal     PT, PT/OT Ongoing, Progressing     Description: Goals to be met by: 2020     Patient will increase functional independence with mobility by performin. Supine to sit with Set-up [place]  2. Sit to supine with Set-up [place]  3. Sit to stand transfer with Contact Guard Assistance  4. Bed to chair transfer with Contact Guard Assistance using Rolling Walker  5. Gait  x 150 feet with Contact Guard Assistance using Rolling Walker.   6. Lower extremity exercise program x15 reps per handout, with supervision                     History:     Past Medical History:   Diagnosis Date    Decompensated hepatic cirrhosis     Hypertension     SBP (spontaneous bacterial peritonitis)        Past Surgical History:   Procedure Laterality  Date    ERCP N/A 10/1/2020    Procedure: ERCP (ENDOSCOPIC RETROGRADE CHOLANGIOPANCREATOGRAPHY);  Surgeon: Marcos Ramirez MD;  Location: [facility] ENDO (2ND FLR);  Service: Endoscopy;  Laterality: N/A;    ESOPHAGOGASTRODUODENOSCOPY N/A 10/13/2020    Procedure: EGD (ESOPHAGOGASTRODUODENOSCOPY);  Surgeon: Prasanth Jerry MD;  Location: [facility] ENDO (2ND FLR);  Service: Endoscopy;  Laterality: N/A;    EXPLORATORY LAPAROTOMY AFTER LIVER TRANSPLANTATION N/A 10/15/2020    Procedure: LAPAROTOMY, EXPLORATORY, AFTER LIVER TRANSPLANT, Possible redo of artery, possible portal thrombectomy. IntraOperative US.;  Surgeon: Curtis Zavaleta MD;  [facility];  Service: Transplant;  Laterality: N/A;  With intraoperative ultrasound    LIVER TRANSPLANT N/A 10/14/2020    Procedure: TRANSPLANT, LIVER;  Surgeon: Curtis Zavaleta MD;  [facility];  Service: Transplant;  Laterality: N/A;  Intra op HD    RIGHT HEART CATHETERIZATION Right 9/23/2020    Procedure: INSERTION, CATHETER, RIGHT HEART;  Surgeon: Mikel Costa Jr., MD;  Location: [facility] CATH LAB;  Service: Cardiology;  Laterality: Right;    THORACENTESIS  2011    THROMBECTOMY  10/15/2020    Procedure: THROMBECTOMY portal vein;  Surgeon: Curtis Zavaleta MD;  [facility];  Service: Transplant;;       Time Tracking:     PT Received On: 10/21/20  PT Start Time: 1254     PT Stop Time: 1318  PT Total Time (min): 24 min     Billable Minutes: Evaluation 14 and Therapeutic Activity 10      Heath Hopkins, PT  10/21/2020

## 2020-10-21 NOTE — ASSESSMENT & PLAN NOTE
- Oliguric JENN requiring CRRT post op (had renal dysfx pre op).  - Nephrology following. Last CRRT 10/18-10/19.  - UOP improving  - Cr 2.8 from 3.  - No HD today.   - Lasix 120 mg IV again today. Albumin x 2 doses.  - Strict I/O. Daily weights.

## 2020-10-21 NOTE — ASSESSMENT & PLAN NOTE
- Listed for liver transplant 2/2 ETOH.  - Blood cx with GPC.   - One dose of Vanc given 10/12.  - Per cx results, organism is a probable contaminant. No further treatment at this time.       MELD-Na score: 22 at 10/21/2020  4:00 AM  MELD score: 21 at 10/21/2020  4:00 AM  Calculated from:  Serum Creatinine: 2.8 mg/dL at 10/21/2020  4:00 AM  Serum Sodium: 136 mmol/L at 10/21/2020  4:00 AM  Total Bilirubin: 3.3 mg/dL at 10/21/2020  4:00 AM  INR(ratio): 1.0 at 10/21/2020  4:00 AM  Age: 45 years 5 months

## 2020-10-21 NOTE — ASSESSMENT & PLAN NOTE
JENN likely 2/2 iATN from intraoperative hypotension and acute blood loss anemia     Plan/Recommendations:   -Hold RRT today  -follow UOP today  -reassess labs in AM.  Hopeful that he is showing signs of renal recovery.  -bladder scan to r/o urinary retention  -Strict I/O's  -Renally dose meds and avoid nephrotoxic medications   -Will continue to follow closely   -Plan discussed with staff, Dr Hartley

## 2020-10-21 NOTE — PROGRESS NOTES
"[facility]  Nephrology  Progress Note    Patient Name: Jordan Altman  MRN: [id number]  Admission Date: 10/11/2020  Hospital Length of Stay: 10 days  Attending Provider: Rogerio Malik MD   Primary Care Physician: SHAHEEN Jaime  Principal Problem:Status post liver transplant    Subjective:     HPI: Valdo Altman is a 46 yo M with a medical history including HTN, SBP, hepatic cirrhosis and ESLD secondary to ETOH use. He is currently listed for a liver transplant. He presented to [facility] on 10/11/2020 with complaints of abdominal distention and black, tarry stools x 1 day. Occult stool test (+) in  Emergency room. Transferred to [facility] under liver transplant team for further evaluation. Pt s/p orthotopic liver transplant (whole liver, DBD donor) on 10/14/2020, requiring intraoperative SLED. 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 Underwent ex lap w portal vein thrombectomy, revision of hepatic artery and placement of abd wound vac on 10/15..." Pt with minimal UOP since transplant; received high-dose IV diuretics (120mg lasix and 500mg diuril) on 10/15, with minimal UOP. Pt with a rough baseline sCr of 1.2-1.4. Nephrology consulted for JENN and possible need for RRT.     -HPI was obtained from chart review as pt is currently intubated.     Interval History:  Received PRBC yesterday followed by lasix of 120.  Increase in UOP noted, making 600 ml with unmeasured occurrences.   SCr improved as well, down to 2.8 from 3.0.  Other electrolytes stable.    Review of patient's allergies indicates:   Allergen Reactions    Latex, natural rubber      Current Facility-Administered Medications   Medication Frequency    0.9%  NaCl infusion (for blood administration) Q24H PRN    " acetaminophen tablet 650 mg Q8H PRN    albumin human 25% bottle 25 g Q8H    bisacodyL EC tablet 10 mg QHS    bisacodyL suppository 10 mg Daily PRN    diphenhydrAMINE capsule 25 mg Q6H PRN    docusate sodium capsule 100 mg TID    fluconazole tablet 200 mg Daily    heparin 25,000 units in dextrose 5% (100 units/ml) IV bolus from bag - ADDITIONAL PRN BOLUS - 30 units/kg PRN    heparin 25,000 units in dextrose 5% (100 units/ml) IV bolus from bag - ADDITIONAL PRN BOLUS - 60 units/kg PRN    heparin 25,000 units in dextrose 5% 250 mL (100 units/mL) infusion LOW INTENSITY nomogram Continuous    melatonin tablet 6 mg Nightly PRN    mycophenolate capsule 1,000 mg BID    ondansetron injection 4 mg Q6H PRN    oxyCODONE immediate release tablet 5 mg Q6H PRN    oxyCODONE immediate release tablet Tab 10 mg Q6H PRN    pantoprazole EC tablet 40 mg Daily    predniSONE tablet 20 mg Daily    sevelamer carbonate tablet 800 mg TID WM    sodium chloride 0.9% flush 10 mL PRN    sulfamethoxazole-trimethoprim 400-80mg per tablet 1 tablet Every Mon, Wed, Fri    tacrolimus capsule 1.5 mg BID    [START ON 10/26/2020] valGANciclovir tablet 450 mg Every Mon, Wed, Fri       Objective:     Vital Signs (Most Recent):  Temp: 97.6 °F (36.4 °C) (10/21/20 1233)  Pulse: 88 (10/21/20 1208)  Resp: 18 (10/21/20 1208)  BP: (!) 131/90 (10/21/20 1208)  SpO2: 95 % (10/21/20 1208)  O2 Device (Oxygen Therapy): room air (10/21/20 1208) Vital Signs (24h Range):  Temp:  [97.6 °F (36.4 °C)-98.6 °F (37 °C)] 97.6 °F (36.4 °C)  Pulse:  [] 88  Resp:  [10-20] 18  SpO2:  [90 %-98 %] 95 %  BP: (131-156)/() 131/90     Weight: 112 kg (246 lb 14.6 oz) (10/21/20 0615)  Body mass index is 33.49 kg/m².  Body surface area is 2.39 meters squared.    I/O last 3 completed shifts:  In: 1480 [P.O.:1230; Blood:250]  Out: 600 [Urine:600]    Physical Exam  Vitals signs and nursing note reviewed.   Constitutional:       General: He is not in acute  distress.     Appearance: He is not ill-appearing.   HENT:      Head: Normocephalic and atraumatic.      Nose: Nose normal.      Mouth/Throat:      Mouth: Mucous membranes are moist.      Pharynx: Oropharynx is clear.   Eyes:      Extraocular Movements: Extraocular movements intact.      Conjunctiva/sclera: Conjunctivae normal.   Cardiovascular:      Rate and Rhythm: Normal rate and regular rhythm.   Pulmonary:      Breath sounds: Examination of the right-lower field reveals decreased breath sounds. Examination of the left-lower field reveals decreased breath sounds. Decreased breath sounds present. No rales.      Comments: On nasal canula   Abdominal:      Palpations: Abdomen is soft.   Musculoskeletal:      Right lower leg: Edema present.      Left lower leg: Edema present.   Skin:     General: Skin is warm and dry.      Coloration: Skin is jaundiced.   Neurological:      Mental Status: He is alert and oriented to person, place, and time.         Significant Labs:  CBC:   Recent Labs   Lab 10/21/20  0400   WBC 6.39   RBC 2.45*   HGB 7.5*   HCT 24.0*   PLT 39*   MCV 98   MCH 30.6   MCHC 31.3*     CMP:   Recent Labs   Lab 10/21/20  0400      CALCIUM 8.5*   ALBUMIN 2.4*   PROT 4.5*      K 4.6   CO2 22*      BUN 53*   CREATININE 2.8*   ALKPHOS 91   *   AST 25   BILITOT 3.3*            Assessment/Plan:     JENN (acute kidney injury)    JENN likely 2/2 iATN from intraoperative hypotension and acute blood loss anemia     Plan/Recommendations:   -Hold RRT today  -follow UOP today  -reassess labs in AM.  Hopeful that he is showing signs of renal recovery.  -bladder scan to r/o urinary retention  -Strict I/O's  -Renally dose meds and avoid nephrotoxic medications   -Will continue to follow closely   -Plan discussed with staff, Dr Hartley    Hyperphosphatemia  -low phos diet  -renvela 800 mg PO TID with meals      Kenneth Campbell NP  Nephrology  [facility]

## 2020-10-21 NOTE — ASSESSMENT & PLAN NOTE
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  - Post op course significant for PVT requiring TB to OR POD#1 for thrombectomy. Hepatic artery also revised (shortened) due to kink. - OR POD#2 for abdominal closure, bx, and reconstruction of bile duct.   - US 10/17 satisfactory.   - Oliguric JENN requiring CRRT post op. Nephrology following.   - Stepped down 10/19 afternoon. Drains and bourne out.  - US 10/20 (POD#6) due to decreased H/H. Liver US satisfactory with one small fairly stable fluid collection.  - Continue heparin gtt. Transfused 1u PRBC 10/20.  - LFTs trending down.  - Tolerating diet. Pain well controlled. Continue bowel regimen. PT/OT consulted.

## 2020-10-21 NOTE — SUBJECTIVE & OBJECTIVE
Scheduled Meds:   albumin human 25%  25 g Intravenous Q8H    bisacodyL  10 mg Oral QHS    docusate sodium  100 mg Oral TID    fluconazole  200 mg Oral Daily    mycophenolate  1,000 mg Oral BID    pantoprazole  40 mg Oral Daily    predniSONE  20 mg Oral Daily    sevelamer carbonate  800 mg Oral TID WM    sulfamethoxazole-trimethoprim 400-80mg  1 tablet Oral Every Mon, Wed, Fri    tacrolimus  1.5 mg Oral BID    [START ON 10/26/2020] valGANciclovir  450 mg Oral Every Mon, Wed, Fri     Continuous Infusions:   heparin (porcine) in D5W 20.3 Units/kg/hr (10/21/20 0971)     PRN Meds:sodium chloride, acetaminophen, bisacodyL, diphenhydrAMINE, heparin (PORCINE), heparin (PORCINE), melatonin, ondansetron, oxyCODONE, oxyCODONE, sodium chloride 0.9%    Review of Systems   Constitutional: Positive for activity change. Negative for appetite change, chills and fever.   HENT: Negative for congestion, facial swelling and trouble swallowing.    Eyes: Negative for pain, discharge and visual disturbance.   Respiratory: Negative for cough, chest tightness, shortness of breath and wheezing.    Cardiovascular: Positive for leg swelling. Negative for chest pain and palpitations.   Gastrointestinal: Positive for abdominal pain (expected post op). Negative for abdominal distention, blood in stool, nausea and vomiting.   Endocrine: Negative.    Genitourinary: Positive for decreased urine volume.   Skin: Positive for wound.   Allergic/Immunologic: Positive for immunocompromised state.   Neurological: Positive for weakness and headaches. Negative for light-headedness.   Hematological: Bruises/bleeds easily.   Psychiatric/Behavioral: Negative for agitation, behavioral problems, confusion and dysphoric mood.     Objective:     Vital Signs (Most Recent):  Temp: 98.4 °F (36.9 °C) (10/21/20 1208)  Pulse: 88 (10/21/20 1208)  Resp: 18 (10/21/20 0816)  BP: (!) 131/90 (10/21/20 1208)  SpO2: 95 % (10/21/20 1208) Vital Signs (24h Range):  Temp:   [98.1 °F (36.7 °C)-98.6 °F (37 °C)] 98.4 °F (36.9 °C)  Pulse:  [] 88  Resp:  [10-20] 18  SpO2:  [90 %-98 %] 95 %  BP: (131-156)/() 131/90     Weight: 112 kg (246 lb 14.6 oz)  Body mass index is 33.49 kg/m².    Intake/Output - Last 3 Shifts       10/19 0700 - 10/20 0659 10/20 0700 - 10/21 0659 10/21 0700 - 10/22 0659    P.O. 780 750 240    I.V. (mL/kg) 1328.2 (12.2)      Blood  250     Total Intake(mL/kg) 2108.2 (19.3) 1000 (8.9) 240 (2.1)    Urine (mL/kg/hr) 20 (0) 600 (0.2) 320 (0.6)    Emesis/NG output 0      Drains       Other 2222      Stool 0 0     Total Output 2242 600 320    Net -133.8 +400 -80           Urine Occurrence 0 x 1 x     Stool Occurrence 0 x 0 x     Emesis Occurrence 0 x            Physical Exam  Vitals signs and nursing note reviewed.   Constitutional:       General: He is not in acute distress.  Eyes:      General: Scleral icterus present.         Right eye: No discharge.         Left eye: No discharge.   Cardiovascular:      Rate and Rhythm: Normal rate and regular rhythm.      Pulses: Normal pulses.      Heart sounds: No murmur.   Pulmonary:      Effort: Pulmonary effort is normal. No respiratory distress.      Breath sounds: No wheezing or rales.      Comments: Decreased RLL  Abdominal:      Palpations: Abdomen is soft.      Tenderness: There is abdominal tenderness (mild incisional). There is no guarding.      Comments: Chevron incision KELLY with staples no s/s/i  RLQ drain sites intact   Musculoskeletal:      Right lower leg: Edema present.      Left lower leg: Edema present.   Skin:     General: Skin is warm and dry.      Capillary Refill: Capillary refill takes 2 to 3 seconds.      Coloration: Skin is jaundiced.   Neurological:      General: No focal deficit present.      Mental Status: He is alert and oriented to person, place, and time.   Psychiatric:         Mood and Affect: Mood normal.         Behavior: Behavior normal.         Thought Content: Thought content normal.          Laboratory:  Immunosuppressants         Stop Route Frequency     tacrolimus capsule 1.5 mg      -- Oral 2 times daily     mycophenolate capsule 1,000 mg      -- Oral 2 times daily        CBC:   Recent Labs   Lab 10/21/20  0400   WBC 6.39   RBC 2.45*   HGB 7.5*   HCT 24.0*   PLT 39*   MCV 98   MCH 30.6   MCHC 31.3*     CMP:   Recent Labs   Lab 10/21/20  0400      CALCIUM 8.5*   ALBUMIN 2.4*   PROT 4.5*      K 4.6   CO2 22*      BUN 53*   CREATININE 2.8*   ALKPHOS 91   *   AST 25   BILITOT 3.3*     Labs within the past 24 hours have been reviewed.    Diagnostic Results:  I have personally reviewed all pertinent imaging studies.    Debility/Functional status: Patient debilitated by evidence of Weakness. Physical and occupational therapy ordered daily to evaluate and treat. Debility was: present on admission.

## 2020-10-21 NOTE — NURSING
Pt AAOx3, VSS. Afebrile. Pt c/o pain- pain controlled with PRN oxy.   Heparin drip currently at 20.3 U/kg/hr (at 19.2 ml/hr). Anti Xa therapeutic at 0.41IU/ml at 10 AM labs- next blood draw with AM labs.   Pt worked with PT/OT today.   Lasix administered per orders. UO documented in flowsheet.  Albumin administered per orders.   Pt reminded to call for assistance. Pt verbalized understanding.   Bed in the lowest position, locked. Non skid socks on. Call bell within reach. WCTM.

## 2020-10-21 NOTE — PLAN OF CARE
Problem: Physical Therapy Goal  Goal: Physical Therapy Goal  Description: Goals to be met by: 2020     Patient will increase functional independence with mobility by performin. Supine to sit with Set-up [place]  2. Sit to supine with Set-up [place]  3. Sit to stand transfer with Contact Guard Assistance  4. Bed to chair transfer with Contact Guard Assistance using Rolling Walker  5. Gait  x 150 feet with Contact Guard Assistance using Rolling Walker.   6. Lower extremity exercise program x15 reps per handout, with supervision    Outcome: Ongoing, Progressing

## 2020-10-21 NOTE — PROGRESS NOTES
[facility]  Liver Transplant  Progress Note    Patient Name: Jordan Altman  MRN: [id number]  Admission Date: 10/11/2020  Hospital Length of Stay: 10 days  Code Status: Full Code  Primary Care Provider: SHAHEEN Jaime  Post-Operative Day: 7    ORGAN:   LIVER  Disease Etiology: Alcoholic Cirrhosis  Donor Type:   Donation after Brain Death  [facility] High Risk:   Yes  Donor CMV Status:   Donor CMV Status: Positive  Donor HBcAB:   Negative  Donor HCV Status:   Positive  Donor HBV SHAYE: Negative  Donor HCV SHAYE: Positive  Whole or Partial: Whole Liver  Biliary Anastomosis: End to End  Arterial Anatomy: Standard  Subjective:     History of Present Illness:  Mr. 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 He also c/o chronic left knee pain and denies any recent injury. Occult blood test (+) at OSH but HCT stable. Pt denies fever, chills, nausea, vomiting, chest pain, sob, hematuria or changes in bladder function. Will admit to LTS for further evaluation.     Hospital Course:  ESLD 2/2 ETOH. Admitted 10/11 with suspected GIB from OSH. 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 Post op course significant for PVT requiring TB to OR POD#1 for thrombectomy. Hepatic artery also revised (shortened) due to kink. OR POD#2 for abdominal closure, bx, and reconstruction of bile duct. US 10/17 satisfactory. Oliguric JENN requiring CRRT post op. Nephrology following. Stepped down 10/19 afternoon. Drains and bourne out.    Interval History: No acute events overnight. Pt c/o feeling weak but otherwise doing well. PT/OT consulted and assigned to work with patient today. T bili up slightly today, could be related to blood transfusion given yesterday. Overall LFTs improving. Will monitor for another day before transitioning from heparin gtt to  an oral anticoagulant. Last US on 10/20 with satisfactory results. Cr 2.8 from 3. No need for RRT today. Plan for another dose of lasix 120 mg today. Albumin 25% ordered x 2. Tolerating diet, pain controlled. Monitor.     Scheduled Meds:   albumin human 25%  25 g Intravenous Q8H    bisacodyL  10 mg Oral QHS    docusate sodium  100 mg Oral TID    fluconazole  200 mg Oral Daily    mycophenolate  1,000 mg Oral BID    pantoprazole  40 mg Oral Daily    predniSONE  20 mg Oral Daily    sevelamer carbonate  800 mg Oral TID WM    sulfamethoxazole-trimethoprim 400-80mg  1 tablet Oral Every Mon, Wed, Fri    tacrolimus  1.5 mg Oral BID    [START ON 10/26/2020] valGANciclovir  450 mg Oral Every Mon, Wed, Fri     Continuous Infusions:   heparin (porcine) in D5W 20.3 Units/kg/hr (10/21/20 0983)     PRN Meds:sodium chloride, acetaminophen, bisacodyL, diphenhydrAMINE, heparin (PORCINE), heparin (PORCINE), melatonin, ondansetron, oxyCODONE, oxyCODONE, sodium chloride 0.9%    Review of Systems   Constitutional: Positive for activity change. Negative for appetite change, chills and fever.   HENT: Negative for congestion, facial swelling and trouble swallowing.    Eyes: Negative for pain, discharge and visual disturbance.   Respiratory: Negative for cough, chest tightness, shortness of breath and wheezing.    Cardiovascular: Positive for leg swelling. Negative for chest pain and palpitations.   Gastrointestinal: Positive for abdominal pain (expected post op). Negative for abdominal distention, blood in stool, nausea and vomiting.   Endocrine: Negative.    Genitourinary: Positive for decreased urine volume.   Skin: Positive for wound.   Allergic/Immunologic: Positive for immunocompromised state.   Neurological: Positive for weakness and headaches. Negative for light-headedness.   Hematological: Bruises/bleeds easily.   Psychiatric/Behavioral: Negative for agitation, behavioral problems, confusion and dysphoric mood.      Objective:     Vital Signs (Most Recent):  Temp: 98.4 °F (36.9 °C) (10/21/20 1208)  Pulse: 88 (10/21/20 1208)  Resp: 18 (10/21/20 0816)  BP: (!) 131/90 (10/21/20 1208)  SpO2: 95 % (10/21/20 1208) Vital Signs (24h Range):  Temp:  [98.1 °F (36.7 °C)-98.6 °F (37 °C)] 98.4 °F (36.9 °C)  Pulse:  [] 88  Resp:  [10-20] 18  SpO2:  [90 %-98 %] 95 %  BP: (131-156)/() 131/90     Weight: 112 kg (246 lb 14.6 oz)  Body mass index is 33.49 kg/m².    Intake/Output - Last 3 Shifts       10/19 0700 - 10/20 0659 10/20 0700 - 10/21 0659 10/21 0700 - 10/22 0659    P.O. 780 750 240    I.V. (mL/kg) 1328.2 (12.2)      Blood  250     Total Intake(mL/kg) 2108.2 (19.3) 1000 (8.9) 240 (2.1)    Urine (mL/kg/hr) 20 (0) 600 (0.2) 320 (0.6)    Emesis/NG output 0      Drains       Other 2222      Stool 0 0     Total Output 2242 600 320    Net -133.8 +400 -80           Urine Occurrence 0 x 1 x     Stool Occurrence 0 x 0 x     Emesis Occurrence 0 x            Physical Exam  Vitals signs and nursing note reviewed.   Constitutional:       General: He is not in acute distress.  Eyes:      General: Scleral icterus present.         Right eye: No discharge.         Left eye: No discharge.   Cardiovascular:      Rate and Rhythm: Normal rate and regular rhythm.      Pulses: Normal pulses.      Heart sounds: No murmur.   Pulmonary:      Effort: Pulmonary effort is normal. No respiratory distress.      Breath sounds: No wheezing or rales.      Comments: Decreased RLL  Abdominal:      Palpations: Abdomen is soft.      Tenderness: There is abdominal tenderness (mild incisional). There is no guarding.      Comments: Chevron incision KELLY with staples no s/s/i  RLQ drain sites intact   Musculoskeletal:      Right lower leg: Edema present.      Left lower leg: Edema present.   Skin:     General: Skin is warm and dry.      Capillary Refill: Capillary refill takes 2 to 3 seconds.      Coloration: Skin is jaundiced.   Neurological:      General: No  focal deficit present.      Mental Status: He is alert and oriented to person, place, and time.   Psychiatric:         Mood and Affect: Mood normal.         Behavior: Behavior normal.         Thought Content: Thought content normal.         Laboratory:  Immunosuppressants         Stop Route Frequency     tacrolimus capsule 1.5 mg      -- Oral 2 times daily     mycophenolate capsule 1,000 mg      -- Oral 2 times daily        CBC:   Recent Labs   Lab 10/21/20  0400   WBC 6.39   RBC 2.45*   HGB 7.5*   HCT 24.0*   PLT 39*   MCV 98   MCH 30.6   MCHC 31.3*     CMP:   Recent Labs   Lab 10/21/20  0400      CALCIUM 8.5*   ALBUMIN 2.4*   PROT 4.5*      K 4.6   CO2 22*      BUN 53*   CREATININE 2.8*   ALKPHOS 91   *   AST 25   BILITOT 3.3*     Labs within the past 24 hours have been reviewed.    Diagnostic Results:  I have personally reviewed all pertinent imaging studies.    Debility/Functional status: Patient debilitated by evidence of Weakness. Physical and occupational therapy ordered daily to evaluate and treat. Debility was: present on admission.    Assessment/Plan:     * Status post liver transplant  - s/p DBD OLTx 10/14 (CMV+/-, donor HCV SHAYE+).  - Post op course significant for PVT requiring TB to OR POD#1 for thrombectomy. Hepatic artery also revised (shortened) due to kink. - OR POD#2 for abdominal closure, bx, and reconstruction of bile duct.   - US 10/17 satisfactory.   - Oliguric JENN requiring CRRT post op. Nephrology following.   - Stepped down 10/19 afternoon. Drains and bourne out.  - US 10/20 (POD#6) due to decreased H/H. Liver US satisfactory with one small fairly stable fluid collection.  - Continue heparin gtt. Transfused 1u PRBC 10/20.  - LFTs trending down.  - Tolerating diet. Pain well controlled. Continue bowel regimen. PT/OT consulted.    Decompensated hepatic cirrhosis  - Listed for liver transplant 2/2 ETOH.  - Blood cx with GPC.   - One dose of Vanc given 10/12.  - Per cx  "results, organism is a probable contaminant. No further treatment at this time.       MELD-Na score: 22 at 10/21/2020  4:00 AM  MELD score: 21 at 10/21/2020  4:00 AM  Calculated from:  Serum Creatinine: 2.8 mg/dL at 10/21/2020  4:00 AM  Serum Sodium: 136 mmol/L at 10/21/2020  4:00 AM  Total Bilirubin: 3.3 mg/dL at 10/21/2020  4:00 AM  INR(ratio): 1.0 at 10/21/2020  4:00 AM  Age: 45 years 5 months        JENN (acute kidney injury)  - Oliguric JENN requiring CRRT post op (had renal dysfx pre op).  - Nephrology following. Last CRRT 10/18-10/19.  - UOP improving  - Cr 2.8 from 3.  - No HD today.   - Lasix 120 mg IV again today. Albumin x 2 doses.  - Strict I/O. Daily weights.          Anemia of chronic disease  - H&H decreased but stable.  - Monitor.      At risk for opportunistic infections  - Bactrim for PCP prophylaxis.  - Valcyte for CMV prophylaxis.  - Fluc for fungal prophylaxis.       Long-term use of immunosuppressant medication  - See "prophylactic immunotherapy."       Weakness  - Encourage OOBTC/ambulation with assistance. PT/OT consulted.      Hyperphosphatemia  - Continue binders.  - Monitor.      Anticoagulated  - Heparin gtt for PVT. Monitor closely.      Prophylactic immunotherapy  - Continue Prograf. Monitor trough daily and adjust dose as needed to achieve therapeutic level.  - Continue Cellcept.  - Continue steroids, per protocol.      Class 1 obesity due to excess calories with serious comorbidity and body mass index (BMI) of 34.0 to 34.9 in adult  - Monitor.      Acidosis  - Management per nephrology with HD.  - Monitor.        VTE Risk Mitigation (From admission, onward)         Ordered     heparin 25,000 units in dextrose 5% 250 mL (100 units/mL) infusion LOW INTENSITY nomogram  Continuous     Question:  Heparin Infusion Adjustment (DO NOT MODIFY ANSWER)  Answer:  [email or web address]\Images\Pharmacy\HeparinInfusions\heparin LOW INTENSITY nomogram with ANTI-XA [id number]    10/15/20 1308     heparin " 25,000 units in dextrose 5% (100 units/ml) IV bolus from bag - ADDITIONAL PRN BOLUS - 30 units/kg  As needed (PRN)     Question:  Heparin Infusion Adjustment (DO NOT MODIFY ANSWER)  Answer:  [email or web address]\Images\Pharmacy\HeparinInfusions\heparin LOW INTENSITY nomogram with ANTI-XA [id number]    10/15/20 1304     heparin 25,000 units in dextrose 5% (100 units/ml) IV bolus from bag - ADDITIONAL PRN BOLUS - 60 units/kg  As needed (PRN)     Question:  Heparin Infusion Adjustment (DO NOT MODIFY ANSWER)  Answer:  [email or web address]\Images\Pharmacy\HeparinInfusions\heparin LOW INTENSITY nomogram with ANTI-XA [id number]    10/15/20 1304     Place sequential compression device  Until discontinued      10/14/20 1339     IP VTE HIGH RISK PATIENT  Once      10/14/20 1339     Place JELENA hose  Until discontinued      10/11/20 0217                The patients clinical status was discussed at multidisplinary rounds, involving transplant surgery, transplant medicine, pharmacy, nursing, nutrition, and social work    Discharge Planning: not a candidate for dc at this time.       Awa Sun, ROC  Liver Transplant  [facility]

## 2020-10-21 NOTE — PLAN OF CARE
Pt is AAOx4 in bed wearing non-skid footwear, bed in low/locked position and with call bell within reach. Pt reminded to use call bell to call for assistance, pt verbalizes understanding.  Pt is afebrile at this time. Proper hand hygiene performed before and after pt care activities.  Last Anti xa subtherapeutic at 0.28. Heparin gtt infusing at 20.3units/kg/hr. Next anti xa due at 0400. PRN Tylenol administered. Denies any pain or discomfort at this time.

## 2020-10-21 NOTE — PT/OT/SLP EVAL
Occupational Therapy   Evaluation    Name: Jordan Altman  MRN: [id number]  Admitting Diagnosis:  Status post liver transplant 5 Days Post-Op    Recommendations:     Discharge Recommendations: rehabilitation facility  Discharge Equipment Recommendations:  shower chair, walker, rolling, bedside commode  Barriers to discharge:       Assessment:     Jordan Altman is a 45 y.o. male with a medical diagnosis of Status post liver transplant. Pt. currently demonstrates decreased (I) with ADLs, functional mobility & t/fs decreased overall strength, ROM, endurance and balance. Pt would benefit from skilled OT services as well as Rehab placement to address these deficits and to facilitate improving (I) with daily tasks. Performance deficits affecting function: weakness, impaired endurance, impaired self care skills, impaired functional mobilty, gait instability, impaired balance, decreased coordination, decreased lower extremity function, decreased safety awareness, pain.      Rehab Prognosis: Good; patient would benefit from acute skilled OT services to address these deficits and reach maximum level of function.       Plan:     Patient to be seen 4 x/week to address the above listed problems via self-care/home management, therapeutic activities, therapeutic exercises  · Plan of Care Expires: 11/20/20  · Plan of Care Reviewed with: patient    Subjective     Chief Complaint: weakness  Patient/Family Comments/goals: Be independent.    Occupational Profile:  Living Environment: Pt lives with his fiance in a 1st floor apt with both a walk-in and tub setup.  Previous level of function: (I) with ADLS / walking.  Equipment Used at Home:  none  Assistance upon Discharge: [place]    Pain/Comfort:  · Pain Rating 1: 5/10  · Location 1: abdomen    Patients cultural, spiritual, Denominational conflicts given the current situation:      Objective:     Communicated with: rn prior to session.  Patient found supine with peripheral IV upon OT  entry to room.    General Precautions: Standard, fall   Orthopedic Precautions:    Braces:       Occupational Performance:    Bed Mobility:    · Patient completed Rolling/Turning to Left with  minimum assistance  · Patient completed Scooting/Bridging with minimum assistance  · Patient completed Supine to Sit with minimum assistance  · Patient completed Sit to Supine with moderate assistance and 2 persons    Functional Mobility/Transfers:  · Patient completed Sit <> Stand Transfer with moderate assistance  with  no assistive device   · Functional Mobility: Pt took 4 sidesteps with Mod A / blocking knees.    Activities of Daily Living:  · Feeding:  independence  · Grooming: supervision seated  · Lower Body Dressing: total assistance     Cognitive/Visual Perceptual:  Cognitive/Psychosocial Skills:     -       Oriented to: Person, Place, Time and Situation   -       Safety awareness/insight to disability: intact     Physical Exam:  BUE AROM/MMT: WNL    AMPAC 6 Click ADL:  AMPAC Total Score: 15    Treatment & Education:  UE ROM/MMT  Bed mobility training / assessment  Functional mobility assessment  Sit/standing balance assessment  Educated on importance of sitting OOB in bedside chair to promote increased strength, endurance & breathing.  Discussed OT POC / Post-acute plan  Education:    Patient left supine with all lines intact and call button in reach    GOALS:   Multidisciplinary Problems     Occupational Therapy Goals        Problem: Occupational Therapy Goal    Goal Priority Disciplines Outcome Interventions   Occupational Therapy Goal     OT, PT/OT Ongoing, Progressing    Description: Goals to be met by: 10/28/20    Patient will increase functional independence with ADLs by performing:    Grooming while standing with Contact Guard Assistance.  Toileting from bedside commode with Moderate Assistance for hygiene and clothing management.   Supine to sit with Stand-by Assistance.  Step transfer with Minimal  Assistance  Toilet transfer to bedside commode with Minimal Assistance.                     History:     Past Medical History:   Diagnosis Date    Decompensated hepatic cirrhosis     Hypertension     SBP (spontaneous bacterial peritonitis)        Past Surgical History:   Procedure Laterality Date    ERCP N/A 10/1/2020    Procedure: ERCP (ENDOSCOPIC RETROGRADE CHOLANGIOPANCREATOGRAPHY);  Surgeon: Marcos Ramirez MD;  [facility]  Service: Endoscopy;  Laterality: N/A;    ESOPHAGOGASTRODUODENOSCOPY N/A 10/13/2020    Procedure: EGD (ESOPHAGOGASTRODUODENOSCOPY);  Surgeon: Prasanth Jerry MD;  [facility]  Service: Endoscopy;  Laterality: N/A;    EXPLORATORY LAPAROTOMY AFTER LIVER TRANSPLANTATION N/A 10/15/2020    Procedure: LAPAROTOMY, EXPLORATORY, AFTER LIVER TRANSPLANT, Possible redo of artery, possible portal thrombectomy. IntraOperative US.;  Surgeon: Curtis Zavaleta MD;  [facility];  Service: Transplant;  Laterality: N/A;  With intraoperative ultrasound    LIVER TRANSPLANT N/A 10/14/2020    Procedure: TRANSPLANT, LIVER;  Surgeon: Curtis Zavaleta MD;  [facility];  Service: Transplant;  Laterality: N/A;  Intra op HD    RIGHT HEART CATHETERIZATION Right 9/23/2020    Procedure: INSERTION, CATHETER, RIGHT HEART;  Surgeon: Mikel Costa Jr., MD;  Location: [facility] CATH LAB;  Service: Cardiology;  Laterality: Right;    THORACENTESIS  2011    THROMBECTOMY  10/15/2020    Procedure: THROMBECTOMY portal vein;  Surgeon: Curtis Zavaleta MD;  [facility];  Service: Transplant;;       Time Tracking:     OT Date of Treatment: 10/21/20  OT Start Time: 1254  OT Stop Time: 1317  OT Total Time (min): 23 min    Billable Minutes:Evaluation 10  Therapeutic Activity 13    MAURICIO Mulligan  10/21/2020

## 2020-10-22 ENCOUNTER — CONFERENCE (OUTPATIENT)
Dept: TRANSPLANT | Facility: CLINIC | Age: 45
End: 2020-10-22

## 2020-10-22 LAB
ABO + RH BLD: NORMAL
ACANTHOCYTES BLD QL SMEAR: PRESENT
ALBUMIN SERPL BCP-MCNC: 2.6 G/DL (ref 3.5–5.2)
ALP SERPL-CCNC: 76 U/L (ref 55–135)
ALT SERPL W/O P-5'-P-CCNC: 119 U/L (ref 10–44)
ANION GAP SERPL CALC-SCNC: 9 MMOL/L (ref 8–16)
ANISOCYTOSIS BLD QL SMEAR: SLIGHT
APTT BLDCRRT: 46.6 SEC (ref 21–32)
AST SERPL-CCNC: 18 U/L (ref 10–40)
BASOPHILS # BLD AUTO: 0 K/UL (ref 0–0.2)
BASOPHILS # BLD AUTO: 0.01 K/UL (ref 0–0.2)
BASOPHILS NFR BLD: 0 % (ref 0–1.9)
BASOPHILS NFR BLD: 0.3 % (ref 0–1.9)
BILIRUB SERPL-MCNC: 2.8 MG/DL (ref 0.1–1)
BLD GP AB SCN CELLS X3 SERPL QL: NORMAL
BLD PROD TYP BPU: NORMAL
BLD PROD TYP BPU: NORMAL
BLOOD GROUP ANTIBODIES SERPL: NORMAL
BLOOD UNIT EXPIRATION DATE: NORMAL
BLOOD UNIT EXPIRATION DATE: NORMAL
BLOOD UNIT TYPE CODE: 6200
BLOOD UNIT TYPE CODE: 6200
BLOOD UNIT TYPE: NORMAL
BLOOD UNIT TYPE: NORMAL
BUN SERPL-MCNC: 50 MG/DL (ref 6–20)
CALCIUM SERPL-MCNC: 8.8 MG/DL (ref 8.7–10.5)
CHLORIDE SERPL-SCNC: 103 MMOL/L (ref 95–110)
CO2 SERPL-SCNC: 24 MMOL/L (ref 23–29)
CODING SYSTEM: NORMAL
CODING SYSTEM: NORMAL
CREAT SERPL-MCNC: 2.3 MG/DL (ref 0.5–1.4)
DACRYOCYTES BLD QL SMEAR: ABNORMAL
DIFFERENTIAL METHOD: ABNORMAL
DIFFERENTIAL METHOD: ABNORMAL
DISPENSE STATUS: NORMAL
DISPENSE STATUS: NORMAL
EOSINOPHIL # BLD AUTO: 0.1 K/UL (ref 0–0.5)
EOSINOPHIL # BLD AUTO: 0.2 K/UL (ref 0–0.5)
EOSINOPHIL NFR BLD: 1.6 % (ref 0–8)
EOSINOPHIL NFR BLD: 6.2 % (ref 0–8)
ERYTHROCYTE [DISTWIDTH] IN BLOOD BY AUTOMATED COUNT: 18.7 % (ref 11.5–14.5)
ERYTHROCYTE [DISTWIDTH] IN BLOOD BY AUTOMATED COUNT: 19 % (ref 11.5–14.5)
EST. GFR  (AFRICAN AMERICAN): 37.7 ML/MIN/1.73 M^2
EST. GFR  (NON AFRICAN AMERICAN): 32.6 ML/MIN/1.73 M^2
FACT X PPP CHRO-ACNC: 0.29 IU/ML (ref 0.3–0.7)
GLUCOSE SERPL-MCNC: 104 MG/DL (ref 70–110)
HCT VFR BLD AUTO: 20.4 % (ref 40–54)
HCT VFR BLD AUTO: 20.9 % (ref 40–54)
HCT VFR BLD AUTO: 21 % (ref 40–54)
HCV AB SERPL QL IA: POSITIVE
HGB BLD-MCNC: 6.5 G/DL (ref 14–18)
HGB BLD-MCNC: 6.6 G/DL (ref 14–18)
HGB BLD-MCNC: 6.8 G/DL (ref 14–18)
IMM GRANULOCYTES # BLD AUTO: 0.11 K/UL (ref 0–0.04)
IMM GRANULOCYTES # BLD AUTO: 0.16 K/UL (ref 0–0.04)
IMM GRANULOCYTES NFR BLD AUTO: 3 % (ref 0–0.5)
IMM GRANULOCYTES NFR BLD AUTO: 4.2 % (ref 0–0.5)
INR PPP: 1 (ref 0.8–1.2)
LYMPHOCYTES # BLD AUTO: 0.2 K/UL (ref 1–4.8)
LYMPHOCYTES # BLD AUTO: 0.3 K/UL (ref 1–4.8)
LYMPHOCYTES NFR BLD: 5.8 % (ref 18–48)
LYMPHOCYTES NFR BLD: 7.8 % (ref 18–48)
MAGNESIUM SERPL-MCNC: 1.8 MG/DL (ref 1.6–2.6)
MCH RBC QN AUTO: 30.6 PG (ref 27–31)
MCH RBC QN AUTO: 30.9 PG (ref 27–31)
MCHC RBC AUTO-ENTMCNC: 31.2 G/DL (ref 32–36)
MCHC RBC AUTO-ENTMCNC: 32.4 G/DL (ref 32–36)
MCV RBC AUTO: 96 FL (ref 82–98)
MCV RBC AUTO: 98 FL (ref 82–98)
MONOCYTES # BLD AUTO: 0.5 K/UL (ref 0.3–1)
MONOCYTES # BLD AUTO: 0.5 K/UL (ref 0.3–1)
MONOCYTES NFR BLD: 12.5 % (ref 4–15)
MONOCYTES NFR BLD: 13.7 % (ref 4–15)
NEUTROPHILS # BLD AUTO: 2.6 K/UL (ref 1.8–7.7)
NEUTROPHILS # BLD AUTO: 2.9 K/UL (ref 1.8–7.7)
NEUTROPHILS NFR BLD: 69.3 % (ref 38–73)
NEUTROPHILS NFR BLD: 75.6 % (ref 38–73)
NRBC BLD-RTO: 0 /100 WBC
NRBC BLD-RTO: 0 /100 WBC
NUM UNITS TRANS PACKED RBC: NORMAL
PHOSPHATE SERPL-MCNC: 5 MG/DL (ref 2.7–4.5)
PLATELET # BLD AUTO: 38 K/UL (ref 150–350)
PLATELET # BLD AUTO: 42 K/UL (ref 150–350)
PLATELET BLD QL SMEAR: ABNORMAL
PMV BLD AUTO: 11.7 FL (ref 9.2–12.9)
PMV BLD AUTO: 13.2 FL (ref 9.2–12.9)
POIKILOCYTOSIS BLD QL SMEAR: SLIGHT
POTASSIUM SERPL-SCNC: 4 MMOL/L (ref 3.5–5.1)
PROT SERPL-MCNC: 4.4 G/DL (ref 6–8.4)
PROTHROMBIN TIME: 10.8 SEC (ref 9–12.5)
RBC # BLD AUTO: 2.09 M/UL (ref 4.6–6.2)
RBC # BLD AUTO: 2.2 M/UL (ref 4.6–6.2)
SCHISTOCYTES BLD QL SMEAR: PRESENT
SODIUM SERPL-SCNC: 136 MMOL/L (ref 136–145)
TACROLIMUS BLD-MCNC: 3.4 NG/ML (ref 5–15)
TRANS ERYTHROCYTES VOL PATIENT: NORMAL ML
WBC # BLD AUTO: 3.77 K/UL (ref 3.9–12.7)
WBC # BLD AUTO: 3.85 K/UL (ref 3.9–12.7)

## 2020-10-22 PROCEDURE — 84100 ASSAY OF PHOSPHORUS: CPT

## 2020-10-22 PROCEDURE — P9016 RBC LEUKOCYTES REDUCED: HCPCS

## 2020-10-22 PROCEDURE — 36415 COLL VENOUS BLD VENIPUNCTURE: CPT

## 2020-10-22 PROCEDURE — 25000003 PHARM REV CODE 250: Performed by: STUDENT IN AN ORGANIZED HEALTH CARE EDUCATION/TRAINING PROGRAM

## 2020-10-22 PROCEDURE — 63600175 PHARM REV CODE 636 W HCPCS: Performed by: SURGERY

## 2020-10-22 PROCEDURE — 99233 PR SUBSEQUENT HOSPITAL CARE,LEVL III: ICD-10-PCS | Mod: 24,,, | Performed by: NURSE PRACTITIONER

## 2020-10-22 PROCEDURE — 86905 BLOOD TYPING RBC ANTIGENS: CPT

## 2020-10-22 PROCEDURE — P9047 ALBUMIN (HUMAN), 25%, 50ML: HCPCS | Mod: JG | Performed by: NURSE PRACTITIONER

## 2020-10-22 PROCEDURE — 85025 COMPLETE CBC W/AUTO DIFF WBC: CPT

## 2020-10-22 PROCEDURE — 97116 GAIT TRAINING THERAPY: CPT | Mod: CQ

## 2020-10-22 PROCEDURE — 97110 THERAPEUTIC EXERCISES: CPT

## 2020-10-22 PROCEDURE — P9021 RED BLOOD CELLS UNIT: HCPCS

## 2020-10-22 PROCEDURE — 80197 ASSAY OF TACROLIMUS: CPT

## 2020-10-22 PROCEDURE — 97530 THERAPEUTIC ACTIVITIES: CPT

## 2020-10-22 PROCEDURE — 80053 COMPREHEN METABOLIC PANEL: CPT

## 2020-10-22 PROCEDURE — 85610 PROTHROMBIN TIME: CPT

## 2020-10-22 PROCEDURE — 86922 COMPATIBILITY TEST ANTIGLOB: CPT

## 2020-10-22 PROCEDURE — 97530 THERAPEUTIC ACTIVITIES: CPT | Mod: CQ

## 2020-10-22 PROCEDURE — 85520 HEPARIN ASSAY: CPT

## 2020-10-22 PROCEDURE — 86870 RBC ANTIBODY IDENTIFICATION: CPT

## 2020-10-22 PROCEDURE — 99233 PR SUBSEQUENT HOSPITAL CARE,LEVL III: ICD-10-PCS | Mod: ,,, | Performed by: INTERNAL MEDICINE

## 2020-10-22 PROCEDURE — 83735 ASSAY OF MAGNESIUM: CPT

## 2020-10-22 PROCEDURE — 86850 RBC ANTIBODY SCREEN: CPT

## 2020-10-22 PROCEDURE — 25000003 PHARM REV CODE 250: Performed by: NURSE PRACTITIONER

## 2020-10-22 PROCEDURE — 97110 THERAPEUTIC EXERCISES: CPT | Mod: CQ

## 2020-10-22 PROCEDURE — 25000003 PHARM REV CODE 250: Performed by: PHYSICIAN ASSISTANT

## 2020-10-22 PROCEDURE — 85014 HEMATOCRIT: CPT

## 2020-10-22 PROCEDURE — 36430 TRANSFUSION BLD/BLD COMPNT: CPT

## 2020-10-22 PROCEDURE — 63600175 PHARM REV CODE 636 W HCPCS: Performed by: STUDENT IN AN ORGANIZED HEALTH CARE EDUCATION/TRAINING PROGRAM

## 2020-10-22 PROCEDURE — 99233 SBSQ HOSP IP/OBS HIGH 50: CPT | Mod: ,,, | Performed by: INTERNAL MEDICINE

## 2020-10-22 PROCEDURE — 99233 SBSQ HOSP IP/OBS HIGH 50: CPT | Mod: 24,,, | Performed by: NURSE PRACTITIONER

## 2020-10-22 PROCEDURE — 85018 HEMOGLOBIN: CPT

## 2020-10-22 PROCEDURE — 85730 THROMBOPLASTIN TIME PARTIAL: CPT

## 2020-10-22 PROCEDURE — 63600175 PHARM REV CODE 636 W HCPCS: Performed by: NURSE PRACTITIONER

## 2020-10-22 PROCEDURE — 20600001 HC STEP DOWN PRIVATE ROOM

## 2020-10-22 PROCEDURE — 63600175 PHARM REV CODE 636 W HCPCS: Performed by: TRANSPLANT SURGERY

## 2020-10-22 RX ORDER — ALBUMIN HUMAN 250 G/1000ML
25 SOLUTION INTRAVENOUS EVERY 8 HOURS
Status: COMPLETED | OUTPATIENT
Start: 2020-10-22 | End: 2020-10-22

## 2020-10-22 RX ORDER — TACROLIMUS 1 MG/1
2 CAPSULE ORAL 2 TIMES DAILY
Status: DISCONTINUED | OUTPATIENT
Start: 2020-10-22 | End: 2020-10-24

## 2020-10-22 RX ORDER — HYDROCODONE BITARTRATE AND ACETAMINOPHEN 500; 5 MG/1; MG/1
TABLET ORAL
Status: DISCONTINUED | OUTPATIENT
Start: 2020-10-22 | End: 2020-10-24

## 2020-10-22 RX ORDER — PSEUDOEPHEDRINE/ACETAMINOPHEN 30MG-500MG
100 TABLET ORAL
Status: DISPENSED | OUTPATIENT
Start: 2020-10-22 | End: 2020-10-22

## 2020-10-22 RX ORDER — TACROLIMUS 0.5 MG/1
0.5 CAPSULE ORAL ONCE
Status: COMPLETED | OUTPATIENT
Start: 2020-10-22 | End: 2020-10-22

## 2020-10-22 RX ORDER — HYDROCODONE BITARTRATE AND ACETAMINOPHEN 500; 5 MG/1; MG/1
TABLET ORAL
Status: DISCONTINUED | OUTPATIENT
Start: 2020-10-22 | End: 2020-10-23

## 2020-10-22 RX ORDER — FUROSEMIDE 10 MG/ML
120 INJECTION INTRAMUSCULAR; INTRAVENOUS ONCE
Status: COMPLETED | OUTPATIENT
Start: 2020-10-22 | End: 2020-10-22

## 2020-10-22 RX ORDER — SYRING-NEEDL,DISP,INSUL,0.3 ML 29 G X1/2"
296 SYRINGE, EMPTY DISPOSABLE MISCELLANEOUS
Status: DISPENSED | OUTPATIENT
Start: 2020-10-22 | End: 2020-10-22

## 2020-10-22 RX ADMIN — MYCOPHENOLATE MOFETIL 1000 MG: 250 CAPSULE ORAL at 08:10

## 2020-10-22 RX ADMIN — DOCUSATE SODIUM 100 MG: 100 CAPSULE, LIQUID FILLED ORAL at 09:10

## 2020-10-22 RX ADMIN — SEVELAMER CARBONATE 800 MG: 800 TABLET, FILM COATED ORAL at 09:10

## 2020-10-22 RX ADMIN — ALBUMIN (HUMAN) 25 G: 12.5 SOLUTION INTRAVENOUS at 08:10

## 2020-10-22 RX ADMIN — FUROSEMIDE 120 MG: 10 INJECTION, SOLUTION INTRAMUSCULAR; INTRAVENOUS at 11:10

## 2020-10-22 RX ADMIN — PREDNISONE 20 MG: 20 TABLET ORAL at 09:10

## 2020-10-22 RX ADMIN — FLUCONAZOLE 200 MG: 200 TABLET ORAL at 09:10

## 2020-10-22 RX ADMIN — HEPARIN SODIUM AND DEXTROSE 22.3 UNITS/KG/HR: 10000; 5 INJECTION INTRAVENOUS at 06:10

## 2020-10-22 RX ADMIN — PANTOPRAZOLE SODIUM 40 MG: 40 TABLET, DELAYED RELEASE ORAL at 09:10

## 2020-10-22 RX ADMIN — SEVELAMER CARBONATE 800 MG: 800 TABLET, FILM COATED ORAL at 05:10

## 2020-10-22 RX ADMIN — MELATONIN TAB 3 MG 6 MG: 3 TAB at 08:10

## 2020-10-22 RX ADMIN — DOCUSATE SODIUM 100 MG: 100 CAPSULE, LIQUID FILLED ORAL at 03:10

## 2020-10-22 RX ADMIN — DOCUSATE SODIUM 100 MG: 100 CAPSULE, LIQUID FILLED ORAL at 08:10

## 2020-10-22 RX ADMIN — TACROLIMUS 0.5 MG: 0.5 CAPSULE ORAL at 11:10

## 2020-10-22 RX ADMIN — TACROLIMUS 1.5 MG: 1 CAPSULE ORAL at 09:10

## 2020-10-22 RX ADMIN — BISACODYL 10 MG: 5 TABLET, COATED ORAL at 08:10

## 2020-10-22 RX ADMIN — ALBUMIN (HUMAN) 25 G: 12.5 SOLUTION INTRAVENOUS at 02:10

## 2020-10-22 RX ADMIN — SEVELAMER CARBONATE 800 MG: 800 TABLET, FILM COATED ORAL at 12:10

## 2020-10-22 RX ADMIN — TACROLIMUS 2 MG: 1 CAPSULE ORAL at 06:10

## 2020-10-22 RX ADMIN — ACETAMINOPHEN 650 MG: 325 TABLET ORAL at 08:10

## 2020-10-22 RX ADMIN — OXYCODONE HYDROCHLORIDE 5 MG: 5 TABLET ORAL at 12:10

## 2020-10-22 RX ADMIN — DIPHENHYDRAMINE HYDROCHLORIDE 25 MG: 25 CAPSULE ORAL at 08:10

## 2020-10-22 RX ADMIN — MYCOPHENOLATE MOFETIL 1000 MG: 250 CAPSULE ORAL at 09:10

## 2020-10-22 RX ADMIN — OXYCODONE HYDROCHLORIDE 10 MG: 10 TABLET ORAL at 05:10

## 2020-10-22 NOTE — PLAN OF CARE
Problem: Physical Therapy Goal  Goal: Physical Therapy Goal  Description: Goals to be met by: 2020     Patient will increase functional independence with mobility by performin. Supine to sit with Set-up Lyndon  2. Sit to supine with Set-up Lyndon  3. Sit to stand transfer with Contact Guard Assistance  4. Bed to chair transfer with Contact Guard Assistance using Rolling Walker  5. Gait  x 150 feet with Contact Guard Assistance using Rolling Walker.   6. Lower extremity exercise program x15 reps per handout, with supervision    Outcome: Ongoing, Progressing

## 2020-10-22 NOTE — SUBJECTIVE & OBJECTIVE
Scheduled Meds:   albumin human 25%  25 g Intravenous Q8H    bisacodyL  10 mg Oral QHS    docusate sodium  100 mg Oral TID    fluconazole  200 mg Oral Daily    glycerin 99.5%  100 mL Rectal ED 1 Time    And    magnesium citrate  296 mL Rectal ED 1 Time    And    sodium chloride 0.9%  500 mL Rectal ED 1 Time    mycophenolate  1,000 mg Oral BID    pantoprazole  40 mg Oral Daily    predniSONE  20 mg Oral Daily    sevelamer carbonate  800 mg Oral TID WM    sulfamethoxazole-trimethoprim 400-80mg  1 tablet Oral Every Mon, Wed, Fri    tacrolimus  2 mg Oral BID    [START ON 10/26/2020] valGANciclovir  450 mg Oral Every Mon, Wed, Fri     Continuous Infusions:   heparin (porcine) in D5W Stopped (10/22/20 0815)     PRN Meds:sodium chloride, acetaminophen, bisacodyL, diphenhydrAMINE, heparin (PORCINE), heparin (PORCINE), melatonin, ondansetron, oxyCODONE, oxyCODONE, sodium chloride 0.9%    Review of Systems   Constitutional: Positive for activity change. Negative for appetite change, chills and fever.   HENT: Negative for congestion, facial swelling and trouble swallowing.    Eyes: Negative for pain, discharge and visual disturbance.   Respiratory: Negative for cough, chest tightness, shortness of breath and wheezing.    Cardiovascular: Positive for leg swelling. Negative for chest pain and palpitations.   Gastrointestinal: Positive for abdominal pain (expected post op). Negative for abdominal distention, blood in stool, nausea and vomiting.   Endocrine: Negative.    Genitourinary: Positive for decreased urine volume.   Musculoskeletal: Negative for back pain.   Skin: Positive for wound.   Allergic/Immunologic: Positive for immunocompromised state.   Neurological: Positive for weakness. Negative for light-headedness and headaches.   Hematological: Bruises/bleeds easily.   Psychiatric/Behavioral: Negative for agitation, behavioral problems, confusion and dysphoric mood.     Objective:     Vital Signs (Most  Recent):  Temp: 98 °F (36.7 °C) (10/22/20 1238)  Pulse: 93 (10/22/20 1215)  Resp: 18 (10/22/20 1252)  BP: 117/68 (10/22/20 1215)  SpO2: (!) 94 % (10/22/20 1215) Vital Signs (24h Range):  Temp:  [98 °F (36.7 °C)-98.9 °F (37.2 °C)] 98 °F (36.7 °C)  Pulse:  [] 93  Resp:  [15-20] 18  SpO2:  [91 %-95 %] 94 %  BP: (105-140)/(68-86) 117/68     Weight: 107 kg (235 lb 14.3 oz)  Body mass index is 31.99 kg/m².    Intake/Output - Last 3 Shifts       10/20 0700 - 10/21 0659 10/21 0700 - 10/22 0659 10/22 0700 - 10/23 0659    P.O. 750 840 480    I.V. (mL/kg)  821.7 (7.7) 62 (0.6)    Blood 250      Total Intake(mL/kg) 1000 (8.9) 1661.7 (15.5) 542 (5.1)    Urine (mL/kg/hr) 600 (0.2) 2945 (1.1) 500 (0.6)    Emesis/NG output       Other       Stool 0 0     Total Output 600 2945 500    Net +400 -1283.3 +42           Urine Occurrence 1 x      Stool Occurrence 0 x 0 x           Physical Exam  Vitals signs and nursing note reviewed.   Constitutional:       General: He is not in acute distress.  Eyes:      General: Scleral icterus present.         Right eye: No discharge.         Left eye: No discharge.   Cardiovascular:      Rate and Rhythm: Normal rate and regular rhythm.      Pulses: Normal pulses.      Heart sounds: No murmur.   Pulmonary:      Effort: Pulmonary effort is normal. No respiratory distress.      Breath sounds: No wheezing or rales.      Comments: Decreased RLL  Abdominal:      Palpations: Abdomen is soft.      Tenderness: There is abdominal tenderness (mild incisional). There is no guarding.      Comments: Chevron incision KELLY with staples no s/s/i  RLQ drain sites intact   Musculoskeletal:      Right lower leg: Edema present.      Left lower leg: Edema present.   Skin:     General: Skin is warm and dry.      Capillary Refill: Capillary refill takes 2 to 3 seconds.      Coloration: Skin is jaundiced.   Neurological:      General: No focal deficit present.      Mental Status: He is alert and oriented to person,  place, and time.   Psychiatric:         Mood and Affect: Mood normal.         Behavior: Behavior normal.         Thought Content: Thought content normal.         Laboratory:  Immunosuppressants         Stop Route Frequency     tacrolimus capsule 2 mg      -- Oral 2 times daily     mycophenolate capsule 1,000 mg      -- Oral 2 times daily        CBC:   Recent Labs   Lab 10/22/20  0500 10/22/20  0830   WBC 3.85*  --    RBC 2.09*  --    HGB 6.5* 6.6*   HCT 20.4* 20.9*   PLT 38*  --    MCV 98  --    MCH 30.6  --    MCHC 31.2*  --      CMP:   Recent Labs   Lab 10/22/20  0500      CALCIUM 8.8   ALBUMIN 2.6*   PROT 4.4*      K 4.0   CO2 24      BUN 50*   CREATININE 2.3*   ALKPHOS 76   *   AST 18   BILITOT 2.8*     Labs within the past 24 hours have been reviewed.    Diagnostic Results:  I have personally reviewed all pertinent imaging studies.    Debility/Functional status: Patient debilitated by evidence of Weakness. Physical and occupational therapy ordered daily to evaluate and treat. Debility was: present on admission.

## 2020-10-22 NOTE — PLAN OF CARE
Pt is AAOx4 in bed wearing non-skid footwear, bed in low/locked position and with call bell within reach. Pt reminded to use call bell to call for assistance, pt verbalizes understanding.  Pt is afebrile at this time. Proper hand hygiene performed before and after pt care activities. Heparin gtt infusing at 20.3units/kg/hr. Next Anti-xa due in AM. Denies any pain or discomfort at this time.

## 2020-10-22 NOTE — PT/OT/SLP PROGRESS
Occupational Therapy   Treatment    Name: Jordan Altman  MRN: [id number]  Admitting Diagnosis:  Status post liver transplant  6 Days Post-Op    Recommendations:     Discharge Recommendations: rehabilitation facility  Discharge Equipment Recommendations:  shower chair, walker, rolling  Barriers to discharge:  None    Assessment:     Jordan Altman is a 45 y.o. male with a medical diagnosis of Status post liver transplant. Pt with much improved functional mobility but still an excellent candidate for IP Rehab to maximize his functional (I). Performance deficits affecting function are weakness, impaired endurance, impaired self care skills, impaired functional mobilty, gait instability, impaired balance, decreased coordination, decreased safety awareness, decreased ROM, edema.     Rehab Prognosis:  Good; patient would benefit from acute skilled OT services to address these deficits and reach maximum level of function.       Plan:     Patient to be seen 4 x/week to address the above listed problems via self-care/home management, therapeutic activities, therapeutic exercises  · Plan of Care Expires: 11/20/20  · Plan of Care Reviewed with: patient    Subjective     Pain/Comfort:  · Pain Rating 1: 0/10    Objective:     Communicated with: rn prior to session.  Patient found supine with peripheral IV upon OT entry to room.    General Precautions: Standard, fall     Occupational Performance:     Bed Mobility:    · Patient completed Rolling/Turning to Right with minimum assistance  · Patient completed Scooting/Bridging with stand by assistance  · Patient completed Supine to Sit with minimum assistance     Functional Mobility/Transfers:  · Patient completed Sit <> Stand Transfer with minimum assistance and of 2 persons  with  rolling walker   · Patient completed Bed <> Chair Transfer using Step Transfer technique with minimum assistance with rolling walker  · Patient completed Toilet Transfer Step Transfer technique with  moderate assistance and of 2 persons with  rolling walker and grab bars  · Functional Mobility: Pt walked within room with CGA using a RW.    Activities of Daily Living:  · Lower Body Dressing: total assistance    AMPAC 6 Click ADL: 16    Treatment & Education:  Pt educated on proper hand placement / technique for sit<>stand and functional t/fs.  From chair level, pt completed BUE therex (x 15 reps each) with a 3# dowel including:  - bicep curls  - straight arm raises  - hor Abd/Add  Discussed OT POC and progress.    Patient left up in chair with call button in reachEducation:      GOALS:   Multidisciplinary Problems     Occupational Therapy Goals        Problem: Occupational Therapy Goal    Goal Priority Disciplines Outcome Interventions   Occupational Therapy Goal     OT, PT/OT Ongoing, Progressing    Description: Goals to be met by: 10/28/20    Patient will increase functional independence with ADLs by performing:    Grooming while standing with Contact Guard Assistance.  Toileting from bedside commode with Moderate Assistance for hygiene and clothing management.   Supine to sit with Stand-by Assistance.  Step transfer with Minimal Assistance  Toilet transfer to bedside commode with Minimal Assistance.                     Time Tracking:     OT Date of Treatment: 10/22/20  OT Start Time: 0905  OT Stop Time: 0945  OT Total Time (min): 40 min    Billable Minutes:Therapeutic Activity 23  Therapeutic Exercise 17    MAURICIO Mulligan  10/22/2020

## 2020-10-22 NOTE — PROGRESS NOTES
[facility]  Nephrology  Progress Note    Patient Name: Jordan Altman  MRN: [id number]  Admission Date: 10/11/2020  Hospital Length of Stay: 11 days  Attending Provider: Rogerio Malik MD   Primary Care Physician: SHAHEEN Jaime  Principal Problem:Status post liver transplant    Subjective:     Interval History:   Received a dose of lasix 120 x 1 yesterday, responded well with 2.9L of UOP.  Kidney function improved, SCr down to 2.3 from 2.8.      Review of patient's allergies indicates:   Allergen Reactions    Latex, natural rubber      Current Facility-Administered Medications   Medication Frequency    0.9%  NaCl infusion (for blood administration) Q24H PRN    acetaminophen tablet 650 mg Q8H PRN    albumin human 25% bottle 25 g Q8H    bisacodyL EC tablet 10 mg QHS    bisacodyL suppository 10 mg Daily PRN    diphenhydrAMINE capsule 25 mg Q6H PRN    docusate sodium capsule 100 mg TID    fluconazole tablet 200 mg Daily    glycerin 99.5% topical solution 100 mL ED 1 Time    And    magnesium citrate solution 296 mL ED 1 Time    And    sodium chloride 0.9% bolus 500 mL ED 1 Time    heparin 25,000 units in dextrose 5% (100 units/ml) IV bolus from bag - ADDITIONAL PRN BOLUS - 30 units/kg PRN    heparin 25,000 units in dextrose 5% (100 units/ml) IV bolus from bag - ADDITIONAL PRN BOLUS - 60 units/kg PRN    heparin 25,000 units in dextrose 5% 250 mL (100 units/mL) infusion LOW INTENSITY nomogram Continuous    melatonin tablet 6 mg Nightly PRN    mycophenolate capsule 1,000 mg BID    ondansetron injection 4 mg Q6H PRN    oxyCODONE immediate release tablet 5 mg Q6H PRN    oxyCODONE immediate release tablet Tab 10 mg Q6H PRN    pantoprazole EC tablet 40 mg Daily    predniSONE tablet 20 mg Daily    sevelamer carbonate tablet 800 mg TID WM    sodium chloride 0.9% flush 10 mL PRN    sulfamethoxazole-trimethoprim 400-80mg per tablet 1 tablet Every Mon, Wed, Fri    tacrolimus  capsule 2 mg BID    [START ON 10/26/2020] valGANciclovir tablet 450 mg Every Mon, Wed, Fri       Objective:     Vital Signs (Most Recent):  Temp: 98.1 °F (36.7 °C) (10/22/20 0817)  Pulse: 93 (10/22/20 1215)  Resp: 16 (10/22/20 1215)  BP: 117/68 (10/22/20 1215)  SpO2: (!) 94 % (10/22/20 1215)  O2 Device (Oxygen Therapy): room air (10/22/20 1215) Vital Signs (24h Range):  Temp:  [97.6 °F (36.4 °C)-98.9 °F (37.2 °C)] 98.1 °F (36.7 °C)  Pulse:  [] 93  Resp:  [15-20] 16  SpO2:  [91 %-95 %] 94 %  BP: (105-140)/(68-86) 117/68     Weight: 107 kg (235 lb 14.3 oz) (10/22/20 0500)  Body mass index is 31.99 kg/m².  Body surface area is 2.33 meters squared.    I/O last 3 completed shifts:  In: 1901.7 [P.O.:1080; I.V.:821.7]  Out: 3545 [Urine:3545]    Physical Exam  Vitals signs and nursing note reviewed.   Constitutional:       General: He is not in acute distress.     Appearance: He is not ill-appearing.   HENT:      Head: Normocephalic and atraumatic.      Nose: Nose normal.      Mouth/Throat:      Mouth: Mucous membranes are moist.      Pharynx: Oropharynx is clear.   Eyes:      Extraocular Movements: Extraocular movements intact.      Conjunctiva/sclera: Conjunctivae normal.   Cardiovascular:      Rate and Rhythm: Normal rate and regular rhythm.   Pulmonary:      Breath sounds: Examination of the right-lower field reveals decreased breath sounds. Examination of the left-lower field reveals decreased breath sounds. Decreased breath sounds present. No rales.   Abdominal:      Palpations: Abdomen is soft.   Musculoskeletal:      Right lower leg: Edema present.      Left lower leg: Edema present.   Skin:     General: Skin is warm and dry.      Coloration: Skin is jaundiced.   Neurological:      Mental Status: He is alert and oriented to person, place, and time.         Significant Labs:  CBC:   Recent Labs   Lab 10/22/20  0500 10/22/20  0830   WBC 3.85*  --    RBC 2.09*  --    HGB 6.5* 6.6*   HCT 20.4* 20.9*   PLT 38*  --     MCV 98  --    MCH 30.6  --    MCHC 31.2*  --      CMP:   Recent Labs   Lab 10/22/20  0500      CALCIUM 8.8   ALBUMIN 2.6*   PROT 4.4*      K 4.0   CO2 24      BUN 50*   CREATININE 2.3*   ALKPHOS 76   *   AST 18   BILITOT 2.8*            Assessment/Plan:     JENN (acute kidney injury)    JENN likely 2/2 iATN from intraoperative hypotension and acute blood loss anemia     Plan/Recommendations:   -Hold RRT today  -follow UOP today, would hold further diuresis and monitor.  -reassess labs in AM.    -Strict I/O's  -Renally dose meds and avoid nephrotoxic medications   -Will continue to follow closely   -Plan discussed with staff, Dr Odilia Campbell, NP  Nephrology  [facility]

## 2020-10-22 NOTE — SUBJECTIVE & OBJECTIVE
Interval History:   Received a dose of lasix 120 x 1 yesterday, responded well with 2.9L of UOP.  Kidney function improved, SCr down to 2.3 from 2.8.      Review of patient's allergies indicates:   Allergen Reactions    Latex, natural rubber      Current Facility-Administered Medications   Medication Frequency    0.9%  NaCl infusion (for blood administration) Q24H PRN    acetaminophen tablet 650 mg Q8H PRN    albumin human 25% bottle 25 g Q8H    bisacodyL EC tablet 10 mg QHS    bisacodyL suppository 10 mg Daily PRN    diphenhydrAMINE capsule 25 mg Q6H PRN    docusate sodium capsule 100 mg TID    fluconazole tablet 200 mg Daily    glycerin 99.5% topical solution 100 mL ED 1 Time    And    magnesium citrate solution 296 mL ED 1 Time    And    sodium chloride 0.9% bolus 500 mL ED 1 Time    heparin 25,000 units in dextrose 5% (100 units/ml) IV bolus from bag - ADDITIONAL PRN BOLUS - 30 units/kg PRN    heparin 25,000 units in dextrose 5% (100 units/ml) IV bolus from bag - ADDITIONAL PRN BOLUS - 60 units/kg PRN    heparin 25,000 units in dextrose 5% 250 mL (100 units/mL) infusion LOW INTENSITY nomogram Continuous    melatonin tablet 6 mg Nightly PRN    mycophenolate capsule 1,000 mg BID    ondansetron injection 4 mg Q6H PRN    oxyCODONE immediate release tablet 5 mg Q6H PRN    oxyCODONE immediate release tablet Tab 10 mg Q6H PRN    pantoprazole EC tablet 40 mg Daily    predniSONE tablet 20 mg Daily    sevelamer carbonate tablet 800 mg TID WM    sodium chloride 0.9% flush 10 mL PRN    sulfamethoxazole-trimethoprim 400-80mg per tablet 1 tablet Every Mon, Wed, Fri    tacrolimus capsule 2 mg BID    [START ON 10/26/2020] valGANciclovir tablet 450 mg Every Mon, Wed, Fri       Objective:     Vital Signs (Most Recent):  Temp: 98.1 °F (36.7 °C) (10/22/20 0817)  Pulse: 93 (10/22/20 1215)  Resp: 16 (10/22/20 1215)  BP: 117/68 (10/22/20 1215)  SpO2: (!) 94 % (10/22/20 1215)  O2 Device (Oxygen Therapy): room  air (10/22/20 1215) Vital Signs (24h Range):  Temp:  [97.6 °F (36.4 °C)-98.9 °F (37.2 °C)] 98.1 °F (36.7 °C)  Pulse:  [] 93  Resp:  [15-20] 16  SpO2:  [91 %-95 %] 94 %  BP: (105-140)/(68-86) 117/68     Weight: 107 kg (235 lb 14.3 oz) (10/22/20 0500)  Body mass index is 31.99 kg/m².  Body surface area is 2.33 meters squared.    I/O last 3 completed shifts:  In: 1901.7 [P.O.:1080; I.V.:821.7]  Out: 3545 [Urine:3545]    Physical Exam  Vitals signs and nursing note reviewed.   Constitutional:       General: He is not in acute distress.     Appearance: He is not ill-appearing.   HENT:      Head: Normocephalic and atraumatic.      Nose: Nose normal.      Mouth/Throat:      Mouth: Mucous membranes are moist.      Pharynx: Oropharynx is clear.   Eyes:      Extraocular Movements: Extraocular movements intact.      Conjunctiva/sclera: Conjunctivae normal.   Cardiovascular:      Rate and Rhythm: Normal rate and regular rhythm.   Pulmonary:      Breath sounds: Examination of the right-lower field reveals decreased breath sounds. Examination of the left-lower field reveals decreased breath sounds. Decreased breath sounds present. No rales.   Abdominal:      Palpations: Abdomen is soft.   Musculoskeletal:      Right lower leg: Edema present.      Left lower leg: Edema present.   Skin:     General: Skin is warm and dry.      Coloration: Skin is jaundiced.   Neurological:      Mental Status: He is alert and oriented to person, place, and time.         Significant Labs:  CBC:   Recent Labs   Lab 10/22/20  0500 10/22/20  0830   WBC 3.85*  --    RBC 2.09*  --    HGB 6.5* 6.6*   HCT 20.4* 20.9*   PLT 38*  --    MCV 98  --    MCH 30.6  --    MCHC 31.2*  --      CMP:   Recent Labs   Lab 10/22/20  0500      CALCIUM 8.8   ALBUMIN 2.6*   PROT 4.4*      K 4.0   CO2 24      BUN 50*   CREATININE 2.3*   ALKPHOS 76   *   AST 18   BILITOT 2.8*

## 2020-10-22 NOTE — ASSESSMENT & PLAN NOTE
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  - Post op course significant for PVT requiring TB to OR POD#1 for thrombectomy. Hepatic artery also revised (shortened) due to kink. - OR POD#2 for abdominal closure, bx, and reconstruction of bile duct.   - US 10/17 satisfactory.   - Oliguric JENN requiring CRRT post op. Nephrology following.   - Stepped down 10/19 afternoon. Drains and bourne out.  - US 10/20 (POD#6) due to decreased H/H. Liver US satisfactory with one small fairly stable fluid collection.  - Continue heparin gtt. Transfused 1u PRBC 10/20.  - LFTs trending down.  - Drop in HCT again today. Heparin gtt held. CT a/p without contrast to assess for hematomas. Transfuse 1 u PRBC 10/22.  - restart heparin this afternoon if CT unremarkable.  - plan to transition to oral anticoagulant 10/23 if LFTs remain stable.  - Tolerating diet. Pain well controlled. Continue bowel regimen. PT/OT consulted.

## 2020-10-22 NOTE — PT/OT/SLP PROGRESS
Physical Therapy Treatment    Patient Name:  Jordan Altman   MRN:  [id number]    Recommendations:     Discharge Recommendations:  rehabilitation facility   Discharge Equipment Recommendations: shower chair, walker, rolling   Barriers to discharge: None    Assessment:     Jordan Altman is a 45 y.o. male admitted with a medical diagnosis of Status post liver transplant.  He presents with the following impairments/functional limitations:  weakness, impaired endurance, impaired self care skills, impaired functional mobilty, gait instability, impaired balance, decreased safety awareness, decreased ROM, edema .Pt Progressing with PT Intervention. Pt Progressing with improving gait distance. Pt would continue to benefit from skilled PT to address overall functional mobility and goals. Goals remain appropriate.      Rehab Prognosis: Good; patient would benefit from acute skilled PT services to address these deficits and reach maximum level of function.    Recent Surgery: Procedure(s) (LRB):  LAPAROTOMY, EXPLORATORY, BILIARY ANASTOMOSIS, ABDOMINAL CLOSURE (N/A) 6 Days Post-Op    Plan:     During this hospitalization, patient to be seen 4 x/week to address the identified rehab impairments via gait training, therapeutic activities, therapeutic exercises and progress toward the following goals:    · Plan of Care Expires:  11/20/20    Subjective     Chief Complaint: B LE weakness    Pain/Comfort:  · Pain Rating 1: 0/10      Objective:     Communicated with RN prior to session.  Patient found supine with telemetry upon PT entry to room.     General Precautions: Standard, fall   Orthopedic Precautions:N/A   Braces: N/A     Functional Mobility:  · Bed Mobility:     · Rolling Right: minimum assistance  · Scooting: stand by assistance  · Supine to Sit: minimum assistance  · Transfers:     · Sit to Stand:  minimum assistance and of 2 persons with rolling walker  · Bed <> Chair Transfer using Step Transfer technique with minimum  assistance with rolling walker  ·  Toilet Transfer Step Transfer technique with moderate assistance and of 2 persons with  rolling walker and grab bars  · Gait: pt amb with RW 15 ft and 10 ft with RW with CGA/Alexx for safety      AM-PAC 6 CLICK MOBILITY  Turning over in bed (including adjusting bedclothes, sheets and blankets)?: 3  Sitting down on and standing up from a chair with arms (e.g., wheelchair, bedside commode, etc.): 2  Moving from lying on back to sitting on the side of the bed?: 3  Moving to and from a bed to a chair (including a wheelchair)?: 3  Need to walk in hospital room?: 3  Climbing 3-5 steps with a railing?: 2  Basic Mobility Total Score: 16       Therapeutic Activities and Exercises:   Therapist provided instruction and educated of patient on progress, safety,d/c,PT POC,   proper body mechanics, energy conservation, and fall prevention strategies during tasks listed above, on the effects of prolonged immobility and the importance of performing OOB activity and exercises to promote healing and reduce recovery time   Patient facilitated therex X 15 reps seated in bedside chair B LE AROM AP, LAQ, Hip Flexion, Hip Abd/Add with facilitation for correct performance and sequencing. Exercises performed to develop and maintain pt's strength, endurance and flexibility.   Updated white board with appropriate PT mobility information for medical team notification  Donned an extra gown    Call nursing/pct to transfer to chair/use bathroom. Pt stated understanding  Bedside table in front of patient and area set up for function, convenience, and safety. RN aware of patient's mobility needs and status. Questions/concerns addressed within PTA scope of practice; patient with no further questions. Time was provided for active listening, discussion of health disposition, and discussion of safe discharge. Pt?verbalized?agreement .      Patient left up in chair with all lines intact, call button in reach and nsg  present..    GOALS:   Multidisciplinary Problems     Physical Therapy Goals        Problem: Physical Therapy Goal    Goal Priority Disciplines Outcome Goal Variances Interventions   Physical Therapy Goal     PT, PT/OT Ongoing, Progressing     Description: Goals to be met by: 2020     Patient will increase functional independence with mobility by performin. Supine to sit with Set-up Tift  2. Sit to supine with Set-up Tift  3. Sit to stand transfer with Contact Guard Assistance  4. Bed to chair transfer with Contact Guard Assistance using Rolling Walker  5. Gait  x 150 feet with Contact Guard Assistance using Rolling Walker.   6. Lower extremity exercise program x15 reps per handout, with supervision                     Time Tracking:     PT Received On: 10/22/20  PT Start Time: 906     PT Stop Time: 945  PT Total Time (min): 39 min     Billable Minutes: Gait Training 15, Therapeutic Activity 15 and Therapeutic Exercise 9    Treatment Type: Treatment  PT/PTA: PTA     PTA Visit Number: 1     Ottoniel Fletcher, PTA  10/22/2020

## 2020-10-22 NOTE — PROGRESS NOTES
PMR consult placed and referral for rehab sent to [facility] via [facility]. SW to follow-up as appropriate. SW will continue to follow patient for resources, education, support and dc planning as appropriate

## 2020-10-22 NOTE — PROGRESS NOTES
EDUCATION NOTE:    Met with Jordan Altman and his caregivers to provide teaching re: immunosuppressant medications.  Reviewed medication section of the Liver Transplant Education book that was provided.  Emphasized the importance of compliance, role of the blue medication card, concerns for drug interactions, and process of obtaining refills.  Counseled regarding Prograf, Cellcept , prednisone, including directions for use, monitoring, how to handle missed doses, and side effects.  Mr. Altman and caregiver verbalized understanding and had the opportunity to ask questions.

## 2020-10-22 NOTE — ASSESSMENT & PLAN NOTE
- Oliguric JENN requiring CRRT post op (had renal dysfx pre op).  - Nephrology following. Last CRRT 10/18-10/19.  - UOP improving  - Cr 2.3 from 2.8.  - No HD today.   - Lasix 120 mg IV again today. Albumin x 2 doses.  - Strict I/O. Daily weights.

## 2020-10-22 NOTE — ASSESSMENT & PLAN NOTE
- Listed for liver transplant 2/2 ETOH.  - Blood cx with [facility].   - One dose of Vanc given 10/12.  - Per cx results, organism is a probable contaminant. No further treatment at this time.       MELD-Na score: 19 at 10/22/2020  5:00 AM  MELD score: 18 at 10/22/2020  5:00 AM  Calculated from:  Serum Creatinine: 2.3 mg/dL at 10/22/2020  5:00 AM  Serum Sodium: 136 mmol/L at 10/22/2020  5:00 AM  Total Bilirubin: 2.8 mg/dL at 10/22/2020  5:00 AM  INR(ratio): 1.0 at 10/22/2020  5:00 AM  Age: 45 years 5 months

## 2020-10-22 NOTE — PROGRESS NOTES
[facility]  Liver Transplant  Progress Note    Patient Name: Jordan Altman  MRN: [id number]  Admission Date: 10/11/2020  Hospital Length of Stay: 11 days  Code Status: Full Code  Primary Care Provider: SHAHEEN Jaime  Post-Operative Day: 8    ORGAN:   LIVER  Disease Etiology: Alcoholic Cirrhosis  Donor Type:   Donation after Brain Death  CDC High Risk:   Yes  Donor CMV Status:   Donor CMV Status: Positive  Donor HBcAB:   Negative  Donor HCV Status:   Positive  Donor HBV SHAYE: Negative  Donor HCV SHAYE: Positive  Whole or Partial: Whole Liver  Biliary Anastomosis: End to End  Arterial Anatomy: Standard  Subjective:     History of Present Illness:  Mr. 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 He also c/o chronic left knee pain and denies any recent injury. Occult blood test (+) at OSH but HCT stable. Pt denies fever, chills, nausea, vomiting, chest pain, sob, hematuria or changes in bladder function. Will admit to LTS for further evaluation.     Hospital Course:  ESLD 2/2 ETOH. Admitted 10/11 with suspected GIB from OSH. 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 Post op course significant for PVT requiring TB to OR POD#1 for thrombectomy. Hepatic artery also revised (shortened) due to kink. OR POD#2 for abdominal closure, bx, and reconstruction of bile duct. US 10/17 satisfactory. Oliguric JENN requiring CRRT post op. Nephrology following. Stepped down 10/19 afternoon. Drains and bourne out.    Interval History: No acute events overnight. H&H decreased on morning labs. Heparin gtt held. No drainage reported from incision. Transfuse 1 unit PRBCs. CT a/p without contrast to assess for abdominal hematomas. Will resume heparin gtt today if no collections seen and consider transitioning to an oral anticoagulant  tomorrow. LFTs trending down. Cr improving daily, 2.3 from 2.8. No HD. Continue lasix and albumin today. Pt progressing well. Tolerating diet. Working well with therapy. No BM since surgery. Enema ordered today. Will consult Rehab for possible placement next week. Monitor.       Scheduled Meds:   albumin human 25%  25 g Intravenous Q8H    bisacodyL  10 mg Oral QHS    docusate sodium  100 mg Oral TID    fluconazole  200 mg Oral Daily    glycerin 99.5%  100 mL Rectal ED 1 Time    And    magnesium citrate  296 mL Rectal ED 1 Time    And    sodium chloride 0.9%  500 mL Rectal ED 1 Time    mycophenolate  1,000 mg Oral BID    pantoprazole  40 mg Oral Daily    predniSONE  20 mg Oral Daily    sevelamer carbonate  800 mg Oral TID WM    sulfamethoxazole-trimethoprim 400-80mg  1 tablet Oral Every Mon, Wed, Fri    tacrolimus  2 mg Oral BID    [START ON 10/26/2020] valGANciclovir  450 mg Oral Every Mon, Wed, Fri     Continuous Infusions:   heparin (porcine) in D5W Stopped (10/22/20 0815)     PRN Meds:sodium chloride, acetaminophen, bisacodyL, diphenhydrAMINE, heparin (PORCINE), heparin (PORCINE), melatonin, ondansetron, oxyCODONE, oxyCODONE, sodium chloride 0.9%    Review of Systems   Constitutional: Positive for activity change. Negative for appetite change, chills and fever.   HENT: Negative for congestion, facial swelling and trouble swallowing.    Eyes: Negative for pain, discharge and visual disturbance.   Respiratory: Negative for cough, chest tightness, shortness of breath and wheezing.    Cardiovascular: Positive for leg swelling. Negative for chest pain and palpitations.   Gastrointestinal: Positive for abdominal pain (expected post op). Negative for abdominal distention, blood in stool, nausea and vomiting.   Endocrine: Negative.    Genitourinary: Positive for decreased urine volume.   Musculoskeletal: Negative for back pain.   Skin: Positive for wound.   Allergic/Immunologic: Positive for  immunocompromised state.   Neurological: Positive for weakness. Negative for light-headedness and headaches.   Hematological: Bruises/bleeds easily.   Psychiatric/Behavioral: Negative for agitation, behavioral problems, confusion and dysphoric mood.     Objective:     Vital Signs (Most Recent):  Temp: 98 °F (36.7 °C) (10/22/20 1238)  Pulse: 93 (10/22/20 1215)  Resp: 18 (10/22/20 1252)  BP: 117/68 (10/22/20 1215)  SpO2: (!) 94 % (10/22/20 1215) Vital Signs (24h Range):  Temp:  [98 °F (36.7 °C)-98.9 °F (37.2 °C)] 98 °F (36.7 °C)  Pulse:  [] 93  Resp:  [15-20] 18  SpO2:  [91 %-95 %] 94 %  BP: (105-140)/(68-86) 117/68     Weight: 107 kg (235 lb 14.3 oz)  Body mass index is 31.99 kg/m².    Intake/Output - Last 3 Shifts       10/20 0700 - 10/21 0659 10/21 0700 - 10/22 0659 10/22 0700 - 10/23 0659    P.O. 750 840 480    I.V. (mL/kg)  821.7 (7.7) 62 (0.6)    Blood 250      Total Intake(mL/kg) 1000 (8.9) 1661.7 (15.5) 542 (5.1)    Urine (mL/kg/hr) 600 (0.2) 2945 (1.1) 500 (0.6)    Emesis/NG output       Other       Stool 0 0     Total Output 600 2945 500    Net +400 -1283.3 +42           Urine Occurrence 1 x      Stool Occurrence 0 x 0 x           Physical Exam  Vitals signs and nursing note reviewed.   Constitutional:       General: He is not in acute distress.  Eyes:      General: Scleral icterus present.         Right eye: No discharge.         Left eye: No discharge.   Cardiovascular:      Rate and Rhythm: Normal rate and regular rhythm.      Pulses: Normal pulses.      Heart sounds: No murmur.   Pulmonary:      Effort: Pulmonary effort is normal. No respiratory distress.      Breath sounds: No wheezing or rales.      Comments: Decreased RLL  Abdominal:      Palpations: Abdomen is soft.      Tenderness: There is abdominal tenderness (mild incisional). There is no guarding.      Comments: Chevron incision KELLY with staples no s/s/i  RLQ drain sites intact   Musculoskeletal:      Right lower leg: Edema present.       Left lower leg: Edema present.   Skin:     General: Skin is warm and dry.      Capillary Refill: Capillary refill takes 2 to 3 seconds.      Coloration: Skin is jaundiced.   Neurological:      General: No focal deficit present.      Mental Status: He is alert and oriented to person, place, and time.   Psychiatric:         Mood and Affect: Mood normal.         Behavior: Behavior normal.         Thought Content: Thought content normal.         Laboratory:  Immunosuppressants         Stop Route Frequency     tacrolimus capsule 2 mg      -- Oral 2 times daily     mycophenolate capsule 1,000 mg      -- Oral 2 times daily        CBC:   Recent Labs   Lab 10/22/20  0500 10/22/20  0830   WBC 3.85*  --    RBC 2.09*  --    HGB 6.5* 6.6*   HCT 20.4* 20.9*   PLT 38*  --    MCV 98  --    MCH 30.6  --    MCHC 31.2*  --      CMP:   Recent Labs   Lab 10/22/20  0500      CALCIUM 8.8   ALBUMIN 2.6*   PROT 4.4*      K 4.0   CO2 24      BUN 50*   CREATININE 2.3*   ALKPHOS 76   *   AST 18   BILITOT 2.8*     Labs within the past 24 hours have been reviewed.    Diagnostic Results:  I have personally reviewed all pertinent imaging studies.    Debility/Functional status: Patient debilitated by evidence of Weakness. Physical and occupational therapy ordered daily to evaluate and treat. Debility was: present on admission.    Assessment/Plan:     * Status post liver transplant  - s/p DBD OLTx 10/14 (CMV+/-, donor HCV SHAYE+).  - Post op course significant for PVT requiring TB to OR POD#1 for thrombectomy. Hepatic artery also revised (shortened) due to kink. - OR POD#2 for abdominal closure, bx, and reconstruction of bile duct.   - US 10/17 satisfactory.   - Oliguric JENN requiring CRRT post op. Nephrology following.   - Stepped down 10/19 afternoon. Drains and bourne out.  - US 10/20 (POD#6) due to decreased H/H. Liver US satisfactory with one small fairly stable fluid collection.  - Continue heparin gtt. Transfused 1u  "PRBC 10/20.  - LFTs trending down.  - Drop in HCT again today. Heparin gtt held. CT a/p without contrast to assess for hematomas. Transfuse 1 u PRBC 10/22.  - restart heparin this afternoon if CT unremarkable.  - plan to transition to oral anticoagulant 10/23 if LFTs remain stable.  - Tolerating diet. Pain well controlled. Continue bowel regimen. PT/OT consulted.    JENN (acute kidney injury)  - Oliguric JENN requiring CRRT post op (had renal dysfx pre op).  - Nephrology following. Last CRRT 10/18-10/19.  - UOP improving  - Cr 2.3 from 2.8.  - No HD today.   - Lasix 120 mg IV again today. Albumin x 2 doses.  - Strict I/O. Daily weights.          Anemia of chronic disease  - H&H 6.5/20.4.  - Transfuse 1 unit PRBCs.  - Monitor.      At risk for opportunistic infections  - Bactrim for PCP prophylaxis.  - Valcyte for CMV prophylaxis.  - Fluc for fungal prophylaxis.       Long-term use of immunosuppressant medication  - See "prophylactic immunotherapy."       Weakness  - Encourage OOBTC/ambulation with assistance. PT/OT consulted.  - Rehab consult placed.      Hyperphosphatemia  - Continue binders.  - Monitor.      Anticoagulated  - Heparin gtt for PVT. Monitor closely.      Prophylactic immunotherapy  - Continue Prograf. Monitor trough daily and adjust dose as needed to achieve therapeutic level.  - Continue Cellcept.  - Continue steroids, per protocol.      Class 1 obesity due to excess calories with serious comorbidity and body mass index (BMI) of 34.0 to 34.9 in adult  - Monitor.      Acidosis  - stable.  - Monitor.        VTE Risk Mitigation (From admission, onward)         Ordered     heparin 25,000 units in dextrose 5% 250 mL (100 units/mL) infusion LOW INTENSITY nomogram  Continuous     Question:  Heparin Infusion Adjustment (DO NOT MODIFY ANSWER)  Answer:  [email or web address]\Images\Pharmacy\HeparinInfusions\heparin LOW INTENSITY nomogram with ANTI-XA [id number]    10/15/20 1304     heparin 25,000 units in dextrose " 5% (100 units/ml) IV bolus from bag - ADDITIONAL PRN BOLUS - 30 units/kg  As needed (PRN)     Question:  Heparin Infusion Adjustment (DO NOT MODIFY ANSWER)  Answer:  \\[email or web address]\epic\Images\Pharmacy\HeparinInfusions\heparin LOW INTENSITY nomogram with ANTI-XA [id number]    10/15/20 1304     heparin 25,000 units in dextrose 5% (100 units/ml) IV bolus from bag - ADDITIONAL PRN BOLUS - 60 units/kg  As needed (PRN)     Question:  Heparin Infusion Adjustment (DO NOT MODIFY ANSWER)  Answer:  \[email or web address]\Images\Pharmacy\HeparinInfusions\heparin LOW INTENSITY nomogram with ANTI-XA [id number]    10/15/20 1304     Place sequential compression device  Until discontinued      10/14/20 1339     IP VTE HIGH RISK PATIENT  Once      10/14/20 1339     Place JELENA hose  Until discontinued      10/11/20 0217                The patients clinical status was discussed at multidisplinary rounds, involving transplant surgery, transplant medicine, pharmacy, nursing, nutrition, and social work    Discharge Planning: not a candidate for dc at this time.        Awa Sun, ROC  Liver Transplant  [facility]

## 2020-10-22 NOTE — ASSESSMENT & PLAN NOTE
JENN likely 2/2 iATN from intraoperative hypotension and acute blood loss anemia     Plan/Recommendations:   -Hold RRT today  -follow UOP today, would hold further diuresis and monitor.  -reassess labs in AM.    -Strict I/O's  -Renally dose meds and avoid nephrotoxic medications   -Will continue to follow closely   -Plan discussed with staff, Dr Hartley

## 2020-10-22 NOTE — PROGRESS NOTES
Met with patient and his significant other, Valeria, or  discharge teaching.  Reviewed My New Journey: Living Smart After My Liver Transplant.  Sections reviewed were: First Steps, Appointments and Prevention.  Medication sections will  be reviewed by Pharm D.  Allowed time for questions and answers.  Asked patient and caregiver to complete the review questions in the discharge book.

## 2020-10-23 PROBLEM — R53.81 DEBILITY: Status: ACTIVE | Noted: 2020-10-20

## 2020-10-23 LAB
ALBUMIN SERPL BCP-MCNC: 3.1 G/DL (ref 3.5–5.2)
ALP SERPL-CCNC: 72 U/L (ref 55–135)
ALT SERPL W/O P-5'-P-CCNC: 92 U/L (ref 10–44)
ANION GAP SERPL CALC-SCNC: 10 MMOL/L (ref 8–16)
ANISOCYTOSIS BLD QL SMEAR: SLIGHT
APTT BLDCRRT: 25.4 SEC (ref 21–32)
APTT BLDCRRT: 25.6 SEC (ref 21–32)
AST SERPL-CCNC: 18 U/L (ref 10–40)
BASO STIPL BLD QL SMEAR: ABNORMAL
BASOPHILS # BLD AUTO: 0.02 K/UL (ref 0–0.2)
BASOPHILS NFR BLD: 0 % (ref 0–1.9)
BASOPHILS NFR BLD: 0 % (ref 0–1.9)
BASOPHILS NFR BLD: 0.3 % (ref 0–1.9)
BILIRUB SERPL-MCNC: 3.3 MG/DL (ref 0.1–1)
BUN SERPL-MCNC: 48 MG/DL (ref 6–20)
CALCIUM SERPL-MCNC: 9 MG/DL (ref 8.7–10.5)
CHLORIDE SERPL-SCNC: 102 MMOL/L (ref 95–110)
CO2 SERPL-SCNC: 24 MMOL/L (ref 23–29)
CREAT SERPL-MCNC: 1.9 MG/DL (ref 0.5–1.4)
DIFFERENTIAL METHOD: ABNORMAL
EOSINOPHIL # BLD AUTO: 0.3 K/UL (ref 0–0.5)
EOSINOPHIL NFR BLD: 0 % (ref 0–8)
EOSINOPHIL NFR BLD: 5 % (ref 0–8)
EOSINOPHIL NFR BLD: 6 % (ref 0–8)
ERYTHROCYTE [DISTWIDTH] IN BLOOD BY AUTOMATED COUNT: 18.4 % (ref 11.5–14.5)
ERYTHROCYTE [DISTWIDTH] IN BLOOD BY AUTOMATED COUNT: 18.5 % (ref 11.5–14.5)
ERYTHROCYTE [DISTWIDTH] IN BLOOD BY AUTOMATED COUNT: 18.5 % (ref 11.5–14.5)
EST. GFR  (AFRICAN AMERICAN): 47.5 ML/MIN/1.73 M^2
EST. GFR  (NON AFRICAN AMERICAN): 41.1 ML/MIN/1.73 M^2
FACT X PPP CHRO-ACNC: <0.1 IU/ML (ref 0.3–0.7)
FACT X PPP CHRO-ACNC: <0.1 IU/ML (ref 0.3–0.7)
GLUCOSE SERPL-MCNC: 71 MG/DL (ref 70–110)
HCT VFR BLD AUTO: 23.8 % (ref 40–54)
HCT VFR BLD AUTO: 25.9 % (ref 40–54)
HCT VFR BLD AUTO: 28.2 % (ref 40–54)
HGB BLD-MCNC: 7.5 G/DL (ref 14–18)
HGB BLD-MCNC: 8 G/DL (ref 14–18)
HGB BLD-MCNC: 9 G/DL (ref 14–18)
IMM GRANULOCYTES # BLD AUTO: 0.33 K/UL (ref 0–0.04)
IMM GRANULOCYTES # BLD AUTO: ABNORMAL K/UL (ref 0–0.04)
IMM GRANULOCYTES # BLD AUTO: ABNORMAL K/UL (ref 0–0.04)
IMM GRANULOCYTES NFR BLD AUTO: 4.9 % (ref 0–0.5)
IMM GRANULOCYTES NFR BLD AUTO: ABNORMAL % (ref 0–0.5)
IMM GRANULOCYTES NFR BLD AUTO: ABNORMAL % (ref 0–0.5)
INR PPP: 1 (ref 0.8–1.2)
LYMPHOCYTES # BLD AUTO: 0.2 K/UL (ref 1–4.8)
LYMPHOCYTES NFR BLD: 3.3 % (ref 18–48)
LYMPHOCYTES NFR BLD: 6 % (ref 18–48)
LYMPHOCYTES NFR BLD: 7 % (ref 18–48)
MAGNESIUM SERPL-MCNC: 1.7 MG/DL (ref 1.6–2.6)
MCH RBC QN AUTO: 29.9 PG (ref 27–31)
MCH RBC QN AUTO: 30.7 PG (ref 27–31)
MCH RBC QN AUTO: 30.7 PG (ref 27–31)
MCHC RBC AUTO-ENTMCNC: 30.9 G/DL (ref 32–36)
MCHC RBC AUTO-ENTMCNC: 31.5 G/DL (ref 32–36)
MCHC RBC AUTO-ENTMCNC: 31.9 G/DL (ref 32–36)
MCV RBC AUTO: 96 FL (ref 82–98)
MCV RBC AUTO: 97 FL (ref 82–98)
MCV RBC AUTO: 98 FL (ref 82–98)
MONOCYTES # BLD AUTO: 0.8 K/UL (ref 0.3–1)
MONOCYTES NFR BLD: 11.7 % (ref 4–15)
MONOCYTES NFR BLD: 12 % (ref 4–15)
MONOCYTES NFR BLD: 7 % (ref 4–15)
NEUTROPHILS # BLD AUTO: 5.1 K/UL (ref 1.8–7.7)
NEUTROPHILS NFR BLD: 74 % (ref 38–73)
NEUTROPHILS NFR BLD: 74.8 % (ref 38–73)
NEUTROPHILS NFR BLD: 84 % (ref 38–73)
NEUTS BAND NFR BLD MANUAL: 2 %
NEUTS BAND NFR BLD MANUAL: 2 %
NRBC BLD-RTO: 0 /100 WBC
OVALOCYTES BLD QL SMEAR: ABNORMAL
PHOSPHATE SERPL-MCNC: 4.3 MG/DL (ref 2.7–4.5)
PLATELET # BLD AUTO: 44 K/UL (ref 150–350)
PLATELET # BLD AUTO: 56 K/UL (ref 150–350)
PLATELET # BLD AUTO: 72 K/UL (ref 150–350)
PLATELET BLD QL SMEAR: ABNORMAL
PMV BLD AUTO: 11.8 FL (ref 9.2–12.9)
PMV BLD AUTO: 13 FL (ref 9.2–12.9)
PMV BLD AUTO: 13 FL (ref 9.2–12.9)
POIKILOCYTOSIS BLD QL SMEAR: SLIGHT
POLYCHROMASIA BLD QL SMEAR: ABNORMAL
POTASSIUM SERPL-SCNC: 4.3 MMOL/L (ref 3.5–5.1)
PROT SERPL-MCNC: 5 G/DL (ref 6–8.4)
PROTHROMBIN TIME: 10.6 SEC (ref 9–12.5)
RBC # BLD AUTO: 2.44 M/UL (ref 4.6–6.2)
RBC # BLD AUTO: 2.68 M/UL (ref 4.6–6.2)
RBC # BLD AUTO: 2.93 M/UL (ref 4.6–6.2)
SODIUM SERPL-SCNC: 136 MMOL/L (ref 136–145)
TACROLIMUS BLD-MCNC: 6.1 NG/ML (ref 5–15)
WBC # BLD AUTO: 5.14 K/UL (ref 3.9–12.7)
WBC # BLD AUTO: 6.76 K/UL (ref 3.9–12.7)
WBC # BLD AUTO: 6.88 K/UL (ref 3.9–12.7)

## 2020-10-23 PROCEDURE — 85025 COMPLETE CBC W/AUTO DIFF WBC: CPT

## 2020-10-23 PROCEDURE — 97116 GAIT TRAINING THERAPY: CPT | Mod: CQ

## 2020-10-23 PROCEDURE — 25000003 PHARM REV CODE 250: Performed by: STUDENT IN AN ORGANIZED HEALTH CARE EDUCATION/TRAINING PROGRAM

## 2020-10-23 PROCEDURE — 99222 1ST HOSP IP/OBS MODERATE 55: CPT | Mod: ,,, | Performed by: NURSE PRACTITIONER

## 2020-10-23 PROCEDURE — 85520 HEPARIN ASSAY: CPT | Mod: 91

## 2020-10-23 PROCEDURE — 99233 PR SUBSEQUENT HOSPITAL CARE,LEVL III: ICD-10-PCS | Mod: ,,, | Performed by: INTERNAL MEDICINE

## 2020-10-23 PROCEDURE — 83735 ASSAY OF MAGNESIUM: CPT

## 2020-10-23 PROCEDURE — 99233 PR SUBSEQUENT HOSPITAL CARE,LEVL III: ICD-10-PCS | Mod: 24,,, | Performed by: NURSE PRACTITIONER

## 2020-10-23 PROCEDURE — 63600175 PHARM REV CODE 636 W HCPCS: Performed by: SURGERY

## 2020-10-23 PROCEDURE — 25000003 PHARM REV CODE 250: Performed by: PHYSICIAN ASSISTANT

## 2020-10-23 PROCEDURE — 80053 COMPREHEN METABOLIC PANEL: CPT

## 2020-10-23 PROCEDURE — 97110 THERAPEUTIC EXERCISES: CPT | Mod: CQ

## 2020-10-23 PROCEDURE — 97110 THERAPEUTIC EXERCISES: CPT

## 2020-10-23 PROCEDURE — 25000003 PHARM REV CODE 250: Performed by: NURSE PRACTITIONER

## 2020-10-23 PROCEDURE — 97535 SELF CARE MNGMENT TRAINING: CPT

## 2020-10-23 PROCEDURE — 99233 SBSQ HOSP IP/OBS HIGH 50: CPT | Mod: 24,,, | Performed by: NURSE PRACTITIONER

## 2020-10-23 PROCEDURE — 20600001 HC STEP DOWN PRIVATE ROOM

## 2020-10-23 PROCEDURE — 84100 ASSAY OF PHOSPHORUS: CPT

## 2020-10-23 PROCEDURE — 99222 PR INITIAL HOSPITAL CARE,LEVL II: ICD-10-PCS | Mod: ,,, | Performed by: NURSE PRACTITIONER

## 2020-10-23 PROCEDURE — 97530 THERAPEUTIC ACTIVITIES: CPT

## 2020-10-23 PROCEDURE — 85027 COMPLETE CBC AUTOMATED: CPT | Mod: 91

## 2020-10-23 PROCEDURE — 85730 THROMBOPLASTIN TIME PARTIAL: CPT

## 2020-10-23 PROCEDURE — 63600175 PHARM REV CODE 636 W HCPCS: Performed by: STUDENT IN AN ORGANIZED HEALTH CARE EDUCATION/TRAINING PROGRAM

## 2020-10-23 PROCEDURE — 99233 SBSQ HOSP IP/OBS HIGH 50: CPT | Mod: ,,, | Performed by: INTERNAL MEDICINE

## 2020-10-23 PROCEDURE — 80197 ASSAY OF TACROLIMUS: CPT

## 2020-10-23 PROCEDURE — 63600175 PHARM REV CODE 636 W HCPCS: Performed by: NURSE PRACTITIONER

## 2020-10-23 PROCEDURE — 85007 BL SMEAR W/DIFF WBC COUNT: CPT

## 2020-10-23 PROCEDURE — 85520 HEPARIN ASSAY: CPT

## 2020-10-23 PROCEDURE — 97530 THERAPEUTIC ACTIVITIES: CPT | Mod: CQ

## 2020-10-23 PROCEDURE — 85730 THROMBOPLASTIN TIME PARTIAL: CPT | Mod: 91

## 2020-10-23 PROCEDURE — 85610 PROTHROMBIN TIME: CPT

## 2020-10-23 RX ORDER — SULFAMETHOXAZOLE AND TRIMETHOPRIM 400; 80 MG/1; MG/1
1 TABLET ORAL DAILY
Status: DISCONTINUED | OUTPATIENT
Start: 2020-10-24 | End: 2020-10-27 | Stop reason: HOSPADM

## 2020-10-23 RX ORDER — VALGANCICLOVIR 450 MG/1
450 TABLET, FILM COATED ORAL DAILY
Status: DISCONTINUED | OUTPATIENT
Start: 2020-10-24 | End: 2020-10-27 | Stop reason: HOSPADM

## 2020-10-23 RX ORDER — HEPARIN SODIUM 10000 [USP'U]/100ML
500 INJECTION, SOLUTION INTRAVENOUS CONTINUOUS
Status: DISCONTINUED | OUTPATIENT
Start: 2020-10-23 | End: 2020-10-23

## 2020-10-23 RX ORDER — HEPARIN SODIUM 10000 [USP'U]/100ML
500 INJECTION, SOLUTION INTRAVENOUS CONTINUOUS
Status: DISCONTINUED | OUTPATIENT
Start: 2020-10-23 | End: 2020-10-24

## 2020-10-23 RX ADMIN — SULFAMETHOXAZOLE AND TRIMETHOPRIM 1 TABLET: 400; 80 TABLET ORAL at 09:10

## 2020-10-23 RX ADMIN — TACROLIMUS 2 MG: 1 CAPSULE ORAL at 09:10

## 2020-10-23 RX ADMIN — OXYCODONE HYDROCHLORIDE 5 MG: 5 TABLET ORAL at 07:10

## 2020-10-23 RX ADMIN — BISACODYL 10 MG: 10 SUPPOSITORY RECTAL at 04:10

## 2020-10-23 RX ADMIN — DOCUSATE SODIUM 100 MG: 100 CAPSULE, LIQUID FILLED ORAL at 09:10

## 2020-10-23 RX ADMIN — BISACODYL 10 MG: 5 TABLET, COATED ORAL at 08:10

## 2020-10-23 RX ADMIN — MELATONIN TAB 3 MG 6 MG: 3 TAB at 07:10

## 2020-10-23 RX ADMIN — ACETAMINOPHEN 650 MG: 325 TABLET ORAL at 03:10

## 2020-10-23 RX ADMIN — FLUCONAZOLE 200 MG: 200 TABLET ORAL at 09:10

## 2020-10-23 RX ADMIN — DIPHENHYDRAMINE HYDROCHLORIDE 25 MG: 25 CAPSULE ORAL at 07:10

## 2020-10-23 RX ADMIN — DOCUSATE SODIUM 100 MG: 100 CAPSULE, LIQUID FILLED ORAL at 04:10

## 2020-10-23 RX ADMIN — SEVELAMER CARBONATE 800 MG: 800 TABLET, FILM COATED ORAL at 09:10

## 2020-10-23 RX ADMIN — MYCOPHENOLATE MOFETIL 1000 MG: 250 CAPSULE ORAL at 09:10

## 2020-10-23 RX ADMIN — DOCUSATE SODIUM 100 MG: 100 CAPSULE, LIQUID FILLED ORAL at 08:10

## 2020-10-23 RX ADMIN — HEPARIN SODIUM AND DEXTROSE 500 UNITS: 10000; 5 INJECTION INTRAVENOUS at 03:10

## 2020-10-23 RX ADMIN — PREDNISONE 20 MG: 20 TABLET ORAL at 09:10

## 2020-10-23 RX ADMIN — TACROLIMUS 2 MG: 1 CAPSULE ORAL at 04:10

## 2020-10-23 RX ADMIN — PANTOPRAZOLE SODIUM 40 MG: 40 TABLET, DELAYED RELEASE ORAL at 09:10

## 2020-10-23 RX ADMIN — MYCOPHENOLATE MOFETIL 1000 MG: 250 CAPSULE ORAL at 08:10

## 2020-10-23 RX ADMIN — OXYCODONE HYDROCHLORIDE 10 MG: 10 TABLET ORAL at 01:10

## 2020-10-23 NOTE — HOSPITAL COURSE
10/22/2020: Bed mobility Min-SBA.  Sit to stand Chris x 2 ppl & RW and transfers Chris & rw. LBD TA.

## 2020-10-23 NOTE — PT/OT/SLP PROGRESS
Occupational Therapy   Treatment    Name: Jordan Altman  MRN: [id number]  Admitting Diagnosis:  Status post liver transplant  7 Days Post-Op    Recommendations:     Discharge Recommendations: rehabilitation facility  Discharge Equipment Recommendations:  shower chair, walker, rolling  Barriers to discharge:  Decreased caregiver support    Assessment:     Jordan Altman is a 45 y.o. male with a medical diagnosis of Status post liver transplant. Pt making excellent progress and continues to be an excellent Rehab candidate. Performance deficits affecting function are weakness, impaired endurance, impaired self care skills, impaired functional mobilty, gait instability, impaired balance, decreased coordination, decreased safety awareness.     Rehab Prognosis:  Good; patient would benefit from acute skilled OT services to address these deficits and reach maximum level of function.       Plan:     Patient to be seen 4 x/week to address the above listed problems via self-care/home management, therapeutic activities, therapeutic exercises  · Plan of Care Expires: 11/20/20  · Plan of Care Reviewed with: patient    Subjective     Pain/Comfort:  · Pain Rating 1: 0/10    Objective:     Communicated with: rn prior to session.  Patient found EOB with peripheral IV upon OT entry to room.    General Precautions: Standard, fall   Orthopedic Precautions:    Braces:       Occupational Performance:     Bed Mobility:    · Found sitting EOB.    Functional Mobility/Transfers:  · Patient completed Sit <> Stand Transfer with minimum assistance  with  rolling walker   · Patient completed Bed <> Chair Transfer using Step Transfer technique with contact guard assistance with rolling walker  · Patient completed Toilet Transfer Step Transfer technique with moderate assistance with  rolling walker and grab bars  · Functional Mobility: CGA with a RW.    Activities of Daily Living:  · Grooming: contact guard assistance in standing at the  sink.  · Toileting: contact guard assistance for underwear management and pericare.    [facility] 6 Click ADL: 19    Treatment & Education:  From chair level, pt completed BUE therex (x 15 reps each) including:  - bicep curls  - resistive tricep extension  - straight arm raises  - hor Abd/Add    Discussed OT POC and the role of OT in the acute setting.    Patient left up in chair with all lines intact and call button in reachEducation:      GOALS:   Multidisciplinary Problems     Occupational Therapy Goals        Problem: Occupational Therapy Goal    Goal Priority Disciplines Outcome Interventions   Occupational Therapy Goal     OT, PT/OT Ongoing, Progressing    Description: Goals to be met by: 10/28/20    Patient will increase functional independence with ADLs by performing:    Grooming while standing with Contact Guard Assistance. MET 10/23  REVISED to (S).  Toileting from bedside commode with Moderate Assistance for hygiene and clothing management. MET 10/23  REVISED to (S).  Supine to sit with Stand-by Assistance.  Step transfer with Minimal Assistance MET 10/23  REVISED to (S).  Toilet transfer to bedside commode with Minimal Assistance.                      Time Tracking:     OT Date of Treatment: 10/23/20  OT Start Time: 0930  OT Stop Time: 1009  OT Total Time (min): 39 min    Billable Minutes:Self Care/Home Management 10  Therapeutic Activity 19  Therapeutic Exercise 10    MAURICIO Mulligan  10/23/2020

## 2020-10-23 NOTE — CONSULTS
[facility]  Physical Medicine & Rehab  Consult Note    Patient Name: Jordan Altman  MRN: [id number]  Admission Date: 10/11/2020  Hospital Length of Stay: 12 days  Attending Physician: Rogerio Malik MD     Inpatient consult to Physical Medicine & Rehabilitation  Consult performed by: Kadie Snell NP  Consult requested by:  Rogerio Malik MD    Reason for Consult:  assess rehabilitation needs    Consults  Subjective:     Principal Problem: Status post liver transplant    HPI: Per chart review, Jordan Lyle is a 45-year-old male with PMHx of HTN, SBP, hepatic cirrhosis and ESLD 2/2 ETOH listed for liver transplant.  Patient presented to [facility] from [facility] on 10/11 with abd distention, black tarry stools, and L knee pain. OSH occult blood test positive. Transferred to [facility] for LTS evaluation. At [facility], now s/p liver trx 10/14 with post-op complications significant for PVT s/p thrombectomy, hepatic artery revision 2/2 kink on 10/15 and s/p abd closure and reconstruction of the bile duct on 10/16.   Hospital course further complicated by transamnitis, anemia requiring transfusion,  JENN requiring CRRT (last session 10/19), L knee pain, & debility . Receiving Lasix IV.     Functional History: Patient lives in  with [facility] in a ground level apt.  Prior to admission, (I) with ADLs and mobility. DME: none.     Hospital Course: 10/22/2020: Bed mobility Min-SBA.  Sit to stand Chris x 2 ppl & RW and transfers Chris & rw. LBD TA.    Past Medical History:   Diagnosis Date    Decompensated hepatic cirrhosis     Hypertension     SBP (spontaneous bacterial peritonitis)      Past Surgical History:   Procedure Laterality Date    ERCP N/A 10/1/2020    Procedure: ERCP (ENDOSCOPIC RETROGRADE CHOLANGIOPANCREATOGRAPHY);  Surgeon: Marcos Ramirez MD;  Location: [address]);  Service: Endoscopy;  Laterality: N/A;    ESOPHAGOGASTRODUODENOSCOPY N/A 10/13/2020    Procedure: EGD (ESOPHAGOGASTRODUODENOSCOPY);   Surgeon: Prasanth Jerry MD;  Location: [facility] ENDO (2ND FLR);  Service: Endoscopy;  Laterality: N/A;    EXPLORATORY LAPAROTOMY AFTER LIVER TRANSPLANTATION N/A 10/15/2020    Procedure: LAPAROTOMY, EXPLORATORY, AFTER LIVER TRANSPLANT, Possible redo of artery, possible portal thrombectomy. IntraOperative US.;  Surgeon: Curtis Zavaleta MD;  [facility];  Service: Transplant;  Laterality: N/A;  With intraoperative ultrasound    LIVER TRANSPLANT N/A 10/14/2020    Procedure: TRANSPLANT, LIVER;  Surgeon: Curtis Zavaleta MD;  [facility];  Service: Transplant;  Laterality: N/A;  Intra op HD    RIGHT HEART CATHETERIZATION Right 9/23/2020    Procedure: INSERTION, CATHETER, RIGHT HEART;  Surgeon: Mikel Costa Jr., MD;  Location: [facility] CATH LAB;  Service: Cardiology;  Laterality: Right;    THORACENTESIS  2011    THROMBECTOMY  10/15/2020    Procedure: THROMBECTOMY portal vein;  Surgeon: Curtis Zavaleta MD;  [facility];  Service: Transplant;;     Review of patient's allergies indicates:   Allergen Reactions    Latex, natural rubber        Scheduled Medications:    bisacodyL  10 mg Oral QHS    docusate sodium  100 mg Oral TID    fluconazole  200 mg Oral Daily    mycophenolate  1,000 mg Oral BID    pantoprazole  40 mg Oral Daily    predniSONE  20 mg Oral Daily    sevelamer carbonate  800 mg Oral TID WM    sulfamethoxazole-trimethoprim 400-80mg  1 tablet Oral Every Mon, Wed, Fri    tacrolimus  2 mg Oral BID    [START ON 10/26/2020] valGANciclovir  450 mg Oral Every Mon, Wed, Fri       PRN Medications: sodium chloride, sodium chloride, acetaminophen, bisacodyL, diphenhydrAMINE, heparin (PORCINE), heparin (PORCINE), melatonin, ondansetron, oxyCODONE, oxyCODONE, sodium chloride 0.9%    Family History     Problem Relation (Age of Onset)    COPD Mother        Tobacco Use    Smoking status: Current Some Day Smoker    Smokeless tobacco: Current User   Substance and Sexual  Activity    Alcohol use: Yes     Alcohol/week: 112.0 standard drinks     Types: 112 Shots of liquor per week     Comment: fifth of vodka a day. LAST DRINK 7/25/2020 per pt     Drug use: Yes     Frequency: 3.0 times per week     Types: Marijuana    Sexual activity: Yes     Review of Systems   Constitutional: Positive for activity change. Negative for fatigue and fever.   HENT: Negative for trouble swallowing and voice change.    Eyes: Negative for photophobia and visual disturbance.   Respiratory: Negative for cough and shortness of breath.    Cardiovascular: Negative for chest pain and palpitations.   Gastrointestinal: Negative for nausea and vomiting.   Genitourinary: Negative for difficulty urinating and flank pain.   Musculoskeletal: Positive for gait problem. Negative for arthralgias.   Skin: Positive for color change. Negative for rash.   Allergic/Immunologic: Positive for immunocompromised state.   Neurological: Positive for weakness. Negative for numbness.   Psychiatric/Behavioral: Positive for confusion. Negative for agitation.     Objective:     Vital Signs (Most Recent):  Temp: 98.2 °F (36.8 °C) (10/23/20 0758)  Pulse: 60 (10/23/20 0758)  Resp: 19 (10/23/20 0758)  BP: (!) 139/91 (10/23/20 0758)  SpO2: 98 % (10/23/20 0758)    Vital Signs (24h Range):  Temp:  [98 °F (36.7 °C)-98.6 °F (37 °C)] 98.2 °F (36.8 °C)  Pulse:  [60-95] 60  Resp:  [15-23] 19  SpO2:  [92 %-98 %] 98 %  BP: (117-145)/(68-91) 139/91     Body mass index is 31.39 kg/m².    Physical Exam  Constitutional:       General: He is not in acute distress.     Appearance: He is well-developed. He is ill-appearing.   HENT:      Head: Normocephalic and atraumatic.   Eyes:      General:         Right eye: No discharge.         Left eye: No discharge.   Neck:      Musculoskeletal: Neck supple.   Cardiovascular:      Pulses: Normal pulses.   Pulmonary:      Effort: Pulmonary effort is normal. No respiratory distress.   Abdominal:      General: There is  no distension.      Palpations: Abdomen is soft.   Musculoskeletal:         General: No deformity.   Skin:     General: Skin is warm and dry.      Coloration: Skin is jaundiced.   Neurological:      Mental Status: He is alert and oriented to person, place, and time.      Motor: Weakness (general) present.      Comments: Follows commands    Psychiatric:         Behavior: Behavior normal.         Cognition and Memory: Cognition is not impaired.       Diagnostic Results:   Labs: Reviewed  X-Ray: Reviewed  CT: Reviewed    Assessment/Plan:     * Status post liver transplant  -s/p liver trx 10/14/20 with post-op complications significant for PVT s/p thrombectomy, hepatic artery revision 2/2 kink on 10/15 and s/p abd closure and reconstruction of the bile duct on 10/16  - Liver US satisfactory with one small fairly stable fluid collection on 10/20   -Transfuse 1 u PRBC 10/22  -per LTS, on heparin gtt and plan to transition to PO AC    Debility  See hospital course for functional status.      Recommendations  -  Encourage mobility, OOB in chair, and early ambulation as appropriate  -  PT/OT evaluate and treat  -  Pain management  -  DVT prophylaxis if appropriate   -  Monitor for and prevent skin breakdown and pressure ulcers  · Early mobility, repositioning/weight shifting every 20-30 minutes when sitting, turn patient every 2 hours, proper mattress/overlay and chair cushioning, pressure relief/heel protector boots    JENN (acute kidney injury)  -Nephrology consulted  -Last CRRT 10/18-10/19  -on lasix IV    Reviewed case with Collaborative MD, Dr. Phillips. PAC recommendation: Inpatient Rehab pending medical stability.         Thank you for your consult.     Kadie Snell NP  Department of Physical Medicine & Rehab  [facility]

## 2020-10-23 NOTE — SUBJECTIVE & OBJECTIVE
Interval History:   Continued improvement in kidney function, SCr down to 1.9 from 2.3.  Other electrolytes stable.  UOP of 2.2l/24h.    Review of patient's allergies indicates:   Allergen Reactions    Latex, natural rubber      Current Facility-Administered Medications   Medication Frequency    0.9%  NaCl infusion (for blood administration) Q24H PRN    acetaminophen tablet 650 mg Q8H PRN    bisacodyL EC tablet 10 mg QHS    bisacodyL suppository 10 mg Daily PRN    diphenhydrAMINE capsule 25 mg Q6H PRN    docusate sodium capsule 100 mg TID    fluconazole tablet 200 mg Daily    heparin 25,000 units in dextrose 5% (100 units/ml) IV bolus from bag - ADDITIONAL PRN BOLUS - 30 units/kg PRN    heparin 25,000 units in dextrose 5% (100 units/ml) IV bolus from bag - ADDITIONAL PRN BOLUS - 60 units/kg PRN    heparin 25,000 units in dextrose 5% 250 mL (100 units/mL) infusion LOW INTENSITY nomogram Continuous    melatonin tablet 6 mg Nightly PRN    mycophenolate capsule 1,000 mg BID    ondansetron injection 4 mg Q6H PRN    oxyCODONE immediate release tablet 5 mg Q6H PRN    oxyCODONE immediate release tablet Tab 10 mg Q6H PRN    pantoprazole EC tablet 40 mg Daily    predniSONE tablet 20 mg Daily    sodium chloride 0.9% flush 10 mL PRN    [START ON 10/24/2020] sulfamethoxazole-trimethoprim 400-80mg per tablet 1 tablet Daily    tacrolimus capsule 2 mg BID    [START ON 10/24/2020] valGANciclovir tablet 450 mg Daily       Objective:     Vital Signs (Most Recent):  Temp: 98.2 °F (36.8 °C) (10/23/20 1135)  Pulse: 87 (10/23/20 1108)  Resp: 19 (10/23/20 1108)  BP: 122/80 (10/23/20 1108)  SpO2: 98 % (10/23/20 1108)  O2 Device (Oxygen Therapy): room air (10/23/20 1108) Vital Signs (24h Range):  Temp:  [98.1 °F (36.7 °C)-98.6 °F (37 °C)] 98.2 °F (36.8 °C)  Pulse:  [60-95] 87  Resp:  [15-23] 19  SpO2:  [92 %-98 %] 98 %  BP: (120-145)/(69-91) 122/80     Weight: 105 kg (231 lb 7.7 oz) (10/23/20 0615)  Body mass index is  31.39 kg/m².  Body surface area is 2.31 meters squared.    I/O last 3 completed shifts:  In: 2627.2 [P.O.:1640; I.V.:438.9; Blood:548.3]  Out: 3350 [Urine:3350]    Physical Exam  Vitals signs and nursing note reviewed.   Constitutional:       General: He is not in acute distress.     Appearance: He is not ill-appearing.   HENT:      Head: Normocephalic and atraumatic.      Nose: Nose normal.      Mouth/Throat:      Mouth: Mucous membranes are moist.      Pharynx: Oropharynx is clear.   Eyes:      Extraocular Movements: Extraocular movements intact.      Conjunctiva/sclera: Conjunctivae normal.   Cardiovascular:      Rate and Rhythm: Normal rate and regular rhythm.   Pulmonary:      Breath sounds: Examination of the right-lower field reveals decreased breath sounds. Examination of the left-lower field reveals decreased breath sounds. Decreased breath sounds present. No rales.   Abdominal:      Palpations: Abdomen is soft.   Musculoskeletal:      Right lower leg: Edema present.      Left lower leg: Edema present.   Skin:     General: Skin is warm and dry.      Coloration: Skin is jaundiced.   Neurological:      Mental Status: He is alert and oriented to person, place, and time.         Significant Labs:  CBC:   Recent Labs   Lab 10/23/20  1306   WBC 6.76   RBC 2.68*   HGB 8.0*   HCT 25.9*   PLT 56*   MCV 97   MCH 29.9   MCHC 30.9*     CMP:   Recent Labs   Lab 10/23/20  0530   GLU 71   CALCIUM 9.0   ALBUMIN 3.1*   PROT 5.0*      K 4.3   CO2 24      BUN 48*   CREATININE 1.9*   ALKPHOS 72   ALT 92*   AST 18   BILITOT 3.3*

## 2020-10-23 NOTE — SUBJECTIVE & OBJECTIVE
Scheduled Meds:   bisacodyL  10 mg Oral QHS    docusate sodium  100 mg Oral TID    fluconazole  200 mg Oral Daily    mycophenolate  1,000 mg Oral BID    pantoprazole  40 mg Oral Daily    predniSONE  20 mg Oral Daily    [START ON 10/24/2020] sulfamethoxazole-trimethoprim 400-80mg  1 tablet Oral Daily    tacrolimus  2 mg Oral BID    [START ON 10/24/2020] valGANciclovir  450 mg Oral Daily     Continuous Infusions:   heparin (porcine) in 5 % dex       PRN Meds:sodium chloride, acetaminophen, bisacodyL, diphenhydrAMINE, melatonin, ondansetron, oxyCODONE, oxyCODONE, sodium chloride 0.9%    Review of Systems   Constitutional: Positive for activity change. Negative for appetite change, chills and fever.   HENT: Negative for congestion, facial swelling and trouble swallowing.    Eyes: Negative for pain, discharge and visual disturbance.   Respiratory: Negative for cough, chest tightness, shortness of breath and wheezing.    Cardiovascular: Positive for leg swelling. Negative for chest pain and palpitations.   Gastrointestinal: Positive for abdominal pain (expected post op). Negative for abdominal distention, blood in stool, nausea and vomiting.   Endocrine: Negative.    Genitourinary: Positive for decreased urine volume.   Musculoskeletal: Negative for back pain.   Skin: Positive for wound.   Allergic/Immunologic: Positive for immunocompromised state.   Neurological: Positive for weakness. Negative for light-headedness and headaches.   Hematological: Bruises/bleeds easily.   Psychiatric/Behavioral: Negative for agitation, behavioral problems, confusion and dysphoric mood.     Objective:     Vital Signs (Most Recent):  Temp: 98.2 °F (36.8 °C) (10/23/20 1135)  Pulse: 83 (10/23/20 1548)  Resp: (!) 21 (10/23/20 1548)  BP: 134/88 (10/23/20 1548)  SpO2: 95 % (10/23/20 1548) Vital Signs (24h Range):  Temp:  [98.1 °F (36.7 °C)-98.6 °F (37 °C)] 98.2 °F (36.8 °C)  Pulse:  [60-95] 83  Resp:  [15-21] 21  SpO2:  [92 %-98 %] 95  %  BP: (122-145)/(72-91) 134/88     Weight: 105 kg (231 lb 7.7 oz)  Body mass index is 31.39 kg/m².    Intake/Output - Last 3 Shifts       10/21 0700 - 10/22 0659 10/22 0700 - 10/23 0659 10/23 0700 - 10/24 0659    P.O. 840 1400 350    I.V. (mL/kg) 821.7 (7.7) 204 (1.9)     Blood  548.3     Total Intake(mL/kg) 1661.7 (15.5) 2152.3 (20.5) 350 (3.3)    Urine (mL/kg/hr) 2945 (1.1) 2275 (0.9) 475 (0.5)    Emesis/NG output  0     Stool 0 0 0    Total Output 2945 2275 475    Net -1283.3 -122.7 -125           Urine Occurrence  0 x     Stool Occurrence 0 x 0 x 1 x    Emesis Occurrence  0 x           Physical Exam  Vitals signs and nursing note reviewed.   Constitutional:       General: He is not in acute distress.  Eyes:      General: Scleral icterus present.         Right eye: No discharge.         Left eye: No discharge.   Cardiovascular:      Rate and Rhythm: Normal rate and regular rhythm.      Pulses: Normal pulses.      Heart sounds: No murmur.   Pulmonary:      Effort: Pulmonary effort is normal. No respiratory distress.      Breath sounds: No wheezing or rales.      Comments: Decreased RLL  Abdominal:      Palpations: Abdomen is soft.      Tenderness: There is abdominal tenderness (mild incisional). There is no guarding.      Comments: Chevron incision KELLY with staples no s/s/i  RLQ drain sites intact   Musculoskeletal:      Right lower leg: Edema present.      Left lower leg: Edema present.   Skin:     General: Skin is warm and dry.      Capillary Refill: Capillary refill takes 2 to 3 seconds.      Coloration: Skin is jaundiced.   Neurological:      General: No focal deficit present.      Mental Status: He is alert and oriented to person, place, and time.   Psychiatric:         Mood and Affect: Mood normal.         Behavior: Behavior normal.         Thought Content: Thought content normal.         Laboratory:  Immunosuppressants         Stop Route Frequency     tacrolimus capsule 2 mg      -- Oral 2 times daily      mycophenolate capsule 1,000 mg      -- Oral 2 times daily        CBC:   Recent Labs   Lab 10/23/20  1306   WBC 6.76   RBC 2.68*   HGB 8.0*   HCT 25.9*   PLT 56*   MCV 97   MCH 29.9   MCHC 30.9*     CMP:   Recent Labs   Lab 10/23/20  0530   GLU 71   CALCIUM 9.0   ALBUMIN 3.1*   PROT 5.0*      K 4.3   CO2 24      BUN 48*   CREATININE 1.9*   ALKPHOS 72   ALT 92*   AST 18   BILITOT 3.3*     Labs within the past 24 hours have been reviewed.    Diagnostic Results:  I have personally reviewed all pertinent imaging studies.    Debility/Functional status: Patient debilitated by evidence of Weakness. Physical and occupational therapy ordered daily to evaluate and treat. Debility was: present on admission.

## 2020-10-23 NOTE — NURSING
"Patient informed RN at start of shift that he was unsure if his fiance, Marnie, is his fiance anymore. RN asked pt to elaborate and pt stated "She told me she doesn't love me anymore and doesn't want to be together." Pt continued explaining the conversation that occurred between his fiance and himself today while she was here for teaching and then stated "she is my caregiver and my support person, but she is not interested in being it." During the conversation between the RN and pt, it became clear that the pt's primary caregiver/support person (Marnie) would no longer be an option going further, thus putting the pt in a position where he has no support or caregiver to help him post-transplant once he leaves the hospital. 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 RN reeducated pt on the great importance of having a caregiver/support person. Pt stated he would attempt to find a replacement for his primary caregiver since he believes she will no longer be in the picture to be able to do so. Pt is not very receptive to self meds-  was unable to accurately pull pm [employer] this shift even after multiple attempts. RN reeducated pt- pt somewhat verbalized understanding but was not confident in his abilities. Pt verbalizes understanding of painting incision- but was unable to paint himself. Pt questioned RN on the likelihood of getting home health involved for when he leaves the hospital and if that could work in place of a support person/primary caregiver. RN instructed pt to talk to the team in the morning to hopefully find a new caregiver. Pt did not seem confident about being able to find a new one.   "

## 2020-10-23 NOTE — ASSESSMENT & PLAN NOTE
- Oliguric JENN requiring CRRT post op (had renal dysfx pre op).  - Nephrology following. Last CRRT 10/18-10/19.  - UOP improving  - Cr improving daily, excellent UOP.  - Last HD 10/20/20.   - Lasix total 200 mg IV/Diuril 500 mg and Albumin x 2 10/22.  - Strict I/O. Daily weights.

## 2020-10-23 NOTE — PROGRESS NOTES
[facility]  Liver Transplant  Progress Note    Patient Name: Jordan Altman  MRN: [id number]  Admission Date: 10/11/2020  Hospital Length of Stay: 12 days  Code Status: Full Code  Primary Care Provider: SHAHEEN Jaime  Post-Operative Day: 9    ORGAN:   LIVER  Disease Etiology: Alcoholic Cirrhosis  Donor Type:   Donation after Brain Death  CDC High Risk:   Yes  Donor CMV Status:   Donor CMV Status: Positive  Donor HBcAB:   Negative  Donor HCV Status:   Positive  Donor HBV SHAYE: Negative  Donor HCV SHAYE: Positive  Whole or Partial: Whole Liver  Biliary Anastomosis: End to End  Arterial Anatomy: Standard  Subjective:     History of Present Illness:  Mr. 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 He also c/o chronic left knee pain and denies any recent injury. Occult blood test (+) at OSH but HCT stable. Pt denies fever, chills, nausea, vomiting, chest pain, sob, hematuria or changes in bladder function. Will admit to LTS for further evaluation.     Hospital Course:  ESLD 2/2 ETOH. Admitted 10/11 with suspected GIB from OSH. 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 Post op course significant for PVT requiring TB to OR POD#1 for thrombectomy. Hepatic artery also revised (shortened) due to kink. OR POD#2 for abdominal closure, bx, and reconstruction of bile duct. US 10/17 satisfactory. Oliguric JENN requiring CRRT post op. Nephrology following. Stepped down 10/19 afternoon. Drains and bourne out.    Interval History: No acute events overnight. Pt received total of 2u nits PRBC pass 24 hours.  Repeat Hgb this afternoon improved to 8.  Per transplant surgeon- restarted Heparin at 500 u/hr- no titration.  Cont to check CBC every 6 hours- next to be drawn at 1800, ptt and anti-Xa at 1800.  Can discuss PO anticoag once  stable.  CT a/p without contrast reviewed, free fluid in mid upper abdomen/left upper abs- subcutaneous hematoma mid chevron noted, staples intact and no oozing. LFTs trending down, t bili up slightly- pt did received PRBC but will cont to monitor.  Last liver US 10/20 w no CBD or intrahepatic duct dilation noted.  Cr improving daily, last HD 10/20.  Excellent UOP. Pt progressing well. Tolerating diet. Working well with therapy. (+) BM w/o enema. Will consult Rehab for possible placement next week. Monitor.       Scheduled Meds:   bisacodyL  10 mg Oral QHS    docusate sodium  100 mg Oral TID    fluconazole  200 mg Oral Daily    mycophenolate  1,000 mg Oral BID    pantoprazole  40 mg Oral Daily    predniSONE  20 mg Oral Daily    [START ON 10/24/2020] sulfamethoxazole-trimethoprim 400-80mg  1 tablet Oral Daily    tacrolimus  2 mg Oral BID    [START ON 10/24/2020] valGANciclovir  450 mg Oral Daily     Continuous Infusions:   heparin (porcine) in 5 % dex       PRN Meds:sodium chloride, acetaminophen, bisacodyL, diphenhydrAMINE, melatonin, ondansetron, oxyCODONE, oxyCODONE, sodium chloride 0.9%    Review of Systems   Constitutional: Positive for activity change. Negative for appetite change, chills and fever.   HENT: Negative for congestion, facial swelling and trouble swallowing.    Eyes: Negative for pain, discharge and visual disturbance.   Respiratory: Negative for cough, chest tightness, shortness of breath and wheezing.    Cardiovascular: Positive for leg swelling. Negative for chest pain and palpitations.   Gastrointestinal: Positive for abdominal pain (expected post op). Negative for abdominal distention, blood in stool, nausea and vomiting.   Endocrine: Negative.    Genitourinary: Positive for decreased urine volume.   Musculoskeletal: Negative for back pain.   Skin: Positive for wound.   Allergic/Immunologic: Positive for immunocompromised state.   Neurological: Positive for weakness. Negative for  light-headedness and headaches.   Hematological: Bruises/bleeds easily.   Psychiatric/Behavioral: Negative for agitation, behavioral problems, confusion and dysphoric mood.     Objective:     Vital Signs (Most Recent):  Temp: 98.2 °F (36.8 °C) (10/23/20 1135)  Pulse: 83 (10/23/20 1548)  Resp: (!) 21 (10/23/20 1548)  BP: 134/88 (10/23/20 1548)  SpO2: 95 % (10/23/20 1548) Vital Signs (24h Range):  Temp:  [98.1 °F (36.7 °C)-98.6 °F (37 °C)] 98.2 °F (36.8 °C)  Pulse:  [60-95] 83  Resp:  [15-21] 21  SpO2:  [92 %-98 %] 95 %  BP: (122-145)/(72-91) 134/88     Weight: 105 kg (231 lb 7.7 oz)  Body mass index is 31.39 kg/m².    Intake/Output - Last 3 Shifts       10/21 0700 - 10/22 0659 10/22 0700 - 10/23 0659 10/23 0700 - 10/24 0659    P.O. 840 1400 350    I.V. (mL/kg) 821.7 (7.7) 204 (1.9)     Blood  548.3     Total Intake(mL/kg) 1661.7 (15.5) 2152.3 (20.5) 350 (3.3)    Urine (mL/kg/hr) 2945 (1.1) 2275 (0.9) 475 (0.5)    Emesis/NG output  0     Stool 0 0 0    Total Output 2945 2275 475    Net -1283.3 -122.7 -125           Urine Occurrence  0 x     Stool Occurrence 0 x 0 x 1 x    Emesis Occurrence  0 x           Physical Exam  Vitals signs and nursing note reviewed.   Constitutional:       General: He is not in acute distress.  Eyes:      General: Scleral icterus present.         Right eye: No discharge.         Left eye: No discharge.   Cardiovascular:      Rate and Rhythm: Normal rate and regular rhythm.      Pulses: Normal pulses.      Heart sounds: No murmur.   Pulmonary:      Effort: Pulmonary effort is normal. No respiratory distress.      Breath sounds: No wheezing or rales.      Comments: Decreased RLL  Abdominal:      Palpations: Abdomen is soft.      Tenderness: There is abdominal tenderness (mild incisional). There is no guarding.      Comments: Chevron incision KELLY with staples no s/s/i  RLQ drain sites intact   Musculoskeletal:      Right lower leg: Edema present.      Left lower leg: Edema present.   Skin:      General: Skin is warm and dry.      Capillary Refill: Capillary refill takes 2 to 3 seconds.      Coloration: Skin is jaundiced.   Neurological:      General: No focal deficit present.      Mental Status: He is alert and oriented to person, place, and time.   Psychiatric:         Mood and Affect: Mood normal.         Behavior: Behavior normal.         Thought Content: Thought content normal.         Laboratory:  Immunosuppressants         Stop Route Frequency     tacrolimus capsule 2 mg      -- Oral 2 times daily     mycophenolate capsule 1,000 mg      -- Oral 2 times daily        CBC:   Recent Labs   Lab 10/23/20  1306   WBC 6.76   RBC 2.68*   HGB 8.0*   HCT 25.9*   PLT 56*   MCV 97   MCH 29.9   MCHC 30.9*     CMP:   Recent Labs   Lab 10/23/20  0530   GLU 71   CALCIUM 9.0   ALBUMIN 3.1*   PROT 5.0*      K 4.3   CO2 24      BUN 48*   CREATININE 1.9*   ALKPHOS 72   ALT 92*   AST 18   BILITOT 3.3*     Labs within the past 24 hours have been reviewed.    Diagnostic Results:  I have personally reviewed all pertinent imaging studies.    Debility/Functional status: Patient debilitated by evidence of Weakness. Physical and occupational therapy ordered daily to evaluate and treat. Debility was: present on admission.    Assessment/Plan:     * Status post liver transplant  - s/p DBD OLTx 10/14 (CMV+/-, donor HCV SHAYE+).  - Post op course significant for PVT requiring TB to OR POD#1 for thrombectomy. Hepatic artery also revised (shortened) due to kink. - OR POD#2 for abdominal closure, bx, and reconstruction of bile duct.   - US 10/17 satisfactory.   - Oliguric JENN requiring CRRT post op. Nephrology following.   - Stepped down 10/19 afternoon. Drains and bourne out.  - US 10/20 (POD#6) due to decreased H/H. Liver US satisfactory with one small fairly stable fluid collection.  - Continue heparin gtt. Transfused 1u PRBC 10/20.  - LFTs trending down.  - Drop in HCT again today. Heparin gtt held. CT a/p without contrast  "to assess for hematomas. Transfuse 1 u PRBC 10/22.  - CT reviewed per txp surgeon, H/H stable.  Heparin gtt resumed at 1400 at 500u/hr, cont to check CBC every 6 hours.    - plan to transition to oral anticoagulant once stable.    - Tolerating diet. Pain well controlled. Continue bowel regimen. PT/OT consulted.    Anemia of chronic disease  - H&H 6.5/20.4.  - Transfuse 2 unit PRBCs 10/22-10/23.  H/H 10/23 stable at 8.  - Monitor.      At risk for opportunistic infections  - Bactrim for PCP prophylaxis.  - Valcyte for CMV prophylaxis.  - Fluc for fungal prophylaxis.       Long-term use of immunosuppressant medication  - See "prophylactic immunotherapy."       Weakness  - Encourage OOBTC/ambulation with assistance. PT/OT consulted.  - Rehab consult placed.      Hyperphosphatemia  - Continue binders.  - Monitor.      Anticoagulated  - Heparin gtt for PVT. Monitor closely.  - restarted Heparin at 500u/hr at 1400 on 10/23, no titration      Prophylactic immunotherapy  - Continue Prograf. Monitor trough daily and adjust dose as needed to achieve therapeutic level.  - Continue Cellcept.  - Continue steroids, per protocol.      Class 1 obesity due to excess calories with serious comorbidity and body mass index (BMI) of 34.0 to 34.9 in adult  - Monitor.      Acidosis  - stable.  - Monitor.    JENN (acute kidney injury)  - Oliguric JENN requiring CRRT post op (had renal dysfx pre op).  - Nephrology following. Last CRRT 10/18-10/19.  - UOP improving  - Cr improving daily, excellent UOP.  - Last HD 10/20/20.   - Lasix total 200 mg IV/Diuril 500 mg and Albumin x 2 10/22.  - Strict I/O. Daily weights.              VTE Risk Mitigation (From admission, onward)         Ordered     heparin 25,000 units in dextrose 5% 250 mL (100 units/mL) infusion (heparin infusion - NO NOMOGRAM)  Continuous      10/23/20 1555     Place sequential compression device  Until discontinued      10/14/20 1339     IP VTE HIGH RISK PATIENT  Once      10/14/20 " 1339     Place JELENA hose  Until discontinued      10/11/20 0217                The patients clinical status was discussed at multidisplinary rounds, involving transplant surgery, transplant medicine, pharmacy, nursing, nutrition, and social work    Discharge Planning:  Discussed plan of care.  No plan for discharge today.      Janeth López NP  Liver Transplant  [facility]

## 2020-10-23 NOTE — CONSULTS
Inpatient consult to Physical Medicine Rehab  Consult performed by: Kadie Snell NP  Consult ordered by: Awa Sun NP  Reason for consult: debility        Reviewed patient history and current admission.  PM&R following.     JEFFY Varela, FNP-C  Physical Medicine & Rehabilitation   10/23/2020

## 2020-10-23 NOTE — PLAN OF CARE
Pt is AAOx4, 2 person assist with walker, and VSS. Managed HA with Tylenol. Heparin restarted at 5 ml/hr- un titrated. U/O 675ml concentrated yellow output. Pt practiced self meds- RN re-educated pt on mistakes. Pt was up in chair during part of shift. VISI monitoring in place. Pt's bed in lowest position, call bell within reach, nonskid socks on, and RN encouraged pt to call for any assistance needed. RN rounded on pt throughout shift. Will continue to monitor.

## 2020-10-23 NOTE — HPI
Per chart review, Jordan Lyle is a 45-year-old male with PMHx of HTN, SBP, hepatic cirrhosis and ESLD 2/2 ETOH listed for liver transplant.  Patient [facility] on [facility]/11 with abd distention, black tarry stools, and L knee pain. OSH occult blood test positive. Transferred to [facility] for LTS evaluation. At [facility], now s/p liver trx 10/14 with post-op complications significant for PVT s/p thrombectomy, hepatic artery revision 2/2 kink on 10/15 and s/p abd closure and reconstruction of the bile duct on 10/16.   Hospital course further complicated by transamnitis, anemia requiring transfusion,  JENN requiring CRRT (last session 10/19), L knee pain, & debility . Receiving Lasix IV.     Functional History: Patient lives in  with [facility]' in a ground level apt.  Prior to admission, (I) with ADLs and mobility. DME: none.

## 2020-10-23 NOTE — ASSESSMENT & PLAN NOTE
-s/p liver trx 10/14/20 with post-op complications significant for PVT s/p thrombectomy, hepatic artery revision 2/2 kink on 10/15 and s/p abd closure and reconstruction of the bile duct on 10/16  - Liver US satisfactory with one small fairly stable fluid collection on 10/20   -Transfuse 1 u PRBC 10/22  -per LTS, on heparin gtt and plan to transition to PO AC

## 2020-10-23 NOTE — NURSING
"Patient had an enema scheduled for 1030 am that was not administered during day shift. RN notified pt of enema orders and that RN would administer enema tonight- pt refused stating "I dont think I would be able to hold it in." & "I am exhausted, I had a very long day I would like to sleep." RN reeducated pt on reasoning behind enema orders- pt verbalized understanding but refused to receive treatment tonight.   "

## 2020-10-23 NOTE — NURSING
Notified ROC Diaz of pt repeat H/H 6.8/21 after one unit PRBCs (previous H/H 6.6/20.9). NP to place orders for 1unit PRBCs to be administered this shift. Transfuse orders released.

## 2020-10-23 NOTE — SUBJECTIVE & OBJECTIVE
Past Medical History:   Diagnosis Date    Decompensated hepatic cirrhosis     Hypertension     SBP (spontaneous bacterial peritonitis)      Past Surgical History:   Procedure Laterality Date    ERCP N/A 10/1/2020    Procedure: ERCP (ENDOSCOPIC RETROGRADE CHOLANGIOPANCREATOGRAPHY);  Surgeon: Marcos Ramirez MD;  [facility]  Service: Endoscopy;  Laterality: N/A;    ESOPHAGOGASTRODUODENOSCOPY N/A 10/13/2020    Procedure: EGD (ESOPHAGOGASTRODUODENOSCOPY);  Surgeon: Prasanth Jerry MD;  [facility]  Service: Endoscopy;  Laterality: N/A;    EXPLORATORY LAPAROTOMY AFTER LIVER TRANSPLANTATION N/A 10/15/2020    Procedure: LAPAROTOMY, EXPLORATORY, AFTER LIVER TRANSPLANT, Possible redo of artery, possible portal thrombectomy. IntraOperative US.;  Surgeon: Curtis Zavaleta MD;  [facility];  Service: Transplant;  Laterality: N/A;  With intraoperative ultrasound    LIVER TRANSPLANT N/A 10/14/2020    Procedure: TRANSPLANT, LIVER;  Surgeon: Curtis Zavaleta MD;  [facility];  Service: Transplant;  Laterality: N/A;  Intra op HD    RIGHT HEART CATHETERIZATION Right 9/23/2020    Procedure: INSERTION, CATHETER, RIGHT HEART;  Surgeon: Mikel Costa Jr., MD;  Location: [facility] CATH LAB;  Service: Cardiology;  Laterality: Right;    THORACENTESIS  2011    THROMBECTOMY  10/15/2020    Procedure: THROMBECTOMY portal vein;  Surgeon: Curtis Zavaleta MD;  Location: [address];  Service: Transplant;;     Review of patient's allergies indicates:   Allergen Reactions    Latex, natural rubber        Scheduled Medications:    bisacodyL  10 mg Oral QHS    docusate sodium  100 mg Oral TID    fluconazole  200 mg Oral Daily    mycophenolate  1,000 mg Oral BID    pantoprazole  40 mg Oral Daily    predniSONE  20 mg Oral Daily    sevelamer carbonate  800 mg Oral TID WM    sulfamethoxazole-trimethoprim 400-80mg  1 tablet Oral Every Mon, Wed, Fri    tacrolimus   2 mg Oral BID    [START ON 10/26/2020] valGANciclovir  450 mg Oral Every Mon, Wed, Fri       PRN Medications: sodium chloride, sodium chloride, acetaminophen, bisacodyL, diphenhydrAMINE, heparin (PORCINE), heparin (PORCINE), melatonin, ondansetron, oxyCODONE, oxyCODONE, sodium chloride 0.9%    Family History     Problem Relation (Age of Onset)    COPD Mother        Tobacco Use    Smoking status: Current Some Day Smoker    Smokeless tobacco: Current User   Substance and Sexual Activity    Alcohol use: Yes     Alcohol/week: 112.0 standard drinks     Types: 112 Shots of liquor per week     Comment: fifth of vodka a day. LAST DRINK 7/25/2020 per pt     Drug use: Yes     Frequency: 3.0 times per week     Types: Marijuana    Sexual activity: Yes     Review of Systems   Constitutional: Positive for activity change. Negative for fatigue and fever.   HENT: Negative for trouble swallowing and voice change.    Eyes: Negative for photophobia and visual disturbance.   Respiratory: Negative for cough and shortness of breath.    Cardiovascular: Negative for chest pain and palpitations.   Gastrointestinal: Negative for nausea and vomiting.   Genitourinary: Negative for difficulty urinating and flank pain.   Musculoskeletal: Positive for gait problem. Negative for arthralgias.   Skin: Positive for color change. Negative for rash.   Allergic/Immunologic: Positive for immunocompromised state.   Neurological: Positive for weakness. Negative for numbness.   Psychiatric/Behavioral: Positive for confusion. Negative for agitation.     Objective:     Vital Signs (Most Recent):  Temp: 98.2 °F (36.8 °C) (10/23/20 0758)  Pulse: 60 (10/23/20 0758)  Resp: 19 (10/23/20 0758)  BP: (!) 139/91 (10/23/20 0758)  SpO2: 98 % (10/23/20 0758)    Vital Signs (24h Range):  Temp:  [98 °F (36.7 °C)-98.6 °F (37 °C)] 98.2 °F (36.8 °C)  Pulse:  [60-95] 60  Resp:  [15-23] 19  SpO2:  [92 %-98 %] 98 %  BP: (117-145)/(68-91) 139/91     Body mass index is 31.39  kg/m².    Physical Exam  Constitutional:       General: He is not in acute distress.     Appearance: He is well-developed. He is ill-appearing.   HENT:      Head: Normocephalic and atraumatic.   Eyes:      General:         Right eye: No discharge.         Left eye: No discharge.   Neck:      Musculoskeletal: Neck supple.   Cardiovascular:      Pulses: Normal pulses.   Pulmonary:      Effort: Pulmonary effort is normal. No respiratory distress.   Abdominal:      General: There is no distension.      Palpations: Abdomen is soft.   Musculoskeletal:         General: No deformity.   Skin:     General: Skin is warm and dry.      Coloration: Skin is jaundiced.   Neurological:      Mental Status: He is alert and oriented to person, place, and time.      Motor: Weakness (general) present.      Comments: Follows commands    Psychiatric:         Behavior: Behavior normal.         Cognition and Memory: Cognition is not impaired.       NEUROLOGICAL EXAMINATION:     MENTAL STATUS   Oriented to person, place, and time.       Diagnostic Results:   Labs: Reviewed  X-Ray: Reviewed  CT: Reviewed

## 2020-10-23 NOTE — PLAN OF CARE
Patient AAOx4, VSS on RA. Afebrile. H/H 6.8/21- 1 unit of PRBCs transfused o/n w/o issues. [employer] KELLY w/ staples- free from signs of infection- painted x3. RLQ old ADAM drain site leaking serous fluid, abd pads placed w/ moderate relief. CT obtained during day shift; resulted- see impression for details. Pt c/o pain, controlled by PRN medications. Voids per urinal- see I&O for details. No BM noted o/n- pt refused enema. Up w/ 2 person assist. Bed locked and lowered, call bell in reach, nonskid socks on when out of bed. Reminded pt to call for assistance, pt verbalized understanding. Hand hygiene performed before and after care. Self meds- needs reenforcement, unable to pull Cellcept correctly this shift. Rehab consult placed. NAD noted, WCTM.       Addendum: Pt sats dropped to 85% sustaining- placed on 2L NC- sats >95%.

## 2020-10-23 NOTE — PROGRESS NOTES
"[facility]  Nephrology  Progress Note    Patient Name: Jordan Altman  MRN: [id number]  Admission Date: 10/11/2020  Hospital Length of Stay: 12 days  Attending Provider: Rogerio Malik MD   Primary Care Physician: SHAHEEN Jaime  Principal Problem:Status post liver transplant    Subjective:     HPI: Valdo Altman is a 46 yo M with a medical history including HTN, SBP, hepatic cirrhosis and ESLD secondary to ETOH use. He is currently listed for a liver transplant. He presented to [facility] on 10/11/2020 with complaints of abdominal distention and black, tarry stools x 1 day. Occult stool test (+) in [facility] Emergency room. Transferred to [facility] under liver transplant team for further evaluation. Pt s/p orthotopic liver transplant (whole liver, DBD donor) on 10/14/2020, requiring intraoperative SLED. 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 Underwent ex lap w portal vein thrombectomy, revision of hepatic artery and placement of abd wound vac on 10/15..." Pt with minimal UOP since transplant; received high-dose IV diuretics (120mg lasix and 500mg diuril) on 10/15, with minimal UOP. Pt with a rough baseline sCr of 1.2-1.4. Nephrology consulted for JENN and possible need for RRT.     -HPI was obtained from chart review as pt is currently intubated.     Interval History:   Continued improvement in kidney function, SCr down to 1.9 from 2.3.  Other electrolytes stable.  UOP of 2.2l/24h.    Review of patient's allergies indicates:   Allergen Reactions    Latex, natural rubber      Current Facility-Administered Medications   Medication Frequency    0.9%  NaCl infusion (for blood administration) Q24H PRN    acetaminophen tablet 650 mg Q8H PRN    bisacodyL EC tablet 10 mg QHS    " bisacodyL suppository 10 mg Daily PRN    diphenhydrAMINE capsule 25 mg Q6H PRN    docusate sodium capsule 100 mg TID    fluconazole tablet 200 mg Daily    heparin 25,000 units in dextrose 5% (100 units/ml) IV bolus from bag - ADDITIONAL PRN BOLUS - 30 units/kg PRN    heparin 25,000 units in dextrose 5% (100 units/ml) IV bolus from bag - ADDITIONAL PRN BOLUS - 60 units/kg PRN    heparin 25,000 units in dextrose 5% 250 mL (100 units/mL) infusion LOW INTENSITY nomogram Continuous    melatonin tablet 6 mg Nightly PRN    mycophenolate capsule 1,000 mg BID    ondansetron injection 4 mg Q6H PRN    oxyCODONE immediate release tablet 5 mg Q6H PRN    oxyCODONE immediate release tablet Tab 10 mg Q6H PRN    pantoprazole EC tablet 40 mg Daily    predniSONE tablet 20 mg Daily    sodium chloride 0.9% flush 10 mL PRN    [START ON 10/24/2020] sulfamethoxazole-trimethoprim 400-80mg per tablet 1 tablet Daily    tacrolimus capsule 2 mg BID    [START ON 10/24/2020] valGANciclovir tablet 450 mg Daily       Objective:     Vital Signs (Most Recent):  Temp: 98.2 °F (36.8 °C) (10/23/20 1135)  Pulse: 87 (10/23/20 1108)  Resp: 19 (10/23/20 1108)  BP: 122/80 (10/23/20 1108)  SpO2: 98 % (10/23/20 1108)  O2 Device (Oxygen Therapy): room air (10/23/20 1108) Vital Signs (24h Range):  Temp:  [98.1 °F (36.7 °C)-98.6 °F (37 °C)] 98.2 °F (36.8 °C)  Pulse:  [60-95] 87  Resp:  [15-23] 19  SpO2:  [92 %-98 %] 98 %  BP: (120-145)/(69-91) 122/80     Weight: 105 kg (231 lb 7.7 oz) (10/23/20 0615)  Body mass index is 31.39 kg/m².  Body surface area is 2.31 meters squared.    I/O last 3 completed shifts:  In: 2627.2 [P.O.:1640; I.V.:438.9; Blood:548.3]  Out: 3350 [Urine:3350]    Physical Exam  Vitals signs and nursing note reviewed.   Constitutional:       General: He is not in acute distress.     Appearance: He is not ill-appearing.   HENT:      Head: Normocephalic and atraumatic.      Nose: Nose normal.      Mouth/Throat:      Mouth: Mucous  membranes are moist.      Pharynx: Oropharynx is clear.   Eyes:      Extraocular Movements: Extraocular movements intact.      Conjunctiva/sclera: Conjunctivae normal.   Cardiovascular:      Rate and Rhythm: Normal rate and regular rhythm.   Pulmonary:      Breath sounds: Examination of the right-lower field reveals decreased breath sounds. Examination of the left-lower field reveals decreased breath sounds. Decreased breath sounds present. No rales.   Abdominal:      Palpations: Abdomen is soft.   Musculoskeletal:      Right lower leg: Edema present.      Left lower leg: Edema present.   Skin:     General: Skin is warm and dry.      Coloration: Skin is jaundiced.   Neurological:      Mental Status: He is alert and oriented to person, place, and time.         Significant Labs:  CBC:   Recent Labs   Lab 10/23/20  1306   WBC 6.76   RBC 2.68*   HGB 8.0*   HCT 25.9*   PLT 56*   MCV 97   MCH 29.9   MCHC 30.9*     CMP:   Recent Labs   Lab 10/23/20  0530   GLU 71   CALCIUM 9.0   ALBUMIN 3.1*   PROT 5.0*      K 4.3   CO2 24      BUN 48*   CREATININE 1.9*   ALKPHOS 72   ALT 92*   AST 18   BILITOT 3.3*          Assessment/Plan:     JENN (acute kidney injury)    JENN likely 2/2 iATN from intraoperative hypotension and acute blood loss anemia     Plan/Recommendations:   -Hold RRT today  -follow UOP today, would hold further diuresis and monitor.  -reassess labs in AM.    -Strict I/O's  -Renally dose meds and avoid nephrotoxic medications   -Will continue to follow closely   -Plan discussed with staff, Dr Odilia Campbell, NP  [facility]ph[facility]  [facility]

## 2020-10-23 NOTE — PT/OT/SLP PROGRESS
Physical Therapy Treatment    Patient Name:  Jordan Altman   MRN:  [id number]    Recommendations:     Discharge Recommendations:  rehabilitation facility   Discharge Equipment Recommendations: shower chair, walker, rolling   Barriers to discharge: None    Assessment:     Jordan Altman is a 45 y.o. male admitted with a medical diagnosis of Status post liver transplant.  He presents with the following impairments/functional limitations:  weakness, impaired endurance, impaired self care skills, impaired functional mobilty, gait instability, impaired balance, decreased safety awareness, decreased ROM, edema .  Pt continues to remain motivated and cooperative with treatment session. Pt Progressing with PT Intervention. Pt required increased (A) for transfers from low surfaces. Pt would continue to benefit from skilled PT to address overall functional mobility and goals. Goals remain appropriate    Rehab Prognosis: Good; patient would benefit from acute skilled PT services to address these deficits and reach maximum level of function.    Recent Surgery: Procedure(s) (LRB):  LAPAROTOMY, EXPLORATORY, BILIARY ANASTOMOSIS, ABDOMINAL CLOSURE (N/A) 7 Days Post-Op    Plan:     During this hospitalization, patient to be seen 4 x/week to address the identified rehab impairments via gait training, therapeutic activities, therapeutic exercises and progress toward the following goals:    · Plan of Care Expires:  11/20/20    Subjective     Patient/Family Comments/goals: I need to try to use the bathroom  Pain/Comfort:  · Pain Rating 1: 0/10  · Pain Rating Post-Intervention 1: 0/10      Objective:     Communicated with RN prior to session.  Patient found seated on EOB with telemetry upon PT entry to room.     General Precautions: Standard, fall   Orthopedic Precautions:N/A   Braces:       Functional Mobility:  · Transfers:     · Sit to Stand:  minimum assistance and of 2 persons from EOB and mod/maxA from low chair with rolling  walker  · Bed <> Chair Transfer using Step Transfer technique with minimum assistance with rolling walker  ·  Toilet Transfer Step Transfer technique with moderate assistance and of 2 persons with  rolling walker and grab bars  · Gait: pt amb with RW 15 ft,10 ft and 26 ft with RW with CGA for safety with chair in tow  AM-PAC 6 CLICK MOBILITY  Turning over in bed (including adjusting bedclothes, sheets and blankets)?: 3  Sitting down on and standing up from a chair with arms (e.g., wheelchair, bedside commode, etc.): 2  Moving from lying on back to sitting on the side of the bed?: 3  Moving to and from a bed to a chair (including a wheelchair)?: 3  Need to walk in hospital room?: 3  Climbing 3-5 steps with a railing?: 2  Basic Mobility Total Score: 16       Therapeutic Activities and Exercises:   Therapist provided instruction and educated of patient on progress, safety,d/c,PT POC,   proper body mechanics, energy conservation, and fall prevention strategies during tasks listed above, on the effects of prolonged immobility and the importance of performing OOB activity and exercises to promote healing and reduce recovery time   Patient facilitated therex X 15 reps seated in bedside chair B LE AROM AP, LAQ, Hip Flexion, Hip Abd/Add with facilitation for correct performance and sequencing. Exercises performed to develop and maintain pt's strength, endurance and flexibility.   Updated white board with appropriate PT mobility information for medical team notification  Donned an extra gown    Call nursing/pct to transfer to chair/use bathroom. Pt stated understanding  Bedside table in front of patient and area set up for function, convenience, and safety. RN aware of patient's mobility needs and status. Questions/concerns addressed within PTA scope of practice; patient with no further questions. Time was provided for active listening, discussion of health disposition, and discussion of safe discharge.  Pt?verbalized?agreement .        Patient left up in chair with all lines intact, call button in reach and nsg present    GOALS:   Multidisciplinary Problems     Physical Therapy Goals        Problem: Physical Therapy Goal    Goal Priority Disciplines Outcome Goal Variances Interventions   Physical Therapy Goal     PT, PT/OT Ongoing, Progressing     Description: Goals to be met by: 2020     Patient will increase functional independence with mobility by performin. Supine to sit with Set-up [place]  2. Sit to supine with Set-up [place]  3. Sit to stand transfer with Contact Guard Assistance  4. Bed to chair transfer with Contact Guard Assistance using Rolling Walker  5. Gait  x 150 feet with Contact Guard Assistance using Rolling Walker.   6. Lower extremity exercise program x15 reps per handout, with supervision                     Time Tracking:     PT Received On: 10/23/20  PT Start Time: 0930     PT Stop Time: 1012  PT Total Time (min): 42 min     Billable Minutes: Gait Training 15, Therapeutic Activity 12 and Therapeutic Exercise 15    Treatment Type: Treatment  PT/PTA: PTA     PTA Visit Number: 2     Ottoniel Fletcher, PTA  10/23/2020

## 2020-10-23 NOTE — ASSESSMENT & PLAN NOTE
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  - Post op course significant for PVT requiring TB to OR POD#1 for thrombectomy. Hepatic artery also revised (shortened) due to kink. - OR POD#2 for abdominal closure, bx, and reconstruction of bile duct.   - US 10/17 satisfactory.   - Oliguric JENN requiring CRRT post op. Nephrology following.   - Stepped down 10/19 afternoon. Drains and bourne out.  - US 10/20 (POD#6) due to decreased H/H. Liver US satisfactory with one small fairly stable fluid collection.  - Continue heparin gtt. Transfused 1u PRBC 10/20.  - LFTs trending down.  - Drop in HCT again today. Heparin gtt held. CT a/p without contrast to assess for hematomas. Transfuse 1 u PRBC 10/22.  - CT reviewed per txp surgeon, H/H stable.  Heparin gtt resumed at 1400 at 500u/hr, cont to check CBC every 6 hours.    - plan to transition to oral anticoagulant once stable.    - Tolerating diet. Pain well controlled. Continue bowel regimen. PT/OT consulted.

## 2020-10-23 NOTE — ASSESSMENT & PLAN NOTE
- Heparin gtt for PVT. Monitor closely.  - restarted Heparin at 500u/hr at 1400 on 10/23, no titration

## 2020-10-24 LAB
ALBUMIN SERPL BCP-MCNC: 2.7 G/DL (ref 3.5–5.2)
ALP SERPL-CCNC: 71 U/L (ref 55–135)
ALT SERPL W/O P-5'-P-CCNC: 69 U/L (ref 10–44)
ANION GAP SERPL CALC-SCNC: 10 MMOL/L (ref 8–16)
APTT BLDCRRT: 21.9 SEC (ref 21–32)
APTT BLDCRRT: 22.9 SEC (ref 21–32)
APTT BLDCRRT: 26.5 SEC (ref 21–32)
AST SERPL-CCNC: 17 U/L (ref 10–40)
BASOPHILS # BLD AUTO: 0.01 K/UL (ref 0–0.2)
BASOPHILS # BLD AUTO: 0.01 K/UL (ref 0–0.2)
BASOPHILS # BLD AUTO: 0.02 K/UL (ref 0–0.2)
BASOPHILS # BLD AUTO: 0.03 K/UL (ref 0–0.2)
BASOPHILS NFR BLD: 0.2 % (ref 0–1.9)
BASOPHILS NFR BLD: 0.2 % (ref 0–1.9)
BASOPHILS NFR BLD: 0.4 % (ref 0–1.9)
BASOPHILS NFR BLD: 0.4 % (ref 0–1.9)
BILIRUB SERPL-MCNC: 2.9 MG/DL (ref 0.1–1)
BUN SERPL-MCNC: 43 MG/DL (ref 6–20)
CALCIUM SERPL-MCNC: 8.6 MG/DL (ref 8.7–10.5)
CHLORIDE SERPL-SCNC: 104 MMOL/L (ref 95–110)
CO2 SERPL-SCNC: 23 MMOL/L (ref 23–29)
CREAT SERPL-MCNC: 1.6 MG/DL (ref 0.5–1.4)
DIFFERENTIAL METHOD: ABNORMAL
EOSINOPHIL # BLD AUTO: 0 K/UL (ref 0–0.5)
EOSINOPHIL # BLD AUTO: 0.1 K/UL (ref 0–0.5)
EOSINOPHIL # BLD AUTO: 0.2 K/UL (ref 0–0.5)
EOSINOPHIL # BLD AUTO: 0.2 K/UL (ref 0–0.5)
EOSINOPHIL NFR BLD: 0.7 % (ref 0–8)
EOSINOPHIL NFR BLD: 2 % (ref 0–8)
EOSINOPHIL NFR BLD: 2.9 % (ref 0–8)
EOSINOPHIL NFR BLD: 4.6 % (ref 0–8)
ERYTHROCYTE [DISTWIDTH] IN BLOOD BY AUTOMATED COUNT: 18.4 % (ref 11.5–14.5)
ERYTHROCYTE [DISTWIDTH] IN BLOOD BY AUTOMATED COUNT: 18.5 % (ref 11.5–14.5)
ERYTHROCYTE [DISTWIDTH] IN BLOOD BY AUTOMATED COUNT: 18.6 % (ref 11.5–14.5)
ERYTHROCYTE [DISTWIDTH] IN BLOOD BY AUTOMATED COUNT: 18.8 % (ref 11.5–14.5)
EST. GFR  (AFRICAN AMERICAN): 58.4 ML/MIN/1.73 M^2
EST. GFR  (NON AFRICAN AMERICAN): 50.6 ML/MIN/1.73 M^2
FACT X PPP CHRO-ACNC: <0.1 IU/ML (ref 0.3–0.7)
GLUCOSE SERPL-MCNC: 85 MG/DL (ref 70–110)
HCT VFR BLD AUTO: 24.1 % (ref 40–54)
HCT VFR BLD AUTO: 24.8 % (ref 40–54)
HCT VFR BLD AUTO: 24.8 % (ref 40–54)
HCT VFR BLD AUTO: 29.4 % (ref 40–54)
HCV RNA SERPL NAA+PROBE-LOG IU: 6.32 LOG (10) IU/ML
HCV RNA SERPL QL NAA+PROBE: DETECTED IU/ML
HCV RNA SPEC NAA+PROBE-ACNC: ABNORMAL IU/ML
HGB BLD-MCNC: 7.9 G/DL (ref 14–18)
HGB BLD-MCNC: 7.9 G/DL (ref 14–18)
HGB BLD-MCNC: 8 G/DL (ref 14–18)
HGB BLD-MCNC: 9.4 G/DL (ref 14–18)
IMM GRANULOCYTES # BLD AUTO: 0.19 K/UL (ref 0–0.04)
IMM GRANULOCYTES # BLD AUTO: 0.19 K/UL (ref 0–0.04)
IMM GRANULOCYTES # BLD AUTO: 0.2 K/UL (ref 0–0.04)
IMM GRANULOCYTES # BLD AUTO: 0.39 K/UL (ref 0–0.04)
IMM GRANULOCYTES NFR BLD AUTO: 4.3 % (ref 0–0.5)
IMM GRANULOCYTES NFR BLD AUTO: 4.4 % (ref 0–0.5)
IMM GRANULOCYTES NFR BLD AUTO: 4.4 % (ref 0–0.5)
IMM GRANULOCYTES NFR BLD AUTO: 4.6 % (ref 0–0.5)
INR PPP: 1 (ref 0.8–1.2)
LYMPHOCYTES # BLD AUTO: 0.2 K/UL (ref 1–4.8)
LYMPHOCYTES # BLD AUTO: 0.3 K/UL (ref 1–4.8)
LYMPHOCYTES # BLD AUTO: 0.4 K/UL (ref 1–4.8)
LYMPHOCYTES # BLD AUTO: 0.4 K/UL (ref 1–4.8)
LYMPHOCYTES NFR BLD: 4.9 % (ref 18–48)
LYMPHOCYTES NFR BLD: 5.2 % (ref 18–48)
LYMPHOCYTES NFR BLD: 6.8 % (ref 18–48)
LYMPHOCYTES NFR BLD: 9.2 % (ref 18–48)
MAGNESIUM SERPL-MCNC: 1.5 MG/DL (ref 1.6–2.6)
MCH RBC QN AUTO: 31.1 PG (ref 27–31)
MCH RBC QN AUTO: 31.3 PG (ref 27–31)
MCHC RBC AUTO-ENTMCNC: 31.9 G/DL (ref 32–36)
MCHC RBC AUTO-ENTMCNC: 31.9 G/DL (ref 32–36)
MCHC RBC AUTO-ENTMCNC: 32 G/DL (ref 32–36)
MCHC RBC AUTO-ENTMCNC: 33.2 G/DL (ref 32–36)
MCV RBC AUTO: 94 FL (ref 82–98)
MCV RBC AUTO: 97 FL (ref 82–98)
MCV RBC AUTO: 98 FL (ref 82–98)
MCV RBC AUTO: 98 FL (ref 82–98)
MONOCYTES # BLD AUTO: 0.5 K/UL (ref 0.3–1)
MONOCYTES # BLD AUTO: 0.6 K/UL (ref 0.3–1)
MONOCYTES # BLD AUTO: 0.7 K/UL (ref 0.3–1)
MONOCYTES # BLD AUTO: 0.7 K/UL (ref 0.3–1)
MONOCYTES NFR BLD: 10.2 % (ref 4–15)
MONOCYTES NFR BLD: 14.3 % (ref 4–15)
MONOCYTES NFR BLD: 15.6 % (ref 4–15)
MONOCYTES NFR BLD: 6.6 % (ref 4–15)
NEUTROPHILS # BLD AUTO: 2.9 K/UL (ref 1.8–7.7)
NEUTROPHILS # BLD AUTO: 3.3 K/UL (ref 1.8–7.7)
NEUTROPHILS # BLD AUTO: 3.5 K/UL (ref 1.8–7.7)
NEUTROPHILS # BLD AUTO: 7 K/UL (ref 1.8–7.7)
NEUTROPHILS NFR BLD: 66 % (ref 38–73)
NEUTROPHILS NFR BLD: 71.2 % (ref 38–73)
NEUTROPHILS NFR BLD: 79.4 % (ref 38–73)
NEUTROPHILS NFR BLD: 81.5 % (ref 38–73)
NRBC BLD-RTO: 0 /100 WBC
PHOSPHATE SERPL-MCNC: 3.9 MG/DL (ref 2.7–4.5)
PLATELET # BLD AUTO: 101 K/UL (ref 150–350)
PLATELET # BLD AUTO: 52 K/UL (ref 150–350)
PLATELET # BLD AUTO: 61 K/UL (ref 150–350)
PLATELET # BLD AUTO: 63 K/UL (ref 150–350)
PMV BLD AUTO: 12.1 FL (ref 9.2–12.9)
PMV BLD AUTO: 12.3 FL (ref 9.2–12.9)
PMV BLD AUTO: 12.4 FL (ref 9.2–12.9)
PMV BLD AUTO: 12.6 FL (ref 9.2–12.9)
POTASSIUM SERPL-SCNC: 3.8 MMOL/L (ref 3.5–5.1)
PROT SERPL-MCNC: 4.5 G/DL (ref 6–8.4)
PROTHROMBIN TIME: 11 SEC (ref 9–12.5)
RBC # BLD AUTO: 2.54 M/UL (ref 4.6–6.2)
RBC # BLD AUTO: 2.54 M/UL (ref 4.6–6.2)
RBC # BLD AUTO: 2.56 M/UL (ref 4.6–6.2)
RBC # BLD AUTO: 3.02 M/UL (ref 4.6–6.2)
SODIUM SERPL-SCNC: 137 MMOL/L (ref 136–145)
TACROLIMUS BLD-MCNC: 5.8 NG/ML (ref 5–15)
WBC # BLD AUTO: 4.36 K/UL (ref 3.9–12.7)
WBC # BLD AUTO: 4.41 K/UL (ref 3.9–12.7)
WBC # BLD AUTO: 4.56 K/UL (ref 3.9–12.7)
WBC # BLD AUTO: 8.54 K/UL (ref 3.9–12.7)

## 2020-10-24 PROCEDURE — 84100 ASSAY OF PHOSPHORUS: CPT

## 2020-10-24 PROCEDURE — 25000003 PHARM REV CODE 250: Performed by: NURSE PRACTITIONER

## 2020-10-24 PROCEDURE — 83735 ASSAY OF MAGNESIUM: CPT

## 2020-10-24 PROCEDURE — 80197 ASSAY OF TACROLIMUS: CPT

## 2020-10-24 PROCEDURE — 85520 HEPARIN ASSAY: CPT

## 2020-10-24 PROCEDURE — 63600175 PHARM REV CODE 636 W HCPCS: Performed by: SURGERY

## 2020-10-24 PROCEDURE — 63600175 PHARM REV CODE 636 W HCPCS: Performed by: STUDENT IN AN ORGANIZED HEALTH CARE EDUCATION/TRAINING PROGRAM

## 2020-10-24 PROCEDURE — 85730 THROMBOPLASTIN TIME PARTIAL: CPT

## 2020-10-24 PROCEDURE — 80053 COMPREHEN METABOLIC PANEL: CPT

## 2020-10-24 PROCEDURE — 63600175 PHARM REV CODE 636 W HCPCS: Performed by: NURSE PRACTITIONER

## 2020-10-24 PROCEDURE — 85025 COMPLETE CBC W/AUTO DIFF WBC: CPT

## 2020-10-24 PROCEDURE — 85730 THROMBOPLASTIN TIME PARTIAL: CPT | Mod: 91

## 2020-10-24 PROCEDURE — 36415 COLL VENOUS BLD VENIPUNCTURE: CPT

## 2020-10-24 PROCEDURE — 25000003 PHARM REV CODE 250: Performed by: PHYSICIAN ASSISTANT

## 2020-10-24 PROCEDURE — 25000003 PHARM REV CODE 250: Performed by: STUDENT IN AN ORGANIZED HEALTH CARE EDUCATION/TRAINING PROGRAM

## 2020-10-24 PROCEDURE — 85610 PROTHROMBIN TIME: CPT

## 2020-10-24 PROCEDURE — 20600001 HC STEP DOWN PRIVATE ROOM

## 2020-10-24 RX ORDER — MAGNESIUM SULFATE HEPTAHYDRATE 40 MG/ML
2 INJECTION, SOLUTION INTRAVENOUS ONCE
Status: COMPLETED | OUTPATIENT
Start: 2020-10-24 | End: 2020-10-24

## 2020-10-24 RX ORDER — TACROLIMUS 1 MG/1
3 CAPSULE ORAL 2 TIMES DAILY
Status: DISCONTINUED | OUTPATIENT
Start: 2020-10-24 | End: 2020-10-26

## 2020-10-24 RX ORDER — TACROLIMUS 1 MG/1
1 CAPSULE ORAL ONCE
Status: COMPLETED | OUTPATIENT
Start: 2020-10-24 | End: 2020-10-24

## 2020-10-24 RX ORDER — HEPARIN SODIUM,PORCINE/D5W 25000/250
12 INTRAVENOUS SOLUTION INTRAVENOUS CONTINUOUS
Status: DISCONTINUED | OUTPATIENT
Start: 2020-10-24 | End: 2020-10-25

## 2020-10-24 RX ADMIN — OXYCODONE HYDROCHLORIDE 5 MG: 5 TABLET ORAL at 05:10

## 2020-10-24 RX ADMIN — OXYCODONE HYDROCHLORIDE 10 MG: 10 TABLET ORAL at 08:10

## 2020-10-24 RX ADMIN — TACROLIMUS 3 MG: 1 CAPSULE ORAL at 05:10

## 2020-10-24 RX ADMIN — MELATONIN TAB 3 MG 6 MG: 3 TAB at 08:10

## 2020-10-24 RX ADMIN — SULFAMETHOXAZOLE AND TRIMETHOPRIM 1 TABLET: 400; 80 TABLET ORAL at 09:10

## 2020-10-24 RX ADMIN — VALGANCICLOVIR 450 MG: 450 TABLET, FILM COATED ORAL at 09:10

## 2020-10-24 RX ADMIN — DOCUSATE SODIUM 100 MG: 100 CAPSULE, LIQUID FILLED ORAL at 05:10

## 2020-10-24 RX ADMIN — PANTOPRAZOLE SODIUM 40 MG: 40 TABLET, DELAYED RELEASE ORAL at 09:10

## 2020-10-24 RX ADMIN — TACROLIMUS 1 MG: 1 CAPSULE ORAL at 01:10

## 2020-10-24 RX ADMIN — ACETAMINOPHEN 650 MG: 325 TABLET ORAL at 09:10

## 2020-10-24 RX ADMIN — MYCOPHENOLATE MOFETIL 1000 MG: 250 CAPSULE ORAL at 08:10

## 2020-10-24 RX ADMIN — PREDNISONE 20 MG: 20 TABLET ORAL at 09:10

## 2020-10-24 RX ADMIN — TACROLIMUS 2 MG: 1 CAPSULE ORAL at 09:10

## 2020-10-24 RX ADMIN — DOCUSATE SODIUM 100 MG: 100 CAPSULE, LIQUID FILLED ORAL at 09:10

## 2020-10-24 RX ADMIN — MYCOPHENOLATE MOFETIL 1000 MG: 250 CAPSULE ORAL at 09:10

## 2020-10-24 RX ADMIN — DIPHENHYDRAMINE HYDROCHLORIDE 25 MG: 25 CAPSULE ORAL at 09:10

## 2020-10-24 RX ADMIN — MAGNESIUM SULFATE IN WATER 2 G: 40 INJECTION, SOLUTION INTRAVENOUS at 10:10

## 2020-10-24 RX ADMIN — FLUCONAZOLE 200 MG: 200 TABLET ORAL at 09:10

## 2020-10-24 RX ADMIN — DIPHENHYDRAMINE HYDROCHLORIDE 25 MG: 25 CAPSULE ORAL at 08:10

## 2020-10-24 NOTE — ASSESSMENT & PLAN NOTE
- Heparin gtt for PVT. Monitor closely.  - restarted Heparin at 500u/hr at 1400 on 10/23, no titration  - cbc stable, resumed low intensity heparin gtt 10/24

## 2020-10-24 NOTE — SUBJECTIVE & OBJECTIVE
Scheduled Meds:   bisacodyL  10 mg Oral QHS    docusate sodium  100 mg Oral TID    fluconazole  200 mg Oral Daily    mycophenolate  1,000 mg Oral BID    pantoprazole  40 mg Oral Daily    predniSONE  20 mg Oral Daily    sulfamethoxazole-trimethoprim 400-80mg  1 tablet Oral Daily    tacrolimus  3 mg Oral BID    valGANciclovir  450 mg Oral Daily     Continuous Infusions:   heparin (porcine) in D5W 12 Units/kg/hr (10/24/20 1045)     PRN Meds:acetaminophen, bisacodyL, diphenhydrAMINE, heparin (PORCINE), melatonin, ondansetron, oxyCODONE, oxyCODONE, sodium chloride 0.9%    Review of Systems   Constitutional: Positive for activity change. Negative for appetite change, chills and fever.   HENT: Negative for congestion, facial swelling and trouble swallowing.    Eyes: Negative for pain, discharge and visual disturbance.   Respiratory: Negative for cough, chest tightness, shortness of breath and wheezing.    Cardiovascular: Positive for leg swelling. Negative for chest pain and palpitations.   Gastrointestinal: Positive for abdominal pain (expected post op). Negative for abdominal distention, blood in stool, nausea and vomiting.   Endocrine: Negative.    Genitourinary: Positive for decreased urine volume.   Musculoskeletal: Negative for back pain.   Skin: Positive for wound.   Allergic/Immunologic: Positive for immunocompromised state.   Neurological: Positive for weakness. Negative for light-headedness and headaches.   Hematological: Bruises/bleeds easily.   Psychiatric/Behavioral: Negative for agitation, behavioral problems, confusion and dysphoric mood.     Objective:     Vital Signs (Most Recent):  Temp: 98.2 °F (36.8 °C) (10/24/20 1250)  Pulse: 96 (10/24/20 1132)  Resp: 20 (10/24/20 1132)  BP: (!) 148/89 (10/24/20 1132)  SpO2: (!) 93 % (10/24/20 0833) Vital Signs (24h Range):  Temp:  [97.6 °F (36.4 °C)-98.9 °F (37.2 °C)] 98.2 °F (36.8 °C)  Pulse:  [] 96  Resp:  [13-21] 20  SpO2:  [84 %-99 %] 93 %  BP:  (111-148)/(64-93) 148/89     Weight: 108 kg (238 lb 1.6 oz)  Body mass index is 32.29 kg/m².    Intake/Output - Last 3 Shifts       10/22 0700 - 10/23 0659 10/23 0700 - 10/24 0659 10/24 0700 - 10/25 0659    P.O. 1400 550     I.V. (mL/kg) 204 (1.9)      Blood 548.3      Total Intake(mL/kg) 2152.3 (20.5) 550 (5.1)     Urine (mL/kg/hr) 2275 (0.9) 1575 (0.6) 480 (0.5)    Emesis/NG output 0 0     Stool 0 0 0    Total Output 2275 1575 480    Net -122.7 -1025 -480           Urine Occurrence 0 x 0 x     Stool Occurrence 0 x 2 x 1 x    Emesis Occurrence 0 x 0 x           Physical Exam  Vitals signs and nursing note reviewed.   Constitutional:       General: He is not in acute distress.  Eyes:      General: Scleral icterus present.         Right eye: No discharge.         Left eye: No discharge.   Cardiovascular:      Rate and Rhythm: Normal rate and regular rhythm.      Pulses: Normal pulses.      Heart sounds: No murmur.   Pulmonary:      Effort: Pulmonary effort is normal. No respiratory distress.      Breath sounds: No wheezing or rales.      Comments: Decreased RLL  Abdominal:      Palpations: Abdomen is soft.      Tenderness: There is abdominal tenderness (mild incisional). There is no guarding.      Comments: Chevron incision KELLY with staples no s/s/i  RLQ drain sites intact   Musculoskeletal:      Right lower leg: Edema present.      Left lower leg: Edema present.   Skin:     General: Skin is warm and dry.      Capillary Refill: Capillary refill takes 2 to 3 seconds.      Coloration: Skin is jaundiced.   Neurological:      General: No focal deficit present.      Mental Status: He is alert and oriented to person, place, and time.   Psychiatric:         Mood and Affect: Mood normal.         Behavior: Behavior normal.         Thought Content: Thought content normal.         Laboratory:  Immunosuppressants         Stop Route Frequency     tacrolimus capsule 3 mg      -- Oral 2 times daily     mycophenolate capsule 1,000  mg      -- Oral 2 times daily        CBC:   Recent Labs   Lab 10/24/20  1326   WBC 8.54   RBC 3.02*   HGB 9.4*   HCT 29.4*   *   MCV 97   MCH 31.1*   MCHC 32.0     CMP:   Recent Labs   Lab 10/24/20  0530   GLU 85   CALCIUM 8.6*   ALBUMIN 2.7*   PROT 4.5*      K 3.8   CO2 23      BUN 43*   CREATININE 1.6*   ALKPHOS 71   ALT 69*   AST 17   BILITOT 2.9*     Labs within the past 24 hours have been reviewed.    Diagnostic Results:  I have personally reviewed all pertinent imaging studies.    Debility/Functional status: Patient debilitated by evidence of Weakness. Physical and occupational therapy ordered daily to evaluate and treat. Debility was: present on admission.

## 2020-10-24 NOTE — PROGRESS NOTES
[facility]  Liver Transplant  Progress Note    Patient Name: Jordan Altman  MRN: [id number]  Admission Date: 10/11/2020  Hospital Length of Stay: 13 days  Code Status: Full Code  Primary Care Provider: SHAHEEN Jaime  Post-Operative Day: 10    ORGAN:   LIVER  Disease Etiology: Alcoholic Cirrhosis  Donor Type:   Donation after Brain Death  [facility] High Risk:   Yes  Donor CMV Status:   Donor CMV Status: Positive  Donor HBcAB:   Negative  Donor HCV Status:   Positive  Donor HBV SHAYE: Negative  Donor HCV SHAYE: Positive  Whole or Partial: Whole Liver  Biliary Anastomosis: End to End  Arterial Anatomy: Standard  Subjective:     History of Present Illness:  Mr. 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 He also c/o chronic left knee pain and denies any recent injury. Occult blood test (+) at OSH but HCT stable. Pt denies fever, chills, nausea, vomiting, chest pain, sob, hematuria or changes in bladder function. Will admit to LTS for further evaluation.     Hospital Course:  ESLD 2/2 ETOH. Admitted 10/11 with suspected GIB from OSH. 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 Post op course significant for PVT requiring TB to OR POD#1 for thrombectomy. Hepatic artery also revised (shortened) due to kink. OR POD#2 for abdominal closure, bx, and reconstruction of bile duct. US 10/17 satisfactory. Oliguric JENN requiring CRRT post op. Nephrology following. Stepped down 10/19 afternoon. Drains and bourne out.    Interval History: No acute events overnight. Last received 2u nits PRBC 10/22.  Restarted Heparin at 500 u/hr- no titration 10/23.  H/H remained stable.  Restarted low intensity heparin gtt 10/24, cont to monitor ptt.  Cont to check CBC every 6 hours.  Can discuss PO anticoag once stable.  CT a/p without contrast  reviewed, free fluid in mid upper abdomen/left upper abs- subcutaneous hematoma mid chevron noted, staples intact and no oozing. Hematoma stable.  Will cont to monitor.  LFTs trending down, t bili up slightly- pt did received PRBC but will cont to monitor.  Last liver US 10/20 w no CBD or intrahepatic duct dilation noted.  Cr improving daily, last HD 10/20.  Excellent UOP. Pt progressing well. Tolerating diet. (+) BM. Working well with therapy.  Will consult Rehab for possible placement next week. Monitor.       Scheduled Meds:   bisacodyL  10 mg Oral QHS    docusate sodium  100 mg Oral TID    fluconazole  200 mg Oral Daily    mycophenolate  1,000 mg Oral BID    pantoprazole  40 mg Oral Daily    predniSONE  20 mg Oral Daily    sulfamethoxazole-trimethoprim 400-80mg  1 tablet Oral Daily    tacrolimus  3 mg Oral BID    valGANciclovir  450 mg Oral Daily     Continuous Infusions:   heparin (porcine) in D5W 12 Units/kg/hr (10/24/20 1045)     PRN Meds:acetaminophen, bisacodyL, diphenhydrAMINE, heparin (PORCINE), melatonin, ondansetron, oxyCODONE, oxyCODONE, sodium chloride 0.9%    Review of Systems   Constitutional: Positive for activity change. Negative for appetite change, chills and fever.   HENT: Negative for congestion, facial swelling and trouble swallowing.    Eyes: Negative for pain, discharge and visual disturbance.   Respiratory: Negative for cough, chest tightness, shortness of breath and wheezing.    Cardiovascular: Positive for leg swelling. Negative for chest pain and palpitations.   Gastrointestinal: Positive for abdominal pain (expected post op). Negative for abdominal distention, blood in stool, nausea and vomiting.   Endocrine: Negative.    Genitourinary: Positive for decreased urine volume.   Musculoskeletal: Negative for back pain.   Skin: Positive for wound.   Allergic/Immunologic: Positive for immunocompromised state.   Neurological: Positive for weakness. Negative for light-headedness and  headaches.   Hematological: Bruises/bleeds easily.   Psychiatric/Behavioral: Negative for agitation, behavioral problems, confusion and dysphoric mood.     Objective:     Vital Signs (Most Recent):  Temp: 98.2 °F (36.8 °C) (10/24/20 1250)  Pulse: 96 (10/24/20 1132)  Resp: 20 (10/24/20 1132)  BP: (!) 148/89 (10/24/20 1132)  SpO2: (!) 93 % (10/24/20 0833) Vital Signs (24h Range):  Temp:  [97.6 °F (36.4 °C)-98.9 °F (37.2 °C)] 98.2 °F (36.8 °C)  Pulse:  [] 96  Resp:  [13-21] 20  SpO2:  [84 %-99 %] 93 %  BP: (111-148)/(64-93) 148/89     Weight: 108 kg (238 lb 1.6 oz)  Body mass index is 32.29 kg/m².    Intake/Output - Last 3 Shifts       10/22 0700 - 10/23 0659 10/23 0700 - 10/24 0659 10/24 0700 - 10/25 0659    P.O. 1400 550     I.V. (mL/kg) 204 (1.9)      Blood 548.3      Total Intake(mL/kg) 2152.3 (20.5) 550 (5.1)     Urine (mL/kg/hr) 2275 (0.9) 1575 (0.6) 480 (0.5)    Emesis/NG output 0 0     Stool 0 0 0    Total Output 2275 1575 480    Net -122.7 -1025 -480           Urine Occurrence 0 x 0 x     Stool Occurrence 0 x 2 x 1 x    Emesis Occurrence 0 x 0 x           Physical Exam  Vitals signs and nursing note reviewed.   Constitutional:       General: He is not in acute distress.  Eyes:      General: Scleral icterus present.         Right eye: No discharge.         Left eye: No discharge.   Cardiovascular:      Rate and Rhythm: Normal rate and regular rhythm.      Pulses: Normal pulses.      Heart sounds: No murmur.   Pulmonary:      Effort: Pulmonary effort is normal. No respiratory distress.      Breath sounds: No wheezing or rales.      Comments: Decreased RLL  Abdominal:      Palpations: Abdomen is soft.      Tenderness: There is abdominal tenderness (mild incisional). There is no guarding.      Comments: [employer] incision KELLY with staples no s/s/i  RLQ drain sites intact   Musculoskeletal:      Right lower leg: Edema present.      Left lower leg: Edema present.   Skin:     General: Skin is warm and dry.       Capillary Refill: Capillary refill takes 2 to 3 seconds.      Coloration: Skin is jaundiced.   Neurological:      General: No focal deficit present.      Mental Status: He is alert and oriented to person, place, and time.   Psychiatric:         Mood and Affect: Mood normal.         Behavior: Behavior normal.         Thought Content: Thought content normal.         Laboratory:  Immunosuppressants         Stop Route Frequency     tacrolimus capsule 3 mg      -- Oral 2 times daily     mycophenolate capsule 1,000 mg      -- Oral 2 times daily        CBC:   Recent Labs   Lab 10/24/20  1326   WBC 8.54   RBC 3.02*   HGB 9.4*   HCT 29.4*   *   MCV 97   MCH 31.1*   MCHC 32.0     CMP:   Recent Labs   Lab 10/24/20  0530   GLU 85   CALCIUM 8.6*   ALBUMIN 2.7*   PROT 4.5*      K 3.8   CO2 23      BUN 43*   CREATININE 1.6*   ALKPHOS 71   ALT 69*   AST 17   BILITOT 2.9*     Labs within the past 24 hours have been reviewed.    Diagnostic Results:  I have personally reviewed all pertinent imaging studies.    Debility/Functional status: Patient debilitated by evidence of Weakness. Physical and occupational therapy ordered daily to evaluate and treat. Debility was: present on admission.    Assessment/Plan:     * Status post liver transplant  - s/p DBD OLTx 10/14 (CMV+/-, donor HCV SHAYE+).  - Post op course significant for PVT requiring TB to OR POD#1 for thrombectomy. Hepatic artery also revised (shortened) due to kink. - OR POD#2 for abdominal closure, bx, and reconstruction of bile duct.   - US 10/17 satisfactory.   - Oliguric JENN requiring CRRT post op. Nephrology following.   - Stepped down 10/19 afternoon. Drains and bourne out.  - US 10/20 (POD#6) due to decreased H/H. Liver US satisfactory with one small fairly stable fluid collection.  - Continue heparin gtt. Transfused 1u PRBC 10/20.  - LFTs trending down.  - Drop in HCT again today. Heparin gtt held. CT a/p without contrast to assess for hematomas. Transfuse  "1 u PRBC 10/22.  - CT reviewed per txp surgeon, H/H stable.   -  Heparin gtt resumed at 1400 at 500u/hr 10/23  - H/H remained stable- resumed low intensity heparin gtt 10/24. Cont to monitor CBC and ptt.    - plan to transition to oral anticoagulant once stable.    - Tolerating diet. Pain well controlled. Continue bowel regimen. PT/OT consulted.    Anemia of chronic disease  - H&H 6.5/20.4.  - Transfuse 2 unit PRBCs 10/22-10/23.  H/H 10/23 stable at 8.  - Monitor.      At risk for opportunistic infections  - Bactrim for PCP prophylaxis.  - Valcyte for CMV prophylaxis.  - Fluc for fungal prophylaxis.       Long-term use of immunosuppressant medication  - See "prophylactic immunotherapy."       Weakness  - Encourage OOBTC/ambulation with assistance. PT/OT consulted.  - Rehab consult placed.      Hyperphosphatemia  - Continue binders.  - Monitor.      Anticoagulated  - Heparin gtt for PVT. Monitor closely.  - restarted Heparin at 500u/hr at 1400 on 10/23, no titration  - cbc stable, resumed low intensity heparin gtt 10/24      Prophylactic immunotherapy  - Continue Prograf. Monitor trough daily and adjust dose as needed to achieve therapeutic level.  - Continue Cellcept.  - Continue steroids, per protocol.      Class 1 obesity due to excess calories with serious comorbidity and body mass index (BMI) of 34.0 to 34.9 in adult  - Monitor.      Acidosis  - stable.  - Monitor.    JENN (acute kidney injury)  - Oliguric JENN requiring CRRT post op (had renal dysfx pre op).  - Nephrology following. Last CRRT 10/18-10/19.  - UOP improving  - Cr improving daily, excellent UOP.  - Last HD 10/20/20.   - Lasix total 200 mg IV/Diuril 500 mg and Albumin x 2 10/22.  - Strict I/O. Daily weights.              VTE Risk Mitigation (From admission, onward)         Ordered     heparin 25,000 units in dextrose 5% 250 mL (100 units/mL) infusion LOW INTENSITY nomogram - [facility]  Continuous     Question:  Heparin Infusion Adjustment (DO NOT MODIFY " ANSWER)  Answer:  \\[email or web address]\epic\Images\Pharmacy\HeparinInfusions\heparin LOW INTENSITY nomogram for [facility] [id number].pdf    10/24/20 1027     heparin 25,000 units in dextrose 5% (100 units/ml) IV bolus from bag - ADDITIONAL PRN BOLUS - 60 units/kg  As needed (PRN)     Question:  Heparin Infusion Adjustment (DO NOT MODIFY ANSWER)  Answer:  \\[email or web address]\epic\Images\Pharmacy\HeparinInfusions\heparin LOW INTENSITY nomogram for [facility] [id number].pdf    10/24/20 1027     Place sequential compression device  Until discontinued      10/14/20 1339     IP VTE HIGH RISK PATIENT  Once      10/14/20 1339     Place JELENA hose  Until discontinued      10/11/20 0217                The patients clinical status was discussed at multidisplinary rounds, involving transplant surgery, transplant medicine, pharmacy, nursing, nutrition, and social work    Discharge Planning:  Discussed plan of care.  No plan for discharge today.      Janeth López NP  Liver Transplant  [facility]

## 2020-10-24 NOTE — PLAN OF CARE
Patient AAOx4, VSS on RA. Afebrile. Heparin gtt infusing @ 5mL/hr w/o issues o/n. Abd x-ray obtained and resulted. CBC q6h orders maintained. H/H 7.9/24.8, aPTT 25.6; Anti-Xa 0.10. [employer] KELLY w/ staples, leaking serous drainage @ left corner- free from signs of infection- educated pt and Marnie Echevarria) via [employer] on painting inc. RLQ old ADAM drain site KELLY w/ sutures. Pt c/o pain, controlled by PRN medications. Voids per urinal- see I&O for details. Up w/ 2 person assist. Bed locked and lowered, call bell in reach, nonskid socks on when out of bed. Reminded pt to call for assistance, pt verbalized understanding. Hand hygiene performed before and after care. Self meds pulled 100% this shift. Plan for rehab consult and tentative placement for next week. NAD noted, URSZULA.

## 2020-10-24 NOTE — ASSESSMENT & PLAN NOTE
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  - Post op course significant for PVT requiring TB to OR POD#1 for thrombectomy. Hepatic artery also revised (shortened) due to kink. - OR POD#2 for abdominal closure, bx, and reconstruction of bile duct.   - US 10/17 satisfactory.   - Oliguric JENN requiring CRRT post op. Nephrology following.   - Stepped down 10/19 afternoon. Drains and oburne out.  - US 10/20 (POD#6) due to decreased H/H. Liver US satisfactory with one small fairly stable fluid collection.  - Continue heparin gtt. Transfused 1u PRBC 10/20.  - LFTs trending down.  - Drop in HCT again today. Heparin gtt held. CT a/p without contrast to assess for hematomas. Transfuse 1 u PRBC 10/22.  - CT reviewed per txp surgeon, H/H stable.   -  Heparin gtt resumed at 1400 at 500u/hr 10/23  - H/H remained stable- resumed low intensity heparin gtt 10/24. Cont to monitor CBC and ptt.    - plan to transition to oral anticoagulant once stable.    - Tolerating diet. Pain well controlled. Continue bowel regimen. PT/OT consulted.

## 2020-10-25 LAB
ALBUMIN SERPL BCP-MCNC: 2.8 G/DL (ref 3.5–5.2)
ALP SERPL-CCNC: 77 U/L (ref 55–135)
ALT SERPL W/O P-5'-P-CCNC: 56 U/L (ref 10–44)
ANION GAP SERPL CALC-SCNC: 9 MMOL/L (ref 8–16)
APTT BLDCRRT: 28.9 SEC (ref 21–32)
APTT BLDCRRT: 33.9 SEC (ref 21–32)
APTT BLDCRRT: 34.3 SEC (ref 21–32)
APTT BLDCRRT: 34.3 SEC (ref 21–32)
AST SERPL-CCNC: 13 U/L (ref 10–40)
BASOPHILS # BLD AUTO: 0.02 K/UL (ref 0–0.2)
BASOPHILS # BLD AUTO: 0.02 K/UL (ref 0–0.2)
BASOPHILS # BLD AUTO: 0.03 K/UL (ref 0–0.2)
BASOPHILS NFR BLD: 0.4 % (ref 0–1.9)
BASOPHILS NFR BLD: 0.4 % (ref 0–1.9)
BASOPHILS NFR BLD: 0.6 % (ref 0–1.9)
BILIRUB SERPL-MCNC: 2.8 MG/DL (ref 0.1–1)
BUN SERPL-MCNC: 36 MG/DL (ref 6–20)
CALCIUM SERPL-MCNC: 8.6 MG/DL (ref 8.7–10.5)
CHLORIDE SERPL-SCNC: 104 MMOL/L (ref 95–110)
CO2 SERPL-SCNC: 23 MMOL/L (ref 23–29)
CREAT SERPL-MCNC: 1.5 MG/DL (ref 0.5–1.4)
DIFFERENTIAL METHOD: ABNORMAL
EOSINOPHIL # BLD AUTO: 0 K/UL (ref 0–0.5)
EOSINOPHIL # BLD AUTO: 0 K/UL (ref 0–0.5)
EOSINOPHIL # BLD AUTO: 0.2 K/UL (ref 0–0.5)
EOSINOPHIL NFR BLD: 0.6 % (ref 0–8)
EOSINOPHIL NFR BLD: 0.7 % (ref 0–8)
EOSINOPHIL NFR BLD: 3.9 % (ref 0–8)
ERYTHROCYTE [DISTWIDTH] IN BLOOD BY AUTOMATED COUNT: 18.6 % (ref 11.5–14.5)
ERYTHROCYTE [DISTWIDTH] IN BLOOD BY AUTOMATED COUNT: 18.7 % (ref 11.5–14.5)
ERYTHROCYTE [DISTWIDTH] IN BLOOD BY AUTOMATED COUNT: 18.9 % (ref 11.5–14.5)
EST. GFR  (AFRICAN AMERICAN): >60 ML/MIN/1.73 M^2
EST. GFR  (NON AFRICAN AMERICAN): 54.7 ML/MIN/1.73 M^2
FACT X PPP CHRO-ACNC: 0.13 IU/ML (ref 0.3–0.7)
GLUCOSE SERPL-MCNC: 105 MG/DL (ref 70–110)
HCT VFR BLD AUTO: 26.5 % (ref 40–54)
HCT VFR BLD AUTO: 26.9 % (ref 40–54)
HCT VFR BLD AUTO: 27.3 % (ref 40–54)
HGB BLD-MCNC: 8.3 G/DL (ref 14–18)
HGB BLD-MCNC: 8.5 G/DL (ref 14–18)
HGB BLD-MCNC: 8.5 G/DL (ref 14–18)
IMM GRANULOCYTES # BLD AUTO: 0.14 K/UL (ref 0–0.04)
IMM GRANULOCYTES # BLD AUTO: 0.16 K/UL (ref 0–0.04)
IMM GRANULOCYTES # BLD AUTO: 0.22 K/UL (ref 0–0.04)
IMM GRANULOCYTES NFR BLD AUTO: 3.1 % (ref 0–0.5)
IMM GRANULOCYTES NFR BLD AUTO: 3.4 % (ref 0–0.5)
IMM GRANULOCYTES NFR BLD AUTO: 4.5 % (ref 0–0.5)
INR PPP: 1 (ref 0.8–1.2)
LYMPHOCYTES # BLD AUTO: 0.2 K/UL (ref 1–4.8)
LYMPHOCYTES # BLD AUTO: 0.2 K/UL (ref 1–4.8)
LYMPHOCYTES # BLD AUTO: 0.5 K/UL (ref 1–4.8)
LYMPHOCYTES NFR BLD: 10.2 % (ref 18–48)
LYMPHOCYTES NFR BLD: 3.2 % (ref 18–48)
LYMPHOCYTES NFR BLD: 3.3 % (ref 18–48)
MAGNESIUM SERPL-MCNC: 1.6 MG/DL (ref 1.6–2.6)
MCH RBC QN AUTO: 30.6 PG (ref 27–31)
MCH RBC QN AUTO: 30.6 PG (ref 27–31)
MCH RBC QN AUTO: 31 PG (ref 27–31)
MCHC RBC AUTO-ENTMCNC: 31.1 G/DL (ref 32–36)
MCHC RBC AUTO-ENTMCNC: 31.3 G/DL (ref 32–36)
MCHC RBC AUTO-ENTMCNC: 31.6 G/DL (ref 32–36)
MCV RBC AUTO: 98 FL (ref 82–98)
MONOCYTES # BLD AUTO: 0.2 K/UL (ref 0.3–1)
MONOCYTES # BLD AUTO: 0.2 K/UL (ref 0.3–1)
MONOCYTES # BLD AUTO: 0.8 K/UL (ref 0.3–1)
MONOCYTES NFR BLD: 16.3 % (ref 4–15)
MONOCYTES NFR BLD: 3.6 % (ref 4–15)
MONOCYTES NFR BLD: 3.8 % (ref 4–15)
NEUTROPHILS # BLD AUTO: 3.2 K/UL (ref 1.8–7.7)
NEUTROPHILS # BLD AUTO: 4 K/UL (ref 1.8–7.7)
NEUTROPHILS # BLD AUTO: 4.2 K/UL (ref 1.8–7.7)
NEUTROPHILS NFR BLD: 64.5 % (ref 38–73)
NEUTROPHILS NFR BLD: 88.7 % (ref 38–73)
NEUTROPHILS NFR BLD: 88.8 % (ref 38–73)
NRBC BLD-RTO: 0 /100 WBC
PHOSPHATE SERPL-MCNC: 3.8 MG/DL (ref 2.7–4.5)
PLATELET # BLD AUTO: 83 K/UL (ref 150–350)
PLATELET # BLD AUTO: 83 K/UL (ref 150–350)
PLATELET # BLD AUTO: 85 K/UL (ref 150–350)
PMV BLD AUTO: 11.6 FL (ref 9.2–12.9)
PMV BLD AUTO: 12.1 FL (ref 9.2–12.9)
PMV BLD AUTO: 12.2 FL (ref 9.2–12.9)
POTASSIUM SERPL-SCNC: 3.6 MMOL/L (ref 3.5–5.1)
PROT SERPL-MCNC: 4.7 G/DL (ref 6–8.4)
PROTHROMBIN TIME: 10.8 SEC (ref 9–12.5)
RBC # BLD AUTO: 2.71 M/UL (ref 4.6–6.2)
RBC # BLD AUTO: 2.74 M/UL (ref 4.6–6.2)
RBC # BLD AUTO: 2.78 M/UL (ref 4.6–6.2)
SODIUM SERPL-SCNC: 136 MMOL/L (ref 136–145)
TACROLIMUS BLD-MCNC: 7.3 NG/ML (ref 5–15)
WBC # BLD AUTO: 4.5 K/UL (ref 3.9–12.7)
WBC # BLD AUTO: 4.68 K/UL (ref 3.9–12.7)
WBC # BLD AUTO: 4.91 K/UL (ref 3.9–12.7)

## 2020-10-25 PROCEDURE — 63600175 PHARM REV CODE 636 W HCPCS: Performed by: STUDENT IN AN ORGANIZED HEALTH CARE EDUCATION/TRAINING PROGRAM

## 2020-10-25 PROCEDURE — 85730 THROMBOPLASTIN TIME PARTIAL: CPT | Mod: 91

## 2020-10-25 PROCEDURE — 80197 ASSAY OF TACROLIMUS: CPT

## 2020-10-25 PROCEDURE — 83735 ASSAY OF MAGNESIUM: CPT

## 2020-10-25 PROCEDURE — 85610 PROTHROMBIN TIME: CPT

## 2020-10-25 PROCEDURE — 25000003 PHARM REV CODE 250: Performed by: NURSE PRACTITIONER

## 2020-10-25 PROCEDURE — 25000003 PHARM REV CODE 250: Performed by: PHYSICIAN ASSISTANT

## 2020-10-25 PROCEDURE — 85520 HEPARIN ASSAY: CPT

## 2020-10-25 PROCEDURE — 63600175 PHARM REV CODE 636 W HCPCS: Performed by: NURSE PRACTITIONER

## 2020-10-25 PROCEDURE — 63600175 PHARM REV CODE 636 W HCPCS: Performed by: SURGERY

## 2020-10-25 PROCEDURE — 80053 COMPREHEN METABOLIC PANEL: CPT

## 2020-10-25 PROCEDURE — 84100 ASSAY OF PHOSPHORUS: CPT

## 2020-10-25 PROCEDURE — 36415 COLL VENOUS BLD VENIPUNCTURE: CPT

## 2020-10-25 PROCEDURE — 20600001 HC STEP DOWN PRIVATE ROOM

## 2020-10-25 PROCEDURE — 85025 COMPLETE CBC W/AUTO DIFF WBC: CPT | Mod: 91

## 2020-10-25 PROCEDURE — 25000003 PHARM REV CODE 250: Performed by: STUDENT IN AN ORGANIZED HEALTH CARE EDUCATION/TRAINING PROGRAM

## 2020-10-25 RX ORDER — PREDNISONE 20 MG/1
20 TABLET ORAL DAILY
Status: COMPLETED | OUTPATIENT
Start: 2020-10-25 | End: 2020-10-26

## 2020-10-25 RX ORDER — PREDNISONE 10 MG/1
10 TABLET ORAL DAILY
Status: DISCONTINUED | OUTPATIENT
Start: 2020-11-03 | End: 2020-10-27 | Stop reason: HOSPADM

## 2020-10-25 RX ORDER — MAGNESIUM SULFATE HEPTAHYDRATE 40 MG/ML
2 INJECTION, SOLUTION INTRAVENOUS ONCE
Status: COMPLETED | OUTPATIENT
Start: 2020-10-25 | End: 2020-10-25

## 2020-10-25 RX ORDER — PREDNISONE 5 MG/1
5 TABLET ORAL DAILY
Status: DISCONTINUED | OUTPATIENT
Start: 2020-11-10 | End: 2020-10-27 | Stop reason: HOSPADM

## 2020-10-25 RX ADMIN — DIPHENHYDRAMINE HYDROCHLORIDE 25 MG: 25 CAPSULE ORAL at 08:10

## 2020-10-25 RX ADMIN — FLUCONAZOLE 200 MG: 200 TABLET ORAL at 08:10

## 2020-10-25 RX ADMIN — DOCUSATE SODIUM 100 MG: 100 CAPSULE, LIQUID FILLED ORAL at 08:10

## 2020-10-25 RX ADMIN — MAGNESIUM SULFATE IN WATER 2 G: 40 INJECTION, SOLUTION INTRAVENOUS at 11:10

## 2020-10-25 RX ADMIN — DIPHENHYDRAMINE HYDROCHLORIDE 25 MG: 25 CAPSULE ORAL at 07:10

## 2020-10-25 RX ADMIN — OXYCODONE HYDROCHLORIDE 10 MG: 10 TABLET ORAL at 08:10

## 2020-10-25 RX ADMIN — PREDNISONE 20 MG: 20 TABLET ORAL at 08:10

## 2020-10-25 RX ADMIN — APIXABAN 5 MG: 5 TABLET, FILM COATED ORAL at 07:10

## 2020-10-25 RX ADMIN — DOCUSATE SODIUM 100 MG: 100 CAPSULE, LIQUID FILLED ORAL at 07:10

## 2020-10-25 RX ADMIN — PANTOPRAZOLE SODIUM 40 MG: 40 TABLET, DELAYED RELEASE ORAL at 08:10

## 2020-10-25 RX ADMIN — APIXABAN 5 MG: 5 TABLET, FILM COATED ORAL at 12:10

## 2020-10-25 RX ADMIN — VALGANCICLOVIR 450 MG: 450 TABLET, FILM COATED ORAL at 08:10

## 2020-10-25 RX ADMIN — DOCUSATE SODIUM 100 MG: 100 CAPSULE, LIQUID FILLED ORAL at 03:10

## 2020-10-25 RX ADMIN — MYCOPHENOLATE MOFETIL 1000 MG: 250 CAPSULE ORAL at 07:10

## 2020-10-25 RX ADMIN — TACROLIMUS 3 MG: 1 CAPSULE ORAL at 05:10

## 2020-10-25 RX ADMIN — SULFAMETHOXAZOLE AND TRIMETHOPRIM 1 TABLET: 400; 80 TABLET ORAL at 08:10

## 2020-10-25 RX ADMIN — ACETAMINOPHEN 650 MG: 325 TABLET ORAL at 03:10

## 2020-10-25 RX ADMIN — MELATONIN TAB 3 MG 6 MG: 3 TAB at 07:10

## 2020-10-25 RX ADMIN — PREDNISONE 20 MG: 20 TABLET ORAL at 11:10

## 2020-10-25 RX ADMIN — MYCOPHENOLATE MOFETIL 1000 MG: 250 CAPSULE ORAL at 08:10

## 2020-10-25 RX ADMIN — HEPARIN SODIUM AND DEXTROSE 17 UNITS/KG/HR: 10000; 5 INJECTION INTRAVENOUS at 04:10

## 2020-10-25 RX ADMIN — TACROLIMUS 3 MG: 1 CAPSULE ORAL at 08:10

## 2020-10-25 RX ADMIN — OXYCODONE HYDROCHLORIDE 10 MG: 10 TABLET ORAL at 07:10

## 2020-10-25 RX ADMIN — BISACODYL 10 MG: 5 TABLET, COATED ORAL at 07:10

## 2020-10-25 NOTE — PLAN OF CARE
Notified Janeth López of the discrepancy in dosing weight for heparin gtt to the patient's current weight (heparin dosing weight= 89.8 kg; patient's current weight= 110 kg (~20 kg difference). Patient's APTT sub therapeutic at  26.5 sec. Orders received: given patient's history of anemia/hematoma, leave dosing weight as is and continue to follow the current heparin nomogram.

## 2020-10-25 NOTE — ASSESSMENT & PLAN NOTE
- H&H 6.5/20.4.  - Transfuse 2 unit PRBCs 10/22-10/23.  H/H remains stable.  No overt signs of bleeding.  - Monitor.

## 2020-10-25 NOTE — SUBJECTIVE & OBJECTIVE
Scheduled Meds:   apixaban  5 mg Oral BID    bisacodyL  10 mg Oral QHS    docusate sodium  100 mg Oral TID    fluconazole  200 mg Oral Daily    mycophenolate  1,000 mg Oral BID    pantoprazole  40 mg Oral Daily    predniSONE  20 mg Oral Daily    Followed by    [START ON 10/27/2020] predniSONE  15 mg Oral Daily    Followed by    [START ON 11/3/2020] predniSONE  10 mg Oral Daily    Followed by    [START ON 11/10/2020] predniSONE  5 mg Oral Daily    sulfamethoxazole-trimethoprim 400-80mg  1 tablet Oral Daily    tacrolimus  3 mg Oral BID    valGANciclovir  450 mg Oral Daily     Continuous Infusions:    PRN Meds:acetaminophen, bisacodyL, diphenhydrAMINE, melatonin, ondansetron, oxyCODONE, oxyCODONE, sodium chloride 0.9%    Review of Systems   Constitutional: Positive for activity change. Negative for appetite change, chills and fever.   HENT: Negative for congestion, facial swelling and trouble swallowing.    Eyes: Negative for pain, discharge and visual disturbance.   Respiratory: Negative for cough, chest tightness, shortness of breath and wheezing.    Cardiovascular: Positive for leg swelling. Negative for chest pain and palpitations.   Gastrointestinal: Positive for abdominal pain (expected post op). Negative for abdominal distention, blood in stool, nausea and vomiting.   Endocrine: Negative.    Genitourinary: Positive for decreased urine volume.   Musculoskeletal: Negative for back pain.   Skin: Positive for wound.   Allergic/Immunologic: Positive for immunocompromised state.   Neurological: Positive for weakness. Negative for light-headedness and headaches.   Hematological: Bruises/bleeds easily.   Psychiatric/Behavioral: Negative for agitation, behavioral problems, confusion and dysphoric mood.     Objective:     Vital Signs (Most Recent):  Temp: 97.8 °F (36.6 °C) (10/25/20 1641)  Pulse: 80 (10/25/20 1641)  Resp: 16 (10/25/20 1641)  BP: 137/89 (10/25/20 1641)  SpO2: 95 % (10/25/20 1641) Vital Signs  (24h Range):  Temp:  [97.8 °F (36.6 °C)-98.6 °F (37 °C)] 97.8 °F (36.6 °C)  Pulse:  [] 80  Resp:  [13-22] 16  SpO2:  [95 %-99 %] 95 %  BP: (121-137)/(77-89) 137/89     Weight: 108 kg (238 lb 1.6 oz)  Body mass index is 32.29 kg/m².    Intake/Output - Last 3 Shifts       10/23 0700 - 10/24 0659 10/24 0700 - 10/25 0659 10/25 0700 - 10/26 0659    P.O. 550 480     I.V. (mL/kg)  270.8 (2.5)     Blood       IV Piggyback  81.5     Total Intake(mL/kg) 550 (5.1) 832.2 (7.7)     Urine (mL/kg/hr) 1575 (0.6) 1070 (0.4) 990 (0.9)    Emesis/NG output 0      Stool 0 0     Total Output 1575 1070 990    Net -1025 -237.8 -990           Urine Occurrence 0 x      Stool Occurrence 2 x 2 x     Emesis Occurrence 0 x            Physical Exam  Vitals signs and nursing note reviewed.   Constitutional:       General: He is not in acute distress.  Eyes:      General: Scleral icterus present.         Right eye: No discharge.         Left eye: No discharge.   Cardiovascular:      Rate and Rhythm: Normal rate and regular rhythm.      Pulses: Normal pulses.      Heart sounds: No murmur.   Pulmonary:      Effort: Pulmonary effort is normal. No respiratory distress.      Breath sounds: No wheezing or rales.      Comments: Decreased RLL  Abdominal:      Palpations: Abdomen is soft.      Tenderness: There is abdominal tenderness (mild incisional). There is no guarding.      Comments: [employer] incision KELLY with staples no s/s/i  RLQ drain sites intact   Musculoskeletal:      Right lower leg: Edema present.      Left lower leg: Edema present.   Skin:     General: Skin is warm and dry.      Capillary Refill: Capillary refill takes 2 to 3 seconds.      Coloration: Skin is jaundiced.   Neurological:      General: No focal deficit present.      Mental Status: He is alert and oriented to person, place, and time.   Psychiatric:         Mood and Affect: Mood normal.         Behavior: Behavior normal.         Thought Content: Thought content normal.          Laboratory:  Immunosuppressants         Stop Route Frequency     tacrolimus capsule 3 mg      -- Oral 2 times daily     mycophenolate capsule 1,000 mg      -- Oral 2 times daily        CBC:   Recent Labs   Lab 10/25/20  1501   WBC 4.68  4.50   RBC 2.78*  2.74*   HGB 8.5*  8.5*   HCT 27.3*  26.9*   PLT 85*  83*   MCV 98  98   MCH 30.6  31.0   MCHC 31.1*  31.6*     CMP:   Recent Labs   Lab 10/25/20  0357      CALCIUM 8.6*   ALBUMIN 2.8*   PROT 4.7*      K 3.6   CO2 23      BUN 36*   CREATININE 1.5*   ALKPHOS 77   ALT 56*   AST 13   BILITOT 2.8*     Labs within the past 24 hours have been reviewed.    Diagnostic Results:  I have personally reviewed all pertinent imaging studies.    Debility/Functional status: Patient debilitated by evidence of Weakness. Physical and occupational therapy ordered daily to evaluate and treat. Debility was: present on admission.

## 2020-10-25 NOTE — ASSESSMENT & PLAN NOTE
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  - Post op course significant for PVT requiring TB to OR POD#1 for thrombectomy. Hepatic artery also revised (shortened) due to kink. - OR POD#2 for abdominal closure, bx, and reconstruction of bile duct.   - US 10/17 satisfactory.   - Oliguric JENN requiring CRRT post op. Nephrology following.   - Stepped down 10/19 afternoon. Drains and bourne out.  - US 10/20 (POD#6) due to decreased H/H. Liver US satisfactory with one small fairly stable fluid collection.  - Continue heparin gtt. Transfused 1u PRBC 10/20.  - LFTs trending down.  - Drop in HCT again today. Heparin gtt held. CT a/p without contrast to assess for hematomas. Transfuse 1 u PRBC 10/22.  - CT reviewed per txp surgeon, H/H stable.   -  Heparin gtt resumed at 1400 at 500u/hr 10/23  - H/H remained stable- resumed low intensity heparin gtt 10/24. Cont to monitor CBC and ptt.    - transition to Eliquis 10/25.    - Tolerating diet. Pain well controlled. Continue bowel regimen. PT/OT consulted.

## 2020-10-25 NOTE — NURSING
Pt AAOx4, VSS, afebrile. Heparin gtt d/c, transitioned to Eliquis. Consult Rehab tomorrow. U/O 1L. Bed in low/locked position, call light/personal belongings within reach, non-slip socks on when OOB, pt remains free from falls, WCTM.

## 2020-10-25 NOTE — ASSESSMENT & PLAN NOTE
- Oliguric JENN requiring CRRT post op (had renal dysfx pre op).  - Nephrology following. Last CRRT 10/18-10/19.  - UOP improving  - Cr improving daily, excellent UOP.  - Last HD 10/20/20.   - Lasix total 200 mg IV/Diuril 500 mg and Albumin x 2 10/22.  - UOP excellent, Cr trending down daily  - Strict I/O. Daily weights.

## 2020-10-25 NOTE — PROGRESS NOTES
[facility]  Liver Transplant  Progress Note    Patient Name: Jordan Altman  MRN: [id number]  Admission Date: 10/11/2020  Hospital Length of Stay: 14 days  Code Status: Full Code  Primary Care Provider: SHAHEEN Jaime  Post-Operative Day: 11    ORGAN:   LIVER  Disease Etiology: Alcoholic Cirrhosis  Donor Type:   Donation after Brain Death  CDC High Risk:   Yes  Donor CMV Status:   Donor CMV Status: Positive  Donor HBcAB:   Negative  Donor HCV Status:   Positive  Donor HBV SHAYE: Negative  Donor HCV SHAYE: Positive  Whole or Partial: Whole Liver  Biliary Anastomosis: End to End  Arterial Anatomy: Standard  Subjective:     History of Present Illness:  Mr. 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 He also c/o chronic left knee pain and denies any recent injury. Occult blood test (+) at OSH but HCT stable. Pt denies fever, chills, nausea, vomiting, chest pain, sob, hematuria or changes in bladder function. Will admit to LTS for further evaluation.     Hospital Course:  ESLD 2/2 ETOH. Admitted 10/11 with suspected GIB from OSH. 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 Post op course significant for PVT requiring TB to OR POD#1 for thrombectomy. Hepatic artery also revised (shortened) due to kink. OR POD#2 for abdominal closure, bx, and reconstruction of bile duct. US 10/17 satisfactory. Oliguric JENN requiring CRRT post op. Nephrology following. Stepped down 10/19 afternoon. Drains and bourne out. CT without contrast 10/22 reviewed, free fluid in mid upper abdomen/left upper abs- subcutaneous hematoma mid chevron noted, staples intact and no oozing.     Interval History: No acute events overnight. Last received 2u nits PRBC 10/22.  H/H stable on low intensity heparin gtt 10/24.  Transitioned to Eliquis 10/25.  D/c'd  Heparin gtt.  Hematoma stable.  Will cont to monitor.  LFTs trending down.  Last liver US 10/20 w no CBD or intrahepatic duct dilation noted.  Cr improving daily, last HD 10/20.  Excellent UOP. Pt progressing well. Tolerating diet. (+) BM. Working well with therapy.  Will consult Rehab for possible short placement Monday. Monitor.       Scheduled Meds:   apixaban  5 mg Oral BID    bisacodyL  10 mg Oral QHS    docusate sodium  100 mg Oral TID    fluconazole  200 mg Oral Daily    mycophenolate  1,000 mg Oral BID    pantoprazole  40 mg Oral Daily    predniSONE  20 mg Oral Daily    Followed by    [START ON 10/27/2020] predniSONE  15 mg Oral Daily    Followed by    [START ON 11/3/2020] predniSONE  10 mg Oral Daily    Followed by    [START ON 11/10/2020] predniSONE  5 mg Oral Daily    sulfamethoxazole-trimethoprim 400-80mg  1 tablet Oral Daily    tacrolimus  3 mg Oral BID    valGANciclovir  450 mg Oral Daily     Continuous Infusions:    PRN Meds:acetaminophen, bisacodyL, diphenhydrAMINE, melatonin, ondansetron, oxyCODONE, oxyCODONE, sodium chloride 0.9%    Review of Systems   Constitutional: Positive for activity change. Negative for appetite change, chills and fever.   HENT: Negative for congestion, facial swelling and trouble swallowing.    Eyes: Negative for pain, discharge and visual disturbance.   Respiratory: Negative for cough, chest tightness, shortness of breath and wheezing.    Cardiovascular: Positive for leg swelling. Negative for chest pain and palpitations.   Gastrointestinal: Positive for abdominal pain (expected post op). Negative for abdominal distention, blood in stool, nausea and vomiting.   Endocrine: Negative.    Genitourinary: Positive for decreased urine volume.   Musculoskeletal: Negative for back pain.   Skin: Positive for wound.   Allergic/Immunologic: Positive for immunocompromised state.   Neurological: Positive for weakness. Negative for light-headedness and headaches.    Hematological: Bruises/bleeds easily.   Psychiatric/Behavioral: Negative for agitation, behavioral problems, confusion and dysphoric mood.     Objective:     Vital Signs (Most Recent):  Temp: 97.8 °F (36.6 °C) (10/25/20 1641)  Pulse: 80 (10/25/20 1641)  Resp: 16 (10/25/20 1641)  BP: 137/89 (10/25/20 1641)  SpO2: 95 % (10/25/20 1641) Vital Signs (24h Range):  Temp:  [97.8 °F (36.6 °C)-98.6 °F (37 °C)] 97.8 °F (36.6 °C)  Pulse:  [] 80  Resp:  [13-22] 16  SpO2:  [95 %-99 %] 95 %  BP: (121-137)/(77-89) 137/89     Weight: 108 kg (238 lb 1.6 oz)  Body mass index is 32.29 kg/m².    Intake/Output - Last 3 Shifts       10/23 0700 - 10/24 0659 10/24 0700 - 10/25 0659 10/25 0700 - 10/26 0659    P.O. 550 480     I.V. (mL/kg)  270.8 (2.5)     Blood       IV Piggyback  81.5     Total Intake(mL/kg) 550 (5.1) 832.2 (7.7)     Urine (mL/kg/hr) 1575 (0.6) 1070 (0.4) 990 (0.9)    Emesis/NG output 0      Stool 0 0     Total Output 1575 1070 990    Net -1025 -237.8 -990           Urine Occurrence 0 x      Stool Occurrence 2 x 2 x     Emesis Occurrence 0 x            Physical Exam  Vitals signs and nursing note reviewed.   Constitutional:       General: He is not in acute distress.  Eyes:      General: Scleral icterus present.         Right eye: No discharge.         Left eye: No discharge.   Cardiovascular:      Rate and Rhythm: Normal rate and regular rhythm.      Pulses: Normal pulses.      Heart sounds: No murmur.   Pulmonary:      Effort: Pulmonary effort is normal. No respiratory distress.      Breath sounds: No wheezing or rales.      Comments: Decreased RLL  Abdominal:      Palpations: Abdomen is soft.      Tenderness: There is abdominal tenderness (mild incisional). There is no guarding.      Comments: Chevron incision KELLY with staples no s/s/i  RLQ drain sites intact   Musculoskeletal:      Right lower leg: Edema present.      Left lower leg: Edema present.   Skin:     General: Skin is warm and dry.      Capillary  Refill: Capillary refill takes 2 to 3 seconds.      Coloration: Skin is jaundiced.   Neurological:      General: No focal deficit present.      Mental Status: He is alert and oriented to person, place, and time.   Psychiatric:         Mood and Affect: Mood normal.         Behavior: Behavior normal.         Thought Content: Thought content normal.         Laboratory:  Immunosuppressants         Stop Route Frequency     tacrolimus capsule 3 mg      -- Oral 2 times daily     mycophenolate capsule 1,000 mg      -- Oral 2 times daily        CBC:   Recent Labs   Lab 10/25/20  1501   WBC 4.68  4.50   RBC 2.78*  2.74*   HGB 8.5*  8.5*   HCT 27.3*  26.9*   PLT 85*  83*   MCV 98  98   MCH 30.6  31.0   MCHC 31.1*  31.6*     CMP:   Recent Labs   Lab 10/25/20  0357      CALCIUM 8.6*   ALBUMIN 2.8*   PROT 4.7*      K 3.6   CO2 23      BUN 36*   CREATININE 1.5*   ALKPHOS 77   ALT 56*   AST 13   BILITOT 2.8*     Labs within the past 24 hours have been reviewed.    Diagnostic Results:  I have personally reviewed all pertinent imaging studies.    Debility/Functional status: Patient debilitated by evidence of Weakness. Physical and occupational therapy ordered daily to evaluate and treat. Debility was: present on admission.    Assessment/Plan:     * Status post liver transplant  - s/p DBD OLTx 10/14 (CMV+/-, donor HCV SHAYE+).  - Post op course significant for PVT requiring TB to OR POD#1 for thrombectomy. Hepatic artery also revised (shortened) due to kink. - OR POD#2 for abdominal closure, bx, and reconstruction of bile duct.   - US 10/17 satisfactory.   - Oliguric JENN requiring CRRT post op. Nephrology following.   - Stepped down 10/19 afternoon. Drains and bourne out.  - US 10/20 (POD#6) due to decreased H/H. Liver US satisfactory with one small fairly stable fluid collection.  - Continue heparin gtt. Transfused 1u PRBC 10/20.  - LFTs trending down.  - Drop in HCT again today. Heparin gtt held. CT a/p  "without contrast to assess for hematomas. Transfuse 1 u PRBC 10/22.  - CT reviewed per txp surgeon, H/H stable.   -  Heparin gtt resumed at 1400 at 500u/hr 10/23  - H/H remained stable- resumed low intensity heparin gtt 10/24. Cont to monitor CBC and ptt.    - transition to Eliquis 10/25.    - Tolerating diet. Pain well controlled. Continue bowel regimen. PT/OT consulted.    Anemia of chronic disease  - H&H 6.5/20.4.  - Transfuse 2 unit PRBCs 10/22-10/23.  H/H remains stable.  No overt signs of bleeding.  - Monitor.      At risk for opportunistic infections  - Bactrim for PCP prophylaxis.  - Valcyte for CMV prophylaxis.  - Fluc for fungal prophylaxis.       Long-term use of immunosuppressant medication  - See "prophylactic immunotherapy."       Weakness  - Encourage OOBTC/ambulation with assistance. PT/OT consulted.  - Rehab consult placed.      Hyperphosphatemia  - Continue binders.  - Monitor.      Anticoagulated  - Heparin gtt for PVT. Monitor closely.  - restarted Heparin at 500u/hr at 1400 on 10/23, no titration  - cbc stable, resumed low intensity heparin gtt 10/24      Prophylactic immunotherapy  - Continue Prograf. Monitor trough daily and adjust dose as needed to achieve therapeutic level.  - Continue Cellcept.  - Continue steroids, per protocol.      Class 1 obesity due to excess calories with serious comorbidity and body mass index (BMI) of 34.0 to 34.9 in adult  - Monitor.      Acidosis  - stable.  - Monitor.    JENN (acute kidney injury)  - Oliguric JENN requiring CRRT post op (had renal dysfx pre op).  - Nephrology following. Last CRRT 10/18-10/19.  - UOP improving  - Cr improving daily, excellent UOP.  - Last HD 10/20/20.   - Lasix total 200 mg IV/Diuril 500 mg and Albumin x 2 10/22.  - UOP excellent, Cr trending down daily  - Strict I/O. Daily weights.              VTE Risk Mitigation (From admission, onward)         Ordered     apixaban tablet 5 mg  2 times daily      10/25/20 1140     Place " sequential compression device  Until discontinued      10/14/20 1339     IP VTE HIGH RISK PATIENT  Once      10/14/20 1339     Place JELENA hose  Until discontinued      10/11/20 0217                The patients clinical status was discussed at multidisplinary rounds, involving transplant surgery, transplant medicine, pharmacy, nursing, nutrition, and social work    Discharge Planning:  Discussed plan of care.  No plan for discharge today.        Janeth López NP  Liver Transplant  [facility]

## 2020-10-26 LAB
ALBUMIN SERPL BCP-MCNC: 2.7 G/DL (ref 3.5–5.2)
ALP SERPL-CCNC: 71 U/L (ref 55–135)
ALT SERPL W/O P-5'-P-CCNC: 42 U/L (ref 10–44)
ANION GAP SERPL CALC-SCNC: 11 MMOL/L (ref 8–16)
AST SERPL-CCNC: 9 U/L (ref 10–40)
BASOPHILS # BLD AUTO: 0.02 K/UL (ref 0–0.2)
BASOPHILS # BLD AUTO: 0.04 K/UL (ref 0–0.2)
BASOPHILS # BLD AUTO: 0.04 K/UL (ref 0–0.2)
BASOPHILS # BLD AUTO: 0.05 K/UL (ref 0–0.2)
BASOPHILS NFR BLD: 0.4 % (ref 0–1.9)
BASOPHILS NFR BLD: 0.6 % (ref 0–1.9)
BASOPHILS NFR BLD: 0.8 % (ref 0–1.9)
BASOPHILS NFR BLD: 0.8 % (ref 0–1.9)
BILIRUB SERPL-MCNC: 2.4 MG/DL (ref 0.1–1)
BUN SERPL-MCNC: 35 MG/DL (ref 6–20)
CALCIUM SERPL-MCNC: 8.7 MG/DL (ref 8.7–10.5)
CHLORIDE SERPL-SCNC: 104 MMOL/L (ref 95–110)
CO2 SERPL-SCNC: 21 MMOL/L (ref 23–29)
CREAT SERPL-MCNC: 1.6 MG/DL (ref 0.5–1.4)
DIFFERENTIAL METHOD: ABNORMAL
EOSINOPHIL # BLD AUTO: 0 K/UL (ref 0–0.5)
EOSINOPHIL # BLD AUTO: 0 K/UL (ref 0–0.5)
EOSINOPHIL # BLD AUTO: 0.1 K/UL (ref 0–0.5)
EOSINOPHIL # BLD AUTO: 0.2 K/UL (ref 0–0.5)
EOSINOPHIL NFR BLD: 0.2 % (ref 0–8)
EOSINOPHIL NFR BLD: 0.3 % (ref 0–8)
EOSINOPHIL NFR BLD: 1.3 % (ref 0–8)
EOSINOPHIL NFR BLD: 2.4 % (ref 0–8)
ERYTHROCYTE [DISTWIDTH] IN BLOOD BY AUTOMATED COUNT: 18.2 % (ref 11.5–14.5)
ERYTHROCYTE [DISTWIDTH] IN BLOOD BY AUTOMATED COUNT: 18.4 % (ref 11.5–14.5)
ERYTHROCYTE [DISTWIDTH] IN BLOOD BY AUTOMATED COUNT: 18.4 % (ref 11.5–14.5)
ERYTHROCYTE [DISTWIDTH] IN BLOOD BY AUTOMATED COUNT: 18.6 % (ref 11.5–14.5)
EST. GFR  (AFRICAN AMERICAN): 58.4 ML/MIN/1.73 M^2
EST. GFR  (NON AFRICAN AMERICAN): 50.6 ML/MIN/1.73 M^2
GLUCOSE SERPL-MCNC: 94 MG/DL (ref 70–110)
HCT VFR BLD AUTO: 25.4 % (ref 40–54)
HCT VFR BLD AUTO: 26.5 % (ref 40–54)
HCT VFR BLD AUTO: 27.6 % (ref 40–54)
HCT VFR BLD AUTO: 28.4 % (ref 40–54)
HGB BLD-MCNC: 8 G/DL (ref 14–18)
HGB BLD-MCNC: 8.1 G/DL (ref 14–18)
HGB BLD-MCNC: 8.9 G/DL (ref 14–18)
HGB BLD-MCNC: 8.9 G/DL (ref 14–18)
IMM GRANULOCYTES # BLD AUTO: 0.13 K/UL (ref 0–0.04)
IMM GRANULOCYTES # BLD AUTO: 0.15 K/UL (ref 0–0.04)
IMM GRANULOCYTES # BLD AUTO: 0.16 K/UL (ref 0–0.04)
IMM GRANULOCYTES # BLD AUTO: 0.16 K/UL (ref 0–0.04)
IMM GRANULOCYTES NFR BLD AUTO: 2.4 % (ref 0–0.5)
IMM GRANULOCYTES NFR BLD AUTO: 2.5 % (ref 0–0.5)
IMM GRANULOCYTES NFR BLD AUTO: 2.7 % (ref 0–0.5)
IMM GRANULOCYTES NFR BLD AUTO: 3 % (ref 0–0.5)
INR PPP: 1 (ref 0.8–1.2)
LYMPHOCYTES # BLD AUTO: 0.4 K/UL (ref 1–4.8)
LYMPHOCYTES # BLD AUTO: 0.4 K/UL (ref 1–4.8)
LYMPHOCYTES # BLD AUTO: 0.5 K/UL (ref 1–4.8)
LYMPHOCYTES # BLD AUTO: 0.5 K/UL (ref 1–4.8)
LYMPHOCYTES NFR BLD: 6.4 % (ref 18–48)
LYMPHOCYTES NFR BLD: 7.3 % (ref 18–48)
LYMPHOCYTES NFR BLD: 7.5 % (ref 18–48)
LYMPHOCYTES NFR BLD: 8.6 % (ref 18–48)
MAGNESIUM SERPL-MCNC: 2 MG/DL (ref 1.6–2.6)
MCH RBC QN AUTO: 30.6 PG (ref 27–31)
MCH RBC QN AUTO: 30.7 PG (ref 27–31)
MCH RBC QN AUTO: 30.9 PG (ref 27–31)
MCH RBC QN AUTO: 31.1 PG (ref 27–31)
MCHC RBC AUTO-ENTMCNC: 30.6 G/DL (ref 32–36)
MCHC RBC AUTO-ENTMCNC: 31.3 G/DL (ref 32–36)
MCHC RBC AUTO-ENTMCNC: 31.5 G/DL (ref 32–36)
MCHC RBC AUTO-ENTMCNC: 32.2 G/DL (ref 32–36)
MCV RBC AUTO: 100 FL (ref 82–98)
MCV RBC AUTO: 95 FL (ref 82–98)
MCV RBC AUTO: 99 FL (ref 82–98)
MCV RBC AUTO: 99 FL (ref 82–98)
MONOCYTES # BLD AUTO: 0.5 K/UL (ref 0.3–1)
MONOCYTES # BLD AUTO: 0.6 K/UL (ref 0.3–1)
MONOCYTES # BLD AUTO: 0.7 K/UL (ref 0.3–1)
MONOCYTES # BLD AUTO: 0.8 K/UL (ref 0.3–1)
MONOCYTES NFR BLD: 11.4 % (ref 4–15)
MONOCYTES NFR BLD: 11.6 % (ref 4–15)
MONOCYTES NFR BLD: 15 % (ref 4–15)
MONOCYTES NFR BLD: 7.8 % (ref 4–15)
NEUTROPHILS # BLD AUTO: 3.8 K/UL (ref 1.8–7.7)
NEUTROPHILS # BLD AUTO: 3.8 K/UL (ref 1.8–7.7)
NEUTROPHILS # BLD AUTO: 4.8 K/UL (ref 1.8–7.7)
NEUTROPHILS # BLD AUTO: 5.3 K/UL (ref 1.8–7.7)
NEUTROPHILS NFR BLD: 71.3 % (ref 38–73)
NEUTROPHILS NFR BLD: 75.5 % (ref 38–73)
NEUTROPHILS NFR BLD: 77.8 % (ref 38–73)
NEUTROPHILS NFR BLD: 82.4 % (ref 38–73)
NRBC BLD-RTO: 0 /100 WBC
PHOSPHATE SERPL-MCNC: 3.9 MG/DL (ref 2.7–4.5)
PLATELET # BLD AUTO: 101 K/UL (ref 150–350)
PLATELET # BLD AUTO: 113 K/UL (ref 150–350)
PLATELET # BLD AUTO: 124 K/UL (ref 150–350)
PLATELET # BLD AUTO: 80 K/UL (ref 150–350)
PMV BLD AUTO: 11.6 FL (ref 9.2–12.9)
PMV BLD AUTO: 12 FL (ref 9.2–12.9)
POTASSIUM SERPL-SCNC: 3.8 MMOL/L (ref 3.5–5.1)
PROT SERPL-MCNC: 4.7 G/DL (ref 6–8.4)
PROTHROMBIN TIME: 10.7 SEC (ref 9–12.5)
RBC # BLD AUTO: 2.57 M/UL (ref 4.6–6.2)
RBC # BLD AUTO: 2.65 M/UL (ref 4.6–6.2)
RBC # BLD AUTO: 2.88 M/UL (ref 4.6–6.2)
RBC # BLD AUTO: 2.9 M/UL (ref 4.6–6.2)
SODIUM SERPL-SCNC: 136 MMOL/L (ref 136–145)
TACROLIMUS BLD-MCNC: 9.1 NG/ML (ref 5–15)
WBC # BLD AUTO: 4.83 K/UL (ref 3.9–12.7)
WBC # BLD AUTO: 5.32 K/UL (ref 3.9–12.7)
WBC # BLD AUTO: 6.3 K/UL (ref 3.9–12.7)
WBC # BLD AUTO: 6.4 K/UL (ref 3.9–12.7)

## 2020-10-26 PROCEDURE — 63600175 PHARM REV CODE 636 W HCPCS: Performed by: NURSE PRACTITIONER

## 2020-10-26 PROCEDURE — 36415 COLL VENOUS BLD VENIPUNCTURE: CPT

## 2020-10-26 PROCEDURE — 85025 COMPLETE CBC W/AUTO DIFF WBC: CPT | Mod: 91

## 2020-10-26 PROCEDURE — 99233 SBSQ HOSP IP/OBS HIGH 50: CPT | Mod: 24,,, | Performed by: NURSE PRACTITIONER

## 2020-10-26 PROCEDURE — 97803 MED NUTRITION INDIV SUBSEQ: CPT

## 2020-10-26 PROCEDURE — 99233 PR SUBSEQUENT HOSPITAL CARE,LEVL III: ICD-10-PCS | Mod: ,,, | Performed by: INTERNAL MEDICINE

## 2020-10-26 PROCEDURE — 80197 ASSAY OF TACROLIMUS: CPT

## 2020-10-26 PROCEDURE — 83735 ASSAY OF MAGNESIUM: CPT

## 2020-10-26 PROCEDURE — 25000003 PHARM REV CODE 250: Performed by: NURSE PRACTITIONER

## 2020-10-26 PROCEDURE — 99233 SBSQ HOSP IP/OBS HIGH 50: CPT | Mod: ,,, | Performed by: INTERNAL MEDICINE

## 2020-10-26 PROCEDURE — 85610 PROTHROMBIN TIME: CPT

## 2020-10-26 PROCEDURE — 84100 ASSAY OF PHOSPHORUS: CPT

## 2020-10-26 PROCEDURE — 80053 COMPREHEN METABOLIC PANEL: CPT

## 2020-10-26 PROCEDURE — 20600001 HC STEP DOWN PRIVATE ROOM

## 2020-10-26 PROCEDURE — 97530 THERAPEUTIC ACTIVITIES: CPT | Mod: CQ

## 2020-10-26 PROCEDURE — 99233 PR SUBSEQUENT HOSPITAL CARE,LEVL III: ICD-10-PCS | Mod: 24,,, | Performed by: NURSE PRACTITIONER

## 2020-10-26 PROCEDURE — 97116 GAIT TRAINING THERAPY: CPT | Mod: CQ

## 2020-10-26 PROCEDURE — 25000003 PHARM REV CODE 250: Performed by: PHYSICIAN ASSISTANT

## 2020-10-26 PROCEDURE — 25000003 PHARM REV CODE 250: Performed by: STUDENT IN AN ORGANIZED HEALTH CARE EDUCATION/TRAINING PROGRAM

## 2020-10-26 PROCEDURE — 63600175 PHARM REV CODE 636 W HCPCS: Performed by: STUDENT IN AN ORGANIZED HEALTH CARE EDUCATION/TRAINING PROGRAM

## 2020-10-26 RX ORDER — SODIUM BICARBONATE 650 MG/1
1300 TABLET ORAL 2 TIMES DAILY
Status: DISCONTINUED | OUTPATIENT
Start: 2020-10-26 | End: 2020-10-27 | Stop reason: HOSPADM

## 2020-10-26 RX ORDER — TACROLIMUS 1 MG/1
3 CAPSULE ORAL EVERY MORNING
Status: DISCONTINUED | OUTPATIENT
Start: 2020-10-27 | End: 2020-10-27

## 2020-10-26 RX ORDER — TACROLIMUS 1 MG/1
2 CAPSULE ORAL EVERY EVENING
Status: DISCONTINUED | OUTPATIENT
Start: 2020-10-26 | End: 2020-10-27

## 2020-10-26 RX ADMIN — PANTOPRAZOLE SODIUM 40 MG: 40 TABLET, DELAYED RELEASE ORAL at 08:10

## 2020-10-26 RX ADMIN — DOCUSATE SODIUM 100 MG: 100 CAPSULE, LIQUID FILLED ORAL at 08:10

## 2020-10-26 RX ADMIN — MYCOPHENOLATE MOFETIL 1000 MG: 250 CAPSULE ORAL at 08:10

## 2020-10-26 RX ADMIN — MELATONIN TAB 3 MG 6 MG: 3 TAB at 08:10

## 2020-10-26 RX ADMIN — TACROLIMUS 2 MG: 1 CAPSULE ORAL at 06:10

## 2020-10-26 RX ADMIN — ACETAMINOPHEN 650 MG: 325 TABLET ORAL at 08:10

## 2020-10-26 RX ADMIN — VALGANCICLOVIR 450 MG: 450 TABLET, FILM COATED ORAL at 08:10

## 2020-10-26 RX ADMIN — FLUCONAZOLE 200 MG: 200 TABLET ORAL at 08:10

## 2020-10-26 RX ADMIN — SODIUM BICARBONATE 650 MG TABLET 1300 MG: at 08:10

## 2020-10-26 RX ADMIN — PREDNISONE 20 MG: 20 TABLET ORAL at 08:10

## 2020-10-26 RX ADMIN — DIPHENHYDRAMINE HYDROCHLORIDE 25 MG: 25 CAPSULE ORAL at 08:10

## 2020-10-26 RX ADMIN — SULFAMETHOXAZOLE AND TRIMETHOPRIM 1 TABLET: 400; 80 TABLET ORAL at 08:10

## 2020-10-26 RX ADMIN — SODIUM BICARBONATE 650 MG TABLET 1300 MG: at 11:10

## 2020-10-26 RX ADMIN — APIXABAN 5 MG: 5 TABLET, FILM COATED ORAL at 08:10

## 2020-10-26 RX ADMIN — TACROLIMUS 3 MG: 1 CAPSULE ORAL at 08:10

## 2020-10-26 RX ADMIN — OXYCODONE HYDROCHLORIDE 10 MG: 10 TABLET ORAL at 08:10

## 2020-10-26 RX ADMIN — OXYCODONE HYDROCHLORIDE 10 MG: 10 TABLET ORAL at 12:10

## 2020-10-26 NOTE — ASSESSMENT & PLAN NOTE
- Heparin gtt for PVT. Monitor closely.  - restarted Heparin at 500u/hr at 1400 on 10/23, no titration.  - cbc stable, resumed low intensity heparin gtt 10/24.  - Transitioned to Eliquis on 10/25. H&H stable.  - Monitor.

## 2020-10-26 NOTE — ASSESSMENT & PLAN NOTE
JENN likely 2/2 iATN from intraoperative hypotension and acute blood loss anemia     Plan/Recommendations:   -no further need for RRT at this time. Renal function continues to show signs of improvement.   -consider further diuresis if needed for volume management.   -reassess labs in AM.    -Strict I/O's  -Renally dose meds and avoid nephrotoxic medications   -Will continue to follow closely   -Plan discussed with staff, Dr Hartley, no further recommendations from Nephrology at this time.   -Will continue to follow from afar at this time.

## 2020-10-26 NOTE — PROGRESS NOTES
[facility]  Liver Transplant  Progress Note    Patient Name: Jordan Altman  MRN: [id number]  Admission Date: 10/11/2020  Hospital Length of Stay: 15 days  Code Status: Full Code  Primary Care Provider: SHAHEEN Jaime  Post-Operative Day: 12    ORGAN:   LIVER  Disease Etiology: Alcoholic Cirrhosis  Donor Type:   Donation after Brain Death  [facility] High Risk:   Yes  Donor CMV Status:   Donor CMV Status: Positive  Donor HBcAB:   Negative  Donor HCV Status:   Positive  Donor HBV SHAYE: Negative  Donor HCV SHAYE: Positive  Whole or Partial: Whole Liver  Biliary Anastomosis: End to End  Arterial Anatomy: Standard  Subjective:     History of Present Illness:  Mr. 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 He also c/o chronic left knee pain and denies any recent injury. Occult blood test (+) at OSH but HCT stable. Pt denies fever, chills, nausea, vomiting, chest pain, sob, hematuria or changes in bladder function. Will admit to LTS for further evaluation.     Hospital Course:  ESLD 2/2 ETOH. Admitted 10/11 with suspected GIB from OSH. 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 Post op course significant for PVT requiring TB to OR POD#1 for thrombectomy. Hepatic artery also revised (shortened) due to kink. OR POD#2 for abdominal closure, bx, and reconstruction of bile duct. US 10/17 satisfactory. Oliguric JENN requiring CRRT post op. Nephrology following. Stepped down 10/19 afternoon. Drains and bourne out. CT without contrast 10/22 reviewed, free fluid in mid upper abdomen/left upper abs- subcutaneous hematoma mid chevron noted, staples intact and no oozing.     Interval History: No acute events overnight. Pt feeling well today. Off heparin gtt now transitioned to Eliquis as of 10/25. H&H stable. LFTs improving daily. Last  US stable. Repeat US today for surveillance. Cr improving, 1.6 today. Good UOP. Working well with therapy. PT still recommending Rehab. Possible transfer tomorrow. Monitor.       Scheduled Meds:   apixaban  5 mg Oral BID    bisacodyL  10 mg Oral QHS    docusate sodium  100 mg Oral TID    fluconazole  200 mg Oral Daily    mycophenolate  1,000 mg Oral BID    pantoprazole  40 mg Oral Daily    [START ON 10/27/2020] predniSONE  15 mg Oral Daily    Followed by    [START ON 11/3/2020] predniSONE  10 mg Oral Daily    Followed by    [START ON 11/10/2020] predniSONE  5 mg Oral Daily    sodium bicarbonate  1,300 mg Oral BID    sulfamethoxazole-trimethoprim 400-80mg  1 tablet Oral Daily    tacrolimus  2 mg Oral Daily PM    [START ON 10/27/2020] tacrolimus  3 mg Oral Daily AM    valGANciclovir  450 mg Oral Daily     Continuous Infusions:  PRN Meds:acetaminophen, bisacodyL, diphenhydrAMINE, melatonin, ondansetron, oxyCODONE, oxyCODONE, sodium chloride 0.9%    Review of Systems   Constitutional: Positive for activity change. Negative for appetite change, chills and fever.   HENT: Negative for congestion, facial swelling and trouble swallowing.    Eyes: Negative for pain, discharge and visual disturbance.   Respiratory: Negative for cough, chest tightness, shortness of breath and wheezing.    Cardiovascular: Positive for leg swelling. Negative for chest pain and palpitations.   Gastrointestinal: Positive for abdominal pain (expected post op). Negative for abdominal distention, blood in stool, nausea and vomiting.   Endocrine: Negative.    Genitourinary: Positive for decreased urine volume.   Musculoskeletal: Negative for back pain.   Skin: Positive for wound.   Allergic/Immunologic: Positive for immunocompromised state.   Neurological: Positive for weakness. Negative for light-headedness and headaches.   Hematological: Bruises/bleeds easily.   Psychiatric/Behavioral: Negative for agitation, behavioral problems,  confusion and dysphoric mood.     Objective:     Vital Signs (Most Recent):  Temp: 98 °F (36.7 °C) (10/26/20 1203)  Pulse: 85 (10/26/20 1203)  Resp: 19 (10/26/20 1242)  BP: (!) 154/97 (10/26/20 0826)  SpO2: 99 % (10/26/20 1203) Vital Signs (24h Range):  Temp:  [97.8 °F (36.6 °C)-98 °F (36.7 °C)] 98 °F (36.7 °C)  Pulse:  [68-85] 85  Resp:  [14-20] 19  SpO2:  [95 %-99 %] 99 %  BP: (123-154)/(81-99) 154/97     Weight: 108 kg (238 lb 1.6 oz)  Body mass index is 32.29 kg/m².    Intake/Output - Last 3 Shifts       10/24 0700 - 10/25 0659 10/25 0700 - 10/26 0659 10/26 0700 - 10/27 0659    P.O. 480 1600     I.V. (mL/kg) 270.8 (2.5) 50 (0.5)     IV Piggyback 81.5      Total Intake(mL/kg) 832.2 (7.7) 1650 (15.3)     Urine (mL/kg/hr) 1070 (0.4) 1290 (0.5)     Emesis/NG output       Stool 0 0     Total Output 1070 1290     Net -237.8 +360            Urine Occurrence   840 x    Stool Occurrence 2 x 1 x 1 x          Physical Exam  Vitals signs and nursing note reviewed.   Constitutional:       General: He is not in acute distress.  HENT:      Head: Normocephalic.   Eyes:      General: Scleral icterus present.         Right eye: No discharge.         Left eye: No discharge.   Cardiovascular:      Rate and Rhythm: Normal rate and regular rhythm.      Pulses: Normal pulses.      Heart sounds: No murmur.   Pulmonary:      Effort: Pulmonary effort is normal. No respiratory distress.      Breath sounds: No wheezing or rales.      Comments: Decreased RLL  Abdominal:      Palpations: Abdomen is soft.      Tenderness: There is abdominal tenderness (mild incisional). There is no guarding.      Comments: Chevron incision KELLY with staples no s/s/i  RLQ drain sites intact   Musculoskeletal:      Right lower leg: Edema present.      Left lower leg: Edema present.   Skin:     General: Skin is warm and dry.      Capillary Refill: Capillary refill takes 2 to 3 seconds.      Coloration: Skin is jaundiced.   Neurological:      General: No focal  deficit present.      Mental Status: He is alert and oriented to person, place, and time.   Psychiatric:         Mood and Affect: Mood normal.         Behavior: Behavior normal.         Thought Content: Thought content normal.         Laboratory:  Immunosuppressants         Stop Route Frequency     tacrolimus capsule 3 mg      -- Oral Every morning     tacrolimus capsule 2 mg      -- Oral Every evening     mycophenolate capsule 1,000 mg      -- Oral 2 times daily        CBC:   Recent Labs   Lab 10/26/20  1027   WBC 6.30   RBC 2.88*   HGB 8.9*   HCT 28.4*   *   MCV 99*   MCH 30.9   MCHC 31.3*     CMP:   Recent Labs   Lab 10/26/20  0625   GLU 94   CALCIUM 8.7   ALBUMIN 2.7*   PROT 4.7*      K 3.8   CO2 21*      BUN 35*   CREATININE 1.6*   ALKPHOS 71   ALT 42   AST 9*   BILITOT 2.4*     Labs within the past 24 hours have been reviewed.    Diagnostic Results:  I have personally reviewed all pertinent imaging studies.    Debility/Functional status: Patient debilitated by evidence of Weakness. Physical and occupational therapy ordered daily to evaluate and treat. Debility was: present on admission.    Assessment/Plan:     * Status post liver transplant  - s/p DBD OLTx 10/14 (CMV+/-, donor HCV SHAYE+).  - Post op course significant for PVT requiring TB to OR POD#1 for thrombectomy. Hepatic artery also revised (shortened) due to kink. - OR POD#2 for abdominal closure, bx, and reconstruction of bile duct.   - US 10/17 satisfactory.   - Oliguric JENN requiring CRRT post op. Nephrology following.   - Stepped down 10/19 afternoon. Drains and bourne out.  - US 10/20 (POD#6) due to decreased H/H. Liver US satisfactory with one small fairly stable fluid collection.  - Continue heparin gtt. Transfused 1u PRBC 10/20.  - LFTs trending down.  - Drop in HCT again today. Heparin gtt held. CT a/p without contrast to assess for hematomas. Transfuse 1 u PRBC 10/22.  - CT reviewed per txp surgeon, H/H stable.   -  Heparin gtt  "resumed at 1400 at 500u/hr 10/23  - H/H remained stable- resumed low intensity heparin gtt 10/24. Cont to monitor CBC and ptt.    - transition to Eliquis 10/25.    - Tolerating diet. Pain well controlled. Continue bowel regimen. PT/OT consulted.  - Repeat Liver US today to reassess perfusion.   - Monitor.    JENN (acute kidney injury)  - Oliguric JENN requiring CRRT post op (had renal dysfx pre op).  - Nephrology following. Last CRRT 10/18-10/19.  - UOP improving.  - Cr improving daily, excellent UOP.  - Last HD 10/20/20.   - Lasix total 200 mg IV/Diuril 500 mg and Albumin x 2 10/22.  - UOP excellent, Cr trending down daily.  - Strict I/O. Daily weights.          Anemia of chronic disease  - H&H 6.5/20.4.  - Transfuse 2 unit PRBCs 10/22-10/23.    - H/H remains stable.  No overt signs of bleeding.  - Monitor.      At risk for opportunistic infections  - Bactrim for PCP prophylaxis.  - Valcyte for CMV prophylaxis.  - Fluc for fungal prophylaxis.       Long-term use of immunosuppressant medication  - See "prophylactic immunotherapy."       Weakness  - Encourage OOBTC/ambulation with assistance. PT/OT consulted.  - Rehab consult placed.      Hyperphosphatemia  - Continue binders.  - Monitor.      Anticoagulated  - Heparin gtt for PVT. Monitor closely.  - restarted Heparin at 500u/hr at 1400 on 10/23, no titration.  - cbc stable, resumed low intensity heparin gtt 10/24.  - Transitioned to Eliquis on 10/25. H&H stable.  - Monitor.      Prophylactic immunotherapy  - Continue Prograf. Monitor trough daily and adjust dose as needed to achieve therapeutic level.  - Continue Cellcept.  - Continue steroids, per protocol.      Class 1 obesity due to excess calories with serious comorbidity and body mass index (BMI) of 34.0 to 34.9 in adult  - Monitor.      Acidosis  - start bicarb 1300 mg bid.  - Monitor.        VTE Risk Mitigation (From admission, onward)         Ordered     apixaban tablet 5 mg  2 times daily      10/25/20 1140 "     Place sequential compression device  Until discontinued      10/14/20 1339     IP VTE HIGH RISK PATIENT  Once      10/14/20 1339     Place JELENA hose  Until discontinued      10/11/20 0217                The patients clinical status was discussed at multidisplinary rounds, involving transplant surgery, transplant medicine, pharmacy, nursing, nutrition, and social work    Discharge Planning: not a candidate for dc at this time.       Awa Sun, ROC  Liver Transplant  [facility]

## 2020-10-26 NOTE — PT/OT/SLP PROGRESS
Physical Therapy Treatment    Patient Name:  Jordan Altman   MRN:  [id number]    Recommendations:     Discharge Recommendations:  rehabilitation facility   Discharge Equipment Recommendations: walker, rolling, shower chair   Barriers to discharge: None    Assessment:     Jordan Altman is a 45 y.o. male admitted with a medical diagnosis of Status post liver transplant.  He presents with the following impairments/functional limitations:  weakness, impaired endurance, impaired self care skills, impaired functional mobilty, gait instability, impaired balance, decreased safety awareness, decreased coordination . Pt Progressing with PT Intervention. Pt Progressing with improving gait distance. Pt would continue to benefit from skilled PT to address overall functional mobility and goals. Goals remain appropriate      Rehab Prognosis: Good; patient would benefit from acute skilled PT services to address these deficits and reach maximum level of function.    Recent Surgery: Procedure(s) (LRB):  LAPAROTOMY, EXPLORATORY, BILIARY ANASTOMOSIS, ABDOMINAL CLOSURE (N/A) 10 Days Post-Op    Plan:     During this hospitalization, patient to be seen 4 x/week to address the identified rehab impairments via gait training, therapeutic activities, therapeutic exercises and progress toward the following goals:    · Plan of Care Expires:  11/20/20    Subjective     Patient/Family Comments/goals: I am doing better today  Pain/Comfort:  · Pain Rating 1: 0/10      Objective:     Communicated with RN prior to session.  Patient found supine with telemetry upon PT entry to room.     General Precautions: Standard, fall   Orthopedic Precautions:N/A   Braces:       Functional Mobility:  · Bed Mobility:     · Rolling Left:  stand by assistance  · Supine to Sit: stand by assistance  · Transfers:     · Sit to Stand:  stand by assistance and contact guard assistance with rolling walker  · Gait: pt amb with  ft and 80 ft with CGA/SBA for  safety with mask on. vcs for upright posture      AM-PAC 6 CLICK MOBILITY  Turning over in bed (including adjusting bedclothes, sheets and blankets)?: 3  Sitting down on and standing up from a chair with arms (e.g., wheelchair, bedside commode, etc.): 2  Moving from lying on back to sitting on the side of the bed?: 3  Moving to and from a bed to a chair (including a wheelchair)?: 3  Need to walk in hospital room?: 3  Climbing 3-5 steps with a railing?: 2  Basic Mobility Total Score: 16       Therapeutic Activities and Exercises:   Therapist provided instruction and educated of patient on progress, safety,d/c,PT POC,   proper body mechanics, energy conservation, and fall prevention strategies during tasks listed above, on the effects of prolonged immobility and the importance of performing OOB activity and exercises to promote healing and reduce recovery time   Patient facilitated therex X 15 reps seated in bedside chair B LE AROM AP, LAQ, Hip Flexion, Hip Abd/Add with facilitation for correct performance and sequencing. Exercises performed to develop and maintain pt's strength, endurance and flexibility.   Updated white board with appropriate PT mobility information for medical team notification  Donned an extra gown    Call nursing/pct to transfer to chair/use bathroom. Pt stated understanding  Bedside table in front of patient and area set up for function, convenience, and safety. RN aware of patient's mobility needs and status. Questions/concerns addressed within PTA scope of practice; patient with no further questions. Time was provided for active listening, discussion of health disposition, and discussion of safe discharge. Pt?verbalized?agreement .        Patient left seated with all lines intact, call button in reach and nsg notified    GOALS:   Multidisciplinary Problems     Physical Therapy Goals        Problem: Physical Therapy Goal    Goal Priority Disciplines Outcome Goal Variances Interventions    Physical Therapy Goal     PT, PT/OT Ongoing, Progressing     Description: Goals to be met by: 2020     Patient will increase functional independence with mobility by performin. Supine to sit with Set-up [place]  2. Sit to supine with Set-up [place]  3. Sit to stand transfer with Contact Guard Assistance  4. Bed to chair transfer with Contact Guard Assistance using Rolling Walker  5. Gait  x 150 feet with Contact Guard Assistance using Rolling Walker.   6. Lower extremity exercise program x15 reps per handout, with supervision                     Time Tracking:     PT Received On: 10/26/20  PT Start Time: 1248     PT Stop Time: 1312  PT Total Time (min): 24 min     Billable Minutes: Gait Training 15 and Therapeutic Activity 9    Treatment Type: Treatment  PT/PTA: PTA     PTA Visit Number: 3     Ottoniel Fletcher PTA  10/26/2020

## 2020-10-26 NOTE — PLAN OF CARE
Problem: Physical Therapy Goal  Goal: Physical Therapy Goal  Description: Goals to be met by: 2020     Patient will increase functional independence with mobility by performin. Supine to sit with Set-up [place]  2. Sit to supine with Set-up [place]  3. Sit to stand transfer with Contact Guard Assistance-met  4. Bed to chair transfer with Contact Guard Assistance using Rolling Walker  5. Gait  x 150 feet with Contact Guard Assistance using Rolling Walker. -met  6. Lower extremity exercise program x15 reps per handout, with supervision    Outcome: Ongoing, Progressing

## 2020-10-26 NOTE — PROGRESS NOTES
[facility]  Nephrology  Progress Note    Patient Name: Jordan Altman  MRN: [id number]  Admission Date: 10/11/2020  Hospital Length of Stay: 15 days  Attending Provider: Rogerio Malik MD   Primary Care Physician: SHAHEEN Jaime  Principal Problem:Status post liver transplant    Subjective:     Interval History:   NAEON. Patient progressing w/o difficulty.   sCr now 1.6. UOP adequate.     Review of patient's allergies indicates:   Allergen Reactions    Latex, natural rubber      Current Facility-Administered Medications   Medication Frequency    acetaminophen tablet 650 mg Q8H PRN    apixaban tablet 5 mg BID    bisacodyL EC tablet 10 mg QHS    bisacodyL suppository 10 mg Daily PRN    diphenhydrAMINE capsule 25 mg Q6H PRN    docusate sodium capsule 100 mg TID    fluconazole tablet 200 mg Daily    melatonin tablet 6 mg Nightly PRN    mycophenolate capsule 1,000 mg BID    ondansetron injection 4 mg Q6H PRN    oxyCODONE immediate release tablet 5 mg Q6H PRN    oxyCODONE immediate release tablet Tab 10 mg Q6H PRN    pantoprazole EC tablet 40 mg Daily    [START ON 10/27/2020] predniSONE tablet 15 mg Daily    Followed by    [START ON 11/3/2020] predniSONE tablet 10 mg Daily    Followed by    [START ON 11/10/2020] predniSONE tablet 5 mg Daily    sodium bicarbonate tablet 1,300 mg BID    sodium chloride 0.9% flush 10 mL PRN    sulfamethoxazole-trimethoprim 400-80mg per tablet 1 tablet Daily    tacrolimus capsule 2 mg Daily PM    [START ON 10/27/2020] tacrolimus capsule 3 mg Daily AM    valGANciclovir tablet 450 mg Daily       Objective:     Vital Signs (Most Recent):  Temp: 98 °F (36.7 °C) (10/26/20 1203)  Pulse: 85 (10/26/20 1203)  Resp: 19 (10/26/20 1242)  BP: (!) 154/97 (10/26/20 0826)  SpO2: 99 % (10/26/20 1203)  O2 Device (Oxygen Therapy): room air (10/26/20 0859) Vital Signs (24h Range):  Temp:  [97.8 °F (36.6 °C)-98 °F (36.7 °C)] 98 °F (36.7 °C)  Pulse:  [68-85]  85  Resp:  [14-20] 19  SpO2:  [95 %-99 %] 99 %  BP: (123-154)/(81-99) 154/97     Weight: 108 kg (238 lb 1.6 oz) (10/24/20 0615)  Body mass index is 32.29 kg/m².  Body surface area is 2.34 meters squared.    I/O last 3 completed shifts:  In: 2364 [P.O.:2080; I.V.:202.6; IV Piggyback:81.5]  Out: 1740 [Urine:1740]    Physical Exam  Vitals signs and nursing note reviewed.   Constitutional:       General: He is not in acute distress.     Appearance: He is not ill-appearing.   HENT:      Head: Normocephalic and atraumatic.      Nose: Nose normal.      Mouth/Throat:      Mouth: Mucous membranes are moist.      Pharynx: Oropharynx is clear.   Eyes:      Extraocular Movements: Extraocular movements intact.      Conjunctiva/sclera: Conjunctivae normal.   Cardiovascular:      Rate and Rhythm: Normal rate and regular rhythm.   Pulmonary:      Breath sounds: Decreased breath sounds present. No rales.   Abdominal:      Palpations: Abdomen is soft.   Musculoskeletal:      Right lower leg: Edema present.      Left lower leg: Edema present.   Skin:     General: Skin is warm and dry.      Coloration: Skin is jaundiced.   Neurological:      Mental Status: He is alert and oriented to person, place, and time.         Significant Labs:  CBC:   Recent Labs   Lab 10/26/20  1027   WBC 6.30   RBC 2.88*   HGB 8.9*   HCT 28.4*   *   MCV 99*   MCH 30.9   MCHC 31.3*     CMP:   Recent Labs   Lab 10/26/20  0625   GLU 94   CALCIUM 8.7   ALBUMIN 2.7*   PROT 4.7*      K 3.8   CO2 21*      BUN 35*   CREATININE 1.6*   ALKPHOS 71   ALT 42   AST 9*   BILITOT 2.4*     All labs within the past 24 hours have been reviewed.       Assessment/Plan:     * Status post liver transplant  -Management per primary team     Hyperphosphatemia  -improved, phos 3.9    JENN (acute kidney injury)    JENN likely 2/2 iATN from intraoperative hypotension and acute blood loss anemia     Plan/Recommendations:   -no further need for RRT at this time. Renal  function continues to show signs of improvement.   -consider further diuresis if needed for volume management.   -reassess labs in AM.    -Strict I/O's  -Renally dose meds and avoid nephrotoxic medications   -Will continue to follow closely   -Plan discussed with staff, Dr Hartley, no further recommendations from Nephrology at this time.   -Will continue to follow from afar at this time.         Thank you for your consult. I will follow-up with patient. Please contact us if you have any additional questions.    Arlene Russo DNP, FNP-C  Nephrology  [facility]

## 2020-10-26 NOTE — ASSESSMENT & PLAN NOTE
- H&H 6.5/20.4.  - Transfuse 2 unit PRBCs 10/22-10/23.    - H/H remains stable.  No overt signs of bleeding.  - Monitor.

## 2020-10-26 NOTE — SUBJECTIVE & OBJECTIVE
Interval History:   NAEON. Patient progressing w/o difficulty.   sCr now 1.6. UOP adequate.     Review of patient's allergies indicates:   Allergen Reactions    Latex, natural rubber      Current Facility-Administered Medications   Medication Frequency    acetaminophen tablet 650 mg Q8H PRN    apixaban tablet 5 mg BID    bisacodyL EC tablet 10 mg QHS    bisacodyL suppository 10 mg Daily PRN    diphenhydrAMINE capsule 25 mg Q6H PRN    docusate sodium capsule 100 mg TID    fluconazole tablet 200 mg Daily    melatonin tablet 6 mg Nightly PRN    mycophenolate capsule 1,000 mg BID    ondansetron injection 4 mg Q6H PRN    oxyCODONE immediate release tablet 5 mg Q6H PRN    oxyCODONE immediate release tablet Tab 10 mg Q6H PRN    pantoprazole EC tablet 40 mg Daily    [START ON 10/27/2020] predniSONE tablet 15 mg Daily    Followed by    [START ON 11/3/2020] predniSONE tablet 10 mg Daily    Followed by    [START ON 11/10/2020] predniSONE tablet 5 mg Daily    sodium bicarbonate tablet 1,300 mg BID    sodium chloride 0.9% flush 10 mL PRN    sulfamethoxazole-trimethoprim 400-80mg per tablet 1 tablet Daily    tacrolimus capsule 2 mg Daily PM    [START ON 10/27/2020] tacrolimus capsule 3 mg Daily AM    valGANciclovir tablet 450 mg Daily       Objective:     Vital Signs (Most Recent):  Temp: 98 °F (36.7 °C) (10/26/20 1203)  Pulse: 85 (10/26/20 1203)  Resp: 19 (10/26/20 1242)  BP: (!) 154/97 (10/26/20 0826)  SpO2: 99 % (10/26/20 1203)  O2 Device (Oxygen Therapy): room air (10/26/20 0859) Vital Signs (24h Range):  Temp:  [97.8 °F (36.6 °C)-98 °F (36.7 °C)] 98 °F (36.7 °C)  Pulse:  [68-85] 85  Resp:  [14-20] 19  SpO2:  [95 %-99 %] 99 %  BP: (123-154)/(81-99) 154/97     Weight: 108 kg (238 lb 1.6 oz) (10/24/20 0615)  Body mass index is 32.29 kg/m².  Body surface area is 2.34 meters squared.    I/O last 3 completed shifts:  In: 2364 [P.O.:2080; I.V.:202.6; IV Piggyback:81.5]  Out: 1740 [Urine:1740]    Physical  Exam  Vitals signs and nursing note reviewed.   Constitutional:       General: He is not in acute distress.     Appearance: He is not ill-appearing.   HENT:      Head: Normocephalic and atraumatic.      Nose: Nose normal.      Mouth/Throat:      Mouth: Mucous membranes are moist.      Pharynx: Oropharynx is clear.   Eyes:      Extraocular Movements: Extraocular movements intact.      Conjunctiva/sclera: Conjunctivae normal.   Cardiovascular:      Rate and Rhythm: Normal rate and regular rhythm.   Pulmonary:      Breath sounds: Decreased breath sounds present. No rales.   Abdominal:      Palpations: Abdomen is soft.   Musculoskeletal:      Right lower leg: Edema present.      Left lower leg: Edema present.   Skin:     General: Skin is warm and dry.      Coloration: Skin is jaundiced.   Neurological:      Mental Status: He is alert and oriented to person, place, and time.         Significant Labs:  CBC:   Recent Labs   Lab 10/26/20  1027   WBC 6.30   RBC 2.88*   HGB 8.9*   HCT 28.4*   *   MCV 99*   MCH 30.9   MCHC 31.3*     CMP:   Recent Labs   Lab 10/26/20  0625   GLU 94   CALCIUM 8.7   ALBUMIN 2.7*   PROT 4.7*      K 3.8   CO2 21*      BUN 35*   CREATININE 1.6*   ALKPHOS 71   ALT 42   AST 9*   BILITOT 2.4*     All labs within the past 24 hours have been reviewed.

## 2020-10-26 NOTE — ASSESSMENT & PLAN NOTE
- Oliguric JENN requiring CRRT post op (had renal dysfx pre op).  - Nephrology following. Last CRRT 10/18-10/19.  - UOP improving.  - Cr improving daily, excellent UOP.  - Last HD 10/20/20.   - Lasix total 200 mg IV/Diuril 500 mg and Albumin x 2 10/22.  - UOP excellent, Cr trending down daily.  - Strict I/O. Daily weights.

## 2020-10-26 NOTE — PROGRESS NOTES
[facility]  Adult Nutrition  Progress Note    SUMMARY       Recommendations  1. Continue renal diet as tolerated.   2. If PO intake decreases < 50%, add Novasource once daily.   3. RD to monitor.    Goals: Pt to meet >75% of estimated energy and protein needs over the course of 7 days.   Nutrition Goal Status: progressing towards goal  Communication of RD Recs: (POC\)    Reason for Assessment    Reason For Assessment: RD follow-up  Diagnosis: (s/p liver transplant)  Relevant Medical History: HTN, Decompensated hepatic cirrhosis, liver transplant  Interdisciplinary Rounds: did not attend  General Information Comments: S/p OLTx 10/14. Pt reports good appetite currently and states he has been consuming % of breakfast and lunch and 50% of dinner. Wt remains stable since last RD visit. NFPE 10/20 with mild wasting, pt continues with chronic moderate malnutrition. Pt denies further questions about post transplant diet at this time.  Nutrition Discharge Planning: Post transplant nutrition education provided on 10/20. Food safety/drug interactions emphasized. General healthy/low salt diet recommended. Education material left. No other needs identified. Caregiver present.    Nutrition Risk Screen    Nutrition Risk Screen: no indicators present    Nutrition/Diet History    Spiritual, Cultural Beliefs, Adventism Practices, Values that Affect Care: no  Factors Affecting Nutritional Intake: None identified at this time    Anthropometrics    Temp: 98 °F (36.7 °C)  Height Method: Stated  Height: 6' (182.9 cm)  Height (inches): 72 in  Weight Method: Bed Scale  Weight: 108 kg (238 lb 1.6 oz)  Weight (lb): 238.1 lb  Ideal Body Weight (IBW), Male: 178 lb  % Ideal Body Weight, Male (lb): 141.94 %  BMI (Calculated): 32.3  Usual Body Weight (UBW), kg: (270# per pt)    Lab/Procedures/Meds    Pertinent Labs Reviewed: reviewed  Pertinent Labs Comments: BUN 35, Cr 1.6, GFR 50.6, Alb 2.7, AST 9  Pertinent Medications  Reviewed: reviewed  Pertinent Medications Comments: pantoprazole, prednisone, tacrolimus    Estimated/Assessed Needs    Weight Used For Calorie Calculations: 105 kg (231 lb 7.7 oz)  Energy Calorie Requirements (kcal): 2453  Energy Need Method: [place]-St Jeor(x1.25PAL)  Protein Requirements: 121-145gm (1.5-1.8gm/kg IBW)  Weight Used For Protein Calculations: 80.9 kg (178 lb 5.6 oz)(IBW)     RDA Method (mL): 2453    Nutrition Prescription Ordered    Current Diet Order: Renal    Evaluation of Received Nutrient/Fluid Intake    Other Calories (kcal): 0  Total Calories (kcal): 0  I/O: I: 4964; o: 7063; +4.0L since admission  Comments: LBM 10/26  Tolerance: tolerating  % Intake of Estimated Energy Needs: 75%  % Meal Intake: 75%    Nutrition Risk    Level of Risk/Frequency of Follow-up: low     Assessment and Plan    Nutrition Problem:  Moderate Protein-Calorie Malnutrition  Malnutrition in the context of Chronic Illness/Injury     Related to (etiology):  Decreased appetite     Signs and Symptoms (as evidenced by):  Energy Intake: <75% of estimated energy requirement for 1 month  Body Fat Depletion: mild depletion of triceps   Muscle Mass Depletion: mild depletion of temples and clavicle region   Weight Loss: 8% x 1 month   Fluid Accumulation: mild     Interventions(treatment strategy):  Collaboration with other providers  Sodium, potassium, phosphorus modified diet  Nutrition education     Nutrition Diagnosis Status:  Continues    Monitor and Evaluation    Food and Nutrient Intake: energy intake  Food and Nutrient Adminstration: diet order  Knowledge/Beliefs/Attitudes: food and nutrition knowledge/skill  Anthropometric Measurements: weight, weight change  Biochemical Data, Medical Tests and Procedures: electrolyte and renal panel, gastrointestinal profile  Nutrition-Focused Physical Findings: overall appearance, skin     Malnutrition Assessment  Malnutrition Type: chronic illness      Weight Loss (Malnutrition): greater  than 5% in 1 month  Energy Intake (Malnutrition): less than 75% for greater than or equal to 1 month   Orbital Region (Subcutaneous Fat Loss): well nourished  Upper Arm Region (Subcutaneous Fat Loss): mild depletion  Thoracic and Lumbar Region: well nourished   [place] Region (Muscle Loss): mild depletion  Clavicle Bone Region (Muscle Loss): mild depletion  Scapular Bone Region (Muscle Loss): well nourished  Dorsal Hand (Muscle Loss): well nourished  Patellar Region (Muscle Loss): well nourished  Anterior Thigh Region (Muscle Loss): well nourished  Posterior Calf Region (Muscle Loss): well nourished   Edema (Fluid Accumulation): (1-2+)     Nutrition Follow-Up    RD Follow-up?: Yes

## 2020-10-26 NOTE — ASSESSMENT & PLAN NOTE
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  - Post op course significant for PVT requiring TB to OR POD#1 for thrombectomy. Hepatic artery also revised (shortened) due to kink. - OR POD#2 for abdominal closure, bx, and reconstruction of bile duct.   - US 10/17 satisfactory.   - Oliguric JENN requiring CRRT post op. Nephrology following.   - Stepped down 10/19 afternoon. Drains and bourne out.  - US 10/20 (POD#6) due to decreased H/H. Liver US satisfactory with one small fairly stable fluid collection.  - Continue heparin gtt. Transfused 1u PRBC 10/20.  - LFTs trending down.  - Drop in HCT again today. Heparin gtt held. CT a/p without contrast to assess for hematomas. Transfuse 1 u PRBC 10/22.  - CT reviewed per txp surgeon, H/H stable.   -  Heparin gtt resumed at 1400 at 500u/hr 10/23  - H/H remained stable- resumed low intensity heparin gtt 10/24. Cont to monitor CBC and ptt.    - transition to Eliquis 10/25.    - Tolerating diet. Pain well controlled. Continue bowel regimen. PT/OT consulted.  - Repeat Liver US today to reassess perfusion.   - Monitor.

## 2020-10-26 NOTE — SUBJECTIVE & OBJECTIVE
Scheduled Meds:   apixaban  5 mg Oral BID    bisacodyL  10 mg Oral QHS    docusate sodium  100 mg Oral TID    fluconazole  200 mg Oral Daily    mycophenolate  1,000 mg Oral BID    pantoprazole  40 mg Oral Daily    [START ON 10/27/2020] predniSONE  15 mg Oral Daily    Followed by    [START ON 11/3/2020] predniSONE  10 mg Oral Daily    Followed by    [START ON 11/10/2020] predniSONE  5 mg Oral Daily    sodium bicarbonate  1,300 mg Oral BID    sulfamethoxazole-trimethoprim 400-80mg  1 tablet Oral Daily    tacrolimus  2 mg Oral Daily PM    [START ON 10/27/2020] tacrolimus  3 mg Oral Daily AM    valGANciclovir  450 mg Oral Daily     Continuous Infusions:  PRN Meds:acetaminophen, bisacodyL, diphenhydrAMINE, melatonin, ondansetron, oxyCODONE, oxyCODONE, sodium chloride 0.9%    Review of Systems   Constitutional: Positive for activity change. Negative for appetite change, chills and fever.   HENT: Negative for congestion, facial swelling and trouble swallowing.    Eyes: Negative for pain, discharge and visual disturbance.   Respiratory: Negative for cough, chest tightness, shortness of breath and wheezing.    Cardiovascular: Positive for leg swelling. Negative for chest pain and palpitations.   Gastrointestinal: Positive for abdominal pain (expected post op). Negative for abdominal distention, blood in stool, nausea and vomiting.   Endocrine: Negative.    Genitourinary: Positive for decreased urine volume.   Musculoskeletal: Negative for back pain.   Skin: Positive for wound.   Allergic/Immunologic: Positive for immunocompromised state.   Neurological: Positive for weakness. Negative for light-headedness and headaches.   Hematological: Bruises/bleeds easily.   Psychiatric/Behavioral: Negative for agitation, behavioral problems, confusion and dysphoric mood.     Objective:     Vital Signs (Most Recent):  Temp: 98 °F (36.7 °C) (10/26/20 1203)  Pulse: 85 (10/26/20 1203)  Resp: 19 (10/26/20 1242)  BP: (!) 154/97  (10/26/20 0826)  SpO2: 99 % (10/26/20 1203) Vital Signs (24h Range):  Temp:  [97.8 °F (36.6 °C)-98 °F (36.7 °C)] 98 °F (36.7 °C)  Pulse:  [68-85] 85  Resp:  [14-20] 19  SpO2:  [95 %-99 %] 99 %  BP: (123-154)/(81-99) 154/97     Weight: 108 kg (238 lb 1.6 oz)  Body mass index is 32.29 kg/m².    Intake/Output - Last 3 Shifts       10/24 0700 - 10/25 0659 10/25 0700 - 10/26 0659 10/26 0700 - 10/27 0659    P.O. 480 1600     I.V. (mL/kg) 270.8 (2.5) 50 (0.5)     IV Piggyback 81.5      Total Intake(mL/kg) 832.2 (7.7) 1650 (15.3)     Urine (mL/kg/hr) 1070 (0.4) 1290 (0.5)     Emesis/NG output       Stool 0 0     Total Output 1070 1290     Net -237.8 +360            Urine Occurrence   840 x    Stool Occurrence 2 x 1 x 1 x          Physical Exam  Vitals signs and nursing note reviewed.   Constitutional:       General: He is not in acute distress.  HENT:      Head: Normocephalic.   Eyes:      General: Scleral icterus present.         Right eye: No discharge.         Left eye: No discharge.   Cardiovascular:      Rate and Rhythm: Normal rate and regular rhythm.      Pulses: Normal pulses.      Heart sounds: No murmur.   Pulmonary:      Effort: Pulmonary effort is normal. No respiratory distress.      Breath sounds: No wheezing or rales.      Comments: Decreased RLL  Abdominal:      Palpations: Abdomen is soft.      Tenderness: There is abdominal tenderness (mild incisional). There is no guarding.      Comments: Chevron incision KELLY with staples no s/s/i  RLQ drain sites intact   Musculoskeletal:      Right lower leg: Edema present.      Left lower leg: Edema present.   Skin:     General: Skin is warm and dry.      Capillary Refill: Capillary refill takes 2 to 3 seconds.      Coloration: Skin is jaundiced.   Neurological:      General: No focal deficit present.      Mental Status: He is alert and oriented to person, place, and time.   Psychiatric:         Mood and Affect: Mood normal.         Behavior: Behavior normal.          Thought Content: Thought content normal.         Laboratory:  Immunosuppressants         Stop Route Frequency     tacrolimus capsule 3 mg      -- Oral Every morning     tacrolimus capsule 2 mg      -- Oral Every evening     mycophenolate capsule 1,000 mg      -- Oral 2 times daily        CBC:   Recent Labs   Lab 10/26/20  1027   WBC 6.30   RBC 2.88*   HGB 8.9*   HCT 28.4*   *   MCV 99*   MCH 30.9   MCHC 31.3*     CMP:   Recent Labs   Lab 10/26/20  0625   GLU 94   CALCIUM 8.7   ALBUMIN 2.7*   PROT 4.7*      K 3.8   CO2 21*      BUN 35*   CREATININE 1.6*   ALKPHOS 71   ALT 42   AST 9*   BILITOT 2.4*     Labs within the past 24 hours have been reviewed.    Diagnostic Results:  I have personally reviewed all pertinent imaging studies.    Debility/Functional status: Patient debilitated by evidence of Weakness. Physical and occupational therapy ordered daily to evaluate and treat. Debility was: present on admission.

## 2020-10-27 VITALS
BODY MASS INDEX: 30.46 KG/M2 | OXYGEN SATURATION: 97 % | WEIGHT: 224.88 LBS | DIASTOLIC BLOOD PRESSURE: 92 MMHG | TEMPERATURE: 98 F | HEIGHT: 72 IN | SYSTOLIC BLOOD PRESSURE: 158 MMHG | RESPIRATION RATE: 19 BRPM | HEART RATE: 69 BPM

## 2020-10-27 DIAGNOSIS — Z94.4 LIVER REPLACED BY TRANSPLANT: Primary | ICD-10-CM

## 2020-10-27 PROBLEM — E87.20 ACIDOSIS: Status: RESOLVED | Noted: 2020-10-13 | Resolved: 2020-10-27

## 2020-10-27 LAB
ALBUMIN SERPL BCP-MCNC: 2.7 G/DL (ref 3.5–5.2)
ALP SERPL-CCNC: 73 U/L (ref 55–135)
ALT SERPL W/O P-5'-P-CCNC: 37 U/L (ref 10–44)
ANION GAP SERPL CALC-SCNC: 10 MMOL/L (ref 8–16)
AST SERPL-CCNC: 12 U/L (ref 10–40)
BASOPHILS # BLD AUTO: 0.06 K/UL (ref 0–0.2)
BASOPHILS # BLD AUTO: 0.06 K/UL (ref 0–0.2)
BASOPHILS # BLD AUTO: 0.08 K/UL (ref 0–0.2)
BASOPHILS NFR BLD: 1.1 % (ref 0–1.9)
BASOPHILS NFR BLD: 1.2 % (ref 0–1.9)
BASOPHILS NFR BLD: 1.2 % (ref 0–1.9)
BILIRUB SERPL-MCNC: 2.5 MG/DL (ref 0.1–1)
BUN SERPL-MCNC: 32 MG/DL (ref 6–20)
CALCIUM SERPL-MCNC: 8.9 MG/DL (ref 8.7–10.5)
CHLORIDE SERPL-SCNC: 104 MMOL/L (ref 95–110)
CO2 SERPL-SCNC: 23 MMOL/L (ref 23–29)
CREAT SERPL-MCNC: 1.9 MG/DL (ref 0.5–1.4)
DIFFERENTIAL METHOD: ABNORMAL
EOSINOPHIL # BLD AUTO: 0.1 K/UL (ref 0–0.5)
EOSINOPHIL NFR BLD: 1.5 % (ref 0–8)
EOSINOPHIL NFR BLD: 2.1 % (ref 0–8)
EOSINOPHIL NFR BLD: 2.5 % (ref 0–8)
ERYTHROCYTE [DISTWIDTH] IN BLOOD BY AUTOMATED COUNT: 18.6 % (ref 11.5–14.5)
ERYTHROCYTE [DISTWIDTH] IN BLOOD BY AUTOMATED COUNT: 18.6 % (ref 11.5–14.5)
ERYTHROCYTE [DISTWIDTH] IN BLOOD BY AUTOMATED COUNT: 18.7 % (ref 11.5–14.5)
EST. GFR  (AFRICAN AMERICAN): 47.5 ML/MIN/1.73 M^2
EST. GFR  (NON AFRICAN AMERICAN): 41.1 ML/MIN/1.73 M^2
GLUCOSE SERPL-MCNC: 75 MG/DL (ref 70–110)
HCT VFR BLD AUTO: 26.7 % (ref 40–54)
HCT VFR BLD AUTO: 27 % (ref 40–54)
HCT VFR BLD AUTO: 29.6 % (ref 40–54)
HGB BLD-MCNC: 8.3 G/DL (ref 14–18)
HGB BLD-MCNC: 8.3 G/DL (ref 14–18)
HGB BLD-MCNC: 9.1 G/DL (ref 14–18)
IMM GRANULOCYTES # BLD AUTO: 0.12 K/UL (ref 0–0.04)
IMM GRANULOCYTES # BLD AUTO: 0.13 K/UL (ref 0–0.04)
IMM GRANULOCYTES # BLD AUTO: 0.14 K/UL (ref 0–0.04)
IMM GRANULOCYTES NFR BLD AUTO: 2.1 % (ref 0–0.5)
IMM GRANULOCYTES NFR BLD AUTO: 2.3 % (ref 0–0.5)
IMM GRANULOCYTES NFR BLD AUTO: 2.5 % (ref 0–0.5)
INR PPP: 1 (ref 0.8–1.2)
LYMPHOCYTES # BLD AUTO: 0.4 K/UL (ref 1–4.8)
LYMPHOCYTES # BLD AUTO: 0.5 K/UL (ref 1–4.8)
LYMPHOCYTES # BLD AUTO: 0.5 K/UL (ref 1–4.8)
LYMPHOCYTES NFR BLD: 5.3 % (ref 18–48)
LYMPHOCYTES NFR BLD: 9.6 % (ref 18–48)
LYMPHOCYTES NFR BLD: 9.7 % (ref 18–48)
MAGNESIUM SERPL-MCNC: 1.7 MG/DL (ref 1.6–2.6)
MCH RBC QN AUTO: 30.3 PG (ref 27–31)
MCH RBC QN AUTO: 30.6 PG (ref 27–31)
MCH RBC QN AUTO: 30.9 PG (ref 27–31)
MCHC RBC AUTO-ENTMCNC: 30.7 G/DL (ref 32–36)
MCHC RBC AUTO-ENTMCNC: 30.7 G/DL (ref 32–36)
MCHC RBC AUTO-ENTMCNC: 31.1 G/DL (ref 32–36)
MCV RBC AUTO: 100 FL (ref 82–98)
MCV RBC AUTO: 99 FL (ref 82–98)
MCV RBC AUTO: 99 FL (ref 82–98)
MONOCYTES # BLD AUTO: 0.6 K/UL (ref 0.3–1)
MONOCYTES # BLD AUTO: 0.7 K/UL (ref 0.3–1)
MONOCYTES # BLD AUTO: 0.7 K/UL (ref 0.3–1)
MONOCYTES NFR BLD: 13.1 % (ref 4–15)
MONOCYTES NFR BLD: 13.3 % (ref 4–15)
MONOCYTES NFR BLD: 8.2 % (ref 4–15)
NEUTROPHILS # BLD AUTO: 3.7 K/UL (ref 1.8–7.7)
NEUTROPHILS # BLD AUTO: 3.7 K/UL (ref 1.8–7.7)
NEUTROPHILS # BLD AUTO: 5.5 K/UL (ref 1.8–7.7)
NEUTROPHILS NFR BLD: 71.1 % (ref 38–73)
NEUTROPHILS NFR BLD: 72.1 % (ref 38–73)
NEUTROPHILS NFR BLD: 81.1 % (ref 38–73)
NRBC BLD-RTO: 0 /100 WBC
PHOSPHATE SERPL-MCNC: 4.8 MG/DL (ref 2.7–4.5)
PLATELET # BLD AUTO: 124 K/UL (ref 150–350)
PLATELET # BLD AUTO: 132 K/UL (ref 150–350)
PLATELET # BLD AUTO: 137 K/UL (ref 150–350)
PMV BLD AUTO: 11.1 FL (ref 9.2–12.9)
PMV BLD AUTO: 11.3 FL (ref 9.2–12.9)
PMV BLD AUTO: 11.6 FL (ref 9.2–12.9)
POTASSIUM SERPL-SCNC: 3.6 MMOL/L (ref 3.5–5.1)
PROT SERPL-MCNC: 4.7 G/DL (ref 6–8.4)
PROTHROMBIN TIME: 11.2 SEC (ref 9–12.5)
RBC # BLD AUTO: 2.69 M/UL (ref 4.6–6.2)
RBC # BLD AUTO: 2.71 M/UL (ref 4.6–6.2)
RBC # BLD AUTO: 3 M/UL (ref 4.6–6.2)
SODIUM SERPL-SCNC: 137 MMOL/L (ref 136–145)
TACROLIMUS BLD-MCNC: 11.1 NG/ML (ref 5–15)
WBC # BLD AUTO: 5.19 K/UL (ref 3.9–12.7)
WBC # BLD AUTO: 5.25 K/UL (ref 3.9–12.7)
WBC # BLD AUTO: 6.81 K/UL (ref 3.9–12.7)

## 2020-10-27 PROCEDURE — 80053 COMPREHEN METABOLIC PANEL: CPT

## 2020-10-27 PROCEDURE — 85610 PROTHROMBIN TIME: CPT

## 2020-10-27 PROCEDURE — 97110 THERAPEUTIC EXERCISES: CPT

## 2020-10-27 PROCEDURE — 25000003 PHARM REV CODE 250: Performed by: PHYSICIAN ASSISTANT

## 2020-10-27 PROCEDURE — 63600175 PHARM REV CODE 636 W HCPCS: Performed by: NURSE PRACTITIONER

## 2020-10-27 PROCEDURE — 84100 ASSAY OF PHOSPHORUS: CPT

## 2020-10-27 PROCEDURE — 85025 COMPLETE CBC W/AUTO DIFF WBC: CPT | Mod: 91

## 2020-10-27 PROCEDURE — 97530 THERAPEUTIC ACTIVITIES: CPT

## 2020-10-27 PROCEDURE — 25000003 PHARM REV CODE 250: Performed by: STUDENT IN AN ORGANIZED HEALTH CARE EDUCATION/TRAINING PROGRAM

## 2020-10-27 PROCEDURE — 25000003 PHARM REV CODE 250: Performed by: NURSE PRACTITIONER

## 2020-10-27 PROCEDURE — 99239 PR HOSPITAL DISCHARGE DAY,>30 MIN: ICD-10-PCS | Mod: 24,,, | Performed by: NURSE PRACTITIONER

## 2020-10-27 PROCEDURE — 97116 GAIT TRAINING THERAPY: CPT | Mod: CQ

## 2020-10-27 PROCEDURE — 36415 COLL VENOUS BLD VENIPUNCTURE: CPT

## 2020-10-27 PROCEDURE — 99239 HOSP IP/OBS DSCHRG MGMT >30: CPT | Mod: 24,,, | Performed by: NURSE PRACTITIONER

## 2020-10-27 PROCEDURE — 83735 ASSAY OF MAGNESIUM: CPT

## 2020-10-27 PROCEDURE — 97110 THERAPEUTIC EXERCISES: CPT | Mod: CQ

## 2020-10-27 PROCEDURE — 63600175 PHARM REV CODE 636 W HCPCS: Performed by: STUDENT IN AN ORGANIZED HEALTH CARE EDUCATION/TRAINING PROGRAM

## 2020-10-27 PROCEDURE — 97535 SELF CARE MNGMENT TRAINING: CPT

## 2020-10-27 PROCEDURE — 80197 ASSAY OF TACROLIMUS: CPT

## 2020-10-27 RX ORDER — OXYCODONE HYDROCHLORIDE 10 MG/1
10 TABLET ORAL EVERY 8 HOURS PRN
Qty: 21 TABLET | Refills: 0 | Status: SHIPPED | OUTPATIENT
Start: 2020-10-27 | End: 2020-11-04 | Stop reason: SDUPTHER

## 2020-10-27 RX ORDER — SODIUM BICARBONATE 650 MG/1
1300 TABLET ORAL 2 TIMES DAILY
Qty: 120 TABLET | Refills: 5 | Status: SHIPPED | OUTPATIENT
Start: 2020-10-27 | End: 2022-02-02

## 2020-10-27 RX ADMIN — SULFAMETHOXAZOLE AND TRIMETHOPRIM 1 TABLET: 400; 80 TABLET ORAL at 08:10

## 2020-10-27 RX ADMIN — PANTOPRAZOLE SODIUM 40 MG: 40 TABLET, DELAYED RELEASE ORAL at 08:10

## 2020-10-27 RX ADMIN — OXYCODONE HYDROCHLORIDE 10 MG: 10 TABLET ORAL at 04:10

## 2020-10-27 RX ADMIN — SODIUM BICARBONATE 650 MG TABLET 1300 MG: at 08:10

## 2020-10-27 RX ADMIN — TACROLIMUS 3 MG: 1 CAPSULE ORAL at 08:10

## 2020-10-27 RX ADMIN — SODIUM CHLORIDE 500 ML: 0.9 INJECTION, SOLUTION INTRAVENOUS at 11:10

## 2020-10-27 RX ADMIN — VALGANCICLOVIR 450 MG: 450 TABLET, FILM COATED ORAL at 08:10

## 2020-10-27 RX ADMIN — FLUCONAZOLE 200 MG: 200 TABLET ORAL at 08:10

## 2020-10-27 RX ADMIN — OXYCODONE HYDROCHLORIDE 10 MG: 10 TABLET ORAL at 10:10

## 2020-10-27 RX ADMIN — DOCUSATE SODIUM 100 MG: 100 CAPSULE, LIQUID FILLED ORAL at 08:10

## 2020-10-27 RX ADMIN — APIXABAN 5 MG: 5 TABLET, FILM COATED ORAL at 08:10

## 2020-10-27 RX ADMIN — ACETAMINOPHEN 650 MG: 325 TABLET ORAL at 01:10

## 2020-10-27 RX ADMIN — PREDNISONE 15 MG: 10 TABLET ORAL at 08:10

## 2020-10-27 RX ADMIN — DIPHENHYDRAMINE HYDROCHLORIDE 25 MG: 25 CAPSULE ORAL at 10:10

## 2020-10-27 RX ADMIN — DIPHENHYDRAMINE HYDROCHLORIDE 25 MG: 25 CAPSULE ORAL at 04:10

## 2020-10-27 RX ADMIN — MYCOPHENOLATE MOFETIL 1000 MG: 250 CAPSULE ORAL at 08:10

## 2020-10-27 NOTE — PROGRESS NOTES
Met with patient and caregiver in preparation for discharge today.  Reviewed their answers to the review questions in [facility].  Patient missed one question.  Rationale for correct answer given.  These questions cover : post op care, medication management, infection prevention and emergency contacts.  Outpatient coordinator assigned.  Outpatient appointments reviewed.  Allowed time for questions and answers.

## 2020-10-27 NOTE — PT/OT/SLP PROGRESS
Occupational Therapy   Treatment    Name: Jordan Altman  MRN: [id number]  Admitting Diagnosis:  Status post liver transplant  11 Days Post-Op    Recommendations:     Discharge Recommendations: home health OT  Discharge Equipment Recommendations:  walker, rolling, shower chair  Barriers to discharge:  None    Assessment:     Jordan Altman is a 45 y.o. male with a medical diagnosis of Status post liver transplant. Pt making great progress. D/C recommendation changed to HHOT. Performance deficits affecting function are weakness, impaired endurance, impaired self care skills, impaired functional mobilty, gait instability, decreased coordination, impaired balance.     Rehab Prognosis:  Good; patient would benefit from acute skilled OT services to address these deficits and reach maximum level of function.       Plan:     Patient to be seen 4 x/week to address the above listed problems via self-care/home management, therapeutic activities, therapeutic exercises  · Plan of Care Expires: 11/20/20  · Plan of Care Reviewed with: patient    Subjective     Pain/Comfort:  · Pain Rating 1: 0/10    Objective:     Communicated with: rn prior to session.  Patient found supine with peripheral IV upon OT entry to room.    General Precautions: Standard, fall     Occupational Performance:     Bed Mobility:    · Independent    Functional Mobility/Transfers:  · Patient completed Sit <> Stand Transfer with supervision and minimum assistance (from lower couch surface) with  rolling walker   · Patient completed Bed <> Chair Transfer using Step Transfer technique with stand by assistance with rolling walker  · Patient completed Toilet Transfer Step Transfer technique with stand by assistance with  rolling walker and grab bars    Activities of Daily Living:  · Grooming: supervision in standing  · Lower Body Dressing: supervision to don shoes.    [facility] 6 Click ADL: 22    Treatment & Education:  From chair level, pt completed MARIA INES bernardo (x  15 reps each) including:  - bicep curls  - resistive tricep extension  - straight arm raises  - hor Abd/Add    Discussed OT POC and D/C paln.    Patient left up in chair with call button in reachEducation:      GOALS:   Multidisciplinary Problems     Occupational Therapy Goals        Problem: Occupational Therapy Goal    Goal Priority Disciplines Outcome Interventions   Occupational Therapy Goal     OT, PT/OT Ongoing, Progressing    Description: Goals to be met by: 10/28/20    Patient will increase functional independence with ADLs by performing:    Grooming while standing with Contact Guard Assistance. MET 10/23  REVISED to (S).  Toileting from bedside commode with Moderate Assistance for hygiene and clothing management. MET 10/23  REVISED to (S).  Supine to sit with Stand-by Assistance. MET (I)  Step transfer with Minimal Assistance MET 10/23  REVISED to (S).  Toilet transfer to bedside commode with Minimal Assistance. MET 10/27                     Time Tracking:     OT Date of Treatment: 10/27/20  OT Start Time: 0821  OT Stop Time: 0900  OT Total Time (min): 39 min    Billable Minutes:Self Care/Home Management 10  Therapeutic Activity 17  Therapeutic Exercise 12    MAURICIO Mulligan  10/27/2020

## 2020-10-27 NOTE — DISCHARGE SUMMARY
[facility]  Liver Transplant  Discharge Summary      Patient Name: Jordan Altman  MRN: [id number]  Admission Date: 10/11/2020  Hospital Length of Stay: 16 days  Discharge Date and Time:  10/27/2020 1:53 PM  Attending Physician: Ottoniel Sidhu MD  Discharging Provider: Awa Sun NP  Primary Care Provider: SHAHEEN Jaime  Post-Operative Day: 13     ORGAN:   LIVER  Disease Etiology: Alcoholic Cirrhosis  Donor Type:   Donation after Brain Death  CDC High Risk:   Yes  Donor CMV Status:   Donor CMV Status: Positive  Donor HBcAB:   Negative  Donor HCV Status:   Positive  Whole or Partial: Whole Liver  Biliary Anastomosis: End to End  Arterial Anatomy: Standard      HPI:   Mr. 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 He also c/o chronic left knee pain and denies any recent injury. Occult blood test (+) at OSH but HCT stable. Pt denies fever, chills, nausea, vomiting, chest pain, sob, hematuria or changes in bladder function. Will admit to LTS for further evaluation.     Hospital Course:    ESLD 2/2 ETOH. Admitted 10/11 with suspected GIB from OSH. 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 Post op course significant for PVT requiring TB to OR POD#1 for thrombectomy. Hepatic artery also revised (shortened) due to kink. OR POD#2 for abdominal closure, bx, and reconstruction of bile duct. US 10/17 satisfactory. Oliguric JENN requiring CRRT post op. Nephrology following. Stepped down 10/19 afternoon. Drains and bourne out. Cr now improving but up slightly today thought to be due to supratherpeutic prograf level. Good UOP. Pt hydrated with NS bolus and prograf held prior to discharge. Pt was on heparin gtt post op due to PVT. CT abdomen/pelvis 10/22 performed to assess for hematomas prior to transitioning to oral  anticoagulation. Imaging reviewed, free fluid in mid upper abdomen/left upper ab- subcutaneous hematoma mid chevron noted, staples intact and no oozing. Transitioned to Eliquis on 10/25. H&H has remained stable. LFTs improving. Liver US 10/26 with stable findings.     Mr. Altman is stable for discharge today. He has no complaints. Worked with PT/OT daily and progressed to home/outpatient PT/OT. RW ordered for discharge. Pt will f/u with labs and clinic visit tomorrow 10/28. Pt verbalized understanding of discharge instructions and importance of follow up.         Final Active Diagnoses:    Diagnosis Date Noted POA    PRINCIPAL PROBLEM:  Status post liver transplant [Z94.4] 10/14/2020 Not Applicable    JENN (acute kidney injury) [N17.9] 09/19/2020 Yes    Anemia of chronic disease [D63.8] 10/21/2020 Yes    Hyperphosphatemia [E83.39] 10/20/2020 No    Debility [R53.81] 10/20/2020 Yes    Long-term use of immunosuppressant medication [Z79.899] 10/20/2020 Not Applicable    At risk for opportunistic infections [Z91.89] 10/20/2020 No    Anticoagulated [Z79.01]  Not Applicable    Class 1 obesity due to excess calories with serious comorbidity and body mass index (BMI) of 34.0 to 34.9 in adult [E66.09, Z68.34] 10/14/2020 Not Applicable    Prophylactic immunotherapy [Z29.8] 10/14/2020 Not Applicable      Problems Resolved During this Admission:    Diagnosis Date Noted Date Resolved POA    GI bleed [K92.2] 10/11/2020 10/20/2020 Yes    Left knee pain [M25.562] 09/26/2020 10/20/2020 Yes    Acute hyperglycemia [R73.9] 10/14/2020 10/20/2020 Yes    Acidosis [E87.2] 10/13/2020 10/27/2020 Yes    Gram positive sepsis [A41.89]  10/20/2020 Yes       Consults (From admission, onward)        Status Ordering Provider     Inpatient consult to Endocrinology  Once     Provider:  (Not yet assigned)    Completed DEYVI THOMPSON     Inpatient consult to Gastroenterology  Once     Provider:  (Not yet assigned)    Completed  PRO SHERWOOD     Inpatient consult to Nephrology  Once     Provider:  (Not yet assigned)    Completed ROSELYN STATON II     Inpatient consult to Physical Medicine Rehab  Once     Provider:  (Not yet assigned)    Completed STEPHANIE BROWN     Inpatient consult to Registered Dietitian/Nutritionist  Once     Provider:  (Not yet assigned)    Completed DEYVI THOMPSON     Inpatient consult to Registered Dietitian/Nutritionist  Once     Provider:  (Not yet assigned)    Completed DOROTEO INTERIANO          Pending Diagnostic Studies:     Procedure Component Value Units Date/Time    Anti-Xa Heparin Monitoring [[id number]] Collected: 10/20/20 0500    Order Status: Sent Lab Status: No result     Specimen: Blood     Bilirubin, direct [[id number]] Collected: 10/16/20 0458    Order Status: Sent Lab Status: In process Updated: 10/16/20 0531    Specimen: Blood     Comprehensive metabolic panel [[id number]] Collected: 10/16/20 0458    Order Status: Sent Lab Status: In process Updated: 10/16/20 0531    Specimen: Blood     Comprehensive metabolic panel [[id number]] Collected: 10/16/20 0458    Order Status: Sent Lab Status: In process Updated: 10/16/20 0531    Specimen: Blood     Hepatitis C Genotype [[id number]] Collected: 10/21/20 1216    Order Status: Sent Lab Status: In process Updated: 10/21/20 1254    Specimen: Blood     Magnesium [[id number]] Collected: 10/16/20 0458    Order Status: Sent Lab Status: In process Updated: 10/16/20 0459    Specimen: Blood     Phosphorus [[id number]] Collected: 10/16/20 0458    Order Status: Sent Lab Status: In process Updated: 10/16/20 0459    Specimen: Blood     Tacrolimus level [[id number]] Collected: 10/16/20 0458    Order Status: Sent Lab Status: In process Updated: 10/16/20 0531    Specimen: Blood     X-Ray Chest AP Portable [[id number]]     Order Status: Sent Lab Status: No result         Significant Diagnostic Studies: Labs:   CMP   Recent Labs   Lab 10/26/20  0625 10/27/20  0703   NA  "136 137   K 3.8 3.6    104   CO2 21* 23   GLU 94 75   BUN 35* 32*   CREATININE 1.6* 1.9*   CALCIUM 8.7 8.9   PROT 4.7* 4.7*   ALBUMIN 2.7* 2.7*   BILITOT 2.4* 2.5*   ALKPHOS 71 73   AST 9* 12   ALT 42 37   ANIONGAP 11 10   ESTGFRAFRICA 58.4* 47.5*   EGFRNONAA 50.6* 41.1*    and CBC   Recent Labs   Lab 10/27/20  0050 10/27/20  0703 10/27/20  1139   WBC 5.19 5.25 6.81   HGB 8.3* 8.3* 9.1*   HCT 26.7* 27.0* 29.6*   * 132* 137*       The patients clinical status was discussed at multidisplinary rounds, involving transplant surgery, transplant medicine, pharmacy, nursing, nutrition, and social work    Discharged Condition: good    Disposition: Home or Self Care    Follow Up: as above    Patient Instructions:      WALKER FOR HOME USE     Order Specific Question Answer Comments   Type of Walker: Adult (5'4"-6'6")    With wheels? Yes    Height: 6' (1.829 m)    Weight: 102 kg (224 lb 13.9 oz)    Length of need (1-99 months): 99    Does patient have medical equipment at home? none    Please check all that apply: Patient's condition impairs ambulation.    Please check all that apply: Patient is unable to safely ambulate without equipment.      Ambulatory referral/consult to Physical/Occupational Therapy   Standing Status: Future   Referral Priority: Routine Referral Type: Physical Medicine   Referral Reason: Specialty Services Required   Requested Specialty: Physical Therapy   Number of Visits Requested: 1     Diet Adult Regular     Call MD for:  temperature >100.4     Call MD for:  persistent nausea and vomiting or diarrhea     Call MD for:  severe uncontrolled pain     Call MD for:  redness, tenderness, or signs of infection (pain, swelling, redness, odor or green/yellow discharge around incision site)     Call MD for:  difficulty breathing or increased cough     Call MD for:  severe persistent headache     Call MD for:  worsening rash     Call MD for:  persistent dizziness, light-headedness, or visual " disturbances     Call MD for:  increased confusion or weakness     Call MD for:   Order Comments: Any unusual problems or concerns.     Activity as tolerated     Medications:  Reconciled Home Medications:      Medication List      START taking these medications    apixaban 5 mg Tab  Commonly known as: ELIQUIS  Take 1 tablet (5 mg total) by mouth 2 (two) times daily.     bisacodyL 5 mg EC tablet  Commonly known as: DULCOLAX  Take 2 tablets (10 mg total) by mouth daily as needed for Constipation.     docusate sodium 100 MG capsule  Commonly known as: COLACE  Take 1 capsule (100 mg total) by mouth 3 (three) times daily as needed for Constipation.     fluconazole 200 MG Tab  Commonly known as: DIFLUCAN  Take 1 tablet (200 mg total) by mouth once daily. STOP 11/13/20     mycophenolate 250 mg Cap  Commonly known as: CELLCEPT  Take 4 capsules (1,000 mg total) by mouth 2 (two) times daily.     oxyCODONE 10 mg Tab immediate release tablet  Commonly known as: ROXICODONE  Take 1 tablet (10 mg total) by mouth every 8 (eight) hours as needed for Pain.     pantoprazole 40 MG tablet  Commonly known as: PROTONIX  Take 1 tablet (40 mg total) by mouth once daily.     predniSONE 5 MG tablet  Commonly known as: DELTASONE  Take by mouth daily: 20 mg 10/20-10/26; 15 mg 10/27-11/2; 10 mg 11/3-11/9; 5 mg 11/10-11/16; STOP 11/17/20     sodium bicarbonate 650 MG tablet  Take 2 tablets (1,300 mg total) by mouth 2 (two) times daily.     sulfamethoxazole-trimethoprim 400-80mg 400-80 mg per tablet  Commonly known as: BACTRIM,SEPTRA  Take 1 tablet by mouth every morning. STOP 4/12/21     valGANciclovir 450 mg Tab  Commonly known as: VALCYTE  Take 1 tablet (450 mg total) by mouth once daily. STOP 4/12/21        CONTINUE taking these medications    albuterol 90 mcg/actuation inhaler  Commonly known as: PROVENTIL/VENTOLIN HFA  Inhale 2 puffs into the lungs every 6 (six) hours as needed for Wheezing or Shortness of Breath. Rescue     cetirizine 10 MG  tablet  Commonly known as: ZYRTEC  Take 10 mg by mouth daily as needed.        STOP taking these medications    ciprofloxacin HCl 500 MG tablet  Commonly known as: CIPRO     folic acid 1 MG tablet  Commonly known as: FOLVITE     hydrOXYzine HCL 25 MG tablet  Commonly known as: ATARAX     lactulose 10 gram/15 mL solution  Commonly known as: CHRONULAC     polyethylene glycol 17 gram/dose powder  Commonly known as: GLYCOLAX     rifAXIMin 550 mg Tab  Commonly known as: XIFAXAN     thiamine 100 MG tablet     VITAMIN D2 50,000 unit Cap  Generic drug: ergocalciferol          Time spent caring for patient (Greater than 1/2 spent in direct face-to-face contact): > 30 minutes    Awa Sun NP  Liver Transplant  [facility]

## 2020-10-27 NOTE — PROGRESS NOTES
DISCHARGE NOTE:    Jordan Altman is a 45 y.o. male s/p   LIVER   Donation after Brain Death transplant on 10/14/2020 (Liver) for ESLD secondary to Alcoholic Cirrhosis.      Past Medical History:   Diagnosis Date    Decompensated hepatic cirrhosis     Hypertension     SBP (spontaneous bacterial peritonitis)        Hospital Course: Patient had complicated hospital course with take back for PV thrombus on 10/15 and JENN requiring CRRT.  Patient started on therapeutic heparin then transitioned to Eliquis for at least 6 months.  Duration to be ultimately decided by surgeons based on ultrasound or CT scan.  Patient's JENN resolved, with SCr down to 1.5, day of discharge SCr slightly elevated 1.9 - gave some IVF and held tacrolimus (level 11.1).     Allergies:   Review of patient's allergies indicates:   Allergen Reactions    Latex, natural rubber        Patient Pharmacy: [facility]    Discharge Medications:   Jordan Altman   Home Medication Instructions BABAK:[id number]    Printed on:10/27/20 1469   Medication Information                      albuterol (PROVENTIL/VENTOLIN HFA) 90 mcg/actuation inhaler  Inhale 2 puffs into the lungs every 6 (six) hours as needed for Wheezing or Shortness of Breath. Rescue             apixaban (ELIQUIS) 5 mg Tab  Take 1 tablet (5 mg total) by mouth 2 (two) times daily.             bisacodyL (DULCOLAX) 5 mg EC tablet  Take 2 tablets (10 mg total) by mouth daily as needed for Constipation.             cetirizine (ZYRTEC) 10 MG tablet  Take 10 mg by mouth daily as needed.              docusate sodium (COLACE) 100 MG capsule  Take 1 capsule (100 mg total) by mouth 3 (three) times daily as needed for Constipation.             fluconazole (DIFLUCAN) 200 MG Tab  Take 1 tablet (200 mg total) by mouth once daily. STOP 11/13/20             mycophenolate (CELLCEPT) 250 mg Cap  Take 4 capsules (1,000 mg total) by mouth 2 (two) times daily.             oxyCODONE (ROXICODONE) 10 mg Tab immediate  release tablet  Take 1 tablet (10 mg total) by mouth every 8 (eight) hours as needed for Pain.             pantoprazole (PROTONIX) 40 MG tablet  Take 1 tablet (40 mg total) by mouth once daily.             predniSONE (DELTASONE) 5 MG tablet  Take by mouth daily: 20 mg 10/20-10/26; 15 mg 10/27-11/2; 10 mg 11/3-11/9; 5 mg 11/10-11/16; STOP 11/17/20             sodium bicarbonate 650 MG tablet  Take 2 tablets (1,300 mg total) by mouth 2 (two) times daily.             sulfamethoxazole-trimethoprim 400-80mg (BACTRIM,SEPTRA) 400-80 mg per tablet  Take 1 tablet by mouth every morning. STOP 4/12/21             valGANciclovir (VALCYTE) 450 mg Tab  Take 1 tablet (450 mg total) by mouth once daily. STOP 4/12/21                 Pharmacy Interventions/Recommendations:  1) Transplant Immunosuppression: Induction Steroids, and maintenance Tacrolimus held (patient did not receive medication - will need a prescription), MMF 1000 mg BID and Prednisone taper.     2) Opportunistic Infection prophylaxis: Bactrim until 4/12/21; Valcyte until 4/12/21; and Diflucan until 11/13/20    3) Osteoporosis Prevention measures (liver txp): Patient had Vitamin D level on 9/19/20 which was 38.  Patient will need outpatient DEXA scan to determine osteoporosis risk.      4) Patient Counseling/Education:  Demonstrated the use of the BP cuff, thermometer.    5) Follow-Up/Discharge Needs:  Patient needs tacrolimus prescription, once level comes back and dose determined.  (patient was on 3 mg AM and 2 mg PM prior to discontinuing dose). Patient needs DEXA scan to determine osteoporosis risk.     6) Patient Assistance Information: N/A    7) The following medications have been placed on HOLD and should be restarted in the outpatient setting (when appropriate): Tacrolimus held due to supratherapeutic level.     Jordan and his caregiver verbalized their understanding and had the opportunity to ask questions.

## 2020-10-27 NOTE — PROGRESS NOTES
Discharge:    Pt will no longer discharge to Skilled Rehab, Pt will be discharging to LR apt today under the care of his caregiver Valeria Tucker(significant other). MARCELLA informed pt will be discharging with Outpatient PT needs. Pt verbalized understanding. Pt coping well, denied having questions or concerns. MARCELLA contacted pt's caregiver to discuss pt's discharge needs and to verifying checking into LR apt today. Caregiver reported on her way to check into LR apt [address] and stated on her way to the hospital for education with Sulema Malhotra RN after checking into LR apt.  Pt is also discharging with rolling walker, orders are in, MARCELLA contacted [facility] DME and informed of pt's rolling walker order. Caregiver and pt deny having any further questions or concerns at this time. Pt's significant other will be transporting pt to LR apt. MARCELLA remains following and available.

## 2020-10-27 NOTE — PLAN OF CARE
Pt remains AAO x 4 with VSS on visi monitor, afebrile, sats upper 90s on RA throughout shift  Chief complaint of incisional pain 7/10 relieved with PRN Oxy 10 q6hr   Pt complained of itching relieved with PRN Benadryl   L Forearm 20g - CDI  Chevron incision KELLY with staples - pt cleaned with Betadine   Pt possible discharge in the AM 9/27  Pt up independent and ambulatory to toilet, voids in urinal.   Renal diet  Self meds 100%  Pt remains free from falls and injuries, call light in reach, bed in lowest position, nonskid socks on when OOB  Will continue to monitor

## 2020-10-27 NOTE — PLAN OF CARE
VSS  No signs of evident distress.  Pain medication admin as per MAR  Pt. Ambulating in room independently.  Non slip socks worn when OOB  Family member at bedside  500mL normal saline bolus admin as per MAR.  Left Wrist PIV removed and dressed.   Visi removed, cleaned and placed back in storage area.  Self meds 100%  Chevron KELLY with staples, staples intact, edges approximated.  Pt. Painting with betadine.  Pt. Given box of swabs for cleaning post discharge.  Prescriptions delivered bedside  Walker delivered bedside    Pt. To be discharged to apartments.

## 2020-10-27 NOTE — PT/OT/SLP PROGRESS
Physical Therapy Treatment    Patient Name:  Jordan Altman   MRN:  [id number]    Recommendations:     Discharge Recommendations:  rehabilitation facility (pt progressing towards HHPT)  Discharge Equipment Recommendations: walker, rolling, shower chair   Barriers to discharge: None    Assessment:     Jordan Altman is a 45 y.o. male admitted with a medical diagnosis of Status post liver transplant.  He presents with the following impairments/functional limitations:  weakness, impaired endurance, impaired self care skills, impaired functional mobilty, gait instability, impaired balance, decreased safety awareness, decreased coordination .Pt presents with improved overall functional mobility requiring decreased assistance and increased activity tolerance to perform functional mobility.Pt would continue to benefit from skilled PT to address overall functional mobility and goals. Goals remain appropriate      Rehab Prognosis: Good; patient would benefit from acute skilled PT services to address these deficits and reach maximum level of function.    Recent Surgery: Procedure(s) (LRB):  LAPAROTOMY, EXPLORATORY, BILIARY ANASTOMOSIS, ABDOMINAL CLOSURE (N/A) 11 Days Post-Op    Plan:     During this hospitalization, patient to be seen 4 x/week to address the identified rehab impairments via gait training, therapeutic activities, therapeutic exercises and progress toward the following goals:    · Plan of Care Expires:  11/20/20    Subjective     Chief Complaint: LE weakness  Patient/Family Comments/goals: I am doing better but my legs are still weak  Pain/Comfort:  · Pain Rating 1: 0/10      Objective:     Communicated with RN prior to session.  Patient found seated  with telemetry upon PT entry to room.     General Precautions: Standard, fall   Orthopedic Precautions:N/A   Braces:       Functional Mobility:  · Transfers:     · Sit to Stand:  stand by assistance from EOB with rolling walker. Sit to stand from low sofa with  RW with modA  · Gait: pt amb with  ft  with SBA for safety with mask on. vcs for upright posture     AM-PAC 6 CLICK MOBILITY  Turning over in bed (including adjusting bedclothes, sheets and blankets)?: 3  Sitting down on and standing up from a chair with arms (e.g., wheelchair, bedside commode, etc.): 2  Moving from lying on back to sitting on the side of the bed?: 3  Moving to and from a bed to a chair (including a wheelchair)?: 3  Need to walk in hospital room?: 3  Climbing 3-5 steps with a railing?: 2  Basic Mobility Total Score: 16       Therapeutic Activities and Exercises:   Therapist provided instruction and educated of patient on progress, safety,d/c,PT POC,   proper body mechanics, energy conservation, and fall prevention strategies during tasks listed above, on the effects of prolonged immobility and the importance of performing OOB activity and exercises to promote healing and reduce recovery time   Patient facilitated therex X 15 reps seated in bedside chair B LE AROM AP, LAQ, Hip Flexion, Hip Abd/Add with facilitation for correct performance and sequencing. Exercises performed to develop and maintain pt's strength, endurance and flexibility.   Updated white board with appropriate PT mobility information for medical team notification  Donned an extra gown    Call nursing/pct to transfer to chair/use bathroom. Pt stated understanding  Bedside table in front of patient and area set up for function, convenience, and safety. RN aware of patient's mobility needs and status. Questions/concerns addressed within PTA scope of practice; patient with no further questions. Time was provided for active listening, discussion of health disposition, and discussion of safe discharge. Pt?verbalized?agreement .        Patient left seated with all lines intact, call button in reach and nsg notified    GOALS:   Multidisciplinary Problems     Physical Therapy Goals        Problem: Physical Therapy Goal    Goal Priority  Disciplines Outcome Goal Variances Interventions   Physical Therapy Goal     PT, PT/OT Ongoing, Progressing     Description: Goals to be met by: 2020     Patient will increase functional independence with mobility by performin. Supine to sit with Set-up [place]  2. Sit to supine with Set-up [place]  3. Sit to stand transfer with Contact Guard Assistance  4. Bed to chair transfer with Contact Guard Assistance using Rolling Walker  5. Gait  x 150 feet with Contact Guard Assistance using Rolling Walker.   6. Lower extremity exercise program x15 reps per handout, with supervision                     Time Tracking:     PT Received On: 10/27/20  PT Start Time: 830     PT Stop Time: 857  PT Total Time (min): 27 min     Billable Minutes: Gait Training 15 and Therapeutic Exercise 12    Treatment Type: Treatment  PT/PTA: PTA     PTA Visit Number: 4     Ottoniel Fletcher PTA  10/27/2020

## 2020-10-28 ENCOUNTER — CLINICAL SUPPORT (OUTPATIENT)
Dept: TRANSPLANT | Facility: CLINIC | Age: 45
DRG: 005 | End: 2020-10-28
Payer: MEDICAID

## 2020-10-28 ENCOUNTER — TELEPHONE (OUTPATIENT)
Dept: TRANSPLANT | Facility: CLINIC | Age: 45
End: 2020-10-28

## 2020-10-28 ENCOUNTER — PATIENT OUTREACH (OUTPATIENT)
Dept: ADMINISTRATIVE | Facility: CLINIC | Age: 45
End: 2020-10-28

## 2020-10-28 ENCOUNTER — NURSE TRIAGE (OUTPATIENT)
Dept: ADMINISTRATIVE | Facility: CLINIC | Age: 45
End: 2020-10-28

## 2020-10-28 VITALS
RESPIRATION RATE: 18 BRPM | HEART RATE: 86 BPM | WEIGHT: 243.19 LBS | OXYGEN SATURATION: 95 % | BODY MASS INDEX: 32.94 KG/M2 | TEMPERATURE: 99 F | SYSTOLIC BLOOD PRESSURE: 148 MMHG | HEIGHT: 72 IN | DIASTOLIC BLOOD PRESSURE: 91 MMHG

## 2020-10-28 DIAGNOSIS — K72.90 DECOMPENSATED HEPATIC CIRRHOSIS: Primary | ICD-10-CM

## 2020-10-28 DIAGNOSIS — K74.60 DECOMPENSATED HEPATIC CIRRHOSIS: Primary | ICD-10-CM

## 2020-10-28 LAB — FUNGUS SPEC CULT: NORMAL

## 2020-10-28 PROCEDURE — 99213 OFFICE O/P EST LOW 20 MIN: CPT | Mod: PBBFAC

## 2020-10-28 PROCEDURE — 99999 PR PBB SHADOW E&M-EST. PATIENT-LVL III: ICD-10-PCS | Mod: PBBFAC,,,

## 2020-10-28 PROCEDURE — 99999 PR PBB SHADOW E&M-EST. PATIENT-LVL III: CPT | Mod: PBBFAC,,,

## 2020-10-28 NOTE — PROGRESS NOTES
Please forward this important TCC information to your provider in order to maximize the post discharge care delivery of this patient.    C3 nurse spoke with Jordan Altman  for a TCC post hospital discharge follow up call. The patient does not have a scheduled HOSFU appointment with SHAHEEN Jaime within 7-14 days post hospital discharge date 10/27/2020 C3 nurse was unable to schedule HOSFU appointment in [facility].  Please contact pcp  and schedule follow up appointment using HOSFU visit type on or before 11/09/2020    Respectfully,  Abena José LPN    [facility] C[facility]    [email or web address]       Please do not reply to this message, as this inbox is not routinely monitored.

## 2020-10-28 NOTE — TELEPHONE ENCOUNTER
Contacted patient on behalf of [facility]'s post procedure call back symptom tracker.  Patient's identity verified by stating first and last names and .  Patient denies any Covid 19 symptoms since procedure

## 2020-10-28 NOTE — PROGRESS NOTES
Clinic Note: First Return to Clinic Post-  Liver Transplant    Jordan Altman  is a 45 y.o. male  S/p   LIVER transplant on 10/14/2020 (Liver) for Alcoholic Cirrhosis.      Discharge Course (Issues/Concerns): Patient presents with no complaints. Patient able to eat dinner, get a good nights rest, and he feels his pain is well managed.    Objective:   Vitals:    10/28/20 0914   BP: (!) 148/91   Pulse: 86   Resp: 18   Temp: 98.6 °F (37 °C)       Met with patient and his caregiver in the clinic to review current medication list.     Current Outpatient Medications   Medication Sig Dispense Refill    albuterol (PROVENTIL/VENTOLIN HFA) 90 mcg/actuation inhaler Inhale 2 puffs into the lungs every 6 (six) hours as needed for Wheezing or Shortness of Breath. Rescue      apixaban (ELIQUIS) 5 mg Tab Take 1 tablet (5 mg total) by mouth 2 (two) times daily. 60 tablet 5    bisacodyL (DULCOLAX) 5 mg EC tablet Take 2 tablets (10 mg total) by mouth daily as needed for Constipation.  0    cetirizine (ZYRTEC) 10 MG tablet Take 10 mg by mouth daily as needed.       docusate sodium (COLACE) 100 MG capsule Take 1 capsule (100 mg total) by mouth 3 (three) times daily as needed for Constipation.  0    fluconazole (DIFLUCAN) 200 MG Tab Take 1 tablet (200 mg total) by mouth once daily. STOP 11/13/20 30 tablet 0    mycophenolate (CELLCEPT) 250 mg Cap Take 4 capsules (1,000 mg total) by mouth 2 (two) times daily. 240 capsule 2    opw transplant care kit Welcome to [facility].  This is your transplant care kit. 1 kit 0    oxyCODONE (ROXICODONE) 10 mg Tab immediate release tablet Take 1 tablet (10 mg total) by mouth every 8 (eight) hours as needed for Pain. 21 tablet 0    pantoprazole (PROTONIX) 40 MG tablet Take 1 tablet (40 mg total) by mouth once daily. 30 tablet 3    predniSONE (DELTASONE) 5 MG tablet Take by mouth daily: 20 mg 10/20-10/26; 15 mg 10/27-11/2; 10 mg 11/3-11/9; 5 mg 11/10-11/16; STOP 11/17/20 70 tablet 0     sodium bicarbonate 650 MG tablet Take 2 tablets (1,300 mg total) by mouth 2 (two) times daily. 120 tablet 5    sulfamethoxazole-trimethoprim 400-80mg (BACTRIM,SEPTRA) 400-80 mg per tablet Take 1 tablet by mouth every morning. STOP 4/12/21 30 tablet 5    valGANciclovir (VALCYTE) 450 mg Tab Take 1 tablet (450 mg total) by mouth once daily. STOP 4/12/21 30 tablet 5     No current facility-administered medications for this visit.        Pharmacy Interventions/Recommendations:     1) Graft Function & Immunosuppression Issues: Tacrolimus (on hold), cellcept 1000mg BID, and Pred taper.    2) Opportunistic Infection prophylaxis:   PCP ppx: Bactrim until 4/12/21  CMV ppx: Valcyte until 4/12/21  Fungal ppx: Fluconazole until 11/13/20    3) Donor Serologies & Monitoring:     Donor CMV Status: Positive  Donor HCV Status: Positive  Donor HBcAb: Negative  Donor HBV SHAYE: Negative  Donor HCV SHAYE: Positive      4) Pain Management & Bowel Regimen: Oxycodone, docusate, and bisacodyl.    5) Blood Pressure Management: None    6) Blood Sugar Management & Follow-up: None    7) Electrolyte Management: Sodium Bicarb    8) Osteoporosis Prevention Strategy (Liver Transplant): Dexa scan needed once patient is able to tolerate. Will address prevention at that time.    8) OTHER medication follow-up (patient assistance, held medications, etc): Tacro held for supra therapeutic level. Patient ha tacrolimus in his possession.    9) Reinforced medication education conducted in the hospital, including medication indications, dosing, administration, side effects, monitoring-- including timing of immunosuppressant levels.     Patient received their FIRST fill of medications from [facility].  Discussed the process for obtaining refills of medications, including verifying the dose and mailing address to have medications delivered.     Jordan and his caregivers verbalized understanding and had the opportunity to ask questions.

## 2020-10-28 NOTE — PROGRESS NOTES
C3 nurse attempted to contact patient. The following occurred:   C3 nurse attempted to contact Jordan Altman  for a TCC post hospital discharge follow up call. The patient is unable to conduct the call @ this time. The patient requested a callback.    The patient does not have a scheduled HOSFU appointment within 7-14 days post hospital discharge date 10/27/2020. Message sent to Physician staff to assist with HOSFU appointment scheduling.

## 2020-10-28 NOTE — PATIENT INSTRUCTIONS
Discharge Instructions for Cirrhosis of the Liver  You have been diagnosed with cirrhosis of the liver. This is a long-term (chronic) problem. It occurs when liver tissue is destroyed and replaced by scar tissue. Causes of cirrhosis include:  · Infection such as viral hepatitis  · Chronic alcoholism  · The bodys immune system attacks healthy cells (autoimmune disorders)  · Obesity  · Medicine side effects  · Genetic diseases  Sometimes the exact cause is unknown. You may not have any symptoms at first. Or your symptoms may be mild. But they usually get worse. Cirrhosis is likely to occur if you have a history of long-term alcohol abuse. Cirrhosis cant be cured. But it can be treated.   Home care  Alcohol  · People with liver disease should not drink alcohol. If you stop drinking, you may feel better and live longer.  · If you are a chronic alcohol user, you will have withdrawal symptoms. Talk with your healthcare provider for more information.    · If alcohol is a problem, ask your provider about medicine that can help you quit drinking.    · Find a local Alcoholics Anonymous support group online at www.aa.org.  Diet  · Ask your provider what kind of diet you should follow. You may be asked to limit or not eat certain foods. Do not limit your protein intake.  · Weigh yourself daily and keep a weight log. If you have a sudden change in weight, call your provider.  · Cut back on salt:  ¨ Limit canned, dried, packaged, and fast foods.  ¨ Dont add salt to your food at the table.  ¨ Season foods with herbs instead of salt when you cook.  Medicines, supplements, and vaccines  · Take your medicines exactly as directed.  · Talk to your provider before taking vitamins, over-the-counter medicines, or herbal supplements. Many herbal supplements may be poisonous (toxic) to the liver.  · Avoid aspirin and other blood-thinning medicines.  · Discuss vitamin supplements and deficiencies with your provider.  · Ask your provider  about getting vaccinations for viruses that can cause liver diseases.  Follow-up care  Follow up with your healthcare provider, or as advised. You will likely have the following tests:  · Lab tests  · Blood tests for liver cancer  · Ultrasound of your liver every 6 months  · Endoscopy to check for swollen veins (varices) in your digestive tract  When to call your provider  Call your healthcare provider right away if you have any of the following:  · Fever of 100.4°F (38.0°C) or higher  · Extreme tiredness (fatigue), weakness, or lack of appetite  · Vomiting (with or without blood)  · Yellowing of your skin or eyes (jaundice)  · Itching  · Swelling in your belly or legs  · Black or tarry stools  · Skin that bruises easily  · Confusion or trouble thinking clearly   Date Last Reviewed: 8/1/2016  © 2491-8871 [employer]. [address] [place] [address], [place] [address]. All rights reserved. This information is not intended as a substitute for professional medical care. Always follow your healthcare professional's instructions.

## 2020-10-28 NOTE — TELEPHONE ENCOUNTER
Pt's prograf currently on hold.  Discussed w/ Dr. Jenkins.  Pt reports blue card states take 1 BID.  Received report from pharmD earlier today that pt was on 3/2 prior to holding.  Epic reports last RX was 6 BID.  Pt then reports that he does not have Tacrolimus.  Pharmacy is currently closed, unable to obtain RX.  Will f/u in AM to restart Prograf.  Pt will have repeat labs on Friday.  ----- Message from Olman Jenkins MD sent at 10/28/2020  1:47 PM CDT -----  Results ok. No action needed

## 2020-10-29 ENCOUNTER — TELEPHONE (OUTPATIENT)
Dept: TRANSPLANT | Facility: CLINIC | Age: 45
End: 2020-10-29

## 2020-10-29 LAB
HCV GENTYP SERPL NAA+PROBE: ABNORMAL
HCV RNA SERPL NAA+PROBE-LOG IU: 6.27 LOG (10) IU/ML
HCV RNA SERPL QL NAA+PROBE: DETECTED
HCV RNA SPEC NAA+PROBE-ACNC: ABNORMAL IU/ML
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
## 2020-10-30 ENCOUNTER — TELEPHONE (OUTPATIENT)
Dept: TRANSPLANT | Facility: CLINIC | Age: 45
End: 2020-10-30

## 2020-10-30 DIAGNOSIS — Z94.4 LIVER REPLACED BY TRANSPLANT: Primary | ICD-10-CM

## 2020-10-30 NOTE — TELEPHONE ENCOUNTER
Labs not scheduled as planned on 10/28.  Called pt and instructed to repeat labs tomorrow.  Pt verbalized understanding.

## 2020-10-30 NOTE — TELEPHONE ENCOUNTER
----- Message from Olman Jenkins MD sent at 10/28/2020  1:47 PM CDT -----  Results ok. No action needed

## 2020-10-31 ENCOUNTER — TELEPHONE (OUTPATIENT)
Dept: TRANSPLANT | Facility: CLINIC | Age: 45
End: 2020-10-31

## 2020-10-31 ENCOUNTER — LAB VISIT (OUTPATIENT)
Dept: LAB | Facility: HOSPITAL | Age: 45
End: 2020-10-31
Attending: SURGERY
Payer: MEDICAID

## 2020-10-31 DIAGNOSIS — Z94.4 LIVER TRANSPLANTED: Primary | ICD-10-CM

## 2020-10-31 DIAGNOSIS — Z94.4 LIVER REPLACED BY TRANSPLANT: ICD-10-CM

## 2020-10-31 LAB
ALBUMIN SERPL BCP-MCNC: 3 G/DL (ref 3.5–5.2)
ALP SERPL-CCNC: 72 U/L (ref 55–135)
ALT SERPL W/O P-5'-P-CCNC: 20 U/L (ref 10–44)
ANION GAP SERPL CALC-SCNC: 9 MMOL/L (ref 8–16)
AST SERPL-CCNC: 10 U/L (ref 10–40)
BASOPHILS # BLD AUTO: 0.1 K/UL (ref 0–0.2)
BASOPHILS NFR BLD: 2.3 % (ref 0–1.9)
BILIRUB SERPL-MCNC: 2.3 MG/DL (ref 0.1–1)
BUN SERPL-MCNC: 22 MG/DL (ref 6–20)
CALCIUM SERPL-MCNC: 9.1 MG/DL (ref 8.7–10.5)
CHLORIDE SERPL-SCNC: 107 MMOL/L (ref 95–110)
CO2 SERPL-SCNC: 24 MMOL/L (ref 23–29)
CREAT SERPL-MCNC: 1.4 MG/DL (ref 0.5–1.4)
DIFFERENTIAL METHOD: ABNORMAL
EOSINOPHIL # BLD AUTO: 0.2 K/UL (ref 0–0.5)
EOSINOPHIL NFR BLD: 4.7 % (ref 0–8)
ERYTHROCYTE [DISTWIDTH] IN BLOOD BY AUTOMATED COUNT: 17.6 % (ref 11.5–14.5)
EST. GFR  (AFRICAN AMERICAN): >60 ML/MIN/1.73 M^2
EST. GFR  (NON AFRICAN AMERICAN): 59.4 ML/MIN/1.73 M^2
GLUCOSE SERPL-MCNC: 91 MG/DL (ref 70–110)
HCT VFR BLD AUTO: 33.3 % (ref 40–54)
HGB BLD-MCNC: 9.8 G/DL (ref 14–18)
IMM GRANULOCYTES # BLD AUTO: 0.05 K/UL (ref 0–0.04)
IMM GRANULOCYTES NFR BLD AUTO: 1.2 % (ref 0–0.5)
LYMPHOCYTES # BLD AUTO: 0.6 K/UL (ref 1–4.8)
LYMPHOCYTES NFR BLD: 13.3 % (ref 18–48)
MCH RBC QN AUTO: 30.1 PG (ref 27–31)
MCHC RBC AUTO-ENTMCNC: 29.4 G/DL (ref 32–36)
MCV RBC AUTO: 102 FL (ref 82–98)
MONOCYTES # BLD AUTO: 0.4 K/UL (ref 0.3–1)
MONOCYTES NFR BLD: 8.2 % (ref 4–15)
NEUTROPHILS # BLD AUTO: 3 K/UL (ref 1.8–7.7)
NEUTROPHILS NFR BLD: 70.3 % (ref 38–73)
NRBC BLD-RTO: 0 /100 WBC
PLATELET # BLD AUTO: 206 K/UL (ref 150–350)
PMV BLD AUTO: 10 FL (ref 9.2–12.9)
POTASSIUM SERPL-SCNC: 4.4 MMOL/L (ref 3.5–5.1)
PROT SERPL-MCNC: 5.3 G/DL (ref 6–8.4)
RBC # BLD AUTO: 3.26 M/UL (ref 4.6–6.2)
SODIUM SERPL-SCNC: 140 MMOL/L (ref 136–145)
TACROLIMUS BLD-MCNC: 13.8 NG/ML (ref 5–15)
WBC # BLD AUTO: 4.29 K/UL (ref 3.9–12.7)

## 2020-10-31 PROCEDURE — 80197 ASSAY OF TACROLIMUS: CPT

## 2020-10-31 PROCEDURE — 36415 COLL VENOUS BLD VENIPUNCTURE: CPT

## 2020-10-31 PROCEDURE — 80053 COMPREHEN METABOLIC PANEL: CPT

## 2020-10-31 PROCEDURE — 85025 COMPLETE CBC W/AUTO DIFF WBC: CPT

## 2020-10-31 NOTE — TELEPHONE ENCOUNTER
Labs reviewed with Dr. Jenkins. Instructed patient change Prograf to 1 mg twice daily. Repeat labs Monday.

## 2020-11-02 ENCOUNTER — LAB VISIT (OUTPATIENT)
Dept: LAB | Facility: HOSPITAL | Age: 45
End: 2020-11-02
Attending: SURGERY
Payer: MEDICAID

## 2020-11-02 ENCOUNTER — TELEPHONE (OUTPATIENT)
Dept: TRANSPLANT | Facility: CLINIC | Age: 45
End: 2020-11-02

## 2020-11-02 DIAGNOSIS — Z94.4 LIVER REPLACED BY TRANSPLANT: ICD-10-CM

## 2020-11-02 LAB
ALBUMIN SERPL BCP-MCNC: 3 G/DL (ref 3.5–5.2)
ALP SERPL-CCNC: 73 U/L (ref 55–135)
ALT SERPL W/O P-5'-P-CCNC: 14 U/L (ref 10–44)
ANION GAP SERPL CALC-SCNC: 7 MMOL/L (ref 8–16)
AST SERPL-CCNC: 10 U/L (ref 10–40)
BASOPHILS # BLD AUTO: 0.1 K/UL (ref 0–0.2)
BASOPHILS NFR BLD: 2.6 % (ref 0–1.9)
BILIRUB SERPL-MCNC: 2.2 MG/DL (ref 0.1–1)
BUN SERPL-MCNC: 20 MG/DL (ref 6–20)
CALCIUM SERPL-MCNC: 9.1 MG/DL (ref 8.7–10.5)
CHLORIDE SERPL-SCNC: 108 MMOL/L (ref 95–110)
CO2 SERPL-SCNC: 25 MMOL/L (ref 23–29)
CREAT SERPL-MCNC: 1.3 MG/DL (ref 0.5–1.4)
DIFFERENTIAL METHOD: ABNORMAL
EOSINOPHIL # BLD AUTO: 0.2 K/UL (ref 0–0.5)
EOSINOPHIL NFR BLD: 4.1 % (ref 0–8)
ERYTHROCYTE [DISTWIDTH] IN BLOOD BY AUTOMATED COUNT: 17.2 % (ref 11.5–14.5)
EST. GFR  (AFRICAN AMERICAN): >60 ML/MIN/1.73 M^2
EST. GFR  (NON AFRICAN AMERICAN): >60 ML/MIN/1.73 M^2
GLUCOSE SERPL-MCNC: 92 MG/DL (ref 70–110)
HCT VFR BLD AUTO: 33.3 % (ref 40–54)
HGB BLD-MCNC: 9.9 G/DL (ref 14–18)
IMM GRANULOCYTES # BLD AUTO: 0.05 K/UL (ref 0–0.04)
IMM GRANULOCYTES NFR BLD AUTO: 1.3 % (ref 0–0.5)
LYMPHOCYTES # BLD AUTO: 0.7 K/UL (ref 1–4.8)
LYMPHOCYTES NFR BLD: 17.7 % (ref 18–48)
MCH RBC QN AUTO: 29.6 PG (ref 27–31)
MCHC RBC AUTO-ENTMCNC: 29.7 G/DL (ref 32–36)
MCV RBC AUTO: 100 FL (ref 82–98)
MONOCYTES # BLD AUTO: 0.2 K/UL (ref 0.3–1)
MONOCYTES NFR BLD: 6.2 % (ref 4–15)
NEUTROPHILS # BLD AUTO: 2.7 K/UL (ref 1.8–7.7)
NEUTROPHILS NFR BLD: 68.1 % (ref 38–73)
NRBC BLD-RTO: 0 /100 WBC
PLATELET # BLD AUTO: 227 K/UL (ref 150–350)
PMV BLD AUTO: 9.9 FL (ref 9.2–12.9)
POTASSIUM SERPL-SCNC: 5.2 MMOL/L (ref 3.5–5.1)
PROT SERPL-MCNC: 5.1 G/DL (ref 6–8.4)
RBC # BLD AUTO: 3.34 M/UL (ref 4.6–6.2)
SODIUM SERPL-SCNC: 140 MMOL/L (ref 136–145)
TACROLIMUS BLD-MCNC: 9.3 NG/ML (ref 5–15)
WBC # BLD AUTO: 3.89 K/UL (ref 3.9–12.7)

## 2020-11-02 PROCEDURE — 36415 COLL VENOUS BLD VENIPUNCTURE: CPT

## 2020-11-02 PROCEDURE — 80197 ASSAY OF TACROLIMUS: CPT

## 2020-11-02 PROCEDURE — 80053 COMPREHEN METABOLIC PANEL: CPT

## 2020-11-02 PROCEDURE — 85025 COMPLETE CBC W/AUTO DIFF WBC: CPT
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
## 2020-11-02 NOTE — TELEPHONE ENCOUNTER
Stable labs, no medication changes.  Clinic visit tomorrow.  Pt verbalized understanding.  ----- Message from Moon Moreno MD sent at 11/2/2020  2:13 PM CST -----  Results ok. No action needed.

## 2020-11-03 ENCOUNTER — HOSPITAL ENCOUNTER (OUTPATIENT)
Dept: RADIOLOGY | Facility: CLINIC | Age: 45
Discharge: HOME | End: 2020-11-03
Attending: SURGERY
Payer: MEDICAID

## 2020-11-03 ENCOUNTER — OFFICE VISIT (OUTPATIENT)
Dept: TRANSPLANT | Facility: CLINIC | Age: 45
End: 2020-11-03
Payer: MEDICAID

## 2020-11-03 VITALS
HEIGHT: 72 IN | WEIGHT: 235.88 LBS | DIASTOLIC BLOOD PRESSURE: 99 MMHG | OXYGEN SATURATION: 98 % | RESPIRATION RATE: 16 BRPM | BODY MASS INDEX: 31.95 KG/M2 | SYSTOLIC BLOOD PRESSURE: 164 MMHG | HEART RATE: 70 BPM

## 2020-11-03 DIAGNOSIS — Z94.9 HYPERTENSION ASSOCIATED WITH TRANSPLANTATION: ICD-10-CM

## 2020-11-03 DIAGNOSIS — Z51.81 ENCOUNTER FOR THERAPEUTIC DRUG MONITORING: Primary | ICD-10-CM

## 2020-11-03 DIAGNOSIS — Z94.4 LIVER REPLACED BY TRANSPLANT: ICD-10-CM

## 2020-11-03 DIAGNOSIS — I15.8 HYPERTENSION ASSOCIATED WITH TRANSPLANTATION: ICD-10-CM

## 2020-11-03 PROCEDURE — 77080 DXA BONE DENSITY AXIAL: CPT | Mod: 26,,, | Performed by: INTERNAL MEDICINE

## 2020-11-03 PROCEDURE — 99213 OFFICE O/P EST LOW 20 MIN: CPT | Mod: PBBFAC,25

## 2020-11-03 PROCEDURE — 77080 DXA BONE DENSITY AXIAL: CPT | Mod: TC

## 2020-11-03 PROCEDURE — 99999 PR PBB SHADOW E&M-EST. PATIENT-LVL III: ICD-10-PCS | Mod: PBBFAC,,,

## 2020-11-03 PROCEDURE — 77080 DEXA BONE DENSITY SPINE HIP: ICD-10-PCS | Mod: 26,,, | Performed by: INTERNAL MEDICINE

## 2020-11-03 PROCEDURE — 99999 PR PBB SHADOW E&M-EST. PATIENT-LVL III: CPT | Mod: PBBFAC,,,

## 2020-11-03 PROCEDURE — 99213 OFFICE O/P EST LOW 20 MIN: CPT | Mod: 24,S$PBB,, | Performed by: SURGERY

## 2020-11-03 PROCEDURE — 99213 PR OFFICE/OUTPT VISIT, EST, LEVL III, 20-29 MIN: ICD-10-PCS | Mod: 24,S$PBB,, | Performed by: SURGERY

## 2020-11-03 RX ORDER — AMLODIPINE BESYLATE 10 MG/1
10 TABLET ORAL DAILY
Qty: 30 TABLET | Refills: 11 | Status: SHIPPED | OUTPATIENT
Start: 2020-11-03 | End: 2021-12-04

## 2020-11-03 NOTE — PROGRESS NOTES
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    ORGAN: LIVER  Whole or Partial: whole liver  Donor Type: donation after brain death  PHS Increased Risk: yes  Donor CMV Status: Positive  Donor HCV Status: Positive  Donor HBcAb: Negative  Donor HBV SHAYE: Negative  Donor HCV SHAYE: Positive    Biliary Anastomosis: end to end  Arterial Anatomy: standard  IVC reconstruction: end to end ivc  Portal vein status: patent    Subjective:     History of Present Illness: He has had the following complications since transplant: renal insufficiency and jaundice.  The noted complications are well controlled.    Interval History: Currently, he is doing well.  Current complaints include none.  Jordan is here for management of his immunosuppression medication.    Review of Systems   Constitutional: Negative for fatigue.   HENT: Negative for drooling, postnasal drip and sore throat.    Eyes: Negative for discharge and itching.   Respiratory: Negative for choking and stridor.    Gastrointestinal: Negative for rectal pain.   Endocrine: Negative for polydipsia.   Genitourinary: Negative for enuresis and genital sores.   Musculoskeletal: Negative for back pain, neck pain and neck stiffness.   Allergic/Immunologic: Positive for immunocompromised state.   Neurological: Negative for facial asymmetry and numbness.   Hematological: Negative for adenopathy.   Psychiatric/Behavioral: Negative for behavioral problems, self-injury and suicidal ideas.       Objective:     Physical Exam  Abdominal:          Comments: Incision well healed       Lab Results    Component Value Date    BILITOT 2.2 (H) 11/02/2020    AST 10 11/02/2020    ALT 14 11/02/2020    ALKPHOS 73 11/02/2020    CREATININE 1.3 11/02/2020    ALBUMIN 3.0 (L) 11/02/2020     Lab Results   Component Value Date    WBC 3.89 (L) 11/02/2020    HGB 9.9 (L) 11/02/2020    HCT 33.3 (L) 11/02/2020    HCT 23 (L) 10/16/2020     11/02/2020     Lab Results   Component Value Date    TACROLIMUS 9.3 11/02/2020       Assessment/Plan:          · S/P liver transplant.  · Chronic immunosuppressive medications for rejection prophylaxis at target.  Plan: no adjustment needed.  · Continue monitoring symptoms, labs and drug levels for drug-related toxicity and side effects.  · Incision: staples in place; wound clean, dry, and intact  · Femoral arterial line site: no complications evident   · Staples to be removed next week  · Receive HCV positive liver will be seen in hepatology clinic  · Low potassium diet    MD SHY Gaytan Patient Status  Functional Status: 90% - Able to carry on normal activity: minor symptoms of disease  Physical Capacity: No Limitations

## 2020-11-04 ENCOUNTER — TELEPHONE (OUTPATIENT)
Dept: TRANSPLANT | Facility: CLINIC | Age: 45
End: 2020-11-04

## 2020-11-04 ENCOUNTER — PATIENT MESSAGE (OUTPATIENT)
Dept: TRANSPLANT | Facility: CLINIC | Age: 45
End: 2020-11-04

## 2020-11-04 RX ORDER — OXYCODONE HYDROCHLORIDE 10 MG/1
10 TABLET ORAL EVERY 8 HOURS PRN
Qty: 21 TABLET | Refills: 0 | Status: SHIPPED | OUTPATIENT
Start: 2020-11-04 | End: 2020-11-13 | Stop reason: SDUPTHER

## 2020-11-04 NOTE — TELEPHONE ENCOUNTER
----- Message from Stephane Rivero sent at 11/4/2020  3:10 PM CST -----  Regarding: Refill          Pt calling to speak with coord in regards to getting his pain meds. Says the heart meds were there but not the other ones                  [phone] (M)

## 2020-11-04 NOTE — TELEPHONE ENCOUNTER
MARCELLA contacted pt after being informed pt is cleared to return home. Pt did confirm was cleared to return home. MARCELLA informed pt place keys in envelope and place in the drop box at the apartment. Pt verbalized understanding. MARCELLA informed Lisa with [address]. Pt and caregiver deny having further questions or concerns. MARCELLA remains following and available.       ----- Message from Sendy Santos RN sent at 11/3/2020  3:54 PM CST -----  Regarding: return home  He is cleared to return home by Dr. Zavaleta.  They are most likely leaving today.  Thanks,  Sendy Santos RN, BSN

## 2020-11-05 ENCOUNTER — PATIENT MESSAGE (OUTPATIENT)
Dept: ADMINISTRATIVE | Facility: OTHER | Age: 45
End: 2020-11-05

## 2020-11-05 RX ORDER — TACROLIMUS 1 MG/1
1 CAPSULE ORAL EVERY 12 HOURS
Qty: 120 CAPSULE | Refills: 11 | Status: SHIPPED | OUTPATIENT
Start: 2020-11-05 | End: 2020-11-11

## 2020-11-06 ENCOUNTER — CARE AT HOME (OUTPATIENT)
Dept: HOME HEALTH SERVICES | Facility: CLINIC | Age: 45
End: 2020-11-06
Payer: MEDICAID

## 2020-11-06 VITALS
SYSTOLIC BLOOD PRESSURE: 146 MMHG | RESPIRATION RATE: 17 BRPM | DIASTOLIC BLOOD PRESSURE: 100 MMHG | OXYGEN SATURATION: 98 % | HEART RATE: 97 BPM | TEMPERATURE: 98 F

## 2020-11-06 DIAGNOSIS — Z94.4 STATUS POST LIVER TRANSPLANT: ICD-10-CM

## 2020-11-06 DIAGNOSIS — K74.60 DECOMPENSATED HEPATIC CIRRHOSIS: ICD-10-CM

## 2020-11-06 DIAGNOSIS — K72.90 DECOMPENSATED HEPATIC CIRRHOSIS: ICD-10-CM

## 2020-11-06 DIAGNOSIS — Z76.89 ENCOUNTER FOR SUPPORT AND COORDINATION OF TRANSITION OF CARE: Primary | ICD-10-CM

## 2020-11-06 PROBLEM — Z01.818 ENCOUNTER FOR PRE-TRANSPLANT EVALUATION FOR LIVER TRANSPLANT: Status: RESOLVED | Noted: 2020-09-21 | Resolved: 2020-11-06

## 2020-11-06 PROBLEM — Z51.5 ENCOUNTER FOR PALLIATIVE CARE: Status: RESOLVED | Noted: 2020-09-24 | Resolved: 2020-11-06

## 2020-11-06 PROCEDURE — 99344 PR HOME VISIT,NEW PATIENT,LEVEL IV: ICD-10-PCS | Mod: ,,, | Performed by: NURSE PRACTITIONER

## 2020-11-06 PROCEDURE — 99344 HOME/RES VST NEW MOD MDM 60: CPT | Mod: ,,, | Performed by: NURSE PRACTITIONER

## 2020-11-06 NOTE — PROGRESS NOTES
[facility]  Transition of Care Home Visit    Visit Date: 11/6/2020  Encounter Provider: Ho Cook NP  PCP:  SHAHEEN Jaime    PRESENTING HISTORY      Patient ID: Jordan Altman     Consult Requested By:  Ho Cook  Reason for Consult:  Transitional Care Coordination    Chief Complaint: Transitional Care     History of Present Illness:     Mr. 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 He also c/o chronic left knee pain and denies any recent injury. Occult blood test (+) at OSH but HCT stable. Pt denies fever, chills, nausea, vomiting, chest pain, sob, hematuria or changes in bladder function. Will admit to LTS for further evaluation.      Hospital Course:    ESLD 2/2 ETOH. Admitted 10/11 with suspected GIB from OSH. 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 Post op course significant for PVT requiring TB to OR POD#1 for thrombectomy. Hepatic artery also revised (shortened) due to kink. OR POD#2 for abdominal closure, bx, and reconstruction of bile duct. US 10/17 satisfactory. Oliguric JENN requiring CRRT post op. Nephrology following. Stepped down 10/19 afternoon. Drains and bourne out. Cr now improving but up slightly today thought to be due to supratherpeutic prograf level. Good UOP. Pt hydrated with NS bolus and prograf held prior to discharge. Pt was on heparin gtt post op due to PVT. CT abdomen/pelvis 10/22 performed to assess for hematomas prior to transitioning to oral anticoagulation. Imaging reviewed, free fluid in mid upper abdomen/left upper ab- subcutaneous hematoma mid chevron noted, staples intact and no oozing. Transitioned to Eliquis on 10/25. H&H has remained stable. LFTs improving. Liver US 10/26 with stable findings.      Mr. Altman is stable for discharge today. He has no  complaints. Worked with PT/OT daily and progressed to home/outpatient PT/OT. RW ordered for discharge. Pt will f/u with labs and clinic visit tomorrow 10/28. Pt verbalized understanding of discharge instructions and importance of follow up.        Admit Date: 10/11/20  Discharge Date: 10/27/20  TCC Referral Date: 10/28/20  _____________________________________  Today:  Mr. Jordan Altman is a 45 y.o. male being seen and examined at home today for transitional care visit to the home environment post-discharge from inpatient hospitalization encounter described above. Patient presents at baseline state of health as reported by patient and caregiver. VSS. Denies any acute issues, concerns or complaints to address on today's visit. Reports taking all medications as prescribed. No other needs identified at this time. Risks of environmental exposure to coronavirus discussed including: social distancing, hand hygiene, and limiting departures from the home for necessities only. Reports understanding and willingness to comply.      Review of Systems   Constitutional: Positive for activity change. Negative for appetite change, chills, diaphoresis and fever.   HENT: Negative.    Respiratory: Negative for shortness of breath and wheezing.    Cardiovascular: Positive for leg swelling. Negative for chest pain.   Gastrointestinal: Positive for abdominal distention and abdominal pain. Negative for constipation, diarrhea, nausea and vomiting.   Genitourinary: Negative.    Musculoskeletal: Positive for gait problem (r/t post-op weakness).   Skin:        Abdominal chevron incision   Allergic/Immunologic: Positive for immunocompromised state.   Neurological: Positive for weakness (gen). Negative for dizziness.   Hematological: Bruises/bleeds easily.   Psychiatric/Behavioral: Negative.    All other systems reviewed and are negative.    Assessments:  · Environmental: single story home, no steps to enter, adequate lighting and  temeprature control  · Functional Status: Independent with ADL's/IADL's, ambulates with assistance of a cane/walker, continent of bowel and bladder  · Safety: Fall Precautions, COVID Precautions/Social Distancing/Mask Use  · Nutritional: Adequate  · Home Health: none  · DME/Supplies: walker    Ethical / Legal: Advance Care Planning   Capacity to make medical decisions:  yes, Conflict no  · Surrogate decision maker:  Name Valeria Tucker, Relationship: SO  · Advance Directives:  none  · HCPOA: none  · LaPOST:  none  · Code Status:  full    Advanced Care Directives, HCPOA and LaPost forms left in the home for family review, discussion and signing with instructions to return upon their next provider encounter for inclusion to the medical record.     PAST HISTORY:     Past Medical History:   Diagnosis Date    Decompensated hepatic cirrhosis     Hypertension     SBP (spontaneous bacterial peritonitis)        Past Surgical History:   Procedure Laterality Date    ERCP N/A 10/1/2020    Procedure: ERCP (ENDOSCOPIC RETROGRADE CHOLANGIOPANCREATOGRAPHY);  Surgeon: Marcos Ramirez MD;  Location: [facility] ([address] [place] [address]);  Service: Endoscopy;  Laterality: N/A;    ESOPHAGOGASTRODUODENOSCOPY N/A 10/13/2020    Procedure: EGD (ESOPHAGOGASTRODUODENOSCOPY);  Surgeon: Prasanth Jerry MD;  Location: [facility] ([address] [place] [address]);  Service: Endoscopy;  Laterality: N/A;    EXPLORATORY LAPAROTOMY AFTER LIVER TRANSPLANTATION N/A 10/15/2020    Procedure: LAPAROTOMY, EXPLORATORY, AFTER LIVER TRANSPLANT, Possible redo of artery, possible portal thrombectomy. IntraOperative US.;  Surgeon: Curtis Zavaleta MD;  Location: [address];  Service: Transplant;  Laterality: N/A;  With intraoperative ultrasound    LIVER TRANSPLANT N/A 10/14/2020    Procedure: TRANSPLANT, LIVER;  Surgeon: Curtis Zavaleta MD;  Location: [facility] OR [address] [place] [address];  Service: Transplant;  Laterality: N/A;  Intra op HD    RIGHT HEART CATHETERIZATION Right 9/23/2020    Procedure:  INSERTION, CATHETER, RIGHT HEART;  Surgeon: Mikel Costa Jr., MD;  Location: [facility] CATH LAB;  Service: Cardiology;  Laterality: Right;    THORACENTESIS  2011    THROMBECTOMY  10/15/2020    Procedure: THROMBECTOMY portal vein;  Surgeon: Curtis Zavaleta MD;  [facility];  Service: Transplant;;       Family History   Problem Relation Age of Onset    COPD Mother        Social History     Socioeconomic History    Marital status:      Spouse name: Not on file    Number of children: Not on file    Years of education: Not on file    Highest education level: Not on file   Occupational History    Not on file   Social Needs    Financial resource strain: Not on file    Food insecurity     Worry: Not on file     Inability: Not on file    Transportation needs     Medical: Not on file     Non-medical: Not on file   Tobacco Use    Smoking status: Current Some Day Smoker    Smokeless tobacco: Current User   Substance and Sexual Activity    Alcohol use: Yes     Alcohol/week: 112.0 standard drinks     Types: 112 Shots of liquor per week     Comment: fifth of vodka a day. LAST DRINK 7/25/2020 per pt     Drug use: Yes     Frequency: 3.0 times per week     Types: Marijuana    Sexual activity: Yes   Lifestyle    Physical activity     Days per week: Not on file     Minutes per session: Not on file    Stress: Not on file   Relationships    Social connections     Talks on phone: Not on file     Gets together: Not on file     Attends Yazidi service: Not on file     Active member of club or organization: Not on file     Attends meetings of clubs or organizations: Not on file     Relationship status: Not on file   Other Topics Concern    Not on file   Social History Narrative    Not on file       MEDICATIONS & ALLERGIES:     Current Outpatient Medications on File Prior to Visit   Medication Sig Dispense Refill    albuterol (PROVENTIL/VENTOLIN HFA) 90 mcg/actuation inhaler Inhale 2 puffs into  the lungs every 6 (six) hours as needed for Wheezing or Shortness of Breath. Rescue      amLODIPine (NORVASC) 10 MG tablet Take 1 tablet (10 mg total) by mouth once daily. 30 tablet 11    apixaban (ELIQUIS) 5 mg Tab Take 1 tablet (5 mg total) by mouth 2 (two) times daily. 60 tablet 5    bisacodyL (DULCOLAX) 5 mg EC tablet Take 2 tablets (10 mg total) by mouth daily as needed for Constipation.  0    cetirizine (ZYRTEC) 10 MG tablet Take 10 mg by mouth daily as needed.       docusate sodium (COLACE) 100 MG capsule Take 1 capsule (100 mg total) by mouth 3 (three) times daily as needed for Constipation.  0    fluconazole (DIFLUCAN) 200 MG Tab Take 1 tablet (200 mg total) by mouth once daily. STOP 11/13/20 30 tablet 0    mycophenolate (CELLCEPT) 250 mg Cap Take 4 capsules (1,000 mg total) by mouth 2 (two) times daily. 240 capsule 2    [facility] transplant care kit Welcome to [facility].  This is your transplant care kit. 1 kit 0    oxyCODONE (ROXICODONE) 10 mg Tab immediate release tablet Take 1 tablet (10 mg total) by mouth every 8 (eight) hours as needed for Pain. 21 tablet 0    pantoprazole (PROTONIX) 40 MG tablet Take 1 tablet (40 mg total) by mouth once daily. 30 tablet 3    predniSONE (DELTASONE) 5 MG tablet Take by mouth daily: 20 mg 10/20-10/26; 15 mg 10/27-11/2; 10 mg 11/3-11/9; 5 mg 11/10-11/16; STOP 11/17/20 70 tablet 0    sodium bicarbonate 650 MG tablet Take 2 tablets (1,300 mg total) by mouth 2 (two) times daily. 120 tablet 5    sulfamethoxazole-trimethoprim 400-80mg (BACTRIM,SEPTRA) 400-80 mg per tablet Take 1 tablet by mouth every morning. STOP 4/12/21 30 tablet 5    tacrolimus (PROGRAF) 1 MG Cap Take 1 capsule (1 mg total) by mouth every 12 (twelve) hours. 120 capsule 11    valGANciclovir (VALCYTE) 450 mg Tab Take 1 tablet (450 mg total) by mouth once daily. STOP 4/12/21 30 tablet 5     No current facility-administered medications on file prior to visit.         Review of  patient's allergies indicates:   Allergen Reactions    Latex, natural rubber        OBJECTIVE:     Vital Signs:  Vitals:    11/06/20 1142   BP: (!) 146/100   Pulse: 97   Resp: 17   Temp: 98 °F (36.7 °C)     There is no height or weight on file to calculate BMI.       Physical Exam  Vitals signs reviewed.   Constitutional:       General: He is not in acute distress.     Appearance: He is obese. He is ill-appearing. He is not toxic-appearing.   HENT:      Head: Normocephalic.      Mouth/Throat:      Mouth: Mucous membranes are moist.      Pharynx: Oropharynx is clear.   Eyes:      General: Scleral icterus (slight) present.      Extraocular Movements: Extraocular movements intact.      Pupils: Pupils are equal, round, and reactive to light.   Neck:      Musculoskeletal: Normal range of motion and neck supple.   Cardiovascular:      Rate and Rhythm: Normal rate and regular rhythm.      Pulses: Normal pulses.   Pulmonary:      Effort: Pulmonary effort is normal. No respiratory distress.      Breath sounds: No wheezing or rhonchi.   Abdominal:      General: There is distension.      Tenderness: There is no abdominal tenderness. There is guarding.      Comments: Distended/s/p surgery   Musculoskeletal: Normal range of motion.   Skin:     General: Skin is warm and dry.      Capillary Refill: Capillary refill takes less than 2 seconds.      Coloration: Skin is jaundiced (slight).      Comments: Heavily tattooed  Transverse abdominal chevron incision with staples intact - no drainage or s/sx infection   Neurological:      General: No focal deficit present.      Mental Status: He is alert and oriented to person, place, and time. Mental status is at baseline.      Motor: Weakness present.      Gait: Gait abnormal.   Psychiatric:         Mood and Affect: Mood normal.       Laboratory  Lab Results   Component Value Date    WBC 3.89 (L) 11/02/2020    HGB 9.9 (L) 11/02/2020    HCT 33.3 (L) 11/02/2020     (H) 11/02/2020      11/02/2020     Lab Results   Component Value Date    INR 1.0 10/27/2020    INR 1.0 10/26/2020    INR 1.0 10/25/2020     Lab Results   Component Value Date    HGBA1C <4.0 (A) 10/13/2020     No results for input(s): POCTGLUCOSE in the last 72 hours.  TRANSITION OF CARE:     Family and/or Caretaker present at visit?  No.  Diagnostic tests reviewed/disposition: No diagnosic tests pending after this hospitalization.  Disease/illness education: Importance of Compliance with all prescribed medications and treatments, COVID Precautions/Social Distancing/Mask Use  Home health/community services discussion/referrals: Patient does not have home health established from hospital visit.  They do not need home health.  If needed, we will set up home health for the patient.   Establishment or re-establishment of referral orders for community resources: No other necessary community resources.   Discussion with other health care providers: No discussion with other health care providers necessary.     Transition of Care Visit:  I have reviewed and updated the history and problem list. I have reconciled the medication list. I have discussed the hospitalization and current medical issues, prognosis and plans with the patient/family.     Medications Reconciliation:   I have reconciled the patient's home medications and discharge medications with the patient/family. I have updated all changes. Refer to After-Visit Medication List.    Discharge plans, follow-up instructions, future appointments, provider contact information, indicators to seek emergency treatment and encouragement to call for any questions, concerns or clarification of the patient's plan of care explained to patient and/or caregiver(s), whom confirm understanding of provided information and endorse willingness to comply.     ASSESSMENT & PLAN:     HIGH RISK CONDITION(S):  Patient is currently on drug therapy requiring intensive monitoring for toxicity:  jacinta    Jordan was seen today for transitional care.  Diagnoses and all orders for this visit:    Status post liver transplant  - follow up with Liver Tranplant Service as scheduled    Encounter for Support and Coordination of Transition of Care   - [facility] Nurse Practitioner to schedule home visit with patient PRN    Were controlled substances prescribed? No    Instructions for the patient:    Scheduled Follow-up :  Future Appointments   Date Time Provider Department Center   11/9/2020  9:05 AM LAB, TRANSPLANT [facility] LABTX [facility] [address]   11/10/2020  1:40 PM LIVER SURGERY, TRANSPLANT NOM [facility] [address]   11/11/2020  9:15 AM ALFRED Schroeder OP RHB [place]   11/13/2020  8:40 AM Jennifer B. Scheuermann, PA [facility] HEPC [facility] [address]   11/20/2020  9:30 AM [facility] OIC-US1 MASTER [facility] ULTR [facility]       After Visit Medication List :     Medication List          Accurate as of November 6, 2020  5:23 PM. If you have any questions, ask your nurse or doctor.            CONTINUE taking these medications    albuterol 90 mcg/actuation inhaler  Commonly known as: PROVENTIL/VENTOLIN HFA     amLODIPine 10 MG tablet  Commonly known as: NORVASC  Take 1 tablet (10 mg total) by mouth once daily.     bisacodyL 5 mg EC tablet  Commonly known as: DULCOLAX  Take 2 tablets (10 mg total) by mouth daily as needed for Constipation.     cetirizine 10 MG tablet  Commonly known as: ZYRTEC     docusate sodium 100 MG capsule  Commonly known as: COLACE  Take 1 capsule (100 mg total) by mouth 3 (three) times daily as needed for Constipation.     ELIQUIS 5 mg Tab  Generic drug: apixaban  Take 1 tablet (5 mg total) by mouth 2 (two) times daily.     fluconazole 200 MG Tab  Commonly known as: DIFLUCAN  Take 1 tablet (200 mg total) by mouth once daily. STOP 11/13/20     mycophenolate 250 mg Cap  Commonly known as: CELLCEPT  Take 4 capsules (1,000 mg total) by mouth 2 (two) times daily.     opw transplant care kit 1 Kit  Welcome to  [facility].  This is your transplant care kit.     oxyCODONE 10 mg Tab immediate release tablet  Commonly known as: ROXICODONE  Take 1 tablet (10 mg total) by mouth every 8 (eight) hours as needed for Pain.     pantoprazole 40 MG tablet  Commonly known as: PROTONIX  Take 1 tablet (40 mg total) by mouth once daily.     predniSONE 5 MG tablet  Commonly known as: DELTASONE  Take by mouth daily: 20 mg 10/20-10/26; 15 mg 10/27-11/2; 10 mg 11/3-11/9; 5 mg 11/10-11/16; STOP 11/17/20     sodium bicarbonate 650 MG tablet  Take 2 tablets (1,300 mg total) by mouth 2 (two) times daily.     sulfamethoxazole-trimethoprim 400-80mg 400-80 mg per tablet  Commonly known as: BACTRIM,SEPTRA  Take 1 tablet by mouth every morning. STOP 4/12/21     tacrolimus 1 MG Cap  Commonly known as: PROGRAF  Take 1 capsule (1 mg total) by mouth every 12 (twelve) hours.     valGANciclovir 450 mg Tab  Commonly known as: VALCYTE  Take 1 tablet (450 mg total) by mouth once daily. STOP 4/12/21            Patient consent was obtained prior to treatment on this visit.    Attestation: Screening criteria to assess the level of the patient's risk for infection with COVID-19 as recommended by the CDC at the time of the above documented home visit concluded appropriateness to proceed. Universal precautions were maintained at all times, including provider use of >60% alcohol gel hand  immediately prior to entry and upon departing the patient's home as well as cleaning of equipment used in home visit with antibacterial/germicidal disposable wipes.     Signature:      Ho Beltran, MSN, APRN, FNP-C  [facility]    Total Face-to-Face Encounter: 60 minutes with >50% of time spent discussing the care with the patient/family.

## 2020-11-06 NOTE — PATIENT INSTRUCTIONS
- [facility] to schedule follow-up visit with patient as needed.  - Continue all medications, treatments and therapies as ordered.   - Follow all instructions, recommendations as discussed.  - Maintain Safety Precautions at all times.  - Attend all medical appointments as scheduled.  - For worsening symptoms: call Primary Care Physician or Nurse Practitioner.  - For emergencies, call 911 or immediately report to the nearest emergency room.  - Limit Risks of environmental exposure to coronavirus as discussed including: social distancing, hand hygiene, and limiting departures from the home for necessities only.     Your care is important to us. If your provider recommended a follow-up appointment or test, we are happy to help you coordinate your recommended care. It is important that you complete your recommended follow-up. If you need help scheduling, please call [phone]. Appointments can also be made online through the patient portal.  While scheduling and attending your appointments is your responsibility, our goal is to support and empower you throughout that process.    [facility] Nurse Care Line - 24/7 Assistance  Unless otherwise directed by your provider, please contact [facility], our nurse care line that is available for 24/7 assistance.  Registered nurses in the [facility] provide: appointment scheduling, clinical advisement, health education, and other advisory services.  Call: [phone] (toll free)    COVID-19 Prevention   Avoid close contact with people and stay home if youre sick, except to get medical care.   Cover coughs and sneezes with a tissue, or use the inside of your elbow. Immediately wash your hands or use hand .  For more information, see CDC link below:  https://www.cdc.gov/coronavirus/2019-ncov/hcp/guidance-prevent-spread.html#precautions     The following information is provided to all patients.  This information is to help you find  resources for any of the problems found today that may be affecting your health:            Living healthy guide: www.[facility].[place].[facility]    Understanding Diabetes: www.diabetes.org   Eating healthy: www.cdc.gov/healthyweight   CDC home safety checklist: www.cdc.gov/steadi/patient.html  Agency on Aging: www.goea.[place].[facility]    Alcoholics anonymous (AA): www.aa.org   Physical Activity: www.nhan.nih.gov/en2amdu    Tobacco use: www.quitwithusla.org

## 2020-11-07 ENCOUNTER — PATIENT MESSAGE (OUTPATIENT)
Dept: TRANSPLANT | Facility: CLINIC | Age: 45
End: 2020-11-07

## 2020-11-10 ENCOUNTER — LAB VISIT (OUTPATIENT)
Dept: LAB | Facility: HOSPITAL | Age: 45
End: 2020-11-10
Attending: INTERNAL MEDICINE
Payer: MEDICAID

## 2020-11-10 ENCOUNTER — OFFICE VISIT (OUTPATIENT)
Dept: TRANSPLANT | Facility: CLINIC | Age: 45
End: 2020-11-10
Payer: MEDICAID

## 2020-11-10 ENCOUNTER — TELEPHONE (OUTPATIENT)
Dept: TRANSPLANT | Facility: CLINIC | Age: 45
End: 2020-11-10

## 2020-11-10 VITALS
OXYGEN SATURATION: 95 % | BODY MASS INDEX: 28.97 KG/M2 | HEIGHT: 72 IN | RESPIRATION RATE: 18 BRPM | SYSTOLIC BLOOD PRESSURE: 130 MMHG | DIASTOLIC BLOOD PRESSURE: 83 MMHG | WEIGHT: 213.88 LBS | HEART RATE: 95 BPM | TEMPERATURE: 96 F

## 2020-11-10 DIAGNOSIS — Z94.4 LIVER REPLACED BY TRANSPLANT: ICD-10-CM

## 2020-11-10 DIAGNOSIS — Z94.4 STATUS POST LIVER TRANSPLANT: Primary | ICD-10-CM

## 2020-11-10 DIAGNOSIS — Z51.81 MEDICATION MONITORING ENCOUNTER: ICD-10-CM

## 2020-11-10 DIAGNOSIS — Z79.60 LONG-TERM USE OF IMMUNOSUPPRESSANT MEDICATION: ICD-10-CM

## 2020-11-10 LAB
BASOPHILS # BLD AUTO: 0.08 K/UL (ref 0–0.2)
BASOPHILS NFR BLD: 1.9 % (ref 0–1.9)
DIFFERENTIAL METHOD: ABNORMAL
EOSINOPHIL # BLD AUTO: 0.2 K/UL (ref 0–0.5)
EOSINOPHIL NFR BLD: 4.3 % (ref 0–8)
ERYTHROCYTE [DISTWIDTH] IN BLOOD BY AUTOMATED COUNT: 15.5 % (ref 11.5–14.5)
HCT VFR BLD AUTO: 35 % (ref 40–54)
HGB BLD-MCNC: 10.5 G/DL (ref 14–18)
IMM GRANULOCYTES # BLD AUTO: 0.05 K/UL (ref 0–0.04)
IMM GRANULOCYTES NFR BLD AUTO: 1.2 % (ref 0–0.5)
LYMPHOCYTES # BLD AUTO: 0.7 K/UL (ref 1–4.8)
LYMPHOCYTES NFR BLD: 17.3 % (ref 18–48)
MCH RBC QN AUTO: 29.7 PG (ref 27–31)
MCHC RBC AUTO-ENTMCNC: 30 G/DL (ref 32–36)
MCV RBC AUTO: 99 FL (ref 82–98)
MONOCYTES # BLD AUTO: 0.5 K/UL (ref 0.3–1)
MONOCYTES NFR BLD: 10.7 % (ref 4–15)
NEUTROPHILS # BLD AUTO: 2.7 K/UL (ref 1.8–7.7)
NEUTROPHILS NFR BLD: 64.6 % (ref 38–73)
NRBC BLD-RTO: 0 /100 WBC
PLATELET # BLD AUTO: 248 K/UL (ref 150–350)
PMV BLD AUTO: 10.3 FL (ref 9.2–12.9)
RBC # BLD AUTO: 3.54 M/UL (ref 4.6–6.2)
WBC # BLD AUTO: 4.21 K/UL (ref 3.9–12.7)

## 2020-11-10 PROCEDURE — 99999 PR PBB SHADOW E&M-EST. PATIENT-LVL III: CPT | Mod: PBBFAC,,,

## 2020-11-10 PROCEDURE — 36415 COLL VENOUS BLD VENIPUNCTURE: CPT | Mod: PO

## 2020-11-10 PROCEDURE — 85025 COMPLETE CBC W/AUTO DIFF WBC: CPT

## 2020-11-10 PROCEDURE — 80053 COMPREHEN METABOLIC PANEL: CPT

## 2020-11-10 PROCEDURE — 99999 PR PBB SHADOW E&M-EST. PATIENT-LVL III: ICD-10-PCS | Mod: PBBFAC,,,

## 2020-11-10 PROCEDURE — 80197 ASSAY OF TACROLIMUS: CPT

## 2020-11-10 PROCEDURE — 87522 HEPATITIS C REVRS TRNSCRPJ: CPT

## 2020-11-10 PROCEDURE — 86803 HEPATITIS C AB TEST: CPT

## 2020-11-10 PROCEDURE — 99215 PR OFFICE/OUTPT VISIT, EST, LEVL V, 40-54 MIN: ICD-10-PCS | Mod: 24,S$PBB,, | Performed by: TRANSPLANT SURGERY

## 2020-11-10 PROCEDURE — 99213 OFFICE O/P EST LOW 20 MIN: CPT | Mod: PBBFAC

## 2020-11-10 PROCEDURE — 99215 OFFICE O/P EST HI 40 MIN: CPT | Mod: 24,S$PBB,, | Performed by: TRANSPLANT SURGERY

## 2020-11-10 NOTE — TELEPHONE ENCOUNTER
Post ransplant Clinic Follow-up:    MARCELLA met with patient and patient girlfriend Valeria in post surgery clinic.   Patient presents alert and oriented x 4 engaged, communicative and asking and answering questions appropriately.    Pt denies coping issues.   Patient reports they are getting settled in their new apartment in [place].       SW inquired if patient had enrolled in substance abuse rehab treatment yet.   Pt reports with getting settled in new apartment and covid he has not enrolled anywhere.  Pt acknowledges that he does have resource information for substance abuse rehab at home.  SW did provide patient with substance abuse rehab information in writing today, including virtual and online IOP and AA information, AA sign in sheet, [facility] IOP resources and signed VIVIENNE for patient to complete once enrolled.   Patient with no further psychosocial concerns.     Patient will return to clinic in 2 weeks to meet with  to ensured engagement/enrollment in IOP and AA/12 step meetings for relapse prevention post liver transplantation.   MARCELLA remains available for all transplant resources, education and psychosocial support.

## 2020-11-11 DIAGNOSIS — Z94.4 LIVER TRANSPLANTED: ICD-10-CM

## 2020-11-11 LAB
ALBUMIN SERPL BCP-MCNC: 3.4 G/DL (ref 3.5–5.2)
ALP SERPL-CCNC: 69 U/L (ref 55–135)
ALT SERPL W/O P-5'-P-CCNC: 9 U/L (ref 10–44)
ANION GAP SERPL CALC-SCNC: 10 MMOL/L (ref 8–16)
AST SERPL-CCNC: 13 U/L (ref 10–40)
BILIRUB SERPL-MCNC: 1.9 MG/DL (ref 0.1–1)
BUN SERPL-MCNC: 14 MG/DL (ref 6–20)
CALCIUM SERPL-MCNC: 9.3 MG/DL (ref 8.7–10.5)
CHLORIDE SERPL-SCNC: 105 MMOL/L (ref 95–110)
CO2 SERPL-SCNC: 25 MMOL/L (ref 23–29)
CREAT SERPL-MCNC: 1.2 MG/DL (ref 0.5–1.4)
EST. GFR  (AFRICAN AMERICAN): >60 ML/MIN/1.73 M^2
EST. GFR  (NON AFRICAN AMERICAN): >60 ML/MIN/1.73 M^2
GLUCOSE SERPL-MCNC: 86 MG/DL (ref 70–110)
HCV AB SERPL QL IA: POSITIVE
POTASSIUM SERPL-SCNC: 4.6 MMOL/L (ref 3.5–5.1)
PROT SERPL-MCNC: 5.9 G/DL (ref 6–8.4)
SODIUM SERPL-SCNC: 140 MMOL/L (ref 136–145)
TACROLIMUS BLD-MCNC: 5 NG/ML (ref 5–15)

## 2020-11-11 RX ORDER — TACROLIMUS 1 MG/1
2 CAPSULE ORAL EVERY 12 HOURS
Qty: 120 CAPSULE | Refills: 11 | Status: SHIPPED | OUTPATIENT
Start: 2020-11-11 | End: 2020-11-18 | Stop reason: DRUGHIGH

## 2020-11-11 NOTE — TELEPHONE ENCOUNTER
Called pt to discuss.  Instructed pt to start taking Prograf 2 mg by mouth twice daily and repeat labs on Monday.  ----- Message from Olman Jenkins MD sent at 11/11/2020  1:55 PM CST -----  fk 2 bid

## 2020-11-12 ENCOUNTER — PATIENT MESSAGE (OUTPATIENT)
Dept: ADMINISTRATIVE | Facility: OTHER | Age: 45
End: 2020-11-12

## 2020-11-13 ENCOUNTER — TELEPHONE (OUTPATIENT)
Dept: HEPATOLOGY | Facility: CLINIC | Age: 45
End: 2020-11-13

## 2020-11-13 ENCOUNTER — PATIENT MESSAGE (OUTPATIENT)
Dept: TRANSPLANT | Facility: CLINIC | Age: 45
End: 2020-11-13

## 2020-11-13 LAB
HCV RNA SERPL NAA+PROBE-LOG IU: 6.31 LOG (10) IU/ML
HCV RNA SERPL QL NAA+PROBE: DETECTED IU/ML
HCV RNA SPEC NAA+PROBE-ACNC: ABNORMAL IU/ML

## 2020-11-13 RX ORDER — OXYCODONE HYDROCHLORIDE 10 MG/1
10 TABLET ORAL EVERY 8 HOURS PRN
Qty: 21 TABLET | Refills: 0 | Status: SHIPPED | OUTPATIENT
Start: 2020-11-13 | End: 2020-11-13 | Stop reason: CLARIF

## 2020-11-13 RX ORDER — OXYCODONE HYDROCHLORIDE 10 MG/1
10 TABLET ORAL EVERY 8 HOURS PRN
Qty: 21 TABLET | Refills: 0 | Status: SHIPPED | OUTPATIENT
Start: 2020-11-13 | End: 2020-11-13 | Stop reason: SDUPTHER

## 2020-11-13 NOTE — TELEPHONE ENCOUNTER
----- Message from Vale Moore sent at 11/13/2020  1:24 PM CST -----  Regarding: Refill  Contact: Jordan Carbone is calling to get refill on pain medications.      [phone]        [facility] [place]    Phone# [phone]

## 2020-11-13 NOTE — TELEPHONE ENCOUNTER
E-RX sent to MD for approval.  Pt called back to report the requested pharmacy is closed and he will not be able to get medication.  E-RX sent to Dr. Jenkins for approval.  Existing RX cancelled.  ----- Message from Vale Moore sent at 11/13/2020  1:24 PM CST -----  Regarding: Refill  Contact: Jordan Carbone is calling to get refill on pain medications.      [phone]        [facility] [place]    Phone# [phone]

## 2020-11-13 NOTE — TELEPHONE ENCOUNTER
pls r/s appt  Since he has [facility] let's see if he can do Video Visit so we're not worried about transportation issues    thanks

## 2020-11-13 NOTE — TELEPHONE ENCOUNTER
Spoke with pt, appointment rescheduled as virtual visit as requested. Reminder notice mailed to pt as well.

## 2020-11-14 RX ORDER — OXYCODONE HYDROCHLORIDE 10 MG/1
10 TABLET ORAL EVERY 8 HOURS PRN
Qty: 21 TABLET | Refills: 0 | Status: SHIPPED | OUTPATIENT
Start: 2020-11-14 | End: 2020-11-24 | Stop reason: SDUPTHER

## 2020-11-15 ENCOUNTER — PATIENT MESSAGE (OUTPATIENT)
Dept: TRANSPLANT | Facility: CLINIC | Age: 45
End: 2020-11-15

## 2020-11-16 ENCOUNTER — LAB VISIT (OUTPATIENT)
Dept: LAB | Facility: HOSPITAL | Age: 45
End: 2020-11-16
Attending: SURGERY
Payer: MEDICAID

## 2020-11-16 DIAGNOSIS — Z94.4 LIVER REPLACED BY TRANSPLANT: ICD-10-CM

## 2020-11-16 LAB
ALBUMIN SERPL BCP-MCNC: 3.4 G/DL (ref 3.5–5.2)
ALP SERPL-CCNC: 84 U/L (ref 55–135)
ALT SERPL W/O P-5'-P-CCNC: 7 U/L (ref 10–44)
ANION GAP SERPL CALC-SCNC: 8 MMOL/L (ref 8–16)
AST SERPL-CCNC: 12 U/L (ref 10–40)
BASOPHILS # BLD AUTO: 0.07 K/UL (ref 0–0.2)
BASOPHILS NFR BLD: 1.6 % (ref 0–1.9)
BILIRUB SERPL-MCNC: 1.4 MG/DL (ref 0.1–1)
BUN SERPL-MCNC: 22 MG/DL (ref 6–20)
CALCIUM SERPL-MCNC: 9.6 MG/DL (ref 8.7–10.5)
CHLORIDE SERPL-SCNC: 103 MMOL/L (ref 95–110)
CO2 SERPL-SCNC: 26 MMOL/L (ref 23–29)
CREAT SERPL-MCNC: 1.7 MG/DL (ref 0.5–1.4)
DIFFERENTIAL METHOD: ABNORMAL
EOSINOPHIL # BLD AUTO: 0.1 K/UL (ref 0–0.5)
EOSINOPHIL NFR BLD: 1.4 % (ref 0–8)
ERYTHROCYTE [DISTWIDTH] IN BLOOD BY AUTOMATED COUNT: 14.6 % (ref 11.5–14.5)
EST. GFR  (AFRICAN AMERICAN): 54.3 ML/MIN/1.73 M^2
EST. GFR  (NON AFRICAN AMERICAN): 47 ML/MIN/1.73 M^2
FUNGUS SPEC CULT: NORMAL
GLUCOSE SERPL-MCNC: 80 MG/DL (ref 70–110)
HCT VFR BLD AUTO: 35 % (ref 40–54)
HGB BLD-MCNC: 10.5 G/DL (ref 14–18)
IMM GRANULOCYTES # BLD AUTO: 0.04 K/UL (ref 0–0.04)
IMM GRANULOCYTES NFR BLD AUTO: 0.9 % (ref 0–0.5)
LYMPHOCYTES # BLD AUTO: 1.1 K/UL (ref 1–4.8)
LYMPHOCYTES NFR BLD: 25.6 % (ref 18–48)
MCH RBC QN AUTO: 28.8 PG (ref 27–31)
MCHC RBC AUTO-ENTMCNC: 30 G/DL (ref 32–36)
MCV RBC AUTO: 96 FL (ref 82–98)
MONOCYTES # BLD AUTO: 0.4 K/UL (ref 0.3–1)
MONOCYTES NFR BLD: 10.2 % (ref 4–15)
NEUTROPHILS # BLD AUTO: 2.6 K/UL (ref 1.8–7.7)
NEUTROPHILS NFR BLD: 60.3 % (ref 38–73)
NRBC BLD-RTO: 0 /100 WBC
PLATELET # BLD AUTO: 221 K/UL (ref 150–350)
PMV BLD AUTO: 10 FL (ref 9.2–12.9)
POTASSIUM SERPL-SCNC: 5.2 MMOL/L (ref 3.5–5.1)
PROT SERPL-MCNC: 6 G/DL (ref 6–8.4)
RBC # BLD AUTO: 3.64 M/UL (ref 4.6–6.2)
SODIUM SERPL-SCNC: 137 MMOL/L (ref 136–145)
WBC # BLD AUTO: 4.33 K/UL (ref 3.9–12.7)

## 2020-11-16 PROCEDURE — 85025 COMPLETE CBC W/AUTO DIFF WBC: CPT

## 2020-11-16 PROCEDURE — 80197 ASSAY OF TACROLIMUS: CPT

## 2020-11-16 PROCEDURE — 36415 COLL VENOUS BLD VENIPUNCTURE: CPT | Mod: PO

## 2020-11-16 PROCEDURE — 80053 COMPREHEN METABOLIC PANEL: CPT

## 2020-11-17 ENCOUNTER — TELEPHONE (OUTPATIENT)
Dept: TRANSPLANT | Facility: CLINIC | Age: 45
End: 2020-11-17

## 2020-11-17 ENCOUNTER — CLINICAL SUPPORT (OUTPATIENT)
Dept: REHABILITATION | Facility: HOSPITAL | Age: 45
End: 2020-11-17
Payer: MEDICAID

## 2020-11-17 DIAGNOSIS — R53.1 GENERALIZED WEAKNESS: ICD-10-CM

## 2020-11-17 DIAGNOSIS — Z94.4 STATUS POST LIVER TRANSPLANT: Primary | ICD-10-CM

## 2020-11-17 LAB — TACROLIMUS BLD-MCNC: 15.4 NG/ML (ref 5–15)

## 2020-11-17 PROCEDURE — 97161 PT EVAL LOW COMPLEX 20 MIN: CPT

## 2020-11-17 PROCEDURE — 97110 THERAPEUTIC EXERCISES: CPT

## 2020-11-18 ENCOUNTER — PATIENT MESSAGE (OUTPATIENT)
Dept: TRANSPLANT | Facility: CLINIC | Age: 45
End: 2020-11-18

## 2020-11-18 DIAGNOSIS — Z94.4 LIVER TRANSPLANTED: ICD-10-CM

## 2020-11-18 NOTE — TELEPHONE ENCOUNTER
Labs reviewed by Dr. Zavaleta in office.  Labs not drawn today as scheduled, Prograf is on hold.  Per MD, have pt restart Prograf at 2 mg in AM and 1 mg in PM.  Pt will have labs drawn tomorrow.  Called pt to advise.  Pt repeated instructions and verbalized understanding.

## 2020-11-19 ENCOUNTER — LAB VISIT (OUTPATIENT)
Dept: LAB | Facility: HOSPITAL | Age: 45
End: 2020-11-19
Attending: SURGERY
Payer: MEDICAID

## 2020-11-19 ENCOUNTER — PATIENT MESSAGE (OUTPATIENT)
Dept: ADMINISTRATIVE | Facility: OTHER | Age: 45
End: 2020-11-19

## 2020-11-19 DIAGNOSIS — Z94.4 LIVER REPLACED BY TRANSPLANT: ICD-10-CM

## 2020-11-19 LAB
ALBUMIN SERPL BCP-MCNC: 3.3 G/DL (ref 3.5–5.2)
ALP SERPL-CCNC: 93 U/L (ref 55–135)
ALT SERPL W/O P-5'-P-CCNC: 8 U/L (ref 10–44)
ANION GAP SERPL CALC-SCNC: 12 MMOL/L (ref 8–16)
AST SERPL-CCNC: 12 U/L (ref 10–40)
BASOPHILS # BLD AUTO: 0.06 K/UL (ref 0–0.2)
BASOPHILS NFR BLD: 1.6 % (ref 0–1.9)
BILIRUB SERPL-MCNC: 1.2 MG/DL (ref 0.1–1)
BUN SERPL-MCNC: 26 MG/DL (ref 6–20)
CALCIUM SERPL-MCNC: 9.9 MG/DL (ref 8.7–10.5)
CHLORIDE SERPL-SCNC: 107 MMOL/L (ref 95–110)
CO2 SERPL-SCNC: 23 MMOL/L (ref 23–29)
CREAT SERPL-MCNC: 1.9 MG/DL (ref 0.5–1.4)
DIFFERENTIAL METHOD: ABNORMAL
EOSINOPHIL # BLD AUTO: 0 K/UL (ref 0–0.5)
EOSINOPHIL NFR BLD: 1 % (ref 0–8)
ERYTHROCYTE [DISTWIDTH] IN BLOOD BY AUTOMATED COUNT: 14.5 % (ref 11.5–14.5)
EST. GFR  (AFRICAN AMERICAN): 47.5 ML/MIN/1.73 M^2
EST. GFR  (NON AFRICAN AMERICAN): 41.1 ML/MIN/1.73 M^2
GLUCOSE SERPL-MCNC: 102 MG/DL (ref 70–110)
HCT VFR BLD AUTO: 34.2 % (ref 40–54)
HGB BLD-MCNC: 10.4 G/DL (ref 14–18)
IMM GRANULOCYTES # BLD AUTO: 0.02 K/UL (ref 0–0.04)
IMM GRANULOCYTES NFR BLD AUTO: 0.5 % (ref 0–0.5)
LYMPHOCYTES # BLD AUTO: 0.9 K/UL (ref 1–4.8)
LYMPHOCYTES NFR BLD: 22.7 % (ref 18–48)
MCH RBC QN AUTO: 29.1 PG (ref 27–31)
MCHC RBC AUTO-ENTMCNC: 30.4 G/DL (ref 32–36)
MCV RBC AUTO: 96 FL (ref 82–98)
MONOCYTES # BLD AUTO: 0.4 K/UL (ref 0.3–1)
MONOCYTES NFR BLD: 9.9 % (ref 4–15)
NEUTROPHILS # BLD AUTO: 2.5 K/UL (ref 1.8–7.7)
NEUTROPHILS NFR BLD: 64.3 % (ref 38–73)
NRBC BLD-RTO: 0 /100 WBC
PLATELET # BLD AUTO: 239 K/UL (ref 150–350)
PMV BLD AUTO: 10.5 FL (ref 9.2–12.9)
POTASSIUM SERPL-SCNC: 5.7 MMOL/L (ref 3.5–5.1)
PROT SERPL-MCNC: 5.9 G/DL (ref 6–8.4)
RBC # BLD AUTO: 3.57 M/UL (ref 4.6–6.2)
SODIUM SERPL-SCNC: 142 MMOL/L (ref 136–145)
WBC # BLD AUTO: 3.84 K/UL (ref 3.9–12.7)

## 2020-11-19 PROCEDURE — 80053 COMPREHEN METABOLIC PANEL: CPT

## 2020-11-19 PROCEDURE — 85025 COMPLETE CBC W/AUTO DIFF WBC: CPT

## 2020-11-19 PROCEDURE — 36415 COLL VENOUS BLD VENIPUNCTURE: CPT | Mod: PO

## 2020-11-19 PROCEDURE — 80197 ASSAY OF TACROLIMUS: CPT

## 2020-11-19 RX ORDER — TACROLIMUS 1 MG/1
CAPSULE ORAL
Qty: 90 CAPSULE | Refills: 11 | Status: SHIPPED | OUTPATIENT
Start: 2020-11-19 | End: 2020-11-20 | Stop reason: DRUGHIGH

## 2020-11-20 ENCOUNTER — TELEPHONE (OUTPATIENT)
Dept: TRANSPLANT | Facility: CLINIC | Age: 45
End: 2020-11-20

## 2020-11-20 ENCOUNTER — HOSPITAL ENCOUNTER (OUTPATIENT)
Dept: RADIOLOGY | Facility: HOSPITAL | Age: 45
Discharge: HOME | End: 2020-11-20
Attending: SURGERY
Payer: MEDICAID

## 2020-11-20 ENCOUNTER — PATIENT MESSAGE (OUTPATIENT)
Dept: TRANSPLANT | Facility: CLINIC | Age: 45
End: 2020-11-20

## 2020-11-20 DIAGNOSIS — Z94.4 LIVER REPLACED BY TRANSPLANT: Primary | ICD-10-CM

## 2020-11-20 DIAGNOSIS — Z94.4 LIVER REPLACED BY TRANSPLANT: ICD-10-CM

## 2020-11-20 DIAGNOSIS — Z94.4 LIVER TRANSPLANTED: Primary | ICD-10-CM

## 2020-11-20 DIAGNOSIS — Z94.4 LIVER TRANSPLANTED: ICD-10-CM

## 2020-11-20 LAB — TACROLIMUS BLD-MCNC: 5.5 NG/ML (ref 5–15)

## 2020-11-20 PROCEDURE — 93975 US LIVER TRANSPLANT POST: ICD-10-PCS | Mod: 26,,, | Performed by: RADIOLOGY

## 2020-11-20 PROCEDURE — 76705 US LIVER TRANSPLANT POST: ICD-10-PCS | Mod: 26,XS,, | Performed by: RADIOLOGY

## 2020-11-20 PROCEDURE — 93975 VASCULAR STUDY: CPT | Mod: 26,,, | Performed by: RADIOLOGY

## 2020-11-20 PROCEDURE — 93975 VASCULAR STUDY: CPT | Mod: TC

## 2020-11-20 PROCEDURE — 76705 ECHO EXAM OF ABDOMEN: CPT | Mod: 26,XS,, | Performed by: RADIOLOGY

## 2020-11-20 RX ORDER — OXYCODONE HYDROCHLORIDE 5 MG/1
5 TABLET ORAL EVERY 8 HOURS PRN
Qty: 21 TABLET | Refills: 0 | Status: CANCELLED | OUTPATIENT
Start: 2020-11-20

## 2020-11-20 NOTE — TELEPHONE ENCOUNTER
Called pt to discuss labs and US results.  Instructed to start taking Prograf 1 mg by mouth twice daily and repeat labs on Monday.  Also notified pt that ultrasound will be repeated next week.  Pt repeated instructions and verbalized understanding.  ----- Message from Olman Jenkins MD sent at 11/20/2020  1:35 PM CST -----  fk 1 bid

## 2020-11-21 ENCOUNTER — HOSPITAL ENCOUNTER (EMERGENCY)
Facility: HOSPITAL | Age: 45
Discharge: HOME | End: 2020-11-21
Attending: EMERGENCY MEDICINE
Payer: MEDICAID

## 2020-11-21 VITALS
HEART RATE: 99 BPM | TEMPERATURE: 98 F | RESPIRATION RATE: 20 BRPM | SYSTOLIC BLOOD PRESSURE: 158 MMHG | DIASTOLIC BLOOD PRESSURE: 89 MMHG | BODY MASS INDEX: 29.26 KG/M2 | HEIGHT: 72 IN | OXYGEN SATURATION: 96 % | WEIGHT: 216.06 LBS

## 2020-11-21 DIAGNOSIS — E87.5 HYPERKALEMIA: Primary | ICD-10-CM

## 2020-11-21 DIAGNOSIS — E87.8 ELECTROLYTE ABNORMALITY: ICD-10-CM

## 2020-11-21 LAB
ALBUMIN SERPL BCP-MCNC: 3.2 G/DL (ref 3.5–5.2)
ALP SERPL-CCNC: 109 U/L (ref 55–135)
ALT SERPL W/O P-5'-P-CCNC: 9 U/L (ref 10–44)
AMMONIA PLAS-SCNC: 83 UMOL/L (ref 10–50)
ANION GAP SERPL CALC-SCNC: 13 MMOL/L (ref 8–16)
ANION GAP SERPL CALC-SCNC: 8 MMOL/L (ref 8–16)
APTT BLDCRRT: 32.8 SEC (ref 21–32)
AST SERPL-CCNC: 16 U/L (ref 10–40)
BASOPHILS # BLD AUTO: 0.05 K/UL (ref 0–0.2)
BASOPHILS NFR BLD: 1.5 % (ref 0–1.9)
BILIRUB SERPL-MCNC: 1 MG/DL (ref 0.1–1)
BILIRUB UR QL STRIP: NEGATIVE
BUN SERPL-MCNC: 21 MG/DL (ref 6–20)
BUN SERPL-MCNC: 27 MG/DL (ref 6–20)
CALCIUM SERPL-MCNC: 8.7 MG/DL (ref 8.7–10.5)
CALCIUM SERPL-MCNC: 9.6 MG/DL (ref 8.7–10.5)
CHLORIDE SERPL-SCNC: 106 MMOL/L (ref 95–110)
CHLORIDE SERPL-SCNC: 110 MMOL/L (ref 95–110)
CLARITY UR: CLEAR
CO2 SERPL-SCNC: 21 MMOL/L (ref 23–29)
CO2 SERPL-SCNC: 25 MMOL/L (ref 23–29)
COLOR UR: YELLOW
CREAT SERPL-MCNC: 1.5 MG/DL (ref 0.5–1.4)
CREAT SERPL-MCNC: 1.9 MG/DL (ref 0.5–1.4)
DIFFERENTIAL METHOD: ABNORMAL
EOSINOPHIL # BLD AUTO: 0 K/UL (ref 0–0.5)
EOSINOPHIL NFR BLD: 0.9 % (ref 0–8)
ERYTHROCYTE [DISTWIDTH] IN BLOOD BY AUTOMATED COUNT: 14.1 % (ref 11.5–14.5)
EST. GFR  (AFRICAN AMERICAN): 47 ML/MIN/1.73 M^2
EST. GFR  (AFRICAN AMERICAN): >60 ML/MIN/1.73 M^2
EST. GFR  (NON AFRICAN AMERICAN): 41 ML/MIN/1.73 M^2
EST. GFR  (NON AFRICAN AMERICAN): 55 ML/MIN/1.73 M^2
GLUCOSE SERPL-MCNC: 100 MG/DL (ref 70–110)
GLUCOSE SERPL-MCNC: 137 MG/DL (ref 70–110)
GLUCOSE UR QL STRIP: NEGATIVE
HCT VFR BLD AUTO: 30.1 % (ref 40–54)
HGB BLD-MCNC: 9.3 G/DL (ref 14–18)
HGB UR QL STRIP: NEGATIVE
IMM GRANULOCYTES # BLD AUTO: 0.03 K/UL (ref 0–0.04)
IMM GRANULOCYTES NFR BLD AUTO: 0.9 % (ref 0–0.5)
INR PPP: 1 (ref 0.8–1.2)
KETONES UR QL STRIP: NEGATIVE
LEUKOCYTE ESTERASE UR QL STRIP: NEGATIVE
LYMPHOCYTES # BLD AUTO: 0.7 K/UL (ref 1–4.8)
LYMPHOCYTES NFR BLD: 19.5 % (ref 18–48)
MCH RBC QN AUTO: 28.9 PG (ref 27–31)
MCHC RBC AUTO-ENTMCNC: 30.9 G/DL (ref 32–36)
MCV RBC AUTO: 94 FL (ref 82–98)
MONOCYTES # BLD AUTO: 0.3 K/UL (ref 0.3–1)
MONOCYTES NFR BLD: 8.2 % (ref 4–15)
NEUTROPHILS # BLD AUTO: 2.4 K/UL (ref 1.8–7.7)
NEUTROPHILS NFR BLD: 69 % (ref 38–73)
NITRITE UR QL STRIP: NEGATIVE
NRBC BLD-RTO: 0 /100 WBC
PH UR STRIP: 7 [PH] (ref 5–8)
PLATELET # BLD AUTO: 195 K/UL (ref 150–350)
PMV BLD AUTO: 9.5 FL (ref 9.2–12.9)
POTASSIUM SERPL-SCNC: 5 MMOL/L (ref 3.5–5.1)
POTASSIUM SERPL-SCNC: 5.8 MMOL/L (ref 3.5–5.1)
PROT SERPL-MCNC: 5.8 G/DL (ref 6–8.4)
PROT UR QL STRIP: NEGATIVE
PROTHROMBIN TIME: 11 SEC (ref 9–12.5)
RBC # BLD AUTO: 3.22 M/UL (ref 4.6–6.2)
SARS-COV-2 RDRP RESP QL NAA+PROBE: NEGATIVE
SODIUM SERPL-SCNC: 140 MMOL/L (ref 136–145)
SODIUM SERPL-SCNC: 143 MMOL/L (ref 136–145)
SP GR UR STRIP: 1.01 (ref 1–1.03)
URN SPEC COLLECT METH UR: NORMAL
UROBILINOGEN UR STRIP-ACNC: NEGATIVE EU/DL
WBC # BLD AUTO: 3.43 K/UL (ref 3.9–12.7)

## 2020-11-21 PROCEDURE — 25000003 PHARM REV CODE 250: Performed by: EMERGENCY MEDICINE

## 2020-11-21 PROCEDURE — 80053 COMPREHEN METABOLIC PANEL: CPT

## 2020-11-21 PROCEDURE — 82140 ASSAY OF AMMONIA: CPT

## 2020-11-21 PROCEDURE — 93010 EKG 12-LEAD: ICD-10-PCS | Mod: ,,, | Performed by: INTERNAL MEDICINE

## 2020-11-21 PROCEDURE — 96360 HYDRATION IV INFUSION INIT: CPT

## 2020-11-21 PROCEDURE — 80197 ASSAY OF TACROLIMUS: CPT

## 2020-11-21 PROCEDURE — 96361 HYDRATE IV INFUSION ADD-ON: CPT

## 2020-11-21 PROCEDURE — 85025 COMPLETE CBC W/AUTO DIFF WBC: CPT

## 2020-11-21 PROCEDURE — U0002 COVID-19 LAB TEST NON-CDC: HCPCS

## 2020-11-21 PROCEDURE — 81003 URINALYSIS AUTO W/O SCOPE: CPT

## 2020-11-21 PROCEDURE — 93005 ELECTROCARDIOGRAM TRACING: CPT

## 2020-11-21 PROCEDURE — 93010 ELECTROCARDIOGRAM REPORT: CPT | Mod: ,,, | Performed by: INTERNAL MEDICINE

## 2020-11-21 PROCEDURE — 85730 THROMBOPLASTIN TIME PARTIAL: CPT

## 2020-11-21 PROCEDURE — 99284 EMERGENCY DEPT VISIT MOD MDM: CPT | Mod: 25

## 2020-11-21 PROCEDURE — 63600175 PHARM REV CODE 636 W HCPCS: Performed by: EMERGENCY MEDICINE

## 2020-11-21 PROCEDURE — 85610 PROTHROMBIN TIME: CPT

## 2020-11-21 PROCEDURE — 80048 BASIC METABOLIC PNL TOTAL CA: CPT

## 2020-11-21 RX ORDER — TACROLIMUS 1 MG/1
1 CAPSULE ORAL 2 TIMES DAILY
Status: DISCONTINUED | OUTPATIENT
Start: 2020-11-21 | End: 2020-11-22 | Stop reason: HOSPADM

## 2020-11-21 RX ORDER — SODIUM BICARBONATE 650 MG/1
1300 TABLET ORAL 2 TIMES DAILY
Status: DISCONTINUED | OUTPATIENT
Start: 2020-11-21 | End: 2020-11-22 | Stop reason: HOSPADM

## 2020-11-21 RX ORDER — MYCOPHENOLATE MOFETIL 250 MG/1
1000 CAPSULE ORAL 2 TIMES DAILY
Status: DISCONTINUED | OUTPATIENT
Start: 2020-11-21 | End: 2020-11-22 | Stop reason: HOSPADM

## 2020-11-21 RX ORDER — OXYCODONE HYDROCHLORIDE 5 MG/1
10 TABLET ORAL ONCE
Status: COMPLETED | OUTPATIENT
Start: 2020-11-21 | End: 2020-11-21

## 2020-11-21 RX ORDER — TACROLIMUS 1 MG/1
1 CAPSULE ORAL 2 TIMES DAILY
Status: DISCONTINUED | OUTPATIENT
Start: 2020-11-21 | End: 2020-11-21

## 2020-11-21 RX ADMIN — SODIUM CHLORIDE 500 ML: 0.9 INJECTION, SOLUTION INTRAVENOUS at 03:11

## 2020-11-21 RX ADMIN — SODIUM BICARBONATE 1300 MG: 650 TABLET ORAL at 08:11

## 2020-11-21 RX ADMIN — TACROLIMUS 1 MG: 1 CAPSULE ORAL at 08:11

## 2020-11-21 RX ADMIN — SODIUM POLYSTYRENE SULFONATE 30 G: 15 SUSPENSION ORAL; RECTAL at 08:11

## 2020-11-21 RX ADMIN — APIXABAN 5 MG: 2.5 TABLET, FILM COATED ORAL at 08:11

## 2020-11-21 RX ADMIN — SODIUM CHLORIDE 500 ML: 0.9 INJECTION, SOLUTION INTRAVENOUS at 07:11

## 2020-11-21 RX ADMIN — SODIUM POLYSTYRENE SULFONATE 30 G: 15 SUSPENSION ORAL; RECTAL at 03:11

## 2020-11-21 RX ADMIN — MYCOPHENOLATE MOFETIL 1000 MG: 250 CAPSULE ORAL at 08:11

## 2020-11-21 RX ADMIN — OXYCODONE 10 MG: 5 TABLET ORAL at 08:11

## 2020-11-21 NOTE — ED NOTES
Pt sitting up on ED stretcher in NAD. Currently eating supper tray delivered by [facility] cafeteria. IV fluid infusion complete. Pt still has not had a BM since taking kaexylate. He denies abdominal cramping or feeling the urge to defecate at this time. He feels certain that after he finishes eating his dinner he will have a BM. Advised pt to notify nursing staff once he completes meal. Verbalized understanding.

## 2020-11-21 NOTE — ED NOTES
x2 attempts to obtain peripheral IV access without success. Able to retrieve blood to send to lab for ordered tests. Will return to pt bedside for further attempts at IV access shortly.

## 2020-11-21 NOTE — ED NOTES
Per MD request, contacted pharmacy to inquire about pharmacy dosing of kaexylate for hyperkalemia greater than 6. The standard dosing is 30 G PO Q4H x3 doses. Pharmacist also states the Lokelma option if no result with kaexylate. Dr. Ray notified.

## 2020-11-21 NOTE — ED NOTES
Urinal placed within patient reach & pt made aware of need for urine specimen to send for analysis.

## 2020-11-21 NOTE — ED PROVIDER NOTES
"SCRIBE #1 NOTE: I, Claudia Thurston, am scribing for, and in the presence of, Claudia Cortez DO. I have scribed the H&P.     SCRIBE #2 NOTE: I, Dalila Wong, am scribing for, and in the presence of,  Valeria Silva MD. I have scribed the remaining portions of the note not scribed by Scribe #1.      History     Chief Complaint   Patient presents with    Abnormal Lab     had liver transplant Oct 2020, elevated potassium level, sent by .      Review of patient's allergies indicates:   Allergen Reactions    Latex, natural rubber          History of Present Illness     HPI    11/21/2020, 12:20 PM  History obtained from the patient      History of Present Illness: Jordan Altman is a 45 y.o. male patient with a PMHx of HTN, decompensated hepatic cirrhosis, SBP who presents to the Emergency Department for evaluation of elevated potassium level which onset gradually 1 day PTA. Pt is 5 weeks s/p liver transplant (10/14/20). Pt was seen at [facility] in [place] yesterday for blood work and a liver US. Pt's doctor called to inform him of his elevated K+ level and recommend that pt go into the ED. Pt denies taking any potassium supplements. Pt reports that he is feeling okay and c/o only abd pain that he describes as "tolerable", which onset 2 days PTA. Symptoms are constant and moderate in severity. Pt's abd pain is made worse by getting up and down or for sitting for prolonged periods. Patient denies any n/v/d, hematochezia, fever, cough, congestion, dysuria and all other sxs at this time. No prior Tx. No further complaints or concerns at this time.       Arrival mode: Personal vehicle     PCP: SHAHEEN Jaime        Past Medical History:  Past Medical History:   Diagnosis Date    Decompensated hepatic cirrhosis     Hypertension     SBP (spontaneous bacterial peritonitis)        Past Surgical History:  Past Surgical History:   Procedure Laterality Date    ERCP N/A 10/1/2020    Procedure: ERCP " (ENDOSCOPIC RETROGRADE CHOLANGIOPANCREATOGRAPHY);  Surgeon: Marcos Ramirez MD;  Location: [facility] ENDO (2ND FLR);  Service: Endoscopy;  Laterality: N/A;    ESOPHAGOGASTRODUODENOSCOPY N/A 10/13/2020    Procedure: EGD (ESOPHAGOGASTRODUODENOSCOPY);  Surgeon: Prasanth Jeryr MD;  Location: [facility] ENDO (2ND FLR);  Service: Endoscopy;  Laterality: N/A;    EXPLORATORY LAPAROTOMY AFTER LIVER TRANSPLANTATION N/A 10/15/2020    Procedure: LAPAROTOMY, EXPLORATORY, AFTER LIVER TRANSPLANT, Possible redo of artery, possible portal thrombectomy. IntraOperative US.;  Surgeon: Curtis Zavaleta MD;  [facility];  Service: Transplant;  Laterality: N/A;  With intraoperative ultrasound    LIVER TRANSPLANT N/A 10/14/2020    Procedure: TRANSPLANT, LIVER;  Surgeon: Curtis Zavaleta MD;  [facility];  Service: Transplant;  Laterality: N/A;  Intra op HD    RIGHT HEART CATHETERIZATION Right 9/23/2020    Procedure: INSERTION, CATHETER, RIGHT HEART;  Surgeon: Mikel Costa Jr., MD;  Location: [facility] CATH LAB;  Service: Cardiology;  Laterality: Right;    THORACENTESIS  2011    THROMBECTOMY  10/15/2020    Procedure: THROMBECTOMY portal vein;  Surgeon: Curtis Zavaleta MD;  [facility];  Service: Transplant;;         Family History:  Family History   Problem Relation Age of Onset    COPD Mother        Social History:  Social History     Tobacco Use    Smoking status: Current Some Day Smoker    Smokeless tobacco: Current User   Substance and Sexual Activity    Alcohol use: Yes     Alcohol/week: 112.0 standard drinks     Types: 112 Shots of liquor per week     Comment: fifth of vodka a day. LAST DRINK 7/25/2020 per pt     Drug use: Yes     Frequency: 3.0 times per week     Types: Marijuana    Sexual activity: Yes        Review of Systems     Review of Systems   Constitutional: Negative for fever.        (+) elevated potassium   HENT: Negative for congestion and sore throat.    Respiratory:  Negative for cough and shortness of breath.    Cardiovascular: Negative for chest pain.   Gastrointestinal: Positive for abdominal pain. Negative for blood in stool, diarrhea, nausea and vomiting.   Genitourinary: Negative for dysuria.   Musculoskeletal: Negative for back pain.   Skin: Negative for rash.   Neurological: Negative for weakness.   Hematological: Does not bruise/bleed easily.   All other systems reviewed and are negative.       Physical Exam     Initial Vitals [11/21/20 1155]   BP Pulse Resp Temp SpO2   122/75 96 16 98.1 °F (36.7 °C) 98 %      MAP       --          Physical Exam  Nursing Notes and Vital Signs Reviewed.  Constitutional: Patient is in no acute distress. Well-developed and well-nourished.  Head: Atraumatic. Normocephalic.  Eyes: PERRL. EOM intact. Conjunctivae are not pale. No scleral icterus.  ENT: Mucous membranes are moist. Oropharynx is clear and symmetric.    Neck: Supple. Full ROM. No lymphadenopathy.  Cardiovascular: Regular rate. Regular rhythm. No murmurs, rubs, or gallops. Distal pulses are 2+ and symmetric.  Pulmonary/Chest: No respiratory distress. Clear to auscultation bilaterally. No wheezing or rales.  Abdominal: Soft and non-distended.  There is no tenderness.  No rebound, guarding, or rigidity. Good bowel sounds. Incision site looks clean and well-healed.  Genitourinary: No CVA tenderness  Musculoskeletal: Moves all extremities. No obvious deformities. No edema. No calf tenderness.  Skin: Warm and dry.  Neurological:  Alert, awake, and appropriate.  Normal speech.  No acute focal neurological deficits are appreciated.  Psychiatric: Normal affect. Good eye contact. Appropriate in content.     ED Course   Critical Care    Date/Time: 11/21/2020 10:09 PM  Performed by: Valeria Silva MD  Authorized by: Valeria Silva MD   Direct patient critical care time: 15 minutes  Additional history critical care time: 10 minutes  Ordering / reviewing critical care time: 10  minutes  Documentation critical care time: 10 minutes  Total critical care time (exclusive of procedural time) : 45 minutes  Critical care time was exclusive of separately billable procedures and treating other patients and teaching time.  Critical care was necessary to treat or prevent imminent or life-threatening deterioration of the following conditions: Hyperkalemia.  Critical care was time spent personally by me on the following activities: blood draw for specimens, development of treatment plan with patient or surrogate, interpretation of cardiac output measurements, evaluation of patient's response to treatment, examination of patient, obtaining history from patient or surrogate, ordering and performing treatments and interventions, ordering and review of laboratory studies, ordering and review of radiographic studies, pulse oximetry, re-evaluation of patient's condition and review of old charts.        ED Vital Signs:  Vitals:    11/21/20 1155 11/21/20 1215 11/21/20 1316 11/21/20 1438   BP: 122/75  134/89 137/86   Pulse: 96 94 87 82   Resp: 16  15 19   Temp: 98.1 °F (36.7 °C)      TempSrc: Oral      SpO2: 98%  97% 95%   Weight: 98 kg (216 lb 0.8 oz)      Height: 6' (1.829 m)       11/21/20 1601 11/21/20 1721 11/21/20 1909 11/21/20 2014   BP: (!) 156/84 (!) 144/86 (!) 145/89    Pulse: 83 86 83    Resp: 20 20 20 20   Temp:       TempSrc:       SpO2: 98% 96% 95%    Weight:       Height:        11/21/20 2111 11/21/20 2222 11/21/20 2253   BP: (!) 173/96 (!) 135/97 (!) 158/89   Pulse: 86 100 99   Resp: 20 20 20   Temp:   98 °F (36.7 °C)   TempSrc:      SpO2: 96% 95% 96%   Weight:      Height:          Abnormal Lab Results:  Labs Reviewed   CBC W/ AUTO DIFFERENTIAL - Abnormal; Notable for the following components:       Result Value    WBC 3.43 (*)     RBC 3.22 (*)     Hemoglobin 9.3 (*)     Hematocrit 30.1 (*)     MCHC 30.9 (*)     Immature Granulocytes 0.9 (*)     Lymph # 0.7 (*)     All other components within  normal limits   COMPREHENSIVE METABOLIC PANEL - Abnormal; Notable for the following components:    Potassium 5.8 (*)     CO2 21 (*)     Glucose 137 (*)     BUN 27 (*)     Creatinine 1.9 (*)     Total Protein 5.8 (*)     Albumin 3.2 (*)     ALT 9 (*)     eGFR if  47 (*)     eGFR if non  41 (*)     All other components within normal limits   APTT - Abnormal; Notable for the following components:    aPTT 32.8 (*)     All other components within normal limits   AMMONIA - Abnormal; Notable for the following components:    Ammonia 83 (*)     All other components within normal limits   TACROLIMUS LEVEL - Abnormal; Notable for the following components:    Tacrolimus Lvl 4.3 (*)     All other components within normal limits   BASIC METABOLIC PANEL - Abnormal; Notable for the following components:    BUN 21 (*)     Creatinine 1.5 (*)     eGFR if non  55 (*)     All other components within normal limits    Narrative:     After Bowel Movement   URINALYSIS, REFLEX TO URINE CULTURE    Narrative:     Specimen Source->Urine   SARS-COV-2 RNA AMPLIFICATION, QUAL   PROTIME-INR        All Lab Results:  Results for orders placed or performed during the hospital encounter of 11/21/20   CBC Auto Differential   Result Value Ref Range    WBC 3.43 (L) 3.90 - 12.70 K/uL    RBC 3.22 (L) 4.60 - 6.20 M/uL    Hemoglobin 9.3 (L) 14.0 - 18.0 g/dL    Hematocrit 30.1 (L) 40.0 - 54.0 %    MCV 94 82 - 98 fL    MCH 28.9 27.0 - 31.0 pg    MCHC 30.9 (L) 32.0 - 36.0 g/dL    RDW 14.1 11.5 - 14.5 %    Platelets 195 150 - 350 K/uL    MPV 9.5 9.2 - 12.9 fL    Immature Granulocytes 0.9 (H) 0.0 - 0.5 %    Gran # (ANC) 2.4 1.8 - 7.7 K/uL    Immature Grans (Abs) 0.03 0.00 - 0.04 K/uL    Lymph # 0.7 (L) 1.0 - 4.8 K/uL    Mono # 0.3 0.3 - 1.0 K/uL    Eos # 0.0 0.0 - 0.5 K/uL    Baso # 0.05 0.00 - 0.20 K/uL    nRBC 0 0 /100 WBC    Gran % 69.0 38.0 - 73.0 %    Lymph % 19.5 18.0 - 48.0 %    Mono % 8.2 4.0 - 15.0 %     Eosinophil % 0.9 0.0 - 8.0 %    Basophil % 1.5 0.0 - 1.9 %    Differential Method Automated    Comprehensive Metabolic Panel   Result Value Ref Range    Sodium 140 136 - 145 mmol/L    Potassium 5.8 (H) 3.5 - 5.1 mmol/L    Chloride 106 95 - 110 mmol/L    CO2 21 (L) 23 - 29 mmol/L    Glucose 137 (H) 70 - 110 mg/dL    BUN 27 (H) 6 - 20 mg/dL    Creatinine 1.9 (H) 0.5 - 1.4 mg/dL    Calcium 9.6 8.7 - 10.5 mg/dL    Total Protein 5.8 (L) 6.0 - 8.4 g/dL    Albumin 3.2 (L) 3.5 - 5.2 g/dL    Total Bilirubin 1.0 0.1 - 1.0 mg/dL    Alkaline Phosphatase 109 55 - 135 U/L    AST 16 10 - 40 U/L    ALT 9 (L) 10 - 44 U/L    Anion Gap 13 8 - 16 mmol/L    eGFR if African American 47 (A) >60 mL/min/1.73 m^2    eGFR if non African American 41 (A) >60 mL/min/1.73 m^2   Urinalysis, Reflex to Urine Culture Urine, Clean Catch    Specimen: Urine   Result Value Ref Range    Specimen UA Urine, Clean Catch     Color, UA Yellow Yellow, Straw, Mela    Appearance, UA Clear Clear    pH, UA 7.0 5.0 - 8.0    Specific Gravity, UA 1.010 1.005 - 1.030    Protein, UA Negative Negative    Glucose, UA Negative Negative    Ketones, UA Negative Negative    Bilirubin (UA) Negative Negative    Occult Blood UA Negative Negative    Nitrite, UA Negative Negative    Urobilinogen, UA Negative <2.0 EU/dL    Leukocytes, UA Negative Negative   COVID-19 Rapid Screening   Result Value Ref Range    SARS-CoV-2 RNA, Amplification, Qual Negative Negative   Protime-INR   Result Value Ref Range    Prothrombin Time 11.0 9.0 - 12.5 sec    INR 1.0 0.8 - 1.2   APTT   Result Value Ref Range    aPTT 32.8 (H) 21.0 - 32.0 sec   Ammonia   Result Value Ref Range    Ammonia 83 (H) 10 - 50 umol/L   Tacrolimus level   Result Value Ref Range    Tacrolimus Lvl 4.3 (L) 5.0 - 15.0 ng/mL   Basic metabolic panel   Result Value Ref Range    Sodium 143 136 - 145 mmol/L    Potassium 5.0 3.5 - 5.1 mmol/L    Chloride 110 95 - 110 mmol/L    CO2 25 23 - 29 mmol/L    Glucose 100 70 - 110 mg/dL    BUN  21 (H) 6 - 20 mg/dL    Creatinine 1.5 (H) 0.5 - 1.4 mg/dL    Calcium 8.7 8.7 - 10.5 mg/dL    Anion Gap 8 8 - 16 mmol/L    eGFR if African American >60 >60 mL/min/1.73 m^2    eGFR if non African American 55 (A) >60 mL/min/1.73 m^2         Imaging Results:  Imaging Results    None          The EKG was ordered, reviewed, and independently interpreted by the ED provider.  Interpretation time: 12:26  Rate: 94 BPM  Rhythm: Sinus rhythm with occasional atrial-paced complexes and with occasional premature ventricular complexes  Interpretation: No hyperacute T waves. Normal axis. No ST or T wave changes. No STEMI.             The Emergency Provider reviewed the vital signs and test results, which are outlined above.     ED Discussion   2:19 PM:   Spoke with Dr. Titus (ON call GI)  She recommends treating elevated potassium in the ED per procotocol,.    2:50 PM: Discussed pt's case with Dr. Sidhu (Liver Transplant) who recommends having the pt take Kayexalate in the ED, rechecking the potassium and sending pt home with rx for Kayexalate. Dr. Sidhu would also like pt to receive fluids in the ED. Pt should have lab work done tomorrow to recheck his levels. Dr. Bonilla also recommends that pt stop taking his Bactrim and continue taking his Prograf.  Send home on Kayexalate 15 grams TID.    8:00 PM: Dr. Cortez transfers care of patient to Dr. Silva pending lab results.    9:00 PM:  Patient has had a bowel movement, will recheck potassium.  Patient's do denies any chest pain or shortness of breath.  EKG does not show any bradycardia or peaked T-waves or ST changes.  Vitals are stable.    10:06 PM: Reassessed pt at this time.  Patient's potassium improved.  Discussed with pt all pertinent ED information and results. Discussed pt dx and plan of tx. Gave pt all f/u and return to the ED instructions. All questions and concerns were addressed at this time. Pt expresses understanding of information and instructions, and is comfortable  with plan to discharge. Pt is stable for discharge.    I discussed with patient and/or family/caretaker that evaluation in the ED does not suggest any emergent or life threatening medical conditions requiring immediate intervention beyond what was provided in the ED, and I believe patient is safe for discharge.  Regardless, an unremarkable evaluation in the ED does not preclude the development or presence of a serious of life threatening condition. As such, patient was instructed to return immediately for any worsening or change in current symptoms.    10:40 PM: Pt has no CP and is ready for discharge. Discussed with pt the recommendations made by Dr. Sidhu. Send pt home with rx for Kayexalate. Pt should have lab work done tomorrow to recheck his potassium levels. Recommends pt stop taking his Bactrim and continue taking his Prograf. Send home on Kayexalate 15 grams TID.       ED Course as of Nov 22 1842   Sat Nov 21, 2020   1455 Results for TANIYA CHAVEZ (MRN [id number]) as of 11/21/2020 14:55    11/20/2020 11:37  Hemoglobin: 9.8 (L)  Hematocrit: 33.8 (L)    11/21/2020 12:26    11/21/2020 12:45  Hemoglobin: 9.3 (L)  Hematocrit: 30.1 (L)    11/21/2020 12:49      [LB]   1455 Results for TANIYA CHAVEZ (MRN [id number]) as of 11/21/2020 14:55    11/16/2020 09:03  Potassium: 5.2 (H)  Chloride: 103  CO2: 26  Anion Gap: 8  BUN: 22 (H)  Creatinine: 1.7 (H)    11/19/2020 08:48  Potassium: 5.7 (H)  Chloride: 107  CO2: 23  Anion Gap: 12  BUN: 26 (H)  Creatinine: 1.9 (H)    11/20/2020 11:36  Potassium: 6.2 (H)  Chloride: 106  CO2: 25  Anion Gap: 8  BUN: 29 (H)  Creatinine: 2.4 (H)    11/20/2020 11:36  Potassium: 6.2 (H)    11/21/2020 12:45  Potassium: 5.8 (H)  Chloride: 106  CO2: 21 (L)  Anion Gap: 13  BUN: 27 (H)  Creatinine: 1.9 (H)      [LB]      ED Course User Index  [LB] Claudia Cortez, DO     Medical Decision Making:   Clinical Tests:   Lab Tests: Ordered and Reviewed  Medical Tests: Ordered and Reviewed            ED Medication(s):  Medications   sodium polystyrene 15 gram/60 mL suspension 30 g (30 g Oral Given 11/21/20 1540)   sodium chloride 0.9% bolus 500 mL (0 mLs Intravenous Stopped 11/21/20 1719)   oxyCODONE immediate release tablet 10 mg (10 mg Oral Given 11/21/20 2014)   sodium chloride 0.9% bolus 500 mL (0 mLs Intravenous Stopped 11/21/20 2222)   sodium polystyrene 15 gram/60 mL suspension 30 g (30 g Oral Given 11/21/20 2015)       Discharge Medication List as of 11/21/2020 10:42 PM          Follow-up Information     Ottoniel Sidhu MD. Call in 1 day.    Specialty: Transplant  Contact information:  [address]  [facility] [address]  [phone]                       Scribe Attestation:   Scribe #1: I performed the above scribed service and the documentation accurately describes the services I performed. I attest to the accuracy of the note.     Attending:   Physician Attestation Statement for Scribe #1: I, Claudia Cortez DO, personally performed the services described in this documentation, as scribed by Claudia Thurston, in my presence, and it is both accurate and complete.       Scribe Attestation:   Scribe #2: I performed the above scribed service and the documentation accurately describes the services I performed. I attest to the accuracy of the note.    Attending Attestation:           Physician Attestation for Scribe:    Physician Attestation Statement for Scribe #2: I, Valeria Silva MD, reviewed documentation, as scribed by Dalila Wong in my presence, and it is both accurate and complete. I also acknowledge and confirm the content of the note done by Scribe #1.           Clinical Impression       ICD-10-CM ICD-9-CM   1. Hyperkalemia  E87.5 276.7   2. Electrolyte abnormality  E87.8 276.9       Disposition:   Disposition: Discharged  Condition: Stable         Valeria Silva MD  11/22/20 6861

## 2020-11-21 NOTE — ED NOTES
BSC set up at pt's bedside for ease of pt to transfer out of bed to when/if BM occurs. Pt advised to notify nursing staff when he does have a BM so that we can re-draw chemistry and verify improvement of hyperkalemia. Pt verbalized understanding. Call bell within pt reach, bed locked & in lowest position. Side rails up x 1 so that pt is able to get out of bed if necessary. All wire hazards and other fall risk items removed from line of transfer. Will monitor closely.

## 2020-11-22 LAB — TACROLIMUS BLD-MCNC: 4.3 NG/ML (ref 5–15)

## 2020-11-22 NOTE — ED NOTES
Notified Dr. Cortez & Dr. Silva of pt's BM status & order given to continue to administer additional dose of kaexylate but OK to draw repeat BMP at this time.

## 2020-11-22 NOTE — DISCHARGE INSTRUCTIONS
Please follow up with Dr. Sidhu for recheck of potassium. They will be setting up appointment for recheck tomorrow, but call center regardless. Take Kayexalate 15 g 3 times a day, stop Bactrim and continue your Prograf. If you have any CP, SOB or any immediate concerns, come back to the ER.    .hyp

## 2020-11-23 ENCOUNTER — LAB VISIT (OUTPATIENT)
Dept: LAB | Facility: HOSPITAL | Age: 45
End: 2020-11-23
Attending: SURGERY
Payer: MEDICAID

## 2020-11-23 DIAGNOSIS — Z94.4 LIVER REPLACED BY TRANSPLANT: ICD-10-CM

## 2020-11-23 LAB
ALBUMIN SERPL BCP-MCNC: 3.3 G/DL (ref 3.5–5.2)
ALP SERPL-CCNC: 110 U/L (ref 55–135)
ALT SERPL W/O P-5'-P-CCNC: 10 U/L (ref 10–44)
ANION GAP SERPL CALC-SCNC: 10 MMOL/L (ref 8–16)
AST SERPL-CCNC: 17 U/L (ref 10–40)
BASOPHILS # BLD AUTO: 0.04 K/UL (ref 0–0.2)
BASOPHILS NFR BLD: 1.2 % (ref 0–1.9)
BILIRUB SERPL-MCNC: 1.1 MG/DL (ref 0.1–1)
BUN SERPL-MCNC: 21 MG/DL (ref 6–20)
CALCIUM SERPL-MCNC: 9.1 MG/DL (ref 8.7–10.5)
CHLORIDE SERPL-SCNC: 104 MMOL/L (ref 95–110)
CO2 SERPL-SCNC: 25 MMOL/L (ref 23–29)
CREAT SERPL-MCNC: 1.5 MG/DL (ref 0.5–1.4)
DIFFERENTIAL METHOD: ABNORMAL
EOSINOPHIL # BLD AUTO: 0.1 K/UL (ref 0–0.5)
EOSINOPHIL NFR BLD: 2.1 % (ref 0–8)
ERYTHROCYTE [DISTWIDTH] IN BLOOD BY AUTOMATED COUNT: 14.1 % (ref 11.5–14.5)
EST. GFR  (AFRICAN AMERICAN): >60 ML/MIN/1.73 M^2
EST. GFR  (NON AFRICAN AMERICAN): 54.7 ML/MIN/1.73 M^2
GLUCOSE SERPL-MCNC: 89 MG/DL (ref 70–110)
HCT VFR BLD AUTO: 32.6 % (ref 40–54)
HGB BLD-MCNC: 9.9 G/DL (ref 14–18)
IMM GRANULOCYTES # BLD AUTO: 0.03 K/UL (ref 0–0.04)
IMM GRANULOCYTES NFR BLD AUTO: 0.9 % (ref 0–0.5)
LYMPHOCYTES # BLD AUTO: 0.6 K/UL (ref 1–4.8)
LYMPHOCYTES NFR BLD: 19.5 % (ref 18–48)
MCH RBC QN AUTO: 28.4 PG (ref 27–31)
MCHC RBC AUTO-ENTMCNC: 30.4 G/DL (ref 32–36)
MCV RBC AUTO: 94 FL (ref 82–98)
MONOCYTES # BLD AUTO: 0.2 K/UL (ref 0.3–1)
MONOCYTES NFR BLD: 6.4 % (ref 4–15)
NEUTROPHILS # BLD AUTO: 2.3 K/UL (ref 1.8–7.7)
NEUTROPHILS NFR BLD: 69.9 % (ref 38–73)
NRBC BLD-RTO: 0 /100 WBC
PLATELET # BLD AUTO: 202 K/UL (ref 150–350)
PMV BLD AUTO: 10.1 FL (ref 9.2–12.9)
POTASSIUM SERPL-SCNC: 4.7 MMOL/L (ref 3.5–5.1)
PROT SERPL-MCNC: 5.7 G/DL (ref 6–8.4)
RBC # BLD AUTO: 3.48 M/UL (ref 4.6–6.2)
SODIUM SERPL-SCNC: 139 MMOL/L (ref 136–145)
WBC # BLD AUTO: 3.28 K/UL (ref 3.9–12.7)

## 2020-11-23 PROCEDURE — 80053 COMPREHEN METABOLIC PANEL: CPT

## 2020-11-23 PROCEDURE — 36415 COLL VENOUS BLD VENIPUNCTURE: CPT | Mod: PO

## 2020-11-23 PROCEDURE — 80197 ASSAY OF TACROLIMUS: CPT

## 2020-11-23 PROCEDURE — 85025 COMPLETE CBC W/AUTO DIFF WBC: CPT

## 2020-11-23 RX ORDER — TACROLIMUS 1 MG/1
CAPSULE ORAL
Qty: 60 CAPSULE | Refills: 11 | Status: SHIPPED | OUTPATIENT
Start: 2020-11-23 | End: 2020-12-30

## 2020-11-24 ENCOUNTER — TELEPHONE (OUTPATIENT)
Dept: TRANSPLANT | Facility: CLINIC | Age: 45
End: 2020-11-24

## 2020-11-24 LAB — TACROLIMUS BLD-MCNC: 7.6 NG/ML (ref 5–15)

## 2020-11-24 RX ORDER — OXYCODONE HYDROCHLORIDE 10 MG/1
10 TABLET ORAL EVERY 8 HOURS PRN
Qty: 21 TABLET | Refills: 0 | Status: SHIPPED | OUTPATIENT
Start: 2020-11-24 | End: 2020-12-08 | Stop reason: SDUPTHER

## 2020-11-25 ENCOUNTER — TELEPHONE (OUTPATIENT)
Dept: TRANSPLANT | Facility: CLINIC | Age: 45
End: 2020-11-25

## 2020-11-26 ENCOUNTER — PATIENT MESSAGE (OUTPATIENT)
Dept: ADMINISTRATIVE | Facility: OTHER | Age: 45
End: 2020-11-26

## 2020-11-27 ENCOUNTER — HOSPITAL ENCOUNTER (OUTPATIENT)
Dept: RADIOLOGY | Facility: HOSPITAL | Age: 45
Discharge: HOME | End: 2020-11-27
Attending: SURGERY
Payer: MEDICAID

## 2020-11-27 DIAGNOSIS — Z94.4 LIVER TRANSPLANTED: ICD-10-CM

## 2020-11-27 LAB
ACID FAST MOD KINY STN SPEC: NORMAL
MYCOBACTERIUM SPEC QL CULT: NORMAL

## 2020-11-27 PROCEDURE — 93975 VASCULAR STUDY: CPT | Mod: 26,,, | Performed by: RADIOLOGY

## 2020-11-27 PROCEDURE — 93975 VASCULAR STUDY: CPT | Mod: TC

## 2020-11-27 PROCEDURE — 76705 ECHO EXAM OF ABDOMEN: CPT | Mod: TC,59

## 2020-11-27 PROCEDURE — 76705 US LIVER TRANSPLANT POST: ICD-10-PCS | Mod: 26,59,, | Performed by: RADIOLOGY

## 2020-11-27 PROCEDURE — 93975 US LIVER TRANSPLANT POST: ICD-10-PCS | Mod: 26,,, | Performed by: RADIOLOGY

## 2020-11-27 PROCEDURE — 76705 ECHO EXAM OF ABDOMEN: CPT | Mod: 26,59,, | Performed by: RADIOLOGY

## 2020-11-30 ENCOUNTER — PATIENT MESSAGE (OUTPATIENT)
Dept: TRANSPLANT | Facility: CLINIC | Age: 45
End: 2020-11-30

## 2020-11-30 ENCOUNTER — TELEPHONE (OUTPATIENT)
Dept: TRANSPLANT | Facility: CLINIC | Age: 45
End: 2020-11-30

## 2020-11-30 NOTE — TELEPHONE ENCOUNTER
Honey message sent to notify pt of ultrasound results.  ----- Message from Olman Jenkins MD sent at 11/30/2020  1:50 PM CST -----  Repeat in 2 weeks

## 2020-12-01 ENCOUNTER — LAB VISIT (OUTPATIENT)
Dept: LAB | Facility: HOSPITAL | Age: 45
End: 2020-12-01
Attending: SURGERY
Payer: MEDICAID

## 2020-12-01 DIAGNOSIS — Z94.4 LIVER REPLACED BY TRANSPLANT: ICD-10-CM

## 2020-12-01 DIAGNOSIS — Z94.4 LIVER TRANSPLANTED: Primary | ICD-10-CM

## 2020-12-01 LAB
ALBUMIN SERPL BCP-MCNC: 3.5 G/DL (ref 3.5–5.2)
ALP SERPL-CCNC: 92 U/L (ref 55–135)
ALT SERPL W/O P-5'-P-CCNC: 9 U/L (ref 10–44)
ANION GAP SERPL CALC-SCNC: 9 MMOL/L (ref 8–16)
AST SERPL-CCNC: 17 U/L (ref 10–40)
BASOPHILS # BLD AUTO: 0.05 K/UL (ref 0–0.2)
BASOPHILS NFR BLD: 1.5 % (ref 0–1.9)
BILIRUB SERPL-MCNC: 1 MG/DL (ref 0.1–1)
BUN SERPL-MCNC: 19 MG/DL (ref 6–20)
CALCIUM SERPL-MCNC: 9.9 MG/DL (ref 8.7–10.5)
CHLORIDE SERPL-SCNC: 105 MMOL/L (ref 95–110)
CO2 SERPL-SCNC: 26 MMOL/L (ref 23–29)
CREAT SERPL-MCNC: 1.2 MG/DL (ref 0.5–1.4)
DIFFERENTIAL METHOD: ABNORMAL
EOSINOPHIL # BLD AUTO: 0.1 K/UL (ref 0–0.5)
EOSINOPHIL NFR BLD: 2.8 % (ref 0–8)
ERYTHROCYTE [DISTWIDTH] IN BLOOD BY AUTOMATED COUNT: 13.8 % (ref 11.5–14.5)
EST. GFR  (AFRICAN AMERICAN): >60 ML/MIN/1.73 M^2
EST. GFR  (NON AFRICAN AMERICAN): >60 ML/MIN/1.73 M^2
GLUCOSE SERPL-MCNC: 122 MG/DL (ref 70–110)
HCT VFR BLD AUTO: 37 % (ref 40–54)
HGB BLD-MCNC: 11.4 G/DL (ref 14–18)
IMM GRANULOCYTES # BLD AUTO: 0.05 K/UL (ref 0–0.04)
IMM GRANULOCYTES NFR BLD AUTO: 1.5 % (ref 0–0.5)
LYMPHOCYTES # BLD AUTO: 1 K/UL (ref 1–4.8)
LYMPHOCYTES NFR BLD: 30.1 % (ref 18–48)
MCH RBC QN AUTO: 28.4 PG (ref 27–31)
MCHC RBC AUTO-ENTMCNC: 30.8 G/DL (ref 32–36)
MCV RBC AUTO: 92 FL (ref 82–98)
MONOCYTES # BLD AUTO: 0.3 K/UL (ref 0.3–1)
MONOCYTES NFR BLD: 8.6 % (ref 4–15)
NEUTROPHILS # BLD AUTO: 1.8 K/UL (ref 1.8–7.7)
NEUTROPHILS NFR BLD: 55.5 % (ref 38–73)
NRBC BLD-RTO: 0 /100 WBC
PLATELET # BLD AUTO: 208 K/UL (ref 150–350)
PMV BLD AUTO: 10.2 FL (ref 9.2–12.9)
POTASSIUM SERPL-SCNC: 4.7 MMOL/L (ref 3.5–5.1)
PROT SERPL-MCNC: 6.3 G/DL (ref 6–8.4)
RBC # BLD AUTO: 4.02 M/UL (ref 4.6–6.2)
SODIUM SERPL-SCNC: 140 MMOL/L (ref 136–145)
WBC # BLD AUTO: 3.26 K/UL (ref 3.9–12.7)

## 2020-12-01 PROCEDURE — 85025 COMPLETE CBC W/AUTO DIFF WBC: CPT

## 2020-12-01 PROCEDURE — 36415 COLL VENOUS BLD VENIPUNCTURE: CPT | Mod: PO

## 2020-12-01 PROCEDURE — 80053 COMPREHEN METABOLIC PANEL: CPT

## 2020-12-01 PROCEDURE — 80197 ASSAY OF TACROLIMUS: CPT

## 2020-12-02 ENCOUNTER — PATIENT MESSAGE (OUTPATIENT)
Dept: TRANSPLANT | Facility: CLINIC | Age: 45
End: 2020-12-02

## 2020-12-02 ENCOUNTER — TELEPHONE (OUTPATIENT)
Dept: TRANSPLANT | Facility: CLINIC | Age: 45
End: 2020-12-02

## 2020-12-02 ENCOUNTER — PATIENT MESSAGE (OUTPATIENT)
Dept: HEPATOLOGY | Facility: CLINIC | Age: 45
End: 2020-12-02

## 2020-12-02 ENCOUNTER — OFFICE VISIT (OUTPATIENT)
Dept: HEPATOLOGY | Facility: CLINIC | Age: 45
End: 2020-12-02
Payer: MEDICAID

## 2020-12-02 DIAGNOSIS — B19.20 HEPATITIS C VIRUS INFECTION WITHOUT HEPATIC COMA, UNSPECIFIED CHRONICITY: Primary | ICD-10-CM

## 2020-12-02 LAB — TACROLIMUS BLD-MCNC: 4.9 NG/ML (ref 5–15)

## 2020-12-02 PROCEDURE — 99213 PR OFFICE/OUTPT VISIT, EST, LEVL III, 20-29 MIN: ICD-10-PCS | Mod: 95,,, | Performed by: PHYSICIAN ASSISTANT

## 2020-12-02 PROCEDURE — 99213 OFFICE O/P EST LOW 20 MIN: CPT | Mod: 95,,, | Performed by: PHYSICIAN ASSISTANT

## 2020-12-02 RX ORDER — VELPATASVIR AND SOFOSBUVIR 100; 400 MG/1; MG/1
1 TABLET, FILM COATED ORAL DAILY
Qty: 28 TABLET | Refills: 2 | Status: SHIPPED | OUTPATIENT
Start: 2020-12-02 | End: 2021-03-29 | Stop reason: ALTCHOICE

## 2020-12-02 RX ORDER — PANTOPRAZOLE SODIUM 20 MG/1
20 TABLET, DELAYED RELEASE ORAL DAILY
Qty: 30 TABLET | Refills: 3 | Status: SHIPPED | OUTPATIENT
Start: 2020-12-02 | End: 2021-01-05

## 2020-12-02 NOTE — TELEPHONE ENCOUNTER
Stable labs, no medication changes.  [employer] message sent instructing pt to repeat labs on Monday.  ----- Message from Moon Moreno MD sent at 12/2/2020 12:38 PM CST -----  Results ok. No action needed.

## 2020-12-02 NOTE — PROGRESS NOTES
HEPATOLOGY VIDEO VISIT NOTE - HCV clinic  The patient location is: [place]  Visit type: audiovisual    Face to Face time with patient: 15 minutes  25 minutes of total time spent on the encounter, which includes face to face time and non-face to face time preparing to see the patient (eg, review of tests), Obtaining and/or reviewing separately obtained history, Documenting clinical information in the electronic or other health record, Independently interpreting results (not separately reported) and communicating results to the patient/family/caregiver, or Care coordination (not separately reported).     Each patient to whom he or she provides medical services by telemedicine is:  (1) informed of the relationship between the physician and patient and the respective role of any other health care provider with respect to management of the patient; and (2) notified that he or she may decline to receive medical services by telemedicine and may withdraw from such care at any time.    CHIEF COMPLAINT: Hepatitis C     HISTORY: This is a 45 y.o.  White male who underwent LIVER transplant 10/14/20    Donor was HCV SHAYE (+); pt became viremic at 1 week post transplant, now here to initiate antiviral therapy.  - Treatment naive  - Genotype 2b  Donor and Recipient HBV studies negative    Feels well.   Appetite increasing slowly  Denies jaundice, dark urine, abdominal distention, hematemesis, melena, slowed mentation.                       Past Medical History:   Diagnosis Date    Decompensated hepatic cirrhosis     Hypertension     SBP (spontaneous bacterial peritonitis)      Patient Active Problem List   Diagnosis    Ascites due to alcoholic cirrhosis    Thrombocytopenia    Coagulopathy    JENN (acute kidney injury)    Portal hypertension    Chronic Hepatic encephalopathy    Hyponatremia    Macrocytic anemia    DIC (disseminated intravascular coagulation)    Hypertension    Hx of SBP (spontaneous bacterial  peritonitis)    Pulmonary HTN    Alcohol use disorder, severe, dependence    Counseling regarding advanced care planning and goals of care    Status post liver transplant    Class 1 obesity due to excess calories with serious comorbidity and body mass index (BMI) of 34.0 to 34.9 in adult    Adverse effect of corticosteroids    Prophylactic immunotherapy    Anticoagulated    Hyperphosphatemia    Debility    Long-term use of immunosuppressant medication    At risk for opportunistic infections    Anemia of chronic disease    Encounter for support and coordination of transition of care    Generalized weakness     Past Surgical History:   Procedure Laterality Date    ERCP N/A 10/1/2020    Procedure: ERCP (ENDOSCOPIC RETROGRADE CHOLANGIOPANCREATOGRAPHY);  Surgeon: Marcos Ramirez MD;  Location: [facility] ENDO ([address] [place] [address]);  Service: Endoscopy;  Laterality: N/A;    ESOPHAGOGASTRODUODENOSCOPY N/A 10/13/2020    Procedure: EGD (ESOPHAGOGASTRODUODENOSCOPY);  Surgeon: Prasanth Jerry MD;  [facility]  Service: Endoscopy;  Laterality: N/A;    EXPLORATORY LAPAROTOMY AFTER LIVER TRANSPLANTATION N/A 10/15/2020    Procedure: LAPAROTOMY, EXPLORATORY, AFTER LIVER TRANSPLANT, Possible redo of artery, possible portal thrombectomy. IntraOperative US.;  Surgeon: Curtis Zavaleta MD;  Location: [address];  Service: Transplant;  Laterality: N/A;  With intraoperative ultrasound    LIVER TRANSPLANT N/A 10/14/2020    Procedure: TRANSPLANT, LIVER;  Surgeon: Curtis Zavaleta MD;  Location: [address];  Service: Transplant;  Laterality: N/A;  Intra op HD    RIGHT HEART CATHETERIZATION Right 9/23/2020    Procedure: INSERTION, CATHETER, RIGHT HEART;  Surgeon: Mikel Costa Jr., MD;  Location: [facility] CATH LAB;  Service: Cardiology;  Laterality: Right;    THORACENTESIS  2011    THROMBECTOMY  10/15/2020    Procedure: THROMBECTOMY portal vein;  Surgeon: Curtis Zavaleta MD;  Location: [facility] OR [address] [place] [address];   Service: Transplant;;       SOCIAL HISTORY:   Social History     Tobacco Use   Smoking Status Current Some Day Smoker   Smokeless Tobacco Current User     Alcohol - none  Drugs - none      ROS:   (+) fatigue - improving, staying very active  (+) GERD - prior prilosec, now on protonix  A review of 10+ systems was conducted with pertinent positive and negative findings documented in HPI with all other systems reviewed and negative.        PHYSICAL EXAM:  Friendly White male, in no acute distress; alert and oriented to person, place and time  HEENT: Sclerae anicteric.   LUNGS: Normal respiratory effort.  SKIN: No jaundice, No obvious rashes. Numerous tattoos  NEURO/PSYCH: Memory intact. Thought and speech pattern appropriate. Behavior normal. No depression or anxiety noted.    RECENT LABS:  Lab Results   Component Value Date    WBC 3.26 (L) 12/01/2020    HGB 11.4 (L) 12/01/2020     12/01/2020     Lab Results   Component Value Date    INR 1.0 11/21/2020     Lab Results   Component Value Date    AST 17 12/01/2020    ALT 9 (L) 12/01/2020    BILITOT 1.0 12/01/2020    ALBUMIN 3.5 12/01/2020    ALKPHOS 92 12/01/2020    CREATININE 1.2 12/01/2020    BUN 19 12/01/2020     12/01/2020    K 4.7 12/01/2020    AFP 1.4 09/19/2020       ASSESSMENT  45 y.o. White male with:  1. HEPATITIS C, GENOTYPE 2B - treatment naive    2. HISTORY OF LIVER TRANSPLANT - 10/14/20  -- HCV SHAYE POS donor  -- HBV neg donor / recipient      EDUCATION:  Transmission of Hepatitis C was reviewed, including possible sexual transmission. Patient should avoid sharing personal products such as razors, toothbrushes, etc.     HCV RX  Discussed goal of HCV eradication to prevent progression of liver disease.  Discussed use of Epclusa daily x 12 weeks w/ potential side effects of fatigue and headache.     Reviewed limitations on acid suppressant medications due to DDI w/ Epclusa:  -- Antacids - on sodium bicarb BID, must be  from Epclusa by 4  hours  -- H2 Receptor Antagonist - Pt not taking  -- PPI - on protonix 40mg daily - needs to be reduced to 20mg, dosed once daily, 4 hours after epclusa dosed w/ food  Patient instructed to contact me if experiencing acid related symptoms so further recommendations can be made regarding acid suppression therapy.      Herbal / alternative therapies must be discontinued  Discussed importance of medication adherence and risk of treatment failure / viral resistance if not adherent. Pt has verbalized understanding.      PLAN:  1. Obtain authorization to treat HCV with Epclusa x 12 weeks  -- Rx will be routed to [facility]  -- Patient will notify me of exact treatment start date so appropriate lab f/u can be scheduled.  2. protonix 20mg - dosed per above - while on Epclusa

## 2020-12-03 ENCOUNTER — PATIENT MESSAGE (OUTPATIENT)
Dept: REHABILITATION | Facility: HOSPITAL | Age: 45
End: 2020-12-03

## 2020-12-03 ENCOUNTER — SPECIALTY PHARMACY (OUTPATIENT)
Dept: PHARMACY | Facility: CLINIC | Age: 45
End: 2020-12-03

## 2020-12-03 ENCOUNTER — PATIENT MESSAGE (OUTPATIENT)
Dept: ADMINISTRATIVE | Facility: OTHER | Age: 45
End: 2020-12-03

## 2020-12-04 ENCOUNTER — PATIENT MESSAGE (OUTPATIENT)
Dept: TRANSPLANT | Facility: CLINIC | Age: 45
End: 2020-12-04

## 2020-12-04 ENCOUNTER — CLINICAL SUPPORT (OUTPATIENT)
Dept: REHABILITATION | Facility: HOSPITAL | Age: 45
End: 2020-12-04
Payer: MEDICAID

## 2020-12-04 ENCOUNTER — DOCUMENTATION ONLY (OUTPATIENT)
Dept: REHABILITATION | Facility: HOSPITAL | Age: 45
End: 2020-12-04

## 2020-12-04 DIAGNOSIS — Z94.4 STATUS POST LIVER TRANSPLANT: Primary | ICD-10-CM

## 2020-12-04 DIAGNOSIS — R53.1 GENERALIZED WEAKNESS: ICD-10-CM

## 2020-12-04 PROCEDURE — 97110 THERAPEUTIC EXERCISES: CPT | Mod: CQ

## 2020-12-04 NOTE — PROGRESS NOTES
PT/PTA met face to face to discuss pt's treatment plan and progress towards established goals. Pt will be seen by a physical therapist minimally every 6th visit or every 30 days.    Rocio Bermudez PTA

## 2020-12-04 NOTE — PROGRESS NOTES
Physical Therapy Assistant Treatment Note     Name: Jordan Altman  Clinic Number: [id number]    Therapy Diagnosis:   Encounter Diagnoses   Name Primary?    Status post liver transplant Yes    Generalized weakness      Physician: Awa Sun NP    Visit Date: 12/4/2020    Physician Orders: PT Eval and Treat   Medical Diagnosis from Referral: Evaluation Date: Z94.4 (ICD-10-CM) - Status post liver transplant  Authorization Period Expiration: 01/30/2021  Plan of Care Expiration: 12/17/20  Visit # / Visits authorized: 2/ 12  PTA Visit 1    Time In: 2:15  Time Out: 3:00  Total Billable Time: 45 minutes    Precautions: organ transplant    Subjective     Pt reports: his L knee is hurting a little today, no reports of LBP. He has not been compliant with HEP because uncle is sick with cancer. His goal for therapy is to get off the ground by himself.  He was compliant with home exercise program.  Response to previous treatment: good  Functional change: nothing significant    Pain: 5/10  Location: L knee, bilateral back    Objective     Jordan received therapeutic exercises to develop strength and core stabilization for 45 minutes including: plank- forward/modified x 2 min, bridging x 2 min., clams x 2 min., hip add x 2 min.  Nustep 5' for joint nutrition and LE ROM  Rows 20# 2x10  Paloff Press 20# 2x10  Heel raises 20x  Step ups 1' 6 inch  SLR 2x10  Shuttle Squats 5B 2'  STS to 24 inch 2x10  Floor transfers 2x prone, quadruped, tall kneel to stand using chair     Home Exercises and Patient Education Provided     Education provided:   -Education on condition, HEP, and - activity      Written Home Z94.4 (ICD-10-CM) - Status post liver transplant  Exercises Provided: yes.  Exercises were reviewed and Jordan was able to demonstrate them prior to the end of the session.  Jordan demonstrated good  understanding of the education provided.      See EMR under Patient Instructions for exercises provided 11/17/2020.    Assessment      Patient presents to therapy with reports of L knee pain and was not affected by pain during treatment today. Focused on patient's goal of getting off the floor independently. Patient performed 2x before fatigue. He continues to show weakness in hips and core so he was encouraged to perform HEP. He will benefit from continued skilled therapy to increase standing endurance and functional independence.    Jordan is progressing well towards his goals.   Pt prognosis is Excellent.     Pt will continue to benefit from skilled outpatient physical therapy to address the deficits listed in the problem list box on initial evaluation, provide pt/family education and to maximize pt's level of independence in the home and community environment.     Pt's spiritual, cultural and educational needs considered and pt agreeable to plan of care and goals.     Anticipated barriers to physical therapy: none    Goals:   Short Term Goals: In 4 weeks   1.Pt to be educated on HEP.  2.Patient to B scapular/UE strength to 5/5 grossly.  3.Patient to increase B hip MMT strength by 1 grade.  4.Patient to have pain less than 3/10 in low back at all times.   5. Pt to be educated on postural/body mechanics awareness.     Long Term Goals: In 8 weeks  1. Patient to improve score on the FOTO to less than 10% impaired.  2. Patient to demo increase in LE strength to 5/5 gross MMT to perform transfers from lower surface with ease.  3. Patient to have very good core strength to perform lifting activities with proper posture at all times.  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         Plan   Plan of care Certification: 11/17/2020 to 12/17/20.     Outpatient Physical Therapy 2 times weekly for 8 weeks to include the following interventions: Gait Training, Manual Therapy, Moist Heat/ Ice, Neuromuscular Re-ed, Patient Education, Self Care, Therapeutic Activites and Therapeutic Exercise, e-stim.      Rocio Bermudez,  PTA

## 2020-12-07 ENCOUNTER — PATIENT MESSAGE (OUTPATIENT)
Dept: TRANSPLANT | Facility: CLINIC | Age: 45
End: 2020-12-07

## 2020-12-08 ENCOUNTER — PATIENT MESSAGE (OUTPATIENT)
Dept: REHABILITATION | Facility: HOSPITAL | Age: 45
End: 2020-12-08

## 2020-12-08 ENCOUNTER — CLINICAL SUPPORT (OUTPATIENT)
Dept: REHABILITATION | Facility: HOSPITAL | Age: 45
End: 2020-12-08
Payer: MEDICAID

## 2020-12-08 ENCOUNTER — PATIENT MESSAGE (OUTPATIENT)
Dept: HEPATOLOGY | Facility: CLINIC | Age: 45
End: 2020-12-08

## 2020-12-08 ENCOUNTER — LAB VISIT (OUTPATIENT)
Dept: LAB | Facility: HOSPITAL | Age: 45
End: 2020-12-08
Attending: SURGERY
Payer: MEDICAID

## 2020-12-08 DIAGNOSIS — Z94.4 STATUS POST LIVER TRANSPLANT: Primary | ICD-10-CM

## 2020-12-08 DIAGNOSIS — Z94.4 LIVER REPLACED BY TRANSPLANT: ICD-10-CM

## 2020-12-08 DIAGNOSIS — R53.1 GENERALIZED WEAKNESS: ICD-10-CM

## 2020-12-08 LAB
ANISOCYTOSIS BLD QL SMEAR: SLIGHT
BASOPHILS NFR BLD: 2 % (ref 0–1.9)
DIFFERENTIAL METHOD: ABNORMAL
EOSINOPHIL NFR BLD: 1 % (ref 0–8)
ERYTHROCYTE [DISTWIDTH] IN BLOOD BY AUTOMATED COUNT: 13.8 % (ref 11.5–14.5)
HCT VFR BLD AUTO: 35.9 % (ref 40–54)
HGB BLD-MCNC: 11.1 G/DL (ref 14–18)
IMM GRANULOCYTES # BLD AUTO: ABNORMAL K/UL (ref 0–0.04)
IMM GRANULOCYTES NFR BLD AUTO: ABNORMAL % (ref 0–0.5)
LYMPHOCYTES NFR BLD: 34 % (ref 18–48)
MCH RBC QN AUTO: 28.5 PG (ref 27–31)
MCHC RBC AUTO-ENTMCNC: 30.9 G/DL (ref 32–36)
MCV RBC AUTO: 92 FL (ref 82–98)
METAMYELOCYTES NFR BLD MANUAL: 1 %
MONOCYTES NFR BLD: 5 % (ref 4–15)
MYELOCYTES NFR BLD MANUAL: 1 %
NEUTROPHILS NFR BLD: 56 % (ref 38–73)
NRBC BLD-RTO: 0 /100 WBC
PLATELET # BLD AUTO: 239 K/UL (ref 150–350)
PLATELET BLD QL SMEAR: ABNORMAL
PMV BLD AUTO: 10.3 FL (ref 9.2–12.9)
RBC # BLD AUTO: 3.89 M/UL (ref 4.6–6.2)
SCHISTOCYTES BLD QL SMEAR: ABNORMAL
WBC # BLD AUTO: 2.44 K/UL (ref 3.9–12.7)

## 2020-12-08 PROCEDURE — 80053 COMPREHEN METABOLIC PANEL: CPT

## 2020-12-08 PROCEDURE — 97140 MANUAL THERAPY 1/> REGIONS: CPT

## 2020-12-08 PROCEDURE — 36415 COLL VENOUS BLD VENIPUNCTURE: CPT | Mod: PO

## 2020-12-08 PROCEDURE — 85027 COMPLETE CBC AUTOMATED: CPT

## 2020-12-08 PROCEDURE — 85007 BL SMEAR W/DIFF WBC COUNT: CPT

## 2020-12-08 PROCEDURE — 80197 ASSAY OF TACROLIMUS: CPT

## 2020-12-08 PROCEDURE — 97110 THERAPEUTIC EXERCISES: CPT

## 2020-12-08 PROCEDURE — 86803 HEPATITIS C AB TEST: CPT

## 2020-12-08 NOTE — TELEPHONE ENCOUNTER
Welcome call complete for epclusa.  Reviewed [facility] services and location, contact information, shipping process,  hours of operation, refill process, and after-hours on-call availability. NO PA Required for AG Epclusa with [place] Medicaid. Will proceed with Benefits investigation then plan to proceed with initial consult.

## 2020-12-08 NOTE — PROGRESS NOTES
Physical Therapy Treatment Note     Name: Jordan Altman  [facility] Number: [id number]    Therapy Diagnosis:   Encounter Diagnoses   Name Primary?    Status post liver transplant Yes    Generalized weakness      Physician: Awa Sun NP    Visit Date: 12/8/2020    Physician Orders: PT Eval and Treat   Medical Diagnosis from Referral: Evaluation Date: Z94.4 (ICD-10-CM) - Status post liver transplant  Authorization Period Expiration: 01/30/2021  Plan of Care Expiration: 12/17/20  Visit # / Visits authorized: 3/ 12    Time In: 1:00  Time Out: 1:50  Total Billable Time: 45 minutes    Precautions: organ transplant    Subjective     Pt reports:  Soreness today secondary working out at home.  Pt. Reports experiencing back pain which he relates to 26 years of work as a .     He was compliant with home exercise program.  Response to previous treatment: good  Functional change: nothing significant    Pain: not reported today  Location: low back    Objective     Jordan received therapeutic exercises to develop strength and core stabilization for 28 minutes including rest breaks :    Nustep 5' for joint nutrition and LE ROM  Hand heel rock  B open books  Forward Plank   Bird Dogs UE/ LE  Bridges  Heel raises 20x  SLR 2x10  B SL hip abd  B SL hip add  Mini squat    Pt. Received manual therapy x 10 min.: P-A mob to L 1-5, R L 3-5 unilateral P-A mob, R facet gapping x 2 min.     Home Exercises and Patient Education Provided     Education provided:   -Education on condition, HEP, and - activity      Written Home Exercises Provided: previous visit  Exercises were reviewed and Jordan was able to demonstrate them prior to the end of the session.  Jordan demonstrated good  understanding of the education provided.      See EMR under Patient Instructions for exercises provided 11/17/2020.    Assessment   Pt. Educated on importance of spinal mobility and good core strength to decrease back pain.  Pt. Began spinal mobility  exercises and educated on further core strengthening.  Pt. Unable to perform forward plank but endured about 26 sec of modified plank.  Began Bird dog exercises which caused discomfort in L post. HS.  Pt. Cont.  hip strengthening therex.  Began mini squat to improve LE functional strength.  Pt. Had decreased B knee flexion and weakness noted in LE with shakiness in LEs.  Cont. Skilled PT with advancement to UE, LE, and core strength as tolerated to improve proper posture and standing balance.    Jordan is progressing well towards his goals.   Pt prognosis is Excellent.     Pt will continue to benefit from skilled outpatient physical therapy to address the deficits listed in the problem list box on initial evaluation, provide pt/family education and to maximize pt's level of independence in the home and community environment.     Pt's spiritual, cultural and educational needs considered and pt agreeable to plan of care and goals.     Anticipated barriers to physical therapy: none    Goals:   Short Term Goals: In 4 weeks   1.Pt to be educated on HEP.  2.Patient to B scapular/UE strength to 5/5 grossly.  3.Patient to increase B hip MMT strength by 1 grade.  4.Patient to have pain less than 3/10 in low back at all times.   5. Pt to be educated on postural/body mechanics awareness.     Long Term Goals: In 8 weeks  1. Patient to improve score on the FOTO to less than 10% impaired.  2. Patient to demo increase in LE strength to 5/5 gross MMT to perform transfers from lower surface with ease.  3. Patient to have very good core strength to perform lifting activities with proper posture at all times.  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         Plan   Plan of care Certification: 11/17/2020 to 12/17/20.     Outpatient Physical Therapy 2 times weekly for 8 weeks to include the following interventions: Gait Training, Manual Therapy, Moist Heat/ Ice, Neuromuscular Re-ed, Patient  Education, Self Care, Therapeutic Activites and Therapeutic Exercise, e-stim.      Anne-Marie Lockwood, PT

## 2020-12-09 ENCOUNTER — TELEPHONE (OUTPATIENT)
Dept: TRANSPLANT | Facility: CLINIC | Age: 45
End: 2020-12-09

## 2020-12-09 ENCOUNTER — PATIENT MESSAGE (OUTPATIENT)
Dept: TRANSPLANT | Facility: CLINIC | Age: 45
End: 2020-12-09

## 2020-12-09 DIAGNOSIS — Z94.4 LIVER REPLACED BY TRANSPLANT: Primary | ICD-10-CM

## 2020-12-09 LAB
ALBUMIN SERPL BCP-MCNC: 3.5 G/DL (ref 3.5–5.2)
ALP SERPL-CCNC: 70 U/L (ref 55–135)
ALT SERPL W/O P-5'-P-CCNC: 10 U/L (ref 10–44)
ANION GAP SERPL CALC-SCNC: 13 MMOL/L (ref 8–16)
AST SERPL-CCNC: 22 U/L (ref 10–40)
BILIRUB SERPL-MCNC: 0.7 MG/DL (ref 0.1–1)
BUN SERPL-MCNC: 24 MG/DL (ref 6–20)
CALCIUM SERPL-MCNC: 9.9 MG/DL (ref 8.7–10.5)
CHLORIDE SERPL-SCNC: 106 MMOL/L (ref 95–110)
CO2 SERPL-SCNC: 21 MMOL/L (ref 23–29)
CREAT SERPL-MCNC: 1.3 MG/DL (ref 0.5–1.4)
EST. GFR  (AFRICAN AMERICAN): >60 ML/MIN/1.73 M^2
EST. GFR  (NON AFRICAN AMERICAN): >60 ML/MIN/1.73 M^2
GLUCOSE SERPL-MCNC: 90 MG/DL (ref 70–110)
POTASSIUM SERPL-SCNC: 4.2 MMOL/L (ref 3.5–5.1)
PROT SERPL-MCNC: 6.2 G/DL (ref 6–8.4)
SODIUM SERPL-SCNC: 140 MMOL/L (ref 136–145)
TACROLIMUS BLD-MCNC: 4 NG/ML (ref 5–15)

## 2020-12-09 RX ORDER — OXYCODONE HYDROCHLORIDE 5 MG/1
5 TABLET ORAL EVERY 8 HOURS PRN
Qty: 20 TABLET | Refills: 0 | Status: ON HOLD | OUTPATIENT
Start: 2020-12-09 | End: 2021-01-29 | Stop reason: HOSPADM

## 2020-12-09 NOTE — TELEPHONE ENCOUNTER
[employer] message sent to notify pt of stable labs.  Next labs 12/14.  ----- Message from Ottoniel Sidhu MD sent at 12/9/2020  2:02 PM CST -----  Results ok. No action required.

## 2020-12-10 ENCOUNTER — PATIENT MESSAGE (OUTPATIENT)
Dept: REHABILITATION | Facility: HOSPITAL | Age: 45
End: 2020-12-10

## 2020-12-10 LAB — HCV AB SERPL QL IA: POSITIVE

## 2020-12-11 ENCOUNTER — HOSPITAL ENCOUNTER (OUTPATIENT)
Dept: RADIOLOGY | Facility: HOSPITAL | Age: 45
Discharge: HOME | End: 2020-12-11
Attending: SURGERY
Payer: MEDICAID

## 2020-12-11 ENCOUNTER — SOCIAL WORK (OUTPATIENT)
Dept: TRANSPLANT | Facility: CLINIC | Age: 45
End: 2020-12-11
Payer: MEDICAID

## 2020-12-11 ENCOUNTER — OFFICE VISIT (OUTPATIENT)
Dept: TRANSPLANT | Facility: CLINIC | Age: 45
End: 2020-12-11
Payer: MEDICAID

## 2020-12-11 ENCOUNTER — SPECIALTY PHARMACY (OUTPATIENT)
Dept: PHARMACY | Facility: CLINIC | Age: 45
End: 2020-12-11

## 2020-12-11 VITALS
BODY MASS INDEX: 27.62 KG/M2 | SYSTOLIC BLOOD PRESSURE: 140 MMHG | WEIGHT: 203.94 LBS | HEIGHT: 72 IN | RESPIRATION RATE: 18 BRPM | HEART RATE: 94 BPM | DIASTOLIC BLOOD PRESSURE: 98 MMHG | TEMPERATURE: 97 F | OXYGEN SATURATION: 100 %

## 2020-12-11 DIAGNOSIS — Z79.60 LONG-TERM USE OF IMMUNOSUPPRESSANT MEDICATION: ICD-10-CM

## 2020-12-11 DIAGNOSIS — Z94.4 LIVER TRANSPLANTED: ICD-10-CM

## 2020-12-11 DIAGNOSIS — Z94.4 LIVER TRANSPLANTED: Primary | ICD-10-CM

## 2020-12-11 PROCEDURE — 99214 OFFICE O/P EST MOD 30 MIN: CPT | Mod: PBBFAC | Performed by: STUDENT IN AN ORGANIZED HEALTH CARE EDUCATION/TRAINING PROGRAM

## 2020-12-11 PROCEDURE — 99999 PR PBB SHADOW E&M-EST. PATIENT-LVL I: CPT | Mod: PBBFAC,,,

## 2020-12-11 PROCEDURE — 99214 PR OFFICE/OUTPT VISIT, EST, LEVL IV, 30-39 MIN: ICD-10-PCS | Mod: S$PBB,,, | Performed by: STUDENT IN AN ORGANIZED HEALTH CARE EDUCATION/TRAINING PROGRAM

## 2020-12-11 PROCEDURE — 99999 PR PBB SHADOW E&M-EST. PATIENT-LVL IV: CPT | Mod: PBBFAC,,, | Performed by: STUDENT IN AN ORGANIZED HEALTH CARE EDUCATION/TRAINING PROGRAM

## 2020-12-11 PROCEDURE — 76705 ECHO EXAM OF ABDOMEN: CPT | Mod: TC

## 2020-12-11 PROCEDURE — 93975 VASCULAR STUDY: CPT | Mod: 26,,, | Performed by: RADIOLOGY

## 2020-12-11 PROCEDURE — 99214 OFFICE O/P EST MOD 30 MIN: CPT | Mod: S$PBB,,, | Performed by: STUDENT IN AN ORGANIZED HEALTH CARE EDUCATION/TRAINING PROGRAM

## 2020-12-11 PROCEDURE — 99999 PR PBB SHADOW E&M-EST. PATIENT-LVL IV: ICD-10-PCS | Mod: PBBFAC,,, | Performed by: STUDENT IN AN ORGANIZED HEALTH CARE EDUCATION/TRAINING PROGRAM

## 2020-12-11 PROCEDURE — 99211 OFF/OP EST MAY X REQ PHY/QHP: CPT | Mod: PBBFAC,25,27

## 2020-12-11 PROCEDURE — 93975 VASCULAR STUDY: CPT | Mod: TC

## 2020-12-11 PROCEDURE — 99999 PR PBB SHADOW E&M-EST. PATIENT-LVL I: ICD-10-PCS | Mod: PBBFAC,,,

## 2020-12-11 PROCEDURE — 93975 US LIVER TRANSPLANT POST: ICD-10-PCS | Mod: 26,,, | Performed by: RADIOLOGY

## 2020-12-11 PROCEDURE — 76705 US LIVER TRANSPLANT POST: ICD-10-PCS | Mod: 26,59,, | Performed by: RADIOLOGY

## 2020-12-11 PROCEDURE — 76705 ECHO EXAM OF ABDOMEN: CPT | Mod: 26,59,, | Performed by: RADIOLOGY

## 2020-12-11 RX ORDER — FEXOFENADINE HCL 60 MG
60 TABLET ORAL DAILY
Status: ON HOLD | COMMUNITY
End: 2021-01-29 | Stop reason: HOSPADM

## 2020-12-11 NOTE — TELEPHONE ENCOUNTER
Specialty Pharmacy - Initial Clinical Assessment    Specialty Medication Orders Linked to Encounter      Most Recent Value   Medication #1  sofosbuvir-velpatasvir 400-100 mg Tab (Order#[id number], Rx#[id number])        Subjective    Jordan Altman is a 45 y.o. male, who is followed by the specialty pharmacy service for management and education.    Recent Encounters     Date Type Provider Description    12/11/2020 Specialty Pharmacy Vandana Ramirez, PharmD Initial Clinical Assessment    12/03/2020 Specialty Pharmacy Selena Vaughn, Silvina Referral Authorization        Clinical call attempts since last clinical assessment   No call attempts found.     Today he received education before his first fill with [facility].    Current Outpatient Medications   Medication Sig    albuterol (PROVENTIL/VENTOLIN HFA) 90 mcg/actuation inhaler Inhale 2 puffs into the lungs every 6 (six) hours as needed for Wheezing or Shortness of Breath. Rescue    amLODIPine (NORVASC) 10 MG tablet Take 1 tablet (10 mg total) by mouth once daily.    apixaban (ELIQUIS) 5 mg Tab Take 1 tablet (5 mg total) by mouth 2 (two) times daily.    bisacodyL (DULCOLAX) 5 mg EC tablet Take 2 tablets (10 mg total) by mouth daily as needed for Constipation.    docusate sodium (COLACE) 100 MG capsule Take 1 capsule (100 mg total) by mouth 3 (three) times daily as needed for Constipation.    fexofenadine (ALLEGRA ALLERGY) 60 MG tablet Take 60 mg by mouth once daily.    mycophenolate (CELLCEPT) 250 mg Cap Take 4 capsules (1,000 mg total) by mouth 2 (two) times daily.    opw transplant care kit Welcome to [facility].  This is your transplant care kit.    oxyCODONE (ROXICODONE) 5 MG immediate release tablet Take 1 tablet (5 mg total) by mouth every 8 (eight) hours as needed for Pain.    pantoprazole (PROTONIX) 20 MG tablet Take 1 tablet (20 mg total) by mouth once daily. To be taken 4 hours AFTER epclusa is dosed with food     sodium bicarbonate 650 MG tablet Take 2 tablets (1,300 mg total) by mouth 2 (two) times daily.    sofosbuvir-velpatasvir 400-100 mg Tab Take 1 tablet by mouth once daily.    tacrolimus (PROGRAF) 1 MG Cap Take 1 capsule (1 mg total) by mouth every morning AND 1 capsule (1 mg total) every evening.    valGANciclovir (VALCYTE) 450 mg Tab Take 1 tablet (450 mg total) by mouth once daily. STOP 4/12/21   Last reviewed on 12/11/2020  3:09 PM by Vandana Ramirez, PharmD    Review of patient's allergies indicates:   Allergen Reactions    Latex, natural rubber    Last reviewed on  12/11/2020 3:09 PM by Vandana Ramirez    Drug Interactions    Drug interactions evaluated: yes  Clinically relevant drug interactions identified: yes   Interactions list: DDI: Pantoprazole and epclusa  DDI: sodium bicarbonate and epclusa   Drug management plan: DDI: Pantoprazole and epclusa-- patient will decrease pantoprazole dose to 20 mg daily and must strictly take Epclusa With food and Pantoprazole 4 hours later. Sodium bicarbonate and Epclusa- sodium bicarb must be spaced 4 hours from epclusa   Provided the patient with educational material regarding drug interactions: yes   Educational material comments: Patient expects to take sodium bicarbonate and pantoprazole at 1pm, and 2nd dose of sodium bicarbonate at night time.  Epclusa will be taken with food.           Adverse Effects    *All other systems reviewed and are negative       Assessment Questions - Documented Responses      Most Recent Value   Assessment   Medication Reconciliation completed for patient  Yes   During the past 4 weeks, has patient missed any activities due to condition or medication?  No   During the past 4 weeks, did patient have any of the following urgent care visits?  None   Goals of Therapy Status  Discussed (new start)   Welcome packet contents reviewed and discussed with patient?  Yes   Assesment completed?  Yes   Plan  Therapy being initiated   Do you need to open  a clinical intervention (i-vent)?  No   Do you want to schedule first shipment?  Yes   Medication #1 Assessment Info   Patient status  New medication, New to [facility]   Is this medication appropriate for the patient?  Yes   Is this medication effective?  Not yet started        Refill Questions - Documented Responses      Most Recent Value   Relationship to patient of person spoken to?  Self   HIPAA/medical authority confirmed?  Yes   Can the patient store medication/sharps container properly (at the correct temperature, away from children/pets, etc.)?  Yes   Can the patient call emergency services (911) in the event of an emergency?  Yes   Does the patient have any concerns or questions about taking or administering this medication as prescribed?  No   How many doses does the patient have on hand?  0   How many days does the patient report on hand quantity will last?  0   During the past 4 weeks, has patient missed any activities due to condition or medication?  No   During the past 4 weeks, did patient have any of the following urgent care visits?  None   How will the patient receive the medication?  Mail   When does the patient need to receive the medication?  12/16/20   Shipping Address  Home   Address in [facility] confirmed and updated if neccessary?  Yes   Expected Copay ($)  0   Is the patient able to afford the medication copay?  Yes   Payment Method  zero copay   Days supply of Refill  28   Refill activity completed?  Yes   Refill activity plan  Refill scheduled   Shipment/Pickup Date:  12/14/20          Objective    He has a past medical history of Decompensated hepatic cirrhosis, Hypertension, and SBP (spontaneous bacterial peritonitis).    Tried/failed medications: none    BP Readings from Last 4 Encounters:   12/11/20 (!) 140/98   11/21/20 (!) 158/89   11/10/20 130/83   11/06/20 (!) 146/100     Ht Readings from Last 4 Encounters:   12/11/20 6' (1.829 m)   11/21/20 6' (1.829 m)   11/10/20 6' (1.829 m)    11/03/20 6' (1.829 m)     Wt Readings from Last 4 Encounters:   12/11/20 92.5 kg (203 lb 14.8 oz)   11/21/20 98 kg (216 lb 0.8 oz)   11/10/20 97 kg (213 lb 13.5 oz)   11/03/20 107 kg (235 lb 14.3 oz)     No results found for: HCVGENOTYPE  Recent Labs   Lab Result Units 12/08/20  0850 12/01/20  0906 11/23/20  0904 11/21/20  2105 11/21/20  1245  11/10/20  0906  10/21/20  0400  10/13/20  1955  09/24/20  0546  09/19/20  1222   Creatinine mg/dL 1.3 1.2 1.5 H 1.5 H 1.9 H   < > 1.2   < > 2.8 H   < > 1.6 H   < > 1.8 H   < > 2.3 H   ALT U/L 10 9 L 10  --  9 L   < > 9 L   < > 191 H   < > 38   < > 38   < >  --    AST U/L 22 17 17  --  16   < > 13   < > 25   < > 96 H   < > 82 H   < >  --    HCV, Qualitative IU/mL  --   --   --   --   --   --  DETECTED A  --  DETECTED A  --  Not detected  --   --   --   --    HCV RNA Quant PCR IU/mL  --   --   --   --   --   --  2,030,744 H  --  2,106,079 H  --  <12  --   --   --   --    Hepatitis B Surface Ag   --   --   --   --   --   --   --   --   --   --  Negative  --   --   --  Negative   Hep B S Ab   --   --   --   --   --   --   --   --   --   --   --   --  Negative  --   --    Hep B Core Total Ab   --   --   --   --   --   --   --   --   --   --   --   --  Negative  --   --     < > = values in this interval not displayed.     The goals of Hepatitis C Virus (HCV) infection treatment include:  · Reducing all-cause mortality and liver-related health adverse consequences, including cirrhosis, end-stage liver disease, and hepatocellular carcinoma  · Achieving virologic cure as evidenced by a sustained virologic response  · Improving or maintaining quality of life  · Maintaining optimal therapy adherence  · Minimizing and managing side effects  · Preventing the transmission of HCV      Goals of Therapy Status: Discussed (new start)    Assessment/Plan  Patient plans to start therapy on 12/16/20      Indication, dosage, appropriateness, effectiveness, safety and convenience of his specialty  medication(s) were reviewed today.     Patient Counseling    Counseled the patient on the following: doses and administration discussed, safe handling, storage, and disposal discussed, possible adverse effects and management discussed, possible drug and prescription drug interactions discussed, possible drug and OTC drug and food interactions discussed, lab monitoring and follow-up discussed, therapeutic rationale discussed, cost of medications and cost implications discussed, adherence and missed doses discussed, pharmacy contact information discussed       Initial Epclusa consult completed on . Epclusa will be shipped on   to arrive at patient's home on 12/15 via [employer]. $0 copay. Patient will start Epclusa on . Address confirmed, CC on file. Confirmed 2 patient identifiers - name and . Therapy Appropriate.     Epclusa 400/100mg- Take one tablet by mouth daily x 12 weeks  Counseling was reviewed:   1. Patient MUST take Epclusa at the SAME time every day. Pt expects to take around 9am with breakfast. MUST TAKE WITH FOOD.    2. Patient MUST avoid acid reducers without consulting with myself or provider first. Antacids are to be spaced out at least 4 hours apart from Epclusa. Pantoprazole MUST be taken strictly 4 hours after Epclusa. Patient expects to take sodium bicarbonate and pantoprazole at 1pm, and sodium bicarbonate at night time.    3. Potential Side effects include: headaches and fatigue.   Headache: Patient may treat with OTC remedies. If Tylenol is used, dose should not exceed 2000mg per day.    4. Allergies reviewed and medication reconciliation complete (reviewed and documented in [facility]). Patient MUST contact myself or provider prior to starting any new OTC, herbal, or prescription drugs to avoid potential DDIs.    DDI: Medication list reviewed--- DDI: Sodium bicarbonate, Pantoprazole and Epclusa-- Patient expects to take sodium bicarbonate and  pantoprazole at 1pm, and 2nd dose of sodium bicarbonate at night time to manage 4 hour spacing requirement. Epclusa will be taken with food.     Discussed the importance of staying well hydrated while on therapy. Compliance stressed - patient to take missed doses as soon as remembered, but NOT to take 2 doses in one day. Patient will report questions or concerns to myself or practitioner. Patient verbalizes understanding. Patient plans to start Epclusa on 12/16.  Consultation included: indication; goals of treatment; administration; storage and handling; side effects; how to handle side effects; the importance of compliance; how to handle missed doses; the importance of laboratory monitoring; the importance of keeping all follow up appointments.  Disease education reviewed (including transmission and prevention). Patient understands to report any medication changes to [facility] and provider. All questions answered and addressed to patients satisfaction. I will f/u with patient 1 week from start, [facility] to contact patient in 3 weeks for refills.           Tasks added this encounter   No tasks added.   Tasks due within next 3 months   12/8/2020 - Clinical - Initial Assessment     Vandana Ramirez, PharmD  [facility]  [address] [place] [id number]  Phone: [phone]  Fax: [phone]

## 2020-12-11 NOTE — PROGRESS NOTES
Clinic Follow up:     SW met with pt and caregiver during clinic appt for follow up on IOP and AA meetings. Pt and caregiver present aaox4, engaged, good eye contact, asking, and answering questions appropriately. Caregiver/ significant other presented tired affect during clinic appt, reporting working 6 days a week , however coping well. Pt reported coping well, stating even relationships with family members and significant other has gotten better. Pt reported still settling into apartment and unpacking items.     SW inquired about IOP and AA meetings, pt reported not having time to enroll due to in the process of unpacking into new apt and medical appts. SW informed pt, several pts have appts and are able to enroll in IOP. As medical appts, should not be an excuse for not enrolling in an IOP. SW provided pt with list of IOPs and AA meeting resources along with AA logs. SW provided education on the importance of IOP and AA meetings, as these are resources that will provide pt the tools for sobriety mainantence, coping skills, and relapse prevention. SW stressed to pt the importance of IOP and AA several times throughout the appt, SW informed pt SW will continue to follow and see again in clinic. SW to inform Tx coordinator, of current update and pt will continue to need SW follow up. Pt reported will have enrolled and attended AA and IOP prior to next SW follow up.    Pt reported still working on receiving disability with . Pt denied having SNAP benefits. SW informed pt to start applying for SNAP benefits as soon as possible. Pt and caregiver verbalized understanding. SW providing ongoing psychosocial and counseling support, education, resources, and assistance. Following and available.

## 2020-12-11 NOTE — PROGRESS NOTES
Transplant Hepatology  Liver Transplant Recipient Follow Up    PCP: SHAHEEN Jaime    Transplant History  Liver transplant performed 10/14/2020 for the primary diagnosis (UNOS) of Alcoholic Cirrhosis     ORGAN: LIVER  Whole or Partial: whole liver  Donor Type: donation after brain death  PHS Increased Risk: yes  Donor CMV Status: Positive  Donor HCV Status: Positive  Donor HBcAb: Negative  Donor HBV SHAYE: Negative  Donor HCV SHAYE: Positive     Biliary Anastomosis: end to end  Arterial Anatomy: standard  IVC reconstruction: end to end ivc  Portal vein status: patent    He has had the following complications since transplant: Portal vein thrombosis and poor hepatic artery flow requiring re-exploration with portal vein thrombectomy and revision of hepatic artery.    Chief complaint: follow up, history of OLT    HPI:  Jordan Altman is a 45 y.o. male with history of OLT in 10/2020 for alcohol related cirrhosis who presents for scheduled follow up. He is without complaints today. He has not had recent hospitalizations or ED visits. He reports compliance with Prograf and CellCept. He denies recent fever, chills, nausea, vomiting, abdominal pain, diarrhea, headache, tremors.    Past Medical History:   Diagnosis Date    Decompensated hepatic cirrhosis     Hypertension     SBP (spontaneous bacterial peritonitis)        Past Surgical History:   Procedure Laterality Date    ERCP N/A 10/1/2020    Procedure: ERCP (ENDOSCOPIC RETROGRADE CHOLANGIOPANCREATOGRAPHY);  Surgeon: Marcos Ramirez MD;  Location: [address]);  Service: Endoscopy;  Laterality: N/A;    ESOPHAGOGASTRODUODENOSCOPY N/A 10/13/2020    Procedure: EGD (ESOPHAGOGASTRODUODENOSCOPY);  Surgeon: Prasanth Jerry MD;  Location: [address]);  Service: Endoscopy;  Laterality: N/A;    EXPLORATORY LAPAROTOMY AFTER LIVER TRANSPLANTATION N/A 10/15/2020    Procedure: LAPAROTOMY, EXPLORATORY, AFTER LIVER TRANSPLANT, Possible redo of artery,  possible portal thrombectomy. IntraOperative US.;  Surgeon: Curtis Zavaleta MD;  [facility];  Service: Transplant;  Laterality: N/A;  With intraoperative ultrasound    LIVER TRANSPLANT N/A 10/14/2020    Procedure: TRANSPLANT, LIVER;  Surgeon: Curtis Zavaleta MD;  Location: [facility] OR [facility];  Service: Transplant;  Laterality: N/A;  Intra op HD    RIGHT HEART CATHETERIZATION Right 9/23/2020    Procedure: INSERTION, CATHETER, RIGHT HEART;  Surgeon: Mikel Costa Jr., MD;  Location: [facility] CATH LAB;  Service: Cardiology;  Laterality: Right;    THORACENTESIS  2011    THROMBECTOMY  10/15/2020    Procedure: THROMBECTOMY portal vein;  Surgeon: Curtis Zavaleta MD;  [facility];  Service: Transplant;;       Family History   Problem Relation Age of Onset    COPD Mother        Social History     Tobacco Use    Smoking status: Current Some Day Smoker    Smokeless tobacco: Current User   Substance Use Topics    Alcohol use: Yes     Alcohol/week: 112.0 standard drinks     Types: 112 Shots of liquor per week     Comment: fifth of vodka a day. LAST DRINK 7/25/2020 per pt     Drug use: Yes     Frequency: 3.0 times per week     Types: Marijuana       Current Outpatient Medications   Medication Sig Dispense Refill    albuterol (PROVENTIL/VENTOLIN HFA) 90 mcg/actuation inhaler Inhale 2 puffs into the lungs every 6 (six) hours as needed for Wheezing or Shortness of Breath. Rescue      amLODIPine (NORVASC) 10 MG tablet Take 1 tablet (10 mg total) by mouth once daily. 30 tablet 11    apixaban (ELIQUIS) 5 mg Tab Take 1 tablet (5 mg total) by mouth 2 (two) times daily. 60 tablet 5    bisacodyL (DULCOLAX) 5 mg EC tablet Take 2 tablets (10 mg total) by mouth daily as needed for Constipation.  0    docusate sodium (COLACE) 100 MG capsule Take 1 capsule (100 mg total) by mouth 3 (three) times daily as needed for Constipation.  0    fexofenadine (ALLEGRA ALLERGY) 60 MG tablet Take 60 mg by mouth once  daily.      mycophenolate (CELLCEPT) 250 mg Cap Take 4 capsules (1,000 mg total) by mouth 2 (two) times daily. 240 capsule 2    opw transplant care kit Welcome to [facility].  This is your transplant care kit. 1 kit 0    oxyCODONE (ROXICODONE) 5 MG immediate release tablet Take 1 tablet (5 mg total) by mouth every 8 (eight) hours as needed for Pain. 20 tablet 0    pantoprazole (PROTONIX) 20 MG tablet Take 1 tablet (20 mg total) by mouth once daily. To be taken 4 hours AFTER epclusa is dosed with food 30 tablet 3    sodium bicarbonate 650 MG tablet Take 2 tablets (1,300 mg total) by mouth 2 (two) times daily. 120 tablet 5    sofosbuvir-velpatasvir 400-100 mg Tab Take 1 tablet by mouth once daily. 28 tablet 2    tacrolimus (PROGRAF) 1 MG Cap Take 1 capsule (1 mg total) by mouth every morning AND 1 capsule (1 mg total) every evening. 60 capsule 11    valGANciclovir (VALCYTE) 450 mg Tab Take 1 tablet (450 mg total) by mouth once daily. STOP 4/12/21 30 tablet 5    cetirizine (ZYRTEC) 10 MG tablet Take 10 mg by mouth daily as needed.       predniSONE (DELTASONE) 5 MG tablet Take by mouth daily: 20 mg 10/20-10/26; 15 mg 10/27-11/2; 10 mg 11/3-11/9; 5 mg 11/10-11/16; STOP 11/17/20 (Patient not taking: Reported on 12/11/2020) 70 tablet 0     No current facility-administered medications for this visit.        Review of patient's allergies indicates:   Allergen Reactions    Latex, natural rubber        Review of Systems   Constitutional: Negative for fever and weight loss.   HENT: Negative for congestion and sore throat.    Eyes: Negative for blurred vision and double vision.   Respiratory: Negative for cough and shortness of breath.    Cardiovascular: Negative for chest pain and leg swelling.   Gastrointestinal: Negative for abdominal pain, blood in stool, constipation, diarrhea, melena, nausea and vomiting.   Genitourinary: Negative for dysuria and hematuria.   Musculoskeletal: Negative for joint  pain and myalgias.   Skin: Negative for itching and rash.   Neurological: Negative for tremors, seizures and headaches.   Endo/Heme/Allergies: Does not bruise/bleed easily.   Psychiatric/Behavioral: Negative for depression. The patient is not nervous/anxious.        Vitals:    12/11/20 0904   BP: (!) 140/98   Pulse: 94   Resp: 18   Temp: 96.5 °F (35.8 °C)   TempSrc: Oral   SpO2: 100%   Weight: 92.5 kg (203 lb 14.8 oz)   Height: 6' (1.829 m)       Physical Exam  Vitals signs reviewed.   Constitutional:       General: He is not in acute distress.     Appearance: He is well-developed.   HENT:      Head: Normocephalic and atraumatic.   Eyes:      General: No scleral icterus.     Extraocular Movements: Extraocular movements intact.      Conjunctiva/sclera: Conjunctivae normal.   Cardiovascular:      Rate and Rhythm: Normal rate and regular rhythm.   Pulmonary:      Effort: Pulmonary effort is normal. No respiratory distress.      Breath sounds: Normal breath sounds. No wheezing, rhonchi or rales.   Abdominal:      General: Bowel sounds are normal. There is no distension.      Palpations: Abdomen is soft.      Tenderness: There is no abdominal tenderness.   Musculoskeletal:         General: No swelling or deformity.      Right lower leg: No edema.      Left lower leg: No edema.   Skin:     General: Skin is warm and dry.      Coloration: Skin is not jaundiced.   Neurological:      General: No focal deficit present.      Mental Status: He is alert and oriented to person, place, and time.   Psychiatric:         Mood and Affect: Mood and affect normal.         Behavior: Behavior is cooperative.         LABS:  Lab Results   Component Value Date    WBC 2.44 (L) 12/08/2020    HGB 11.1 (L) 12/08/2020    HCT 35.9 (L) 12/08/2020    MCV 92 12/08/2020     12/08/2020       Lab Results   Component Value Date     12/08/2020    K 4.2 12/08/2020     12/08/2020    CO2 21 (L) 12/08/2020    BUN 24 (H) 12/08/2020     CREATININE 1.3 12/08/2020    CALCIUM 9.9 12/08/2020    ANIONGAP 13 12/08/2020    ESTGFRAFRICA >60.0 12/08/2020    EGFRNONAA >60.0 12/08/2020       Lab Results   Component Value Date    ALT 10 12/08/2020    AST 22 12/08/2020    ALKPHOS 70 12/08/2020    BILITOT 0.7 12/08/2020       Lab Results   Component Value Date    TACROLIMUS 4.0 (L) 12/08/2020       Assessment:  45 y.o. male with history of OLT in 10/2020 for alcohol related cirrhosis who presents for scheduled follow up.    1. Liver transplanted    2. Long-term use of immunosuppressant medication        Recommendations:  1. Allograft Function  - Excellent graft function with normal LFTs in 12/2020    2. Immunosuppression   - Continue Prograf 1mg twice daily  - Continue CellCept 1000mg twice daily    3. HCV+ donor: Recently started on Epclusa. Followed in HCV clinic.    4. Kidney function   - Creatinine 1.3 in 12/2020. Stable.  - Avoid NSAIDs as able and maintain adequate hydration    5. Health Maintenance/Screening:  - Recommend age appropriate cancer screening  - Skin cancer: Recommend use of sunscreen SPF 30 or higher, hat and sunglasses while outside, dermatologist visit annually or sooner if any concerning lesions.  - Osteoporosis: Recommend bone density testing every 2-3 years if previously normal or annually if previously abnormal.    Return to clinic in 1 month.    UNOS Patient Status  Functional Status: 70% - Cares for self: unable to carry on normal activity or active work  Physical Capacity: No Limitations    Valeriano Marrero MD  Staff Physician  Hepatology and Liver Transplant  [facility]  [facility]

## 2020-12-11 NOTE — LETTER
December 11, 2020        Theresa Titus  [address]  [facility] [address]  Phone: [phone]  Fax: [phone]             [facility]  [address]  [facility] [address]  Phone: [phone]   Patient: Jordan Altman   MR Number: [id number]   YOB: 1975   Date of Visit: 12/11/2020       Dear Dr. Theresa Titus    Thank you for referring Jordan Altman to me for evaluation. Attached you will find relevant portions of my assessment and plan of care.    If you have questions, please do not hesitate to call me. I look forward to following Jordan Altman along with you.    Sincerely,    Valeriano Marrero MD    Enclosure    If you would like to receive this communication electronically, please contact [email or web address] or [phone] to request [employer] access.    [employer] [facility] is a tool which provides read-only access to select patient information with whom you have a relationship. Its easy to use and provides real time access to review your patients record including encounter summaries, notes, results, and demographic information.    If you feel you have received this communication in error or would no longer like to receive these types of communications, please e-mail [email or web address]

## 2020-12-15 ENCOUNTER — PATIENT MESSAGE (OUTPATIENT)
Dept: TRANSPLANT | Facility: CLINIC | Age: 45
End: 2020-12-15

## 2020-12-15 ENCOUNTER — PATIENT MESSAGE (OUTPATIENT)
Dept: REHABILITATION | Facility: HOSPITAL | Age: 45
End: 2020-12-15

## 2020-12-15 ENCOUNTER — TELEPHONE (OUTPATIENT)
Dept: GASTROENTEROLOGY | Facility: CLINIC | Age: 45
End: 2020-12-15

## 2020-12-15 DIAGNOSIS — Z94.4 LIVER REPLACED BY TRANSPLANT: Primary | ICD-10-CM

## 2020-12-15 NOTE — TELEPHONE ENCOUNTER
----- Message from Yessenia Lopez MA sent at 12/15/2020  1:19 PM CST -----  Hello and Good Afternoon,    The above patient needs an appointment in January with Dr. Titus.  He lives closer to [place] than [place].    I appreciate your assistance with his appointment.    Thank you very much for your help.    Yessenia vergara [phone]

## 2020-12-16 ENCOUNTER — TELEPHONE (OUTPATIENT)
Dept: TRANSPLANT | Facility: CLINIC | Age: 45
End: 2020-12-16

## 2020-12-16 DIAGNOSIS — Z94.4 LIVER REPLACED BY TRANSPLANT: Primary | ICD-10-CM

## 2020-12-16 NOTE — TELEPHONE ENCOUNTER
----- Message from Carolin Martines LMSW sent at 12/16/2020 10:39 AM CST -----  Will continue following up via telephone.   Thank you,    Carolin   ----- Message -----  From: Sendy Santos RN  Sent: 12/15/2020  11:25 AM CST  To: Carolin Martines LMSW    He actually sees Dr. Titus in [place].  He will only be required to come here for urgent matters.  How to proceed going forward?  ----- Message -----  From: Carolin Martines LMSW  Sent: 12/11/2020   3:29 PM CST  To: MILADIS Wei, please see chart for SW note. Pt has yet to complete IOP or AA, can we have him come in next month the same day he has an appt with Dr. Saab?    Carolin

## 2020-12-16 NOTE — TELEPHONE ENCOUNTER
Hep C lab, no action required.  ----- Message from Olman Jenkins MD sent at 12/16/2020  2:06 PM CST -----  Results ok. No action needed

## 2020-12-17 ENCOUNTER — TELEPHONE (OUTPATIENT)
Dept: REHABILITATION | Facility: HOSPITAL | Age: 45
End: 2020-12-17

## 2020-12-18 ENCOUNTER — PATIENT MESSAGE (OUTPATIENT)
Dept: HEPATOLOGY | Facility: CLINIC | Age: 45
End: 2020-12-18

## 2020-12-21 ENCOUNTER — PATIENT MESSAGE (OUTPATIENT)
Dept: TRANSPLANT | Facility: CLINIC | Age: 45
End: 2020-12-21

## 2020-12-21 ENCOUNTER — PATIENT MESSAGE (OUTPATIENT)
Dept: REHABILITATION | Facility: HOSPITAL | Age: 45
End: 2020-12-21

## 2020-12-22 ENCOUNTER — LAB VISIT (OUTPATIENT)
Dept: LAB | Facility: HOSPITAL | Age: 45
End: 2020-12-22
Attending: INTERNAL MEDICINE
Payer: MEDICAID

## 2020-12-22 ENCOUNTER — TELEPHONE (OUTPATIENT)
Dept: HEPATOLOGY | Facility: CLINIC | Age: 45
End: 2020-12-22

## 2020-12-22 DIAGNOSIS — Z76.82 ORGAN TRANSPLANT CANDIDATE: ICD-10-CM

## 2020-12-22 DIAGNOSIS — B19.20 HEPATITIS C VIRUS INFECTION WITHOUT HEPATIC COMA, UNSPECIFIED CHRONICITY: Primary | ICD-10-CM

## 2020-12-22 DIAGNOSIS — K76.0 FATTY LIVER: ICD-10-CM

## 2020-12-22 DIAGNOSIS — Z94.4 LIVER REPLACED BY TRANSPLANT: ICD-10-CM

## 2020-12-22 LAB
ALBUMIN SERPL BCP-MCNC: 3.2 G/DL (ref 3.5–5.2)
ALP SERPL-CCNC: 43 U/L (ref 55–135)
ALT SERPL W/O P-5'-P-CCNC: 8 U/L (ref 10–44)
ANION GAP SERPL CALC-SCNC: 8 MMOL/L (ref 8–16)
AST SERPL-CCNC: 15 U/L (ref 10–40)
BILIRUB SERPL-MCNC: 0.6 MG/DL (ref 0.1–1)
BUN SERPL-MCNC: 19 MG/DL (ref 6–20)
CALCIUM SERPL-MCNC: 9 MG/DL (ref 8.7–10.5)
CHLORIDE SERPL-SCNC: 108 MMOL/L (ref 95–110)
CO2 SERPL-SCNC: 25 MMOL/L (ref 23–29)
CREAT SERPL-MCNC: 1.1 MG/DL (ref 0.5–1.4)
EST. GFR  (AFRICAN AMERICAN): >60 ML/MIN/1.73 M^2
EST. GFR  (NON AFRICAN AMERICAN): >60 ML/MIN/1.73 M^2
GLUCOSE SERPL-MCNC: 97 MG/DL (ref 70–110)
POTASSIUM SERPL-SCNC: 4 MMOL/L (ref 3.5–5.1)
PROT SERPL-MCNC: 5.4 G/DL (ref 6–8.4)
SODIUM SERPL-SCNC: 141 MMOL/L (ref 136–145)

## 2020-12-22 PROCEDURE — 85007 BL SMEAR W/DIFF WBC COUNT: CPT

## 2020-12-22 PROCEDURE — 87522 HEPATITIS C REVRS TRNSCRPJ: CPT

## 2020-12-22 PROCEDURE — 86703 HIV-1/HIV-2 1 RESULT ANTBDY: CPT

## 2020-12-22 PROCEDURE — 86704 HEP B CORE ANTIBODY TOTAL: CPT

## 2020-12-22 PROCEDURE — 36415 COLL VENOUS BLD VENIPUNCTURE: CPT | Mod: PO

## 2020-12-22 PROCEDURE — 87340 HEPATITIS B SURFACE AG IA: CPT

## 2020-12-22 PROCEDURE — 87517 HEPATITIS B DNA QUANT: CPT

## 2020-12-22 PROCEDURE — 85027 COMPLETE CBC AUTOMATED: CPT

## 2020-12-22 PROCEDURE — 80197 ASSAY OF TACROLIMUS: CPT

## 2020-12-22 PROCEDURE — 80053 COMPREHEN METABOLIC PANEL: CPT

## 2020-12-22 NOTE — TELEPHONE ENCOUNTER
Pt beginning 12 weeks of Epclusa on 12/16/20  Anticipated treatment end date: 3/9/21  Arlette 2b  Treatment naive  S/p LIVER transplant w/ SHAYE+ Donor 10/14/20    Pls update episode and schedule:  - HCV RNA at week 6 - 1/25/21  - HCV RNA - SVR12 - 5/31/21    thanks

## 2020-12-23 ENCOUNTER — TELEPHONE (OUTPATIENT)
Dept: TRANSPLANT | Facility: CLINIC | Age: 45
End: 2020-12-23

## 2020-12-23 ENCOUNTER — INFUSION (OUTPATIENT)
Dept: INFUSION THERAPY | Facility: HOSPITAL | Age: 45
End: 2020-12-23
Attending: INTERNAL MEDICINE
Payer: MEDICAID

## 2020-12-23 ENCOUNTER — SPECIALTY PHARMACY (OUTPATIENT)
Dept: PHARMACY | Facility: CLINIC | Age: 45
End: 2020-12-23

## 2020-12-23 VITALS
RESPIRATION RATE: 18 BRPM | HEART RATE: 94 BPM | OXYGEN SATURATION: 98 % | DIASTOLIC BLOOD PRESSURE: 94 MMHG | WEIGHT: 216.94 LBS | TEMPERATURE: 98 F | BODY MASS INDEX: 29.42 KG/M2 | SYSTOLIC BLOOD PRESSURE: 149 MMHG

## 2020-12-23 DIAGNOSIS — Z79.60 LONG-TERM USE OF IMMUNOSUPPRESSANT MEDICATION: Primary | ICD-10-CM

## 2020-12-23 DIAGNOSIS — Z94.4 LIVER REPLACED BY TRANSPLANT: Primary | ICD-10-CM

## 2020-12-23 LAB
ANISOCYTOSIS BLD QL SMEAR: SLIGHT
BASOPHILS # BLD AUTO: ABNORMAL K/UL (ref 0–0.2)
BASOPHILS NFR BLD: 3 % (ref 0–1.9)
BURR CELLS BLD QL SMEAR: ABNORMAL
DIFFERENTIAL METHOD: ABNORMAL
EOSINOPHIL # BLD AUTO: ABNORMAL K/UL (ref 0–0.5)
EOSINOPHIL NFR BLD: 4 % (ref 0–8)
ERYTHROCYTE [DISTWIDTH] IN BLOOD BY AUTOMATED COUNT: 13.8 % (ref 11.5–14.5)
GIANT PLATELETS BLD QL SMEAR: PRESENT
HBV CORE AB SERPL QL IA: NEGATIVE
HBV SURFACE AG SERPL QL IA: NEGATIVE
HCT VFR BLD AUTO: 32.1 % (ref 40–54)
HGB BLD-MCNC: 9.7 G/DL (ref 14–18)
HIV 1+2 AB+HIV1 P24 AG SERPL QL IA: NEGATIVE
HYPOCHROMIA BLD QL SMEAR: ABNORMAL
IMM GRANULOCYTES # BLD AUTO: ABNORMAL K/UL (ref 0–0.04)
IMM GRANULOCYTES NFR BLD AUTO: ABNORMAL % (ref 0–0.5)
LYMPHOCYTES # BLD AUTO: ABNORMAL K/UL (ref 1–4.8)
LYMPHOCYTES NFR BLD: 46 % (ref 18–48)
MCH RBC QN AUTO: 27.5 PG (ref 27–31)
MCHC RBC AUTO-ENTMCNC: 30.2 G/DL (ref 32–36)
MCV RBC AUTO: 91 FL (ref 82–98)
MONOCYTES # BLD AUTO: ABNORMAL K/UL (ref 0.3–1)
MONOCYTES NFR BLD: 13 % (ref 4–15)
NEUTROPHILS # BLD AUTO: ABNORMAL K/UL (ref 1.8–7.7)
NEUTROPHILS NFR BLD: 29 % (ref 38–73)
NEUTS BAND NFR BLD MANUAL: 5 %
NRBC BLD-RTO: 0 /100 WBC
OVALOCYTES BLD QL SMEAR: ABNORMAL
PLATELET # BLD AUTO: 145 K/UL (ref 150–350)
PLATELET BLD QL SMEAR: ABNORMAL
PMV BLD AUTO: 10.4 FL (ref 9.2–12.9)
POIKILOCYTOSIS BLD QL SMEAR: SLIGHT
RBC # BLD AUTO: 3.53 M/UL (ref 4.6–6.2)
TACROLIMUS BLD-MCNC: 3.8 NG/ML (ref 5–15)
WBC # BLD AUTO: 0.89 K/UL (ref 3.9–12.7)

## 2020-12-23 PROCEDURE — 96372 THER/PROPH/DIAG INJ SC/IM: CPT

## 2020-12-23 PROCEDURE — 63600175 PHARM REV CODE 636 W HCPCS: Mod: JG | Performed by: INTERNAL MEDICINE

## 2020-12-23 RX ADMIN — FILGRASTIM-SNDZ 480 MCG: 480 INJECTION, SOLUTION INTRAVENOUS; SUBCUTANEOUS at 03:12

## 2020-12-23 NOTE — TELEPHONE ENCOUNTER
Outreach to patient for 7 day touchbase Epclusa. Patient did not answer. LVM to return call. Will next to try outreach patient for refill call.

## 2020-12-24 ENCOUNTER — TELEPHONE (OUTPATIENT)
Dept: REHABILITATION | Facility: HOSPITAL | Age: 45
End: 2020-12-24

## 2020-12-24 NOTE — TELEPHONE ENCOUNTER
Pt. Called after no show for appt. This morning.  Pt. Informed that after 3 no shows he will be discharged.  Pt. Educated that if he wants to discharge from PT just let us know by calling clinic or messaging PT.

## 2020-12-27 ENCOUNTER — PATIENT MESSAGE (OUTPATIENT)
Dept: TRANSPLANT | Facility: CLINIC | Age: 45
End: 2020-12-27

## 2020-12-28 ENCOUNTER — LAB VISIT (OUTPATIENT)
Dept: LAB | Facility: HOSPITAL | Age: 45
End: 2020-12-28
Attending: STUDENT IN AN ORGANIZED HEALTH CARE EDUCATION/TRAINING PROGRAM
Payer: MEDICAID

## 2020-12-28 DIAGNOSIS — Z94.4 LIVER REPLACED BY TRANSPLANT: ICD-10-CM

## 2020-12-28 LAB
ALBUMIN SERPL BCP-MCNC: 3.5 G/DL (ref 3.5–5.2)
ALP SERPL-CCNC: 50 U/L (ref 55–135)
ALT SERPL W/O P-5'-P-CCNC: 15 U/L (ref 10–44)
ANION GAP SERPL CALC-SCNC: 8 MMOL/L (ref 8–16)
ANISOCYTOSIS BLD QL SMEAR: SLIGHT
AST SERPL-CCNC: 18 U/L (ref 10–40)
BASOPHILS # BLD AUTO: 0.02 K/UL (ref 0–0.2)
BASOPHILS NFR BLD: 1 % (ref 0–1.9)
BILIRUB SERPL-MCNC: 0.6 MG/DL (ref 0.1–1)
BUN SERPL-MCNC: 23 MG/DL (ref 6–20)
BURR CELLS BLD QL SMEAR: ABNORMAL
CALCIUM SERPL-MCNC: 9 MG/DL (ref 8.7–10.5)
CHLORIDE SERPL-SCNC: 107 MMOL/L (ref 95–110)
CO2 SERPL-SCNC: 26 MMOL/L (ref 23–29)
CREAT SERPL-MCNC: 1.2 MG/DL (ref 0.5–1.4)
DIFFERENTIAL METHOD: ABNORMAL
EOSINOPHIL # BLD AUTO: 0 K/UL (ref 0–0.5)
EOSINOPHIL NFR BLD: 1.5 % (ref 0–8)
ERYTHROCYTE [DISTWIDTH] IN BLOOD BY AUTOMATED COUNT: 14 % (ref 11.5–14.5)
EST. GFR  (AFRICAN AMERICAN): >60 ML/MIN/1.73 M^2
EST. GFR  (NON AFRICAN AMERICAN): >60 ML/MIN/1.73 M^2
GLUCOSE SERPL-MCNC: 95 MG/DL (ref 70–110)
HBV DNA SERPL NAA+PROBE-ACNC: <10 IU/ML
HBV DNA SERPL NAA+PROBE-LOG IU: <1 LOG (10) IU/ML
HBV DNA SERPL QL NAA+PROBE: NOT DETECTED
HCT VFR BLD AUTO: 33.5 % (ref 40–54)
HCV RNA SERPL NAA+PROBE-LOG IU: 1.79 LOG (10) IU/ML
HCV RNA SERPL QL NAA+PROBE: DETECTED IU/ML
HCV RNA SPEC NAA+PROBE-ACNC: 62 IU/ML
HGB BLD-MCNC: 10.2 G/DL (ref 14–18)
IMM GRANULOCYTES # BLD AUTO: 0.03 K/UL (ref 0–0.04)
IMM GRANULOCYTES NFR BLD AUTO: 1.5 % (ref 0–0.5)
LYMPHOCYTES # BLD AUTO: 1.2 K/UL (ref 1–4.8)
LYMPHOCYTES NFR BLD: 59.8 % (ref 18–48)
MCH RBC QN AUTO: 27.3 PG (ref 27–31)
MCHC RBC AUTO-ENTMCNC: 30.4 G/DL (ref 32–36)
MCV RBC AUTO: 90 FL (ref 82–98)
MONOCYTES # BLD AUTO: 0.7 K/UL (ref 0.3–1)
MONOCYTES NFR BLD: 32.4 % (ref 4–15)
NEUTROPHILS # BLD AUTO: 0.1 K/UL (ref 1.8–7.7)
NEUTROPHILS NFR BLD: 3.8 % (ref 38–73)
NRBC BLD-RTO: 0 /100 WBC
OVALOCYTES BLD QL SMEAR: ABNORMAL
PLATELET # BLD AUTO: 162 K/UL (ref 150–350)
PLATELET BLD QL SMEAR: ABNORMAL
PMV BLD AUTO: 10.8 FL (ref 9.2–12.9)
POTASSIUM SERPL-SCNC: 4.7 MMOL/L (ref 3.5–5.1)
PROT SERPL-MCNC: 5.9 G/DL (ref 6–8.4)
RBC # BLD AUTO: 3.74 M/UL (ref 4.6–6.2)
SODIUM SERPL-SCNC: 141 MMOL/L (ref 136–145)
WBC # BLD AUTO: 2.04 K/UL (ref 3.9–12.7)

## 2020-12-28 PROCEDURE — 80197 ASSAY OF TACROLIMUS: CPT

## 2020-12-28 PROCEDURE — 80053 COMPREHEN METABOLIC PANEL: CPT

## 2020-12-28 PROCEDURE — 85025 COMPLETE CBC W/AUTO DIFF WBC: CPT

## 2020-12-28 PROCEDURE — 36415 COLL VENOUS BLD VENIPUNCTURE: CPT | Mod: PO

## 2020-12-29 ENCOUNTER — PATIENT MESSAGE (OUTPATIENT)
Dept: HEPATOLOGY | Facility: CLINIC | Age: 45
End: 2020-12-29

## 2020-12-29 ENCOUNTER — TELEPHONE (OUTPATIENT)
Dept: GASTROENTEROLOGY | Facility: CLINIC | Age: 45
End: 2020-12-29

## 2020-12-29 LAB — TACROLIMUS BLD-MCNC: 3.4 NG/ML (ref 5–15)

## 2020-12-29 NOTE — TELEPHONE ENCOUNTER
----- Message from Theresa Titus MD sent at 12/28/2020  8:31 AM CST -----  Just seeing the note now, I have been off for a week. I can take over everything after I see the patient. Thanks  ----- Message -----  From: Sendy Santos RN  Sent: 12/15/2020  11:30 AM CST  To: MD Dr. Janes Murrell saw him for first hepatology visit.  Will enter labs in your name, pending appointment with you ASAP.  He has been doing well.

## 2020-12-30 ENCOUNTER — PATIENT MESSAGE (OUTPATIENT)
Dept: REHABILITATION | Facility: HOSPITAL | Age: 45
End: 2020-12-30

## 2020-12-30 ENCOUNTER — PATIENT MESSAGE (OUTPATIENT)
Dept: TRANSPLANT | Facility: CLINIC | Age: 45
End: 2020-12-30

## 2020-12-30 DIAGNOSIS — Z94.4 LIVER TRANSPLANTED: ICD-10-CM

## 2020-12-30 LAB — CMV DNA SERPL NAA+PROBE-ACNC: NORMAL IU/ML

## 2020-12-30 RX ORDER — TACROLIMUS 1 MG/1
CAPSULE ORAL
Qty: 90 CAPSULE | Refills: 11 | Status: SHIPPED | OUTPATIENT
Start: 2020-12-30 | End: 2021-01-22

## 2020-12-30 NOTE — TELEPHONE ENCOUNTER
Pt notified labs reviewed and are stable, instructed pt to increase prograf to 2 mg in am and 1 mg in pm. Will repeat labs 1/4.     ----- Message from Valeriano Marrero MD sent at 12/29/2020  6:23 PM CST -----  Reviewed. Liver tests stable. White count improved. Tac slightly low. Please increase tac to 2/1 and repeat labs in 2 weeks.

## 2021-01-05 ENCOUNTER — TELEPHONE (OUTPATIENT)
Dept: TRANSPLANT | Facility: CLINIC | Age: 46
End: 2021-01-05

## 2021-01-05 ENCOUNTER — PATIENT MESSAGE (OUTPATIENT)
Dept: TRANSPLANT | Facility: CLINIC | Age: 46
End: 2021-01-05

## 2021-01-05 ENCOUNTER — SPECIALTY PHARMACY (OUTPATIENT)
Dept: PHARMACY | Facility: CLINIC | Age: 46
End: 2021-01-05

## 2021-01-05 DIAGNOSIS — Z94.4 LIVER TRANSPLANTED: Primary | ICD-10-CM

## 2021-01-06 ENCOUNTER — PATIENT MESSAGE (OUTPATIENT)
Dept: TRANSPLANT | Facility: CLINIC | Age: 46
End: 2021-01-06

## 2021-01-07 ENCOUNTER — PATIENT MESSAGE (OUTPATIENT)
Dept: TRANSPLANT | Facility: CLINIC | Age: 46
End: 2021-01-07

## 2021-01-08 ENCOUNTER — PATIENT MESSAGE (OUTPATIENT)
Dept: TRANSPLANT | Facility: CLINIC | Age: 46
End: 2021-01-08

## 2021-01-13 ENCOUNTER — TELEPHONE (OUTPATIENT)
Dept: TRANSPLANT | Facility: CLINIC | Age: 46
End: 2021-01-13

## 2021-01-14 ENCOUNTER — PATIENT MESSAGE (OUTPATIENT)
Dept: ENDOSCOPY | Facility: HOSPITAL | Age: 46
End: 2021-01-14

## 2021-01-14 ENCOUNTER — LAB VISIT (OUTPATIENT)
Dept: LAB | Facility: HOSPITAL | Age: 46
End: 2021-01-14
Attending: STUDENT IN AN ORGANIZED HEALTH CARE EDUCATION/TRAINING PROGRAM
Payer: MEDICAID

## 2021-01-14 DIAGNOSIS — Z94.4 LIVER REPLACED BY TRANSPLANT: ICD-10-CM

## 2021-01-14 DIAGNOSIS — Z94.4 LIVER TRANSPLANTED: ICD-10-CM

## 2021-01-14 LAB
ANISOCYTOSIS BLD QL SMEAR: SLIGHT
BASOPHILS # BLD AUTO: 0.04 K/UL (ref 0–0.2)
BASOPHILS NFR BLD: 1.3 % (ref 0–1.9)
DIFFERENTIAL METHOD: ABNORMAL
EOSINOPHIL # BLD AUTO: 0.1 K/UL (ref 0–0.5)
EOSINOPHIL NFR BLD: 4.6 % (ref 0–8)
ERYTHROCYTE [DISTWIDTH] IN BLOOD BY AUTOMATED COUNT: 13.9 % (ref 11.5–14.5)
HCT VFR BLD AUTO: 40.4 % (ref 40–54)
HGB BLD-MCNC: 11.9 G/DL (ref 14–18)
IMM GRANULOCYTES # BLD AUTO: 0.01 K/UL (ref 0–0.04)
IMM GRANULOCYTES NFR BLD AUTO: 0.3 % (ref 0–0.5)
LYMPHOCYTES # BLD AUTO: 1.4 K/UL (ref 1–4.8)
LYMPHOCYTES NFR BLD: 46.9 % (ref 18–48)
MCH RBC QN AUTO: 27.9 PG (ref 27–31)
MCHC RBC AUTO-ENTMCNC: 29.5 G/DL (ref 32–36)
MCV RBC AUTO: 95 FL (ref 82–98)
MONOCYTES # BLD AUTO: 0.6 K/UL (ref 0.3–1)
MONOCYTES NFR BLD: 19 % (ref 4–15)
NEUTROPHILS # BLD AUTO: 0.9 K/UL (ref 1.8–7.7)
NEUTROPHILS NFR BLD: 27.9 % (ref 38–73)
NRBC BLD-RTO: 0 /100 WBC
OVALOCYTES BLD QL SMEAR: ABNORMAL
PLATELET # BLD AUTO: 158 K/UL (ref 150–350)
PMV BLD AUTO: 9.9 FL (ref 9.2–12.9)
POIKILOCYTOSIS BLD QL SMEAR: SLIGHT
RBC # BLD AUTO: 4.27 M/UL (ref 4.6–6.2)
WBC # BLD AUTO: 3.05 K/UL (ref 3.9–12.7)

## 2021-01-14 PROCEDURE — 80197 ASSAY OF TACROLIMUS: CPT

## 2021-01-14 PROCEDURE — 85025 COMPLETE CBC W/AUTO DIFF WBC: CPT

## 2021-01-14 PROCEDURE — 80053 COMPREHEN METABOLIC PANEL: CPT

## 2021-01-14 PROCEDURE — 87522 HEPATITIS C REVRS TRNSCRPJ: CPT

## 2021-01-14 PROCEDURE — 86803 HEPATITIS C AB TEST: CPT

## 2021-01-15 LAB
ALBUMIN SERPL BCP-MCNC: 3.5 G/DL (ref 3.5–5.2)
ALP SERPL-CCNC: 72 U/L (ref 55–135)
ALT SERPL W/O P-5'-P-CCNC: 44 U/L (ref 10–44)
ANION GAP SERPL CALC-SCNC: 9 MMOL/L (ref 8–16)
AST SERPL-CCNC: 61 U/L (ref 10–40)
BILIRUB SERPL-MCNC: 0.6 MG/DL (ref 0.1–1)
BUN SERPL-MCNC: 22 MG/DL (ref 6–20)
CALCIUM SERPL-MCNC: 9.4 MG/DL (ref 8.7–10.5)
CHLORIDE SERPL-SCNC: 106 MMOL/L (ref 95–110)
CO2 SERPL-SCNC: 25 MMOL/L (ref 23–29)
CREAT SERPL-MCNC: 1.1 MG/DL (ref 0.5–1.4)
EST. GFR  (AFRICAN AMERICAN): >60 ML/MIN/1.73 M^2
EST. GFR  (NON AFRICAN AMERICAN): >60 ML/MIN/1.73 M^2
GLUCOSE SERPL-MCNC: 93 MG/DL (ref 70–110)
HCV AB SERPL QL IA: POSITIVE
POTASSIUM SERPL-SCNC: 4.9 MMOL/L (ref 3.5–5.1)
PROT SERPL-MCNC: 6.7 G/DL (ref 6–8.4)
SODIUM SERPL-SCNC: 140 MMOL/L (ref 136–145)
TACROLIMUS BLD-MCNC: 4.4 NG/ML (ref 5–15)

## 2021-01-18 LAB
CMV DNA SERPL NAA+PROBE-ACNC: NORMAL IU/ML
HCV RNA SERPL NAA+PROBE-LOG IU: <1.08 LOG (10) IU/ML
HCV RNA SERPL QL NAA+PROBE: DETECTED IU/ML
HCV RNA SPEC NAA+PROBE-ACNC: <12 IU/ML

## 2021-01-22 ENCOUNTER — PATIENT MESSAGE (OUTPATIENT)
Dept: TRANSPLANT | Facility: CLINIC | Age: 46
End: 2021-01-22

## 2021-01-22 DIAGNOSIS — Z94.4 LIVER TRANSPLANTED: ICD-10-CM

## 2021-01-25 ENCOUNTER — TELEPHONE (OUTPATIENT)
Dept: HEPATOLOGY | Facility: CLINIC | Age: 46
End: 2021-01-25

## 2021-01-25 RX ORDER — TACROLIMUS 1 MG/1
CAPSULE ORAL
Qty: 120 CAPSULE | Refills: 11 | Status: SHIPPED | OUTPATIENT
Start: 2021-01-25 | End: 2021-03-04

## 2021-01-26 ENCOUNTER — LAB VISIT (OUTPATIENT)
Dept: LAB | Facility: HOSPITAL | Age: 46
End: 2021-01-26
Attending: STUDENT IN AN ORGANIZED HEALTH CARE EDUCATION/TRAINING PROGRAM
Payer: MEDICAID

## 2021-01-26 ENCOUNTER — PATIENT MESSAGE (OUTPATIENT)
Dept: HEPATOLOGY | Facility: CLINIC | Age: 46
End: 2021-01-26

## 2021-01-26 DIAGNOSIS — Z94.4 LIVER REPLACED BY TRANSPLANT: ICD-10-CM

## 2021-01-26 PROCEDURE — 85025 COMPLETE CBC W/AUTO DIFF WBC: CPT

## 2021-01-26 PROCEDURE — 80197 ASSAY OF TACROLIMUS: CPT

## 2021-01-26 PROCEDURE — 36415 COLL VENOUS BLD VENIPUNCTURE: CPT | Mod: PO

## 2021-01-26 PROCEDURE — 80053 COMPREHEN METABOLIC PANEL: CPT

## 2021-01-27 ENCOUNTER — TELEPHONE (OUTPATIENT)
Dept: TRANSPLANT | Facility: CLINIC | Age: 46
End: 2021-01-27

## 2021-01-27 ENCOUNTER — HOSPITAL ENCOUNTER (INPATIENT)
Facility: HOSPITAL | Age: 46
LOS: 2 days | Discharge: HOME | DRG: 443 | End: 2021-01-29
Attending: TRANSPLANT SURGERY | Admitting: TRANSPLANT SURGERY
Payer: MEDICAID

## 2021-01-27 DIAGNOSIS — Z91.89 AT RISK FOR OPPORTUNISTIC INFECTIONS: ICD-10-CM

## 2021-01-27 DIAGNOSIS — T38.0X5S ADVERSE EFFECT OF CORTICOSTEROIDS, SEQUELA: ICD-10-CM

## 2021-01-27 DIAGNOSIS — Z29.89 PROPHYLACTIC IMMUNOTHERAPY: ICD-10-CM

## 2021-01-27 DIAGNOSIS — T86.41 ACUTE REJECTION OF LIVER TRANSPLANT: Primary | ICD-10-CM

## 2021-01-27 DIAGNOSIS — Z94.4 S/P LIVER TRANSPLANT: ICD-10-CM

## 2021-01-27 DIAGNOSIS — R79.89 ELEVATED LFTS: ICD-10-CM

## 2021-01-27 DIAGNOSIS — R73.9 ACUTE HYPERGLYCEMIA: ICD-10-CM

## 2021-01-27 DIAGNOSIS — Z79.60 LONG-TERM USE OF IMMUNOSUPPRESSANT MEDICATION: ICD-10-CM

## 2021-01-27 DIAGNOSIS — D63.8 ANEMIA OF CHRONIC DISEASE: ICD-10-CM

## 2021-01-27 LAB
ALBUMIN SERPL BCP-MCNC: 3.1 G/DL (ref 3.5–5.2)
ALBUMIN SERPL BCP-MCNC: 3.2 G/DL (ref 3.5–5.2)
ALP SERPL-CCNC: 235 U/L (ref 55–135)
ALP SERPL-CCNC: 307 U/L (ref 55–135)
ALT SERPL W/O P-5'-P-CCNC: 468 U/L (ref 10–44)
ALT SERPL W/O P-5'-P-CCNC: 507 U/L (ref 10–44)
ANION GAP SERPL CALC-SCNC: 6 MMOL/L (ref 8–16)
ANION GAP SERPL CALC-SCNC: 7 MMOL/L (ref 8–16)
ANISOCYTOSIS BLD QL SMEAR: SLIGHT
ANISOCYTOSIS BLD QL SMEAR: SLIGHT
AST SERPL-CCNC: 436 U/L (ref 10–40)
AST SERPL-CCNC: 446 U/L (ref 10–40)
BASOPHILS # BLD AUTO: 0.03 K/UL (ref 0–0.2)
BASOPHILS # BLD AUTO: 0.05 K/UL (ref 0–0.2)
BASOPHILS NFR BLD: 0.8 % (ref 0–1.9)
BASOPHILS NFR BLD: 1.5 % (ref 0–1.9)
BILIRUB SERPL-MCNC: 0.6 MG/DL (ref 0.1–1)
BILIRUB SERPL-MCNC: 0.7 MG/DL (ref 0.1–1)
BUN SERPL-MCNC: 26 MG/DL (ref 6–20)
BUN SERPL-MCNC: 27 MG/DL (ref 6–20)
CALCIUM SERPL-MCNC: 9.2 MG/DL (ref 8.7–10.5)
CALCIUM SERPL-MCNC: 9.4 MG/DL (ref 8.7–10.5)
CHLORIDE SERPL-SCNC: 104 MMOL/L (ref 95–110)
CHLORIDE SERPL-SCNC: 105 MMOL/L (ref 95–110)
CO2 SERPL-SCNC: 25 MMOL/L (ref 23–29)
CO2 SERPL-SCNC: 27 MMOL/L (ref 23–29)
CREAT SERPL-MCNC: 1.1 MG/DL (ref 0.5–1.4)
CREAT SERPL-MCNC: 1.3 MG/DL (ref 0.5–1.4)
DIFFERENTIAL METHOD: ABNORMAL
DIFFERENTIAL METHOD: ABNORMAL
EOSINOPHIL # BLD AUTO: 0.1 K/UL (ref 0–0.5)
EOSINOPHIL # BLD AUTO: 0.2 K/UL (ref 0–0.5)
EOSINOPHIL NFR BLD: 2.7 % (ref 0–8)
EOSINOPHIL NFR BLD: 6.4 % (ref 0–8)
ERYTHROCYTE [DISTWIDTH] IN BLOOD BY AUTOMATED COUNT: 13.4 % (ref 11.5–14.5)
ERYTHROCYTE [DISTWIDTH] IN BLOOD BY AUTOMATED COUNT: 13.7 % (ref 11.5–14.5)
EST. GFR  (AFRICAN AMERICAN): >60 ML/MIN/1.73 M^2
EST. GFR  (AFRICAN AMERICAN): >60 ML/MIN/1.73 M^2
EST. GFR  (NON AFRICAN AMERICAN): >60 ML/MIN/1.73 M^2
EST. GFR  (NON AFRICAN AMERICAN): >60 ML/MIN/1.73 M^2
GLUCOSE SERPL-MCNC: 105 MG/DL (ref 70–110)
GLUCOSE SERPL-MCNC: 77 MG/DL (ref 70–110)
HCT VFR BLD AUTO: 37.3 % (ref 40–54)
HCT VFR BLD AUTO: 38.7 % (ref 40–54)
HGB BLD-MCNC: 11.5 G/DL (ref 14–18)
HGB BLD-MCNC: 11.7 G/DL (ref 14–18)
IMM GRANULOCYTES # BLD AUTO: 0.03 K/UL (ref 0–0.04)
IMM GRANULOCYTES # BLD AUTO: 0.05 K/UL (ref 0–0.04)
IMM GRANULOCYTES NFR BLD AUTO: 0.9 % (ref 0–0.5)
IMM GRANULOCYTES NFR BLD AUTO: 1.3 % (ref 0–0.5)
LYMPHOCYTES # BLD AUTO: 1.2 K/UL (ref 1–4.8)
LYMPHOCYTES # BLD AUTO: 1.6 K/UL (ref 1–4.8)
LYMPHOCYTES NFR BLD: 34.9 % (ref 18–48)
LYMPHOCYTES NFR BLD: 41.1 % (ref 18–48)
MAGNESIUM SERPL-MCNC: 1.5 MG/DL (ref 1.6–2.6)
MCH RBC QN AUTO: 28 PG (ref 27–31)
MCH RBC QN AUTO: 28.1 PG (ref 27–31)
MCHC RBC AUTO-ENTMCNC: 30.2 G/DL (ref 32–36)
MCHC RBC AUTO-ENTMCNC: 30.8 G/DL (ref 32–36)
MCV RBC AUTO: 91 FL (ref 82–98)
MCV RBC AUTO: 93 FL (ref 82–98)
MONOCYTES # BLD AUTO: 0.5 K/UL (ref 0.3–1)
MONOCYTES # BLD AUTO: 0.6 K/UL (ref 0.3–1)
MONOCYTES NFR BLD: 13.5 % (ref 4–15)
MONOCYTES NFR BLD: 17.4 % (ref 4–15)
NEUTROPHILS # BLD AUTO: 1.3 K/UL (ref 1.8–7.7)
NEUTROPHILS # BLD AUTO: 1.5 K/UL (ref 1.8–7.7)
NEUTROPHILS NFR BLD: 38.9 % (ref 38–73)
NEUTROPHILS NFR BLD: 40.6 % (ref 38–73)
NRBC BLD-RTO: 0 /100 WBC
NRBC BLD-RTO: 0 /100 WBC
OVALOCYTES BLD QL SMEAR: ABNORMAL
PHOSPHATE SERPL-MCNC: 4.3 MG/DL (ref 2.7–4.5)
PLATELET # BLD AUTO: 120 K/UL (ref 150–350)
PLATELET # BLD AUTO: 132 K/UL (ref 150–350)
PLATELET BLD QL SMEAR: ABNORMAL
PLATELET BLD QL SMEAR: ABNORMAL
PMV BLD AUTO: 10.4 FL (ref 9.2–12.9)
PMV BLD AUTO: 10.7 FL (ref 9.2–12.9)
POIKILOCYTOSIS BLD QL SMEAR: SLIGHT
POIKILOCYTOSIS BLD QL SMEAR: SLIGHT
POTASSIUM SERPL-SCNC: 5.1 MMOL/L (ref 3.5–5.1)
POTASSIUM SERPL-SCNC: 5.3 MMOL/L (ref 3.5–5.1)
PROT SERPL-MCNC: 6.5 G/DL (ref 6–8.4)
PROT SERPL-MCNC: 6.6 G/DL (ref 6–8.4)
RBC # BLD AUTO: 4.11 M/UL (ref 4.6–6.2)
RBC # BLD AUTO: 4.16 M/UL (ref 4.6–6.2)
SARS-COV-2 RDRP RESP QL NAA+PROBE: NEGATIVE
SODIUM SERPL-SCNC: 137 MMOL/L (ref 136–145)
SODIUM SERPL-SCNC: 137 MMOL/L (ref 136–145)
TACROLIMUS BLD-MCNC: 6.3 NG/ML (ref 5–15)
WBC # BLD AUTO: 3.44 K/UL (ref 3.9–12.7)
WBC # BLD AUTO: 3.77 K/UL (ref 3.9–12.7)

## 2021-01-27 PROCEDURE — 80053 COMPREHEN METABOLIC PANEL: CPT

## 2021-01-27 PROCEDURE — 84100 ASSAY OF PHOSPHORUS: CPT

## 2021-01-27 PROCEDURE — 63600175 PHARM REV CODE 636 W HCPCS: Performed by: NURSE PRACTITIONER

## 2021-01-27 PROCEDURE — U0002 COVID-19 LAB TEST NON-CDC: HCPCS

## 2021-01-27 PROCEDURE — 85025 COMPLETE CBC W/AUTO DIFF WBC: CPT

## 2021-01-27 PROCEDURE — 99223 PR INITIAL HOSPITAL CARE,LEVL III: ICD-10-PCS | Mod: ,,, | Performed by: PHYSICIAN ASSISTANT

## 2021-01-27 PROCEDURE — 25000003 PHARM REV CODE 250: Performed by: NURSE PRACTITIONER

## 2021-01-27 PROCEDURE — 87040 BLOOD CULTURE FOR BACTERIA: CPT

## 2021-01-27 PROCEDURE — 99223 1ST HOSP IP/OBS HIGH 75: CPT | Mod: ,,, | Performed by: PHYSICIAN ASSISTANT

## 2021-01-27 PROCEDURE — 20600001 HC STEP DOWN PRIVATE ROOM

## 2021-01-27 PROCEDURE — 83735 ASSAY OF MAGNESIUM: CPT

## 2021-01-27 PROCEDURE — 36415 COLL VENOUS BLD VENIPUNCTURE: CPT

## 2021-01-27 RX ORDER — ACETAMINOPHEN 325 MG/1
650 TABLET ORAL EVERY 8 HOURS PRN
Status: DISCONTINUED | OUTPATIENT
Start: 2021-01-27 | End: 2021-01-29 | Stop reason: HOSPADM

## 2021-01-27 RX ORDER — DOCUSATE SODIUM 100 MG/1
100 CAPSULE, LIQUID FILLED ORAL 3 TIMES DAILY PRN
Status: DISCONTINUED | OUTPATIENT
Start: 2021-01-27 | End: 2021-01-29 | Stop reason: HOSPADM

## 2021-01-27 RX ORDER — SODIUM CHLORIDE 0.9 % (FLUSH) 0.9 %
10 SYRINGE (ML) INJECTION
Status: DISCONTINUED | OUTPATIENT
Start: 2021-01-27 | End: 2021-01-29 | Stop reason: HOSPADM

## 2021-01-27 RX ORDER — ONDANSETRON 8 MG/1
8 TABLET, ORALLY DISINTEGRATING ORAL EVERY 8 HOURS PRN
Status: DISCONTINUED | OUTPATIENT
Start: 2021-01-27 | End: 2021-01-29 | Stop reason: HOSPADM

## 2021-01-27 RX ORDER — SODIUM BICARBONATE 650 MG/1
1300 TABLET ORAL 2 TIMES DAILY
Status: DISCONTINUED | OUTPATIENT
Start: 2021-01-27 | End: 2021-01-29 | Stop reason: HOSPADM

## 2021-01-27 RX ORDER — GLUCAGON 1 MG
1 KIT INJECTION
Status: DISCONTINUED | OUTPATIENT
Start: 2021-01-27 | End: 2021-01-28

## 2021-01-27 RX ORDER — INSULIN ASPART 100 [IU]/ML
0-5 INJECTION, SOLUTION INTRAVENOUS; SUBCUTANEOUS
Status: DISCONTINUED | OUTPATIENT
Start: 2021-01-27 | End: 2021-01-28

## 2021-01-27 RX ORDER — TACROLIMUS 1 MG/1
2 CAPSULE ORAL 2 TIMES DAILY
Status: DISCONTINUED | OUTPATIENT
Start: 2021-01-27 | End: 2021-01-29 | Stop reason: HOSPADM

## 2021-01-27 RX ORDER — AMLODIPINE BESYLATE 10 MG/1
10 TABLET ORAL DAILY
Status: DISCONTINUED | OUTPATIENT
Start: 2021-01-28 | End: 2021-01-29 | Stop reason: HOSPADM

## 2021-01-27 RX ORDER — ALBUTEROL SULFATE 90 UG/1
2 AEROSOL, METERED RESPIRATORY (INHALATION) EVERY 6 HOURS PRN
Status: DISCONTINUED | OUTPATIENT
Start: 2021-01-27 | End: 2021-01-29 | Stop reason: HOSPADM

## 2021-01-27 RX ORDER — BISACODYL 5 MG
10 TABLET, DELAYED RELEASE (ENTERIC COATED) ORAL DAILY PRN
Status: DISCONTINUED | OUTPATIENT
Start: 2021-01-27 | End: 2021-01-29 | Stop reason: HOSPADM

## 2021-01-27 RX ORDER — MAGNESIUM SULFATE HEPTAHYDRATE 40 MG/ML
2 INJECTION, SOLUTION INTRAVENOUS ONCE
Status: COMPLETED | OUTPATIENT
Start: 2021-01-27 | End: 2021-01-28

## 2021-01-27 RX ORDER — IBUPROFEN 200 MG
24 TABLET ORAL
Status: DISCONTINUED | OUTPATIENT
Start: 2021-01-27 | End: 2021-01-28

## 2021-01-27 RX ORDER — IBUPROFEN 200 MG
16 TABLET ORAL
Status: DISCONTINUED | OUTPATIENT
Start: 2021-01-27 | End: 2021-01-28

## 2021-01-27 RX ADMIN — SODIUM BICARBONATE 650 MG TABLET 1300 MG: at 08:01

## 2021-01-27 RX ADMIN — TACROLIMUS 2 MG: 1 CAPSULE ORAL at 08:01

## 2021-01-27 RX ADMIN — DEXTROSE 500 MG: 50 INJECTION, SOLUTION INTRAVENOUS at 08:01

## 2021-01-28 PROBLEM — N17.9 AKI (ACUTE KIDNEY INJURY): Status: RESOLVED | Noted: 2020-09-19 | Resolved: 2021-01-28

## 2021-01-28 PROBLEM — I27.20 PULMONARY HTN: Status: RESOLVED | Noted: 2020-09-21 | Resolved: 2021-01-28

## 2021-01-28 PROBLEM — D68.9 COAGULOPATHY: Status: RESOLVED | Noted: 2020-09-19 | Resolved: 2021-01-28

## 2021-01-28 PROBLEM — K70.31 ASCITES DUE TO ALCOHOLIC CIRRHOSIS: Status: RESOLVED | Noted: 2020-09-19 | Resolved: 2021-01-28

## 2021-01-28 PROBLEM — R53.81 DEBILITY: Status: RESOLVED | Noted: 2020-10-20 | Resolved: 2021-01-28

## 2021-01-28 PROBLEM — Z76.89 ENCOUNTER FOR SUPPORT AND COORDINATION OF TRANSITION OF CARE: Status: RESOLVED | Noted: 2020-11-06 | Resolved: 2021-01-28

## 2021-01-28 PROBLEM — K76.82 HEPATIC ENCEPHALOPATHY: Status: RESOLVED | Noted: 2020-09-19 | Resolved: 2021-01-28

## 2021-01-28 PROBLEM — E87.1 HYPONATREMIA: Status: RESOLVED | Noted: 2020-09-19 | Resolved: 2021-01-28

## 2021-01-28 PROBLEM — K76.6 PORTAL HYPERTENSION: Status: RESOLVED | Noted: 2020-09-19 | Resolved: 2021-01-28

## 2021-01-28 PROBLEM — D65 DIC (DISSEMINATED INTRAVASCULAR COAGULATION): Status: RESOLVED | Noted: 2020-09-20 | Resolved: 2021-01-28

## 2021-01-28 PROBLEM — R53.1 GENERALIZED WEAKNESS: Status: RESOLVED | Noted: 2020-11-17 | Resolved: 2021-01-28

## 2021-01-28 PROBLEM — E83.39 HYPERPHOSPHATEMIA: Status: RESOLVED | Noted: 2020-10-20 | Resolved: 2021-01-28

## 2021-01-28 LAB
ALBUMIN SERPL BCP-MCNC: 3.4 G/DL (ref 3.5–5.2)
ALP SERPL-CCNC: 365 U/L (ref 55–135)
ALT SERPL W/O P-5'-P-CCNC: 518 U/L (ref 10–44)
ANION GAP SERPL CALC-SCNC: 10 MMOL/L (ref 8–16)
ANISOCYTOSIS BLD QL SMEAR: SLIGHT
AST SERPL-CCNC: 317 U/L (ref 10–40)
BACTERIA #/AREA URNS AUTO: ABNORMAL /HPF
BASOPHILS # BLD AUTO: 0.02 K/UL (ref 0–0.2)
BASOPHILS NFR BLD: 0.8 % (ref 0–1.9)
BILIRUB SERPL-MCNC: 0.7 MG/DL (ref 0.1–1)
BILIRUB UR QL STRIP: NEGATIVE
BUN SERPL-MCNC: 26 MG/DL (ref 6–20)
CALCIUM SERPL-MCNC: 9.7 MG/DL (ref 8.7–10.5)
CHLORIDE SERPL-SCNC: 104 MMOL/L (ref 95–110)
CLARITY UR REFRACT.AUTO: CLEAR
CO2 SERPL-SCNC: 22 MMOL/L (ref 23–29)
COLOR UR AUTO: YELLOW
CREAT SERPL-MCNC: 1 MG/DL (ref 0.5–1.4)
DACRYOCYTES BLD QL SMEAR: ABNORMAL
DIFFERENTIAL METHOD: ABNORMAL
EOSINOPHIL # BLD AUTO: 0 K/UL (ref 0–0.5)
EOSINOPHIL NFR BLD: 0 % (ref 0–8)
ERYTHROCYTE [DISTWIDTH] IN BLOOD BY AUTOMATED COUNT: 13.2 % (ref 11.5–14.5)
EST. GFR  (AFRICAN AMERICAN): >60 ML/MIN/1.73 M^2
EST. GFR  (NON AFRICAN AMERICAN): >60 ML/MIN/1.73 M^2
GLUCOSE SERPL-MCNC: 259 MG/DL (ref 70–110)
GLUCOSE UR QL STRIP: NEGATIVE
HCT VFR BLD AUTO: 42.5 % (ref 40–54)
HGB BLD-MCNC: 13.3 G/DL (ref 14–18)
HGB UR QL STRIP: ABNORMAL
IMM GRANULOCYTES # BLD AUTO: 0.04 K/UL (ref 0–0.04)
IMM GRANULOCYTES NFR BLD AUTO: 1.6 % (ref 0–0.5)
INR PPP: 1 (ref 0.8–1.2)
KETONES UR QL STRIP: NEGATIVE
LEUKOCYTE ESTERASE UR QL STRIP: NEGATIVE
LYMPHOCYTES # BLD AUTO: 1.2 K/UL (ref 1–4.8)
LYMPHOCYTES NFR BLD: 46.9 % (ref 18–48)
MAGNESIUM SERPL-MCNC: 1.6 MG/DL (ref 1.6–2.6)
MCH RBC QN AUTO: 27.9 PG (ref 27–31)
MCHC RBC AUTO-ENTMCNC: 31.3 G/DL (ref 32–36)
MCV RBC AUTO: 89 FL (ref 82–98)
MICROSCOPIC COMMENT: ABNORMAL
MONOCYTES # BLD AUTO: 0.1 K/UL (ref 0.3–1)
MONOCYTES NFR BLD: 3.1 % (ref 4–15)
NEUTROPHILS # BLD AUTO: 1.2 K/UL (ref 1.8–7.7)
NEUTROPHILS NFR BLD: 47.6 % (ref 38–73)
NITRITE UR QL STRIP: NEGATIVE
NRBC BLD-RTO: 0 /100 WBC
OVALOCYTES BLD QL SMEAR: ABNORMAL
PH UR STRIP: 6 [PH] (ref 5–8)
PHOSPHATE SERPL-MCNC: 5.8 MG/DL (ref 2.7–4.5)
PLATELET # BLD AUTO: 121 K/UL (ref 150–350)
PLATELET BLD QL SMEAR: ABNORMAL
PMV BLD AUTO: 9.8 FL (ref 9.2–12.9)
POCT GLUCOSE: 162 MG/DL (ref 70–110)
POCT GLUCOSE: 218 MG/DL (ref 70–110)
POCT GLUCOSE: 230 MG/DL (ref 70–110)
POCT GLUCOSE: 276 MG/DL (ref 70–110)
POIKILOCYTOSIS BLD QL SMEAR: SLIGHT
POTASSIUM SERPL-SCNC: 5.1 MMOL/L (ref 3.5–5.1)
PROT SERPL-MCNC: 7.4 G/DL (ref 6–8.4)
PROT UR QL STRIP: NEGATIVE
PROTHROMBIN TIME: 11.2 SEC (ref 9–12.5)
RBC # BLD AUTO: 4.76 M/UL (ref 4.6–6.2)
RBC #/AREA URNS AUTO: 6 /HPF (ref 0–4)
SODIUM SERPL-SCNC: 136 MMOL/L (ref 136–145)
SP GR UR STRIP: 1.01 (ref 1–1.03)
SQUAMOUS #/AREA URNS AUTO: 0 /HPF
TACROLIMUS BLD-MCNC: 7.9 NG/ML (ref 5–15)
URN SPEC COLLECT METH UR: ABNORMAL
WBC # BLD AUTO: 2.58 K/UL (ref 3.9–12.7)
WBC #/AREA URNS AUTO: 0 /HPF (ref 0–5)

## 2021-01-28 PROCEDURE — 99222 PR INITIAL HOSPITAL CARE,LEVL II: ICD-10-PCS | Mod: 95,,, | Performed by: NURSE PRACTITIONER

## 2021-01-28 PROCEDURE — 88313 PR  SPECIAL STAINS,GROUP II: ICD-10-PCS | Mod: 26,,, | Performed by: PATHOLOGY

## 2021-01-28 PROCEDURE — 99233 SBSQ HOSP IP/OBS HIGH 50: CPT | Mod: ,,, | Performed by: PHYSICIAN ASSISTANT

## 2021-01-28 PROCEDURE — 25000003 PHARM REV CODE 250: Performed by: PHYSICIAN ASSISTANT

## 2021-01-28 PROCEDURE — 80053 COMPREHEN METABOLIC PANEL: CPT

## 2021-01-28 PROCEDURE — 63600175 PHARM REV CODE 636 W HCPCS: Performed by: RADIOLOGY

## 2021-01-28 PROCEDURE — 99222 1ST HOSP IP/OBS MODERATE 55: CPT | Mod: 95,,, | Performed by: NURSE PRACTITIONER

## 2021-01-28 PROCEDURE — C9399 UNCLASSIFIED DRUGS OR BIOLOG: HCPCS | Performed by: NURSE PRACTITIONER

## 2021-01-28 PROCEDURE — 81001 URINALYSIS AUTO W/SCOPE: CPT

## 2021-01-28 PROCEDURE — 88307 TISSUE EXAM BY PATHOLOGIST: CPT | Mod: 26,,, | Performed by: PATHOLOGY

## 2021-01-28 PROCEDURE — 25000003 PHARM REV CODE 250: Performed by: NURSE PRACTITIONER

## 2021-01-28 PROCEDURE — 85025 COMPLETE CBC W/AUTO DIFF WBC: CPT

## 2021-01-28 PROCEDURE — 99233 PR SUBSEQUENT HOSPITAL CARE,LEVL III: ICD-10-PCS | Mod: ,,, | Performed by: PHYSICIAN ASSISTANT

## 2021-01-28 PROCEDURE — 88307 PR  SURG PATH,LEVEL V: ICD-10-PCS | Mod: 26,,, | Performed by: PATHOLOGY

## 2021-01-28 PROCEDURE — 36415 COLL VENOUS BLD VENIPUNCTURE: CPT

## 2021-01-28 PROCEDURE — 85610 PROTHROMBIN TIME: CPT

## 2021-01-28 PROCEDURE — 88313 SPECIAL STAINS GROUP 2: CPT | Performed by: PATHOLOGY

## 2021-01-28 PROCEDURE — 63600175 PHARM REV CODE 636 W HCPCS: Performed by: PHYSICIAN ASSISTANT

## 2021-01-28 PROCEDURE — 80197 ASSAY OF TACROLIMUS: CPT

## 2021-01-28 PROCEDURE — 63600175 PHARM REV CODE 636 W HCPCS: Performed by: NURSE PRACTITIONER

## 2021-01-28 PROCEDURE — 25500020 PHARM REV CODE 255: Performed by: TRANSPLANT SURGERY

## 2021-01-28 PROCEDURE — 88313 SPECIAL STAINS GROUP 2: CPT | Mod: 26,,, | Performed by: PATHOLOGY

## 2021-01-28 PROCEDURE — 84100 ASSAY OF PHOSPHORUS: CPT

## 2021-01-28 PROCEDURE — 88307 TISSUE EXAM BY PATHOLOGIST: CPT | Performed by: PATHOLOGY

## 2021-01-28 PROCEDURE — 20600001 HC STEP DOWN PRIVATE ROOM

## 2021-01-28 PROCEDURE — 83735 ASSAY OF MAGNESIUM: CPT

## 2021-01-28 RX ORDER — INSULIN ASPART 100 [IU]/ML
1-10 INJECTION, SOLUTION INTRAVENOUS; SUBCUTANEOUS EVERY 4 HOURS PRN
Status: DISCONTINUED | OUTPATIENT
Start: 2021-01-28 | End: 2021-01-28

## 2021-01-28 RX ORDER — IBUPROFEN 200 MG
24 TABLET ORAL
Status: DISCONTINUED | OUTPATIENT
Start: 2021-01-28 | End: 2021-01-29 | Stop reason: HOSPADM

## 2021-01-28 RX ORDER — OXYCODONE HYDROCHLORIDE 5 MG/1
5 TABLET ORAL ONCE AS NEEDED
Status: COMPLETED | OUTPATIENT
Start: 2021-01-28 | End: 2021-01-28

## 2021-01-28 RX ORDER — INSULIN ASPART 100 [IU]/ML
1-10 INJECTION, SOLUTION INTRAVENOUS; SUBCUTANEOUS
Status: DISCONTINUED | OUTPATIENT
Start: 2021-01-28 | End: 2021-01-29 | Stop reason: HOSPADM

## 2021-01-28 RX ORDER — PREDNISONE 20 MG/1
20 TABLET ORAL DAILY
Status: DISCONTINUED | OUTPATIENT
Start: 2021-01-30 | End: 2021-01-29 | Stop reason: HOSPADM

## 2021-01-28 RX ORDER — IBUPROFEN 200 MG
16 TABLET ORAL
Status: DISCONTINUED | OUTPATIENT
Start: 2021-01-28 | End: 2021-01-29 | Stop reason: HOSPADM

## 2021-01-28 RX ORDER — GABAPENTIN 300 MG/1
300 CAPSULE ORAL 2 TIMES DAILY
Status: DISCONTINUED | OUTPATIENT
Start: 2021-01-28 | End: 2021-01-29 | Stop reason: HOSPADM

## 2021-01-28 RX ORDER — GLUCAGON 1 MG
1 KIT INJECTION
Status: DISCONTINUED | OUTPATIENT
Start: 2021-01-28 | End: 2021-01-29 | Stop reason: HOSPADM

## 2021-01-28 RX ORDER — INSULIN ASPART 100 [IU]/ML
8 INJECTION, SOLUTION INTRAVENOUS; SUBCUTANEOUS
Status: DISCONTINUED | OUTPATIENT
Start: 2021-01-28 | End: 2021-01-29

## 2021-01-28 RX ORDER — GABAPENTIN 100 MG/1
360 CAPSULE ORAL 3 TIMES DAILY PRN
Status: ON HOLD | COMMUNITY
End: 2021-01-29 | Stop reason: HOSPADM

## 2021-01-28 RX ORDER — VALGANCICLOVIR 450 MG/1
450 TABLET, FILM COATED ORAL DAILY
Status: DISCONTINUED | OUTPATIENT
Start: 2021-01-28 | End: 2021-01-29 | Stop reason: HOSPADM

## 2021-01-28 RX ORDER — FENTANYL CITRATE 50 UG/ML
INJECTION, SOLUTION INTRAMUSCULAR; INTRAVENOUS CODE/TRAUMA/SEDATION MEDICATION
Status: COMPLETED | OUTPATIENT
Start: 2021-01-28 | End: 2021-01-28

## 2021-01-28 RX ORDER — VELPATASVIR AND SOFOSBUVIR 100; 400 MG/1; MG/1
1 TABLET, FILM COATED ORAL DAILY
Status: DISCONTINUED | OUTPATIENT
Start: 2021-01-28 | End: 2021-01-28

## 2021-01-28 RX ORDER — VELPATASVIR AND SOFOSBUVIR 100; 400 MG/1; MG/1
1 TABLET, FILM COATED ORAL DAILY
Status: DISCONTINUED | OUTPATIENT
Start: 2021-01-28 | End: 2021-01-29 | Stop reason: HOSPADM

## 2021-01-28 RX ORDER — MIDAZOLAM HYDROCHLORIDE 1 MG/ML
INJECTION INTRAMUSCULAR; INTRAVENOUS CODE/TRAUMA/SEDATION MEDICATION
Status: COMPLETED | OUTPATIENT
Start: 2021-01-28 | End: 2021-01-28

## 2021-01-28 RX ORDER — GLUCAGON 1 MG
1 KIT INJECTION
Status: DISCONTINUED | OUTPATIENT
Start: 2021-01-28 | End: 2021-01-28

## 2021-01-28 RX ORDER — INSULIN ASPART 100 [IU]/ML
1-10 INJECTION, SOLUTION INTRAVENOUS; SUBCUTANEOUS
Status: DISCONTINUED | OUTPATIENT
Start: 2021-01-28 | End: 2021-01-28

## 2021-01-28 RX ORDER — NYSTATIN 100000 [USP'U]/ML
500000 SUSPENSION ORAL
Status: DISCONTINUED | OUTPATIENT
Start: 2021-01-28 | End: 2021-01-29 | Stop reason: HOSPADM

## 2021-01-28 RX ADMIN — FENTANYL CITRATE 50 MCG: 50 INJECTION, SOLUTION INTRAMUSCULAR; INTRAVENOUS at 05:01

## 2021-01-28 RX ADMIN — NYSTATIN 500000 UNITS: 100000 SUSPENSION ORAL at 12:01

## 2021-01-28 RX ADMIN — NYSTATIN 500000 UNITS: 100000 SUSPENSION ORAL at 07:01

## 2021-01-28 RX ADMIN — GABAPENTIN 300 MG: 300 CAPSULE ORAL at 08:01

## 2021-01-28 RX ADMIN — TACROLIMUS 2 MG: 1 CAPSULE ORAL at 08:01

## 2021-01-28 RX ADMIN — MIDAZOLAM HYDROCHLORIDE 1 MG: 1 INJECTION, SOLUTION INTRAMUSCULAR; INTRAVENOUS at 05:01

## 2021-01-28 RX ADMIN — OXYCODONE 5 MG: 5 TABLET ORAL at 08:01

## 2021-01-28 RX ADMIN — VELPATASVIR AND SOFOSBUVIR 1 TABLET: 100; 400 TABLET, FILM COATED ORAL at 12:01

## 2021-01-28 RX ADMIN — NYSTATIN 500000 UNITS: 100000 SUSPENSION ORAL at 08:01

## 2021-01-28 RX ADMIN — INSULIN ASPART 6 UNITS: 100 INJECTION, SOLUTION INTRAVENOUS; SUBCUTANEOUS at 12:01

## 2021-01-28 RX ADMIN — TACROLIMUS 2 MG: 1 CAPSULE ORAL at 07:01

## 2021-01-28 RX ADMIN — INSULIN ASPART 2 UNITS: 100 INJECTION, SOLUTION INTRAVENOUS; SUBCUTANEOUS at 08:01

## 2021-01-28 RX ADMIN — INSULIN DETEMIR 14 UNITS: 100 INJECTION, SOLUTION SUBCUTANEOUS at 03:01

## 2021-01-28 RX ADMIN — SODIUM BICARBONATE 650 MG TABLET 1300 MG: at 08:01

## 2021-01-28 RX ADMIN — IOHEXOL 25 ML: 300 INJECTION, SOLUTION INTRAVENOUS at 05:01

## 2021-01-28 RX ADMIN — DEXTROSE 500 MG: 50 INJECTION, SOLUTION INTRAVENOUS at 07:01

## 2021-01-28 RX ADMIN — INSULIN ASPART 2 UNITS: 100 INJECTION, SOLUTION INTRAVENOUS; SUBCUTANEOUS at 07:01

## 2021-01-28 RX ADMIN — VALGANCICLOVIR 450 MG: 450 TABLET, FILM COATED ORAL at 12:01

## 2021-01-28 RX ADMIN — INSULIN ASPART 8 UNITS: 100 INJECTION, SOLUTION INTRAVENOUS; SUBCUTANEOUS at 07:01

## 2021-01-28 RX ADMIN — MAGNESIUM SULFATE 2 G: 2 INJECTION INTRAVENOUS at 12:01

## 2021-01-28 RX ADMIN — AMLODIPINE BESYLATE 10 MG: 10 TABLET ORAL at 08:01

## 2021-01-28 RX ADMIN — INSULIN ASPART 4 UNITS: 100 INJECTION, SOLUTION INTRAVENOUS; SUBCUTANEOUS at 03:01

## 2021-01-29 ENCOUNTER — TELEPHONE (OUTPATIENT)
Dept: TRANSPLANT | Facility: HOSPITAL | Age: 46
End: 2021-01-29

## 2021-01-29 ENCOUNTER — TELEPHONE (OUTPATIENT)
Dept: ENDOCRINOLOGY | Facility: HOSPITAL | Age: 46
End: 2021-01-29

## 2021-01-29 ENCOUNTER — TELEPHONE (OUTPATIENT)
Dept: TRANSPLANT | Facility: CLINIC | Age: 46
End: 2021-01-29

## 2021-01-29 VITALS
OXYGEN SATURATION: 96 % | HEART RATE: 99 BPM | SYSTOLIC BLOOD PRESSURE: 157 MMHG | DIASTOLIC BLOOD PRESSURE: 104 MMHG | HEIGHT: 72 IN | RESPIRATION RATE: 13 BRPM | WEIGHT: 203.94 LBS | BODY MASS INDEX: 27.62 KG/M2 | TEMPERATURE: 98 F

## 2021-01-29 DIAGNOSIS — Z94.4 LIVER REPLACED BY TRANSPLANT: Primary | ICD-10-CM

## 2021-01-29 LAB
ALBUMIN SERPL BCP-MCNC: 3.3 G/DL (ref 3.5–5.2)
ALP SERPL-CCNC: 274 U/L (ref 55–135)
ALT SERPL W/O P-5'-P-CCNC: 332 U/L (ref 10–44)
ANION GAP SERPL CALC-SCNC: 9 MMOL/L (ref 8–16)
ANISOCYTOSIS BLD QL SMEAR: SLIGHT
AST SERPL-CCNC: 87 U/L (ref 10–40)
BASOPHILS # BLD AUTO: 0.01 K/UL (ref 0–0.2)
BASOPHILS NFR BLD: 0.2 % (ref 0–1.9)
BILIRUB SERPL-MCNC: 0.5 MG/DL (ref 0.1–1)
BUN SERPL-MCNC: 34 MG/DL (ref 6–20)
CALCIUM SERPL-MCNC: 9.2 MG/DL (ref 8.7–10.5)
CHLORIDE SERPL-SCNC: 104 MMOL/L (ref 95–110)
CMV DNA SERPL NAA+PROBE-ACNC: 679 IU/ML
CO2 SERPL-SCNC: 21 MMOL/L (ref 23–29)
CREAT SERPL-MCNC: 1.1 MG/DL (ref 0.5–1.4)
DIFFERENTIAL METHOD: ABNORMAL
EOSINOPHIL # BLD AUTO: 0 K/UL (ref 0–0.5)
EOSINOPHIL NFR BLD: 0 % (ref 0–8)
ERYTHROCYTE [DISTWIDTH] IN BLOOD BY AUTOMATED COUNT: 13.2 % (ref 11.5–14.5)
EST. GFR  (AFRICAN AMERICAN): >60 ML/MIN/1.73 M^2
EST. GFR  (NON AFRICAN AMERICAN): >60 ML/MIN/1.73 M^2
FINAL PATHOLOGIC DIAGNOSIS: NORMAL
GLUCOSE SERPL-MCNC: 285 MG/DL (ref 70–110)
GROSS: NORMAL
HCT VFR BLD AUTO: 39.4 % (ref 40–54)
HGB BLD-MCNC: 12.8 G/DL (ref 14–18)
IMM GRANULOCYTES # BLD AUTO: 0.03 K/UL (ref 0–0.04)
IMM GRANULOCYTES NFR BLD AUTO: 0.7 % (ref 0–0.5)
LYMPHOCYTES # BLD AUTO: 0.9 K/UL (ref 1–4.8)
LYMPHOCYTES NFR BLD: 18.9 % (ref 18–48)
MAGNESIUM SERPL-MCNC: 1.9 MG/DL (ref 1.6–2.6)
MCH RBC QN AUTO: 28.5 PG (ref 27–31)
MCHC RBC AUTO-ENTMCNC: 32.5 G/DL (ref 32–36)
MCV RBC AUTO: 88 FL (ref 82–98)
MICROSCOPIC EXAM: NORMAL
MONOCYTES # BLD AUTO: 0.1 K/UL (ref 0.3–1)
MONOCYTES NFR BLD: 2.2 % (ref 4–15)
NEUTROPHILS # BLD AUTO: 3.5 K/UL (ref 1.8–7.7)
NEUTROPHILS NFR BLD: 78 % (ref 38–73)
NRBC BLD-RTO: 0 /100 WBC
PHOSPHATE SERPL-MCNC: 4.3 MG/DL (ref 2.7–4.5)
PLATELET # BLD AUTO: 133 K/UL (ref 150–350)
PLATELET BLD QL SMEAR: ABNORMAL
PMV BLD AUTO: 9.8 FL (ref 9.2–12.9)
POCT GLUCOSE: 256 MG/DL (ref 70–110)
POCT GLUCOSE: 261 MG/DL (ref 70–110)
POLYCHROMASIA BLD QL SMEAR: ABNORMAL
POTASSIUM SERPL-SCNC: 4.5 MMOL/L (ref 3.5–5.1)
PROT SERPL-MCNC: 6.9 G/DL (ref 6–8.4)
RBC # BLD AUTO: 4.49 M/UL (ref 4.6–6.2)
SODIUM SERPL-SCNC: 134 MMOL/L (ref 136–145)
TACROLIMUS BLD-MCNC: 7.9 NG/ML (ref 5–15)
WBC # BLD AUTO: 4.54 K/UL (ref 3.9–12.7)

## 2021-01-29 PROCEDURE — 99239 HOSP IP/OBS DSCHRG MGMT >30: CPT | Mod: 24,,, | Performed by: PHYSICIAN ASSISTANT

## 2021-01-29 PROCEDURE — 83735 ASSAY OF MAGNESIUM: CPT

## 2021-01-29 PROCEDURE — 36415 COLL VENOUS BLD VENIPUNCTURE: CPT

## 2021-01-29 PROCEDURE — 84100 ASSAY OF PHOSPHORUS: CPT

## 2021-01-29 PROCEDURE — 25000003 PHARM REV CODE 250: Performed by: NURSE PRACTITIONER

## 2021-01-29 PROCEDURE — 80053 COMPREHEN METABOLIC PANEL: CPT

## 2021-01-29 PROCEDURE — 85025 COMPLETE CBC W/AUTO DIFF WBC: CPT

## 2021-01-29 PROCEDURE — 99239 PR HOSPITAL DISCHARGE DAY,>30 MIN: ICD-10-PCS | Mod: 24,,, | Performed by: PHYSICIAN ASSISTANT

## 2021-01-29 PROCEDURE — 25000003 PHARM REV CODE 250: Performed by: PHYSICIAN ASSISTANT

## 2021-01-29 PROCEDURE — 63600175 PHARM REV CODE 636 W HCPCS: Performed by: NURSE PRACTITIONER

## 2021-01-29 PROCEDURE — 63600175 PHARM REV CODE 636 W HCPCS: Performed by: PHYSICIAN ASSISTANT

## 2021-01-29 PROCEDURE — 99232 SBSQ HOSP IP/OBS MODERATE 35: CPT | Mod: ,,, | Performed by: NURSE PRACTITIONER

## 2021-01-29 PROCEDURE — 80197 ASSAY OF TACROLIMUS: CPT

## 2021-01-29 PROCEDURE — 99232 PR SUBSEQUENT HOSPITAL CARE,LEVL II: ICD-10-PCS | Mod: ,,, | Performed by: NURSE PRACTITIONER

## 2021-01-29 RX ORDER — INSULIN ASPART 100 [IU]/ML
10 INJECTION, SOLUTION INTRAVENOUS; SUBCUTANEOUS
Status: DISCONTINUED | OUTPATIENT
Start: 2021-01-29 | End: 2021-01-29 | Stop reason: HOSPADM

## 2021-01-29 RX ORDER — PEN NEEDLE, DIABETIC 29 G X1/2"
1 NEEDLE, DISPOSABLE MISCELLANEOUS
Qty: 100 EACH | Refills: 1 | Status: SHIPPED | OUTPATIENT
Start: 2021-01-29 | End: 2024-01-01

## 2021-01-29 RX ORDER — PREDNISONE 5 MG/1
TABLET ORAL
Qty: 75 TABLET | Refills: 0 | Status: SHIPPED | OUTPATIENT
Start: 2021-01-29 | End: 2021-02-11 | Stop reason: DRUGHIGH

## 2021-01-29 RX ORDER — VALGANCICLOVIR 450 MG/1
450 TABLET, FILM COATED ORAL DAILY
Qty: 30 TABLET | Refills: 5 | Status: SHIPPED | OUTPATIENT
Start: 2021-01-29 | End: 2021-02-11 | Stop reason: DRUGHIGH

## 2021-01-29 RX ORDER — GABAPENTIN 300 MG/1
600 CAPSULE ORAL 2 TIMES DAILY
Qty: 120 CAPSULE | Refills: 11 | COMMUNITY
Start: 2021-01-29 | End: 2024-01-01 | Stop reason: DRUGHIGH

## 2021-01-29 RX ORDER — NYSTATIN 100000 [USP'U]/ML
500000 SUSPENSION ORAL
Qty: 220 ML | Refills: 0 | Status: SHIPPED | OUTPATIENT
Start: 2021-01-29 | End: 2022-02-02 | Stop reason: ALTCHOICE

## 2021-01-29 RX ORDER — BLOOD-GLUCOSE CONTROL, NORMAL
EACH MISCELLANEOUS
Qty: 100 EACH | Refills: 1 | Status: SHIPPED | OUTPATIENT
Start: 2021-01-29 | End: 2024-01-01

## 2021-01-29 RX ORDER — DEXTROSE 4 G
TABLET,CHEWABLE ORAL
Qty: 1 EACH | Refills: 0 | Status: SHIPPED | OUTPATIENT
Start: 2021-01-29 | End: 2024-01-01

## 2021-01-29 RX ORDER — INSULIN ASPART 100 [IU]/ML
INJECTION, SOLUTION INTRAVENOUS; SUBCUTANEOUS
Refills: 0
Start: 2021-01-29 | End: 2021-03-29

## 2021-01-29 RX ORDER — SULFAMETHOXAZOLE AND TRIMETHOPRIM 400; 80 MG/1; MG/1
1 TABLET ORAL DAILY
Qty: 30 TABLET | Refills: 2 | Status: SHIPPED | OUTPATIENT
Start: 2021-01-29 | End: 2021-05-28 | Stop reason: ALTCHOICE

## 2021-01-29 RX ADMIN — NYSTATIN 500000 UNITS: 100000 SUSPENSION ORAL at 12:01

## 2021-01-29 RX ADMIN — GABAPENTIN 300 MG: 300 CAPSULE ORAL at 08:01

## 2021-01-29 RX ADMIN — VELPATASVIR AND SOFOSBUVIR 1 TABLET: 100; 400 TABLET, FILM COATED ORAL at 08:01

## 2021-01-29 RX ADMIN — AMLODIPINE BESYLATE 10 MG: 10 TABLET ORAL at 08:01

## 2021-01-29 RX ADMIN — VALGANCICLOVIR 450 MG: 450 TABLET, FILM COATED ORAL at 08:01

## 2021-01-29 RX ADMIN — ACETAMINOPHEN 650 MG: 325 TABLET ORAL at 08:01

## 2021-01-29 RX ADMIN — INSULIN ASPART 8 UNITS: 100 INJECTION, SOLUTION INTRAVENOUS; SUBCUTANEOUS at 08:01

## 2021-01-29 RX ADMIN — INSULIN ASPART 6 UNITS: 100 INJECTION, SOLUTION INTRAVENOUS; SUBCUTANEOUS at 08:01

## 2021-01-29 RX ADMIN — SODIUM BICARBONATE 650 MG TABLET 1300 MG: at 08:01

## 2021-01-29 RX ADMIN — INSULIN ASPART 8 UNITS: 100 INJECTION, SOLUTION INTRAVENOUS; SUBCUTANEOUS at 01:01

## 2021-01-29 RX ADMIN — NYSTATIN 500000 UNITS: 100000 SUSPENSION ORAL at 08:01

## 2021-01-29 RX ADMIN — SODIUM BICARBONATE 650 MG TABLET 1300 MG: at 03:01

## 2021-01-29 RX ADMIN — TACROLIMUS 2 MG: 1 CAPSULE ORAL at 08:01

## 2021-01-29 RX ADMIN — DEXTROSE 500 MG: 50 INJECTION, SOLUTION INTRAVENOUS at 03:01

## 2021-01-29 RX ADMIN — INSULIN ASPART 6 UNITS: 100 INJECTION, SOLUTION INTRAVENOUS; SUBCUTANEOUS at 01:01

## 2021-01-30 ENCOUNTER — TELEPHONE (OUTPATIENT)
Dept: TRANSPLANT | Facility: CLINIC | Age: 46
End: 2021-01-30

## 2021-01-30 ENCOUNTER — NURSE TRIAGE (OUTPATIENT)
Dept: ADMINISTRATIVE | Facility: CLINIC | Age: 46
End: 2021-01-30

## 2021-01-30 ENCOUNTER — PATIENT MESSAGE (OUTPATIENT)
Dept: TRANSPLANT | Facility: CLINIC | Age: 46
End: 2021-01-30

## 2021-02-01 ENCOUNTER — LAB VISIT (OUTPATIENT)
Dept: LAB | Facility: HOSPITAL | Age: 46
End: 2021-02-01
Attending: STUDENT IN AN ORGANIZED HEALTH CARE EDUCATION/TRAINING PROGRAM
Payer: MEDICAID

## 2021-02-01 DIAGNOSIS — Z94.4 LIVER REPLACED BY TRANSPLANT: ICD-10-CM

## 2021-02-01 LAB
ALBUMIN SERPL BCP-MCNC: 3.1 G/DL (ref 3.5–5.2)
ALP SERPL-CCNC: 160 U/L (ref 55–135)
ALT SERPL W/O P-5'-P-CCNC: 145 U/L (ref 10–44)
ANION GAP SERPL CALC-SCNC: 9 MMOL/L (ref 8–16)
AST SERPL-CCNC: 47 U/L (ref 10–40)
BASOPHILS # BLD AUTO: 0.01 K/UL (ref 0–0.2)
BASOPHILS NFR BLD: 0.2 % (ref 0–1.9)
BILIRUB SERPL-MCNC: 0.4 MG/DL (ref 0.1–1)
BUN SERPL-MCNC: 42 MG/DL (ref 6–20)
CALCIUM SERPL-MCNC: 8.9 MG/DL (ref 8.7–10.5)
CHLORIDE SERPL-SCNC: 109 MMOL/L (ref 95–110)
CO2 SERPL-SCNC: 24 MMOL/L (ref 23–29)
CREAT SERPL-MCNC: 1.4 MG/DL (ref 0.5–1.4)
DIFFERENTIAL METHOD: ABNORMAL
EOSINOPHIL # BLD AUTO: 0 K/UL (ref 0–0.5)
EOSINOPHIL NFR BLD: 0.9 % (ref 0–8)
ERYTHROCYTE [DISTWIDTH] IN BLOOD BY AUTOMATED COUNT: 13.3 % (ref 11.5–14.5)
EST. GFR  (AFRICAN AMERICAN): >60 ML/MIN/1.73 M^2
EST. GFR  (NON AFRICAN AMERICAN): 59.4 ML/MIN/1.73 M^2
GLUCOSE SERPL-MCNC: 191 MG/DL (ref 70–110)
HCT VFR BLD AUTO: 38.3 % (ref 40–54)
HGB BLD-MCNC: 11.7 G/DL (ref 14–18)
IMM GRANULOCYTES # BLD AUTO: 0.03 K/UL (ref 0–0.04)
IMM GRANULOCYTES NFR BLD AUTO: 0.7 % (ref 0–0.5)
LYMPHOCYTES # BLD AUTO: 1.4 K/UL (ref 1–4.8)
LYMPHOCYTES NFR BLD: 32.3 % (ref 18–48)
MCH RBC QN AUTO: 28.3 PG (ref 27–31)
MCHC RBC AUTO-ENTMCNC: 30.5 G/DL (ref 32–36)
MCV RBC AUTO: 93 FL (ref 82–98)
MONOCYTES # BLD AUTO: 0.5 K/UL (ref 0.3–1)
MONOCYTES NFR BLD: 10.5 % (ref 4–15)
NEUTROPHILS # BLD AUTO: 2.4 K/UL (ref 1.8–7.7)
NEUTROPHILS NFR BLD: 55.4 % (ref 38–73)
NRBC BLD-RTO: 0 /100 WBC
PLATELET # BLD AUTO: 136 K/UL (ref 150–350)
PMV BLD AUTO: 10.3 FL (ref 9.2–12.9)
POTASSIUM SERPL-SCNC: 4.7 MMOL/L (ref 3.5–5.1)
PROT SERPL-MCNC: 6 G/DL (ref 6–8.4)
RBC # BLD AUTO: 4.13 M/UL (ref 4.6–6.2)
SODIUM SERPL-SCNC: 142 MMOL/L (ref 136–145)
WBC # BLD AUTO: 4.4 K/UL (ref 3.9–12.7)

## 2021-02-01 PROCEDURE — 85025 COMPLETE CBC W/AUTO DIFF WBC: CPT

## 2021-02-01 PROCEDURE — 36415 COLL VENOUS BLD VENIPUNCTURE: CPT | Mod: PO

## 2021-02-01 PROCEDURE — 80197 ASSAY OF TACROLIMUS: CPT

## 2021-02-01 PROCEDURE — 80053 COMPREHEN METABOLIC PANEL: CPT

## 2021-02-02 ENCOUNTER — SPECIALTY PHARMACY (OUTPATIENT)
Dept: PHARMACY | Facility: CLINIC | Age: 46
End: 2021-02-02

## 2021-02-02 LAB
BACTERIA BLD CULT: NORMAL
BACTERIA BLD CULT: NORMAL
TACROLIMUS BLD-MCNC: 12.5 NG/ML (ref 5–15)

## 2021-02-03 ENCOUNTER — TELEPHONE (OUTPATIENT)
Dept: TRANSPLANT | Facility: CLINIC | Age: 46
End: 2021-02-03

## 2021-02-05 ENCOUNTER — PATIENT MESSAGE (OUTPATIENT)
Dept: TRANSPLANT | Facility: CLINIC | Age: 46
End: 2021-02-05

## 2021-02-05 ENCOUNTER — TELEPHONE (OUTPATIENT)
Dept: TRANSPLANT | Facility: CLINIC | Age: 46
End: 2021-02-05

## 2021-02-05 ENCOUNTER — TELEPHONE (OUTPATIENT)
Dept: GASTROENTEROLOGY | Facility: CLINIC | Age: 46
End: 2021-02-05

## 2021-02-08 ENCOUNTER — LAB VISIT (OUTPATIENT)
Dept: LAB | Facility: HOSPITAL | Age: 46
End: 2021-02-08
Attending: STUDENT IN AN ORGANIZED HEALTH CARE EDUCATION/TRAINING PROGRAM
Payer: MEDICAID

## 2021-02-08 DIAGNOSIS — Z94.4 LIVER REPLACED BY TRANSPLANT: ICD-10-CM

## 2021-02-08 PROCEDURE — 36415 COLL VENOUS BLD VENIPUNCTURE: CPT | Mod: PO

## 2021-02-09 ENCOUNTER — PATIENT MESSAGE (OUTPATIENT)
Dept: HEPATOLOGY | Facility: CLINIC | Age: 46
End: 2021-02-09

## 2021-02-09 ENCOUNTER — PATIENT MESSAGE (OUTPATIENT)
Dept: TRANSPLANT | Facility: CLINIC | Age: 46
End: 2021-02-09

## 2021-02-09 ENCOUNTER — LAB VISIT (OUTPATIENT)
Dept: LAB | Facility: HOSPITAL | Age: 46
End: 2021-02-09
Attending: INTERNAL MEDICINE
Payer: MEDICAID

## 2021-02-09 DIAGNOSIS — Z94.4 LIVER REPLACED BY TRANSPLANT: ICD-10-CM

## 2021-02-09 PROCEDURE — 80197 ASSAY OF TACROLIMUS: CPT

## 2021-02-09 PROCEDURE — 85025 COMPLETE CBC W/AUTO DIFF WBC: CPT

## 2021-02-09 PROCEDURE — 80053 COMPREHEN METABOLIC PANEL: CPT

## 2021-02-09 PROCEDURE — 36415 COLL VENOUS BLD VENIPUNCTURE: CPT | Mod: PO

## 2021-02-10 LAB
ALBUMIN SERPL BCP-MCNC: 3.4 G/DL (ref 3.5–5.2)
ALP SERPL-CCNC: 117 U/L (ref 55–135)
ALT SERPL W/O P-5'-P-CCNC: 201 U/L (ref 10–44)
ANION GAP SERPL CALC-SCNC: 11 MMOL/L (ref 8–16)
ANISOCYTOSIS BLD QL SMEAR: SLIGHT
AST SERPL-CCNC: 121 U/L (ref 10–40)
BASOPHILS # BLD AUTO: 0.02 K/UL (ref 0–0.2)
BASOPHILS NFR BLD: 0.5 % (ref 0–1.9)
BILIRUB SERPL-MCNC: 0.8 MG/DL (ref 0.1–1)
BUN SERPL-MCNC: 30 MG/DL (ref 6–20)
BURR CELLS BLD QL SMEAR: ABNORMAL
CALCIUM SERPL-MCNC: 9 MG/DL (ref 8.7–10.5)
CHLORIDE SERPL-SCNC: 106 MMOL/L (ref 95–110)
CO2 SERPL-SCNC: 20 MMOL/L (ref 23–29)
CREAT SERPL-MCNC: 1.2 MG/DL (ref 0.5–1.4)
DIFFERENTIAL METHOD: ABNORMAL
EOSINOPHIL # BLD AUTO: 0.1 K/UL (ref 0–0.5)
EOSINOPHIL NFR BLD: 2.6 % (ref 0–8)
ERYTHROCYTE [DISTWIDTH] IN BLOOD BY AUTOMATED COUNT: 13.8 % (ref 11.5–14.5)
EST. GFR  (AFRICAN AMERICAN): >60 ML/MIN/1.73 M^2
EST. GFR  (NON AFRICAN AMERICAN): >60 ML/MIN/1.73 M^2
GLUCOSE SERPL-MCNC: 95 MG/DL (ref 70–110)
HCT VFR BLD AUTO: 37.4 % (ref 40–54)
HGB BLD-MCNC: 11.5 G/DL (ref 14–18)
IMM GRANULOCYTES # BLD AUTO: 0.02 K/UL (ref 0–0.04)
IMM GRANULOCYTES NFR BLD AUTO: 0.5 % (ref 0–0.5)
LYMPHOCYTES # BLD AUTO: 1.8 K/UL (ref 1–4.8)
LYMPHOCYTES NFR BLD: 47 % (ref 18–48)
MCH RBC QN AUTO: 28 PG (ref 27–31)
MCHC RBC AUTO-ENTMCNC: 30.7 G/DL (ref 32–36)
MCV RBC AUTO: 91 FL (ref 82–98)
MONOCYTES # BLD AUTO: 0.3 K/UL (ref 0.3–1)
MONOCYTES NFR BLD: 7.2 % (ref 4–15)
NEUTROPHILS # BLD AUTO: 1.6 K/UL (ref 1.8–7.7)
NEUTROPHILS NFR BLD: 42.2 % (ref 38–73)
NRBC BLD-RTO: 0 /100 WBC
PLATELET # BLD AUTO: 138 K/UL (ref 150–350)
PLATELET BLD QL SMEAR: ABNORMAL
PMV BLD AUTO: 9.9 FL (ref 9.2–12.9)
POIKILOCYTOSIS BLD QL SMEAR: SLIGHT
POTASSIUM SERPL-SCNC: 5.6 MMOL/L (ref 3.5–5.1)
PROT SERPL-MCNC: 6.4 G/DL (ref 6–8.4)
RBC # BLD AUTO: 4.1 M/UL (ref 4.6–6.2)
SODIUM SERPL-SCNC: 137 MMOL/L (ref 136–145)
TACROLIMUS BLD-MCNC: 5.2 NG/ML (ref 5–15)
WBC # BLD AUTO: 3.89 K/UL (ref 3.9–12.7)

## 2021-02-11 ENCOUNTER — CONFERENCE (OUTPATIENT)
Dept: TRANSPLANT | Facility: CLINIC | Age: 46
End: 2021-02-11

## 2021-02-11 DIAGNOSIS — E87.5 HYPERKALEMIA: ICD-10-CM

## 2021-02-11 DIAGNOSIS — B25.9 CYTOMEGALOVIRUS INFECTION, UNSPECIFIED CYTOMEGALOVIRAL INFECTION TYPE: Primary | ICD-10-CM

## 2021-02-11 LAB — CMV DNA SERPL NAA+PROBE-ACNC: 1530 IU/ML

## 2021-02-11 RX ORDER — PREDNISONE 10 MG/1
40 TABLET ORAL DAILY
Qty: 120 TABLET | Refills: 1 | Status: SHIPPED | OUTPATIENT
Start: 2021-02-11 | End: 2021-02-22 | Stop reason: DRUGHIGH

## 2021-02-11 RX ORDER — VALGANCICLOVIR 450 MG/1
900 TABLET, FILM COATED ORAL 2 TIMES DAILY
Qty: 60 TABLET | Refills: 5 | Status: SHIPPED | OUTPATIENT
Start: 2021-02-11 | End: 2021-05-28

## 2021-02-11 RX ORDER — MYCOPHENOLATE MOFETIL 250 MG/1
500 CAPSULE ORAL 2 TIMES DAILY
Qty: 120 CAPSULE | Refills: 11 | Status: SHIPPED | OUTPATIENT
Start: 2021-02-11 | End: 2021-03-17 | Stop reason: DRUGHIGH

## 2021-02-13 ENCOUNTER — PATIENT MESSAGE (OUTPATIENT)
Dept: TRANSPLANT | Facility: CLINIC | Age: 46
End: 2021-02-13

## 2021-02-13 ENCOUNTER — PATIENT MESSAGE (OUTPATIENT)
Dept: HEPATOLOGY | Facility: CLINIC | Age: 46
End: 2021-02-13

## 2021-02-18 ENCOUNTER — LAB VISIT (OUTPATIENT)
Dept: LAB | Facility: HOSPITAL | Age: 46
End: 2021-02-18
Attending: INTERNAL MEDICINE
Payer: MEDICAID

## 2021-02-18 DIAGNOSIS — B25.9 CYTOMEGALOVIRUS INFECTION, UNSPECIFIED CYTOMEGALOVIRAL INFECTION TYPE: ICD-10-CM

## 2021-02-18 DIAGNOSIS — Z94.4 LIVER REPLACED BY TRANSPLANT: ICD-10-CM

## 2021-02-18 LAB
ALBUMIN SERPL BCP-MCNC: 3.3 G/DL (ref 3.5–5.2)
ALP SERPL-CCNC: 76 U/L (ref 55–135)
ALT SERPL W/O P-5'-P-CCNC: 73 U/L (ref 10–44)
ANION GAP SERPL CALC-SCNC: 10 MMOL/L (ref 8–16)
AST SERPL-CCNC: 28 U/L (ref 10–40)
BASOPHILS # BLD AUTO: 0.02 K/UL (ref 0–0.2)
BASOPHILS NFR BLD: 0.5 % (ref 0–1.9)
BILIRUB SERPL-MCNC: 0.5 MG/DL (ref 0.1–1)
BUN SERPL-MCNC: 27 MG/DL (ref 6–20)
CALCIUM SERPL-MCNC: 9.4 MG/DL (ref 8.7–10.5)
CHLORIDE SERPL-SCNC: 106 MMOL/L (ref 95–110)
CO2 SERPL-SCNC: 24 MMOL/L (ref 23–29)
CREAT SERPL-MCNC: 1.1 MG/DL (ref 0.5–1.4)
DIFFERENTIAL METHOD: ABNORMAL
EOSINOPHIL # BLD AUTO: 0 K/UL (ref 0–0.5)
EOSINOPHIL NFR BLD: 0 % (ref 0–8)
ERYTHROCYTE [DISTWIDTH] IN BLOOD BY AUTOMATED COUNT: 14.3 % (ref 11.5–14.5)
EST. GFR  (AFRICAN AMERICAN): >60 ML/MIN/1.73 M^2
EST. GFR  (NON AFRICAN AMERICAN): >60 ML/MIN/1.73 M^2
GLUCOSE SERPL-MCNC: 100 MG/DL (ref 70–110)
HCT VFR BLD AUTO: 37.1 % (ref 40–54)
HGB BLD-MCNC: 11.6 G/DL (ref 14–18)
IMM GRANULOCYTES # BLD AUTO: 0.03 K/UL (ref 0–0.04)
IMM GRANULOCYTES NFR BLD AUTO: 0.7 % (ref 0–0.5)
LYMPHOCYTES # BLD AUTO: 2 K/UL (ref 1–4.8)
LYMPHOCYTES NFR BLD: 47.3 % (ref 18–48)
MCH RBC QN AUTO: 28.6 PG (ref 27–31)
MCHC RBC AUTO-ENTMCNC: 31.3 G/DL (ref 32–36)
MCV RBC AUTO: 92 FL (ref 82–98)
MONOCYTES # BLD AUTO: 0.2 K/UL (ref 0.3–1)
MONOCYTES NFR BLD: 5.6 % (ref 4–15)
NEUTROPHILS # BLD AUTO: 1.9 K/UL (ref 1.8–7.7)
NEUTROPHILS NFR BLD: 45.9 % (ref 38–73)
NRBC BLD-RTO: 0 /100 WBC
PLATELET # BLD AUTO: 204 K/UL (ref 150–350)
PMV BLD AUTO: 9.2 FL (ref 9.2–12.9)
POTASSIUM SERPL-SCNC: 4.5 MMOL/L (ref 3.5–5.1)
PROT SERPL-MCNC: 6.5 G/DL (ref 6–8.4)
RBC # BLD AUTO: 4.05 M/UL (ref 4.6–6.2)
SODIUM SERPL-SCNC: 140 MMOL/L (ref 136–145)
WBC # BLD AUTO: 4.14 K/UL (ref 3.9–12.7)

## 2021-02-18 PROCEDURE — 36415 COLL VENOUS BLD VENIPUNCTURE: CPT | Mod: PO

## 2021-02-18 PROCEDURE — 85025 COMPLETE CBC W/AUTO DIFF WBC: CPT

## 2021-02-18 PROCEDURE — 80053 COMPREHEN METABOLIC PANEL: CPT

## 2021-02-18 PROCEDURE — 80197 ASSAY OF TACROLIMUS: CPT

## 2021-02-19 LAB — TACROLIMUS BLD-MCNC: 5.6 NG/ML (ref 5–15)

## 2021-02-20 LAB — CMV DNA SERPL NAA+PROBE-ACNC: <35 IU/ML

## 2021-02-22 ENCOUNTER — PATIENT MESSAGE (OUTPATIENT)
Dept: PHARMACY | Facility: CLINIC | Age: 46
End: 2021-02-22

## 2021-02-22 DIAGNOSIS — Z94.4 LIVER REPLACED BY TRANSPLANT: Primary | ICD-10-CM

## 2021-02-22 RX ORDER — PREDNISONE 10 MG/1
30 TABLET ORAL DAILY
Qty: 120 TABLET | Refills: 1 | Status: SHIPPED | OUTPATIENT
Start: 2021-02-22 | End: 2021-03-04 | Stop reason: DRUGHIGH

## 2021-02-23 ENCOUNTER — PATIENT MESSAGE (OUTPATIENT)
Dept: TRANSPLANT | Facility: CLINIC | Age: 46
End: 2021-02-23

## 2021-03-01 ENCOUNTER — LAB VISIT (OUTPATIENT)
Dept: LAB | Facility: HOSPITAL | Age: 46
End: 2021-03-01
Attending: STUDENT IN AN ORGANIZED HEALTH CARE EDUCATION/TRAINING PROGRAM
Payer: MEDICAID

## 2021-03-01 DIAGNOSIS — B25.9 CYTOMEGALOVIRUS INFECTION, UNSPECIFIED CYTOMEGALOVIRAL INFECTION TYPE: ICD-10-CM

## 2021-03-01 DIAGNOSIS — Z94.4 LIVER REPLACED BY TRANSPLANT: ICD-10-CM

## 2021-03-01 LAB
BASOPHILS # BLD AUTO: 0.03 K/UL (ref 0–0.2)
BASOPHILS NFR BLD: 0.7 % (ref 0–1.9)
DIFFERENTIAL METHOD: ABNORMAL
EOSINOPHIL # BLD AUTO: 0 K/UL (ref 0–0.5)
EOSINOPHIL NFR BLD: 0.2 % (ref 0–8)
ERYTHROCYTE [DISTWIDTH] IN BLOOD BY AUTOMATED COUNT: 15.6 % (ref 11.5–14.5)
HCT VFR BLD AUTO: 37.1 % (ref 40–54)
HGB BLD-MCNC: 11.7 G/DL (ref 14–18)
IMM GRANULOCYTES # BLD AUTO: 0.02 K/UL (ref 0–0.04)
IMM GRANULOCYTES NFR BLD AUTO: 0.5 % (ref 0–0.5)
LYMPHOCYTES # BLD AUTO: 1.6 K/UL (ref 1–4.8)
LYMPHOCYTES NFR BLD: 38.3 % (ref 18–48)
MCH RBC QN AUTO: 29.5 PG (ref 27–31)
MCHC RBC AUTO-ENTMCNC: 31.5 G/DL (ref 32–36)
MCV RBC AUTO: 94 FL (ref 82–98)
MONOCYTES # BLD AUTO: 0.7 K/UL (ref 0.3–1)
MONOCYTES NFR BLD: 16.8 % (ref 4–15)
NEUTROPHILS # BLD AUTO: 1.9 K/UL (ref 1.8–7.7)
NEUTROPHILS NFR BLD: 43.5 % (ref 38–73)
NRBC BLD-RTO: 0 /100 WBC
PLATELET # BLD AUTO: 153 K/UL (ref 150–350)
PMV BLD AUTO: 10.1 FL (ref 9.2–12.9)
RBC # BLD AUTO: 3.96 M/UL (ref 4.6–6.2)
WBC # BLD AUTO: 4.28 K/UL (ref 3.9–12.7)

## 2021-03-01 PROCEDURE — 36415 COLL VENOUS BLD VENIPUNCTURE: CPT | Mod: PO

## 2021-03-01 PROCEDURE — 85025 COMPLETE CBC W/AUTO DIFF WBC: CPT

## 2021-03-01 PROCEDURE — 80197 ASSAY OF TACROLIMUS: CPT

## 2021-03-01 PROCEDURE — 80053 COMPREHEN METABOLIC PANEL: CPT

## 2021-03-02 LAB
ALBUMIN SERPL BCP-MCNC: 3.6 G/DL (ref 3.5–5.2)
ALP SERPL-CCNC: 62 U/L (ref 55–135)
ALT SERPL W/O P-5'-P-CCNC: 47 U/L (ref 10–44)
ANION GAP SERPL CALC-SCNC: 12 MMOL/L (ref 8–16)
AST SERPL-CCNC: 33 U/L (ref 10–40)
BILIRUB SERPL-MCNC: 0.6 MG/DL (ref 0.1–1)
BUN SERPL-MCNC: 36 MG/DL (ref 6–20)
CALCIUM SERPL-MCNC: 9.6 MG/DL (ref 8.7–10.5)
CHLORIDE SERPL-SCNC: 108 MMOL/L (ref 95–110)
CO2 SERPL-SCNC: 22 MMOL/L (ref 23–29)
CREAT SERPL-MCNC: 1.2 MG/DL (ref 0.5–1.4)
EST. GFR  (AFRICAN AMERICAN): >60 ML/MIN/1.73 M^2
EST. GFR  (NON AFRICAN AMERICAN): >60 ML/MIN/1.73 M^2
GLUCOSE SERPL-MCNC: 104 MG/DL (ref 70–110)
POTASSIUM SERPL-SCNC: 4.6 MMOL/L (ref 3.5–5.1)
PROT SERPL-MCNC: 6.4 G/DL (ref 6–8.4)
SODIUM SERPL-SCNC: 142 MMOL/L (ref 136–145)
TACROLIMUS BLD-MCNC: 3.6 NG/ML (ref 5–15)

## 2021-03-03 LAB — CMV DNA SERPL NAA+PROBE-ACNC: <35 IU/ML

## 2021-03-04 DIAGNOSIS — Z94.4 LIVER TRANSPLANTED: ICD-10-CM

## 2021-03-05 RX ORDER — PREDNISONE 10 MG/1
20 TABLET ORAL DAILY
Qty: 60 TABLET | Refills: 0 | Status: SHIPPED | OUTPATIENT
Start: 2021-03-05 | End: 2021-04-01

## 2021-03-05 RX ORDER — TACROLIMUS 1 MG/1
CAPSULE ORAL
Qty: 150 CAPSULE | Refills: 11 | Status: SHIPPED | OUTPATIENT
Start: 2021-03-05 | End: 2021-04-15 | Stop reason: DRUGHIGH

## 2021-03-11 ENCOUNTER — LAB VISIT (OUTPATIENT)
Dept: LAB | Facility: HOSPITAL | Age: 46
End: 2021-03-11
Attending: STUDENT IN AN ORGANIZED HEALTH CARE EDUCATION/TRAINING PROGRAM
Payer: MEDICAID

## 2021-03-11 DIAGNOSIS — Z94.4 LIVER TRANSPLANTED: ICD-10-CM

## 2021-03-11 LAB
BASOPHILS # BLD AUTO: 0.02 K/UL (ref 0–0.2)
BASOPHILS NFR BLD: 0.5 % (ref 0–1.9)
DIFFERENTIAL METHOD: ABNORMAL
EOSINOPHIL # BLD AUTO: 0 K/UL (ref 0–0.5)
EOSINOPHIL NFR BLD: 0.5 % (ref 0–8)
ERYTHROCYTE [DISTWIDTH] IN BLOOD BY AUTOMATED COUNT: 15.8 % (ref 11.5–14.5)
HCT VFR BLD AUTO: 35.1 % (ref 40–54)
HGB BLD-MCNC: 11.7 G/DL (ref 14–18)
IMM GRANULOCYTES # BLD AUTO: 0.02 K/UL (ref 0–0.04)
IMM GRANULOCYTES NFR BLD AUTO: 0.5 % (ref 0–0.5)
LYMPHOCYTES # BLD AUTO: 1.8 K/UL (ref 1–4.8)
LYMPHOCYTES NFR BLD: 43.1 % (ref 18–48)
MCH RBC QN AUTO: 29.6 PG (ref 27–31)
MCHC RBC AUTO-ENTMCNC: 33.3 G/DL (ref 32–36)
MCV RBC AUTO: 89 FL (ref 82–98)
MONOCYTES # BLD AUTO: 0.8 K/UL (ref 0.3–1)
MONOCYTES NFR BLD: 18.4 % (ref 4–15)
NEUTROPHILS # BLD AUTO: 1.6 K/UL (ref 1.8–7.7)
NEUTROPHILS NFR BLD: 37 % (ref 38–73)
NRBC BLD-RTO: 0 /100 WBC
PLATELET # BLD AUTO: 146 K/UL (ref 150–350)
PMV BLD AUTO: 9.9 FL (ref 9.2–12.9)
RBC # BLD AUTO: 3.95 M/UL (ref 4.6–6.2)
WBC # BLD AUTO: 4.18 K/UL (ref 3.9–12.7)

## 2021-03-11 PROCEDURE — 80197 ASSAY OF TACROLIMUS: CPT | Performed by: STUDENT IN AN ORGANIZED HEALTH CARE EDUCATION/TRAINING PROGRAM

## 2021-03-11 PROCEDURE — 80053 COMPREHEN METABOLIC PANEL: CPT | Performed by: STUDENT IN AN ORGANIZED HEALTH CARE EDUCATION/TRAINING PROGRAM

## 2021-03-11 PROCEDURE — 85025 COMPLETE CBC W/AUTO DIFF WBC: CPT | Performed by: STUDENT IN AN ORGANIZED HEALTH CARE EDUCATION/TRAINING PROGRAM

## 2021-03-11 PROCEDURE — 36415 COLL VENOUS BLD VENIPUNCTURE: CPT | Mod: PO | Performed by: STUDENT IN AN ORGANIZED HEALTH CARE EDUCATION/TRAINING PROGRAM

## 2021-03-12 LAB
ALBUMIN SERPL BCP-MCNC: 3.7 G/DL (ref 3.5–5.2)
ALP SERPL-CCNC: 66 U/L (ref 55–135)
ALT SERPL W/O P-5'-P-CCNC: 60 U/L (ref 10–44)
ANION GAP SERPL CALC-SCNC: 10 MMOL/L (ref 8–16)
AST SERPL-CCNC: 51 U/L (ref 10–40)
BILIRUB SERPL-MCNC: 0.8 MG/DL (ref 0.1–1)
BUN SERPL-MCNC: 24 MG/DL (ref 6–20)
CALCIUM SERPL-MCNC: 9 MG/DL (ref 8.7–10.5)
CHLORIDE SERPL-SCNC: 105 MMOL/L (ref 95–110)
CO2 SERPL-SCNC: 23 MMOL/L (ref 23–29)
CREAT SERPL-MCNC: 1.1 MG/DL (ref 0.5–1.4)
EST. GFR  (AFRICAN AMERICAN): >60 ML/MIN/1.73 M^2
EST. GFR  (NON AFRICAN AMERICAN): >60 ML/MIN/1.73 M^2
GLUCOSE SERPL-MCNC: 146 MG/DL (ref 70–110)
POTASSIUM SERPL-SCNC: 4 MMOL/L (ref 3.5–5.1)
PROT SERPL-MCNC: 6.6 G/DL (ref 6–8.4)
SODIUM SERPL-SCNC: 138 MMOL/L (ref 136–145)
TACROLIMUS BLD-MCNC: 7.5 NG/ML (ref 5–15)

## 2021-03-17 DIAGNOSIS — Z94.4 LIVER REPLACED BY TRANSPLANT: Primary | ICD-10-CM

## 2021-03-17 RX ORDER — MYCOPHENOLATE MOFETIL 250 MG/1
1000 CAPSULE ORAL 2 TIMES DAILY
Qty: 240 CAPSULE | Refills: 11 | Status: SHIPPED | OUTPATIENT
Start: 2021-03-17 | End: 2022-02-02

## 2021-03-18 ENCOUNTER — PATIENT MESSAGE (OUTPATIENT)
Dept: TRANSPLANT | Facility: CLINIC | Age: 46
End: 2021-03-18

## 2021-03-22 ENCOUNTER — LAB VISIT (OUTPATIENT)
Dept: LAB | Facility: HOSPITAL | Age: 46
End: 2021-03-22
Attending: STUDENT IN AN ORGANIZED HEALTH CARE EDUCATION/TRAINING PROGRAM
Payer: MEDICAID

## 2021-03-22 DIAGNOSIS — Z94.4 LIVER REPLACED BY TRANSPLANT: ICD-10-CM

## 2021-03-22 LAB
ALBUMIN SERPL BCP-MCNC: 3.6 G/DL (ref 3.5–5.2)
ALP SERPL-CCNC: 56 U/L (ref 55–135)
ALT SERPL W/O P-5'-P-CCNC: 26 U/L (ref 10–44)
ANION GAP SERPL CALC-SCNC: 7 MMOL/L (ref 8–16)
AST SERPL-CCNC: 24 U/L (ref 10–40)
BASOPHILS # BLD AUTO: 0.02 K/UL (ref 0–0.2)
BASOPHILS NFR BLD: 0.5 % (ref 0–1.9)
BILIRUB SERPL-MCNC: 0.7 MG/DL (ref 0.1–1)
BUN SERPL-MCNC: 28 MG/DL (ref 6–20)
CALCIUM SERPL-MCNC: 9 MG/DL (ref 8.7–10.5)
CHLORIDE SERPL-SCNC: 108 MMOL/L (ref 95–110)
CO2 SERPL-SCNC: 25 MMOL/L (ref 23–29)
CREAT SERPL-MCNC: 1.2 MG/DL (ref 0.5–1.4)
DIFFERENTIAL METHOD: ABNORMAL
EOSINOPHIL # BLD AUTO: 0 K/UL (ref 0–0.5)
EOSINOPHIL NFR BLD: 0.5 % (ref 0–8)
ERYTHROCYTE [DISTWIDTH] IN BLOOD BY AUTOMATED COUNT: 17.5 % (ref 11.5–14.5)
EST. GFR  (AFRICAN AMERICAN): >60 ML/MIN/1.73 M^2
EST. GFR  (NON AFRICAN AMERICAN): >60 ML/MIN/1.73 M^2
GLUCOSE SERPL-MCNC: 102 MG/DL (ref 70–110)
HCT VFR BLD AUTO: 33.3 % (ref 40–54)
HGB BLD-MCNC: 11 G/DL (ref 14–18)
IMM GRANULOCYTES # BLD AUTO: 0.02 K/UL (ref 0–0.04)
IMM GRANULOCYTES NFR BLD AUTO: 0.5 % (ref 0–0.5)
LYMPHOCYTES # BLD AUTO: 2.1 K/UL (ref 1–4.8)
LYMPHOCYTES NFR BLD: 47.6 % (ref 18–48)
MCH RBC QN AUTO: 30.9 PG (ref 27–31)
MCHC RBC AUTO-ENTMCNC: 33 G/DL (ref 32–36)
MCV RBC AUTO: 94 FL (ref 82–98)
MONOCYTES # BLD AUTO: 0.2 K/UL (ref 0.3–1)
MONOCYTES NFR BLD: 4.6 % (ref 4–15)
NEUTROPHILS # BLD AUTO: 2 K/UL (ref 1.8–7.7)
NEUTROPHILS NFR BLD: 46.3 % (ref 38–73)
NRBC BLD-RTO: 0 /100 WBC
PLATELET # BLD AUTO: 145 K/UL (ref 150–350)
PMV BLD AUTO: 9.9 FL (ref 9.2–12.9)
POTASSIUM SERPL-SCNC: 4 MMOL/L (ref 3.5–5.1)
PROT SERPL-MCNC: 6.3 G/DL (ref 6–8.4)
RBC # BLD AUTO: 3.56 M/UL (ref 4.6–6.2)
SODIUM SERPL-SCNC: 140 MMOL/L (ref 136–145)
WBC # BLD AUTO: 4.35 K/UL (ref 3.9–12.7)

## 2021-03-22 PROCEDURE — 80053 COMPREHEN METABOLIC PANEL: CPT | Performed by: STUDENT IN AN ORGANIZED HEALTH CARE EDUCATION/TRAINING PROGRAM

## 2021-03-22 PROCEDURE — 36415 COLL VENOUS BLD VENIPUNCTURE: CPT | Mod: PO | Performed by: STUDENT IN AN ORGANIZED HEALTH CARE EDUCATION/TRAINING PROGRAM

## 2021-03-22 PROCEDURE — 85025 COMPLETE CBC W/AUTO DIFF WBC: CPT | Performed by: STUDENT IN AN ORGANIZED HEALTH CARE EDUCATION/TRAINING PROGRAM

## 2021-03-22 PROCEDURE — 80197 ASSAY OF TACROLIMUS: CPT | Performed by: STUDENT IN AN ORGANIZED HEALTH CARE EDUCATION/TRAINING PROGRAM

## 2021-03-23 ENCOUNTER — TELEPHONE (OUTPATIENT)
Dept: TRANSPLANT | Facility: CLINIC | Age: 46
End: 2021-03-23

## 2021-03-23 LAB — TACROLIMUS BLD-MCNC: 8.9 NG/ML (ref 5–15)

## 2021-03-23 RX ORDER — PREDNISONE 10 MG/1
15 TABLET ORAL DAILY
Qty: 60 TABLET | Refills: 0 | Status: CANCELLED | OUTPATIENT
Start: 2021-03-23

## 2021-03-29 ENCOUNTER — OFFICE VISIT (OUTPATIENT)
Dept: HEPATOLOGY | Facility: CLINIC | Age: 46
End: 2021-03-29
Payer: MEDICAID

## 2021-03-29 VITALS
WEIGHT: 242.5 LBS | HEART RATE: 92 BPM | BODY MASS INDEX: 32.85 KG/M2 | DIASTOLIC BLOOD PRESSURE: 78 MMHG | SYSTOLIC BLOOD PRESSURE: 132 MMHG | HEIGHT: 72 IN

## 2021-03-29 DIAGNOSIS — B18.2 CHRONIC HEPATITIS C WITHOUT HEPATIC COMA: ICD-10-CM

## 2021-03-29 DIAGNOSIS — B25.9 CYTOMEGALOVIRUS INFECTION, UNSPECIFIED CYTOMEGALOVIRAL INFECTION TYPE: ICD-10-CM

## 2021-03-29 DIAGNOSIS — D84.9 IMMUNOSUPPRESSION: Primary | ICD-10-CM

## 2021-03-29 DIAGNOSIS — Z94.4 LIVER TRANSPLANTED: ICD-10-CM

## 2021-03-29 DIAGNOSIS — I74.8 HEPATIC ARTERY THROMBOSIS: ICD-10-CM

## 2021-03-29 PROCEDURE — 99215 OFFICE O/P EST HI 40 MIN: CPT | Mod: S$PBB,,, | Performed by: INTERNAL MEDICINE

## 2021-03-29 PROCEDURE — 99214 OFFICE O/P EST MOD 30 MIN: CPT | Mod: PBBFAC | Performed by: INTERNAL MEDICINE

## 2021-03-29 PROCEDURE — 99215 PR OFFICE/OUTPT VISIT, EST, LEVL V, 40-54 MIN: ICD-10-PCS | Mod: S$PBB,,, | Performed by: INTERNAL MEDICINE

## 2021-03-29 PROCEDURE — 99999 PR PBB SHADOW E&M-EST. PATIENT-LVL IV: ICD-10-PCS | Mod: PBBFAC,,, | Performed by: INTERNAL MEDICINE

## 2021-03-29 PROCEDURE — 99999 PR PBB SHADOW E&M-EST. PATIENT-LVL IV: CPT | Mod: PBBFAC,,, | Performed by: INTERNAL MEDICINE

## 2021-03-29 RX ORDER — CYANOCOBALAMIN (VITAMIN B-12) 500 MCG
TABLET ORAL
COMMUNITY
End: 2023-01-01

## 2021-03-29 RX ORDER — SILDENAFIL 50 MG/1
50 TABLET, FILM COATED ORAL
COMMUNITY
Start: 2021-03-15 | End: 2024-01-01

## 2021-03-30 ENCOUNTER — LAB VISIT (OUTPATIENT)
Dept: LAB | Facility: HOSPITAL | Age: 46
End: 2021-03-30
Attending: STUDENT IN AN ORGANIZED HEALTH CARE EDUCATION/TRAINING PROGRAM
Payer: MEDICAID

## 2021-03-30 DIAGNOSIS — Z94.4 LIVER REPLACED BY TRANSPLANT: ICD-10-CM

## 2021-03-30 LAB
ALBUMIN SERPL BCP-MCNC: 3.7 G/DL (ref 3.5–5.2)
ALP SERPL-CCNC: 49 U/L (ref 55–135)
ALT SERPL W/O P-5'-P-CCNC: 17 U/L (ref 10–44)
ANION GAP SERPL CALC-SCNC: 9 MMOL/L (ref 8–16)
AST SERPL-CCNC: 19 U/L (ref 10–40)
BASOPHILS # BLD AUTO: 0.02 K/UL (ref 0–0.2)
BASOPHILS NFR BLD: 0.5 % (ref 0–1.9)
BILIRUB SERPL-MCNC: 0.4 MG/DL (ref 0.1–1)
BUN SERPL-MCNC: 30 MG/DL (ref 6–20)
CALCIUM SERPL-MCNC: 9.2 MG/DL (ref 8.7–10.5)
CHLORIDE SERPL-SCNC: 106 MMOL/L (ref 95–110)
CO2 SERPL-SCNC: 25 MMOL/L (ref 23–29)
CREAT SERPL-MCNC: 1.2 MG/DL (ref 0.5–1.4)
DIFFERENTIAL METHOD: ABNORMAL
EOSINOPHIL # BLD AUTO: 0 K/UL (ref 0–0.5)
EOSINOPHIL NFR BLD: 0 % (ref 0–8)
ERYTHROCYTE [DISTWIDTH] IN BLOOD BY AUTOMATED COUNT: 18.2 % (ref 11.5–14.5)
EST. GFR  (AFRICAN AMERICAN): >60 ML/MIN/1.73 M^2
EST. GFR  (NON AFRICAN AMERICAN): >60 ML/MIN/1.73 M^2
GLUCOSE SERPL-MCNC: 130 MG/DL (ref 70–110)
HCT VFR BLD AUTO: 34.1 % (ref 40–54)
HGB BLD-MCNC: 11.1 G/DL (ref 14–18)
IMM GRANULOCYTES # BLD AUTO: 0.04 K/UL (ref 0–0.04)
IMM GRANULOCYTES NFR BLD AUTO: 1 % (ref 0–0.5)
LYMPHOCYTES # BLD AUTO: 2 K/UL (ref 1–4.8)
LYMPHOCYTES NFR BLD: 50.4 % (ref 18–48)
MCH RBC QN AUTO: 31 PG (ref 27–31)
MCHC RBC AUTO-ENTMCNC: 32.6 G/DL (ref 32–36)
MCV RBC AUTO: 95 FL (ref 82–98)
MONOCYTES # BLD AUTO: 0.1 K/UL (ref 0.3–1)
MONOCYTES NFR BLD: 3.6 % (ref 4–15)
NEUTROPHILS # BLD AUTO: 1.7 K/UL (ref 1.8–7.7)
NEUTROPHILS NFR BLD: 44.5 % (ref 38–73)
NRBC BLD-RTO: 0 /100 WBC
PLATELET # BLD AUTO: 165 K/UL (ref 150–450)
PMV BLD AUTO: 9.5 FL (ref 9.2–12.9)
POTASSIUM SERPL-SCNC: 4.5 MMOL/L (ref 3.5–5.1)
PROT SERPL-MCNC: 6.4 G/DL (ref 6–8.4)
RBC # BLD AUTO: 3.58 M/UL (ref 4.6–6.2)
SODIUM SERPL-SCNC: 140 MMOL/L (ref 136–145)
WBC # BLD AUTO: 3.87 K/UL (ref 3.9–12.7)

## 2021-03-30 PROCEDURE — 36415 COLL VENOUS BLD VENIPUNCTURE: CPT | Mod: PO | Performed by: STUDENT IN AN ORGANIZED HEALTH CARE EDUCATION/TRAINING PROGRAM

## 2021-03-30 PROCEDURE — 80197 ASSAY OF TACROLIMUS: CPT | Performed by: STUDENT IN AN ORGANIZED HEALTH CARE EDUCATION/TRAINING PROGRAM

## 2021-03-30 PROCEDURE — 85025 COMPLETE CBC W/AUTO DIFF WBC: CPT | Performed by: STUDENT IN AN ORGANIZED HEALTH CARE EDUCATION/TRAINING PROGRAM

## 2021-03-30 PROCEDURE — 80053 COMPREHEN METABOLIC PANEL: CPT | Performed by: STUDENT IN AN ORGANIZED HEALTH CARE EDUCATION/TRAINING PROGRAM

## 2021-03-31 LAB — TACROLIMUS BLD-MCNC: 10.1 NG/ML (ref 5–15)

## 2021-04-01 RX ORDER — PREDNISONE 10 MG/1
10 TABLET ORAL DAILY
Qty: 30 TABLET | Refills: 0 | Status: SHIPPED | OUTPATIENT
Start: 2021-04-01 | End: 2021-04-15

## 2021-04-02 ENCOUNTER — PATIENT MESSAGE (OUTPATIENT)
Dept: TRANSPLANT | Facility: CLINIC | Age: 46
End: 2021-04-02

## 2021-04-03 ENCOUNTER — PATIENT MESSAGE (OUTPATIENT)
Dept: TRANSPLANT | Facility: CLINIC | Age: 46
End: 2021-04-03

## 2021-04-05 ENCOUNTER — PATIENT MESSAGE (OUTPATIENT)
Dept: HEPATOLOGY | Facility: CLINIC | Age: 46
End: 2021-04-05

## 2021-04-09 ENCOUNTER — TELEPHONE (OUTPATIENT)
Dept: RADIOLOGY | Facility: HOSPITAL | Age: 46
End: 2021-04-09

## 2021-04-12 ENCOUNTER — HOSPITAL ENCOUNTER (OUTPATIENT)
Dept: RADIOLOGY | Facility: HOSPITAL | Age: 46
Discharge: HOME | End: 2021-04-12
Attending: NURSE PRACTITIONER
Payer: MEDICAID

## 2021-04-12 DIAGNOSIS — Z94.4 LIVER REPLACED BY TRANSPLANT: ICD-10-CM

## 2021-04-12 PROCEDURE — 76705 US LIVER TRANSPLANT POST: ICD-10-PCS | Mod: 26,59,, | Performed by: RADIOLOGY

## 2021-04-12 PROCEDURE — 76705 ECHO EXAM OF ABDOMEN: CPT | Mod: 26,59,, | Performed by: RADIOLOGY

## 2021-04-12 PROCEDURE — 93975 US LIVER TRANSPLANT POST: ICD-10-PCS | Mod: 26,,, | Performed by: RADIOLOGY

## 2021-04-12 PROCEDURE — 93975 VASCULAR STUDY: CPT | Mod: 26,,, | Performed by: RADIOLOGY

## 2021-04-12 PROCEDURE — 93975 VASCULAR STUDY: CPT | Mod: TC

## 2021-04-13 ENCOUNTER — PATIENT MESSAGE (OUTPATIENT)
Dept: HEPATOLOGY | Facility: CLINIC | Age: 46
End: 2021-04-13

## 2021-04-15 ENCOUNTER — PATIENT MESSAGE (OUTPATIENT)
Dept: TRANSPLANT | Facility: CLINIC | Age: 46
End: 2021-04-15

## 2021-04-15 DIAGNOSIS — Z94.4 LIVER TRANSPLANTED: ICD-10-CM

## 2021-04-15 RX ORDER — PREDNISONE 5 MG/1
5 TABLET ORAL DAILY
Qty: 14 TABLET | Refills: 1 | Status: SHIPPED | OUTPATIENT
Start: 2021-04-15 | End: 2021-04-29

## 2021-04-15 RX ORDER — TACROLIMUS 1 MG/1
CAPSULE ORAL
Qty: 120 CAPSULE | Refills: 11 | Status: SHIPPED | OUTPATIENT
Start: 2021-04-15 | End: 2021-05-28 | Stop reason: DRUGHIGH

## 2021-04-21 ENCOUNTER — PATIENT MESSAGE (OUTPATIENT)
Dept: HEPATOLOGY | Facility: CLINIC | Age: 46
End: 2021-04-21

## 2021-04-23 ENCOUNTER — TELEPHONE (OUTPATIENT)
Dept: TRANSPLANT | Facility: CLINIC | Age: 46
End: 2021-04-23

## 2021-04-26 ENCOUNTER — LAB VISIT (OUTPATIENT)
Dept: LAB | Facility: HOSPITAL | Age: 46
End: 2021-04-26
Attending: STUDENT IN AN ORGANIZED HEALTH CARE EDUCATION/TRAINING PROGRAM
Payer: MEDICAID

## 2021-04-26 DIAGNOSIS — Z94.4 LIVER TRANSPLANTED: ICD-10-CM

## 2021-04-26 DIAGNOSIS — Z94.4 LIVER REPLACED BY TRANSPLANT: ICD-10-CM

## 2021-04-26 LAB
ALBUMIN SERPL BCP-MCNC: 3.3 G/DL (ref 3.5–5.2)
ALP SERPL-CCNC: 250 U/L (ref 55–135)
ALT SERPL W/O P-5'-P-CCNC: 175 U/L (ref 10–44)
ANION GAP SERPL CALC-SCNC: 9 MMOL/L (ref 8–16)
ANISOCYTOSIS BLD QL SMEAR: SLIGHT
AST SERPL-CCNC: 151 U/L (ref 10–40)
BASOPHILS # BLD AUTO: 0.02 K/UL (ref 0–0.2)
BASOPHILS NFR BLD: 0.7 % (ref 0–1.9)
BILIRUB SERPL-MCNC: 0.8 MG/DL (ref 0.1–1)
BUN SERPL-MCNC: 23 MG/DL (ref 6–20)
CALCIUM SERPL-MCNC: 9.2 MG/DL (ref 8.7–10.5)
CHLORIDE SERPL-SCNC: 103 MMOL/L (ref 95–110)
CO2 SERPL-SCNC: 26 MMOL/L (ref 23–29)
CREAT SERPL-MCNC: 1.3 MG/DL (ref 0.5–1.4)
DIFFERENTIAL METHOD: ABNORMAL
EOSINOPHIL # BLD AUTO: 0 K/UL (ref 0–0.5)
EOSINOPHIL NFR BLD: 0 % (ref 0–8)
ERYTHROCYTE [DISTWIDTH] IN BLOOD BY AUTOMATED COUNT: 13.7 % (ref 11.5–14.5)
EST. GFR  (AFRICAN AMERICAN): >60 ML/MIN/1.73 M^2
EST. GFR  (NON AFRICAN AMERICAN): >60 ML/MIN/1.73 M^2
GIANT PLATELETS BLD QL SMEAR: PRESENT
GLUCOSE SERPL-MCNC: 100 MG/DL (ref 70–110)
HCT VFR BLD AUTO: 32.2 % (ref 40–54)
HGB BLD-MCNC: 10.6 G/DL (ref 14–18)
IMM GRANULOCYTES # BLD AUTO: 0.04 K/UL (ref 0–0.04)
IMM GRANULOCYTES NFR BLD AUTO: 1.5 % (ref 0–0.5)
LYMPHOCYTES # BLD AUTO: 1.3 K/UL (ref 1–4.8)
LYMPHOCYTES NFR BLD: 49.1 % (ref 18–48)
MCH RBC QN AUTO: 32 PG (ref 27–31)
MCHC RBC AUTO-ENTMCNC: 32.9 G/DL (ref 32–36)
MCV RBC AUTO: 97 FL (ref 82–98)
MONOCYTES # BLD AUTO: 0.4 K/UL (ref 0.3–1)
MONOCYTES NFR BLD: 14.5 % (ref 4–15)
NEUTROPHILS # BLD AUTO: 0.9 K/UL (ref 1.8–7.7)
NEUTROPHILS NFR BLD: 34.2 % (ref 38–73)
NRBC BLD-RTO: 0 /100 WBC
OVALOCYTES BLD QL SMEAR: ABNORMAL
PLATELET # BLD AUTO: 153 K/UL (ref 150–450)
PLATELET BLD QL SMEAR: ABNORMAL
PMV BLD AUTO: 9.9 FL (ref 9.2–12.9)
POIKILOCYTOSIS BLD QL SMEAR: SLIGHT
POLYCHROMASIA BLD QL SMEAR: ABNORMAL
POTASSIUM SERPL-SCNC: 4.8 MMOL/L (ref 3.5–5.1)
PROT SERPL-MCNC: 6 G/DL (ref 6–8.4)
RBC # BLD AUTO: 3.31 M/UL (ref 4.6–6.2)
SODIUM SERPL-SCNC: 138 MMOL/L (ref 136–145)
TOXIC GRANULES BLD QL SMEAR: PRESENT
WBC # BLD AUTO: 2.69 K/UL (ref 3.9–12.7)
WBC TOXIC VACUOLES BLD QL SMEAR: PRESENT

## 2021-04-26 PROCEDURE — 85025 COMPLETE CBC W/AUTO DIFF WBC: CPT | Performed by: STUDENT IN AN ORGANIZED HEALTH CARE EDUCATION/TRAINING PROGRAM

## 2021-04-26 PROCEDURE — 80197 ASSAY OF TACROLIMUS: CPT | Performed by: STUDENT IN AN ORGANIZED HEALTH CARE EDUCATION/TRAINING PROGRAM

## 2021-04-26 PROCEDURE — 36415 COLL VENOUS BLD VENIPUNCTURE: CPT | Mod: PO | Performed by: STUDENT IN AN ORGANIZED HEALTH CARE EDUCATION/TRAINING PROGRAM

## 2021-04-26 PROCEDURE — 80053 COMPREHEN METABOLIC PANEL: CPT | Performed by: STUDENT IN AN ORGANIZED HEALTH CARE EDUCATION/TRAINING PROGRAM

## 2021-04-27 LAB — TACROLIMUS BLD-MCNC: 11.4 NG/ML (ref 5–15)

## 2021-04-29 ENCOUNTER — PATIENT MESSAGE (OUTPATIENT)
Dept: TRANSPLANT | Facility: CLINIC | Age: 46
End: 2021-04-29

## 2021-04-29 ENCOUNTER — PATIENT MESSAGE (OUTPATIENT)
Dept: RESEARCH | Facility: HOSPITAL | Age: 46
End: 2021-04-29

## 2021-04-29 LAB — CMV DNA SERPL NAA+PROBE-ACNC: NORMAL IU/ML

## 2021-04-30 RX ORDER — PREDNISONE 10 MG/1
40 TABLET ORAL DAILY
Qty: 56 TABLET | Refills: 1 | Status: SHIPPED | OUTPATIENT
Start: 2021-04-30 | End: 2021-06-09 | Stop reason: SDUPTHER

## 2021-05-03 ENCOUNTER — LAB VISIT (OUTPATIENT)
Dept: LAB | Facility: HOSPITAL | Age: 46
End: 2021-05-03
Attending: STUDENT IN AN ORGANIZED HEALTH CARE EDUCATION/TRAINING PROGRAM
Payer: MEDICAID

## 2021-05-03 DIAGNOSIS — B25.9 CYTOMEGALOVIRUS INFECTION, UNSPECIFIED CYTOMEGALOVIRAL INFECTION TYPE: ICD-10-CM

## 2021-05-03 DIAGNOSIS — Z94.4 LIVER REPLACED BY TRANSPLANT: ICD-10-CM

## 2021-05-03 LAB
BASOPHILS # BLD AUTO: 0.03 K/UL (ref 0–0.2)
BASOPHILS NFR BLD: 1.2 % (ref 0–1.9)
DIFFERENTIAL METHOD: ABNORMAL
EOSINOPHIL # BLD AUTO: 0 K/UL (ref 0–0.5)
EOSINOPHIL NFR BLD: 0.8 % (ref 0–8)
ERYTHROCYTE [DISTWIDTH] IN BLOOD BY AUTOMATED COUNT: 13.9 % (ref 11.5–14.5)
HCT VFR BLD AUTO: 33.6 % (ref 40–54)
HGB BLD-MCNC: 11.2 G/DL (ref 14–18)
IMM GRANULOCYTES # BLD AUTO: 0.01 K/UL (ref 0–0.04)
IMM GRANULOCYTES NFR BLD AUTO: 0.4 % (ref 0–0.5)
LYMPHOCYTES # BLD AUTO: 1.6 K/UL (ref 1–4.8)
LYMPHOCYTES NFR BLD: 60.2 % (ref 18–48)
MCH RBC QN AUTO: 32.2 PG (ref 27–31)
MCHC RBC AUTO-ENTMCNC: 33.3 G/DL (ref 32–36)
MCV RBC AUTO: 97 FL (ref 82–98)
MONOCYTES # BLD AUTO: 0.1 K/UL (ref 0.3–1)
MONOCYTES NFR BLD: 3.5 % (ref 4–15)
NEUTROPHILS # BLD AUTO: 0.9 K/UL (ref 1.8–7.7)
NEUTROPHILS NFR BLD: 33.9 % (ref 38–73)
NRBC BLD-RTO: 0 /100 WBC
PLATELET # BLD AUTO: 169 K/UL (ref 150–450)
PMV BLD AUTO: 9.6 FL (ref 9.2–12.9)
RBC # BLD AUTO: 3.48 M/UL (ref 4.6–6.2)
WBC # BLD AUTO: 2.59 K/UL (ref 3.9–12.7)

## 2021-05-03 PROCEDURE — 36415 COLL VENOUS BLD VENIPUNCTURE: CPT | Mod: PO | Performed by: STUDENT IN AN ORGANIZED HEALTH CARE EDUCATION/TRAINING PROGRAM

## 2021-05-03 PROCEDURE — 80197 ASSAY OF TACROLIMUS: CPT | Performed by: STUDENT IN AN ORGANIZED HEALTH CARE EDUCATION/TRAINING PROGRAM

## 2021-05-03 PROCEDURE — 85025 COMPLETE CBC W/AUTO DIFF WBC: CPT | Performed by: STUDENT IN AN ORGANIZED HEALTH CARE EDUCATION/TRAINING PROGRAM

## 2021-05-03 PROCEDURE — 80053 COMPREHEN METABOLIC PANEL: CPT | Performed by: STUDENT IN AN ORGANIZED HEALTH CARE EDUCATION/TRAINING PROGRAM

## 2021-05-04 LAB
ALBUMIN SERPL BCP-MCNC: 3.8 G/DL (ref 3.5–5.2)
ALP SERPL-CCNC: 125 U/L (ref 55–135)
ALT SERPL W/O P-5'-P-CCNC: 27 U/L (ref 10–44)
ANION GAP SERPL CALC-SCNC: 10 MMOL/L (ref 8–16)
AST SERPL-CCNC: 25 U/L (ref 10–40)
BILIRUB SERPL-MCNC: 0.6 MG/DL (ref 0.1–1)
BUN SERPL-MCNC: 27 MG/DL (ref 6–20)
CALCIUM SERPL-MCNC: 9 MG/DL (ref 8.7–10.5)
CHLORIDE SERPL-SCNC: 107 MMOL/L (ref 95–110)
CO2 SERPL-SCNC: 23 MMOL/L (ref 23–29)
CREAT SERPL-MCNC: 1.5 MG/DL (ref 0.5–1.4)
EST. GFR  (AFRICAN AMERICAN): >60 ML/MIN/1.73 M^2
EST. GFR  (NON AFRICAN AMERICAN): 54.7 ML/MIN/1.73 M^2
GLUCOSE SERPL-MCNC: 126 MG/DL (ref 70–110)
POTASSIUM SERPL-SCNC: 5.4 MMOL/L (ref 3.5–5.1)
PROT SERPL-MCNC: 6.5 G/DL (ref 6–8.4)
SODIUM SERPL-SCNC: 140 MMOL/L (ref 136–145)

## 2021-05-05 LAB — CMV DNA SERPL NAA+PROBE-ACNC: NORMAL IU/ML

## 2021-05-07 ENCOUNTER — TELEPHONE (OUTPATIENT)
Dept: TRANSPLANT | Facility: CLINIC | Age: 46
End: 2021-05-07

## 2021-05-08 LAB — TACROLIMUS BLD-MCNC: 16.1 NG/ML (ref 5–15)

## 2021-05-18 ENCOUNTER — PATIENT MESSAGE (OUTPATIENT)
Dept: TRANSPLANT | Facility: CLINIC | Age: 46
End: 2021-05-18

## 2021-05-19 ENCOUNTER — TELEPHONE (OUTPATIENT)
Dept: TRANSPLANT | Facility: CLINIC | Age: 46
End: 2021-05-19

## 2021-05-21 ENCOUNTER — LAB VISIT (OUTPATIENT)
Dept: LAB | Facility: HOSPITAL | Age: 46
End: 2021-05-21
Attending: INTERNAL MEDICINE
Payer: MEDICAID

## 2021-05-21 DIAGNOSIS — Z94.4 LIVER REPLACED BY TRANSPLANT: ICD-10-CM

## 2021-05-21 LAB
ALBUMIN SERPL BCP-MCNC: 3.2 G/DL (ref 3.5–5.2)
ALP SERPL-CCNC: 50 U/L (ref 55–135)
ALT SERPL W/O P-5'-P-CCNC: 10 U/L (ref 10–44)
ANION GAP SERPL CALC-SCNC: 10 MMOL/L (ref 8–16)
ANISOCYTOSIS BLD QL SMEAR: SLIGHT
AST SERPL-CCNC: 11 U/L (ref 10–40)
BASOPHILS # BLD AUTO: 0.02 K/UL (ref 0–0.2)
BASOPHILS NFR BLD: 0.6 % (ref 0–1.9)
BILIRUB SERPL-MCNC: 0.4 MG/DL (ref 0.1–1)
BUN SERPL-MCNC: 23 MG/DL (ref 6–20)
CALCIUM SERPL-MCNC: 9.3 MG/DL (ref 8.7–10.5)
CHLORIDE SERPL-SCNC: 107 MMOL/L (ref 95–110)
CO2 SERPL-SCNC: 23 MMOL/L (ref 23–29)
CREAT SERPL-MCNC: 1.1 MG/DL (ref 0.5–1.4)
DIFFERENTIAL METHOD: ABNORMAL
EOSINOPHIL # BLD AUTO: 0 K/UL (ref 0–0.5)
EOSINOPHIL NFR BLD: 0.3 % (ref 0–8)
ERYTHROCYTE [DISTWIDTH] IN BLOOD BY AUTOMATED COUNT: 13.4 % (ref 11.5–14.5)
EST. GFR  (AFRICAN AMERICAN): >60 ML/MIN/1.73 M^2
EST. GFR  (NON AFRICAN AMERICAN): >60 ML/MIN/1.73 M^2
GLUCOSE SERPL-MCNC: 132 MG/DL (ref 70–110)
HCT VFR BLD AUTO: 35.3 % (ref 40–54)
HGB BLD-MCNC: 11.4 G/DL (ref 14–18)
IMM GRANULOCYTES # BLD AUTO: 0.05 K/UL (ref 0–0.04)
IMM GRANULOCYTES NFR BLD AUTO: 1.6 % (ref 0–0.5)
LYMPHOCYTES # BLD AUTO: 2 K/UL (ref 1–4.8)
LYMPHOCYTES NFR BLD: 62.4 % (ref 18–48)
MCH RBC QN AUTO: 31.8 PG (ref 27–31)
MCHC RBC AUTO-ENTMCNC: 32.3 G/DL (ref 32–36)
MCV RBC AUTO: 99 FL (ref 82–98)
MONOCYTES # BLD AUTO: 0.6 K/UL (ref 0.3–1)
MONOCYTES NFR BLD: 17.7 % (ref 4–15)
NEUTROPHILS # BLD AUTO: 0.6 K/UL (ref 1.8–7.7)
NEUTROPHILS NFR BLD: 17.4 % (ref 38–73)
NRBC BLD-RTO: 0 /100 WBC
PLATELET # BLD AUTO: 184 K/UL (ref 150–450)
PLATELET BLD QL SMEAR: ABNORMAL
PMV BLD AUTO: 10 FL (ref 9.2–12.9)
POLYCHROMASIA BLD QL SMEAR: ABNORMAL
POTASSIUM SERPL-SCNC: 3.8 MMOL/L (ref 3.5–5.1)
PROT SERPL-MCNC: 5.8 G/DL (ref 6–8.4)
RBC # BLD AUTO: 3.58 M/UL (ref 4.6–6.2)
SODIUM SERPL-SCNC: 140 MMOL/L (ref 136–145)
WBC # BLD AUTO: 3.22 K/UL (ref 3.9–12.7)

## 2021-05-21 PROCEDURE — 80053 COMPREHEN METABOLIC PANEL: CPT | Performed by: INTERNAL MEDICINE

## 2021-05-21 PROCEDURE — 36415 COLL VENOUS BLD VENIPUNCTURE: CPT | Mod: PO | Performed by: INTERNAL MEDICINE

## 2021-05-21 PROCEDURE — 80197 ASSAY OF TACROLIMUS: CPT | Performed by: INTERNAL MEDICINE

## 2021-05-21 PROCEDURE — 85025 COMPLETE CBC W/AUTO DIFF WBC: CPT | Performed by: INTERNAL MEDICINE

## 2021-05-22 LAB
TACROLIMUS BLD-MCNC: 13.7 NG/ML (ref 5–15)
TACROLIMUS, NORMALIZED: 11.9 NG/ML (ref 5–15)

## 2021-05-28 DIAGNOSIS — B25.9 CYTOMEGALOVIRUS INFECTION, UNSPECIFIED CYTOMEGALOVIRAL INFECTION TYPE: ICD-10-CM

## 2021-05-28 DIAGNOSIS — Z94.4 LIVER TRANSPLANTED: ICD-10-CM

## 2021-05-28 DIAGNOSIS — Z94.4 LIVER REPLACED BY TRANSPLANT: Primary | ICD-10-CM

## 2021-05-31 ENCOUNTER — LAB VISIT (OUTPATIENT)
Dept: LAB | Facility: HOSPITAL | Age: 46
End: 2021-05-31
Attending: INTERNAL MEDICINE
Payer: MEDICAID

## 2021-05-31 DIAGNOSIS — Z94.4 LIVER REPLACED BY TRANSPLANT: ICD-10-CM

## 2021-05-31 LAB
ALBUMIN SERPL BCP-MCNC: 3.6 G/DL (ref 3.5–5.2)
ALP SERPL-CCNC: 74 U/L (ref 55–135)
ALT SERPL W/O P-5'-P-CCNC: 51 U/L (ref 10–44)
ANION GAP SERPL CALC-SCNC: 13 MMOL/L (ref 8–16)
AST SERPL-CCNC: 126 U/L (ref 10–40)
BASOPHILS # BLD AUTO: 0.02 K/UL (ref 0–0.2)
BASOPHILS NFR BLD: 0.6 % (ref 0–1.9)
BILIRUB SERPL-MCNC: 0.7 MG/DL (ref 0.1–1)
BUN SERPL-MCNC: 25 MG/DL (ref 6–20)
CALCIUM SERPL-MCNC: 9.7 MG/DL (ref 8.7–10.5)
CHLORIDE SERPL-SCNC: 105 MMOL/L (ref 95–110)
CO2 SERPL-SCNC: 24 MMOL/L (ref 23–29)
CREAT SERPL-MCNC: 1.3 MG/DL (ref 0.5–1.4)
DIFFERENTIAL METHOD: ABNORMAL
EOSINOPHIL # BLD AUTO: 0 K/UL (ref 0–0.5)
EOSINOPHIL NFR BLD: 0 % (ref 0–8)
ERYTHROCYTE [DISTWIDTH] IN BLOOD BY AUTOMATED COUNT: 13.3 % (ref 11.5–14.5)
EST. GFR  (AFRICAN AMERICAN): >60 ML/MIN/1.73 M^2
EST. GFR  (NON AFRICAN AMERICAN): >60 ML/MIN/1.73 M^2
GLUCOSE SERPL-MCNC: 193 MG/DL (ref 70–110)
HCT VFR BLD AUTO: 32.5 % (ref 40–54)
HGB BLD-MCNC: 10.4 G/DL (ref 14–18)
IMM GRANULOCYTES # BLD AUTO: 0.04 K/UL (ref 0–0.04)
IMM GRANULOCYTES NFR BLD AUTO: 1.2 % (ref 0–0.5)
LYMPHOCYTES # BLD AUTO: 1.3 K/UL (ref 1–4.8)
LYMPHOCYTES NFR BLD: 39.8 % (ref 18–48)
MCH RBC QN AUTO: 32.3 PG (ref 27–31)
MCHC RBC AUTO-ENTMCNC: 32 G/DL (ref 32–36)
MCV RBC AUTO: 101 FL (ref 82–98)
MONOCYTES # BLD AUTO: 0.2 K/UL (ref 0.3–1)
MONOCYTES NFR BLD: 6.1 % (ref 4–15)
NEUTROPHILS # BLD AUTO: 1.7 K/UL (ref 1.8–7.7)
NEUTROPHILS NFR BLD: 52.3 % (ref 38–73)
NRBC BLD-RTO: 0 /100 WBC
PLATELET # BLD AUTO: 136 K/UL (ref 150–450)
PMV BLD AUTO: 9.6 FL (ref 9.2–12.9)
POTASSIUM SERPL-SCNC: 4.5 MMOL/L (ref 3.5–5.1)
PROT SERPL-MCNC: 6.1 G/DL (ref 6–8.4)
RBC # BLD AUTO: 3.22 M/UL (ref 4.6–6.2)
SODIUM SERPL-SCNC: 142 MMOL/L (ref 136–145)
WBC # BLD AUTO: 3.29 K/UL (ref 3.9–12.7)

## 2021-05-31 PROCEDURE — 80197 ASSAY OF TACROLIMUS: CPT | Performed by: INTERNAL MEDICINE

## 2021-05-31 PROCEDURE — 80053 COMPREHEN METABOLIC PANEL: CPT | Performed by: INTERNAL MEDICINE

## 2021-05-31 PROCEDURE — 36415 COLL VENOUS BLD VENIPUNCTURE: CPT | Mod: PO | Performed by: INTERNAL MEDICINE

## 2021-05-31 PROCEDURE — 85025 COMPLETE CBC W/AUTO DIFF WBC: CPT | Performed by: INTERNAL MEDICINE

## 2021-05-31 RX ORDER — TACROLIMUS 1 MG/1
CAPSULE ORAL
Qty: 90 CAPSULE | Refills: 11 | Status: SHIPPED | OUTPATIENT
Start: 2021-05-31 | End: 2021-07-02 | Stop reason: DRUGHIGH

## 2021-05-31 RX ORDER — VALGANCICLOVIR 450 MG/1
450 TABLET, FILM COATED ORAL DAILY
Qty: 30 TABLET | Refills: 1 | Status: SHIPPED | OUTPATIENT
Start: 2021-05-31 | End: 2022-02-02

## 2021-06-01 ENCOUNTER — LAB VISIT (OUTPATIENT)
Dept: LAB | Facility: HOSPITAL | Age: 46
End: 2021-06-01
Attending: INTERNAL MEDICINE
Payer: MEDICAID

## 2021-06-01 DIAGNOSIS — B18.2 CHRONIC HEPATITIS C WITHOUT HEPATIC COMA: Primary | ICD-10-CM

## 2021-06-01 DIAGNOSIS — Z94.4 LIVER REPLACED BY TRANSPLANT: ICD-10-CM

## 2021-06-01 LAB
ALBUMIN SERPL BCP-MCNC: 3.6 G/DL (ref 3.5–5.2)
ALP SERPL-CCNC: 67 U/L (ref 55–135)
ALT SERPL W/O P-5'-P-CCNC: 53 U/L (ref 10–44)
ANION GAP SERPL CALC-SCNC: 12 MMOL/L (ref 8–16)
AST SERPL-CCNC: 49 U/L (ref 10–40)
BASOPHILS # BLD AUTO: 0.01 K/UL (ref 0–0.2)
BASOPHILS NFR BLD: 0.4 % (ref 0–1.9)
BILIRUB SERPL-MCNC: 0.4 MG/DL (ref 0.1–1)
BUN SERPL-MCNC: 24 MG/DL (ref 6–20)
CALCIUM SERPL-MCNC: 9.6 MG/DL (ref 8.7–10.5)
CHLORIDE SERPL-SCNC: 105 MMOL/L (ref 95–110)
CO2 SERPL-SCNC: 23 MMOL/L (ref 23–29)
CREAT SERPL-MCNC: 1.2 MG/DL (ref 0.5–1.4)
DIFFERENTIAL METHOD: ABNORMAL
EOSINOPHIL # BLD AUTO: 0 K/UL (ref 0–0.5)
EOSINOPHIL NFR BLD: 0 % (ref 0–8)
ERYTHROCYTE [DISTWIDTH] IN BLOOD BY AUTOMATED COUNT: 13.5 % (ref 11.5–14.5)
EST. GFR  (AFRICAN AMERICAN): >60 ML/MIN/1.73 M^2
EST. GFR  (NON AFRICAN AMERICAN): >60 ML/MIN/1.73 M^2
GLUCOSE SERPL-MCNC: 136 MG/DL (ref 70–110)
HCT VFR BLD AUTO: 33.2 % (ref 40–54)
HGB BLD-MCNC: 10.4 G/DL (ref 14–18)
IMM GRANULOCYTES # BLD AUTO: 0.02 K/UL (ref 0–0.04)
IMM GRANULOCYTES NFR BLD AUTO: 0.8 % (ref 0–0.5)
LYMPHOCYTES # BLD AUTO: 1.1 K/UL (ref 1–4.8)
LYMPHOCYTES NFR BLD: 43.2 % (ref 18–48)
MCH RBC QN AUTO: 32.2 PG (ref 27–31)
MCHC RBC AUTO-ENTMCNC: 31.3 G/DL (ref 32–36)
MCV RBC AUTO: 103 FL (ref 82–98)
MONOCYTES # BLD AUTO: 0.2 K/UL (ref 0.3–1)
MONOCYTES NFR BLD: 8 % (ref 4–15)
NEUTROPHILS # BLD AUTO: 1.3 K/UL (ref 1.8–7.7)
NEUTROPHILS NFR BLD: 47.6 % (ref 38–73)
NRBC BLD-RTO: 0 /100 WBC
PLATELET # BLD AUTO: 131 K/UL (ref 150–450)
PMV BLD AUTO: 9.9 FL (ref 9.2–12.9)
POTASSIUM SERPL-SCNC: 4.6 MMOL/L (ref 3.5–5.1)
PROT SERPL-MCNC: 6.1 G/DL (ref 6–8.4)
RBC # BLD AUTO: 3.23 M/UL (ref 4.6–6.2)
SODIUM SERPL-SCNC: 140 MMOL/L (ref 136–145)
TACROLIMUS BLD-MCNC: 7.6 NG/ML (ref 5–15)
TACROLIMUS, NORMALIZED: 6.8 NG/ML (ref 5–15)
WBC # BLD AUTO: 2.64 K/UL (ref 3.9–12.7)

## 2021-06-01 PROCEDURE — 36415 COLL VENOUS BLD VENIPUNCTURE: CPT | Mod: PO | Performed by: INTERNAL MEDICINE

## 2021-06-01 PROCEDURE — 80197 ASSAY OF TACROLIMUS: CPT | Performed by: INTERNAL MEDICINE

## 2021-06-01 PROCEDURE — 80053 COMPREHEN METABOLIC PANEL: CPT | Performed by: INTERNAL MEDICINE

## 2021-06-01 PROCEDURE — 85025 COMPLETE CBC W/AUTO DIFF WBC: CPT | Performed by: INTERNAL MEDICINE

## 2021-06-02 ENCOUNTER — LAB VISIT (OUTPATIENT)
Dept: LAB | Facility: HOSPITAL | Age: 46
End: 2021-06-02
Attending: PHYSICIAN ASSISTANT
Payer: MEDICAID

## 2021-06-02 DIAGNOSIS — B18.2 CHRONIC HEPATITIS C WITHOUT HEPATIC COMA: ICD-10-CM

## 2021-06-02 LAB
TACROLIMUS BLD-MCNC: 8.1 NG/ML (ref 5–15)
TACROLIMUS, NORMALIZED: 7.2 NG/ML (ref 5–15)

## 2021-06-02 PROCEDURE — 87522 HEPATITIS C REVRS TRNSCRPJ: CPT | Performed by: PHYSICIAN ASSISTANT

## 2021-06-02 PROCEDURE — 36415 COLL VENOUS BLD VENIPUNCTURE: CPT | Mod: PO | Performed by: PHYSICIAN ASSISTANT

## 2021-06-04 ENCOUNTER — PATIENT MESSAGE (OUTPATIENT)
Dept: TRANSPLANT | Facility: CLINIC | Age: 46
End: 2021-06-04

## 2021-06-04 ENCOUNTER — TELEPHONE (OUTPATIENT)
Dept: HEPATOLOGY | Facility: CLINIC | Age: 46
End: 2021-06-04

## 2021-06-04 ENCOUNTER — TELEPHONE (OUTPATIENT)
Dept: TRANSPLANT | Facility: CLINIC | Age: 46
End: 2021-06-04

## 2021-06-04 DIAGNOSIS — Z94.4 LIVER REPLACED BY TRANSPLANT: Primary | ICD-10-CM

## 2021-06-04 DIAGNOSIS — Z86.19 HEPATITIS C VIRUS INFECTION CURED AFTER ANTIVIRAL DRUG THERAPY: ICD-10-CM

## 2021-06-04 LAB — HEPATITIS C VIRUS (HCV) RNA DETECTION/QUANTIFICATION RT-PCR: NORMAL IU/ML

## 2021-06-08 ENCOUNTER — LAB VISIT (OUTPATIENT)
Dept: LAB | Facility: HOSPITAL | Age: 46
End: 2021-06-08
Attending: INTERNAL MEDICINE
Payer: MEDICAID

## 2021-06-08 DIAGNOSIS — Z94.4 LIVER REPLACED BY TRANSPLANT: ICD-10-CM

## 2021-06-08 LAB
ALBUMIN SERPL BCP-MCNC: 3.6 G/DL (ref 3.5–5.2)
ALP SERPL-CCNC: 60 U/L (ref 55–135)
ALT SERPL W/O P-5'-P-CCNC: 23 U/L (ref 10–44)
ANION GAP SERPL CALC-SCNC: 13 MMOL/L (ref 8–16)
AST SERPL-CCNC: 16 U/L (ref 10–40)
BASOPHILS # BLD AUTO: 0.01 K/UL (ref 0–0.2)
BASOPHILS NFR BLD: 0.4 % (ref 0–1.9)
BILIRUB SERPL-MCNC: 0.4 MG/DL (ref 0.1–1)
BUN SERPL-MCNC: 30 MG/DL (ref 6–20)
CALCIUM SERPL-MCNC: 9.1 MG/DL (ref 8.7–10.5)
CHLORIDE SERPL-SCNC: 106 MMOL/L (ref 95–110)
CO2 SERPL-SCNC: 20 MMOL/L (ref 23–29)
CREAT SERPL-MCNC: 1.2 MG/DL (ref 0.5–1.4)
DIFFERENTIAL METHOD: ABNORMAL
EOSINOPHIL # BLD AUTO: 0 K/UL (ref 0–0.5)
EOSINOPHIL NFR BLD: 0.4 % (ref 0–8)
ERYTHROCYTE [DISTWIDTH] IN BLOOD BY AUTOMATED COUNT: 13.4 % (ref 11.5–14.5)
EST. GFR  (AFRICAN AMERICAN): >60 ML/MIN/1.73 M^2
EST. GFR  (NON AFRICAN AMERICAN): >60 ML/MIN/1.73 M^2
GLUCOSE SERPL-MCNC: 119 MG/DL (ref 70–110)
HCT VFR BLD AUTO: 33.6 % (ref 40–54)
HGB BLD-MCNC: 11 G/DL (ref 14–18)
IMM GRANULOCYTES # BLD AUTO: 0.03 K/UL (ref 0–0.04)
IMM GRANULOCYTES NFR BLD AUTO: 1.1 % (ref 0–0.5)
LYMPHOCYTES # BLD AUTO: 1.8 K/UL (ref 1–4.8)
LYMPHOCYTES NFR BLD: 68.7 % (ref 18–48)
MCH RBC QN AUTO: 32 PG (ref 27–31)
MCHC RBC AUTO-ENTMCNC: 32.7 G/DL (ref 32–36)
MCV RBC AUTO: 98 FL (ref 82–98)
MONOCYTES # BLD AUTO: 0.1 K/UL (ref 0.3–1)
MONOCYTES NFR BLD: 5 % (ref 4–15)
NEUTROPHILS # BLD AUTO: 0.6 K/UL (ref 1.8–7.7)
NEUTROPHILS NFR BLD: 24.4 % (ref 38–73)
NRBC BLD-RTO: 0 /100 WBC
PLATELET # BLD AUTO: 134 K/UL (ref 150–450)
PLATELET BLD QL SMEAR: ABNORMAL
PMV BLD AUTO: 10 FL (ref 9.2–12.9)
POTASSIUM SERPL-SCNC: 3.9 MMOL/L (ref 3.5–5.1)
PROT SERPL-MCNC: 6.2 G/DL (ref 6–8.4)
RBC # BLD AUTO: 3.44 M/UL (ref 4.6–6.2)
SMUDGE CELLS BLD QL SMEAR: PRESENT
SODIUM SERPL-SCNC: 139 MMOL/L (ref 136–145)
WBC # BLD AUTO: 2.62 K/UL (ref 3.9–12.7)

## 2021-06-08 PROCEDURE — 80053 COMPREHEN METABOLIC PANEL: CPT | Performed by: INTERNAL MEDICINE

## 2021-06-08 PROCEDURE — 80197 ASSAY OF TACROLIMUS: CPT | Performed by: INTERNAL MEDICINE

## 2021-06-08 PROCEDURE — 85025 COMPLETE CBC W/AUTO DIFF WBC: CPT | Performed by: INTERNAL MEDICINE

## 2021-06-08 PROCEDURE — 80321 ALCOHOLS BIOMARKERS 1OR 2: CPT | Performed by: INTERNAL MEDICINE

## 2021-06-08 PROCEDURE — 36415 COLL VENOUS BLD VENIPUNCTURE: CPT | Mod: PO | Performed by: INTERNAL MEDICINE

## 2021-06-09 ENCOUNTER — PATIENT MESSAGE (OUTPATIENT)
Dept: TRANSPLANT | Facility: CLINIC | Age: 46
End: 2021-06-09

## 2021-06-09 LAB
TACROLIMUS BLD-MCNC: 8.5 NG/ML (ref 5–15)
TACROLIMUS, NORMALIZED: 7.5 NG/ML (ref 5–15)

## 2021-06-09 RX ORDER — PREDNISONE 10 MG/1
40 TABLET ORAL DAILY
Qty: 56 TABLET | Refills: 1 | Status: SHIPPED | OUTPATIENT
Start: 2021-06-09 | End: 2021-06-09 | Stop reason: DRUGHIGH

## 2021-06-10 RX ORDER — PREDNISONE 10 MG/1
20 TABLET ORAL DAILY
Qty: 30 TABLET | Refills: 1 | Status: SHIPPED | OUTPATIENT
Start: 2021-06-10 | End: 2021-06-24 | Stop reason: DRUGHIGH

## 2021-06-14 ENCOUNTER — LAB VISIT (OUTPATIENT)
Dept: LAB | Facility: HOSPITAL | Age: 46
End: 2021-06-14
Attending: INTERNAL MEDICINE
Payer: MEDICAID

## 2021-06-14 DIAGNOSIS — Z94.4 LIVER REPLACED BY TRANSPLANT: ICD-10-CM

## 2021-06-14 LAB
ALBUMIN SERPL BCP-MCNC: 3.9 G/DL (ref 3.5–5.2)
ALP SERPL-CCNC: 64 U/L (ref 55–135)
ALT SERPL W/O P-5'-P-CCNC: 15 U/L (ref 10–44)
ANION GAP SERPL CALC-SCNC: 12 MMOL/L (ref 8–16)
AST SERPL-CCNC: 16 U/L (ref 10–40)
BASOPHILS # BLD AUTO: 0.01 K/UL (ref 0–0.2)
BASOPHILS NFR BLD: 0.3 % (ref 0–1.9)
BILIRUB SERPL-MCNC: 1 MG/DL (ref 0.1–1)
BUN SERPL-MCNC: 28 MG/DL (ref 6–20)
CALCIUM SERPL-MCNC: 10.5 MG/DL (ref 8.7–10.5)
CHLORIDE SERPL-SCNC: 103 MMOL/L (ref 95–110)
CO2 SERPL-SCNC: 22 MMOL/L (ref 23–29)
CREAT SERPL-MCNC: 1.6 MG/DL (ref 0.5–1.4)
DIFFERENTIAL METHOD: ABNORMAL
EOSINOPHIL # BLD AUTO: 0 K/UL (ref 0–0.5)
EOSINOPHIL NFR BLD: 0 % (ref 0–8)
ERYTHROCYTE [DISTWIDTH] IN BLOOD BY AUTOMATED COUNT: 13.7 % (ref 11.5–14.5)
EST. GFR  (AFRICAN AMERICAN): 58 ML/MIN/1.73 M^2
EST. GFR  (NON AFRICAN AMERICAN): 50.2 ML/MIN/1.73 M^2
GLUCOSE SERPL-MCNC: 162 MG/DL (ref 70–110)
HCT VFR BLD AUTO: 34.6 % (ref 40–54)
HGB BLD-MCNC: 11.6 G/DL (ref 14–18)
IMM GRANULOCYTES # BLD AUTO: 0 K/UL (ref 0–0.04)
IMM GRANULOCYTES NFR BLD AUTO: 0 % (ref 0–0.5)
LYMPHOCYTES # BLD AUTO: 2.4 K/UL (ref 1–4.8)
LYMPHOCYTES NFR BLD: 83.6 % (ref 18–48)
MCH RBC QN AUTO: 32.5 PG (ref 27–31)
MCHC RBC AUTO-ENTMCNC: 33.5 G/DL (ref 32–36)
MCV RBC AUTO: 97 FL (ref 82–98)
MONOCYTES # BLD AUTO: 0.2 K/UL (ref 0.3–1)
MONOCYTES NFR BLD: 5.9 % (ref 4–15)
NEUTROPHILS # BLD AUTO: 0.3 K/UL (ref 1.8–7.7)
NEUTROPHILS NFR BLD: 10.2 % (ref 38–73)
NRBC BLD-RTO: 0 /100 WBC
PHOSPHATIDYLETHANOL (PETH): 513 NG/ML
PLATELET # BLD AUTO: 201 K/UL (ref 150–450)
PMV BLD AUTO: 10.1 FL (ref 9.2–12.9)
POTASSIUM SERPL-SCNC: 3.9 MMOL/L (ref 3.5–5.1)
PROT SERPL-MCNC: 6.7 G/DL (ref 6–8.4)
RBC # BLD AUTO: 3.57 M/UL (ref 4.6–6.2)
SODIUM SERPL-SCNC: 137 MMOL/L (ref 136–145)
WBC # BLD AUTO: 2.86 K/UL (ref 3.9–12.7)

## 2021-06-14 PROCEDURE — 36415 COLL VENOUS BLD VENIPUNCTURE: CPT | Mod: PO | Performed by: INTERNAL MEDICINE

## 2021-06-14 PROCEDURE — 85025 COMPLETE CBC W/AUTO DIFF WBC: CPT | Performed by: INTERNAL MEDICINE

## 2021-06-14 PROCEDURE — 80053 COMPREHEN METABOLIC PANEL: CPT | Performed by: INTERNAL MEDICINE

## 2021-06-14 PROCEDURE — 80197 ASSAY OF TACROLIMUS: CPT | Performed by: INTERNAL MEDICINE

## 2021-06-14 RX ORDER — HYDROXYZINE HYDROCHLORIDE 25 MG/1
25 TABLET, FILM COATED ORAL 3 TIMES DAILY PRN
Qty: 30 TABLET | Refills: 1 | OUTPATIENT
Start: 2021-06-14

## 2021-06-15 ENCOUNTER — TELEPHONE (OUTPATIENT)
Dept: TRANSPLANT | Facility: CLINIC | Age: 46
End: 2021-06-15

## 2021-06-15 LAB
TACROLIMUS BLD-MCNC: 16.2 NG/ML (ref 5–15)
TACROLIMUS, NORMALIZED: 14 NG/ML (ref 5–15)

## 2021-06-18 ENCOUNTER — PATIENT MESSAGE (OUTPATIENT)
Dept: TRANSPLANT | Facility: CLINIC | Age: 46
End: 2021-06-18

## 2021-06-18 ENCOUNTER — TELEPHONE (OUTPATIENT)
Dept: TRANSPLANT | Facility: CLINIC | Age: 46
End: 2021-06-18

## 2021-06-18 ENCOUNTER — HOSPITAL ENCOUNTER (EMERGENCY)
Facility: HOSPITAL | Age: 46
Discharge: HOME | End: 2021-06-18
Attending: STUDENT IN AN ORGANIZED HEALTH CARE EDUCATION/TRAINING PROGRAM
Payer: MEDICAID

## 2021-06-18 VITALS
OXYGEN SATURATION: 98 % | HEIGHT: 72 IN | WEIGHT: 245.38 LBS | RESPIRATION RATE: 18 BRPM | HEART RATE: 97 BPM | BODY MASS INDEX: 33.23 KG/M2 | DIASTOLIC BLOOD PRESSURE: 104 MMHG | TEMPERATURE: 98 F | SYSTOLIC BLOOD PRESSURE: 157 MMHG

## 2021-06-18 DIAGNOSIS — M54.50 ACUTE LOW BACK PAIN WITHOUT SCIATICA, UNSPECIFIED BACK PAIN LATERALITY: ICD-10-CM

## 2021-06-18 DIAGNOSIS — M79.89 PAIN AND SWELLING OF LEFT LOWER LEG: ICD-10-CM

## 2021-06-18 DIAGNOSIS — S80.12XA HEMATOMA OF LEFT LOWER LEG: ICD-10-CM

## 2021-06-18 DIAGNOSIS — M79.662 PAIN AND SWELLING OF LEFT LOWER LEG: ICD-10-CM
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
## 2021-06-18 PROCEDURE — 25000003 PHARM REV CODE 250: Performed by: NURSE PRACTITIONER

## 2021-06-18 PROCEDURE — 99284 EMERGENCY DEPT VISIT MOD MDM: CPT | Mod: 25

## 2021-06-18 RX ORDER — ACETAMINOPHEN 500 MG
1000 TABLET ORAL
Status: COMPLETED | OUTPATIENT
Start: 2021-06-18 | End: 2021-06-18

## 2021-06-18 RX ORDER — METHOCARBAMOL 750 MG/1
750 TABLET, FILM COATED ORAL 4 TIMES DAILY PRN
Qty: 30 TABLET | Refills: 0 | Status: SHIPPED | OUTPATIENT
Start: 2021-06-18 | End: 2021-06-28

## 2021-06-18 RX ORDER — METHOCARBAMOL 750 MG/1
750 TABLET, FILM COATED ORAL 4 TIMES DAILY PRN
Qty: 30 TABLET | Refills: 0 | Status: SHIPPED | OUTPATIENT
Start: 2021-06-18 | End: 2021-06-18 | Stop reason: SDUPTHER

## 2021-06-18 RX ADMIN — ACETAMINOPHEN 1000 MG: 500 TABLET ORAL at 01:06

## 2021-06-22 ENCOUNTER — LAB VISIT (OUTPATIENT)
Dept: LAB | Facility: HOSPITAL | Age: 46
End: 2021-06-22
Attending: INTERNAL MEDICINE
Payer: MEDICAID

## 2021-06-22 DIAGNOSIS — Z94.4 LIVER REPLACED BY TRANSPLANT: ICD-10-CM

## 2021-06-22 LAB
ALBUMIN SERPL BCP-MCNC: 3.6 G/DL (ref 3.5–5.2)
ALP SERPL-CCNC: 43 U/L (ref 55–135)
ALT SERPL W/O P-5'-P-CCNC: 11 U/L (ref 10–44)
ANION GAP SERPL CALC-SCNC: 9 MMOL/L (ref 8–16)
AST SERPL-CCNC: 14 U/L (ref 10–40)
BASOPHILS # BLD AUTO: 0.01 K/UL (ref 0–0.2)
BASOPHILS NFR BLD: 0.5 % (ref 0–1.9)
BILIRUB SERPL-MCNC: 0.4 MG/DL (ref 0.1–1)
BUN SERPL-MCNC: 29 MG/DL (ref 6–20)
CALCIUM SERPL-MCNC: 9.6 MG/DL (ref 8.7–10.5)
CHLORIDE SERPL-SCNC: 107 MMOL/L (ref 95–110)
CO2 SERPL-SCNC: 24 MMOL/L (ref 23–29)
CREAT SERPL-MCNC: 1.3 MG/DL (ref 0.5–1.4)
DIFFERENTIAL METHOD: ABNORMAL
EOSINOPHIL # BLD AUTO: 0 K/UL (ref 0–0.5)
EOSINOPHIL NFR BLD: 0 % (ref 0–8)
ERYTHROCYTE [DISTWIDTH] IN BLOOD BY AUTOMATED COUNT: 14 % (ref 11.5–14.5)
EST. GFR  (AFRICAN AMERICAN): >60 ML/MIN/1.73 M^2
EST. GFR  (NON AFRICAN AMERICAN): >60 ML/MIN/1.73 M^2
GLUCOSE SERPL-MCNC: 132 MG/DL (ref 70–110)
HCT VFR BLD AUTO: 33.1 % (ref 40–54)
HGB BLD-MCNC: 10.9 G/DL (ref 14–18)
IMM GRANULOCYTES # BLD AUTO: 0.04 K/UL (ref 0–0.04)
IMM GRANULOCYTES NFR BLD AUTO: 1.9 % (ref 0–0.5)
LYMPHOCYTES # BLD AUTO: 1.6 K/UL (ref 1–4.8)
LYMPHOCYTES NFR BLD: 78.7 % (ref 18–48)
MCH RBC QN AUTO: 32.8 PG (ref 27–31)
MCHC RBC AUTO-ENTMCNC: 32.9 G/DL (ref 32–36)
MCV RBC AUTO: 100 FL (ref 82–98)
MONOCYTES # BLD AUTO: 0.2 K/UL (ref 0.3–1)
MONOCYTES NFR BLD: 11.1 % (ref 4–15)
NEUTROPHILS # BLD AUTO: 0.2 K/UL (ref 1.8–7.7)
NEUTROPHILS NFR BLD: 7.8 % (ref 38–73)
NRBC BLD-RTO: 0 /100 WBC
PLATELET # BLD AUTO: 154 K/UL (ref 150–450)
PMV BLD AUTO: 9.9 FL (ref 9.2–12.9)
POTASSIUM SERPL-SCNC: 4 MMOL/L (ref 3.5–5.1)
PROT SERPL-MCNC: 6.1 G/DL (ref 6–8.4)
RBC # BLD AUTO: 3.32 M/UL (ref 4.6–6.2)
SODIUM SERPL-SCNC: 140 MMOL/L (ref 136–145)
WBC # BLD AUTO: 2.07 K/UL (ref 3.9–12.7)

## 2021-06-22 PROCEDURE — 85025 COMPLETE CBC W/AUTO DIFF WBC: CPT | Performed by: INTERNAL MEDICINE

## 2021-06-22 PROCEDURE — 80197 ASSAY OF TACROLIMUS: CPT | Performed by: INTERNAL MEDICINE

## 2021-06-22 PROCEDURE — 36415 COLL VENOUS BLD VENIPUNCTURE: CPT | Mod: PO | Performed by: INTERNAL MEDICINE

## 2021-06-22 PROCEDURE — 80053 COMPREHEN METABOLIC PANEL: CPT | Performed by: INTERNAL MEDICINE

## 2021-06-23 LAB
TACROLIMUS BLD-MCNC: 5.7 NG/ML (ref 5–15)
TACROLIMUS, NORMALIZED: 5.2 NG/ML (ref 5–15)

## 2021-06-24 ENCOUNTER — PATIENT MESSAGE (OUTPATIENT)
Dept: TRANSPLANT | Facility: CLINIC | Age: 46
End: 2021-06-24

## 2021-06-24 RX ORDER — PREDNISONE 10 MG/1
10 TABLET ORAL DAILY
Qty: 14 TABLET | Refills: 1 | Status: SHIPPED | OUTPATIENT
Start: 2021-06-24 | End: 2021-07-02

## 2021-06-25 ENCOUNTER — TELEPHONE (OUTPATIENT)
Dept: TRANSPLANT | Facility: CLINIC | Age: 46
End: 2021-06-25

## 2021-06-28 ENCOUNTER — PATIENT MESSAGE (OUTPATIENT)
Dept: TRANSPLANT | Facility: CLINIC | Age: 46
End: 2021-06-28

## 2021-06-28 ENCOUNTER — TELEPHONE (OUTPATIENT)
Dept: TRANSPLANT | Facility: CLINIC | Age: 46
End: 2021-06-28

## 2021-06-29 ENCOUNTER — LAB VISIT (OUTPATIENT)
Dept: LAB | Facility: HOSPITAL | Age: 46
End: 2021-06-29
Attending: INTERNAL MEDICINE
Payer: MEDICAID

## 2021-06-29 DIAGNOSIS — Z94.4 LIVER REPLACED BY TRANSPLANT: ICD-10-CM

## 2021-06-29 LAB
ALBUMIN SERPL BCP-MCNC: 3.7 G/DL (ref 3.5–5.2)
ALP SERPL-CCNC: 59 U/L (ref 55–135)
ALT SERPL W/O P-5'-P-CCNC: 20 U/L (ref 10–44)
ANION GAP SERPL CALC-SCNC: 9 MMOL/L (ref 8–16)
ANISOCYTOSIS BLD QL SMEAR: SLIGHT
AST SERPL-CCNC: 23 U/L (ref 10–40)
BASOPHILS # BLD AUTO: 0.01 K/UL (ref 0–0.2)
BASOPHILS NFR BLD: 0.4 % (ref 0–1.9)
BILIRUB SERPL-MCNC: 0.7 MG/DL (ref 0.1–1)
BUN SERPL-MCNC: 22 MG/DL (ref 6–20)
CALCIUM SERPL-MCNC: 9 MG/DL (ref 8.7–10.5)
CHLORIDE SERPL-SCNC: 105 MMOL/L (ref 95–110)
CO2 SERPL-SCNC: 22 MMOL/L (ref 23–29)
CREAT SERPL-MCNC: 1.2 MG/DL (ref 0.5–1.4)
DIFFERENTIAL METHOD: ABNORMAL
EOSINOPHIL # BLD AUTO: 0 K/UL (ref 0–0.5)
EOSINOPHIL NFR BLD: 0 % (ref 0–8)
ERYTHROCYTE [DISTWIDTH] IN BLOOD BY AUTOMATED COUNT: 14.1 % (ref 11.5–14.5)
EST. GFR  (AFRICAN AMERICAN): >60 ML/MIN/1.73 M^2
EST. GFR  (NON AFRICAN AMERICAN): >60 ML/MIN/1.73 M^2
GLUCOSE SERPL-MCNC: 136 MG/DL (ref 70–110)
HCT VFR BLD AUTO: 29.4 % (ref 40–54)
HGB BLD-MCNC: 9.6 G/DL (ref 14–18)
HYPOCHROMIA BLD QL SMEAR: ABNORMAL
IMM GRANULOCYTES # BLD AUTO: 0.03 K/UL (ref 0–0.04)
IMM GRANULOCYTES NFR BLD AUTO: 1.2 % (ref 0–0.5)
LYMPHOCYTES # BLD AUTO: 1.3 K/UL (ref 1–4.8)
LYMPHOCYTES NFR BLD: 52 % (ref 18–48)
MCH RBC QN AUTO: 32.7 PG (ref 27–31)
MCHC RBC AUTO-ENTMCNC: 32.7 G/DL (ref 32–36)
MCV RBC AUTO: 100 FL (ref 82–98)
MONOCYTES # BLD AUTO: 0.6 K/UL (ref 0.3–1)
MONOCYTES NFR BLD: 24.2 % (ref 4–15)
NEUTROPHILS # BLD AUTO: 0.6 K/UL (ref 1.8–7.7)
NEUTROPHILS NFR BLD: 22.2 % (ref 38–73)
NRBC BLD-RTO: 0 /100 WBC
PLATELET # BLD AUTO: 116 K/UL (ref 150–450)
PLATELET BLD QL SMEAR: ABNORMAL
PMV BLD AUTO: 10.5 FL (ref 9.2–12.9)
POTASSIUM SERPL-SCNC: 3.9 MMOL/L (ref 3.5–5.1)
PROT SERPL-MCNC: 6.2 G/DL (ref 6–8.4)
RBC # BLD AUTO: 2.94 M/UL (ref 4.6–6.2)
SODIUM SERPL-SCNC: 136 MMOL/L (ref 136–145)
WBC # BLD AUTO: 2.56 K/UL (ref 3.9–12.7)

## 2021-06-29 PROCEDURE — 36415 COLL VENOUS BLD VENIPUNCTURE: CPT | Mod: PO | Performed by: INTERNAL MEDICINE

## 2021-06-29 PROCEDURE — 85025 COMPLETE CBC W/AUTO DIFF WBC: CPT | Performed by: INTERNAL MEDICINE

## 2021-06-29 PROCEDURE — 80053 COMPREHEN METABOLIC PANEL: CPT | Performed by: INTERNAL MEDICINE

## 2021-06-29 PROCEDURE — 80197 ASSAY OF TACROLIMUS: CPT | Performed by: INTERNAL MEDICINE

## 2021-06-30 ENCOUNTER — TELEPHONE (OUTPATIENT)
Dept: HEPATOLOGY | Facility: CLINIC | Age: 46
End: 2021-06-30

## 2021-06-30 LAB
TACROLIMUS BLD-MCNC: 4.5 NG/ML (ref 5–15)
TACROLIMUS, NORMALIZED: 4.2 NG/ML (ref 5–15)

## 2021-07-02 ENCOUNTER — PATIENT MESSAGE (OUTPATIENT)
Dept: TRANSPLANT | Facility: CLINIC | Age: 46
End: 2021-07-02

## 2021-07-02 DIAGNOSIS — Z94.4 LIVER TRANSPLANTED: ICD-10-CM

## 2021-07-02 RX ORDER — TACROLIMUS 1 MG/1
CAPSULE ORAL
Qty: 120 CAPSULE | Refills: 11 | Status: SHIPPED | OUTPATIENT
Start: 2021-07-02 | End: 2021-07-23 | Stop reason: DRUGHIGH

## 2021-07-02 RX ORDER — PREDNISONE 10 MG/1
5 TABLET ORAL DAILY
Qty: 14 TABLET | Refills: 1 | Status: SHIPPED | OUTPATIENT
Start: 2021-07-02 | End: 2021-11-04 | Stop reason: ALTCHOICE

## 2021-07-05 ENCOUNTER — PATIENT MESSAGE (OUTPATIENT)
Dept: TRANSPLANT | Facility: CLINIC | Age: 46
End: 2021-07-05

## 2021-07-18 ENCOUNTER — PATIENT MESSAGE (OUTPATIENT)
Dept: TRANSPLANT | Facility: CLINIC | Age: 46
End: 2021-07-18

## 2021-07-21 ENCOUNTER — LAB VISIT (OUTPATIENT)
Dept: LAB | Facility: HOSPITAL | Age: 46
End: 2021-07-21
Attending: INTERNAL MEDICINE
Payer: MEDICAID

## 2021-07-21 DIAGNOSIS — Z94.4 LIVER REPLACED BY TRANSPLANT: ICD-10-CM

## 2021-07-21 LAB
ALBUMIN SERPL BCP-MCNC: 3.4 G/DL (ref 3.5–5.2)
ALP SERPL-CCNC: 142 U/L (ref 55–135)
ALT SERPL W/O P-5'-P-CCNC: 30 U/L (ref 10–44)
ANION GAP SERPL CALC-SCNC: 12 MMOL/L (ref 8–16)
AST SERPL-CCNC: 52 U/L (ref 10–40)
BASOPHILS # BLD AUTO: 0 K/UL (ref 0–0.2)
BASOPHILS NFR BLD: 0 % (ref 0–1.9)
BILIRUB SERPL-MCNC: 0.7 MG/DL (ref 0.1–1)
BUN SERPL-MCNC: 15 MG/DL (ref 6–20)
CALCIUM SERPL-MCNC: 9 MG/DL (ref 8.7–10.5)
CHLORIDE SERPL-SCNC: 103 MMOL/L (ref 95–110)
CO2 SERPL-SCNC: 22 MMOL/L (ref 23–29)
CREAT SERPL-MCNC: 2.2 MG/DL (ref 0.5–1.4)
DIFFERENTIAL METHOD: ABNORMAL
EOSINOPHIL # BLD AUTO: 0 K/UL (ref 0–0.5)
EOSINOPHIL NFR BLD: 0.4 % (ref 0–8)
ERYTHROCYTE [DISTWIDTH] IN BLOOD BY AUTOMATED COUNT: 16.2 % (ref 11.5–14.5)
EST. GFR  (AFRICAN AMERICAN): 39.5 ML/MIN/1.73 M^2
EST. GFR  (NON AFRICAN AMERICAN): 34.2 ML/MIN/1.73 M^2
GLUCOSE SERPL-MCNC: 135 MG/DL (ref 70–110)
HCT VFR BLD AUTO: 29.2 % (ref 40–54)
HGB BLD-MCNC: 9.6 G/DL (ref 14–18)
IMM GRANULOCYTES # BLD AUTO: 0.04 K/UL (ref 0–0.04)
IMM GRANULOCYTES NFR BLD AUTO: 1.5 % (ref 0–0.5)
LYMPHOCYTES # BLD AUTO: 1.2 K/UL (ref 1–4.8)
LYMPHOCYTES NFR BLD: 43.8 % (ref 18–48)
MCH RBC QN AUTO: 33.1 PG (ref 27–31)
MCHC RBC AUTO-ENTMCNC: 32.9 G/DL (ref 32–36)
MCV RBC AUTO: 101 FL (ref 82–98)
MONOCYTES # BLD AUTO: 0.5 K/UL (ref 0.3–1)
MONOCYTES NFR BLD: 18.4 % (ref 4–15)
NEUTROPHILS # BLD AUTO: 1 K/UL (ref 1.8–7.7)
NEUTROPHILS NFR BLD: 35.9 % (ref 38–73)
NRBC BLD-RTO: 0 /100 WBC
PLATELET # BLD AUTO: 104 K/UL (ref 150–450)
PMV BLD AUTO: 11.5 FL (ref 9.2–12.9)
POTASSIUM SERPL-SCNC: 3.7 MMOL/L (ref 3.5–5.1)
PROT SERPL-MCNC: 6.1 G/DL (ref 6–8.4)
RBC # BLD AUTO: 2.9 M/UL (ref 4.6–6.2)
SODIUM SERPL-SCNC: 137 MMOL/L (ref 136–145)
WBC # BLD AUTO: 2.72 K/UL (ref 3.9–12.7)

## 2021-07-21 PROCEDURE — 80197 ASSAY OF TACROLIMUS: CPT | Performed by: INTERNAL MEDICINE

## 2021-07-21 PROCEDURE — 85025 COMPLETE CBC W/AUTO DIFF WBC: CPT | Performed by: INTERNAL MEDICINE

## 2021-07-21 PROCEDURE — 80053 COMPREHEN METABOLIC PANEL: CPT | Performed by: INTERNAL MEDICINE

## 2021-07-21 PROCEDURE — 36415 COLL VENOUS BLD VENIPUNCTURE: CPT | Mod: PO | Performed by: INTERNAL MEDICINE

## 2021-07-22 LAB
TACROLIMUS BLD-MCNC: 12.1 NG/ML (ref 5–15)
TACROLIMUS, NORMALIZED: 10.5 NG/ML (ref 5–15)

## 2021-07-23 DIAGNOSIS — Z94.4 LIVER TRANSPLANTED: ICD-10-CM

## 2021-07-26 ENCOUNTER — PATIENT MESSAGE (OUTPATIENT)
Dept: PHARMACY | Facility: CLINIC | Age: 46
End: 2021-07-26

## 2021-07-26 RX ORDER — TACROLIMUS 1 MG/1
CAPSULE ORAL
Qty: 90 CAPSULE | Refills: 11 | Status: SHIPPED | OUTPATIENT
Start: 2021-07-26 | End: 2021-09-29 | Stop reason: DRUGHIGH

## 2021-07-27 ENCOUNTER — LAB VISIT (OUTPATIENT)
Dept: LAB | Facility: HOSPITAL | Age: 46
End: 2021-07-27
Attending: INTERNAL MEDICINE
Payer: MEDICAID

## 2021-07-27 DIAGNOSIS — Z94.4 LIVER REPLACED BY TRANSPLANT: ICD-10-CM

## 2021-07-27 LAB
ALBUMIN SERPL BCP-MCNC: 3.2 G/DL (ref 3.5–5.2)
ALP SERPL-CCNC: 78 U/L (ref 55–135)
ALT SERPL W/O P-5'-P-CCNC: 21 U/L (ref 10–44)
ANION GAP SERPL CALC-SCNC: 9 MMOL/L (ref 8–16)
AST SERPL-CCNC: 29 U/L (ref 10–40)
BASO STIPL BLD QL SMEAR: ABNORMAL
BASOPHILS # BLD AUTO: 0.01 K/UL (ref 0–0.2)
BASOPHILS NFR BLD: 0.5 % (ref 0–1.9)
BILIRUB SERPL-MCNC: 0.4 MG/DL (ref 0.1–1)
BUN SERPL-MCNC: 15 MG/DL (ref 6–20)
CALCIUM SERPL-MCNC: 9.3 MG/DL (ref 8.7–10.5)
CHLORIDE SERPL-SCNC: 106 MMOL/L (ref 95–110)
CO2 SERPL-SCNC: 23 MMOL/L (ref 23–29)
CREAT SERPL-MCNC: 1.5 MG/DL (ref 0.5–1.4)
DIFFERENTIAL METHOD: ABNORMAL
EOSINOPHIL # BLD AUTO: 0 K/UL (ref 0–0.5)
EOSINOPHIL NFR BLD: 0.5 % (ref 0–8)
ERYTHROCYTE [DISTWIDTH] IN BLOOD BY AUTOMATED COUNT: 14.6 % (ref 11.5–14.5)
EST. GFR  (AFRICAN AMERICAN): >60 ML/MIN/1.73 M^2
EST. GFR  (NON AFRICAN AMERICAN): 54.3 ML/MIN/1.73 M^2
GLUCOSE SERPL-MCNC: 133 MG/DL (ref 70–110)
HCT VFR BLD AUTO: 31.1 % (ref 40–54)
HGB BLD-MCNC: 9.8 G/DL (ref 14–18)
IMM GRANULOCYTES # BLD AUTO: 0.03 K/UL (ref 0–0.04)
IMM GRANULOCYTES NFR BLD AUTO: 1.4 % (ref 0–0.5)
LYMPHOCYTES # BLD AUTO: 0.9 K/UL (ref 1–4.8)
LYMPHOCYTES NFR BLD: 43.3 % (ref 18–48)
MCH RBC QN AUTO: 33.2 PG (ref 27–31)
MCHC RBC AUTO-ENTMCNC: 31.5 G/DL (ref 32–36)
MCV RBC AUTO: 105 FL (ref 82–98)
MONOCYTES # BLD AUTO: 0.4 K/UL (ref 0.3–1)
MONOCYTES NFR BLD: 17.2 % (ref 4–15)
NEUTROPHILS # BLD AUTO: 0.8 K/UL (ref 1.8–7.7)
NEUTROPHILS NFR BLD: 37.1 % (ref 38–73)
NRBC BLD-RTO: 0 /100 WBC
PLATELET # BLD AUTO: 116 K/UL (ref 150–450)
PLATELET BLD QL SMEAR: ABNORMAL
PMV BLD AUTO: 10.7 FL (ref 9.2–12.9)
POTASSIUM SERPL-SCNC: 4 MMOL/L (ref 3.5–5.1)
PROT SERPL-MCNC: 6 G/DL (ref 6–8.4)
RBC # BLD AUTO: 2.95 M/UL (ref 4.6–6.2)
SCHISTOCYTES BLD QL SMEAR: ABNORMAL
SMUDGE CELLS BLD QL SMEAR: ABNORMAL
SODIUM SERPL-SCNC: 138 MMOL/L (ref 136–145)
WBC # BLD AUTO: 2.15 K/UL (ref 3.9–12.7)

## 2021-07-27 PROCEDURE — 80197 ASSAY OF TACROLIMUS: CPT | Performed by: INTERNAL MEDICINE

## 2021-07-27 PROCEDURE — 80053 COMPREHEN METABOLIC PANEL: CPT | Performed by: INTERNAL MEDICINE

## 2021-07-27 PROCEDURE — 36415 COLL VENOUS BLD VENIPUNCTURE: CPT | Mod: PO | Performed by: INTERNAL MEDICINE

## 2021-07-27 PROCEDURE — 85025 COMPLETE CBC W/AUTO DIFF WBC: CPT | Performed by: INTERNAL MEDICINE

## 2021-07-28 LAB
TACROLIMUS BLD-MCNC: 8.3 NG/ML (ref 5–15)
TACROLIMUS, NORMALIZED: 7.3 NG/ML (ref 5–15)

## 2021-07-29 ENCOUNTER — PATIENT MESSAGE (OUTPATIENT)
Dept: HEPATOLOGY | Facility: CLINIC | Age: 46
End: 2021-07-29

## 2021-07-29 ENCOUNTER — TELEPHONE (OUTPATIENT)
Dept: TRANSPLANT | Facility: CLINIC | Age: 46
End: 2021-07-29

## 2021-07-29 DIAGNOSIS — D84.9 IMMUNOSUPPRESSION: ICD-10-CM

## 2021-07-29 DIAGNOSIS — U07.1 COVID-19 VIRUS INFECTION: Primary | ICD-10-CM

## 2021-07-29 DIAGNOSIS — Z94.4 LIVER REPLACED BY TRANSPLANT: Primary | ICD-10-CM

## 2021-07-30 ENCOUNTER — TELEPHONE (OUTPATIENT)
Dept: GASTROENTEROLOGY | Facility: CLINIC | Age: 46
End: 2021-07-30

## 2021-07-30 ENCOUNTER — TELEPHONE (OUTPATIENT)
Dept: HEPATOLOGY | Facility: CLINIC | Age: 46
End: 2021-07-30

## 2021-07-30 ENCOUNTER — PATIENT MESSAGE (OUTPATIENT)
Dept: TRANSPLANT | Facility: CLINIC | Age: 46
End: 2021-07-30

## 2021-07-30 ENCOUNTER — TELEPHONE (OUTPATIENT)
Dept: TRANSPLANT | Facility: CLINIC | Age: 46
End: 2021-07-30

## 2021-07-31 ENCOUNTER — PATIENT MESSAGE (OUTPATIENT)
Dept: HEPATOLOGY | Facility: CLINIC | Age: 46
End: 2021-07-31

## 2021-08-01 ENCOUNTER — INFUSION (OUTPATIENT)
Dept: INFECTIOUS DISEASES | Facility: HOSPITAL | Age: 46
End: 2021-08-01
Attending: INTERNAL MEDICINE
Payer: MEDICAID

## 2021-08-01 VITALS
SYSTOLIC BLOOD PRESSURE: 160 MMHG | OXYGEN SATURATION: 97 % | DIASTOLIC BLOOD PRESSURE: 95 MMHG | TEMPERATURE: 98 F | HEART RATE: 97 BPM | RESPIRATION RATE: 20 BRPM

## 2021-08-01 DIAGNOSIS — U07.1 COVID-19: Primary | ICD-10-CM

## 2021-08-01 DIAGNOSIS — U07.1 COVID-19 VIRUS INFECTION: ICD-10-CM

## 2021-08-01 DIAGNOSIS — D84.9 IMMUNOSUPPRESSION: ICD-10-CM

## 2021-08-01 PROCEDURE — 25000003 PHARM REV CODE 250: Performed by: INTERNAL MEDICINE

## 2021-08-01 PROCEDURE — M0243 CASIRIVI AND IMDEVI INFUSION: HCPCS | Performed by: INTERNAL MEDICINE

## 2021-08-01 PROCEDURE — 63600175 PHARM REV CODE 636 W HCPCS: Performed by: INTERNAL MEDICINE

## 2021-08-01 RX ORDER — ALBUTEROL SULFATE 90 UG/1
2 AEROSOL, METERED RESPIRATORY (INHALATION)
Status: ACTIVE | OUTPATIENT
Start: 2021-08-01

## 2021-08-01 RX ORDER — EPINEPHRINE 0.3 MG/.3ML
0.3 INJECTION SUBCUTANEOUS
Status: DISCONTINUED | OUTPATIENT
Start: 2021-08-01 | End: 2024-01-01

## 2021-08-01 RX ORDER — DIPHENHYDRAMINE HYDROCHLORIDE 50 MG/ML
25 INJECTION INTRAMUSCULAR; INTRAVENOUS ONCE AS NEEDED
Status: DISCONTINUED | OUTPATIENT
Start: 2021-08-01 | End: 2024-01-01

## 2021-08-01 RX ORDER — ACETAMINOPHEN 325 MG/1
650 TABLET ORAL ONCE AS NEEDED
Status: ACTIVE | OUTPATIENT
Start: 2021-08-01 | End: 2032-12-28

## 2021-08-01 RX ORDER — ONDANSETRON 4 MG/1
4 TABLET, ORALLY DISINTEGRATING ORAL ONCE AS NEEDED
Status: DISCONTINUED | OUTPATIENT
Start: 2021-08-01 | End: 2024-01-01

## 2021-08-01 RX ORDER — SODIUM CHLORIDE 0.9 % (FLUSH) 0.9 %
10 SYRINGE (ML) INJECTION
Status: DISCONTINUED | OUTPATIENT
Start: 2021-08-01 | End: 2024-01-01

## 2021-08-01 RX ADMIN — CASIRIVIMAB AND IMDEVIMAB 600 MG: 600; 600 INJECTION, SOLUTION, CONCENTRATE INTRAVENOUS at 10:08

## 2021-08-12 ENCOUNTER — PATIENT MESSAGE (OUTPATIENT)
Dept: HEPATOLOGY | Facility: CLINIC | Age: 46
End: 2021-08-12

## 2021-08-12 ENCOUNTER — LAB VISIT (OUTPATIENT)
Dept: LAB | Facility: HOSPITAL | Age: 46
End: 2021-08-12
Attending: INTERNAL MEDICINE
Payer: MEDICAID

## 2021-08-12 DIAGNOSIS — Z94.4 LIVER REPLACED BY TRANSPLANT: ICD-10-CM

## 2021-08-12 LAB
ALBUMIN SERPL BCP-MCNC: 3.3 G/DL (ref 3.5–5.2)
ALP SERPL-CCNC: 68 U/L (ref 55–135)
ALT SERPL W/O P-5'-P-CCNC: 17 U/L (ref 10–44)
ANION GAP SERPL CALC-SCNC: 7 MMOL/L (ref 8–16)
AST SERPL-CCNC: 33 U/L (ref 10–40)
BASO STIPL BLD QL SMEAR: ABNORMAL
BASOPHILS # BLD AUTO: 0.02 K/UL (ref 0–0.2)
BASOPHILS NFR BLD: 0.8 % (ref 0–1.9)
BILIRUB SERPL-MCNC: 0.5 MG/DL (ref 0.1–1)
BUN SERPL-MCNC: 17 MG/DL (ref 6–20)
CALCIUM SERPL-MCNC: 9 MG/DL (ref 8.7–10.5)
CHLORIDE SERPL-SCNC: 110 MMOL/L (ref 95–110)
CO2 SERPL-SCNC: 22 MMOL/L (ref 23–29)
CREAT SERPL-MCNC: 1.2 MG/DL (ref 0.5–1.4)
DIFFERENTIAL METHOD: ABNORMAL
EOSINOPHIL # BLD AUTO: 0.1 K/UL (ref 0–0.5)
EOSINOPHIL NFR BLD: 2 % (ref 0–8)
ERYTHROCYTE [DISTWIDTH] IN BLOOD BY AUTOMATED COUNT: 13.6 % (ref 11.5–14.5)
EST. GFR  (AFRICAN AMERICAN): >60 ML/MIN/1.73 M^2
EST. GFR  (NON AFRICAN AMERICAN): >60 ML/MIN/1.73 M^2
GLUCOSE SERPL-MCNC: 114 MG/DL (ref 70–110)
HCT VFR BLD AUTO: 32.3 % (ref 40–54)
HGB BLD-MCNC: 10 G/DL (ref 14–18)
IMM GRANULOCYTES # BLD AUTO: 0.12 K/UL (ref 0–0.04)
IMM GRANULOCYTES NFR BLD AUTO: 4.8 % (ref 0–0.5)
LYMPHOCYTES # BLD AUTO: 1 K/UL (ref 1–4.8)
LYMPHOCYTES NFR BLD: 40.8 % (ref 18–48)
MCH RBC QN AUTO: 32.9 PG (ref 27–31)
MCHC RBC AUTO-ENTMCNC: 31 G/DL (ref 32–36)
MCV RBC AUTO: 106 FL (ref 82–98)
MONOCYTES # BLD AUTO: 0.4 K/UL (ref 0.3–1)
MONOCYTES NFR BLD: 17.6 % (ref 4–15)
NEUTROPHILS # BLD AUTO: 0.9 K/UL (ref 1.8–7.7)
NEUTROPHILS NFR BLD: 34 % (ref 38–73)
NRBC BLD-RTO: 0 /100 WBC
PLATELET # BLD AUTO: 171 K/UL (ref 150–450)
PLATELET BLD QL SMEAR: ABNORMAL
PMV BLD AUTO: 10.3 FL (ref 9.2–12.9)
POTASSIUM SERPL-SCNC: 4.3 MMOL/L (ref 3.5–5.1)
PROT SERPL-MCNC: 5.6 G/DL (ref 6–8.4)
RBC # BLD AUTO: 3.04 M/UL (ref 4.6–6.2)
SCHISTOCYTES BLD QL SMEAR: ABNORMAL
SODIUM SERPL-SCNC: 139 MMOL/L (ref 136–145)
WBC # BLD AUTO: 2.5 K/UL (ref 3.9–12.7)

## 2021-08-12 PROCEDURE — 80053 COMPREHEN METABOLIC PANEL: CPT | Performed by: INTERNAL MEDICINE

## 2021-08-12 PROCEDURE — 36415 COLL VENOUS BLD VENIPUNCTURE: CPT | Mod: PO | Performed by: INTERNAL MEDICINE

## 2021-08-12 PROCEDURE — 80197 ASSAY OF TACROLIMUS: CPT | Performed by: INTERNAL MEDICINE

## 2021-08-12 PROCEDURE — 85025 COMPLETE CBC W/AUTO DIFF WBC: CPT | Performed by: INTERNAL MEDICINE

## 2021-08-13 LAB
TACROLIMUS BLD-MCNC: 8.8 NG/ML (ref 5–15)
TACROLIMUS, NORMALIZED: 8.2 NG/ML (ref 5–15)

## 2021-08-18 NOTE — NURSING
"Received call from pts brother, Didier, stating that pt is "basically" homeless and has no where to go and that he does not think discharging the pt today is a good idea. When speaking with pt he states that he lived in a hotel before being admitted. Dr. Gonsalez notified.   "
Attempted new IV from 2 different nurses and unsuccessful. IV site due to be changed. Midline consult placed for ultrasound guided IV. Will continue to monitor.   
Dr. Villa stated to hold lactulose today and tonight due to pt having frequent diarrhea. Will continue to monitor.   
Pt returned from Echo and IR for para. IR stated pt did not have fluid in abdomen. VSS, pt AAOx4, pain to left knee rated 9/10, preparing to admin pain meds at this time. Will continue to monitor.   
Pt transported to Echo for nuclear stress test. Will continue to monitor.   
Pt transported to echo via stretcher. Will continue to monitor.   
Pt went to MRI  
Pt. D/cing today.  Medications delivered to bedside.  Discharge instructions provided, PIV removed.  Awaiting family transport.  
no

## 2021-08-19 ENCOUNTER — PATIENT MESSAGE (OUTPATIENT)
Dept: TRANSPLANT | Facility: CLINIC | Age: 46
End: 2021-08-19

## 2021-08-19 ENCOUNTER — TELEPHONE (OUTPATIENT)
Dept: TRANSPLANT | Facility: CLINIC | Age: 46
End: 2021-08-19

## 2021-09-07 ENCOUNTER — LAB VISIT (OUTPATIENT)
Dept: LAB | Facility: HOSPITAL | Age: 46
End: 2021-09-07
Attending: INTERNAL MEDICINE
Payer: MEDICAID

## 2021-09-07 DIAGNOSIS — Z94.4 LIVER REPLACED BY TRANSPLANT: ICD-10-CM

## 2021-09-07 LAB
ALBUMIN SERPL BCP-MCNC: 3.7 G/DL (ref 3.5–5.2)
ALP SERPL-CCNC: 59 U/L (ref 55–135)
ALT SERPL W/O P-5'-P-CCNC: 20 U/L (ref 10–44)
ANION GAP SERPL CALC-SCNC: 13 MMOL/L (ref 8–16)
ANISOCYTOSIS BLD QL SMEAR: SLIGHT
AST SERPL-CCNC: 36 U/L (ref 10–40)
BASOPHILS NFR BLD: 0 % (ref 0–1.9)
BILIRUB SERPL-MCNC: 0.3 MG/DL (ref 0.1–1)
BUN SERPL-MCNC: 22 MG/DL (ref 6–20)
CALCIUM SERPL-MCNC: 9.4 MG/DL (ref 8.7–10.5)
CHLORIDE SERPL-SCNC: 107 MMOL/L (ref 95–110)
CO2 SERPL-SCNC: 19 MMOL/L (ref 23–29)
CREAT SERPL-MCNC: 1.3 MG/DL (ref 0.5–1.4)
DIFFERENTIAL METHOD: ABNORMAL
EOSINOPHIL NFR BLD: 0 % (ref 0–8)
ERYTHROCYTE [DISTWIDTH] IN BLOOD BY AUTOMATED COUNT: 13.2 % (ref 11.5–14.5)
EST. GFR  (AFRICAN AMERICAN): >60 ML/MIN/1.73 M^2
EST. GFR  (NON AFRICAN AMERICAN): >60 ML/MIN/1.73 M^2
GLUCOSE SERPL-MCNC: 109 MG/DL (ref 70–110)
HCT VFR BLD AUTO: 37 % (ref 40–54)
HGB BLD-MCNC: 10.9 G/DL (ref 14–18)
IMM GRANULOCYTES # BLD AUTO: ABNORMAL K/UL (ref 0–0.04)
IMM GRANULOCYTES NFR BLD AUTO: ABNORMAL % (ref 0–0.5)
LYMPHOCYTES NFR BLD: 46 % (ref 18–48)
MCH RBC QN AUTO: 31.9 PG (ref 27–31)
MCHC RBC AUTO-ENTMCNC: 29.5 G/DL (ref 32–36)
MCV RBC AUTO: 108 FL (ref 82–98)
MONOCYTES NFR BLD: 13 % (ref 4–15)
MYELOCYTES NFR BLD MANUAL: 2 %
NEUTROPHILS NFR BLD: 38 % (ref 38–73)
NEUTS BAND NFR BLD MANUAL: 1 %
NRBC BLD-RTO: 0 /100 WBC
OVALOCYTES BLD QL SMEAR: ABNORMAL
PLATELET # BLD AUTO: 143 K/UL (ref 150–450)
PLATELET BLD QL SMEAR: ABNORMAL
PMV BLD AUTO: 10.3 FL (ref 9.2–12.9)
POIKILOCYTOSIS BLD QL SMEAR: SLIGHT
POTASSIUM SERPL-SCNC: 4 MMOL/L (ref 3.5–5.1)
PROT SERPL-MCNC: 6.3 G/DL (ref 6–8.4)
RBC # BLD AUTO: 3.42 M/UL (ref 4.6–6.2)
SODIUM SERPL-SCNC: 139 MMOL/L (ref 136–145)
WBC # BLD AUTO: 3.06 K/UL (ref 3.9–12.7)

## 2021-09-07 PROCEDURE — 80053 COMPREHEN METABOLIC PANEL: CPT | Performed by: INTERNAL MEDICINE

## 2021-09-07 PROCEDURE — 36415 COLL VENOUS BLD VENIPUNCTURE: CPT | Mod: PO | Performed by: INTERNAL MEDICINE

## 2021-09-07 PROCEDURE — 80197 ASSAY OF TACROLIMUS: CPT | Performed by: INTERNAL MEDICINE

## 2021-09-07 PROCEDURE — 85007 BL SMEAR W/DIFF WBC COUNT: CPT | Performed by: INTERNAL MEDICINE

## 2021-09-07 PROCEDURE — 85027 COMPLETE CBC AUTOMATED: CPT | Performed by: INTERNAL MEDICINE

## 2021-09-08 LAB
TACROLIMUS BLD-MCNC: 8.7 NG/ML (ref 5–15)
TACROLIMUS, NORMALIZED: 8.1 NG/ML (ref 5–15)

## 2021-09-10 ENCOUNTER — PATIENT MESSAGE (OUTPATIENT)
Dept: TRANSPLANT | Facility: CLINIC | Age: 46
End: 2021-09-10

## 2021-09-10 ENCOUNTER — TELEPHONE (OUTPATIENT)
Dept: TRANSPLANT | Facility: CLINIC | Age: 46
End: 2021-09-10

## 2021-09-10 DIAGNOSIS — Z94.4 LIVER REPLACED BY TRANSPLANT: Primary | ICD-10-CM

## 2021-09-27 ENCOUNTER — LAB VISIT (OUTPATIENT)
Dept: LAB | Facility: HOSPITAL | Age: 46
End: 2021-09-27
Attending: INTERNAL MEDICINE
Payer: MEDICAID

## 2021-09-27 DIAGNOSIS — Z94.4 LIVER REPLACED BY TRANSPLANT: ICD-10-CM

## 2021-09-27 LAB
ALBUMIN SERPL BCP-MCNC: 4 G/DL (ref 3.5–5.2)
ALP SERPL-CCNC: 64 U/L (ref 55–135)
ALT SERPL W/O P-5'-P-CCNC: 35 U/L (ref 10–44)
ANION GAP SERPL CALC-SCNC: 13 MMOL/L (ref 8–16)
AST SERPL-CCNC: 46 U/L (ref 10–40)
BASOPHILS # BLD AUTO: 0.02 K/UL (ref 0–0.2)
BASOPHILS NFR BLD: 0.6 % (ref 0–1.9)
BILIRUB SERPL-MCNC: 0.8 MG/DL (ref 0.1–1)
BUN SERPL-MCNC: 21 MG/DL (ref 6–20)
CALCIUM SERPL-MCNC: 9.7 MG/DL (ref 8.7–10.5)
CHLORIDE SERPL-SCNC: 102 MMOL/L (ref 95–110)
CO2 SERPL-SCNC: 23 MMOL/L (ref 23–29)
CREAT SERPL-MCNC: 1.5 MG/DL (ref 0.5–1.4)
DIFFERENTIAL METHOD: ABNORMAL
EOSINOPHIL # BLD AUTO: 0 K/UL (ref 0–0.5)
EOSINOPHIL NFR BLD: 0.6 % (ref 0–8)
ERYTHROCYTE [DISTWIDTH] IN BLOOD BY AUTOMATED COUNT: 12.4 % (ref 11.5–14.5)
EST. GFR  (AFRICAN AMERICAN): >60 ML/MIN/1.73 M^2
EST. GFR  (NON AFRICAN AMERICAN): 54.3 ML/MIN/1.73 M^2
GLUCOSE SERPL-MCNC: 115 MG/DL (ref 70–110)
HCT VFR BLD AUTO: 39.4 % (ref 40–54)
HGB BLD-MCNC: 12.4 G/DL (ref 14–18)
IMM GRANULOCYTES # BLD AUTO: 0.05 K/UL (ref 0–0.04)
IMM GRANULOCYTES NFR BLD AUTO: 1.5 % (ref 0–0.5)
LYMPHOCYTES # BLD AUTO: 1.7 K/UL (ref 1–4.8)
LYMPHOCYTES NFR BLD: 50.1 % (ref 18–48)
MCH RBC QN AUTO: 31.3 PG (ref 27–31)
MCHC RBC AUTO-ENTMCNC: 31.5 G/DL (ref 32–36)
MCV RBC AUTO: 100 FL (ref 82–98)
MONOCYTES # BLD AUTO: 0.6 K/UL (ref 0.3–1)
MONOCYTES NFR BLD: 18.1 % (ref 4–15)
NEUTROPHILS # BLD AUTO: 1 K/UL (ref 1.8–7.7)
NEUTROPHILS NFR BLD: 29.1 % (ref 38–73)
NRBC BLD-RTO: 0 /100 WBC
PLATELET # BLD AUTO: 128 K/UL (ref 150–450)
PMV BLD AUTO: 10.4 FL (ref 9.2–12.9)
POTASSIUM SERPL-SCNC: 3.9 MMOL/L (ref 3.5–5.1)
PROT SERPL-MCNC: 6.5 G/DL (ref 6–8.4)
RBC # BLD AUTO: 3.96 M/UL (ref 4.6–6.2)
SODIUM SERPL-SCNC: 138 MMOL/L (ref 136–145)
WBC # BLD AUTO: 3.37 K/UL (ref 3.9–12.7)

## 2021-09-27 PROCEDURE — 36415 COLL VENOUS BLD VENIPUNCTURE: CPT | Mod: PO | Performed by: INTERNAL MEDICINE

## 2021-09-27 PROCEDURE — 85025 COMPLETE CBC W/AUTO DIFF WBC: CPT | Performed by: INTERNAL MEDICINE

## 2021-09-27 PROCEDURE — 80197 ASSAY OF TACROLIMUS: CPT | Performed by: INTERNAL MEDICINE

## 2021-09-27 PROCEDURE — 80053 COMPREHEN METABOLIC PANEL: CPT | Performed by: INTERNAL MEDICINE

## 2021-09-28 LAB
TACROLIMUS BLD-MCNC: 11.2 NG/ML (ref 5–15)
TACROLIMUS, NORMALIZED: 10.5 NG/ML (ref 5–15)

## 2021-09-29 ENCOUNTER — PATIENT MESSAGE (OUTPATIENT)
Dept: TRANSPLANT | Facility: CLINIC | Age: 46
End: 2021-09-29

## 2021-09-29 DIAGNOSIS — Z94.4 LIVER TRANSPLANTED: ICD-10-CM

## 2021-10-04 RX ORDER — TACROLIMUS 1 MG/1
CAPSULE ORAL
Qty: 60 CAPSULE | Refills: 11 | Status: SHIPPED | OUTPATIENT
Start: 2021-10-04 | End: 2021-11-11

## 2021-10-05 ENCOUNTER — TELEPHONE (OUTPATIENT)
Dept: TRANSPLANT | Facility: CLINIC | Age: 46
End: 2021-10-05

## 2021-10-06 ENCOUNTER — LAB VISIT (OUTPATIENT)
Dept: LAB | Facility: HOSPITAL | Age: 46
End: 2021-10-06
Attending: INTERNAL MEDICINE
Payer: MEDICAID

## 2021-10-06 DIAGNOSIS — Z94.4 LIVER REPLACED BY TRANSPLANT: ICD-10-CM

## 2021-10-06 LAB
ALBUMIN SERPL BCP-MCNC: 3.8 G/DL (ref 3.5–5.2)
ALP SERPL-CCNC: 63 U/L (ref 55–135)
ALT SERPL W/O P-5'-P-CCNC: 30 U/L (ref 10–44)
ANION GAP SERPL CALC-SCNC: 12 MMOL/L (ref 8–16)
AST SERPL-CCNC: 43 U/L (ref 10–40)
BASOPHILS NFR BLD: 0 % (ref 0–1.9)
BILIRUB SERPL-MCNC: 0.6 MG/DL (ref 0.1–1)
BUN SERPL-MCNC: 25 MG/DL (ref 6–20)
CALCIUM SERPL-MCNC: 9.9 MG/DL (ref 8.7–10.5)
CHLORIDE SERPL-SCNC: 109 MMOL/L (ref 95–110)
CO2 SERPL-SCNC: 20 MMOL/L (ref 23–29)
CREAT SERPL-MCNC: 1.2 MG/DL (ref 0.5–1.4)
DIFFERENTIAL METHOD: ABNORMAL
EOSINOPHIL NFR BLD: 2 % (ref 0–8)
ERYTHROCYTE [DISTWIDTH] IN BLOOD BY AUTOMATED COUNT: 12 % (ref 11.5–14.5)
EST. GFR  (AFRICAN AMERICAN): >60 ML/MIN/1.73 M^2
EST. GFR  (NON AFRICAN AMERICAN): >60 ML/MIN/1.73 M^2
GLUCOSE SERPL-MCNC: 113 MG/DL (ref 70–110)
HCT VFR BLD AUTO: 36.9 % (ref 40–54)
HGB BLD-MCNC: 11.7 G/DL (ref 14–18)
IMM GRANULOCYTES # BLD AUTO: ABNORMAL K/UL (ref 0–0.04)
IMM GRANULOCYTES NFR BLD AUTO: ABNORMAL % (ref 0–0.5)
LYMPHOCYTES NFR BLD: 41 % (ref 18–48)
MCH RBC QN AUTO: 31.1 PG (ref 27–31)
MCHC RBC AUTO-ENTMCNC: 31.7 G/DL (ref 32–36)
MCV RBC AUTO: 98 FL (ref 82–98)
MONOCYTES NFR BLD: 7 % (ref 4–15)
NEUTROPHILS NFR BLD: 50 % (ref 38–73)
NRBC BLD-RTO: 0 /100 WBC
PLATELET # BLD AUTO: 169 K/UL (ref 150–450)
PLATELET BLD QL SMEAR: ABNORMAL
PMV BLD AUTO: 10.3 FL (ref 9.2–12.9)
POTASSIUM SERPL-SCNC: 4.7 MMOL/L (ref 3.5–5.1)
PROT SERPL-MCNC: 6.4 G/DL (ref 6–8.4)
RBC # BLD AUTO: 3.76 M/UL (ref 4.6–6.2)
SODIUM SERPL-SCNC: 141 MMOL/L (ref 136–145)
WBC # BLD AUTO: 2.94 K/UL (ref 3.9–12.7)

## 2021-10-06 PROCEDURE — 80053 COMPREHEN METABOLIC PANEL: CPT | Performed by: INTERNAL MEDICINE

## 2021-10-06 PROCEDURE — 80197 ASSAY OF TACROLIMUS: CPT | Performed by: INTERNAL MEDICINE

## 2021-10-06 PROCEDURE — 85007 BL SMEAR W/DIFF WBC COUNT: CPT | Performed by: INTERNAL MEDICINE

## 2021-10-06 PROCEDURE — 85027 COMPLETE CBC AUTOMATED: CPT | Performed by: INTERNAL MEDICINE

## 2021-10-06 PROCEDURE — 36415 COLL VENOUS BLD VENIPUNCTURE: CPT | Mod: PO | Performed by: INTERNAL MEDICINE

## 2021-10-07 ENCOUNTER — TELEPHONE (OUTPATIENT)
Dept: TRANSPLANT | Facility: CLINIC | Age: 46
End: 2021-10-07

## 2021-10-07 LAB
TACROLIMUS BLD-MCNC: 4.7 NG/ML (ref 5–15)
TACROLIMUS, NORMALIZED: 4.3 NG/ML (ref 5–15)

## 2021-10-08 ENCOUNTER — TELEPHONE (OUTPATIENT)
Dept: RADIOLOGY | Facility: HOSPITAL | Age: 46
End: 2021-10-08

## 2021-10-08 ENCOUNTER — PATIENT MESSAGE (OUTPATIENT)
Dept: TRANSPLANT | Facility: CLINIC | Age: 46
End: 2021-10-08

## 2021-10-11 ENCOUNTER — HOSPITAL ENCOUNTER (OUTPATIENT)
Dept: RADIOLOGY | Facility: HOSPITAL | Age: 46
Discharge: HOME | End: 2021-10-11
Attending: INTERNAL MEDICINE
Payer: MEDICAID

## 2021-10-11 DIAGNOSIS — Z94.4 LIVER REPLACED BY TRANSPLANT: ICD-10-CM

## 2021-10-11 PROCEDURE — 71046 XR CHEST PA AND LATERAL: ICD-10-PCS | Mod: 26,,, | Performed by: RADIOLOGY

## 2021-10-11 PROCEDURE — 93975 VASCULAR STUDY: CPT | Mod: TC

## 2021-10-11 PROCEDURE — 93975 VASCULAR STUDY: CPT | Mod: 26,,, | Performed by: RADIOLOGY

## 2021-10-11 PROCEDURE — 76705 ECHO EXAM OF ABDOMEN: CPT | Mod: 26,59,, | Performed by: RADIOLOGY

## 2021-10-11 PROCEDURE — 93975 US LIVER WITH DOPPLER: ICD-10-PCS | Mod: 26,,, | Performed by: RADIOLOGY

## 2021-10-11 PROCEDURE — 76705 US LIVER WITH DOPPLER: ICD-10-PCS | Mod: 26,59,, | Performed by: RADIOLOGY

## 2021-10-11 PROCEDURE — 71046 X-RAY EXAM CHEST 2 VIEWS: CPT | Mod: 26,,, | Performed by: RADIOLOGY

## 2021-10-11 PROCEDURE — 71046 X-RAY EXAM CHEST 2 VIEWS: CPT | Mod: TC

## 2021-10-12 ENCOUNTER — TELEPHONE (OUTPATIENT)
Dept: TRANSPLANT | Facility: CLINIC | Age: 46
End: 2021-10-12

## 2021-10-15 ENCOUNTER — TELEPHONE (OUTPATIENT)
Dept: TRANSPLANT | Facility: CLINIC | Age: 46
End: 2021-10-15

## 2021-10-18 ENCOUNTER — LAB VISIT (OUTPATIENT)
Dept: LAB | Facility: HOSPITAL | Age: 46
End: 2021-10-18
Attending: INTERNAL MEDICINE
Payer: MEDICAID

## 2021-10-18 DIAGNOSIS — Z94.4 LIVER REPLACED BY TRANSPLANT: ICD-10-CM

## 2021-10-18 LAB
ALBUMIN SERPL BCP-MCNC: 3.8 G/DL (ref 3.5–5.2)
ALP SERPL-CCNC: 59 U/L (ref 55–135)
ALT SERPL W/O P-5'-P-CCNC: 31 U/L (ref 10–44)
ANION GAP SERPL CALC-SCNC: 11 MMOL/L (ref 8–16)
AST SERPL-CCNC: 42 U/L (ref 10–40)
BASOPHILS # BLD AUTO: 0.02 K/UL (ref 0–0.2)
BASOPHILS NFR BLD: 0.7 % (ref 0–1.9)
BILIRUB SERPL-MCNC: 0.6 MG/DL (ref 0.1–1)
BUN SERPL-MCNC: 18 MG/DL (ref 6–20)
CALCIUM SERPL-MCNC: 9.5 MG/DL (ref 8.7–10.5)
CHLORIDE SERPL-SCNC: 104 MMOL/L (ref 95–110)
CO2 SERPL-SCNC: 26 MMOL/L (ref 23–29)
CREAT SERPL-MCNC: 1.3 MG/DL (ref 0.5–1.4)
DIFFERENTIAL METHOD: ABNORMAL
EOSINOPHIL # BLD AUTO: 0 K/UL (ref 0–0.5)
EOSINOPHIL NFR BLD: 1.3 % (ref 0–8)
ERYTHROCYTE [DISTWIDTH] IN BLOOD BY AUTOMATED COUNT: 12.1 % (ref 11.5–14.5)
EST. GFR  (AFRICAN AMERICAN): >60 ML/MIN/1.73 M^2
EST. GFR  (NON AFRICAN AMERICAN): >60 ML/MIN/1.73 M^2
GLUCOSE SERPL-MCNC: 105 MG/DL (ref 70–110)
HCT VFR BLD AUTO: 38.6 % (ref 40–54)
HGB BLD-MCNC: 12 G/DL (ref 14–18)
IMM GRANULOCYTES # BLD AUTO: 0.07 K/UL (ref 0–0.04)
IMM GRANULOCYTES NFR BLD AUTO: 2.3 % (ref 0–0.5)
LYMPHOCYTES # BLD AUTO: 1.1 K/UL (ref 1–4.8)
LYMPHOCYTES NFR BLD: 36.7 % (ref 18–48)
MCH RBC QN AUTO: 30.6 PG (ref 27–31)
MCHC RBC AUTO-ENTMCNC: 31.1 G/DL (ref 32–36)
MCV RBC AUTO: 99 FL (ref 82–98)
MONOCYTES # BLD AUTO: 0.5 K/UL (ref 0.3–1)
MONOCYTES NFR BLD: 17.7 % (ref 4–15)
NEUTROPHILS # BLD AUTO: 1.2 K/UL (ref 1.8–7.7)
NEUTROPHILS NFR BLD: 41.3 % (ref 38–73)
NRBC BLD-RTO: 0 /100 WBC
PLATELET # BLD AUTO: 158 K/UL (ref 150–450)
PMV BLD AUTO: 10 FL (ref 9.2–12.9)
POTASSIUM SERPL-SCNC: 4.1 MMOL/L (ref 3.5–5.1)
PROT SERPL-MCNC: 6.4 G/DL (ref 6–8.4)
RBC # BLD AUTO: 3.92 M/UL (ref 4.6–6.2)
SODIUM SERPL-SCNC: 141 MMOL/L (ref 136–145)
WBC # BLD AUTO: 3 K/UL (ref 3.9–12.7)

## 2021-10-18 PROCEDURE — 36415 COLL VENOUS BLD VENIPUNCTURE: CPT | Mod: PO | Performed by: INTERNAL MEDICINE

## 2021-10-18 PROCEDURE — 80053 COMPREHEN METABOLIC PANEL: CPT | Performed by: INTERNAL MEDICINE

## 2021-10-18 PROCEDURE — 85025 COMPLETE CBC W/AUTO DIFF WBC: CPT | Performed by: INTERNAL MEDICINE

## 2021-10-18 PROCEDURE — 80197 ASSAY OF TACROLIMUS: CPT | Performed by: INTERNAL MEDICINE

## 2021-10-19 LAB
TACROLIMUS BLD-MCNC: 5.9 NG/ML (ref 5–15)
TACROLIMUS, NORMALIZED: 5.4 NG/ML (ref 5–15)

## 2021-10-21 ENCOUNTER — PATIENT MESSAGE (OUTPATIENT)
Dept: TRANSPLANT | Facility: CLINIC | Age: 46
End: 2021-10-21
Payer: MEDICAID

## 2021-10-21 ENCOUNTER — TELEPHONE (OUTPATIENT)
Dept: TRANSPLANT | Facility: CLINIC | Age: 46
End: 2021-10-21

## 2021-10-21 DIAGNOSIS — Z94.4 LIVER REPLACED BY TRANSPLANT: Primary | ICD-10-CM

## 2021-11-02 ENCOUNTER — TELEPHONE (OUTPATIENT)
Dept: TRANSPLANT | Facility: CLINIC | Age: 46
End: 2021-11-02
Payer: MEDICAID

## 2021-11-02 RX ORDER — PREDNISONE 10 MG/1
5 TABLET ORAL DAILY
Qty: 14 TABLET | Refills: 1 | Status: CANCELLED | OUTPATIENT
Start: 2021-11-02

## 2021-11-03 RX ORDER — PREDNISONE 10 MG/1
5 TABLET ORAL DAILY
Qty: 14 TABLET | Refills: 1 | Status: CANCELLED | OUTPATIENT
Start: 2021-11-02

## 2021-11-04 ENCOUNTER — PATIENT MESSAGE (OUTPATIENT)
Dept: TRANSPLANT | Facility: CLINIC | Age: 46
End: 2021-11-04
Payer: MEDICAID

## 2021-11-04 RX ORDER — PREDNISONE 5 MG/1
5 TABLET ORAL DAILY
Qty: 30 TABLET | Refills: 1 | Status: CANCELLED | OUTPATIENT
Start: 2021-11-04

## 2021-11-08 ENCOUNTER — LAB VISIT (OUTPATIENT)
Dept: LAB | Facility: HOSPITAL | Age: 46
End: 2021-11-08
Attending: INTERNAL MEDICINE
Payer: MEDICAID

## 2021-11-08 DIAGNOSIS — Z94.4 LIVER REPLACED BY TRANSPLANT: ICD-10-CM

## 2021-11-08 LAB
ALBUMIN SERPL BCP-MCNC: 3.8 G/DL (ref 3.5–5.2)
ALP SERPL-CCNC: 60 U/L (ref 55–135)
ALT SERPL W/O P-5'-P-CCNC: 24 U/L (ref 10–44)
ANION GAP SERPL CALC-SCNC: 7 MMOL/L (ref 8–16)
AST SERPL-CCNC: 34 U/L (ref 10–40)
BASOPHILS # BLD AUTO: 0.01 K/UL (ref 0–0.2)
BASOPHILS NFR BLD: 0.4 % (ref 0–1.9)
BILIRUB SERPL-MCNC: 0.8 MG/DL (ref 0.1–1)
BUN SERPL-MCNC: 20 MG/DL (ref 6–20)
CALCIUM SERPL-MCNC: 9.7 MG/DL (ref 8.7–10.5)
CHLORIDE SERPL-SCNC: 104 MMOL/L (ref 95–110)
CO2 SERPL-SCNC: 26 MMOL/L (ref 23–29)
CREAT SERPL-MCNC: 1.3 MG/DL (ref 0.5–1.4)
DIFFERENTIAL METHOD: ABNORMAL
EOSINOPHIL # BLD AUTO: 0 K/UL (ref 0–0.5)
EOSINOPHIL NFR BLD: 1.5 % (ref 0–8)
ERYTHROCYTE [DISTWIDTH] IN BLOOD BY AUTOMATED COUNT: 11.7 % (ref 11.5–14.5)
EST. GFR  (AFRICAN AMERICAN): >60 ML/MIN/1.73 M^2
EST. GFR  (NON AFRICAN AMERICAN): >60 ML/MIN/1.73 M^2
GLUCOSE SERPL-MCNC: 109 MG/DL (ref 70–110)
HCT VFR BLD AUTO: 40.3 % (ref 40–54)
HGB BLD-MCNC: 12.4 G/DL (ref 14–18)
IMM GRANULOCYTES # BLD AUTO: 0.03 K/UL (ref 0–0.04)
IMM GRANULOCYTES NFR BLD AUTO: 1.1 % (ref 0–0.5)
LYMPHOCYTES # BLD AUTO: 1.1 K/UL (ref 1–4.8)
LYMPHOCYTES NFR BLD: 41.1 % (ref 18–48)
MCH RBC QN AUTO: 29.1 PG (ref 27–31)
MCHC RBC AUTO-ENTMCNC: 30.8 G/DL (ref 32–36)
MCV RBC AUTO: 95 FL (ref 82–98)
MONOCYTES # BLD AUTO: 0.5 K/UL (ref 0.3–1)
MONOCYTES NFR BLD: 18.9 % (ref 4–15)
NEUTROPHILS # BLD AUTO: 1 K/UL (ref 1.8–7.7)
NEUTROPHILS NFR BLD: 37 % (ref 38–73)
NRBC BLD-RTO: 0 /100 WBC
PLATELET # BLD AUTO: 155 K/UL (ref 150–450)
PMV BLD AUTO: 10.3 FL (ref 9.2–12.9)
POTASSIUM SERPL-SCNC: 4.3 MMOL/L (ref 3.5–5.1)
PROT SERPL-MCNC: 6.3 G/DL (ref 6–8.4)
RBC # BLD AUTO: 4.26 M/UL (ref 4.6–6.2)
SODIUM SERPL-SCNC: 137 MMOL/L (ref 136–145)
WBC # BLD AUTO: 2.65 K/UL (ref 3.9–12.7)

## 2021-11-08 PROCEDURE — 85025 COMPLETE CBC W/AUTO DIFF WBC: CPT | Performed by: INTERNAL MEDICINE

## 2021-11-08 PROCEDURE — 36415 COLL VENOUS BLD VENIPUNCTURE: CPT | Mod: PO | Performed by: INTERNAL MEDICINE

## 2021-11-08 PROCEDURE — 80197 ASSAY OF TACROLIMUS: CPT | Performed by: INTERNAL MEDICINE

## 2021-11-08 PROCEDURE — 80053 COMPREHEN METABOLIC PANEL: CPT | Performed by: INTERNAL MEDICINE

## 2021-11-08 PROCEDURE — 80321 ALCOHOLS BIOMARKERS 1OR 2: CPT | Performed by: INTERNAL MEDICINE

## 2021-11-09 LAB — TACROLIMUS BLD-MCNC: 5.3 NG/ML (ref 5–15)

## 2021-11-11 ENCOUNTER — PATIENT MESSAGE (OUTPATIENT)
Dept: TRANSPLANT | Facility: CLINIC | Age: 46
End: 2021-11-11
Payer: MEDICAID

## 2021-11-11 DIAGNOSIS — Z94.4 LIVER TRANSPLANTED: ICD-10-CM

## 2021-11-11 RX ORDER — TACROLIMUS 1 MG/1
CAPSULE ORAL
Qty: 90 CAPSULE | Refills: 11 | Status: SHIPPED | OUTPATIENT
Start: 2021-11-11 | End: 2021-11-18

## 2021-11-13 LAB
PETH 16:0/18.1 (POPETH): 652 NG/ML
PETH 16:0/18.2 (PLPETH): 350 NG/ML

## 2021-11-15 ENCOUNTER — LAB VISIT (OUTPATIENT)
Dept: LAB | Facility: HOSPITAL | Age: 46
End: 2021-11-15
Attending: INTERNAL MEDICINE
Payer: MEDICAID

## 2021-11-15 ENCOUNTER — TELEPHONE (OUTPATIENT)
Dept: TRANSPLANT | Facility: CLINIC | Age: 46
End: 2021-11-15
Payer: MEDICAID

## 2021-11-15 DIAGNOSIS — Z94.4 LIVER REPLACED BY TRANSPLANT: ICD-10-CM

## 2021-11-15 LAB
ALBUMIN SERPL BCP-MCNC: 3.9 G/DL (ref 3.5–5.2)
ALP SERPL-CCNC: 66 U/L (ref 55–135)
ALT SERPL W/O P-5'-P-CCNC: 21 U/L (ref 10–44)
ANION GAP SERPL CALC-SCNC: 11 MMOL/L (ref 8–16)
AST SERPL-CCNC: 30 U/L (ref 10–40)
BASOPHILS # BLD AUTO: ABNORMAL K/UL (ref 0–0.2)
BASOPHILS NFR BLD: 0 % (ref 0–1.9)
BILIRUB SERPL-MCNC: 0.5 MG/DL (ref 0.1–1)
BUN SERPL-MCNC: 17 MG/DL (ref 6–20)
CALCIUM SERPL-MCNC: 9.7 MG/DL (ref 8.7–10.5)
CHLORIDE SERPL-SCNC: 104 MMOL/L (ref 95–110)
CO2 SERPL-SCNC: 25 MMOL/L (ref 23–29)
CREAT SERPL-MCNC: 1.2 MG/DL (ref 0.5–1.4)
DIFFERENTIAL METHOD: ABNORMAL
EOSINOPHIL # BLD AUTO: ABNORMAL K/UL (ref 0–0.5)
EOSINOPHIL NFR BLD: 2 % (ref 0–8)
ERYTHROCYTE [DISTWIDTH] IN BLOOD BY AUTOMATED COUNT: 11.9 % (ref 11.5–14.5)
EST. GFR  (AFRICAN AMERICAN): >60 ML/MIN/1.73 M^2
EST. GFR  (NON AFRICAN AMERICAN): >60 ML/MIN/1.73 M^2
GLUCOSE SERPL-MCNC: 115 MG/DL (ref 70–110)
HCT VFR BLD AUTO: 40 % (ref 40–54)
HGB BLD-MCNC: 12.4 G/DL (ref 14–18)
IMM GRANULOCYTES # BLD AUTO: ABNORMAL K/UL (ref 0–0.04)
IMM GRANULOCYTES NFR BLD AUTO: ABNORMAL % (ref 0–0.5)
LYMPHOCYTES # BLD AUTO: ABNORMAL K/UL (ref 1–4.8)
LYMPHOCYTES NFR BLD: 62 % (ref 18–48)
MCH RBC QN AUTO: 29.1 PG (ref 27–31)
MCHC RBC AUTO-ENTMCNC: 31 G/DL (ref 32–36)
MCV RBC AUTO: 94 FL (ref 82–98)
MONOCYTES # BLD AUTO: ABNORMAL K/UL (ref 0.3–1)
MONOCYTES NFR BLD: 17 % (ref 4–15)
NEUTROPHILS NFR BLD: 18 % (ref 38–73)
NEUTS BAND NFR BLD MANUAL: 1 %
NRBC BLD-RTO: 0 /100 WBC
PLATELET # BLD AUTO: 146 K/UL (ref 150–450)
PMV BLD AUTO: 9.9 FL (ref 9.2–12.9)
POTASSIUM SERPL-SCNC: 3.9 MMOL/L (ref 3.5–5.1)
PROT SERPL-MCNC: 6.3 G/DL (ref 6–8.4)
RBC # BLD AUTO: 4.26 M/UL (ref 4.6–6.2)
SODIUM SERPL-SCNC: 140 MMOL/L (ref 136–145)
WBC # BLD AUTO: 2.35 K/UL (ref 3.9–12.7)

## 2021-11-15 PROCEDURE — 36415 COLL VENOUS BLD VENIPUNCTURE: CPT | Mod: PO | Performed by: INTERNAL MEDICINE

## 2021-11-15 PROCEDURE — 85025 COMPLETE CBC W/AUTO DIFF WBC: CPT | Performed by: INTERNAL MEDICINE

## 2021-11-15 PROCEDURE — 80053 COMPREHEN METABOLIC PANEL: CPT | Performed by: INTERNAL MEDICINE

## 2021-11-15 PROCEDURE — 80197 ASSAY OF TACROLIMUS: CPT | Performed by: INTERNAL MEDICINE

## 2021-11-16 ENCOUNTER — TELEPHONE (OUTPATIENT)
Dept: TRANSPLANT | Facility: HOSPITAL | Age: 46
End: 2021-11-16
Payer: MEDICAID

## 2021-11-16 LAB — TACROLIMUS BLD-MCNC: 5.2 NG/ML (ref 5–15)

## 2021-11-17 ENCOUNTER — TELEPHONE (OUTPATIENT)
Dept: TRANSPLANT | Facility: CLINIC | Age: 46
End: 2021-11-17
Payer: MEDICAID

## 2021-11-18 ENCOUNTER — PATIENT MESSAGE (OUTPATIENT)
Dept: TRANSPLANT | Facility: CLINIC | Age: 46
End: 2021-11-18
Payer: MEDICAID

## 2021-11-18 DIAGNOSIS — Z94.4 LIVER TRANSPLANTED: ICD-10-CM

## 2021-11-18 DIAGNOSIS — Z94.4 LIVER REPLACED BY TRANSPLANT: Primary | ICD-10-CM

## 2021-11-21 RX ORDER — TACROLIMUS 1 MG/1
CAPSULE ORAL
Qty: 120 CAPSULE | Refills: 11 | Status: SHIPPED | OUTPATIENT
Start: 2021-11-21 | End: 2022-06-17 | Stop reason: DRUGHIGH

## 2021-11-22 ENCOUNTER — LAB VISIT (OUTPATIENT)
Dept: LAB | Facility: HOSPITAL | Age: 46
End: 2021-11-22
Attending: INTERNAL MEDICINE
Payer: MEDICAID

## 2021-11-22 DIAGNOSIS — Z94.4 LIVER REPLACED BY TRANSPLANT: ICD-10-CM

## 2021-11-22 LAB
ALBUMIN SERPL BCP-MCNC: 3.9 G/DL (ref 3.5–5.2)
ALP SERPL-CCNC: 60 U/L (ref 55–135)
ALT SERPL W/O P-5'-P-CCNC: 28 U/L (ref 10–44)
ANION GAP SERPL CALC-SCNC: 11 MMOL/L (ref 8–16)
AST SERPL-CCNC: 46 U/L (ref 10–40)
BASOPHILS # BLD AUTO: 0.02 K/UL (ref 0–0.2)
BASOPHILS NFR BLD: 0.7 % (ref 0–1.9)
BILIRUB SERPL-MCNC: 0.6 MG/DL (ref 0.1–1)
BUN SERPL-MCNC: 25 MG/DL (ref 6–20)
CALCIUM SERPL-MCNC: 9.5 MG/DL (ref 8.7–10.5)
CHLORIDE SERPL-SCNC: 108 MMOL/L (ref 95–110)
CO2 SERPL-SCNC: 22 MMOL/L (ref 23–29)
CREAT SERPL-MCNC: 1.4 MG/DL (ref 0.5–1.4)
DIFFERENTIAL METHOD: ABNORMAL
EOSINOPHIL # BLD AUTO: 0.1 K/UL (ref 0–0.5)
EOSINOPHIL NFR BLD: 2 % (ref 0–8)
ERYTHROCYTE [DISTWIDTH] IN BLOOD BY AUTOMATED COUNT: 12 % (ref 11.5–14.5)
EST. GFR  (AFRICAN AMERICAN): >60 ML/MIN/1.73 M^2
EST. GFR  (NON AFRICAN AMERICAN): 59 ML/MIN/1.73 M^2
GLUCOSE SERPL-MCNC: 105 MG/DL (ref 70–110)
HCT VFR BLD AUTO: 44.1 % (ref 40–54)
HGB BLD-MCNC: 13.7 G/DL (ref 14–18)
IMM GRANULOCYTES # BLD AUTO: 0.02 K/UL (ref 0–0.04)
IMM GRANULOCYTES NFR BLD AUTO: 0.7 % (ref 0–0.5)
LYMPHOCYTES # BLD AUTO: 1.8 K/UL (ref 1–4.8)
LYMPHOCYTES NFR BLD: 60.3 % (ref 18–48)
MCH RBC QN AUTO: 29.7 PG (ref 27–31)
MCHC RBC AUTO-ENTMCNC: 31.1 G/DL (ref 32–36)
MCV RBC AUTO: 96 FL (ref 82–98)
MONOCYTES # BLD AUTO: 0.5 K/UL (ref 0.3–1)
MONOCYTES NFR BLD: 17.2 % (ref 4–15)
NEUTROPHILS # BLD AUTO: 0.6 K/UL (ref 1.8–7.7)
NEUTROPHILS NFR BLD: 19.1 % (ref 38–73)
NRBC BLD-RTO: 0 /100 WBC
PLATELET # BLD AUTO: 140 K/UL (ref 150–450)
PLATELET BLD QL SMEAR: ABNORMAL
PMV BLD AUTO: 10.5 FL (ref 9.2–12.9)
POTASSIUM SERPL-SCNC: 4.3 MMOL/L (ref 3.5–5.1)
PROT SERPL-MCNC: 6.4 G/DL (ref 6–8.4)
RBC # BLD AUTO: 4.62 M/UL (ref 4.6–6.2)
SODIUM SERPL-SCNC: 141 MMOL/L (ref 136–145)
WBC # BLD AUTO: 2.97 K/UL (ref 3.9–12.7)

## 2021-11-22 PROCEDURE — 36415 COLL VENOUS BLD VENIPUNCTURE: CPT | Mod: PO | Performed by: INTERNAL MEDICINE

## 2021-11-22 PROCEDURE — 85025 COMPLETE CBC W/AUTO DIFF WBC: CPT | Performed by: INTERNAL MEDICINE

## 2021-11-22 PROCEDURE — 80197 ASSAY OF TACROLIMUS: CPT | Performed by: INTERNAL MEDICINE

## 2021-11-22 PROCEDURE — 80053 COMPREHEN METABOLIC PANEL: CPT | Performed by: INTERNAL MEDICINE

## 2021-11-23 LAB — TACROLIMUS BLD-MCNC: 9.2 NG/ML (ref 5–15)

## 2021-11-24 ENCOUNTER — TELEPHONE (OUTPATIENT)
Dept: TRANSPLANT | Facility: CLINIC | Age: 46
End: 2021-11-24
Payer: MEDICAID

## 2021-11-24 ENCOUNTER — PATIENT MESSAGE (OUTPATIENT)
Dept: TRANSPLANT | Facility: CLINIC | Age: 46
End: 2021-11-24
Payer: MEDICAID

## 2021-11-30 DIAGNOSIS — I15.8 HYPERTENSION ASSOCIATED WITH TRANSPLANTATION: ICD-10-CM

## 2021-11-30 DIAGNOSIS — Z94.9 HYPERTENSION ASSOCIATED WITH TRANSPLANTATION: ICD-10-CM

## 2021-11-30 RX ORDER — APIXABAN 5 MG/1
TABLET, FILM COATED ORAL
Qty: 60 TABLET | Refills: 5 | Status: CANCELLED | OUTPATIENT
Start: 2021-11-30

## 2021-11-30 RX ORDER — AMLODIPINE BESYLATE 10 MG/1
10 TABLET ORAL DAILY
Qty: 30 TABLET | Refills: 11 | Status: CANCELLED | OUTPATIENT
Start: 2021-11-30 | End: 2022-11-30

## 2021-12-07 ENCOUNTER — TELEPHONE (OUTPATIENT)
Dept: TRANSPLANT | Facility: CLINIC | Age: 46
End: 2021-12-07
Payer: MEDICAID

## 2021-12-08 DIAGNOSIS — I15.8 HYPERTENSION ASSOCIATED WITH TRANSPLANTATION: ICD-10-CM

## 2021-12-08 DIAGNOSIS — Z94.9 HYPERTENSION ASSOCIATED WITH TRANSPLANTATION: ICD-10-CM

## 2021-12-08 RX ORDER — AMLODIPINE BESYLATE 10 MG/1
10 TABLET ORAL DAILY
Qty: 30 TABLET | Refills: 11 | OUTPATIENT
Start: 2021-12-08 | End: 2022-12-08

## 2021-12-09 ENCOUNTER — LAB VISIT (OUTPATIENT)
Dept: LAB | Facility: HOSPITAL | Age: 46
End: 2021-12-09
Attending: INTERNAL MEDICINE
Payer: MEDICAID

## 2021-12-09 DIAGNOSIS — I15.8 HYPERTENSION ASSOCIATED WITH TRANSPLANTATION: ICD-10-CM

## 2021-12-09 DIAGNOSIS — Z94.4 LIVER REPLACED BY TRANSPLANT: ICD-10-CM

## 2021-12-09 DIAGNOSIS — Z94.9 HYPERTENSION ASSOCIATED WITH TRANSPLANTATION: ICD-10-CM

## 2021-12-09 LAB
ALBUMIN SERPL BCP-MCNC: 4 G/DL (ref 3.5–5.2)
ALP SERPL-CCNC: 70 U/L (ref 55–135)
ALT SERPL W/O P-5'-P-CCNC: 40 U/L (ref 10–44)
ANION GAP SERPL CALC-SCNC: 12 MMOL/L (ref 8–16)
AST SERPL-CCNC: 83 U/L (ref 10–40)
BASOPHILS # BLD AUTO: 0.02 K/UL (ref 0–0.2)
BASOPHILS NFR BLD: 0.6 % (ref 0–1.9)
BILIRUB SERPL-MCNC: 0.7 MG/DL (ref 0.1–1)
BUN SERPL-MCNC: 19 MG/DL (ref 6–20)
CALCIUM SERPL-MCNC: 9.8 MG/DL (ref 8.7–10.5)
CHLORIDE SERPL-SCNC: 104 MMOL/L (ref 95–110)
CO2 SERPL-SCNC: 22 MMOL/L (ref 23–29)
CREAT SERPL-MCNC: 1.2 MG/DL (ref 0.5–1.4)
DIFFERENTIAL METHOD: ABNORMAL
EOSINOPHIL # BLD AUTO: 0 K/UL (ref 0–0.5)
EOSINOPHIL NFR BLD: 1.2 % (ref 0–8)
ERYTHROCYTE [DISTWIDTH] IN BLOOD BY AUTOMATED COUNT: 12 % (ref 11.5–14.5)
EST. GFR  (AFRICAN AMERICAN): >60 ML/MIN/1.73 M^2
EST. GFR  (NON AFRICAN AMERICAN): >60 ML/MIN/1.73 M^2
GLUCOSE SERPL-MCNC: 106 MG/DL (ref 70–110)
HCT VFR BLD AUTO: 42.3 % (ref 40–54)
HGB BLD-MCNC: 13.3 G/DL (ref 14–18)
IMM GRANULOCYTES # BLD AUTO: 0.01 K/UL (ref 0–0.04)
IMM GRANULOCYTES NFR BLD AUTO: 0.3 % (ref 0–0.5)
LYMPHOCYTES # BLD AUTO: 1.4 K/UL (ref 1–4.8)
LYMPHOCYTES NFR BLD: 39.8 % (ref 18–48)
MCH RBC QN AUTO: 29.1 PG (ref 27–31)
MCHC RBC AUTO-ENTMCNC: 31.4 G/DL (ref 32–36)
MCV RBC AUTO: 93 FL (ref 82–98)
MONOCYTES # BLD AUTO: 0.5 K/UL (ref 0.3–1)
MONOCYTES NFR BLD: 15.6 % (ref 4–15)
NEUTROPHILS # BLD AUTO: 1.5 K/UL (ref 1.8–7.7)
NEUTROPHILS NFR BLD: 42.5 % (ref 38–73)
NRBC BLD-RTO: 0 /100 WBC
PLATELET # BLD AUTO: 139 K/UL (ref 150–450)
PMV BLD AUTO: 10.3 FL (ref 9.2–12.9)
POTASSIUM SERPL-SCNC: 4.3 MMOL/L (ref 3.5–5.1)
PROT SERPL-MCNC: 6.6 G/DL (ref 6–8.4)
RBC # BLD AUTO: 4.57 M/UL (ref 4.6–6.2)
SODIUM SERPL-SCNC: 138 MMOL/L (ref 136–145)
WBC # BLD AUTO: 3.47 K/UL (ref 3.9–12.7)

## 2021-12-09 PROCEDURE — 80053 COMPREHEN METABOLIC PANEL: CPT | Performed by: INTERNAL MEDICINE

## 2021-12-09 PROCEDURE — 36415 COLL VENOUS BLD VENIPUNCTURE: CPT | Mod: PO | Performed by: INTERNAL MEDICINE

## 2021-12-09 PROCEDURE — 80197 ASSAY OF TACROLIMUS: CPT | Performed by: INTERNAL MEDICINE

## 2021-12-09 PROCEDURE — 85025 COMPLETE CBC W/AUTO DIFF WBC: CPT | Performed by: INTERNAL MEDICINE

## 2021-12-10 LAB — TACROLIMUS BLD-MCNC: 7.7 NG/ML (ref 5–15)

## 2021-12-10 RX ORDER — AMLODIPINE BESYLATE 10 MG/1
10 TABLET ORAL DAILY
Qty: 30 TABLET | Refills: 11 | OUTPATIENT
Start: 2021-12-10 | End: 2022-12-10

## 2021-12-15 ENCOUNTER — TELEPHONE (OUTPATIENT)
Dept: TRANSPLANT | Facility: CLINIC | Age: 46
End: 2021-12-15
Payer: MEDICAID

## 2021-12-15 ENCOUNTER — PATIENT MESSAGE (OUTPATIENT)
Dept: TRANSPLANT | Facility: CLINIC | Age: 46
End: 2021-12-15
Payer: MEDICAID

## 2021-12-27 ENCOUNTER — TELEPHONE (OUTPATIENT)
Dept: TRANSPLANT | Facility: CLINIC | Age: 46
End: 2021-12-27
Payer: MEDICAID

## 2021-12-29 ENCOUNTER — TELEPHONE (OUTPATIENT)
Dept: TRANSPLANT | Facility: CLINIC | Age: 46
End: 2021-12-29
Payer: MEDICAID

## 2021-12-30 ENCOUNTER — LAB VISIT (OUTPATIENT)
Dept: LAB | Facility: HOSPITAL | Age: 46
End: 2021-12-30
Attending: INTERNAL MEDICINE
Payer: MEDICAID

## 2021-12-30 DIAGNOSIS — Z94.4 LIVER REPLACED BY TRANSPLANT: ICD-10-CM

## 2021-12-30 LAB
ALBUMIN SERPL BCP-MCNC: 4 G/DL (ref 3.5–5.2)
ALP SERPL-CCNC: 86 U/L (ref 55–135)
ALT SERPL W/O P-5'-P-CCNC: 37 U/L (ref 10–44)
ANION GAP SERPL CALC-SCNC: 12 MMOL/L (ref 8–16)
AST SERPL-CCNC: 60 U/L (ref 10–40)
BASOPHILS # BLD AUTO: 0.01 K/UL (ref 0–0.2)
BASOPHILS NFR BLD: 0.4 % (ref 0–1.9)
BILIRUB SERPL-MCNC: 0.7 MG/DL (ref 0.1–1)
BUN SERPL-MCNC: 21 MG/DL (ref 6–20)
CALCIUM SERPL-MCNC: 9.8 MG/DL (ref 8.7–10.5)
CHLORIDE SERPL-SCNC: 106 MMOL/L (ref 95–110)
CO2 SERPL-SCNC: 24 MMOL/L (ref 23–29)
CREAT SERPL-MCNC: 1.1 MG/DL (ref 0.5–1.4)
DIFFERENTIAL METHOD: ABNORMAL
EOSINOPHIL # BLD AUTO: 0 K/UL (ref 0–0.5)
EOSINOPHIL NFR BLD: 0.4 % (ref 0–8)
ERYTHROCYTE [DISTWIDTH] IN BLOOD BY AUTOMATED COUNT: 12.4 % (ref 11.5–14.5)
EST. GFR  (AFRICAN AMERICAN): >60 ML/MIN/1.73 M^2
EST. GFR  (NON AFRICAN AMERICAN): >60 ML/MIN/1.73 M^2
GLUCOSE SERPL-MCNC: 131 MG/DL (ref 70–110)
HCT VFR BLD AUTO: 41.4 % (ref 40–54)
HGB BLD-MCNC: 12.6 G/DL (ref 14–18)
IMM GRANULOCYTES # BLD AUTO: 0.02 K/UL (ref 0–0.04)
IMM GRANULOCYTES NFR BLD AUTO: 0.8 % (ref 0–0.5)
LYMPHOCYTES # BLD AUTO: 0.9 K/UL (ref 1–4.8)
LYMPHOCYTES NFR BLD: 38.1 % (ref 18–48)
MCH RBC QN AUTO: 28.4 PG (ref 27–31)
MCHC RBC AUTO-ENTMCNC: 30.4 G/DL (ref 32–36)
MCV RBC AUTO: 94 FL (ref 82–98)
MONOCYTES # BLD AUTO: 0.4 K/UL (ref 0.3–1)
MONOCYTES NFR BLD: 16.7 % (ref 4–15)
NEUTROPHILS # BLD AUTO: 1 K/UL (ref 1.8–7.7)
NEUTROPHILS NFR BLD: 43.6 % (ref 38–73)
NRBC BLD-RTO: 0 /100 WBC
PLATELET # BLD AUTO: 123 K/UL (ref 150–450)
PMV BLD AUTO: 9.7 FL (ref 9.2–12.9)
POTASSIUM SERPL-SCNC: 5.2 MMOL/L (ref 3.5–5.1)
PROT SERPL-MCNC: 6.7 G/DL (ref 6–8.4)
RBC # BLD AUTO: 4.43 M/UL (ref 4.6–6.2)
SODIUM SERPL-SCNC: 142 MMOL/L (ref 136–145)
WBC # BLD AUTO: 2.39 K/UL (ref 3.9–12.7)

## 2021-12-30 PROCEDURE — 85025 COMPLETE CBC W/AUTO DIFF WBC: CPT | Performed by: INTERNAL MEDICINE

## 2021-12-30 PROCEDURE — 80197 ASSAY OF TACROLIMUS: CPT | Performed by: INTERNAL MEDICINE

## 2021-12-30 PROCEDURE — 80053 COMPREHEN METABOLIC PANEL: CPT | Performed by: INTERNAL MEDICINE

## 2021-12-30 PROCEDURE — 36415 COLL VENOUS BLD VENIPUNCTURE: CPT | Mod: PO | Performed by: INTERNAL MEDICINE

## 2021-12-31 LAB — TACROLIMUS BLD-MCNC: 9.2 NG/ML (ref 5–15)

## 2022-01-03 ENCOUNTER — PATIENT MESSAGE (OUTPATIENT)
Dept: TRANSPLANT | Facility: CLINIC | Age: 47
End: 2022-01-03
Payer: MEDICAID

## 2022-01-03 ENCOUNTER — TELEPHONE (OUTPATIENT)
Dept: TRANSPLANT | Facility: CLINIC | Age: 47
End: 2022-01-03
Payer: MEDICAID

## 2022-01-07 ENCOUNTER — TELEPHONE (OUTPATIENT)
Dept: TRANSPLANT | Facility: CLINIC | Age: 47
End: 2022-01-07
Payer: MEDICAID

## 2022-01-07 ENCOUNTER — PATIENT MESSAGE (OUTPATIENT)
Dept: TRANSPLANT | Facility: CLINIC | Age: 47
End: 2022-01-07
Payer: MEDICAID

## 2022-01-07 NOTE — TELEPHONE ENCOUNTER
Stable labs, no medication changes.  Next labs 2/7/22,[employer] message sent.  ----- Message from Theresa Titus MD sent at 1/3/2022  1:38 PM CST -----  Reviewed, nothing to do; repeat per routine

## 2022-01-12 DIAGNOSIS — Z94.4 LIVER REPLACED BY TRANSPLANT: Primary | ICD-10-CM

## 2022-01-12 DIAGNOSIS — R79.89 ELEVATED LFTS: ICD-10-CM

## 2022-01-14 ENCOUNTER — PATIENT MESSAGE (OUTPATIENT)
Dept: HEPATOLOGY | Facility: CLINIC | Age: 47
End: 2022-01-14
Payer: MEDICAID

## 2022-02-02 ENCOUNTER — OFFICE VISIT (OUTPATIENT)
Dept: HEPATOLOGY | Facility: CLINIC | Age: 47
End: 2022-02-02
Payer: MEDICAID

## 2022-02-02 VITALS
DIASTOLIC BLOOD PRESSURE: 72 MMHG | HEIGHT: 72 IN | SYSTOLIC BLOOD PRESSURE: 126 MMHG | HEART RATE: 82 BPM | BODY MASS INDEX: 36.4 KG/M2 | WEIGHT: 268.75 LBS

## 2022-02-02 DIAGNOSIS — D84.9 IMMUNOSUPPRESSION: ICD-10-CM

## 2022-02-02 DIAGNOSIS — Z94.4 LIVER TRANSPLANTED: ICD-10-CM

## 2022-02-02 DIAGNOSIS — R79.89 ABNORMAL LFTS: Primary | ICD-10-CM

## 2022-02-02 DIAGNOSIS — I77.1 HEPATIC ARTERY STENOSIS: ICD-10-CM

## 2022-02-02 DIAGNOSIS — E66.9 OBESITY (BMI 35.0-39.9 WITHOUT COMORBIDITY): ICD-10-CM

## 2022-02-02 PROCEDURE — 99999 PR PBB SHADOW E&M-EST. PATIENT-LVL V: ICD-10-PCS | Mod: PBBFAC,,, | Performed by: INTERNAL MEDICINE

## 2022-02-02 PROCEDURE — 3078F DIAST BP <80 MM HG: CPT | Mod: CPTII,,, | Performed by: INTERNAL MEDICINE

## 2022-02-02 PROCEDURE — 1160F RVW MEDS BY RX/DR IN RCRD: CPT | Mod: CPTII,,, | Performed by: INTERNAL MEDICINE

## 2022-02-02 PROCEDURE — 3008F PR BODY MASS INDEX (BMI) DOCUMENTED: ICD-10-PCS | Mod: CPTII,,, | Performed by: INTERNAL MEDICINE

## 2022-02-02 PROCEDURE — 4010F ACE/ARB THERAPY RXD/TAKEN: CPT | Mod: CPTII,,, | Performed by: INTERNAL MEDICINE

## 2022-02-02 PROCEDURE — 99215 OFFICE O/P EST HI 40 MIN: CPT | Mod: PBBFAC | Performed by: INTERNAL MEDICINE

## 2022-02-02 PROCEDURE — 1159F MED LIST DOCD IN RCRD: CPT | Mod: CPTII,,, | Performed by: INTERNAL MEDICINE

## 2022-02-02 PROCEDURE — 4010F PR ACE/ARB THEARPY RXD/TAKEN: ICD-10-PCS | Mod: CPTII,,, | Performed by: INTERNAL MEDICINE

## 2022-02-02 PROCEDURE — 3074F PR MOST RECENT SYSTOLIC BLOOD PRESSURE < 130 MM HG: ICD-10-PCS | Mod: CPTII,,, | Performed by: INTERNAL MEDICINE

## 2022-02-02 PROCEDURE — 3078F PR MOST RECENT DIASTOLIC BLOOD PRESSURE < 80 MM HG: ICD-10-PCS | Mod: CPTII,,, | Performed by: INTERNAL MEDICINE

## 2022-02-02 PROCEDURE — 99213 PR OFFICE/OUTPT VISIT, EST, LEVL III, 20-29 MIN: ICD-10-PCS | Mod: S$PBB,,, | Performed by: INTERNAL MEDICINE

## 2022-02-02 PROCEDURE — 3074F SYST BP LT 130 MM HG: CPT | Mod: CPTII,,, | Performed by: INTERNAL MEDICINE

## 2022-02-02 PROCEDURE — 1160F PR REVIEW ALL MEDS BY PRESCRIBER/CLIN PHARMACIST DOCUMENTED: ICD-10-PCS | Mod: CPTII,,, | Performed by: INTERNAL MEDICINE

## 2022-02-02 PROCEDURE — 3008F BODY MASS INDEX DOCD: CPT | Mod: CPTII,,, | Performed by: INTERNAL MEDICINE

## 2022-02-02 PROCEDURE — 99999 PR PBB SHADOW E&M-EST. PATIENT-LVL V: CPT | Mod: PBBFAC,,, | Performed by: INTERNAL MEDICINE

## 2022-02-02 PROCEDURE — 1159F PR MEDICATION LIST DOCUMENTED IN MEDICAL RECORD: ICD-10-PCS | Mod: CPTII,,, | Performed by: INTERNAL MEDICINE

## 2022-02-02 PROCEDURE — 99213 OFFICE O/P EST LOW 20 MIN: CPT | Mod: S$PBB,,, | Performed by: INTERNAL MEDICINE

## 2022-02-02 RX ORDER — TESTOSTERONE CYPIONATE 200 MG/ML
INJECTION, SOLUTION INTRAMUSCULAR
COMMUNITY
Start: 2021-11-10 | End: 2022-08-10

## 2022-02-02 RX ORDER — TIZANIDINE 4 MG/1
4 TABLET ORAL 2 TIMES DAILY
COMMUNITY
Start: 2021-11-01

## 2022-02-02 RX ORDER — LOSARTAN POTASSIUM 25 MG/1
25 TABLET ORAL DAILY
COMMUNITY
Start: 2021-12-13

## 2022-02-02 RX ORDER — MYCOPHENOLATE MOFETIL 250 MG/1
500 CAPSULE ORAL 2 TIMES DAILY
Qty: 120 CAPSULE | Refills: 11
Start: 2022-02-02 | End: 2023-01-01 | Stop reason: SDUPTHER

## 2022-02-02 NOTE — PROGRESS NOTES
Transplant Hepatology  Liver Transplant Recipient Follow-up    Transplant Date: 10/14/2020  UNOS Native Liver Dx: Alcoholic Cirrhosis    Jordan is here for follow up of his liver transplant.    ORGAN: LIVER  Whole or Partial: whole liver  Donor Type: donation after brain death  CDC High Risk: yes  Donor CMV Status: Positive  Donor HCV Status: Positive  Donor HBcAb: Negative  Biliary Anastomosis: end to end  Arterial Anatomy: standard  IVC reconstruction: end to end ivc  Portal vein status: patent  .    Subjective:     Interval History:  Currently, he is doing with difficulty. Current complaints include arthritis.  He did have a significantly elevated peth test.  He reports he has been working with a psychiatrist on his mental health and anxiety.  He he reports losing his girlfriend, ongoing another family member over the previous few months and that precipitated his increased alcohol use.  He is now working on his abstinence.  He reports compliance with medications.    Review of Systems    Objective:     Physical Exam  Vitals reviewed.   Constitutional:       General: He is not in acute distress.     Appearance: He is well-developed and well-nourished.   HENT:      Head: Normocephalic and atraumatic.      Mouth/Throat:      Mouth: Oropharynx is clear and moist.      Pharynx: No oropharyngeal exudate.   Eyes:      General: No scleral icterus.        Right eye: No discharge.         Left eye: No discharge.      Conjunctiva/sclera: Conjunctivae normal.      Pupils: Pupils are equal, round, and reactive to light.   Pulmonary:      Effort: Pulmonary effort is normal. No respiratory distress.      Breath sounds: Normal breath sounds. No wheezing.   Abdominal:      General: There is no distension.      Palpations: Abdomen is soft.      Tenderness: There is no abdominal tenderness.   Musculoskeletal:         General: No edema.   Neurological:      Mental Status: He is alert and oriented to person, place, and time.    Psychiatric:         Mood and Affect: Mood and affect normal.         Behavior: Behavior normal.         WBC   Date Value Ref Range Status   12/30/2021 2.39 (L) 3.90 - 12.70 K/uL Final     Hemoglobin   Date Value Ref Range Status   12/30/2021 12.6 (L) 14.0 - 18.0 g/dL Final     POC Hematocrit   Date Value Ref Range Status   10/16/2020 23 (L) 36 - 54 %PCV Final     Hematocrit   Date Value Ref Range Status   12/30/2021 41.4 40.0 - 54.0 % Final     Platelets   Date Value Ref Range Status   12/30/2021 123 (L) 150 - 450 K/uL Final     BUN   Date Value Ref Range Status   12/30/2021 21 (H) 6 - 20 mg/dL Final     Creatinine   Date Value Ref Range Status   12/30/2021 1.1 0.5 - 1.4 mg/dL Final     Glucose   Date Value Ref Range Status   12/30/2021 131 (H) 70 - 110 mg/dL Final     Calcium   Date Value Ref Range Status   12/30/2021 9.8 8.7 - 10.5 mg/dL Final     Sodium   Date Value Ref Range Status   12/30/2021 142 136 - 145 mmol/L Final     Potassium   Date Value Ref Range Status   12/30/2021 5.2 (H) 3.5 - 5.1 mmol/L Final     Chloride   Date Value Ref Range Status   12/30/2021 106 95 - 110 mmol/L Final     Magnesium   Date Value Ref Range Status   01/29/2021 1.9 1.6 - 2.6 mg/dL Final     AST   Date Value Ref Range Status   12/30/2021 60 (H) 10 - 40 U/L Final     ALT   Date Value Ref Range Status   12/30/2021 37 10 - 44 U/L Final     Alkaline Phosphatase   Date Value Ref Range Status   12/30/2021 86 55 - 135 U/L Final     Total Bilirubin   Date Value Ref Range Status   12/30/2021 0.7 0.1 - 1.0 mg/dL Final     Comment:     For infants and newborns, interpretation of results should be based  on gestational age, weight and in agreement with clinical  observations.    Premature Infant recommended reference ranges:  Up to 24 hours.............<8.0 mg/dL  Up to 48 hours............<12.0 mg/dL  3-5 days..................<15.0 mg/dL  6-29 days.................<15.0 mg/dL       Albumin   Date Value Ref Range Status   12/30/2021 4.0  3.5 - 5.2 g/dL Final     INR   Date Value Ref Range Status   01/28/2021 1.0 0.8 - 1.2 Final     Comment:     Coumadin Therapy:  2.0 - 3.0 for INR for all indicators except mechanical heart valves  and antiphospholipid syndromes which should use 2.5 - 3.5.       Lab Results   Component Value Date    TACROLIMUS 9.2 12/30/2021           Assessment/Plan:     1. Abnormal LFTs    2. Immunosuppression    3. Liver transplanted    4. Obesity (BMI 35.0-39.9 without comorbidity)    5. Hepatic artery stenosis      Abnormal LFTs-slight increase in LFTs mostly AST suspect this is related to alcohol use.  This seems to be improving.  Will continue to monitor.  -encourage alcohol abstinence  -check CK with next lab    Immunosuppression-patient is on significant immunosuppression given that he had a rejection in the past but low concern at this time and patient is over a year out from transplant.  -decrease CellCept to 500 mg twice a day; patient reports is already scheduled for repeat labs next week  -     mycophenolate (CELLCEPT) 250 mg Cap; Take 2 capsules (500 mg total) by mouth 2 (two) times daily.  Dispense: 120 capsule; Refill: 11    Liver transplanted-good allograft function    Obesity (BMI 35.0-39.9 without comorbidity)-uncontrolled  -discussed the importance of weight loss through diet and exercise    Hepatic artery stenosis along with history of portal vein thrombus that both required surgical intervention after transplant therefore recommend continuing with anticoagulation.    Return to clinic in 6 months    Patient was seen in the liver transplant department at [facility].      Theresa Titus MD           [facility] Patient Status  Functional Status: 80% - Normal activity with effort: some symptoms of disease  Physical Capacity: No Limitations

## 2022-02-14 DIAGNOSIS — Z94.4 LIVER REPLACED BY TRANSPLANT: Primary | ICD-10-CM

## 2022-02-16 ENCOUNTER — LAB VISIT (OUTPATIENT)
Dept: LAB | Facility: HOSPITAL | Age: 47
End: 2022-02-16
Attending: INTERNAL MEDICINE
Payer: MEDICAID

## 2022-02-16 DIAGNOSIS — R79.89 ELEVATED LFTS: ICD-10-CM

## 2022-02-16 DIAGNOSIS — Z94.4 LIVER REPLACED BY TRANSPLANT: ICD-10-CM

## 2022-02-16 LAB
ALBUMIN SERPL BCP-MCNC: 3.9 G/DL (ref 3.5–5.2)
ALP SERPL-CCNC: 97 U/L (ref 55–135)
ALT SERPL W/O P-5'-P-CCNC: 39 U/L (ref 10–44)
ANION GAP SERPL CALC-SCNC: 10 MMOL/L (ref 8–16)
AST SERPL-CCNC: 57 U/L (ref 10–40)
BASOPHILS # BLD AUTO: 0.01 K/UL (ref 0–0.2)
BASOPHILS NFR BLD: 0.3 % (ref 0–1.9)
BILIRUB SERPL-MCNC: 0.7 MG/DL (ref 0.1–1)
BUN SERPL-MCNC: 17 MG/DL (ref 6–20)
CALCIUM SERPL-MCNC: 9.8 MG/DL (ref 8.7–10.5)
CHLORIDE SERPL-SCNC: 107 MMOL/L (ref 95–110)
CK SERPL-CCNC: 106 U/L (ref 20–200)
CO2 SERPL-SCNC: 24 MMOL/L (ref 23–29)
CREAT SERPL-MCNC: 1 MG/DL (ref 0.5–1.4)
DIFFERENTIAL METHOD: ABNORMAL
EOSINOPHIL # BLD AUTO: 0 K/UL (ref 0–0.5)
EOSINOPHIL NFR BLD: 0.9 % (ref 0–8)
ERYTHROCYTE [DISTWIDTH] IN BLOOD BY AUTOMATED COUNT: 12.1 % (ref 11.5–14.5)
EST. GFR  (AFRICAN AMERICAN): >60 ML/MIN/1.73 M^2
EST. GFR  (NON AFRICAN AMERICAN): >60 ML/MIN/1.73 M^2
GLUCOSE SERPL-MCNC: 113 MG/DL (ref 70–110)
HCT VFR BLD AUTO: 41.6 % (ref 40–54)
HGB BLD-MCNC: 12.8 G/DL (ref 14–18)
IMM GRANULOCYTES # BLD AUTO: 0.01 K/UL (ref 0–0.04)
IMM GRANULOCYTES NFR BLD AUTO: 0.3 % (ref 0–0.5)
LYMPHOCYTES # BLD AUTO: 1.3 K/UL (ref 1–4.8)
LYMPHOCYTES NFR BLD: 40.2 % (ref 18–48)
MCH RBC QN AUTO: 29.4 PG (ref 27–31)
MCHC RBC AUTO-ENTMCNC: 30.8 G/DL (ref 32–36)
MCV RBC AUTO: 95 FL (ref 82–98)
MONOCYTES # BLD AUTO: 0.5 K/UL (ref 0.3–1)
MONOCYTES NFR BLD: 16.8 % (ref 4–15)
NEUTROPHILS # BLD AUTO: 1.3 K/UL (ref 1.8–7.7)
NEUTROPHILS NFR BLD: 41.5 % (ref 38–73)
NRBC BLD-RTO: 0 /100 WBC
PLATELET # BLD AUTO: 152 K/UL (ref 150–450)
PMV BLD AUTO: 10.2 FL (ref 9.2–12.9)
POTASSIUM SERPL-SCNC: 4.8 MMOL/L (ref 3.5–5.1)
PROT SERPL-MCNC: 6.5 G/DL (ref 6–8.4)
RBC # BLD AUTO: 4.36 M/UL (ref 4.6–6.2)
SODIUM SERPL-SCNC: 141 MMOL/L (ref 136–145)
WBC # BLD AUTO: 3.16 K/UL (ref 3.9–12.7)

## 2022-02-16 PROCEDURE — 80053 COMPREHEN METABOLIC PANEL: CPT | Performed by: INTERNAL MEDICINE

## 2022-02-16 PROCEDURE — 85025 COMPLETE CBC W/AUTO DIFF WBC: CPT | Performed by: INTERNAL MEDICINE

## 2022-02-16 PROCEDURE — 82550 ASSAY OF CK (CPK): CPT | Performed by: INTERNAL MEDICINE

## 2022-02-16 PROCEDURE — 80197 ASSAY OF TACROLIMUS: CPT | Performed by: INTERNAL MEDICINE

## 2022-02-16 PROCEDURE — 80321 ALCOHOLS BIOMARKERS 1OR 2: CPT | Performed by: INTERNAL MEDICINE

## 2022-02-17 ENCOUNTER — PATIENT MESSAGE (OUTPATIENT)
Dept: TRANSPLANT | Facility: CLINIC | Age: 47
End: 2022-02-17
Payer: MEDICAID

## 2022-02-17 LAB — TACROLIMUS BLD-MCNC: 8.2 NG/ML (ref 5–15)

## 2022-02-18 ENCOUNTER — TELEPHONE (OUTPATIENT)
Dept: TRANSPLANT | Facility: CLINIC | Age: 47
End: 2022-02-18
Payer: MEDICAID

## 2022-02-18 ENCOUNTER — PATIENT MESSAGE (OUTPATIENT)
Dept: TRANSPLANT | Facility: CLINIC | Age: 47
End: 2022-02-18
Payer: MEDICAID

## 2022-02-18 NOTE — TELEPHONE ENCOUNTER
Stable labs, no medication changes.  Next labs due week of 3/14/22.  [employer] message sent requesting lab date.  ----- Message from Theresa Titus MD sent at 2/17/2022 11:54 AM CST -----  Labs continue to improve, repeat in 4 weeks

## 2022-02-20 LAB
PETH 16:0/18.1 (POPETH): 1103 NG/ML
PETH 16:0/18.2 (PLPETH): 626 NG/ML

## 2022-02-24 DIAGNOSIS — Z94.4 LIVER REPLACED BY TRANSPLANT: Primary | ICD-10-CM

## 2022-03-04 ENCOUNTER — PATIENT MESSAGE (OUTPATIENT)
Dept: TRANSPLANT | Facility: CLINIC | Age: 47
End: 2022-03-04
Payer: MEDICAID

## 2022-03-14 ENCOUNTER — PATIENT MESSAGE (OUTPATIENT)
Dept: TRANSPLANT | Facility: CLINIC | Age: 47
End: 2022-03-14
Payer: MEDICAID

## 2022-03-14 ENCOUNTER — LAB VISIT (OUTPATIENT)
Dept: LAB | Facility: HOSPITAL | Age: 47
End: 2022-03-14
Attending: INTERNAL MEDICINE
Payer: MEDICAID

## 2022-03-14 DIAGNOSIS — Z94.4 LIVER REPLACED BY TRANSPLANT: ICD-10-CM

## 2022-03-14 LAB
ALBUMIN SERPL BCP-MCNC: 3.7 G/DL (ref 3.5–5.2)
ALP SERPL-CCNC: 77 U/L (ref 55–135)
ALT SERPL W/O P-5'-P-CCNC: 32 U/L (ref 10–44)
ANION GAP SERPL CALC-SCNC: 11 MMOL/L (ref 8–16)
AST SERPL-CCNC: 52 U/L (ref 10–40)
BASOPHILS # BLD AUTO: 0.02 K/UL (ref 0–0.2)
BASOPHILS NFR BLD: 0.6 % (ref 0–1.9)
BILIRUB SERPL-MCNC: 0.4 MG/DL (ref 0.1–1)
BUN SERPL-MCNC: 22 MG/DL (ref 6–20)
CALCIUM SERPL-MCNC: 9.3 MG/DL (ref 8.7–10.5)
CHLORIDE SERPL-SCNC: 107 MMOL/L (ref 95–110)
CO2 SERPL-SCNC: 23 MMOL/L (ref 23–29)
CREAT SERPL-MCNC: 1.2 MG/DL (ref 0.5–1.4)
DIFFERENTIAL METHOD: ABNORMAL
EOSINOPHIL # BLD AUTO: 0 K/UL (ref 0–0.5)
EOSINOPHIL NFR BLD: 1.2 % (ref 0–8)
ERYTHROCYTE [DISTWIDTH] IN BLOOD BY AUTOMATED COUNT: 12.9 % (ref 11.5–14.5)
EST. GFR  (AFRICAN AMERICAN): >60 ML/MIN/1.73 M^2
EST. GFR  (NON AFRICAN AMERICAN): >60 ML/MIN/1.73 M^2
GLUCOSE SERPL-MCNC: 116 MG/DL (ref 70–110)
HCT VFR BLD AUTO: 41 % (ref 40–54)
HGB BLD-MCNC: 12.8 G/DL (ref 14–18)
IMM GRANULOCYTES # BLD AUTO: 0.01 K/UL (ref 0–0.04)
IMM GRANULOCYTES NFR BLD AUTO: 0.3 % (ref 0–0.5)
LYMPHOCYTES # BLD AUTO: 2.2 K/UL (ref 1–4.8)
LYMPHOCYTES NFR BLD: 63.6 % (ref 18–48)
MCH RBC QN AUTO: 30.2 PG (ref 27–31)
MCHC RBC AUTO-ENTMCNC: 31.2 G/DL (ref 32–36)
MCV RBC AUTO: 97 FL (ref 82–98)
MONOCYTES # BLD AUTO: 0.5 K/UL (ref 0.3–1)
MONOCYTES NFR BLD: 15.7 % (ref 4–15)
NEUTROPHILS # BLD AUTO: 0.6 K/UL (ref 1.8–7.7)
NEUTROPHILS NFR BLD: 18.6 % (ref 38–73)
NRBC BLD-RTO: 0 /100 WBC
PLATELET # BLD AUTO: 174 K/UL (ref 150–450)
PLATELET BLD QL SMEAR: ABNORMAL
PMV BLD AUTO: 10.1 FL (ref 9.2–12.9)
POTASSIUM SERPL-SCNC: 4.1 MMOL/L (ref 3.5–5.1)
PROT SERPL-MCNC: 6 G/DL (ref 6–8.4)
RBC # BLD AUTO: 4.24 M/UL (ref 4.6–6.2)
SODIUM SERPL-SCNC: 141 MMOL/L (ref 136–145)
WBC # BLD AUTO: 3.43 K/UL (ref 3.9–12.7)

## 2022-03-14 PROCEDURE — 80197 ASSAY OF TACROLIMUS: CPT | Performed by: INTERNAL MEDICINE

## 2022-03-14 PROCEDURE — 36415 COLL VENOUS BLD VENIPUNCTURE: CPT | Mod: PO | Performed by: INTERNAL MEDICINE

## 2022-03-14 PROCEDURE — 85025 COMPLETE CBC W/AUTO DIFF WBC: CPT | Performed by: INTERNAL MEDICINE

## 2022-03-14 PROCEDURE — 80321 ALCOHOLS BIOMARKERS 1OR 2: CPT | Performed by: INTERNAL MEDICINE

## 2022-03-14 PROCEDURE — 80053 COMPREHEN METABOLIC PANEL: CPT | Performed by: INTERNAL MEDICINE

## 2022-03-15 ENCOUNTER — PATIENT MESSAGE (OUTPATIENT)
Dept: HEPATOLOGY | Facility: CLINIC | Age: 47
End: 2022-03-15
Payer: MEDICAID

## 2022-03-15 LAB — TACROLIMUS BLD-MCNC: 22.7 NG/ML (ref 5–15)

## 2022-03-17 ENCOUNTER — LAB VISIT (OUTPATIENT)
Dept: LAB | Facility: HOSPITAL | Age: 47
End: 2022-03-17
Attending: INTERNAL MEDICINE
Payer: MEDICAID

## 2022-03-17 DIAGNOSIS — Z94.4 LIVER REPLACED BY TRANSPLANT: ICD-10-CM

## 2022-03-17 LAB
ALBUMIN SERPL BCP-MCNC: 3.7 G/DL (ref 3.5–5.2)
ALP SERPL-CCNC: 107 U/L (ref 55–135)
ALT SERPL W/O P-5'-P-CCNC: 41 U/L (ref 10–44)
ANION GAP SERPL CALC-SCNC: 11 MMOL/L (ref 8–16)
ANISOCYTOSIS BLD QL SMEAR: SLIGHT
AST SERPL-CCNC: 63 U/L (ref 10–40)
BASOPHILS # BLD AUTO: 0.02 K/UL (ref 0–0.2)
BASOPHILS NFR BLD: 0.8 % (ref 0–1.9)
BILIRUB SERPL-MCNC: 0.6 MG/DL (ref 0.1–1)
BUN SERPL-MCNC: 18 MG/DL (ref 6–20)
CALCIUM SERPL-MCNC: 9 MG/DL (ref 8.7–10.5)
CHLORIDE SERPL-SCNC: 105 MMOL/L (ref 95–110)
CO2 SERPL-SCNC: 28 MMOL/L (ref 23–29)
CREAT SERPL-MCNC: 1.1 MG/DL (ref 0.5–1.4)
DIFFERENTIAL METHOD: ABNORMAL
EOSINOPHIL # BLD AUTO: 0 K/UL (ref 0–0.5)
EOSINOPHIL NFR BLD: 1.2 % (ref 0–8)
ERYTHROCYTE [DISTWIDTH] IN BLOOD BY AUTOMATED COUNT: 12.7 % (ref 11.5–14.5)
EST. GFR  (AFRICAN AMERICAN): >60 ML/MIN/1.73 M^2
EST. GFR  (NON AFRICAN AMERICAN): >60 ML/MIN/1.73 M^2
GLUCOSE SERPL-MCNC: 134 MG/DL (ref 70–110)
HCT VFR BLD AUTO: 40.3 % (ref 40–54)
HGB BLD-MCNC: 12.8 G/DL (ref 14–18)
HYPOCHROMIA BLD QL SMEAR: ABNORMAL
IMM GRANULOCYTES # BLD AUTO: 0.02 K/UL (ref 0–0.04)
IMM GRANULOCYTES NFR BLD AUTO: 0.8 % (ref 0–0.5)
LYMPHOCYTES # BLD AUTO: 1.2 K/UL (ref 1–4.8)
LYMPHOCYTES NFR BLD: 48.2 % (ref 18–48)
MCH RBC QN AUTO: 30.6 PG (ref 27–31)
MCHC RBC AUTO-ENTMCNC: 31.8 G/DL (ref 32–36)
MCV RBC AUTO: 96 FL (ref 82–98)
MONOCYTES # BLD AUTO: 0.4 K/UL (ref 0.3–1)
MONOCYTES NFR BLD: 14.1 % (ref 4–15)
NEUTROPHILS # BLD AUTO: 0.9 K/UL (ref 1.8–7.7)
NEUTROPHILS NFR BLD: 34.9 % (ref 38–73)
NRBC BLD-RTO: 0 /100 WBC
OVALOCYTES BLD QL SMEAR: ABNORMAL
PLATELET # BLD AUTO: 133 K/UL (ref 150–450)
PLATELET BLD QL SMEAR: ABNORMAL
PMV BLD AUTO: 10 FL (ref 9.2–12.9)
POIKILOCYTOSIS BLD QL SMEAR: SLIGHT
POLYCHROMASIA BLD QL SMEAR: ABNORMAL
POTASSIUM SERPL-SCNC: 4.3 MMOL/L (ref 3.5–5.1)
PROT SERPL-MCNC: 6.3 G/DL (ref 6–8.4)
RBC # BLD AUTO: 4.18 M/UL (ref 4.6–6.2)
SODIUM SERPL-SCNC: 144 MMOL/L (ref 136–145)
WBC # BLD AUTO: 2.55 K/UL (ref 3.9–12.7)

## 2022-03-17 PROCEDURE — 85025 COMPLETE CBC W/AUTO DIFF WBC: CPT | Performed by: INTERNAL MEDICINE

## 2022-03-17 PROCEDURE — 80053 COMPREHEN METABOLIC PANEL: CPT | Performed by: INTERNAL MEDICINE

## 2022-03-17 PROCEDURE — 80197 ASSAY OF TACROLIMUS: CPT | Performed by: INTERNAL MEDICINE

## 2022-03-17 PROCEDURE — 36415 COLL VENOUS BLD VENIPUNCTURE: CPT | Mod: PO | Performed by: INTERNAL MEDICINE

## 2022-03-18 LAB
PETH 16:0/18.1 (POPETH): 1326 NG/ML
PETH 16:0/18.2 (PLPETH): 901 NG/ML
TACROLIMUS BLD-MCNC: 10.2 NG/ML (ref 5–15)

## 2022-03-21 ENCOUNTER — TELEPHONE (OUTPATIENT)
Dept: TRANSPLANT | Facility: CLINIC | Age: 47
End: 2022-03-21
Payer: MEDICAID

## 2022-03-21 NOTE — TELEPHONE ENCOUNTER
Labs repeated 3/17, pending review.  ----- Message from Janelle Meléndez MA sent at 3/21/2022  2:16 PM CDT -----    ----- Message -----  From: Theresa Titus MD  Sent: 3/16/2022   3:27 PM CDT  To: [facility] YEAG[facility] Staff    Suspect this is not a tacrolimus trough.  If it is not a true trough then repeat labs in 4 weeks.  If it is a true trough replete labs now.

## 2022-03-22 ENCOUNTER — PATIENT MESSAGE (OUTPATIENT)
Dept: TRANSPLANT | Facility: CLINIC | Age: 47
End: 2022-03-22
Payer: MEDICAID

## 2022-03-22 ENCOUNTER — TELEPHONE (OUTPATIENT)
Dept: TRANSPLANT | Facility: CLINIC | Age: 47
End: 2022-03-22
Payer: MEDICAID

## 2022-03-22 NOTE — TELEPHONE ENCOUNTER
Stable labs, no medication changes.  Next labs week of 4/11/22.  [employer] message sent to notify pt.  ----- Message from Theresa Titus MD sent at 3/21/2022  4:35 PM CDT -----  Tacrolimus level improved.  Repeat labs in 4 weeks.

## 2022-03-22 NOTE — LETTER
March 22, 2022    Jordan Altman  [address] [address] [address]          Dear Jordan Altman:  MRN: [id number]    This is a follow up to your recent labs, your lab results were stable.  There are no medicine changes.  Please have your labs drawn again on 4/11/22.      If you cannot have your labs drawn on the scheduled date, it is your responsibility to call the transplant department to reschedule.  Please call [phone] and ask to speak to Yessenia QUIROZ   for all scheduling requests.     Sincerely,      Sendy DOYLEN, RN  Your Liver Transplant Coordinator    [facility]  [address] [place] [address]  [phone]

## 2022-04-14 ENCOUNTER — PATIENT MESSAGE (OUTPATIENT)
Dept: TRANSPLANT | Facility: CLINIC | Age: 47
End: 2022-04-14
Payer: MEDICAID

## 2022-04-14 ENCOUNTER — TELEPHONE (OUTPATIENT)
Dept: TRANSPLANT | Facility: CLINIC | Age: 47
End: 2022-04-14
Payer: MEDICAID

## 2022-04-14 NOTE — TELEPHONE ENCOUNTER
Patient was due for lab on 4/11/22.  Pt cancelled appointment.  [employer] message sent to notify labs rescheduled to 4/19.

## 2022-04-19 ENCOUNTER — LAB VISIT (OUTPATIENT)
Dept: LAB | Facility: HOSPITAL | Age: 47
End: 2022-04-19
Attending: INTERNAL MEDICINE
Payer: MEDICAID

## 2022-04-19 DIAGNOSIS — Z94.4 LIVER REPLACED BY TRANSPLANT: ICD-10-CM

## 2022-04-19 LAB
ALBUMIN SERPL BCP-MCNC: 3.7 G/DL (ref 3.5–5.2)
ALP SERPL-CCNC: 82 U/L (ref 55–135)
ALT SERPL W/O P-5'-P-CCNC: 35 U/L (ref 10–44)
ANION GAP SERPL CALC-SCNC: 10 MMOL/L (ref 8–16)
AST SERPL-CCNC: 60 U/L (ref 10–40)
BASOPHILS # BLD AUTO: 0.01 K/UL (ref 0–0.2)
BASOPHILS NFR BLD: 0.3 % (ref 0–1.9)
BILIRUB SERPL-MCNC: 0.5 MG/DL (ref 0.1–1)
BUN SERPL-MCNC: 25 MG/DL (ref 6–20)
CALCIUM SERPL-MCNC: 9.4 MG/DL (ref 8.7–10.5)
CHLORIDE SERPL-SCNC: 109 MMOL/L (ref 95–110)
CO2 SERPL-SCNC: 23 MMOL/L (ref 23–29)
CREAT SERPL-MCNC: 1.2 MG/DL (ref 0.5–1.4)
DIFFERENTIAL METHOD: ABNORMAL
EOSINOPHIL # BLD AUTO: 0 K/UL (ref 0–0.5)
EOSINOPHIL NFR BLD: 1 % (ref 0–8)
ERYTHROCYTE [DISTWIDTH] IN BLOOD BY AUTOMATED COUNT: 12 % (ref 11.5–14.5)
EST. GFR  (AFRICAN AMERICAN): >60 ML/MIN/1.73 M^2
EST. GFR  (NON AFRICAN AMERICAN): >60 ML/MIN/1.73 M^2
GLUCOSE SERPL-MCNC: 106 MG/DL (ref 70–110)
HCT VFR BLD AUTO: 40.1 % (ref 40–54)
HGB BLD-MCNC: 12.4 G/DL (ref 14–18)
IMM GRANULOCYTES # BLD AUTO: 0.02 K/UL (ref 0–0.04)
IMM GRANULOCYTES NFR BLD AUTO: 0.7 % (ref 0–0.5)
LYMPHOCYTES # BLD AUTO: 1.3 K/UL (ref 1–4.8)
LYMPHOCYTES NFR BLD: 46.2 % (ref 18–48)
MCH RBC QN AUTO: 29.4 PG (ref 27–31)
MCHC RBC AUTO-ENTMCNC: 30.9 G/DL (ref 32–36)
MCV RBC AUTO: 95 FL (ref 82–98)
MONOCYTES # BLD AUTO: 0.4 K/UL (ref 0.3–1)
MONOCYTES NFR BLD: 13.8 % (ref 4–15)
NEUTROPHILS # BLD AUTO: 1.1 K/UL (ref 1.8–7.7)
NEUTROPHILS NFR BLD: 38 % (ref 38–73)
NRBC BLD-RTO: 0 /100 WBC
PLATELET # BLD AUTO: 109 K/UL (ref 150–450)
PMV BLD AUTO: 10.4 FL (ref 9.2–12.9)
POTASSIUM SERPL-SCNC: 4.5 MMOL/L (ref 3.5–5.1)
PROT SERPL-MCNC: 6.5 G/DL (ref 6–8.4)
RBC # BLD AUTO: 4.22 M/UL (ref 4.6–6.2)
SODIUM SERPL-SCNC: 142 MMOL/L (ref 136–145)
WBC # BLD AUTO: 2.9 K/UL (ref 3.9–12.7)

## 2022-04-19 PROCEDURE — 80053 COMPREHEN METABOLIC PANEL: CPT | Performed by: INTERNAL MEDICINE

## 2022-04-19 PROCEDURE — 80197 ASSAY OF TACROLIMUS: CPT | Performed by: INTERNAL MEDICINE

## 2022-04-19 PROCEDURE — 85025 COMPLETE CBC W/AUTO DIFF WBC: CPT | Performed by: INTERNAL MEDICINE

## 2022-04-19 PROCEDURE — 36415 COLL VENOUS BLD VENIPUNCTURE: CPT | Mod: PO | Performed by: INTERNAL MEDICINE

## 2022-04-20 LAB — TACROLIMUS BLD-MCNC: 9.8 NG/ML (ref 5–15)

## 2022-04-21 ENCOUNTER — PATIENT MESSAGE (OUTPATIENT)
Dept: TRANSPLANT | Facility: CLINIC | Age: 47
End: 2022-04-21
Payer: MEDICAID

## 2022-04-21 ENCOUNTER — TELEPHONE (OUTPATIENT)
Dept: TRANSPLANT | Facility: CLINIC | Age: 47
End: 2022-04-21
Payer: MEDICAID

## 2022-04-21 NOTE — TELEPHONE ENCOUNTER
Stable labs, no medication changes.  Next labs 6/14/22, [employer] message sent to notify pt.  ----- Message from Theresa Titus MD sent at 4/20/2022 12:54 PM CDT -----  Reviewed, nothing to do; repeat per routine

## 2022-05-11 ENCOUNTER — PATIENT MESSAGE (OUTPATIENT)
Dept: RESEARCH | Facility: CLINIC | Age: 47
End: 2022-05-11
Payer: MEDICAID

## 2022-05-14 ENCOUNTER — PATIENT MESSAGE (OUTPATIENT)
Dept: TRANSPLANT | Facility: CLINIC | Age: 47
End: 2022-05-14
Payer: MEDICAID

## 2022-05-28 ENCOUNTER — PATIENT MESSAGE (OUTPATIENT)
Dept: HEPATOLOGY | Facility: CLINIC | Age: 47
End: 2022-05-28
Payer: MEDICAID

## 2022-05-28 ENCOUNTER — PATIENT MESSAGE (OUTPATIENT)
Dept: TRANSPLANT | Facility: CLINIC | Age: 47
End: 2022-05-28
Payer: MEDICAID

## 2022-05-31 ENCOUNTER — LAB VISIT (OUTPATIENT)
Dept: LAB | Facility: HOSPITAL | Age: 47
End: 2022-05-31
Attending: INTERNAL MEDICINE
Payer: MEDICAID

## 2022-05-31 DIAGNOSIS — Z94.4 LIVER REPLACED BY TRANSPLANT: ICD-10-CM

## 2022-05-31 LAB
ALBUMIN SERPL BCP-MCNC: 4.1 G/DL (ref 3.5–5.2)
ALP SERPL-CCNC: 73 U/L (ref 55–135)
ALT SERPL W/O P-5'-P-CCNC: 45 U/L (ref 10–44)
ANION GAP SERPL CALC-SCNC: 13 MMOL/L (ref 8–16)
AST SERPL-CCNC: 69 U/L (ref 10–40)
BASOPHILS # BLD AUTO: 0.04 K/UL (ref 0–0.2)
BASOPHILS NFR BLD: 0.9 % (ref 0–1.9)
BILIRUB SERPL-MCNC: 0.8 MG/DL (ref 0.1–1)
BUN SERPL-MCNC: 20 MG/DL (ref 6–20)
CALCIUM SERPL-MCNC: 9.2 MG/DL (ref 8.7–10.5)
CHLORIDE SERPL-SCNC: 106 MMOL/L (ref 95–110)
CO2 SERPL-SCNC: 20 MMOL/L (ref 23–29)
CREAT SERPL-MCNC: 1.3 MG/DL (ref 0.5–1.4)
DIFFERENTIAL METHOD: NORMAL
EOSINOPHIL # BLD AUTO: 0 K/UL (ref 0–0.5)
EOSINOPHIL NFR BLD: 0.7 % (ref 0–8)
ERYTHROCYTE [DISTWIDTH] IN BLOOD BY AUTOMATED COUNT: 13.3 % (ref 11.5–14.5)
EST. GFR  (AFRICAN AMERICAN): >60 ML/MIN/1.73 M^2
EST. GFR  (NON AFRICAN AMERICAN): >60 ML/MIN/1.73 M^2
GLUCOSE SERPL-MCNC: 73 MG/DL (ref 70–110)
HCT VFR BLD AUTO: 43.8 % (ref 40–54)
HGB BLD-MCNC: 14 G/DL (ref 14–18)
IMM GRANULOCYTES # BLD AUTO: 0.02 K/UL (ref 0–0.04)
IMM GRANULOCYTES NFR BLD AUTO: 0.4 % (ref 0–0.5)
LYMPHOCYTES # BLD AUTO: 2.1 K/UL (ref 1–4.8)
LYMPHOCYTES NFR BLD: 46.6 % (ref 18–48)
MCH RBC QN AUTO: 30.2 PG (ref 27–31)
MCHC RBC AUTO-ENTMCNC: 32 G/DL (ref 32–36)
MCV RBC AUTO: 95 FL (ref 82–98)
MONOCYTES # BLD AUTO: 0.6 K/UL (ref 0.3–1)
MONOCYTES NFR BLD: 12.9 % (ref 4–15)
NEUTROPHILS # BLD AUTO: 1.8 K/UL (ref 1.8–7.7)
NEUTROPHILS NFR BLD: 38.5 % (ref 38–73)
NRBC BLD-RTO: 0 /100 WBC
PLATELET # BLD AUTO: 157 K/UL (ref 150–450)
PMV BLD AUTO: 9.8 FL (ref 9.2–12.9)
POTASSIUM SERPL-SCNC: 4.5 MMOL/L (ref 3.5–5.1)
PROT SERPL-MCNC: 6.6 G/DL (ref 6–8.4)
RBC # BLD AUTO: 4.63 M/UL (ref 4.6–6.2)
SODIUM SERPL-SCNC: 139 MMOL/L (ref 136–145)
WBC # BLD AUTO: 4.59 K/UL (ref 3.9–12.7)

## 2022-05-31 PROCEDURE — 36415 COLL VENOUS BLD VENIPUNCTURE: CPT | Mod: PO | Performed by: INTERNAL MEDICINE

## 2022-05-31 PROCEDURE — 80053 COMPREHEN METABOLIC PANEL: CPT | Performed by: INTERNAL MEDICINE

## 2022-05-31 PROCEDURE — 80197 ASSAY OF TACROLIMUS: CPT | Performed by: INTERNAL MEDICINE

## 2022-05-31 PROCEDURE — 85025 COMPLETE CBC W/AUTO DIFF WBC: CPT | Performed by: INTERNAL MEDICINE

## 2022-06-01 ENCOUNTER — TELEPHONE (OUTPATIENT)
Dept: TRANSPLANT | Facility: CLINIC | Age: 47
End: 2022-06-01
Payer: MEDICAID

## 2022-06-01 ENCOUNTER — PATIENT MESSAGE (OUTPATIENT)
Dept: TRANSPLANT | Facility: CLINIC | Age: 47
End: 2022-06-01
Payer: MEDICAID

## 2022-06-01 LAB — TACROLIMUS BLD-MCNC: 9.3 NG/ML (ref 5–15)

## 2022-06-01 NOTE — TELEPHONE ENCOUNTER
----- Message from Theresa Titus MD sent at 6/1/2022  4:33 PM CDT -----  Slight trend up then liver tests but tacrolimus levels adequate and patient is also on CellCept.  Would repeat labs next week.

## 2022-06-01 NOTE — TELEPHONE ENCOUNTER
[employer] message sent instructing pt to repeat labs next week, no medication changes.  ----- Message from Theresa Titus MD sent at 6/1/2022  4:33 PM CDT -----  Slight trend up then liver tests but tacrolimus levels adequate and patient is also on CellCept.  Would repeat labs next week.

## 2022-06-08 ENCOUNTER — LAB VISIT (OUTPATIENT)
Dept: LAB | Facility: HOSPITAL | Age: 47
End: 2022-06-08
Attending: INTERNAL MEDICINE
Payer: MEDICAID

## 2022-06-08 DIAGNOSIS — Z94.4 LIVER REPLACED BY TRANSPLANT: ICD-10-CM

## 2022-06-08 LAB
ALBUMIN SERPL BCP-MCNC: 3.8 G/DL (ref 3.5–5.2)
ALP SERPL-CCNC: 101 U/L (ref 55–135)
ALT SERPL W/O P-5'-P-CCNC: 60 U/L (ref 10–44)
ANION GAP SERPL CALC-SCNC: 11 MMOL/L (ref 8–16)
AST SERPL-CCNC: 76 U/L (ref 10–40)
BASOPHILS # BLD AUTO: 0.02 K/UL (ref 0–0.2)
BASOPHILS NFR BLD: 0.7 % (ref 0–1.9)
BILIRUB SERPL-MCNC: 0.9 MG/DL (ref 0.1–1)
BUN SERPL-MCNC: 12 MG/DL (ref 6–20)
CALCIUM SERPL-MCNC: 9.3 MG/DL (ref 8.7–10.5)
CHLORIDE SERPL-SCNC: 105 MMOL/L (ref 95–110)
CO2 SERPL-SCNC: 22 MMOL/L (ref 23–29)
CREAT SERPL-MCNC: 1.2 MG/DL (ref 0.5–1.4)
DIFFERENTIAL METHOD: ABNORMAL
EOSINOPHIL # BLD AUTO: 0 K/UL (ref 0–0.5)
EOSINOPHIL NFR BLD: 0.7 % (ref 0–8)
ERYTHROCYTE [DISTWIDTH] IN BLOOD BY AUTOMATED COUNT: 13.1 % (ref 11.5–14.5)
EST. GFR  (AFRICAN AMERICAN): >60 ML/MIN/1.73 M^2
EST. GFR  (NON AFRICAN AMERICAN): >60 ML/MIN/1.73 M^2
GLUCOSE SERPL-MCNC: 100 MG/DL (ref 70–110)
HCT VFR BLD AUTO: 47.2 % (ref 40–54)
HGB BLD-MCNC: 14.5 G/DL (ref 14–18)
IMM GRANULOCYTES # BLD AUTO: 0.02 K/UL (ref 0–0.04)
IMM GRANULOCYTES NFR BLD AUTO: 0.7 % (ref 0–0.5)
LYMPHOCYTES # BLD AUTO: 1.3 K/UL (ref 1–4.8)
LYMPHOCYTES NFR BLD: 41.9 % (ref 18–48)
MCH RBC QN AUTO: 31.3 PG (ref 27–31)
MCHC RBC AUTO-ENTMCNC: 30.7 G/DL (ref 32–36)
MCV RBC AUTO: 102 FL (ref 82–98)
MONOCYTES # BLD AUTO: 0.5 K/UL (ref 0.3–1)
MONOCYTES NFR BLD: 16.4 % (ref 4–15)
NEUTROPHILS # BLD AUTO: 1.2 K/UL (ref 1.8–7.7)
NEUTROPHILS NFR BLD: 39.6 % (ref 38–73)
NRBC BLD-RTO: 0 /100 WBC
PLATELET # BLD AUTO: 139 K/UL (ref 150–450)
PMV BLD AUTO: 10.4 FL (ref 9.2–12.9)
POTASSIUM SERPL-SCNC: 4.6 MMOL/L (ref 3.5–5.1)
PROT SERPL-MCNC: 6.2 G/DL (ref 6–8.4)
RBC # BLD AUTO: 4.63 M/UL (ref 4.6–6.2)
SODIUM SERPL-SCNC: 138 MMOL/L (ref 136–145)
WBC # BLD AUTO: 2.98 K/UL (ref 3.9–12.7)

## 2022-06-08 PROCEDURE — 80197 ASSAY OF TACROLIMUS: CPT | Performed by: INTERNAL MEDICINE

## 2022-06-08 PROCEDURE — 80053 COMPREHEN METABOLIC PANEL: CPT | Performed by: INTERNAL MEDICINE

## 2022-06-08 PROCEDURE — 36415 COLL VENOUS BLD VENIPUNCTURE: CPT | Mod: PO | Performed by: INTERNAL MEDICINE

## 2022-06-08 PROCEDURE — 85025 COMPLETE CBC W/AUTO DIFF WBC: CPT | Performed by: INTERNAL MEDICINE

## 2022-06-09 ENCOUNTER — PATIENT MESSAGE (OUTPATIENT)
Dept: TRANSPLANT | Facility: CLINIC | Age: 47
End: 2022-06-09
Payer: MEDICAID

## 2022-06-09 ENCOUNTER — TELEPHONE (OUTPATIENT)
Dept: TRANSPLANT | Facility: CLINIC | Age: 47
End: 2022-06-09
Payer: MEDICAID

## 2022-06-09 DIAGNOSIS — Z94.4 LIVER REPLACED BY TRANSPLANT: Primary | ICD-10-CM

## 2022-06-09 LAB — TACROLIMUS BLD-MCNC: 10.9 NG/ML (ref 5–15)

## 2022-06-09 NOTE — TELEPHONE ENCOUNTER
Sent message to notify patient will need US and biopsy and repeat labs in 2 weeks on 06/20/22    ----- Message from Theresa Titus MD sent at 6/9/2022  8:54 AM CDT -----  Liver tests still elevated, awaiting tacro level. Proceed with US and liver biopsy.

## 2022-06-10 ENCOUNTER — TELEPHONE (OUTPATIENT)
Dept: INTERVENTIONAL RADIOLOGY/VASCULAR | Facility: CLINIC | Age: 47
End: 2022-06-10
Payer: MEDICAID

## 2022-06-10 NOTE — TELEPHONE ENCOUNTER
Spoke to pt on phone to scheduled IR procedure, he stated that he was waiting for the US and labs to be done first on 6/13 and 6/14 before scheduling liver bx. Verbally understood, thanks

## 2022-06-11 ENCOUNTER — PATIENT MESSAGE (OUTPATIENT)
Dept: TRANSPLANT | Facility: CLINIC | Age: 47
End: 2022-06-11
Payer: MEDICAID

## 2022-06-13 ENCOUNTER — TELEPHONE (OUTPATIENT)
Dept: TRANSPLANT | Facility: CLINIC | Age: 47
End: 2022-06-13
Payer: MEDICAID

## 2022-06-14 ENCOUNTER — LAB VISIT (OUTPATIENT)
Dept: LAB | Facility: HOSPITAL | Age: 47
End: 2022-06-14
Attending: INTERNAL MEDICINE
Payer: MEDICAID

## 2022-06-14 DIAGNOSIS — Z94.4 LIVER REPLACED BY TRANSPLANT: ICD-10-CM

## 2022-06-14 LAB
ALBUMIN SERPL BCP-MCNC: 3.6 G/DL (ref 3.5–5.2)
ALP SERPL-CCNC: 81 U/L (ref 55–135)
ALT SERPL W/O P-5'-P-CCNC: 35 U/L (ref 10–44)
ANION GAP SERPL CALC-SCNC: 13 MMOL/L (ref 8–16)
ANISOCYTOSIS BLD QL SMEAR: SLIGHT
AST SERPL-CCNC: 44 U/L (ref 10–40)
BASOPHILS # BLD AUTO: 0.02 K/UL (ref 0–0.2)
BASOPHILS NFR BLD: 0.7 % (ref 0–1.9)
BILIRUB SERPL-MCNC: 0.8 MG/DL (ref 0.1–1)
BUN SERPL-MCNC: 25 MG/DL (ref 6–20)
CALCIUM SERPL-MCNC: 9.6 MG/DL (ref 8.7–10.5)
CHLORIDE SERPL-SCNC: 102 MMOL/L (ref 95–110)
CO2 SERPL-SCNC: 24 MMOL/L (ref 23–29)
CREAT SERPL-MCNC: 1.3 MG/DL (ref 0.5–1.4)
DIFFERENTIAL METHOD: ABNORMAL
EOSINOPHIL # BLD AUTO: 0 K/UL (ref 0–0.5)
EOSINOPHIL NFR BLD: 0.7 % (ref 0–8)
ERYTHROCYTE [DISTWIDTH] IN BLOOD BY AUTOMATED COUNT: 13 % (ref 11.5–14.5)
EST. GFR  (AFRICAN AMERICAN): >60 ML/MIN/1.73 M^2
EST. GFR  (NON AFRICAN AMERICAN): >60 ML/MIN/1.73 M^2
GLUCOSE SERPL-MCNC: 108 MG/DL (ref 70–110)
HCT VFR BLD AUTO: 42.1 % (ref 40–54)
HGB BLD-MCNC: 13 G/DL (ref 14–18)
IMM GRANULOCYTES # BLD AUTO: 0.01 K/UL (ref 0–0.04)
IMM GRANULOCYTES NFR BLD AUTO: 0.4 % (ref 0–0.5)
INR PPP: 1 (ref 0.8–1.2)
LYMPHOCYTES # BLD AUTO: 1.5 K/UL (ref 1–4.8)
LYMPHOCYTES NFR BLD: 52.1 % (ref 18–48)
MCH RBC QN AUTO: 30.4 PG (ref 27–31)
MCHC RBC AUTO-ENTMCNC: 30.9 G/DL (ref 32–36)
MCV RBC AUTO: 99 FL (ref 82–98)
MONOCYTES # BLD AUTO: 0.7 K/UL (ref 0.3–1)
MONOCYTES NFR BLD: 22.9 % (ref 4–15)
NEUTROPHILS # BLD AUTO: 0.7 K/UL (ref 1.8–7.7)
NEUTROPHILS NFR BLD: 23.2 % (ref 38–73)
NRBC BLD-RTO: 0 /100 WBC
PLATELET # BLD AUTO: 108 K/UL (ref 150–450)
PMV BLD AUTO: 9.7 FL (ref 9.2–12.9)
POTASSIUM SERPL-SCNC: 4.6 MMOL/L (ref 3.5–5.1)
PROT SERPL-MCNC: 6.3 G/DL (ref 6–8.4)
PROTHROMBIN TIME: 10.5 SEC (ref 9–12.5)
RBC # BLD AUTO: 4.27 M/UL (ref 4.6–6.2)
SMUDGE CELLS BLD QL SMEAR: PRESENT
SODIUM SERPL-SCNC: 139 MMOL/L (ref 136–145)
WBC # BLD AUTO: 2.84 K/UL (ref 3.9–12.7)

## 2022-06-14 PROCEDURE — 36415 COLL VENOUS BLD VENIPUNCTURE: CPT | Mod: PO | Performed by: INTERNAL MEDICINE

## 2022-06-14 PROCEDURE — 85025 COMPLETE CBC W/AUTO DIFF WBC: CPT | Performed by: INTERNAL MEDICINE

## 2022-06-14 PROCEDURE — 85610 PROTHROMBIN TIME: CPT | Performed by: INTERNAL MEDICINE

## 2022-06-14 PROCEDURE — 80053 COMPREHEN METABOLIC PANEL: CPT | Performed by: INTERNAL MEDICINE

## 2022-06-14 PROCEDURE — 80197 ASSAY OF TACROLIMUS: CPT | Performed by: INTERNAL MEDICINE

## 2022-06-15 LAB — TACROLIMUS BLD-MCNC: 11.5 NG/ML (ref 5–15)

## 2022-06-16 ENCOUNTER — HOSPITAL ENCOUNTER (OUTPATIENT)
Dept: RADIOLOGY | Facility: HOSPITAL | Age: 47
Discharge: HOME | End: 2022-06-16
Attending: INTERNAL MEDICINE
Payer: MEDICAID

## 2022-06-16 DIAGNOSIS — Z94.4 LIVER TRANSPLANTED: ICD-10-CM

## 2022-06-16 DIAGNOSIS — Z94.4 LIVER REPLACED BY TRANSPLANT: ICD-10-CM

## 2022-06-16 PROCEDURE — 93976 VASCULAR STUDY: CPT | Mod: TC

## 2022-06-16 PROCEDURE — 93976 VASCULAR STUDY: CPT | Mod: 26,,, | Performed by: RADIOLOGY

## 2022-06-16 PROCEDURE — 93976 US DOPPLER LIVER TRANSPLANT POST (XPD): ICD-10-PCS | Mod: 26,,, | Performed by: RADIOLOGY

## 2022-06-16 PROCEDURE — 76705 ECHO EXAM OF ABDOMEN: CPT | Mod: 26,XS,, | Performed by: RADIOLOGY

## 2022-06-16 PROCEDURE — 76705 US DOPPLER LIVER TRANSPLANT POST (XPD): ICD-10-PCS | Mod: 26,XS,, | Performed by: RADIOLOGY

## 2022-06-16 PROCEDURE — 76705 ECHO EXAM OF ABDOMEN: CPT | Mod: TC

## 2022-06-16 NOTE — TELEPHONE ENCOUNTER
----- Message from Theresa Titus MD sent at 6/16/2022  1:24 PM CDT -----  Fatty liver on imaging. Dilated common bile duct but no evidence of biliary obstruction. Nothing to do at this time.

## 2022-06-17 ENCOUNTER — PATIENT MESSAGE (OUTPATIENT)
Dept: TRANSPLANT | Facility: CLINIC | Age: 47
End: 2022-06-17
Payer: MEDICAID

## 2022-06-17 NOTE — TELEPHONE ENCOUNTER
[employer] message sent to notify pt of medication changes and labs on 6/21.  Also notified no liver biopsy is needed at this time.  ----- Message from Theresa Titus MD sent at 6/16/2022  2:29 PM CDT -----  No biopsy liver tests better    ----- Message -----  From: Sendy Santos RN  Sent: 6/16/2022  10:18 AM CDT  To: Theresa Titus MD    Do you still want a liver biopsy?  Liver ultrasound was performed this morning.  ----- Message -----  From: Theresa Titus MD  Sent: 6/16/2022   8:35 AM CDT  To: [facility] Post-Liver Transplant Clinical    Liver tests improved, tacro high, decrease to 2mg in the morning and 1mg in the evening, repeat labs next week.

## 2022-06-20 RX ORDER — TACROLIMUS 1 MG/1
CAPSULE ORAL
Qty: 90 CAPSULE | Refills: 11 | Status: SHIPPED | OUTPATIENT
Start: 2022-06-20 | End: 2022-07-06 | Stop reason: SDUPTHER

## 2022-06-23 ENCOUNTER — TELEPHONE (OUTPATIENT)
Dept: TRANSPLANT | Facility: CLINIC | Age: 47
End: 2022-06-23
Payer: MEDICAID

## 2022-06-23 ENCOUNTER — PATIENT MESSAGE (OUTPATIENT)
Dept: TRANSPLANT | Facility: CLINIC | Age: 47
End: 2022-06-23
Payer: MEDICAID

## 2022-07-01 ENCOUNTER — LAB VISIT (OUTPATIENT)
Dept: LAB | Facility: HOSPITAL | Age: 47
End: 2022-07-01
Attending: INTERNAL MEDICINE
Payer: MEDICAID

## 2022-07-01 DIAGNOSIS — Z94.4 LIVER REPLACED BY TRANSPLANT: ICD-10-CM

## 2022-07-01 LAB
ALBUMIN SERPL BCP-MCNC: 3.8 G/DL (ref 3.5–5.2)
ALP SERPL-CCNC: 88 U/L (ref 55–135)
ALT SERPL W/O P-5'-P-CCNC: 75 U/L (ref 10–44)
ANION GAP SERPL CALC-SCNC: 11 MMOL/L (ref 8–16)
AST SERPL-CCNC: 107 U/L (ref 10–40)
BASOPHILS # BLD AUTO: 0.03 K/UL (ref 0–0.2)
BASOPHILS NFR BLD: 0.7 % (ref 0–1.9)
BILIRUB SERPL-MCNC: 1 MG/DL (ref 0.1–1)
BUN SERPL-MCNC: 23 MG/DL (ref 6–20)
CALCIUM SERPL-MCNC: 9 MG/DL (ref 8.7–10.5)
CHLORIDE SERPL-SCNC: 104 MMOL/L (ref 95–110)
CO2 SERPL-SCNC: 22 MMOL/L (ref 23–29)
CREAT SERPL-MCNC: 2 MG/DL (ref 0.5–1.4)
DIFFERENTIAL METHOD: ABNORMAL
EOSINOPHIL # BLD AUTO: 0.1 K/UL (ref 0–0.5)
EOSINOPHIL NFR BLD: 1.1 % (ref 0–8)
ERYTHROCYTE [DISTWIDTH] IN BLOOD BY AUTOMATED COUNT: 14 % (ref 11.5–14.5)
EST. GFR  (AFRICAN AMERICAN): 44 ML/MIN/1.73 M^2
EST. GFR  (NON AFRICAN AMERICAN): 38.1 ML/MIN/1.73 M^2
GLUCOSE SERPL-MCNC: 98 MG/DL (ref 70–110)
HCT VFR BLD AUTO: 44.8 % (ref 40–54)
HGB BLD-MCNC: 13.9 G/DL (ref 14–18)
IMM GRANULOCYTES # BLD AUTO: 0.01 K/UL (ref 0–0.04)
IMM GRANULOCYTES NFR BLD AUTO: 0.2 % (ref 0–0.5)
LYMPHOCYTES # BLD AUTO: 2.1 K/UL (ref 1–4.8)
LYMPHOCYTES NFR BLD: 45.4 % (ref 18–48)
MCH RBC QN AUTO: 30.5 PG (ref 27–31)
MCHC RBC AUTO-ENTMCNC: 31 G/DL (ref 32–36)
MCV RBC AUTO: 98 FL (ref 82–98)
MONOCYTES # BLD AUTO: 0.8 K/UL (ref 0.3–1)
MONOCYTES NFR BLD: 17.4 % (ref 4–15)
NEUTROPHILS # BLD AUTO: 1.6 K/UL (ref 1.8–7.7)
NEUTROPHILS NFR BLD: 35.2 % (ref 38–73)
NRBC BLD-RTO: 0 /100 WBC
PLATELET # BLD AUTO: 158 K/UL (ref 150–450)
PMV BLD AUTO: 10.2 FL (ref 9.2–12.9)
POTASSIUM SERPL-SCNC: 4.4 MMOL/L (ref 3.5–5.1)
PROT SERPL-MCNC: 6.2 G/DL (ref 6–8.4)
RBC # BLD AUTO: 4.56 M/UL (ref 4.6–6.2)
SODIUM SERPL-SCNC: 137 MMOL/L (ref 136–145)
WBC # BLD AUTO: 4.6 K/UL (ref 3.9–12.7)

## 2022-07-01 PROCEDURE — 36415 COLL VENOUS BLD VENIPUNCTURE: CPT | Mod: PO | Performed by: INTERNAL MEDICINE

## 2022-07-01 PROCEDURE — 80053 COMPREHEN METABOLIC PANEL: CPT | Performed by: INTERNAL MEDICINE

## 2022-07-01 PROCEDURE — 85025 COMPLETE CBC W/AUTO DIFF WBC: CPT | Performed by: INTERNAL MEDICINE

## 2022-07-01 PROCEDURE — 80197 ASSAY OF TACROLIMUS: CPT | Performed by: INTERNAL MEDICINE

## 2022-07-02 LAB — TACROLIMUS BLD-MCNC: 9.9 NG/ML (ref 5–15)

## 2022-07-06 ENCOUNTER — PATIENT MESSAGE (OUTPATIENT)
Dept: TRANSPLANT | Facility: CLINIC | Age: 47
End: 2022-07-06
Payer: MEDICAID

## 2022-07-06 DIAGNOSIS — Z94.4 LIVER TRANSPLANTED: ICD-10-CM

## 2022-07-06 RX ORDER — TACROLIMUS 1 MG/1
CAPSULE ORAL
Qty: 60 CAPSULE | Refills: 11 | Status: SHIPPED | OUTPATIENT
Start: 2022-07-06 | End: 2022-08-10 | Stop reason: SDUPTHER

## 2022-07-06 NOTE — TELEPHONE ENCOUNTER
[employer] message sent to notify pt.  Instructed to start taking Prograf 1 mg BID, repeat labs on Monday, and hydrate.  ----- Message from Theresa Titus MD sent at 7/5/2022  4:48 PM CDT -----  Liver tests elevated and creatinine elevated need to decrease tacrolimus to 1mg twice a da and repeat labs next week.

## 2022-07-07 DIAGNOSIS — Z94.4 LIVER REPLACED BY TRANSPLANT: ICD-10-CM

## 2022-07-07 DIAGNOSIS — Z85.05 HISTORY OF LIVER CANCER: Primary | ICD-10-CM

## 2022-07-13 ENCOUNTER — LAB VISIT (OUTPATIENT)
Dept: LAB | Facility: HOSPITAL | Age: 47
End: 2022-07-13
Attending: INTERNAL MEDICINE
Payer: MEDICAID

## 2022-07-13 DIAGNOSIS — Z94.4 LIVER REPLACED BY TRANSPLANT: ICD-10-CM

## 2022-07-13 LAB
ALBUMIN SERPL BCP-MCNC: 3.9 G/DL (ref 3.5–5.2)
ALP SERPL-CCNC: 95 U/L (ref 55–135)
ALT SERPL W/O P-5'-P-CCNC: 93 U/L (ref 10–44)
ANION GAP SERPL CALC-SCNC: 12 MMOL/L (ref 8–16)
ANISOCYTOSIS BLD QL SMEAR: SLIGHT
AST SERPL-CCNC: 175 U/L (ref 10–40)
BASOPHILS # BLD AUTO: 0.03 K/UL (ref 0–0.2)
BASOPHILS NFR BLD: 0.8 % (ref 0–1.9)
BILIRUB SERPL-MCNC: 0.9 MG/DL (ref 0.1–1)
BUN SERPL-MCNC: 17 MG/DL (ref 6–20)
CALCIUM SERPL-MCNC: 8.8 MG/DL (ref 8.7–10.5)
CHLORIDE SERPL-SCNC: 106 MMOL/L (ref 95–110)
CO2 SERPL-SCNC: 21 MMOL/L (ref 23–29)
CREAT SERPL-MCNC: 1.3 MG/DL (ref 0.5–1.4)
DIFFERENTIAL METHOD: ABNORMAL
EOSINOPHIL # BLD AUTO: 0 K/UL (ref 0–0.5)
EOSINOPHIL NFR BLD: 0.8 % (ref 0–8)
ERYTHROCYTE [DISTWIDTH] IN BLOOD BY AUTOMATED COUNT: 13.4 % (ref 11.5–14.5)
EST. GFR  (AFRICAN AMERICAN): >60 ML/MIN/1.73 M^2
EST. GFR  (NON AFRICAN AMERICAN): >60 ML/MIN/1.73 M^2
GLUCOSE SERPL-MCNC: 113 MG/DL (ref 70–110)
HCT VFR BLD AUTO: 49.3 % (ref 40–54)
HGB BLD-MCNC: 15.1 G/DL (ref 14–18)
IMM GRANULOCYTES # BLD AUTO: 0.01 K/UL (ref 0–0.04)
IMM GRANULOCYTES NFR BLD AUTO: 0.3 % (ref 0–0.5)
LYMPHOCYTES # BLD AUTO: 2.5 K/UL (ref 1–4.8)
LYMPHOCYTES NFR BLD: 65.8 % (ref 18–48)
MCH RBC QN AUTO: 31.1 PG (ref 27–31)
MCHC RBC AUTO-ENTMCNC: 30.6 G/DL (ref 32–36)
MCV RBC AUTO: 102 FL (ref 82–98)
MONOCYTES # BLD AUTO: 0.5 K/UL (ref 0.3–1)
MONOCYTES NFR BLD: 13.6 % (ref 4–15)
NEUTROPHILS # BLD AUTO: 0.7 K/UL (ref 1.8–7.7)
NEUTROPHILS NFR BLD: 18.7 % (ref 38–73)
NRBC BLD-RTO: 0 /100 WBC
PLATELET # BLD AUTO: 175 K/UL (ref 150–450)
PLATELET BLD QL SMEAR: ABNORMAL
PMV BLD AUTO: 10.5 FL (ref 9.2–12.9)
POTASSIUM SERPL-SCNC: 4.3 MMOL/L (ref 3.5–5.1)
PROT SERPL-MCNC: 6.5 G/DL (ref 6–8.4)
RBC # BLD AUTO: 4.85 M/UL (ref 4.6–6.2)
SMUDGE CELLS BLD QL SMEAR: PRESENT
SODIUM SERPL-SCNC: 139 MMOL/L (ref 136–145)
WBC # BLD AUTO: 3.83 K/UL (ref 3.9–12.7)

## 2022-07-13 PROCEDURE — 36415 COLL VENOUS BLD VENIPUNCTURE: CPT | Mod: PO | Performed by: INTERNAL MEDICINE

## 2022-07-13 PROCEDURE — 85025 COMPLETE CBC W/AUTO DIFF WBC: CPT | Performed by: INTERNAL MEDICINE

## 2022-07-13 PROCEDURE — 80197 ASSAY OF TACROLIMUS: CPT | Performed by: INTERNAL MEDICINE

## 2022-07-13 PROCEDURE — 80321 ALCOHOLS BIOMARKERS 1OR 2: CPT | Performed by: INTERNAL MEDICINE

## 2022-07-13 PROCEDURE — 80053 COMPREHEN METABOLIC PANEL: CPT | Performed by: INTERNAL MEDICINE

## 2022-07-14 LAB — TACROLIMUS BLD-MCNC: 6.6 NG/ML (ref 5–15)

## 2022-07-16 LAB
PETH 16:0/18.1 (POPETH): 1779 NG/ML
PETH 16:0/18.2 (PLPETH): 1140 NG/ML

## 2022-07-19 ENCOUNTER — PATIENT MESSAGE (OUTPATIENT)
Dept: TRANSPLANT | Facility: CLINIC | Age: 47
End: 2022-07-19
Payer: MEDICAID

## 2022-07-19 ENCOUNTER — TELEPHONE (OUTPATIENT)
Dept: TRANSPLANT | Facility: CLINIC | Age: 47
End: 2022-07-19
Payer: MEDICAID

## 2022-07-19 DIAGNOSIS — Z94.4 LIVER REPLACED BY TRANSPLANT: Primary | ICD-10-CM

## 2022-07-19 NOTE — TELEPHONE ENCOUNTER
----- Message from Theresa Titus MD sent at 7/14/2022  9:18 AM CDT -----  Kidney function improved in liver tests still elevated.  Awaiting peth level.

## 2022-07-19 NOTE — TELEPHONE ENCOUNTER
[email or web address] message sent to notify pt.  Next labs on 7/26/22, pt verified receipt of message.  ----- Message from Theresa Titus MD sent at 7/18/2022  2:55 PM CDT -----  Liver tests elevated.  Suspect related to alcohol the patient has had rejection in the past.  Recommend proceeding with liver biopsy.

## 2022-07-20 DIAGNOSIS — Z94.4 S/P LIVER TRANSPLANT: Primary | ICD-10-CM

## 2022-07-25 ENCOUNTER — PATIENT MESSAGE (OUTPATIENT)
Dept: TRANSPLANT | Facility: CLINIC | Age: 47
End: 2022-07-25
Payer: MEDICAID

## 2022-07-29 ENCOUNTER — LAB VISIT (OUTPATIENT)
Dept: LAB | Facility: HOSPITAL | Age: 47
End: 2022-07-29
Attending: INTERNAL MEDICINE
Payer: MEDICAID

## 2022-07-29 DIAGNOSIS — Z94.4 LIVER REPLACED BY TRANSPLANT: ICD-10-CM

## 2022-07-29 DIAGNOSIS — Z94.4 S/P LIVER TRANSPLANT: ICD-10-CM

## 2022-07-29 LAB
ALBUMIN SERPL BCP-MCNC: 3.4 G/DL (ref 3.5–5.2)
ALP SERPL-CCNC: 76 U/L (ref 55–135)
ALT SERPL W/O P-5'-P-CCNC: 39 U/L (ref 10–44)
ANION GAP SERPL CALC-SCNC: 10 MMOL/L (ref 8–16)
AST SERPL-CCNC: 47 U/L (ref 10–40)
BASOPHILS # BLD AUTO: 0.02 K/UL (ref 0–0.2)
BASOPHILS NFR BLD: 0.6 % (ref 0–1.9)
BILIRUB SERPL-MCNC: 1.1 MG/DL (ref 0.1–1)
BUN SERPL-MCNC: 18 MG/DL (ref 6–20)
CALCIUM SERPL-MCNC: 9.3 MG/DL (ref 8.7–10.5)
CHLORIDE SERPL-SCNC: 105 MMOL/L (ref 95–110)
CO2 SERPL-SCNC: 23 MMOL/L (ref 23–29)
CREAT SERPL-MCNC: 1.1 MG/DL (ref 0.5–1.4)
DIFFERENTIAL METHOD: ABNORMAL
EOSINOPHIL # BLD AUTO: 0.1 K/UL (ref 0–0.5)
EOSINOPHIL NFR BLD: 2 % (ref 0–8)
ERYTHROCYTE [DISTWIDTH] IN BLOOD BY AUTOMATED COUNT: 13 % (ref 11.5–14.5)
EST. GFR  (AFRICAN AMERICAN): >60 ML/MIN/1.73 M^2
EST. GFR  (NON AFRICAN AMERICAN): >60 ML/MIN/1.73 M^2
GLUCOSE SERPL-MCNC: 104 MG/DL (ref 70–110)
HCT VFR BLD AUTO: 44.5 % (ref 40–54)
HGB BLD-MCNC: 13.9 G/DL (ref 14–18)
IMM GRANULOCYTES # BLD AUTO: 0.01 K/UL (ref 0–0.04)
IMM GRANULOCYTES NFR BLD AUTO: 0.3 % (ref 0–0.5)
INR PPP: 1.1 (ref 0.8–1.2)
LYMPHOCYTES # BLD AUTO: 1.7 K/UL (ref 1–4.8)
LYMPHOCYTES NFR BLD: 47.2 % (ref 18–48)
MCH RBC QN AUTO: 30.8 PG (ref 27–31)
MCHC RBC AUTO-ENTMCNC: 31.2 G/DL (ref 32–36)
MCV RBC AUTO: 99 FL (ref 82–98)
MONOCYTES # BLD AUTO: 0.7 K/UL (ref 0.3–1)
MONOCYTES NFR BLD: 18.4 % (ref 4–15)
NEUTROPHILS # BLD AUTO: 1.1 K/UL (ref 1.8–7.7)
NEUTROPHILS NFR BLD: 31.5 % (ref 38–73)
NRBC BLD-RTO: 0 /100 WBC
PLATELET # BLD AUTO: 139 K/UL (ref 150–450)
PMV BLD AUTO: 9.7 FL (ref 9.2–12.9)
POTASSIUM SERPL-SCNC: 4.5 MMOL/L (ref 3.5–5.1)
PROT SERPL-MCNC: 5.8 G/DL (ref 6–8.4)
PROTHROMBIN TIME: 11.5 SEC (ref 9–12.5)
RBC # BLD AUTO: 4.51 M/UL (ref 4.6–6.2)
SODIUM SERPL-SCNC: 138 MMOL/L (ref 136–145)
WBC # BLD AUTO: 3.58 K/UL (ref 3.9–12.7)

## 2022-07-29 PROCEDURE — 80053 COMPREHEN METABOLIC PANEL: CPT | Performed by: INTERNAL MEDICINE

## 2022-07-29 PROCEDURE — 85610 PROTHROMBIN TIME: CPT | Performed by: FAMILY MEDICINE

## 2022-07-29 PROCEDURE — 36415 COLL VENOUS BLD VENIPUNCTURE: CPT | Mod: PO | Performed by: FAMILY MEDICINE

## 2022-07-29 PROCEDURE — 80197 ASSAY OF TACROLIMUS: CPT | Performed by: INTERNAL MEDICINE

## 2022-07-29 PROCEDURE — 85025 COMPLETE CBC W/AUTO DIFF WBC: CPT | Performed by: INTERNAL MEDICINE

## 2022-07-30 LAB — TACROLIMUS BLD-MCNC: 6.7 NG/ML (ref 5–15)

## 2022-08-01 ENCOUNTER — TELEPHONE (OUTPATIENT)
Dept: TRANSPLANT | Facility: CLINIC | Age: 47
End: 2022-08-01
Payer: MEDICAID

## 2022-08-01 ENCOUNTER — PATIENT MESSAGE (OUTPATIENT)
Dept: TRANSPLANT | Facility: CLINIC | Age: 47
End: 2022-08-01
Payer: MEDICAID

## 2022-08-01 NOTE — TELEPHONE ENCOUNTER
----- Message from Theresa Titus MD sent at 8/1/2022  8:16 AM CDT -----  Liver tests are improving.  We can hold on any biopsy.  Repeat labs next week.

## 2022-08-01 NOTE — TELEPHONE ENCOUNTER
[employer] message sent notifying pt of MD review and requesting lab date for labs next week.  Notified IR liver biopsy not needed at this time.  ----- Message from Theresa Titus MD sent at 8/1/2022  8:16 AM CDT -----  Liver tests are improving.  We can hold on any biopsy.  Repeat labs next week.

## 2022-08-03 ENCOUNTER — PATIENT MESSAGE (OUTPATIENT)
Dept: HEPATOLOGY | Facility: CLINIC | Age: 47
End: 2022-08-03
Payer: MEDICAID

## 2022-08-09 ENCOUNTER — LAB VISIT (OUTPATIENT)
Dept: LAB | Facility: HOSPITAL | Age: 47
End: 2022-08-09
Attending: INTERNAL MEDICINE
Payer: MEDICAID

## 2022-08-09 DIAGNOSIS — Z94.4 LIVER REPLACED BY TRANSPLANT: ICD-10-CM

## 2022-08-09 LAB
ALBUMIN SERPL BCP-MCNC: 3.9 G/DL (ref 3.5–5.2)
ALP SERPL-CCNC: 69 U/L (ref 55–135)
ALT SERPL W/O P-5'-P-CCNC: 86 U/L (ref 10–44)
ANION GAP SERPL CALC-SCNC: 11 MMOL/L (ref 8–16)
AST SERPL-CCNC: 66 U/L (ref 10–40)
BASOPHILS # BLD AUTO: 0.02 K/UL (ref 0–0.2)
BASOPHILS NFR BLD: 0.5 % (ref 0–1.9)
BILIRUB SERPL-MCNC: 0.9 MG/DL (ref 0.1–1)
BUN SERPL-MCNC: 20 MG/DL (ref 6–20)
CALCIUM SERPL-MCNC: 10.6 MG/DL (ref 8.7–10.5)
CHLORIDE SERPL-SCNC: 106 MMOL/L (ref 95–110)
CO2 SERPL-SCNC: 20 MMOL/L (ref 23–29)
CREAT SERPL-MCNC: 1.3 MG/DL (ref 0.5–1.4)
DIFFERENTIAL METHOD: ABNORMAL
EOSINOPHIL # BLD AUTO: 0.1 K/UL (ref 0–0.5)
EOSINOPHIL NFR BLD: 1.3 % (ref 0–8)
ERYTHROCYTE [DISTWIDTH] IN BLOOD BY AUTOMATED COUNT: 12 % (ref 11.5–14.5)
EST. GFR  (NO RACE VARIABLE): >60 ML/MIN/1.73 M^2
GLUCOSE SERPL-MCNC: 92 MG/DL (ref 70–110)
HCT VFR BLD AUTO: 44.8 % (ref 40–54)
HGB BLD-MCNC: 14.6 G/DL (ref 14–18)
IMM GRANULOCYTES # BLD AUTO: 0.01 K/UL (ref 0–0.04)
IMM GRANULOCYTES NFR BLD AUTO: 0.3 % (ref 0–0.5)
LYMPHOCYTES # BLD AUTO: 1.4 K/UL (ref 1–4.8)
LYMPHOCYTES NFR BLD: 37.9 % (ref 18–48)
MCH RBC QN AUTO: 31.2 PG (ref 27–31)
MCHC RBC AUTO-ENTMCNC: 32.6 G/DL (ref 32–36)
MCV RBC AUTO: 96 FL (ref 82–98)
MONOCYTES # BLD AUTO: 0.7 K/UL (ref 0.3–1)
MONOCYTES NFR BLD: 18.4 % (ref 4–15)
NEUTROPHILS # BLD AUTO: 1.6 K/UL (ref 1.8–7.7)
NEUTROPHILS NFR BLD: 41.6 % (ref 38–73)
NRBC BLD-RTO: 0 /100 WBC
PLATELET # BLD AUTO: 223 K/UL (ref 150–450)
PMV BLD AUTO: 10.2 FL (ref 9.2–12.9)
POTASSIUM SERPL-SCNC: 4.4 MMOL/L (ref 3.5–5.1)
PROT SERPL-MCNC: 6.9 G/DL (ref 6–8.4)
RBC # BLD AUTO: 4.68 M/UL (ref 4.6–6.2)
SODIUM SERPL-SCNC: 137 MMOL/L (ref 136–145)
WBC # BLD AUTO: 3.75 K/UL (ref 3.9–12.7)

## 2022-08-09 PROCEDURE — 36415 COLL VENOUS BLD VENIPUNCTURE: CPT | Mod: PO | Performed by: INTERNAL MEDICINE

## 2022-08-09 PROCEDURE — 85025 COMPLETE CBC W/AUTO DIFF WBC: CPT | Performed by: INTERNAL MEDICINE

## 2022-08-09 PROCEDURE — 80197 ASSAY OF TACROLIMUS: CPT | Performed by: INTERNAL MEDICINE

## 2022-08-09 PROCEDURE — 80053 COMPREHEN METABOLIC PANEL: CPT | Performed by: INTERNAL MEDICINE

## 2022-08-10 ENCOUNTER — OFFICE VISIT (OUTPATIENT)
Dept: HEPATOLOGY | Facility: CLINIC | Age: 47
End: 2022-08-10
Payer: MEDICAID

## 2022-08-10 VITALS
BODY MASS INDEX: 37.21 KG/M2 | WEIGHT: 274.69 LBS | DIASTOLIC BLOOD PRESSURE: 78 MMHG | SYSTOLIC BLOOD PRESSURE: 118 MMHG | HEIGHT: 72 IN | HEART RATE: 64 BPM

## 2022-08-10 DIAGNOSIS — F10.10 ALCOHOL ABUSE: ICD-10-CM

## 2022-08-10 DIAGNOSIS — D84.9 IMMUNOSUPPRESSION: ICD-10-CM

## 2022-08-10 DIAGNOSIS — Z01.818 PREOPERATIVE CLEARANCE: ICD-10-CM

## 2022-08-10 DIAGNOSIS — Z94.4 LIVER TRANSPLANTED: ICD-10-CM

## 2022-08-10 DIAGNOSIS — R79.89 LOW TESTOSTERONE: ICD-10-CM

## 2022-08-10 DIAGNOSIS — R79.89 ABNORMAL LFTS: Primary | ICD-10-CM

## 2022-08-10 LAB — TACROLIMUS BLD-MCNC: 5.4 NG/ML (ref 5–15)

## 2022-08-10 PROCEDURE — 3078F DIAST BP <80 MM HG: CPT | Mod: CPTII,,, | Performed by: INTERNAL MEDICINE

## 2022-08-10 PROCEDURE — 99214 OFFICE O/P EST MOD 30 MIN: CPT | Mod: S$PBB,,, | Performed by: INTERNAL MEDICINE

## 2022-08-10 PROCEDURE — 3074F PR MOST RECENT SYSTOLIC BLOOD PRESSURE < 130 MM HG: ICD-10-PCS | Mod: CPTII,,, | Performed by: INTERNAL MEDICINE

## 2022-08-10 PROCEDURE — 1159F PR MEDICATION LIST DOCUMENTED IN MEDICAL RECORD: ICD-10-PCS | Mod: CPTII,,, | Performed by: INTERNAL MEDICINE

## 2022-08-10 PROCEDURE — 1159F MED LIST DOCD IN RCRD: CPT | Mod: CPTII,,, | Performed by: INTERNAL MEDICINE

## 2022-08-10 PROCEDURE — 3078F PR MOST RECENT DIASTOLIC BLOOD PRESSURE < 80 MM HG: ICD-10-PCS | Mod: CPTII,,, | Performed by: INTERNAL MEDICINE

## 2022-08-10 PROCEDURE — 3074F SYST BP LT 130 MM HG: CPT | Mod: CPTII,,, | Performed by: INTERNAL MEDICINE

## 2022-08-10 PROCEDURE — 99214 PR OFFICE/OUTPT VISIT, EST, LEVL IV, 30-39 MIN: ICD-10-PCS | Mod: S$PBB,,, | Performed by: INTERNAL MEDICINE

## 2022-08-10 PROCEDURE — 99999 PR PBB SHADOW E&M-EST. PATIENT-LVL IV: ICD-10-PCS | Mod: PBBFAC,,, | Performed by: INTERNAL MEDICINE

## 2022-08-10 PROCEDURE — 3008F BODY MASS INDEX DOCD: CPT | Mod: CPTII,,, | Performed by: INTERNAL MEDICINE

## 2022-08-10 PROCEDURE — 99999 PR PBB SHADOW E&M-EST. PATIENT-LVL IV: CPT | Mod: PBBFAC,,, | Performed by: INTERNAL MEDICINE

## 2022-08-10 PROCEDURE — 99214 OFFICE O/P EST MOD 30 MIN: CPT | Mod: PBBFAC | Performed by: INTERNAL MEDICINE

## 2022-08-10 PROCEDURE — 3008F PR BODY MASS INDEX (BMI) DOCUMENTED: ICD-10-PCS | Mod: CPTII,,, | Performed by: INTERNAL MEDICINE

## 2022-08-10 PROCEDURE — 4010F PR ACE/ARB THEARPY RXD/TAKEN: ICD-10-PCS | Mod: CPTII,,, | Performed by: INTERNAL MEDICINE

## 2022-08-10 PROCEDURE — 4010F ACE/ARB THERAPY RXD/TAKEN: CPT | Mod: CPTII,,, | Performed by: INTERNAL MEDICINE

## 2022-08-10 RX ORDER — FLUTICASONE PROPIONATE AND SALMETEROL 100; 50 UG/1; UG/1
1 POWDER RESPIRATORY (INHALATION) 2 TIMES DAILY
COMMUNITY
End: 2022-08-10

## 2022-08-10 RX ORDER — TACROLIMUS 1 MG/1
CAPSULE ORAL
Qty: 90 CAPSULE | Refills: 11
Start: 2022-08-10 | End: 2022-08-12 | Stop reason: DRUGHIGH

## 2022-08-10 RX ORDER — FLUTICASONE PROPIONATE 50 MCG
1 SPRAY, SUSPENSION (ML) NASAL DAILY
COMMUNITY
End: 2024-01-01

## 2022-08-10 RX ORDER — TESTOSTERONE CYPIONATE 200 MG/ML
150 INJECTION, SOLUTION INTRAMUSCULAR WEEKLY
Qty: 5 ML | Refills: 0
Start: 2022-08-10

## 2022-08-10 RX ORDER — TIOTROPIUM BROMIDE 18 UG/1
18 CAPSULE ORAL; RESPIRATORY (INHALATION) DAILY
COMMUNITY
End: 2023-01-01

## 2022-08-10 RX ORDER — FLUTICASONE PROPIONATE AND SALMETEROL 250; 50 UG/1; UG/1
1 POWDER RESPIRATORY (INHALATION) 2 TIMES DAILY
COMMUNITY
End: 2023-01-01

## 2022-08-10 RX ORDER — TACROLIMUS 1 MG/1
CAPSULE ORAL
Qty: 90 CAPSULE | Refills: 11 | Status: SHIPPED | OUTPATIENT
Start: 2022-08-10 | End: 2022-08-10

## 2022-08-10 NOTE — PROGRESS NOTES
Transplant Hepatology  Liver Transplant Recipient Follow-up    Transplant Date: 10/14/2020  UNOS Native Liver Dx: Alcoholic Cirrhosis    Jordan is here for follow up of his liver transplant.    ORGAN: LIVER  Whole or Partial: whole liver  Donor Type: donation after brain death  CDC High Risk: yes  Donor CMV Status: Positive  Donor HCV Status: Positive  Donor HBcAb: Negative  Biliary Anastomosis: end to end  Arterial Anatomy: standard  IVC reconstruction: end to end ivc  Portal vein status: patent  .    Subjective:     Interval History:      Currently, he is doing with difficulty. Current complaints include his neck which have been chronic.  He is getting follow-up for that.  He reports he may need surgery.  Of note he reports he is dealing with stress better.  He denies any recent alcohol use.  For the most part he reports compliance with medications being knowledge is missing a dose of medication on Monday.    Review of Systems   Constitutional: Positive for activity change.   HENT: Negative.    Eyes: Negative.    Respiratory: Negative.    Cardiovascular: Negative.    Gastrointestinal: Negative.    Genitourinary: Negative.    Musculoskeletal: Positive for arthralgias, myalgias and neck pain.   Skin: Negative.    Neurological: Negative.    Psychiatric/Behavioral: Negative.        Objective:     Physical Exam  Vitals reviewed.   Constitutional:       General: He is not in acute distress.     Appearance: He is well-developed.   HENT:      Head: Normocephalic and atraumatic.      Mouth/Throat:      Pharynx: No oropharyngeal exudate.   Eyes:      General: No scleral icterus.        Right eye: No discharge.         Left eye: No discharge.      Conjunctiva/sclera: Conjunctivae normal.      Pupils: Pupils are equal, round, and reactive to light.   Pulmonary:      Effort: Pulmonary effort is normal. No respiratory distress.      Breath sounds: Normal breath sounds. No wheezing.   Abdominal:      General: There is no  distension.      Palpations: Abdomen is soft.      Tenderness: There is no abdominal tenderness.   Neurological:      Mental Status: He is alert and oriented to person, place, and time.   Psychiatric:         Behavior: Behavior normal.         WBC   Date Value Ref Range Status   08/09/2022 3.75 (L) 3.90 - 12.70 K/uL Final     Hemoglobin   Date Value Ref Range Status   08/09/2022 14.6 14.0 - 18.0 g/dL Final     POC Hematocrit   Date Value Ref Range Status   10/16/2020 23 (L) 36 - 54 %PCV Final     Hematocrit   Date Value Ref Range Status   08/09/2022 44.8 40.0 - 54.0 % Final     Platelets   Date Value Ref Range Status   08/09/2022 223 150 - 450 K/uL Final     BUN   Date Value Ref Range Status   08/09/2022 20 6 - 20 mg/dL Final     Creatinine   Date Value Ref Range Status   08/09/2022 1.3 0.5 - 1.4 mg/dL Final     Glucose   Date Value Ref Range Status   08/09/2022 92 70 - 110 mg/dL Final     Calcium   Date Value Ref Range Status   08/09/2022 10.6 (H) 8.7 - 10.5 mg/dL Final     Sodium   Date Value Ref Range Status   08/09/2022 137 136 - 145 mmol/L Final     Potassium   Date Value Ref Range Status   08/09/2022 4.4 3.5 - 5.1 mmol/L Final     Chloride   Date Value Ref Range Status   08/09/2022 106 95 - 110 mmol/L Final     Magnesium   Date Value Ref Range Status   01/29/2021 1.9 1.6 - 2.6 mg/dL Final     AST   Date Value Ref Range Status   08/09/2022 66 (H) 10 - 40 U/L Final     ALT   Date Value Ref Range Status   08/09/2022 86 (H) 10 - 44 U/L Final     Alkaline Phosphatase   Date Value Ref Range Status   08/09/2022 69 55 - 135 U/L Final     Total Bilirubin   Date Value Ref Range Status   08/09/2022 0.9 0.1 - 1.0 mg/dL Final     Comment:     For infants and newborns, interpretation of results should be based  on gestational age, weight and in agreement with clinical  observations.    Premature Infant recommended reference ranges:  Up to 24 hours.............<8.0 mg/dL  Up to 48 hours............<12.0 mg/dL  3-5  days..................<15.0 mg/dL  6-29 days.................<15.0 mg/dL       Albumin   Date Value Ref Range Status   08/09/2022 3.9 3.5 - 5.2 g/dL Final     INR   Date Value Ref Range Status   07/29/2022 1.1 0.8 - 1.2 Final     Comment:     Coumadin Therapy:  2.0 - 3.0 for INR for all indicators except mechanical heart valves  and antiphospholipid syndromes which should use 2.5 - 3.5.       Lab Results   Component Value Date    TACROLIMUS 5.4 08/09/2022           Assessment/Plan:     1. Abnormal LFTs    2. Immunosuppression    3. Liver transplanted    4. Alcohol abuse    5. Low testosterone    6. Preoperative clearance      Abnormal LFTs-liver tests have fluctuated regularly.  Mostly been concerned that is related to alcohol but given patient's history of rejection also concerned that it could be a component of rejection.  Plans were made for liver biopsy but liver tests started to improve.  Now liver tests are going back up the patient did miss a dose of his immunosuppression.  -repeat labs next week, if immunosuppression is adequate level and liver tests are still elevated will need to proceed with biopsy.  Patient would have to hold anticoagulation during that time.    Immunosuppression-since patient missed a dose of his immunosuppression will hold on increasing immunosuppression dosing  -check level next week; if not adequate then will titrate up tacrolimus  -     tacrolimus (PROGRAF) 1 MG Cap; Take 1 capsule (1 mg total) by mouth every morning AND 1 capsule (1 mg total) every evening.  Dispense: 90 capsule; Refill: 11    Liver transplanted-good allograft function despite abnormal LFTs.  -     tacrolimus (PROGRAF) 1 MG Cap; Take 1 capsule (1 mg total) by mouth every morning AND 1 capsule (1 mg total) every evening.  Dispense: 90 capsule; Refill: 11    Alcohol abuse  -continue to advise alcohol abstinence    Low testosterone-low concern the testosterone could be affecting his LFTs but this is a consideration  -      testosterone cypionate (DEPOTESTOTERONE CYPIONATE) 200 mg/mL injection; Inject 0.75 mLs (150 mg total) into the muscle once a week.  Dispense: 5 mL; Refill: 0    Preoperative clearance-patient to proceed with neck surgery as long as he is not in the midst of treatment for rejection    Return to clinic in 6 months    Patient was seen in the liver transplant department at [facility].      Theresa Titus MD           [facility] Patient Status  Functional Status: 70% - Cares for self: unable to carry on normal activity or active work  Physical Capacity: No Limitations

## 2022-08-11 ENCOUNTER — PATIENT MESSAGE (OUTPATIENT)
Dept: TRANSPLANT | Facility: CLINIC | Age: 47
End: 2022-08-11
Payer: MEDICAID

## 2022-08-12 ENCOUNTER — PATIENT MESSAGE (OUTPATIENT)
Dept: TRANSPLANT | Facility: CLINIC | Age: 47
End: 2022-08-12
Payer: MEDICAID

## 2022-08-12 DIAGNOSIS — Z94.4 LIVER TRANSPLANTED: ICD-10-CM

## 2022-08-12 DIAGNOSIS — D84.9 IMMUNOSUPPRESSION: ICD-10-CM

## 2022-08-12 NOTE — TELEPHONE ENCOUNTER
Stable labs, no medication changes.  Next labs 8/18/22.  [employer] message sent to notify pt.  ----- Message from Theresa Titus MD sent at 8/10/2022  4:06 PM CDT -----  Liver tests are trending back up.  Will discuss in clinic increase in tacrolimus to 2 mg in the morning and 1 mg in the evening.  Repeat labs next week.

## 2022-08-15 RX ORDER — TACROLIMUS 1 MG/1
CAPSULE ORAL
Qty: 90 CAPSULE | Refills: 11 | Status: SHIPPED | OUTPATIENT
Start: 2022-08-15 | End: 2022-09-15 | Stop reason: DRUGHIGH

## 2022-08-23 ENCOUNTER — LAB VISIT (OUTPATIENT)
Dept: LAB | Facility: HOSPITAL | Age: 47
End: 2022-08-23
Attending: INTERNAL MEDICINE
Payer: MEDICAID

## 2022-08-23 DIAGNOSIS — Z94.4 LIVER REPLACED BY TRANSPLANT: ICD-10-CM

## 2022-08-23 LAB
ALBUMIN SERPL BCP-MCNC: 3.7 G/DL (ref 3.5–5.2)
ALP SERPL-CCNC: 57 U/L (ref 55–135)
ALT SERPL W/O P-5'-P-CCNC: 27 U/L (ref 10–44)
ANION GAP SERPL CALC-SCNC: 7 MMOL/L (ref 8–16)
AST SERPL-CCNC: 28 U/L (ref 10–40)
BASOPHILS # BLD AUTO: 0.02 K/UL (ref 0–0.2)
BASOPHILS NFR BLD: 0.5 % (ref 0–1.9)
BILIRUB SERPL-MCNC: 0.6 MG/DL (ref 0.1–1)
BUN SERPL-MCNC: 21 MG/DL (ref 6–20)
CALCIUM SERPL-MCNC: 9.6 MG/DL (ref 8.7–10.5)
CHLORIDE SERPL-SCNC: 106 MMOL/L (ref 95–110)
CO2 SERPL-SCNC: 23 MMOL/L (ref 23–29)
CREAT SERPL-MCNC: 1.2 MG/DL (ref 0.5–1.4)
DIFFERENTIAL METHOD: ABNORMAL
EOSINOPHIL # BLD AUTO: 0.1 K/UL (ref 0–0.5)
EOSINOPHIL NFR BLD: 2 % (ref 0–8)
ERYTHROCYTE [DISTWIDTH] IN BLOOD BY AUTOMATED COUNT: 12.3 % (ref 11.5–14.5)
EST. GFR  (NO RACE VARIABLE): >60 ML/MIN/1.73 M^2
GLUCOSE SERPL-MCNC: 103 MG/DL (ref 70–110)
HCT VFR BLD AUTO: 43.3 % (ref 40–54)
HGB BLD-MCNC: 14 G/DL (ref 14–18)
IMM GRANULOCYTES # BLD AUTO: 0.03 K/UL (ref 0–0.04)
IMM GRANULOCYTES NFR BLD AUTO: 0.7 % (ref 0–0.5)
LYMPHOCYTES # BLD AUTO: 1.6 K/UL (ref 1–4.8)
LYMPHOCYTES NFR BLD: 40.3 % (ref 18–48)
MCH RBC QN AUTO: 30.2 PG (ref 27–31)
MCHC RBC AUTO-ENTMCNC: 32.3 G/DL (ref 32–36)
MCV RBC AUTO: 94 FL (ref 82–98)
MONOCYTES # BLD AUTO: 0.7 K/UL (ref 0.3–1)
MONOCYTES NFR BLD: 16.7 % (ref 4–15)
NEUTROPHILS # BLD AUTO: 1.6 K/UL (ref 1.8–7.7)
NEUTROPHILS NFR BLD: 39.8 % (ref 38–73)
NRBC BLD-RTO: 0 /100 WBC
PLATELET # BLD AUTO: 147 K/UL (ref 150–450)
PMV BLD AUTO: 9.7 FL (ref 9.2–12.9)
POTASSIUM SERPL-SCNC: 4.6 MMOL/L (ref 3.5–5.1)
PROT SERPL-MCNC: 6.3 G/DL (ref 6–8.4)
RBC # BLD AUTO: 4.63 M/UL (ref 4.6–6.2)
SODIUM SERPL-SCNC: 136 MMOL/L (ref 136–145)
WBC # BLD AUTO: 4.07 K/UL (ref 3.9–12.7)

## 2022-08-23 PROCEDURE — 85025 COMPLETE CBC W/AUTO DIFF WBC: CPT | Performed by: INTERNAL MEDICINE

## 2022-08-23 PROCEDURE — 80053 COMPREHEN METABOLIC PANEL: CPT | Performed by: INTERNAL MEDICINE

## 2022-08-23 PROCEDURE — 80197 ASSAY OF TACROLIMUS: CPT | Performed by: INTERNAL MEDICINE

## 2022-08-23 PROCEDURE — 36415 COLL VENOUS BLD VENIPUNCTURE: CPT | Mod: PO | Performed by: INTERNAL MEDICINE

## 2022-08-24 LAB — TACROLIMUS BLD-MCNC: 7.7 NG/ML (ref 5–15)

## 2022-08-25 ENCOUNTER — PATIENT MESSAGE (OUTPATIENT)
Dept: TRANSPLANT | Facility: CLINIC | Age: 47
End: 2022-08-25
Payer: MEDICAID

## 2022-08-25 ENCOUNTER — TELEPHONE (OUTPATIENT)
Dept: TRANSPLANT | Facility: CLINIC | Age: 47
End: 2022-08-25
Payer: MEDICAID

## 2022-08-25 NOTE — TELEPHONE ENCOUNTER
Stable labs, no medication changes.  Next labs 9/7/22, [employer] message sent to notify pt.  ----- Message from Theresa Titus MD sent at 8/24/2022  5:26 PM CDT -----  Liver tests are normal; repeat labs in 2 weeks

## 2022-09-03 ENCOUNTER — PATIENT MESSAGE (OUTPATIENT)
Dept: TRANSPLANT | Facility: CLINIC | Age: 47
End: 2022-09-03
Payer: MEDICAID

## 2022-09-10 ENCOUNTER — PATIENT MESSAGE (OUTPATIENT)
Dept: TRANSPLANT | Facility: CLINIC | Age: 47
End: 2022-09-10
Payer: MEDICAID

## 2022-09-13 ENCOUNTER — LAB VISIT (OUTPATIENT)
Dept: LAB | Facility: HOSPITAL | Age: 47
End: 2022-09-13
Attending: INTERNAL MEDICINE
Payer: MEDICAID

## 2022-09-13 DIAGNOSIS — Z94.4 LIVER REPLACED BY TRANSPLANT: ICD-10-CM

## 2022-09-13 LAB
ALBUMIN SERPL BCP-MCNC: 3.7 G/DL (ref 3.5–5.2)
ALP SERPL-CCNC: 74 U/L (ref 55–135)
ALT SERPL W/O P-5'-P-CCNC: 31 U/L (ref 10–44)
ANION GAP SERPL CALC-SCNC: 9 MMOL/L (ref 8–16)
AST SERPL-CCNC: 47 U/L (ref 10–40)
BASOPHILS # BLD AUTO: 0.02 K/UL (ref 0–0.2)
BASOPHILS NFR BLD: 0.6 % (ref 0–1.9)
BILIRUB SERPL-MCNC: 1 MG/DL (ref 0.1–1)
BUN SERPL-MCNC: 20 MG/DL (ref 6–20)
CALCIUM SERPL-MCNC: 9.5 MG/DL (ref 8.7–10.5)
CHLORIDE SERPL-SCNC: 106 MMOL/L (ref 95–110)
CO2 SERPL-SCNC: 24 MMOL/L (ref 23–29)
CREAT SERPL-MCNC: 1.2 MG/DL (ref 0.5–1.4)
DIFFERENTIAL METHOD: ABNORMAL
EOSINOPHIL # BLD AUTO: 0.1 K/UL (ref 0–0.5)
EOSINOPHIL NFR BLD: 1.5 % (ref 0–8)
ERYTHROCYTE [DISTWIDTH] IN BLOOD BY AUTOMATED COUNT: 13.3 % (ref 11.5–14.5)
EST. GFR  (NO RACE VARIABLE): >60 ML/MIN/1.73 M^2
GLUCOSE SERPL-MCNC: 113 MG/DL (ref 70–110)
HCT VFR BLD AUTO: 43.8 % (ref 40–54)
HGB BLD-MCNC: 14.3 G/DL (ref 14–18)
IMM GRANULOCYTES # BLD AUTO: 0.01 K/UL (ref 0–0.04)
IMM GRANULOCYTES NFR BLD AUTO: 0.3 % (ref 0–0.5)
LYMPHOCYTES # BLD AUTO: 1.2 K/UL (ref 1–4.8)
LYMPHOCYTES NFR BLD: 35.7 % (ref 18–48)
MCH RBC QN AUTO: 31.5 PG (ref 27–31)
MCHC RBC AUTO-ENTMCNC: 32.6 G/DL (ref 32–36)
MCV RBC AUTO: 97 FL (ref 82–98)
MONOCYTES # BLD AUTO: 0.5 K/UL (ref 0.3–1)
MONOCYTES NFR BLD: 15.9 % (ref 4–15)
NEUTROPHILS # BLD AUTO: 1.6 K/UL (ref 1.8–7.7)
NEUTROPHILS NFR BLD: 46 % (ref 38–73)
NRBC BLD-RTO: 0 /100 WBC
PLATELET # BLD AUTO: 111 K/UL (ref 150–450)
PMV BLD AUTO: 9.7 FL (ref 9.2–12.9)
POTASSIUM SERPL-SCNC: 4.5 MMOL/L (ref 3.5–5.1)
PROT SERPL-MCNC: 6.6 G/DL (ref 6–8.4)
RBC # BLD AUTO: 4.54 M/UL (ref 4.6–6.2)
SODIUM SERPL-SCNC: 139 MMOL/L (ref 136–145)
WBC # BLD AUTO: 3.39 K/UL (ref 3.9–12.7)

## 2022-09-13 PROCEDURE — 80053 COMPREHEN METABOLIC PANEL: CPT | Performed by: INTERNAL MEDICINE

## 2022-09-13 PROCEDURE — 36415 COLL VENOUS BLD VENIPUNCTURE: CPT | Mod: PO | Performed by: INTERNAL MEDICINE

## 2022-09-13 PROCEDURE — 80197 ASSAY OF TACROLIMUS: CPT | Performed by: INTERNAL MEDICINE

## 2022-09-13 PROCEDURE — 85025 COMPLETE CBC W/AUTO DIFF WBC: CPT | Performed by: INTERNAL MEDICINE

## 2022-09-14 LAB — TACROLIMUS BLD-MCNC: 7.1 NG/ML (ref 5–15)

## 2022-09-15 DIAGNOSIS — Z94.4 LIVER TRANSPLANTED: ICD-10-CM

## 2022-09-15 DIAGNOSIS — D84.9 IMMUNOSUPPRESSION: ICD-10-CM

## 2022-09-15 RX ORDER — TACROLIMUS 1 MG/1
CAPSULE ORAL
Qty: 60 CAPSULE | Refills: 11 | Status: SHIPPED | OUTPATIENT
Start: 2022-09-15 | End: 2023-01-01 | Stop reason: SDUPTHER

## 2022-09-22 ENCOUNTER — PATIENT MESSAGE (OUTPATIENT)
Dept: TRANSPLANT | Facility: CLINIC | Age: 47
End: 2022-09-22
Payer: MEDICAID

## 2022-09-22 ENCOUNTER — TELEPHONE (OUTPATIENT)
Dept: TRANSPLANT | Facility: CLINIC | Age: 47
End: 2022-09-22
Payer: MEDICAID

## 2022-10-07 ENCOUNTER — LAB VISIT (OUTPATIENT)
Dept: LAB | Facility: HOSPITAL | Age: 47
End: 2022-10-07
Attending: INTERNAL MEDICINE
Payer: MEDICAID

## 2022-10-07 DIAGNOSIS — Z94.4 LIVER REPLACED BY TRANSPLANT: ICD-10-CM

## 2022-10-07 LAB
ALBUMIN SERPL BCP-MCNC: 3.7 G/DL (ref 3.5–5.2)
ALP SERPL-CCNC: 73 U/L (ref 55–135)
ALT SERPL W/O P-5'-P-CCNC: 15 U/L (ref 10–44)
ANION GAP SERPL CALC-SCNC: 6 MMOL/L (ref 8–16)
AST SERPL-CCNC: 23 U/L (ref 10–40)
BASOPHILS # BLD AUTO: 0.01 K/UL (ref 0–0.2)
BASOPHILS NFR BLD: 0.3 % (ref 0–1.9)
BILIRUB SERPL-MCNC: 0.7 MG/DL (ref 0.1–1)
BUN SERPL-MCNC: 27 MG/DL (ref 6–20)
CALCIUM SERPL-MCNC: 9.4 MG/DL (ref 8.7–10.5)
CHLORIDE SERPL-SCNC: 108 MMOL/L (ref 95–110)
CO2 SERPL-SCNC: 24 MMOL/L (ref 23–29)
CREAT SERPL-MCNC: 1.2 MG/DL (ref 0.5–1.4)
DIFFERENTIAL METHOD: ABNORMAL
EOSINOPHIL # BLD AUTO: 0.1 K/UL (ref 0–0.5)
EOSINOPHIL NFR BLD: 1.4 % (ref 0–8)
ERYTHROCYTE [DISTWIDTH] IN BLOOD BY AUTOMATED COUNT: 12.3 % (ref 11.5–14.5)
EST. GFR  (NO RACE VARIABLE): >60 ML/MIN/1.73 M^2
GLUCOSE SERPL-MCNC: 130 MG/DL (ref 70–110)
HCT VFR BLD AUTO: 42.7 % (ref 40–54)
HGB BLD-MCNC: 13.5 G/DL (ref 14–18)
IMM GRANULOCYTES # BLD AUTO: 0.01 K/UL (ref 0–0.04)
IMM GRANULOCYTES NFR BLD AUTO: 0.3 % (ref 0–0.5)
LYMPHOCYTES # BLD AUTO: 1.7 K/UL (ref 1–4.8)
LYMPHOCYTES NFR BLD: 45.3 % (ref 18–48)
MCH RBC QN AUTO: 29.7 PG (ref 27–31)
MCHC RBC AUTO-ENTMCNC: 31.6 G/DL (ref 32–36)
MCV RBC AUTO: 94 FL (ref 82–98)
MONOCYTES # BLD AUTO: 0.6 K/UL (ref 0.3–1)
MONOCYTES NFR BLD: 16 % (ref 4–15)
NEUTROPHILS # BLD AUTO: 1.4 K/UL (ref 1.8–7.7)
NEUTROPHILS NFR BLD: 36.7 % (ref 38–73)
NRBC BLD-RTO: 0 /100 WBC
PLATELET # BLD AUTO: 167 K/UL (ref 150–450)
PMV BLD AUTO: 10.4 FL (ref 9.2–12.9)
POTASSIUM SERPL-SCNC: 5.2 MMOL/L (ref 3.5–5.1)
PROT SERPL-MCNC: 6.1 G/DL (ref 6–8.4)
RBC # BLD AUTO: 4.55 M/UL (ref 4.6–6.2)
SODIUM SERPL-SCNC: 138 MMOL/L (ref 136–145)
WBC # BLD AUTO: 3.69 K/UL (ref 3.9–12.7)

## 2022-10-07 PROCEDURE — 80197 ASSAY OF TACROLIMUS: CPT | Performed by: INTERNAL MEDICINE

## 2022-10-07 PROCEDURE — 85025 COMPLETE CBC W/AUTO DIFF WBC: CPT | Performed by: INTERNAL MEDICINE

## 2022-10-07 PROCEDURE — 36415 COLL VENOUS BLD VENIPUNCTURE: CPT | Mod: PO | Performed by: INTERNAL MEDICINE

## 2022-10-07 PROCEDURE — 80053 COMPREHEN METABOLIC PANEL: CPT | Performed by: INTERNAL MEDICINE

## 2022-10-08 LAB — TACROLIMUS BLD-MCNC: 7.5 NG/ML (ref 5–15)

## 2022-10-10 ENCOUNTER — TELEPHONE (OUTPATIENT)
Dept: TRANSPLANT | Facility: CLINIC | Age: 47
End: 2022-10-10
Payer: MEDICAID

## 2022-10-10 ENCOUNTER — PATIENT MESSAGE (OUTPATIENT)
Dept: TRANSPLANT | Facility: CLINIC | Age: 47
End: 2022-10-10
Payer: MEDICAID

## 2022-10-10 NOTE — LETTER
October 10, 2022    Jordan Altman  [address] [address]  [place] [address]          Dear Jordan Altman:  MRN: [id number]    This is a follow up to your recent labs, your lab results were stable.  There are no medicine changes.  Please have your labs drawn again on 11/7/22.      If you cannot have your labs drawn on the scheduled date, it is your responsibility to call the transplant department to reschedule.  Please call [phone] and ask to speak to Yessenia QUIROZ   for all scheduling requests.     Sincerely,      Sendy DOYLEN, RN  Your Liver Transplant Coordinator    [facility]  [address] [place] [id number]  [phone]

## 2022-10-10 NOTE — TELEPHONE ENCOUNTER
Stable labs, no medication changes.  Next labs 11/7/22, letter and MyChart message sent.  ----- Message from Theresa Titus MD sent at 10/10/2022 10:31 AM CDT -----  Reviewed, nothing to do; repeat per routine

## 2022-11-12 ENCOUNTER — HOSPITAL ENCOUNTER (EMERGENCY)
Facility: HOSPITAL | Age: 47
Discharge: HOME | End: 2022-11-12
Attending: EMERGENCY MEDICINE
Payer: MEDICAID

## 2022-11-12 VITALS
TEMPERATURE: 99 F | RESPIRATION RATE: 18 BRPM | WEIGHT: 282.63 LBS | DIASTOLIC BLOOD PRESSURE: 69 MMHG | HEIGHT: 72 IN | SYSTOLIC BLOOD PRESSURE: 149 MMHG | BODY MASS INDEX: 38.28 KG/M2 | OXYGEN SATURATION: 100 % | HEART RATE: 80 BPM

## 2022-11-12 DIAGNOSIS — R11.2 NAUSEA AND VOMITING, UNSPECIFIED VOMITING TYPE: Primary | ICD-10-CM

## 2022-11-12 DIAGNOSIS — Z94.4 HISTORY OF LIVER TRANSPLANT: ICD-10-CM

## 2022-11-12 DIAGNOSIS — R00.0 TACHYCARDIA: ICD-10-CM

## 2022-11-12 DIAGNOSIS — K21.9 GASTROESOPHAGEAL REFLUX DISEASE, UNSPECIFIED WHETHER ESOPHAGITIS PRESENT: ICD-10-CM

## 2022-11-12 DIAGNOSIS — E83.42 HYPOMAGNESEMIA: ICD-10-CM

## 2022-11-12 DIAGNOSIS — E86.1 INTRAVASCULAR VOLUME DEPLETION: ICD-10-CM

## 2022-11-12 LAB
ALBUMIN SERPL BCP-MCNC: 4.1 G/DL (ref 3.5–5.2)
ALP SERPL-CCNC: 91 U/L (ref 55–135)
ALT SERPL W/O P-5'-P-CCNC: 23 U/L (ref 10–44)
ANION GAP SERPL CALC-SCNC: 15 MMOL/L (ref 8–16)
APTT BLDCRRT: 30.8 SEC (ref 21–32)
AST SERPL-CCNC: 39 U/L (ref 10–40)
BASOPHILS # BLD AUTO: 0.01 K/UL (ref 0–0.2)
BASOPHILS NFR BLD: 0.2 % (ref 0–1.9)
BILIRUB SERPL-MCNC: 1.6 MG/DL (ref 0.1–1)
BILIRUB UR QL STRIP: NEGATIVE
BUN SERPL-MCNC: 16 MG/DL (ref 6–20)
CALCIUM SERPL-MCNC: 9.6 MG/DL (ref 8.7–10.5)
CHLORIDE SERPL-SCNC: 102 MMOL/L (ref 95–110)
CLARITY UR: CLEAR
CO2 SERPL-SCNC: 19 MMOL/L (ref 23–29)
COLOR UR: YELLOW
CREAT SERPL-MCNC: 1.2 MG/DL (ref 0.5–1.4)
DIFFERENTIAL METHOD: ABNORMAL
EOSINOPHIL # BLD AUTO: 0 K/UL (ref 0–0.5)
EOSINOPHIL NFR BLD: 0.2 % (ref 0–8)
ERYTHROCYTE [DISTWIDTH] IN BLOOD BY AUTOMATED COUNT: 12.8 % (ref 11.5–14.5)
EST. GFR  (NO RACE VARIABLE): >60 ML/MIN/1.73 M^2
GLUCOSE SERPL-MCNC: 146 MG/DL (ref 70–110)
GLUCOSE UR QL STRIP: NEGATIVE
HCT VFR BLD AUTO: 46 % (ref 40–54)
HGB BLD-MCNC: 15.6 G/DL (ref 14–18)
HGB UR QL STRIP: NEGATIVE
IMM GRANULOCYTES # BLD AUTO: 0.02 K/UL (ref 0–0.04)
IMM GRANULOCYTES NFR BLD AUTO: 0.5 % (ref 0–0.5)
INR PPP: 1.1 (ref 0.8–1.2)
KETONES UR QL STRIP: ABNORMAL
LEUKOCYTE ESTERASE UR QL STRIP: NEGATIVE
LIPASE SERPL-CCNC: 36 U/L (ref 4–60)
LYMPHOCYTES # BLD AUTO: 1.1 K/UL (ref 1–4.8)
LYMPHOCYTES NFR BLD: 25.6 % (ref 18–48)
MAGNESIUM SERPL-MCNC: 1.2 MG/DL (ref 1.6–2.6)
MCH RBC QN AUTO: 30.2 PG (ref 27–31)
MCHC RBC AUTO-ENTMCNC: 33.9 G/DL (ref 32–36)
MCV RBC AUTO: 89 FL (ref 82–98)
MONOCYTES # BLD AUTO: 0.4 K/UL (ref 0.3–1)
MONOCYTES NFR BLD: 10.1 % (ref 4–15)
NEUTROPHILS # BLD AUTO: 2.7 K/UL (ref 1.8–7.7)
NEUTROPHILS NFR BLD: 63.4 % (ref 38–73)
NITRITE UR QL STRIP: NEGATIVE
NRBC BLD-RTO: 0 /100 WBC
PH UR STRIP: 6 [PH] (ref 5–8)
PLATELET # BLD AUTO: 127 K/UL (ref 150–450)
PMV BLD AUTO: 9.5 FL (ref 9.2–12.9)
POTASSIUM SERPL-SCNC: 3.9 MMOL/L (ref 3.5–5.1)
PROT SERPL-MCNC: 6.9 G/DL (ref 6–8.4)
PROT UR QL STRIP: NEGATIVE
PROTHROMBIN TIME: 11.5 SEC (ref 9–12.5)
RBC # BLD AUTO: 5.17 M/UL (ref 4.6–6.2)
SODIUM SERPL-SCNC: 136 MMOL/L (ref 136–145)
SP GR UR STRIP: 1.01 (ref 1–1.03)
URN SPEC COLLECT METH UR: ABNORMAL
UROBILINOGEN UR STRIP-ACNC: NEGATIVE EU/DL
WBC # BLD AUTO: 4.25 K/UL (ref 3.9–12.7)

## 2022-11-12 PROCEDURE — 81003 URINALYSIS AUTO W/O SCOPE: CPT | Performed by: EMERGENCY MEDICINE

## 2022-11-12 PROCEDURE — 83690 ASSAY OF LIPASE: CPT | Performed by: EMERGENCY MEDICINE

## 2022-11-12 PROCEDURE — 99285 EMERGENCY DEPT VISIT HI MDM: CPT | Mod: 25

## 2022-11-12 PROCEDURE — 83735 ASSAY OF MAGNESIUM: CPT | Performed by: EMERGENCY MEDICINE

## 2022-11-12 PROCEDURE — 96365 THER/PROPH/DIAG IV INF INIT: CPT

## 2022-11-12 PROCEDURE — 25000003 PHARM REV CODE 250: Performed by: EMERGENCY MEDICINE

## 2022-11-12 PROCEDURE — 63600175 PHARM REV CODE 636 W HCPCS: Performed by: EMERGENCY MEDICINE

## 2022-11-12 PROCEDURE — 96361 HYDRATE IV INFUSION ADD-ON: CPT

## 2022-11-12 PROCEDURE — 80053 COMPREHEN METABOLIC PANEL: CPT | Performed by: EMERGENCY MEDICINE

## 2022-11-12 PROCEDURE — 85025 COMPLETE CBC W/AUTO DIFF WBC: CPT | Performed by: EMERGENCY MEDICINE

## 2022-11-12 PROCEDURE — 93010 ELECTROCARDIOGRAM REPORT: CPT | Mod: ,,, | Performed by: INTERNAL MEDICINE

## 2022-11-12 PROCEDURE — 93010 EKG 12-LEAD: ICD-10-PCS | Mod: ,,, | Performed by: INTERNAL MEDICINE

## 2022-11-12 PROCEDURE — 85730 THROMBOPLASTIN TIME PARTIAL: CPT | Performed by: EMERGENCY MEDICINE

## 2022-11-12 PROCEDURE — 96375 TX/PRO/DX INJ NEW DRUG ADDON: CPT

## 2022-11-12 PROCEDURE — 85610 PROTHROMBIN TIME: CPT | Performed by: EMERGENCY MEDICINE

## 2022-11-12 PROCEDURE — 96366 THER/PROPH/DIAG IV INF ADDON: CPT

## 2022-11-12 PROCEDURE — 93005 ELECTROCARDIOGRAM TRACING: CPT

## 2022-11-12 RX ORDER — BENZONATATE 200 MG/1
CAPSULE ORAL
COMMUNITY
Start: 2022-10-26 | End: 2024-01-01

## 2022-11-12 RX ORDER — MAGNESIUM SULFATE HEPTAHYDRATE 40 MG/ML
2 INJECTION, SOLUTION INTRAVENOUS
Status: COMPLETED | OUTPATIENT
Start: 2022-11-12 | End: 2022-11-12

## 2022-11-12 RX ORDER — ONDANSETRON 2 MG/ML
4 INJECTION INTRAMUSCULAR; INTRAVENOUS
Status: COMPLETED | OUTPATIENT
Start: 2022-11-12 | End: 2022-11-12

## 2022-11-12 RX ORDER — ACETAMINOPHEN 325 MG/1
650 TABLET ORAL
Status: COMPLETED | OUTPATIENT
Start: 2022-11-12 | End: 2022-11-12

## 2022-11-12 RX ORDER — SUCRALFATE 1 G/1
1 TABLET ORAL 4 TIMES DAILY
Qty: 20 TABLET | Refills: 0 | Status: SHIPPED | OUTPATIENT
Start: 2022-11-12 | End: 2023-01-01

## 2022-11-12 RX ORDER — SUCRALFATE 1 G/10ML
1 SUSPENSION ORAL
Status: COMPLETED | OUTPATIENT
Start: 2022-11-12 | End: 2022-11-12

## 2022-11-12 RX ORDER — ONDANSETRON 4 MG/1
4 TABLET, FILM COATED ORAL EVERY 6 HOURS PRN
Qty: 12 TABLET | Refills: 0 | Status: SHIPPED | OUTPATIENT
Start: 2022-11-12 | End: 2024-01-01

## 2022-11-12 RX ADMIN — ACETAMINOPHEN 650 MG: 325 TABLET ORAL at 08:11

## 2022-11-12 RX ADMIN — ONDANSETRON 4 MG: 2 INJECTION INTRAMUSCULAR; INTRAVENOUS at 04:11

## 2022-11-12 RX ADMIN — SODIUM CHLORIDE 1000 ML: 0.9 INJECTION, SOLUTION INTRAVENOUS at 05:11

## 2022-11-12 RX ADMIN — SUCRALFATE 1 G: 1 SUSPENSION ORAL at 06:11

## 2022-11-12 RX ADMIN — MAGNESIUM SULFATE HEPTAHYDRATE 2 G: 40 INJECTION, SOLUTION INTRAVENOUS at 05:11

## 2022-11-12 RX ADMIN — SODIUM CHLORIDE 1000 ML: 0.9 INJECTION, SOLUTION INTRAVENOUS at 04:11

## 2022-11-12 NOTE — ED PROVIDER NOTES
"SCRIBE #1 NOTE: I, Yordan Cross, am scribing for, and in the presence of, Page Hernandez MD. I have scribed the entire note.       History     Chief Complaint   Patient presents with    Vomiting     Vomiting x 2 days, unable to keep anti-rejection meds and heart meds. Liver transplant 2 years ago.     Review of patient's allergies indicates:   Allergen Reactions    Latex, natural rubber          History of Present Illness     HPI    11/12/2022, 4:08 PM  History obtained from the patient      History of Present Illness: Jordan Altman is a 47 y.o. male patient with a PMHx of HTN who presents to the Emergency Department for evaluation of Emesis which onset gradually within the past two days. The pt said his stomach "felt-ill" beginning 4 days ago. The pt has failed a liver transplant in the past and was concerned about his current liver transplant success; the pt received a transplant 2 years ago on October 14th, 2020. The pt is concerned about eating a medium-rare burger on accident, which he has been instructed not to do. Symptoms are constant and moderate in severity. No mitigating or exacerbating factors reported. Associated sxs include N/V/D and bloating. Patient denies any abdominal pain, SOB, CP, weakness, and all other sxs at this time. Prior Tx includes Pepto Bismol. The pt was unable to tolerate his anti-rejection meds and heart meds. No further complaints or concerns at this time.       Arrival mode: Personal vehicle     PCP: SHAHEEN Jaime        Past Medical History:  Past Medical History:   Diagnosis Date    Decompensated hepatic cirrhosis     HCV acquired through organ donation, treated / cured     svr12 - 6/2021    Hypertension     SBP (spontaneous bacterial peritonitis)        Past Surgical History:  Past Surgical History:   Procedure Laterality Date    ERCP N/A 10/1/2020    Procedure: ERCP (ENDOSCOPIC RETROGRADE CHOLANGIOPANCREATOGRAPHY);  Surgeon: Marcos Ramirez MD;  Location: [facility]" ENDO (2ND FLR);  Service: Endoscopy;  Laterality: N/A;    ESOPHAGOGASTRODUODENOSCOPY N/A 10/13/2020    Procedure: EGD (ESOPHAGOGASTRODUODENOSCOPY);  Surgeon: Prasanth Jerry MD;  Location: [facility] ENDO (2ND FLR);  Service: Endoscopy;  Laterality: N/A;    EXPLORATORY LAPAROTOMY AFTER LIVER TRANSPLANTATION N/A 10/15/2020    Procedure: LAPAROTOMY, EXPLORATORY, AFTER LIVER TRANSPLANT, Possible redo of artery, possible portal thrombectomy. IntraOperative US.;  Surgeon: Curtis Zavaleta MD;  [facility];  Service: Transplant;  Laterality: N/A;  With intraoperative ultrasound    LIVER TRANSPLANT N/A 10/14/2020    Procedure: TRANSPLANT, LIVER;  Surgeon: Curtis Zavaleta MD;  [facility];  Service: Transplant;  Laterality: N/A;  Intra op HD    RIGHT HEART CATHETERIZATION Right 9/23/2020    Procedure: INSERTION, CATHETER, RIGHT HEART;  Surgeon: Mikel Costa Jr., MD;  Location: [facility] CATH LAB;  Service: Cardiology;  Laterality: Right;    THORACENTESIS  2011    THROMBECTOMY  10/15/2020    Procedure: THROMBECTOMY portal vein;  Surgeon: Curtis Zavaleta MD;  [facility];  Service: Transplant;;         Family History:  Family History   Problem Relation Age of Onset    COPD Mother        Social History:  Social History     Tobacco Use    Smoking status: Some Days    Smokeless tobacco: Current   Substance and Sexual Activity    Alcohol use: Yes     Alcohol/week: 112.0 standard drinks     Types: 112 Shots of liquor per week     Comment: fifth of vodka a day. LAST DRINK 7/25/2020 per pt     Drug use: Yes     Frequency: 3.0 times per week     Types: Marijuana    Sexual activity: Yes        Review of Systems     Review of Systems   Constitutional:  Positive for fever. Negative for chills.   HENT:  Negative for congestion and sore throat.    Respiratory:  Negative for cough and shortness of breath.    Cardiovascular:  Negative for chest pain.        (+) High BP   Gastrointestinal:  Positive for  abdominal pain, diarrhea, nausea and vomiting.        (+) Bloating   Genitourinary:  Negative for dysuria.   Musculoskeletal:  Negative for back pain.   Skin:  Negative for rash.   Neurological:  Negative for weakness and headaches.   Hematological:  Does not bruise/bleed easily.   All other systems reviewed and are negative.     Physical Exam     Initial Vitals [11/12/22 1601]   BP Pulse Resp Temp SpO2   (!) 153/97 (!) 116 20 98.2 °F (36.8 °C) 96 %      MAP       --          Physical Exam  Nursing Notes and Vital Signs Reviewed.  Constitutional: Patient is in no acute distress. Well-developed and well-nourished. Non-toxic appearing  Head: Atraumatic. Normocephalic.  Eyes: PERRL. EOM intact. Conjunctivae are not pale. No scleral icterus.  ENT: Mucous membranes are moist. Oropharynx is clear and symmetric.    Neck: Supple. Full ROM. No lymphadenopathy.  Cardiovascular: Regular rate. Regular rhythm. No murmurs, rubs, or gallops. Distal pulses are 2+ and symmetric.  Pulmonary/Chest: No respiratory distress. Clear to auscultation bilaterally. No wheezing or rales.  Abdominal: Soft and non-distended.  There is no tenderness.  No rebound, guarding, or rigidity. Good bowel sounds.  Genitourinary: No CVA tenderness  Musculoskeletal: Moves all extremities. No obvious deformities. No edema. No calf tenderness.  Skin: Warm and dry.  Neurological:  Alert, awake, and appropriate.  Normal speech.  No acute focal neurological deficits are appreciated.  Psychiatric: Normal affect. Good eye contact. Appropriate in content.     ED Course   Procedures  ED Vital Signs:  Vitals:    11/12/22 1601 11/12/22 1857 11/12/22 2005   BP: (!) 153/97 (!) 150/70 (!) 149/69   Pulse: (!) 116 71 80   Resp: 20 19 18   Temp: 98.2 °F (36.8 °C)  98.6 °F (37 °C)   TempSrc: Oral  Oral   SpO2: 96% 98% 100%   Weight: 128.2 kg (282 lb 10.1 oz)     Height: 6' (1.829 m)         Abnormal Lab Results:  Labs Reviewed   CBC W/ AUTO DIFFERENTIAL - Abnormal; Notable  for the following components:       Result Value    Platelets 127 (*)     All other components within normal limits   COMPREHENSIVE METABOLIC PANEL - Abnormal; Notable for the following components:    CO2 19 (*)     Glucose 146 (*)     Total Bilirubin 1.6 (*)     All other components within normal limits   MAGNESIUM - Abnormal; Notable for the following components:    Magnesium 1.2 (*)     All other components within normal limits   LIPASE   PROTIME-INR   APTT   URINALYSIS, REFLEX TO URINE CULTURE        All Lab Results:  Results for orders placed or performed during the hospital encounter of 11/12/22   CBC W/ AUTO DIFFERENTIAL   Result Value Ref Range    WBC 4.25 3.90 - 12.70 K/uL    RBC 5.17 4.60 - 6.20 M/uL    Hemoglobin 15.6 14.0 - 18.0 g/dL    Hematocrit 46.0 40.0 - 54.0 %    MCV 89 82 - 98 fL    MCH 30.2 27.0 - 31.0 pg    MCHC 33.9 32.0 - 36.0 g/dL    RDW 12.8 11.5 - 14.5 %    Platelets 127 (L) 150 - 450 K/uL    MPV 9.5 9.2 - 12.9 fL    Immature Granulocytes 0.5 0.0 - 0.5 %    Gran # (ANC) 2.7 1.8 - 7.7 K/uL    Immature Grans (Abs) 0.02 0.00 - 0.04 K/uL    Lymph # 1.1 1.0 - 4.8 K/uL    Mono # 0.4 0.3 - 1.0 K/uL    Eos # 0.0 0.0 - 0.5 K/uL    Baso # 0.01 0.00 - 0.20 K/uL    nRBC 0 0 /100 WBC    Gran % 63.4 38.0 - 73.0 %    Lymph % 25.6 18.0 - 48.0 %    Mono % 10.1 4.0 - 15.0 %    Eosinophil % 0.2 0.0 - 8.0 %    Basophil % 0.2 0.0 - 1.9 %    Differential Method Automated    Comp. Metabolic Panel   Result Value Ref Range    Sodium 136 136 - 145 mmol/L    Potassium 3.9 3.5 - 5.1 mmol/L    Chloride 102 95 - 110 mmol/L    CO2 19 (L) 23 - 29 mmol/L    Glucose 146 (H) 70 - 110 mg/dL    BUN 16 6 - 20 mg/dL    Creatinine 1.2 0.5 - 1.4 mg/dL    Calcium 9.6 8.7 - 10.5 mg/dL    Total Protein 6.9 6.0 - 8.4 g/dL    Albumin 4.1 3.5 - 5.2 g/dL    Total Bilirubin 1.6 (H) 0.1 - 1.0 mg/dL    Alkaline Phosphatase 91 55 - 135 U/L    AST 39 10 - 40 U/L    ALT 23 10 - 44 U/L    Anion Gap 15 8 - 16 mmol/L    eGFR >60 >60 mL/min/1.73  m^2   Lipase   Result Value Ref Range    Lipase 36 4 - 60 U/L   Magnesium   Result Value Ref Range    Magnesium 1.2 (L) 1.6 - 2.6 mg/dL   Protime-INR   Result Value Ref Range    Prothrombin Time 11.5 9.0 - 12.5 sec    INR 1.1 0.8 - 1.2   APTT   Result Value Ref Range    aPTT 30.8 21.0 - 32.0 sec        Imaging Results:  Imaging Results              X-Ray Abdomen Flat And Erect (Final result)  Result time 11/12/22 16:38:48      Final result by Gerson Terrell MD (11/12/22 16:38:48)                   Impression:      No acute abnormality identified.      Electronically signed by: Gerson Terrell  Date:    11/12/2022  Time:    16:38               Narrative:    EXAMINATION:  XR ABDOMEN FLAT AND ERECT    CLINICAL HISTORY:  nausea vomiting;    TECHNIQUE:  Flat and erect AP views of the abdomen were performed.    COMPARISON:  10/23/2020    FINDINGS:  No free air.  No evidence of obstruction or ileus.  No radiopaque foreign body, or abnormal calcification.  Osseous structures unremarkable.                                       The EKG was ordered, reviewed, and independently interpreted by the ED provider.  Interpretation time: 17:24  Rate: 97 BPM  Rhythm: normal sinus rhythm  Interpretation: Left axis deviation. Low voltage QRS. Inferior infarct, age undetermined. No STEMI.             The Emergency Provider reviewed the vital signs and test results, which are outlined above.     ED Discussion     6:50 PM: Pt is tolerating PO and feeling better. The pt wants something for nausea and indigestion.  He has not had any vomiting or diarrhea in the ER, abd exam benign, stable for discharge home    6:52 PM: Reassessed pt at this time. Discussed with pt all pertinent ED information and results. Discussed pt dx and plan of tx. Gave pt all f/u and return to the ED instructions. All questions and concerns were addressed at this time. Pt expresses understanding of information and instructions, and is comfortable with plan to discharge. Pt  is stable for discharge.    I discussed with patient and/or family/caretaker that evaluation in the ED does not suggest any emergent or life threatening medical conditions requiring immediate intervention beyond what was provided in the ED, and I believe patient is safe for discharge.  Regardless, an unremarkable evaluation in the ED does not preclude the development or presence of a serious of life threatening condition. As such, patient was instructed to return immediately for any worsening or change in current symptoms.          Medical Decision Making:   Clinical Tests:   Lab Tests: Ordered and Reviewed  Radiological Study: Ordered and Reviewed  Medical Tests: Ordered and Reviewed         ED Medication(s):  Medications   ondansetron injection 4 mg (4 mg Intravenous Given 11/12/22 1655)   sodium chloride 0.9% bolus 1,000 mL (0 mLs Intravenous Stopped 11/12/22 1952)   sodium chloride 0.9% bolus 1,000 mL (0 mLs Intravenous Stopped 11/12/22 1952)   magnesium sulfate 2g in water 50mL IVPB (premix) (0 g Intravenous Stopped 11/12/22 1953)   sucralfate 100 mg/mL suspension 1 g (1 g Oral Given 11/12/22 1801)   acetaminophen tablet 650 mg (650 mg Oral Given 11/12/22 2001)       Discharge Medication List as of 11/12/2022  6:55 PM        START taking these medications    Details   ondansetron (ZOFRAN) 4 MG tablet Take 1 tablet (4 mg total) by mouth every 6 (six) hours as needed for Nausea., Starting Sat 11/12/2022, Print      sucralfate (CARAFATE) 1 gram tablet Take 1 tablet (1 g total) by mouth 4 (four) times daily., Starting Sat 11/12/2022, Print              Follow-up Information       SHAHEEN Jaime. Schedule an appointment as soon as possible for a visit in 2 days.    Specialty: Family Medicine  Contact information:  [address]  [facility] MEDICINE & AFTER HOURS  [facility] [address] [phone]               Theresa Titus MD. Schedule an appointment as soon as possible for a visit in 2 days.     Specialties: Gastroenterology, Hepatology  Why: Return to the Emergency Room, If symptoms worsen  Contact information:  [address]  [place] [address]  [phone]                                 Scribe Attestation:   Scribe #1: I performed the above scribed service and the documentation accurately describes the services I performed. I attest to the accuracy of the note.     Attending:   Physician Attestation Statement for Scribe #1: I, Page Hernandez MD, personally performed the services described in this documentation, as scribed by Yordan Cross, in my presence, and it is both accurate and complete.           Clinical Impression       ICD-10-CM ICD-9-CM   1. Nausea and vomiting, unspecified vomiting type  R11.2 787.01   2. Tachycardia  R00.0 785.0   3. Intravascular volume depletion  E86.1 276.52   4. Gastroesophageal reflux disease, unspecified whether esophagitis present  K21.9 530.81   5. History of liver transplant  Z94.4 V42.7   6. Hypomagnesemia  E83.42 275.2       Disposition:   Disposition: Discharged  Condition: Stable      Page Hernandez MD  11/12/22 2011

## 2022-11-16 ENCOUNTER — PATIENT MESSAGE (OUTPATIENT)
Dept: HEPATOLOGY | Facility: CLINIC | Age: 47
End: 2022-11-16
Payer: MEDICAID

## 2022-11-28 ENCOUNTER — PATIENT MESSAGE (OUTPATIENT)
Dept: TRANSPLANT | Facility: CLINIC | Age: 47
End: 2022-11-28
Payer: MEDICAID

## 2022-11-29 ENCOUNTER — LAB VISIT (OUTPATIENT)
Dept: LAB | Facility: HOSPITAL | Age: 47
End: 2022-11-29
Attending: NURSE PRACTITIONER
Payer: MEDICAID

## 2022-11-29 DIAGNOSIS — Z94.4 LIVER REPLACED BY TRANSPLANT: ICD-10-CM

## 2022-11-29 LAB
ALBUMIN SERPL BCP-MCNC: 4.1 G/DL (ref 3.5–5.2)
ALP SERPL-CCNC: 73 U/L (ref 55–135)
ALT SERPL W/O P-5'-P-CCNC: 47 U/L (ref 10–44)
ANION GAP SERPL CALC-SCNC: 11 MMOL/L (ref 8–16)
AST SERPL-CCNC: 62 U/L (ref 10–40)
BASOPHILS # BLD AUTO: 0.03 K/UL (ref 0–0.2)
BASOPHILS NFR BLD: 0.8 % (ref 0–1.9)
BILIRUB SERPL-MCNC: 0.5 MG/DL (ref 0.1–1)
BUN SERPL-MCNC: 24 MG/DL (ref 6–20)
CALCIUM SERPL-MCNC: 9.5 MG/DL (ref 8.7–10.5)
CHLORIDE SERPL-SCNC: 106 MMOL/L (ref 95–110)
CO2 SERPL-SCNC: 24 MMOL/L (ref 23–29)
CREAT SERPL-MCNC: 1.2 MG/DL (ref 0.5–1.4)
DIFFERENTIAL METHOD: ABNORMAL
EOSINOPHIL # BLD AUTO: 0 K/UL (ref 0–0.5)
EOSINOPHIL NFR BLD: 0.8 % (ref 0–8)
ERYTHROCYTE [DISTWIDTH] IN BLOOD BY AUTOMATED COUNT: 13.2 % (ref 11.5–14.5)
EST. GFR  (NO RACE VARIABLE): >60 ML/MIN/1.73 M^2
GLUCOSE SERPL-MCNC: 103 MG/DL (ref 70–110)
HCT VFR BLD AUTO: 43.7 % (ref 40–54)
HGB BLD-MCNC: 14 G/DL (ref 14–18)
IMM GRANULOCYTES # BLD AUTO: 0.02 K/UL (ref 0–0.04)
IMM GRANULOCYTES NFR BLD AUTO: 0.5 % (ref 0–0.5)
LYMPHOCYTES # BLD AUTO: 2 K/UL (ref 1–4.8)
LYMPHOCYTES NFR BLD: 54.9 % (ref 18–48)
MCH RBC QN AUTO: 30.2 PG (ref 27–31)
MCHC RBC AUTO-ENTMCNC: 32 G/DL (ref 32–36)
MCV RBC AUTO: 94 FL (ref 82–98)
MONOCYTES # BLD AUTO: 0.4 K/UL (ref 0.3–1)
MONOCYTES NFR BLD: 11.1 % (ref 4–15)
NEUTROPHILS # BLD AUTO: 1.2 K/UL (ref 1.8–7.7)
NEUTROPHILS NFR BLD: 31.9 % (ref 38–73)
NRBC BLD-RTO: 0 /100 WBC
PLATELET # BLD AUTO: 233 K/UL (ref 150–450)
PMV BLD AUTO: 9.5 FL (ref 9.2–12.9)
POTASSIUM SERPL-SCNC: 4.4 MMOL/L (ref 3.5–5.1)
PROT SERPL-MCNC: 7.1 G/DL (ref 6–8.4)
RBC # BLD AUTO: 4.63 M/UL (ref 4.6–6.2)
SODIUM SERPL-SCNC: 141 MMOL/L (ref 136–145)
WBC # BLD AUTO: 3.68 K/UL (ref 3.9–12.7)

## 2022-11-29 PROCEDURE — 36415 COLL VENOUS BLD VENIPUNCTURE: CPT | Mod: PO | Performed by: INTERNAL MEDICINE

## 2022-11-29 PROCEDURE — 80197 ASSAY OF TACROLIMUS: CPT | Performed by: INTERNAL MEDICINE

## 2022-11-29 PROCEDURE — 80053 COMPREHEN METABOLIC PANEL: CPT | Performed by: INTERNAL MEDICINE

## 2022-11-29 PROCEDURE — 85025 COMPLETE CBC W/AUTO DIFF WBC: CPT | Performed by: INTERNAL MEDICINE

## 2022-11-30 LAB — TACROLIMUS BLD-MCNC: 6.4 NG/ML (ref 5–15)

## 2022-12-05 ENCOUNTER — TELEPHONE (OUTPATIENT)
Dept: TRANSPLANT | Facility: CLINIC | Age: 47
End: 2022-12-05
Payer: MEDICAID

## 2022-12-05 NOTE — TELEPHONE ENCOUNTER
Elevated liver enzymes noted, no medication changes.  Next labs 1/3/23, [employer] message sent to notify pt.  ----- Message from Theresa Titus MD sent at 12/2/2022  3:53 PM CST -----  Tacro ok but liver tests trending up, repeat labs in 4 weeks

## 2022-12-06 ENCOUNTER — PATIENT MESSAGE (OUTPATIENT)
Dept: HEPATOLOGY | Facility: CLINIC | Age: 47
End: 2022-12-06
Payer: MEDICAID

## 2023-01-01 ENCOUNTER — TELEPHONE (OUTPATIENT)
Dept: TRANSPLANT | Facility: CLINIC | Age: 48
End: 2023-01-01

## 2023-01-01 ENCOUNTER — TELEPHONE (OUTPATIENT)
Dept: HEPATOLOGY | Facility: CLINIC | Age: 48
End: 2023-01-01
Payer: MEDICAID

## 2023-01-01 ENCOUNTER — TELEPHONE (OUTPATIENT)
Dept: TRANSPLANT | Facility: CLINIC | Age: 48
End: 2023-01-01
Payer: MEDICAID

## 2023-01-01 ENCOUNTER — LAB VISIT (OUTPATIENT)
Dept: LAB | Facility: HOSPITAL | Age: 48
End: 2023-01-01
Attending: INTERNAL MEDICINE
Payer: MEDICAID

## 2023-01-01 ENCOUNTER — PATIENT MESSAGE (OUTPATIENT)
Dept: TRANSPLANT | Facility: CLINIC | Age: 48
End: 2023-01-01
Payer: MEDICAID

## 2023-01-01 ENCOUNTER — HOSPITAL ENCOUNTER (OUTPATIENT)
Dept: RADIOLOGY | Facility: HOSPITAL | Age: 48
Discharge: HOME | End: 2023-02-22
Attending: INTERNAL MEDICINE
Payer: MEDICAID

## 2023-01-01 ENCOUNTER — PATIENT MESSAGE (OUTPATIENT)
Dept: HEPATOLOGY | Facility: CLINIC | Age: 48
End: 2023-01-01
Payer: MEDICAID

## 2023-01-01 ENCOUNTER — CLINICAL SUPPORT (OUTPATIENT)
Dept: SMOKING CESSATION | Facility: CLINIC | Age: 48
End: 2023-01-01
Payer: COMMERCIAL

## 2023-01-01 ENCOUNTER — HOSPITAL ENCOUNTER (OUTPATIENT)
Dept: RADIOLOGY | Facility: HOSPITAL | Age: 48
Discharge: HOME | End: 2023-03-20
Attending: INTERNAL MEDICINE
Payer: MEDICAID

## 2023-01-01 ENCOUNTER — HOSPITAL ENCOUNTER (INPATIENT)
Facility: HOSPITAL | Age: 48
LOS: 3 days | Discharge: HOME | DRG: 872 | End: 2023-07-31
Attending: EMERGENCY MEDICINE | Admitting: INTERNAL MEDICINE
Payer: MEDICAID

## 2023-01-01 ENCOUNTER — HOSPITAL ENCOUNTER (EMERGENCY)
Facility: HOSPITAL | Age: 48
Discharge: HOME | End: 2023-09-12
Attending: EMERGENCY MEDICINE
Payer: MEDICAID

## 2023-01-01 ENCOUNTER — HOSPITAL ENCOUNTER (EMERGENCY)
Facility: HOSPITAL | Age: 48
Discharge: HOME | End: 2023-12-12
Attending: EMERGENCY MEDICINE
Payer: MEDICAID

## 2023-01-01 VITALS
BODY MASS INDEX: 39.96 KG/M2 | OXYGEN SATURATION: 95 % | TEMPERATURE: 98 F | RESPIRATION RATE: 20 BRPM | HEIGHT: 72 IN | WEIGHT: 295 LBS | HEART RATE: 77 BPM | DIASTOLIC BLOOD PRESSURE: 82 MMHG | SYSTOLIC BLOOD PRESSURE: 129 MMHG

## 2023-01-01 VITALS
HEART RATE: 81 BPM | OXYGEN SATURATION: 98 % | SYSTOLIC BLOOD PRESSURE: 113 MMHG | RESPIRATION RATE: 20 BRPM | DIASTOLIC BLOOD PRESSURE: 84 MMHG | WEIGHT: 286.19 LBS | TEMPERATURE: 98 F | BODY MASS INDEX: 38.81 KG/M2

## 2023-01-01 VITALS
WEIGHT: 280 LBS | TEMPERATURE: 98 F | BODY MASS INDEX: 37.97 KG/M2 | RESPIRATION RATE: 18 BRPM | DIASTOLIC BLOOD PRESSURE: 70 MMHG | HEART RATE: 97 BPM | SYSTOLIC BLOOD PRESSURE: 104 MMHG | OXYGEN SATURATION: 94 %

## 2023-01-01 DIAGNOSIS — Z94.4 LIVER REPLACED BY TRANSPLANT: ICD-10-CM

## 2023-01-01 DIAGNOSIS — Z94.4 HISTORY OF LIVER TRANSPLANT: ICD-10-CM

## 2023-01-01 DIAGNOSIS — J10.1 INFLUENZA A: Primary | ICD-10-CM

## 2023-01-01 DIAGNOSIS — R10.9 LEFT FLANK PAIN: Primary | ICD-10-CM

## 2023-01-01 DIAGNOSIS — D84.9 IMMUNOSUPPRESSION: ICD-10-CM

## 2023-01-01 DIAGNOSIS — R05.9 COUGH: ICD-10-CM

## 2023-01-01 DIAGNOSIS — E87.20 LACTIC ACIDOSIS: ICD-10-CM

## 2023-01-01 DIAGNOSIS — N17.9 AKI (ACUTE KIDNEY INJURY): ICD-10-CM

## 2023-01-01 DIAGNOSIS — Z94.4 LIVER REPLACED BY TRANSPLANT: Primary | ICD-10-CM

## 2023-01-01 DIAGNOSIS — L03.115 CELLULITIS OF RIGHT LEG: Primary | ICD-10-CM

## 2023-01-01 DIAGNOSIS — I48.91 NEW ONSET A-FIB: ICD-10-CM

## 2023-01-01 DIAGNOSIS — F17.200 NICOTINE DEPENDENCE: Primary | ICD-10-CM

## 2023-01-01 DIAGNOSIS — R07.9 CHEST PAIN: ICD-10-CM

## 2023-01-01 DIAGNOSIS — I45.5 SINUS PAUSE: ICD-10-CM

## 2023-01-01 DIAGNOSIS — Z94.4 LIVER TRANSPLANTED: ICD-10-CM

## 2023-01-01 DIAGNOSIS — I48.91 ATRIAL FIBRILLATION WITH RVR: ICD-10-CM

## 2023-01-01 LAB
ALBUMIN SERPL BCP-MCNC: 2.9 G/DL (ref 3.5–5.2)
ALBUMIN SERPL BCP-MCNC: 3.2 G/DL (ref 3.5–5.2)
ALBUMIN SERPL BCP-MCNC: 3.3 G/DL (ref 3.5–5.2)
ALBUMIN SERPL BCP-MCNC: 3.3 G/DL (ref 3.5–5.2)
ALBUMIN SERPL BCP-MCNC: 3.5 G/DL (ref 3.5–5.2)
ALBUMIN SERPL BCP-MCNC: 3.6 G/DL (ref 3.5–5.2)
ALBUMIN SERPL BCP-MCNC: 3.8 G/DL (ref 3.5–5.2)
ALBUMIN SERPL BCP-MCNC: 3.9 G/DL (ref 3.5–5.2)
ALBUMIN SERPL BCP-MCNC: 4 G/DL (ref 3.5–5.2)
ALP SERPL-CCNC: 122 U/L (ref 55–135)
ALP SERPL-CCNC: 125 U/L (ref 55–135)
ALP SERPL-CCNC: 223 U/L (ref 55–135)
ALP SERPL-CCNC: 258 U/L (ref 55–135)
ALP SERPL-CCNC: 281 U/L (ref 55–135)
ALP SERPL-CCNC: 299 U/L (ref 55–135)
ALP SERPL-CCNC: 89 U/L (ref 55–135)
ALP SERPL-CCNC: 89 U/L (ref 55–135)
ALP SERPL-CCNC: 90 U/L (ref 55–135)
ALP SERPL-CCNC: 95 U/L (ref 55–135)
ALP SERPL-CCNC: 97 U/L (ref 55–135)
ALT SERPL W/O P-5'-P-CCNC: 102 U/L (ref 10–44)
ALT SERPL W/O P-5'-P-CCNC: 121 U/L (ref 10–44)
ALT SERPL W/O P-5'-P-CCNC: 24 U/L (ref 10–44)
ALT SERPL W/O P-5'-P-CCNC: 28 U/L (ref 10–44)
ALT SERPL W/O P-5'-P-CCNC: 31 U/L (ref 10–44)
ALT SERPL W/O P-5'-P-CCNC: 33 U/L (ref 10–44)
ALT SERPL W/O P-5'-P-CCNC: 37 U/L (ref 10–44)
ALT SERPL W/O P-5'-P-CCNC: 50 U/L (ref 10–44)
ALT SERPL W/O P-5'-P-CCNC: 55 U/L (ref 10–44)
ALT SERPL W/O P-5'-P-CCNC: 73 U/L (ref 10–44)
ALT SERPL W/O P-5'-P-CCNC: 89 U/L (ref 10–44)
ANION GAP SERPL CALC-SCNC: 10 MMOL/L (ref 8–16)
ANION GAP SERPL CALC-SCNC: 10 MMOL/L (ref 8–16)
ANION GAP SERPL CALC-SCNC: 11 MMOL/L (ref 8–16)
ANION GAP SERPL CALC-SCNC: 12 MMOL/L (ref 8–16)
ANION GAP SERPL CALC-SCNC: 12 MMOL/L (ref 8–16)
ANION GAP SERPL CALC-SCNC: 13 MMOL/L (ref 8–16)
ANION GAP SERPL CALC-SCNC: 13 MMOL/L (ref 8–16)
ANION GAP SERPL CALC-SCNC: 14 MMOL/L (ref 8–16)
ANION GAP SERPL CALC-SCNC: 15 MMOL/L (ref 8–16)
ANION GAP SERPL CALC-SCNC: 8 MMOL/L (ref 8–16)
ANION GAP SERPL CALC-SCNC: 9 MMOL/L (ref 8–16)
ANION GAP SERPL CALC-SCNC: 9 MMOL/L (ref 8–16)
ANISOCYTOSIS BLD QL SMEAR: SLIGHT
AORTIC ROOT ANNULUS: 4.04 CM
ASCENDING AORTA: 3.25 CM
AST SERPL-CCNC: 123 U/L (ref 10–40)
AST SERPL-CCNC: 156 U/L (ref 10–40)
AST SERPL-CCNC: 219 U/L (ref 10–40)
AST SERPL-CCNC: 37 U/L (ref 10–40)
AST SERPL-CCNC: 42 U/L (ref 10–40)
AST SERPL-CCNC: 43 U/L (ref 10–40)
AST SERPL-CCNC: 44 U/L (ref 10–40)
AST SERPL-CCNC: 64 U/L (ref 10–40)
AST SERPL-CCNC: 65 U/L (ref 10–40)
AST SERPL-CCNC: 67 U/L (ref 10–40)
AST SERPL-CCNC: 79 U/L (ref 10–40)
AV INDEX (PROSTH): 0.9
AV MEAN GRADIENT: 7 MMHG
AV PEAK GRADIENT: 11 MMHG
AV VALVE AREA BY VELOCITY RATIO: 2.78 CM²
AV VALVE AREA: 2.94 CM²
AV VELOCITY RATIO: 0.85
BACTERIA #/AREA URNS HPF: ABNORMAL /HPF
BACTERIA BLD CULT: NORMAL
BACTERIA BLD CULT: NORMAL
BASOPHILS # BLD AUTO: 0.01 K/UL (ref 0–0.2)
BASOPHILS # BLD AUTO: 0.02 K/UL (ref 0–0.2)
BASOPHILS # BLD AUTO: 0.03 K/UL (ref 0–0.2)
BASOPHILS NFR BLD: 0.2 % (ref 0–1.9)
BASOPHILS NFR BLD: 0.3 % (ref 0–1.9)
BASOPHILS NFR BLD: 0.5 % (ref 0–1.9)
BASOPHILS NFR BLD: 0.5 % (ref 0–1.9)
BASOPHILS NFR BLD: 0.6 % (ref 0–1.9)
BASOPHILS NFR BLD: 0.7 % (ref 0–1.9)
BASOPHILS NFR BLD: 0.8 % (ref 0–1.9)
BASOPHILS NFR BLD: 0.9 % (ref 0–1.9)
BILIRUB DIRECT SERPL-MCNC: 2 MG/DL (ref 0.1–0.3)
BILIRUB SERPL-MCNC: 0.6 MG/DL (ref 0.1–1)
BILIRUB SERPL-MCNC: 0.7 MG/DL (ref 0.1–1)
BILIRUB SERPL-MCNC: 0.8 MG/DL (ref 0.1–1)
BILIRUB SERPL-MCNC: 0.9 MG/DL (ref 0.1–1)
BILIRUB SERPL-MCNC: 1.1 MG/DL (ref 0.1–1)
BILIRUB SERPL-MCNC: 1.3 MG/DL (ref 0.1–1)
BILIRUB SERPL-MCNC: 2.7 MG/DL (ref 0.1–1)
BILIRUB UR QL STRIP: NEGATIVE
BILIRUB UR QL STRIP: NEGATIVE
BNP SERPL-MCNC: 217 PG/ML (ref 0–99)
BSA FOR ECHO PROCEDURE: 2.61 M2
BUN SERPL-MCNC: 15 MG/DL (ref 6–20)
BUN SERPL-MCNC: 16 MG/DL (ref 6–20)
BUN SERPL-MCNC: 18 MG/DL (ref 6–20)
BUN SERPL-MCNC: 18 MG/DL (ref 6–20)
BUN SERPL-MCNC: 19 MG/DL (ref 6–20)
BUN SERPL-MCNC: 22 MG/DL (ref 6–20)
BUN SERPL-MCNC: 26 MG/DL (ref 6–20)
CALCIUM SERPL-MCNC: 8.6 MG/DL (ref 8.7–10.5)
CALCIUM SERPL-MCNC: 8.7 MG/DL (ref 8.7–10.5)
CALCIUM SERPL-MCNC: 8.8 MG/DL (ref 8.7–10.5)
CALCIUM SERPL-MCNC: 8.8 MG/DL (ref 8.7–10.5)
CALCIUM SERPL-MCNC: 8.9 MG/DL (ref 8.7–10.5)
CALCIUM SERPL-MCNC: 8.9 MG/DL (ref 8.7–10.5)
CALCIUM SERPL-MCNC: 9.1 MG/DL (ref 8.7–10.5)
CALCIUM SERPL-MCNC: 9.3 MG/DL (ref 8.7–10.5)
CALCIUM SERPL-MCNC: 9.4 MG/DL (ref 8.7–10.5)
CALCIUM SERPL-MCNC: 9.7 MG/DL (ref 8.7–10.5)
CHLORIDE SERPL-SCNC: 100 MMOL/L (ref 95–110)
CHLORIDE SERPL-SCNC: 100 MMOL/L (ref 95–110)
CHLORIDE SERPL-SCNC: 101 MMOL/L (ref 95–110)
CHLORIDE SERPL-SCNC: 101 MMOL/L (ref 95–110)
CHLORIDE SERPL-SCNC: 102 MMOL/L (ref 95–110)
CHLORIDE SERPL-SCNC: 103 MMOL/L (ref 95–110)
CHLORIDE SERPL-SCNC: 105 MMOL/L (ref 95–110)
CHLORIDE SERPL-SCNC: 105 MMOL/L (ref 95–110)
CHLORIDE SERPL-SCNC: 106 MMOL/L (ref 95–110)
CHLORIDE SERPL-SCNC: 107 MMOL/L (ref 95–110)
CHLORIDE SERPL-SCNC: 107 MMOL/L (ref 95–110)
CHLORIDE SERPL-SCNC: 109 MMOL/L (ref 95–110)
CK SERPL-CCNC: 100 U/L (ref 20–200)
CLARITY UR: CLEAR
CLARITY UR: CLEAR
CLINICAL BIOCHEMIST REVIEW: NORMAL
CO2 SERPL-SCNC: 17 MMOL/L (ref 23–29)
CO2 SERPL-SCNC: 20 MMOL/L (ref 23–29)
CO2 SERPL-SCNC: 22 MMOL/L (ref 23–29)
CO2 SERPL-SCNC: 23 MMOL/L (ref 23–29)
CO2 SERPL-SCNC: 24 MMOL/L (ref 23–29)
CO2 SERPL-SCNC: 25 MMOL/L (ref 23–29)
CO2 SERPL-SCNC: 25 MMOL/L (ref 23–29)
CO2 SERPL-SCNC: 27 MMOL/L (ref 23–29)
COLOR UR: YELLOW
COLOR UR: YELLOW
CREAT SERPL-MCNC: 1 MG/DL (ref 0.5–1.4)
CREAT SERPL-MCNC: 1 MG/DL (ref 0.5–1.4)
CREAT SERPL-MCNC: 1.1 MG/DL (ref 0.5–1.4)
CREAT SERPL-MCNC: 1.2 MG/DL (ref 0.5–1.4)
CREAT SERPL-MCNC: 1.2 MG/DL (ref 0.5–1.4)
CREAT SERPL-MCNC: 1.3 MG/DL (ref 0.5–1.4)
CREAT SERPL-MCNC: 1.3 MG/DL (ref 0.5–1.4)
CREAT SERPL-MCNC: 1.5 MG/DL (ref 0.5–1.4)
CREAT SERPL-MCNC: 1.6 MG/DL (ref 0.5–1.4)
CREAT SERPL-MCNC: 1.8 MG/DL (ref 0.5–1.4)
CREAT SERPL-MCNC: 1.9 MG/DL (ref 0.5–1.4)
CREAT SERPL-MCNC: 2 MG/DL (ref 0.5–1.4)
CV ECHO LV RWT: 0.41 CM
DIFFERENTIAL METHOD: ABNORMAL
DOP CALC AO PEAK VEL: 1.67 M/S
DOP CALC AO VTI: 29.2 CM
DOP CALC LVOT AREA: 3.3 CM2
DOP CALC LVOT DIAMETER: 2.04 CM
DOP CALC LVOT PEAK VEL: 1.42 M/S
DOP CALC LVOT STROKE VOLUME: 85.92 CM3
DOP CALC RVOT PEAK VEL: 1 M/S
DOP CALC RVOT VTI: 18.6 CM
DOP CALCLVOT PEAK VEL VTI: 26.3 CM
E/E' RATIO: 7.38 M/S
ECHO LV POSTERIOR WALL: 1.19 CM (ref 0.6–1.1)
EOSINOPHIL # BLD AUTO: 0 K/UL (ref 0–0.5)
EOSINOPHIL # BLD AUTO: 0.1 K/UL (ref 0–0.5)
EOSINOPHIL NFR BLD: 0.5 % (ref 0–8)
EOSINOPHIL NFR BLD: 0.6 % (ref 0–8)
EOSINOPHIL NFR BLD: 0.9 % (ref 0–8)
EOSINOPHIL NFR BLD: 1.2 % (ref 0–8)
EOSINOPHIL NFR BLD: 1.2 % (ref 0–8)
EOSINOPHIL NFR BLD: 1.3 % (ref 0–8)
EOSINOPHIL NFR BLD: 1.6 % (ref 0–8)
EOSINOPHIL NFR BLD: 2 % (ref 0–8)
ERYTHROCYTE [DISTWIDTH] IN BLOOD BY AUTOMATED COUNT: 12 % (ref 11.5–14.5)
ERYTHROCYTE [DISTWIDTH] IN BLOOD BY AUTOMATED COUNT: 12.3 % (ref 11.5–14.5)
ERYTHROCYTE [DISTWIDTH] IN BLOOD BY AUTOMATED COUNT: 12.3 % (ref 11.5–14.5)
ERYTHROCYTE [DISTWIDTH] IN BLOOD BY AUTOMATED COUNT: 12.4 % (ref 11.5–14.5)
ERYTHROCYTE [DISTWIDTH] IN BLOOD BY AUTOMATED COUNT: 12.4 % (ref 11.5–14.5)
ERYTHROCYTE [DISTWIDTH] IN BLOOD BY AUTOMATED COUNT: 12.6 % (ref 11.5–14.5)
ERYTHROCYTE [DISTWIDTH] IN BLOOD BY AUTOMATED COUNT: 12.8 % (ref 11.5–14.5)
ERYTHROCYTE [DISTWIDTH] IN BLOOD BY AUTOMATED COUNT: 13.2 % (ref 11.5–14.5)
ERYTHROCYTE [DISTWIDTH] IN BLOOD BY AUTOMATED COUNT: 13.3 % (ref 11.5–14.5)
ERYTHROCYTE [DISTWIDTH] IN BLOOD BY AUTOMATED COUNT: 13.5 % (ref 11.5–14.5)
ERYTHROCYTE [DISTWIDTH] IN BLOOD BY AUTOMATED COUNT: 14 % (ref 11.5–14.5)
EST. GFR  (NO RACE VARIABLE): 40 ML/MIN/1.73 M^2
EST. GFR  (NO RACE VARIABLE): 42 ML/MIN/1.73 M^2
EST. GFR  (NO RACE VARIABLE): 45 ML/MIN/1.73 M^2
EST. GFR  (NO RACE VARIABLE): 52 ML/MIN/1.73 M^2
EST. GFR  (NO RACE VARIABLE): 56 ML/MIN/1.73 M^2
EST. GFR  (NO RACE VARIABLE): >60 ML/MIN/1.73 M^2
FRACTIONAL SHORTENING: 37 % (ref 28–44)
GLUCOSE SERPL-MCNC: 101 MG/DL (ref 70–110)
GLUCOSE SERPL-MCNC: 103 MG/DL (ref 70–110)
GLUCOSE SERPL-MCNC: 104 MG/DL (ref 70–110)
GLUCOSE SERPL-MCNC: 106 MG/DL (ref 70–110)
GLUCOSE SERPL-MCNC: 113 MG/DL (ref 70–110)
GLUCOSE SERPL-MCNC: 116 MG/DL (ref 70–110)
GLUCOSE SERPL-MCNC: 149 MG/DL (ref 70–110)
GLUCOSE SERPL-MCNC: 78 MG/DL (ref 70–110)
GLUCOSE SERPL-MCNC: 94 MG/DL (ref 70–110)
GLUCOSE SERPL-MCNC: 98 MG/DL (ref 70–110)
GLUCOSE UR QL STRIP: NEGATIVE
GLUCOSE UR QL STRIP: NEGATIVE
HAV IGM SERPL QL IA: NORMAL
HAV IGM SERPL QL IA: NORMAL
HBV CORE IGM SERPL QL IA: NORMAL
HBV CORE IGM SERPL QL IA: NORMAL
HBV SURFACE AG SERPL QL IA: NORMAL
HBV SURFACE AG SERPL QL IA: NORMAL
HCT VFR BLD AUTO: 41 % (ref 40–54)
HCT VFR BLD AUTO: 41.7 % (ref 40–54)
HCT VFR BLD AUTO: 42.6 % (ref 40–54)
HCT VFR BLD AUTO: 44.7 % (ref 40–54)
HCT VFR BLD AUTO: 45.1 % (ref 40–54)
HCT VFR BLD AUTO: 45.2 % (ref 40–54)
HCT VFR BLD AUTO: 45.4 % (ref 40–54)
HCT VFR BLD AUTO: 47 % (ref 40–54)
HCT VFR BLD AUTO: 47.1 % (ref 40–54)
HCT VFR BLD AUTO: 47.7 % (ref 40–54)
HCT VFR BLD AUTO: 47.8 % (ref 40–54)
HCV AB SERPL QL IA: NORMAL
HCV AB SERPL QL IA: NORMAL
HGB BLD-MCNC: 12.7 G/DL (ref 14–18)
HGB BLD-MCNC: 13 G/DL (ref 14–18)
HGB BLD-MCNC: 13.3 G/DL (ref 14–18)
HGB BLD-MCNC: 13.8 G/DL (ref 14–18)
HGB BLD-MCNC: 13.9 G/DL (ref 14–18)
HGB BLD-MCNC: 14.5 G/DL (ref 14–18)
HGB BLD-MCNC: 14.6 G/DL (ref 14–18)
HGB BLD-MCNC: 14.8 G/DL (ref 14–18)
HGB BLD-MCNC: 14.9 G/DL (ref 14–18)
HGB BLD-MCNC: 14.9 G/DL (ref 14–18)
HGB BLD-MCNC: 15.4 G/DL (ref 14–18)
HGB UR QL STRIP: NEGATIVE
HGB UR QL STRIP: NEGATIVE
HYALINE CASTS #/AREA URNS LPF: 6 /LPF
IMM GRANULOCYTES # BLD AUTO: 0.01 K/UL (ref 0–0.04)
IMM GRANULOCYTES # BLD AUTO: 0.02 K/UL (ref 0–0.04)
IMM GRANULOCYTES NFR BLD AUTO: 0.2 % (ref 0–0.5)
IMM GRANULOCYTES NFR BLD AUTO: 0.2 % (ref 0–0.5)
IMM GRANULOCYTES NFR BLD AUTO: 0.3 % (ref 0–0.5)
IMM GRANULOCYTES NFR BLD AUTO: 0.3 % (ref 0–0.5)
IMM GRANULOCYTES NFR BLD AUTO: 0.5 % (ref 0–0.5)
IMM GRANULOCYTES NFR BLD AUTO: 0.6 % (ref 0–0.5)
INFLUENZA A, MOLECULAR: POSITIVE
INFLUENZA B, MOLECULAR: NEGATIVE
INTERVENTRICULAR SEPTUM: 1.26 CM (ref 0.6–1.1)
IVC DIAMETER: 1.79 CM
IVRT: 49.48 MSEC
KETONES UR QL STRIP: ABNORMAL
KETONES UR QL STRIP: NEGATIVE
LA MAJOR: 5.77 CM
LA MINOR: 5.55 CM
LA WIDTH: 4.3 CM
LACTATE SERPL-SCNC: 0.8 MMOL/L (ref 0.5–2.2)
LACTATE SERPL-SCNC: 2.5 MMOL/L (ref 0.5–2.2)
LEFT ATRIUM SIZE: 4.2 CM
LEFT ATRIUM VOLUME INDEX: 34.6 ML/M2
LEFT ATRIUM VOLUME: 86.85 CM3
LEFT INTERNAL DIMENSION IN SYSTOLE: 3.64 CM (ref 2.1–4)
LEFT VENTRICLE DIASTOLIC VOLUME INDEX: 67 ML/M2
LEFT VENTRICLE DIASTOLIC VOLUME: 168.17 ML
LEFT VENTRICLE MASS INDEX: 122 G/M2
LEFT VENTRICLE SYSTOLIC VOLUME INDEX: 22.3 ML/M2
LEFT VENTRICLE SYSTOLIC VOLUME: 55.99 ML
LEFT VENTRICULAR INTERNAL DIMENSION IN DIASTOLE: 5.82 CM (ref 3.5–6)
LEFT VENTRICULAR MASS: 307.17 G
LEUKOCYTE ESTERASE UR QL STRIP: NEGATIVE
LEUKOCYTE ESTERASE UR QL STRIP: NEGATIVE
LIPASE SERPL-CCNC: 40 U/L (ref 4–60)
LV LATERAL E/E' RATIO: 7.38 M/S
LV SEPTAL E/E' RATIO: 7.38 M/S
LVOT MG: 6.6 MMHG
LVOT MV: 1.28 CM/S
LYMPHOCYTES # BLD AUTO: 1.4 K/UL (ref 1–4.8)
LYMPHOCYTES # BLD AUTO: 1.5 K/UL (ref 1–4.8)
LYMPHOCYTES # BLD AUTO: 1.6 K/UL (ref 1–4.8)
LYMPHOCYTES # BLD AUTO: 1.7 K/UL (ref 1–4.8)
LYMPHOCYTES # BLD AUTO: 2 K/UL (ref 1–4.8)
LYMPHOCYTES # BLD AUTO: 2 K/UL (ref 1–4.8)
LYMPHOCYTES # BLD AUTO: 2.2 K/UL (ref 1–4.8)
LYMPHOCYTES # BLD AUTO: 2.6 K/UL (ref 1–4.8)
LYMPHOCYTES # BLD AUTO: 2.7 K/UL (ref 1–4.8)
LYMPHOCYTES NFR BLD: 32.9 % (ref 18–48)
LYMPHOCYTES NFR BLD: 36 % (ref 18–48)
LYMPHOCYTES NFR BLD: 36.4 % (ref 18–48)
LYMPHOCYTES NFR BLD: 38.7 % (ref 18–48)
LYMPHOCYTES NFR BLD: 40 % (ref 18–48)
LYMPHOCYTES NFR BLD: 43.6 % (ref 18–48)
LYMPHOCYTES NFR BLD: 46.8 % (ref 18–48)
LYMPHOCYTES NFR BLD: 58.5 % (ref 18–48)
LYMPHOCYTES NFR BLD: 59.6 % (ref 18–48)
LYMPHOCYTES NFR BLD: 61.3 % (ref 18–48)
LYMPHOCYTES NFR BLD: 62.7 % (ref 18–48)
MAGNESIUM SERPL-MCNC: 1.3 MG/DL (ref 1.6–2.6)
MAGNESIUM SERPL-MCNC: 1.7 MG/DL (ref 1.6–2.6)
MAGNESIUM SERPL-MCNC: 1.8 MG/DL (ref 1.6–2.6)
MAGNESIUM SERPL-MCNC: 2.1 MG/DL (ref 1.6–2.6)
MCH RBC QN AUTO: 29.6 PG (ref 27–31)
MCH RBC QN AUTO: 29.9 PG (ref 27–31)
MCH RBC QN AUTO: 29.9 PG (ref 27–31)
MCH RBC QN AUTO: 30.1 PG (ref 27–31)
MCH RBC QN AUTO: 30.2 PG (ref 27–31)
MCH RBC QN AUTO: 30.3 PG (ref 27–31)
MCH RBC QN AUTO: 30.6 PG (ref 27–31)
MCH RBC QN AUTO: 30.7 PG (ref 27–31)
MCH RBC QN AUTO: 30.8 PG (ref 27–31)
MCH RBC QN AUTO: 31.4 PG (ref 27–31)
MCH RBC QN AUTO: 31.4 PG (ref 27–31)
MCHC RBC AUTO-ENTMCNC: 29.5 G/DL (ref 32–36)
MCHC RBC AUTO-ENTMCNC: 31 G/DL (ref 32–36)
MCHC RBC AUTO-ENTMCNC: 31 G/DL (ref 32–36)
MCHC RBC AUTO-ENTMCNC: 31.1 G/DL (ref 32–36)
MCHC RBC AUTO-ENTMCNC: 31.2 G/DL (ref 32–36)
MCHC RBC AUTO-ENTMCNC: 31.2 G/DL (ref 32–36)
MCHC RBC AUTO-ENTMCNC: 31.6 G/DL (ref 32–36)
MCHC RBC AUTO-ENTMCNC: 31.9 G/DL (ref 32–36)
MCHC RBC AUTO-ENTMCNC: 32.3 G/DL (ref 32–36)
MCHC RBC AUTO-ENTMCNC: 32.4 G/DL (ref 32–36)
MCHC RBC AUTO-ENTMCNC: 32.8 G/DL (ref 32–36)
MCV RBC AUTO: 101 FL (ref 82–98)
MCV RBC AUTO: 101 FL (ref 82–98)
MCV RBC AUTO: 103 FL (ref 82–98)
MCV RBC AUTO: 91 FL (ref 82–98)
MCV RBC AUTO: 92 FL (ref 82–98)
MCV RBC AUTO: 92 FL (ref 82–98)
MCV RBC AUTO: 95 FL (ref 82–98)
MCV RBC AUTO: 96 FL (ref 82–98)
MCV RBC AUTO: 97 FL (ref 82–98)
MCV RBC AUTO: 99 FL (ref 82–98)
MCV RBC AUTO: 99 FL (ref 82–98)
MICROSCOPIC COMMENT: ABNORMAL
MONOCYTES # BLD AUTO: 0.4 K/UL (ref 0.3–1)
MONOCYTES # BLD AUTO: 0.5 K/UL (ref 0.3–1)
MONOCYTES # BLD AUTO: 0.6 K/UL (ref 0.3–1)
MONOCYTES # BLD AUTO: 0.9 K/UL (ref 0.3–1)
MONOCYTES # BLD AUTO: 0.9 K/UL (ref 0.3–1)
MONOCYTES NFR BLD: 10.4 % (ref 4–15)
MONOCYTES NFR BLD: 11.5 % (ref 4–15)
MONOCYTES NFR BLD: 12.7 % (ref 4–15)
MONOCYTES NFR BLD: 12.8 % (ref 4–15)
MONOCYTES NFR BLD: 14 % (ref 4–15)
MONOCYTES NFR BLD: 15.5 % (ref 4–15)
MONOCYTES NFR BLD: 15.8 % (ref 4–15)
MONOCYTES NFR BLD: 16.3 % (ref 4–15)
MONOCYTES NFR BLD: 16.6 % (ref 4–15)
MONOCYTES NFR BLD: 16.8 % (ref 4–15)
MONOCYTES NFR BLD: 19.3 % (ref 4–15)
MV PEAK E VEL: 1.18 M/S
MV STENOSIS PRESSURE HALF TIME: 79.96 MS
MV VALVE AREA P 1/2 METHOD: 2.75 CM2
NEUTROPHILS # BLD AUTO: 0.7 K/UL (ref 1.8–7.7)
NEUTROPHILS # BLD AUTO: 0.8 K/UL (ref 1.8–7.7)
NEUTROPHILS # BLD AUTO: 1.2 K/UL (ref 1.8–7.7)
NEUTROPHILS # BLD AUTO: 1.2 K/UL (ref 1.8–7.7)
NEUTROPHILS # BLD AUTO: 1.3 K/UL (ref 1.8–7.7)
NEUTROPHILS # BLD AUTO: 1.4 K/UL (ref 1.8–7.7)
NEUTROPHILS # BLD AUTO: 1.5 K/UL (ref 1.8–7.7)
NEUTROPHILS # BLD AUTO: 1.6 K/UL (ref 1.8–7.7)
NEUTROPHILS # BLD AUTO: 1.8 K/UL (ref 1.8–7.7)
NEUTROPHILS # BLD AUTO: 1.9 K/UL (ref 1.8–7.7)
NEUTROPHILS # BLD AUTO: 3 K/UL (ref 1.8–7.7)
NEUTROPHILS NFR BLD: 21.4 % (ref 38–73)
NEUTROPHILS NFR BLD: 22.7 % (ref 38–73)
NEUTROPHILS NFR BLD: 27.1 % (ref 38–73)
NEUTROPHILS NFR BLD: 28.3 % (ref 38–73)
NEUTROPHILS NFR BLD: 36.8 % (ref 38–73)
NEUTROPHILS NFR BLD: 40.5 % (ref 38–73)
NEUTROPHILS NFR BLD: 42 % (ref 38–73)
NEUTROPHILS NFR BLD: 42.2 % (ref 38–73)
NEUTROPHILS NFR BLD: 42.3 % (ref 38–73)
NEUTROPHILS NFR BLD: 45.1 % (ref 38–73)
NEUTROPHILS NFR BLD: 50.5 % (ref 38–73)
NITRITE UR QL STRIP: NEGATIVE
NITRITE UR QL STRIP: NEGATIVE
NRBC BLD-RTO: 0 /100 WBC
PH UR STRIP: 6 [PH] (ref 5–8)
PH UR STRIP: 6 [PH] (ref 5–8)
PHOSPHATE SERPL-MCNC: 3.1 MG/DL (ref 2.7–4.5)
PHOSPHATE SERPL-MCNC: 3.7 MG/DL (ref 2.7–4.5)
PHOSPHATE SERPL-MCNC: 4.5 MG/DL (ref 2.7–4.5)
PISA TR MAX VEL: 2.81 M/S
PLATELET # BLD AUTO: 123 K/UL (ref 150–450)
PLATELET # BLD AUTO: 132 K/UL (ref 150–450)
PLATELET # BLD AUTO: 133 K/UL (ref 150–450)
PLATELET # BLD AUTO: 139 K/UL (ref 150–450)
PLATELET # BLD AUTO: 152 K/UL (ref 150–450)
PLATELET # BLD AUTO: 155 K/UL (ref 150–450)
PLATELET # BLD AUTO: 167 K/UL (ref 150–450)
PLATELET # BLD AUTO: 173 K/UL (ref 150–450)
PLATELET # BLD AUTO: 176 K/UL (ref 150–450)
PLATELET # BLD AUTO: 188 K/UL (ref 150–450)
PLATELET # BLD AUTO: 195 K/UL (ref 150–450)
PLATELET BLD QL SMEAR: ABNORMAL
PLATELET BLD QL SMEAR: ABNORMAL
PLPETH BLD-MCNC: 1227 NG/ML
PLPETH BLD-MCNC: 1315 NG/ML
PLPETH BLD-MCNC: 552 NG/ML
PLPETH BLD-MCNC: 770 NG/ML
PLPETH BLD-MCNC: 883 NG/ML
PMV BLD AUTO: 10 FL (ref 9.2–12.9)
PMV BLD AUTO: 10.1 FL (ref 9.2–12.9)
PMV BLD AUTO: 10.4 FL (ref 9.2–12.9)
PMV BLD AUTO: 10.7 FL (ref 9.2–12.9)
PMV BLD AUTO: 10.7 FL (ref 9.2–12.9)
PMV BLD AUTO: 10.8 FL (ref 9.2–12.9)
PMV BLD AUTO: 10.9 FL (ref 9.2–12.9)
PMV BLD AUTO: 9.9 FL (ref 9.2–12.9)
PMV BLD AUTO: 9.9 FL (ref 9.2–12.9)
POCT GLUCOSE: 106 MG/DL (ref 70–110)
POCT GLUCOSE: 112 MG/DL (ref 70–110)
POCT GLUCOSE: 113 MG/DL (ref 70–110)
POCT GLUCOSE: 116 MG/DL (ref 70–110)
POCT GLUCOSE: 118 MG/DL (ref 70–110)
POCT GLUCOSE: 119 MG/DL (ref 70–110)
POCT GLUCOSE: 122 MG/DL (ref 70–110)
POCT GLUCOSE: 126 MG/DL (ref 70–110)
POCT GLUCOSE: 139 MG/DL (ref 70–110)
POCT GLUCOSE: 161 MG/DL (ref 70–110)
POCT GLUCOSE: 96 MG/DL (ref 70–110)
POPETH BLD-MCNC: 1100 NG/ML
POPETH BLD-MCNC: 1200 NG/ML
POPETH BLD-MCNC: 1979 NG/ML
POPETH BLD-MCNC: 881 NG/ML
POPETH BLD-MCNC: >2000 NG/ML
POTASSIUM SERPL-SCNC: 3.7 MMOL/L (ref 3.5–5.1)
POTASSIUM SERPL-SCNC: 3.8 MMOL/L (ref 3.5–5.1)
POTASSIUM SERPL-SCNC: 4.2 MMOL/L (ref 3.5–5.1)
POTASSIUM SERPL-SCNC: 4.3 MMOL/L (ref 3.5–5.1)
POTASSIUM SERPL-SCNC: 4.4 MMOL/L (ref 3.5–5.1)
POTASSIUM SERPL-SCNC: 4.5 MMOL/L (ref 3.5–5.1)
POTASSIUM SERPL-SCNC: 4.6 MMOL/L (ref 3.5–5.1)
POTASSIUM SERPL-SCNC: 4.7 MMOL/L (ref 3.5–5.1)
POTASSIUM SERPL-SCNC: 4.9 MMOL/L (ref 3.5–5.1)
POTASSIUM SERPL-SCNC: 4.9 MMOL/L (ref 3.5–5.1)
POTASSIUM SERPL-SCNC: 5 MMOL/L (ref 3.5–5.1)
POTASSIUM SERPL-SCNC: 5 MMOL/L (ref 3.5–5.1)
PROT SERPL-MCNC: 5.6 G/DL (ref 6–8.4)
PROT SERPL-MCNC: 6.2 G/DL (ref 6–8.4)
PROT SERPL-MCNC: 6.3 G/DL (ref 6–8.4)
PROT SERPL-MCNC: 6.4 G/DL (ref 6–8.4)
PROT SERPL-MCNC: 6.5 G/DL (ref 6–8.4)
PROT SERPL-MCNC: 6.6 G/DL (ref 6–8.4)
PROT SERPL-MCNC: 6.6 G/DL (ref 6–8.4)
PROT SERPL-MCNC: 6.7 G/DL (ref 6–8.4)
PROT SERPL-MCNC: 6.8 G/DL (ref 6–8.4)
PROT SERPL-MCNC: 7.1 G/DL (ref 6–8.4)
PROT SERPL-MCNC: 7.1 G/DL (ref 6–8.4)
PROT UR QL STRIP: ABNORMAL
PROT UR QL STRIP: ABNORMAL
PV MEAN GRADIENT: 2 MMHG
RA MAJOR: 4.94 CM
RA PRESSURE ESTIMATED: 8 MMHG
RA WIDTH: 3.7 CM
RBC # BLD AUTO: 4.13 M/UL (ref 4.6–6.2)
RBC # BLD AUTO: 4.35 M/UL (ref 4.6–6.2)
RBC # BLD AUTO: 4.4 M/UL (ref 4.6–6.2)
RBC # BLD AUTO: 4.54 M/UL (ref 4.6–6.2)
RBC # BLD AUTO: 4.67 M/UL (ref 4.6–6.2)
RBC # BLD AUTO: 4.72 M/UL (ref 4.6–6.2)
RBC # BLD AUTO: 4.74 M/UL (ref 4.6–6.2)
RBC # BLD AUTO: 4.79 M/UL (ref 4.6–6.2)
RBC # BLD AUTO: 4.85 M/UL (ref 4.6–6.2)
RBC # BLD AUTO: 4.89 M/UL (ref 4.6–6.2)
RBC # BLD AUTO: 5.11 M/UL (ref 4.6–6.2)
RBC #/AREA URNS HPF: 0 /HPF (ref 0–4)
RV TB RVSP: 11 MMHG
SARS-COV-2 RDRP RESP QL NAA+PROBE: NEGATIVE
SODIUM SERPL-SCNC: 133 MMOL/L (ref 136–145)
SODIUM SERPL-SCNC: 133 MMOL/L (ref 136–145)
SODIUM SERPL-SCNC: 134 MMOL/L (ref 136–145)
SODIUM SERPL-SCNC: 136 MMOL/L (ref 136–145)
SODIUM SERPL-SCNC: 137 MMOL/L (ref 136–145)
SODIUM SERPL-SCNC: 138 MMOL/L (ref 136–145)
SODIUM SERPL-SCNC: 138 MMOL/L (ref 136–145)
SODIUM SERPL-SCNC: 139 MMOL/L (ref 136–145)
SODIUM SERPL-SCNC: 143 MMOL/L (ref 136–145)
SODIUM SERPL-SCNC: 144 MMOL/L (ref 136–145)
SP GR UR STRIP: 1.02 (ref 1–1.03)
SP GR UR STRIP: >1.03 (ref 1–1.03)
SPECIMEN SOURCE: ABNORMAL
STJ: 3.55 CM
TACROLIMUS BLD-MCNC: 10.6 NG/ML (ref 5–15)
TACROLIMUS BLD-MCNC: 6.8 NG/ML (ref 5–15)
TACROLIMUS BLD-MCNC: 7.3 NG/ML (ref 5–15)
TACROLIMUS BLD-MCNC: 8.3 NG/ML (ref 5–15)
TACROLIMUS BLD-MCNC: 8.8 NG/ML (ref 5–15)
TACROLIMUS BLD-MCNC: 9 NG/ML (ref 5–15)
TACROLIMUS BLD-MCNC: 9.7 NG/ML (ref 5–15)
TACROLIMUS BLD-MCNC: 9.9 NG/ML (ref 5–15)
TAPSE: 2.1 CM
TDI LATERAL: 0.16 M/S
TDI SEPTAL: 0.16 M/S
TDI: 0.16 M/S
TR MAX PG: 32 MMHG
TR MEAN GRADIENT: 27 MMHG
TROPONIN I SERPL DL<=0.01 NG/ML-MCNC: <0.006 NG/ML (ref 0–0.03)
URN SPEC COLLECT METH UR: ABNORMAL
URN SPEC COLLECT METH UR: ABNORMAL
UROBILINOGEN UR STRIP-ACNC: ABNORMAL EU/DL
UROBILINOGEN UR STRIP-ACNC: ABNORMAL EU/DL
WBC # BLD AUTO: 3.29 K/UL (ref 3.9–12.7)
WBC # BLD AUTO: 3.32 K/UL (ref 3.9–12.7)
WBC # BLD AUTO: 3.4 K/UL (ref 3.9–12.7)
WBC # BLD AUTO: 3.43 K/UL (ref 3.9–12.7)
WBC # BLD AUTO: 3.49 K/UL (ref 3.9–12.7)
WBC # BLD AUTO: 3.85 K/UL (ref 3.9–12.7)
WBC # BLD AUTO: 3.92 K/UL (ref 3.9–12.7)
WBC # BLD AUTO: 4.43 K/UL (ref 3.9–12.7)
WBC # BLD AUTO: 4.44 K/UL (ref 3.9–12.7)
WBC # BLD AUTO: 4.5 K/UL (ref 3.9–12.7)
WBC # BLD AUTO: 5.99 K/UL (ref 3.9–12.7)
WBC #/AREA URNS HPF: 0 /HPF (ref 0–5)
Z-SCORE OF LEFT VENTRICULAR DIMENSION IN END DIASTOLE: -8.99
Z-SCORE OF LEFT VENTRICULAR DIMENSION IN END SYSTOLE: -6.68

## 2023-01-01 PROCEDURE — 80053 COMPREHEN METABOLIC PANEL: CPT | Performed by: INTERNAL MEDICINE

## 2023-01-01 PROCEDURE — 84484 ASSAY OF TROPONIN QUANT: CPT | Performed by: HOSPITALIST

## 2023-01-01 PROCEDURE — 36415 COLL VENOUS BLD VENIPUNCTURE: CPT | Mod: PO | Performed by: INTERNAL MEDICINE

## 2023-01-01 PROCEDURE — 83880 ASSAY OF NATRIURETIC PEPTIDE: CPT | Performed by: HOSPITALIST

## 2023-01-01 PROCEDURE — 25000003 PHARM REV CODE 250: Performed by: HOSPITALIST

## 2023-01-01 PROCEDURE — 84100 ASSAY OF PHOSPHORUS: CPT | Performed by: NURSE PRACTITIONER

## 2023-01-01 PROCEDURE — 94761 N-INVAS EAR/PLS OXIMETRY MLT: CPT

## 2023-01-01 PROCEDURE — 80197 ASSAY OF TACROLIMUS: CPT | Performed by: INTERNAL MEDICINE

## 2023-01-01 PROCEDURE — 85025 COMPLETE CBC W/AUTO DIFF WBC: CPT | Performed by: NURSE PRACTITIONER

## 2023-01-01 PROCEDURE — 81000 URINALYSIS NONAUTO W/SCOPE: CPT | Performed by: NURSE PRACTITIONER

## 2023-01-01 PROCEDURE — 99222 PR INITIAL HOSPITAL CARE,LEVL II: ICD-10-PCS | Mod: 95,,, | Performed by: INTERNAL MEDICINE

## 2023-01-01 PROCEDURE — 83735 ASSAY OF MAGNESIUM: CPT | Performed by: NURSE PRACTITIONER

## 2023-01-01 PROCEDURE — 63600175 PHARM REV CODE 636 W HCPCS: Performed by: NURSE PRACTITIONER

## 2023-01-01 PROCEDURE — 25500020 PHARM REV CODE 255: Performed by: INTERNAL MEDICINE

## 2023-01-01 PROCEDURE — 93005 ELECTROCARDIOGRAM TRACING: CPT

## 2023-01-01 PROCEDURE — 21400001 HC TELEMETRY ROOM

## 2023-01-01 PROCEDURE — 99232 SBSQ HOSP IP/OBS MODERATE 35: CPT | Mod: ,,, | Performed by: INTERNAL MEDICINE

## 2023-01-01 PROCEDURE — 93010 ELECTROCARDIOGRAM REPORT: CPT | Mod: ,,, | Performed by: INTERNAL MEDICINE

## 2023-01-01 PROCEDURE — 99900035 HC TECH TIME PER 15 MIN (STAT)

## 2023-01-01 PROCEDURE — 99223 1ST HOSP IP/OBS HIGH 75: CPT | Mod: ,,, | Performed by: INTERNAL MEDICINE

## 2023-01-01 PROCEDURE — 80053 COMPREHEN METABOLIC PANEL: CPT | Performed by: NURSE PRACTITIONER

## 2023-01-01 PROCEDURE — 80321 ALCOHOLS BIOMARKERS 1OR 2: CPT | Performed by: INTERNAL MEDICINE

## 2023-01-01 PROCEDURE — 25000003 PHARM REV CODE 250: Performed by: INTERNAL MEDICINE

## 2023-01-01 PROCEDURE — 76705 US DOPPLER LIVER TRANSPLANT POST (XPD): ICD-10-PCS | Mod: 26,XS,, | Performed by: RADIOLOGY

## 2023-01-01 PROCEDURE — 99285 EMERGENCY DEPT VISIT HI MDM: CPT | Mod: 25

## 2023-01-01 PROCEDURE — 85025 COMPLETE CBC W/AUTO DIFF WBC: CPT | Performed by: INTERNAL MEDICINE

## 2023-01-01 PROCEDURE — 25000003 PHARM REV CODE 250: Performed by: NURSE PRACTITIONER

## 2023-01-01 PROCEDURE — 27000221 HC OXYGEN, UP TO 24 HOURS

## 2023-01-01 PROCEDURE — 93976 US DOPPLER LIVER TRANSPLANT POST (XPD): ICD-10-PCS | Mod: 26,,, | Performed by: RADIOLOGY

## 2023-01-01 PROCEDURE — 80074 ACUTE HEPATITIS PANEL: CPT | Performed by: INTERNAL MEDICINE

## 2023-01-01 PROCEDURE — U0002 COVID-19 LAB TEST NON-CDC: HCPCS | Performed by: EMERGENCY MEDICINE

## 2023-01-01 PROCEDURE — 82550 ASSAY OF CK (CPK): CPT | Performed by: HOSPITALIST

## 2023-01-01 PROCEDURE — 99284 EMERGENCY DEPT VISIT MOD MDM: CPT | Mod: 25

## 2023-01-01 PROCEDURE — 36415 COLL VENOUS BLD VENIPUNCTURE: CPT | Performed by: NURSE PRACTITIONER

## 2023-01-01 PROCEDURE — 80074 ACUTE HEPATITIS PANEL: CPT | Performed by: HOSPITALIST

## 2023-01-01 PROCEDURE — 76705 ECHO EXAM OF ABDOMEN: CPT | Mod: 26,XS,, | Performed by: RADIOLOGY

## 2023-01-01 PROCEDURE — 93976 VASCULAR STUDY: CPT | Mod: TC

## 2023-01-01 PROCEDURE — 87040 BLOOD CULTURE FOR BACTERIA: CPT | Performed by: NURSE PRACTITIONER

## 2023-01-01 PROCEDURE — 83605 ASSAY OF LACTIC ACID: CPT | Performed by: NURSE PRACTITIONER

## 2023-01-01 PROCEDURE — 99406 PT REFUSED TOBACCO CESSATION: ICD-10-PCS | Mod: S$GLB,,,

## 2023-01-01 PROCEDURE — 96375 TX/PRO/DX INJ NEW DRUG ADDON: CPT

## 2023-01-01 PROCEDURE — 96365 THER/PROPH/DIAG IV INF INIT: CPT

## 2023-01-01 PROCEDURE — 99222 1ST HOSP IP/OBS MODERATE 55: CPT | Mod: 95,,, | Performed by: INTERNAL MEDICINE

## 2023-01-01 PROCEDURE — 82248 BILIRUBIN DIRECT: CPT | Performed by: HOSPITALIST

## 2023-01-01 PROCEDURE — 81003 URINALYSIS AUTO W/O SCOPE: CPT | Performed by: NURSE PRACTITIONER

## 2023-01-01 PROCEDURE — 25000242 PHARM REV CODE 250 ALT 637 W/ HCPCS: Performed by: NURSE PRACTITIONER

## 2023-01-01 PROCEDURE — 83690 ASSAY OF LIPASE: CPT | Performed by: NURSE PRACTITIONER

## 2023-01-01 PROCEDURE — 93010 EKG 12-LEAD: ICD-10-PCS | Mod: ,,, | Performed by: INTERNAL MEDICINE

## 2023-01-01 PROCEDURE — 87502 INFLUENZA DNA AMP PROBE: CPT | Performed by: EMERGENCY MEDICINE

## 2023-01-01 PROCEDURE — 96374 THER/PROPH/DIAG INJ IV PUSH: CPT

## 2023-01-01 PROCEDURE — 74175 CTA ABDOMEN: ICD-10-PCS | Mod: 26,,, | Performed by: RADIOLOGY

## 2023-01-01 PROCEDURE — 80048 BASIC METABOLIC PNL TOTAL CA: CPT | Mod: XB | Performed by: NURSE PRACTITIONER

## 2023-01-01 PROCEDURE — 74175 CTA ABDOMEN W/CONTRAST: CPT | Mod: 26,,, | Performed by: RADIOLOGY

## 2023-01-01 PROCEDURE — 96361 HYDRATE IV INFUSION ADD-ON: CPT

## 2023-01-01 PROCEDURE — 80197 ASSAY OF TACROLIMUS: CPT | Performed by: NURSE PRACTITIONER

## 2023-01-01 PROCEDURE — 99406 BEHAV CHNG SMOKING 3-10 MIN: CPT | Mod: S$GLB,,,

## 2023-01-01 PROCEDURE — 63600175 PHARM REV CODE 636 W HCPCS: Performed by: EMERGENCY MEDICINE

## 2023-01-01 PROCEDURE — 80053 COMPREHEN METABOLIC PANEL: CPT | Performed by: EMERGENCY MEDICINE

## 2023-01-01 PROCEDURE — 99223 PR INITIAL HOSPITAL CARE,LEVL III: ICD-10-PCS | Mod: ,,, | Performed by: INTERNAL MEDICINE

## 2023-01-01 PROCEDURE — 74175 CTA ABDOMEN W/CONTRAST: CPT | Mod: TC

## 2023-01-01 PROCEDURE — 93976 VASCULAR STUDY: CPT | Mod: 26,,, | Performed by: RADIOLOGY

## 2023-01-01 PROCEDURE — 83735 ASSAY OF MAGNESIUM: CPT | Mod: 91 | Performed by: NURSE PRACTITIONER

## 2023-01-01 PROCEDURE — 99232 PR SUBSEQUENT HOSPITAL CARE,LEVL II: ICD-10-PCS | Mod: ,,, | Performed by: INTERNAL MEDICINE

## 2023-01-01 PROCEDURE — 36415 COLL VENOUS BLD VENIPUNCTURE: CPT | Performed by: HOSPITALIST

## 2023-01-01 RX ORDER — MAGNESIUM SULFATE HEPTAHYDRATE 40 MG/ML
2 INJECTION, SOLUTION INTRAVENOUS ONCE
Status: COMPLETED | OUTPATIENT
Start: 2023-01-01 | End: 2023-01-01

## 2023-01-01 RX ORDER — HYDRALAZINE HYDROCHLORIDE 20 MG/ML
10 INJECTION INTRAMUSCULAR; INTRAVENOUS EVERY 6 HOURS PRN
Status: DISCONTINUED | OUTPATIENT
Start: 2023-01-01 | End: 2023-01-01 | Stop reason: HOSPADM

## 2023-01-01 RX ORDER — OXYCODONE HYDROCHLORIDE 10 MG/1
10 TABLET ORAL EVERY 6 HOURS PRN
Qty: 20 TABLET | Refills: 0 | Status: SHIPPED | OUTPATIENT
Start: 2023-01-01 | End: 2023-01-01

## 2023-01-01 RX ORDER — ALBUTEROL SULFATE 90 UG/1
2 AEROSOL, METERED RESPIRATORY (INHALATION) EVERY 8 HOURS
Status: DISCONTINUED | OUTPATIENT
Start: 2023-01-01 | End: 2023-01-01

## 2023-01-01 RX ORDER — LORATADINE 10 MG/1
10 TABLET ORAL DAILY
COMMUNITY
Start: 2023-01-01

## 2023-01-01 RX ORDER — OSELTAMIVIR PHOSPHATE 75 MG/1
75 CAPSULE ORAL 2 TIMES DAILY
Qty: 10 CAPSULE | Refills: 0 | Status: SHIPPED | OUTPATIENT
Start: 2023-01-01 | End: 2023-01-01

## 2023-01-01 RX ORDER — DOXYCYCLINE 100 MG/1
100 CAPSULE ORAL 2 TIMES DAILY
Qty: 14 CAPSULE | Refills: 0 | Status: SHIPPED | OUTPATIENT
Start: 2023-01-01 | End: 2023-01-01

## 2023-01-01 RX ORDER — IBUPROFEN 200 MG
16 TABLET ORAL
Status: DISCONTINUED | OUTPATIENT
Start: 2023-01-01 | End: 2023-01-01 | Stop reason: HOSPADM

## 2023-01-01 RX ORDER — MYCOPHENOLATE MOFETIL 250 MG/1
500 CAPSULE ORAL 2 TIMES DAILY
Qty: 120 CAPSULE | Refills: 11 | Status: SHIPPED | OUTPATIENT
Start: 2023-01-01 | End: 2024-02-21

## 2023-01-01 RX ORDER — MORPHINE SULFATE 4 MG/ML
6 INJECTION, SOLUTION INTRAMUSCULAR; INTRAVENOUS
Status: COMPLETED | OUTPATIENT
Start: 2023-01-01 | End: 2023-01-01

## 2023-01-01 RX ORDER — INSULIN ASPART 100 [IU]/ML
0-5 INJECTION, SOLUTION INTRAVENOUS; SUBCUTANEOUS
Status: DISCONTINUED | OUTPATIENT
Start: 2023-01-01 | End: 2023-01-01 | Stop reason: HOSPADM

## 2023-01-01 RX ORDER — NALOXONE HCL 0.4 MG/ML
0.02 VIAL (ML) INJECTION
Status: DISCONTINUED | OUTPATIENT
Start: 2023-01-01 | End: 2023-01-01 | Stop reason: HOSPADM

## 2023-01-01 RX ORDER — CEFAZOLIN SODIUM 1 G/50ML
1 SOLUTION INTRAVENOUS
Status: COMPLETED | OUTPATIENT
Start: 2023-01-01 | End: 2023-01-01

## 2023-01-01 RX ORDER — OXYCODONE HYDROCHLORIDE 5 MG/1
10 TABLET ORAL EVERY 4 HOURS PRN
Status: DISCONTINUED | OUTPATIENT
Start: 2023-01-01 | End: 2023-01-01 | Stop reason: HOSPADM

## 2023-01-01 RX ORDER — PROCHLORPERAZINE EDISYLATE 5 MG/ML
5 INJECTION INTRAMUSCULAR; INTRAVENOUS EVERY 6 HOURS PRN
Status: DISCONTINUED | OUTPATIENT
Start: 2023-01-01 | End: 2023-01-01 | Stop reason: HOSPADM

## 2023-01-01 RX ORDER — ONDANSETRON 2 MG/ML
4 INJECTION INTRAMUSCULAR; INTRAVENOUS
Status: COMPLETED | OUTPATIENT
Start: 2023-01-01 | End: 2023-01-01

## 2023-01-01 RX ORDER — TACROLIMUS 1 MG/1
1 CAPSULE ORAL 2 TIMES DAILY
Status: DISCONTINUED | OUTPATIENT
Start: 2023-01-01 | End: 2023-01-01 | Stop reason: HOSPADM

## 2023-01-01 RX ORDER — GABAPENTIN 300 MG/1
300 CAPSULE ORAL NIGHTLY
Status: DISCONTINUED | OUTPATIENT
Start: 2023-01-01 | End: 2023-01-01 | Stop reason: HOSPADM

## 2023-01-01 RX ORDER — TACROLIMUS 1 MG/1
CAPSULE ORAL
Qty: 60 CAPSULE | Refills: 11 | Status: SHIPPED | OUTPATIENT
Start: 2023-01-01

## 2023-01-01 RX ORDER — ONDANSETRON 2 MG/ML
4 INJECTION INTRAMUSCULAR; INTRAVENOUS EVERY 8 HOURS PRN
Status: DISCONTINUED | OUTPATIENT
Start: 2023-01-01 | End: 2023-01-01 | Stop reason: HOSPADM

## 2023-01-01 RX ORDER — MAG HYDROX/ALUMINUM HYD/SIMETH 200-200-20
30 SUSPENSION, ORAL (FINAL DOSE FORM) ORAL 4 TIMES DAILY PRN
Status: DISCONTINUED | OUTPATIENT
Start: 2023-01-01 | End: 2023-01-01 | Stop reason: HOSPADM

## 2023-01-01 RX ORDER — AMOXICILLIN AND CLAVULANATE POTASSIUM 875; 125 MG/1; MG/1
1 TABLET, FILM COATED ORAL EVERY 12 HOURS
Qty: 14 TABLET | Refills: 0 | Status: SHIPPED | OUTPATIENT
Start: 2023-01-01 | End: 2023-01-01

## 2023-01-01 RX ORDER — SULFAMETHOXAZOLE AND TRIMETHOPRIM 800; 160 MG/1; MG/1
1 TABLET ORAL 2 TIMES DAILY
COMMUNITY
Start: 2023-01-01 | End: 2024-01-01

## 2023-01-01 RX ORDER — ACETAMINOPHEN 325 MG/1
650 TABLET ORAL EVERY 4 HOURS PRN
Status: DISCONTINUED | OUTPATIENT
Start: 2023-01-01 | End: 2023-01-01 | Stop reason: HOSPADM

## 2023-01-01 RX ORDER — SODIUM CHLORIDE 0.9 % (FLUSH) 0.9 %
10 SYRINGE (ML) INJECTION EVERY 12 HOURS PRN
Status: DISCONTINUED | OUTPATIENT
Start: 2023-01-01 | End: 2023-01-01 | Stop reason: HOSPADM

## 2023-01-01 RX ORDER — MUPIROCIN 20 MG/G
OINTMENT TOPICAL
COMMUNITY
Start: 2023-01-01 | End: 2024-01-01

## 2023-01-01 RX ORDER — TRAZODONE HYDROCHLORIDE 50 MG/1
50-100 TABLET ORAL NIGHTLY
COMMUNITY
Start: 2023-01-01

## 2023-01-01 RX ORDER — AMLODIPINE BESYLATE 10 MG/1
10 TABLET ORAL DAILY
Status: DISCONTINUED | OUTPATIENT
Start: 2023-01-01 | End: 2023-01-01

## 2023-01-01 RX ORDER — TALC
6 POWDER (GRAM) TOPICAL NIGHTLY PRN
Status: DISCONTINUED | OUTPATIENT
Start: 2023-01-01 | End: 2023-01-01 | Stop reason: HOSPADM

## 2023-01-01 RX ORDER — MAGNESIUM SULFATE 1 G/100ML
1 INJECTION INTRAVENOUS ONCE
Status: COMPLETED | OUTPATIENT
Start: 2023-01-01 | End: 2023-01-01

## 2023-01-01 RX ORDER — ALBUTEROL SULFATE 90 UG/1
2 AEROSOL, METERED RESPIRATORY (INHALATION) EVERY 4 HOURS PRN
Status: DISCONTINUED | OUTPATIENT
Start: 2023-01-01 | End: 2023-01-01 | Stop reason: CLARIF

## 2023-01-01 RX ORDER — TRAZODONE HYDROCHLORIDE 50 MG/1
50 TABLET ORAL NIGHTLY PRN
Status: DISCONTINUED | OUTPATIENT
Start: 2023-01-01 | End: 2023-01-01 | Stop reason: HOSPADM

## 2023-01-01 RX ORDER — OXYCODONE HYDROCHLORIDE 5 MG/1
10 TABLET ORAL EVERY 6 HOURS PRN
Status: DISCONTINUED | OUTPATIENT
Start: 2023-01-01 | End: 2023-01-01

## 2023-01-01 RX ORDER — APIXABAN 5 MG/1
5 TABLET, FILM COATED ORAL 2 TIMES DAILY
Qty: 60 TABLET | Refills: 5 | Status: CANCELLED | OUTPATIENT
Start: 2023-01-01

## 2023-01-01 RX ORDER — POLYETHYLENE GLYCOL 3350 17 G/17G
17 POWDER, FOR SOLUTION ORAL DAILY
Status: DISCONTINUED | OUTPATIENT
Start: 2023-01-01 | End: 2023-01-01 | Stop reason: HOSPADM

## 2023-01-01 RX ORDER — MORPHINE SULFATE 4 MG/ML
4 INJECTION, SOLUTION INTRAMUSCULAR; INTRAVENOUS
Status: COMPLETED | OUTPATIENT
Start: 2023-01-01 | End: 2023-01-01

## 2023-01-01 RX ORDER — GLUCAGON 1 MG
1 KIT INJECTION
Status: DISCONTINUED | OUTPATIENT
Start: 2023-01-01 | End: 2023-01-01 | Stop reason: HOSPADM

## 2023-01-01 RX ORDER — ESCITALOPRAM OXALATE 10 MG/1
10 TABLET ORAL
COMMUNITY
Start: 2023-01-01 | End: 2024-01-01

## 2023-01-01 RX ORDER — ALBUTEROL SULFATE 0.83 MG/ML
2.5 SOLUTION RESPIRATORY (INHALATION) EVERY 4 HOURS PRN
Status: DISCONTINUED | OUTPATIENT
Start: 2023-01-01 | End: 2023-01-01 | Stop reason: HOSPADM

## 2023-01-01 RX ORDER — SODIUM CHLORIDE 9 MG/ML
INJECTION, SOLUTION INTRAVENOUS CONTINUOUS
Status: DISCONTINUED | OUTPATIENT
Start: 2023-01-01 | End: 2023-01-01 | Stop reason: HOSPADM

## 2023-01-01 RX ORDER — PROMETHAZINE HYDROCHLORIDE AND DEXTROMETHORPHAN HYDROBROMIDE 6.25; 15 MG/5ML; MG/5ML
5 SYRUP ORAL NIGHTLY PRN
Qty: 118 ML | Refills: 0 | Status: SHIPPED | OUTPATIENT
Start: 2023-01-01 | End: 2023-01-01

## 2023-01-01 RX ORDER — MORPHINE SULFATE 4 MG/ML
2 INJECTION, SOLUTION INTRAMUSCULAR; INTRAVENOUS EVERY 4 HOURS PRN
Status: DISCONTINUED | OUTPATIENT
Start: 2023-01-01 | End: 2023-01-01 | Stop reason: HOSPADM

## 2023-01-01 RX ORDER — IBUPROFEN 200 MG
24 TABLET ORAL
Status: DISCONTINUED | OUTPATIENT
Start: 2023-01-01 | End: 2023-01-01 | Stop reason: HOSPADM

## 2023-01-01 RX ADMIN — MORPHINE SULFATE 2 MG: 4 INJECTION INTRAVENOUS at 01:07

## 2023-01-01 RX ADMIN — SODIUM CHLORIDE: 9 INJECTION, SOLUTION INTRAVENOUS at 03:07

## 2023-01-01 RX ADMIN — CEFTAZIDIME 2 G: 2 INJECTION, POWDER, FOR SOLUTION INTRAVENOUS at 03:07

## 2023-01-01 RX ADMIN — DOXYCYCLINE 100 MG: 100 INJECTION, POWDER, LYOPHILIZED, FOR SOLUTION INTRAVENOUS at 04:07

## 2023-01-01 RX ADMIN — ONDANSETRON 4 MG: 2 INJECTION INTRAMUSCULAR; INTRAVENOUS at 01:07

## 2023-01-01 RX ADMIN — SODIUM CHLORIDE: 9 INJECTION, SOLUTION INTRAVENOUS at 09:07

## 2023-01-01 RX ADMIN — CEFTAZIDIME 2 G: 2 INJECTION, POWDER, FOR SOLUTION INTRAVENOUS at 11:07

## 2023-01-01 RX ADMIN — TACROLIMUS 1 MG: 1 CAPSULE ORAL at 08:07

## 2023-01-01 RX ADMIN — GABAPENTIN 300 MG: 300 CAPSULE ORAL at 08:07

## 2023-01-01 RX ADMIN — OXYCODONE HYDROCHLORIDE 10 MG: 5 TABLET ORAL at 09:07

## 2023-01-01 RX ADMIN — CEFTAZIDIME 2 G: 2 INJECTION, POWDER, FOR SOLUTION INTRAVENOUS at 08:07

## 2023-01-01 RX ADMIN — OXYCODONE HYDROCHLORIDE 10 MG: 5 TABLET ORAL at 12:07

## 2023-01-01 RX ADMIN — SODIUM CHLORIDE, POTASSIUM CHLORIDE, SODIUM LACTATE AND CALCIUM CHLORIDE 2328 ML: 600; 310; 30; 20 INJECTION, SOLUTION INTRAVENOUS at 01:07

## 2023-01-01 RX ADMIN — MORPHINE SULFATE 2 MG: 4 INJECTION INTRAVENOUS at 06:07

## 2023-01-01 RX ADMIN — APIXABAN 5 MG: 2.5 TABLET, FILM COATED ORAL at 08:07

## 2023-01-01 RX ADMIN — MORPHINE SULFATE 2 MG: 4 INJECTION INTRAVENOUS at 08:07

## 2023-01-01 RX ADMIN — CEFTAZIDIME 2 G: 2 INJECTION, POWDER, FOR SOLUTION INTRAVENOUS at 09:07

## 2023-01-01 RX ADMIN — OXYCODONE HYDROCHLORIDE 10 MG: 5 TABLET ORAL at 04:07

## 2023-01-01 RX ADMIN — DOXYCYCLINE 100 MG: 100 INJECTION, POWDER, LYOPHILIZED, FOR SOLUTION INTRAVENOUS at 05:07

## 2023-01-01 RX ADMIN — SODIUM CHLORIDE: 9 INJECTION, SOLUTION INTRAVENOUS at 08:07

## 2023-01-01 RX ADMIN — POLYETHYLENE GLYCOL 3350 17 G: 17 POWDER, FOR SOLUTION ORAL at 09:07

## 2023-01-01 RX ADMIN — TRAZODONE HYDROCHLORIDE 50 MG: 50 TABLET ORAL at 08:07

## 2023-01-01 RX ADMIN — CEFAZOLIN SODIUM 1 G: 1 SOLUTION INTRAVENOUS at 01:07

## 2023-01-01 RX ADMIN — MORPHINE SULFATE 2 MG: 4 INJECTION INTRAVENOUS at 11:07

## 2023-01-01 RX ADMIN — CEFTAZIDIME 2 G: 2 INJECTION, POWDER, FOR SOLUTION INTRAVENOUS at 12:07

## 2023-01-01 RX ADMIN — FLUTICASONE FUROATE, UMECLIDINIUM BROMIDE AND VILANTEROL TRIFENATATE 1 PUFF: 200; 62.5; 25 POWDER RESPIRATORY (INHALATION) at 09:07

## 2023-01-01 RX ADMIN — SODIUM CHLORIDE: 9 INJECTION, SOLUTION INTRAVENOUS at 05:07

## 2023-01-01 RX ADMIN — FLUTICASONE FUROATE, UMECLIDINIUM BROMIDE AND VILANTEROL TRIFENATATE 1 PUFF: 200; 62.5; 25 POWDER RESPIRATORY (INHALATION) at 08:07

## 2023-01-01 RX ADMIN — APIXABAN 5 MG: 2.5 TABLET, FILM COATED ORAL at 09:07

## 2023-01-01 RX ADMIN — ALUMINUM HYDROXIDE, MAGNESIUM HYDROXIDE, AND DIMETHICONE 30 ML: 200; 20; 200 SUSPENSION ORAL at 04:07

## 2023-01-01 RX ADMIN — ALUMINUM HYDROXIDE, MAGNESIUM HYDROXIDE, AND DIMETHICONE 30 ML: 200; 20; 200 SUSPENSION ORAL at 05:07

## 2023-01-01 RX ADMIN — CEFTAZIDIME 2 G: 2 INJECTION, POWDER, FOR SOLUTION INTRAVENOUS at 04:07

## 2023-01-01 RX ADMIN — MAGNESIUM SULFATE HEPTAHYDRATE 2 G: 40 INJECTION, SOLUTION INTRAVENOUS at 08:07

## 2023-01-01 RX ADMIN — POLYETHYLENE GLYCOL 3350 17 G: 17 POWDER, FOR SOLUTION ORAL at 07:07

## 2023-01-01 RX ADMIN — MAGNESIUM SULFATE IN DEXTROSE 1 G: 10 INJECTION, SOLUTION INTRAVENOUS at 11:07

## 2023-01-01 RX ADMIN — MORPHINE SULFATE 2 MG: 4 INJECTION INTRAVENOUS at 10:07

## 2023-01-01 RX ADMIN — TACROLIMUS 1 MG: 1 CAPSULE ORAL at 09:07

## 2023-01-01 RX ADMIN — IOHEXOL 100 ML: 350 INJECTION, SOLUTION INTRAVENOUS at 01:03

## 2023-01-01 RX ADMIN — TACROLIMUS 1 MG: 1 CAPSULE ORAL at 05:07

## 2023-01-01 RX ADMIN — MORPHINE SULFATE 4 MG: 4 INJECTION INTRAVENOUS at 03:09

## 2023-01-01 RX ADMIN — MORPHINE SULFATE 6 MG: 4 INJECTION INTRAVENOUS at 01:07

## 2023-01-01 RX ADMIN — OXYCODONE HYDROCHLORIDE 10 MG: 5 TABLET ORAL at 05:07

## 2023-01-01 RX ADMIN — ONDANSETRON 4 MG: 2 INJECTION INTRAMUSCULAR; INTRAVENOUS at 12:07

## 2023-01-01 RX ADMIN — MORPHINE SULFATE 2 MG: 4 INJECTION INTRAVENOUS at 05:07

## 2023-01-01 RX ADMIN — POLYETHYLENE GLYCOL 3350 17 G: 17 POWDER, FOR SOLUTION ORAL at 08:07

## 2023-01-01 RX ADMIN — OXYCODONE HYDROCHLORIDE 10 MG: 5 TABLET ORAL at 08:07

## 2023-01-01 RX ADMIN — Medication 6 MG: at 08:07

## 2023-01-01 RX ADMIN — ONDANSETRON 4 MG: 2 INJECTION INTRAMUSCULAR; INTRAVENOUS at 03:09

## 2023-01-01 RX ADMIN — OXYCODONE HYDROCHLORIDE 10 MG: 5 TABLET ORAL at 11:07

## 2023-01-05 ENCOUNTER — TELEPHONE (OUTPATIENT)
Dept: HEPATOLOGY | Facility: CLINIC | Age: 48
End: 2023-01-05
Payer: MEDICAID

## 2023-01-05 NOTE — TELEPHONE ENCOUNTER
Patient is requesting a surgical clearance to be off of his Eliquis for an upcoming procedure on 1/13/23. Patient is having an arthroplasty of his c-spine.

## 2023-01-05 NOTE — TELEPHONE ENCOUNTER
----- Message from Sue Calderón sent at 1/5/2023  9:21 AM CST -----  Regarding: Medical Assistance  Contact: Patient  Patient is requesting a call back regarding a medical clearance needed for a procedure scheduled at [place] on 01/13/2023   Please Assist     Patient can be reached at [phone]

## 2023-01-06 ENCOUNTER — TELEPHONE (OUTPATIENT)
Dept: HEPATOLOGY | Facility: CLINIC | Age: 48
End: 2023-01-06
Payer: MEDICAID

## 2023-01-06 NOTE — TELEPHONE ENCOUNTER
----- Message from Sue Calderón sent at 1/6/2023  1:55 PM CST -----  Regarding: Medical Assistance  Contact: Patient  Patient is requesting a call back regarding a clearance from medication   apixaban (ELIQUIS) 5 mg Tab  Patient is scheduled to have a procedure completed on 01/13/2023 and would like clearance faxed to physician Dr. Acevedo Fax: [phone]   Patient stated he needs clearance to state he should stop medication 2 days before and a week after procedure is completed   Please Assist     Patient can be reached at [phone]

## 2023-01-09 ENCOUNTER — LAB VISIT (OUTPATIENT)
Dept: LAB | Facility: HOSPITAL | Age: 48
End: 2023-01-09
Attending: INTERNAL MEDICINE
Payer: MEDICAID

## 2023-01-09 ENCOUNTER — PATIENT MESSAGE (OUTPATIENT)
Dept: TRANSPLANT | Facility: CLINIC | Age: 48
End: 2023-01-09
Payer: MEDICAID

## 2023-01-09 ENCOUNTER — TELEPHONE (OUTPATIENT)
Dept: TRANSPLANT | Facility: CLINIC | Age: 48
End: 2023-01-09
Payer: MEDICAID

## 2023-01-09 DIAGNOSIS — Z94.4 LIVER REPLACED BY TRANSPLANT: ICD-10-CM

## 2023-01-09 PROCEDURE — 36415 COLL VENOUS BLD VENIPUNCTURE: CPT | Mod: PO | Performed by: INTERNAL MEDICINE

## 2023-01-09 PROCEDURE — 85025 COMPLETE CBC W/AUTO DIFF WBC: CPT | Performed by: INTERNAL MEDICINE

## 2023-01-09 PROCEDURE — 80053 COMPREHEN METABOLIC PANEL: CPT | Performed by: INTERNAL MEDICINE

## 2023-01-09 PROCEDURE — 80197 ASSAY OF TACROLIMUS: CPT | Performed by: INTERNAL MEDICINE

## 2023-01-09 NOTE — TELEPHONE ENCOUNTER
[employer] message sent to notify pt of MD response to surgical clearance.  Okay for a liver transplant standpoint and okay to be off of Eliquis.

## 2023-01-09 NOTE — TELEPHONE ENCOUNTER
Theresa Titus MD  [facility] Transplant Clinical 5 hours ago (10:17 AM)     Can you please assist?  He can be off the Eliquis for the surgery.       Janelle Meléndez MA routed conversation to Theresa Titus MD 4 days ago     Janelle Meléndez MA 4 days ago     CC  Patient is requesting a surgical clearance to be off of his Eliquis for an upcoming procedure on 1/13/23. Patient is having an arthroplasty of his c-spine.

## 2023-01-10 LAB
ALBUMIN SERPL BCP-MCNC: 3.8 G/DL (ref 3.5–5.2)
ALP SERPL-CCNC: 67 U/L (ref 55–135)
ALT SERPL W/O P-5'-P-CCNC: 31 U/L (ref 10–44)
ANION GAP SERPL CALC-SCNC: 12 MMOL/L (ref 8–16)
AST SERPL-CCNC: 32 U/L (ref 10–40)
BASOPHILS # BLD AUTO: 0.02 K/UL (ref 0–0.2)
BASOPHILS NFR BLD: 0.6 % (ref 0–1.9)
BILIRUB SERPL-MCNC: 0.6 MG/DL (ref 0.1–1)
BUN SERPL-MCNC: 21 MG/DL (ref 6–20)
CALCIUM SERPL-MCNC: 9.9 MG/DL (ref 8.7–10.5)
CHLORIDE SERPL-SCNC: 104 MMOL/L (ref 95–110)
CO2 SERPL-SCNC: 22 MMOL/L (ref 23–29)
CREAT SERPL-MCNC: 1.2 MG/DL (ref 0.5–1.4)
DIFFERENTIAL METHOD: ABNORMAL
EOSINOPHIL # BLD AUTO: 0 K/UL (ref 0–0.5)
EOSINOPHIL NFR BLD: 0.9 % (ref 0–8)
ERYTHROCYTE [DISTWIDTH] IN BLOOD BY AUTOMATED COUNT: 13.7 % (ref 11.5–14.5)
EST. GFR  (NO RACE VARIABLE): >60 ML/MIN/1.73 M^2
GLUCOSE SERPL-MCNC: 100 MG/DL (ref 70–110)
HCT VFR BLD AUTO: 46 % (ref 40–54)
HGB BLD-MCNC: 14.1 G/DL (ref 14–18)
IMM GRANULOCYTES # BLD AUTO: 0.02 K/UL (ref 0–0.04)
IMM GRANULOCYTES NFR BLD AUTO: 0.6 % (ref 0–0.5)
LYMPHOCYTES # BLD AUTO: 1.4 K/UL (ref 1–4.8)
LYMPHOCYTES NFR BLD: 42.1 % (ref 18–48)
MCH RBC QN AUTO: 30.5 PG (ref 27–31)
MCHC RBC AUTO-ENTMCNC: 30.7 G/DL (ref 32–36)
MCV RBC AUTO: 99 FL (ref 82–98)
MONOCYTES # BLD AUTO: 0.6 K/UL (ref 0.3–1)
MONOCYTES NFR BLD: 18.4 % (ref 4–15)
NEUTROPHILS # BLD AUTO: 1.3 K/UL (ref 1.8–7.7)
NEUTROPHILS NFR BLD: 37.4 % (ref 38–73)
NRBC BLD-RTO: 0 /100 WBC
PLATELET # BLD AUTO: 190 K/UL (ref 150–450)
PMV BLD AUTO: 10.2 FL (ref 9.2–12.9)
POTASSIUM SERPL-SCNC: 4.7 MMOL/L (ref 3.5–5.1)
PROT SERPL-MCNC: 6.5 G/DL (ref 6–8.4)
RBC # BLD AUTO: 4.63 M/UL (ref 4.6–6.2)
SODIUM SERPL-SCNC: 138 MMOL/L (ref 136–145)
TACROLIMUS BLD-MCNC: 8 NG/ML (ref 5–15)
WBC # BLD AUTO: 3.42 K/UL (ref 3.9–12.7)

## 2023-01-11 ENCOUNTER — TELEPHONE (OUTPATIENT)
Dept: TRANSPLANT | Facility: CLINIC | Age: 48
End: 2023-01-11
Payer: MEDICAID

## 2023-01-11 ENCOUNTER — PATIENT MESSAGE (OUTPATIENT)
Dept: TRANSPLANT | Facility: CLINIC | Age: 48
End: 2023-01-11
Payer: MEDICAID

## 2023-01-11 NOTE — LETTER
January 11, 2023    Jordan Altman  [address]  [place] [address]          Dear Jordan Altman:  MRN: [id number]    This is a follow up to your recent labs, your lab results were stable.  There are no medicine changes.  Please have your labs drawn again on 2/6/23.      If you cannot have your labs drawn on the scheduled date, it is your responsibility to call the transplant department to reschedule.  Please call [phone] and ask to speak to Yessenia QUIROZ   for all scheduling requests.     Sincerely,      Sendy DOYLEN, RN  Your Liver Transplant Coordinator    [facility]  [address] [place] [id number]  [phone]

## 2023-01-11 NOTE — TELEPHONE ENCOUNTER
----- Message from Theresa Titus MD sent at 1/11/2023  8:09 AM CST -----    Liver tests are back to normal.  Repeat labs in 4 weeks.

## 2023-01-12 ENCOUNTER — TELEPHONE (OUTPATIENT)
Dept: TRANSPLANT | Facility: CLINIC | Age: 48
End: 2023-01-12
Payer: MEDICAID

## 2023-01-12 NOTE — TELEPHONE ENCOUNTER
Communicated w/ pt via [employer].  Clearance approved by Dr. Titus and faxed to MD's office.  ----- Message from Wendy Celis MA sent at 1/12/2023  8:43 AM CST -----  Regarding: FW: Advice  Contact: [phone]  Please advise...  ----- Message -----  From: Isa Anton  Sent: 1/12/2023   8:22 AM CST  To: Tacho YEAG[facility] Staff  Subject: Advice                                           Patient is calling to speak with someone in office he need clearance paperwork faxed today to vandana pardo so he can get his surgery tomorrow he said he has been trying since 12/06 an no one has responded to him. Please contact pt     Fax: [phone] Attention Nurse Aundrea

## 2023-02-01 NOTE — RESPIRATORY THERAPY
Patient extubated to 4LNC, tolerating well. Ambu bag at bedside. Will continue to monitor.  
Pt extubated per MD order. RN and RRT at bedside. Pt tolerated extubation well and placed on room air with NC on standy. Pt sats WNL.  
[FreeTextEntry2] : s/p diagnostic lap, distal pancreatectomy, splenectomy, left adrenalectomy & BRCA 2 positive

## 2023-02-16 NOTE — TELEPHONE ENCOUNTER
----- Message from Theresa Titus MD sent at 2/16/2023  7:32 AM CST -----  Liver tests are elevated awaiting tacro level. Needs US and repeat labs next week with TUNDE

## 2023-02-17 NOTE — TELEPHONE ENCOUNTER
No changes.  ----- Message from Theresa Titus MD sent at 2/16/2023  9:10 AM CST -----  Tacro should be adequate

## 2023-02-17 NOTE — TELEPHONE ENCOUNTER
[employer] message sent instructing pt to repeat labs and ultrasound next week.  ----- Message from Theresa Titus MD sent at 2/16/2023  7:32 AM CST -----  Liver tests are elevated awaiting tacro level. Needs US and repeat labs next week with TUNDE

## 2023-02-23 NOTE — TELEPHONE ENCOUNTER
[employer] message sent to notify pt, CTA pending scheduling.  Labs needed, pt rescheduled lab appointment for today, awaiting results.  ----- Message from Theresa Titus MD sent at 2/22/2023  5:49 PM CST -----  Waveforms are abnormal.  Patient needs a CTA to further evaluate.

## 2023-03-09 NOTE — TELEPHONE ENCOUNTER
Stable labs, no medication changes.  Next labs 5/29/23, [employer] message sent to notify pt.  Pt rescheduled CTA due to not feeling well, will be performed on 3/20.  ----- Message from Theresa Titus MD sent at 3/7/2023  1:17 PM CST -----  Reviewed, nothing to do; repeat per routine

## 2023-03-23 NOTE — TELEPHONE ENCOUNTER
CTA, no action required.  Pt notified via [employer].  ----- Message from Theresa Titus MD sent at 3/20/2023  4:42 PM CDT -----  Low concern for any clinically significant hepatic artery stenosis.

## 2023-04-06 NOTE — PROGRESS NOTES
Notified Dr. Glaser of increased AST of 2762 from previous AST of 1652. No new orders at this time. Will draw next full set of labs at 0400 as ordered.    Previously Declined (within the last year)

## 2023-06-08 NOTE — TELEPHONE ENCOUNTER
Stable labs, no medication changes.  Peth pending.  [employer] message sent instructing pt to have labs drawn on 7/3.  ----- Message from Theresa Titus MD sent at 6/8/2023 10:06 AM CDT -----  Liver tests elevated likely from EtOH. Tacro level should be adequate. Repeat labs in a month

## 2023-06-23 NOTE — SUBJECTIVE & OBJECTIVE
Scheduled Meds:   bisacodyL  10 mg Oral QHS    docusate sodium  100 mg Oral TID    fluconazole  200 mg Oral Daily    furosemide (LASIX) IV  120 mg Intravenous Once    mycophenolate  1,000 mg Oral BID    pantoprazole  40 mg Oral Daily    predniSONE  20 mg Oral Daily    sevelamer carbonate  800 mg Oral TID WM    [START ON 10/21/2020] sulfamethoxazole-trimethoprim 400-80mg  1 tablet Oral Every Mon, Wed, Fri    tacrolimus  0.5 mg Oral Daily PM    [START ON 10/21/2020] tacrolimus  1 mg Oral Daily AM    [START ON 10/26/2020] valGANciclovir  450 mg Oral Every Mon, Wed, Fri     Continuous Infusions:   heparin (porcine) in D5W 18.3 Units/kg/hr (10/20/20 3115)     PRN Meds:sodium chloride, acetaminophen, diphenhydrAMINE, heparin (PORCINE), heparin (PORCINE), melatonin, ondansetron, oxyCODONE, oxyCODONE, sodium chloride 0.9%    Review of Systems   Constitutional: Positive for activity change. Negative for appetite change, chills and fever.   HENT: Negative for facial swelling and trouble swallowing.    Eyes: Negative for photophobia and redness.   Respiratory: Negative for cough, shortness of breath, wheezing and stridor.    Cardiovascular: Positive for leg swelling. Negative for chest pain and palpitations.   Gastrointestinal: Positive for abdominal pain (expected post op). Negative for abdominal distention, blood in stool, nausea and vomiting.   Genitourinary: Positive for decreased urine volume.   Skin: Positive for wound.   Allergic/Immunologic: Positive for immunocompromised state.   Neurological: Positive for weakness and headaches. Negative for light-headedness.   Hematological: Bruises/bleeds easily.   Psychiatric/Behavioral: Negative for agitation, behavioral problems, confusion and dysphoric mood.     Objective:     Vital Signs (Most Recent):  Temp: 98.2 °F (36.8 °C) (10/20/20 1411)  Pulse: 91 (10/20/20 1411)  Resp: 20 (10/20/20 1417)  BP: (!) 155/106 (10/20/20 1411)  SpO2: (!) 90 % (10/20/20 1411) Vital  Signs (24h Range):  Temp:  [98.1 °F (36.7 °C)-98.6 °F (37 °C)] 98.2 °F (36.8 °C)  Pulse:  [77-98] 91  Resp:  [12-20] 20  SpO2:  [90 %-96 %] 90 %  BP: (124-155)/() 155/106     Weight: 109 kg (240 lb 4.8 oz)  Body mass index is 32.59 kg/m².    Intake/Output - Last 3 Shifts       10/18 0700 - 10/19 0659 10/19 0700 - 10/20 0659 10/20 0700 - 10/21 0659    P.O. 1272 780     I.V. (mL/kg) 3449 (32.8) 1328.2 (12.2)     Blood 243.8      IV Piggyback       Total Intake(mL/kg) 4964.8 (47.3) 2108.2 (19.3)     Urine (mL/kg/hr) 156 (0.1) 20 (0)     Emesis/NG output  0     Drains 282      Other 6625 2222     Stool  0     Total Output 7063 2242     Net -2098.3 -133.8            Urine Occurrence  0 x     Stool Occurrence  0 x     Emesis Occurrence  0 x           Physical Exam  Vitals signs and nursing note reviewed.   Constitutional:       General: He is not in acute distress.  Eyes:      General: Scleral icterus present.         Right eye: No discharge.         Left eye: No discharge.   Cardiovascular:      Rate and Rhythm: Normal rate and regular rhythm.      Pulses: Normal pulses.      Heart sounds: No murmur.   Pulmonary:      Effort: Pulmonary effort is normal. No respiratory distress.      Breath sounds: No wheezing or rales.      Comments: Decreased RLL  Abdominal:      Palpations: Abdomen is soft.      Tenderness: There is abdominal tenderness (mild incisional). There is no guarding.      Comments: Chevron incision KELLY with staples no s/s/i  RLQ drain sites dressed - serous drainage    Musculoskeletal:      Right lower leg: Edema present.      Left lower leg: Edema present.   Skin:     General: Skin is warm and dry.      Capillary Refill: Capillary refill takes 2 to 3 seconds.      Coloration: Skin is jaundiced.   Neurological:      General: No focal deficit present.      Mental Status: He is alert and oriented to person, place, and time.   Psychiatric:         Mood and Affect: Mood normal.         Behavior: Behavior  normal.         Thought Content: Thought content normal.         Laboratory:  Immunosuppressants         Stop Route Frequency     tacrolimus capsule 1 mg      -- Oral Every morning     tacrolimus capsule 0.5 mg      -- Oral Every evening     mycophenolate capsule 1,000 mg      -- Oral 2 times daily        BMP:   Recent Labs   Lab 10/20/20  0530   *   *   K 4.6      CO2 24   BUN 41*   CREATININE 3.0*   CALCIUM 8.3*     CMP:   Recent Labs   Lab 10/20/20  0530   *   CALCIUM 8.3*   ALBUMIN 2.2*   PROT 4.4*   *   K 4.6   CO2 24      BUN 41*   CREATININE 3.0*   ALKPHOS 81   *   AST 22   BILITOT 2.9*       Diagnostic Results:  Reviewed    Debility/Functional status: Patient debilitated by evidence of Muscle wasting and atrophy, Weakness, Other malaise, Limitation of activities due to disability and Other reduced mobility. Physical and occupational therapy ordered daily to evaluate and treat. Debility was: present on admission.   Xolair Counseling:  Patient informed of potential adverse effects including but not limited to fever, muscle aches, rash and allergic reactions.  The patient verbalized understanding of the proper use and possible adverse effects of Xolair.  All of the patient's questions and concerns were addressed.

## 2023-07-21 NOTE — TELEPHONE ENCOUNTER
----- Message from Theresa Askew MD sent at 7/20/2023  6:07 PM CDT -----  Liver tests improved but still elevated. Tacro level is ok. Repeat labs in a month

## 2023-07-28 PROBLEM — L03.115 CELLULITIS OF RIGHT LEG: Status: ACTIVE | Noted: 2023-01-01

## 2023-07-28 PROBLEM — A41.9 SEPSIS: Status: ACTIVE | Noted: 2023-01-01

## 2023-07-28 NOTE — H&P
[facility] - Emergency Dept.  Hospital Medicine  History & Physical    Patient Name: Jordan Altman  MRN: [id number]  Patient Class: OP- Observation  Admission Date: 7/28/2023  Attending Physician: Buddy Thurston MD   Primary Care Provider: SHAHEEN Jaime         Patient information was obtained from patient, past medical records and ER records.     Subjective:     Principal Problem:Sepsis    Chief Complaint:   Chief Complaint   Patient presents with    Leg Injury     Pt. Has redness and swelling to the right ankle that is traveling up his leg. He reports he hit his leg on a [place] in the [place] a few weeks ago. Pt. Was placed on Abx. And reports he ran a fever of 101.0 yesterday.         HPI: 48 y.o. male with a PMH of HTN and SBP who presents to the [facility] emergency department for evaluation of  erythema and swelling to right ankle traveling up leg. Pt reports he cut his leg tubing in the river a few weeks ago then went swimming in [facility] in [place] x 2wks ago. Pt states he went to  2 days ago and was put on bactrim and an ointment. Associated symptoms include fever, chills, evolving erythema, edema to RLE. In the ED, 142/79, 97.9, 95, 96% RA. Labs: Cr: 1.6, Lactic: 2.5. Patient continued mycophenolate with infection. Treated with IV fluids @30cc/kg, Blood cultures collected, initiated on IV Antibiotics, Fortaz added. Patient is a full code, surrogate decision maker is his spouse, Chelsea Altman. Placed in observation under the care of Hospital Medicine for management of sepsis, cellulitis.          Past Medical History:   Diagnosis Date    Decompensated hepatic cirrhosis     HCV acquired through organ donation, treated / cured     [id number] - 6/2021    Hypertension     SBP (spontaneous bacterial peritonitis)        Past Surgical History:   Procedure Laterality Date    ERCP N/A 10/1/2020    Procedure: ERCP (ENDOSCOPIC RETROGRADE CHOLANGIOPANCREATOGRAPHY);  Surgeon: Marcos Ramirez MD;   Location: [facility] ENDO (2ND FLR);  Service: Endoscopy;  Laterality: N/A;    ESOPHAGOGASTRODUODENOSCOPY N/A 10/13/2020    Procedure: EGD (ESOPHAGOGASTRODUODENOSCOPY);  Surgeon: Prasanth Jerry MD;  Location: [facility] ENDO (2ND FLR);  Service: Endoscopy;  Laterality: N/A;    EXPLORATORY LAPAROTOMY AFTER LIVER TRANSPLANTATION N/A 10/15/2020    Procedure: LAPAROTOMY, EXPLORATORY, AFTER LIVER TRANSPLANT, Possible redo of artery, possible portal thrombectomy. IntraOperative US.;  Surgeon: Curtis Zavaleta MD;  Location: [facility] OR [facility] FLR;  Service: Transplant;  Laterality: N/A;  With intraoperative ultrasound    LIVER TRANSPLANT N/A 10/14/2020    Procedure: TRANSPLANT, LIVER;  Surgeon: Curtis Zavaleta MD;  [facility];  Service: Transplant;  Laterality: N/A;  Intra op HD    RIGHT HEART CATHETERIZATION Right 9/23/2020    Procedure: INSERTION, CATHETER, RIGHT HEART;  Surgeon: Mikel Costa Jr., MD;  Location: [facility] CATH LAB;  Service: Cardiology;  Laterality: Right;    THORACENTESIS  2011    THROMBECTOMY  10/15/2020    Procedure: THROMBECTOMY portal vein;  Surgeon: Curtis Zavaleta MD;  [facility];  Service: Transplant;;       Review of patient's allergies indicates:   Allergen Reactions    Latex, natural rubber        Current Facility-Administered Medications on File Prior to Encounter   Medication    acetaminophen tablet 650 mg    albuterol inhaler 2 puff    diphenhydrAMINE injection 25 mg    EPINEPHrine (EPIPEN) 0.3 mg/0.3 mL pen injection 0.3 mg    methylPREDNISolone sodium succinate injection 40 mg    ondansetron disintegrating tablet 4 mg    sodium chloride 0.9% 500 mL flush bag    sodium chloride 0.9% flush 10 mL     Current Outpatient Medications on File Prior to Encounter   Medication Sig    albuterol (PROVENTIL/VENTOLIN HFA) 90 mcg/actuation inhaler Inhale 2 puffs into the lungs every 6 (six) hours as needed for Wheezing or Shortness of Breath. Rescue    amLODIPine  "(NORVASC) 10 MG tablet Take 1 tablet (10 mg total) by mouth once daily.    apixaban (ELIQUIS) 5 mg Tab Take 1 tablet (5 mg total) by mouth 2 (two) times a day.    blood sugar diagnostic Strp Use to test blood glucose  3 (three) times daily with meals.    blood-glucose meter Misc USE AS INSTRUCTED. (Patient taking differently: USE AS INSTRUCTED.)    EScitalopram oxalate (LEXAPRO) 10 MG tablet Take 10 mg by mouth.    fluticasone propionate (FLONASE) 50 mcg/actuation nasal spray 1 spray by Each Nostril route once daily.    lancets 30 gauge Misc 1 each by Misc.(Non-Drug; Combo Route) route 3 (three) times daily with meals.    losartan (COZAAR) 25 MG tablet Take 25 mg by mouth once daily.    mupirocin (BACTROBAN) 2 % ointment APPLY TOPICALLY TO THE AFFECTED AREA THREE TIMES DAILY FOR 7 DAYS    mycophenolate (CELLCEPT) 250 mg Cap Take 2 capsules (500 mg total) by mouth 2 (two) times daily.    ondansetron (ZOFRAN) 4 MG tablet Take 1 tablet (4 mg total) by mouth every 6 (six) hours as needed for Nausea.    pen needle, diabetic 29 gauge x 1/2" Ndle use to inject insulin 3 (three) times daily with meals.    sulfamethoxazole-trimethoprim 800-160mg (BACTRIM DS) 800-160 mg Tab Take 1 tablet by mouth 2 (two) times daily.    tacrolimus (PROGRAF) 1 MG Cap Take 1 capsule (1 mg total) by mouth every morning AND 1 capsule (1 mg total) every evening.    testosterone cypionate (DEPOTESTOTERONE CYPIONATE) 200 mg/mL injection Inject 0.75 mLs (150 mg total) into the muscle once a week.    tiZANidine (ZANAFLEX) 4 MG tablet     traZODone (DESYREL) 50 MG tablet Take  mg by mouth every evening.    TRELEGY ELLIPTA 200-62.5-25 mcg inhaler INHALE 1 PUFF BY MOUTH EVERY DAY AS DIRECTED    benzonatate (TESSALON) 200 MG capsule benzonatate Take 1 Capsule (oral) 1 time per day PRN - Cough for 10 days [id number] capsule 1 time per day oral 10 days active 200 mg    gabapentin (NEURONTIN) 300 MG capsule Take 2 capsules (600 mg " total) by mouth 2 (two) times daily.    loratadine (CLARITIN) 10 mg tablet Take 10 mg by mouth.    sildenafiL (VIAGRA) 50 MG tablet Take 50 mg by mouth as needed.    [DISCONTINUED] fluticasone-salmeterol diskus inhaler 250-50 mcg Inhale 1 puff into the lungs 2 (two) times daily. Controller    [DISCONTINUED] melatonin 1 mg Tab Take by mouth.    [DISCONTINUED] montelukast sodium (SINGULAIR ORAL) Singulair Take No date recorded No form recorded No frequency recorded No route recorded No set duration recorded No set duration amount recorded suspended No dosage strength recorded No dosage strength units of measure recorded    [DISCONTINUED] sucralfate (CARAFATE) 1 gram tablet Take 1 tablet (1 g total) by mouth 4 (four) times daily.    [DISCONTINUED] tiotropium (SPIRIVA) 18 mcg inhalation capsule Inhale 18 mcg into the lungs once daily. Controller     Family History       Problem Relation (Age of Onset)    COPD Mother          Tobacco Use    Smoking status: Some Days    Smokeless tobacco: Current   Substance and Sexual Activity    Alcohol use: Yes     Alcohol/week: 112.0 standard drinks     Types: 112 Shots of liquor per week     Comment: fifth of vodka a day. LAST DRINK 7/25/2020 per pt     Drug use: Yes     Frequency: 3.0 times per week     Types: Marijuana    Sexual activity: Yes     Review of Systems   Constitutional:  Positive for chills, fatigue and fever.   Respiratory:  Positive for cough.    Gastrointestinal:  Positive for abdominal distention and nausea.   Musculoskeletal:  Positive for arthralgias and myalgias.   Skin:  Positive for color change and wound.   All other systems reviewed and are negative.  Objective:     Vital Signs (Most Recent):  Temp: 97.9 °F (36.6 °C) (07/28/23 1152)  Pulse: 95 (07/28/23 1152)  Resp: 18 (07/28/23 1350)  BP: (!) 142/79 (07/28/23 1152)  SpO2: 96 % (07/28/23 1152) Vital Signs (24h Range):  Temp:  [97.9 °F (36.6 °C)] 97.9 °F (36.6 °C)  Pulse:  [95] 95  Resp:  [16-18]  18  SpO2:  [96 %] 96 %  BP: (142)/(79) 142/79     Weight: 132.6 kg (292 lb 5.3 oz)  Body mass index is 39.65 kg/m².     Physical Exam  Vitals and nursing note reviewed.   Constitutional:       General: He is not in acute distress.     Appearance: He is well-developed. He is ill-appearing. He is not diaphoretic.   HENT:      Head: Normocephalic and atraumatic.      Right Ear: Hearing and external ear normal.      Left Ear: Hearing and external ear normal.      Nose: Nose normal. No mucosal edema or rhinorrhea.      Mouth/Throat:      Pharynx: Uvula midline.   Eyes:      General:         Right eye: No discharge.         Left eye: No discharge.      Conjunctiva/sclera: Conjunctivae normal.      Right eye: No chemosis.     Left eye: No chemosis.     Pupils: Pupils are equal, round, and reactive to light.   Neck:      Thyroid: No thyroid mass or thyromegaly.      Trachea: Trachea normal.   Cardiovascular:      Rate and Rhythm: Normal rate and regular rhythm.      Pulses:           Dorsalis pedis pulses are 2+ on the right side and 2+ on the left side.      Heart sounds: Normal heart sounds. No murmur heard.  Pulmonary:      Effort: Pulmonary effort is normal. No respiratory distress.      Breath sounds: Normal breath sounds. No decreased breath sounds or wheezing.   Abdominal:      General: Bowel sounds are normal. There is distension.      Palpations: Abdomen is soft.      Tenderness: There is no abdominal tenderness.   Musculoskeletal:         General: Normal range of motion.      Cervical back: Normal range of motion and neck supple.   Lymphadenopathy:      Cervical: No cervical adenopathy.      Upper Body:      Right upper body: No supraclavicular adenopathy.      Left upper body: No supraclavicular adenopathy.   Skin:     General: Skin is warm and dry.      Capillary Refill: Capillary refill takes less than 2 seconds.      Findings: No rash.          Neurological:      Mental Status: He is alert and oriented to  person, place, and time.   Psychiatric:         Mood and Affect: Mood is not anxious.         Speech: Speech normal.         Behavior: Behavior normal.         Thought Content: Thought content normal.         Judgment: Judgment normal.            CRANIAL NERVES     CN III, IV, VI   Pupils are equal, round, and reactive to light.     Significant Labs: All pertinent labs within the past 24 hours have been reviewed.  CBC:   Recent Labs   Lab 07/28/23  1226   WBC 5.99   HGB 14.9   HCT 47.1        CMP:   Recent Labs   Lab 07/28/23  1405      K 3.8      CO2 17*   GLU 94   BUN 18   CREATININE 1.6*   CALCIUM 8.8   PROT 6.4   ALBUMIN 3.3*   BILITOT 0.8   ALKPHOS 89   AST 42*   ALT 31   ANIONGAP 14     Lactic Acid:   Recent Labs   Lab 07/28/23  1226   LACTATE 2.5*       Significant Imaging: I have reviewed all pertinent imaging results/findings within the past 24 hours.    Assessment/Plan:     * Sepsis  This patient does have evidence of infective focus  My overall impression is sepsis.  Source: Skin and Soft Tissue (location RLE)  Antibiotics given-   Antibiotics (72h ago, onward)    Start     Stop Route Frequency Ordered    07/28/23 1630  ceftAZIDime (FORTAZ) 2 g in dextrose 5 % in water (D5W) 50 mL IVPB (MB+)         -- IV Every 8 hours (non-standard times) 07/28/23 1511    07/28/23 1500  vancomycin 2 g in dextrose 5 % 500 mL IVPB         -- IV Once 07/28/23 1357    07/28/23 1408  vancomycin - pharmacy to dose  (vancomycin IVPB (PEDS and ADULTS))        See Hyperspace for full Linked Orders Report.    -- IV pharmacy to manage frequency 07/28/23 1308        Latest lactate reviewed-  Recent Labs   Lab 07/28/23  1226   LACTATE 2.5*     Organ dysfunction indicated by Acute kidney injury    Fluid challenge Actual Body weight- Patient will receive 30ml/kg actual body weight to calculate fluid bolus for treatment of septic shock.     Post- resuscitation assessment No - Post resuscitation assessment not needed        Will Not start Pressors- Levophed for MAP of 65  Source control achieved by: Star Kerr    Cellulitis of right leg  Patient reports small wound on right ankle while tubing in river, reported wound had a scab, then went to [place] and swam at beach. Noticed on 7/24, erythema, pain to right ankle. 7/26 symptoms were worsening and had a fever, presented to , treated with bactrim, mupirocin topical, reported erythema continued to spread. Unknown if fever persisted, had been taking tylenol and ibuprofen for pain.     --See Sepsis  --Added fortaz to cover for vibrio    Long-term use of immunosuppressant medication  Treated with mycophenolate, tacro, most recent labs stable.     --Continue tacro, hold mycophenolate  --Tacro level in AM      S/P liver transplant  Followed by hepatology    --CMP in AM      Hypertension  Chronic, stable    --Resume amlodipine, hold losartan in setting of JENN  --Hydralazine 10mg IV PRN SBP >170      JENN (acute kidney injury)  Patient with acute kidney injury/acute renal failure likely due to pre-renal azotemia due to dehydration JENN is currently New Onset. Baseline creatinine 1.0 - Labs reviewed- Renal function/electrolytes with Estimated Creatinine Clearance: 77.8 mL/min (A) (based on SCr of 1.6 mg/dL (H)). according to latest data. Monitor urine output and serial BMP and adjust therapy as needed. Avoid nephrotoxins and renally dose meds for GFR listed above.      VTE Risk Mitigation (From admission, onward)         Ordered     apixaban tablet 5 mg  2 times daily         07/28/23 1523     IP VTE HIGH RISK PATIENT  Once         07/28/23 1523     Place sequential compression device  Until discontinued         07/28/23 1523     Reason for No Pharmacological VTE Prophylaxis  Once        Question:  Reasons:  Answer:  Already adequately anticoagulated on oral Anticoagulants    07/28/23 1523                     On 07/28/2023, patient should be placed in hospital observation services  under my care in collaboration with Buddy Thurston MD.      Avril Campbell NP  Department of Hospital Medicine  [facility] - Emergency Dept.

## 2023-07-28 NOTE — ASSESSMENT & PLAN NOTE
Treated with mycophenolate, tacro, most recent labs stable.     --Continue tacro, hold mycophenolate  --Tacro level in AM

## 2023-07-28 NOTE — HPI
48 y.o. male with a PMH of HTN and SBP who presents to the [facility] emergency department for evaluation of  erythema and swelling to right ankle traveling up leg. Pt reports he cut his leg tubing in the river a few weeks ago then went [place] in [place] x 2wks ago. Pt states he went to  2 days ago and was put on bactrim and an ointment. Associated symptoms include fever, chills, evolving erythema, edema to RLE. In the ED, 142/79, 97.9, 95, 96% RA. Labs: Cr: 1.6, Lactic: 2.5. Patient continued mycophenolate with infection. Treated with IV fluids @30cc/kg, Blood cultures collected, initiated on IV Antibiotics, Fortaz added. Patient is a full code, surrogate decision maker is his spouse, Chelsea Altman. Placed in observation under the care of Hospital Medicine for management of sepsis, cellulitis.

## 2023-07-28 NOTE — FIRST PROVIDER EVALUATION
Medical screening examination initiated.  I have conducted a focused provider triage encounter, findings are as follows:    Brief history of present illness:  48-year-old male with a past medical history of liver transplant also taking Eliquis presents to the ER with right lower extremity cellulitis.  Patient has been taking Bactrim without any relief.    Vitals:    07/28/23 1152   BP: (!) 142/79   BP Location: Right arm   Patient Position: Sitting   Pulse: 95   Resp: 16   Temp: 97.9 °F (36.6 °C)   TempSrc: Oral   SpO2: 96%   Weight: 132.6 kg (292 lb 5.3 oz)       Pertinent physical exam:  Lower extremity cellulitis on the right side    Brief workup plan:  Labs, meds, further eval    Preliminary workup initiated; this workup will be continued and followed by the physician or advanced practice provider that is assigned to the patient when roomed.

## 2023-07-28 NOTE — ASSESSMENT & PLAN NOTE
Chronic, stable    --Resume amlodipine, hold losartan in setting of JENN  --Hydralazine 10mg IV PRN SBP >170

## 2023-07-28 NOTE — SUBJECTIVE & OBJECTIVE
Past Medical History:   Diagnosis Date    Decompensated hepatic cirrhosis     HCV acquired through organ donation, treated / cured     svr12 - 6/2021    Hypertension     SBP (spontaneous bacterial peritonitis)        Past Surgical History:   Procedure Laterality Date    ERCP N/A 10/1/2020    Procedure: ERCP (ENDOSCOPIC RETROGRADE CHOLANGIOPANCREATOGRAPHY);  Surgeon: Marcos Ramirez MD;  Location: [facility] ENDO (2ND FLR);  Service: Endoscopy;  Laterality: N/A;    ESOPHAGOGASTRODUODENOSCOPY N/A 10/13/2020    Procedure: EGD (ESOPHAGOGASTRODUODENOSCOPY);  Surgeon: Prasanth Jerry MD;  Location: [facility] ENDO (2ND FLR);  Service: Endoscopy;  Laterality: N/A;    EXPLORATORY LAPAROTOMY AFTER LIVER TRANSPLANTATION N/A 10/15/2020    Procedure: LAPAROTOMY, EXPLORATORY, AFTER LIVER TRANSPLANT, Possible redo of artery, possible portal thrombectomy. IntraOperative US.;  Surgeon: Curtis Zavaleta MD;  [facility];  Service: Transplant;  Laterality: N/A;  With intraoperative ultrasound    LIVER TRANSPLANT N/A 10/14/2020    Procedure: TRANSPLANT, LIVER;  Surgeon: Curtis Zavaleta MD;  [facility];  Service: Transplant;  Laterality: N/A;  Intra op HD    RIGHT HEART CATHETERIZATION Right 9/23/2020    Procedure: INSERTION, CATHETER, RIGHT HEART;  Surgeon: Mikel Costa Jr., MD;  Location: [facility] CATH LAB;  Service: Cardiology;  Laterality: Right;    THORACENTESIS  2011    THROMBECTOMY  10/15/2020    Procedure: THROMBECTOMY portal vein;  Surgeon: Curtis Zavaleta MD;  [facility];  Service: Transplant;;       Review of patient's allergies indicates:   Allergen Reactions    Latex, natural rubber        Current Facility-Administered Medications on File Prior to Encounter   Medication    acetaminophen tablet 650 mg    albuterol inhaler 2 puff    diphenhydrAMINE injection 25 mg    EPINEPHrine (EPIPEN) 0.3 mg/0.3 mL pen injection 0.3 mg    methylPREDNISolone sodium succinate injection 40 mg     "ondansetron disintegrating tablet 4 mg    sodium chloride 0.9% 500 mL flush bag    sodium chloride 0.9% flush 10 mL     Current Outpatient Medications on File Prior to Encounter   Medication Sig    albuterol (PROVENTIL/VENTOLIN HFA) 90 mcg/actuation inhaler Inhale 2 puffs into the lungs every 6 (six) hours as needed for Wheezing or Shortness of Breath. Rescue    amLODIPine (NORVASC) 10 MG tablet Take 1 tablet (10 mg total) by mouth once daily.    apixaban (ELIQUIS) 5 mg Tab Take 1 tablet (5 mg total) by mouth 2 (two) times a day.    blood sugar diagnostic Strp Use to test blood glucose  3 (three) times daily with meals.    blood-glucose meter Misc USE AS INSTRUCTED. (Patient taking differently: USE AS INSTRUCTED.)    EScitalopram oxalate (LEXAPRO) 10 MG tablet Take 10 mg by mouth.    fluticasone propionate (FLONASE) 50 mcg/actuation nasal spray 1 spray by Each Nostril route once daily.    lancets 30 gauge Misc 1 each by Misc.(Non-Drug; Combo Route) route 3 (three) times daily with meals.    losartan (COZAAR) 25 MG tablet Take 25 mg by mouth once daily.    mupirocin (BACTROBAN) 2 % ointment APPLY TOPICALLY TO THE AFFECTED AREA THREE TIMES DAILY FOR 7 DAYS    mycophenolate (CELLCEPT) 250 mg Cap Take 2 capsules (500 mg total) by mouth 2 (two) times daily.    ondansetron (ZOFRAN) 4 MG tablet Take 1 tablet (4 mg total) by mouth every 6 (six) hours as needed for Nausea.    pen needle, diabetic 29 gauge x 1/2" Ndle use to inject insulin 3 (three) times daily with meals.    sulfamethoxazole-trimethoprim 800-160mg (BACTRIM DS) 800-160 mg Tab Take 1 tablet by mouth 2 (two) times daily.    tacrolimus (PROGRAF) 1 MG Cap Take 1 capsule (1 mg total) by mouth every morning AND 1 capsule (1 mg total) every evening.    testosterone cypionate (DEPOTESTOTERONE CYPIONATE) 200 mg/mL injection Inject 0.75 mLs (150 mg total) into the muscle once a week.    tiZANidine (ZANAFLEX) 4 MG tablet     traZODone (DESYREL) 50 MG tablet Take  " mg by mouth every evening.    TRELEGY ELLIPTA 200-62.5-25 mcg inhaler INHALE 1 PUFF BY MOUTH EVERY DAY AS DIRECTED    benzonatate (TESSALON) 200 MG capsule benzonatate Take 1 Capsule (oral) 1 time per day PRN - Cough for 10 days [id number] capsule 1 time per day oral 10 days active 200 mg    gabapentin (NEURONTIN) 300 MG capsule Take 2 capsules (600 mg total) by mouth 2 (two) times daily.    loratadine (CLARITIN) 10 mg tablet Take 10 mg by mouth.    sildenafiL (VIAGRA) 50 MG tablet Take 50 mg by mouth as needed.    [DISCONTINUED] fluticasone-salmeterol diskus inhaler 250-50 mcg Inhale 1 puff into the lungs 2 (two) times daily. Controller    [DISCONTINUED] melatonin 1 mg Tab Take by mouth.    [DISCONTINUED] montelukast sodium (SINGULAIR ORAL) Singulair Take No date recorded No form recorded No frequency recorded No route recorded No set duration recorded No set duration amount recorded suspended No dosage strength recorded No dosage strength units of measure recorded    [DISCONTINUED] sucralfate (CARAFATE) 1 gram tablet Take 1 tablet (1 g total) by mouth 4 (four) times daily.    [DISCONTINUED] tiotropium (SPIRIVA) 18 mcg inhalation capsule Inhale 18 mcg into the lungs once daily. Controller     Family History       Problem Relation (Age of Onset)    COPD Mother          Tobacco Use    Smoking status: Some Days    Smokeless tobacco: Current   Substance and Sexual Activity    Alcohol use: Yes     Alcohol/week: 112.0 standard drinks     Types: 112 Shots of liquor per week     Comment: fifth of vodka a day. LAST DRINK 7/25/2020 per pt     Drug use: Yes     Frequency: 3.0 times per week     Types: Marijuana    Sexual activity: Yes     Review of Systems   Constitutional:  Positive for chills, fatigue and fever.   Respiratory:  Positive for cough.    Gastrointestinal:  Positive for abdominal distention and nausea.   Musculoskeletal:  Positive for arthralgias and myalgias.   Skin:  Positive for color change and wound.   All  other systems reviewed and are negative.  Objective:     Vital Signs (Most Recent):  Temp: 97.9 °F (36.6 °C) (07/28/23 1152)  Pulse: 95 (07/28/23 1152)  Resp: 18 (07/28/23 1350)  BP: (!) 142/79 (07/28/23 1152)  SpO2: 96 % (07/28/23 1152) Vital Signs (24h Range):  Temp:  [97.9 °F (36.6 °C)] 97.9 °F (36.6 °C)  Pulse:  [95] 95  Resp:  [16-18] 18  SpO2:  [96 %] 96 %  BP: (142)/(79) 142/79     Weight: 132.6 kg (292 lb 5.3 oz)  Body mass index is 39.65 kg/m².     Physical Exam  Vitals and nursing note reviewed.   Constitutional:       General: He is not in acute distress.     Appearance: He is well-developed. He is ill-appearing. He is not diaphoretic.   HENT:      Head: Normocephalic and atraumatic.      Right Ear: Hearing and external ear normal.      Left Ear: Hearing and external ear normal.      Nose: Nose normal. No mucosal edema or rhinorrhea.      Mouth/Throat:      Pharynx: Uvula midline.   Eyes:      General:         Right eye: No discharge.         Left eye: No discharge.      Conjunctiva/sclera: Conjunctivae normal.      Right eye: No chemosis.     Left eye: No chemosis.     Pupils: Pupils are equal, round, and reactive to light.   Neck:      Thyroid: No thyroid mass or thyromegaly.      Trachea: Trachea normal.   Cardiovascular:      Rate and Rhythm: Normal rate and regular rhythm.      Pulses:           Dorsalis pedis pulses are 2+ on the right side and 2+ on the left side.      Heart sounds: Normal heart sounds. No murmur heard.  Pulmonary:      Effort: Pulmonary effort is normal. No respiratory distress.      Breath sounds: Normal breath sounds. No decreased breath sounds or wheezing.   Abdominal:      General: Bowel sounds are normal. There is distension.      Palpations: Abdomen is soft.      Tenderness: There is no abdominal tenderness.   Musculoskeletal:         General: Normal range of motion.      Cervical back: Normal range of motion and neck supple.   Lymphadenopathy:      Cervical: No cervical  adenopathy.      Upper Body:      Right upper body: No supraclavicular adenopathy.      Left upper body: No supraclavicular adenopathy.   Skin:     General: Skin is warm and dry.      Capillary Refill: Capillary refill takes less than 2 seconds.      Findings: No rash.          Neurological:      Mental Status: He is alert and oriented to person, place, and time.   Psychiatric:         Mood and Affect: Mood is not anxious.         Speech: Speech normal.         Behavior: Behavior normal.         Thought Content: Thought content normal.         Judgment: Judgment normal.            CRANIAL NERVES     CN III, IV, VI   Pupils are equal, round, and reactive to light.     Significant Labs: All pertinent labs within the past 24 hours have been reviewed.  CBC:   Recent Labs   Lab 07/28/23  1226   WBC 5.99   HGB 14.9   HCT 47.1        CMP:   Recent Labs   Lab 07/28/23  1405      K 3.8      CO2 17*   GLU 94   BUN 18   CREATININE 1.6*   CALCIUM 8.8   PROT 6.4   ALBUMIN 3.3*   BILITOT 0.8   ALKPHOS 89   AST 42*   ALT 31   ANIONGAP 14     Lactic Acid:   Recent Labs   Lab 07/28/23  1226   LACTATE 2.5*       Significant Imaging: I have reviewed all pertinent imaging results/findings within the past 24 hours.

## 2023-07-28 NOTE — ASSESSMENT & PLAN NOTE
Patient with acute kidney injury/acute renal failure likely due to pre-renal azotemia due to dehydration JENN is currently New Onset. Baseline creatinine 1.0 - Labs reviewed- Renal function/electrolytes with Estimated Creatinine Clearance: 77.8 mL/min (A) (based on SCr of 1.6 mg/dL (H)). according to latest data. Monitor urine output and serial BMP and adjust therapy as needed. Avoid nephrotoxins and renally dose meds for GFR listed above.   <<----- Click to add NO significant Past Surgical History

## 2023-07-28 NOTE — ASSESSMENT & PLAN NOTE
This patient does have evidence of infective focus  My overall impression is sepsis.  Source: Skin and Soft Tissue (location RLE)  Antibiotics given-   Antibiotics (72h ago, onward)    Start     Stop Route Frequency Ordered    07/28/23 1630  ceftAZIDime (FORTAZ) 2 g in dextrose 5 % in water (D5W) 50 mL IVPB (MB+)         -- IV Every 8 hours (non-standard times) 07/28/23 1511    07/28/23 1500  vancomycin 2 g in dextrose 5 % 500 mL IVPB         -- IV Once 07/28/23 1357    07/28/23 1408  vancomycin - pharmacy to dose  (vancomycin IVPB (PEDS and ADULTS))        See Hyperspace for full Linked Orders Report.    -- IV pharmacy to manage frequency 07/28/23 1308        Latest lactate reviewed-  Recent Labs   Lab 07/28/23  1226   LACTATE 2.5*     Organ dysfunction indicated by Acute kidney injury    Fluid challenge Actual Body weight- Patient will receive 30ml/kg actual body weight to calculate fluid bolus for treatment of septic shock.     Post- resuscitation assessment No - Post resuscitation assessment not needed       Will Not start Pressors- Levophed for MAP of 65  Source control achieved by: Star Kerr

## 2023-07-28 NOTE — ED PROVIDER NOTES
SCRIBE #1 NOTE: I, Moon Griffin, am scribing for, and in the presence of, Rober Faulkner MD. I have scribed the entire note.       History     Chief Complaint   Patient presents with    Leg Injury     Pt. Has redness and swelling to the right ankle that is traveling up his leg. He reports he hit his leg [place] the river a [place]. Pt. Was placed on Abx. And reports he ran a fever of 101.0 yesterday.      HPI  7/28/2023, 1:13 PM  History obtained from the patient    HPI:  Jordan Altman is a 48 y.o. male with a PMH of HTN and SBP who presents to the [facility] emergency department for evaluation of  erythema and swelling to right ankle traveling up leg. Pt reports he cut his leg tubing in the river a few weeks ago then went swimming in [facility] in [place] x 2wks ago. Pt states he went to  2 days ago and was put on bactrim and a mupirocin ointment. Associated symptoms include fever.  No other complaints or concerns.     Arrival mode: Personal vehicle     PCP: SHAHEEN Jaime    Review of patient's allergies indicates:   Allergen Reactions    Latex, natural rubber       Past Medical History:   Diagnosis Date    Decompensated hepatic cirrhosis     HCV acquired through organ donation, treated / cured     svr12 - 6/2021    Hypertension     SBP (spontaneous bacterial peritonitis)      Past Surgical History:   Procedure Laterality Date    ERCP N/A 10/1/2020    Procedure: ERCP (ENDOSCOPIC RETROGRADE CHOLANGIOPANCREATOGRAPHY);  Surgeon: Marcos Ramirez MD;  Location: [address]);  Service: Endoscopy;  Laterality: N/A;    ESOPHAGOGASTRODUODENOSCOPY N/A 10/13/2020    Procedure: EGD (ESOPHAGOGASTRODUODENOSCOPY);  Surgeon: Prasanth Jerry MD;  Location: [facility] ([address] [place] [address]);  Service: Endoscopy;  Laterality: N/A;    EXPLORATORY LAPAROTOMY AFTER LIVER TRANSPLANTATION N/A 10/15/2020    Procedure: LAPAROTOMY, EXPLORATORY, AFTER LIVER TRANSPLANT, Possible redo of artery, possible  portal thrombectomy. IntraOperative US.;  Surgeon: Curtis Zavaleta MD;  [address];  Service: Transplant;  Laterality: N/A;  With intraoperative ultrasound    LIVER TRANSPLANT N/A 10/14/2020    Procedure: TRANSPLANT, LIVER;  Surgeon: Curtis Zavaleta MD;  [facility];  Service: Transplant;  Laterality: N/A;  Intra op HD    RIGHT HEART CATHETERIZATION Right 9/23/2020    Procedure: INSERTION, CATHETER, RIGHT HEART;  Surgeon: Mikel Costa Jr., MD;  Location: [facility] CATH LAB;  Service: Cardiology;  Laterality: Right;    THORACENTESIS  2011    THROMBECTOMY  10/15/2020    Procedure: THROMBECTOMY portal vein;  Surgeon: Curtis Zavaleta MD;  Location: [facility] OR [address] [place] [address];  Service: Transplant;;       Family History   Problem Relation Age of Onset    COPD Mother      Social History     Tobacco Use    Smoking status: Some Days     Current packs/day: 0.00    Smokeless tobacco: Current   Substance and Sexual Activity    Alcohol use: Yes     Alcohol/week: 112.0 standard drinks of alcohol     Types: 112 Shots of liquor per week     Comment: fifth of vodka a day. LAST DRINK 7/25/2020 per pt     Drug use: Yes     Frequency: 3.0 times per week     Types: Marijuana    Sexual activity: Yes      Review of Systems     Review of Systems   Constitutional:  Positive for fever.   Skin:  Positive for color change.   All other systems reviewed and are negative.         Physical Exam     Initial Vitals [07/28/23 1152]   BP Pulse Resp Temp SpO2   (!) 142/79 95 16 97.9 °F (36.6 °C) 96 %      MAP       --          Physical Exam  Nursing notes and vital signs reviewed.  Constitutional: Patient is in mild distress.   Head: Normocephalic. Atraumatic.   Eyes: Conjunctivae are not pale. No scleral icterus.   ENT: Mucous membranes moist.   Neck: Supple.   Cardiovascular: Regular rate. Regular rhythm.   Pulmonary: No respiratory distress.   Abdominal: Non-distended.   Musculoskeletal: Moves all extremities. RLE erythema from  lateral malleolus extending proximally in serpiginous pattern with tenderness.        Skin: Warm and dry.   Neurological:  Alert, awake, and appropriate. Normal speech. No acute lateralizing neurologic deficits appreciated.   Psychiatric: Normal affect.       ED Course   Critical Care    Date/Time: 7/28/2023 2:10 PM    Performed by: Rober Faulkner MD  Authorized by: Rober Faulkner MD  Direct patient critical care time: 15 minutes  Additional history critical care time: 5 minutes  Ordering / reviewing critical care time: 10 minutes  Documentation critical care time: 5 minutes  Total critical care time (exclusive of procedural time) : 35 minutes  Critical care time was exclusive of separately billable procedures and treating other patients and teaching time.  Critical care was necessary to treat or prevent imminent or life-threatening deterioration of the following conditions: serious infection with IV antibiotics required.  Critical care was time spent personally by me on the following activities: blood draw for specimens, development of treatment plan with patient or surrogate, discussions with consultants, interpretation of cardiac output measurements, evaluation of patient's response to treatment, examination of patient, obtaining history from patient or surrogate, ordering and review of laboratory studies, ordering and performing treatments and interventions, ordering and review of radiographic studies, pulse oximetry, review of old charts and re-evaluation of patient's condition.        Vitals:    07/28/23 1152 07/28/23 1350 07/28/23 1854 07/28/23 1947   BP: (!) 142/79  (!) 158/81 (!) 155/92   Pulse: 95  99 98   Resp: 16 18 20 16   Temp: 97.9 °F (36.6 °C)   98.3 °F (36.8 °C)   TempSrc: Oral   Oral   SpO2: 96%  95% (!) 94%   Weight: 132.6 kg (292 lb 5.3 oz)   134.2 kg (295 lb 13.7 oz)   Height:    6' (1.829 m)    07/28/23 2000 07/29/23 0003 07/29/23 0007 07/29/23 0131   BP:  125/74     Pulse:  91      Resp: 18  18 18   Temp:  97.8 °F (36.6 °C)     TempSrc:  Oral     SpO2:  95%     Weight:       Height:        07/29/23 0440 07/29/23 0733 07/29/23 0743 07/29/23 0829   BP: 109/71 108/61     Pulse: 72 85 92    Resp: 20 18  18   Temp: 98.5 °F (36.9 °C) 98 °F (36.7 °C)     TempSrc: Oral Oral     SpO2: 95% (!) 90% 97%    Weight:       Height:        07/29/23 0845 07/29/23 0931   BP:  108/61   Pulse:  85   Resp:  18   Temp:     TempSrc:     SpO2: 99%    Weight:  133.8 kg (295 lb)   Height:  6' (1.829 m)     Lab Results Interpreted as Abnormal:  Labs Reviewed   CBC W/ AUTO DIFFERENTIAL - Abnormal; Notable for the following components:       Result Value    MCHC 31.6 (*)     Mono % 15.5 (*)     All other components within normal limits   LACTIC ACID, PLASMA - Abnormal; Notable for the following components:    Lactate (Lactic Acid) 2.5 (*)     All other components within normal limits   URINALYSIS, REFLEX TO URINE CULTURE - Abnormal; Notable for the following components:    Protein, UA Trace (*)     Urobilinogen, UA 4.0-6.0 (*)     All other components within normal limits    Narrative:     Specimen Source->Urine   COMPREHENSIVE METABOLIC PANEL - Abnormal; Notable for the following components:    CO2 17 (*)     Creatinine 1.6 (*)     Albumin 3.3 (*)     AST 42 (*)     eGFR 52 (*)     All other components within normal limits   LACTIC ACID, PLASMA   POCT GLUCOSE, HAND-HELD DEVICE   POCT GLUCOSE      All Lab Results:  Results for orders placed or performed during the hospital encounter of 07/28/23   Blood culture x two cultures. Draw prior to antibiotics.    Specimen: Peripheral, Antecubital, Right; Blood   Result Value Ref Range    Blood Culture, Routine No Growth to date    Blood culture x two cultures. Draw prior to antibiotics.    Specimen: Peripheral, Antecubital, Left; Blood   Result Value Ref Range    Blood Culture, Routine No Growth to date    CBC auto differential   Result Value Ref Range    WBC 5.99 3.90 - 12.70  K/uL    RBC 4.85 4.60 - 6.20 M/uL    Hemoglobin 14.9 14.0 - 18.0 g/dL    Hematocrit 47.1 40.0 - 54.0 %    MCV 97 82 - 98 fL    MCH 30.7 27.0 - 31.0 pg    MCHC 31.6 (L) 32.0 - 36.0 g/dL    RDW 12.4 11.5 - 14.5 %    Platelets 152 150 - 450 K/uL    MPV 10.7 9.2 - 12.9 fL    Immature Granulocytes 0.3 0.0 - 0.5 %    Gran # (ANC) 3.0 1.8 - 7.7 K/uL    Immature Grans (Abs) 0.02 0.00 - 0.04 K/uL    Lymph # 2.0 1.0 - 4.8 K/uL    Mono # 0.9 0.3 - 1.0 K/uL    Eos # 0.0 0.0 - 0.5 K/uL    Baso # 0.02 0.00 - 0.20 K/uL    nRBC 0 0 /100 WBC    Gran % 50.5 38.0 - 73.0 %    Lymph % 32.9 18.0 - 48.0 %    Mono % 15.5 (H) 4.0 - 15.0 %    Eosinophil % 0.5 0.0 - 8.0 %    Basophil % 0.3 0.0 - 1.9 %    Differential Method Automated    Lactic acid, plasma #1   Result Value Ref Range    Lactate (Lactic Acid) 2.5 (H) 0.5 - 2.2 mmol/L   Lactic acid, plasma #2   Result Value Ref Range    Lactate (Lactic Acid) 0.8 0.5 - 2.2 mmol/L   Urinalysis, Reflex to Urine Culture Urine, Clean Catch    Specimen: Urine   Result Value Ref Range    Specimen UA Urine, Clean Catch     Color, UA Yellow Yellow, Straw, Mela    Appearance, UA Clear Clear    pH, UA 6.0 5.0 - 8.0    Specific Gravity, UA 1.025 1.005 - 1.030    Protein, UA Trace (A) Negative    Glucose, UA Negative Negative    Ketones, UA Negative Negative    Bilirubin (UA) Negative Negative    Occult Blood UA Negative Negative    Nitrite, UA Negative Negative    Urobilinogen, UA 4.0-6.0 (A) <2.0 EU/dL    Leukocytes, UA Negative Negative   Comprehensive metabolic panel   Result Value Ref Range    Sodium 137 136 - 145 mmol/L    Potassium 3.8 3.5 - 5.1 mmol/L    Chloride 106 95 - 110 mmol/L    CO2 17 (L) 23 - 29 mmol/L    Glucose 94 70 - 110 mg/dL    BUN 18 6 - 20 mg/dL    Creatinine 1.6 (H) 0.5 - 1.4 mg/dL    Calcium 8.8 8.7 - 10.5 mg/dL    Total Protein 6.4 6.0 - 8.4 g/dL    Albumin 3.3 (L) 3.5 - 5.2 g/dL    Total Bilirubin 0.8 0.1 - 1.0 mg/dL    Alkaline Phosphatase 89 55 - 135 U/L    AST 42 (H) 10 - 40  U/L    ALT 31 10 - 44 U/L    eGFR 52 (A) >60 mL/min/1.73 m^2    Anion Gap 14 8 - 16 mmol/L   Comprehensive Metabolic Panel (CMP)   Result Value Ref Range    Sodium 134 (L) 136 - 145 mmol/L    Potassium 5.0 3.5 - 5.1 mmol/L    Chloride 101 95 - 110 mmol/L    CO2 25 23 - 29 mmol/L    Glucose 103 70 - 110 mg/dL    BUN 22 (H) 6 - 20 mg/dL    Creatinine 2.0 (H) 0.5 - 1.4 mg/dL    Calcium 8.7 8.7 - 10.5 mg/dL    Total Protein 5.6 (L) 6.0 - 8.4 g/dL    Albumin 2.9 (L) 3.5 - 5.2 g/dL    Total Bilirubin 2.7 (H) 0.1 - 1.0 mg/dL    Alkaline Phosphatase 281 (H) 55 - 135 U/L     (H) 10 - 40 U/L     (H) 10 - 44 U/L    eGFR 40 (A) >60 mL/min/1.73 m^2    Anion Gap 8 8 - 16 mmol/L   Magnesium   Result Value Ref Range    Magnesium 1.3 (L) 1.6 - 2.6 mg/dL   Phosphorus   Result Value Ref Range    Phosphorus 4.5 2.7 - 4.5 mg/dL   CBC with Automated Differential   Result Value Ref Range    WBC 4.50 3.90 - 12.70 K/uL    RBC 4.13 (L) 4.60 - 6.20 M/uL    Hemoglobin 12.7 (L) 14.0 - 18.0 g/dL    Hematocrit 41.0 40.0 - 54.0 %    MCV 99 (H) 82 - 98 fL    MCH 30.8 27.0 - 31.0 pg    MCHC 31.0 (L) 32.0 - 36.0 g/dL    RDW 12.6 11.5 - 14.5 %    Platelets 133 (L) 150 - 450 K/uL    MPV 10.4 9.2 - 12.9 fL    Immature Granulocytes 0.2 0.0 - 0.5 %    Gran # (ANC) 1.9 1.8 - 7.7 K/uL    Immature Grans (Abs) 0.01 0.00 - 0.04 K/uL    Lymph # 1.6 1.0 - 4.8 K/uL    Mono # 0.9 0.3 - 1.0 K/uL    Eos # 0.1 0.0 - 0.5 K/uL    Baso # 0.01 0.00 - 0.20 K/uL    nRBC 0 0 /100 WBC    Gran % 42.3 38.0 - 73.0 %    Lymph % 36.4 18.0 - 48.0 %    Mono % 19.3 (H) 4.0 - 15.0 %    Eosinophil % 1.6 0.0 - 8.0 %    Basophil % 0.2 0.0 - 1.9 %    Differential Method Automated    Tacrolimus level   Result Value Ref Range    Tacrolimus Lvl 9.9 5.0 - 15.0 ng/mL   Bilirubin, direct   Result Value Ref Range    Bilirubin, Direct 2.0 (H) 0.1 - 0.3 mg/dL   CK   Result Value Ref Range     20 - 200 U/L   Echo   Result Value Ref Range    BSA 2.61 m2    LVOT stroke volume  85.92 cm3    LVIDd 5.82 3.5 - 6.0 cm    LV Systolic Volume 55.99 mL    LV Systolic Volume Index 22.3 mL/m2    LVIDs 3.64 2.1 - 4.0 cm    LV Diastolic Volume 168.17 mL    LV Diastolic Volume Index 67.00 mL/m2    IVS 1.26 (A) 0.6 - 1.1 cm    LVOT diameter 2.04 cm    LVOT area 3.3 cm2    FS 37 28 - 44 %    Left Ventricle Relative Wall Thickness 0.41 cm    Posterior Wall 1.19 (A) 0.6 - 1.1 cm    TDI LATERAL 0.16 m/s    TDI SEPTAL 0.16 m/s    LV mass 307.17 g    LV Mass Index 122 g/m2    MV Peak E Iam 1.18 m/s    LV LATERAL E/E' RATIO 7.38 m/s    LV SEPTAL E/E' RATIO 7.38 m/s    E/E' ratio 7.38 m/s    TR Max Iam 2.81 m/s    IVRT 49.48 msec    Mean e' 0.16 m/s    LVOT peak iam 1.42 m/s    Left Ventricular Outflow Tract Mean Velocity 1.28 cm/s    Left Ventricular Outflow Tract Mean Gradient 6.60 mmHg    LA size 4.20 cm    Left Atrium Major Axis 5.77 cm    Left Atrium Minor Axis 5.55 cm    RVOT peak VTI 18.6 cm    RA Major Axis 4.94 cm    AV mean gradient 7 mmHg    AV peak gradient 11 mmHg    Ao peak iam 1.67 m/s    Ao VTI 29.20 cm    LVOT peak VTI 26.30 cm    AV valve area 2.94 cm²    AV Velocity Ratio 0.85     AV index (prosthetic) 0.90     NATHALIE by Velocity Ratio 2.78 cm²    MV stenosis pressure 1/2 time 79.96 ms    MV valve area p 1/2 method 2.75 cm2    TV mean gradient 27 mmHg    Triscuspid Valve Regurgitation Peak Gradient 32 mmHg    PV mean gradient 2 mmHg    PV peak gradient 4     RVOT peak iam 1.00 m/s    Ao root annulus 4.04 cm    STJ 3.55 cm    Ascending aorta 3.25 cm    ZLVIDS -6.68     ZLVIDD -8.99     IVC diameter 1.79 cm    LA Volume Index 34.6 mL/m2    LA volume 86.85 cm3    LA WIDTH 4.3 cm    Tapse 2.1 cm    RA Width 3.7 cm   POCT glucose   Result Value Ref Range    POCT Glucose 106 70 - 110 mg/dL   POCT glucose   Result Value Ref Range    POCT Glucose 96 70 - 110 mg/dL   POCT glucose   Result Value Ref Range    POCT Glucose 112 (H) 70 - 110 mg/dL     *Note: Due to a large number of results and/or encounters  for the requested time period, some results have not been displayed. A complete set of results can be found in Results Review.     Imaging Results    None              The emergency physician reviewed the vital signs and test results, which are outlined above.     ED Discussion     3:14 PM: Discussed case with Dr. Jernigan ([facility]) who agrees with current care and management of pt and accepts admission.   Admitting Service: [facility]   Admitting Physician: Dr. Thurston  Admit to: Obs    3:14 PM: Re-evaluated pt. I have discussed test results, shared treatment plan, and the need for admission with patient and family at bedside. Pt and family express understanding at this time and agree with all information. All questions answered. Pt and family have no further questions or concerns at this time. Pt is ready for admit.           Medical Decision Making:   Clinical Tests:   Lab Tests: Ordered and Reviewed         ED Medication(s) Administered:  Medications   ceftAZIDime (FORTAZ) 2 g in dextrose 5 % in water (D5W) 50 mL IVPB (MB+) (0 g Intravenous Stopped 7/29/23 0956)   amLODIPine tablet 10 mg (0 mg Oral Hold 7/29/23 0900)   apixaban tablet 5 mg (5 mg Oral Given 7/29/23 0825)   tacrolimus capsule 1 mg (1 mg Oral Given 7/29/23 0825)   fluticasone-umeclidin-vilanter 200-62.5-25 mcg inhaler 1 puff (1 puff Inhalation Given 7/29/23 0838)   sodium chloride 0.9% flush 10 mL (has no administration in time range)   ondansetron injection 4 mg (4 mg Intravenous Given 7/29/23 0007)   prochlorperazine injection Soln 5 mg (has no administration in time range)   polyethylene glycol packet 17 g (17 g Oral Given 7/29/23 0833)   aluminum-magnesium hydroxide-simethicone 200-200-20 mg/5 mL suspension 30 mL (has no administration in time range)   acetaminophen tablet 650 mg (has no administration in time range)   naloxone 0.4 mg/mL injection 0.02 mg (has no administration in time range)   glucose chewable tablet 16 g (has  no administration in time range)   glucose chewable tablet 24 g (has no administration in time range)   glucagon (human recombinant) injection 1 mg (has no administration in time range)   morphine injection 2 mg (2 mg Intravenous Given 7/29/23 0829)   oxyCODONE immediate release tablet 10 mg (10 mg Oral Given 7/29/23 0931)   insulin aspart U-100 pen 0-5 Units (has no administration in time range)   traZODone tablet 50 mg (50 mg Oral Given 7/28/23 2000)   hydrALAZINE injection 10 mg (has no administration in time range)   doxycycline (VIBRAMYCIN) 100 mg in dextrose 5 % in water (D5W) 100 mL IVPB (MB+) (0 mg Intravenous Stopped 7/29/23 0610)   melatonin tablet 6 mg (6 mg Oral Given 7/28/23 2058)   gabapentin capsule 300 mg (300 mg Oral Given 7/28/23 2058)   0.9%  NaCl infusion ( Intravenous New Bag 7/29/23 0926)   magnesium sulfate in dextrose IVPB (premix) 1 g (0 g Intravenous Stopped 7/29/23 1229)   ceFAZolin (ANCEF) 1 gram in dextrose 5 % 50 mL IVPB (premix) (0 g Intravenous Stopped 7/28/23 1435)   lactated ringers bolus 2,328 mL (0 mLs Intravenous Stopped 7/28/23 1752)   morphine injection 6 mg (6 mg Intravenous Given 7/28/23 1350)   ondansetron injection 4 mg (4 mg Intravenous Given 7/28/23 1350)       Prescription Management: I performed a review of the patient's current Rx medication list as input by nursing staff.    Current Discharge Medication List        CONTINUE these medications which have NOT CHANGED    Details   albuterol (PROVENTIL/VENTOLIN HFA) 90 mcg/actuation inhaler Inhale 2 puffs into the lungs every 6 (six) hours as needed for Wheezing or Shortness of Breath. Rescue      amLODIPine (NORVASC) 10 MG tablet Take 1 tablet (10 mg total) by mouth once daily.  Qty: 30 tablet, Refills: 11    Comments: .      apixaban (ELIQUIS) 5 mg Tab Take 1 tablet (5 mg total) by mouth 2 (two) times a day.  Qty: 60 tablet, Refills: 11      blood sugar diagnostic Strp Use to test blood glucose  3 (three) times daily  "with meals.  Qty: 100 each, Refills: 1      blood-glucose meter Misc USE AS INSTRUCTED.  Qty: 1 each, Refills: 0      EScitalopram oxalate (LEXAPRO) 10 MG tablet Take 10 mg by mouth.      fluticasone propionate (FLONASE) 50 mcg/actuation nasal spray 1 spray by Each Nostril route once daily.      gabapentin (NEURONTIN) 300 MG capsule Take 2 capsules (600 mg total) by mouth 2 (two) times daily.  Qty: 120 capsule, Refills: 11      lancets 30 gauge Misc 1 each by Misc.(Non-Drug; Combo Route) route 3 (three) times daily with meals.  Qty: 100 each, Refills: 1      losartan (COZAAR) 25 MG tablet Take 25 mg by mouth once daily.      mupirocin (BACTROBAN) 2 % ointment APPLY TOPICALLY TO THE AFFECTED AREA THREE TIMES DAILY FOR 7 DAYS      mycophenolate (CELLCEPT) 250 mg Cap Take 2 capsules (500 mg total) by mouth 2 (two) times daily.  Qty: 120 capsule, Refills: 11    Comments: Txp Date:10/14/2020 (Liver) Disch Date:10/27/2020 ICD10:Z94.4 Txp Location:[facility]  Associated Diagnoses: Immunosuppression      ondansetron (ZOFRAN) 4 MG tablet Take 1 tablet (4 mg total) by mouth every 6 (six) hours as needed for Nausea.  Qty: 12 tablet, Refills: 0      pen needle, diabetic 29 gauge x 1/2" Ndle use to inject insulin 3 (three) times daily with meals.  Qty: 100 each, Refills: 1      sulfamethoxazole-trimethoprim 800-160mg (BACTRIM DS) 800-160 mg Tab Take 1 tablet by mouth 2 (two) times daily.      tacrolimus (PROGRAF) 1 MG Cap Take 1 capsule (1 mg total) by mouth every morning AND 1 capsule (1 mg total) every evening.  Qty: 60 capsule, Refills: 11    Comments: Txp Date:10/14/2020 (Liver) Disch Date:10/27/2020 ICD10:Z94.4 Txp Location:[facility]  Associated Diagnoses: Immunosuppression; Liver transplanted      testosterone cypionate (DEPOTESTOTERONE CYPIONATE) 200 mg/mL injection Inject 0.75 mLs (150 mg total) into the muscle once a week.  Qty: 5 mL, Refills: 0    Associated Diagnoses: Low testosterone      tiZANidine (ZANAFLEX) 4 MG tablet     "   traZODone (DESYREL) 50 MG tablet Take  mg by mouth every evening.      TRELEGY ELLIPTA 200-62.5-25 mcg inhaler INHALE 1 PUFF BY MOUTH EVERY DAY AS DIRECTED      benzonatate (TESSALON) 200 MG capsule benzonatate Take 1 Capsule (oral) 1 time per day PRN - Cough for 10 days [id number] capsule 1 time per day oral 10 days active 200 mg      loratadine (CLARITIN) 10 mg tablet Take 10 mg by mouth.      sildenafiL (VIAGRA) 50 MG tablet Take 50 mg by mouth as needed.                    Scribe Attestation:   Scribe #1: I performed the above scribed service and the documentation accurately describes the services I performed. I attest to the accuracy of the note.     Attending:   Physician Attestation Statement for Scribe #1: I, Rober Faulkner MD, personally performed the services described in this documentation, as scribed by Moon Griffin, in my presence, and it is both accurate and complete. As with other dictation methods such as dictation software, small errors or inconsistencies may be overlooked due to the goal of spending more face-to-face time with patients.      Clinical Impression       ICD-10-CM ICD-9-CM   1. Cellulitis of right leg  L03.115 682.6   2. JENN (acute kidney injury)  N17.9 584.9   3. Lactic acidosis  E87.20 276.2   4. Chest pain  R07.9 786.50   5. New onset a-fib  I48.91 427.31   6. Atrial fibrillation with RVR  I48.91 427.31      ED Disposition Condition    Admit Fair               Rober Faulkner MD  07/29/23 1224

## 2023-07-28 NOTE — Clinical Note
Diagnosis: Cellulitis of right leg [749920]   Admitting Provider:: NANCY WEINSTEIN [[id number]]   Future Attending Provider: NANCY WEINSTEIN [[id number]]   Reason for IP Medical Treatment  (Clinical interventions that can only be accomplished in the IP setting? ) :: Cellulitis in Immunocompromised patient, History of Liver Transplant   I certify that Inpatient services for greater than or equal to 2 midnights are medically necessary:: Yes   Plans for Post-Acute care--if anticipated (pick the single best option):: A. No post acute care anticipated at this time   Special Needs:: No Special Needs [1]

## 2023-07-28 NOTE — ASSESSMENT & PLAN NOTE
Patient reports small wound on right ankle while tubing in river, reported wound had a scab, then went to [place] and swam at beach. Noticed on 7/24, erythema, pain to right ankle. 7/26 symptoms were worsening and had a fever, presented to , treated with bactrim, mupirocin topical, reported erythema continued to spread. Unknown if fever persisted, had been taking tylenol and ibuprofen for pain.     --See Sepsis  --Added fortaz to cover for vibrio

## 2023-07-29 NOTE — ASSESSMENT & PLAN NOTE
Followed by Dr. Askew. Rising liver function.     --Consult Hepatology.   --continue Tacrolimus. Check level  --Hold Cellcept given acute infection

## 2023-07-29 NOTE — ASSESSMENT & PLAN NOTE
Treated with mycophenolate, tacro, most recent labs stable.     --Continue tacro, hold mycophenolate  --Tacro level pending

## 2023-07-29 NOTE — ASSESSMENT & PLAN NOTE
Unclear etiology. Pattern looks like it could be consistent with acute EtOH hepatitis, but elevations from medication, infection/sepsis or rejection have to also be considered.  -Check Lfts daily with tacro level   -Acute hep panel ordered  -Agree with holding cellcept  -Patient tacro usually runs high so lower concern for rejection; ok to continue on home tacro dosing of 1mg bid

## 2023-07-29 NOTE — PROGRESS NOTES
[facility] - Telemetry ([facility])  [facility] Medicine  Progress Note    Patient Name: Jordan Altman  MRN: [id number]  Patient Class: IP- Inpatient   Admission Date: 7/28/2023  Length of Stay: 1 days  Attending Physician: Dylan Champagne MD  Primary Care Provider: SHAHEEN Jaime    Subjective:     Principal Problem:Sepsis        HPI:  48 y.o. male with a PMH of HTN and SBP who presents to the [facility] emergency department for evaluation of  erythema and swelling to right ankle traveling up leg. Pt reports he [place] his leg tubing [place] river a few [place] ago then went swimming in [employer] in [place] x 2wks ago. Pt states he went to  2 days ago and was put on bactrim and an ointment. Associated symptoms include fever, chills, evolving erythema, edema to RLE. In the ED, 142/79, 97.9, 95, 96% RA. Labs: Cr: 1.6, Lactic: 2.5. Patient continued mycophenolate with infection. Treated with IV fluids @30cc/kg, Blood cultures collected, initiated on IV Antibiotics, Fortaz added. Patient is a full code, surrogate decision maker is his spouse, Chelsea Altman. Placed in observation under the care of Hospital Medicine for management of sepsis, cellulitis.       Overview/Hospital Course:    7/29  NAEON, LFTs rising today, discussed with Transplant Hepatology, will order US and CK level. Kidney function trending up as well.   Patient reports improvement in RLE cellulitis, mostly involving ankle  Pt reports he was swimming, hit by a log with small scan right posterior leg      Interval History: patient states erythema has improved. Still some swelling.  Rising kidney function and liver enzymes.     Review of Systems   Constitutional:  Negative for chills, fatigue and fever.   Respiratory:  Positive for cough.    Gastrointestinal:  Positive for abdominal distention. Negative for nausea.   Musculoskeletal:  Positive for arthralgias, gait problem and myalgias.   Skin:  Positive for color change and wound.   All other systems  reviewed and are negative.    Objective:     Vital Signs (Most Recent):  Temp: 98.6 °F (37 °C) (07/29/23 1221)  Pulse: 76 (07/29/23 1221)  Resp: 18 (07/29/23 1221)  BP: 133/81 (07/29/23 1221)  SpO2: (!) 94 % (07/29/23 1221) Vital Signs (24h Range):  Temp:  [97.8 °F (36.6 °C)-98.6 °F (37 °C)] 98.6 °F (37 °C)  Pulse:  [72-99] 76  Resp:  [16-20] 18  SpO2:  [90 %-99 %] 94 %  BP: (108-158)/(61-92) 133/81     Weight: 133.8 kg (295 lb)  Body mass index is 40.01 kg/m².    Intake/Output Summary (Last 24 hours) at 7/29/2023 1427  Last data filed at 7/28/2023 1855  Gross per 24 hour   Intake 2528 ml   Output --   Net 2528 ml         Physical Exam  Vitals and nursing note reviewed.   Constitutional:       General: He is not in acute distress.     Appearance: He is well-developed. He is ill-appearing. He is not diaphoretic.   HENT:      Head: Normocephalic and atraumatic.      Right Ear: Hearing and external ear normal.      Left Ear: Hearing and external ear normal.      Nose: Nose normal. No mucosal edema or rhinorrhea.      Mouth/Throat:      Pharynx: Uvula midline.   Eyes:      General:         Right eye: No discharge.         Left eye: No discharge.      Conjunctiva/sclera: Conjunctivae normal.      Right eye: No chemosis.     Left eye: No chemosis.     Pupils: Pupils are equal, round, and reactive to light.   Neck:      Thyroid: No thyroid mass or thyromegaly.      Trachea: Trachea normal.   Cardiovascular:      Rate and Rhythm: Normal rate and regular rhythm.      Pulses:           Dorsalis pedis pulses are 2+ on the right side and 2+ on the left side.      Heart sounds: Normal heart sounds. No murmur heard.  Pulmonary:      Effort: Pulmonary effort is normal. No respiratory distress.      Breath sounds: Normal breath sounds. No decreased breath sounds or wheezing.   Abdominal:      General: Bowel sounds are normal. There is no distension.      Palpations: Abdomen is soft.      Tenderness: There is no abdominal  tenderness.   Musculoskeletal:         General: Normal range of motion.      Cervical back: Normal range of motion and neck supple.   Lymphadenopathy:      Cervical: No cervical adenopathy.      Upper Body:      Right upper body: No supraclavicular adenopathy.      Left upper body: No supraclavicular adenopathy.   Skin:     General: Skin is warm and dry.      Capillary Refill: Capillary refill takes less than 2 seconds.      Findings: No rash.          Neurological:      Mental Status: He is alert and oriented to person, place, and time.   Psychiatric:         Mood and Affect: Mood is not anxious.         Speech: Speech normal.         Behavior: Behavior normal.         Thought Content: Thought content normal.         Judgment: Judgment normal.             Significant Labs: All pertinent labs within the past 24 hours have been reviewed.  CBC:   Recent Labs   Lab 07/28/23  1226 07/29/23  0438   WBC 5.99 4.50   HGB 14.9 12.7*   HCT 47.1 41.0    133*     CMP:   Recent Labs   Lab 07/28/23  1405 07/29/23  0438    134*   K 3.8 5.0    101   CO2 17* 25   GLU 94 103   BUN 18 22*   CREATININE 1.6* 2.0*   CALCIUM 8.8 8.7   PROT 6.4 5.6*   ALBUMIN 3.3* 2.9*   BILITOT 0.8 2.7*   ALKPHOS 89 281*   AST 42* 219*   ALT 31 102*   ANIONGAP 14 8       Significant Imaging: I have reviewed all pertinent imaging results/findings within the past 24 hours.      Assessment/Plan:      * Sepsis  This patient does have evidence of infective focus  My overall impression is sepsis.  Source: Skin and Soft Tissue (location RLE)  Antibiotics given-   Antibiotics (72h ago, onward)      Start     Stop Route Frequency Ordered    07/28/23 1730  doxycycline (VIBRAMYCIN) 100 mg in dextrose 5 % in water (D5W) 100 mL IVPB (MB+)         -- IV Every 12 hours (non-standard times) 07/28/23 1700    07/28/23 1630  ceftAZIDime (FORTAZ) 2 g in dextrose 5 % in water (D5W) 50 mL IVPB (MB+)         -- IV Every 8 hours (non-standard times) 07/28/23  1511          Latest lactate reviewed-  Recent Labs   Lab 07/28/23  1226 07/28/23  1751   LACTATE 2.5* 0.8     Organ dysfunction indicated by Acute kidney injury    Fluid challenge Actual Body weight- Patient will receive 30ml/kg actual body weight to calculate fluid bolus for treatment of septic shock.     Post- resuscitation assessment No - Post resuscitation assessment not needed       Will Not start Pressors- Levophed for MAP of 65  Source control achieved by: Star Kerr    Cellulitis of right leg  Patient reports small wound on right ankle while tubing in [place], reported wound had a scab, then went to [place] and swam at beach. Noticed on 7/24, erythema, pain to right ankle. 7/26 symptoms were worsening and had a fever, presented to , treated with bactrim, mupirocin topical, reported erythema continued to spread. Unknown if fever persisted, had been taking tylenol and ibuprofen for pain.     --Gradually improving. See Sepsis  --Continue fortaz to cover for vibrio    Elevated LFTs  AST>ALT  ?medications vs infectious process. Consult Hepatology  Ultrasound shows stable findings      Long-term use of immunosuppressant medication  Treated with mycophenolate, tacro, most recent labs stable.     --Continue tacro, hold mycophenolate  --Tacro level pending      S/P liver transplant  Followed by Dr. Askew. Rising liver function.     --Consult Hepatology.   --continue Tacrolimus. Check level  --Hold Cellcept given acute infection      Hypertension  Chronic, stable    --Resume amlodipine, hold losartan in setting of JENN  --Hydralazine 10mg IV PRN SBP >170      JENN (acute kidney injury)  Patient with acute kidney injury/acute renal failure likely due to pre-renal azotemia due to dehydration JENN is currently New Onset. Baseline creatinine 1.0 - Labs reviewed- Renal function/electrolytes with Estimated Creatinine Clearance: 62.6 mL/min (A) (based on SCr of 2 mg/dL (H)). according to latest data. Monitor urine output  and serial BMP and adjust therapy as needed. Avoid nephrotoxins and renally dose meds for GFR listed above.    7/29/23  Continues to rise. Gentle hydration today.   Monitor intake and output  Patient is receiving Vancomycin, start IVF's.    Hypomagnesemia       Replete    VTE Risk Mitigation (From admission, onward)           Ordered     apixaban tablet 5 mg  2 times daily         07/28/23 1523     IP VTE HIGH RISK PATIENT  Once         07/28/23 1523     Place sequential compression device  Until discontinued         07/28/23 1523     Reason for No Pharmacological VTE Prophylaxis  Once        Question:  Reasons:  Answer:  Already adequately anticoagulated on oral Anticoagulants    07/28/23 1523                    Discharge Planning   JACK:      Code Status: Full Code   Is the patient medically ready for discharge?:     Reason for patient still in hospital (select all that apply): Treatment           Sanjuanita Andrea NP  Department of Hospital Medicine   '[place] - Telemetry ([facility])

## 2023-07-29 NOTE — ASSESSMENT & PLAN NOTE
This patient does have evidence of infective focus  My overall impression is sepsis.  Source: Skin and Soft Tissue (location RLE)  Antibiotics given-   Antibiotics (72h ago, onward)    Start     Stop Route Frequency Ordered    07/28/23 1730  doxycycline (VIBRAMYCIN) 100 mg in dextrose 5 % in water (D5W) 100 mL IVPB (MB+)         -- IV Every 12 hours (non-standard times) 07/28/23 1700    07/28/23 1630  ceftAZIDime (FORTAZ) 2 g in dextrose 5 % in water (D5W) 50 mL IVPB (MB+)         -- IV Every 8 hours (non-standard times) 07/28/23 1511        Latest lactate reviewed-  Recent Labs   Lab 07/28/23  1226 07/28/23  1751   LACTATE 2.5* 0.8     Organ dysfunction indicated by Acute kidney injury    Fluid challenge Actual Body weight- Patient will receive 30ml/kg actual body weight to calculate fluid bolus for treatment of septic shock.     Post- resuscitation assessment No - Post resuscitation assessment not needed       Will Not start Pressors- Levophed for MAP of 65  Source control achieved by: Star Kerr

## 2023-07-29 NOTE — ASSESSMENT & PLAN NOTE
AST>ALT  ?medications vs infectious process. Consult Hepatology  Ultrasound shows stable findings

## 2023-07-29 NOTE — PLAN OF CARE
A254/A254 COLLEENRiver Altman is a 48 y.o.male admitted on 7/28/2023 for Sepsis   Code Status: Full Code MRN: [id number]   Review of patient's allergies indicates:   Allergen Reactions    Latex, natural rubber      Past Medical History:   Diagnosis Date    Decompensated hepatic cirrhosis     HCV acquired through organ donation, treated / cured     svr12 - 6/2021    Hypertension     SBP (spontaneous bacterial peritonitis)       PRN meds    acetaminophen, 650 mg, Q4H PRN  aluminum-magnesium hydroxide-simethicone, 30 mL, QID PRN  glucagon (human recombinant), 1 mg, PRN  glucose, 16 g, PRN  glucose, 24 g, PRN  hydrALAZINE, 10 mg, Q6H PRN  insulin aspart U-100, 0-5 Units, QID (AC + HS) PRN  melatonin, 6 mg, Nightly PRN  morphine, 2 mg, Q4H PRN  naloxone, 0.02 mg, PRN  ondansetron, 4 mg, Q8H PRN  oxyCODONE, 10 mg, Q6H PRN  prochlorperazine, 5 mg, Q6H PRN  sodium chloride 0.9%, 10 mL, Q12H PRN  trazodone, 50 mg, Nightly PRN      Chart check completed. Will continue plan of care.      Orientation: oriented x 4  [place] Coma Scale Score: 15     Lead Monitored: Lead II Rhythm: atrial rhythm    Cardiac/Telemetry Box Number: [id number]  VTE Required Core Measure: Pharmacological prophylaxis initiated/maintained Last Bowel Movement: 07/28/23  Diet Cardiac     Humberto Score: 20  Fall Risk Score: 7  Accucheck []   Freq?      Lines/Drains/Airways       Peripheral Intravenous Line  Duration                  Peripheral IV - Single Lumen 07/29/23 0950 22 G Anterior;Proximal;Right Forearm <1 day                        Problem: Adult Inpatient Plan of Care  Goal: Plan of Care Review  Outcome: Ongoing, Progressing  Goal: Patient-Specific Goal (Individualized)  Outcome: Ongoing, Progressing  Goal: Absence of Hospital-Acquired Illness or Injury  Outcome: Ongoing, Progressing  Goal: Optimal Comfort and Wellbeing  Outcome: Ongoing, Progressing  Goal: Readiness for Transition of Care  Outcome: Ongoing, Progressing     Problem: Adjustment to Illness  (Sepsis/Septic Shock)  Goal: Optimal Coping  Outcome: Ongoing, Progressing     Problem: Bleeding (Sepsis/Septic Shock)  Goal: Absence of Bleeding  Outcome: Ongoing, Progressing     Problem: Glycemic Control Impaired (Sepsis/Septic Shock)  Goal: Blood Glucose Level Within Desired Range  Outcome: Ongoing, Progressing     Problem: Infection Progression (Sepsis/Septic Shock)  Goal: Absence of Infection Signs and Symptoms  Outcome: Ongoing, Progressing     Problem: Nutrition Impaired (Sepsis/Septic Shock)  Goal: Optimal Nutrition Intake  Outcome: Ongoing, Progressing     Problem: Fluid and Electrolyte Imbalance (Acute Kidney Injury/Impairment)  Goal: Fluid and Electrolyte Balance  Outcome: Ongoing, Progressing     Problem: Oral Intake Inadequate (Acute Kidney Injury/Impairment)  Goal: Optimal Nutrition Intake  Outcome: Ongoing, Progressing     Problem: Renal Function Impairment (Acute Kidney Injury/Impairment)  Goal: Effective Renal Function  Outcome: Ongoing, Progressing     Problem: Bariatric Environmental Safety  Goal: Safety Maintained with Care  Outcome: Ongoing, Progressing     Problem: Fall Injury Risk  Goal: Absence of Fall and Fall-Related Injury  Outcome: Ongoing, Progressing

## 2023-07-29 NOTE — HPI
48M w/ h/o liver transplant 10/2020 for EtOH cirrhosis has continued EtOH after transplant. He also has a h/o acute rejection. He is admitted with spesis thought 2/2 cellulitis. He also has increased LFTs. US is unremarkable except for hepatic steatosis. Last labs were earlier this month and slightly elevated c/w EtOH and PETH level was very high.    He developed a wound on right leg below ankle. He reports swelling, redness and pain started giving worse and he had intermittent fever. He went to urgent care. He was given Bactrim at  along with an ointment.    No fevers here. He reports ankle is getting better. Denies any acute issues. Otherwise feeling better.

## 2023-07-29 NOTE — ASSESSMENT & PLAN NOTE
Patient reports [place], reported wound had a scab, then went to [place] and swam at beach. Noticed on 7/24, erythema, pain to right ankle. 7/26 symptoms were worsening and had a fever, presented to , treated with bactrim, mupirocin topical, reported erythema continued to spread. Unknown if fever persisted, had been taking tylenol and ibuprofen for pain.     --Gradually improving. See Sepsis  --Continue fortaz to cover for vibrio

## 2023-07-29 NOTE — ASSESSMENT & PLAN NOTE
Patient with acute kidney injury/acute renal failure likely due to pre-renal azotemia due to dehydration JENN is currently New Onset. Baseline creatinine 1.0 - Labs reviewed- Renal function/electrolytes with Estimated Creatinine Clearance: 62.6 mL/min (A) (based on SCr of 2 mg/dL (H)). according to latest data. Monitor urine output and serial BMP and adjust therapy as needed. Avoid nephrotoxins and renally dose meds for GFR listed above.    7/29/23  Continues to rise. Gentle hydration today.   Monitor intake and output  Patient is receiving Vancomycin, start IVF's.

## 2023-07-29 NOTE — SUBJECTIVE & OBJECTIVE
Review of Systems   Constitutional: Negative.    HENT: Negative.     Eyes: Negative.    Respiratory: Negative.     Cardiovascular:  Positive for leg swelling.   Gastrointestinal: Negative.    Genitourinary: Negative.    Musculoskeletal:  Positive for arthralgias, gait problem, joint swelling and myalgias.   Skin: Negative.    Psychiatric/Behavioral: Negative.         Past Medical History:   Diagnosis Date    Decompensated hepatic cirrhosis     HCV acquired through organ donation, treated / cured     svr12 - 6/2021    Hypertension     SBP (spontaneous bacterial peritonitis)        Past Surgical History:   Procedure Laterality Date    ERCP N/A 10/1/2020    Procedure: ERCP (ENDOSCOPIC RETROGRADE CHOLANGIOPANCREATOGRAPHY);  Surgeon: Marcos Ramirez MD;  Location: [address]);  Service: Endoscopy;  Laterality: N/A;    ESOPHAGOGASTRODUODENOSCOPY N/A 10/13/2020    Procedure: EGD (ESOPHAGOGASTRODUODENOSCOPY);  Surgeon: Prasanth Jerry MD;  Location: [address]);  Service: Endoscopy;  Laterality: N/A;    EXPLORATORY LAPAROTOMY AFTER LIVER TRANSPLANTATION N/A 10/15/2020    Procedure: LAPAROTOMY, EXPLORATORY, AFTER LIVER TRANSPLANT, Possible redo of artery, possible portal thrombectomy. IntraOperative US.;  Surgeon: Curtis Zavaleta MD;  Location: [address];  Service: Transplant;  Laterality: N/A;  With intraoperative ultrasound    LIVER TRANSPLANT N/A 10/14/2020    Procedure: TRANSPLANT, LIVER;  Surgeon: Curtis Zavaleta MD;  Location: [address];  Service: Transplant;  Laterality: N/A;  Intra op HD    RIGHT HEART CATHETERIZATION Right 9/23/2020    Procedure: INSERTION, CATHETER, RIGHT HEART;  Surgeon: Mikel Costa Jr., MD;  [facility] C[facility] LAB;  Service: Cardiology;  Laterality: Right;    THORACENTESIS  2011    THROMBECTOMY  10/15/2020    Procedure: THROMBECTOMY portal vein;  Surgeon: Curtis Zavaleta MD;  Location: [address];  Service: Transplant;;       Family history of liver  disease: n/a    Review of patient's allergies indicates:   Allergen Reactions    Latex, natural rubber          Tobacco Use    Smoking status: Some Days     Current packs/day: 0.00    Smokeless tobacco: Current   Substance and Sexual Activity    Alcohol use: Yes     Alcohol/week: 112.0 standard drinks of alcohol     Types: 112 Shots of liquor per week     Comment: fifth of vodka a day. LAST DRINK 7/25/2020 per pt     Drug use: Yes     Frequency: 3.0 times per week     Types: Marijuana    Sexual activity: Yes       Facility-Administered Medications Prior to Admission   Medication Dose Route Frequency Provider Last Rate Last Admin    acetaminophen tablet 650 mg  650 mg Oral Once PRN Leo Ghotra MD        albuterol inhaler 2 puff  2 puff Inhalation Q20 Min PRN Leo Ghotra MD        diphenhydrAMINE injection 25 mg  25 mg Intravenous Once PRN Leo Ghotra MD        EPINEPHrine (EPIPEN) 0.3 mg/0.3 mL pen injection 0.3 mg  0.3 mg Intramuscular PRN Leo Ghotra MD        methylPREDNISolone sodium succinate injection 40 mg  40 mg Intravenous Once PRN Leo Ghotra MD        ondansetron disintegrating tablet 4 mg  4 mg Oral Once PRN Leo Ghotra MD        sodium chloride 0.9% 500 mL flush bag   Intravenous PRN Leo Ghotra MD        sodium chloride 0.9% flush 10 mL  10 mL Intravenous PRN Leo Ghotra MD         Medications Prior to Admission   Medication Sig Dispense Refill Last Dose    albuterol (PROVENTIL/VENTOLIN HFA) 90 mcg/actuation inhaler Inhale 2 puffs into the lungs every 6 (six) hours as needed for Wheezing or Shortness of Breath. Rescue   Past Week    amLODIPine (NORVASC) 10 MG tablet Take 1 tablet (10 mg total) by mouth once daily. 30 tablet 11 7/28/2023    apixaban (ELIQUIS) 5 mg Tab Take 1 tablet (5 mg total) by mouth 2 (two) times a day. 60 tablet 11 7/28/2023    blood sugar diagnostic Strp Use to test blood glucose  3 (three) times daily with meals. 100 each 1 7/27/2023  "   blood-glucose meter Misc USE AS INSTRUCTED. (Patient taking differently: USE AS INSTRUCTED.) 1 each 0 7/27/2023    EScitalopram oxalate (LEXAPRO) 10 MG tablet Take 10 mg by mouth.   7/28/2023    fluticasone propionate (FLONASE) 50 mcg/actuation nasal spray 1 spray by Each Nostril route once daily.   Past Week    gabapentin (NEURONTIN) 300 MG capsule Take 2 capsules (600 mg total) by mouth 2 (two) times daily. 120 capsule 11 7/27/2023    lancets 30 gauge Misc 1 each by Misc.(Non-Drug; Combo Route) route 3 (three) times daily with meals. 100 each 1 7/28/2023    losartan (COZAAR) 25 MG tablet Take 25 mg by mouth once daily.   7/28/2023    mupirocin (BACTROBAN) 2 % ointment APPLY TOPICALLY TO THE AFFECTED AREA THREE TIMES DAILY FOR 7 DAYS   7/28/2023    mycophenolate (CELLCEPT) 250 mg Cap Take 2 capsules (500 mg total) by mouth 2 (two) times daily. 120 capsule 11 7/28/2023    ondansetron (ZOFRAN) 4 MG tablet Take 1 tablet (4 mg total) by mouth every 6 (six) hours as needed for Nausea. 12 tablet 0 7/28/2023    pen needle, diabetic 29 gauge x 1/2" Ndle use to inject insulin 3 (three) times daily with meals. 100 each 1 7/28/2023    sulfamethoxazole-trimethoprim 800-160mg (BACTRIM DS) 800-160 mg Tab Take 1 tablet by mouth 2 (two) times daily.   7/28/2023    tacrolimus (PROGRAF) 1 MG Cap Take 1 capsule (1 mg total) by mouth every morning AND 1 capsule (1 mg total) every evening. 60 capsule 11 7/28/2023    testosterone cypionate (DEPOTESTOTERONE CYPIONATE) 200 mg/mL injection Inject 0.75 mLs (150 mg total) into the muscle once a week. 5 mL 0 Past Week    tiZANidine (ZANAFLEX) 4 MG tablet    7/27/2023    traZODone (DESYREL) 50 MG tablet Take  mg by mouth every evening.   Past Week    TRELEGY ELLIPTA 200-62.5-25 mcg inhaler INHALE 1 PUFF BY MOUTH EVERY DAY AS DIRECTED   7/28/2023    benzonatate (TESSALON) 200 MG capsule benzonatate Take 1 Capsule (oral) 1 time per day PRN - Cough for 10 days 20221026 capsule 1 time per " day oral 10 days active 200 mg   Unknown    loratadine (CLARITIN) 10 mg tablet Take 10 mg by mouth.   Unknown    sildenafiL (VIAGRA) 50 MG tablet Take 50 mg by mouth as needed.   Unknown       Objective:     Vital Signs (Most Recent):  Temp: 98.6 °F (37 °C) (07/29/23 1221)  Pulse: 76 (07/29/23 1221)  Resp: 18 (07/29/23 1221)  BP: 133/81 (07/29/23 1221)  SpO2: (!) 94 % (07/29/23 1221) Vital Signs (24h Range):  Temp:  [97.8 °F (36.6 °C)-98.6 °F (37 °C)] 98.6 °F (37 °C)  Pulse:  [72-99] 76  Resp:  [16-20] 18  SpO2:  [90 %-99 %] 94 %  BP: (108-158)/(61-92) 133/81     Weight: 133.8 kg (295 lb) (07/29/23 0931)  Body mass index is 40.01 kg/m².       Physical Exam  Vitals reviewed.   Constitutional:       General: He is not in acute distress.     Appearance: He is well-developed.   HENT:      Head: Normocephalic and atraumatic.      Mouth/Throat:      Pharynx: No oropharyngeal exudate.   Eyes:      General: No scleral icterus.        Right eye: No discharge.         Left eye: No discharge.      Conjunctiva/sclera: Conjunctivae normal.      Pupils: Pupils are equal, round, and reactive to light.   Pulmonary:      Effort: Pulmonary effort is normal. No respiratory distress.      Breath sounds: Normal breath sounds. No wheezing.   Abdominal:      General: There is no distension.      Palpations: Abdomen is soft.      Tenderness: There is no abdominal tenderness.   Musculoskeletal:      Right lower leg: Edema (with erythema and pain) present.   Neurological:      Mental Status: He is alert and oriented to person, place, and time.   Psychiatric:         Behavior: Behavior normal.          MELD 3.0: 12 at 11/12/2022  4:51 PM  MELD-Na: 11 at 11/12/2022  4:51 PM  Calculated from:  Serum Creatinine: 1.2 mg/dL at 11/12/2022  4:51 PM  Serum Sodium: 136 mmol/L at 11/12/2022  4:51 PM  Total Bilirubin: 1.6 mg/dL at 11/12/2022  4:51 PM  Serum Albumin: 4.1 g/dL (Using max of 3.5 g/dL) at 11/12/2022  4:51 PM  INR(ratio): 1.1 at 11/12/2022   4:51 PM  Age at listin years  Sex: Male at 2022  4:51 PM      Significant Labs:  CBC:   Recent Labs   Lab 23   WBC 4.50   RBC 4.13*   HGB 12.7*   HCT 41.0   *     CMP:   Recent Labs   Lab 23      CALCIUM 8.7   ALBUMIN 2.9*   PROT 5.6*   *   K 5.0   CO2 25      BUN 22*   CREATININE 2.0*   ALKPHOS 281*   *   *   BILITOT 2.7*       Significant Imaging:  US: Reviewed

## 2023-07-29 NOTE — PLAN OF CARE
Problem: Adult Inpatient Plan of Care  Goal: Plan of Care Review  Outcome: Ongoing, Progressing  Goal: Patient-Specific Goal (Individualized)  Outcome: Ongoing, Progressing  Goal: Absence of Hospital-Acquired Illness or Injury  Outcome: Ongoing, Progressing  Goal: Optimal Comfort and Wellbeing  Outcome: Ongoing, Progressing  Goal: Readiness for Transition of Care  Outcome: Ongoing, Progressing     Problem: Adjustment to Illness (Sepsis/Septic Shock)  Goal: Optimal Coping  Outcome: Ongoing, Progressing     Problem: Bleeding (Sepsis/Septic Shock)  Goal: Absence of Bleeding  Outcome: Ongoing, Progressing     Problem: Glycemic Control Impaired (Sepsis/Septic Shock)  Goal: Blood Glucose Level Within Desired Range  Outcome: Ongoing, Progressing     Problem: Infection Progression (Sepsis/Septic Shock)  Goal: Absence of Infection Signs and Symptoms  Outcome: Ongoing, Progressing     Problem: Nutrition Impaired (Sepsis/Septic Shock)  Goal: Optimal Nutrition Intake  Outcome: Ongoing, Progressing     Problem: Fluid and Electrolyte Imbalance (Acute Kidney Injury/Impairment)  Goal: Fluid and Electrolyte Balance  Outcome: Ongoing, Progressing     Problem: Oral Intake Inadequate (Acute Kidney Injury/Impairment)  Goal: Optimal Nutrition Intake  Outcome: Ongoing, Progressing     Problem: Renal Function Impairment (Acute Kidney Injury/Impairment)  Goal: Effective Renal Function  Outcome: Ongoing, Progressing     Problem: Bariatric Environmental Safety  Goal: Safety Maintained with Care  Outcome: Ongoing, Progressing

## 2023-07-29 NOTE — CONSULTS
[facility]  Hepatology  Telemedicine Consult Note    Patient Name: Jordan Altman  MRN: [id number]  Admission Date: 7/28/2023  Hospital Length of Stay: 1 days  Attending Provider: Dylan Champagne MD   Primary Care Physician: SHAHEEN Jaime  Principal Problem:Sepsis    Inpatient Consult to Telemedicine - Hepatology  Consult performed by: Theresa Askew MD  Consult ordered by: Dylan Champagne MD  Reason for consult: Abnormal Lfts  Assessment/Recommendations: Monitor labs, hold cellcept and continue tacro        Subjective:     Transplant status: Post-transplant    HPI:  48M w/ h/o liver transplant 10/2020 for EtOH cirrhosis has continued EtOH after transplant. He also has a h/o acute rejection. He is admitted with spesis thought 2/2 cellulitis. He also has increased LFTs. US is unremarkable except for hepatic steatosis. Last labs were earlier this month and slightly elevated c/w EtOH and PETH level was very high.    He developed a wound on right leg below ankle. He reports swelling, redness and pain started giving worse and he had intermittent fever. He went to urgent care. He was given Bactrim at  along with an ointment.    No fevers here. He reports ankle is getting better. Denies any acute issues. Otherwise feeling better.      Review of Systems   Constitutional: Negative.    HENT: Negative.     Eyes: Negative.    Respiratory: Negative.     Cardiovascular:  Positive for leg swelling.   Gastrointestinal: Negative.    Genitourinary: Negative.    Musculoskeletal:  Positive for arthralgias, gait problem, joint swelling and myalgias.   Skin: Negative.    Psychiatric/Behavioral: Negative.         Past Medical History:   Diagnosis Date    Decompensated hepatic cirrhosis     HCV acquired through organ donation, treated / cured     svr12 - 6/2021    Hypertension     SBP (spontaneous bacterial peritonitis)        Past Surgical History:   Procedure Laterality Date    ERCP N/A 10/1/2020    Procedure:  ERCP (ENDOSCOPIC RETROGRADE CHOLANGIOPANCREATOGRAPHY);  Surgeon: Marcos Ramirez MD;  Location: [facility] ENDO (2ND FLR);  Service: Endoscopy;  Laterality: N/A;    ESOPHAGOGASTRODUODENOSCOPY N/A 10/13/2020    Procedure: EGD (ESOPHAGOGASTRODUODENOSCOPY);  Surgeon: Prasanth Jerry MD;  Location: [facility] ENDO (2ND FLR);  Service: Endoscopy;  Laterality: N/A;    EXPLORATORY LAPAROTOMY AFTER LIVER TRANSPLANTATION N/A 10/15/2020    Procedure: LAPAROTOMY, EXPLORATORY, AFTER LIVER TRANSPLANT, Possible redo of artery, possible portal thrombectomy. IntraOperative US.;  Surgeon: Curtis Zavaleta MD;  [facility];  Service: Transplant;  Laterality: N/A;  With intraoperative ultrasound    LIVER TRANSPLANT N/A 10/14/2020    Procedure: TRANSPLANT, LIVER;  Surgeon: Curtis Zavaleta MD;  [facility];  Service: Transplant;  Laterality: N/A;  Intra op HD    RIGHT HEART CATHETERIZATION Right 9/23/2020    Procedure: INSERTION, CATHETER, RIGHT HEART;  Surgeon: Mikel Costa Jr., MD;  Location: [facility] CATH LAB;  Service: Cardiology;  Laterality: Right;    THORACENTESIS  2011    THROMBECTOMY  10/15/2020    Procedure: THROMBECTOMY portal vein;  Surgeon: Curtis Zavaleta MD;  [facility];  Service: Transplant;;       Family history of liver disease: n/a    Review of patient's allergies indicates:   Allergen Reactions    Latex, natural rubber          Tobacco Use    Smoking status: Some Days     Current packs/day: 0.00    Smokeless tobacco: Current   Substance and Sexual Activity    Alcohol use: Yes     Alcohol/week: 112.0 standard drinks of alcohol     Types: 112 Shots of liquor per week     Comment: fifth of vodka a day. LAST DRINK 7/25/2020 per pt     Drug use: Yes     Frequency: 3.0 times per week     Types: Marijuana    Sexual activity: Yes       Facility-Administered Medications Prior to Admission   Medication Dose Route Frequency Provider Last Rate Last Admin    acetaminophen tablet  650 mg  650 mg Oral Once PRN Leo Ghotra MD        albuterol inhaler 2 puff  2 puff Inhalation Q20 Min PRN Leo Ghotra MD        diphenhydrAMINE injection 25 mg  25 mg Intravenous Once PRN Leo Ghotra MD        EPINEPHrine (EPIPEN) 0.3 mg/0.3 mL pen injection 0.3 mg  0.3 mg Intramuscular PRN Leo Ghotra MD        methylPREDNISolone sodium succinate injection 40 mg  40 mg Intravenous Once PRN Leo Ghotra MD        ondansetron disintegrating tablet 4 mg  4 mg Oral Once PRN Leo Ghotra MD        sodium chloride 0.9% 500 mL flush bag   Intravenous PRN Leo Ghotra MD        sodium chloride 0.9% flush 10 mL  10 mL Intravenous PRN Leo Ghotra MD         Medications Prior to Admission   Medication Sig Dispense Refill Last Dose    albuterol (PROVENTIL/VENTOLIN HFA) 90 mcg/actuation inhaler Inhale 2 puffs into the lungs every 6 (six) hours as needed for Wheezing or Shortness of Breath. Rescue   Past Week    amLODIPine (NORVASC) 10 MG tablet Take 1 tablet (10 mg total) by mouth once daily. 30 tablet 11 7/28/2023    apixaban (ELIQUIS) 5 mg Tab Take 1 tablet (5 mg total) by mouth 2 (two) times a day. 60 tablet 11 7/28/2023    blood sugar diagnostic Strp Use to test blood glucose  3 (three) times daily with meals. 100 each 1 7/27/2023    blood-glucose meter Misc USE AS INSTRUCTED. (Patient taking differently: USE AS INSTRUCTED.) 1 each 0 7/27/2023    EScitalopram oxalate (LEXAPRO) 10 MG tablet Take 10 mg by mouth.   7/28/2023    fluticasone propionate (FLONASE) 50 mcg/actuation nasal spray 1 spray by Each Nostril route once daily.   Past Week    gabapentin (NEURONTIN) 300 MG capsule Take 2 capsules (600 mg total) by mouth 2 (two) times daily. 120 capsule 11 7/27/2023    lancets 30 gauge Misc 1 each by Misc.(Non-Drug; Combo Route) route 3 (three) times daily with meals. 100 each 1 7/28/2023    losartan (COZAAR) 25 MG tablet Take 25 mg by mouth once daily.   7/28/2023  "   mupirocin (BACTROBAN) 2 % ointment APPLY TOPICALLY TO THE AFFECTED AREA THREE TIMES DAILY FOR 7 DAYS   7/28/2023    mycophenolate (CELLCEPT) 250 mg Cap Take 2 capsules (500 mg total) by mouth 2 (two) times daily. 120 capsule 11 7/28/2023    ondansetron (ZOFRAN) 4 MG tablet Take 1 tablet (4 mg total) by mouth every 6 (six) hours as needed for Nausea. 12 tablet 0 7/28/2023    pen needle, diabetic 29 gauge x 1/2" Ndle use to inject insulin 3 (three) times daily with meals. 100 each 1 7/28/2023    sulfamethoxazole-trimethoprim 800-160mg (BACTRIM DS) 800-160 mg Tab Take 1 tablet by mouth 2 (two) times daily.   7/28/2023    tacrolimus (PROGRAF) 1 MG Cap Take 1 capsule (1 mg total) by mouth every morning AND 1 capsule (1 mg total) every evening. 60 capsule 11 7/28/2023    testosterone cypionate (DEPOTESTOTERONE CYPIONATE) 200 mg/mL injection Inject 0.75 mLs (150 mg total) into the muscle once a week. 5 mL 0 Past Week    tiZANidine (ZANAFLEX) 4 MG tablet    7/27/2023    traZODone (DESYREL) 50 MG tablet Take  mg by mouth every evening.   Past Week    TRELEGY ELLIPTA 200-62.5-25 mcg inhaler INHALE 1 PUFF BY MOUTH EVERY DAY AS DIRECTED   7/28/2023    benzonatate (TESSALON) 200 MG capsule benzonatate Take 1 Capsule (oral) 1 time per day PRN - Cough for 10 days 20221026 capsule 1 time per day oral 10 days active 200 mg   Unknown    loratadine (CLARITIN) 10 mg tablet Take 10 mg by mouth.   Unknown    sildenafiL (VIAGRA) 50 MG tablet Take 50 mg by mouth as needed.   Unknown       Objective:     Vital Signs (Most Recent):  Temp: 98.6 °F (37 °C) (07/29/23 1221)  Pulse: 76 (07/29/23 1221)  Resp: 18 (07/29/23 1221)  BP: 133/81 (07/29/23 1221)  SpO2: (!) 94 % (07/29/23 1221) Vital Signs (24h Range):  Temp:  [97.8 °F (36.6 °C)-98.6 °F (37 °C)] 98.6 °F (37 °C)  Pulse:  [72-99] 76  Resp:  [16-20] 18  SpO2:  [90 %-99 %] 94 %  BP: (108-158)/(61-92) 133/81     Weight: 133.8 kg (295 lb) (07/29/23 2531)  Body mass index " is 40.01 kg/m².       Physical Exam  Vitals reviewed.   Constitutional:       General: He is not in acute distress.     Appearance: He is well-developed.   HENT:      Head: Normocephalic and atraumatic.      Mouth/Throat:      Pharynx: No oropharyngeal exudate.   Eyes:      General: No scleral icterus.        Right eye: No discharge.         Left eye: No discharge.      Conjunctiva/sclera: Conjunctivae normal.      Pupils: Pupils are equal, round, and reactive to light.   Pulmonary:      Effort: Pulmonary effort is normal. No respiratory distress.      Breath sounds: Normal breath sounds. No wheezing.   Abdominal:      General: There is no distension.      Palpations: Abdomen is soft.      Tenderness: There is no abdominal tenderness.   Musculoskeletal:      Right lower leg: Edema (with erythema and pain) present.   Neurological:      Mental Status: He is alert and oriented to person, place, and time.   Psychiatric:         Behavior: Behavior normal.          MELD 3.0: 12 at 2022  4:51 PM  MELD-Na: 11 at 2022  4:51 PM  Calculated from:  Serum Creatinine: 1.2 mg/dL at 2022  4:51 PM  Serum Sodium: 136 mmol/L at 2022  4:51 PM  Total Bilirubin: 1.6 mg/dL at 2022  4:51 PM  Serum Albumin: 4.1 g/dL (Using max of 3.5 g/dL) at 2022  4:51 PM  INR(ratio): 1.1 at 2022  4:51 PM  Age at listin years  Sex: Male at 2022  4:51 PM      Significant Labs:  CBC:   Recent Labs   Lab 23  043   WBC 4.50   RBC 4.13*   HGB 12.7*   HCT 41.0   *     CMP:   Recent Labs   Lab 23      CALCIUM 8.7   ALBUMIN 2.9*   PROT 5.6*   *   K 5.0   CO2 25      BUN 22*   CREATININE 2.0*   ALKPHOS 281*   *   *   BILITOT 2.7*       Significant Imaging:  US: Reviewed    Assessment/Plan:     GI  Elevated LFTs  Unclear etiology. Pattern looks like it could be consistent with acute EtOH hepatitis, but elevations from medication, infection/sepsis or  rejection have to also be considered.  -Check Lfts daily with tacro level   -Acute hep panel ordered  -Agree with holding cellcept  -Patient tacro usually runs high so lower concern for rejection; ok to continue on home tacro dosing of 1mg bid        Thank you for your consult. I will follow-up with patient. Please contact us if you have any additional questions.    Theresa Askew MD  Hepatology  [facility] ([facility])

## 2023-07-29 NOTE — SUBJECTIVE & OBJECTIVE
Interval History: patient states erythema has improved. Still some swelling.  Rising kidney function and liver enzymes.     Review of Systems   Constitutional:  Negative for chills, fatigue and fever.   Respiratory:  Positive for cough.    Gastrointestinal:  Positive for abdominal distention. Negative for nausea.   Musculoskeletal:  Positive for arthralgias, gait problem and myalgias.   Skin:  Positive for color change and wound.   All other systems reviewed and are negative.    Objective:     Vital Signs (Most Recent):  Temp: 98.6 °F (37 °C) (07/29/23 1221)  Pulse: 76 (07/29/23 1221)  Resp: 18 (07/29/23 1221)  BP: 133/81 (07/29/23 1221)  SpO2: (!) 94 % (07/29/23 1221) Vital Signs (24h Range):  Temp:  [97.8 °F (36.6 °C)-98.6 °F (37 °C)] 98.6 °F (37 °C)  Pulse:  [72-99] 76  Resp:  [16-20] 18  SpO2:  [90 %-99 %] 94 %  BP: (108-158)/(61-92) 133/81     Weight: 133.8 kg (295 lb)  Body mass index is 40.01 kg/m².    Intake/Output Summary (Last 24 hours) at 7/29/2023 1427  Last data filed at 7/28/2023 1855  Gross per 24 hour   Intake 2528 ml   Output --   Net 2528 ml         Physical Exam  Vitals and nursing note reviewed.   Constitutional:       General: He is not in acute distress.     Appearance: He is well-developed. He is ill-appearing. He is not diaphoretic.   HENT:      Head: Normocephalic and atraumatic.      Right Ear: Hearing and external ear normal.      Left Ear: Hearing and external ear normal.      Nose: Nose normal. No mucosal edema or rhinorrhea.      Mouth/Throat:      Pharynx: Uvula midline.   Eyes:      General:         Right eye: No discharge.         Left eye: No discharge.      Conjunctiva/sclera: Conjunctivae normal.      Right eye: No chemosis.     Left eye: No chemosis.     Pupils: Pupils are equal, round, and reactive to light.   Neck:      Thyroid: No thyroid mass or thyromegaly.      Trachea: Trachea normal.   Cardiovascular:      Rate and Rhythm: Normal rate and regular rhythm.      Pulses:            Dorsalis pedis pulses are 2+ on the right side and 2+ on the left side.      Heart sounds: Normal heart sounds. No murmur heard.  Pulmonary:      Effort: Pulmonary effort is normal. No respiratory distress.      Breath sounds: Normal breath sounds. No decreased breath sounds or wheezing.   Abdominal:      General: Bowel sounds are normal. There is no distension.      Palpations: Abdomen is soft.      Tenderness: There is no abdominal tenderness.   Musculoskeletal:         General: Normal range of motion.      Cervical back: Normal range of motion and neck supple.   Lymphadenopathy:      Cervical: No cervical adenopathy.      Upper Body:      Right upper body: No supraclavicular adenopathy.      Left upper body: No supraclavicular adenopathy.   Skin:     General: Skin is warm and dry.      Capillary Refill: Capillary refill takes less than 2 seconds.      Findings: No rash.          Neurological:      Mental Status: He is alert and oriented to person, place, and time.   Psychiatric:         Mood and Affect: Mood is not anxious.         Speech: Speech normal.         Behavior: Behavior normal.         Thought Content: Thought content normal.         Judgment: Judgment normal.             Significant Labs: All pertinent labs within the past 24 hours have been reviewed.  CBC:   Recent Labs   Lab 07/28/23  1226 07/29/23  0438   WBC 5.99 4.50   HGB 14.9 12.7*   HCT 47.1 41.0    133*     CMP:   Recent Labs   Lab 07/28/23  1405 07/29/23  0438    134*   K 3.8 5.0    101   CO2 17* 25   GLU 94 103   BUN 18 22*   CREATININE 1.6* 2.0*   CALCIUM 8.8 8.7   PROT 6.4 5.6*   ALBUMIN 3.3* 2.9*   BILITOT 0.8 2.7*   ALKPHOS 89 281*   AST 42* 219*   ALT 31 102*   ANIONGAP 14 8       Significant Imaging: I have reviewed all pertinent imaging results/findings within the past 24 hours.

## 2023-07-29 NOTE — HOSPITAL COURSE
7/29  BYRONON, LFTs rising today, discussed with Transplant Hepatology, will order US and CK level. Kidney function trending up as well.   Patient reports improvement in RLE cellulitis, mostly involving ankle  Pt reports he was swimming, hit by a log with small scan right posterior leg    7/30/23  Liver enzymes trending down. Tacrolimus level stable. Appearance of cellulitis gradually improving. Bradycardia noted overnight, but asymptomatic. Seen by Cardiology who recommended monitoring.     Plan for discharge home today, will prescribe Augmentin 875/125mg PO BID x 7days, Doxycycline 100mg PO BID x7 days, Oxycodone 10mg PO q6H PRN pain, recommended bedside delivery. LFTs continue to trend down. Per review of Hepatology recommendations, patient to continue on current tacrolimus dose and resume mycophenolate at discharge. Significant improvement in erythema, edema. Patient able to bear weight, c/o weakness in the ankle. XRAYs of ankle- negative for fluid in joint, +soft tissue edema. Patient afebrile, BC: NGTD. Discussed plan with patient, in agreement with plan. Patient to follow-up with PCP, Hepatology as OP. Patient seen and examined on day of discharge and determined stable for discharge.

## 2023-07-30 PROBLEM — I48.11 LONGSTANDING PERSISTENT ATRIAL FIBRILLATION: Status: ACTIVE | Noted: 2023-01-01

## 2023-07-30 NOTE — CONSULTS
[facility] Tele[facility] ([facility])  Cardiology  Consult Note    Patient Name: Jordan Altman  MRN: [id number]  Admission Date: 7/28/2023  Hospital Length of Stay: 2 days  Code Status: Full Code   Attending Provider: Wesley Garcia MD   Consulting Provider: Noel Shepherd MD  Primary Care Physician: SHAHEEN Jaime  Principal Problem:Sepsis    Patient information was obtained from patient and ER records.     Inpatient consult to Cardiology  Consult performed by: Noel Shepherd MD  Consult ordered by: Montse Monroy NP        Subjective:     Chief Complaint:  afib     HPI:   48M w/ h/o liver transplant 10/2020 for EtOH cirrhosis has continued EtOH after transplant. He also has a h/o acute rejection. He is admitted with spesis thought 2/2 cellulitis. He also has increased LFTs. US is unremarkable except for hepatic steatosis. Last labs were earlier this month and slightly elevated c/w EtOH and PETH level was very high.     He developed a wound on right leg below ankle. He reports swelling, redness and pain started giving worse and he had intermittent fever. He went to urgent care. He was given Bactrim at  along with an ointment.     No fevers here. He reports ankle is getting better. Denies any acute issues. Otherwise feeling better.   Patient seen and examined, afib and some bradycardia, no rate lowering medications.  Cardiac echo is showing normal LV function and no valve vegetations.  Will continue to monitor.      Past Medical History:   Diagnosis Date    Decompensated hepatic cirrhosis     HCV acquired through organ donation, treated / cured     svr12 - 6/2021    Hypertension     SBP (spontaneous bacterial peritonitis)        Past Surgical History:   Procedure Laterality Date    ERCP N/A 10/1/2020    Procedure: ERCP (ENDOSCOPIC RETROGRADE CHOLANGIOPANCREATOGRAPHY);  Surgeon: Marcos Ramirez MD;  Location: [address]);  Service: Endoscopy;  Laterality: N/A;    ESOPHAGOGASTRODUODENOSCOPY N/A  10/13/2020    Procedure: EGD (ESOPHAGOGASTRODUODENOSCOPY);  Surgeon: Prasanth Jerry MD;  Location: [facility] ENDO (2ND FLR);  Service: Endoscopy;  Laterality: N/A;    EXPLORATORY LAPAROTOMY AFTER LIVER TRANSPLANTATION N/A 10/15/2020    Procedure: LAPAROTOMY, EXPLORATORY, AFTER LIVER TRANSPLANT, Possible redo of artery, possible portal thrombectomy. IntraOperative US.;  Surgeon: Curtis Zavaleta MD;  Location: [facility] OR [facility] FLR;  Service: Transplant;  Laterality: N/A;  With intraoperative ultrasound    LIVER TRANSPLANT N/A 10/14/2020    Procedure: TRANSPLANT, LIVER;  Surgeon: Curtis Zavaleta MD;  Location: [facility] OR [facility] FLR;  Service: Transplant;  Laterality: N/A;  Intra op HD    RIGHT HEART CATHETERIZATION Right 9/23/2020    Procedure: INSERTION, CATHETER, RIGHT HEART;  Surgeon: Mikel Costa Jr., MD;  Location: [facility] CATH LAB;  Service: Cardiology;  Laterality: Right;    THORACENTESIS  2011    THROMBECTOMY  10/15/2020    Procedure: THROMBECTOMY portal vein;  Surgeon: Curtis Zavaleta MD;  Location: [facility] OR [facility] FLR;  Service: Transplant;;       Review of patient's allergies indicates:   Allergen Reactions    Latex, natural rubber        No current facility-administered medications on file prior to encounter.     Current Outpatient Medications on File Prior to Encounter   Medication Sig    albuterol (PROVENTIL/VENTOLIN HFA) 90 mcg/actuation inhaler Inhale 2 puffs into the lungs every 6 (six) hours as needed for Wheezing or Shortness of Breath. Rescue    amLODIPine (NORVASC) 10 MG tablet Take 1 tablet (10 mg total) by mouth once daily.    apixaban (ELIQUIS) 5 mg Tab Take 1 tablet (5 mg total) by mouth 2 (two) times a day.    blood sugar diagnostic Strp Use to test blood glucose  3 (three) times daily with meals.    blood-glucose meter Misc USE AS INSTRUCTED. (Patient taking differently: USE AS INSTRUCTED.)    EScitalopram oxalate (LEXAPRO) 10 MG tablet Take 10 mg by mouth.    fluticasone propionate (FLONASE)  "50 mcg/actuation nasal spray 1 spray by Each Nostril route once daily.    gabapentin (NEURONTIN) 300 MG capsule Take 2 capsules (600 mg total) by mouth 2 (two) times daily.    lancets 30 gauge Misc 1 each by Misc.(Non-Drug; Combo Route) route 3 (three) times daily with meals.    losartan (COZAAR) 25 MG tablet Take 25 mg by mouth once daily.    mupirocin (BACTROBAN) 2 % ointment APPLY TOPICALLY TO THE AFFECTED AREA THREE TIMES DAILY FOR 7 DAYS    mycophenolate (CELLCEPT) 250 mg Cap Take 2 capsules (500 mg total) by mouth 2 (two) times daily.    ondansetron (ZOFRAN) 4 MG tablet Take 1 tablet (4 mg total) by mouth every 6 (six) hours as needed for Nausea.    pen needle, diabetic 29 gauge x 1/2" Ndle use to inject insulin 3 (three) times daily with meals.    sulfamethoxazole-trimethoprim 800-160mg (BACTRIM DS) 800-160 mg Tab Take 1 tablet by mouth 2 (two) times daily.    tacrolimus (PROGRAF) 1 MG Cap Take 1 capsule (1 mg total) by mouth every morning AND 1 capsule (1 mg total) every evening.    testosterone cypionate (DEPOTESTOTERONE CYPIONATE) 200 mg/mL injection Inject 0.75 mLs (150 mg total) into the muscle once a week.    tiZANidine (ZANAFLEX) 4 MG tablet     traZODone (DESYREL) 50 MG tablet Take  mg by mouth every evening.    TRELEGY ELLIPTA 200-62.5-25 mcg inhaler INHALE 1 PUFF BY MOUTH EVERY DAY AS DIRECTED    benzonatate (TESSALON) 200 MG capsule benzonatate Take 1 Capsule (oral) 1 time per day PRN - Cough for 10 days [id number] capsule 1 time per day oral 10 days active 200 mg    loratadine (CLARITIN) 10 mg tablet Take 10 mg by mouth.    sildenafiL (VIAGRA) 50 MG tablet Take 50 mg by mouth as needed.     Family History       Problem Relation (Age of Onset)    COPD Mother          Tobacco Use    Smoking status: Some Days     Current packs/day: 0.00    Smokeless tobacco: Current   Substance and Sexual Activity    Alcohol use: Yes     Alcohol/week: 112.0 standard drinks of alcohol     Types: " 112 Shots of liquor per week     Comment: fifth of vodka a day. LAST DRINK 7/25/2020 per pt     Drug use: Yes     Frequency: 3.0 times per week     Types: Marijuana    Sexual activity: Yes     Review of Systems   Constitutional: Positive for malaise/fatigue. Negative for chills, diaphoresis, night sweats, weight gain and weight loss.   HENT:  Negative for congestion, hoarse voice, sore throat and stridor.    Eyes:  Negative for double vision and pain.   Cardiovascular:  Negative for chest pain, claudication, cyanosis, dyspnea on exertion, irregular heartbeat, leg swelling, near-syncope, orthopnea, palpitations, paroxysmal nocturnal dyspnea and syncope.   Respiratory:  Negative for cough, hemoptysis, shortness of breath, sleep disturbances due to breathing, snoring, sputum production and wheezing.    Endocrine: Negative for cold intolerance, heat intolerance and polydipsia.   Hematologic/Lymphatic: Negative for bleeding problem. Does not bruise/bleed easily.   Skin:  Negative for color change, dry skin and rash.   Musculoskeletal:  Negative for joint swelling and muscle cramps.   Gastrointestinal:  Negative for bloating, abdominal pain, constipation, diarrhea, dysphagia, melena, nausea and vomiting.   Genitourinary:  Negative for flank pain and urgency.   Neurological:  Negative for dizziness, focal weakness, headaches, light-headedness, loss of balance, seizures and weakness.   Psychiatric/Behavioral:  Negative for altered mental status and memory loss. The patient is not nervous/anxious.      Objective:     Vital Signs (Most Recent):  Temp: 97.9 °F (36.6 °C) (07/30/23 0730)  Pulse: 65 (07/30/23 0759)  Resp: 18 (07/30/23 0837)  BP: 123/79 (07/30/23 0730)  SpO2: 95 % (07/30/23 0759) Vital Signs (24h Range):  Temp:  [97.7 °F (36.5 °C)-98.7 °F (37.1 °C)] 97.9 °F (36.6 °C)  Pulse:  [60-76] 65  Resp:  [18-20] 18  SpO2:  [90 %-95 %] 95 %  BP: ()/(60-81) 123/79     Weight: 133.8 kg (295 lb)  Body mass index is  40.01 kg/m².    SpO2: 95 %         Intake/Output Summary (Last 24 hours) at 7/30/2023 1112  Last data filed at 7/30/2023 0815  Gross per 24 hour   Intake 2296.55 ml   Output 1150 ml   Net 1146.55 ml       Lines/Drains/Airways       Peripheral Intravenous Line  Duration                  Peripheral IV - Single Lumen 07/29/23 0950 22 G Anterior;Proximal;Right Forearm 1 day                     Physical Exam  Eyes:      Pupils: Pupils are equal, round, and reactive to light.   Neck:      Trachea: No tracheal deviation.   Cardiovascular:      Rate and Rhythm: Normal rate and regular rhythm.      Pulses: Intact distal pulses.           Carotid pulses are 2+ on the right side and 2+ on the left side.       Radial pulses are 2+ on the right side and 2+ on the left side.        Femoral pulses are 2+ on the right side and 2+ on the left side.       Popliteal pulses are 2+ on the right side and 2+ on the left side.        Dorsalis pedis pulses are 2+ on the right side and 2+ on the left side.        Posterior tibial pulses are 2+ on the right side and 2+ on the left side.      Heart sounds: Normal heart sounds. No murmur heard.     No friction rub. No gallop.   Pulmonary:      Effort: Pulmonary effort is normal. No respiratory distress.      Breath sounds: Normal breath sounds. No stridor. No wheezing or rales.   Chest:      Chest wall: No tenderness.   Abdominal:      General: There is no distension.      Tenderness: There is no abdominal tenderness. There is no rebound.   Musculoskeletal:         General: No tenderness.      Cervical back: Normal range of motion.   Skin:     General: Skin is warm and dry.   Neurological:      Mental Status: He is alert and oriented to person, place, and time.          Significant Labs: CMP   Recent Labs   Lab 07/28/23  1405 07/29/23  0438 07/30/23  0429 07/30/23  0430    134* 133* 133*   K 3.8 5.0 4.7 4.9    101 100 101   CO2 17* 25 23 22*   GLU 94 103 103 104   BUN 18 22* 26* 26*    CREATININE 1.6* 2.0* 1.9* 1.8*   CALCIUM 8.8 8.7 8.9 8.8   PROT 6.4 5.6* 6.2  --    ALBUMIN 3.3* 2.9* 3.2*  --    BILITOT 0.8 2.7* 1.3*  --    ALKPHOS 89 281* 299*  --    AST 42* 219* 156*  --    ALT 31 102* 121*  --    ANIONGAP 14 8 10 10    and CBC   Recent Labs   Lab 07/28/23  1226 07/29/23  0438 07/30/23  0429   WBC 5.99 4.50 3.49*   HGB 14.9 12.7* 13.3*   HCT 47.1 41.0 45.1    133* 123*       Significant Imaging: Echocardiogram: Transthoracic echo (TTE) complete ([facility] Only):   Results for orders placed or performed during the hospital encounter of 07/28/23   Echo   Result Value Ref Range    BSA 2.61 m2    LVOT stroke volume 85.92 cm3    LVIDd 5.82 3.5 - 6.0 cm    LV Systolic Volume 55.99 mL    LV Systolic Volume Index 22.3 mL/m2    LVIDs 3.64 2.1 - 4.0 cm    LV Diastolic Volume 168.17 mL    LV Diastolic Volume Index 67.00 mL/m2    IVS 1.26 (A) 0.6 - 1.1 cm    LVOT diameter 2.04 cm    LVOT area 3.3 cm2    FS 37 28 - 44 %    Left Ventricle Relative Wall Thickness 0.41 cm    Posterior Wall 1.19 (A) 0.6 - 1.1 cm    TDI LATERAL 0.16 m/s    TDI SEPTAL 0.16 m/s    LV mass 307.17 g    LV Mass Index 122 g/m2    MV Peak E Iam 1.18 m/s    LV LATERAL E/E' RATIO 7.38 m/s    LV SEPTAL E/E' RATIO 7.38 m/s    E/E' ratio 7.38 m/s    TR Max Iam 2.81 m/s    IVRT 49.48 msec    Mean e' 0.16 m/s    LVOT peak iam 1.42 m/s    Left Ventricular Outflow Tract Mean Velocity 1.28 cm/s    Left Ventricular Outflow Tract Mean Gradient 6.60 mmHg    LA size 4.20 cm    Left Atrium Major Axis 5.77 cm    Left Atrium Minor Axis 5.55 cm    RVOT peak VTI 18.6 cm    RA Major Axis 4.94 cm    AV mean gradient 7 mmHg    AV peak gradient 11 mmHg    Ao peak iam 1.67 m/s    Ao VTI 29.20 cm    LVOT peak VTI 26.30 cm    AV valve area 2.94 cm²    AV Velocity Ratio 0.85     AV index (prosthetic) 0.90     NATHALIE by Velocity Ratio 2.78 cm²    MV stenosis pressure 1/2 time 79.96 ms    MV valve area p 1/2 method 2.75 cm2    TV mean gradient 27 mmHg     Triscuspid Valve Regurgitation Peak Gradient 32 mmHg    PV mean gradient 2 mmHg    RVOT peak mahesh 1.00 m/s    Ao root annulus 4.04 cm    STJ 3.55 cm    Ascending aorta 3.25 cm    ZLVIDS -6.68     ZLVIDD -8.99     IVC diameter 1.79 cm    LA Volume Index 34.6 mL/m2    LA volume 86.85 cm3    LA WIDTH 4.3 cm    Tapse 2.1 cm    RA Width 3.7 cm    RV TB RVSP 11 mmHg    Est. RA pres 8 mmHg    Narrative      Left Ventricle: Normal wall motion. There is normal systolic function   with a visually estimated ejection fraction of 55 - 70%. There is normal   diastolic function.    Left Atrium: Left atrium is moderately dilated.    Right Ventricle: Normal right ventricular cavity size. Wall thickness   is normal. Right ventricle wall motion  is normal. Systolic function is   normal.    Aortic Valve: There is no mass/vegetation present.    Mitral Valve: There is no mass/vegetation present. There is no   stenosis.    Tricuspid Valve: There is no mass/vegetation present. There is mild to   moderate transvalvular regurgitation with a centrally directed jet.    Pulmonic Valve: There is no significant stenosis.    IVC/SVC: Intermediate venous pressure at 8 mmHg.    Pericardium: There is no pericardial effusion.       Assessment and Plan:     Longstanding persistent atrial fibrillation  afib treated with eliquis  Continue to monitor for bradycardia, currently stable    Cellulitis of right leg  Per hospital medicine    Hypertension  Per hospital medicine    JENN (acute kidney injury)  Per hospital medicine        VTE Risk Mitigation (From admission, onward)         Ordered     apixaban tablet 5 mg  2 times daily         07/28/23 1523     IP VTE HIGH RISK PATIENT  Once         07/28/23 1523     Place sequential compression device  Until discontinued         07/28/23 1523     Reason for No Pharmacological VTE Prophylaxis  Once        Question:  Reasons:  Answer:  Already adequately anticoagulated on oral Anticoagulants    07/28/23  6391                Thank you for your consult. I will follow-up with patient. Please contact us if you have any additional questions.    Noel Shepherd MD  Cardiology   [facility] - Telemetry ([facility])

## 2023-07-30 NOTE — SUBJECTIVE & OBJECTIVE
Past Medical History:   Diagnosis Date    Decompensated hepatic cirrhosis     HCV acquired through organ donation, treated / cured     svr12 - 6/2021    Hypertension     SBP (spontaneous bacterial peritonitis)        Past Surgical History:   Procedure Laterality Date    ERCP N/A 10/1/2020    Procedure: ERCP (ENDOSCOPIC RETROGRADE CHOLANGIOPANCREATOGRAPHY);  Surgeon: Marcos Ramirez MD;  Location: [facility] ENDO (2ND FLR);  Service: Endoscopy;  Laterality: N/A;    ESOPHAGOGASTRODUODENOSCOPY N/A 10/13/2020    Procedure: EGD (ESOPHAGOGASTRODUODENOSCOPY);  Surgeon: Prasanth Jerry MD;  Location: [facility] ENDO (2ND FLR);  Service: Endoscopy;  Laterality: N/A;    EXPLORATORY LAPAROTOMY AFTER LIVER TRANSPLANTATION N/A 10/15/2020    Procedure: LAPAROTOMY, EXPLORATORY, AFTER LIVER TRANSPLANT, Possible redo of artery, possible portal thrombectomy. IntraOperative US.;  Surgeon: Curtis Zavaleta MD;  [facility];  Service: Transplant;  Laterality: N/A;  With intraoperative ultrasound    LIVER TRANSPLANT N/A 10/14/2020    Procedure: TRANSPLANT, LIVER;  Surgeon: Curtis Zavaleta MD;  [facility];  Service: Transplant;  Laterality: N/A;  Intra op HD    RIGHT HEART CATHETERIZATION Right 9/23/2020    Procedure: INSERTION, CATHETER, RIGHT HEART;  Surgeon: Mikel Costa Jr., MD;  Location: [facility] CATH LAB;  Service: Cardiology;  Laterality: Right;    THORACENTESIS  2011    THROMBECTOMY  10/15/2020    Procedure: THROMBECTOMY portal vein;  Surgeon: Curtis Zavaleta MD;  [facility];  Service: Transplant;;       Review of patient's allergies indicates:   Allergen Reactions    Latex, natural rubber        No current facility-administered medications on file prior to encounter.     Current Outpatient Medications on File Prior to Encounter   Medication Sig    albuterol (PROVENTIL/VENTOLIN HFA) 90 mcg/actuation inhaler Inhale 2 puffs into the lungs every 6 (six) hours as needed for Wheezing or Shortness  "of Breath. Rescue    amLODIPine (NORVASC) 10 MG tablet Take 1 tablet (10 mg total) by mouth once daily.    apixaban (ELIQUIS) 5 mg Tab Take 1 tablet (5 mg total) by mouth 2 (two) times a day.    blood sugar diagnostic Strp Use to test blood glucose  3 (three) times daily with meals.    blood-glucose meter Misc USE AS INSTRUCTED. (Patient taking differently: USE AS INSTRUCTED.)    EScitalopram oxalate (LEXAPRO) 10 MG tablet Take 10 mg by mouth.    fluticasone propionate (FLONASE) 50 mcg/actuation nasal spray 1 spray by Each Nostril route once daily.    gabapentin (NEURONTIN) 300 MG capsule Take 2 capsules (600 mg total) by mouth 2 (two) times daily.    lancets 30 gauge Misc 1 each by Misc.(Non-Drug; Combo Route) route 3 (three) times daily with meals.    losartan (COZAAR) 25 MG tablet Take 25 mg by mouth once daily.    mupirocin (BACTROBAN) 2 % ointment APPLY TOPICALLY TO THE AFFECTED AREA THREE TIMES DAILY FOR 7 DAYS    mycophenolate (CELLCEPT) 250 mg Cap Take 2 capsules (500 mg total) by mouth 2 (two) times daily.    ondansetron (ZOFRAN) 4 MG tablet Take 1 tablet (4 mg total) by mouth every 6 (six) hours as needed for Nausea.    pen needle, diabetic 29 gauge x 1/2" Ndle use to inject insulin 3 (three) times daily with meals.    sulfamethoxazole-trimethoprim 800-160mg (BACTRIM DS) 800-160 mg Tab Take 1 tablet by mouth 2 (two) times daily.    tacrolimus (PROGRAF) 1 MG Cap Take 1 capsule (1 mg total) by mouth every morning AND 1 capsule (1 mg total) every evening.    testosterone cypionate (DEPOTESTOTERONE CYPIONATE) 200 mg/mL injection Inject 0.75 mLs (150 mg total) into the muscle once a week.    tiZANidine (ZANAFLEX) 4 MG tablet     traZODone (DESYREL) 50 MG tablet Take  mg by mouth every evening.    TRELEGY ELLIPTA 200-62.5-25 mcg inhaler INHALE 1 PUFF BY MOUTH EVERY DAY AS DIRECTED    benzonatate (TESSALON) 200 MG capsule benzonatate Take 1 Capsule (oral) 1 time per day PRN - Cough for 10 days [id number] " capsule 1 time per day oral 10 days active 200 mg    loratadine (CLARITIN) 10 mg tablet Take 10 mg by mouth.    sildenafiL (VIAGRA) 50 MG tablet Take 50 mg by mouth as needed.     Family History       Problem Relation (Age of Onset)    COPD Mother          Tobacco Use    Smoking status: Some Days     Current packs/day: 0.00    Smokeless tobacco: Current   Substance and Sexual Activity    Alcohol use: Yes     Alcohol/week: 112.0 standard drinks of alcohol     Types: 112 Shots of liquor per week     Comment: fifth of vodka a day. LAST DRINK 7/25/2020 per pt     Drug use: Yes     Frequency: 3.0 times per week     Types: Marijuana    Sexual activity: Yes     Review of Systems   Constitutional: Positive for malaise/fatigue. Negative for chills, diaphoresis, night sweats, weight gain and weight loss.   HENT:  Negative for congestion, hoarse voice, sore throat and stridor.    Eyes:  Negative for double vision and pain.   Cardiovascular:  Negative for chest pain, claudication, cyanosis, dyspnea on exertion, irregular heartbeat, leg swelling, near-syncope, orthopnea, palpitations, paroxysmal nocturnal dyspnea and syncope.   Respiratory:  Negative for cough, hemoptysis, shortness of breath, sleep disturbances due to breathing, snoring, sputum production and wheezing.    Endocrine: Negative for cold intolerance, heat intolerance and polydipsia.   Hematologic/Lymphatic: Negative for bleeding problem. Does not bruise/bleed easily.   Skin:  Negative for color change, dry skin and rash.   Musculoskeletal:  Negative for joint swelling and muscle cramps.   Gastrointestinal:  Negative for bloating, abdominal pain, constipation, diarrhea, dysphagia, melena, nausea and vomiting.   Genitourinary:  Negative for flank pain and urgency.   Neurological:  Negative for dizziness, focal weakness, headaches, light-headedness, loss of balance, seizures and weakness.   Psychiatric/Behavioral:  Negative for altered mental status and memory loss.  The patient is not nervous/anxious.      Objective:     Vital Signs (Most Recent):  Temp: 97.9 °F (36.6 °C) (07/30/23 0730)  Pulse: 65 (07/30/23 0759)  Resp: 18 (07/30/23 0837)  BP: 123/79 (07/30/23 0730)  SpO2: 95 % (07/30/23 0759) Vital Signs (24h Range):  Temp:  [97.7 °F (36.5 °C)-98.7 °F (37.1 °C)] 97.9 °F (36.6 °C)  Pulse:  [60-76] 65  Resp:  [18-20] 18  SpO2:  [90 %-95 %] 95 %  BP: ()/(60-81) 123/79     Weight: 133.8 kg (295 lb)  Body mass index is 40.01 kg/m².    SpO2: 95 %         Intake/Output Summary (Last 24 hours) at 7/30/2023 1112  Last data filed at 7/30/2023 0815  Gross per 24 hour   Intake 2296.55 ml   Output 1150 ml   Net 1146.55 ml       Lines/Drains/Airways       Peripheral Intravenous Line  Duration                  Peripheral IV - Single Lumen 07/29/23 0950 22 G Anterior;Proximal;Right Forearm 1 day                     Physical Exam  Eyes:      Pupils: Pupils are equal, round, and reactive to light.   Neck:      Trachea: No tracheal deviation.   Cardiovascular:      Rate and Rhythm: Normal rate and regular rhythm.      Pulses: Intact distal pulses.           Carotid pulses are 2+ on the right side and 2+ on the left side.       Radial pulses are 2+ on the right side and 2+ on the left side.        Femoral pulses are 2+ on the right side and 2+ on the left side.       Popliteal pulses are 2+ on the right side and 2+ on the left side.        Dorsalis pedis pulses are 2+ on the right side and 2+ on the left side.        Posterior tibial pulses are 2+ on the right side and 2+ on the left side.      Heart sounds: Normal heart sounds. No murmur heard.     No friction rub. No gallop.   Pulmonary:      Effort: Pulmonary effort is normal. No respiratory distress.      Breath sounds: Normal breath sounds. No stridor. No wheezing or rales.   Chest:      Chest wall: No tenderness.   Abdominal:      General: There is no distension.      Tenderness: There is no abdominal tenderness. There is no rebound.    Musculoskeletal:         General: No tenderness.      Cervical back: Normal range of motion.   Skin:     General: Skin is warm and dry.   Neurological:      Mental Status: He is alert and oriented to person, place, and time.          Significant Labs: CMP   Recent Labs   Lab 07/28/23  1405 07/29/23  0438 07/30/23  0429 07/30/23  0430    134* 133* 133*   K 3.8 5.0 4.7 4.9    101 100 101   CO2 17* 25 23 22*   GLU 94 103 103 104   BUN 18 22* 26* 26*   CREATININE 1.6* 2.0* 1.9* 1.8*   CALCIUM 8.8 8.7 8.9 8.8   PROT 6.4 5.6* 6.2  --    ALBUMIN 3.3* 2.9* 3.2*  --    BILITOT 0.8 2.7* 1.3*  --    ALKPHOS 89 281* 299*  --    AST 42* 219* 156*  --    ALT 31 102* 121*  --    ANIONGAP 14 8 10 10    and CBC   Recent Labs   Lab 07/28/23  1226 07/29/23  0438 07/30/23  0429   WBC 5.99 4.50 3.49*   HGB 14.9 12.7* 13.3*   HCT 47.1 41.0 45.1    133* 123*       Significant Imaging: Echocardiogram: Transthoracic echo (TTE) complete ([facility] Only):   Results for orders placed or performed during the hospital encounter of 07/28/23   Echo   Result Value Ref Range    BSA 2.61 m2    LVOT stroke volume 85.92 cm3    LVIDd 5.82 3.5 - 6.0 cm    LV Systolic Volume 55.99 mL    LV Systolic Volume Index 22.3 mL/m2    LVIDs 3.64 2.1 - 4.0 cm    LV Diastolic Volume 168.17 mL    LV Diastolic Volume Index 67.00 mL/m2    IVS 1.26 (A) 0.6 - 1.1 cm    LVOT diameter 2.04 cm    LVOT area 3.3 cm2    FS 37 28 - 44 %    Left Ventricle Relative Wall Thickness 0.41 cm    Posterior Wall 1.19 (A) 0.6 - 1.1 cm    TDI LATERAL 0.16 m/s    TDI SEPTAL 0.16 m/s    LV mass 307.17 g    LV Mass Index 122 g/m2    MV Peak E Iam 1.18 m/s    LV LATERAL E/E' RATIO 7.38 m/s    LV SEPTAL E/E' RATIO 7.38 m/s    E/E' ratio 7.38 m/s    TR Max Iam 2.81 m/s    IVRT 49.48 msec    Mean e' 0.16 m/s    LVOT peak iam 1.42 m/s    Left Ventricular Outflow Tract Mean Velocity 1.28 cm/s    Left Ventricular Outflow Tract Mean Gradient 6.60 mmHg    LA size 4.20 cm    Left  Atrium Major Axis 5.77 cm    Left Atrium Minor Axis 5.55 cm    RVOT peak VTI 18.6 cm    RA Major Axis 4.94 cm    AV mean gradient 7 mmHg    AV peak gradient 11 mmHg    Ao peak mahesh 1.67 m/s    Ao VTI 29.20 cm    LVOT peak VTI 26.30 cm    AV valve area 2.94 cm²    AV Velocity Ratio 0.85     AV index (prosthetic) 0.90     NATHALIE by Velocity Ratio 2.78 cm²    MV stenosis pressure 1/2 time 79.96 ms    MV valve area p 1/2 method 2.75 cm2    TV mean gradient 27 mmHg    Triscuspid Valve Regurgitation Peak Gradient 32 mmHg    PV mean gradient 2 mmHg    RVOT peak mahesh 1.00 m/s    Ao root annulus 4.04 cm    STJ 3.55 cm    Ascending aorta 3.25 cm    ZLVIDS -6.68     ZLVIDD -8.99     IVC diameter 1.79 cm    LA Volume Index 34.6 mL/m2    LA volume 86.85 cm3    LA WIDTH 4.3 cm    Tapse 2.1 cm    RA Width 3.7 cm    RV TB RVSP 11 mmHg    Est. RA pres 8 mmHg    Narrative      Left Ventricle: Normal wall motion. There is normal systolic function   with a visually estimated ejection fraction of 55 - 70%. There is normal   diastolic function.    Left Atrium: Left atrium is moderately dilated.    Right Ventricle: Normal right ventricular cavity size. Wall thickness   is normal. Right ventricle wall motion  is normal. Systolic function is   normal.    Aortic Valve: There is no mass/vegetation present.    Mitral Valve: There is no mass/vegetation present. There is no   stenosis.    Tricuspid Valve: There is no mass/vegetation present. There is mild to   moderate transvalvular regurgitation with a centrally directed jet.    Pulmonic Valve: There is no significant stenosis.    IVC/SVC: Intermediate venous pressure at 8 mmHg.    Pericardium: There is no pericardial effusion.

## 2023-07-30 NOTE — ASSESSMENT & PLAN NOTE
AST>ALT  ?medications vs infectious process. Consult Hepatology  Ultrasound shows stable findings    7/30/23  Trending down. Tacrolimus level stable

## 2023-07-30 NOTE — ASSESSMENT & PLAN NOTE
Followed by Dr. Askew. Rising liver function.     --Consult Hepatology.   --continue Tacrolimus. Check level  --Hold Cellcept given acute infection    7/30/23  Hepatology input appreciated  Liver enzymes trending down  Tacrolimus level trending down

## 2023-07-30 NOTE — CONSULTS
[facility] - Telemetry ([facility])  Cardiology  Consult Note    Patient Name: Jordan Altman  MRN: [id number]  Admission Date: 7/28/2023  Hospital Length of Stay: 2 days  Code Status: Full Code   Attending Provider: Wesley Garcia MD   Consulting Provider: Noel Shepherd MD  Primary Care Physician: SHAHEEN Jaime  Principal Problem:Sepsis    Patient information was obtained from patient and ER records.     Inpatient consult to Cardiology  Consult performed by: Noel Shepherd MD  Consult ordered by: Dylan Champagne MD        Subjective:     Chief Complaint:  fatigue     HPI:   48M w/ h/o liver transplant 10/2020 for EtOH cirrhosis has continued EtOH after transplant. He also has a h/o acute rejection. He is admitted with spesis thought 2/2 cellulitis. He also has increased LFTs. US is unremarkable except for hepatic steatosis. Last labs were earlier this month and slightly elevated c/w EtOH and PETH level was very high.     He developed a wound on right leg below ankle. He reports swelling, redness and pain started giving worse and he had intermittent fever. He went to urgent care. He was given Bactrim at  along with an ointment.     No fevers here. He reports ankle is getting better. Denies any acute issues. Otherwise feeling better.   Patient seen and examined, afib and some bradycardia, no rate lowering medications.  Cardiac echo is showing normal LV function and no valve vegetations.  Will continue to monitor.      No new subjective & objective note has been filed under this hospital service since the last note was generated.    Assessment and Plan:     Longstanding persistent atrial fibrillation  afib treated with eliquis  Continue to monitor for bradycardia, currently stable    Cellulitis of right leg  Per hospital medicine    Hypertension  Per hospital medicine    JENN (acute kidney injury)  Per hospital medicine        VTE Risk Mitigation (From admission, onward)         Ordered     apixaban tablet 5 mg  2  times daily         07/28/23 1523     IP VTE HIGH RISK PATIENT  Once         07/28/23 1523     Place sequential compression device  Until discontinued         07/28/23 1523     Reason for No Pharmacological VTE Prophylaxis  Once        Question:  Reasons:  Answer:  Already adequately anticoagulated on oral Anticoagulants    07/28/23 1523                Thank you for your consult. I will follow-up with patient. Please contact us if you have any additional questions.    Noel Shepherd MD  Cardiology   [facility] - Telemetry ([facility])

## 2023-07-30 NOTE — HPI
48M w/ h/o liver transplant 10/2020 for EtOH cirrhosis has continued EtOH after transplant. He also has a h/o acute rejection. He is admitted with spesis thought 2/2 cellulitis. He also has increased LFTs. US is unremarkable except for hepatic steatosis. Last labs were earlier this month and slightly elevated c/w EtOH and PETH level was very high.     He developed a wound on right leg below ankle. He reports swelling, redness and pain started giving worse and he had intermittent fever. He went to urgent care. He was given Bactrim at  along with an ointment.     No fevers here. He reports ankle is getting better. Denies any acute issues. Otherwise feeling better.   Patient seen and examined, afib and some bradycardia, no rate lowering medications.  Cardiac echo is showing normal LV function and no valve vegetations.  Will continue to monitor.

## 2023-07-30 NOTE — PROGRESS NOTES
[place] - Telemetry ([facility])  [facility] Medicine  Progress Note    Patient Name: Jordan Altman  MRN: [id number]  Patient Class: IP- Inpatient   Admission Date: 7/28/2023  Length of Stay: 2 days  Attending Physician: Wesley Garcia MD  Primary Care Provider: SHAHEEN Jaime    Subjective:     Principal Problem:Sepsis        HPI:  48 y.o. male with a PMH of HTN and SBP who presents to the [facility] emergency department for evaluation of  erythema and swelling to right ankle traveling up leg. Pt reports he cut his leg tubing in the river a few weeks ago then went swimming in [place] in [place] x 2wks ago. Pt states he went to  2 days ago and was put on bactrim and an ointment. Associated symptoms include fever, chills, evolving erythema, edema to RLE. In the ED, 142/79, 97.9, 95, 96% RA. Labs: Cr: 1.6, Lactic: 2.5. Patient continued mycophenolate with infection. Treated with IV fluids @30cc/kg, Blood cultures collected, initiated on IV Antibiotics, Fortaz added. Patient is a full code, surrogate decision maker is his spouse, Chelsea Altman. Placed in observation under the care of Hospital Medicine for management of sepsis, cellulitis.       Overview/Hospital Course:    7/29  NAEON, LFTs rising today, discussed with Transplant Hepatology, will order US and CK level. Kidney function trending up as well.   Patient reports improvement in RLE cellulitis, mostly involving ankle  Pt reports he was swimming, hit by a log with small scan right posterior leg    7/30/23  Liver enzymes trending down. Tacrolimus level stable. Appearance of cellulitis gradually improving. Bradycardia noted overnight, but asymptomatic. Seen by Cardiology who recommended monitoring.       Interval History: appearance of cellulitis improving. Liver enzymes trending down. Bradycardia overnight, but resolved. Seen by cardiology who recommended monitoring.    Review of Systems   Constitutional:  Negative for chills, fatigue and fever.    Respiratory:  Positive for cough.    Gastrointestinal:  Positive for abdominal distention. Negative for nausea.   Musculoskeletal:  Positive for arthralgias, gait problem and myalgias.   Skin:  Positive for color change and wound.   All other systems reviewed and are negative.    Objective:     Vital Signs (Most Recent):  Temp: 96.9 °F (36.1 °C) (07/30/23 1625)  Pulse: 95 (07/30/23 1625)  Resp: 18 (07/30/23 1712)  BP: 123/74 (07/30/23 1625)  SpO2: (!) 92 % (07/30/23 1625) Vital Signs (24h Range):  Temp:  [96.1 °F (35.6 °C)-98.7 °F (37.1 °C)] 96.9 °F (36.1 °C)  Pulse:  [61-95] 95  Resp:  [18-20] 18  SpO2:  [92 %-95 %] 92 %  BP: ()/(65-83) 123/74     Weight: 133.8 kg (295 lb)  Body mass index is 40.01 kg/m².    Intake/Output Summary (Last 24 hours) at 7/30/2023 1738  Last data filed at 7/30/2023 1715  Gross per 24 hour   Intake 2296.55 ml   Output 2335 ml   Net -38.45 ml         Physical Exam  Vitals and nursing note reviewed.   Constitutional:       General: He is not in acute distress.     Appearance: He is well-developed. He is ill-appearing. He is not diaphoretic.   HENT:      Head: Normocephalic and atraumatic.      Right Ear: Hearing and external ear normal.      Left Ear: Hearing and external ear normal.      Nose: Nose normal. No mucosal edema or rhinorrhea.      Mouth/Throat:      Pharynx: Uvula midline.   Eyes:      General:         Right eye: No discharge.         Left eye: No discharge.      Conjunctiva/sclera: Conjunctivae normal.      Right eye: No chemosis.     Left eye: No chemosis.     Pupils: Pupils are equal, round, and reactive to light.   Neck:      Thyroid: No thyroid mass or thyromegaly.      Trachea: Trachea normal.   Cardiovascular:      Rate and Rhythm: Normal rate and regular rhythm.      Pulses:           Dorsalis pedis pulses are 2+ on the right side and 2+ on the left side.      Heart sounds: Normal heart sounds. No murmur heard.  Pulmonary:      Effort: Pulmonary effort is  normal. No respiratory distress.      Breath sounds: Normal breath sounds. No decreased breath sounds or wheezing.   Abdominal:      General: Bowel sounds are normal. There is no distension.      Palpations: Abdomen is soft.      Tenderness: There is no abdominal tenderness.   Musculoskeletal:         General: Normal range of motion.      Cervical back: Normal range of motion and neck supple.   Lymphadenopathy:      Cervical: No cervical adenopathy.      Upper Body:      Right upper body: No supraclavicular adenopathy.      Left upper body: No supraclavicular adenopathy.   Skin:     General: Skin is warm and dry.      Capillary Refill: Capillary refill takes less than 2 seconds.      Findings: No rash.          Neurological:      Mental Status: He is alert and oriented to person, place, and time.   Psychiatric:         Mood and Affect: Mood is not anxious.         Speech: Speech normal.         Behavior: Behavior normal.         Thought Content: Thought content normal.         Judgment: Judgment normal.           Significant Labs: All pertinent labs within the past 24 hours have been reviewed.  CBC:   Recent Labs   Lab 07/29/23 0438 07/30/23 0429   WBC 4.50 3.49*   HGB 12.7* 13.3*   HCT 41.0 45.1   * 123*     CMP:   Recent Labs   Lab 07/29/23  0438 07/30/23  0429 07/30/23  0430   * 133* 133*   K 5.0 4.7 4.9    100 101   CO2 25 23 22*    103 104   BUN 22* 26* 26*   CREATININE 2.0* 1.9* 1.8*   CALCIUM 8.7 8.9 8.8   PROT 5.6* 6.2  --    ALBUMIN 2.9* 3.2*  --    BILITOT 2.7* 1.3*  --    ALKPHOS 281* 299*  --    * 156*  --    * 121*  --    ANIONGAP 8 10 10       Significant Imaging: I have reviewed all pertinent imaging results/findings within the past 24 hours.      Assessment/Plan:      * Sepsis  This patient does have evidence of infective focus  My overall impression is sepsis.  Source: Skin and Soft Tissue (location RLE)  Antibiotics given-   Antibiotics (72h ago, onward)     Start     Stop Route Frequency Ordered    07/28/23 1730  doxycycline (VIBRAMYCIN) 100 mg in dextrose 5 % in water (D5W) 100 mL IVPB (MB+)         -- IV Every 12 hours (non-standard times) 07/28/23 1700    07/28/23 1630  ceftAZIDime (FORTAZ) 2 g in dextrose 5 % in water (D5W) 50 mL IVPB (MB+)         -- IV Every 8 hours (non-standard times) 07/28/23 1511        Latest lactate reviewed-  Recent Labs   Lab 07/28/23  1226 07/28/23  1751   LACTATE 2.5* 0.8     Organ dysfunction indicated by Acute kidney injury    Fluid challenge Actual Body weight- Patient will receive 30ml/kg actual body weight to calculate fluid bolus for treatment of septic shock.     Post- resuscitation assessment No - Post resuscitation assessment not needed       Will Not start Pressors- Levophed for MAP of 65  Source control achieved by: Star Kerr    Longstanding persistent atrial fibrillation  Episode of bradycardia overnight.   Anticoagulation with Eliquis    Cellulitis of right leg  Patient reports small wound on right ankle while tubing in [place], reported wound had a scab, then went to [place] and swam at beach. Noticed on 7/24, erythema, pain to right ankle. 7/26 symptoms were worsening and had a fever, presented to , treated with bactrim, mupirocin topical, reported erythema continued to spread. Unknown if fever persisted, had been taking tylenol and ibuprofen for pain.     --Gradually improving. See Sepsis  --Continue fortaz to cover for vibrio    Elevated LFTs  AST>ALT  ?medications vs infectious process. Consult Hepatology  Ultrasound shows stable findings    7/30/23  Trending down. Tacrolimus level stable        Long-term use of immunosuppressant medication  Treated with mycophenolate, tacro, most recent labs stable.     --Continue tacro, hold mycophenolate  --Tacro level pending      S/P liver transplant  Followed by Dr. Askew. Rising liver function.     --Consult Hepatology.   --continue Tacrolimus. Check level  --Hold Cellcept  given acute infection    7/30/23  Hepatology input appreciated  Liver enzymes trending down  Tacrolimus level trending down    Hypertension  Chronic, stable    --Resume amlodipine, hold losartan in setting of JENN  --Hydralazine 10mg IV PRN SBP >170      JENN (acute kidney injury)  Patient with acute kidney injury/acute renal failure likely due to pre-renal azotemia due to dehydration JENN is currently New Onset. Baseline creatinine 1.0 - Labs reviewed- Renal function/electrolytes with Estimated Creatinine Clearance: 69.5 mL/min (A) (based on SCr of 1.8 mg/dL (H)). according to latest data. Monitor urine output and serial BMP and adjust therapy as needed. Avoid nephrotoxins and renally dose meds for GFR listed above.    7/29/23  Continues to rise. Gentle hydration today.   Monitor intake and output  Patient is receiving Vancomycin, start IVF's.    7/30/23  Renal function trending down      VTE Risk Mitigation (From admission, onward)         Ordered     apixaban tablet 5 mg  2 times daily         07/28/23 1523     IP VTE HIGH RISK PATIENT  Once         07/28/23 1523     Place sequential compression device  Until discontinued         07/28/23 1523     Reason for No Pharmacological VTE Prophylaxis  Once        Question:  Reasons:  Answer:  Already adequately anticoagulated on oral Anticoagulants    07/28/23 1523                Discharge Planning   JACK:      Code Status: Full Code   Is the patient medically ready for discharge?:     Reason for patient still in hospital (select all that apply): Treatment           Sanjuanita Andrea NP  Department of Hospital Medicine   '[place] - Telemetry ([facility])

## 2023-07-30 NOTE — NURSING
Rn notified that pt had a 2.5 second pause, and HR drop to 23. Montse Monroy NP notified. VSS. Pt denies any symptoms. EKG ordered. Pt is resting in bed, will continue to monitor.

## 2023-07-30 NOTE — PLAN OF CARE
Problem: Adult Inpatient Plan of Care  Goal: Plan of Care Review  7/29/2023 2120 by Lucio Whitman RN  Outcome: Ongoing, Progressing  7/29/2023 2119 by Lucio Whitman RN  Outcome: Ongoing, Progressing  Goal: Patient-Specific Goal (Individualized)  7/29/2023 2120 by Lucio Whitman RN  Outcome: Ongoing, Progressing  7/29/2023 2119 by Lucio Whitman RN  Outcome: Ongoing, Progressing  Goal: Absence of Hospital-Acquired Illness or Injury  7/29/2023 2120 by Lucio Whitman RN  Outcome: Ongoing, Progressing  7/29/2023 2119 by Lucio Whitman RN  Outcome: Ongoing, Progressing  Goal: Optimal Comfort and Wellbeing  7/29/2023 2120 by Lucio Whitman RN  Outcome: Ongoing, Progressing  7/29/2023 2119 by Lucio Whitman RN  Outcome: Ongoing, Progressing  Goal: Readiness for Transition of Care  7/29/2023 2120 by Lucio Whitman RN  Outcome: Ongoing, Progressing  7/29/2023 2119 by Lucio Whitman RN  Outcome: Ongoing, Progressing     Problem: Adjustment to Illness (Sepsis/Septic Shock)  Goal: Optimal Coping  7/29/2023 2120 by Lucio Whitman RN  Outcome: Ongoing, Progressing  7/29/2023 2119 by Lucio Whitman RN  Outcome: Ongoing, Progressing     Problem: Bleeding (Sepsis/Septic Shock)  Goal: Absence of Bleeding  7/29/2023 2120 by Lucio Whitman RN  Outcome: Ongoing, Progressing  7/29/2023 2119 by Lucio Whitman RN  Outcome: Ongoing, Progressing     Problem: Glycemic Control Impaired (Sepsis/Septic Shock)  Goal: Blood Glucose Level Within Desired Range  7/29/2023 2120 by Lucio Whitman RN  Outcome: Ongoing, Progressing  7/29/2023 2119 by Lucio Whitman RN  Outcome: Ongoing, Progressing     Problem: Infection Progression (Sepsis/Septic Shock)  Goal: Absence of Infection Signs and Symptoms  7/29/2023 2120 by Lucio Whitman RN  Outcome: Ongoing, Progressing  7/29/2023 2119 by Lucio Whitman RN  Outcome: Ongoing, Progressing     Problem: Nutrition Impaired (Sepsis/Septic Shock)  Goal: Optimal Nutrition Intake  7/29/2023 2120 by Lucio Whitman RN  Outcome: Ongoing,  Progressing  7/29/2023 2119 by Lucio Whitman RN  Outcome: Ongoing, Progressing     Problem: Fluid and Electrolyte Imbalance (Acute Kidney Injury/Impairment)  Goal: Fluid and Electrolyte Balance  7/29/2023 2120 by Lucio Whitman RN  Outcome: Ongoing, Progressing  7/29/2023 2119 by Lucio Whitman RN  Outcome: Ongoing, Progressing     Problem: Oral Intake Inadequate (Acute Kidney Injury/Impairment)  Goal: Optimal Nutrition Intake  7/29/2023 2120 by Lucio Whitman RN  Outcome: Ongoing, Progressing  7/29/2023 2119 by Lucio Whitman RN  Outcome: Ongoing, Progressing     Problem: Renal Function Impairment (Acute Kidney Injury/Impairment)  Goal: Effective Renal Function  7/29/2023 2120 by Lucio Whitman RN  Outcome: Ongoing, Progressing  7/29/2023 2119 by Lucio Whitman RN  Outcome: Ongoing, Progressing     Problem: Bariatric Environmental Safety  Goal: Safety Maintained with Care  7/29/2023 2120 by Lucio Whitman RN  Outcome: Ongoing, Progressing  7/29/2023 2119 by Lucio Whitman RN  Outcome: Ongoing, Progressing     Problem: Fall Injury Risk  Goal: Absence of Fall and Fall-Related Injury  7/29/2023 2120 by Lucio Whitman RN  Outcome: Ongoing, Progressing  7/29/2023 2119 by Lucio Whitman RN  Outcome: Ongoing, Progressing

## 2023-07-30 NOTE — SIGNIFICANT EVENT
Notified by nurse that pt had a 2.7 second pause. HR dropped to 40s just prior to pause and slowly improved after. Pt was asymptomatic. VSS. Will check STAT electrolytes and consult cardiology.

## 2023-07-30 NOTE — PLAN OF CARE
A254/A254 COLLEENRiver Altman is a 48 y.o.male admitted on 7/28/2023 for Sepsis   Code Status: Full Code MRN: [id number]   Review of patient's allergies indicates:   Allergen Reactions    Latex, natural rubber      Past Medical History:   Diagnosis Date    Decompensated hepatic cirrhosis     HCV acquired through organ donation, treated / cured     svr12 - 6/2021    Hypertension     SBP (spontaneous bacterial peritonitis)       PRN meds    acetaminophen, 650 mg, Q4H PRN  albuterol sulfate, 2.5 mg, Q4H PRN  aluminum-magnesium hydroxide-simethicone, 30 mL, QID PRN  glucagon (human recombinant), 1 mg, PRN  glucose, 16 g, PRN  glucose, 24 g, PRN  hydrALAZINE, 10 mg, Q6H PRN  insulin aspart U-100, 0-5 Units, QID (AC + HS) PRN  melatonin, 6 mg, Nightly PRN  morphine, 2 mg, Q4H PRN  naloxone, 0.02 mg, PRN  ondansetron, 4 mg, Q8H PRN  oxyCODONE, 10 mg, Q6H PRN  prochlorperazine, 5 mg, Q6H PRN  sodium chloride 0.9%, 10 mL, Q12H PRN  trazodone, 50 mg, Nightly PRN      Chart check completed. Will continue plan of care.      Orientation: oriented x 4  Hendrix Coma Scale Score: 15     Lead Monitored: Lead II Rhythm: atrial rhythm    Cardiac/Telemetry Box Number: [id number]  VTE Required Core Measure: Pharmacological prophylaxis initiated/maintained Last Bowel Movement: 07/28/23  Diet Cardiac     Humberto Score: 20  Fall Risk Score: 7  Accucheck []   Freq?      Lines/Drains/Airways       Peripheral Intravenous Line  Duration                  Peripheral IV - Single Lumen 07/29/23 0950 22 G Anterior;Proximal;Right Forearm 1 day                        Problem: Adult Inpatient Plan of Care  Goal: Plan of Care Review  7/30/2023 1741 by Nisha Faria RN  Outcome: Ongoing, Progressing  7/30/2023 1741 by Nisha Faria RN  Outcome: Ongoing, Progressing  Goal: Patient-Specific Goal (Individualized)  7/30/2023 1741 by Nisha Faria RN  Outcome: Ongoing, Progressing  7/30/2023 1741 by Nisha Faria RN  Outcome: Ongoing,  Progressing  Goal: Absence of Hospital-Acquired Illness or Injury  7/30/2023 1741 by Nisha Faria RN  Outcome: Ongoing, Progressing  7/30/2023 1741 by Nisha Faria RN  Outcome: Ongoing, Progressing  Goal: Optimal Comfort and Wellbeing  7/30/2023 1741 by Nisha Faria RN  Outcome: Ongoing, Progressing  7/30/2023 1741 by Nisha Faria RN  Outcome: Ongoing, Progressing  Goal: Readiness for Transition of Care  7/30/2023 1741 by Nisha Faria RN  Outcome: Ongoing, Progressing  7/30/2023 1741 by Nisha Faria RN  Outcome: Ongoing, Progressing     Problem: Adjustment to Illness (Sepsis/Septic Shock)  Goal: Optimal Coping  7/30/2023 1741 by Nisha Faria RN  Outcome: Ongoing, Progressing  7/30/2023 1741 by Nisha Faria RN  Outcome: Ongoing, Progressing     Problem: Bleeding (Sepsis/Septic Shock)  Goal: Absence of Bleeding  7/30/2023 1741 by Nisha Faria RN  Outcome: Ongoing, Progressing  7/30/2023 1741 by Nisha Faria RN  Outcome: Ongoing, Progressing     Problem: Glycemic Control Impaired (Sepsis/Septic Shock)  Goal: Blood Glucose Level Within Desired Range  7/30/2023 1741 by Nisha Faria RN  Outcome: Ongoing, Progressing  7/30/2023 1741 by Nisha Faria RN  Outcome: Ongoing, Progressing     Problem: Infection Progression (Sepsis/Septic Shock)  Goal: Absence of Infection Signs and Symptoms  7/30/2023 1741 by Nisha Faria RN  Outcome: Ongoing, Progressing  7/30/2023 1741 by Nisha Faria RN  Outcome: Ongoing, Progressing     Problem: Nutrition Impaired (Sepsis/Septic Shock)  Goal: Optimal Nutrition Intake  7/30/2023 1741 by Nisha Faria RN  Outcome: Ongoing, Progressing  7/30/2023 1741 by Nisha Faria RN  Outcome: Ongoing, Progressing     Problem: Fluid and Electrolyte Imbalance (Acute Kidney Injury/Impairment)  Goal: Fluid and Electrolyte Balance  7/30/2023 1741 by Nisha Faria RN  Outcome: Ongoing, Progressing  7/30/2023 1741 by Nisha  MILADIS Faria  Outcome: Ongoing, Progressing     Problem: Oral Intake Inadequate (Acute Kidney Injury/Impairment)  Goal: Optimal Nutrition Intake  7/30/2023 1741 by Nisha Faria RN  Outcome: Ongoing, Progressing  7/30/2023 1741 by Nisha Faria RN  Outcome: Ongoing, Progressing     Problem: Renal Function Impairment (Acute Kidney Injury/Impairment)  Goal: Effective Renal Function  7/30/2023 1741 by Nisha Faria RN  Outcome: Ongoing, Progressing  7/30/2023 1741 by Nisha Faria RN  Outcome: Ongoing, Progressing     Problem: Bariatric Environmental Safety  Goal: Safety Maintained with Care  7/30/2023 1741 by Nsiha Faria RN  Outcome: Ongoing, Progressing  7/30/2023 1741 by Nisha Faria RN  Outcome: Ongoing, Progressing     Problem: Fall Injury Risk  Goal: Absence of Fall and Fall-Related Injury  7/30/2023 1741 by Nisha Faria RN  Outcome: Ongoing, Progressing  7/30/2023 1741 by Nisha Faria RN  Outcome: Ongoing, Progressing

## 2023-07-30 NOTE — ASSESSMENT & PLAN NOTE
Patient reports small wound on [place], reported wound had a scab, then went to [place] and swam at beach. Noticed on 7/24, erythema, pain to right ankle. 7/26 symptoms were worsening and had a fever, presented to , treated with bactrim, mupirocin topical, reported erythema continued to spread. Unknown if fever persisted, had been taking tylenol and ibuprofen for pain.     --Gradually improving. See Sepsis  --Continue fortaz to cover for vibrio

## 2023-07-30 NOTE — SUBJECTIVE & OBJECTIVE
Interval History: appearance of cellulitis improving. Liver enzymes trending down. Bradycardia overnight, but resolved. Seen by cardiology who recommended monitoring.    Review of Systems   Constitutional:  Negative for chills, fatigue and fever.   Respiratory:  Positive for cough.    Gastrointestinal:  Positive for abdominal distention. Negative for nausea.   Musculoskeletal:  Positive for arthralgias, gait problem and myalgias.   Skin:  Positive for color change and wound.   All other systems reviewed and are negative.    Objective:     Vital Signs (Most Recent):  Temp: 96.9 °F (36.1 °C) (07/30/23 1625)  Pulse: 95 (07/30/23 1625)  Resp: 18 (07/30/23 1712)  BP: 123/74 (07/30/23 1625)  SpO2: (!) 92 % (07/30/23 1625) Vital Signs (24h Range):  Temp:  [96.1 °F (35.6 °C)-98.7 °F (37.1 °C)] 96.9 °F (36.1 °C)  Pulse:  [61-95] 95  Resp:  [18-20] 18  SpO2:  [92 %-95 %] 92 %  BP: ()/(65-83) 123/74     Weight: 133.8 kg (295 lb)  Body mass index is 40.01 kg/m².    Intake/Output Summary (Last 24 hours) at 7/30/2023 1738  Last data filed at 7/30/2023 1715  Gross per 24 hour   Intake 2296.55 ml   Output 2335 ml   Net -38.45 ml         Physical Exam  Vitals and nursing note reviewed.   Constitutional:       General: He is not in acute distress.     Appearance: He is well-developed. He is ill-appearing. He is not diaphoretic.   HENT:      Head: Normocephalic and atraumatic.      Right Ear: Hearing and external ear normal.      Left Ear: Hearing and external ear normal.      Nose: Nose normal. No mucosal edema or rhinorrhea.      Mouth/Throat:      Pharynx: Uvula midline.   Eyes:      General:         Right eye: No discharge.         Left eye: No discharge.      Conjunctiva/sclera: Conjunctivae normal.      Right eye: No chemosis.     Left eye: No chemosis.     Pupils: Pupils are equal, round, and reactive to light.   Neck:      Thyroid: No thyroid mass or thyromegaly.      Trachea: Trachea normal.   Cardiovascular:      Rate  and Rhythm: Normal rate and regular rhythm.      Pulses:           Dorsalis pedis pulses are 2+ on the right side and 2+ on the left side.      Heart sounds: Normal heart sounds. No murmur heard.  Pulmonary:      Effort: Pulmonary effort is normal. No respiratory distress.      Breath sounds: Normal breath sounds. No decreased breath sounds or wheezing.   Abdominal:      General: Bowel sounds are normal. There is no distension.      Palpations: Abdomen is soft.      Tenderness: There is no abdominal tenderness.   Musculoskeletal:         General: Normal range of motion.      Cervical back: Normal range of motion and neck supple.   Lymphadenopathy:      Cervical: No cervical adenopathy.      Upper Body:      Right upper body: No supraclavicular adenopathy.      Left upper body: No supraclavicular adenopathy.   Skin:     General: Skin is warm and dry.      Capillary Refill: Capillary refill takes less than 2 seconds.      Findings: No rash.          Neurological:      Mental Status: He is alert and oriented to person, place, and time.   Psychiatric:         Mood and Affect: Mood is not anxious.         Speech: Speech normal.         Behavior: Behavior normal.         Thought Content: Thought content normal.         Judgment: Judgment normal.           Significant Labs: All pertinent labs within the past 24 hours have been reviewed.  CBC:   Recent Labs   Lab 07/29/23  0438 07/30/23  0429   WBC 4.50 3.49*   HGB 12.7* 13.3*   HCT 41.0 45.1   * 123*     CMP:   Recent Labs   Lab 07/29/23  0438 07/30/23  0429 07/30/23  0430   * 133* 133*   K 5.0 4.7 4.9    100 101   CO2 25 23 22*    103 104   BUN 22* 26* 26*   CREATININE 2.0* 1.9* 1.8*   CALCIUM 8.7 8.9 8.8   PROT 5.6* 6.2  --    ALBUMIN 2.9* 3.2*  --    BILITOT 2.7* 1.3*  --    ALKPHOS 281* 299*  --    * 156*  --    * 121*  --    ANIONGAP 8 10 10       Significant Imaging: I have reviewed all pertinent imaging results/findings within  the past 24 hours.

## 2023-07-30 NOTE — ASSESSMENT & PLAN NOTE
Patient with acute kidney injury/acute renal failure likely due to pre-renal azotemia due to dehydration JENN is currently New Onset. Baseline creatinine 1.0 - Labs reviewed- Renal function/electrolytes with Estimated Creatinine Clearance: 69.5 mL/min (A) (based on SCr of 1.8 mg/dL (H)). according to latest data. Monitor urine output and serial BMP and adjust therapy as needed. Avoid nephrotoxins and renally dose meds for GFR listed above.    7/29/23  Continues to rise. Gentle hydration today.   Monitor intake and output  Patient is receiving Vancomycin, start IVF's.    7/30/23  Renal function trending down

## 2023-07-30 NOTE — PROGRESS NOTES
Hepatology Note    Chart Reviewed.    LFTs are overall trending down and tacro level is adequate if not higher than likely needed but will not adjust dose given abnormal LFTs  -Continue to check LFTs daily

## 2023-07-30 NOTE — PLAN OF CARE
Problem: Adult Inpatient Plan of Care  Goal: Plan of Care Review  Outcome: Ongoing, Progressing  Goal: Patient-Specific Goal (Individualized)  Outcome: Ongoing, Progressing  Goal: Absence of Hospital-Acquired Illness or Injury  Outcome: Ongoing, Progressing  Goal: Optimal Comfort and Wellbeing  Outcome: Ongoing, Progressing  Goal: Readiness for Transition of Care  Outcome: Ongoing, Progressing     Problem: Adjustment to Illness (Sepsis/Septic Shock)  Goal: Optimal Coping  Outcome: Ongoing, Progressing     Problem: Bleeding (Sepsis/Septic Shock)  Goal: Absence of Bleeding  Outcome: Ongoing, Progressing     Problem: Glycemic Control Impaired (Sepsis/Septic Shock)  Goal: Blood Glucose Level Within Desired Range  Outcome: Ongoing, Progressing     Problem: Infection Progression (Sepsis/Septic Shock)  Goal: Absence of Infection Signs and Symptoms  Outcome: Ongoing, Progressing     Problem: Nutrition Impaired (Sepsis/Septic Shock)  Goal: Optimal Nutrition Intake  Outcome: Ongoing, Progressing     Problem: Fluid and Electrolyte Imbalance (Acute Kidney Injury/Impairment)  Goal: Fluid and Electrolyte Balance  Outcome: Ongoing, Progressing     Problem: Oral Intake Inadequate (Acute Kidney Injury/Impairment)  Goal: Optimal Nutrition Intake  Outcome: Ongoing, Progressing     Problem: Renal Function Impairment (Acute Kidney Injury/Impairment)  Goal: Effective Renal Function  Outcome: Ongoing, Progressing     Problem: Bariatric Environmental Safety  Goal: Safety Maintained with Care  Outcome: Ongoing, Progressing     Problem: Fall Injury Risk  Goal: Absence of Fall and Fall-Related Injury  Outcome: Ongoing, Progressing

## 2023-07-31 PROBLEM — A41.9 SEPSIS: Status: RESOLVED | Noted: 2023-01-01 | Resolved: 2023-01-01

## 2023-07-31 NOTE — ASSESSMENT & PLAN NOTE
afib treated with eliquis  Continue to monitor for bradycardia, currently stable    7/31/23  -HR stable  -Not on any AV liberty agents given bradycardia  -Already AC on Eliquis as OP--defer to primary cards

## 2023-07-31 NOTE — DISCHARGE SUMMARY
[facility] - Telemetry ([facility])  [facility] Medicine  Discharge Summary      Patient Name: Jordan Altman  MRN: [id number]  BABAK: [id number]  Patient Class: IP- Inpatient  Admission Date: 7/28/2023  Hospital Length of Stay: 3 days  Discharge Date and Time:  07/31/2023 3:51 PM  Attending Physician: Wesley Garcia MD   Discharging Provider: Avril Campbell NP  Primary Care Provider: SHAHEEN Jaime    Primary Care Team: Networked reference to record PCT     HPI:   48 y.o. male with a PMH of HTN and SBP who presents to the [facility] emergency department for evaluation of  erythema and swelling to right ankle traveling up leg. Pt reports he cut his leg tubing in the river a few weeks ago then went swimming in [facility] in [place] x 2wks ago. Pt states he went to  2 days ago and was put on bactrim and an ointment. Associated symptoms include fever, chills, evolving erythema, edema to RLE. In the ED, 142/79, 97.9, 95, 96% RA. Labs: Cr: 1.6, Lactic: 2.5. Patient continued mycophenolate with infection. Treated with IV fluids @30cc/kg, Blood cultures collected, initiated on IV Antibiotics, Fortaz added. Patient is a full code, surrogate decision maker is his spouse, Chelsea Altman. Placed in observation under the care of Hospital Medicine for management of sepsis, cellulitis.       * No surgery found *      Hospital Course:     7/29  NAEON, LFTs rising today, discussed with Transplant Hepatology, will order US and CK level. Kidney function trending up as well.   Patient reports improvement in RLE cellulitis, mostly involving ankle  Pt reports he was swimming, hit by a log with small scan right posterior leg    7/30/23  Liver enzymes trending down. Tacrolimus level stable. Appearance of cellulitis gradually improving. Bradycardia noted overnight, but asymptomatic. Seen by Cardiology who recommended monitoring.     Plan for discharge home today, will prescribe Augmentin 875/125mg PO BID x 7days, Doxycycline 100mg PO  BID x7 days, Oxycodone 10mg PO q6H PRN pain, recommended bedside delivery. LFTs continue to trend down. Per review of Hepatology recommendations, patient to continue on current tacrolimus dose and resume mycophenolate at discharge. Significant improvement in erythema, edema. Patient able to bear weight, c/o weakness in the ankle. XRAYs of ankle- negative for fluid in joint, +soft tissue edema. Patient afebrile, BC: NGTD. Discussed plan with patient, in agreement with plan. Patient to follow-up with PCP, Hepatology as OP. Patient seen and examined on day of discharge and determined stable for discharge.        Goals of Care Treatment Preferences:  Code Status: Full Code      Consults:   Consults (From admission, onward)        Status Ordering Provider     Inpatient consult to Cardiology  Once        Provider:  Noel Shepherd MD    Completed WERO LR     Inpatient consult to Cardiology  Once        Provider:  Noel Shepherd MD    Completed ALONZO PRIETO     Inpatient Consult to Telemedicine - Hepatology  Once        Provider:  Theresa Askew MD    Completed ALONZO PRIETO          Cardiac/Vascular  Longstanding persistent atrial fibrillation  Episode of bradycardia overnight.   Anticoagulation with Eliquis    Hypertension  Chronic, stable    --Resume home medications      Renal/  JENN (acute kidney injury)  Patient with acute kidney injury/acute renal failure likely due to pre-renal azotemia due to dehydration JENN is currently New Onset. Baseline creatinine 1.0 - Labs reviewed- Renal function/electrolytes with Estimated Creatinine Clearance: 83.4 mL/min (A) (based on SCr of 1.5 mg/dL (H)). according to latest data. Monitor urine output and serial BMP and adjust therapy as needed. Avoid nephrotoxins and renally dose meds for GFR listed above.    Patient at baseline    ID  Cellulitis of right leg  Patient reports small wound on right ankle while tubing in river, reported wound had a scab, then went to  [place] and swam at beach. Noticed on 7/24, erythema, pain to right ankle. 7/26 symptoms were worsening and had a fever, presented to , treated with bactrim, mupirocin topical, reported erythema continued to spread. Unknown if fever persisted, had been taking tylenol and ibuprofen for pain.     --Continued improvement, d/c with PO Augmentin/Doxy per MAR    GI  Elevated LFTs  AST>ALT  ?medications vs infectious process. Consult Hepatology  Ultrasound shows stable findings    7/30/23  Trending down. Tacrolimus level stable    Continued to trend down, hepatology to follow as OP    S/P liver transplant  Followed by Dr. Askew. Rising liver function.     --Consult Hepatology.   --continue Tacrolimus. Check level  --Hold Cellcept given acute infection    7/30/23  Hepatology input appreciated  Liver enzymes trending down  Tacrolimus level trending down    7/31  Per Hepatology, continue current tacro dose, resume mycophenolate on d/c  F/U in clinic, Hepatology will repeat labs    Other  Long-term use of immunosuppressant medication  Treated with mycophenolate, tacro, most recent labs stable.     --Resume home medications on d/c      Final Active Diagnoses:    Diagnosis Date Noted POA    Longstanding persistent atrial fibrillation [I48.11] 07/30/2023 Yes    Cellulitis of right leg [L03.115] 07/28/2023 Yes    Elevated LFTs [R79.89] 01/27/2021 Yes    Long-term use of immunosuppressant medication [Z79.60] 10/20/2020 Not Applicable    S/P liver transplant [Z94.4] 10/14/2020 Not Applicable    Hypertension [I10]  Yes    JENN (acute kidney injury) [N17.9] 09/19/2020 Yes      Problems Resolved During this Admission:    Diagnosis Date Noted Date Resolved POA    PRINCIPAL PROBLEM:  Sepsis [A41.9] 07/28/2023 07/31/2023 Yes       Discharged Condition: stable    Disposition: Home or Self Care    Follow Up:   Follow-up Information     SHAHEEN Jaime Follow up in 3 day(s).    Specialty: Family Medicine  Contact  information:  [id number]   [address]  [facility]  [place] [address]  [phone]             Theresa Askew MD Follow up in 1 week(s).    Specialties: Gastroenterology, Hepatology  Contact information:  [address]  [place] [address] [phone]                       Patient Instructions:      Diet Adult Regular     Notify your health care provider if you experience any of the following:  temperature >100.4     Notify your health care provider if you experience any of the following:  persistent nausea and vomiting or diarrhea     Notify your health care provider if you experience any of the following:  severe uncontrolled pain     Notify your health care provider if you experience any of the following:  difficulty breathing or increased cough     No dressing needed     Activity as tolerated       Significant Diagnostic Studies: Labs:   CMP   Recent Labs   Lab 07/30/23 0429 07/30/23 0430 07/31/23 0437   * 133* 136   K 4.7 4.9 5.0    101 100   CO2 23 22* 27    104 116*   BUN 26* 26* 19   CREATININE 1.9* 1.8* 1.5*   CALCIUM 8.9 8.8 9.7   PROT 6.2  --  6.7   ALBUMIN 3.2*  --  3.3*   BILITOT 1.3*  --  0.8   ALKPHOS 299*  --  258*   *  --  65*   *  --  89*   ANIONGAP 10 10 9    and CBC   Recent Labs   Lab 07/30/23 0429 07/31/23 0437   WBC 3.49* 3.92   HGB 13.3* 13.9*   HCT 45.1 44.7   * 188     Microbiology:   Blood Culture   Lab Results   Component Value Date    LABBLOO No Growth to date 07/28/2023    LABBLOO No Growth to date 07/28/2023    LABBLOO No Growth to date 07/28/2023     Radiology: Imaging reviewed    Pending Diagnostic Studies:     None         Medications:  Reconciled Home Medications:      Medication List      START taking these medications    amoxicillin-clavulanate 875-125mg 875-125 mg per tablet  Commonly known as: AUGMENTIN  Take 1 tablet by mouth every 12 (twelve) hours. for 7 days     doxycycline 100 MG Cap  Commonly  "known as: VIBRAMYCIN  Take 1 capsule (100 mg total) by mouth 2 (two) times daily. for 7 days     oxyCODONE 10 mg Tab immediate release tablet  Commonly known as: ROXICODONE  Take 1 tablet (10 mg total) by mouth every 6 (six) hours as needed for Pain.        CONTINUE taking these medications    albuterol 90 mcg/actuation inhaler  Commonly known as: PROVENTIL/VENTOLIN HFA  Inhale 2 puffs into the lungs every 6 (six) hours as needed for Wheezing or Shortness of Breath. Rescue     amLODIPine 10 MG tablet  Commonly known as: NORVASC  Take 1 tablet (10 mg total) by mouth once daily.     BD ULTRA-FINE ORIG PEN NEEDLE 29 gauge x 1/2" Ndle  Generic drug: pen needle, diabetic  use to inject insulin 3 (three) times daily with meals.     benzonatate 200 MG capsule  Commonly known as: TESSALON  benzonatate Take 1 Capsule (oral) 1 time per day PRN - Cough for 10 days 20221026 capsule 1 time per day oral 10 days active 200 mg     ELIQUIS 5 mg Tab  Generic drug: apixaban  Take 1 tablet (5 mg total) by mouth 2 (two) times a day.     EScitalopram oxalate 10 MG tablet  Commonly known as: LEXAPRO  Take 10 mg by mouth.     fluticasone propionate 50 mcg/actuation nasal spray  Commonly known as: FLONASE  1 spray by Each Nostril route once daily.     gabapentin 300 MG capsule  Commonly known as: NEURONTIN  Take 2 capsules (600 mg total) by mouth 2 (two) times daily.     loratadine 10 mg tablet  Commonly known as: CLARITIN  Take 10 mg by mouth.     losartan 25 MG tablet  Commonly known as: COZAAR  Take 25 mg by mouth once daily.     mupirocin 2 % ointment  Commonly known as: BACTROBAN  APPLY TOPICALLY TO THE AFFECTED AREA THREE TIMES DAILY FOR 7 DAYS     mycophenolate 250 mg Cap  Commonly known as: CELLCEPT  Take 2 capsules (500 mg total) by mouth 2 (two) times daily.     ondansetron 4 MG tablet  Commonly known as: ZOFRAN  Take 1 tablet (4 mg total) by mouth every 6 (six) hours as needed for Nausea.     sildenafiL 50 MG tablet  Commonly " known as: VIAGRA  Take 50 mg by mouth as needed.     sulfamethoxazole-trimethoprim 800-160mg 800-160 mg Tab  Commonly known as: BACTRIM DS  Take 1 tablet by mouth 2 (two) times daily.     tacrolimus 1 MG Cap  Commonly known as: PROGRAF  Take 1 capsule (1 mg total) by mouth every morning AND 1 capsule (1 mg total) every evening.     testosterone cypionate 200 mg/mL injection  Commonly known as: DEPOTESTOTERONE CYPIONATE  Inject 0.75 mLs (150 mg total) into the muscle once a week.     tiZANidine 4 MG tablet  Commonly known as: ZANAFLEX     traZODone 50 MG tablet  Commonly known as: DESYREL  Take  mg by mouth every evening.     TRELEGY ELLIPTA 200-62.5-25 mcg inhaler  Generic drug: fluticasone-umeclidin-vilanter  INHALE 1 PUFF BY MOUTH EVERY DAY AS DIRECTED     TRUE METRIX GLUCOSE METER Misc  Generic drug: blood-glucose meter  USE AS INSTRUCTED.     TRUE METRIX GLUCOSE TEST STRIP Strp  Generic drug: blood sugar diagnostic  Use to test blood glucose  3 (three) times daily with meals.     TRUEPLUS LANCETS 30 gauge Misc  Generic drug: lancets  1 each by Misc.(Non-Drug; Combo Route) route 3 (three) times daily with meals.            Indwelling Lines/Drains at time of discharge:   Lines/Drains/Airways     None                 Time spent on the discharge of patient: 45 minutes         April PAULETTE Campbell NP  Department of Hospital Medicine  [facility] - Telemetry ([facility])

## 2023-07-31 NOTE — PROGRESS NOTES
Hepatology Note    Chart reviewed    Liver tests continue to improve. Continue patient on tacro at d/c. Continue to hold cellcept at d/c and I will have patient's transplant coordinator get labs next week.    No additional hep recs, call with questions.    Theresa Askew MD  Hepatology

## 2023-07-31 NOTE — ASSESSMENT & PLAN NOTE
Patient reports small wound on right ankle while tubing in river, reported wound had a scab, then went to [place] and swam at beach. Noticed on 7/24, erythema, pain to right ankle. 7/26 symptoms were worsening and had a fever, presented to , treated with bactrim, mupirocin topical, reported erythema continued to spread. Unknown if fever persisted, had been taking tylenol and ibuprofen for pain.     --Continued improvement, d/c with PO Augmentin/Doxy per MAR

## 2023-07-31 NOTE — PLAN OF CARE
[facility] - Telemetry (Hospital)  Discharge Final Note    Primary Care Provider: SHAHEEN Jaime    Expected Discharge Date: 7/31/2023    Final Discharge Note (most recent)       Final Note - 07/31/23 1512          Final Note    Assessment Type Final Discharge Note     Anticipated Discharge Disposition Home or Self Care        Post-Acute Status    Other Status No Post-Acute Service Needs     Discharge Delays None known at this time                   Pt to discharge home today. No CM needs/consults for discharge. Pt's transplant team to coordinate follow up with Dr Askew.     Important Message from Medicare             Contact Info       SHAHEEN Jaime   Specialty: Family Medicine   Relationship: PCP - General    [address]   [address]  [facility] AFTER HOURS  [facility] [address]   Phone: [phone]       Next Steps: Follow up in 3 day(s)    Theresa Askew MD   Specialty: Gastroenterology, Hepatology    [address]  [facility] [address]   Phone: [phone]       Next Steps: Follow up in 1 week(s)

## 2023-07-31 NOTE — PLAN OF CARE
[facility] - Telemetry (Hospital)  Initial Discharge Assessment       Primary Care Provider: SHAHEEN Jaime    Admission Diagnosis: Lactic acidosis [E87.20]  Cellulitis of right leg [L03.115]  JENN (acute kidney injury) [N17.9]  Chest pain [R07.9]    Admission Date: 7/28/2023  Expected Discharge Date: 7/31/2023    Transition of Care Barriers: None    Payor: MEDICAID / Plan: [facility] CONNECT / Product Type: Managed Medicaid /     Extended Emergency Contact Information  Primary Emergency Contact: Chelsea Altman  Address: [address]           [place] [address] [address]  Home Phone: [phone]  Mobile Phone: [phone]  Relation: Spouse  Secondary Emergency Contact: IVANIA PATEL  Mobile Phone: [phone]  Relation: Healthcare Power of   Preferred language: English   needed? No    Discharge Plan A: Home, Home with family  Discharge Plan B: Home      [facility] [address] [address]  Phone: [phone] Fax: [phone]    [address] [address] [facility] [address]  [address] [facility] [address]  [facility] [address]  Phone: [phone] Fax: [phone]    [facility] [place] [address] [place] [address]  Phone: [phone] Fax: [phone]    [facility] [place] - [address] [address]  [address]  [place] [address]  Phone: [phone] Fax: [phone]      Initial Assessment (most recent)       Adult Discharge Assessment - 07/31/23 1456          Discharge Assessment    Assessment Type Discharge Planning Assessment     Confirmed/corrected address, phone number and insurance Yes     Confirmed Demographics Correct on Facesheet     Source of Information patient     Communicated JACK with patient/caregiver Yes     Reason For Admission cellulitis     People in Home spouse     Facility Arrived From: Home     Do you expect to return to your current living  situation? Yes     Do you have help at home or someone to help you manage your care at home? No     Who are your caregiver(s) and their phone number(s)? Spouse, Crystal     Prior to hospitilization cognitive status: Alert/Oriented     Current cognitive status: Alert/Oriented     Equipment Currently Used at Home none     Readmission within 30 days? No     Patient currently being followed by outpatient case management? No     Do you currently have service(s) that help you manage your care at home? No     Do you take prescription medications? Yes     Do you have prescription coverage? Yes     Do you have any problems affording any of your prescribed medications? No     Is the patient taking medications as prescribed? yes     Who is going to help you get home at discharge? Pt drove self     How do you get to doctors appointments? car, drives self     Are you on dialysis? No     Do you take coumadin? No     Discharge Plan A Home;Home with family     Discharge Plan B Home     DME Needed Upon Discharge  none     Discharge Plan discussed with: Patient     Transition of Care Barriers None                   SW met with patient at bedside to complete discharge assessment. Patient reports living at home with spouse. Pt reports being independent with Adls. Pt drove self to hospital and will be transportation home.     Pt's whiteboard updated to reflect CM contact information and discharge plan. No needs anticipated at this time. SW to remain available as needed.

## 2023-07-31 NOTE — SUBJECTIVE & OBJECTIVE
Review of Systems   Constitutional: Positive for malaise/fatigue.   HENT: Negative.     Eyes: Negative.    Cardiovascular: Negative.    Respiratory: Negative.     Endocrine: Negative.    Hematologic/Lymphatic: Negative.    Skin:         Redness to right ankle/leg   Musculoskeletal: Negative.    Gastrointestinal: Negative.    Genitourinary: Negative.    Neurological: Negative.    Psychiatric/Behavioral: Negative.     Allergic/Immunologic: Negative.      Objective:     Vital Signs (Most Recent):  Temp: 97.9 °F (36.6 °C) (07/31/23 1257)  Pulse: 77 (07/31/23 1300)  Resp: 20 (07/31/23 1257)  BP: 129/82 (07/31/23 1257)  SpO2: 95 % (07/31/23 1257) Vital Signs (24h Range):  Temp:  [96.9 °F (36.1 °C)-98.1 °F (36.7 °C)] 97.9 °F (36.6 °C)  Pulse:  [61-95] 77  Resp:  [15-20] 20  SpO2:  [92 %-98 %] 95 %  BP: (116-132)/(64-82) 129/82     Weight: 133.8 kg (295 lb)  Body mass index is 40.01 kg/m².     SpO2: 95 %         Intake/Output Summary (Last 24 hours) at 7/31/2023 1437  Last data filed at 7/31/2023 0034  Gross per 24 hour   Intake 465.68 ml   Output 1610 ml   Net -1144.32 ml       Lines/Drains/Airways       None                      Physical Exam  Vitals and nursing note reviewed.   Constitutional:       General: He is not in acute distress.     Appearance: Normal appearance. He is well-developed. He is not diaphoretic.   HENT:      Head: Normocephalic and atraumatic.   Eyes:      General:         Right eye: No discharge.         Left eye: No discharge.      Pupils: Pupils are equal, round, and reactive to light.   Neck:      Thyroid: No thyromegaly.      Vascular: No JVD.      Trachea: No tracheal deviation.   Cardiovascular:      Rate and Rhythm: Normal rate and regular rhythm.      Heart sounds: Normal heart sounds, S1 normal and S2 normal. No murmur heard.  Pulmonary:      Effort: Pulmonary effort is normal. No respiratory distress.      Breath sounds: Normal breath sounds. No wheezing or rales.   Abdominal:       "General: There is no distension.      Tenderness: There is no rebound.   Musculoskeletal:      Cervical back: Neck supple.      Right lower leg: No edema.      Left lower leg: No edema.   Skin:     General: Skin is warm and dry.      Findings: No erythema.      Comments: R ankle/leg cellulitis   Neurological:      General: No focal deficit present.      Mental Status: He is alert and oriented to person, place, and time.   Psychiatric:         Mood and Affect: Mood normal.         Behavior: Behavior normal.         Thought Content: Thought content normal.            Significant Labs: CMP   Recent Labs   Lab 07/30/23 0429 07/30/23  0430 07/31/23 0437   * 133* 136   K 4.7 4.9 5.0    101 100   CO2 23 22* 27    104 116*   BUN 26* 26* 19   CREATININE 1.9* 1.8* 1.5*   CALCIUM 8.9 8.8 9.7   PROT 6.2  --  6.7   ALBUMIN 3.2*  --  3.3*   BILITOT 1.3*  --  0.8   ALKPHOS 299*  --  258*   *  --  65*   *  --  89*   ANIONGAP 10 10 9   , CBC   Recent Labs   Lab 07/30/23 0429 07/31/23 0437   WBC 3.49* 3.92   HGB 13.3* 13.9*   HCT 45.1 44.7   * 188   , Troponin No results for input(s): "TROPONINI" in the last 48 hours., and All pertinent lab results from the last 24 hours have been reviewed.    Significant Imaging: Echocardiogram: Transthoracic echo (TTE) complete (Cupid Only):   Results for orders placed or performed during the hospital encounter of 07/28/23   Echo   Result Value Ref Range    BSA 2.61 m2    LVOT stroke volume 85.92 cm3    LVIDd 5.82 3.5 - 6.0 cm    LV Systolic Volume 55.99 mL    LV Systolic Volume Index 22.3 mL/m2    LVIDs 3.64 2.1 - 4.0 cm    LV Diastolic Volume 168.17 mL    LV Diastolic Volume Index 67.00 mL/m2    IVS 1.26 (A) 0.6 - 1.1 cm    LVOT diameter 2.04 cm    LVOT area 3.3 cm2    FS 37 28 - 44 %    Left Ventricle Relative Wall Thickness 0.41 cm    Posterior Wall 1.19 (A) 0.6 - 1.1 cm    TDI LATERAL 0.16 m/s    TDI SEPTAL 0.16 m/s    LV mass 307.17 g    LV Mass " Index 122 g/m2    MV Peak E Iam 1.18 m/s    LV LATERAL E/E' RATIO 7.38 m/s    LV SEPTAL E/E' RATIO 7.38 m/s    E/E' ratio 7.38 m/s    TR Max Iam 2.81 m/s    IVRT 49.48 msec    Mean e' 0.16 m/s    LVOT peak iam 1.42 m/s    Left Ventricular Outflow Tract Mean Velocity 1.28 cm/s    Left Ventricular Outflow Tract Mean Gradient 6.60 mmHg    LA size 4.20 cm    Left Atrium Major Axis 5.77 cm    Left Atrium Minor Axis 5.55 cm    RVOT peak VTI 18.6 cm    RA Major Axis 4.94 cm    AV mean gradient 7 mmHg    AV peak gradient 11 mmHg    Ao peak iam 1.67 m/s    Ao VTI 29.20 cm    LVOT peak VTI 26.30 cm    AV valve area 2.94 cm²    AV Velocity Ratio 0.85     AV index (prosthetic) 0.90     NATHALIE by Velocity Ratio 2.78 cm²    MV stenosis pressure 1/2 time 79.96 ms    MV valve area p 1/2 method 2.75 cm2    TV mean gradient 27 mmHg    Triscuspid Valve Regurgitation Peak Gradient 32 mmHg    PV mean gradient 2 mmHg    RVOT peak iam 1.00 m/s    Ao root annulus 4.04 cm    STJ 3.55 cm    Ascending aorta 3.25 cm    ZLVIDS -6.68     ZLVIDD -8.99     IVC diameter 1.79 cm    LA Volume Index 34.6 mL/m2    LA volume 86.85 cm3    LA WIDTH 4.3 cm    Tapse 2.1 cm    RA Width 3.7 cm    RV TB RVSP 11 mmHg    Est. RA pres 8 mmHg    Narrative      Left Ventricle: Normal wall motion. There is normal systolic function   with a visually estimated ejection fraction of 55 - 70%. There is normal   diastolic function.    Left Atrium: Left atrium is moderately dilated.    Right Ventricle: Normal right ventricular cavity size. Wall thickness   is normal. Right ventricle wall motion  is normal. Systolic function is   normal.    Aortic Valve: There is no mass/vegetation present.    Mitral Valve: There is no mass/vegetation present. There is no   stenosis.    Tricuspid Valve: There is no mass/vegetation present. There is mild to   moderate transvalvular regurgitation with a centrally directed jet.    Pulmonic Valve: There is no significant stenosis.    IVC/SVC:  Intermediate venous pressure at 8 mmHg.    Pericardium: There is no pericardial effusion.     , EKG: REviewed, and X-Ray: CXR: X-Ray Chest 1 View (CXR): No results found for this visit on 07/28/23. and X-Ray Chest PA and Lateral (CXR): No results found for this visit on 07/28/23.

## 2023-07-31 NOTE — ASSESSMENT & PLAN NOTE
AST>ALT  ?medications vs infectious process. Consult Hepatology  Ultrasound shows stable findings    7/30/23  Trending down. Tacrolimus level stable    Continued to trend down, hepatology to follow as OP

## 2023-07-31 NOTE — ASSESSMENT & PLAN NOTE
Followed by Dr. Askew. Rising liver function.     --Consult Hepatology.   --continue Tacrolimus. Check level  --Hold Cellcept given acute infection    7/30/23  Hepatology input appreciated  Liver enzymes trending down  Tacrolimus level trending down    7/31  Per Hepatology, continue current tacro dose, resume mycophenolate on d/c  F/U in clinic, Hepatology will repeat labs

## 2023-07-31 NOTE — HOSPITAL COURSE
7/31/23-Patient seen and examined today, resting in bed. Feels ok. Right ankle redness/swelling/pain slowly improving. No CV complaints. HR stable. On Eliquis as OP. Creatinine 1.5.

## 2023-07-31 NOTE — ASSESSMENT & PLAN NOTE
Patient with acute kidney injury/acute renal failure likely due to pre-renal azotemia due to dehydration JENN is currently New Onset. Baseline creatinine 1.0 - Labs reviewed- Renal function/electrolytes with Estimated Creatinine Clearance: 83.4 mL/min (A) (based on SCr of 1.5 mg/dL (H)). according to latest data. Monitor urine output and serial BMP and adjust therapy as needed. Avoid nephrotoxins and renally dose meds for GFR listed above.    Patient at baseline

## 2023-07-31 NOTE — TELEPHONE ENCOUNTER
Next labs 8/7, per MD.  ----- Message from Theresa Askew MD sent at 7/31/2023  8:18 AM CDT -----  Patient will need post hospital labs next week. Thanks

## 2023-07-31 NOTE — PROGRESS NOTES
[facility] - Telemetry ([facility])  Cardiology  Progress Note    Patient Name: Jordan Altman  MRN: [id number]  Admission Date: 7/28/2023  Hospital Length of Stay: 3 days  Code Status: Full Code   Attending Physician: Wesley Garcia MD   Primary Care Physician: SHAHEEN Jaime  Expected Discharge Date: 7/31/2023  Principal Problem:Sepsis    Subjective:   HPI:  48M w/ h/o liver transplant 10/2020 for EtOH cirrhosis has continued EtOH after transplant. He also has a h/o acute rejection. He is admitted with spesis thought 2/2 cellulitis. He also has increased LFTs. US is unremarkable except for hepatic steatosis. Last labs were earlier this month and slightly elevated c/w EtOH and PETH level was very high.     He developed a wound on right leg below ankle. He reports swelling, redness and pain started giving worse and he had intermittent fever. He went to urgent care. He was given Bactrim at  along with an ointment.     No fevers here. He reports ankle is getting better. Denies any acute issues. Otherwise feeling better.   Patient seen and examined, afib and some bradycardia, no rate lowering medications.  Cardiac echo is showing normal LV function and no valve vegetations.  Will continue to monitor.          Hospital Course:   7/31/23-Patient seen and examined today, resting in bed. Feels ok. Right ankle redness/swelling/pain slowly improving. No CV complaints. HR stable. On Eliquis as OP. Creatinine 1.5.          Review of Systems   Constitutional: Positive for malaise/fatigue.   HENT: Negative.     Eyes: Negative.    Cardiovascular: Negative.    Respiratory: Negative.     Endocrine: Negative.    Hematologic/Lymphatic: Negative.    Skin:         Redness to right ankle/leg   Musculoskeletal: Negative.    Gastrointestinal: Negative.    Genitourinary: Negative.    Neurological: Negative.    Psychiatric/Behavioral: Negative.     Allergic/Immunologic: Negative.      Objective:     Vital Signs (Most Recent):  Temp: 97.9 °F  (36.6 °C) (07/31/23 1257)  Pulse: 77 (07/31/23 1300)  Resp: 20 (07/31/23 1257)  BP: 129/82 (07/31/23 1257)  SpO2: 95 % (07/31/23 1257) Vital Signs (24h Range):  Temp:  [96.9 °F (36.1 °C)-98.1 °F (36.7 °C)] 97.9 °F (36.6 °C)  Pulse:  [61-95] 77  Resp:  [15-20] 20  SpO2:  [92 %-98 %] 95 %  BP: (116-132)/(64-82) 129/82     Weight: 133.8 kg (295 lb)  Body mass index is 40.01 kg/m².     SpO2: 95 %         Intake/Output Summary (Last 24 hours) at 7/31/2023 1437  Last data filed at 7/31/2023 0034  Gross per 24 hour   Intake 465.68 ml   Output 1610 ml   Net -1144.32 ml       Lines/Drains/Airways       None                      Physical Exam  Vitals and nursing note reviewed.   Constitutional:       General: He is not in acute distress.     Appearance: Normal appearance. He is well-developed. He is not diaphoretic.   HENT:      Head: Normocephalic and atraumatic.   Eyes:      General:         Right eye: No discharge.         Left eye: No discharge.      Pupils: Pupils are equal, round, and reactive to light.   Neck:      Thyroid: No thyromegaly.      Vascular: No JVD.      Trachea: No tracheal deviation.   Cardiovascular:      Rate and Rhythm: Normal rate and regular rhythm.      Heart sounds: Normal heart sounds, S1 normal and S2 normal. No murmur heard.  Pulmonary:      Effort: Pulmonary effort is normal. No respiratory distress.      Breath sounds: Normal breath sounds. No wheezing or rales.   Abdominal:      General: There is no distension.      Tenderness: There is no rebound.   Musculoskeletal:      Cervical back: Neck supple.      Right lower leg: No edema.      Left lower leg: No edema.   Skin:     General: Skin is warm and dry.      Findings: No erythema.      Comments: R ankle/leg cellulitis   Neurological:      General: No focal deficit present.      Mental Status: He is alert and oriented to person, place, and time.   Psychiatric:         Mood and Affect: Mood normal.         Behavior: Behavior normal.          "Thought Content: Thought content normal.            Significant Labs: CMP   Recent Labs   Lab 07/30/23 0429 07/30/23 0430 07/31/23 0437   * 133* 136   K 4.7 4.9 5.0    101 100   CO2 23 22* 27    104 116*   BUN 26* 26* 19   CREATININE 1.9* 1.8* 1.5*   CALCIUM 8.9 8.8 9.7   PROT 6.2  --  6.7   ALBUMIN 3.2*  --  3.3*   BILITOT 1.3*  --  0.8   ALKPHOS 299*  --  258*   *  --  65*   *  --  89*   ANIONGAP 10 10 9   , CBC   Recent Labs   Lab 07/30/23 0429 07/31/23 0437   WBC 3.49* 3.92   HGB 13.3* 13.9*   HCT 45.1 44.7   * 188   , Troponin No results for input(s): "TROPONINI" in the last 48 hours., and All pertinent lab results from the last 24 hours have been reviewed.    Significant Imaging: Echocardiogram: Transthoracic echo (TTE) complete (Cupid Only):   Results for orders placed or performed during the hospital encounter of 07/28/23   Echo   Result Value Ref Range    BSA 2.61 m2    LVOT stroke volume 85.92 cm3    LVIDd 5.82 3.5 - 6.0 cm    LV Systolic Volume 55.99 mL    LV Systolic Volume Index 22.3 mL/m2    LVIDs 3.64 2.1 - 4.0 cm    LV Diastolic Volume 168.17 mL    LV Diastolic Volume Index 67.00 mL/m2    IVS 1.26 (A) 0.6 - 1.1 cm    LVOT diameter 2.04 cm    LVOT area 3.3 cm2    FS 37 28 - 44 %    Left Ventricle Relative Wall Thickness 0.41 cm    Posterior Wall 1.19 (A) 0.6 - 1.1 cm    TDI LATERAL 0.16 m/s    TDI SEPTAL 0.16 m/s    LV mass 307.17 g    LV Mass Index 122 g/m2    MV Peak E Iam 1.18 m/s    LV LATERAL E/E' RATIO 7.38 m/s    LV SEPTAL E/E' RATIO 7.38 m/s    E/E' ratio 7.38 m/s    TR Max Iam 2.81 m/s    IVRT 49.48 msec    Mean e' 0.16 m/s    LVOT peak iam 1.42 m/s    Left Ventricular Outflow Tract Mean Velocity 1.28 cm/s    Left Ventricular Outflow Tract Mean Gradient 6.60 mmHg    LA size 4.20 cm    Left Atrium Major Axis 5.77 cm    Left Atrium Minor Axis 5.55 cm    RVOT peak VTI 18.6 cm    RA Major Axis 4.94 cm    AV mean gradient 7 mmHg    AV peak gradient 11 " mmHg    Ao peak mahesh 1.67 m/s    Ao VTI 29.20 cm    LVOT peak VTI 26.30 cm    AV valve area 2.94 cm²    AV Velocity Ratio 0.85     AV index (prosthetic) 0.90     NATHALIE by Velocity Ratio 2.78 cm²    MV stenosis pressure 1/2 time 79.96 ms    MV valve area p 1/2 method 2.75 cm2    TV mean gradient 27 mmHg    Triscuspid Valve Regurgitation Peak Gradient 32 mmHg    PV mean gradient 2 mmHg    RVOT peak mahesh 1.00 m/s    Ao root annulus 4.04 cm    STJ 3.55 cm    Ascending aorta 3.25 cm    ZLVIDS -6.68     ZLVIDD -8.99     IVC diameter 1.79 cm    LA Volume Index 34.6 mL/m2    LA volume 86.85 cm3    LA WIDTH 4.3 cm    Tapse 2.1 cm    RA Width 3.7 cm    RV TB RVSP 11 mmHg    Est. RA pres 8 mmHg    Narrative      Left Ventricle: Normal wall motion. There is normal systolic function   with a visually estimated ejection fraction of 55 - 70%. There is normal   diastolic function.    Left Atrium: Left atrium is moderately dilated.    Right Ventricle: Normal right ventricular cavity size. Wall thickness   is normal. Right ventricle wall motion  is normal. Systolic function is   normal.    Aortic Valve: There is no mass/vegetation present.    Mitral Valve: There is no mass/vegetation present. There is no   stenosis.    Tricuspid Valve: There is no mass/vegetation present. There is mild to   moderate transvalvular regurgitation with a centrally directed jet.    Pulmonic Valve: There is no significant stenosis.    IVC/SVC: Intermediate venous pressure at 8 mmHg.    Pericardium: There is no pericardial effusion.     , EKG: REviewed, and X-Ray: CXR: X-Ray Chest 1 View (CXR): No results found for this visit on 07/28/23. and X-Ray Chest PA and Lateral (CXR): No results found for this visit on 07/28/23.    Assessment and Plan:   Patient who presents with right leg cellulitis. Improving. HR stable. Continue same meds/mgmt.    Longstanding persistent atrial fibrillation  afib treated with eliquis  Continue to monitor for bradycardia,  currently stable    7/31/23  -HR stable  -Not on any AV liberty agents given bradycardia  -Already AC on Eliquis as OP--defer to primary cards    Cellulitis of right leg  Per hospital medicine  -On abx    Elevated LFTs  -Mgmt as per hepatology    S/P liver transplant  -Mgmt as per hepatology    Hypertension  Per hospital medicine    JENN (acute kidney injury)  Per hospital medicine        VTE Risk Mitigation (From admission, onward)         Ordered     apixaban tablet 5 mg  2 times daily         07/28/23 1523     IP VTE HIGH RISK PATIENT  Once         07/28/23 1523     Place sequential compression device  Until discontinued         07/28/23 1523     Reason for No Pharmacological VTE Prophylaxis  Once        Question:  Reasons:  Answer:  Already adequately anticoagulated on oral Anticoagulants    07/28/23 1523                Oralia Zuñiga PA-C  Cardiology  [facility] - Telemetry ([facility])

## 2023-08-21 NOTE — TELEPHONE ENCOUNTER
Stable labs, no medication changes.  [employer] message sent instructing pt to repeat labs next week.  ----- Message from Theresa Askew MD sent at 8/20/2023  8:32 PM CDT -----  Liver tests improved repeat labs next week

## 2023-08-29 NOTE — TELEPHONE ENCOUNTER
----- Message from Cornelio Sawin sent at 8/29/2023  8:52 AM CDT -----  Contact: Jordan  Patient is calling to speak with the nurse needing to reschedule appt for the an earlier time on 08/30. Please give patient a call at [phone]. If not available please reschedule for another day at an  early time.

## 2023-09-12 NOTE — FIRST PROVIDER EVALUATION
Medical screening examination initiated.  I have conducted a focused provider triage encounter, findings are as follows:    Brief history of present illness:  Patient presents with left flank pain and fatigue.  Denies any urinary symptoms.    There were no vitals filed for this visit.    Pertinent physical exam:  Tenderness to the left flank, no acute distress.    Brief workup plan:  Labs    Preliminary workup initiated; this workup will be continued and followed by the physician or advanced practice provider that is assigned to the patient when roomed.

## 2023-09-18 NOTE — TELEPHONE ENCOUNTER
Stable labs, no medication changes.  Next labs 12/6/23, [employer] message sent to notify pt.  ----- Message from Theresa Askew MD sent at 9/17/2023 11:05 AM CDT -----  Reviewed, nothing to do; repeat per routine

## 2023-10-30 PROBLEM — N17.9 AKI (ACUTE KIDNEY INJURY): Status: RESOLVED | Noted: 2020-09-19 | Resolved: 2023-01-01

## 2023-11-21 NOTE — PROGRESS NOTES
Transplant Surgery  Liver Transplant Recipient Follow-up    Original Referring Physician: Theresa Titus  Current Corresponding Physician: Theresa Titus    Chief Complaint: Jordan is here for follow up of his liver transplant performed 10/14/2020 for the primary diagnosis (UNOS) of Alcoholic Cirrhosis    ORGAN: LIVER  Whole or Partial: whole liver  Donor Type: donation after brain death  PHS Increased Risk: yes  Donor CMV Status: Positive  Donor HCV Status: Positive  Donor HBcAb: Negative  Donor HBV SHAYE: Negative  Donor HCV SHAYE: Positive    Biliary Anastomosis: end to end  Arterial Anatomy: standard  IVC reconstruction: end to end ivc  Portal vein status: patent    Subjective:     History of Present Illness: He has had the following complications since transplant: Portal vein thrombosis and poor hepatic artery flow requiring re-exploration with portal vein thrombectomy and revision of hepatic artery.  The noted complications are well controlled.    Interval History: Currently, he is doing well.  Current complaints include none.   Jordan is here for management of his immunosuppression medication.    Review of Systems   Constitutional: Positive for fatigue. Negative for appetite change and fever.        Fatigue improving   Respiratory: Negative.    Cardiovascular: Negative.    Gastrointestinal: Negative.    Skin: Negative.        Objective:     Physical Exam  Constitutional:       General: He is not in acute distress.     Appearance: Normal appearance.   HENT:      Head: Normocephalic and atraumatic.   Eyes:      Extraocular Movements: Extraocular movements intact.      Conjunctiva/sclera: Conjunctivae normal.   Pulmonary:      Effort: Pulmonary effort is normal.   Abdominal:      General: There is no distension.      Palpations: Abdomen is soft.      Tenderness: There is no guarding.      Hernia: No hernia is present.       Musculoskeletal:         General: No swelling or deformity.   Skin:     General: Skin is warm and  dry.      Coloration: Skin is not jaundiced.      Findings: No bruising.   Neurological:      General: No focal deficit present.      Mental Status: He is alert and oriented to person, place, and time.   Psychiatric:         Mood and Affect: Mood normal.         Behavior: Behavior normal.       Lab Results   Component Value Date    BILITOT 2.2 (H) 11/02/2020    AST 10 11/02/2020    ALT 14 11/02/2020    ALKPHOS 73 11/02/2020    CREATININE 1.3 11/02/2020    ALBUMIN 3.0 (L) 11/02/2020     Lab Results   Component Value Date    WBC 3.89 (L) 11/02/2020    HGB 9.9 (L) 11/02/2020    HCT 33.3 (L) 11/02/2020    HCT 23 (L) 10/16/2020     11/02/2020     Lab Results   Component Value Date    TACROLIMUS 9.3 11/02/2020       Assessment/Plan:          · S/P liver transplant.  · Chronic immunosuppressive medications for rejection prophylaxis at target.  Plan: no adjustment needed.  · Continue monitoring symptoms, labs and drug levels for drug-related toxicity and side effects.  · Incision: staples in place; wound clean, dry, and intact. Can be removed today  · Donor HCV SHAYE+. Seroconversion with genotype complete.  Hepatology follow-up.  · Femoral arterial line site: no complications evident    Rogerio Malik MD       [facility] Patient Status  Functional Status: 50% - Requires considerable assistance and frequent medical care  Physical Capacity: Limited Mobility     minutes on the discharge service.

## 2023-12-08 NOTE — TELEPHONE ENCOUNTER
Functional status reported as 70% on 8/2022.  Pt rescheduled initial f/u clinic appointment.  No acute changes since last clinic visit, next clinic visit scheduled 3/4/23.

## 2023-12-12 NOTE — ED PROVIDER NOTES
Encounter Date: 12/12/2023       History     Chief Complaint   Patient presents with    Cough     Cough, congestion, SOB and fatigue x 3 weeks. Hx. Of liver transplant       48-year-old male with complaint of cough and congestion for the past 3 weeks.  Patient denies fever or chills.  Patient denies vomiting.  Patient denies shortness of breath.        Review of patient's allergies indicates:   Allergen Reactions    Latex, natural rubber      Past Medical History:   Diagnosis Date    Decompensated hepatic cirrhosis     HCV acquired through organ donation, treated / cured     svr12 - 6/2021    Hypertension     SBP (spontaneous bacterial peritonitis)      Past Surgical History:   Procedure Laterality Date    ERCP N/A 10/1/2020    Procedure: ERCP (ENDOSCOPIC RETROGRADE CHOLANGIOPANCREATOGRAPHY);  Surgeon: Marcos Ramirez MD;  [facility]  Service: Endoscopy;  Laterality: N/A;    ESOPHAGOGASTRODUODENOSCOPY N/A 10/13/2020    Procedure: EGD (ESOPHAGOGASTRODUODENOSCOPY);  Surgeon: Prasanth Jerry MD;  [facility]  Service: Endoscopy;  Laterality: N/A;    EXPLORATORY LAPAROTOMY AFTER LIVER TRANSPLANTATION N/A 10/15/2020    Procedure: LAPAROTOMY, EXPLORATORY, AFTER LIVER TRANSPLANT, Possible redo of artery, possible portal thrombectomy. IntraOperative US.;  Surgeon: Curtis Zavaleta MD;  [facility];  Service: Transplant;  Laterality: N/A;  With intraoperative ultrasound    LIVER TRANSPLANT N/A 10/14/2020    Procedure: TRANSPLANT, LIVER;  Surgeon: Curtis Zavaleta MD;  [facility];  Service: Transplant;  Laterality: N/A;  Intra op HD    RIGHT HEART CATHETERIZATION Right 9/23/2020    Procedure: INSERTION, CATHETER, RIGHT HEART;  Surgeon: Mikel Costa Jr., MD;  Location: [facility] CATH LAB;  Service: Cardiology;  Laterality: Right;    THORACENTESIS  2011    THROMBECTOMY  10/15/2020    Procedure: THROMBECTOMY portal vein;  Surgeon: Curtis Zavaleta MD;   Location: [facility] OR [address] [place] [address];  Service: Transplant;;     Family History   Problem Relation Age of Onset    COPD Mother      Social History     Tobacco Use    Smoking status: Some Days    Smokeless tobacco: Current   Substance Use Topics    Alcohol use: Yes     Alcohol/week: 112.0 standard drinks of alcohol     Types: 112 Shots of liquor per week     Comment: fifth of vodka a day. LAST DRINK 7/25/2020 per pt     Drug use: Yes     Frequency: 3.0 times per week     Types: Marijuana     Review of Systems   Constitutional:  Negative for fever.   HENT:  Positive for congestion. Negative for sore throat.    Respiratory:  Positive for cough. Negative for shortness of breath.    Cardiovascular:  Negative for chest pain.   Gastrointestinal:  Negative for nausea.   Genitourinary:  Negative for dysuria.   Musculoskeletal:  Negative for back pain.   Skin:  Negative for rash.   Neurological:  Negative for weakness.   Hematological:  Does not bruise/bleed easily.       Physical Exam     Initial Vitals [12/12/23 1334]   BP Pulse Resp Temp SpO2   104/70 97 18 98 °F (36.7 °C) (!) 94 %      MAP       --         Physical Exam    Nursing note and vitals reviewed.  Constitutional: He appears well-developed and well-nourished.   HENT:   Head: Normocephalic and atraumatic.   +nasal congestion    Eyes: Conjunctivae are normal. Pupils are equal, round, and reactive to light.   Neck: Neck supple.   Normal range of motion.  Cardiovascular:  Normal rate, regular rhythm, normal heart sounds and intact distal pulses.           Pulmonary/Chest: Breath sounds normal.   Abdominal: Abdomen is soft. There is no rebound and no guarding.   Musculoskeletal:         General: Normal range of motion.      Cervical back: Normal range of motion and neck supple.     Neurological: He is alert.   Skin: Skin is warm and dry.   Psychiatric: He has a normal mood and affect. His behavior is normal. Thought content normal.         ED Course   Procedures  Labs Reviewed    INFLUENZA A & B BY MOLECULAR - Abnormal; Notable for the following components:       Result Value    Influenza A, Molecular Positive (*)     All other components within normal limits   SARS-COV-2 RNA AMPLIFICATION, QUAL          Imaging Results              X-Ray Chest 1 View (Final result)  Result time 12/12/23 14:43:34      Final result by Gerson Terrell MD (12/12/23 14:43:34)                   Impression:      No acute abnormality.      Electronically signed by: Gerson Terrell  Date:    12/12/2023  Time:    14:43               Narrative:    EXAMINATION:  XR CHEST 1 VIEW    CLINICAL HISTORY:  . Cough, unspecified    TECHNIQUE:  Single frontal portable view of the chest was performed.    COMPARISON:  10/11/2021    FINDINGS:  Support devices: None    Mild progression of the pleuroparenchymal scarring left lung base with chronic healed rib posttraumatic change.The lungs are otherwise clear, with normal appearance of pulmonary vasculature and no pleural effusion or pneumothorax.    The cardiac silhouette is normal in size. The hilar and mediastinal contours are unremarkable.    Bones are intact.                                       Medications - No data to display  Medical Decision Making  3:12 PM    Patient reports that he has had cough and congestion for the past 3 weeks but started to feel bad 4 days ago.  Patient denies shortness of breath.  Patient denies chest pain.    Amount and/or Complexity of Data Reviewed  Radiology: ordered.    Risk  Prescription drug management.                                      Clinical Impression:  Final diagnoses:  [R05.9] Cough  [J10.1] Influenza A (Primary)          ED Disposition Condition    Discharge Stable          ED Prescriptions       Medication Sig Dispense Start Date End Date Auth. Provider    oseltamivir (TAMIFLU) 75 MG capsule Take 1 capsule (75 mg total) by mouth 2 (two) times daily. for 5 days 10 capsule 12/12/2023 12/17/2023 Diego Barrios, NP     promethazine-dextromethorphan (PROMETHAZINE-DM) 6.25-15 mg/5 mL Syrp Take 5 mLs by mouth nightly as needed (cough). 118 mL 12/12/2023 12/22/2023 Diego Barrios, ROC          Follow-up Information       Follow up With Specialties Details Why Contact Info    Zohra Walters FNP Family Medicine Schedule an appointment as soon as possible for a visit in 2 days  [id number]   [address]  [facility] MEDICINE & AFTER HOURS  [facility] [id number]  [phone]               Diego Barrios NP  12/12/23 1512

## 2023-12-12 NOTE — FIRST PROVIDER EVALUATION
Medical screening examination initiated.  I have conducted a focused provider triage encounter, findings are as follows:    Brief history of present illness:    48-year-old male with complaint of cough and congestion for 3 weeks.    There were no vitals filed for this visit.    Pertinent physical exam:  BBSCTA

## 2023-12-19 NOTE — TELEPHONE ENCOUNTER
Pt reports that he requested refills from [facility] pharmacy and requests lab appointment (missed today) to be rescheduled for tomorrow.  ----- Message from Ariane Lozano sent at 12/19/2023  4:04 PM CST -----  Regarding: med refills  Contact: PT  [phone]  The patient called requesting med refills of     tacrolimus (PROGRAF) 1 MG Cap  mycophenolate (CELLCEPT) 250 mg Cap  apixaban (ELIQUIS) 5 mg Tab  amLODIPine (NORVASC) 10 MG tablet    PT states they are mailed to his home    No further information provided      Patient can be contacted @# [phone]    .

## 2024-01-01 ENCOUNTER — CLINICAL SUPPORT (OUTPATIENT)
Dept: SMOKING CESSATION | Facility: CLINIC | Age: 49
End: 2024-01-01
Payer: COMMERCIAL

## 2024-01-01 ENCOUNTER — PATIENT MESSAGE (OUTPATIENT)
Dept: TRANSPLANT | Facility: CLINIC | Age: 49
End: 2024-01-01
Payer: MEDICAID

## 2024-01-01 ENCOUNTER — HOSPITAL ENCOUNTER (INPATIENT)
Facility: HOSPITAL | Age: 49
LOS: 11 days | DRG: 207 | End: 2024-01-15
Attending: EMERGENCY MEDICINE | Admitting: FAMILY MEDICINE
Payer: MEDICAID

## 2024-01-01 ENCOUNTER — TELEPHONE (OUTPATIENT)
Dept: TRANSPLANT | Facility: CLINIC | Age: 49
End: 2024-01-01
Payer: MEDICAID

## 2024-01-01 VITALS
DIASTOLIC BLOOD PRESSURE: 88 MMHG | BODY MASS INDEX: 40.31 KG/M2 | HEART RATE: 26 BPM | HEIGHT: 72 IN | OXYGEN SATURATION: 53 % | WEIGHT: 297.63 LBS | SYSTOLIC BLOOD PRESSURE: 129 MMHG | TEMPERATURE: 82 F

## 2024-01-01 DIAGNOSIS — Z94.4 S/P LIVER TRANSPLANT: ICD-10-CM

## 2024-01-01 DIAGNOSIS — F17.200 NICOTINE DEPENDENCE: Primary | ICD-10-CM

## 2024-01-01 DIAGNOSIS — J18.9 PNA (PNEUMONIA): ICD-10-CM

## 2024-01-01 DIAGNOSIS — R79.89 ELEVATED BRAIN NATRIURETIC PEPTIDE (BNP) LEVEL: ICD-10-CM

## 2024-01-01 DIAGNOSIS — R74.8 ELEVATED LIVER ENZYMES: ICD-10-CM

## 2024-01-01 DIAGNOSIS — Z79.60 LONG-TERM USE OF IMMUNOSUPPRESSANT MEDICATION: ICD-10-CM

## 2024-01-01 DIAGNOSIS — R07.9 CHEST PAIN: ICD-10-CM

## 2024-01-01 DIAGNOSIS — R06.02 SHORTNESS OF BREATH: ICD-10-CM

## 2024-01-01 DIAGNOSIS — I48.91 ATRIAL FIBRILLATION, UNSPECIFIED TYPE: ICD-10-CM

## 2024-01-01 DIAGNOSIS — J18.9 PNEUMONIA OF LEFT LOWER LOBE DUE TO INFECTIOUS ORGANISM: Primary | ICD-10-CM

## 2024-01-01 DIAGNOSIS — R07.9 CHEST PAIN, UNSPECIFIED TYPE: ICD-10-CM

## 2024-01-01 LAB
1,3 BETA GLUCAN SER-MCNC: 63 PG/ML
ALBUMIN SERPL BCP-MCNC: 1.6 G/DL (ref 3.5–5.2)
ALBUMIN SERPL BCP-MCNC: 1.6 G/DL (ref 3.5–5.2)
ALBUMIN SERPL BCP-MCNC: 1.7 G/DL (ref 3.5–5.2)
ALBUMIN SERPL BCP-MCNC: 1.8 G/DL (ref 3.5–5.2)
ALBUMIN SERPL BCP-MCNC: 1.9 G/DL (ref 3.5–5.2)
ALBUMIN SERPL BCP-MCNC: 1.9 G/DL (ref 3.5–5.2)
ALBUMIN SERPL BCP-MCNC: 2 G/DL (ref 3.5–5.2)
ALBUMIN SERPL BCP-MCNC: 2.3 G/DL (ref 3.5–5.2)
ALBUMIN SERPL BCP-MCNC: 2.3 G/DL (ref 3.5–5.2)
ALBUMIN SERPL BCP-MCNC: 2.4 G/DL (ref 3.5–5.2)
ALBUMIN SERPL BCP-MCNC: 2.7 G/DL (ref 3.5–5.2)
ALLENS TEST: ABNORMAL
ALP SERPL-CCNC: 163 U/L (ref 55–135)
ALP SERPL-CCNC: 169 U/L (ref 55–135)
ALP SERPL-CCNC: 178 U/L (ref 55–135)
ALP SERPL-CCNC: 182 U/L (ref 55–135)
ALP SERPL-CCNC: 232 U/L (ref 55–135)
ALP SERPL-CCNC: 241 U/L (ref 55–135)
ALP SERPL-CCNC: 286 U/L (ref 55–135)
ALP SERPL-CCNC: 309 U/L (ref 55–135)
ALP SERPL-CCNC: 378 U/L (ref 55–135)
ALP SERPL-CCNC: 382 U/L (ref 55–135)
ALP SERPL-CCNC: 393 U/L (ref 55–135)
ALT SERPL W/O P-5'-P-CCNC: 50 U/L (ref 10–44)
ALT SERPL W/O P-5'-P-CCNC: 50 U/L (ref 10–44)
ALT SERPL W/O P-5'-P-CCNC: 53 U/L (ref 10–44)
ALT SERPL W/O P-5'-P-CCNC: 54 U/L (ref 10–44)
ALT SERPL W/O P-5'-P-CCNC: 55 U/L (ref 10–44)
ALT SERPL W/O P-5'-P-CCNC: 60 U/L (ref 10–44)
ALT SERPL W/O P-5'-P-CCNC: 61 U/L (ref 10–44)
ALT SERPL W/O P-5'-P-CCNC: 63 U/L (ref 10–44)
ALT SERPL W/O P-5'-P-CCNC: 65 U/L (ref 10–44)
ALT SERPL W/O P-5'-P-CCNC: 70 U/L (ref 10–44)
ALT SERPL W/O P-5'-P-CCNC: 82 U/L (ref 10–44)
AMMONIA PLAS-SCNC: 117 UMOL/L (ref 10–50)
AMMONIA PLAS-SCNC: 117 UMOL/L (ref 10–50)
AMMONIA PLAS-SCNC: 118 UMOL/L (ref 10–50)
AMMONIA PLAS-SCNC: 146 UMOL/L (ref 10–50)
AMMONIA PLAS-SCNC: 62 UMOL/L (ref 10–50)
AMMONIA PLAS-SCNC: 69 UMOL/L (ref 10–50)
AMMONIA PLAS-SCNC: 85 UMOL/L (ref 10–50)
ANION GAP SERPL CALC-SCNC: 11 MMOL/L (ref 8–16)
ANION GAP SERPL CALC-SCNC: 11 MMOL/L (ref 8–16)
ANION GAP SERPL CALC-SCNC: 12 MMOL/L (ref 8–16)
ANION GAP SERPL CALC-SCNC: 13 MMOL/L (ref 8–16)
ANION GAP SERPL CALC-SCNC: 14 MMOL/L (ref 8–16)
ANION GAP SERPL CALC-SCNC: 15 MMOL/L (ref 8–16)
ANION GAP SERPL CALC-SCNC: 16 MMOL/L (ref 8–16)
ANION GAP SERPL CALC-SCNC: 17 MMOL/L (ref 8–16)
ANION GAP SERPL CALC-SCNC: 5 MMOL/L (ref 8–16)
ANION GAP SERPL CALC-SCNC: 9 MMOL/L (ref 8–16)
ANISOCYTOSIS BLD QL SMEAR: SLIGHT
ANISOCYTOSIS BLD QL SMEAR: SLIGHT
AORTIC ROOT ANNULUS: 3.79 CM
APTT PPP: 29.5 SEC (ref 21–32)
APTT PPP: 34.4 SEC (ref 21–32)
APTT PPP: 36.7 SEC (ref 21–32)
APTT PPP: 39.6 SEC (ref 21–32)
APTT PPP: 41.5 SEC (ref 21–32)
APTT PPP: 60.1 SEC (ref 21–32)
APTT PPP: 61.5 SEC (ref 21–32)
APTT PPP: 65.8 SEC (ref 21–32)
APTT PPP: 68.7 SEC (ref 21–32)
APTT PPP: 72.2 SEC (ref 21–32)
ASCENDING AORTA: 3.51 CM
ASPERGILLUS AB SER QL ID: NOT DETECTED
AST SERPL-CCNC: 102 U/L (ref 10–40)
AST SERPL-CCNC: 106 U/L (ref 10–40)
AST SERPL-CCNC: 110 U/L (ref 10–40)
AST SERPL-CCNC: 125 U/L (ref 10–40)
AST SERPL-CCNC: 131 U/L (ref 10–40)
AST SERPL-CCNC: 144 U/L (ref 10–40)
AST SERPL-CCNC: 158 U/L (ref 10–40)
AST SERPL-CCNC: 164 U/L (ref 10–40)
AST SERPL-CCNC: 177 U/L (ref 10–40)
AST SERPL-CCNC: 178 U/L (ref 10–40)
AST SERPL-CCNC: 93 U/L (ref 10–40)
AV INDEX (PROSTH): 0.79
AV MEAN GRADIENT: 9 MMHG
AV PEAK GRADIENT: 14 MMHG
AV VALVE AREA BY VELOCITY RATIO: 2.5 CM²
AV VALVE AREA: 2.77 CM²
AV VELOCITY RATIO: 0.71
B DERMAT AB SER QL ID: NOT DETECTED
BACTERIA BLD CULT: NORMAL
BACTERIA SPEC AEROBE CULT: NORMAL
BACTERIA SPEC AEROBE CULT: NORMAL
BASO STIPL BLD QL SMEAR: ABNORMAL
BASOPHILS # BLD AUTO: 0.02 K/UL (ref 0–0.2)
BASOPHILS # BLD AUTO: 0.03 K/UL (ref 0–0.2)
BASOPHILS # BLD AUTO: 0.04 K/UL (ref 0–0.2)
BASOPHILS # BLD AUTO: 0.04 K/UL (ref 0–0.2)
BASOPHILS # BLD AUTO: 0.05 K/UL (ref 0–0.2)
BASOPHILS # BLD AUTO: 0.07 K/UL (ref 0–0.2)
BASOPHILS # BLD AUTO: 0.09 K/UL (ref 0–0.2)
BASOPHILS # BLD AUTO: 0.09 K/UL (ref 0–0.2)
BASOPHILS # BLD AUTO: ABNORMAL K/UL (ref 0–0.2)
BASOPHILS NFR BLD: 0 % (ref 0–1.9)
BASOPHILS NFR BLD: 0.2 % (ref 0–1.9)
BASOPHILS NFR BLD: 0.5 % (ref 0–1.9)
BASOPHILS NFR BLD: 0.6 % (ref 0–1.9)
BASOPHILS NFR BLD: 0.7 % (ref 0–1.9)
BASOPHILS NFR BLD: 0.8 % (ref 0–1.9)
BASOPHILS NFR BLD: 1 % (ref 0–1.9)
BASOPHILS NFR BLD: 1.2 % (ref 0–1.9)
BASOPHILS NFR BLD: 1.2 % (ref 0–1.9)
BILIRUB DIRECT SERPL-MCNC: 7.8 MG/DL (ref 0.1–0.3)
BILIRUB SERPL-MCNC: 10.6 MG/DL (ref 0.1–1)
BILIRUB SERPL-MCNC: 10.7 MG/DL (ref 0.1–1)
BILIRUB SERPL-MCNC: 6.6 MG/DL (ref 0.1–1)
BILIRUB SERPL-MCNC: 6.6 MG/DL (ref 0.1–1)
BILIRUB SERPL-MCNC: 6.9 MG/DL (ref 0.1–1)
BILIRUB SERPL-MCNC: 7.2 MG/DL (ref 0.1–1)
BILIRUB SERPL-MCNC: 7.8 MG/DL (ref 0.1–1)
BILIRUB SERPL-MCNC: 8 MG/DL (ref 0.1–1)
BILIRUB SERPL-MCNC: 8.2 MG/DL (ref 0.1–1)
BILIRUB SERPL-MCNC: 8.4 MG/DL (ref 0.1–1)
BILIRUB SERPL-MCNC: 8.7 MG/DL (ref 0.1–1)
BNP SERPL-MCNC: 504 PG/ML (ref 0–99)
BSA FOR ECHO PROCEDURE: 2.55 M2
BUN SERPL-MCNC: 14 MG/DL (ref 6–20)
BUN SERPL-MCNC: 15 MG/DL (ref 6–20)
BUN SERPL-MCNC: 16 MG/DL (ref 6–20)
BUN SERPL-MCNC: 17 MG/DL (ref 6–20)
BUN SERPL-MCNC: 19 MG/DL (ref 6–20)
BUN SERPL-MCNC: 19 MG/DL (ref 6–20)
BUN SERPL-MCNC: 20 MG/DL (ref 6–20)
BUN SERPL-MCNC: 23 MG/DL (ref 6–20)
BUN SERPL-MCNC: 25 MG/DL (ref 6–20)
BUN SERPL-MCNC: 30 MG/DL (ref 6–20)
BUN SERPL-MCNC: 30 MG/DL (ref 6–20)
BUN SERPL-MCNC: 34 MG/DL (ref 6–20)
BUN SERPL-MCNC: 34 MG/DL (ref 6–20)
BUN SERPL-MCNC: 36 MG/DL (ref 6–20)
BUN SERPL-MCNC: 39 MG/DL (ref 6–20)
BUN SERPL-MCNC: 40 MG/DL (ref 6–20)
BUN SERPL-MCNC: 41 MG/DL (ref 6–20)
BUN SERPL-MCNC: 42 MG/DL (ref 6–20)
BUN SERPL-MCNC: 44 MG/DL (ref 6–20)
BUN SERPL-MCNC: 59 MG/DL (ref 6–20)
C IMMITIS AB SER QL ID: NOT DETECTED
CALCIUM SERPL-MCNC: 7.5 MG/DL (ref 8.7–10.5)
CALCIUM SERPL-MCNC: 7.5 MG/DL (ref 8.7–10.5)
CALCIUM SERPL-MCNC: 7.6 MG/DL (ref 8.7–10.5)
CALCIUM SERPL-MCNC: 7.7 MG/DL (ref 8.7–10.5)
CALCIUM SERPL-MCNC: 7.8 MG/DL (ref 8.7–10.5)
CALCIUM SERPL-MCNC: 7.9 MG/DL (ref 8.7–10.5)
CALCIUM SERPL-MCNC: 7.9 MG/DL (ref 8.7–10.5)
CALCIUM SERPL-MCNC: 8 MG/DL (ref 8.7–10.5)
CALCIUM SERPL-MCNC: 8 MG/DL (ref 8.7–10.5)
CALCIUM SERPL-MCNC: 8.1 MG/DL (ref 8.7–10.5)
CALCIUM SERPL-MCNC: 8.3 MG/DL (ref 8.7–10.5)
CALCIUM SERPL-MCNC: 8.3 MG/DL (ref 8.7–10.5)
CALCIUM SERPL-MCNC: 8.4 MG/DL (ref 8.7–10.5)
CALCIUM SERPL-MCNC: 8.7 MG/DL (ref 8.7–10.5)
CHLORIDE SERPL-SCNC: 100 MMOL/L (ref 95–110)
CHLORIDE SERPL-SCNC: 101 MMOL/L (ref 95–110)
CHLORIDE SERPL-SCNC: 102 MMOL/L (ref 95–110)
CHLORIDE SERPL-SCNC: 103 MMOL/L (ref 95–110)
CHLORIDE SERPL-SCNC: 103 MMOL/L (ref 95–110)
CHLORIDE SERPL-SCNC: 104 MMOL/L (ref 95–110)
CHLORIDE SERPL-SCNC: 107 MMOL/L (ref 95–110)
CHLORIDE SERPL-SCNC: 88 MMOL/L (ref 95–110)
CHLORIDE SERPL-SCNC: 89 MMOL/L (ref 95–110)
CHLORIDE SERPL-SCNC: 90 MMOL/L (ref 95–110)
CHLORIDE SERPL-SCNC: 91 MMOL/L (ref 95–110)
CHLORIDE SERPL-SCNC: 91 MMOL/L (ref 95–110)
CHLORIDE SERPL-SCNC: 93 MMOL/L (ref 95–110)
CHLORIDE SERPL-SCNC: 94 MMOL/L (ref 95–110)
CHLORIDE SERPL-SCNC: 96 MMOL/L (ref 95–110)
CHLORIDE SERPL-SCNC: 96 MMOL/L (ref 95–110)
CHLORIDE SERPL-SCNC: 97 MMOL/L (ref 95–110)
CHLORIDE SERPL-SCNC: 98 MMOL/L (ref 95–110)
CHLORIDE SERPL-SCNC: 99 MMOL/L (ref 95–110)
CO2 SERPL-SCNC: 15 MMOL/L (ref 23–29)
CO2 SERPL-SCNC: 16 MMOL/L (ref 23–29)
CO2 SERPL-SCNC: 17 MMOL/L (ref 23–29)
CO2 SERPL-SCNC: 18 MMOL/L (ref 23–29)
CO2 SERPL-SCNC: 19 MMOL/L (ref 23–29)
CO2 SERPL-SCNC: 19 MMOL/L (ref 23–29)
CO2 SERPL-SCNC: 20 MMOL/L (ref 23–29)
CO2 SERPL-SCNC: 20 MMOL/L (ref 23–29)
CO2 SERPL-SCNC: 21 MMOL/L (ref 23–29)
CO2 SERPL-SCNC: 22 MMOL/L (ref 23–29)
CO2 SERPL-SCNC: 23 MMOL/L (ref 23–29)
CORTIS SERPL-MCNC: 5.3 UG/DL
CREAT SERPL-MCNC: 1.1 MG/DL (ref 0.5–1.4)
CREAT SERPL-MCNC: 1.5 MG/DL (ref 0.5–1.4)
CREAT SERPL-MCNC: 1.6 MG/DL (ref 0.5–1.4)
CREAT SERPL-MCNC: 1.6 MG/DL (ref 0.5–1.4)
CREAT SERPL-MCNC: 1.7 MG/DL (ref 0.5–1.4)
CREAT SERPL-MCNC: 1.7 MG/DL (ref 0.5–1.4)
CREAT SERPL-MCNC: 1.8 MG/DL (ref 0.5–1.4)
CREAT SERPL-MCNC: 1.8 MG/DL (ref 0.5–1.4)
CREAT SERPL-MCNC: 1.9 MG/DL (ref 0.5–1.4)
CREAT SERPL-MCNC: 2 MG/DL (ref 0.5–1.4)
CREAT SERPL-MCNC: 2 MG/DL (ref 0.5–1.4)
CREAT SERPL-MCNC: 2.1 MG/DL (ref 0.5–1.4)
CREAT SERPL-MCNC: 2.9 MG/DL (ref 0.5–1.4)
CREAT SERPL-MCNC: 3 MG/DL (ref 0.5–1.4)
CREAT SERPL-MCNC: 3 MG/DL (ref 0.5–1.4)
CREAT SERPL-MCNC: 3.4 MG/DL (ref 0.5–1.4)
CREAT SERPL-MCNC: 3.7 MG/DL (ref 0.5–1.4)
CREAT SERPL-MCNC: 4.1 MG/DL (ref 0.5–1.4)
CREAT SERPL-MCNC: 4.3 MG/DL (ref 0.5–1.4)
CREAT SERPL-MCNC: 4.4 MG/DL (ref 0.5–1.4)
CREAT SERPL-MCNC: 5 MG/DL (ref 0.5–1.4)
CREAT SERPL-MCNC: 5.3 MG/DL (ref 0.5–1.4)
CREAT SERPL-MCNC: 5.5 MG/DL (ref 0.5–1.4)
CREAT SERPL-MCNC: 5.6 MG/DL (ref 0.5–1.4)
CREAT SERPL-MCNC: 5.6 MG/DL (ref 0.5–1.4)
CRYPTOC AG SER QL LA: NEGATIVE
CV ECHO LV RWT: 0.46 CM
DACRYOCYTES BLD QL SMEAR: ABNORMAL
DELSYS: ABNORMAL
DIFFERENTIAL METHOD BLD: ABNORMAL
DOP CALC AO PEAK VEL: 1.89 M/S
DOP CALC AO VTI: 33 CM
DOP CALC LVOT AREA: 3.5 CM2
DOP CALC LVOT DIAMETER: 2.11 CM
DOP CALC LVOT PEAK VEL: 1.35 M/S
DOP CALC LVOT STROKE VOLUME: 91.57 CM3
DOP CALC RVOT PEAK VEL: 0.92 M/S
DOP CALC RVOT VTI: 17.7 CM
DOP CALCLVOT PEAK VEL VTI: 26.2 CM
E/E' RATIO: 7.47 M/S
ECHO LV POSTERIOR WALL: 1.29 CM (ref 0.6–1.1)
EOSINOPHIL # BLD AUTO: 0 K/UL (ref 0–0.5)
EOSINOPHIL # BLD AUTO: 0.1 K/UL (ref 0–0.5)
EOSINOPHIL # BLD AUTO: ABNORMAL K/UL (ref 0–0.5)
EOSINOPHIL NFR BLD: 0 % (ref 0–8)
EOSINOPHIL NFR BLD: 0.3 % (ref 0–8)
EOSINOPHIL NFR BLD: 0.3 % (ref 0–8)
EOSINOPHIL NFR BLD: 0.5 % (ref 0–8)
EOSINOPHIL NFR BLD: 0.6 % (ref 0–8)
EOSINOPHIL NFR BLD: 0.7 % (ref 0–8)
EOSINOPHIL NFR BLD: 0.8 % (ref 0–8)
EOSINOPHIL NFR BLD: 0.8 % (ref 0–8)
EOSINOPHIL NFR BLD: 0.9 % (ref 0–8)
EOSINOPHIL NFR BLD: 1 % (ref 0–8)
EOSINOPHIL NFR BLD: 1.1 % (ref 0–8)
ERYTHROCYTE [DISTWIDTH] IN BLOOD BY AUTOMATED COUNT: 14.7 % (ref 11.5–14.5)
ERYTHROCYTE [DISTWIDTH] IN BLOOD BY AUTOMATED COUNT: 14.9 % (ref 11.5–14.5)
ERYTHROCYTE [DISTWIDTH] IN BLOOD BY AUTOMATED COUNT: 15.1 % (ref 11.5–14.5)
ERYTHROCYTE [DISTWIDTH] IN BLOOD BY AUTOMATED COUNT: 15.1 % (ref 11.5–14.5)
ERYTHROCYTE [DISTWIDTH] IN BLOOD BY AUTOMATED COUNT: 15.7 % (ref 11.5–14.5)
ERYTHROCYTE [DISTWIDTH] IN BLOOD BY AUTOMATED COUNT: 15.9 % (ref 11.5–14.5)
ERYTHROCYTE [DISTWIDTH] IN BLOOD BY AUTOMATED COUNT: 16.1 % (ref 11.5–14.5)
ERYTHROCYTE [DISTWIDTH] IN BLOOD BY AUTOMATED COUNT: 16.4 % (ref 11.5–14.5)
ERYTHROCYTE [DISTWIDTH] IN BLOOD BY AUTOMATED COUNT: 16.8 % (ref 11.5–14.5)
ERYTHROCYTE [DISTWIDTH] IN BLOOD BY AUTOMATED COUNT: 17.5 % (ref 11.5–14.5)
ERYTHROCYTE [DISTWIDTH] IN BLOOD BY AUTOMATED COUNT: 17.6 % (ref 11.5–14.5)
ERYTHROCYTE [DISTWIDTH] IN BLOOD BY AUTOMATED COUNT: 17.7 % (ref 11.5–14.5)
ERYTHROCYTE [DISTWIDTH] IN BLOOD BY AUTOMATED COUNT: 17.7 % (ref 11.5–14.5)
ERYTHROCYTE [DISTWIDTH] IN BLOOD BY AUTOMATED COUNT: 17.9 % (ref 11.5–14.5)
ERYTHROCYTE [SEDIMENTATION RATE] IN BLOOD BY WESTERGREN METHOD: 24 MM/H
EST. GFR  (NO RACE VARIABLE): 12 ML/MIN/1.73 M^2
EST. GFR  (NO RACE VARIABLE): 13 ML/MIN/1.73 M^2
EST. GFR  (NO RACE VARIABLE): 15 ML/MIN/1.73 M^2
EST. GFR  (NO RACE VARIABLE): 16 ML/MIN/1.73 M^2
EST. GFR  (NO RACE VARIABLE): 17 ML/MIN/1.73 M^2
EST. GFR  (NO RACE VARIABLE): 19 ML/MIN/1.73 M^2
EST. GFR  (NO RACE VARIABLE): 21 ML/MIN/1.73 M^2
EST. GFR  (NO RACE VARIABLE): 25 ML/MIN/1.73 M^2
EST. GFR  (NO RACE VARIABLE): 25 ML/MIN/1.73 M^2
EST. GFR  (NO RACE VARIABLE): 26 ML/MIN/1.73 M^2
EST. GFR  (NO RACE VARIABLE): 38 ML/MIN/1.73 M^2
EST. GFR  (NO RACE VARIABLE): 40 ML/MIN/1.73 M^2
EST. GFR  (NO RACE VARIABLE): 40 ML/MIN/1.73 M^2
EST. GFR  (NO RACE VARIABLE): 42 ML/MIN/1.73 M^2
EST. GFR  (NO RACE VARIABLE): 45 ML/MIN/1.73 M^2
EST. GFR  (NO RACE VARIABLE): 45 ML/MIN/1.73 M^2
EST. GFR  (NO RACE VARIABLE): 49 ML/MIN/1.73 M^2
EST. GFR  (NO RACE VARIABLE): 49 ML/MIN/1.73 M^2
EST. GFR  (NO RACE VARIABLE): 52 ML/MIN/1.73 M^2
EST. GFR  (NO RACE VARIABLE): 52 ML/MIN/1.73 M^2
EST. GFR  (NO RACE VARIABLE): 56 ML/MIN/1.73 M^2
EST. GFR  (NO RACE VARIABLE): >60 ML/MIN/1.73 M^2
FIO2: 100
FIO2: 40
FIO2: 40
FIO2: 60
FIO2: 65
FLOW: 15
FOLATE SERPL-MCNC: <2.2 NG/ML (ref 4–24)
FRACTIONAL SHORTENING: 34 % (ref 28–44)
FUNGITELL COMMENTS: ABNORMAL
GLUCOSE SERPL-MCNC: 100 MG/DL (ref 70–110)
GLUCOSE SERPL-MCNC: 101 MG/DL (ref 70–110)
GLUCOSE SERPL-MCNC: 101 MG/DL (ref 70–110)
GLUCOSE SERPL-MCNC: 102 MG/DL (ref 70–110)
GLUCOSE SERPL-MCNC: 103 MG/DL (ref 70–110)
GLUCOSE SERPL-MCNC: 109 MG/DL (ref 70–110)
GLUCOSE SERPL-MCNC: 113 MG/DL (ref 70–110)
GLUCOSE SERPL-MCNC: 120 MG/DL (ref 70–110)
GLUCOSE SERPL-MCNC: 121 MG/DL (ref 70–110)
GLUCOSE SERPL-MCNC: 124 MG/DL (ref 70–110)
GLUCOSE SERPL-MCNC: 138 MG/DL (ref 70–110)
GLUCOSE SERPL-MCNC: 141 MG/DL (ref 70–110)
GLUCOSE SERPL-MCNC: 142 MG/DL (ref 70–110)
GLUCOSE SERPL-MCNC: 146 MG/DL (ref 70–110)
GLUCOSE SERPL-MCNC: 152 MG/DL (ref 70–110)
GLUCOSE SERPL-MCNC: 162 MG/DL (ref 70–110)
GLUCOSE SERPL-MCNC: 163 MG/DL (ref 70–110)
GLUCOSE SERPL-MCNC: 163 MG/DL (ref 70–110)
GLUCOSE SERPL-MCNC: 73 MG/DL (ref 70–110)
GLUCOSE SERPL-MCNC: 81 MG/DL (ref 70–110)
GLUCOSE SERPL-MCNC: 84 MG/DL (ref 70–110)
GLUCOSE SERPL-MCNC: 87 MG/DL (ref 70–110)
GLUCOSE SERPL-MCNC: 87 MG/DL (ref 70–110)
GLUCOSE SERPL-MCNC: 89 MG/DL (ref 70–110)
GLUCOSE SERPL-MCNC: 89 MG/DL (ref 70–110)
GLUCOSE SERPL-MCNC: 94 MG/DL (ref 70–110)
GLUCOSE SERPL-MCNC: 95 MG/DL (ref 70–110)
GRAM STN SPEC: NORMAL
H CAPSUL AB SER QL ID: NOT DETECTED
HBV CORE AB SERPL QL IA: NORMAL
HBV SURFACE AB SER QL IA: NEGATIVE
HBV SURFACE AB SER-ACNC: <3 MIU/ML
HBV SURFACE AB SER-ACNC: NORMAL M[IU]/ML
HBV SURFACE AB SERPL IA-ACNC: <3 MIU/ML
HBV SURFACE AG SERPL QL IA: NORMAL
HCO3 UR-SCNC: 18.4 MMOL/L (ref 24–28)
HCO3 UR-SCNC: 19 MMOL/L (ref 24–28)
HCO3 UR-SCNC: 20.3 MMOL/L (ref 24–28)
HCO3 UR-SCNC: 20.6 MMOL/L (ref 24–28)
HCO3 UR-SCNC: 21.5 MMOL/L (ref 24–28)
HCO3 UR-SCNC: 21.8 MMOL/L (ref 24–28)
HCO3 UR-SCNC: 21.9 MMOL/L (ref 24–28)
HCO3 UR-SCNC: 23.2 MMOL/L (ref 24–28)
HCT VFR BLD AUTO: 33.6 % (ref 40–54)
HCT VFR BLD AUTO: 37.9 % (ref 40–54)
HCT VFR BLD AUTO: 38.2 % (ref 40–54)
HCT VFR BLD AUTO: 38.7 % (ref 40–54)
HCT VFR BLD AUTO: 38.8 % (ref 40–54)
HCT VFR BLD AUTO: 38.8 % (ref 40–54)
HCT VFR BLD AUTO: 39.4 % (ref 40–54)
HCT VFR BLD AUTO: 39.4 % (ref 40–54)
HCT VFR BLD AUTO: 40.4 % (ref 40–54)
HCT VFR BLD AUTO: 40.7 % (ref 40–54)
HCT VFR BLD AUTO: 41.5 % (ref 40–54)
HCT VFR BLD AUTO: 42.5 % (ref 40–54)
HCT VFR BLD AUTO: 43.1 % (ref 40–54)
HCT VFR BLD AUTO: 43.7 % (ref 40–54)
HCT VFR BLD CALC: 46 %PCV (ref 36–54)
HGB BLD-MCNC: 11.2 G/DL (ref 14–18)
HGB BLD-MCNC: 12 G/DL (ref 14–18)
HGB BLD-MCNC: 12.1 G/DL (ref 14–18)
HGB BLD-MCNC: 12.2 G/DL (ref 14–18)
HGB BLD-MCNC: 12.6 G/DL (ref 14–18)
HGB BLD-MCNC: 12.6 G/DL (ref 14–18)
HGB BLD-MCNC: 12.8 G/DL (ref 14–18)
HGB BLD-MCNC: 13.1 G/DL (ref 14–18)
HGB BLD-MCNC: 13.1 G/DL (ref 14–18)
HGB BLD-MCNC: 13.9 G/DL (ref 14–18)
HGB BLD-MCNC: 14.2 G/DL (ref 14–18)
HGB BLD-MCNC: 14.3 G/DL (ref 14–18)
HGB BLD-MCNC: 14.5 G/DL (ref 14–18)
HGB BLD-MCNC: 14.5 G/DL (ref 14–18)
HIV 1+2 AB+HIV1 P24 AG SERPL QL IA: NEGATIVE
IMM GRANULOCYTES # BLD AUTO: 0.05 K/UL (ref 0–0.04)
IMM GRANULOCYTES # BLD AUTO: 0.05 K/UL (ref 0–0.04)
IMM GRANULOCYTES # BLD AUTO: 0.08 K/UL (ref 0–0.04)
IMM GRANULOCYTES # BLD AUTO: 0.08 K/UL (ref 0–0.04)
IMM GRANULOCYTES # BLD AUTO: 0.09 K/UL (ref 0–0.04)
IMM GRANULOCYTES # BLD AUTO: 0.1 K/UL (ref 0–0.04)
IMM GRANULOCYTES # BLD AUTO: 0.16 K/UL (ref 0–0.04)
IMM GRANULOCYTES # BLD AUTO: 0.22 K/UL (ref 0–0.04)
IMM GRANULOCYTES # BLD AUTO: 0.36 K/UL (ref 0–0.04)
IMM GRANULOCYTES # BLD AUTO: 0.48 K/UL (ref 0–0.04)
IMM GRANULOCYTES # BLD AUTO: ABNORMAL K/UL (ref 0–0.04)
IMM GRANULOCYTES NFR BLD AUTO: 0.8 % (ref 0–0.5)
IMM GRANULOCYTES NFR BLD AUTO: 0.9 % (ref 0–0.5)
IMM GRANULOCYTES NFR BLD AUTO: 1.2 % (ref 0–0.5)
IMM GRANULOCYTES NFR BLD AUTO: 1.3 % (ref 0–0.5)
IMM GRANULOCYTES NFR BLD AUTO: 1.5 % (ref 0–0.5)
IMM GRANULOCYTES NFR BLD AUTO: 1.7 % (ref 0–0.5)
IMM GRANULOCYTES NFR BLD AUTO: 2.1 % (ref 0–0.5)
IMM GRANULOCYTES NFR BLD AUTO: 3.9 % (ref 0–0.5)
IMM GRANULOCYTES NFR BLD AUTO: 4 % (ref 0–0.5)
IMM GRANULOCYTES NFR BLD AUTO: 4.9 % (ref 0–0.5)
IMM GRANULOCYTES NFR BLD AUTO: ABNORMAL % (ref 0–0.5)
INFLUENZA A, MOLECULAR: NEGATIVE
INFLUENZA B, MOLECULAR: NEGATIVE
INR PPP: 1.3 (ref 0.8–1.2)
INR PPP: 1.4 (ref 0.8–1.2)
INR PPP: 1.4 (ref 0.8–1.2)
INR PPP: 1.5 (ref 0.8–1.2)
INTERVENTRICULAR SEPTUM: 1.35 CM (ref 0.6–1.1)
IVC DIAMETER: 1.84 CM
IVRT: 68.51 MSEC
LA MAJOR: 5.61 CM
LA MINOR: 5.37 CM
LA WIDTH: 3.6 CM
LACTATE SERPL-SCNC: 1.5 MMOL/L (ref 0.5–2.2)
LEFT ATRIUM SIZE: 4.51 CM
LEFT ATRIUM VOLUME INDEX: 30.7 ML/M2
LEFT ATRIUM VOLUME: 75.73 CM3
LEFT INTERNAL DIMENSION IN SYSTOLE: 3.71 CM (ref 2.1–4)
LEFT VENTRICLE DIASTOLIC VOLUME INDEX: 62.4 ML/M2
LEFT VENTRICLE DIASTOLIC VOLUME: 154.13 ML
LEFT VENTRICLE MASS INDEX: 130 G/M2
LEFT VENTRICLE SYSTOLIC VOLUME INDEX: 23.6 ML/M2
LEFT VENTRICLE SYSTOLIC VOLUME: 58.34 ML
LEFT VENTRICULAR INTERNAL DIMENSION IN DIASTOLE: 5.61 CM (ref 3.5–6)
LEFT VENTRICULAR MASS: 320.88 G
LV LATERAL E/E' RATIO: 7.47 M/S
LV SEPTAL E/E' RATIO: 7.47 M/S
LVOT MG: 5.53 MMHG
LVOT MV: 1.17 CM/S
LYMPHOCYTES # BLD AUTO: 0.9 K/UL (ref 1–4.8)
LYMPHOCYTES # BLD AUTO: 1.1 K/UL (ref 1–4.8)
LYMPHOCYTES # BLD AUTO: 1.1 K/UL (ref 1–4.8)
LYMPHOCYTES # BLD AUTO: 1.2 K/UL (ref 1–4.8)
LYMPHOCYTES # BLD AUTO: 1.2 K/UL (ref 1–4.8)
LYMPHOCYTES # BLD AUTO: 1.4 K/UL (ref 1–4.8)
LYMPHOCYTES # BLD AUTO: 1.4 K/UL (ref 1–4.8)
LYMPHOCYTES # BLD AUTO: 1.8 K/UL (ref 1–4.8)
LYMPHOCYTES # BLD AUTO: 2.2 K/UL (ref 1–4.8)
LYMPHOCYTES # BLD AUTO: 2.2 K/UL (ref 1–4.8)
LYMPHOCYTES # BLD AUTO: ABNORMAL K/UL (ref 1–4.8)
LYMPHOCYTES NFR BLD: 14.6 % (ref 18–48)
LYMPHOCYTES NFR BLD: 17 % (ref 18–48)
LYMPHOCYTES NFR BLD: 17 % (ref 18–48)
LYMPHOCYTES NFR BLD: 17.4 % (ref 18–48)
LYMPHOCYTES NFR BLD: 18 % (ref 18–48)
LYMPHOCYTES NFR BLD: 18.7 % (ref 18–48)
LYMPHOCYTES NFR BLD: 19.4 % (ref 18–48)
LYMPHOCYTES NFR BLD: 21 % (ref 18–48)
LYMPHOCYTES NFR BLD: 21.2 % (ref 18–48)
LYMPHOCYTES NFR BLD: 22.8 % (ref 18–48)
LYMPHOCYTES NFR BLD: 23.4 % (ref 18–48)
LYMPHOCYTES NFR BLD: 24.6 % (ref 18–48)
LYMPHOCYTES NFR BLD: 25.3 % (ref 18–48)
LYMPHOCYTES NFR BLD: 29 % (ref 18–48)
MAGNESIUM SERPL-MCNC: 0.8 MG/DL (ref 1.6–2.6)
MAGNESIUM SERPL-MCNC: 1.5 MG/DL (ref 1.6–2.6)
MAGNESIUM SERPL-MCNC: 1.5 MG/DL (ref 1.6–2.6)
MAGNESIUM SERPL-MCNC: 2 MG/DL (ref 1.6–2.6)
MAGNESIUM SERPL-MCNC: 2.1 MG/DL (ref 1.6–2.6)
MAGNESIUM SERPL-MCNC: 2.2 MG/DL (ref 1.6–2.6)
MAGNESIUM SERPL-MCNC: 2.3 MG/DL (ref 1.6–2.6)
MCH RBC QN AUTO: 33.3 PG (ref 27–31)
MCH RBC QN AUTO: 33.4 PG (ref 27–31)
MCH RBC QN AUTO: 33.5 PG (ref 27–31)
MCH RBC QN AUTO: 33.7 PG (ref 27–31)
MCH RBC QN AUTO: 33.7 PG (ref 27–31)
MCH RBC QN AUTO: 33.8 PG (ref 27–31)
MCH RBC QN AUTO: 33.9 PG (ref 27–31)
MCH RBC QN AUTO: 33.9 PG (ref 27–31)
MCH RBC QN AUTO: 34 PG (ref 27–31)
MCH RBC QN AUTO: 34 PG (ref 27–31)
MCH RBC QN AUTO: 34.1 PG (ref 27–31)
MCH RBC QN AUTO: 34.1 PG (ref 27–31)
MCH RBC QN AUTO: 34.4 PG (ref 27–31)
MCH RBC QN AUTO: 34.5 PG (ref 27–31)
MCHC RBC AUTO-ENTMCNC: 30.7 G/DL (ref 32–36)
MCHC RBC AUTO-ENTMCNC: 31.2 G/DL (ref 32–36)
MCHC RBC AUTO-ENTMCNC: 31.5 G/DL (ref 32–36)
MCHC RBC AUTO-ENTMCNC: 31.7 G/DL (ref 32–36)
MCHC RBC AUTO-ENTMCNC: 32 G/DL (ref 32–36)
MCHC RBC AUTO-ENTMCNC: 33.2 G/DL (ref 32–36)
MCHC RBC AUTO-ENTMCNC: 33.3 G/DL (ref 32–36)
MCHC RBC AUTO-ENTMCNC: 33.5 G/DL (ref 32–36)
MCHC RBC AUTO-ENTMCNC: 33.6 G/DL (ref 32–36)
MCHC RBC AUTO-ENTMCNC: 33.6 G/DL (ref 32–36)
MCHC RBC AUTO-ENTMCNC: 33.8 G/DL (ref 32–36)
MCHC RBC AUTO-ENTMCNC: 33.8 G/DL (ref 32–36)
MCHC RBC AUTO-ENTMCNC: 34.2 G/DL (ref 32–36)
MCHC RBC AUTO-ENTMCNC: 34.2 G/DL (ref 32–36)
MCV RBC AUTO: 100 FL (ref 82–98)
MCV RBC AUTO: 101 FL (ref 82–98)
MCV RBC AUTO: 102 FL (ref 82–98)
MCV RBC AUTO: 106 FL (ref 82–98)
MCV RBC AUTO: 108 FL (ref 82–98)
MCV RBC AUTO: 109 FL (ref 82–98)
MCV RBC AUTO: 109 FL (ref 82–98)
MCV RBC AUTO: 110 FL (ref 82–98)
MCV RBC AUTO: 97 FL (ref 82–98)
MCV RBC AUTO: 99 FL (ref 82–98)
METAMYELOCYTES NFR BLD MANUAL: 4 %
METAMYELOCYTES NFR BLD MANUAL: 4 %
MODE: ABNORMAL
MONOCYTES # BLD AUTO: 0.8 K/UL (ref 0.3–1)
MONOCYTES # BLD AUTO: 0.8 K/UL (ref 0.3–1)
MONOCYTES # BLD AUTO: 1 K/UL (ref 0.3–1)
MONOCYTES # BLD AUTO: 1.1 K/UL (ref 0.3–1)
MONOCYTES # BLD AUTO: 1.2 K/UL (ref 0.3–1)
MONOCYTES # BLD AUTO: 1.2 K/UL (ref 0.3–1)
MONOCYTES # BLD AUTO: 1.5 K/UL (ref 0.3–1)
MONOCYTES # BLD AUTO: 2 K/UL (ref 0.3–1)
MONOCYTES # BLD AUTO: ABNORMAL K/UL (ref 0.3–1)
MONOCYTES NFR BLD: 11 % (ref 4–15)
MONOCYTES NFR BLD: 11.9 % (ref 4–15)
MONOCYTES NFR BLD: 12.6 % (ref 4–15)
MONOCYTES NFR BLD: 13 % (ref 4–15)
MONOCYTES NFR BLD: 14.9 % (ref 4–15)
MONOCYTES NFR BLD: 16.6 % (ref 4–15)
MONOCYTES NFR BLD: 16.9 % (ref 4–15)
MONOCYTES NFR BLD: 17 % (ref 4–15)
MONOCYTES NFR BLD: 17 % (ref 4–15)
MONOCYTES NFR BLD: 17.3 % (ref 4–15)
MONOCYTES NFR BLD: 18.3 % (ref 4–15)
MONOCYTES NFR BLD: 19.8 % (ref 4–15)
MONOCYTES NFR BLD: 19.8 % (ref 4–15)
MONOCYTES NFR BLD: 21.6 % (ref 4–15)
MV PEAK E VEL: 1.27 M/S
MV STENOSIS PRESSURE HALF TIME: 57.63 MS
MV VALVE AREA P 1/2 METHOD: 3.82 CM2
MYELOCYTES NFR BLD MANUAL: 2 %
MYELOCYTES NFR BLD MANUAL: 2 %
MYELOCYTES NFR BLD MANUAL: 3 %
MYELOCYTES NFR BLD MANUAL: 4 %
NEUTROPHILS # BLD AUTO: 2.8 K/UL (ref 1.8–7.7)
NEUTROPHILS # BLD AUTO: 3.1 K/UL (ref 1.8–7.7)
NEUTROPHILS # BLD AUTO: 3.4 K/UL (ref 1.8–7.7)
NEUTROPHILS # BLD AUTO: 3.9 K/UL (ref 1.8–7.7)
NEUTROPHILS # BLD AUTO: 3.9 K/UL (ref 1.8–7.7)
NEUTROPHILS # BLD AUTO: 4.1 K/UL (ref 1.8–7.7)
NEUTROPHILS # BLD AUTO: 4.5 K/UL (ref 1.8–7.7)
NEUTROPHILS # BLD AUTO: 4.5 K/UL (ref 1.8–7.7)
NEUTROPHILS # BLD AUTO: 4.8 K/UL (ref 1.8–7.7)
NEUTROPHILS # BLD AUTO: 5.1 K/UL (ref 1.8–7.7)
NEUTROPHILS # BLD AUTO: ABNORMAL K/UL (ref 1.8–7.7)
NEUTROPHILS NFR BLD: 42 % (ref 38–73)
NEUTROPHILS NFR BLD: 49.2 % (ref 38–73)
NEUTROPHILS NFR BLD: 52 % (ref 38–73)
NEUTROPHILS NFR BLD: 53 % (ref 38–73)
NEUTROPHILS NFR BLD: 54.4 % (ref 38–73)
NEUTROPHILS NFR BLD: 57 % (ref 38–73)
NEUTROPHILS NFR BLD: 57 % (ref 38–73)
NEUTROPHILS NFR BLD: 57.6 % (ref 38–73)
NEUTROPHILS NFR BLD: 58 % (ref 38–73)
NEUTROPHILS NFR BLD: 59.1 % (ref 38–73)
NEUTROPHILS NFR BLD: 59.2 % (ref 38–73)
NEUTROPHILS NFR BLD: 63.4 % (ref 38–73)
NEUTROPHILS NFR BLD: 66.4 % (ref 38–73)
NEUTROPHILS NFR BLD: 71.1 % (ref 38–73)
NEUTS BAND NFR BLD MANUAL: 4 %
NEUTS BAND NFR BLD MANUAL: 4 %
NEUTS BAND NFR BLD MANUAL: 6 %
NEUTS BAND NFR BLD MANUAL: 8 %
NRBC BLD-RTO: 0 /100 WBC
PATH REV BLD -IMP: NORMAL
PCO2 BLDA: 36.9 MMHG (ref 35–45)
PCO2 BLDA: 37.1 MMHG (ref 35–45)
PCO2 BLDA: 37.5 MMHG (ref 35–45)
PCO2 BLDA: 38.4 MMHG (ref 35–45)
PCO2 BLDA: 39 MMHG (ref 35–45)
PCO2 BLDA: 41.2 MMHG (ref 35–45)
PCO2 BLDA: 62.3 MMHG (ref 35–45)
PCO2 BLDA: 74.3 MMHG (ref 35–45)
PEEP: 10
PEEP: 5
PEEP: 8
PH SMN: 7.1 [PH] (ref 7.35–7.45)
PH SMN: 7.15 [PH] (ref 7.35–7.45)
PH SMN: 7.28 [PH] (ref 7.35–7.45)
PH SMN: 7.32 [PH] (ref 7.35–7.45)
PH SMN: 7.33 [PH] (ref 7.35–7.45)
PH SMN: 7.33 [PH] (ref 7.35–7.45)
PH SMN: 7.35 [PH] (ref 7.35–7.45)
PH SMN: 7.37 [PH] (ref 7.35–7.45)
PHOSPHATE SERPL-MCNC: 1.8 MG/DL (ref 2.7–4.5)
PHOSPHATE SERPL-MCNC: 2.3 MG/DL (ref 2.7–4.5)
PHOSPHATE SERPL-MCNC: 2.3 MG/DL (ref 2.7–4.5)
PHOSPHATE SERPL-MCNC: 3 MG/DL (ref 2.7–4.5)
PHOSPHATE SERPL-MCNC: 3.1 MG/DL (ref 2.7–4.5)
PHOSPHATE SERPL-MCNC: 3.1 MG/DL (ref 2.7–4.5)
PHOSPHATE SERPL-MCNC: 3.2 MG/DL (ref 2.7–4.5)
PHOSPHATE SERPL-MCNC: 3.3 MG/DL (ref 2.7–4.5)
PHOSPHATE SERPL-MCNC: 3.9 MG/DL (ref 2.7–4.5)
PHOSPHATE SERPL-MCNC: 4.3 MG/DL (ref 2.7–4.5)
PHOSPHATE SERPL-MCNC: 4.7 MG/DL (ref 2.7–4.5)
PISA MRMAX VEL: 2.59 M/S
PISA TR MAX VEL: 3.06 M/S
PLATELET # BLD AUTO: 125 K/UL (ref 150–450)
PLATELET # BLD AUTO: 125 K/UL (ref 150–450)
PLATELET # BLD AUTO: 129 K/UL (ref 150–450)
PLATELET # BLD AUTO: 23 K/UL (ref 150–450)
PLATELET # BLD AUTO: 32 K/UL (ref 150–450)
PLATELET # BLD AUTO: 49 K/UL (ref 150–450)
PLATELET # BLD AUTO: 73 K/UL (ref 150–450)
PLATELET # BLD AUTO: 75 K/UL (ref 150–450)
PLATELET # BLD AUTO: 80 K/UL (ref 150–450)
PLATELET # BLD AUTO: 81 K/UL (ref 150–450)
PLATELET # BLD AUTO: 89 K/UL (ref 150–450)
PLATELET # BLD AUTO: 92 K/UL (ref 150–450)
PLATELET # BLD AUTO: 94 K/UL (ref 150–450)
PLATELET # BLD AUTO: 98 K/UL (ref 150–450)
PLATELET BLD QL SMEAR: ABNORMAL
PMV BLD AUTO: 10.1 FL (ref 9.2–12.9)
PMV BLD AUTO: 10.2 FL (ref 9.2–12.9)
PMV BLD AUTO: 10.4 FL (ref 9.2–12.9)
PMV BLD AUTO: 10.4 FL (ref 9.2–12.9)
PMV BLD AUTO: 10.5 FL (ref 9.2–12.9)
PMV BLD AUTO: 10.6 FL (ref 9.2–12.9)
PMV BLD AUTO: 10.6 FL (ref 9.2–12.9)
PMV BLD AUTO: 10.7 FL (ref 9.2–12.9)
PMV BLD AUTO: 10.7 FL (ref 9.2–12.9)
PMV BLD AUTO: 10.9 FL (ref 9.2–12.9)
PMV BLD AUTO: 11 FL (ref 9.2–12.9)
PMV BLD AUTO: 11.5 FL (ref 9.2–12.9)
PMV BLD AUTO: 12 FL (ref 9.2–12.9)
PMV BLD AUTO: 12.2 FL (ref 9.2–12.9)
PO2 BLDA: 108 MMHG (ref 80–100)
PO2 BLDA: 184 MMHG (ref 80–100)
PO2 BLDA: 60 MMHG (ref 80–100)
PO2 BLDA: 65 MMHG (ref 80–100)
PO2 BLDA: 65 MMHG (ref 80–100)
PO2 BLDA: 71 MMHG (ref 80–100)
PO2 BLDA: 76 MMHG (ref 80–100)
PO2 BLDA: 83 MMHG (ref 80–100)
POC BE: -4 MMOL/L
POC BE: -4 MMOL/L
POC BE: -5 MMOL/L
POC BE: -6 MMOL/L
POC BE: -6 MMOL/L
POC BE: -7 MMOL/L
POC BE: -7 MMOL/L
POC BE: -8 MMOL/L
POC IONIZED CALCIUM: 1.1 MMOL/L (ref 1.06–1.42)
POC SATURATED O2: 83 % (ref 95–100)
POC SATURATED O2: 87 % (ref 95–100)
POC SATURATED O2: 92 % (ref 95–100)
POC SATURATED O2: 93 % (ref 95–100)
POC SATURATED O2: 95 % (ref 95–100)
POC SATURATED O2: 95 % (ref 95–100)
POC SATURATED O2: 98 % (ref 95–100)
POC SATURATED O2: 99 % (ref 95–100)
POCT GLUCOSE: 100 MG/DL (ref 70–110)
POCT GLUCOSE: 102 MG/DL (ref 70–110)
POCT GLUCOSE: 104 MG/DL (ref 70–110)
POCT GLUCOSE: 105 MG/DL (ref 70–110)
POCT GLUCOSE: 113 MG/DL (ref 70–110)
POCT GLUCOSE: 126 MG/DL (ref 70–110)
POCT GLUCOSE: 131 MG/DL (ref 70–110)
POCT GLUCOSE: 135 MG/DL (ref 70–110)
POCT GLUCOSE: 144 MG/DL (ref 70–110)
POCT GLUCOSE: 146 MG/DL (ref 70–110)
POCT GLUCOSE: 153 MG/DL (ref 70–110)
POCT GLUCOSE: 153 MG/DL (ref 70–110)
POCT GLUCOSE: 155 MG/DL (ref 70–110)
POCT GLUCOSE: 163 MG/DL (ref 70–110)
POCT GLUCOSE: 167 MG/DL (ref 70–110)
POCT GLUCOSE: 169 MG/DL (ref 70–110)
POCT GLUCOSE: 82 MG/DL (ref 70–110)
POCT GLUCOSE: 84 MG/DL (ref 70–110)
POCT GLUCOSE: 88 MG/DL (ref 70–110)
POCT GLUCOSE: 89 MG/DL (ref 70–110)
POCT GLUCOSE: 90 MG/DL (ref 70–110)
POCT GLUCOSE: 90 MG/DL (ref 70–110)
POCT GLUCOSE: 96 MG/DL (ref 70–110)
POCT GLUCOSE: 97 MG/DL (ref 70–110)
POCT GLUCOSE: 98 MG/DL (ref 70–110)
POLYCHROMASIA BLD QL SMEAR: ABNORMAL
POTASSIUM BLD-SCNC: 5 MMOL/L (ref 3.5–5.1)
POTASSIUM SERPL-SCNC: 4 MMOL/L (ref 3.5–5.1)
POTASSIUM SERPL-SCNC: 4.1 MMOL/L (ref 3.5–5.1)
POTASSIUM SERPL-SCNC: 4.2 MMOL/L (ref 3.5–5.1)
POTASSIUM SERPL-SCNC: 4.3 MMOL/L (ref 3.5–5.1)
POTASSIUM SERPL-SCNC: 4.4 MMOL/L (ref 3.5–5.1)
POTASSIUM SERPL-SCNC: 4.5 MMOL/L (ref 3.5–5.1)
POTASSIUM SERPL-SCNC: 4.6 MMOL/L (ref 3.5–5.1)
POTASSIUM SERPL-SCNC: 4.7 MMOL/L (ref 3.5–5.1)
POTASSIUM SERPL-SCNC: 4.7 MMOL/L (ref 3.5–5.1)
POTASSIUM SERPL-SCNC: 4.8 MMOL/L (ref 3.5–5.1)
POTASSIUM SERPL-SCNC: 4.8 MMOL/L (ref 3.5–5.1)
POTASSIUM SERPL-SCNC: 5 MMOL/L (ref 3.5–5.1)
POTASSIUM SERPL-SCNC: 5.3 MMOL/L (ref 3.5–5.1)
POTASSIUM SERPL-SCNC: 5.7 MMOL/L (ref 3.5–5.1)
PROCALCITONIN SERPL IA-MCNC: 13.32 NG/ML
PROT SERPL-MCNC: 4.7 G/DL (ref 6–8.4)
PROT SERPL-MCNC: 4.8 G/DL (ref 6–8.4)
PROT SERPL-MCNC: 5.1 G/DL (ref 6–8.4)
PROT SERPL-MCNC: 5.2 G/DL (ref 6–8.4)
PROT SERPL-MCNC: 5.3 G/DL (ref 6–8.4)
PROT SERPL-MCNC: 5.9 G/DL (ref 6–8.4)
PROTHROMBIN TIME: 13.1 SEC (ref 9–12.5)
PROTHROMBIN TIME: 14 SEC (ref 9–12.5)
PROTHROMBIN TIME: 14.4 SEC (ref 9–12.5)
PROTHROMBIN TIME: 15.6 SEC (ref 9–12.5)
PV MEAN GRADIENT: 2 MMHG
RA MAJOR: 4.89 CM
RA PRESSURE ESTIMATED: 3 MMHG
RA WIDTH: 3.6 CM
RBC # BLD AUTO: 3.32 M/UL (ref 4.6–6.2)
RBC # BLD AUTO: 3.52 M/UL (ref 4.6–6.2)
RBC # BLD AUTO: 3.54 M/UL (ref 4.6–6.2)
RBC # BLD AUTO: 3.62 M/UL (ref 4.6–6.2)
RBC # BLD AUTO: 3.66 M/UL (ref 4.6–6.2)
RBC # BLD AUTO: 3.71 M/UL (ref 4.6–6.2)
RBC # BLD AUTO: 3.82 M/UL (ref 4.6–6.2)
RBC # BLD AUTO: 3.86 M/UL (ref 4.6–6.2)
RBC # BLD AUTO: 3.86 M/UL (ref 4.6–6.2)
RBC # BLD AUTO: 4.18 M/UL (ref 4.6–6.2)
RBC # BLD AUTO: 4.19 M/UL (ref 4.6–6.2)
RBC # BLD AUTO: 4.21 M/UL (ref 4.6–6.2)
RBC # BLD AUTO: 4.27 M/UL (ref 4.6–6.2)
RBC # BLD AUTO: 4.29 M/UL (ref 4.6–6.2)
RV TB RVSP: 6 MMHG
SAMPLE: ABNORMAL
SARS-COV-2 RDRP RESP QL NAA+PROBE: NEGATIVE
SITE: ABNORMAL
SMUDGE CELLS BLD QL SMEAR: PRESENT
SODIUM BLD-SCNC: 122 MMOL/L (ref 136–145)
SODIUM SERPL-SCNC: 120 MMOL/L (ref 136–145)
SODIUM SERPL-SCNC: 121 MMOL/L (ref 136–145)
SODIUM SERPL-SCNC: 121 MMOL/L (ref 136–145)
SODIUM SERPL-SCNC: 122 MMOL/L (ref 136–145)
SODIUM SERPL-SCNC: 123 MMOL/L (ref 136–145)
SODIUM SERPL-SCNC: 125 MMOL/L (ref 136–145)
SODIUM SERPL-SCNC: 126 MMOL/L (ref 136–145)
SODIUM SERPL-SCNC: 128 MMOL/L (ref 136–145)
SODIUM SERPL-SCNC: 128 MMOL/L (ref 136–145)
SODIUM SERPL-SCNC: 130 MMOL/L (ref 136–145)
SODIUM SERPL-SCNC: 131 MMOL/L (ref 136–145)
SODIUM SERPL-SCNC: 132 MMOL/L (ref 136–145)
SODIUM SERPL-SCNC: 132 MMOL/L (ref 136–145)
SODIUM SERPL-SCNC: 133 MMOL/L (ref 136–145)
SODIUM SERPL-SCNC: 134 MMOL/L (ref 136–145)
SODIUM SERPL-SCNC: 135 MMOL/L (ref 136–145)
SPECIMEN SOURCE: NORMAL
STJ: 3.46 CM
STOMATOCYTES BLD QL SMEAR: PRESENT
TACROLIMUS BLD-MCNC: 10.2 NG/ML (ref 5–15)
TACROLIMUS BLD-MCNC: 10.3 NG/ML (ref 5–15)
TACROLIMUS BLD-MCNC: 10.7 NG/ML (ref 5–15)
TACROLIMUS BLD-MCNC: 10.8 NG/ML (ref 5–15)
TACROLIMUS BLD-MCNC: 14.6 NG/ML (ref 5–15)
TACROLIMUS BLD-MCNC: 4.8 NG/ML (ref 5–15)
TACROLIMUS BLD-MCNC: 7 NG/ML (ref 5–15)
TACROLIMUS BLD-MCNC: 7.7 NG/ML (ref 5–15)
TACROLIMUS BLD-MCNC: 8.5 NG/ML (ref 5–15)
TACROLIMUS BLD-MCNC: 8.6 NG/ML (ref 5–15)
TACROLIMUS BLD-MCNC: 9.8 NG/ML (ref 5–15)
TARGETS BLD QL SMEAR: ABNORMAL
TARGETS BLD QL SMEAR: ABNORMAL
TDI LATERAL: 0.17 M/S
TDI SEPTAL: 0.17 M/S
TDI: 0.17 M/S
TR MAX PG: 37 MMHG
TR MEAN GRADIENT: 35 MMHG
TRICUSPID ANNULAR PLANE SYSTOLIC EXCURSION: 2.13 CM
TRIGL SERPL-MCNC: 202 MG/DL (ref 30–150)
TRIGL SERPL-MCNC: 222 MG/DL (ref 30–150)
TROPONIN I SERPL DL<=0.01 NG/ML-MCNC: 0.01 NG/ML (ref 0–0.03)
TROPONIN I SERPL DL<=0.01 NG/ML-MCNC: <0.006 NG/ML (ref 0–0.03)
TROPONIN I SERPL DL<=0.01 NG/ML-MCNC: <0.006 NG/ML (ref 0–0.03)
TV REST PULMONARY ARTERY PRESSURE: 40 MMHG
VT: 460
VT: 550
WBC # BLD AUTO: 5.2 K/UL (ref 3.9–12.7)
WBC # BLD AUTO: 5.65 K/UL (ref 3.9–12.7)
WBC # BLD AUTO: 5.7 K/UL (ref 3.9–12.7)
WBC # BLD AUTO: 5.73 K/UL (ref 3.9–12.7)
WBC # BLD AUTO: 6.08 K/UL (ref 3.9–12.7)
WBC # BLD AUTO: 6.09 K/UL (ref 3.9–12.7)
WBC # BLD AUTO: 6.19 K/UL (ref 3.9–12.7)
WBC # BLD AUTO: 6.31 K/UL (ref 3.9–12.7)
WBC # BLD AUTO: 6.53 K/UL (ref 3.9–12.7)
WBC # BLD AUTO: 7.77 K/UL (ref 3.9–12.7)
WBC # BLD AUTO: 8.71 K/UL (ref 3.9–12.7)
WBC # BLD AUTO: 8.71 K/UL (ref 3.9–12.7)
WBC # BLD AUTO: 9.02 K/UL (ref 3.9–12.7)
WBC # BLD AUTO: 9.88 K/UL (ref 3.9–12.7)
WBC OTHER NFR BLD MANUAL: 3 %
WBC OTHER NFR BLD MANUAL: 3 %
Z-SCORE OF LEFT VENTRICULAR DIMENSION IN END DIASTOLE: -8.33
Z-SCORE OF LEFT VENTRICULAR DIMENSION IN END SYSTOLE: -5.75

## 2024-01-01 PROCEDURE — 85025 COMPLETE CBC W/AUTO DIFF WBC: CPT | Performed by: NURSE PRACTITIONER

## 2024-01-01 PROCEDURE — 80197 ASSAY OF TACROLIMUS: CPT | Performed by: INTERNAL MEDICINE

## 2024-01-01 PROCEDURE — 25000003 PHARM REV CODE 250: Performed by: FAMILY MEDICINE

## 2024-01-01 PROCEDURE — 21400001 HC TELEMETRY ROOM

## 2024-01-01 PROCEDURE — 5A1D70Z PERFORMANCE OF URINARY FILTRATION, INTERMITTENT, LESS THAN 6 HOURS PER DAY: ICD-10-PCS | Performed by: INTERNAL MEDICINE

## 2024-01-01 PROCEDURE — 25000003 PHARM REV CODE 250: Performed by: NURSE PRACTITIONER

## 2024-01-01 PROCEDURE — 94660 CPAP INITIATION&MGMT: CPT | Mod: XB

## 2024-01-01 PROCEDURE — 63600175 PHARM REV CODE 636 W HCPCS: Performed by: STUDENT IN AN ORGANIZED HEALTH CARE EDUCATION/TRAINING PROGRAM

## 2024-01-01 PROCEDURE — 86704 HEP B CORE ANTIBODY TOTAL: CPT | Performed by: SPECIALIST

## 2024-01-01 PROCEDURE — 87040 BLOOD CULTURE FOR BACTERIA: CPT | Mod: 59 | Performed by: NURSE PRACTITIONER

## 2024-01-01 PROCEDURE — 87340 HEPATITIS B SURFACE AG IA: CPT | Performed by: SPECIALIST

## 2024-01-01 PROCEDURE — 27000221 HC OXYGEN, UP TO 24 HOURS

## 2024-01-01 PROCEDURE — 94003 VENT MGMT INPAT SUBQ DAY: CPT

## 2024-01-01 PROCEDURE — 82803 BLOOD GASES ANY COMBINATION: CPT

## 2024-01-01 PROCEDURE — 25000242 PHARM REV CODE 250 ALT 637 W/ HCPCS: Performed by: STUDENT IN AN ORGANIZED HEALTH CARE EDUCATION/TRAINING PROGRAM

## 2024-01-01 PROCEDURE — 25000003 PHARM REV CODE 250: Performed by: SPECIALIST

## 2024-01-01 PROCEDURE — 90937 HEMODIALYSIS REPEATED EVAL: CPT | Mod: ,,, | Performed by: INTERNAL MEDICINE

## 2024-01-01 PROCEDURE — 63600175 PHARM REV CODE 636 W HCPCS: Mod: JZ,JG | Performed by: SPECIALIST

## 2024-01-01 PROCEDURE — 27200966 HC CLOSED SUCTION SYSTEM

## 2024-01-01 PROCEDURE — 83735 ASSAY OF MAGNESIUM: CPT | Performed by: SPECIALIST

## 2024-01-01 PROCEDURE — 20000000 HC ICU ROOM

## 2024-01-01 PROCEDURE — A4217 STERILE WATER/SALINE, 500 ML: HCPCS | Performed by: NURSE PRACTITIONER

## 2024-01-01 PROCEDURE — 84478 ASSAY OF TRIGLYCERIDES: CPT | Performed by: NURSE PRACTITIONER

## 2024-01-01 PROCEDURE — 87385 HISTOPLASMA CAPSUL AG IA: CPT | Performed by: INTERNAL MEDICINE

## 2024-01-01 PROCEDURE — 94761 N-INVAS EAR/PLS OXIMETRY MLT: CPT

## 2024-01-01 PROCEDURE — 84100 ASSAY OF PHOSPHORUS: CPT | Performed by: SPECIALIST

## 2024-01-01 PROCEDURE — 37799 UNLISTED PX VASCULAR SURGERY: CPT

## 2024-01-01 PROCEDURE — 99900035 HC TECH TIME PER 15 MIN (STAT)

## 2024-01-01 PROCEDURE — 87205 SMEAR GRAM STAIN: CPT | Performed by: NURSE PRACTITIONER

## 2024-01-01 PROCEDURE — 94640 AIRWAY INHALATION TREATMENT: CPT

## 2024-01-01 PROCEDURE — 27100171 HC OXYGEN HIGH FLOW UP TO 24 HOURS

## 2024-01-01 PROCEDURE — 80048 BASIC METABOLIC PNL TOTAL CA: CPT | Mod: XB | Performed by: NURSE PRACTITIONER

## 2024-01-01 PROCEDURE — 85027 COMPLETE CBC AUTOMATED: CPT | Performed by: STUDENT IN AN ORGANIZED HEALTH CARE EDUCATION/TRAINING PROGRAM

## 2024-01-01 PROCEDURE — 94002 VENT MGMT INPAT INIT DAY: CPT

## 2024-01-01 PROCEDURE — 80053 COMPREHEN METABOLIC PANEL: CPT | Performed by: NURSE PRACTITIONER

## 2024-01-01 PROCEDURE — 99223 1ST HOSP IP/OBS HIGH 75: CPT | Mod: 25,,, | Performed by: INTERNAL MEDICINE

## 2024-01-01 PROCEDURE — 99232 SBSQ HOSP IP/OBS MODERATE 35: CPT | Mod: ,,, | Performed by: INTERNAL MEDICINE

## 2024-01-01 PROCEDURE — 36415 COLL VENOUS BLD VENIPUNCTURE: CPT | Mod: XB | Performed by: STUDENT IN AN ORGANIZED HEALTH CARE EDUCATION/TRAINING PROGRAM

## 2024-01-01 PROCEDURE — 85730 THROMBOPLASTIN TIME PARTIAL: CPT | Mod: 91 | Performed by: SPECIALIST

## 2024-01-01 PROCEDURE — 31500 INSERT EMERGENCY AIRWAY: CPT

## 2024-01-01 PROCEDURE — 02HV33Z INSERTION OF INFUSION DEVICE INTO SUPERIOR VENA CAVA, PERCUTANEOUS APPROACH: ICD-10-PCS | Performed by: SPECIALIST

## 2024-01-01 PROCEDURE — 63600175 PHARM REV CODE 636 W HCPCS: Performed by: INTERNAL MEDICINE

## 2024-01-01 PROCEDURE — 36620 INSERTION CATHETER ARTERY: CPT

## 2024-01-01 PROCEDURE — U0002 COVID-19 LAB TEST NON-CDC: HCPCS | Performed by: REGISTERED NURSE

## 2024-01-01 PROCEDURE — 94761 N-INVAS EAR/PLS OXIMETRY MLT: CPT | Mod: XB

## 2024-01-01 PROCEDURE — 25000003 PHARM REV CODE 250: Performed by: INTERNAL MEDICINE

## 2024-01-01 PROCEDURE — 80053 COMPREHEN METABOLIC PANEL: CPT | Performed by: STUDENT IN AN ORGANIZED HEALTH CARE EDUCATION/TRAINING PROGRAM

## 2024-01-01 PROCEDURE — 99900026 HC AIRWAY MAINTENANCE (STAT)

## 2024-01-01 PROCEDURE — 25000003 PHARM REV CODE 250: Performed by: STUDENT IN AN ORGANIZED HEALTH CARE EDUCATION/TRAINING PROGRAM

## 2024-01-01 PROCEDURE — 82140 ASSAY OF AMMONIA: CPT | Performed by: NURSE PRACTITIONER

## 2024-01-01 PROCEDURE — 63600175 PHARM REV CODE 636 W HCPCS: Performed by: NURSE PRACTITIONER

## 2024-01-01 PROCEDURE — 99233 SBSQ HOSP IP/OBS HIGH 50: CPT | Mod: ,,, | Performed by: INTERNAL MEDICINE

## 2024-01-01 PROCEDURE — 36600 WITHDRAWAL OF ARTERIAL BLOOD: CPT

## 2024-01-01 PROCEDURE — 11000001 HC ACUTE MED/SURG PRIVATE ROOM

## 2024-01-01 PROCEDURE — 83605 ASSAY OF LACTIC ACID: CPT | Performed by: STUDENT IN AN ORGANIZED HEALTH CARE EDUCATION/TRAINING PROGRAM

## 2024-01-01 PROCEDURE — 51798 US URINE CAPACITY MEASURE: CPT

## 2024-01-01 PROCEDURE — 87040 BLOOD CULTURE FOR BACTERIA: CPT | Performed by: STUDENT IN AN ORGANIZED HEALTH CARE EDUCATION/TRAINING PROGRAM

## 2024-01-01 PROCEDURE — 80100008 HC CRRT DAILY MAINTENANCE

## 2024-01-01 PROCEDURE — 85730 THROMBOPLASTIN TIME PARTIAL: CPT | Performed by: REGISTERED NURSE

## 2024-01-01 PROCEDURE — 25000003 PHARM REV CODE 250: Performed by: EMERGENCY MEDICINE

## 2024-01-01 PROCEDURE — 93005 ELECTROCARDIOGRAM TRACING: CPT

## 2024-01-01 PROCEDURE — 85730 THROMBOPLASTIN TIME PARTIAL: CPT | Performed by: SPECIALIST

## 2024-01-01 PROCEDURE — 83735 ASSAY OF MAGNESIUM: CPT | Mod: 91 | Performed by: SPECIALIST

## 2024-01-01 PROCEDURE — 83735 ASSAY OF MAGNESIUM: CPT | Performed by: STUDENT IN AN ORGANIZED HEALTH CARE EDUCATION/TRAINING PROGRAM

## 2024-01-01 PROCEDURE — 85730 THROMBOPLASTIN TIME PARTIAL: CPT | Mod: 91 | Performed by: STUDENT IN AN ORGANIZED HEALTH CARE EDUCATION/TRAINING PROGRAM

## 2024-01-01 PROCEDURE — 85610 PROTHROMBIN TIME: CPT | Performed by: NURSE PRACTITIONER

## 2024-01-01 PROCEDURE — 99291 CRITICAL CARE FIRST HOUR: CPT | Mod: ,,, | Performed by: INTERNAL MEDICINE

## 2024-01-01 PROCEDURE — 80053 COMPREHEN METABOLIC PANEL: CPT | Performed by: SPECIALIST

## 2024-01-01 PROCEDURE — 27202415 HC CARTRIDGE, CRRT

## 2024-01-01 PROCEDURE — 84484 ASSAY OF TROPONIN QUANT: CPT | Performed by: REGISTERED NURSE

## 2024-01-01 PROCEDURE — 87449 NOS EACH ORGANISM AG IA: CPT | Performed by: INTERNAL MEDICINE

## 2024-01-01 PROCEDURE — 90945 DIALYSIS ONE EVALUATION: CPT | Mod: ,,, | Performed by: INTERNAL MEDICINE

## 2024-01-01 PROCEDURE — 86635 COCCIDIOIDES ANTIBODY: CPT | Performed by: NURSE PRACTITIONER

## 2024-01-01 PROCEDURE — 99406 BEHAV CHNG SMOKING 3-10 MIN: CPT | Mod: S$GLB,,,

## 2024-01-01 PROCEDURE — 87389 HIV-1 AG W/HIV-1&-2 AB AG IA: CPT | Performed by: REGISTERED NURSE

## 2024-01-01 PROCEDURE — 99222 1ST HOSP IP/OBS MODERATE 55: CPT | Mod: ,,, | Performed by: INTERNAL MEDICINE

## 2024-01-01 PROCEDURE — 96374 THER/PROPH/DIAG INJ IV PUSH: CPT

## 2024-01-01 PROCEDURE — 36415 COLL VENOUS BLD VENIPUNCTURE: CPT | Performed by: STUDENT IN AN ORGANIZED HEALTH CARE EDUCATION/TRAINING PROGRAM

## 2024-01-01 PROCEDURE — 94760 N-INVAS EAR/PLS OXIMETRY 1: CPT

## 2024-01-01 PROCEDURE — 80197 ASSAY OF TACROLIMUS: CPT | Performed by: STUDENT IN AN ORGANIZED HEALTH CARE EDUCATION/TRAINING PROGRAM

## 2024-01-01 PROCEDURE — 85025 COMPLETE CBC W/AUTO DIFF WBC: CPT | Performed by: STUDENT IN AN ORGANIZED HEALTH CARE EDUCATION/TRAINING PROGRAM

## 2024-01-01 PROCEDURE — 63600175 PHARM REV CODE 636 W HCPCS

## 2024-01-01 PROCEDURE — 80069 RENAL FUNCTION PANEL: CPT | Mod: 91 | Performed by: INTERNAL MEDICINE

## 2024-01-01 PROCEDURE — 85007 BL SMEAR W/DIFF WBC COUNT: CPT | Performed by: STUDENT IN AN ORGANIZED HEALTH CARE EDUCATION/TRAINING PROGRAM

## 2024-01-01 PROCEDURE — 83735 ASSAY OF MAGNESIUM: CPT | Performed by: NURSE PRACTITIONER

## 2024-01-01 PROCEDURE — 94799 UNLISTED PULMONARY SVC/PX: CPT

## 2024-01-01 PROCEDURE — 99233 SBSQ HOSP IP/OBS HIGH 50: CPT | Mod: 95,,, | Performed by: INTERNAL MEDICINE

## 2024-01-01 PROCEDURE — 84478 ASSAY OF TRIGLYCERIDES: CPT

## 2024-01-01 PROCEDURE — 83735 ASSAY OF MAGNESIUM: CPT | Mod: 91 | Performed by: INTERNAL MEDICINE

## 2024-01-01 PROCEDURE — 99291 CRITICAL CARE FIRST HOUR: CPT | Mod: 25,,, | Performed by: INTERNAL MEDICINE

## 2024-01-01 PROCEDURE — 0BH17EZ INSERTION OF ENDOTRACHEAL AIRWAY INTO TRACHEA, VIA NATURAL OR ARTIFICIAL OPENING: ICD-10-PCS | Performed by: SPECIALIST

## 2024-01-01 PROCEDURE — 85025 COMPLETE CBC W/AUTO DIFF WBC: CPT | Performed by: SPECIALIST

## 2024-01-01 PROCEDURE — 90945 DIALYSIS ONE EVALUATION: CPT

## 2024-01-01 PROCEDURE — 87502 INFLUENZA DNA AMP PROBE: CPT | Performed by: REGISTERED NURSE

## 2024-01-01 PROCEDURE — 84484 ASSAY OF TROPONIN QUANT: CPT | Performed by: STUDENT IN AN ORGANIZED HEALTH CARE EDUCATION/TRAINING PROGRAM

## 2024-01-01 PROCEDURE — 85060 BLOOD SMEAR INTERPRETATION: CPT | Mod: ,,, | Performed by: PATHOLOGY

## 2024-01-01 PROCEDURE — 85610 PROTHROMBIN TIME: CPT | Performed by: STUDENT IN AN ORGANIZED HEALTH CARE EDUCATION/TRAINING PROGRAM

## 2024-01-01 PROCEDURE — 83735 ASSAY OF MAGNESIUM: CPT | Performed by: INTERNAL MEDICINE

## 2024-01-01 PROCEDURE — 85025 COMPLETE CBC W/AUTO DIFF WBC: CPT | Performed by: REGISTERED NURSE

## 2024-01-01 PROCEDURE — 96361 HYDRATE IV INFUSION ADD-ON: CPT

## 2024-01-01 PROCEDURE — 25000003 PHARM REV CODE 250

## 2024-01-01 PROCEDURE — 82248 BILIRUBIN DIRECT: CPT | Performed by: INTERNAL MEDICINE

## 2024-01-01 PROCEDURE — 86403 PARTICLE AGGLUT ANTBDY SCRN: CPT | Performed by: INTERNAL MEDICINE

## 2024-01-01 PROCEDURE — 82800 BLOOD PH: CPT

## 2024-01-01 PROCEDURE — 05HN33Z INSERTION OF INFUSION DEVICE INTO LEFT INTERNAL JUGULAR VEIN, PERCUTANEOUS APPROACH: ICD-10-PCS | Performed by: SPECIALIST

## 2024-01-01 PROCEDURE — 36415 COLL VENOUS BLD VENIPUNCTURE: CPT | Performed by: NURSE PRACTITIONER

## 2024-01-01 PROCEDURE — 94760 N-INVAS EAR/PLS OXIMETRY 1: CPT | Mod: XB

## 2024-01-01 PROCEDURE — 83880 ASSAY OF NATRIURETIC PEPTIDE: CPT | Performed by: REGISTERED NURSE

## 2024-01-01 PROCEDURE — 63600175 PHARM REV CODE 636 W HCPCS: Performed by: SPECIALIST

## 2024-01-01 PROCEDURE — 5A09357 ASSISTANCE WITH RESPIRATORY VENTILATION, LESS THAN 24 CONSECUTIVE HOURS, CONTINUOUS POSITIVE AIRWAY PRESSURE: ICD-10-PCS | Performed by: INTERNAL MEDICINE

## 2024-01-01 PROCEDURE — 63600175 PHARM REV CODE 636 W HCPCS: Mod: JG | Performed by: SPECIALIST

## 2024-01-01 PROCEDURE — 99223 1ST HOSP IP/OBS HIGH 75: CPT | Mod: 95,,, | Performed by: INTERNAL MEDICINE

## 2024-01-01 PROCEDURE — 80069 RENAL FUNCTION PANEL: CPT | Performed by: INTERNAL MEDICINE

## 2024-01-01 PROCEDURE — 87449 NOS EACH ORGANISM AG IA: CPT | Performed by: NURSE PRACTITIONER

## 2024-01-01 PROCEDURE — 36415 COLL VENOUS BLD VENIPUNCTURE: CPT | Mod: XB | Performed by: NURSE PRACTITIONER

## 2024-01-01 PROCEDURE — 84145 PROCALCITONIN (PCT): CPT | Performed by: STUDENT IN AN ORGANIZED HEALTH CARE EDUCATION/TRAINING PROGRAM

## 2024-01-01 PROCEDURE — 80197 ASSAY OF TACROLIMUS: CPT | Mod: 91 | Performed by: SPECIALIST

## 2024-01-01 PROCEDURE — 5A1955Z RESPIRATORY VENTILATION, GREATER THAN 96 CONSECUTIVE HOURS: ICD-10-PCS | Performed by: INTERNAL MEDICINE

## 2024-01-01 PROCEDURE — 93010 ELECTROCARDIOGRAM REPORT: CPT | Mod: ,,, | Performed by: INTERNAL MEDICINE

## 2024-01-01 PROCEDURE — 80048 BASIC METABOLIC PNL TOTAL CA: CPT | Mod: 91 | Performed by: NURSE PRACTITIONER

## 2024-01-01 PROCEDURE — 82746 ASSAY OF FOLIC ACID SERUM: CPT | Performed by: SPECIALIST

## 2024-01-01 PROCEDURE — 27000190 HC CPAP FULL FACE MASK W/VALVE

## 2024-01-01 PROCEDURE — 85610 PROTHROMBIN TIME: CPT | Performed by: INTERNAL MEDICINE

## 2024-01-01 PROCEDURE — 85027 COMPLETE CBC AUTOMATED: CPT | Mod: 91 | Performed by: STUDENT IN AN ORGANIZED HEALTH CARE EDUCATION/TRAINING PROGRAM

## 2024-01-01 PROCEDURE — 86706 HEP B SURFACE ANTIBODY: CPT | Performed by: SPECIALIST

## 2024-01-01 PROCEDURE — 80069 RENAL FUNCTION PANEL: CPT | Mod: 91 | Performed by: SPECIALIST

## 2024-01-01 PROCEDURE — 99285 EMERGENCY DEPT VISIT HI MDM: CPT | Mod: 25

## 2024-01-01 PROCEDURE — 86706 HEP B SURFACE ANTIBODY: CPT | Mod: 91 | Performed by: SPECIALIST

## 2024-01-01 PROCEDURE — 80053 COMPREHEN METABOLIC PANEL: CPT | Performed by: REGISTERED NURSE

## 2024-01-01 PROCEDURE — 85730 THROMBOPLASTIN TIME PARTIAL: CPT | Performed by: STUDENT IN AN ORGANIZED HEALTH CARE EDUCATION/TRAINING PROGRAM

## 2024-01-01 PROCEDURE — 85610 PROTHROMBIN TIME: CPT | Performed by: REGISTERED NURSE

## 2024-01-01 PROCEDURE — 80048 BASIC METABOLIC PNL TOTAL CA: CPT | Performed by: STUDENT IN AN ORGANIZED HEALTH CARE EDUCATION/TRAINING PROGRAM

## 2024-01-01 PROCEDURE — 99222 1ST HOSP IP/OBS MODERATE 55: CPT | Mod: 95,,, | Performed by: INTERNAL MEDICINE

## 2024-01-01 PROCEDURE — 84100 ASSAY OF PHOSPHORUS: CPT | Performed by: STUDENT IN AN ORGANIZED HEALTH CARE EDUCATION/TRAINING PROGRAM

## 2024-01-01 PROCEDURE — 82533 TOTAL CORTISOL: CPT | Performed by: STUDENT IN AN ORGANIZED HEALTH CARE EDUCATION/TRAINING PROGRAM

## 2024-01-01 PROCEDURE — 80048 BASIC METABOLIC PNL TOTAL CA: CPT | Mod: 91,XB | Performed by: NURSE PRACTITIONER

## 2024-01-01 PROCEDURE — 80197 ASSAY OF TACROLIMUS: CPT | Performed by: SPECIALIST

## 2024-01-01 PROCEDURE — 27000923 HC TRIALYSIS CATHETER, ANY SIZE

## 2024-01-01 PROCEDURE — 87070 CULTURE OTHR SPECIMN AEROBIC: CPT | Performed by: NURSE PRACTITIONER

## 2024-01-01 RX ORDER — MORPHINE SULFATE 4 MG/ML
4 INJECTION, SOLUTION INTRAMUSCULAR; INTRAVENOUS
Status: DISCONTINUED | OUTPATIENT
Start: 2024-01-01 | End: 2024-01-01 | Stop reason: HOSPADM

## 2024-01-01 RX ORDER — MORPHINE SULFATE 10 MG/ML
10 INJECTION INTRAMUSCULAR; INTRAVENOUS; SUBCUTANEOUS ONCE
Status: COMPLETED | OUTPATIENT
Start: 2024-01-01 | End: 2024-01-01

## 2024-01-01 RX ORDER — MIDAZOLAM HYDROCHLORIDE 1 MG/ML
4 INJECTION INTRAMUSCULAR; INTRAVENOUS
Status: DISCONTINUED | OUTPATIENT
Start: 2024-01-01 | End: 2024-01-01 | Stop reason: HOSPADM

## 2024-01-01 RX ORDER — NALOXONE HCL 0.4 MG/ML
0.02 VIAL (ML) INJECTION
Status: DISCONTINUED | OUTPATIENT
Start: 2024-01-01 | End: 2024-01-01

## 2024-01-01 RX ORDER — HEPARIN SODIUM,PORCINE/D5W 25000/250
0-40 INTRAVENOUS SOLUTION INTRAVENOUS CONTINUOUS
Status: DISCONTINUED | OUTPATIENT
Start: 2024-01-01 | End: 2024-01-01

## 2024-01-01 RX ORDER — SODIUM CHLORIDE 9 MG/ML
INJECTION, SOLUTION INTRAVENOUS CONTINUOUS
Status: DISCONTINUED | OUTPATIENT
Start: 2024-01-01 | End: 2024-01-01

## 2024-01-01 RX ORDER — MAGNESIUM SULFATE 1 G/100ML
1 INJECTION INTRAVENOUS ONCE
Status: COMPLETED | OUTPATIENT
Start: 2024-01-01 | End: 2024-01-01

## 2024-01-01 RX ORDER — GABAPENTIN 100 MG/1
200 CAPSULE ORAL 3 TIMES DAILY PRN
Status: DISCONTINUED | OUTPATIENT
Start: 2024-01-01 | End: 2024-01-01

## 2024-01-01 RX ORDER — MUPIROCIN 20 MG/G
OINTMENT TOPICAL 2 TIMES DAILY
Status: COMPLETED | OUTPATIENT
Start: 2024-01-01 | End: 2024-01-01

## 2024-01-01 RX ORDER — MYCOPHENOLATE MOFETIL 250 MG/1
500 CAPSULE ORAL 2 TIMES DAILY
Status: DISCONTINUED | OUTPATIENT
Start: 2024-01-01 | End: 2024-01-01

## 2024-01-01 RX ORDER — FENTANYL CITRATE 50 UG/ML
100 INJECTION, SOLUTION INTRAMUSCULAR; INTRAVENOUS ONCE
Status: COMPLETED | OUTPATIENT
Start: 2024-01-01 | End: 2024-01-01

## 2024-01-01 RX ORDER — MAGNESIUM SULFATE HEPTAHYDRATE 40 MG/ML
2 INJECTION, SOLUTION INTRAVENOUS ONCE
Status: COMPLETED | OUTPATIENT
Start: 2024-01-01 | End: 2024-01-01

## 2024-01-01 RX ORDER — IBUPROFEN 200 MG
16 TABLET ORAL
Status: DISCONTINUED | OUTPATIENT
Start: 2024-01-01 | End: 2024-01-01 | Stop reason: SDUPTHER

## 2024-01-01 RX ORDER — SODIUM CHLORIDE 9 MG/ML
INJECTION, SOLUTION INTRAVENOUS CONTINUOUS
Status: ACTIVE | OUTPATIENT
Start: 2024-01-01 | End: 2024-01-01

## 2024-01-01 RX ORDER — AMOXICILLIN 250 MG
1 CAPSULE ORAL 2 TIMES DAILY
Status: DISCONTINUED | OUTPATIENT
Start: 2024-01-01 | End: 2024-01-01

## 2024-01-01 RX ORDER — DILTIAZEM HYDROCHLORIDE 30 MG/1
30 TABLET, FILM COATED ORAL EVERY 8 HOURS
Status: DISCONTINUED | OUTPATIENT
Start: 2024-01-01 | End: 2024-01-01

## 2024-01-01 RX ORDER — HYDROCODONE BITARTRATE AND ACETAMINOPHEN 5; 325 MG/1; MG/1
1 TABLET ORAL EVERY 6 HOURS PRN
Status: DISCONTINUED | OUTPATIENT
Start: 2024-01-01 | End: 2024-01-01

## 2024-01-01 RX ORDER — BUPROPION HYDROCHLORIDE 150 MG/1
150 TABLET, EXTENDED RELEASE ORAL 2 TIMES DAILY
COMMUNITY
Start: 2023-01-01

## 2024-01-01 RX ORDER — TACROLIMUS 0.5 MG/1
0.5 CAPSULE ORAL EVERY MORNING
Status: DISCONTINUED | OUTPATIENT
Start: 2024-01-01 | End: 2024-01-01

## 2024-01-01 RX ORDER — CHLORHEXIDINE GLUCONATE ORAL RINSE 1.2 MG/ML
15 SOLUTION DENTAL 2 TIMES DAILY
Status: DISCONTINUED | OUTPATIENT
Start: 2024-01-01 | End: 2024-01-01

## 2024-01-01 RX ORDER — DILTIAZEM HCL/D5W 125 MG/125
5 PLASTIC BAG, INJECTION (ML) INTRAVENOUS CONTINUOUS
Status: DISCONTINUED | OUTPATIENT
Start: 2024-01-01 | End: 2024-01-01

## 2024-01-01 RX ORDER — ACETAMINOPHEN 650 MG/20.3ML
650 LIQUID ORAL EVERY 4 HOURS PRN
Status: DISCONTINUED | OUTPATIENT
Start: 2024-01-01 | End: 2024-01-01

## 2024-01-01 RX ORDER — NOREPINEPHRINE BITARTRATE/D5W 4MG/250ML
PLASTIC BAG, INJECTION (ML) INTRAVENOUS
Status: COMPLETED
Start: 2024-01-01 | End: 2024-01-01

## 2024-01-01 RX ORDER — GABAPENTIN 300 MG/1
600 CAPSULE ORAL 2 TIMES DAILY
Status: DISCONTINUED | OUTPATIENT
Start: 2024-01-01 | End: 2024-01-01

## 2024-01-01 RX ORDER — FLUTICASONE PROPIONATE AND SALMETEROL 250; 50 UG/1; UG/1
1 POWDER RESPIRATORY (INHALATION) 2 TIMES DAILY
COMMUNITY
Start: 2023-01-01

## 2024-01-01 RX ORDER — GUAIFENESIN 600 MG/1
600 TABLET, EXTENDED RELEASE ORAL 2 TIMES DAILY
Status: DISCONTINUED | OUTPATIENT
Start: 2024-01-01 | End: 2024-01-01

## 2024-01-01 RX ORDER — GABAPENTIN 300 MG/1
2 CAPSULE ORAL 2 TIMES DAILY
COMMUNITY
Start: 2023-01-13

## 2024-01-01 RX ORDER — ACETAMINOPHEN 325 MG/1
650 TABLET ORAL EVERY 4 HOURS PRN
Status: DISCONTINUED | OUTPATIENT
Start: 2024-01-01 | End: 2024-01-01

## 2024-01-01 RX ORDER — LINEZOLID 600 MG/1
600 TABLET, FILM COATED ORAL EVERY 12 HOURS
Status: DISCONTINUED | OUTPATIENT
Start: 2024-01-01 | End: 2024-01-01

## 2024-01-01 RX ORDER — LEVOFLOXACIN 5 MG/ML
750 INJECTION, SOLUTION INTRAVENOUS
Status: COMPLETED | OUTPATIENT
Start: 2024-01-01 | End: 2024-01-01

## 2024-01-01 RX ORDER — TRAZODONE HYDROCHLORIDE 50 MG/1
50 TABLET ORAL NIGHTLY PRN
Status: DISCONTINUED | OUTPATIENT
Start: 2024-01-01 | End: 2024-01-01

## 2024-01-01 RX ORDER — TACROLIMUS 1 MG/1
1 CAPSULE ORAL EVERY MORNING
Status: DISCONTINUED | OUTPATIENT
Start: 2024-01-01 | End: 2024-01-01

## 2024-01-01 RX ORDER — VASOPRESSIN IN DEXTROSE 5 % 25/250 ML
0.04 PLASTIC BAG, INJECTION (ML) INTRAVENOUS CONTINUOUS
Status: DISCONTINUED | OUTPATIENT
Start: 2024-01-01 | End: 2024-01-01

## 2024-01-01 RX ORDER — TACROLIMUS 0.5 MG/1
0.5 CAPSULE ORAL 2 TIMES DAILY
Status: DISCONTINUED | OUTPATIENT
Start: 2024-01-01 | End: 2024-01-01

## 2024-01-01 RX ORDER — IBUPROFEN 200 MG
24 TABLET ORAL
Status: DISCONTINUED | OUTPATIENT
Start: 2024-01-01 | End: 2024-01-01 | Stop reason: SDUPTHER

## 2024-01-01 RX ORDER — LEVOFLOXACIN 5 MG/ML
750 INJECTION, SOLUTION INTRAVENOUS
Status: DISCONTINUED | OUTPATIENT
Start: 2024-01-01 | End: 2024-01-01

## 2024-01-01 RX ORDER — SODIUM CHLORIDE, SODIUM LACTATE, POTASSIUM CHLORIDE, CALCIUM CHLORIDE 600; 310; 30; 20 MG/100ML; MG/100ML; MG/100ML; MG/100ML
INJECTION, SOLUTION INTRAVENOUS CONTINUOUS
Status: ACTIVE | OUTPATIENT
Start: 2024-01-01 | End: 2024-01-01

## 2024-01-01 RX ORDER — MAGNESIUM SULFATE HEPTAHYDRATE 40 MG/ML
2 INJECTION, SOLUTION INTRAVENOUS
Status: DISCONTINUED | OUTPATIENT
Start: 2024-01-01 | End: 2024-01-01

## 2024-01-01 RX ORDER — IPRATROPIUM BROMIDE AND ALBUTEROL SULFATE 2.5; .5 MG/3ML; MG/3ML
3 SOLUTION RESPIRATORY (INHALATION)
Status: DISCONTINUED | OUTPATIENT
Start: 2024-01-01 | End: 2024-01-01

## 2024-01-01 RX ORDER — INSULIN ASPART 100 [IU]/ML
0-10 INJECTION, SOLUTION INTRAVENOUS; SUBCUTANEOUS EVERY 6 HOURS PRN
Status: DISCONTINUED | OUTPATIENT
Start: 2024-01-01 | End: 2024-01-01

## 2024-01-01 RX ORDER — FENTANYL CITRATE 50 UG/ML
INJECTION, SOLUTION INTRAMUSCULAR; INTRAVENOUS
Status: COMPLETED
Start: 2024-01-01 | End: 2024-01-01

## 2024-01-01 RX ORDER — PROPOFOL 10 MG/ML
0-50 INJECTION, EMULSION INTRAVENOUS CONTINUOUS
Status: DISCONTINUED | OUTPATIENT
Start: 2024-01-01 | End: 2024-01-01

## 2024-01-01 RX ORDER — NOREPINEPHRINE BITARTRATE/D5W 4MG/250ML
0-3 PLASTIC BAG, INJECTION (ML) INTRAVENOUS CONTINUOUS
Status: DISCONTINUED | OUTPATIENT
Start: 2024-01-01 | End: 2024-01-01

## 2024-01-01 RX ORDER — DILTIAZEM HYDROCHLORIDE 5 MG/ML
15 INJECTION INTRAVENOUS
Status: COMPLETED | OUTPATIENT
Start: 2024-01-01 | End: 2024-01-01

## 2024-01-01 RX ORDER — METOPROLOL TARTRATE 25 MG/1
12.5 TABLET ORAL 2 TIMES DAILY
Status: DISCONTINUED | OUTPATIENT
Start: 2024-01-01 | End: 2024-01-01

## 2024-01-01 RX ORDER — TACROLIMUS 1 MG/1
1 CAPSULE ORAL 2 TIMES DAILY
Status: DISCONTINUED | OUTPATIENT
Start: 2024-01-01 | End: 2024-01-01

## 2024-01-01 RX ORDER — ROCURONIUM BROMIDE 10 MG/ML
100 INJECTION, SOLUTION INTRAVENOUS ONCE
Status: COMPLETED | OUTPATIENT
Start: 2024-01-01 | End: 2024-01-01

## 2024-01-01 RX ORDER — ESCITALOPRAM OXALATE 10 MG/1
10 TABLET ORAL DAILY
Status: DISCONTINUED | OUTPATIENT
Start: 2024-01-01 | End: 2024-01-01

## 2024-01-01 RX ORDER — ROCURONIUM BROMIDE 10 MG/ML
INJECTION, SOLUTION INTRAVENOUS
Status: COMPLETED
Start: 2024-01-01 | End: 2024-01-01

## 2024-01-01 RX ORDER — FENTANYL CITRATE-0.9 % NACL/PF 10 MCG/ML
0-200 PLASTIC BAG, INJECTION (ML) INTRAVENOUS CONTINUOUS
Status: DISCONTINUED | OUTPATIENT
Start: 2024-01-01 | End: 2024-01-01

## 2024-01-01 RX ORDER — ONDANSETRON 4 MG/1
4 TABLET, ORALLY DISINTEGRATING ORAL EVERY 6 HOURS PRN
Status: DISCONTINUED | OUTPATIENT
Start: 2024-01-01 | End: 2024-01-01

## 2024-01-01 RX ORDER — FAMOTIDINE 10 MG/ML
20 INJECTION INTRAVENOUS DAILY
Status: DISCONTINUED | OUTPATIENT
Start: 2024-01-01 | End: 2024-01-01

## 2024-01-01 RX ORDER — MIDAZOLAM HYDROCHLORIDE 1 MG/ML
6 INJECTION INTRAMUSCULAR; INTRAVENOUS ONCE
Status: COMPLETED | OUTPATIENT
Start: 2024-01-01 | End: 2024-01-01

## 2024-01-01 RX ORDER — ONDANSETRON 8 MG/1
8 TABLET, ORALLY DISINTEGRATING ORAL EVERY 6 HOURS PRN
Status: DISCONTINUED | OUTPATIENT
Start: 2024-01-01 | End: 2024-01-01

## 2024-01-01 RX ORDER — LACTULOSE 10 G/15ML
30 SOLUTION ORAL 2 TIMES DAILY
Status: DISCONTINUED | OUTPATIENT
Start: 2024-01-01 | End: 2024-01-01

## 2024-01-01 RX ORDER — TACROLIMUS 0.5 MG/1
0.5 CAPSULE ORAL EVERY EVENING
Status: DISCONTINUED | OUTPATIENT
Start: 2024-01-01 | End: 2024-01-01

## 2024-01-01 RX ORDER — LACTULOSE 10 G/15ML
30 SOLUTION ORAL 3 TIMES DAILY
Status: DISCONTINUED | OUTPATIENT
Start: 2024-01-01 | End: 2024-01-01

## 2024-01-01 RX ORDER — GLUCAGON 1 MG
1 KIT INJECTION
Status: DISCONTINUED | OUTPATIENT
Start: 2024-01-01 | End: 2024-01-01

## 2024-01-01 RX ORDER — FUROSEMIDE 10 MG/ML
40 INJECTION INTRAMUSCULAR; INTRAVENOUS ONCE
Status: COMPLETED | OUTPATIENT
Start: 2024-01-01 | End: 2024-01-01

## 2024-01-01 RX ORDER — PROPOFOL 10 MG/ML
INJECTION, EMULSION INTRAVENOUS
Status: COMPLETED
Start: 2024-01-01 | End: 2024-01-01

## 2024-01-01 RX ORDER — SODIUM CHLORIDE 0.9 % (FLUSH) 0.9 %
10 SYRINGE (ML) INJECTION EVERY 12 HOURS PRN
Status: DISCONTINUED | OUTPATIENT
Start: 2024-01-01 | End: 2024-01-01

## 2024-01-01 RX ORDER — DILTIAZEM HCL 1 MG/ML
0-15 INJECTION, SOLUTION INTRAVENOUS CONTINUOUS
Status: DISCONTINUED | OUTPATIENT
Start: 2024-01-01 | End: 2024-01-01

## 2024-01-01 RX ORDER — HEPARIN SODIUM 1000 [USP'U]/ML
3600 INJECTION, SOLUTION INTRAVENOUS; SUBCUTANEOUS
Status: DISCONTINUED | OUTPATIENT
Start: 2024-01-01 | End: 2024-01-01

## 2024-01-01 RX ORDER — GLUCAGON 1 MG
1 KIT INJECTION
Status: DISCONTINUED | OUTPATIENT
Start: 2024-01-01 | End: 2024-01-01 | Stop reason: SDUPTHER

## 2024-01-01 RX ORDER — MAGNESIUM SULFATE HEPTAHYDRATE 40 MG/ML
2 INJECTION, SOLUTION INTRAVENOUS
Status: DISCONTINUED | OUTPATIENT
Start: 2024-01-01 | End: 2024-01-01 | Stop reason: SDUPTHER

## 2024-01-01 RX ORDER — OXYCODONE HYDROCHLORIDE 5 MG/1
5 TABLET ORAL EVERY 6 HOURS PRN
Status: DISCONTINUED | OUTPATIENT
Start: 2024-01-01 | End: 2024-01-01

## 2024-01-01 RX ORDER — SUCCINYLCHOLINE CHLORIDE 20 MG/ML
INJECTION INTRAMUSCULAR; INTRAVENOUS
Status: COMPLETED
Start: 2024-01-01 | End: 2024-01-01

## 2024-01-01 RX ORDER — ETOMIDATE 2 MG/ML
40 INJECTION INTRAVENOUS ONCE
Status: COMPLETED | OUTPATIENT
Start: 2024-01-01 | End: 2024-01-01

## 2024-01-01 RX ORDER — ETOMIDATE 2 MG/ML
INJECTION INTRAVENOUS
Status: COMPLETED
Start: 2024-01-01 | End: 2024-01-01

## 2024-01-01 RX ORDER — AMOXICILLIN 250 MG
1 CAPSULE ORAL DAILY PRN
Status: DISCONTINUED | OUTPATIENT
Start: 2024-01-01 | End: 2024-01-01

## 2024-01-01 RX ADMIN — MUPIROCIN: 20 OINTMENT TOPICAL at 08:01

## 2024-01-01 RX ADMIN — SODIUM ZIRCONIUM CYCLOSILICATE 10 G: 5 POWDER, FOR SUSPENSION ORAL at 09:01

## 2024-01-01 RX ADMIN — LACTULOSE 30 G: 20 SOLUTION ORAL at 03:01

## 2024-01-01 RX ADMIN — MICAFUNGIN SODIUM 100 MG: 100 INJECTION, POWDER, LYOPHILIZED, FOR SOLUTION INTRAVENOUS at 04:01

## 2024-01-01 RX ADMIN — SODIUM CHLORIDE 1000 ML: 9 INJECTION, SOLUTION INTRAVENOUS at 04:01

## 2024-01-01 RX ADMIN — PROPOFOL 40 MCG/KG/MIN: 10 INJECTION, EMULSION INTRAVENOUS at 11:01

## 2024-01-01 RX ADMIN — SODIUM PHOSPHATE, MONOBASIC, MONOHYDRATE AND SODIUM PHOSPHATE, DIBASIC, ANHYDROUS 39.99 MMOL: 142; 276 INJECTION, SOLUTION INTRAVENOUS at 12:01

## 2024-01-01 RX ADMIN — LACTULOSE 30 G: 20 SOLUTION ORAL at 09:01

## 2024-01-01 RX ADMIN — IPRATROPIUM BROMIDE AND ALBUTEROL SULFATE 3 ML: 2.5; .5 SOLUTION RESPIRATORY (INHALATION) at 01:01

## 2024-01-01 RX ADMIN — NOREPINEPHRINE BITARTRATE 0.1 MCG/KG/MIN: 1 INJECTION, SOLUTION, CONCENTRATE INTRAVENOUS at 03:01

## 2024-01-01 RX ADMIN — PROPOFOL 40 MCG/KG/MIN: 10 INJECTION, EMULSION INTRAVENOUS at 02:01

## 2024-01-01 RX ADMIN — FAMOTIDINE 20 MG: 10 INJECTION, SOLUTION INTRAVENOUS at 08:01

## 2024-01-01 RX ADMIN — NOREPINEPHRINE BITARTRATE 0.44 MCG/KG/MIN: 1 INJECTION, SOLUTION, CONCENTRATE INTRAVENOUS at 05:01

## 2024-01-01 RX ADMIN — SENNOSIDES AND DOCUSATE SODIUM 1 TABLET: 50; 8.6 TABLET ORAL at 09:01

## 2024-01-01 RX ADMIN — SODIUM CHLORIDE: 9 INJECTION, SOLUTION INTRAVENOUS at 10:01

## 2024-01-01 RX ADMIN — VASOPRESSIN 0.04 UNITS/MIN: 0.2 INJECTION INTRAVENOUS at 01:01

## 2024-01-01 RX ADMIN — IPRATROPIUM BROMIDE AND ALBUTEROL SULFATE 3 ML: 2.5; .5 SOLUTION RESPIRATORY (INHALATION) at 07:01

## 2024-01-01 RX ADMIN — PROPOFOL 40 MCG/KG/MIN: 10 INJECTION, EMULSION INTRAVENOUS at 08:01

## 2024-01-01 RX ADMIN — RIFAXIMIN 550 MG: 550 TABLET ORAL at 08:01

## 2024-01-01 RX ADMIN — ESCITALOPRAM OXALATE 10 MG: 10 TABLET ORAL at 09:01

## 2024-01-01 RX ADMIN — LINEZOLID 600 MG: 600 TABLET, FILM COATED ORAL at 08:01

## 2024-01-01 RX ADMIN — NOREPINEPHRINE BITARTRATE 0.42 MCG/KG/MIN: 1 INJECTION, SOLUTION, CONCENTRATE INTRAVENOUS at 01:01

## 2024-01-01 RX ADMIN — MYCOPHENOLATE MOFETIL 500 MG: 250 CAPSULE ORAL at 08:01

## 2024-01-01 RX ADMIN — ONDANSETRON 4 MG: 4 TABLET, ORALLY DISINTEGRATING ORAL at 05:01

## 2024-01-01 RX ADMIN — OXYCODONE HYDROCHLORIDE 5 MG: 5 TABLET ORAL at 06:01

## 2024-01-01 RX ADMIN — PROPOFOL 40 MCG/KG/MIN: 10 INJECTION, EMULSION INTRAVENOUS at 10:01

## 2024-01-01 RX ADMIN — FENTANYL CITRATE 100 MCG: 50 INJECTION INTRAMUSCULAR; INTRAVENOUS at 06:01

## 2024-01-01 RX ADMIN — IPRATROPIUM BROMIDE AND ALBUTEROL SULFATE 3 ML: 2.5; .5 SOLUTION RESPIRATORY (INHALATION) at 08:01

## 2024-01-01 RX ADMIN — LEVOFLOXACIN 750 MG: 750 INJECTION, SOLUTION INTRAVENOUS at 05:01

## 2024-01-01 RX ADMIN — VASOPRESSIN 0.04 UNITS/MIN: 0.2 INJECTION INTRAVENOUS at 08:01

## 2024-01-01 RX ADMIN — CHLORHEXIDINE GLUCONATE 0.12% ORAL RINSE 15 ML: 1.2 LIQUID ORAL at 09:01

## 2024-01-01 RX ADMIN — VASOPRESSIN 0.04 UNITS/MIN: 0.2 INJECTION INTRAVENOUS at 04:01

## 2024-01-01 RX ADMIN — PROPOFOL 5 MCG/KG/MIN: 10 INJECTION, EMULSION INTRAVENOUS at 07:01

## 2024-01-01 RX ADMIN — OXYCODONE HYDROCHLORIDE 5 MG: 5 TABLET ORAL at 11:01

## 2024-01-01 RX ADMIN — TACROLIMUS 0.5 MG: 0.5 CAPSULE ORAL at 09:01

## 2024-01-01 RX ADMIN — HEPARIN SODIUM 12 UNITS/KG/HR: 10000 INJECTION, SOLUTION INTRAVENOUS at 01:01

## 2024-01-01 RX ADMIN — FUROSEMIDE 10 MG/HR: 10 INJECTION, SOLUTION INTRAMUSCULAR; INTRAVENOUS at 06:01

## 2024-01-01 RX ADMIN — NOREPINEPHRINE BITARTRATE 0.46 MCG/KG/MIN: 1 INJECTION, SOLUTION, CONCENTRATE INTRAVENOUS at 07:01

## 2024-01-01 RX ADMIN — ACETAMINOPHEN 650 MG: 325 TABLET ORAL at 01:01

## 2024-01-01 RX ADMIN — VASOPRESSIN 0.04 UNITS/MIN: 0.2 INJECTION INTRAVENOUS at 12:01

## 2024-01-01 RX ADMIN — SODIUM CHLORIDE: 9 INJECTION, SOLUTION INTRAVENOUS at 07:01

## 2024-01-01 RX ADMIN — PROPOFOL 40 MCG/KG/MIN: 10 INJECTION, EMULSION INTRAVENOUS at 12:01

## 2024-01-01 RX ADMIN — LACTULOSE 30 G: 20 SOLUTION ORAL at 10:01

## 2024-01-01 RX ADMIN — VASOPRESSIN 0.04 UNITS/MIN: 0.2 INJECTION INTRAVENOUS at 09:01

## 2024-01-01 RX ADMIN — INSULIN ASPART 1 UNITS: 100 INJECTION, SOLUTION INTRAVENOUS; SUBCUTANEOUS at 11:01

## 2024-01-01 RX ADMIN — FENTANYL CITRATE 150 MCG/HR: 50 INJECTION INTRAVENOUS at 11:01

## 2024-01-01 RX ADMIN — LACTULOSE 30 G: 20 SOLUTION ORAL at 02:01

## 2024-01-01 RX ADMIN — ESCITALOPRAM OXALATE 10 MG: 10 TABLET ORAL at 10:01

## 2024-01-01 RX ADMIN — RIFAXIMIN 550 MG: 550 TABLET ORAL at 09:01

## 2024-01-01 RX ADMIN — TACROLIMUS 1 MG: 1 CAPSULE ORAL at 09:01

## 2024-01-01 RX ADMIN — MIDAZOLAM 6 MG: 1 INJECTION INTRAMUSCULAR; INTRAVENOUS at 11:01

## 2024-01-01 RX ADMIN — HEPARIN SODIUM 3600 UNITS: 1000 INJECTION, SOLUTION INTRAVENOUS; SUBCUTANEOUS at 08:01

## 2024-01-01 RX ADMIN — APIXABAN 5 MG: 2.5 TABLET, FILM COATED ORAL at 08:01

## 2024-01-01 RX ADMIN — CHLORHEXIDINE GLUCONATE 0.12% ORAL RINSE 15 ML: 1.2 LIQUID ORAL at 08:01

## 2024-01-01 RX ADMIN — Medication 5 MG/HR: at 10:01

## 2024-01-01 RX ADMIN — IPRATROPIUM BROMIDE AND ALBUTEROL SULFATE 3 ML: 2.5; .5 SOLUTION RESPIRATORY (INHALATION) at 02:01

## 2024-01-01 RX ADMIN — FUROSEMIDE 40 MG: 10 INJECTION, SOLUTION INTRAMUSCULAR; INTRAVENOUS at 06:01

## 2024-01-01 RX ADMIN — ETOMIDATE 40 MG: 2 INJECTION INTRAVENOUS at 05:01

## 2024-01-01 RX ADMIN — Medication 5 MG/HR: at 06:01

## 2024-01-01 RX ADMIN — TACROLIMUS 1 MG: 1 CAPSULE ORAL at 06:01

## 2024-01-01 RX ADMIN — THIAMINE HYDROCHLORIDE 100 MG: 100 INJECTION, SOLUTION INTRAMUSCULAR; INTRAVENOUS at 09:01

## 2024-01-01 RX ADMIN — MICAFUNGIN SODIUM 100 MG: 100 INJECTION, POWDER, LYOPHILIZED, FOR SOLUTION INTRAVENOUS at 03:01

## 2024-01-01 RX ADMIN — GABAPENTIN 600 MG: 300 CAPSULE ORAL at 08:01

## 2024-01-01 RX ADMIN — PROPOFOL 40 MCG/KG/MIN: 10 INJECTION, EMULSION INTRAVENOUS at 01:01

## 2024-01-01 RX ADMIN — SODIUM ZIRCONIUM CYCLOSILICATE 10 G: 5 POWDER, FOR SUSPENSION ORAL at 08:01

## 2024-01-01 RX ADMIN — NOREPINEPHRINE BITARTRATE 0.44 MCG/KG/MIN: 1 INJECTION, SOLUTION, CONCENTRATE INTRAVENOUS at 09:01

## 2024-01-01 RX ADMIN — TACROLIMUS 0.5 MG: 0.5 CAPSULE ORAL at 08:01

## 2024-01-01 RX ADMIN — RIFAXIMIN 550 MG: 550 TABLET ORAL at 10:01

## 2024-01-01 RX ADMIN — VASOPRESSIN 0.04 UNITS/MIN: 0.2 INJECTION INTRAVENOUS at 06:01

## 2024-01-01 RX ADMIN — ROCURONIUM BROMIDE 100 MG: 10 INJECTION, SOLUTION INTRAVENOUS at 11:01

## 2024-01-01 RX ADMIN — SODIUM CHLORIDE: 9 INJECTION, SOLUTION INTRAVENOUS at 06:01

## 2024-01-01 RX ADMIN — PROMETHAZINE HYDROCHLORIDE 25 MG: 25 INJECTION INTRAMUSCULAR; INTRAVENOUS at 05:01

## 2024-01-01 RX ADMIN — MORPHINE SULFATE 4 MG: 4 INJECTION INTRAVENOUS at 01:01

## 2024-01-01 RX ADMIN — SENNOSIDES AND DOCUSATE SODIUM 1 TABLET: 50; 8.6 TABLET ORAL at 08:01

## 2024-01-01 RX ADMIN — PROPOFOL 40 MCG/KG/MIN: 10 INJECTION, EMULSION INTRAVENOUS at 07:01

## 2024-01-01 RX ADMIN — INSULIN ASPART 2 UNITS: 100 INJECTION, SOLUTION INTRAVENOUS; SUBCUTANEOUS at 11:01

## 2024-01-01 RX ADMIN — TACROLIMUS 1 MG: 1 CAPSULE ORAL at 07:01

## 2024-01-01 RX ADMIN — LACTULOSE 30 G: 20 SOLUTION ORAL at 08:01

## 2024-01-01 RX ADMIN — PROPOFOL 40 MCG/KG/MIN: 10 INJECTION, EMULSION INTRAVENOUS at 05:01

## 2024-01-01 RX ADMIN — THIAMINE HYDROCHLORIDE 100 MG: 100 INJECTION, SOLUTION INTRAMUSCULAR; INTRAVENOUS at 08:01

## 2024-01-01 RX ADMIN — MAGNESIUM SULFATE HEPTAHYDRATE 1 G: 1 INJECTION, SOLUTION INTRAVENOUS at 12:01

## 2024-01-01 RX ADMIN — SODIUM CHLORIDE: 9 INJECTION, SOLUTION INTRAVENOUS at 01:01

## 2024-01-01 RX ADMIN — MAGNESIUM SULFATE HEPTAHYDRATE 2 G: 40 INJECTION, SOLUTION INTRAVENOUS at 01:01

## 2024-01-01 RX ADMIN — GABAPENTIN 600 MG: 300 CAPSULE ORAL at 09:01

## 2024-01-01 RX ADMIN — DILTIAZEM HYDROCHLORIDE 15 MG: 5 INJECTION INTRAVENOUS at 02:01

## 2024-01-01 RX ADMIN — ONDANSETRON 4 MG: 4 TABLET, ORALLY DISINTEGRATING ORAL at 06:01

## 2024-01-01 RX ADMIN — ROCURONIUM BROMIDE 100 MG: 10 INJECTION INTRAVENOUS at 11:01

## 2024-01-01 RX ADMIN — NOREPINEPHRINE BITARTRATE 0.5 MCG/KG/MIN: 1 INJECTION, SOLUTION, CONCENTRATE INTRAVENOUS at 11:01

## 2024-01-01 RX ADMIN — FENTANYL CITRATE 100 MCG: 50 INJECTION, SOLUTION INTRAMUSCULAR; INTRAVENOUS at 06:01

## 2024-01-01 RX ADMIN — NOREPINEPHRINE BITARTRATE 0.4 MCG/KG/MIN: 1 INJECTION, SOLUTION, CONCENTRATE INTRAVENOUS at 04:01

## 2024-01-01 RX ADMIN — APIXABAN 5 MG: 2.5 TABLET, FILM COATED ORAL at 09:01

## 2024-01-01 RX ADMIN — INSULIN ASPART 2 UNITS: 100 INJECTION, SOLUTION INTRAVENOUS; SUBCUTANEOUS at 05:01

## 2024-01-01 RX ADMIN — PROPOFOL 40 MCG/KG/MIN: 10 INJECTION, EMULSION INTRAVENOUS at 04:01

## 2024-01-01 RX ADMIN — SENNOSIDES AND DOCUSATE SODIUM 1 TABLET: 50; 8.6 TABLET ORAL at 10:01

## 2024-01-01 RX ADMIN — MUPIROCIN: 20 OINTMENT TOPICAL at 09:01

## 2024-01-01 RX ADMIN — CEFEPIME 2 G: 2 INJECTION, POWDER, FOR SOLUTION INTRAVENOUS at 02:01

## 2024-01-01 RX ADMIN — PROPOFOL 40 MCG/KG/MIN: 10 INJECTION, EMULSION INTRAVENOUS at 06:01

## 2024-01-01 RX ADMIN — MYCOPHENOLATE MOFETIL 500 MG: 250 CAPSULE ORAL at 09:01

## 2024-01-01 RX ADMIN — FAMOTIDINE 20 MG: 10 INJECTION, SOLUTION INTRAVENOUS at 09:01

## 2024-01-01 RX ADMIN — FENTANYL CITRATE 150 MCG/HR: 50 INJECTION INTRAVENOUS at 05:01

## 2024-01-01 RX ADMIN — Medication 5 MG/HR: at 07:01

## 2024-01-01 RX ADMIN — SODIUM BICARBONATE: 84 INJECTION, SOLUTION INTRAVENOUS at 09:01

## 2024-01-01 RX ADMIN — TACROLIMUS 1 MG: 1 CAPSULE ORAL at 05:01

## 2024-01-01 RX ADMIN — VASOPRESSIN 0.04 UNITS/MIN: 20 INJECTION PARENTERAL at 09:01

## 2024-01-01 RX ADMIN — CEFTRIAXONE 1 G: 1 INJECTION, POWDER, FOR SOLUTION INTRAMUSCULAR; INTRAVENOUS at 03:01

## 2024-01-01 RX ADMIN — NOREPINEPHRINE BITARTRATE 0.24 MCG/KG/MIN: 1 INJECTION, SOLUTION, CONCENTRATE INTRAVENOUS at 02:01

## 2024-01-01 RX ADMIN — LEVOFLOXACIN 750 MG: 750 INJECTION, SOLUTION INTRAVENOUS at 04:01

## 2024-01-01 RX ADMIN — NOREPINEPHRINE BITARTRATE 0.18 MCG/KG/MIN: 4 INJECTION, SOLUTION INTRAVENOUS at 10:01

## 2024-01-01 RX ADMIN — NOREPINEPHRINE BITARTRATE 0.52 MCG/KG/MIN: 1 INJECTION, SOLUTION, CONCENTRATE INTRAVENOUS at 08:01

## 2024-01-01 RX ADMIN — ONDANSETRON 4 MG: 4 TABLET, ORALLY DISINTEGRATING ORAL at 04:01

## 2024-01-01 RX ADMIN — IPRATROPIUM BROMIDE AND ALBUTEROL SULFATE 3 ML: 2.5; .5 SOLUTION RESPIRATORY (INHALATION) at 03:01

## 2024-01-01 RX ADMIN — HYDROCODONE BITARTRATE AND ACETAMINOPHEN 1 TABLET: 5; 325 TABLET ORAL at 03:01

## 2024-01-01 RX ADMIN — SODIUM CHLORIDE: 9 INJECTION, SOLUTION INTRAVENOUS at 12:01

## 2024-01-01 RX ADMIN — NOREPINEPHRINE BITARTRATE 0.24 MCG/KG/MIN: 1 INJECTION, SOLUTION, CONCENTRATE INTRAVENOUS at 05:01

## 2024-01-01 RX ADMIN — OXYCODONE HYDROCHLORIDE 5 MG: 5 TABLET ORAL at 04:01

## 2024-01-01 RX ADMIN — LINEZOLID 600 MG: 600 TABLET, FILM COATED ORAL at 09:01

## 2024-01-01 RX ADMIN — ACETAMINOPHEN 650 MG: 325 TABLET ORAL at 04:01

## 2024-01-01 RX ADMIN — OXYCODONE HYDROCHLORIDE 5 MG: 5 TABLET ORAL at 09:01

## 2024-01-01 RX ADMIN — SODIUM CHLORIDE, POTASSIUM CHLORIDE, SODIUM LACTATE AND CALCIUM CHLORIDE: 600; 310; 30; 20 INJECTION, SOLUTION INTRAVENOUS at 09:01

## 2024-01-01 RX ADMIN — NOREPINEPHRINE BITARTRATE 0.58 MCG/KG/MIN: 1 INJECTION, SOLUTION, CONCENTRATE INTRAVENOUS at 08:01

## 2024-01-01 RX ADMIN — NOREPINEPHRINE BITARTRATE 0.28 MCG/KG/MIN: 4 INJECTION, SOLUTION INTRAVENOUS at 12:01

## 2024-01-01 RX ADMIN — TACROLIMUS 0.5 MG: 0.5 CAPSULE ORAL at 05:01

## 2024-01-01 RX ADMIN — HYDROCODONE BITARTRATE AND ACETAMINOPHEN 1 TABLET: 5; 325 TABLET ORAL at 06:01

## 2024-01-01 RX ADMIN — VASOPRESSIN 0.04 UNITS/MIN: 20 INJECTION PARENTERAL at 05:01

## 2024-01-01 RX ADMIN — HEPARIN SODIUM 10 UNITS/KG/HR: 10000 INJECTION, SOLUTION INTRAVENOUS at 11:01

## 2024-01-01 RX ADMIN — SODIUM BICARBONATE: 84 INJECTION, SOLUTION INTRAVENOUS at 10:01

## 2024-01-01 RX ADMIN — SODIUM PHOSPHATE, MONOBASIC, MONOHYDRATE AND SODIUM PHOSPHATE, DIBASIC, ANHYDROUS 30 MMOL: 142; 276 INJECTION, SOLUTION INTRAVENOUS at 11:01

## 2024-01-01 RX ADMIN — CEFEPIME 2 G: 2 INJECTION, POWDER, FOR SOLUTION INTRAVENOUS at 03:01

## 2024-01-01 RX ADMIN — ROCURONIUM BROMIDE 100 MG: 10 INJECTION, SOLUTION INTRAVENOUS at 05:01

## 2024-01-01 RX ADMIN — NOREPINEPHRINE BITARTRATE 0.02 MCG/KG/MIN: 4 INJECTION, SOLUTION INTRAVENOUS at 05:01

## 2024-01-01 RX ADMIN — DILTIAZEM HYDROCHLORIDE 30 MG: 30 TABLET, FILM COATED ORAL at 01:01

## 2024-01-01 RX ADMIN — SODIUM CHLORIDE, POTASSIUM CHLORIDE, SODIUM LACTATE AND CALCIUM CHLORIDE: 600; 310; 30; 20 INJECTION, SOLUTION INTRAVENOUS at 12:01

## 2024-01-01 RX ADMIN — SODIUM CHLORIDE, POTASSIUM CHLORIDE, SODIUM LACTATE AND CALCIUM CHLORIDE: 600; 310; 30; 20 INJECTION, SOLUTION INTRAVENOUS at 11:01

## 2024-01-01 RX ADMIN — NOREPINEPHRINE BITARTRATE 0.26 MCG/KG/MIN: 1 INJECTION, SOLUTION, CONCENTRATE INTRAVENOUS at 11:01

## 2024-01-01 RX ADMIN — GUAIFENESIN 600 MG: 600 TABLET, EXTENDED RELEASE ORAL at 09:01

## 2024-01-01 RX ADMIN — GUAIFENESIN 600 MG: 600 TABLET, EXTENDED RELEASE ORAL at 08:01

## 2024-01-01 RX ADMIN — VANCOMYCIN HYDROCHLORIDE 2000 MG: 500 INJECTION, POWDER, LYOPHILIZED, FOR SOLUTION INTRAVENOUS at 02:01

## 2024-01-01 RX ADMIN — HEPARIN SODIUM 12 UNITS/KG/HR: 10000 INJECTION, SOLUTION INTRAVENOUS at 12:01

## 2024-01-01 RX ADMIN — SODIUM CHLORIDE 1000 ML: 9 INJECTION, SOLUTION INTRAVENOUS at 02:01

## 2024-01-01 RX ADMIN — NOREPINEPHRINE BITARTRATE 0.52 MCG/KG/MIN: 1 INJECTION, SOLUTION, CONCENTRATE INTRAVENOUS at 09:01

## 2024-01-01 RX ADMIN — MAGNESIUM SULFATE HEPTAHYDRATE 2 G: 40 INJECTION, SOLUTION INTRAVENOUS at 10:01

## 2024-01-01 RX ADMIN — SODIUM CHLORIDE: 9 INJECTION, SOLUTION INTRAVENOUS at 05:01

## 2024-01-01 RX ADMIN — SODIUM CHLORIDE: 9 INJECTION, SOLUTION INTRAVENOUS at 02:01

## 2024-01-01 RX ADMIN — FENTANYL CITRATE 25 MCG/HR: 50 INJECTION INTRAVENOUS at 07:01

## 2024-01-01 RX ADMIN — FENTANYL CITRATE 150 MCG/HR: 50 INJECTION INTRAVENOUS at 04:01

## 2024-01-01 RX ADMIN — THIAMINE HYDROCHLORIDE 100 MG: 100 INJECTION, SOLUTION INTRAMUSCULAR; INTRAVENOUS at 12:01

## 2024-01-01 RX ADMIN — PROPOFOL 40 MCG/KG/MIN: 10 INJECTION, EMULSION INTRAVENOUS at 09:01

## 2024-01-01 RX ADMIN — MORPHINE SULFATE 10 MG: 10 INJECTION INTRAVENOUS at 11:01

## 2024-01-01 RX ADMIN — FOLIC ACID 1 MG: 5 INJECTION, SOLUTION INTRAMUSCULAR; INTRAVENOUS; SUBCUTANEOUS at 10:01

## 2024-01-04 PROBLEM — J18.9 PNA (PNEUMONIA): Status: ACTIVE | Noted: 2024-01-01

## 2024-01-04 PROBLEM — I48.91 ATRIAL FIBRILLATION WITH RAPID VENTRICULAR RESPONSE: Status: ACTIVE | Noted: 2023-01-01

## 2024-01-04 NOTE — HPI
Mr. Altman is a 48 year old male with a history of  alcoholic liver cirrhosis s/p liver transplant [facility] 10/20, CKD, PAF(on eliquis) who presents to the ED with complaints of shortness of breath, fatigue, intermittent fever over the last 4 weeks.  Stated he was dx over a week ago with flu as well.  He also complains of intermittent N/V.   In the ED he was noted to be in afib RVR (HR in the 140's), given Cardizem IVP and placed on gtt.  Lab work notable for INR 1.5, Alk phos 169, , ALT 82, .  FLU and covid negative. Chest xray showed new low-grade infiltrate or atypical pneumonia LLL.  Patient was started on Levaquin.  Patient will be admitted to observation for afib rvr/pna.  Cardiology consulted.     Code Status Full  Surrogate Decision Maker Chelsea

## 2024-01-04 NOTE — Clinical Note
Diagnosis: Pneumonia of left lower lobe due to infectious organism [3199225]   Future Attending Provider: FLAKITA SILVERIO [[id number]]   Admitting Provider:: FLAKITA SILVERIO [[id number]]

## 2024-01-04 NOTE — ASSESSMENT & PLAN NOTE
Patient with Persistent (7 days or more) atrial fibrillation which is uncontrolled currently with Calcium Channel Blocker. Patient is currently in atrial fibrillation.[id number] Score: The patient doesn't have any registry metric data available. Anticoagulation indicated. Anticoagulation done with eliquis .    - patient is unsure if he has afib before in the past, but per chart review has had previous EKG with Afib  - presented to ED with Afib RVR, given IVP cardizem and started on gtt  - contiue cardizem gtt and home eliquis  - contiue cardiac monitoring  - cards consulted

## 2024-01-04 NOTE — ASSESSMENT & PLAN NOTE
Patient was found to have thrombocytopenia, the likely etiology is secondary to cirrhosis/portal hypertension, will monitor the platelets Daily. Will transfuse if platelet count is <20k. Hold DVT prophylaxis if platelets are <50k. The patient's platelet results have been reviewed and are listed below.  Recent Labs   Lab 01/04/24  1435   PLT 89*

## 2024-01-04 NOTE — SUBJECTIVE & OBJECTIVE
Past Medical History:   Diagnosis Date    Decompensated hepatic cirrhosis     HCV acquired through organ donation, treated / cured     svr12 - 6/2021    Hypertension     SBP (spontaneous bacterial peritonitis)        Past Surgical History:   Procedure Laterality Date    ERCP N/A 10/1/2020    Procedure: ERCP (ENDOSCOPIC RETROGRADE CHOLANGIOPANCREATOGRAPHY);  Surgeon: Marcos Ramirez MD;  Location: [facility] ENDO (2ND FLR);  Service: Endoscopy;  Laterality: N/A;    ESOPHAGOGASTRODUODENOSCOPY N/A 10/13/2020    Procedure: EGD (ESOPHAGOGASTRODUODENOSCOPY);  Surgeon: Prasanth Jerry MD;  Location: [facility] ENDO (2ND FLR);  Service: Endoscopy;  Laterality: N/A;    EXPLORATORY LAPAROTOMY AFTER LIVER TRANSPLANTATION N/A 10/15/2020    Procedure: LAPAROTOMY, EXPLORATORY, AFTER LIVER TRANSPLANT, Possible redo of artery, possible portal thrombectomy. IntraOperative US.;  Surgeon: Curtis Zavaleta MD;  [facility];  Service: Transplant;  Laterality: N/A;  With intraoperative ultrasound    LIVER TRANSPLANT N/A 10/14/2020    Procedure: TRANSPLANT, LIVER;  Surgeon: Curtis Zavaleta MD;  [facility];  Service: Transplant;  Laterality: N/A;  Intra op HD    RIGHT HEART CATHETERIZATION Right 9/23/2020    Procedure: INSERTION, CATHETER, RIGHT HEART;  Surgeon: Mikel Costa Jr., MD;  Location: [facility] CATH LAB;  Service: Cardiology;  Laterality: Right;    THORACENTESIS  2011    THROMBECTOMY  10/15/2020    Procedure: THROMBECTOMY portal vein;  Surgeon: Curtis Zavaleta MD;  [facility];  Service: Transplant;;       Review of patient's allergies indicates:   Allergen Reactions    Latex, natural rubber        Current Facility-Administered Medications on File Prior to Encounter   Medication    acetaminophen tablet 650 mg    albuterol inhaler 2 puff    [DISCONTINUED] diphenhydrAMINE injection 25 mg    [DISCONTINUED] EPINEPHrine (EPIPEN) 0.3 mg/0.3 mL pen injection 0.3 mg    [DISCONTINUED]  methylPREDNISolone sodium succinate injection 40 mg    [DISCONTINUED] ondansetron disintegrating tablet 4 mg    [DISCONTINUED] sodium chloride 0.9% 500 mL flush bag    [DISCONTINUED] sodium chloride 0.9% flush 10 mL     Current Outpatient Medications on File Prior to Encounter   Medication Sig    amLODIPine (NORVASC) 10 MG tablet Take 1 tablet by mouth in the morning.    apixaban (ELIQUIS) 5 mg Tab Take 1 tablet (5 mg total) by mouth 2 (two) times a day.    buPROPion (WELLBUTRIN SR) 150 MG TBSR 12 hr tablet Take 150 mg by mouth 2 (two) times daily.    fluticasone-salmeterol diskus inhaler 250-50 mcg Inhale 1 puff into the lungs 2 (two) times a day.    fluticasone-umeclidin-vilanter (TRELEGY ELLIPTA) 200-62.5-25 mcg inhaler Inhale 1 puff into the lungs once daily.    gabapentin (NEURONTIN) 300 MG capsule Take 2 capsules by mouth 2 (two) times daily.    loratadine (CLARITIN) 10 mg tablet Take 10 mg by mouth once daily.    losartan (COZAAR) 25 MG tablet Take 25 mg by mouth once daily.    mycophenolate (CELLCEPT) 250 mg Cap Take 2 capsules (500 mg total) by mouth 2 (two) times daily.    tacrolimus (PROGRAF) 1 MG Cap Take 1 capsule (1 mg total) by mouth every morning AND 1 capsule (1 mg total) every evening.    testosterone cypionate (DEPOTESTOTERONE CYPIONATE) 200 mg/mL injection Inject 0.75 mLs (150 mg total) into the muscle once a week.    tiZANidine (ZANAFLEX) 4 MG tablet Take 4 mg by mouth 2 (two) times a day.    traZODone (DESYREL) 50 MG tablet Take  mg by mouth every evening.    albuterol (PROVENTIL/VENTOLIN HFA) 90 mcg/actuation inhaler Inhale 2 puffs into the lungs every 6 (six) hours as needed for Wheezing or Shortness of Breath. Rescue    [DISCONTINUED] benzonatate (TESSALON) 200 MG capsule benzonatate Take 1 Capsule (oral) 1 time per day PRN - Cough for 10 days [id number] capsule 1 time per day oral 10 days active 200 mg    [DISCONTINUED] blood sugar diagnostic Strp Use to test blood glucose  3 (three)  "times daily with meals.    [DISCONTINUED] blood-glucose meter Misc USE AS INSTRUCTED. (Patient taking differently: USE AS INSTRUCTED.)    [DISCONTINUED] EScitalopram oxalate (LEXAPRO) 10 MG tablet Take 10 mg by mouth.    [DISCONTINUED] fluticasone propionate (FLONASE) 50 mcg/actuation nasal spray 1 spray by Each Nostril route once daily.    [DISCONTINUED] gabapentin (NEURONTIN) 300 MG capsule Take 2 capsules (600 mg total) by mouth 2 (two) times daily.    [DISCONTINUED] lancets 30 gauge Misc 1 each by Misc.(Non-Drug; Combo Route) route 3 (three) times daily with meals.    [DISCONTINUED] mupirocin (BACTROBAN) 2 % ointment APPLY TOPICALLY TO THE AFFECTED AREA THREE TIMES DAILY FOR 7 DAYS    [DISCONTINUED] ondansetron (ZOFRAN) 4 MG tablet Take 1 tablet (4 mg total) by mouth every 6 (six) hours as needed for Nausea.    [DISCONTINUED] pen needle, diabetic 29 gauge x 1/2" Ndle use to inject insulin 3 (three) times daily with meals.    [DISCONTINUED] sildenafiL (VIAGRA) 50 MG tablet Take 50 mg by mouth as needed.    [DISCONTINUED] sulfamethoxazole-trimethoprim 800-160mg (BACTRIM DS) 800-160 mg Tab Take 1 tablet by mouth 2 (two) times daily.     Family History       Problem Relation (Age of Onset)    COPD Mother          Tobacco Use    Smoking status: Some Days    Smokeless tobacco: Current   Substance and Sexual Activity    Alcohol use: Yes     Alcohol/week: 112.0 standard drinks of alcohol     Types: 112 Shots of liquor per week     Comment: fifth of vodka a day. LAST DRINK 7/25/2020 per pt     Drug use: Yes     Frequency: 3.0 times per week     Types: Marijuana    Sexual activity: Yes     Review of Systems   Constitutional:  Positive for fatigue and fever.   Respiratory:  Positive for shortness of breath.    Gastrointestinal:  Positive for nausea and vomiting.     Objective:     Vital Signs (Most Recent):  Temp: 98.1 °F (36.7 °C) (01/04/24 1455)  Pulse: (!) 119 (01/04/24 1630)  Resp: (!) 27 (01/04/24 1630)  BP: 138/62 " (01/04/24 1630)  SpO2: 96 % (01/04/24 1630) Vital Signs (24h Range):  Temp:  [98.1 °F (36.7 °C)] 98.1 °F (36.7 °C)  Pulse:  [] 119  Resp:  [18-36] 27  SpO2:  [95 %-97 %] 96 %  BP: (108-138)/(61-62) 138/62     Weight: 130.2 kg (286 lb 14.9 oz)  Body mass index is 38.91 kg/m².     Physical Exam  Vitals and nursing note reviewed.   Constitutional:       Appearance: He is obese.   Eyes:      General: Scleral icterus present.   Cardiovascular:      Rate and Rhythm: Tachycardia present. Rhythm irregular.      Pulses: Normal pulses.      Heart sounds: Normal heart sounds.   Pulmonary:      Breath sounds: No wheezing.      Comments: Tachypnea, diminished breath sounds all lobes   Abdominal:      General: Bowel sounds are normal. There is no distension.      Palpations: Abdomen is soft.      Tenderness: There is no abdominal tenderness.   Musculoskeletal:         General: No swelling. Normal range of motion.   Skin:     General: Skin is warm and dry.   Neurological:      Mental Status: He is alert and oriented to person, place, and time.                Significant Labs: All pertinent labs within the past 24 hours have been reviewed.    Significant Imaging: I have reviewed all pertinent imaging results/findings within the past 24 hours.

## 2024-01-04 NOTE — FIRST PROVIDER EVALUATION
Medical screening examination initiated.  I have conducted a focused provider triage encounter, findings are as follows:    Brief history of present illness:  Cough, shortness of breath x 6 weeks    Vitals:    01/04/24 1403   Resp: 18   TempSrc: Oral   Height: 6' (1.829 m)       Pertinent physical exam:  No acute distress, patient alert and oriented     Brief workup plan:  Workup    Preliminary workup initiated; this workup will be continued and followed by the physician or advanced practice provider that is assigned to the patient when roomed.

## 2024-01-04 NOTE — H&P
[facility] - Emergency Dept.  [facility] Medicine  History & Physical    Patient Name: Jordan Altman  MRN: [id number]  Patient Class: OP- Observation  Admission Date: 1/4/2024  Attending Physician: Wesley Garcia MD   Primary Care Provider: Zohra Walters FNP         Patient information was obtained from patient, past medical records, and ER records.     Subjective:     Principal Problem:PNA (pneumonia)    Chief Complaint:   Chief Complaint   Patient presents with    General Illness     N/V/D, fatigue, cough, worsening since flu dx 3 weeks ago; liver transplant pt        HPI: Mr. Altman is a 48 year old male with a history of  alcoholic liver cirrhosis s/p [facility], CKD, PAF(on eliquis) who presents to the ED with complaints of shortness of breath, fatigue, intermittent fever over the last 4 weeks.  Stated he was dx over a week ago with flu as well.  He also complains of intermittent N/V.   In the ED he was noted to be in afib RVR (HR in the 140's), given Cardizem IVP and placed on gtt.  Lab work notable for INR 1.5, Alk phos 169, , ALT 82, .  FLU and covid negative. Chest xray showed new low-grade infiltrate or atypical pneumonia LLL.  Patient was started on Levaquin.  Patient will be admitted to observation for afib rvr/pna.  Cardiology consulted.     Code Status Full  Surrogate Decision Maker [place]     Past Medical History:   Diagnosis Date    Decompensated hepatic cirrhosis     HCV acquired through organ donation, treated / cured     svr12 - 6/2021    Hypertension     SBP (spontaneous bacterial peritonitis)        Past Surgical History:   Procedure Laterality Date    ERCP N/A 10/1/2020    Procedure: ERCP (ENDOSCOPIC RETROGRADE CHOLANGIOPANCREATOGRAPHY);  Surgeon: Marcos Ramirez MD;  Location: [address]);  Service: Endoscopy;  Laterality: N/A;    ESOPHAGOGASTRODUODENOSCOPY N/A 10/13/2020    Procedure: EGD (ESOPHAGOGASTRODUODENOSCOPY);  Surgeon: Prasanth Jerry MD;   Location: [facility] ENDO (2ND FLR);  Service: Endoscopy;  Laterality: N/A;    EXPLORATORY LAPAROTOMY AFTER LIVER TRANSPLANTATION N/A 10/15/2020    Procedure: LAPAROTOMY, EXPLORATORY, AFTER LIVER TRANSPLANT, Possible redo of artery, possible portal thrombectomy. IntraOperative US.;  Surgeon: Curtis Zavaleta MD;  Location: [facility] OR [facility] FLR;  Service: Transplant;  Laterality: N/A;  With intraoperative ultrasound    LIVER TRANSPLANT N/A 10/14/2020    Procedure: TRANSPLANT, LIVER;  Surgeon: Curtis Zavaleta MD;  Location: [facility] OR [facility] FLR;  Service: Transplant;  Laterality: N/A;  Intra op HD    RIGHT HEART CATHETERIZATION Right 9/23/2020    Procedure: INSERTION, CATHETER, RIGHT HEART;  Surgeon: Mikel Costa Jr., MD;  Location: [facility] CATH LAB;  Service: Cardiology;  Laterality: Right;    THORACENTESIS  2011    THROMBECTOMY  10/15/2020    Procedure: THROMBECTOMY portal vein;  Surgeon: Curtis Zavaleta MD;  [facility];  Service: Transplant;;       Review of patient's allergies indicates:   Allergen Reactions    Latex, natural rubber        Current Facility-Administered Medications on File Prior to Encounter   Medication    acetaminophen tablet 650 mg    albuterol inhaler 2 puff    [DISCONTINUED] diphenhydrAMINE injection 25 mg    [DISCONTINUED] EPINEPHrine (EPIPEN) 0.3 mg/0.3 mL pen injection 0.3 mg    [DISCONTINUED] methylPREDNISolone sodium succinate injection 40 mg    [DISCONTINUED] ondansetron disintegrating tablet 4 mg    [DISCONTINUED] sodium chloride 0.9% 500 mL flush bag    [DISCONTINUED] sodium chloride 0.9% flush 10 mL     Current Outpatient Medications on File Prior to Encounter   Medication Sig    amLODIPine (NORVASC) 10 MG tablet Take 1 tablet by mouth in the morning.    apixaban (ELIQUIS) 5 mg Tab Take 1 tablet (5 mg total) by mouth 2 (two) times a day.    buPROPion (WELLBUTRIN SR) 150 MG TBSR 12 hr tablet Take 150 mg by mouth 2 (two) times daily.    fluticasone-salmeterol diskus inhaler 250-50 mcg  Inhale 1 puff into the lungs 2 (two) times a day.    fluticasone-umeclidin-vilanter (TRELEGY ELLIPTA) 200-62.5-25 mcg inhaler Inhale 1 puff into the lungs once daily.    gabapentin (NEURONTIN) 300 MG capsule Take 2 capsules by mouth 2 (two) times daily.    loratadine (CLARITIN) 10 mg tablet Take 10 mg by mouth once daily.    losartan (COZAAR) 25 MG tablet Take 25 mg by mouth once daily.    mycophenolate (CELLCEPT) 250 mg Cap Take 2 capsules (500 mg total) by mouth 2 (two) times daily.    tacrolimus (PROGRAF) 1 MG Cap Take 1 capsule (1 mg total) by mouth every morning AND 1 capsule (1 mg total) every evening.    testosterone cypionate (DEPOTESTOTERONE CYPIONATE) 200 mg/mL injection Inject 0.75 mLs (150 mg total) into the muscle once a week.    tiZANidine (ZANAFLEX) 4 MG tablet Take 4 mg by mouth 2 (two) times a day.    traZODone (DESYREL) 50 MG tablet Take  mg by mouth every evening.    albuterol (PROVENTIL/VENTOLIN HFA) 90 mcg/actuation inhaler Inhale 2 puffs into the lungs every 6 (six) hours as needed for Wheezing or Shortness of Breath. Rescue    [DISCONTINUED] benzonatate (TESSALON) 200 MG capsule benzonatate Take 1 Capsule (oral) 1 time per day PRN - Cough for 10 days [id number] capsule 1 time per day oral 10 days active 200 mg    [DISCONTINUED] blood sugar diagnostic Strp Use to test blood glucose  3 (three) times daily with meals.    [DISCONTINUED] blood-glucose meter Misc USE AS INSTRUCTED. (Patient taking differently: USE AS INSTRUCTED.)    [DISCONTINUED] EScitalopram oxalate (LEXAPRO) 10 MG tablet Take 10 mg by mouth.    [DISCONTINUED] fluticasone propionate (FLONASE) 50 mcg/actuation nasal spray 1 spray by Each Nostril route once daily.    [DISCONTINUED] gabapentin (NEURONTIN) 300 MG capsule Take 2 capsules (600 mg total) by mouth 2 (two) times daily.    [DISCONTINUED] lancets 30 gauge Misc 1 each by Misc.(Non-Drug; Combo Route) route 3 (three) times daily with meals.    [DISCONTINUED] mupirocin  "(BACTROBAN) 2 % ointment APPLY TOPICALLY TO THE AFFECTED AREA THREE TIMES DAILY FOR 7 DAYS    [DISCONTINUED] ondansetron (ZOFRAN) 4 MG tablet Take 1 tablet (4 mg total) by mouth every 6 (six) hours as needed for Nausea.    [DISCONTINUED] pen needle, diabetic 29 gauge x 1/2" Ndle use to inject insulin 3 (three) times daily with meals.    [DISCONTINUED] sildenafiL (VIAGRA) 50 MG tablet Take 50 mg by mouth as needed.    [DISCONTINUED] sulfamethoxazole-trimethoprim 800-160mg (BACTRIM DS) 800-160 mg Tab Take 1 tablet by mouth 2 (two) times daily.     Family History       Problem Relation (Age of Onset)    COPD Mother          Tobacco Use    Smoking status: Some Days    Smokeless tobacco: Current   Substance and Sexual Activity    Alcohol use: Yes     Alcohol/week: 112.0 standard drinks of alcohol     Types: 112 Shots of liquor per week     Comment: fifth of vodka a day. LAST DRINK 7/25/2020 per pt     Drug use: Yes     Frequency: 3.0 times per week     Types: Marijuana    Sexual activity: Yes     Review of Systems   Constitutional:  Positive for fatigue and fever.   Respiratory:  Positive for shortness of breath.    Gastrointestinal:  Positive for nausea and vomiting.     Objective:     Vital Signs (Most Recent):  Temp: 98.1 °F (36.7 °C) (01/04/24 1455)  Pulse: (!) 119 (01/04/24 1630)  Resp: (!) 27 (01/04/24 1630)  BP: 138/62 (01/04/24 1630)  SpO2: 96 % (01/04/24 1630) Vital Signs (24h Range):  Temp:  [98.1 °F (36.7 °C)] 98.1 °F (36.7 °C)  Pulse:  [] 119  Resp:  [18-36] 27  SpO2:  [95 %-97 %] 96 %  BP: (108-138)/(61-62) 138/62     Weight: 130.2 kg (286 lb 14.9 oz)  Body mass index is 38.91 kg/m².     Physical Exam  Vitals and nursing note reviewed.   Constitutional:       Appearance: He is obese.   Eyes:      General: Scleral icterus present.   Cardiovascular:      Rate and Rhythm: Tachycardia present. Rhythm irregular.      Pulses: Normal pulses.      Heart sounds: Normal heart sounds.   Pulmonary:      Breath " sounds: No wheezing.      Comments: Tachypnea, diminished breath sounds all lobes   Abdominal:      General: Bowel sounds are normal. There is no distension.      Palpations: Abdomen is soft.      Tenderness: There is no abdominal tenderness.   Musculoskeletal:         General: No swelling. Normal range of motion.   Skin:     General: Skin is warm and dry.   Neurological:      Mental Status: He is alert and oriented to person, place, and time.                Significant Labs: All pertinent labs within the past 24 hours have been reviewed.    Significant Imaging: I have reviewed all pertinent imaging results/findings within the past 24 hours.  Assessment/Plan:     * PNA (pneumonia)  - chest xray with new low-grade infiltrate or atypical pneumonia involving the left lung.   - continue Levaquin         Atrial fibrillation with rapid ventricular response  Patient with Persistent (7 days or more) atrial fibrillation which is uncontrolled currently with Calcium Channel Blocker. Patient is currently in atrial fibrillation.[id number] Score: The patient doesn't have any registry metric data available. Anticoagulation indicated. Anticoagulation done with eliquis .    - patient is unsure if he has afib before in the past, but per chart review has had previous EKG with Afib  - presented to ED with Afib RVR, given IVP cardizem and started on gtt  - contiue cardizem gtt and home eliquis  - contiue cardiac monitoring  - cards consulted      S/P liver transplant  - follows with Dr. Askew  - continues on his prograf/Cellcept   - noted to have elevated LTFs (worse than prior) will monitor and consult hepatology for any further recommendations        Thrombocytopenia  Patient was found to have thrombocytopenia, the likely etiology is secondary to cirrhosis/portal hypertension, will monitor the platelets Daily. Will transfuse if platelet count is <20k. Hold DVT prophylaxis if platelets are <50k. The patient's platelet results have  been reviewed and are listed below.  Recent Labs   Lab 01/04/24  1435   PLT 89*           VTE Risk Mitigation (From admission, onward)           Ordered     apixaban tablet 5 mg  2 times daily         01/04/24 1604     Reason for No Pharmacological VTE Prophylaxis  Once        Question:  Reasons:  Answer:  Already adequately anticoagulated on oral Anticoagulants    01/04/24 1604     IP VTE HIGH RISK PATIENT  Once         01/04/24 1604     Place sequential compression device  Until discontinued         01/04/24 1604                         On 01/04/2024, patient should be placed in hospital observation services under my care in collaboration with Dr. Garcia.           Theresa Jesus, NP  Department of Hospital Medicine  [facility] - Emergency Dept.

## 2024-01-04 NOTE — ED PROVIDER NOTES
SCRIBE #1 NOTE: I, Beau Chew, am scribing for, and in the presence of, Kee Patel MD. I have scribed the entire note.      History      Chief Complaint   Patient presents with    General Illness     N/V/D, fatigue, cough, worsening since flu dx 3 weeks ago; liver transplant pt       Review of patient's allergies indicates:   Allergen Reactions    Latex, natural rubber         HPI   HPI    1/4/2024, 2:13 PM   History obtained from the patient      History of Present Illness: Jordan Altman is a 48 y.o. male patient with a PMHx of HTN, hepatic cirrhosis s/p liver transplant on 10/14/2020 who presents to the Emergency Department for SOB, onset several weeks PTA. Pt states that he was dx with the flu 3 weeks ago. Symptoms are constant and moderate in severity. No mitigating or exacerbating factors reported. Associated sxs include cough, fatigue, and palpitations. Patient denies any fever, chills, n/v/d, CP, weakness, numbness, dizziness, headache, and all other sxs at this time. No prior Tx reported. No further complaints or concerns at this time.     Arrival mode: Personal vehicle     PCP: Zohra Walters FNP       Past Medical History:  Past Medical History:   Diagnosis Date    Decompensated hepatic cirrhosis     HCV acquired through organ donation, treated / cured     svr12 - 6/2021    Hypertension     SBP (spontaneous bacterial peritonitis)        Past Surgical History:  Past Surgical History:   Procedure Laterality Date    ERCP N/A 10/1/2020    Procedure: ERCP (ENDOSCOPIC RETROGRADE CHOLANGIOPANCREATOGRAPHY);  Surgeon: Marcos Ramirez MD;  Location: [address]);  Service: Endoscopy;  Laterality: N/A;    ESOPHAGOGASTRODUODENOSCOPY N/A 10/13/2020    Procedure: EGD (ESOPHAGOGASTRODUODENOSCOPY);  Surgeon: Prasanth Jerry MD;  Location: [facility] ([address] [place] [address]);  Service: Endoscopy;  Laterality: N/A;    EXPLORATORY LAPAROTOMY AFTER LIVER TRANSPLANTATION N/A 10/15/2020    Procedure: LAPAROTOMY,  EXPLORATORY, AFTER LIVER TRANSPLANT, Possible redo of artery, possible portal thrombectomy. IntraOperative US.;  Surgeon: Curtis Zavaleta MD;  Location: [facility] OR [address] [place] [address];  Service: Transplant;  Laterality: N/A;  With intraoperative ultrasound    LIVER TRANSPLANT N/A 10/14/2020    Procedure: TRANSPLANT, LIVER;  Surgeon: Curtis Zavaleta MD;  [facility];  Service: Transplant;  Laterality: N/A;  Intra op HD    RIGHT HEART CATHETERIZATION Right 9/23/2020    Procedure: INSERTION, CATHETER, RIGHT HEART;  Surgeon: Mikel Costa Jr., MD;  Location: [facility] CATH LAB;  Service: Cardiology;  Laterality: Right;    THORACENTESIS  2011    THROMBECTOMY  10/15/2020    Procedure: THROMBECTOMY portal vein;  Surgeon: Curtis Zavaleta MD;  Location: [facility] OR [address] [place] [address];  Service: Transplant;;         Family History:  Family History   Problem Relation Age of Onset    COPD Mother        Social History:  Social History     Tobacco Use    Smoking status: Some Days    Smokeless tobacco: Current   Substance and Sexual Activity    Alcohol use: Yes     Alcohol/week: 112.0 standard drinks of alcohol     Types: 112 Shots of liquor per week     Comment: fifth of vodka a day. LAST DRINK 7/25/2020 per pt     Drug use: Yes     Frequency: 3.0 times per week     Types: Marijuana    Sexual activity: Yes       ROS   Review of Systems   Constitutional:  Positive for fatigue. Negative for chills and fever.   HENT:  Negative for sore throat.    Respiratory:  Positive for cough and shortness of breath.    Cardiovascular:  Positive for palpitations. Negative for chest pain.   Gastrointestinal:  Negative for diarrhea, nausea and vomiting.   Genitourinary:  Negative for dysuria.   Musculoskeletal:  Negative for back pain.   Skin:  Negative for rash.   Neurological:  Negative for dizziness, weakness, numbness and headaches.   Hematological:  Does not bruise/bleed easily.   All other systems reviewed and are negative.    Physical Exam      Initial  Vitals   BP Pulse Resp Temp SpO2   01/04/24 1436 01/04/24 1403 01/04/24 1403 01/04/24 1455 01/04/24 1455   112/62 60 18 98.1 °F (36.7 °C) 95 %      MAP       --                 Physical Exam  Nursing Notes and Vital Signs Reviewed.  Constitutional: Patient is in no acute distress. Well-developed and well-nourished.  Head: Atraumatic. Normocephalic.  Eyes: PERRL. EOM intact. Conjunctivae are not pale. Scleral icterus.  ENT: Mucous membranes are moist. Oropharynx is clear and symmetric.    Neck: Supple. Full ROM. No lymphadenopathy.  Cardiovascular: Tachycardic. Irregularly irregular rhythm. No murmurs, rubs, or gallops. Distal pulses are 2+ and symmetric.  Pulmonary/Chest: No respiratory distress. Clear to auscultation bilaterally. No wheezing or rales.  Abdominal: Soft and non-distended.  There is no tenderness.  No rebound, guarding, or rigidity.   Musculoskeletal: Moves all extremities. No obvious deformities. No edema.  Skin: Warm and dry. Jaundiced.  Neurological:  Alert, awake, and appropriate.  Normal speech.  No acute focal neurological deficits are appreciated.  Psychiatric: Normal affect. Good eye contact. Appropriate in content.    ED Course    Procedures  ED Vital Signs:  Vitals:    01/04/24 1820 01/04/24 1847 01/04/24 1900 01/04/24 1918   BP: 102/63 112/66 (!) 110/53 (!) 102/56   Pulse: 101 97 101 (!) 117   Resp: (!) 33 (!) 28 (!) 24 (!) 24   Temp:       TempSrc:       SpO2: 96% (!) 92% (!) 92% (!) 93%   Weight:       Height:        01/04/24 1947 01/04/24 2050 01/04/24 2052 01/04/24 2100   BP: (!) 98/53 127/73     Pulse: 109 (!) 118 92 (!) 112   Resp: (!) 21 17     Temp:  96.8 °F (36 °C)     TempSrc:       SpO2: (!) 93% (!) 91%     Weight:       Height:        01/04/24 2300 01/05/24 0017 01/05/24 0100 01/05/24 0325   BP:  113/73     Pulse: 88 98 106 (!) 114   Resp:  20  17   Temp:  98.3 °F (36.8 °C)     TempSrc:  Oral     SpO2:  (!) 91%     Weight:       Height:        01/05/24 0425 01/05/24 0500  01/05/24 0513   BP:   116/64   Pulse: 95 90 91   Resp:   20   Temp:   98 °F (36.7 °C)   TempSrc:   Oral   SpO2:   (!) 93%   Weight:   128.1 kg (282 lb 6.6 oz)   Height:          Abnormal Lab Results:  Labs Reviewed   CBC W/ AUTO DIFFERENTIAL - Abnormal; Notable for the following components:       Result Value    RBC 4.27 (*)      (*)     MCH 34.0 (*)     RDW 16.1 (*)     Platelets 89 (*)     Immature Granulocytes 1.3 (*)     Immature Grans (Abs) 0.08 (*)     Lymph # 0.9 (*)     Lymph % 14.6 (*)     All other components within normal limits    Narrative:     Release to patient->Immediate   COMPREHENSIVE METABOLIC PANEL - Abnormal; Notable for the following components:    Sodium 133 (*)     CO2 18 (*)     Total Protein 5.9 (*)     Albumin 2.7 (*)     Total Bilirubin 7.2 (*)     Alkaline Phosphatase 169 (*)      (*)     ALT 82 (*)     Anion Gap 17 (*)     All other components within normal limits    Narrative:     Release to patient->Immediate   B-TYPE NATRIURETIC PEPTIDE - Abnormal; Notable for the following components:     (*)     All other components within normal limits    Narrative:     Release to patient->Immediate   PROTIME-INR - Abnormal; Notable for the following components:    Prothrombin Time 15.6 (*)     INR 1.5 (*)     All other components within normal limits    Narrative:     Release to patient->Immediate   INFLUENZA A & B BY MOLECULAR   CULTURE, RESPIRATORY   TROPONIN I    Narrative:     Release to patient->Immediate   SARS-COV-2 RNA AMPLIFICATION, QUAL   HIV 1 / 2 ANTIBODY    Narrative:     Release to patient->Immediate   APTT    Narrative:     Release to patient->Immediate        All Lab Results:  Results for orders placed or performed during the hospital encounter of 01/04/24   Influenza A & B by Molecular    Specimen: Nasopharyngeal Swab   Result Value Ref Range    Influenza A, Molecular Negative Negative    Influenza B, Molecular Negative Negative    Flu A & B Source Nasal swab     Blood culture    Specimen: Peripheral, Hand, Left; Blood   Result Value Ref Range    Blood Culture, Routine No Growth to date    Blood culture    Specimen: Wrist, Right; Blood   Result Value Ref Range    Blood Culture, Routine No Growth to date    CBC Auto Differential   Result Value Ref Range    WBC 6.31 3.90 - 12.70 K/uL    RBC 4.27 (L) 4.60 - 6.20 M/uL    Hemoglobin 14.5 14.0 - 18.0 g/dL    Hematocrit 43.7 40.0 - 54.0 %     (H) 82 - 98 fL    MCH 34.0 (H) 27.0 - 31.0 pg    MCHC 33.2 32.0 - 36.0 g/dL    RDW 16.1 (H) 11.5 - 14.5 %    Platelets 89 (L) 150 - 450 K/uL    MPV 11.0 9.2 - 12.9 fL    Immature Granulocytes 1.3 (H) 0.0 - 0.5 %    Gran # (ANC) 4.5 1.8 - 7.7 K/uL    Immature Grans (Abs) 0.08 (H) 0.00 - 0.04 K/uL    Lymph # 0.9 (L) 1.0 - 4.8 K/uL    Mono # 0.8 0.3 - 1.0 K/uL    Eos # 0.0 0.0 - 0.5 K/uL    Baso # 0.04 0.00 - 0.20 K/uL    nRBC 0 0 /100 WBC    Gran % 71.1 38.0 - 73.0 %    Lymph % 14.6 (L) 18.0 - 48.0 %    Mono % 11.9 4.0 - 15.0 %    Eosinophil % 0.5 0.0 - 8.0 %    Basophil % 0.6 0.0 - 1.9 %    Differential Method Automated    Comprehensive Metabolic Panel   Result Value Ref Range    Sodium 133 (L) 136 - 145 mmol/L    Potassium 4.3 3.5 - 5.1 mmol/L    Chloride 98 95 - 110 mmol/L    CO2 18 (L) 23 - 29 mmol/L    Glucose 109 70 - 110 mg/dL    BUN 14 6 - 20 mg/dL    Creatinine 1.1 0.5 - 1.4 mg/dL    Calcium 8.7 8.7 - 10.5 mg/dL    Total Protein 5.9 (L) 6.0 - 8.4 g/dL    Albumin 2.7 (L) 3.5 - 5.2 g/dL    Total Bilirubin 7.2 (H) 0.1 - 1.0 mg/dL    Alkaline Phosphatase 169 (H) 55 - 135 U/L     (H) 10 - 40 U/L    ALT 82 (H) 10 - 44 U/L    eGFR >60 >60 mL/min/1.73 m^2    Anion Gap 17 (H) 8 - 16 mmol/L   BNP   Result Value Ref Range     (H) 0 - 99 pg/mL   Troponin I   Result Value Ref Range    Troponin I <0.006 0.000 - 0.026 ng/mL   COVID-19 Rapid Screening   Result Value Ref Range    SARS-CoV-2 RNA, Amplification, Qual Negative Negative   HIV 1/2 Ag/Ab (4th Gen)   Result Value Ref  Range    HIV 1/2 Ag/Ab Negative Negative   Protime-INR   Result Value Ref Range    Prothrombin Time 15.6 (H) 9.0 - 12.5 sec    INR 1.5 (H) 0.8 - 1.2   APTT   Result Value Ref Range    aPTT 29.5 21.0 - 32.0 sec     *Note: Due to a large number of results and/or encounters for the requested time period, some results have not been displayed. A complete set of results can be found in Results Review.     Imaging Results:  Imaging Results              X-Ray Chest 1 View (Final result)  Result time 01/04/24 14:33:00      Final result by Kaylan LeosVenancio), MD (01/04/24 14:33:00)                   Impression:      Possible new low-grade infiltrate or atypical pneumonia involving the left lung.  Follow-up is recommended.      Electronically signed by: Kaylan Leos MD  Date:    01/04/2024  Time:    14:33               Narrative:    EXAMINATION:  XR CHEST 1 VIEW    CLINICAL HISTORY:  Shortness of breath,    COMPARISON:  Chest, 12/12/2023.    FINDINGS:  One view.  Heart is normal in size.  Moderate coarsening of the lung markings in the mid to lower lung zones on the left laterally.  This appears more prominent than on previous exam.                                     The EKG was ordered, reviewed, and independently interpreted by the ED provider.  Interpretation time: 14:09  Rate: 120 BPM  Rhythm: Atrial fibrillation with rapid ventricular response  Interpretation: Left axis deviation. Low voltage QRS. Inferior infarct, age undetermined. Anterolateral infarct, age undetermined. No STEMI.           The Emergency Provider reviewed the vital signs and test results, which are outlined above.    ED Discussion     3:44 PM: Discussed case with Theresa Jesus NP (Hospital Medicine). Dr. Garcia agrees with current care and management of pt and accepts admission.   Admitting Service: Hospital Medicine  Admitting Physician: Dr. Garcia  Admit to: Inpatient    3:45 PM: Re-evaluated pt. I have discussed test results, shared treatment  plan, and the need for admission with patient and family at bedside. Pt and family express understanding at this time and agree with all information. All questions answered. Pt and family have no further questions or concerns at this time. Pt is ready for admit.         ED Medication(s):  Medications   apixaban tablet 5 mg (5 mg Oral Not Given 1/4/24 2100)   EScitalopram oxalate tablet 10 mg (has no administration in time range)   gabapentin capsule 600 mg (600 mg Oral Given 1/4/24 2059)   mycophenolate capsule 500 mg (500 mg Oral Not Given 1/4/24 2100)   tacrolimus capsule 1 mg (1 mg Oral Given 1/4/24 1808)   traZODone tablet 50 mg (has no administration in time range)   sodium chloride 0.9% flush 10 mL (has no administration in time range)   naloxone 0.4 mg/mL injection 0.02 mg (has no administration in time range)   glucose chewable tablet 16 g (has no administration in time range)   glucose chewable tablet 24 g (has no administration in time range)   glucagon (human recombinant) injection 1 mg (has no administration in time range)   acetaminophen tablet 650 mg (has no administration in time range)   HYDROcodone-acetaminophen 5-325 mg per tablet 1 tablet (1 tablet Oral Given 1/5/24 0325)   senna-docusate 8.6-50 mg per tablet 1 tablet (1 tablet Oral Given 1/4/24 2100)   dextrose 10% bolus 125 mL 125 mL (has no administration in time range)   dextrose 10% bolus 250 mL 250 mL (has no administration in time range)   diltiaZEM 125 mg in dextrose 5% 125 mL infusion (non-titrating) (5 mg/hr Intravenous Rate/Dose Change 1/4/24 2214)   levoFLOXacin 750 mg/150 mL IVPB 750 mg (has no administration in time range)   ondansetron disintegrating tablet 4 mg (4 mg Oral Given 1/4/24 1807)   diltiaZEM injection 15 mg (15 mg Intravenous Given 1/4/24 1436)   sodium chloride 0.9% bolus 1,000 mL 1,000 mL (0 mLs Intravenous Stopped 1/4/24 1554)   levoFLOXacin 750 mg/150 mL IVPB 750 mg (0 mg Intravenous Stopped 1/4/24 1920)   furosemide  injection 40 mg (40 mg Intravenous Given 1/4/24 1807)         Current Discharge Medication List            Medical Decision Making    Medical Decision Making  Nausea, vomiting, worsening sob and palpitations  DDx: dehydration, electrolyte abnormality, pneumonia    Amount and/or Complexity of Data Reviewed  Labs: ordered. Decision-making details documented in ED Course.  Radiology: ordered. Decision-making details documented in ED Course.  ECG/medicine tests: ordered and independent interpretation performed. Decision-making details documented in ED Course.    Risk  Prescription drug management.  Decision regarding hospitalization.                Scribe Attestation:   Scribe #1: I performed the above scribed service and the documentation accurately describes the services I performed. I attest to the accuracy of the note.    Attending:   Physician Attestation Statement for Scribe #1: I, Kee Patel MD, personally performed the services described in this documentation, as scribed by Beau Chew, in my presence, and it is both accurate and complete.          Clinical Impression       ICD-10-CM ICD-9-CM   1. Pneumonia of left lower lobe due to infectious organism  J18.9 486   2. Shortness of breath  R06.02 786.05   3. Atrial fibrillation, unspecified type  I48.91 427.31   4. Chest pain  R07.9 786.50       Disposition:   Disposition: Admitted  Condition: Kee Barrios MD  01/05/24 0607

## 2024-01-04 NOTE — PHARMACY MED REC
"Admission Medication History     The home medication history was taken by Moo Lockwood.    You may go to "Admission" then "Reconcile Home Medications" tabs to review and/or act upon these items.     The home medication list has been updated by the Pharmacy department.   Please read ALL comments highlighted in yellow.   Please address this information as you see fit.    Feel free to contact us if you have any questions or require assistance.      The medications listed below were removed from the home medication list. Please reorder if appropriate:  Patient reports no longer taking the following medication(s):  FLONASE NS  TESSALON 100MG  LEXAPRO 10MG    Medications listed below were obtained from: Patient/family and Analytic software- [employer]  (Not in a hospital admission)    Moo Lockwood  [id number]    Current Outpatient Medications on File Prior to Encounter   Medication Sig Dispense Refill Last Dose    amLODIPine (NORVASC) 10 MG tablet Take 1 tablet by mouth in the morning. 30 tablet 1 1/4/2024    apixaban (ELIQUIS) 5 mg Tab Take 1 tablet (5 mg total) by mouth 2 (two) times a day. 60 tablet 11 1/3/2024    buPROPion (WELLBUTRIN SR) 150 MG TBSR 12 hr tablet Take 150 mg by mouth 2 (two) times daily.       fluticasone-salmeterol diskus inhaler 250-50 mcg Inhale 1 puff into the lungs 2 (two) times a day.   1/3/2024    fluticasone-umeclidin-vilanter (TRELEGY ELLIPTA) 200-62.5-25 mcg inhaler Inhale 1 puff into the lungs once daily.   1/3/2024    gabapentin (NEURONTIN) 300 MG capsule Take 2 capsules by mouth 2 (two) times daily.       loratadine (CLARITIN) 10 mg tablet Take 10 mg by mouth once daily.   1/3/2024    losartan (COZAAR) 25 MG tablet Take 25 mg by mouth once daily.   1/4/2024    tacrolimus (PROGRAF) 1 MG Cap Take 1 capsule (1 mg total) by mouth every morning AND 1 capsule (1 mg total) every evening. 60 capsule 11 1/4/2024    testosterone cypionate (DEPOTESTOTERONE CYPIONATE) 200 mg/mL injection " Inject 0.75 mLs (150 mg total) into the muscle once a week. 5 mL 0 Past Week    tiZANidine (ZANAFLEX) 4 MG tablet Take 4 mg by mouth 2 (two) times a day.   1/3/2024    traZODone (DESYREL) 50 MG tablet Take  mg by mouth every evening.   1/3/2024    albuterol (PROVENTIL/VENTOLIN HFA) 90 mcg/actuation inhaler Inhale 2 puffs into the lungs every 6 (six) hours as needed for Wheezing or Shortness of Breath. Rescue       mycophenolate (CELLCEPT) 250 mg Cap Take 2 capsules (500 mg total) by mouth 2 (two) times daily. 120 capsule 11                          .

## 2024-01-04 NOTE — ASSESSMENT & PLAN NOTE
- chest xray with new low-grade infiltrate or atypical pneumonia involving the left lung.   - continue Levaquin

## 2024-01-04 NOTE — ASSESSMENT & PLAN NOTE
- follows with Dr. Askew  - continues on his prograf/Cellcept   - noted to have elevated LTFs (worse than prior) will monitor and consult hepatology for any further recommendations

## 2024-01-05 PROBLEM — I50.32 CHRONIC DIASTOLIC CONGESTIVE HEART FAILURE: Status: ACTIVE | Noted: 2024-01-01

## 2024-01-05 PROBLEM — R74.8 ELEVATED LIVER ENZYMES: Status: ACTIVE | Noted: 2024-01-01

## 2024-01-05 PROBLEM — R07.9 CHEST PAIN: Status: ACTIVE | Noted: 2024-01-01

## 2024-01-05 NOTE — HPI
Mr. Altman is a 48 year old male with a history of  alcoholic liver cirrhosis s/p liver transplant [facility] 10/20, CKD, PAF(on eliquis) who presents to the ED with complaints of shortness of breath, fatigue, intermittent fever over the last 4 weeks.  Stated he was dx over a week ago with flu as well.  He also complains of intermittent N/V.     In the ED he was noted to be in afib RVR (HR in the 140's), given Cardizem IVP and placed on gtt.  Lab work notable for INR 1.5, Alk phos 169, , ALT 82, .  FLU and covid negative. Chest xray showed new low-grade infiltrate or atypical pneumonia LLL.  Patient was started on Levaquin.  Patient will be admitted to observation for afib rvr/pna.  Cardiology consulted.       CARDIOLOGY consult for afib with RVR  HGB 12.6, plt 75  Cr 1.1--> 1.6  Elevated LFTs, ALB 2.4 TP 5.3     Troponin negative   EKG aifb with RVR  CXR Possible new low-grade infiltrate or atypical pneumonia involving the left lung   Echo biv nl function and mild LVH

## 2024-01-05 NOTE — ASSESSMENT & PLAN NOTE
Patient is identified as having Diastolic (HFpEF) heart failure that is Chronic. CHF is currently controlled. Latest ECHO performed and demonstrates- Results for orders placed during the hospital encounter of 01/04/24    Echo    Interpretation Summary    Left Ventricle: The left ventricle is normal in size. Mildly increased ventricular mass. Mildly increased wall thickness. There is concentric hypertrophy. Normal wall motion. There is normal systolic function with a visually estimated ejection fraction of 55 - 60%. There is normal diastolic function.    Right Ventricle: Normal right ventricular cavity size. Wall thickness is normal. Right ventricle wall motion  is normal. Systolic function is normal.    IVC/SVC: Normal venous pressure at 3 mmHg.  .  monitor clinical status closely. Monitor on telemetry. Patient is on CHF pathway.  Monitor strict Is&Os and daily weights.  Place on fluid restriction of 1.5 L. Cardiology has been consulted. Continue to stress to patient importance of self efficacy and  on diet for CHF. Last BNP reviewed- and noted below   Recent Labs   Lab 01/04/24  1435   *       Continue Afib with RVR control

## 2024-01-05 NOTE — CONSULTS
[facility]  Hepatology  Consult Note    Patient Name: Jordan Altman  MRN: [id number]  Admission Date: 1/4/2024  Hospital Length of Stay: 1 days  Attending Provider: Didier Ryder MD   Primary Care Physician: Zohra Walters FNP  Principal Problem:PNA (pneumonia)    Inpatient Consult to Telemedicine - Hepatology  Consult performed by: Juanjose Kwong MD  Consult ordered by: Theresa Jesus NP        Subjective:     Transplant status: Post-transplant    HPI: Mr. Altman is a 48 year old male with a history of  alcoholic liver cirrhosis s/p liver transplant [facility] 10/20, CKD, PAF(on eliquis) admitted with Pneumonia. Hepatology consulted for Post transplant status.      In the ED he was noted to be in afib RVR (HR in the 140's), given Cardizem IVP and placed on gtt.      Lab work notable for INR 1.5, Alk phos 169, , ALT 82, . Chest xray showed new low-grade infiltrate or atypical pneumonia LLL.  Patient  on Levaquin.      Transplant Date: 10/14/2020  UNOS Native Liver Dx: Alcoholic Cirrhosis     Jordan is here for follow up of his liver transplant.     ORGAN: LIVER  Whole or Partial: whole liver  Donor Type: donation after brain death  CDC High Risk: yes  Donor CMV Status: Positive  Donor HCV Status: Positive  Donor HBcAb: Negative  Biliary Anastomosis: end to end  Arterial Anatomy: standard  IVC reconstruction: end to end ivc  Portal vein status: patent        Review of Systems  GEN: No acute distress, pleasant, body habitus obese  HEENT: atraumatic and normocephalic  CARDS: tachyarrhythmic HR, no m/g, pulses palpable in LE  PULM: breathing comfortably on room air, chest symmetric, nonlabored, no abnormal breath sounds on auscultation  ABD: nontender, nondistended, soft, no organomegaly, BS+  Neuro: Alert and oriented x3, CN's I-IX grossly intact, sensation and motor intact; follows directions and answers questions appropriately  Past Medical History:   Diagnosis Date     Decompensated hepatic cirrhosis     HCV acquired through organ donation, treated / cured     svr12 - 6/2021    Hypertension     SBP (spontaneous bacterial peritonitis)        Past Surgical History:   Procedure Laterality Date    ERCP N/A 10/1/2020    Procedure: ERCP (ENDOSCOPIC RETROGRADE CHOLANGIOPANCREATOGRAPHY);  Surgeon: Marcos Ramirez MD;  Location: [facility] ([address] [place] [address]);  Service: Endoscopy;  Laterality: N/A;    ESOPHAGOGASTRODUODENOSCOPY N/A 10/13/2020    Procedure: EGD (ESOPHAGOGASTRODUODENOSCOPY);  Surgeon: Prasanth Jerry MD;  [facility]  Service: Endoscopy;  Laterality: N/A;    EXPLORATORY LAPAROTOMY AFTER LIVER TRANSPLANTATION N/A 10/15/2020    Procedure: LAPAROTOMY, EXPLORATORY, AFTER LIVER TRANSPLANT, Possible redo of artery, possible portal thrombectomy. IntraOperative US.;  Surgeon: Curtis Zavaleta MD;  Location: [facility] OR [address] [place] [address];  Service: Transplant;  Laterality: N/A;  With intraoperative ultrasound    LIVER TRANSPLANT N/A 10/14/2020    Procedure: TRANSPLANT, LIVER;  Surgeon: Curtis Zavaleta MD;  Location: [facility] OR [address] [place] [address];  Service: Transplant;  Laterality: N/A;  Intra op HD    RIGHT HEART CATHETERIZATION Right 9/23/2020    Procedure: INSERTION, CATHETER, RIGHT HEART;  Surgeon: Mikel Costa Jr., MD;  Location: [facility] CATH LAB;  Service: Cardiology;  Laterality: Right;    THORACENTESIS  2011    THROMBECTOMY  10/15/2020    Procedure: THROMBECTOMY portal vein;  Surgeon: Curtis Zavaleta MD;  Location: [address];  Service: Transplant;;       Family history of liver disease: No    Review of patient's allergies indicates:   Allergen Reactions    Latex, natural rubber          Tobacco Use    Smoking status: Some Days    Smokeless tobacco: Current   Substance and Sexual Activity    Alcohol use: Yes     Alcohol/week: 112.0 standard drinks of alcohol     Types: 112 Shots of liquor per week     Comment: fifth of vodka a day. LAST DRINK 7/25/2020 per pt     Drug use: Yes      Frequency: 3.0 times per week     Types: Marijuana    Sexual activity: Yes       Facility-Administered Medications Prior to Admission   Medication Dose Route Frequency Provider Last Rate Last Admin    acetaminophen tablet 650 mg  650 mg Oral Once PRN Leo Ghotra MD        albuterol inhaler 2 puff  2 puff Inhalation Q20 Min PRN Leo Ghotra MD        [DISCONTINUED] diphenhydrAMINE injection 25 mg  25 mg Intravenous Once PRN Leo Ghotra MD        [DISCONTINUED] EPINEPHrine (EPIPEN) 0.3 mg/0.3 mL pen injection 0.3 mg  0.3 mg Intramuscular PRN Leo Ghotra MD        [DISCONTINUED] methylPREDNISolone sodium succinate injection 40 mg  40 mg Intravenous Once PRN Leo Ghotra MD        [DISCONTINUED] ondansetron disintegrating tablet 4 mg  4 mg Oral Once PRN Leo Ghotra MD        [DISCONTINUED] sodium chloride 0.9% 500 mL flush bag   Intravenous PRLeo Bhatt MD        [DISCONTINUED] sodium chloride 0.9% flush 10 mL  10 mL Intravenous PRN Leo Ghotra MD         Medications Prior to Admission   Medication Sig Dispense Refill Last Dose    amLODIPine (NORVASC) 10 MG tablet Take 1 tablet by mouth in the morning. 30 tablet 1 1/4/2024    apixaban (ELIQUIS) 5 mg Tab Take 1 tablet (5 mg total) by mouth 2 (two) times a day. 60 tablet 11 1/3/2024    buPROPion (WELLBUTRIN SR) 150 MG TBSR 12 hr tablet Take 150 mg by mouth 2 (two) times daily.       fluticasone-salmeterol diskus inhaler 250-50 mcg Inhale 1 puff into the lungs 2 (two) times a day.   1/3/2024    fluticasone-umeclidin-vilanter (TRELEGY ELLIPTA) 200-62.5-25 mcg inhaler Inhale 1 puff into the lungs once daily.   1/3/2024    gabapentin (NEURONTIN) 300 MG capsule Take 2 capsules by mouth 2 (two) times daily.       loratadine (CLARITIN) 10 mg tablet Take 10 mg by mouth once daily.   1/3/2024    losartan (COZAAR) 25 MG tablet Take 25 mg by mouth once daily.   1/4/2024    mycophenolate (CELLCEPT) 250 mg Cap Take 2 capsules  (500 mg total) by mouth 2 (two) times daily. 120 capsule 11 1/4/2024    tacrolimus (PROGRAF) 1 MG Cap Take 1 capsule (1 mg total) by mouth every morning AND 1 capsule (1 mg total) every evening. 60 capsule 11 1/4/2024    testosterone cypionate (DEPOTESTOTERONE CYPIONATE) 200 mg/mL injection Inject 0.75 mLs (150 mg total) into the muscle once a week. 5 mL 0 Past Week    tiZANidine (ZANAFLEX) 4 MG tablet Take 4 mg by mouth 2 (two) times a day.   1/3/2024    traZODone (DESYREL) 50 MG tablet Take  mg by mouth every evening.   1/3/2024    albuterol (PROVENTIL/VENTOLIN HFA) 90 mcg/actuation inhaler Inhale 2 puffs into the lungs every 6 (six) hours as needed for Wheezing or Shortness of Breath. Rescue          Objective:     Vital Signs (Most Recent):  Temp: 98.2 °F (36.8 °C) (01/05/24 0740)  Pulse: (!) 126 (01/05/24 0740)  Resp: 18 (01/05/24 1104)  BP: (!) 100/59 (01/05/24 0740)  SpO2: 95 % (01/05/24 0800) Vital Signs (24h Range):  Temp:  [96.8 °F (36 °C)-98.3 °F (36.8 °C)] 98.2 °F (36.8 °C)  Pulse:  [] 126  Resp:  [17-36] 18  SpO2:  [91 %-97 %] 95 %  BP: ()/(53-73) 100/59     Weight: 127.9 kg (282 lb) (01/05/24 0740)  Body mass index is 38.25 kg/m².    Physical Exam  Constitutional:       General: He is not in acute distress.     Appearance: Normal appearance.   HENT:      Head: Normocephalic and atraumatic.      Nose: Nose normal.      Mouth/Throat:      Mouth: Mucous membranes are moist.   Eyes:      Conjunctiva/sclera: Conjunctivae normal.   Cardiovascular:      Rate and Rhythm: Normal rate.   Pulmonary:      Effort: Respiratory distress present.   Abdominal:      General: There is no distension.      Palpations: There is no mass.      Tenderness: There is no abdominal tenderness.   Musculoskeletal:         General: Normal range of motion.      Cervical back: Normal range of motion.      Right lower leg: No edema.      Left lower leg: No edema.   Skin:     Coloration: Skin is not jaundiced.       Findings: No rash.   Neurological:      General: No focal deficit present.      Mental Status: He is alert.   Psychiatric:         Mood and Affect: Mood normal.             Significant Labs:  CBC:   Recent Labs   Lab 01/05/24  0504   WBC 6.09   RBC 3.71*   HGB 12.6*   HCT 39.4*   PLT 75*     CMP:   Recent Labs   Lab 01/05/24  0504   GLU 73   CALCIUM 8.1*   ALBUMIN 2.4*   PROT 5.3*   *   K 4.6   CO2 23   CL 99   BUN 17   CREATININE 1.6*   ALKPHOS 163*   ALT 70*   *   BILITOT 7.8*     Coagulation:   Recent Labs   Lab 01/04/24  1435   INR 1.5*   APTT 29.5       Significant Imaging:  X-Ray: Reviewed      Assessment/Plan:     Active Diagnoses:    Diagnosis Date Noted POA    PRINCIPAL PROBLEM:  PNA (pneumonia) [J18.9] 01/04/2024 Yes    Atrial fibrillation with rapid ventricular response [I48.91] 07/30/2023 Yes    S/P liver transplant [Z94.4] 10/14/2020 Not Applicable    Thrombocytopenia [D69.6] 09/19/2020 Yes      Problems Resolved During this Admission:       Elevated Liver tests  Abnormal LFTs-liver tests have fluctuated regularly.  Mostly been concerned that is related to alcohol. At present likely from underlying infection  -Monitor Liver enzymes    Post Liver transplant  Continue Prograf 1 mg BID  Check Tac levels daily  Hold Cellcept due to infection    Alcohol abuse  -continue to advise alcohol abstinence    The patient location is:  ([place])  The chief complaint leading to consultation is: history of OLT     Visit type: audiovisual     Face to Face time with patient: 30  70 minutes of total time spent on the encounter, which includes face to face time and non-face to face time preparing to see the patient (eg, review of tests), Obtaining and/or reviewing separately obtained history, Documenting clinical information in the electronic or other health record, Independently interpreting results (not separately reported) and communicating results to the patient/family/caregiver, or Care coordination (not  separately reported).     Each patient to whom he or she provides medical services by telemedicine is:  (1) informed of the relationship between the physician and patient and the respective role of any other health care provider with respect to management of the patient; and (2) notified that he or she may decline to receive medical services by telemedicine and may withdraw from such care at any time.    Thank you for your consult. I will follow-up with patient. Please contact us if you have any additional questions.    Juanjose Kwong MD  Hepatology  [facility]

## 2024-01-05 NOTE — CONSULTS
[facility]  Cardiology  Consult Note    Patient Name: Jordan Altman  MRN: [id number]  Admission Date: 1/4/2024  Hospital Length of Stay: 1 days  Code Status: Full Code   Attending Provider: Didier Ryder MD   Consulting Provider: Willie Kowalski MD  Primary Care Physician: Zohra Walters FNP  Principal Problem:PNA (pneumonia)    Patient information was obtained from patient and ER records.     Inpatient consult to Cardiology  Consult performed by: Willie Kowalski MD  Consult ordered by: Theresa Jesus NP        Subjective:       HPI:   Mr. Altman is a 48 year old male with a history of  alcoholic liver cirrhosis s/p liver transplant [facility] 10/20, CKD, PAF(on eliquis) who presents to the ED with complaints of shortness of breath, fatigue, intermittent fever over the last 4 weeks.  Stated he was dx over a week ago with flu as well.  He also complains of intermittent N/V.     In the ED he was noted to be in afib RVR (HR in the 140's), given Cardizem IVP and placed on gtt.  Lab work notable for INR 1.5, Alk phos 169, , ALT 82, .  FLU and covid negative. Chest xray showed new low-grade infiltrate or atypical pneumonia LLL.  Patient was started on Levaquin.  Patient will be admitted to observation for afib rvr/pna.  Cardiology consulted.       CARDIOLOGY consult for afib with RVR  HGB 12.6, plt 75  Cr 1.1--> 1.6  Elevated LFTs, ALB 2.4 TP 5.3     Troponin negative   EKG aifb with RVR  CXR Possible new low-grade infiltrate or atypical pneumonia involving the left lung   Echo biv nl function and mild LVH    Past Medical History:   Diagnosis Date    Decompensated hepatic cirrhosis     HCV acquired through organ donation, treated / cured     svr12 - 6/2021    Hypertension     SBP (spontaneous bacterial peritonitis)        Past Surgical History:   Procedure Laterality Date    ERCP N/A 10/1/2020    Procedure: ERCP (ENDOSCOPIC RETROGRADE CHOLANGIOPANCREATOGRAPHY);  Surgeon: Marcos Ramirez,  MD;  Location: [facility] ENDO (2ND FLR);  Service: Endoscopy;  Laterality: N/A;    ESOPHAGOGASTRODUODENOSCOPY N/A 10/13/2020    Procedure: EGD (ESOPHAGOGASTRODUODENOSCOPY);  Surgeon: Prasanth Jerry MD;  Location: [facility] ENDO (2ND FLR);  Service: Endoscopy;  Laterality: N/A;    EXPLORATORY LAPAROTOMY AFTER LIVER TRANSPLANTATION N/A 10/15/2020    Procedure: LAPAROTOMY, EXPLORATORY, AFTER LIVER TRANSPLANT, Possible redo of artery, possible portal thrombectomy. IntraOperative US.;  Surgeon: Curtis Zavaleta MD;  [facility];  Service: Transplant;  Laterality: N/A;  With intraoperative ultrasound    LIVER TRANSPLANT N/A 10/14/2020    Procedure: TRANSPLANT, LIVER;  Surgeon: Curtis Zavaleta MD;  [facility];  Service: Transplant;  Laterality: N/A;  Intra op HD    RIGHT HEART CATHETERIZATION Right 9/23/2020    Procedure: INSERTION, CATHETER, RIGHT HEART;  Surgeon: Mikel Costa Jr., MD;  Location: [facility] CATH LAB;  Service: Cardiology;  Laterality: Right;    THORACENTESIS  2011    THROMBECTOMY  10/15/2020    Procedure: THROMBECTOMY portal vein;  Surgeon: Curtis Zavaleta MD;  [facility];  Service: Transplant;;       Review of patient's allergies indicates:   Allergen Reactions    Latex, natural rubber        No current facility-administered medications on file prior to encounter.     Current Outpatient Medications on File Prior to Encounter   Medication Sig    amLODIPine (NORVASC) 10 MG tablet Take 1 tablet by mouth in the morning.    apixaban (ELIQUIS) 5 mg Tab Take 1 tablet (5 mg total) by mouth 2 (two) times a day.    buPROPion (WELLBUTRIN SR) 150 MG TBSR 12 hr tablet Take 150 mg by mouth 2 (two) times daily.    fluticasone-salmeterol diskus inhaler 250-50 mcg Inhale 1 puff into the lungs 2 (two) times a day.    fluticasone-umeclidin-vilanter (TRELEGY ELLIPTA) 200-62.5-25 mcg inhaler Inhale 1 puff into the lungs once daily.    gabapentin (NEURONTIN) 300 MG capsule Take 2 capsules  by mouth 2 (two) times daily.    loratadine (CLARITIN) 10 mg tablet Take 10 mg by mouth once daily.    losartan (COZAAR) 25 MG tablet Take 25 mg by mouth once daily.    mycophenolate (CELLCEPT) 250 mg Cap Take 2 capsules (500 mg total) by mouth 2 (two) times daily.    tacrolimus (PROGRAF) 1 MG Cap Take 1 capsule (1 mg total) by mouth every morning AND 1 capsule (1 mg total) every evening.    testosterone cypionate (DEPOTESTOTERONE CYPIONATE) 200 mg/mL injection Inject 0.75 mLs (150 mg total) into the muscle once a week.    tiZANidine (ZANAFLEX) 4 MG tablet Take 4 mg by mouth 2 (two) times a day.    traZODone (DESYREL) 50 MG tablet Take  mg by mouth every evening.    albuterol (PROVENTIL/VENTOLIN HFA) 90 mcg/actuation inhaler Inhale 2 puffs into the lungs every 6 (six) hours as needed for Wheezing or Shortness of Breath. Rescue     Family History       Problem Relation (Age of Onset)    COPD Mother          Tobacco Use    Smoking status: Some Days    Smokeless tobacco: Current   Substance and Sexual Activity    Alcohol use: Yes     Alcohol/week: 112.0 standard drinks of alcohol     Types: 112 Shots of liquor per week     Comment: fifth of vodka a day. LAST DRINK 7/25/2020 per pt     Drug use: Yes     Frequency: 3.0 times per week     Types: Marijuana    Sexual activity: Yes     Review of Systems   Constitutional: Negative for decreased appetite, diaphoresis, fever, malaise/fatigue and night sweats.   HENT:  Negative for nosebleeds.    Eyes:  Negative for blurred vision and double vision.   Cardiovascular:  Negative for chest pain, claudication, dyspnea on exertion, irregular heartbeat, leg swelling, near-syncope, orthopnea, palpitations, paroxysmal nocturnal dyspnea and syncope.   Respiratory:  Positive for cough. Negative for shortness of breath, sleep disturbances due to breathing, snoring, sputum production and wheezing.    Endocrine: Negative for cold intolerance and polyuria.   Hematologic/Lymphatic: Does  not bruise/bleed easily.   Skin:  Negative for rash.   Musculoskeletal:  Negative for back pain, falls, joint pain, joint swelling and neck pain.   Gastrointestinal:  Negative for abdominal pain, heartburn, nausea and vomiting.   Genitourinary:  Negative for dysuria, frequency and hematuria.   Neurological:  Negative for difficulty with concentration, dizziness, focal weakness, headaches, light-headedness, numbness, seizures and weakness.   Psychiatric/Behavioral:  Negative for depression, memory loss and substance abuse. The patient does not have insomnia.    Allergic/Immunologic: Negative for HIV exposure and hives.     Objective:     Vital Signs (Most Recent):  Temp: 98.2 °F (36.8 °C) (01/05/24 0740)  Pulse: (!) 126 (01/05/24 0740)  Resp: 18 (01/05/24 1104)  BP: (!) 100/59 (01/05/24 0740)  SpO2: 95 % (01/05/24 0800) Vital Signs (24h Range):  Temp:  [96.8 °F (36 °C)-98.3 °F (36.8 °C)] 98.2 °F (36.8 °C)  Pulse:  [] 126  Resp:  [17-33] 18  SpO2:  [91 %-96 %] 95 %  BP: ()/(53-73) 100/59     Weight: 127.9 kg (282 lb)  Body mass index is 38.25 kg/m².    SpO2: 95 %         Intake/Output Summary (Last 24 hours) at 1/5/2024 1609  Last data filed at 1/5/2024 0927  Gross per 24 hour   Intake 150 ml   Output 325 ml   Net -175 ml       Lines/Drains/Airways       Peripheral Intravenous Line  Duration                  Peripheral IV - Single Lumen 01/04/24 1434 20 G Anterior;Proximal;Right Forearm 1 day                     Physical Exam  HENT:      Head: Normocephalic.   Eyes:      Pupils: Pupils are equal, round, and reactive to light.   Neck:      Thyroid: No thyromegaly.      Vascular: Normal carotid pulses. No carotid bruit or JVD.   Cardiovascular:      Rate and Rhythm: Normal rate. Rhythm irregularly irregular. No extrasystoles are present.     Chest Wall: PMI is not displaced.      Pulses: Normal pulses.      Heart sounds: Normal heart sounds. No murmur heard.     No gallop. No S3 sounds.   Pulmonary:       "Effort: No respiratory distress.      Breath sounds: Normal breath sounds. No stridor.   Abdominal:      General: Bowel sounds are normal.      Palpations: Abdomen is soft.      Tenderness: There is no abdominal tenderness. There is no rebound.   Musculoskeletal:         General: Normal range of motion.   Skin:     Findings: No rash.   Neurological:      Mental Status: He is alert and oriented to person, place, and time.   Psychiatric:         Behavior: Behavior normal.          Significant Labs: ABG: No results for input(s): "PH", "PCO2", "HCO3", "POCSATURATED", "BE" in the last 48 hours., Blood Culture:   Recent Labs   Lab 01/04/24  1636   LABBLOO No Growth to date  No Growth to date   , BMP:   Recent Labs   Lab 01/04/24  1435 01/05/24  0504    73   * 134*   K 4.3 4.6   CL 98 99   CO2 18* 23   BUN 14 17   CREATININE 1.1 1.6*   CALCIUM 8.7 8.1*   MG  --  0.8*   , CMP   Recent Labs   Lab 01/04/24  1435 01/05/24  0504   * 134*   K 4.3 4.6   CL 98 99   CO2 18* 23    73   BUN 14 17   CREATININE 1.1 1.6*   CALCIUM 8.7 8.1*   PROT 5.9* 5.3*   ALBUMIN 2.7* 2.4*   BILITOT 7.2* 7.8*   ALKPHOS 169* 163*   * 106*   ALT 82* 70*   ANIONGAP 17* 12   , CBC   Recent Labs   Lab 01/04/24  1435 01/05/24  0504   WBC 6.31 6.09   HGB 14.5 12.6*   HCT 43.7 39.4*   PLT 89* 75*   , INR   Recent Labs   Lab 01/04/24  1435   INR 1.5*   , Lipid Panel No results for input(s): "CHOL", "HDL", "LDLCALC", "TRIG", "CHOLHDL" in the last 48 hours., and Troponin   Recent Labs   Lab 01/04/24  1435 01/05/24  0502 01/05/24  1100   TROPONINI <0.006 0.006 <0.006       Significant Imaging: EKG:    Assessment and Plan:     * PNA (pneumonia)  Rx per HM    Atrial fibrillation with rapid ventricular response  Afib with RVR      Continue eliquis and Cardizem rx      S/P liver transplant  Rx per [facility]    Alcohol use disorder, severe, dependence  Alcohol cessation    Thrombocytopenia  PLT 75 stable        VTE Risk Mitigation (From " admission, onward)           Ordered     apixaban tablet 5 mg  2 times daily         01/04/24 1604     Reason for No Pharmacological VTE Prophylaxis  Once        Question:  Reasons:  Answer:  Already adequately anticoagulated on oral Anticoagulants    01/04/24 1604     IP VTE HIGH RISK PATIENT  Once         01/04/24 1604     Place sequential compression device  Until discontinued         01/04/24 1604                    Thank you for your consult. I will follow-up with patient. Please contact us if you have any additional questions.    Willie Kowalski MD  Cardiology   [facility]

## 2024-01-05 NOTE — SUBJECTIVE & OBJECTIVE
Past Medical History:   Diagnosis Date    Decompensated hepatic cirrhosis     HCV acquired through organ donation, treated / cured     svr12 - 6/2021    Hypertension     SBP (spontaneous bacterial peritonitis)        Past Surgical History:   Procedure Laterality Date    ERCP N/A 10/1/2020    Procedure: ERCP (ENDOSCOPIC RETROGRADE CHOLANGIOPANCREATOGRAPHY);  Surgeon: Marcos Ramirez MD;  Location: [facility] ENDO (2ND FLR);  Service: Endoscopy;  Laterality: N/A;    ESOPHAGOGASTRODUODENOSCOPY N/A 10/13/2020    Procedure: EGD (ESOPHAGOGASTRODUODENOSCOPY);  Surgeon: Prasanth Jerry MD;  Location: [facility] ENDO (2ND FLR);  Service: Endoscopy;  Laterality: N/A;    EXPLORATORY LAPAROTOMY AFTER LIVER TRANSPLANTATION N/A 10/15/2020    Procedure: LAPAROTOMY, EXPLORATORY, AFTER LIVER TRANSPLANT, Possible redo of artery, possible portal thrombectomy. IntraOperative US.;  Surgeon: Curtis Zavaleta MD;  [facility];  Service: Transplant;  Laterality: N/A;  With intraoperative ultrasound    LIVER TRANSPLANT N/A 10/14/2020    Procedure: TRANSPLANT, LIVER;  Surgeon: Curtis Zavaleta MD;  [facility];  Service: Transplant;  Laterality: N/A;  Intra op HD    RIGHT HEART CATHETERIZATION Right 9/23/2020    Procedure: INSERTION, CATHETER, RIGHT HEART;  Surgeon: Mikel Costa Jr., MD;  Location: [facility] CATH LAB;  Service: Cardiology;  Laterality: Right;    THORACENTESIS  2011    THROMBECTOMY  10/15/2020    Procedure: THROMBECTOMY portal vein;  Surgeon: Curtis Zavaleta MD;  [facility];  Service: Transplant;;       Review of patient's allergies indicates:   Allergen Reactions    Latex, natural rubber        No current facility-administered medications on file prior to encounter.     Current Outpatient Medications on File Prior to Encounter   Medication Sig    amLODIPine (NORVASC) 10 MG tablet Take 1 tablet by mouth in the morning.    apixaban (ELIQUIS) 5 mg Tab Take 1 tablet (5 mg total) by mouth 2  (two) times a day.    buPROPion (WELLBUTRIN SR) 150 MG TBSR 12 hr tablet Take 150 mg by mouth 2 (two) times daily.    fluticasone-salmeterol diskus inhaler 250-50 mcg Inhale 1 puff into the lungs 2 (two) times a day.    fluticasone-umeclidin-vilanter (TRELEGY ELLIPTA) 200-62.5-25 mcg inhaler Inhale 1 puff into the lungs once daily.    gabapentin (NEURONTIN) 300 MG capsule Take 2 capsules by mouth 2 (two) times daily.    loratadine (CLARITIN) 10 mg tablet Take 10 mg by mouth once daily.    losartan (COZAAR) 25 MG tablet Take 25 mg by mouth once daily.    mycophenolate (CELLCEPT) 250 mg Cap Take 2 capsules (500 mg total) by mouth 2 (two) times daily.    tacrolimus (PROGRAF) 1 MG Cap Take 1 capsule (1 mg total) by mouth every morning AND 1 capsule (1 mg total) every evening.    testosterone cypionate (DEPOTESTOTERONE CYPIONATE) 200 mg/mL injection Inject 0.75 mLs (150 mg total) into the muscle once a week.    tiZANidine (ZANAFLEX) 4 MG tablet Take 4 mg by mouth 2 (two) times a day.    traZODone (DESYREL) 50 MG tablet Take  mg by mouth every evening.    albuterol (PROVENTIL/VENTOLIN HFA) 90 mcg/actuation inhaler Inhale 2 puffs into the lungs every 6 (six) hours as needed for Wheezing or Shortness of Breath. Rescue     Family History       Problem Relation (Age of Onset)    COPD Mother          Tobacco Use    Smoking status: Some Days    Smokeless tobacco: Current   Substance and Sexual Activity    Alcohol use: Yes     Alcohol/week: 112.0 standard drinks of alcohol     Types: 112 Shots of liquor per week     Comment: fifth of vodka a day. LAST DRINK 7/25/2020 per pt     Drug use: Yes     Frequency: 3.0 times per week     Types: Marijuana    Sexual activity: Yes     Review of Systems   Constitutional: Negative for decreased appetite, diaphoresis, fever, malaise/fatigue and night sweats.   HENT:  Negative for nosebleeds.    Eyes:  Negative for blurred vision and double vision.   Cardiovascular:  Negative for chest  pain, claudication, dyspnea on exertion, irregular heartbeat, leg swelling, near-syncope, orthopnea, palpitations, paroxysmal nocturnal dyspnea and syncope.   Respiratory:  Positive for cough. Negative for shortness of breath, sleep disturbances due to breathing, snoring, sputum production and wheezing.    Endocrine: Negative for cold intolerance and polyuria.   Hematologic/Lymphatic: Does not bruise/bleed easily.   Skin:  Negative for rash.   Musculoskeletal:  Negative for back pain, falls, joint pain, joint swelling and neck pain.   Gastrointestinal:  Negative for abdominal pain, heartburn, nausea and vomiting.   Genitourinary:  Negative for dysuria, frequency and hematuria.   Neurological:  Negative for difficulty with concentration, dizziness, focal weakness, headaches, light-headedness, numbness, seizures and weakness.   Psychiatric/Behavioral:  Negative for depression, memory loss and substance abuse. The patient does not have insomnia.    Allergic/Immunologic: Negative for HIV exposure and hives.     Objective:     Vital Signs (Most Recent):  Temp: 98.2 °F (36.8 °C) (01/05/24 0740)  Pulse: (!) 126 (01/05/24 0740)  Resp: 18 (01/05/24 1104)  BP: (!) 100/59 (01/05/24 0740)  SpO2: 95 % (01/05/24 0800) Vital Signs (24h Range):  Temp:  [96.8 °F (36 °C)-98.3 °F (36.8 °C)] 98.2 °F (36.8 °C)  Pulse:  [] 126  Resp:  [17-33] 18  SpO2:  [91 %-96 %] 95 %  BP: ()/(53-73) 100/59     Weight: 127.9 kg (282 lb)  Body mass index is 38.25 kg/m².    SpO2: 95 %         Intake/Output Summary (Last 24 hours) at 1/5/2024 1609  Last data filed at 1/5/2024 0927  Gross per 24 hour   Intake 150 ml   Output 325 ml   Net -175 ml       Lines/Drains/Airways       Peripheral Intravenous Line  Duration                  Peripheral IV - Single Lumen 01/04/24 1434 20 G Anterior;Proximal;Right Forearm 1 day                     Physical Exam  HENT:      Head: Normocephalic.   Eyes:      Pupils: Pupils are equal, round, and reactive to  "light.   Neck:      Thyroid: No thyromegaly.      Vascular: Normal carotid pulses. No carotid bruit or JVD.   Cardiovascular:      Rate and Rhythm: Normal rate. Rhythm irregularly irregular. No extrasystoles are present.     Chest Wall: PMI is not displaced.      Pulses: Normal pulses.      Heart sounds: Normal heart sounds. No murmur heard.     No gallop. No S3 sounds.   Pulmonary:      Effort: No respiratory distress.      Breath sounds: Normal breath sounds. No stridor.   Abdominal:      General: Bowel sounds are normal.      Palpations: Abdomen is soft.      Tenderness: There is no abdominal tenderness. There is no rebound.   Musculoskeletal:         General: Normal range of motion.   Skin:     Findings: No rash.   Neurological:      Mental Status: He is alert and oriented to person, place, and time.   Psychiatric:         Behavior: Behavior normal.          Significant Labs: ABG: No results for input(s): "PH", "PCO2", "HCO3", "POCSATURATED", "BE" in the last 48 hours., Blood Culture:   Recent Labs   Lab 01/04/24  1636   LABBLOO No Growth to date  No Growth to date   , BMP:   Recent Labs   Lab 01/04/24  1435 01/05/24  0504    73   * 134*   K 4.3 4.6   CL 98 99   CO2 18* 23   BUN 14 17   CREATININE 1.1 1.6*   CALCIUM 8.7 8.1*   MG  --  0.8*   , CMP   Recent Labs   Lab 01/04/24  1435 01/05/24  0504   * 134*   K 4.3 4.6   CL 98 99   CO2 18* 23    73   BUN 14 17   CREATININE 1.1 1.6*   CALCIUM 8.7 8.1*   PROT 5.9* 5.3*   ALBUMIN 2.7* 2.4*   BILITOT 7.2* 7.8*   ALKPHOS 169* 163*   * 106*   ALT 82* 70*   ANIONGAP 17* 12   , CBC   Recent Labs   Lab 01/04/24  1435 01/05/24  0504   WBC 6.31 6.09   HGB 14.5 12.6*   HCT 43.7 39.4*   PLT 89* 75*   , INR   Recent Labs   Lab 01/04/24  1435   INR 1.5*   , Lipid Panel No results for input(s): "CHOL", "HDL", "LDLCALC", "TRIG", "CHOLHDL" in the last 48 hours., and Troponin   Recent Labs   Lab 01/04/24  1435 01/05/24  0502 01/05/24  1100 "   TROPONINI <0.006 0.006 <0.006       Significant Imaging: EKG:

## 2024-01-05 NOTE — PROGRESS NOTES
[facility] Medicine  Progress Note     Patient Name:  Jordan Altman  MRN:  [id number]  Patient Class: IP-Inpatient  Admission Date:  1/4/2024   Length of Stay:  1  Attending Physician: Didier Ryder MD  Primary Care Provider: Zohra Walters FNP    Subjective:      Principal Problem: PNA (pneumonia)         HPI:  Mr. Altman is a 48 year old male with a history of  alcoholic liver cirrhosis s/p liver transplant [facility] 10/20, CKD, PAF(on eliquis) who presents to the ED with complaints of shortness of breath, fatigue, intermittent fever over the last 4 weeks.  Stated he was dx over a week ago with flu as well.  He also complains of intermittent N/V.   In the ED he was noted to be in afib RVR (HR in the 140's), given Cardizem IVP and placed on gtt.  Lab work notable for INR 1.5, Alk phos 169, , ALT 82, .  FLU and covid negative. Chest xray showed new low-grade infiltrate or atypical pneumonia LLL.  Patient was started on Levaquin.  Patient will be admitted to observation for afib rvr/pna.  Cardiology consulted.         Overview/Hospital Course:  01/05/2024  Afib RVR marginally controlled on non-titratable drip. Cardiology consulted, recommendations pending.      Interval Hx  Clinically stable at this encounter. Was undergoing Echo.     Objective  BP (!) 100/59 (BP Location: Left arm, Patient Position: Lying)   Pulse (!) 126   Temp 98.2 °F (36.8 °C) (Oral)   Resp 18   Ht 6' (1.829 m)   Wt 127.9 kg (282 lb)   SpO2 95%   BMI 38.25 kg/m²     Intake/Output Summary (Last 24 hours) at 1/5/2024 1016  Last data filed at 1/5/2024 0927  Gross per 24 hour   Intake 1149 ml   Output 325 ml   Net 824 ml       PHYSICAL EXAM  Vitals reviewed  GEN: No acute distress, pleasant, body habitus obese  HEENT: atraumatic and normocephalic  CARDS: tachyarrhythmic HR, no m/g, pulses palpable in LE  PULM: breathing comfortably on room air, chest symmetric, nonlabored, no abnormal breath  "sounds on auscultation  ABD: nontender, nondistended, soft, no organomegaly, BS+  Neuro: Alert and oriented x3, CN's I-IX grossly intact, sensation and motor intact; follows directions and answers questions appropriately    LABS  All labs from the past 24 hours were reviewed.     BMP:   Recent Labs   Lab 01/05/24  0504   GLU 73   *   K 4.6   CL 99   CO2 23   BUN 17   CREATININE 1.6*   CALCIUM 8.1*   MG 0.8*     CBC:   Recent Labs   Lab 01/04/24  1435 01/05/24  0504   WBC 6.31 6.09   HGB 14.5 12.6*   HCT 43.7 39.4*   PLT 89* 75*     CMP:   Recent Labs   Lab 01/04/24  1435 01/05/24  0504   * 134*   K 4.3 4.6   CL 98 99   CO2 18* 23    73   BUN 14 17   CREATININE 1.1 1.6*   CALCIUM 8.7 8.1*   PROT 5.9* 5.3*   ALBUMIN 2.7* 2.4*   BILITOT 7.2* 7.8*   ALKPHOS 169* 163*   * 106*   ALT 82* 70*   ANIONGAP 17* 12     Cardiac Markers:   Recent Labs   Lab 01/04/24  1435   *     Coagulation:   Recent Labs   Lab 01/04/24  1435   INR 1.5*   APTT 29.5     Lactic Acid: No results for input(s): "LACTATE" in the last 48 hours.  Magnesium:   Recent Labs   Lab 01/05/24  0504   MG 0.8*     Troponin:   Recent Labs   Lab 01/04/24  1435 01/05/24  0502   TROPONINI <0.006 0.006     TSH:   No results for input(s): "TSH" in the last 4320 hours.  Urine Studies:   No results for input(s): "COLORU", "APPEARANCEUA", "PHUR", "SPECGRAV", "PROTEINUA", "GLUCUA", "KETONESU", "BILIRUBINUA", "OCCULTUA", "NITRITE", "UROBILINOGEN", "LEUKOCYTESUR", "RBCUA", "WBCUA", "BACTERIA", "SQUAMEPITHEL", "HYALINECASTS" in the last 48 hours.    Invalid input(s): "WRIGHTSUR"    IMAGING  All imaging from the past 24 hours were reviewed.     Imaging Results              X-Ray Chest 1 View (Final result)  Result time 01/04/24 14:33:00      Final result by [facility] MD (01/04/24 14:33:00)                   Impression:      Possible new low-grade infiltrate or atypical pneumonia involving the left lung.  Follow-up is " recommended.      Electronically signed by: Kaylan Leos MD  Date:    01/04/2024  Time:    14:33               Narrative:    EXAMINATION:  XR CHEST 1 VIEW    CLINICAL HISTORY:  Shortness of breath,    COMPARISON:  Chest, 12/12/2023.    FINDINGS:  One view.  Heart is normal in size.  Moderate coarsening of the lung markings in the mid to lower lung zones on the left laterally.  This appears more prominent than on previous exam.                                      Assessment/Plan:      * PNA (pneumonia)  - chest xray with new low-grade infiltrate or atypical pneumonia involving the left lung.   - continue Levaquin      Hypomagnesemia (0.8)  --associated with palpitations, shortness of breath, chest pain  --3g IV ordered   --repeat this afternoon, trend daily  --Potassium level normal, Qtc normal    Atrial fibrillation with rapid ventricular response  Patient with Persistent (7 days or more) atrial fibrillation which is uncontrolled currently with Calcium Channel Blocker. Patient is currently in atrial fibrillation.[id number] Score: The patient doesn't have any registry metric data available. Anticoagulation indicated. Anticoagulation done with eliquis .     - patient is unsure if he has afib before in the past, but per chart review has had previous EKG with Afib  - presented to ED with Afib RVR, given IVP cardizem and started on gtt  - contiue cardizem gtt and home eliquis  - contiue cardiac monitoring  - cards consulted        S/P liver transplant  - follows with Dr. Askew  - continues on his prograf/Cellcept   - noted to have elevated LTFs (worse than prior) will monitor and consult hepatology for any further recommendations     Thrombocytopenia  Patient was found to have thrombocytopenia, the likely etiology is secondary to cirrhosis/portal hypertension, will monitor the platelets Daily. Will transfuse if platelet count is <20k. Hold DVT prophylaxis if platelets are <50k. The patient's platelet results have  been reviewed and are listed below.      Recent Labs   Lab 01/04/24  1435   PLT 89*        CORE MEASURES:  VTE Risk Mitigation (From admission, onward)           Ordered     apixaban tablet 5 mg  2 times daily         01/04/24 1604     Reason for No Pharmacological VTE Prophylaxis  Once        Question:  Reasons:  Answer:  Already adequately anticoagulated on oral Anticoagulants    01/04/24 1604     IP VTE HIGH RISK PATIENT  Once         01/04/24 1604     Place sequential compression device  Until discontinued         01/04/24 1604                    Code Status: full code    Diet: Diet Adult Regular (IDDSI Level 7)    Disposition: follow up with cardiology\         Didier Ryder MD  Department of Hospital Medicine   '[facility] - Telemetry ([facility])

## 2024-01-05 NOTE — PLAN OF CARE
[facility]  Initial Discharge Assessment       Primary Care Provider: Zohra Walters FNP    Admission Diagnosis: Shortness of breath [R06.02]  Chest pain [R07.9]  Pneumonia of left lower lobe due to infectious organism [J18.9]  Atrial fibrillation, unspecified type [I48.91]    Admission Date: 1/4/2024  Expected Discharge Date:     Transition of Care Barriers: None    Payor: MEDICAID / Plan[place] / Product Type: Managed Medicaid /     Extended Emergency Contact Information  Primary Emergency Contact: [place]  Address: [address]           [place] [address] [facility]  Home Phone: [phone]  Mobile Phone: [phone]  Relation: Spouse  Secondary Emergency Contact: IVANIA PATEL  Mobile Phone: [phone]  Relation: Healthcare Power of   Preferred language: English   needed? No    Discharge Plan A: Home with family  Discharge Plan B: Home Health      [facility] [address] [address]  Phone: [phone] Fax: [phone]    [address] [address] [facility] [address]  [address] [facility] [address]  [facility] [address]  Phone: [phone] Fax: [phone]    [facility] [address] [place] [address]  Phone: [phone] Fax: [phone]    [facility] [address] [place] [address]  [address] [address]  [place] [address]  Phone: [phone] Fax: [phone]      Initial Assessment (most recent)       Adult Discharge Assessment - 01/05/24 1107          Discharge Assessment    Assessment Type Discharge Planning Assessment     Confirmed/corrected address, phone number and insurance Yes     Confirmed Demographics Correct on Facesheet     Source of Information patient     People in Home spouse     Do you expect to return to your current living situation? Yes     Do you have help at home or someone to help you manage your care at home?  Yes     Prior to hospitilization cognitive status: Alert/Oriented     Current cognitive status: Alert/Oriented     Walking or Climbing Stairs Difficulty no     Dressing/Bathing Difficulty no     Equipment Currently Used at Home nebulizer     Readmission within 30 days? No     Patient currently being followed by outpatient case management? No     Do you currently have service(s) that help you manage your care at home? No     Do you take prescription medications? Yes     Do you have prescription coverage? Yes     Do you have any problems affording any of your prescribed medications? No     Is the patient taking medications as prescribed? yes     How do you get to doctors appointments? car, drives self     Are you on dialysis? No     Do you take coumadin? No     Discharge Plan A Home with family     Discharge Plan B Home Health     DME Needed Upon Discharge  none     Discharge Plan discussed with: Patient     Transition of Care Barriers None                      Independent with care, no anticipated needs.

## 2024-01-05 NOTE — CONSULTS
[facility]  Cardiology  Consult Note    Patient Name: Jordan Altman  MRN: [id number]  Admission Date: 1/4/2024  Hospital Length of Stay: 1 days  Code Status: Full Code   Attending Provider: Didier Ryder MD   Consulting Provider: Willie Kowalski MD  Primary Care Physician: Zohra Walters FNP  Principal Problem:PNA (pneumonia)    Patient information was obtained from patient and ER records.     Consults  Subjective:       HPI:   Mr. Altman is a 48 year old male with a history of  alcoholic liver cirrhosis s/p liver transplant [facility] 10/20, CKD, PAF(on eliquis) who presents to the ED with complaints of shortness of breath, fatigue, intermittent fever over the last 4 weeks.  Stated he was dx over a week ago with flu as well.  He also complains of intermittent N/V.     In the ED he was noted to be in afib RVR (HR in the 140's), given Cardizem IVP and placed on gtt.  Lab work notable for INR 1.5, Alk phos 169, , ALT 82, .  FLU and covid negative. Chest xray showed new low-grade infiltrate or atypical pneumonia LLL.  Patient was started on Levaquin.  Patient will be admitted to observation for afib rvr/pna.  Cardiology consulted.       CARDIOLOGY consult for afib with RVR  HGB 12.6, plt 75  Cr 1.1--> 1.6  Elevated LFTs, ALB 2.4 TP 5.3     Troponin negative   EKG aifb with RVR  CXR Possible new low-grade infiltrate or atypical pneumonia involving the left lung   Echo biv nl function and mild LVH    No new subjective & objective note has been filed under this hospital service since the last note was generated.    Assessment and Plan:     * PNA (pneumonia)  Rx per     Chronic diastolic congestive heart failure  Patient is identified as having Diastolic (HFpEF) heart failure that is Chronic. CHF is currently controlled. Latest ECHO performed and demonstrates- Results for orders placed during the hospital encounter of 01/04/24    Echo    Interpretation Summary    Left Ventricle: The left  ventricle is normal in size. Mildly increased ventricular mass. Mildly increased wall thickness. There is concentric hypertrophy. Normal wall motion. There is normal systolic function with a visually estimated ejection fraction of 55 - 60%. There is normal diastolic function.    Right Ventricle: Normal right ventricular cavity size. Wall thickness is normal. Right ventricle wall motion  is normal. Systolic function is normal.    IVC/SVC: Normal venous pressure at 3 mmHg.  .  monitor clinical status closely. Monitor on telemetry. Patient is on CHF pathway.  Monitor strict Is&Os and daily weights.  Place on fluid restriction of 1.5 L. Cardiology has been consulted. Continue to stress to patient importance of self efficacy and  on diet for CHF. Last BNP reviewed- and noted below   Recent Labs   Lab 01/04/24  1435   *       Continue Afib with RVR control    Atrial fibrillation with rapid ventricular response  Afib with RVR      Continue eliquis and Cardizem rx      S/P liver transplant  Rx per HM    Alcohol use disorder, severe, dependence  Alcohol cessation    Thrombocytopenia  PLT 75 stable        VTE Risk Mitigation (From admission, onward)           Ordered     apixaban tablet 5 mg  2 times daily         01/04/24 1604     Reason for No Pharmacological VTE Prophylaxis  Once        Question:  Reasons:  Answer:  Already adequately anticoagulated on oral Anticoagulants    01/04/24 1604     IP VTE HIGH RISK PATIENT  Once         01/04/24 1604     Place sequential compression device  Until discontinued         01/04/24 1604                    Thank you for your consult. I will follow-up with patient. Please contact us if you have any additional questions.    Willie Kowalski MD  Cardiology   [facility]

## 2024-01-06 NOTE — SUBJECTIVE & OBJECTIVE
ROS  Objective:     Vital Signs (Most Recent):  Temp: 98 °F (36.7 °C) (01/06/24 1137)  Pulse: 77 (01/06/24 1137)  Resp: 18 (01/06/24 1137)  BP: (!) 98/54 (01/06/24 1137)  SpO2: (!) 92 % (01/06/24 1137) Vital Signs (24h Range):  Temp:  [97.6 °F (36.4 °C)-99.7 °F (37.6 °C)] 98 °F (36.7 °C)  Pulse:  [66-98] 77  Resp:  [17-79] 18  SpO2:  [89 %-95 %] 92 %  BP: ()/(46-64) 98/54     Weight: 127.9 kg (282 lb)  Body mass index is 38.25 kg/m².     SpO2: (!) 92 %         Intake/Output Summary (Last 24 hours) at 1/6/2024 1421  Last data filed at 1/6/2024 0934  Gross per 24 hour   Intake --   Output 150 ml   Net -150 ml       Lines/Drains/Airways       Peripheral Intravenous Line  Duration                  Peripheral IV - Single Lumen 01/04/24 1434 20 G Anterior;Proximal;Right Forearm 1 day         Peripheral IV - Single Lumen 01/05/24 0123 22 G Left;Posterior Hand 1 day         Peripheral IV - Single Lumen 01/05/24 1235 22 G Right Antecubital 1 day                       Physical Exam  HENT:      Head: Normocephalic.   Eyes:      Pupils: Pupils are equal, round, and reactive to light.   Neck:      Thyroid: No thyromegaly.      Vascular: Normal carotid pulses. No carotid bruit or JVD.   Cardiovascular:      Rate and Rhythm: Normal rate. Rhythm irregularly irregular. No extrasystoles are present.     Chest Wall: PMI is not displaced.      Pulses: Normal pulses.      Heart sounds: Normal heart sounds. No murmur heard.     No gallop. No S3 sounds.   Pulmonary:      Effort: No respiratory distress.      Breath sounds: Normal breath sounds. No stridor.   Abdominal:      General: Bowel sounds are normal.      Palpations: Abdomen is soft.      Tenderness: There is no abdominal tenderness. There is no rebound.   Musculoskeletal:         General: Normal range of motion.   Skin:     Findings: No rash.   Neurological:      Mental Status: He is alert and oriented to person, place, and time.   Psychiatric:         Behavior: Behavior  "normal.            Significant Labs: ABG: No results for input(s): "PH", "PCO2", "HCO3", "POCSATURATED", "BE" in the last 48 hours., Blood Culture:   Recent Labs   Lab 01/04/24  1636   LABBLOO No Growth to date  No Growth to date  No Growth to date  No Growth to date   , BMP:   Recent Labs   Lab 01/04/24  1435 01/05/24  0504 01/06/24  0843    73 81   * 134* 128*   K 4.3 4.6 4.5   CL 98 99 96   CO2 18* 23 20*   BUN 14 17 25*   CREATININE 1.1 1.6* 2.9*   CALCIUM 8.7 8.1* 8.0*   MG  --  0.8* 1.5*   , CMP   Recent Labs   Lab 01/04/24  1435 01/05/24  0504 01/06/24  0843   * 134* 128*   K 4.3 4.6 4.5   CL 98 99 96   CO2 18* 23 20*    73 81   BUN 14 17 25*   CREATININE 1.1 1.6* 2.9*   CALCIUM 8.7 8.1* 8.0*   PROT 5.9* 5.3* 5.2*   ALBUMIN 2.7* 2.4* 2.4*   BILITOT 7.2* 7.8* 8.0*   ALKPHOS 169* 163* 182*   * 106* 102*   ALT 82* 70* 65*   ANIONGAP 17* 12 12   , CBC   Recent Labs   Lab 01/04/24  1435 01/05/24  0504 01/06/24  0843   WBC 6.31 6.09 5.73   HGB 14.5 12.6* 12.1*   HCT 43.7 39.4* 39.4*   PLT 89* 75* 80*   , INR   Recent Labs   Lab 01/04/24  1435   INR 1.5*   , Lipid Panel No results for input(s): "CHOL", "HDL", "LDLCALC", "TRIG", "CHOLHDL" in the last 48 hours., and Troponin   Recent Labs   Lab 01/04/24  1435 01/05/24  0502 01/05/24  1100   TROPONINI <0.006 0.006 <0.006       Significant Imaging: EKG:    "

## 2024-01-06 NOTE — PROGRESS NOTES
[facility] Surg  Hepatology  Consult Note    Patient Name: Jordan Altman  MRN: [id number]  Admission Date: 1/4/2024  Hospital Length of Stay: 2 days  Attending Provider: Didier Ryder MD   Primary Care Physician: Zohra Walters FNP  Principal Problem:PNA (pneumonia)    Consults  Subjective:     Transplant status: Post-transplant    HPI: Mr. Altman is a 48 year old male with a history of  alcoholic liver cirrhosis s/p liver transplant [facility] 10/20, CKD, PAF(on eliquis) admitted with Pneumonia. Hepatology consulted for Post transplant status.      In the ED he was noted to be in afib RVR (HR in the 140's), given Cardizem IVP and placed on gtt.      Lab work notable for INR 1.5, Alk phos 169, , ALT 82, . Chest xray showed new low-grade infiltrate or atypical pneumonia LLL.  Patient  on Levaquin.      Transplant Date: 10/14/2020  UNOS Native Liver Dx: Alcoholic Cirrhosis     Jordan is here for follow up of his liver transplant.     ORGAN: LIVER  Whole or Partial: whole liver  Donor Type: donation after brain death  CDC High Risk: yes  Donor CMV Status: Positive  Donor HCV Status: Positive  Donor HBcAb: Negative  Biliary Anastomosis: end to end  Arterial Anatomy: standard  IVC reconstruction: end to end ivc  Portal vein status: patent    Interval history  SOB is better  Afib RVR controlled. Echo unremarkable.   CXR  atypical pneumonia involving the left lung        Review of Systems  GEN: No acute distress, pleasant, body habitus obese  HEENT: atraumatic and normocephalic  CARDS: tachyarrhythmic HR, no m/g, pulses palpable in LE  PULM: breathing comfortably on room air, chest symmetric, nonlabored, no abnormal breath sounds on auscultation  ABD: nontender, nondistended, soft, no organomegaly, BS+  Neuro: Alert and oriented x3, CN's I-IX grossly intact, sensation and motor intact; follows directions and answers questions appropriately  Past Medical History:   Diagnosis Date    Decompensated hepatic  cirrhosis     HCV acquired through organ donation, treated / cured     svr12 - 6/2021    Hypertension     SBP (spontaneous bacterial peritonitis)        Past Surgical History:   Procedure Laterality Date    ERCP N/A 10/1/2020    Procedure: ERCP (ENDOSCOPIC RETROGRADE CHOLANGIOPANCREATOGRAPHY);  Surgeon: Marcos Ramirez MD;  [facility]  Service: Endoscopy;  Laterality: N/A;    ESOPHAGOGASTRODUODENOSCOPY N/A 10/13/2020    Procedure: EGD (ESOPHAGOGASTRODUODENOSCOPY);  Surgeon: Prasanth Jerry MD;  [facility]  Service: Endoscopy;  Laterality: N/A;    EXPLORATORY LAPAROTOMY AFTER LIVER TRANSPLANTATION N/A 10/15/2020    Procedure: LAPAROTOMY, EXPLORATORY, AFTER LIVER TRANSPLANT, Possible redo of artery, possible portal thrombectomy. IntraOperative US.;  Surgeon: Curtis Zavaleta MD;  [place] [facility] [place] [address] [place] [address];  Service: Transplant;  Laterality: N/A;  With intraoperative ultrasound    LIVER TRANSPLANT N/A 10/14/2020    Procedure: TRANSPLANT, LIVER;  Surgeon: Curtis Zavaleta MD;  [facility];  Service: Transplant;  Laterality: N/A;  Intra op HD    RIGHT HEART CATHETERIZATION Right 9/23/2020    Procedure: INSERTION, CATHETER, RIGHT HEART;  Surgeon: Mikel Costa Jr., MD;  [facility] [facility];  Service: Cardiology;  Laterality: Right;    THORACENTESIS  2011    THROMBECTOMY  10/15/2020    Procedure: THROMBECTOMY portal vein;  Surgeon: Curtis Zavaleta MD;  Location: [address];  Service: Transplant;;       Family history of liver disease: No    Review of patient's allergies indicates:   Allergen Reactions    Latex, natural rubber          Tobacco Use    Smoking status: Some Days    Smokeless tobacco: Current   Substance and Sexual Activity    Alcohol use: Yes     Alcohol/week: 112.0 standard drinks of alcohol     Types: 112 Shots of liquor per week     Comment: fifth of vodka a day. LAST DRINK 7/25/2020 per pt     Drug use: Yes     Frequency: 3.0 times per  week     Types: Marijuana    Sexual activity: Yes       Facility-Administered Medications Prior to Admission   Medication Dose Route Frequency Provider Last Rate Last Admin    acetaminophen tablet 650 mg  650 mg Oral Once PRN Leo Ghotra MD        albuterol inhaler 2 puff  2 puff Inhalation Q20 Min PRLeo Bhatt MD        [DISCONTINUED] diphenhydrAMINE injection 25 mg  25 mg Intravenous Once PRN Leo Ghotra MD        [DISCONTINUED] EPINEPHrine (EPIPEN) 0.3 mg/0.3 mL pen injection 0.3 mg  0.3 mg Intramuscular PRN Leo Ghotra MD        [DISCONTINUED] methylPREDNISolone sodium succinate injection 40 mg  40 mg Intravenous Once PRN Leo Ghotra MD        [DISCONTINUED] ondansetron disintegrating tablet 4 mg  4 mg Oral Once PRN Leo Ghotra MD        [DISCONTINUED] sodium chloride 0.9% 500 mL flush bag   Intravenous PRLeo Bhatt MD        [DISCONTINUED] sodium chloride 0.9% flush 10 mL  10 mL Intravenous PRN Leo Ghotra MD         Medications Prior to Admission   Medication Sig Dispense Refill Last Dose    amLODIPine (NORVASC) 10 MG tablet Take 1 tablet by mouth in the morning. 30 tablet 1 1/4/2024    apixaban (ELIQUIS) 5 mg Tab Take 1 tablet (5 mg total) by mouth 2 (two) times a day. 60 tablet 11 1/3/2024    buPROPion (WELLBUTRIN SR) 150 MG TBSR 12 hr tablet Take 150 mg by mouth 2 (two) times daily.       fluticasone-salmeterol diskus inhaler 250-50 mcg Inhale 1 puff into the lungs 2 (two) times a day.   1/3/2024    fluticasone-umeclidin-vilanter (TRELEGY ELLIPTA) 200-62.5-25 mcg inhaler Inhale 1 puff into the lungs once daily.   1/3/2024    gabapentin (NEURONTIN) 300 MG capsule Take 2 capsules by mouth 2 (two) times daily.       loratadine (CLARITIN) 10 mg tablet Take 10 mg by mouth once daily.   1/3/2024    losartan (COZAAR) 25 MG tablet Take 25 mg by mouth once daily.   1/4/2024    mycophenolate (CELLCEPT) 250 mg Cap Take 2 capsules (500 mg total) by mouth 2  (two) times daily. 120 capsule 11 1/4/2024    tacrolimus (PROGRAF) 1 MG Cap Take 1 capsule (1 mg total) by mouth every morning AND 1 capsule (1 mg total) every evening. 60 capsule 11 1/4/2024    testosterone cypionate (DEPOTESTOTERONE CYPIONATE) 200 mg/mL injection Inject 0.75 mLs (150 mg total) into the muscle once a week. 5 mL 0 Past Week    tiZANidine (ZANAFLEX) 4 MG tablet Take 4 mg by mouth 2 (two) times a day.   1/3/2024    traZODone (DESYREL) 50 MG tablet Take  mg by mouth every evening.   1/3/2024    albuterol (PROVENTIL/VENTOLIN HFA) 90 mcg/actuation inhaler Inhale 2 puffs into the lungs every 6 (six) hours as needed for Wheezing or Shortness of Breath. Rescue          Objective:     Vital Signs (Most Recent):  Temp: 97.6 °F (36.4 °C) (01/06/24 1554)  Pulse: 68 (01/06/24 1554)  Resp: 20 (01/06/24 1554)  BP: (!) 96/48 (01/06/24 1554)  SpO2: (!) 92 % (01/06/24 1554) Vital Signs (24h Range):  Temp:  [97.6 °F (36.4 °C)-99.7 °F (37.6 °C)] 97.6 °F (36.4 °C)  Pulse:  [66-98] 68  Resp:  [17-79] 20  SpO2:  [89 %-95 %] 92 %  BP: ()/(46-64) 96/48     Weight: 127.9 kg (282 lb) (01/05/24 0740)  Body mass index is 38.25 kg/m².    Physical Exam  Constitutional:       General: He is not in acute distress.     Appearance: Normal appearance.   HENT:      Head: Normocephalic and atraumatic.      Nose: Nose normal.      Mouth/Throat:      Mouth: Mucous membranes are moist.   Eyes:      Conjunctiva/sclera: Conjunctivae normal.   Cardiovascular:      Rate and Rhythm: Normal rate.   Pulmonary:      Effort: Respiratory distress present.   Abdominal:      General: There is no distension.      Palpations: There is no mass.      Tenderness: There is no abdominal tenderness.   Musculoskeletal:         General: Normal range of motion.      Cervical back: Normal range of motion.      Right lower leg: No edema.      Left lower leg: No edema.   Skin:     Coloration: Skin is not jaundiced.      Findings: No rash.    Neurological:      General: No focal deficit present.      Mental Status: He is alert.   Psychiatric:         Mood and Affect: Mood normal.             Significant Labs:  CBC:   Recent Labs   Lab 01/06/24  0843   WBC 5.73   RBC 3.62*   HGB 12.1*   HCT 39.4*   PLT 80*       CMP:   Recent Labs   Lab 01/06/24  0843   GLU 81   CALCIUM 8.0*   ALBUMIN 2.4*   PROT 5.2*   *   K 4.5   CO2 20*   CL 96   BUN 25*   CREATININE 2.9*   ALKPHOS 182*   ALT 65*   *   BILITOT 8.0*       Coagulation:   Recent Labs   Lab 01/04/24  1435   INR 1.5*   APTT 29.5         Significant Imaging:  X-Ray: Reviewed      Assessment/Plan:     Active Diagnoses:    Diagnosis Date Noted POA    PRINCIPAL PROBLEM:  PNA (pneumonia) [J18.9] 01/04/2024 Yes    Chest pain [R07.9] 01/05/2024 Unknown    Elevated liver enzymes [R74.8] 01/05/2024 Unknown    Chronic diastolic congestive heart failure [I50.32] 01/05/2024 Yes    Atrial fibrillation with rapid ventricular response [I48.91] 07/30/2023 Yes    S/P liver transplant [Z94.4] 10/14/2020 Not Applicable    Alcohol use disorder, severe, dependence [F10.20] 09/21/2020 Yes     Chronic    Thrombocytopenia [D69.6] 09/19/2020 Yes      Problems Resolved During this Admission:       Elevated Liver tests  -Abnormal LFTs-liver tests have fluctuated regularly-- concerned that is related to alcohol. At present likely from underlying infection/pneumonia  -Liver enzymes some improvement  Plan       -Monitor Liver enzymes    Post Liver transplant 3 years  Tac levels goal 5-8 ng/ml  Plan-Hold Cellcept due to infection          Continue Prograf 1 mg BID          Correct HypoMg    Alcohol abuse  -continue to advise alcohol abstinence    Atrial fibrillation with rapid ventricular response        Converted with cardizem  Cardiology on Board. ECHO normal     The patient location is:  ([place])  The chief complaint leading to consultation is: history of OLT     Visit type: audiovisual     Face to Face time with patient:  20  40 minutes of total time spent on the encounter, which includes face to face time and non-face to face time preparing to see the patient (eg, review of tests), Obtaining and/or reviewing separately obtained history, Documenting clinical information in the electronic or other health record, Independently interpreting results (not separately reported) and communicating results to the patient/family/caregiver, or Care coordination (not separately reported).     Each patient to whom he or she provides medical services by telemedicine is:  (1) informed of the relationship between the physician and patient and the respective role of any other health care provider with respect to management of the patient; and (2) notified that he or she may decline to receive medical services by telemedicine and may withdraw from such care at any time.    Thank you for your consult. I will follow-up with patient. Please contact us if you have any additional questions.    Juanjose Kwong MD  Hepatology  [facility] - Med Surg

## 2024-01-06 NOTE — PROGRESS NOTES
[facility] ([facility] Medicine  Progress Note     Patient Name:  Jordan Altman  MRN:  [id number]  Patient Class: IP-Inpatient  Admission Date:  1/4/2024   Length of Stay:  2  Attending Physician: Didier Ryder MD  Primary Care Provider: Zohra Walters FNP    Subjective:      Principal Problem: PNA (pneumonia)         HPI:  Mr. Altman is a 48 year old male with a history of  alcoholic liver cirrhosis s/p liver transplant [facility] 10/20, CKD, PAF(on eliquis) who presents to the ED with complaints of shortness of breath, fatigue, intermittent fever over the last 4 weeks.  Stated he was dx over a week ago with flu as well.  He also complains of intermittent N/V.   In the ED he was noted to be in afib RVR (HR in the 140's), given Cardizem IVP and placed on gtt.  Lab work notable for INR 1.5, Alk phos 169, , ALT 82, .  FLU and covid negative. Chest xray showed new low-grade infiltrate or atypical pneumonia LLL.  Patient was started on Levaquin.  Patient will be admitted to observation for afib rvr/pna.  Cardiology consulted.         Overview/Hospital Course:  01/05/2024  Afib RVR marginally controlled on non-titratable drip. Cardiology consulted, recommendations pending.     01/06/2024  Afib RVR controlled. Echo grossly unremarkable. Still on Cardizem, pending transition to PO by Cardiology. Hepatology recommends continuing Prograf but to hold cellcept due to infection.      Interval Hx  No complaints at this time.     Objective  BP (!) 106/55 (BP Location: Right arm, Patient Position: Lying)   Pulse 83   Temp 98 °F (36.7 °C) (Oral)   Resp 18   Ht 6' (1.829 m)   Wt 127.9 kg (282 lb)   SpO2 (!) 90%   BMI 38.25 kg/m²     Intake/Output Summary (Last 24 hours) at 1/6/2024 0826  Last data filed at 1/5/2024 0927  Gross per 24 hour   Intake --   Output 125 ml   Net -125 ml       PHYSICAL EXAM  Vitals reviewed  GEN: No acute distress, pleasant, body habitus obese  HEENT: atraumatic  "and normocephalic  CARDS: regular rate and rhythm, no m/g, pulses palpable in LE  PULM: breathing comfortably on room air, chest symmetric, nonlabored, no abnormal breath sounds on auscultation  ABD: nontender, nondistended, soft, no organomegaly, BS+  Neuro: Alert and oriented x3, CN's I-IX grossly intact, sensation and motor intact; follows directions and answers questions appropriately    LABS  All labs from the past 24 hours were reviewed.     BMP:   Recent Labs   Lab 01/05/24  0504   GLU 73   *   K 4.6   CL 99   CO2 23   BUN 17   CREATININE 1.6*   CALCIUM 8.1*   MG 0.8*     CBC:   Recent Labs   Lab 01/04/24  1435 01/05/24  0504   WBC 6.31 6.09   HGB 14.5 12.6*   HCT 43.7 39.4*   PLT 89* 75*     CMP:   Recent Labs   Lab 01/04/24  1435 01/05/24  0504   * 134*   K 4.3 4.6   CL 98 99   CO2 18* 23    73   BUN 14 17   CREATININE 1.1 1.6*   CALCIUM 8.7 8.1*   PROT 5.9* 5.3*   ALBUMIN 2.7* 2.4*   BILITOT 7.2* 7.8*   ALKPHOS 169* 163*   * 106*   ALT 82* 70*   ANIONGAP 17* 12     Cardiac Markers:   Recent Labs   Lab 01/04/24  1435   *     Coagulation:   Recent Labs   Lab 01/04/24  1435   INR 1.5*   APTT 29.5     Lactic Acid: No results for input(s): "LACTATE" in the last 48 hours.  Magnesium:   Recent Labs   Lab 01/05/24  0504   MG 0.8*     Troponin:   Recent Labs   Lab 01/04/24  1435 01/05/24  0502 01/05/24  1100   TROPONINI <0.006 0.006 <0.006     TSH:   No results for input(s): "TSH" in the last 4320 hours.  Urine Studies:   No results for input(s): "COLORU", "APPEARANCEUA", "PHUR", "SPECGRAV", "PROTEINUA", "GLUCUA", "KETONESU", "BILIRUBINUA", "OCCULTUA", "NITRITE", "UROBILINOGEN", "LEUKOCYTESUR", "RBCUA", "WBCUA", "BACTERIA", "SQUAMEPITHEL", "HYALINECASTS" in the last 48 hours.    Invalid input(s): "WRIGHTSUR"    IMAGING  All imaging from the past 24 hours were reviewed.     Imaging Results              X-Ray Chest 1 View (Final result)  Result time 01/04/24 14:33:00      Final result " by Kaylan Leos MD (Timothy) (01/04/24 14:33:00)                   Impression:      Possible new low-grade infiltrate or atypical pneumonia involving the left lung.  Follow-up is recommended.      Electronically signed by: Kaylan Leos MD  Date:    01/04/2024  Time:    14:33               Narrative:    EXAMINATION:  XR CHEST 1 VIEW    CLINICAL HISTORY:  Shortness of breath,    COMPARISON:  Chest, 12/12/2023.    FINDINGS:  One view.  Heart is normal in size.  Moderate coarsening of the lung markings in the mid to lower lung zones on the left laterally.  This appears more prominent than on previous exam.                                      Assessment/Plan:    * PNA (pneumonia)  - chest xray with new low-grade infiltrate or atypical pneumonia involving the left lung.   - continue Levaquin  (Qtc normal)      Hypomagnesemia (0.8)  --associated with palpitations, shortness of breath, chest pain  --3g IV ordered   --repeat this afternoon, trend daily  --Potassium level normal, Qtc normal    01/06/2024  1.5 this morning  Another 2g IV MgSO4 ordered  Repeat in the AM     Atrial fibrillation with rapid ventricular response  Patient with Persistent (7 days or more) atrial fibrillation which is uncontrolled currently with Calcium Channel Blocker. Patient is currently in atrial fibrillation.[id number] Score: The patient doesn't have any registry metric data available. Anticoagulation indicated. Anticoagulation done with eliquis .     - patient is unsure if he has afib before in the past, but per chart review has had previous EKG with Afib  - presented to ED with Afib RVR, given IVP cardizem and started on gtt  - contiue cardizem gtt and home eliquis  - contiue cardiac monitoring  - cards consulted    01/06/2024  Converted overnight, has been stable all morning  PO medication conversion pending Cardiology eval        S/P liver transplant  - follows with Dr. Askew  - continues on his prograf/Cellcept   - noted to have  elevated LTFs (worse than prior) will monitor and consult hepatology for any further recommendations     Thrombocytopenia  Patient was found to have thrombocytopenia, the likely etiology is secondary to cirrhosis/portal hypertension, will monitor the platelets Daily. Will transfuse if platelet count is <20k. Hold DVT prophylaxis if platelets are <50k. The patient's platelet results have been reviewed and are listed below.        Recent Labs   Lab 01/04/24  1435   PLT 89*        CORE MEASURES:  VTE Risk Mitigation (From admission, onward)           Ordered     apixaban tablet 5 mg  2 times daily         01/04/24 1604     Reason for No Pharmacological VTE Prophylaxis  Once        Question:  Reasons:  Answer:  Already adequately anticoagulated on oral Anticoagulants    01/04/24 1604     IP VTE HIGH RISK PATIENT  Once         01/04/24 1604     Place sequential compression device  Until discontinued         01/04/24 1604                    Code Status: FULL    Diet: Diet Adult Regular (IDDSI Level 7)    Disposition: Follow up Cardiology recs/PO medications       Didier Ryder MD  Department of Hospital Medicine   [facility] - Telemetry ([facility])

## 2024-01-06 NOTE — HOSPITAL COURSE
CARDIOLOGY consult for afib with RVR  HGB 12.6, plt 75  Cr 1.1--> 1.6  Elevated LFTs, ALB 2.4 TP 5.3     Troponin negative   EKG aifb with RVR  CXR Possible new low-grade infiltrate or atypical pneumonia involving the left lung   Echo biv nl function and mild LVH    01/06/24 AFIB VC R. Cr up to 2.9 PLT 8 and HGB 12.1. remains sob    01/07. Cr 4.1 and had low BP in AM. And switched cardizme gtt to oral. AFIB VRC no orthopnea

## 2024-01-06 NOTE — PROGRESS NOTES
Pharmacist Renal Dose Adjustment Note    Jordan Altman is a 48 y.o. male being treated with the medication levofloxacin.    Patient Data:    Vital Signs (Most Recent):  Temp: 98 °F (36.7 °C) (01/06/24 0716)  Pulse: 83 (01/06/24 0716)  Resp: 18 (01/06/24 0936)  BP: (!) 106/55 (01/06/24 0716)  SpO2: (!) 90 % (01/06/24 0716) Vital Signs (72h Range):  Temp:  [96.8 °F (36 °C)-99.7 °F (37.6 °C)]   Pulse:  []   Resp:  [17-79]   BP: ()/(46-73)   SpO2:  [89 %-97 %]      Recent Labs   Lab 01/04/24  1435 01/05/24  0504 01/06/24  0843   CREATININE 1.1 1.6* 2.9*     Serum creatinine: 2.9 mg/dL (H) 01/06/24 0843  Estimated creatinine clearance: 42.1 mL/min (A)    Medication: levofloxacin dose: 750 mg frequency every 24 hours will be changed to medication: levofloxacin dose: 750 mg frequency: every 48 hours, for CrCl 20-49 mL/min.    Pharmacist's Name: Vandana Flynn

## 2024-01-06 NOTE — ASSESSMENT & PLAN NOTE
Afib with RVR  Continue eliquis and Cardizem rx    0`/06/24  Ok to switch cardzime gtt to oral  Advise to decrease Eliquis to 2.5 mg bid due to worsening GFR

## 2024-01-06 NOTE — PROGRESS NOTES
[facility]  Cardiology  Progress Note    Patient Name: Jordan Altman  MRN: [id number]  Admission Date: 1/4/2024  Hospital Length of Stay: 2 days  Code Status: Full Code   Attending Physician: Didier Ryder MD   Primary Care Physician: Zohra Walters FNP  Expected Discharge Date:   Principal Problem:PNA (pneumonia)    Subjective:     Hospital Course:   CARDIOLOGY consult for afib with RVR  HGB 12.6, plt 75  Cr 1.1--> 1.6  Elevated LFTs, ALB 2.4 TP 5.3     Troponin negative   EKG aifb with RVR  CXR Possible new low-grade infiltrate or atypical pneumonia involving the left lung   Echo biv nl function and mild LVH    01/06/24 AFIB VC R. Cr up to 2.9 PLT 8 and HGB 12.1. remains sob        ROS  Objective:     Vital Signs (Most Recent):  Temp: 98 °F (36.7 °C) (01/06/24 1137)  Pulse: 77 (01/06/24 1137)  Resp: 18 (01/06/24 1137)  BP: (!) 98/54 (01/06/24 1137)  SpO2: (!) 92 % (01/06/24 1137) Vital Signs (24h Range):  Temp:  [97.6 °F (36.4 °C)-99.7 °F (37.6 °C)] 98 °F (36.7 °C)  Pulse:  [66-98] 77  Resp:  [17-79] 18  SpO2:  [89 %-95 %] 92 %  BP: ()/(46-64) 98/54     Weight: 127.9 kg (282 lb)  Body mass index is 38.25 kg/m².     SpO2: (!) 92 %         Intake/Output Summary (Last 24 hours) at 1/6/2024 1421  Last data filed at 1/6/2024 0934  Gross per 24 hour   Intake --   Output 150 ml   Net -150 ml       Lines/Drains/Airways       Peripheral Intravenous Line  Duration                  Peripheral IV - Single Lumen 01/04/24 1434 20 G Anterior;Proximal;Right Forearm 1 day         Peripheral IV - Single Lumen 01/05/24 0123 22 G Left;Posterior Hand 1 day         Peripheral IV - Single Lumen 01/05/24 1235 22 G Right Antecubital 1 day                       Physical Exam  HENT:      Head: Normocephalic.   Eyes:      Pupils: Pupils are equal, round, and reactive to light.   Neck:      Thyroid: No thyromegaly.      Vascular: Normal carotid pulses. No carotid bruit or JVD.   Cardiovascular:      Rate and  "Rhythm: Normal rate. Rhythm irregularly irregular. No extrasystoles are present.     Chest Wall: PMI is not displaced.      Pulses: Normal pulses.      Heart sounds: Normal heart sounds. No murmur heard.     No gallop. No S3 sounds.   Pulmonary:      Effort: No respiratory distress.      Breath sounds: Normal breath sounds. No stridor.   Abdominal:      General: Bowel sounds are normal.      Palpations: Abdomen is soft.      Tenderness: There is no abdominal tenderness. There is no rebound.   Musculoskeletal:         General: Normal range of motion.   Skin:     Findings: No rash.   Neurological:      Mental Status: He is alert and oriented to person, place, and time.   Psychiatric:         Behavior: Behavior normal.            Significant Labs: ABG: No results for input(s): "PH", "PCO2", "HCO3", "POCSATURATED", "BE" in the last 48 hours., Blood Culture:   Recent Labs   Lab 01/04/24  1636   LABBLOO No Growth to date  No Growth to date  No Growth to date  No Growth to date   , BMP:   Recent Labs   Lab 01/04/24  1435 01/05/24  0504 01/06/24  0843    73 81   * 134* 128*   K 4.3 4.6 4.5   CL 98 99 96   CO2 18* 23 20*   BUN 14 17 25*   CREATININE 1.1 1.6* 2.9*   CALCIUM 8.7 8.1* 8.0*   MG  --  0.8* 1.5*   , CMP   Recent Labs   Lab 01/04/24  1435 01/05/24  0504 01/06/24  0843   * 134* 128*   K 4.3 4.6 4.5   CL 98 99 96   CO2 18* 23 20*    73 81   BUN 14 17 25*   CREATININE 1.1 1.6* 2.9*   CALCIUM 8.7 8.1* 8.0*   PROT 5.9* 5.3* 5.2*   ALBUMIN 2.7* 2.4* 2.4*   BILITOT 7.2* 7.8* 8.0*   ALKPHOS 169* 163* 182*   * 106* 102*   ALT 82* 70* 65*   ANIONGAP 17* 12 12   , CBC   Recent Labs   Lab 01/04/24  1435 01/05/24  0504 01/06/24  0843   WBC 6.31 6.09 5.73   HGB 14.5 12.6* 12.1*   HCT 43.7 39.4* 39.4*   PLT 89* 75* 80*   , INR   Recent Labs   Lab 01/04/24  1435   INR 1.5*   , Lipid Panel No results for input(s): "CHOL", "HDL", "LDLCALC", "TRIG", "CHOLHDL" in the last 48 hours., and Troponin "   Recent Labs   Lab 01/04/24  1435 01/05/24  0502 01/05/24  1100   TROPONINI <0.006 0.006 <0.006       Significant Imaging: EKG:    Assessment and Plan:         * PNA (pneumonia)  Rx per     Chronic diastolic congestive heart failure  Patient is identified as having Diastolic (HFpEF) heart failure that is Chronic. CHF is currently controlled. Latest ECHO performed and demonstrates- Results for orders placed during the hospital encounter of 01/04/24    Echo    Interpretation Summary    Left Ventricle: The left ventricle is normal in size. Mildly increased ventricular mass. Mildly increased wall thickness. There is concentric hypertrophy. Normal wall motion. There is normal systolic function with a visually estimated ejection fraction of 55 - 60%. There is normal diastolic function.    Right Ventricle: Normal right ventricular cavity size. Wall thickness is normal. Right ventricle wall motion  is normal. Systolic function is normal.    IVC/SVC: Normal venous pressure at 3 mmHg.  .  monitor clinical status closely. Monitor on telemetry. Patient is on CHF pathway.  Monitor strict Is&Os and daily weights.  Place on fluid restriction of 1.5 L. Cardiology has been consulted. Continue to stress to patient importance of self efficacy and  on diet for CHF. Last BNP reviewed- and noted below   Recent Labs   Lab 01/04/24  1435   *       Continue Afib with RVR control    Atrial fibrillation with rapid ventricular response  Afib with RVR  Continue eliquis and Cardizem rx    0`/06/24  Ok to switch cardzime gtt to oral  Advise to decrease Eliquis to 2.5 mg bid due to worsening GFR      S/P liver transplant  Rx per     Alcohol use disorder, severe, dependence  Alcohol cessation    Thrombocytopenia  PLT 75 stable    01/06/24 PLT 80 no active bleeding        VTE Risk Mitigation (From admission, onward)           Ordered     apixaban tablet 5 mg  2 times daily         01/04/24 1604     Reason for No Pharmacological  VTE Prophylaxis  Once        Question:  Reasons:  Answer:  Already adequately anticoagulated on oral Anticoagulants    01/04/24 1604     IP VTE HIGH RISK PATIENT  Once         01/04/24 1604     Place sequential compression device  Until discontinued         01/04/24 1604                    Willie Kowalski MD  Cardiology  '[facility] Surg

## 2024-01-06 NOTE — NURSING
Patient BP 97/51, recheck 100/51 HR 79, contacted Hospital Medicine to adjust rate on cardizem drip , MD stated to monitor MAP >65, vitals. Plan of care ongoing.

## 2024-01-07 NOTE — PROGRESS NOTES
[facility] S[facility]  Hepatology  Consult Note    Patient Name: Jordan Altman  MRN: [id number]  Admission Date: 1/4/2024  Hospital Length of Stay: 3 days  Attending Provider: Didier Ryder MD   Primary Care Physician: Zohra Walters FNP  Principal Problem:PNA (pneumonia)    Consults  Subjective:     Transplant status: Post-transplant    HPI: Mr. Altman is a 48 year old male with a history of  alcoholic liver cirrhosis s/p liver transplant [facility] 10/20, CKD, PAF(on eliquis) admitted with Pneumonia. Hepatology consulted for Post transplant status.      In the ED he was noted to be in afib RVR (HR in the 140's), given Cardizem IVP and placed on gtt.      Lab work notable for INR 1.5, Alk phos 169, , ALT 82, . Chest xray showed new low-grade infiltrate or atypical pneumonia LLL.  Patient  on Levaquin.      Transplant Date: 10/14/2020  UNOS Native Liver Dx: Alcoholic Cirrhosis     Jordan is here for follow up of his liver transplant.     ORGAN: LIVER  Whole or Partial: whole liver  Donor Type: donation after brain death  CDC High Risk: yes  Donor CMV Status: Positive  Donor HCV Status: Positive  Donor HBcAb: Negative  Biliary Anastomosis: end to end  Arterial Anatomy: standard  IVC reconstruction: end to end ivc  Portal vein status: patent    Interval history  SOB is better  Afib RVR controlled. Echo unremarkable.   CXR  atypical pneumonia involving the left lung        1/7--Afib controlled. Hypotensive. LFT trending down. Still  req. 2 L O2. Worsening JENN    Review of Systems  GEN: No acute distress, pleasant, body habitus obese  HEENT: atraumatic and normocephalic  CARDS: tachyarrhythmic HR, no m/g, pulses palpable in LE  PULM: breathing comfortably on room air, chest symmetric, nonlabored, no abnormal breath sounds on auscultation  ABD: nontender, nondistended, soft, no organomegaly, BS+  Neuro: Alert and oriented x3, CN's I-IX grossly intact, sensation and motor intact; follows directions and  answers questions appropriately  Past Medical History:   Diagnosis Date    Decompensated hepatic cirrhosis     HCV acquired through organ donation, treated / cured     svr12 - 6/2021    Hypertension     SBP (spontaneous bacterial peritonitis)        Past Surgical History:   Procedure Laterality Date    ERCP N/A 10/1/2020    Procedure: ERCP (ENDOSCOPIC RETROGRADE CHOLANGIOPANCREATOGRAPHY);  Surgeon: Marcos Ramirez MD;  [facility]  Service: Endoscopy;  Laterality: N/A;    ESOPHAGOGASTRODUODENOSCOPY N/A 10/13/2020    Procedure: EGD (ESOPHAGOGASTRODUODENOSCOPY);  Surgeon: Prasanth Jerry MD;  [facility]  Service: Endoscopy;  Laterality: N/A;    EXPLORATORY LAPAROTOMY AFTER LIVER TRANSPLANTATION N/A 10/15/2020    Procedure: LAPAROTOMY, EXPLORATORY, AFTER LIVER TRANSPLANT, Possible redo of artery, possible portal thrombectomy. IntraOperative US.;  Surgeon: Curtis Zavaleta MD;  Location: [facility] OR [address] [place] [address];  Service: Transplant;  Laterality: N/A;  With intraoperative ultrasound    LIVER TRANSPLANT N/A 10/14/2020    Procedure: TRANSPLANT, LIVER;  Surgeon: Curtis Zavaleta MD;  Location: [facility] OR [address] [place] [address];  Service: Transplant;  Laterality: N/A;  Intra op HD    RIGHT HEART CATHETERIZATION Right 9/23/2020    Procedure: INSERTION, CATHETER, RIGHT HEART;  Surgeon: Mikel Costa Jr., MD;  Location: [facility] CATH LAB;  Service: Cardiology;  Laterality: Right;    THORACENTESIS  2011    THROMBECTOMY  10/15/2020    Procedure: THROMBECTOMY portal vein;  Surgeon: Curtis Zavaleta MD;  Location: [address];  Service: Transplant;;       Family history of liver disease: No    Review of patient's allergies indicates:   Allergen Reactions    Latex, natural rubber          Tobacco Use    Smoking status: Some Days    Smokeless tobacco: Current   Substance and Sexual Activity    Alcohol use: Yes     Alcohol/week: 112.0 standard drinks of alcohol     Types: 112 Shots of liquor per week     Comment:  fifth of vodka a day. LAST DRINK 7/25/2020 per pt     Drug use: Yes     Frequency: 3.0 times per week     Types: Marijuana    Sexual activity: Yes       Facility-Administered Medications Prior to Admission   Medication Dose Route Frequency Provider Last Rate Last Admin    acetaminophen tablet 650 mg  650 mg Oral Once PRN Leo Ghotra MD        albuterol inhaler 2 puff  2 puff Inhalation Q20 Min PRN Leo Ghotra MD        [DISCONTINUED] diphenhydrAMINE injection 25 mg  25 mg Intravenous Once PRN Leo Ghotra MD        [DISCONTINUED] EPINEPHrine (EPIPEN) 0.3 mg/0.3 mL pen injection 0.3 mg  0.3 mg Intramuscular PRN Leo Ghotra MD        [DISCONTINUED] methylPREDNISolone sodium succinate injection 40 mg  40 mg Intravenous Once PRN Leo Ghotra MD        [DISCONTINUED] ondansetron disintegrating tablet 4 mg  4 mg Oral Once PRN Leo Ghotra MD        [DISCONTINUED] sodium chloride 0.9% 500 mL flush bag   Intravenous PRN Leo Ghotra MD        [DISCONTINUED] sodium chloride 0.9% flush 10 mL  10 mL Intravenous PRN Leo Ghotra MD         Medications Prior to Admission   Medication Sig Dispense Refill Last Dose    amLODIPine (NORVASC) 10 MG tablet Take 1 tablet by mouth in the morning. 30 tablet 1 1/4/2024    apixaban (ELIQUIS) 5 mg Tab Take 1 tablet (5 mg total) by mouth 2 (two) times a day. 60 tablet 11 1/3/2024    buPROPion (WELLBUTRIN SR) 150 MG TBSR 12 hr tablet Take 150 mg by mouth 2 (two) times daily.       fluticasone-salmeterol diskus inhaler 250-50 mcg Inhale 1 puff into the lungs 2 (two) times a day.   1/3/2024    fluticasone-umeclidin-vilanter (TRELEGY ELLIPTA) 200-62.5-25 mcg inhaler Inhale 1 puff into the lungs once daily.   1/3/2024    gabapentin (NEURONTIN) 300 MG capsule Take 2 capsules by mouth 2 (two) times daily.       loratadine (CLARITIN) 10 mg tablet Take 10 mg by mouth once daily.   1/3/2024    losartan (COZAAR) 25 MG tablet Take 25 mg by mouth once  daily.   1/4/2024    mycophenolate (CELLCEPT) 250 mg Cap Take 2 capsules (500 mg total) by mouth 2 (two) times daily. 120 capsule 11 1/4/2024    tacrolimus (PROGRAF) 1 MG Cap Take 1 capsule (1 mg total) by mouth every morning AND 1 capsule (1 mg total) every evening. 60 capsule 11 1/4/2024    testosterone cypionate (DEPOTESTOTERONE CYPIONATE) 200 mg/mL injection Inject 0.75 mLs (150 mg total) into the muscle once a week. 5 mL 0 Past Week    tiZANidine (ZANAFLEX) 4 MG tablet Take 4 mg by mouth 2 (two) times a day.   1/3/2024    traZODone (DESYREL) 50 MG tablet Take  mg by mouth every evening.   1/3/2024    albuterol (PROVENTIL/VENTOLIN HFA) 90 mcg/actuation inhaler Inhale 2 puffs into the lungs every 6 (six) hours as needed for Wheezing or Shortness of Breath. Rescue          Objective:     Vital Signs (Most Recent):  Temp: 97.9 °F (36.6 °C) (01/07/24 1152)  Pulse: 69 (01/07/24 1152)  Resp: 18 (01/07/24 1153)  BP: (!) 104/54 (01/07/24 1152)  SpO2: (!) 91 % (01/07/24 1152) Vital Signs (24h Range):  Temp:  [97.4 °F (36.3 °C)-97.9 °F (36.6 °C)] 97.9 °F (36.6 °C)  Pulse:  [62-95] 69  Resp:  [17-20] 18  SpO2:  [90 %-94 %] 91 %  BP: ()/(44-55) 104/54     Weight: 127.9 kg (282 lb) (01/05/24 0740)  Body mass index is 38.25 kg/m².    Physical Exam  Constitutional:       General: He is not in acute distress.     Appearance: Normal appearance.   HENT:      Head: Normocephalic and atraumatic.      Nose: Nose normal.      Mouth/Throat:      Mouth: Mucous membranes are moist.   Eyes:      Conjunctiva/sclera: Conjunctivae normal.   Cardiovascular:      Rate and Rhythm: Normal rate.   Pulmonary:      Effort: Respiratory distress present.   Abdominal:      General: There is no distension.      Palpations: There is no mass.      Tenderness: There is no abdominal tenderness.   Musculoskeletal:         General: Normal range of motion.      Cervical back: Normal range of motion.      Right lower leg: No edema.      Left lower  leg: No edema.   Skin:     Coloration: Skin is not jaundiced.      Findings: No rash.   Neurological:      General: No focal deficit present.      Mental Status: He is alert.   Psychiatric:         Mood and Affect: Mood normal.             Significant Labs:  CBC:   Recent Labs   Lab 01/07/24  0635   WBC 5.65   RBC 3.52*   HGB 12.0*   HCT 37.9*   PLT 94*       CMP:   Recent Labs   Lab 01/07/24  0635   GLU 94   CALCIUM 7.9*   ALBUMIN 2.3*   PROT 5.2*   *   K 4.7   CO2 20*   CL 93*   BUN 34*   CREATININE 4.1*   ALKPHOS 178*   ALT 60*   AST 93*   BILITOT 6.6*       Coagulation:   Recent Labs   Lab 01/04/24  1435   INR 1.5*   APTT 29.5         Significant Imaging:  X-Ray: Reviewed      Assessment/Plan:     Active Diagnoses:    Diagnosis Date Noted POA    PRINCIPAL PROBLEM:  PNA (pneumonia) [J18.9] 01/04/2024 Yes    Chest pain [R07.9] 01/05/2024 Unknown    Elevated liver enzymes [R74.8] 01/05/2024 Unknown    Chronic diastolic congestive heart failure [I50.32] 01/05/2024 Yes    Atrial fibrillation with rapid ventricular response [I48.91] 07/30/2023 Yes    S/P liver transplant [Z94.4] 10/14/2020 Not Applicable    Alcohol use disorder, severe, dependence [F10.20] 09/21/2020 Yes     Chronic    Thrombocytopenia [D69.6] 09/19/2020 Yes      Problems Resolved During this Admission:       Elevated Liver tests  -Abnormal LFTs-liver tests have fluctuated regularly-- concerned that is related to alcohol. At present likely from underlying infection/pneumonia  -Liver enzymes improving  Plan       -Monitor Liver enzymes    Post Liver transplant 3 years  Tac levels goal 5-8 ng/ml  Tac levels > 10  Plan-Hold Cellcept due to infection          Reduce  Prograf 1 mg AM and 0.5 mg PM from tomorrow          JENN  Consult nephrology  Prograf dose reduced from tomorrow      Alcohol abuse  -continue to advise alcohol abstinence    Atrial fibrillation with rapid ventricular response        Converted with cardizem  Cardiology on Board. ECHO  normal     The patient location is:  ([place])  The chief complaint leading to consultation is: history of OLT     Visit type: audiovisual     Face to Face time with patient: 20  40 minutes of total time spent on the encounter, which includes face to face time and non-face to face time preparing to see the patient (eg, review of tests), Obtaining and/or reviewing separately obtained history, Documenting clinical information in the electronic or other health record, Independently interpreting results (not separately reported) and communicating results to the patient/family/caregiver, or Care coordination (not separately reported).     Each patient to whom he or she provides medical services by telemedicine is:  (1) informed of the relationship between the physician and patient and the respective role of any other health care provider with respect to management of the patient; and (2) notified that he or she may decline to receive medical services by telemedicine and may withdraw from such care at any time.    Thank you for your consult. I will follow-up with patient. Please contact us if you have any additional questions.    Juanjose Kwong MD  Hepatology  [facility]

## 2024-01-07 NOTE — SUBJECTIVE & OBJECTIVE
"    ROS  Objective:     Vital Signs (Most Recent):  Temp: 97.5 °F (36.4 °C) (01/07/24 1554)  Pulse: 88 (01/07/24 1554)  Resp: 18 (01/07/24 1554)  BP: (!) 89/50 (01/07/24 1554)  SpO2: (!) 92 % (01/07/24 1554) Vital Signs (24h Range):  Temp:  [97.4 °F (36.3 °C)-97.9 °F (36.6 °C)] 97.5 °F (36.4 °C)  Pulse:  [62-95] 88  Resp:  [17-18] 18  SpO2:  [90 %-94 %] 92 %  BP: ()/(44-55) 89/50     Weight: 127.9 kg (282 lb)  Body mass index is 38.25 kg/m².     SpO2: (!) 92 %       No intake or output data in the 24 hours ending 01/07/24 1622    Lines/Drains/Airways       Peripheral Intravenous Line  Duration                  Peripheral IV - Single Lumen 01/04/24 1434 20 G Anterior;Proximal;Right Forearm 3 days         Peripheral IV - Single Lumen 01/05/24 0123 22 G Left;Posterior Hand 2 days                       Physical Exam  HENT:      Head: Normocephalic.   Eyes:      Pupils: Pupils are equal, round, and reactive to light.   Neck:      Thyroid: No thyromegaly.      Vascular: Normal carotid pulses. No carotid bruit or JVD.   Cardiovascular:      Rate and Rhythm: Normal rate. Rhythm irregularly irregular. No extrasystoles are present.     Chest Wall: PMI is not displaced.      Pulses: Normal pulses.      Heart sounds: Normal heart sounds. No murmur heard.     No gallop. No S3 sounds.   Pulmonary:      Effort: No respiratory distress.      Breath sounds: Normal breath sounds. No stridor.   Abdominal:      General: Bowel sounds are normal.      Palpations: Abdomen is soft.      Tenderness: There is no abdominal tenderness. There is no rebound.   Musculoskeletal:         General: Normal range of motion.   Skin:     Findings: No rash.   Neurological:      Mental Status: He is alert and oriented to person, place, and time.   Psychiatric:         Behavior: Behavior normal.            Significant Labs: ABG: No results for input(s): "PH", "PCO2", "HCO3", "POCSATURATED", "BE" in the last 48 hours., Blood Culture: No results for " "input(s): "LABBLOO" in the last 48 hours., BMP:   Recent Labs   Lab 01/06/24  0843 01/07/24  0635   GLU 81 94   * 126*   K 4.5 4.7   CL 96 93*   CO2 20* 20*   BUN 25* 34*   CREATININE 2.9* 4.1*   CALCIUM 8.0* 7.9*   MG 1.5* 2.0   , CMP   Recent Labs   Lab 01/06/24  0843 01/07/24  0635   * 126*   K 4.5 4.7   CL 96 93*   CO2 20* 20*   GLU 81 94   BUN 25* 34*   CREATININE 2.9* 4.1*   CALCIUM 8.0* 7.9*   PROT 5.2* 5.2*   ALBUMIN 2.4* 2.3*   BILITOT 8.0* 6.6*   ALKPHOS 182* 178*   * 93*   ALT 65* 60*   ANIONGAP 12 13   , CBC   Recent Labs   Lab 01/06/24  0843 01/07/24  0635   WBC 5.73 5.65   HGB 12.1* 12.0*   HCT 39.4* 37.9*   PLT 80* 94*   , INR No results for input(s): "INR", "PROTIME" in the last 48 hours., Lipid Panel No results for input(s): "CHOL", "HDL", "LDLCALC", "TRIG", "CHOLHDL" in the last 48 hours., and Troponin No results for input(s): "TROPONINI" in the last 48 hours.    Significant Imaging: EKG:    "

## 2024-01-07 NOTE — CONSULTS
[facility] Surg  Nephrology  Consult Note    Patient Name: Jordan Altman  MRN: [id number]  Admission Date: 1/4/2024  Hospital Length of Stay: 3 days  Attending Provider: Didier Ryder MD   Primary Care Physician: Zohra Walters FNP  Principal Problem:PNA (pneumonia)    Inpatient consult to Nephrology  Consult performed by: Cm Camacho MD  Consult ordered by: Didier Ryder MD  Reason for consult: JENN        Subjective:     HPI:  48-year-old male with history of end-stage liver disease status post transplant in 2020.  Admitted with pneumonia.  Noted to have an acute rise in creatinine over the past several days.  Nephrology has been consulted for evaluation.  The patient was seen in his hospital room.  In bed resting comfortably.  No acute distress noted.  He relates that he has had decreased p.o. intake for the past several days as well as intermittent nausea.  He feels like his mouth is dry and that he feels dehydrated.  No diarrhea related.    On admission his creatinine was 1.1.  Over the past several days it increased from 1.6-2.9 now 4.1 this morning.    Past Medical History:   Diagnosis Date    Decompensated hepatic cirrhosis     HCV acquired through organ donation, treated / cured     svr12 - 6/2021    Hypertension     SBP (spontaneous bacterial peritonitis)        Past Surgical History:   Procedure Laterality Date    ERCP N/A 10/1/2020    Procedure: ERCP (ENDOSCOPIC RETROGRADE CHOLANGIOPANCREATOGRAPHY);  Surgeon: Marcos Ramirez MD;  Location: [address]);  Service: Endoscopy;  Laterality: N/A;    ESOPHAGOGASTRODUODENOSCOPY N/A 10/13/2020    Procedure: EGD (ESOPHAGOGASTRODUODENOSCOPY);  Surgeon: Prasanth Jerry MD;  Location: [address]);  Service: Endoscopy;  Laterality: N/A;    EXPLORATORY LAPAROTOMY AFTER LIVER TRANSPLANTATION N/A 10/15/2020    Procedure: LAPAROTOMY, EXPLORATORY, AFTER LIVER TRANSPLANT, Possible redo of artery, possible portal thrombectomy.  IntraOperative US.;  Surgeon: Curtis Zavaleta MD;  [facility];  Service: Transplant;  Laterality: N/A;  With intraoperative ultrasound    LIVER TRANSPLANT N/A 10/14/2020    Procedure: TRANSPLANT, LIVER;  Surgeon: Curtis Zavaleta MD;  [facility];  Service: Transplant;  Laterality: N/A;  Intra op HD    RIGHT HEART CATHETERIZATION Right 9/23/2020    Procedure: INSERTION, CATHETER, RIGHT HEART;  Surgeon: Mikel Costa Jr., MD;  Location: [facility] CATH LAB;  Service: Cardiology;  Laterality: Right;    THORACENTESIS  2011    THROMBECTOMY  10/15/2020    Procedure: THROMBECTOMY portal vein;  Surgeon: Curtis Zavaleta MD;  [facility];  Service: Transplant;;       Review of patient's allergies indicates:   Allergen Reactions    Latex, natural rubber      Current Facility-Administered Medications   Medication Frequency    acetaminophen tablet 650 mg Q4H PRN    apixaban tablet 5 mg BID    dextrose 10% bolus 125 mL 125 mL PRN    dextrose 10% bolus 250 mL 250 mL PRN    diltiaZEM tablet 30 mg Q8H    EScitalopram oxalate tablet 10 mg Daily    gabapentin capsule 200 mg TID PRN    glucagon (human recombinant) injection 1 mg PRN    glucose chewable tablet 16 g PRN    glucose chewable tablet 24 g PRN    lactated ringers infusion Continuous    levoFLOXacin 750 mg/150 mL IVPB 750 mg Q48H    naloxone 0.4 mg/mL injection 0.02 mg PRN    ondansetron disintegrating tablet 8 mg Q6H PRN    oxyCODONE immediate release tablet 5 mg Q6H PRN    promethazine (PHENERGAN) 25 mg in dextrose 5 % (D5W) 50 mL IVPB Q6H PRN    senna-docusate 8.6-50 mg per tablet 1 tablet BID    sodium chloride 0.9% flush 10 mL Q12H PRN    tacrolimus capsule 1 mg BID    traZODone tablet 50 mg Nightly PRN     Family History       Problem Relation (Age of Onset)    COPD Mother          Tobacco Use    Smoking status: Some Days    Smokeless tobacco: Current   Substance and Sexual Activity    Alcohol use: Yes     Alcohol/week: 112.0  standard drinks of alcohol     Types: 112 Shots of liquor per week     Comment: fifth of vodka a day. LAST DRINK 7/25/2020 per pt     Drug use: Yes     Frequency: 3.0 times per week     Types: Marijuana    Sexual activity: Yes     Review of Systems   Constitutional: Negative.    HENT: Negative.     Respiratory: Negative.     Cardiovascular: Negative.    Gastrointestinal:  Positive for nausea.   Genitourinary: Negative.    Musculoskeletal: Negative.    Skin: Negative.      Objective:     Vital Signs (Most Recent):  Temp: 97.9 °F (36.6 °C) (01/07/24 1152)  Pulse: 69 (01/07/24 1152)  Resp: 18 (01/07/24 1153)  BP: (!) 104/54 (01/07/24 1152)  SpO2: (!) 91 % (01/07/24 1152) Vital Signs (24h Range):  Temp:  [97.4 °F (36.3 °C)-97.9 °F (36.6 °C)] 97.9 °F (36.6 °C)  Pulse:  [62-95] 69  Resp:  [17-20] 18  SpO2:  [90 %-94 %] 91 %  BP: ()/(44-55) 104/54     Weight: 127.9 kg (282 lb) (01/05/24 0740)  Body mass index is 38.25 kg/m².  Body surface area is 2.55 meters squared.    I/O last 3 completed shifts:  In: 750 [P.O.:750]  Out: 150 [Urine:150]    Physical Exam  Constitutional:       Appearance: Normal appearance.   HENT:      Head: Normocephalic and atraumatic.   Eyes:      General: No scleral icterus.     Extraocular Movements: Extraocular movements intact.      Pupils: Pupils are equal, round, and reactive to light.   Cardiovascular:      Rate and Rhythm: Tachycardia present. Rhythm irregular.   Pulmonary:      Effort: Pulmonary effort is normal.      Breath sounds: No stridor.   Musculoskeletal:      Right lower leg: No edema.      Left lower leg: No edema.   Skin:     General: Skin is warm and dry.   Neurological:      General: No focal deficit present.      Mental Status: He is alert and oriented to person, place, and time.   Psychiatric:         Mood and Affect: Mood normal.         Behavior: Behavior normal.         Significant Labs:  BMP:   Recent Labs   Lab 01/07/24  0635   GLU 94   *   K 4.7   CL 93*    CO2 20*   BUN 34*   CREATININE 4.1*   CALCIUM 7.9*   MG 2.0     CMP:   Recent Labs   Lab 01/07/24  0635   GLU 94   CALCIUM 7.9*   ALBUMIN 2.3*   PROT 5.2*   *   K 4.7   CO2 20*   CL 93*   BUN 34*   CREATININE 4.1*   ALKPHOS 178*   ALT 60*   AST 93*   BILITOT 6.6*     All labs within the past 24 hours have been reviewed.    Significant Imaging:  Labs: Reviewed      Assessment/Plan:     Active Diagnoses:    Diagnosis Date Noted POA    PRINCIPAL PROBLEM:  PNA (pneumonia) [J18.9] 01/04/2024 Yes    Chest pain [R07.9] 01/05/2024 Unknown    Elevated liver enzymes [R74.8] 01/05/2024 Unknown    Chronic diastolic congestive heart failure [I50.32] 01/05/2024 Yes    Atrial fibrillation with rapid ventricular response [I48.91] 07/30/2023 Yes    S/P liver transplant [Z94.4] 10/14/2020 Not Applicable    Alcohol use disorder, severe, dependence [F10.20] 09/21/2020 Yes     Chronic    Thrombocytopenia [D69.6] 09/19/2020 Yes      Problems Resolved During this Admission:       Assessment and plan:    1. JENN:  At this point etiology is unclear.  There may be a component of prerenal azotemia as he has had nausea with decreased p.o. intake for the past several days.  He is currently receiving gentle IV hydration.    His Prograf levels have been running higher than usual.  This could also account for rise in creatinine.  Most recent Prograf level was 10.7.    Urine studies and urinalysis are currently pending as is a renal ultrasound.    2. CKD 2: Baseline creatinine is around 1.1.  Only 1 previous urinalysis showed evidence of proteinuria however was a concentrated specimen which is likely resulting in a false-positive.  Previous UAs have not shown evidence of proteinuria.    3. Electrolytes:  Serum potassium is normal at 4.7.    4. Acid-base: Serum bicarbonate is slightly low at 20 consistent with JENN.    5. Volume:  He appears euvolemic to slightly hypovolemic on exam.  Would continue gentle hydration and close monitoring of I's  and O's.    Thank you for your consult.     Cm Camacho MD  Nephrology  '[facility] Surg

## 2024-01-07 NOTE — ASSESSMENT & PLAN NOTE
Afib with RVR  Continue eliquis and Cardizem rx    0`/06/24  Ok to switch cardzime gtt to oral  Advise to decrease Eliquis to 2.5 mg bid due to worsening GFR    01/07/24  Due to low BP, d/c cardizem gtt and add cardizem 30 mg tid  Continue eliquis

## 2024-01-07 NOTE — PLAN OF CARE
Problem: Adult Inpatient Plan of Care  Goal: Plan of Care Review  1/6/2024 2022 by Wyatt Villegas RN  Outcome: Ongoing, Progressing  1/6/2024 2009 by Wyatt Villegas RN  Outcome: Ongoing, Progressing  Goal: Patient-Specific Goal (Individualized)  1/6/2024 2022 by Wyatt Villegas RN  Outcome: Ongoing, Progressing  1/6/2024 2009 by Wyatt Villegas RN  Outcome: Ongoing, Progressing  Goal: Absence of Hospital-Acquired Illness or Injury  1/6/2024 2022 by Wyatt Villegas RN  Outcome: Ongoing, Progressing  1/6/2024 2009 by Wyatt Villegas RN  Outcome: Ongoing, Progressing  Goal: Optimal Comfort and Wellbeing  1/6/2024 2022 by Wyatt Villegas RN  Outcome: Ongoing, Progressing  1/6/2024 2009 by Wyatt Villegas RN  Outcome: Ongoing, Progressing  Goal: Readiness for Transition of Care  1/6/2024 2022 by Wyatt Villegas RN  Outcome: Ongoing, Progressing  1/6/2024 2009 by Wyatt Villegas RN  Outcome: Ongoing, Progressing     Problem: Fluid Imbalance (Pneumonia)  Goal: Fluid Balance  1/6/2024 2022 by Wyatt Villegas RN  Outcome: Ongoing, Progressing  1/6/2024 2009 by Wyatt Villegas RN  Outcome: Ongoing, Progressing     Problem: Infection (Pneumonia)  Goal: Resolution of Infection Signs and Symptoms  1/6/2024 2022 by Wyatt Villegas RN  Outcome: Ongoing, Progressing  1/6/2024 2009 by Wyatt Villegas RN  Outcome: Ongoing, Progressing     Problem: Respiratory Compromise (Pneumonia)  Goal: Effective Oxygenation and Ventilation  1/6/2024 2022 by Wyatt Villegas RN  Outcome: Ongoing, Progressing  1/6/2024 2009 by Wyatt Villegas RN  Outcome: Ongoing, Progressing     Problem: Fall Injury Risk  Goal: Absence of Fall and Fall-Related Injury  1/6/2024 2022 by Wyatt Villegas RN  Outcome: Ongoing, Progressing  1/6/2024 2009 by Wyatt Villegas RN  Outcome: Ongoing, Progressing

## 2024-01-07 NOTE — PROGRESS NOTES
[facility] Medicine  Progress Note     Patient Name:  Jordan Altman  MRN:  [id number]  Patient Class: IP-Inpatient  Admission Date:  1/4/2024   Length of Stay:  3  Attending Physician: Didier Ryder MD  Primary Care Provider: Zohra Walters FNP    Subjective:      Principal Problem: PNA (pneumonia)         HPI:  Mr. Altman is a 48 year old male with a history of  alcoholic liver cirrhosis s/p liver transplant [facility] 10/20, CKD, PAF(on eliquis) who presents to the ED with complaints of shortness of breath, fatigue, intermittent fever over the last 4 weeks.  Stated he was dx over a week ago with flu as well.  He also complains of intermittent N/V.   In the ED he was noted to be in afib RVR (HR in the 140's), given Cardizem IVP and placed on gtt.  Lab work notable for INR 1.5, Alk phos 169, , ALT 82, .  FLU and covid negative. Chest xray showed new low-grade infiltrate or atypical pneumonia LLL.  Patient was started on Levaquin.  Patient will be admitted to observation for afib rvr/pna.  Cardiology consulted.         Overview/Hospital Course:  01/05/2024  Afib RVR marginally controlled on non-titratable drip. Cardiology consulted, recommendations pending.      01/06/2024  Afib RVR controlled. Echo grossly unremarkable. Still on Cardizem, pending transition to PO by Cardiology. Hepatology recommends continuing Prograf but to hold cellcept due to infection.     01/07/2024  Afib resolved. In SR. Afebrile, clinically unchanged. However, was notably hypotensive this AM. Renal function sharply worsened over the past 24 hours as well. Nephrology consulted for recommendations.      Interval Hx  No acute events overnight. Doing well this AM with no complaints at our encounter. Denies CP, SOB, Abdominal pain, fevers/chills.    Objective  BP (!) 76/44 (BP Location: Right arm, Patient Position: Lying)   Pulse 65   Temp 97.4 °F (36.3 °C) (Oral)   Resp 18   Ht 6' (1.829 m)    "Wt 127.9 kg (282 lb)   SpO2 (!) 91%   BMI 38.25 kg/m²     Intake/Output Summary (Last 24 hours) at 1/7/2024 1144  Last data filed at 1/6/2024 1532  Gross per 24 hour   Intake 510 ml   Output --   Net 510 ml       PHYSICAL EXAM  Vitals reviewed  GEN: No acute distress, pleasant, body habitus obese  SKIN: diffuse tattoos throughout extremities  CARDS: regular rate and rhythm, no m/g, pulses palpable in LE  PULM: breathing comfortably on room air, chest symmetric, nonlabored, no abnormal breath sounds on auscultation  ABD: nontender, nondistended, soft, no organomegaly, BS+  Neuro: Alert and oriented x3, CN's I-IX grossly intact, sensation and motor intact; follows directions and answers questions appropriately    LABS  All labs from the past 24 hours were reviewed.     BMP:   Recent Labs   Lab 01/07/24  0635   GLU 94   *   K 4.7   CL 93*   CO2 20*   BUN 34*   CREATININE 4.1*   CALCIUM 7.9*   MG 2.0     CBC:   Recent Labs   Lab 01/06/24  0843 01/07/24  0635   WBC 5.73 5.65   HGB 12.1* 12.0*   HCT 39.4* 37.9*   PLT 80* 94*     CMP:   Recent Labs   Lab 01/06/24  0843 01/07/24  0635   * 126*   K 4.5 4.7   CL 96 93*   CO2 20* 20*   GLU 81 94   BUN 25* 34*   CREATININE 2.9* 4.1*   CALCIUM 8.0* 7.9*   PROT 5.2* 5.2*   ALBUMIN 2.4* 2.3*   BILITOT 8.0* 6.6*   ALKPHOS 182* 178*   * 93*   ALT 65* 60*   ANIONGAP 12 13     Cardiac Markers: No results for input(s): "CKMB", "MYOGLOBIN", "BNP", "TROPISTAT" in the last 48 hours.  Coagulation: No results for input(s): "PT", "INR", "APTT" in the last 48 hours.  Lactic Acid: No results for input(s): "LACTATE" in the last 48 hours.  Magnesium:   Recent Labs   Lab 01/06/24  0843 01/07/24  0635   MG 1.5* 2.0     Troponin: No results for input(s): "TROPONINI", "TROPONINIHS" in the last 48 hours.  TSH:   No results for input(s): "TSH" in the last 4320 hours.  Urine Studies:   No results for input(s): "COLORU", "APPEARANCEUA", "PHUR", "SPECGRAV", "PROTEINUA", "GLUCUA", " ""KETONESU", "BILIRUBINUA", "OCCULTUA", "NITRITE", "UROBILINOGEN", "LEUKOCYTESUR", "RBCUA", "WBCUA", "BACTERIA", "SQUAMEPITHEL", "HYALINECASTS" in the last 48 hours.    Invalid input(s): "WRIGHTSUR"    IMAGING  All imaging from the past 24 hours were reviewed.     Imaging Results              X-Ray Chest 1 View (Final result)  Result time 01/04/24 14:33:00      Final result by Kaylan Leos (Venancio), MD (01/04/24 14:33:00)                   Impression:      Possible new low-grade infiltrate or atypical pneumonia involving the left lung.  Follow-up is recommended.      Electronically signed by: Kaylan Leos MD  Date:    01/04/2024  Time:    14:33               Narrative:    EXAMINATION:  XR CHEST 1 VIEW    CLINICAL HISTORY:  Shortness of breath,    COMPARISON:  Chest, 12/12/2023.    FINDINGS:  One view.  Heart is normal in size.  Moderate coarsening of the lung markings in the mid to lower lung zones on the left laterally.  This appears more prominent than on previous exam.                                      Assessment/Plan:   Hypotensive  01/07/2024  -asymptomatic  -repeat while supine improved, but just above MAP  -gentle IVFs   -monitor throughout day     * PNA (pneumonia)  - chest xray with new low-grade infiltrate or atypical pneumonia involving the left lung.   - continue Levaquin  (Qtc normal)     01/07/2024  Improving  Continue Levaquin, renally dosed  Day 4 of 5    JENN  --Cr sharply bumped compared to last few days  --suspect prerenal, urine studies ordered  --Retroperitoneal US ordered  --IVFs initiated  --avoid nephrotoxic agents while hospitalized  --renally dose meds  --daily BMP to monitor renal fxn      Hypomagnesemia (0.8)  --associated with palpitations, shortness of breath, chest pain  --3g IV ordered   --repeat this afternoon, trend daily  --Potassium level normal, Qtc normal     01/06/2024  1.5 this morning  Another 2g IV MgSO4 ordered  Repeat in the AM    01/07/2024  resolved     Atrial " fibrillation with rapid ventricular response  Patient with Persistent (7 days or more) atrial fibrillation which is uncontrolled currently with Calcium Channel Blocker. Patient is currently in atrial fibrillation.[id number] Score: The patient doesn't have any registry metric data available. Anticoagulation indicated. Anticoagulation done with eliquis .     - patient is unsure if he has afib before in the past, but per chart review has had previous EKG with Afib  - presented to ED with Afib RVR, given IVP cardizem and started on gtt  - contiue cardizem gtt and home eliquis  - contiue cardiac monitoring  - cards consulted     01/06/2024  Converted overnight, has been stable all morning  PO medication conversion pending Cardiology eval    01/07/2024  Resolved  I discontinued Cardizem drip this morning  Transitioned to Cardizem PO  Discussed with Cardiology, they agreed        S/P liver transplant  - follows with Dr. Askew  - continues on his prograf/Cellcept   - noted to have elevated LTFs (worse than prior) will monitor and consult hepatology for any further recommendations     Thrombocytopenia  Patient was found to have thrombocytopenia, the likely etiology is secondary to cirrhosis/portal hypertension, will monitor the platelets Daily. Will transfuse if platelet count is <20k. Hold DVT prophylaxis if platelets are <50k. The patient's platelet results have been reviewed and are listed below.    01/07/2024  stable     CORE MEASURES:  VTE Risk Mitigation (From admission, onward)           Ordered     apixaban tablet 5 mg  2 times daily         01/04/24 1604     Reason for No Pharmacological VTE Prophylaxis  Once        Question:  Reasons:  Answer:  Already adequately anticoagulated on oral Anticoagulants    01/04/24 1604     IP VTE HIGH RISK PATIENT  Once         01/04/24 1604     Place sequential compression device  Until discontinued         01/04/24 1604                    Code Status: FULL    Diet: Diet clear  liquid    Disposition: Follow up renal recs in regards to JENN, continue abx       Didier Ryder MD  Department of Hospital Medicine   [facility] - Telemetry ([facility])

## 2024-01-07 NOTE — PROGRESS NOTES
[facility]  Cardiology  Progress Note    Patient Name: Jordan Altman  MRN: [id number]  Admission Date: 1/4/2024  Hospital Length of Stay: 3 days  Code Status: Full Code   Attending Physician: Didier Ryder MD   Primary Care Physician: Zohra Walters FNP  Expected Discharge Date:   Principal Problem:PNA (pneumonia)    Subjective:     Hospital Course:   CARDIOLOGY consult for afib with RVR  HGB 12.6, plt 75  Cr 1.1--> 1.6  Elevated LFTs, ALB 2.4 TP 5.3     Troponin negative   EKG aifb with RVR  CXR Possible new low-grade infiltrate or atypical pneumonia involving the left lung   Echo biv nl function and mild LVH    01/06/24 AFIB VC R. Cr up to 2.9 PLT 8 and HGB 12.1. remains sob    01/07. Cr 4.1 and had low BP in AM. And switched cardizme gtt to oral. AFIB VRC no orthopnea        ROS  Objective:     Vital Signs (Most Recent):  Temp: 97.5 °F (36.4 °C) (01/07/24 1554)  Pulse: 88 (01/07/24 1554)  Resp: 18 (01/07/24 1554)  BP: (!) 89/50 (01/07/24 1554)  SpO2: (!) 92 % (01/07/24 1554) Vital Signs (24h Range):  Temp:  [97.4 °F (36.3 °C)-97.9 °F (36.6 °C)] 97.5 °F (36.4 °C)  Pulse:  [62-95] 88  Resp:  [17-18] 18  SpO2:  [90 %-94 %] 92 %  BP: ()/(44-55) 89/50     Weight: 127.9 kg (282 lb)  Body mass index is 38.25 kg/m².     SpO2: (!) 92 %       No intake or output data in the 24 hours ending 01/07/24 1622    Lines/Drains/Airways       Peripheral Intravenous Line  Duration                  Peripheral IV - Single Lumen 01/04/24 1434 20 G Anterior;Proximal;Right Forearm 3 days         Peripheral IV - Single Lumen 01/05/24 0123 22 G Left;Posterior Hand 2 days                       Physical Exam  HENT:      Head: Normocephalic.   Eyes:      Pupils: Pupils are equal, round, and reactive to light.   Neck:      Thyroid: No thyromegaly.      Vascular: Normal carotid pulses. No carotid bruit or JVD.   Cardiovascular:      Rate and Rhythm: Normal rate. Rhythm irregularly irregular. No extrasystoles are  "present.     Chest Wall: PMI is not displaced.      Pulses: Normal pulses.      Heart sounds: Normal heart sounds. No murmur heard.     No gallop. No S3 sounds.   Pulmonary:      Effort: No respiratory distress.      Breath sounds: Normal breath sounds. No stridor.   Abdominal:      General: Bowel sounds are normal.      Palpations: Abdomen is soft.      Tenderness: There is no abdominal tenderness. There is no rebound.   Musculoskeletal:         General: Normal range of motion.   Skin:     Findings: No rash.   Neurological:      Mental Status: He is alert and oriented to person, place, and time.   Psychiatric:         Behavior: Behavior normal.            Significant Labs: ABG: No results for input(s): "PH", "PCO2", "HCO3", "POCSATURATED", "BE" in the last 48 hours., Blood Culture: No results for input(s): "LABBLOO" in the last 48 hours., BMP:   Recent Labs   Lab 01/06/24  0843 01/07/24  0635   GLU 81 94   * 126*   K 4.5 4.7   CL 96 93*   CO2 20* 20*   BUN 25* 34*   CREATININE 2.9* 4.1*   CALCIUM 8.0* 7.9*   MG 1.5* 2.0   , CMP   Recent Labs   Lab 01/06/24  0843 01/07/24  0635   * 126*   K 4.5 4.7   CL 96 93*   CO2 20* 20*   GLU 81 94   BUN 25* 34*   CREATININE 2.9* 4.1*   CALCIUM 8.0* 7.9*   PROT 5.2* 5.2*   ALBUMIN 2.4* 2.3*   BILITOT 8.0* 6.6*   ALKPHOS 182* 178*   * 93*   ALT 65* 60*   ANIONGAP 12 13   , CBC   Recent Labs   Lab 01/06/24  0843 01/07/24  0635   WBC 5.73 5.65   HGB 12.1* 12.0*   HCT 39.4* 37.9*   PLT 80* 94*   , INR No results for input(s): "INR", "PROTIME" in the last 48 hours., Lipid Panel No results for input(s): "CHOL", "HDL", "LDLCALC", "TRIG", "CHOLHDL" in the last 48 hours., and Troponin No results for input(s): "TROPONINI" in the last 48 hours.    Significant Imaging: EKG:    Assessment and Plan:       * PNA (pneumonia)  Rx per HM    Chronic diastolic congestive heart failure  Patient is identified as having Diastolic (HFpEF) heart failure that is Chronic. CHF is " currently controlled. Latest ECHO performed and demonstrates- Results for orders placed during the hospital encounter of 01/04/24    Echo    Interpretation Summary    Left Ventricle: The left ventricle is normal in size. Mildly increased ventricular mass. Mildly increased wall thickness. There is concentric hypertrophy. Normal wall motion. There is normal systolic function with a visually estimated ejection fraction of 55 - 60%. There is normal diastolic function.    Right Ventricle: Normal right ventricular cavity size. Wall thickness is normal. Right ventricle wall motion  is normal. Systolic function is normal.    IVC/SVC: Normal venous pressure at 3 mmHg.  .  monitor clinical status closely. Monitor on telemetry. Patient is on CHF pathway.  Monitor strict Is&Os and daily weights.  Place on fluid restriction of 1.5 L. Cardiology has been consulted. Continue to stress to patient importance of self efficacy and  on diet for CHF. Last BNP reviewed- and noted below   Recent Labs   Lab 01/04/24  1435   *       Continue Afib with RVR control    Atrial fibrillation with rapid ventricular response  Afib with RVR  Continue eliquis and Cardizem rx    0`/06/24  Ok to switch cardzime gtt to oral  Advise to decrease Eliquis to 2.5 mg bid due to worsening GFR    01/07/24  Due to low BP, d/c cardizem gtt and add cardizem 30 mg tid  Continue eliquis      S/P liver transplant  Rx per HM    Alcohol use disorder, severe, dependence  Alcohol cessation    Thrombocytopenia  PLT 75 stable    01/06/24 PLT 80 no active bleeding        VTE Risk Mitigation (From admission, onward)           Ordered     apixaban tablet 5 mg  2 times daily         01/04/24 1604     Reason for No Pharmacological VTE Prophylaxis  Once        Question:  Reasons:  Answer:  Already adequately anticoagulated on oral Anticoagulants    01/04/24 1604     IP VTE HIGH RISK PATIENT  Once         01/04/24 1604     Place sequential compression device  Until  discontinued         01/04/24 1604                    Willie Kowalski MD  Cardiology  [facility]

## 2024-01-08 PROBLEM — E87.5 HYPERKALEMIA: Status: ACTIVE | Noted: 2024-01-01

## 2024-01-08 PROBLEM — J96.02 ACUTE RESPIRATORY FAILURE WITH HYPOXIA AND HYPERCARBIA: Status: ACTIVE | Noted: 2024-01-01

## 2024-01-08 PROBLEM — J96.01 ACUTE RESPIRATORY FAILURE WITH HYPOXIA AND HYPERCARBIA: Status: ACTIVE | Noted: 2024-01-01

## 2024-01-08 NOTE — PLAN OF CARE
[facility] - [facility]  Discharge Reassessment    Primary Care Provider: Zohra Walters FNP    Expected Discharge Date:     Reassessment (most recent)       Discharge Reassessment - 01/08/24 0846          Discharge Reassessment    Assessment Type Discharge Planning Reassessment     Did the patient's condition or plan change since previous assessment? No     Discharge Plan discussed with: Patient     Communicated JACK with patient/caregiver Date not available/Unable to determine     Discharge Plan A Home with family

## 2024-01-08 NOTE — SUBJECTIVE & OBJECTIVE
Review of Systems    Constitutional:  Positive for fatigue  Respiratory:  Positive for shortness of breath.    Gastrointestinal:  Positive for nausea      Objective:     Vital Signs (Most Recent):  Temp: (!) 94.5 °F (34.7 °C) (01/08/24 1200)  Pulse: 72 (01/08/24 1225)  Resp: 18 (01/08/24 1225)  BP: (!) 101/55 (01/08/24 1225)  SpO2: (!) 93 % (01/08/24 1225) Vital Signs (24h Range):  Temp:  [94.5 °F (34.7 °C)-97.5 °F (36.4 °C)] 94.5 °F (34.7 °C)  Pulse:  [61-88] 72  Resp:  [18-21] 18  SpO2:  [85 %-97 %] 93 %  BP: ()/(38-60) 101/55     Weight: 127.9 kg (282 lb)  Body mass index is 38.25 kg/m².    Intake/Output Summary (Last 24 hours) at 1/8/2024 1325  Last data filed at 1/8/2024 0631  Gross per 24 hour   Intake 1852.93 ml   Output --   Net 1852.93 ml         Physical Exam        Constitutional:       Appearance: He is obese.   Eyes:      Cardiovascular:        Pulses: Normal pulses.      Heart sounds: Normal heart sounds.   Pulmonary:      Breath sounds: No wheezing.      Comments: Tachypnea,  Abdominal:      General: Bowel sounds are normal. There is no distension.      Palpations: Abdomen is soft.      Tenderness: There is no abdominal tenderness.   Musculoskeletal:         General: No swelling. Normal range of motion.   Skin:     General: Skin is warm and dry.   Neurological:      Mental Status: He is alert and oriented to person, place, and time.         Significant Labs: All pertinent labs within the past 24 hours have been reviewed.  CBC:   Recent Labs   Lab 01/07/24  0635 01/08/24  0557 01/08/24  1005   WBC 5.65 6.08 5.20   HGB 12.0* 12.6* 12.2*   HCT 37.9* 40.4 38.7*   PLT 94* 129* 98*     CMP:   Recent Labs   Lab 01/07/24  0635 01/08/24  0557   * 123*   K 4.7 5.3*   CL 93* 89*   CO2 20* 19*   GLU 94 121*   BUN 34* 39*   CREATININE 4.1* 5.3*   CALCIUM 7.9* 7.9*   PROT 5.2*  --    ALBUMIN 2.3* 2.4*   BILITOT 6.6*  --    ALKPHOS 178*  --    AST 93*  --    ALT 60*  --    ANIONGAP 13 15        Significant Imaging:     Imaging Results              X-Ray Chest 1 View (Final result)  Result time 01/04/24 14:33:00      Final result by Kaylan Leos MD (Timothy) (01/04/24 14:33:00)                   Impression:      Possible new low-grade infiltrate or atypical pneumonia involving the left lung.  Follow-up is recommended.      Electronically signed by: Kaylan Leos MD  Date:    01/04/2024  Time:    14:33               Narrative:    EXAMINATION:  XR CHEST 1 VIEW    CLINICAL HISTORY:  Shortness of breath,    COMPARISON:  Chest, 12/12/2023.    FINDINGS:  One view.  Heart is normal in size.  Moderate coarsening of the lung markings in the mid to lower lung zones on the left laterally.  This appears more prominent than on previous exam.

## 2024-01-08 NOTE — PATIENT INSTRUCTIONS
Patient states he doesn't smoke and refuses nicotine patch and smoking cessation at this time.   Depth Of Biopsy: dermis

## 2024-01-08 NOTE — PROGRESS NOTES
[facility] Medicine  Progress Note    Patient Name: Jordan Altman  MRN: [id number]  Patient Class: IP- Inpatient   Admission Date: 1/4/2024  Length of Stay: 4 days  Attending Physician: Amauri Bell,*  Primary Care Provider: Zohra Walters FNP        Subjective:     Principal Problem:PNA (pneumonia)        HPI:  Mr. Altman is a 48 year old male with a history of  alcoholic liver cirrhosis s/p [facility], CKD, PAF(on eliquis) who presents to the ED with complaints of shortness of breath, fatigue, intermittent fever over the last 4 weeks.  Stated he was dx over a week ago with flu as well.  He also complains of intermittent N/V.   In the ED he was noted to be in afib RVR (HR in the 140's), given Cardizem IVP and placed on gtt.  Lab work notable for INR 1.5, Alk phos 169, , ALT 82, .  FLU and covid negative. Chest xray showed new low-grade infiltrate or atypical pneumonia LLL.  Patient was started on Levaquin.  Patient will be admitted to observation for afib rvr/pna.  Cardiology consulted.     Code Status Full  Surrogate Decision Maker Crystal     Overview/Hospital Course:  Mr. Altman is a 48 year old male with a history of  alcoholic liver cirrhosis s/p liver transplant [facility] 10/20, CKD, PAF(on eliquis) who presents to the ED with complaints of shortness of breath, fatigue, intermittent fever over the last 4 weeks.  Stated he was dx over a week ago with flu as well.  He also complains of intermittent N/V.   In the ED he was noted to be in afib RVR (HR in the 140's), given Cardizem IVP and placed on gtt.  Lab work notable for INR 1.5, Alk phos 169, , ALT 82, .  FLU and covid negative. Chest xray showed new low-grade infiltrate or atypical pneumonia LLL.  Patient was started on Levaquin.  Patient will be admitted to observation for afib rvr/pna.  Cardiology consulted.         Overview/Hospital Course:  01/05/2024  Afib RVR marginally  controlled on non-titratable drip. Cardiology consulted, recommendations pending.      01/06/2024  Afib RVR controlled. Echo grossly unremarkable. Still on Cardizem, pending transition to PO by Cardiology. Hepatology recommends continuing Prograf but to hold cellcept due to infection.      01/07/2024  Afib resolved. In SR. Afebrile, clinically unchanged. However, was notably hypotensive this AM. Renal function sharply worsened over the past 24 hours as well. Nephrology consulted for recommendations.       1/8/24  Patient tremulous this morning, with hypothermia, hypotension, hyponatremia, elevated serum creatinine, minimal urine output--> ?  Prograf toxicity versus sepsis,--> ordered urinalysis, urine lytes, chest x-ray, procalcitonin, blood cultures, broaden antibiotics, IV fluids, hold Prograf,--hepatology, Nephrology agreed with the plan.    Eliquis discontinued, changed to heparin, given worsening GFR, also for possible need for renal biopsy.        Review of Systems    Constitutional:  Positive for fatigue  Respiratory:  Positive for shortness of breath.    Gastrointestinal:  Positive for nausea      Objective:     Vital Signs (Most Recent):  Temp: (!) 94.5 °F (34.7 °C) (01/08/24 1200)  Pulse: 72 (01/08/24 1225)  Resp: 18 (01/08/24 1225)  BP: (!) 101/55 (01/08/24 1225)  SpO2: (!) 93 % (01/08/24 1225) Vital Signs (24h Range):  Temp:  [94.5 °F (34.7 °C)-97.5 °F (36.4 °C)] 94.5 °F (34.7 °C)  Pulse:  [61-88] 72  Resp:  [18-21] 18  SpO2:  [85 %-97 %] 93 %  BP: ()/(38-60) 101/55     Weight: 127.9 kg (282 lb)  Body mass index is 38.25 kg/m².    Intake/Output Summary (Last 24 hours) at 1/8/2024 1325  Last data filed at 1/8/2024 0631  Gross per 24 hour   Intake 1852.93 ml   Output --   Net 1852.93 ml         Physical Exam        Constitutional:       Appearance: He is obese.   Eyes:      Cardiovascular:        Pulses: Normal pulses.      Heart sounds: Normal heart sounds.   Pulmonary:      Breath sounds: No  wheezing.      Comments: Tachypnea,  Abdominal:      General: Bowel sounds are normal. There is no distension.      Palpations: Abdomen is soft.      Tenderness: There is no abdominal tenderness.   Musculoskeletal:         General: No swelling. Normal range of motion.   Skin:     General: Skin is warm and dry.   Neurological:      Mental Status: He is alert and oriented to person, place, and time.         Significant Labs: All pertinent labs within the past 24 hours have been reviewed.  CBC:   Recent Labs   Lab 01/07/24  0635 01/08/24  0557 01/08/24  1005   WBC 5.65 6.08 5.20   HGB 12.0* 12.6* 12.2*   HCT 37.9* 40.4 38.7*   PLT 94* 129* 98*     CMP:   Recent Labs   Lab 01/07/24  0635 01/08/24  0557   * 123*   K 4.7 5.3*   CL 93* 89*   CO2 20* 19*   GLU 94 121*   BUN 34* 39*   CREATININE 4.1* 5.3*   CALCIUM 7.9* 7.9*   PROT 5.2*  --    ALBUMIN 2.3* 2.4*   BILITOT 6.6*  --    ALKPHOS 178*  --    AST 93*  --    ALT 60*  --    ANIONGAP 13 15       Significant Imaging:     Imaging Results              X-Ray Chest 1 View (Final result)  Result time 01/04/24 14:33:00      Final result by Kaylan LeosVenancio), MD (01/04/24 14:33:00)                   Impression:      Possible new low-grade infiltrate or atypical pneumonia involving the left lung.  Follow-up is recommended.      Electronically signed by: Kaylan Leos MD  Date:    01/04/2024  Time:    14:33               Narrative:    EXAMINATION:  XR CHEST 1 VIEW    CLINICAL HISTORY:  Shortness of breath,    COMPARISON:  Chest, 12/12/2023.    FINDINGS:  One view.  Heart is normal in size.  Moderate coarsening of the lung markings in the mid to lower lung zones on the left laterally.  This appears more prominent than on previous exam.                                       Assessment/Plan:      * PNA (pneumonia)  - chest xray with new low-grade infiltrate or atypical pneumonia involving the left lung.   - continue Levaquin         Atrial fibrillation with rapid  ventricular response  Patient with Persistent (7 days or more) atrial fibrillation which is uncontrolled currently with Calcium Channel Blocker. Patient is currently in atrial fibrillation.[id number] Score: The patient doesn't have any registry metric data available. Anticoagulation indicated. Anticoagulation done with eliquis .    - patient is unsure if he has afib before in the past, but per chart review has had previous EKG with Afib  - presented to ED with Afib RVR, given IVP cardizem and started on gtt  - contiue cardizem gtt and home eliquis  - contiue cardiac monitoring  - cards consulted      S/P liver transplant  - follows with Dr. Askew  - continues on his prograf/Cellcept   - noted to have elevated LTFs (worse than prior) will monitor and consult hepatology for any further recommendations        Thrombocytopenia  Patient was found to have thrombocytopenia, the likely etiology is secondary to cirrhosis/portal hypertension, will monitor the platelets Daily. Will transfuse if platelet count is <20k. Hold DVT prophylaxis if platelets are <50k. The patient's platelet results have been reviewed and are listed below.  Recent Labs   Lab 01/04/24  1435   PLT 89*           VTE Risk Mitigation (From admission, onward)           Ordered     heparin 25,000 units in dextrose 5% (100 units/ml) IV bolus from bag - ADDITIONAL PRN BOLUS - 60 units/kg  As needed (PRN)        Question:  Heparin Infusion Adjustment (DO NOT MODIFY ANSWER)  Answer:  [email or web address]\epic\Images\Pharmacy\HeparinInfusions\heparin LOW INTENSITY nomogram for [facility] [id number]    01/08/24 0949     heparin 25,000 units in dextrose 5% (100 units/ml) IV bolus from bag - ADDITIONAL PRN BOLUS - 30 units/kg  As needed (PRN)        Question:  Heparin Infusion Adjustment (DO NOT MODIFY ANSWER)  Answer:  [email or web address]\epic\Images\Pharmacy\HeparinInfusions\heparin LOW INTENSITY nomogram for [facility] [id number]    01/08/24 0949     heparin 25,000 units in dextrose 5%  250 mL (100 units/mL) infusion LOW INTENSITY nomogram - [facility]  Continuous        Question:  Begin at (units/kg/hr)  Answer:  12    01/08/24 0949     Reason for No Pharmacological VTE Prophylaxis  Once        Question:  Reasons:  Answer:  Already adequately anticoagulated on oral Anticoagulants    01/04/24 1604     IP VTE HIGH RISK PATIENT  Once         01/04/24 1604     Place sequential compression device  Until discontinued         01/04/24 1604                    Discharge Planning   JACK:      Code Status: Full Code   Is the patient medically ready for discharge?:     Reason for patient still in hospital (select all that apply): Patient trending condition, Consult recommendations, and Pending disposition  Discharge Plan A: Home with family                  Terryjayce Leonel Bell MD  Department of Hospital Medicine   [facility] - [facility]

## 2024-01-08 NOTE — NURSING
Patient's BP soft at beginning of shift, lowest was 78/38. Held night time Cardizem. Patient oxygen was 82% on 6L NC. Notified Kai RT and he placed patient on 15L NC. Patient unable to void in over 24 hours. Bladder scanned patient around 2100 and showed 101 mL. Notified on call providers of these abnormal findings. No orders placed at this time.

## 2024-01-08 NOTE — PROGRESS NOTES
[facility] Surg  Hepatology  Consult Note    Patient Name: Jordan Altman  MRN: [id number]  Admission Date: 1/4/2024  Hospital Length of Stay: 4 days  Attending Provider: Amauri Bell,*   Primary Care Physician: Zohra Walters FNP  Principal Problem:PNA (pneumonia)    Consults  Subjective:     Transplant status: Post-transplant    HPI: Mr. Altman is a 48 year old male with a history of  alcoholic liver cirrhosis s/p liver transplant [facility] 10/20, CKD, PAF(on eliquis) admitted with Pneumonia. Hepatology consulted for Post transplant status.      In the ED he was noted to be in afib RVR (HR in the 140's), given Cardizem IVP and placed on gtt.      Lab work notable for INR 1.5, Alk phos 169, , ALT 82, . Chest xray showed new low-grade infiltrate or atypical pneumonia LLL.  Patient  on Levaquin.      Transplant Date: 10/14/2020  UNOS Native Liver Dx: Alcoholic Cirrhosis     Jordan is here for follow up of his liver transplant.     ORGAN: LIVER  Whole or Partial: whole liver  Donor Type: donation after brain death  CDC High Risk: yes  Donor CMV Status: Positive  Donor HCV Status: Positive  Donor HBcAb: Negative  Biliary Anastomosis: end to end  Arterial Anatomy: standard  IVC reconstruction: end to end ivc  Portal vein status: patent    Interval history  SOB is better  Afib RVR controlled. Echo unremarkable.   CXR  atypical pneumonia involving the left lung        1/7--Afib controlled. Hypotensive. LFT trending down. Still  req. 2 L O2. Worsening JENN    1/8--Worsening JENN and Hyperkalemia       Review of Systems  GEN: No acute distress, pleasant, body habitus obese  HEENT: atraumatic and normocephalic  CARDS: tachyarrhythmic HR, no m/g, pulses palpable in LE  PULM: breathing comfortably on room air, chest symmetric, nonlabored, no abnormal breath sounds on auscultation  ABD: nontender, nondistended, soft, no organomegaly, BS+  Neuro: Alert and oriented x3, CN's I-IX grossly intact, sensation  and motor intact; follows directions and answers questions appropriately  Past Medical History:   Diagnosis Date    Decompensated hepatic cirrhosis     HCV acquired through organ donation, treated / cured     svr12 - 6/2021    Hypertension     SBP (spontaneous bacterial peritonitis)        Past Surgical History:   Procedure Laterality Date    ERCP N/A 10/1/2020    Procedure: ERCP (ENDOSCOPIC RETROGRADE CHOLANGIOPANCREATOGRAPHY);  Surgeon: Marcos Ramirez MD;  [facility]  Service: Endoscopy;  Laterality: N/A;    ESOPHAGOGASTRODUODENOSCOPY N/A 10/13/2020    Procedure: EGD (ESOPHAGOGASTRODUODENOSCOPY);  Surgeon: Prasanth Jerry MD;  [facility]  Service: Endoscopy;  Laterality: N/A;    EXPLORATORY LAPAROTOMY AFTER LIVER TRANSPLANTATION N/A 10/15/2020    Procedure: LAPAROTOMY, EXPLORATORY, AFTER LIVER TRANSPLANT, Possible redo of artery, possible portal thrombectomy. IntraOperative US.;  Surgeon: Curtis Zavaleta MD;  [address]  Service: Transplant;  Laterality: N/A;  With intraoperative ultrasound    LIVER TRANSPLANT N/A 10/14/2020    Procedure: TRANSPLANT, LIVER;  Surgeon: Curtis Zavaleta MD;  [facility];  Service: Transplant;  Laterality: N/A;  Intra op HD    RIGHT HEART CATHETERIZATION Right 9/23/2020    Procedure: INSERTION, CATHETER, RIGHT HEART;  Surgeon: Mikel Costa Jr., MD;  Location: [facility] CATH LAB;  Service: Cardiology;  Laterality: Right;    THORACENTESIS  2011    THROMBECTOMY  10/15/2020    Procedure: THROMBECTOMY portal vein;  Surgeon: Curtis Zavaleta MD;  [address];  Service: Transplant;;       Family history of liver disease: No    Review of patient's allergies indicates:   Allergen Reactions    Latex, natural rubber          Tobacco Use    Smoking status: Some Days    Smokeless tobacco: Current   Substance and Sexual Activity    Alcohol use: Yes     Alcohol/week: 112.0 standard drinks of alcohol     Types:  112 Shots of liquor per week     Comment: fifth of vodka a day. LAST DRINK 7/25/2020 per pt     Drug use: Yes     Frequency: 3.0 times per week     Types: Marijuana    Sexual activity: Yes       Facility-Administered Medications Prior to Admission   Medication Dose Route Frequency Provider Last Rate Last Admin    acetaminophen tablet 650 mg  650 mg Oral Once PRN Leo Ghotra MD        albuterol inhaler 2 puff  2 puff Inhalation Q20 Min PRN Leo Ghotra MD        [DISCONTINUED] diphenhydrAMINE injection 25 mg  25 mg Intravenous Once PRN Leo Ghotra MD        [DISCONTINUED] EPINEPHrine (EPIPEN) 0.3 mg/0.3 mL pen injection 0.3 mg  0.3 mg Intramuscular PRN Leo Ghotra MD        [DISCONTINUED] methylPREDNISolone sodium succinate injection 40 mg  40 mg Intravenous Once PRN Leo Ghotra MD        [DISCONTINUED] ondansetron disintegrating tablet 4 mg  4 mg Oral Once PRN Leo Ghotra MD        [DISCONTINUED] sodium chloride 0.9% 500 mL flush bag   Intravenous PRN Leo Ghotra MD        [DISCONTINUED] sodium chloride 0.9% flush 10 mL  10 mL Intravenous PRN Leo Ghotra MD         Medications Prior to Admission   Medication Sig Dispense Refill Last Dose    amLODIPine (NORVASC) 10 MG tablet Take 1 tablet by mouth in the morning. 30 tablet 1 1/4/2024    apixaban (ELIQUIS) 5 mg Tab Take 1 tablet (5 mg total) by mouth 2 (two) times a day. 60 tablet 11 1/3/2024    buPROPion (WELLBUTRIN SR) 150 MG TBSR 12 hr tablet Take 150 mg by mouth 2 (two) times daily.       fluticasone-salmeterol diskus inhaler 250-50 mcg Inhale 1 puff into the lungs 2 (two) times a day.   1/3/2024    fluticasone-umeclidin-vilanter (TRELEGY ELLIPTA) 200-62.5-25 mcg inhaler Inhale 1 puff into the lungs once daily.   1/3/2024    gabapentin (NEURONTIN) 300 MG capsule Take 2 capsules by mouth 2 (two) times daily.       loratadine (CLARITIN) 10 mg tablet Take 10 mg by mouth once daily.   1/3/2024    losartan (COZAAR)  25 MG tablet Take 25 mg by mouth once daily.   1/4/2024    mycophenolate (CELLCEPT) 250 mg Cap Take 2 capsules (500 mg total) by mouth 2 (two) times daily. 120 capsule 11 1/4/2024    tacrolimus (PROGRAF) 1 MG Cap Take 1 capsule (1 mg total) by mouth every morning AND 1 capsule (1 mg total) every evening. 60 capsule 11 1/4/2024    testosterone cypionate (DEPOTESTOTERONE CYPIONATE) 200 mg/mL injection Inject 0.75 mLs (150 mg total) into the muscle once a week. 5 mL 0 Past Week    tiZANidine (ZANAFLEX) 4 MG tablet Take 4 mg by mouth 2 (two) times a day.   1/3/2024    traZODone (DESYREL) 50 MG tablet Take  mg by mouth every evening.   1/3/2024    albuterol (PROVENTIL/VENTOLIN HFA) 90 mcg/actuation inhaler Inhale 2 puffs into the lungs every 6 (six) hours as needed for Wheezing or Shortness of Breath. Rescue          Objective:     Vital Signs (Most Recent):  Temp: (!) 94.5 °F (34.7 °C) (01/08/24 1200)  Pulse: 72 (01/08/24 1225)  Resp: 18 (01/08/24 1225)  BP: (!) 101/55 (01/08/24 1225)  SpO2: (S) (!) 92 % (Pt. 70% on room air upon initial assessment. Educated on the need to keep nc on at this time. Understanding was verbalized. Oxygen quickly increased with replacement in nares.) (01/08/24 1356) Vital Signs (24h Range):  Temp:  [94.5 °F (34.7 °C)-97.5 °F (36.4 °C)] 94.5 °F (34.7 °C)  Pulse:  [61-88] 72  Resp:  [18-21] 18  SpO2:  [85 %-97 %] 92 %  BP: ()/(38-60) 101/55     Weight: 127.9 kg (282 lb) (01/05/24 0740)  Body mass index is 38.25 kg/m².    Physical Exam  Constitutional:       General: He is not in acute distress.     Appearance: Normal appearance.   HENT:      Head: Normocephalic and atraumatic.      Nose: Nose normal.      Mouth/Throat:      Mouth: Mucous membranes are moist.   Eyes:      Conjunctiva/sclera: Conjunctivae normal.   Cardiovascular:      Rate and Rhythm: Normal rate.   Pulmonary:      Effort: Respiratory distress present.   Abdominal:      General: There is no distension.       Palpations: There is no mass.      Tenderness: There is no abdominal tenderness.   Musculoskeletal:         General: Normal range of motion.      Cervical back: Normal range of motion.      Right lower leg: No edema.      Left lower leg: No edema.   Skin:     Coloration: Skin is not jaundiced.      Findings: No rash.   Neurological:      General: No focal deficit present.      Mental Status: He is alert.   Psychiatric:         Mood and Affect: Mood normal.             Significant Labs:  CBC:   Recent Labs   Lab 01/08/24  1005   WBC 5.20   RBC 3.54*   HGB 12.2*   HCT 38.7*   PLT 98*       CMP:   Recent Labs   Lab 01/07/24  0635 01/08/24  0557   GLU 94 121*   CALCIUM 7.9* 7.9*   ALBUMIN 2.3* 2.4*   PROT 5.2*  --    * 123*   K 4.7 5.3*   CO2 20* 19*   CL 93* 89*   BUN 34* 39*   CREATININE 4.1* 5.3*   ALKPHOS 178*  --    ALT 60*  --    AST 93*  --    BILITOT 6.6*  --        Coagulation:   Recent Labs   Lab 01/08/24  1005   INR 1.4*   APTT 41.5*         Significant Imaging:  X-Ray: Reviewed      Assessment/Plan:     Active Diagnoses:    Diagnosis Date Noted POA    PRINCIPAL PROBLEM:  PNA (pneumonia) [J18.9] 01/04/2024 Yes    Chest pain [R07.9] 01/05/2024 Unknown    Elevated liver enzymes [R74.8] 01/05/2024 Unknown    Chronic diastolic congestive heart failure [I50.32] 01/05/2024 Yes    Atrial fibrillation with rapid ventricular response [I48.91] 07/30/2023 Yes    S/P liver transplant [Z94.4] 10/14/2020 Not Applicable    Alcohol use disorder, severe, dependence [F10.20] 09/21/2020 Yes     Chronic    Thrombocytopenia [D69.6] 09/19/2020 Yes      Problems Resolved During this Admission:       Elevated Liver tests  At present likely from underlying infection/pneumonia  -Liver enzymes improving  -Abnormal LFTs-liver tests have fluctuated regularly-- concerned that is related to alcohol.   Plan       -Monitor Liver enzymes    Post Liver transplant 3 years  Tac levels goal 5-8 ng/ml  Tac levels > 13  Plan-Hold Prograf and  check levels daily           Hold Cellcept due to infection           Restart Prograf 0.5 mg mg AM and 0.5 mg PM once level drops below 8 ng/ml          JENN  Worsening with prograf levels rise  Consult nephrology  Prograf dose on hold       Alcohol abuse  -continue to advise alcohol abstinence    Atrial fibrillation with rapid ventricular response        Converted with cardizem  Cardiology on Board. ECHO normal       Thank you for your consult. I will follow-up with patient. Please contact us if you have any additional questions.    Juanjose Kwong MD  Hepatology  [facility]

## 2024-01-08 NOTE — PROGRESS NOTES
RT called to bedside to perform ABG per Dr. Bell.MD and RN at bedside. ABG performed and critical values reported to Dr. Bell. Patient placed on bipap per verbal order. Will continue to monitor.

## 2024-01-08 NOTE — CONSULTS
[facility] S[facility]  Nephrology  Consult Note    Patient Name: Jordan Altman  MRN: [id number]  Admission Date: 1/4/2024  Hospital Length of Stay: 4 days  Attending Provider: Amauri Bell,*   Primary Care Physician: Zohra Walters FNP  Principal Problem:PNA (pneumonia)    Consults  Subjective:     HPI:  48-year-old male with history of end-stage liver disease status post transplant in 2020.  Admitted with pneumonia.  Noted to have an acute rise in creatinine over the past several days.  Nephrology has been consulted for evaluation.  The patient was seen in his hospital room.  In bed resting comfortably.  No acute distress noted.  He relates that he has had decreased p.o. intake for the past several days as well as intermittent nausea.  He feels like his mouth is dry and that he feels dehydrated.  No diarrhea related.    On admission his creatinine was 1.1.  Over the past several days it increased from 1.6-2.9 now 4.1 this morning.    01/08/2024:  Patient was seen in his hospital room.  In bed resting comfortably.  No acute distress noted.  He is however somewhat tremulous on exam.  No new complaints from overnight.  States that he has had very little urine output and does not have the urge to go to urinate.    Past Medical History:   Diagnosis Date    Decompensated hepatic cirrhosis     HCV acquired through organ donation, treated / cured     svr12 - 6/2021    Hypertension     SBP (spontaneous bacterial peritonitis)        Past Surgical History:   Procedure Laterality Date    ERCP N/A 10/1/2020    Procedure: ERCP (ENDOSCOPIC RETROGRADE CHOLANGIOPANCREATOGRAPHY);  Surgeon: Marcos Ramirez MD;  Location: [address]);  Service: Endoscopy;  Laterality: N/A;    ESOPHAGOGASTRODUODENOSCOPY N/A 10/13/2020    Procedure: EGD (ESOPHAGOGASTRODUODENOSCOPY);  Surgeon: Prasanth Jerry MD;  Location: [facility] ([address] [place] [address]);  Service: Endoscopy;  Laterality: N/A;    EXPLORATORY LAPAROTOMY AFTER LIVER  TRANSPLANTATION N/A 10/15/2020    Procedure: LAPAROTOMY, EXPLORATORY, AFTER LIVER TRANSPLANT, Possible redo of artery, possible portal thrombectomy. IntraOperative US.;  Surgeon: Curtis Zavaleta MD;  Location: [facility];  Service: Transplant;  Laterality: N/A;  With intraoperative ultrasound    LIVER TRANSPLANT N/A 10/14/2020    Procedure: TRANSPLANT, LIVER;  Surgeon: Curtis Zavaleta MD;  Location: [facility];  Service: Transplant;  Laterality: N/A;  Intra op HD    RIGHT HEART CATHETERIZATION Right 9/23/2020    Procedure: INSERTION, CATHETER, RIGHT HEART;  Surgeon: Mikel Costa Jr., MD;  Location: [facility] CATH LAB;  Service: Cardiology;  Laterality: Right;    THORACENTESIS  2011    THROMBECTOMY  10/15/2020    Procedure: THROMBECTOMY portal vein;  Surgeon: Curtis Zavaleta MD;  [facility];  Service: Transplant;;       Review of patient's allergies indicates:   Allergen Reactions    Latex, natural rubber      Current Facility-Administered Medications   Medication Frequency    acetaminophen tablet 650 mg Q4H PRN    dextrose 10% bolus 125 mL 125 mL PRN    dextrose 10% bolus 250 mL 250 mL PRN    diltiaZEM tablet 30 mg Q8H    EScitalopram oxalate tablet 10 mg Daily    gabapentin capsule 200 mg TID PRN    glucagon (human recombinant) injection 1 mg PRN    glucose chewable tablet 16 g PRN    glucose chewable tablet 24 g PRN    heparin 25,000 units in dextrose 5% (100 units/ml) IV bolus from bag - ADDITIONAL PRN BOLUS - 30 units/kg PRN    heparin 25,000 units in dextrose 5% (100 units/ml) IV bolus from bag - ADDITIONAL PRN BOLUS - 60 units/kg PRN    heparin 25,000 units in dextrose 5% (100 units/ml) IV bolus from bag INITIAL BOLUS Once    heparin 25,000 units in dextrose 5% 250 mL (100 units/mL) infusion LOW INTENSITY nomogram - [facility] Continuous    levoFLOXacin 750 mg/150 mL IVPB 750 mg Q48H    naloxone 0.4 mg/mL injection 0.02 mg PRN    ondansetron disintegrating tablet 8 mg Q6H PRN    oxyCODONE  immediate release tablet 5 mg Q6H PRN    promethazine (PHENERGAN) 25 mg in dextrose 5 % (D5W) 50 mL IVPB Q6H PRN    senna-docusate 8.6-50 mg per tablet 1 tablet BID    sodium chloride 0.9% flush 10 mL Q12H PRN    traZODone tablet 50 mg Nightly PRN     Family History       Problem Relation (Age of Onset)    COPD Mother          Tobacco Use    Smoking status: Some Days    Smokeless tobacco: Current   Substance and Sexual Activity    Alcohol use: Yes     Alcohol/week: 112.0 standard drinks of alcohol     Types: 112 Shots of liquor per week     Comment: fifth of vodka a day. LAST DRINK 7/25/2020 per pt     Drug use: Yes     Frequency: 3.0 times per week     Types: Marijuana    Sexual activity: Yes     Review of Systems   Constitutional: Negative.    HENT: Negative.     Respiratory: Negative.     Cardiovascular: Negative.    Gastrointestinal:  Positive for nausea.   Genitourinary: Negative.    Musculoskeletal: Negative.    Skin: Negative.      Objective:     Vital Signs (Most Recent):  Temp: (!) 94.5 °F (34.7 °C) (01/08/24 1051)  Pulse: 74 (01/08/24 0831)  Resp: 20 (01/08/24 0806)  BP: (!) 90/50 (01/08/24 1034)  SpO2: (!) 93 % (01/08/24 0806) Vital Signs (24h Range):  Temp:  [94.5 °F (34.7 °C)-97.9 °F (36.6 °C)] 94.5 °F (34.7 °C)  Pulse:  [61-88] 74  Resp:  [18-21] 20  SpO2:  [85 %-97 %] 93 %  BP: ()/(38-60) 90/50     Weight: 127.9 kg (282 lb) (01/05/24 0740)  Body mass index is 38.25 kg/m².  Body surface area is 2.55 meters squared.    I/O last 3 completed shifts:  In: 1852.9 [I.V.:1683.6; IV Piggyback:169.4]  Out: -     Physical Exam  Constitutional:       Appearance: Normal appearance.   HENT:      Head: Normocephalic and atraumatic.   Eyes:      General: No scleral icterus.     Extraocular Movements: Extraocular movements intact.      Pupils: Pupils are equal, round, and reactive to light.   Cardiovascular:      Rate and Rhythm: Tachycardia present. Rhythm irregular.   Pulmonary:      Effort: Pulmonary effort  is normal.      Breath sounds: No stridor.   Musculoskeletal:      Right lower leg: No edema.      Left lower leg: No edema.   Skin:     General: Skin is warm and dry.   Neurological:      General: No focal deficit present.      Mental Status: He is alert and oriented to person, place, and time.   Psychiatric:         Mood and Affect: Mood normal.         Behavior: Behavior normal.         Significant Labs:  BMP:   Recent Labs   Lab 01/08/24  0557   *   *   K 5.3*   CL 89*   CO2 19*   BUN 39*   CREATININE 5.3*   CALCIUM 7.9*   MG 2.0       CMP:   Recent Labs   Lab 01/07/24  0635 01/08/24  0557   GLU 94 121*   CALCIUM 7.9* 7.9*   ALBUMIN 2.3* 2.4*   PROT 5.2*  --    * 123*   K 4.7 5.3*   CO2 20* 19*   CL 93* 89*   BUN 34* 39*   CREATININE 4.1* 5.3*   ALKPHOS 178*  --    ALT 60*  --    AST 93*  --    BILITOT 6.6*  --        All labs within the past 24 hours have been reviewed.    Significant Imaging:  Labs: Reviewed      Assessment/Plan:     Active Diagnoses:    Diagnosis Date Noted POA    PRINCIPAL PROBLEM:  PNA (pneumonia) [J18.9] 01/04/2024 Yes    Chest pain [R07.9] 01/05/2024 Unknown    Elevated liver enzymes [R74.8] 01/05/2024 Unknown    Chronic diastolic congestive heart failure [I50.32] 01/05/2024 Yes    Atrial fibrillation with rapid ventricular response [I48.91] 07/30/2023 Yes    S/P liver transplant [Z94.4] 10/14/2020 Not Applicable    Alcohol use disorder, severe, dependence [F10.20] 09/21/2020 Yes     Chronic    Thrombocytopenia [D69.6] 09/19/2020 Yes      Problems Resolved During this Admission:       Assessment and plan:    1. JENN:  Creatinine has continued to worsen in spite of IV fluids increasing to 5.3 from 4.1 yesterday.  We have been unable to obtain urine studies as he is oliguric.  Renal ultrasound was unremarkable except for increased resistive indices.    This can be seen in the setting of calcineurin toxicity.  His immunosuppression is being adjusted by liver transplant  medicine.    At this point the cause for his acute kidney injury is not clear.  We discussed that the most direct way of obtaining a diagnosis would be a kidney biopsy.  He is agreeable.  However he has been on Eliquis, this will need to be held for 3-5 days prior to a kidney biopsy.    We also discussed that if his renal function continues to worsen and he remains oliguric that we may have to consider introducing dialysis for clearances.  He seemed agreeable.    2. CKD 2: Baseline creatinine is around 1.1.  Only 1 previous urinalysis showed evidence of proteinuria however was a concentrated specimen which is likely resulting in a false-positive.  Previous UAs have not shown evidence of proteinuria.    3. Electrolytes:  Potassium was mildly elevated at 5.3.  Will make sure he is on renal diet.    4. Acid-base: Serum bicarbonate is slightly low at 19 consistent with JENN.    5. Volume:  He received gentle hydration overnight.  On exam he appears to be euvolemic.    Approximately 50 minutes was spent in face-to-face conversation, chart review and coordination of care with other providers.    Thank you for your consult.     Cm Camacho MD  Nephrology  [facility]

## 2024-01-08 NOTE — HOSPITAL COURSE
Mr. Altman is a 48 year old male with a history of  alcoholic liver cirrhosis s/p liver transplant [facility] 10/20, CKD, PAF(on eliquis) who presents to the ED with complaints of shortness of breath, fatigue, intermittent fever over the last 4 weeks.  Stated he was dx over a week ago with flu as well.  He also complains of intermittent N/V.   In the ED he was noted to be in afib RVR (HR in the 140's), given Cardizem IVP and placed on gtt.  Lab work notable for INR 1.5, Alk phos 169, , ALT 82, .  FLU and covid negative. Chest xray showed new low-grade infiltrate or atypical pneumonia LLL.  Patient was started on Levaquin.  Patient will be admitted to observation for afib rvr/pna.  Cardiology consulted.         Overview/Hospital Course:  01/05/2024  Afib RVR marginally controlled on non-titratable drip. Cardiology consulted, recommendations pending.      01/06/2024  Afib RVR controlled. Echo grossly unremarkable. Still on Cardizem, pending transition to PO by Cardiology. Hepatology recommends continuing Prograf but to hold cellcept due to infection.      01/07/2024  Afib resolved. In SR. Afebrile, clinically unchanged. However, was notably hypotensive this AM. Renal function sharply worsened over the past 24 hours as well. Nephrology consulted for recommendations.       1/8/24  Patient tremulous this morning, with hypothermia, hypotension, hyponatremia, elevated serum creatinine, minimal urine output--> ?  Prograf toxicity versus sepsis,--> ordered urinalysis, urine lytes, chest x-ray, procalcitonin, blood cultures, broaden antibiotics, IV fluids, hold Prograf,--hepatology, Nephrology agreed with the plan.    Eliquis discontinued, changed to heparin, given worsening GFR, also for possible need for renal biopsy.

## 2024-01-08 NOTE — PROGRESS NOTES
Pharmacist Renal Dose Adjustment Note    Jordan Altman is a 48 y.o. male being treated with the medication Cefepime    Patient Data:    Vital Signs (Most Recent):  Temp: (!) 94.5 °F (34.7 °C) (01/08/24 1200)  Pulse: 72 (01/08/24 1225)  Resp: 18 (01/08/24 1225)  BP: (!) 101/55 (01/08/24 1225)  SpO2: (S) (!) 92 % (Pt. 70% on room air upon initial assessment. Educated on the need to keep nc on at this time. Understanding was verbalized. Oxygen quickly increased with replacement in nares.) (01/08/24 1356) Vital Signs (72h Range):  Temp:  [94.5 °F (34.7 °C)-99.7 °F (37.6 °C)]   Pulse:  [61-98]   Resp:  [17-79]   BP: ()/(38-64)   SpO2:  [85 %-97 %]      Recent Labs   Lab 01/07/24  0635 01/08/24  0557 01/08/24  1333   CREATININE 4.1* 5.3* 5.6*     Serum creatinine: 5.6 mg/dL (H) 01/08/24 1333  Estimated creatinine clearance: 21.8 mL/min (A)    Medication:Cefepime dose: 1g frequency q24 will be changed to medication:cefepime dose:2g frequency:q24    Pharmacist's Name: Theresa Jay  Pharmacist's Extension: [phone]

## 2024-01-08 NOTE — AI DETERIORATION ALERT
Artificial Intelligence Notification  [facility]  [id number] [place] [id number]  Phone: [phone]    This documentation was triggered by an Artificial Intelligence Notification:    Admit Date: 2024   LOS: 4  Code Status: Full Code  : 1975  Age: 48 y.o.  Weight:   Wt Readings from Last 1 Encounters:   24 127.9 kg (282 lb)        Sex: male  Bed: A562/A562 A  MRN: [id number]  Attending Physician: Amauri Bell,*     Date of Alert: 2024  Time AI Alert Received: 12:15 pm            Vitals:    24 1115   BP:    Pulse: 70   Resp:    Temp:      SpO2: (!) 93 %      Artificial Intelligence alert discussed with Provider:           Patient Condition:   Examination done at bedside, appeared alert and oriented, hypotensive, we will closely monitor

## 2024-01-08 NOTE — CARE UPDATE
Update       48 year old male with a history of  alcoholic liver cirrhosis s/p liver transplant [facility] 10/20, CKD, PAF(on eliquis) initially admitted with Pneumonia, has been started on Levaquin; during hospital course, had AFib, patient has been started on Cardizem drip, transition to p.o. Cardizem on 01/07/2024,;     On 01/08/2024, patient developed hypothermia, hypotensive, chest x-ray showed worsening changes on left lung, antibiotics broadened, started on IV fluids.   Also appeared tremulous, with intermittent episodes of confusion, ABG showed pCO2 74, patient has been placed on BiPAP.    Also with worsening JENN, oliguric for past 24 hours, hyponatremia--nephrology on board, stated most of the JENN findings likely secondary to Prograf effect; recommended close monitoring of sodium, to consider 3% NS as needed; also to consider renal biopsy; last dose of Eliquis on 01/07/2024 p.m., on 01/08/2024 transitioned eliquis to heparin.       Prograf levels resulted as 14.6, discussed with hepatologist--recommended to hold Prograf, also discussed with hepatologist for possible consideration of phenytoin to help Prograf metabolism/seizure prophylaxis, stated no need for phenytoin initiation as of now, recommended to continue to hold Prograf for now.      Given patient's fluctuation mentation, ordered NPO.      Considering change in his mentation, oliguric state, need for close monitoring, currently requirements of BiPAP--> consulted ICU, agreed for transfer.

## 2024-01-09 NOTE — ASSESSMENT & PLAN NOTE
Patient with Hypercapnic and Hypoxic Respiratory failure which is Acute.  he is not on home oxygen. Supplemental oxygen was provided and noted- Vent Mode: A/C  Oxygen Concentration (%):  [] 90  Resp Rate Total:  [24 br/min-26 br/min] 24 br/min  Vt Set:  [460 mL-550 mL] 550 mL  PEEP/CPAP:  [5 cmH20-10 cmH20] 10 cmH20  Mean Airway Pressure:  [0 djV04-49 cmH20] 17 cmH20    Signs/symptoms of respiratory failure include- tachypnea, increased work of breathing, and respiratory distress. Contributing diagnoses includes - Pneumonia Labs and images were reviewed. Patient Has recent ABG, which has been reviewed. Will treat underlying causes and adjust management of respiratory failure as follows    - intubated upon arrival to the ICU  - ABG and CXR pending   - vent bundle in place  - collect sputum culture   - on zyvox and cefepime  - add fungal workup in immunocompromised pt     1/9 Continue Abx.  Serial labs  [place] assay  May consider bronch when on low settings.

## 2024-01-09 NOTE — ASSESSMENT & PLAN NOTE
- see plan for resp failure     1/9   Likely has pneumonia and fluid overload.  Will consider CT post a few episodes of CRRT

## 2024-01-09 NOTE — PLAN OF CARE
Responds to pain.  Pressor increased to maintain MAP > 65.  Remains on fentanyl & diprivan for sedation with a RASS -2.  Trialysis catheter placed, Lt IJ, for CRRT today.  Lactulose started for elevated ammonia level, 117.  Still awaiting prograf level.  Remains in restraints.  Family updated at bedside by providers.

## 2024-01-09 NOTE — ASSESSMENT & PLAN NOTE
-On saline  -S/p 2 L boluses  -Repeat labs  -May need CRRT  -Resume heparin  -Anuric / Will likely get worsening of the renal function.

## 2024-01-09 NOTE — PLAN OF CARE
Hemodynamically unstable. Titrated Levophed drip to maintain MAP goal of 65mmhg.  Still on Atrial fibrillation rate at 70s  High ventilator support, please see documentation.  Titrated sedation to maintain ventilator synchrony and patient comfort.  Still Anuric, NP made aware, no new order made.

## 2024-01-09 NOTE — PROGRESS NOTES
[facility]  Hepatology  Consult Note    Patient Name: Jordan Altman  MRN: [id number]  Admission Date: 1/4/2024  Hospital Length of Stay: 5 days  Attending Provider: Jeremiah Santillan MD   Primary Care Physician: Zohra Walters FNP  Principal Problem:PNA (pneumonia)    Consults  Subjective:     Transplant status: Post-transplant    HPI: Mr. Altman is a 48 year old male with a history of  alcoholic liver cirrhosis s/p liver transplant [facility] 10/20, CKD, PAF(on eliquis) admitted with Pneumonia. Hepatology consulted for Post transplant status.      In the ED he was noted to be in afib RVR (HR in the 140's), given Cardizem IVP and placed on gtt.      Lab work notable for INR 1.5, Alk phos 169, , ALT 82, . Chest xray showed new low-grade infiltrate or atypical pneumonia LLL.  Patient  on Levaquin.      Transplant Date: 10/14/2020  UNOS Native Liver Dx: Alcoholic Cirrhosis     Jordan is here for follow up of his liver transplant.     ORGAN: LIVER  Whole or Partial: whole liver  Donor Type: donation after brain death  CDC High Risk: yes  Donor CMV Status: Positive  Donor HCV Status: Positive  Donor HBcAb: Negative  Biliary Anastomosis: end to end  Arterial Anatomy: standard  IVC reconstruction: end to end ivc  Portal vein status: patent    Interval history  SOB is better  Afib RVR controlled. Echo unremarkable.   CXR  atypical pneumonia involving the left lung        1/7--Afib controlled. Hypotensive. LFT trending down. Still  req. 2 L O2. Worsening JENN    1/8--Worsening JENN and Hyperkalemia       1/9--Patient was transferred to the intensive care unit and intubated for hypoxia.  He is currently requiring pressor support for hypotension.  Discussed with primary team regarding Cardizem likely causing Prograf metabolism inhibition. Prograf on Hold    Review of Systems  Patient intubated and sedated    Past Medical History:   Diagnosis Date    Decompensated hepatic cirrhosis     HCV acquired  through organ donation, treated / cured     svr12 - 6/2021    Hypertension     SBP (spontaneous bacterial peritonitis)        Past Surgical History:   Procedure Laterality Date    ERCP N/A 10/1/2020    Procedure: ERCP (ENDOSCOPIC RETROGRADE CHOLANGIOPANCREATOGRAPHY);  Surgeon: Marcos Ramirez MD;  Location: [facility] ENDO ([address] [place] [address]);  Service: Endoscopy;  Laterality: N/A;    ESOPHAGOGASTRODUODENOSCOPY N/A 10/13/2020    Procedure: EGD (ESOPHAGOGASTRODUODENOSCOPY);  Surgeon: Prasanth Jerry MD;  [facility]  Service: Endoscopy;  Laterality: N/A;    EXPLORATORY LAPAROTOMY AFTER LIVER TRANSPLANTATION N/A 10/15/2020    Procedure: LAPAROTOMY, EXPLORATORY, AFTER LIVER TRANSPLANT, Possible redo of artery, possible portal thrombectomy. IntraOperative US.;  Surgeon: Curtis Zavaleta MD;  Location: [facility] OR [address] [place] [address];  Service: Transplant;  Laterality: N/A;  With intraoperative ultrasound    LIVER TRANSPLANT N/A 10/14/2020    Procedure: TRANSPLANT, LIVER;  Surgeon: Curtis Zavaleta MD;  [facility];  Service: Transplant;  Laterality: N/A;  Intra op HD    RIGHT HEART CATHETERIZATION Right 9/23/2020    Procedure: INSERTION, CATHETER, RIGHT HEART;  Surgeon: Mikel Costa Jr., MD;  Location: [facility] CATH LAB;  Service: Cardiology;  Laterality: Right;    THORACENTESIS  2011    THROMBECTOMY  10/15/2020    Procedure: THROMBECTOMY portal vein;  Surgeon: Curtis Zavaleta MD;  Location: [address];  Service: Transplant;;       Family history of liver disease: No    Review of patient's allergies indicates:   Allergen Reactions    Latex, natural rubber          Tobacco Use    Smoking status: Some Days    Smokeless tobacco: Current   Substance and Sexual Activity    Alcohol use: Yes     Alcohol/week: 112.0 standard drinks of alcohol     Types: 112 Shots of liquor per week     Comment: fifth of vodka a day. LAST DRINK 7/25/2020 per pt     Drug use: Yes     Frequency: 3.0 times per week     Types: Marijuana     Sexual activity: Yes       Facility-Administered Medications Prior to Admission   Medication Dose Route Frequency Provider Last Rate Last Admin    acetaminophen tablet 650 mg  650 mg Oral Once PRN Leo Ghotra MD        albuterol inhaler 2 puff  2 puff Inhalation Q20 Min PRN Leo Ghotra MD        [DISCONTINUED] diphenhydrAMINE injection 25 mg  25 mg Intravenous Once PRN Leo Ghotra MD        [DISCONTINUED] EPINEPHrine (EPIPEN) 0.3 mg/0.3 mL pen injection 0.3 mg  0.3 mg Intramuscular PRN Leo Ghotra MD        [DISCONTINUED] methylPREDNISolone sodium succinate injection 40 mg  40 mg Intravenous Once PRN Leo Ghotra MD        [DISCONTINUED] ondansetron disintegrating tablet 4 mg  4 mg Oral Once PRN Leo Ghotra MD        [DISCONTINUED] sodium chloride 0.9% 500 mL flush bag   Intravenous PRN Leo Ghotra MD        [DISCONTINUED] sodium chloride 0.9% flush 10 mL  10 mL Intravenous PRN Leo Ghotra MD         Medications Prior to Admission   Medication Sig Dispense Refill Last Dose    amLODIPine (NORVASC) 10 MG tablet Take 1 tablet by mouth in the morning. 30 tablet 1 1/4/2024    apixaban (ELIQUIS) 5 mg Tab Take 1 tablet (5 mg total) by mouth 2 (two) times a day. 60 tablet 11 1/3/2024    buPROPion (WELLBUTRIN SR) 150 MG TBSR 12 hr tablet Take 150 mg by mouth 2 (two) times daily.       fluticasone-salmeterol diskus inhaler 250-50 mcg Inhale 1 puff into the lungs 2 (two) times a day.   1/3/2024    fluticasone-umeclidin-vilanter (TRELEGY ELLIPTA) 200-62.5-25 mcg inhaler Inhale 1 puff into the lungs once daily.   1/3/2024    gabapentin (NEURONTIN) 300 MG capsule Take 2 capsules by mouth 2 (two) times daily.       loratadine (CLARITIN) 10 mg tablet Take 10 mg by mouth once daily.   1/3/2024    losartan (COZAAR) 25 MG tablet Take 25 mg by mouth once daily.   1/4/2024    mycophenolate (CELLCEPT) 250 mg Cap Take 2 capsules (500 mg total) by mouth 2 (two) times daily. 120  capsule 11 1/4/2024    tacrolimus (PROGRAF) 1 MG Cap Take 1 capsule (1 mg total) by mouth every morning AND 1 capsule (1 mg total) every evening. 60 capsule 11 1/4/2024    testosterone cypionate (DEPOTESTOTERONE CYPIONATE) 200 mg/mL injection Inject 0.75 mLs (150 mg total) into the muscle once a week. 5 mL 0 Past Week    tiZANidine (ZANAFLEX) 4 MG tablet Take 4 mg by mouth 2 (two) times a day.   1/3/2024    traZODone (DESYREL) 50 MG tablet Take  mg by mouth every evening.   1/3/2024    albuterol (PROVENTIL/VENTOLIN HFA) 90 mcg/actuation inhaler Inhale 2 puffs into the lungs every 6 (six) hours as needed for Wheezing or Shortness of Breath. Rescue          Objective:     Vital Signs (Most Recent):  Temp: 99.1 °F (37.3 °C) (01/09/24 1145)  Pulse: 93 (01/09/24 1145)  Resp: (!) 24 (01/09/24 1145)  BP: 108/61 (01/09/24 0900)  SpO2: 95 % (01/09/24 1145) Vital Signs (24h Range):  Temp:  [90.3 °F (32.4 °C)-99.3 °F (37.4 °C)] 99.1 °F (37.3 °C)  Pulse:  [66-93] 93  Resp:  [18-34] 24  SpO2:  [86 %-100 %] 95 %  BP: ()/(31-76) 108/61  Arterial Line BP: ()/(44-70) 109/65     Weight: (!) 141.2 kg (311 lb 4.6 oz) (01/09/24 0600)  Body mass index is 42.22 kg/m².    Physical Exam  Vitals and nursing note reviewed.   Constitutional:       General: intubated  HENT:      Head: Normocephalic.   Eyes:      Pupils: Pupils are equal, round, and reactive to light.   Cardiovascular:      Rate and Rhythm: Tachycardia present.      Heart sounds: Normal heart sounds.   Pulmonary:      Effort: Respiratory distress present.      Breath sounds: Rhonchi present.   Abdominal:      General: Bowel sounds are normal.   Skin:     Capillary Refill: Capillary refill takes less than 2 seconds.   Neurological:      General: No focal deficit present.      Mental Status: He is sedated        Significant Labs:  CBC:   Recent Labs   Lab 01/09/24  0431   WBC 8.71  8.71   RBC 3.86*  3.86*   HGB 13.1*  13.1*   HCT 38.8*  38.8*   *   125*       CMP:   Recent Labs   Lab 01/09/24  0431     101   CALCIUM 7.7*  7.7*   ALBUMIN 2.3*   PROT 5.3*   *  120*   K 4.6  4.6   CO2 18*  18*   CL 90*  90*   BUN 44*  44*   CREATININE 5.5*  5.5*   ALKPHOS 232*   ALT 63*   *   BILITOT 8.7*       Coagulation:   Recent Labs   Lab 01/09/24  0431   INR 1.4*   APTT 36.7*         Significant Imaging:  X-Ray: Reviewed      Assessment/Plan:     Active Diagnoses:    Diagnosis Date Noted POA    PRINCIPAL PROBLEM:  PNA (pneumonia) [J18.9] 01/04/2024 Yes    Acute respiratory failure with hypoxia and hypercarbia [J96.01, J96.02] 01/08/2024 No    Hyperkalemia [E87.5] 01/08/2024 Yes    Chest pain [R07.9] 01/05/2024 No    Elevated liver enzymes [R74.8] 01/05/2024 Yes    Chronic diastolic congestive heart failure [I50.32] 01/05/2024 Yes    Atrial fibrillation with rapid ventricular response [I48.91] 07/30/2023 Yes    S/P liver transplant [Z94.4] 10/14/2020 Not Applicable    Alcohol use disorder, severe, dependence [F10.20] 09/21/2020 Yes     Chronic    Thrombocytopenia [D69.6] 09/19/2020 Yes    Hyponatremia [E87.1] 09/19/2020 Yes      Problems Resolved During this Admission:       Elevated Liver tests  At present likely from underlying infection/pneumonia and hypotension  Plan       -Monitor Liver enzymes and INR       -avoid Hepatotoxic drugs/ Hypotension    Post Liver transplant 3 years  Tac levels goal 5-8 ng/ml  Tac levels > 13 yesterday considering enzyme inh by Cardizem  -No evidence of Advanced Liver disease  Plan-Hold Prograf and check levels daily           Hold Cellcept due to infection           Restart Prograf 0.5 mg mg AM and 0.5 mg PM once level drops below 8 ng/ml            Acute respiratory failure   -Atypical Pneumonia ? Fluid overload  -Intubated   Plan--Broad cover considering Immunosuppressed state  Consult ID  Pulmonology on Board       JENN  Baseline creat 1.1  Worsening likely Prograf with ATN/Infection  Patient has  oliguria  Plan--On Board nephrology  Prograf dose on hold   CRRT      Atrial fibrillation with rapid ventricular response        Converted with cardizem  Cardiology on Board. ECHO normal       Thank you for your consult. I will follow-up with patient. Please contact us if you have any additional questions.    Juanjose Kwong MD  Hepatology  [facility]

## 2024-01-09 NOTE — HOSPITAL COURSE
1/9. No improvement in Na despite hydration. Anuric. Worsening hypoxia. Sedated and intermittent paralytics given for vent dyssynchrony during suctioning and episodes of cough while he is on 90% and PEEP 10  1/10 - no acute events  On levo , on fio2 65 peep 10  1/11- on levo   Peep 10   1/12 - on 50/8, events noted last pm , with bath dropped saturations/ bp , on vaso   1/15 - family has elected for palliative withdrawal of advanced support today.  He was extubated and pressors discontinued with full comfort measures in place. He passed peacefully with his family by his side.

## 2024-01-09 NOTE — PROCEDURES
Jordan Altman is a 48 y.o. male patient.    Temp: (!) 87.8 °F (31 °C) (01/08/24 1800)  Pulse: 73 (01/08/24 1800)  Resp: (!) 24 (01/08/24 1817)  BP: (!) 77/39 (01/08/24 1800)  SpO2: 96 % (01/08/24 1800)  Weight: 127.9 kg (282 lb) (01/05/24 0740)  Height: 6' (182.9 cm) (01/05/24 0740)       Central Line    Date/Time: 1/8/2024 6:33 PM    Performed by: Jeremiah Santillan MD  Authorized by: Jeremiah Santillan MD    Location procedure was performed:  [facility] INTENSIVE CARE UNIT  Consent Done ?:  Emergent Situation  Indications:  Med administration, vascular access and hemodynamic monitoring  Preparation:  Skin prepped with ChloraPrep  Skin prep agent dried: Skin prep agent completely dried prior to procedure    Sterile barriers: All five maximal sterile barriers used - gloves, gown, cap, mask and large sterile sheet    Hand hygiene: Hand hygiene performed immediately prior to central venous catheter insertion    Location:  Right internal jugular  Catheter type:  Triple lumen  Catheter size:  7.5 Fr  Ultrasound guidance: Yes    Vessel Caliber:  Medium  Comprressibility:  Normal  Doppler:  Not done  Needle advanced into vessel with real time ultrasound guidance.    Guidewire confirmed in vessel.    Image recorded and saved.    Steril sheath on probe.    Sterile gel used.  Manometry: No    Number of attempts:  1  Securement:  Line sutured, chlorhexidine patch and sterile dressing applied  Complications: No    Specimens: No    Implants: No    XRay:  Placement verified by x-ray  Adverse Events:  NoneTermination Site: superior vena cava      1/8/2024

## 2024-01-09 NOTE — ASSESSMENT & PLAN NOTE
Patient is identified as having diastolicheart failure that is Acute on chronic. CHF is currently controlled. Latest ECHO performed and demonstrates- Results for orders placed during the hospital encounter of 01/04/24    Echo    Interpretation Summary    Left Ventricle: The left ventricle is normal in size. Mildly increased ventricular mass. Mildly increased wall thickness. There is concentric hypertrophy. Normal wall motion. There is normal systolic function with a visually estimated ejection fraction of 55 - 60%. There is normal diastolic function.    Right Ventricle: Normal right ventricular cavity size. Wall thickness is normal. Right ventricle wall motion  is normal. Systolic function is normal.    IVC/SVC: Normal venous pressure at 3 mmHg.  . Continue Furosemide and monitor clinical status closely. Monitor on telemetry. Patient is on CHF pathway.  Monitor strict Is&Os and daily weights.  Place on fluid restriction of 1.5 L. Cardiology has been consulted. Continue to stress to patient importance of self efficacy and  on diet for CHF. Last BNP reviewed- and noted below   Recent Labs   Lab 01/04/24  1435   *

## 2024-01-09 NOTE — ASSESSMENT & PLAN NOTE
Patient with Hypercapnic and Hypoxic Respiratory failure which is Acute.  he is not on home oxygen. Supplemental oxygen was provided and noted- Vent Mode: A/C  Oxygen Concentration (%):  [] 100  Resp Rate Total:  [0 br/min] 0 br/min  Vt Set:  [460 mL] 460 mL  PEEP/CPAP:  [5 cmH20] 5 cmH20  Mean Airway Pressure:  [0 cmH20] 0 cmH20    Signs/symptoms of respiratory failure include- tachypnea, increased work of breathing, and respiratory distress. Contributing diagnoses includes - Pneumonia Labs and images were reviewed. Patient Has recent ABG, which has been reviewed. Will treat underlying causes and adjust management of respiratory failure as follows    - intubated upon arrival to the ICU  - ABG and CXR pending   - vent bundle in place  - collect sputum culture   - on zyvox and cefepime  - add fungal workup in immunocompromised pt

## 2024-01-09 NOTE — SUBJECTIVE & OBJECTIVE
Past Medical History:   Diagnosis Date    Decompensated hepatic cirrhosis     HCV acquired through organ donation, treated / cured     svr12 - 6/2021    Hypertension     SBP (spontaneous bacterial peritonitis)        Past Surgical History:   Procedure Laterality Date    ERCP N/A 10/1/2020    Procedure: ERCP (ENDOSCOPIC RETROGRADE CHOLANGIOPANCREATOGRAPHY);  Surgeon: Marcos Ramirez MD;  Location: [facility] ENDO (2ND FLR);  Service: Endoscopy;  Laterality: N/A;    ESOPHAGOGASTRODUODENOSCOPY N/A 10/13/2020    Procedure: EGD (ESOPHAGOGASTRODUODENOSCOPY);  Surgeon: Prasanth Jerry MD;  Location: [facility] ENDO (2ND FLR);  Service: Endoscopy;  Laterality: N/A;    EXPLORATORY LAPAROTOMY AFTER LIVER TRANSPLANTATION N/A 10/15/2020    Procedure: LAPAROTOMY, EXPLORATORY, AFTER LIVER TRANSPLANT, Possible redo of artery, possible portal thrombectomy. IntraOperative US.;  Surgeon: Curtis Zavaleta MD;  [facility];  Service: Transplant;  Laterality: N/A;  With intraoperative ultrasound    LIVER TRANSPLANT N/A 10/14/2020    Procedure: TRANSPLANT, LIVER;  Surgeon: Curtis Zavaleta MD;  [facility];  Service: Transplant;  Laterality: N/A;  Intra op HD    RIGHT HEART CATHETERIZATION Right 9/23/2020    Procedure: INSERTION, CATHETER, RIGHT HEART;  Surgeon: Mikel Costa Jr., MD;  Location: [facility] CATH LAB;  Service: Cardiology;  Laterality: Right;    THORACENTESIS  2011    THROMBECTOMY  10/15/2020    Procedure: THROMBECTOMY portal vein;  Surgeon: Curtis Zavaleta MD;  [facility];  Service: Transplant;;       Review of patient's allergies indicates:   Allergen Reactions    Latex, natural rubber        Family History       Problem Relation (Age of Onset)    COPD Mother          Tobacco Use    Smoking status: Some Days    Smokeless tobacco: Current   Substance and Sexual Activity    Alcohol use: Yes     Alcohol/week: 112.0 standard drinks of alcohol     Types: 112 Shots of liquor per week      Comment: fifth of vodka a day. LAST DRINK 7/25/2020 per pt     Drug use: Yes     Frequency: 3.0 times per week     Types: Marijuana    Sexual activity: Yes         Review of Systems   Unable to perform ROS: Acuity of condition     Objective:     Vital Signs (Most Recent):  Temp: (!) 87.8 °F (31 °C) (01/08/24 1800)  Pulse: 73 (01/08/24 1800)  Resp: (!) 24 (01/08/24 1817)  BP: (!) 77/39 (01/08/24 1800)  SpO2: 96 % (01/08/24 1800) Vital Signs (24h Range):  Temp:  [87.8 °F (31 °C)-97 °F (36.1 °C)] 87.8 °F (31 °C)  Pulse:  [61-83] 73  Resp:  [18-24] 24  SpO2:  [85 %-97 %] 96 %  BP: ()/(38-60) 77/39     Weight: 127.9 kg (282 lb)  Body mass index is 38.25 kg/m².      Intake/Output Summary (Last 24 hours) at 1/8/2024 1827  Last data filed at 1/8/2024 0631  Gross per 24 hour   Intake 1852.93 ml   Output --   Net 1852.93 ml        Physical Exam  Vitals and nursing note reviewed.   Constitutional:       General: He is in acute distress.   HENT:      Head: Normocephalic.   Eyes:      Pupils: Pupils are equal, round, and reactive to light.   Cardiovascular:      Rate and Rhythm: Tachycardia present.      Heart sounds: Normal heart sounds.   Pulmonary:      Effort: Respiratory distress present.      Breath sounds: Rhonchi present.   Abdominal:      General: Bowel sounds are normal.   Skin:     Capillary Refill: Capillary refill takes less than 2 seconds.   Neurological:      General: No focal deficit present.      Mental Status: He is disoriented.          Vents:  Vent Mode: A/C (01/08/24 1800)  Set Rate: 24 BPM (01/08/24 1800)  Vt Set: 460 mL (01/08/24 1800)  PEEP/CPAP: 5 cmH20 (01/08/24 1800)  Oxygen Concentration (%): 100 (01/08/24 1800)  Peak Airway Pressure: 0 cmH20 (01/08/24 1800)  Plateau Pressure: 0 cmH20 (01/08/24 1800)  Total Ve: 0 L/m (01/08/24 1800)  Negative Inspiratory Force (cm H2O): 0 (01/08/24 1800)    Lines/Drains/Airways       Central Venous Catheter Line  Duration             Percutaneous Central Line  Insertion/Assessment - Triple Lumen  01/08/24 1815 Internal Jugular Right <1 day              Drain  Duration                  NG/OG Tube 01/08/24 1811 [facility] mouth <1 day         Urethral Catheter 01/08/24 1813 Temperature probe 16 Fr. <1 day              Airway  Duration                  Airway - Non-Surgical 01/08/24 1758 Endotracheal Tube <1 day              Peripheral Intravenous Line  Duration                  Peripheral IV - Single Lumen 01/04/24 1434 20 G Anterior;Proximal;Right Forearm 4 days         Peripheral IV - Single Lumen 01/05/24 0123 22 G Left;Posterior Hand 3 days                    Significant Labs:    CBC/Anemia Profile:  Recent Labs   Lab 01/07/24  0635 01/08/24  0557 01/08/24  1005   WBC 5.65 6.08 5.20   HGB 12.0* 12.6* 12.2*   HCT 37.9* 40.4 38.7*   PLT 94* 129* 98*   * 110* 109*   RDW 15.7* 14.9* 14.7*        Chemistries:  Recent Labs   Lab 01/07/24  0635 01/08/24  0557 01/08/24  1333   * 123* 121*   K 4.7 5.3* 5.7*   CL 93* 89* 91*   CO2 20* 19* 17*   BUN 34* 39* 42*   CREATININE 4.1* 5.3* 5.6*   CALCIUM 7.9* 7.9* 8.0*   ALBUMIN 2.3* 2.4*  --    PROT 5.2*  --   --    BILITOT 6.6*  --   --    ALKPHOS 178*  --   --    ALT 60*  --   --    AST 93*  --   --    MG 2.0 2.0  --    PHOS  --  4.3  --        All pertinent labs within the past 24 hours have been reviewed.    Significant Imaging:   I have reviewed all pertinent imaging results/findings within the past 24 hours.

## 2024-01-09 NOTE — CONSULTS
[facility] - Intensive Care ([facility])  Critical Care Medicine  Consult Note    Patient Name: Jordan Altman  MRN: [id number]  Admission Date: 1/4/2024  Hospital Length of Stay: 4 days  Code Status: Full Code  Attending Physician: Jeremiah Santillan MD   Primary Care Provider: Zohra Walters FNP   Principal Problem: PNA (pneumonia)    Consults  Subjective:     HPI:  48 year old male with a known past history of alcoholic liver cirrhosis s/p liver transplant [facility] 10/20, CKD, PAF (on eliquis) who presented to the ED 1/4 with complaints of shortness of breath, fatigue, intermittent fever over the last 4 weeks. Stated he was dx over a week ago with flu as well. He also complains of intermittent N/V. In the ED, he was noted to be in afib RVR (HR in the 140's), given Cardizem IVP and placed on gtt.  Lab work notable for INR 1.5, Alk phos 169, , ALT 82, .  FLU and covid negative. Chest xray showed new low-grade infiltrate or atypical pneumonia LLL.  Patient was started on Levaquin.  Patient will be admitted to observation for afib rvr/pna.  Cardiology consulted. 01/05/2024: Afib RVR marginally controlled on non-titratable drip. Cardiology consulted, recommendations pending. 01/06/2024: Afib RVR controlled. Echo grossly unremarkable. Still on Cardizem, pending transition to PO by Cardiology. Hepatology recommends continuing Prograf but to hold cellcept due to infection. 01/07/2024: Afib resolved. In SR. Afebrile, clinically unchanged. However, was notably hypotensive this AM. Renal function sharply worsened over the past 24 hours as well. Nephrology consulted for recommendations. 1/8/24: Patient tremulous this morning, with hypothermia, hypotension, hyponatremia, elevated serum creatinine, minimal urine output--> ?  Prograf toxicity versus sepsis,--> ordered urinalysis, urine lytes, chest x-ray, procalcitonin, blood cultures, broaden antibiotics, IV fluids, hold Prograf,--hepatology, Nephrology agreed  with the plan.    Eliquis discontinued, changed to heparin, given worsening GFR, also for possible need for renal biopsy.    Pt transferred to the ICU evening of 1/8 for hyponatremia, resp failure on BIPAP, hypothermia, and septic shock. Upon arrival pt was intubated and CVL was placed for pressor support.     Hospital/ICU Course:  No notes on file    Past Medical History:   Diagnosis Date    Decompensated hepatic cirrhosis     HCV acquired through organ donation, treated / cured     svr12 - 6/2021    Hypertension     SBP (spontaneous bacterial peritonitis)        Past Surgical History:   Procedure Laterality Date    ERCP N/A 10/1/2020    Procedure: ERCP (ENDOSCOPIC RETROGRADE CHOLANGIOPANCREATOGRAPHY);  Surgeon: Marcos Ramirez MD;  Location: [facility] (2ND FLR);  Service: Endoscopy;  Laterality: N/A;    ESOPHAGOGASTRODUODENOSCOPY N/A 10/13/2020    Procedure: EGD (ESOPHAGOGASTRODUODENOSCOPY);  Surgeon: Prasanth Jerry MD;  [facility]  Service: Endoscopy;  Laterality: N/A;    EXPLORATORY LAPAROTOMY AFTER LIVER TRANSPLANTATION N/A 10/15/2020    Procedure: LAPAROTOMY, EXPLORATORY, AFTER LIVER TRANSPLANT, Possible redo of artery, possible portal thrombectomy. IntraOperative US.;  Surgeon: Curtis Zavaleta MD;  [facility];  Service: Transplant;  Laterality: N/A;  With intraoperative ultrasound    LIVER TRANSPLANT N/A 10/14/2020    Procedure: TRANSPLANT, LIVER;  Surgeon: Curtis Zavaleta MD;  [facility];  Service: Transplant;  Laterality: N/A;  Intra op HD    RIGHT HEART CATHETERIZATION Right 9/23/2020    Procedure: INSERTION, CATHETER, RIGHT HEART;  Surgeon: Mikel Costa Jr., MD;  Location: [facility] CATH LAB;  Service: Cardiology;  Laterality: Right;    THORACENTESIS  2011    THROMBECTOMY  10/15/2020    Procedure: THROMBECTOMY portal vein;  Surgeon: Curtis Zavaleta MD;  Location: [facility] OR [address] [place] [address];  Service: Transplant;;       Review of patient's allergies indicates:    Allergen Reactions    Latex, natural rubber        Family History       Problem Relation (Age of Onset)    COPD Mother          Tobacco Use    Smoking status: Some Days    Smokeless tobacco: Current   Substance and Sexual Activity    Alcohol use: Yes     Alcohol/week: 112.0 standard drinks of alcohol     Types: 112 Shots of liquor per week     Comment: fifth of vodka a day. LAST DRINK 7/25/2020 per pt     Drug use: Yes     Frequency: 3.0 times per week     Types: Marijuana    Sexual activity: Yes         Review of Systems   Unable to perform ROS: Acuity of condition     Objective:     Vital Signs (Most Recent):  Temp: (!) 87.8 °F (31 °C) (01/08/24 1800)  Pulse: 73 (01/08/24 1800)  Resp: (!) 24 (01/08/24 1817)  BP: (!) 77/39 (01/08/24 1800)  SpO2: 96 % (01/08/24 1800) Vital Signs (24h Range):  Temp:  [87.8 °F (31 °C)-97 °F (36.1 °C)] 87.8 °F (31 °C)  Pulse:  [61-83] 73  Resp:  [18-24] 24  SpO2:  [85 %-97 %] 96 %  BP: ()/(38-60) 77/39     Weight: 127.9 kg (282 lb)  Body mass index is 38.25 kg/m².      Intake/Output Summary (Last 24 hours) at 1/8/2024 1827  Last data filed at 1/8/2024 0631  Gross per 24 hour   Intake 1852.93 ml   Output --   Net 1852.93 ml        Physical Exam  Vitals and nursing note reviewed.   Constitutional:       General: He is in acute distress.   HENT:      Head: Normocephalic.   Eyes:      Pupils: Pupils are equal, round, and reactive to light.   Cardiovascular:      Rate and Rhythm: Tachycardia present.      Heart sounds: Normal heart sounds.   Pulmonary:      Effort: Respiratory distress present.      Breath sounds: Rhonchi present.   Abdominal:      General: Bowel sounds are normal.   Skin:     Capillary Refill: Capillary refill takes less than 2 seconds.   Neurological:      General: No focal deficit present.      Mental Status: He is disoriented.          Vents:  Vent Mode: A/C (01/08/24 1800)  Set Rate: 24 BPM (01/08/24 1800)  Vt Set: 460 mL (01/08/24 1800)  PEEP/CPAP: 5 cmH20  (01/08/24 1800)  Oxygen Concentration (%): 100 (01/08/24 1800)  Peak Airway Pressure: 0 cmH20 (01/08/24 1800)  Plateau Pressure: 0 cmH20 (01/08/24 1800)  Total Ve: 0 L/m (01/08/24 1800)  Negative Inspiratory Force (cm H2O): 0 (01/08/24 1800)    Lines/Drains/Airways       Central Venous Catheter Line  Duration             Percutaneous Central Line Insertion/Assessment - Triple Lumen  01/08/24 1815 Internal Jugular Right <1 day              Drain  Duration                  NG/OG Tube 01/08/24 1811 [facility] mouth <1 day         Urethral Catheter 01/08/24 1813 Temperature probe 16 Fr. <1 day              Airway  Duration                  Airway - Non-Surgical 01/08/24 1758 Endotracheal Tube <1 day              Peripheral Intravenous Line  Duration                  Peripheral IV - Single Lumen 01/04/24 1434 20 G Anterior;Proximal;Right Forearm 4 days         Peripheral IV - Single Lumen 01/05/24 0123 22 G Left;Posterior Hand 3 days                    Significant Labs:    CBC/Anemia Profile:  Recent Labs   Lab 01/07/24  0635 01/08/24  0557 01/08/24  1005   WBC 5.65 6.08 5.20   HGB 12.0* 12.6* 12.2*   HCT 37.9* 40.4 38.7*   PLT 94* 129* 98*   * 110* 109*   RDW 15.7* 14.9* 14.7*        Chemistries:  Recent Labs   Lab 01/07/24  0635 01/08/24  0557 01/08/24  1333   * 123* 121*   K 4.7 5.3* 5.7*   CL 93* 89* 91*   CO2 20* 19* 17*   BUN 34* 39* 42*   CREATININE 4.1* 5.3* 5.6*   CALCIUM 7.9* 7.9* 8.0*   ALBUMIN 2.3* 2.4*  --    PROT 5.2*  --   --    BILITOT 6.6*  --   --    ALKPHOS 178*  --   --    ALT 60*  --   --    AST 93*  --   --    MG 2.0 2.0  --    PHOS  --  4.3  --        All pertinent labs within the past 24 hours have been reviewed.    Significant Imaging:   I have reviewed all pertinent imaging results/findings within the past 24 hours.    ABG  Recent Labs   Lab 01/08/24  1613   PH 7.102*   PO2 65*   PCO2 74.3*   HCO3 23.2*   BE -6*     Assessment/Plan:     Pulmonary  * PNA (pneumonia)  - see  plan for resp failure     Acute respiratory failure with hypoxia and hypercarbia  Patient with Hypercapnic and Hypoxic Respiratory failure which is Acute.  he is not on home oxygen. Supplemental oxygen was provided and noted- Vent Mode: A/C  Oxygen Concentration (%):  [] 100  Resp Rate Total:  [0 br/min] 0 br/min  Vt Set:  [460 mL] 460 mL  PEEP/CPAP:  [5 cmH20] 5 cmH20  Mean Airway Pressure:  [0 cmH20] 0 cmH20    Signs/symptoms of respiratory failure include- tachypnea, increased work of breathing, and respiratory distress. Contributing diagnoses includes - Pneumonia Labs and images were reviewed. Patient Has recent ABG, which has been reviewed. Will treat underlying causes and adjust management of respiratory failure as follows    - intubated upon arrival to the ICU  - ABG and CXR pending   - vent bundle in place  - collect sputum culture   - on zyvox and cefepime  - add fungal workup in immunocompromised pt     Cardiac/Vascular  Atrial fibrillation with rapid ventricular response  - telemetry  - hold BB while on pressors  - monitor     Renal/  Hyperkalemia  -Sec to renal failure  -Will repleat labs  -NG in place  -Will initiate Lokelma    Hyponatremia  -On saline  -S/p 2 L boluses  -Repeat labs  -May need CRRT  -Resume heparin  -Anuric / Will likely get worsening of the renal function.     Hematology  Thrombocytopenia  - monitor     GI  Elevated liver enzymes  - trend     S/P liver transplant  - hepatology consulted and following  - follow LFTs/ammonia         Critical Care Daily Checklist:    A: Awake: RASS Goal/Actual Goal:    Actual:     B: Spontaneous Breathing Trial Performed?     C: SAT & SBT Coordinated?  yes                      D: Delirium: CAM-ICU     E: Early Mobility Performed? yes   F: Feeding Goal:    Status:     Current Diet Order   Procedures    Diet NPO      AS: Analgesia/Sedation yes   T: Thromboembolic Prophylaxis yes   H: HOB > 300 yes   U: Stress Ulcer Prophylaxis (if needed) yes   G:  Glucose Control yes   B: Bowel Function Stool Occurrence: 0   I: Indwelling Catheter (Lines & Duran) Necessity yes   D: De-escalation of Antimicrobials/Pharmacotherapies yes    Plan for the day/ETD yes    Code Status:  Family/Goals of Care: Full Code  yes     Critical Care Time: 45 minutes  Critical secondary to Patient has a condition that poses threat to life and bodily function: Severe Respiratory Distress     Critical care was time spent personally by me on the following activities: development of treatment plan with patient or surrogate and bedside caregivers, discussions with consultants, evaluation of patient's response to treatment, examination of patient, ordering and performing treatments and interventions, ordering and review of laboratory studies, ordering and review of radiographic studies, pulse oximetry, re-evaluation of patient's condition. This critical care time did not overlap with that of any other provider or involve time for any procedures.    Thank you for your consult. I will follow-up with patient. Please contact us if you have any additional questions.     Jeremiah Santillan MD  Critical Care Medicine  [facility] - Intensive Care ([facility])

## 2024-01-09 NOTE — ASSESSMENT & PLAN NOTE
- hepatology consulted and following  - follow LFTs/ammonia     1/9.  Hold immunosuppression and do serial levels.   Lactulose and initiate trophic feeding.   Active alcohol abuse  Suprisingly liver function are slightly high. Certain concerns about worsening due to shock;

## 2024-01-09 NOTE — CONSULTS
[facility]  Nephrology  Consult Note    Patient Name: Jordan Altman  MRN: [id number]  Admission Date: 1/4/2024  Hospital Length of Stay: 5 days  Attending Provider: Jeremiah Santillan MD   Primary Care Physician: Zohra Walters FNP  Principal Problem:PNA (pneumonia)    Consults  Subjective:     HPI:  48-year-old male with history of end-stage liver disease status post transplant in 2020.  Admitted with pneumonia.  Noted to have an acute rise in creatinine over the past several days.  Nephrology has been consulted for evaluation.  The patient was seen in his hospital room.  In bed resting comfortably.  No acute distress noted.  He relates that he has had decreased p.o. intake for the past several days as well as intermittent nausea.  He feels like his mouth is dry and that he feels dehydrated.  No diarrhea related.    On admission his creatinine was 1.1.  Over the past several days it increased from 1.6-2.9 now 4.1 this morning.    01/08/2024:  Patient was seen in his hospital room.  In bed resting comfortably.  No acute distress noted.  He is however somewhat tremulous on exam.  No new complaints from overnight.  States that he has had very little urine output and does not have the urge to go to urinate.    01/09/2024:  Events from yesterday evening and last night reviewed.  Patient was transferred to the intensive care unit and intubated.  He is currently requiring pressor support for hypotension.  Patient was seen in the ICU.  His brother was at the bedside.  All nephrology related questions were answered to his satisfaction.    Past Medical History:   Diagnosis Date    Decompensated hepatic cirrhosis     HCV acquired through organ donation, treated / cured     svr12 - 6/2021    Hypertension     SBP (spontaneous bacterial peritonitis)        Past Surgical History:   Procedure Laterality Date    ERCP N/A 10/1/2020    Procedure: ERCP (ENDOSCOPIC RETROGRADE CHOLANGIOPANCREATOGRAPHY);  Surgeon: Marcos  PILAR Ramirez MD;  Location: [facility] ENDO (2ND FLR);  Service: Endoscopy;  Laterality: N/A;    ESOPHAGOGASTRODUODENOSCOPY N/A 10/13/2020    Procedure: EGD (ESOPHAGOGASTRODUODENOSCOPY);  Surgeon: Prasanth Jerry MD;  Location: [facility] ENDO (2ND FLR);  Service: Endoscopy;  Laterality: N/A;    EXPLORATORY LAPAROTOMY AFTER LIVER TRANSPLANTATION N/A 10/15/2020    Procedure: LAPAROTOMY, EXPLORATORY, AFTER LIVER TRANSPLANT, Possible redo of artery, possible portal thrombectomy. IntraOperative US.;  Surgeon: Curtis Zavaleta MD;  [facility];  Service: Transplant;  Laterality: N/A;  With intraoperative ultrasound    LIVER TRANSPLANT N/A 10/14/2020    Procedure: TRANSPLANT, LIVER;  Surgeon: Curtis Zavaleta MD;  [facility];  Service: Transplant;  Laterality: N/A;  Intra op HD    RIGHT HEART CATHETERIZATION Right 9/23/2020    Procedure: INSERTION, CATHETER, RIGHT HEART;  Surgeon: Mikel Costa Jr., MD;  Location: [facility] CATH LAB;  Service: Cardiology;  Laterality: Right;    THORACENTESIS  2011    THROMBECTOMY  10/15/2020    Procedure: THROMBECTOMY portal vein;  Surgeon: Curtis Zavaleta MD;  [facility];  Service: Transplant;;       Review of patient's allergies indicates:   Allergen Reactions    Latex, natural rubber      Current Facility-Administered Medications   Medication Frequency    acetaminophen oral solution 650 mg Q4H PRN    albuterol-ipratropium 2.5 mg-0.5 mg/3 mL nebulizer solution 3 mL Q6H WAKE    ceFEPIme (MAXIPIME) 2 g in dextrose 5 % in water (D5W) 100 mL IVPB (MB+) Q24H    chlorhexidine 0.12 % solution 15 mL BID    dextrose 10% bolus 125 mL 125 mL PRN    dextrose 10% bolus 250 mL 250 mL PRN    famotidine (PF) injection 20 mg Daily    fentaNYL 2500 mcg in 0.9% sodium chloride 250 mL infusion premix (titrating) Continuous    glucagon (human recombinant) injection 1 mg PRN    heparin 25,000 units in dextrose 5% (100 units/ml) IV bolus from bag - ADDITIONAL PRN BOLUS - 30  units/kg PRN    heparin 25,000 units in dextrose 5% (100 units/ml) IV bolus from bag - ADDITIONAL PRN BOLUS - 60 units/kg PRN    heparin 25,000 units in dextrose 5% 250 mL (100 units/mL) infusion LOW INTENSITY nomogram - [facility] Continuous    influenza (QUADRIVALENT PF) vaccine 0.5 mL vaccine x 1 dose    insulin aspart U-100 pen 0-10 Units Q6H PRN    lactulose 20 gram/30 mL solution Soln 30 g BID    linezolid tablet 600 mg Q12H    magnesium sulfate 2g in water 50mL IVPB (premix) Once    mupirocin 2 % ointment BID    naloxone 0.4 mg/mL injection 0.02 mg PRN    NORepinephrine 32 mg in dextrose 5 % (D5W) 250 mL infusion Continuous    ondansetron disintegrating tablet 8 mg Q6H PRN    oxyCODONE immediate release tablet 5 mg Q6H PRN    pneumoc 20-jovita conj-dip cr(PF) (PREVNAR-20 (PF)) injection Syrg 0.5 mL vaccine x 1 dose    promethazine (PHENERGAN) 25 mg in dextrose 5 % (D5W) 50 mL IVPB Q6H PRN    propofol (DIPRIVAN) 10 mg/mL infusion Continuous    senna-docusate 8.6-50 mg per tablet 1 tablet Daily PRN    sodium bicarbonate 150 mEq in sterile water 1,150 mL infusion Continuous    sodium chloride 0.9% bolus 1,000 mL 1,000 mL Once    sodium chloride 0.9% flush 10 mL Q12H PRN    traZODone tablet 50 mg Nightly PRN     Family History       Problem Relation (Age of Onset)    COPD Mother          Tobacco Use    Smoking status: Some Days    Smokeless tobacco: Current   Substance and Sexual Activity    Alcohol use: Yes     Alcohol/week: 112.0 standard drinks of alcohol     Types: 112 Shots of liquor per week     Comment: fifth of vodka a day. LAST DRINK 7/25/2020 per pt     Drug use: Yes     Frequency: 3.0 times per week     Types: Marijuana    Sexual activity: Yes     Review of Systems   Constitutional: Negative.    HENT: Negative.     Respiratory: Negative.     Cardiovascular: Negative.    Gastrointestinal:  Positive for nausea.   Genitourinary: Negative.    Musculoskeletal: Negative.    Skin: Negative.      Objective:     Vital  Signs (Most Recent):  Temp: 99.3 °F (37.4 °C) (01/09/24 0915)  Pulse: 80 (01/09/24 0915)  Resp: (!) 24 (01/09/24 0915)  BP: 108/61 (01/09/24 0900)  SpO2: 97 % (01/09/24 0915) Vital Signs (24h Range):  Temp:  [90.3 °F (32.4 °C)-99.3 °F (37.4 °C)] 99.3 °F (37.4 °C)  Pulse:  [66-87] 80  Resp:  [18-34] 24  SpO2:  [86 %-100 %] 97 %  BP: ()/(31-76) 108/61  Arterial Line BP: ()/(44-70) 109/65     Weight: (!) 141.2 kg (311 lb 4.6 oz) (01/09/24 0600)  Body mass index is 42.22 kg/m².  Body surface area is 2.68 meters squared.    I/O last 3 completed shifts:  In: 6793.3 [I.V.:4224.6; NG/GT:200; IV Piggyback:2368.7]  Out: 0     Physical Exam  Constitutional:       Appearance: Normal appearance.   HENT:      Head: Normocephalic and atraumatic.   Eyes:      General: No scleral icterus.     Extraocular Movements: Extraocular movements intact.      Pupils: Pupils are equal, round, and reactive to light.   Cardiovascular:      Rate and Rhythm: Tachycardia present. Rhythm irregular.   Pulmonary:      Effort: Pulmonary effort is normal.      Breath sounds: No stridor.   Musculoskeletal:      Right lower leg: No edema.      Left lower leg: No edema.   Skin:     General: Skin is warm and dry.   Neurological:      General: No focal deficit present.      Mental Status: He is alert and oriented to person, place, and time.   Psychiatric:         Mood and Affect: Mood normal.         Behavior: Behavior normal.         Significant Labs:  BMP:   Recent Labs   Lab 01/09/24  0431     101   *  120*   K 4.6  4.6   CL 90*  90*   CO2 18*  18*   BUN 44*  44*   CREATININE 5.5*  5.5*   CALCIUM 7.7*  7.7*   MG 1.5*       CMP:   Recent Labs   Lab 01/09/24  0431     101   CALCIUM 7.7*  7.7*   ALBUMIN 2.3*   PROT 5.3*   *  120*   K 4.6  4.6   CO2 18*  18*   CL 90*  90*   BUN 44*  44*   CREATININE 5.5*  5.5*   ALKPHOS 232*   ALT 63*   *   BILITOT 8.7*       All labs within the past 24 hours have  been reviewed.    Significant Imaging:  Labs: Reviewed      Assessment/Plan:     Active Diagnoses:    Diagnosis Date Noted POA    PRINCIPAL PROBLEM:  PNA (pneumonia) [J18.9] 01/04/2024 Yes    Acute respiratory failure with hypoxia and hypercarbia [J96.01, J96.02] 01/08/2024 No    Hyperkalemia [E87.5] 01/08/2024 Yes    Chest pain [R07.9] 01/05/2024 No    Elevated liver enzymes [R74.8] 01/05/2024 Yes    Chronic diastolic congestive heart failure [I50.32] 01/05/2024 Yes    Atrial fibrillation with rapid ventricular response [I48.91] 07/30/2023 Yes    S/P liver transplant [Z94.4] 10/14/2020 Not Applicable    Alcohol use disorder, severe, dependence [F10.20] 09/21/2020 Yes     Chronic    Thrombocytopenia [D69.6] 09/19/2020 Yes    Hyponatremia [E87.1] 09/19/2020 Yes      Problems Resolved During this Admission:       Assessment and plan:    1. JENN:  Creatinine has been relatively stable since yesterday at 5.5.  However he is aneuric at this point.  His stable creatinine is reflective of dilution due to IV fluids.  This does not represent an improvement in clearance.    Will plan to initiate CRRT later today.    2. CKD 2: Baseline creatinine is around 1.1.  Only 1 previous urinalysis showed evidence of proteinuria however was a concentrated specimen which is likely resulting in a false-positive.  Previous UAs have not shown evidence of proteinuria.    3. Electrolytes:  Potassium is stable at 4.6.    4. Acid-base: Serum bicarbonate is slightly low at 18 consistent with JENN.    5. Volume:  He is positive several L since being admitted to the intensive care unit.  He does not have significant edema at this time.    Approximately 50 minutes was spent in patient examination, discussion and answering questions from family members, chart review and coordination of care with other providers.    Thank you for your consult.     Cm Camacho MD  Nephrology  [facility] - Med Surg

## 2024-01-09 NOTE — HPI
48 year old male with a known past history of alcoholic liver cirrhosis s/p liver transplant [facility] 10/20, CKD, PAF (on eliquis) who presented to the ED 1/4 with complaints of shortness of breath, fatigue, intermittent fever over the last 4 weeks. Stated he was dx over a week ago with flu as well. He also complains of intermittent N/V. In the ED, he was noted to be in afib RVR (HR in the 140's), given Cardizem IVP and placed on gtt.  Lab work notable for INR 1.5, Alk phos 169, , ALT 82, .  FLU and covid negative. Chest xray showed new low-grade infiltrate or atypical pneumonia LLL.  Patient was started on Levaquin.  Patient will be admitted to observation for afib rvr/pna.  Cardiology consulted. 01/05/2024: Afib RVR marginally controlled on non-titratable drip. Cardiology consulted, recommendations pending. 01/06/2024: Afib RVR controlled. Echo grossly unremarkable. Still on Cardizem, pending transition to PO by Cardiology. Hepatology recommends continuing Prograf but to hold cellcept due to infection. 01/07/2024: Afib resolved. In SR. Afebrile, clinically unchanged. However, was notably hypotensive this AM. Renal function sharply worsened over the past 24 hours as well. Nephrology consulted for recommendations. 1/8/24: Patient tremulous this morning, with hypothermia, hypotension, hyponatremia, elevated serum creatinine, minimal urine output--> ?  Prograf toxicity versus sepsis,--> ordered urinalysis, urine lytes, chest x-ray, procalcitonin, blood cultures, broaden antibiotics, IV fluids, hold Prograf,--hepatology, Nephrology agreed with the plan.    Eliquis discontinued, changed to heparin, given worsening GFR, also for possible need for renal biopsy.    Pt transferred to the ICU evening of 1/8 for hyponatremia, resp failure on BIPAP, hypothermia, and septic shock. Upon arrival pt was intubated and CVL was placed for pressor support.

## 2024-01-09 NOTE — NURSING
01/09/24 1600   Treatment   Treatment Type CVVHD   Treatment Status New start   Dialysis Machine Number [id number]   Access Temporary Cath   Catheter Dressing Intact  Yes   Alarms Engaged Yes   CRRT Comments Pt eyes closed, trached and under sedation. Connected to CRRT and tolerating at this time. Pt set up per P & P and Dr. Rahman orders. Report given to primary nurse.   CRRT Hourly Documentation   Blood Flow (mL/min) 250   UF Rate 90 cc/hr   Arterial Pressure (mmHg) -87 mmHg   Venous Pressure (mmHg) 168 mmHg   Balance Chamber  202   Total UF (Hourly Cleared) (mL) 90

## 2024-01-09 NOTE — ASSESSMENT & PLAN NOTE
-On saline  -S/p 2 L boluses  -Repeat labs  -May need CRRT  -Resume heparin  -Anuric / Will likely get worsening of the renal function.     1/9  Needs 3% saline with CRRT.  D/w nephrology  Agree with placement of trilysis catheter.   D/w brother. Prognosis guarded.  On pressors  Will initiate RRT.

## 2024-01-09 NOTE — ASSESSMENT & PLAN NOTE
- telemetry  - hold BB while on pressors  - monitor     1/9 Rate controlled  Heparin infusion - follow protocol.

## 2024-01-09 NOTE — PROCEDURES
Jordan Altman is a 48 y.o. male patient.    Temp: (!) 87.8 °F (31 °C) (01/08/24 1800)  Pulse: 73 (01/08/24 1800)  Resp: (!) 24 (01/08/24 1817)  BP: (!) 77/39 (01/08/24 1800)  SpO2: 96 % (01/08/24 1800)  Weight: 127.9 kg (282 lb) (01/05/24 0740)  Height: 6' (182.9 cm) (01/05/24 0740)       Intubation    Date/Time: 1/8/2024 6:32 PM  Location procedure was performed: [facility] MEDICINE    Performed by: Jeremiah Santillan MD  Authorized by: Jeremiah Santillan MD  Consent Done: Emergent Situation  Indications: respiratory distress and hypoxemia  Intubation method: direct  Patient status: paralyzed (RSI)  Preoxygenation: bag valve mask  Sedatives: etomidate  Paralytic: vecuronium  Laryngoscope size: Glide 4  Tube size: 7.5 mm  Tube type: cuffed  Number of attempts: 1  Post-procedure assessment: chest rise and ETCO2 monitor  Cuff inflated: yes  ETT to lip: 24 cm  Tube secured with: ETT vasquez  Complications: No  Specimens: No  Implants: No          1/8/2024

## 2024-01-09 NOTE — PROGRESS NOTES
[facility] - Intensive Care ([facility])  Critical Care Medicine  Progress Note    Patient Name: Jordan Altman  MRN: [id number]  Admission Date: 1/4/2024  Hospital Length of Stay: 5 days  Code Status: Full Code  Attending Provider: Jeremiah Santillan MD  Primary Care Provider: Zohra Walters FNP   Principal Problem: PNA (pneumonia)    Subjective:     HPI:  48 year old male with a known past history of alcoholic liver cirrhosis s/[facility], CKD, PAF (on eliquis) who presented to the ED 1/4 with complaints of shortness of breath, fatigue, intermittent fever over the last 4 weeks. Stated he was dx over a week ago with flu as well. He also complains of intermittent N/V. In the ED, he was noted to be in afib RVR (HR in the 140's), given Cardizem IVP and placed on gtt.  Lab work notable for INR 1.5, Alk phos 169, , ALT 82, .  FLU and covid negative. Chest xray showed new low-grade infiltrate or atypical pneumonia LLL.  Patient was started on Levaquin.  Patient will be admitted to observation for afib rvr/pna.  Cardiology consulted. 01/05/2024: Afib RVR marginally controlled on non-titratable drip. Cardiology consulted, recommendations pending. 01/06/2024: Afib RVR controlled. Echo grossly unremarkable. Still on Cardizem, pending transition to PO by Cardiology. Hepatology recommends continuing Prograf but to hold cellcept due to infection. 01/07/2024: Afib resolved. In SR. Afebrile, clinically unchanged. However, was notably hypotensive this AM. Renal function sharply worsened over the past 24 hours as well. Nephrology consulted for recommendations. 1/8/24: Patient tremulous this morning, with hypothermia, hypotension, hyponatremia, elevated serum creatinine, minimal urine output--> ?  Prograf toxicity versus sepsis,--> ordered urinalysis, urine lytes, chest x-ray, procalcitonin, blood cultures, broaden antibiotics, IV fluids, hold Prograf,--hepatology, Nephrology agreed with the  plan.    Eliquis discontinued, changed to heparin, given worsening GFR, also for possible need for renal biopsy.    Pt transferred to the ICU evening of 1/8 for hyponatremia, resp failure on BIPAP, hypothermia, and septic shock. Upon arrival pt was intubated and CVL was placed for pressor support.     Hospital/ICU Course:  1/9. No improvement in Na despite hydration. Anuric. Worsening hypoxia. Sedated and intermittent paralytics given for vent dyssynchrony during suctioning and episodes of cough while he is on 90% and PEEP 10        Objective:     Vital Signs (Most Recent):  Temp: 99.1 °F (37.3 °C) (01/09/24 1145)  Pulse: 93 (01/09/24 1145)  Resp: (!) 24 (01/09/24 1145)  BP: 108/61 (01/09/24 0900)  SpO2: 95 % (01/09/24 1145) Vital Signs (24h Range):  Temp:  [90.3 °F (32.4 °C)-99.3 °F (37.4 °C)] 99.1 °F (37.3 °C)  Pulse:  [66-93] 93  Resp:  [18-34] 24  SpO2:  [86 %-100 %] 95 %  BP: ()/(31-76) 108/61  Arterial Line BP: ()/(44-70) 109/65     Weight: (!) 141.2 kg (311 lb 4.6 oz)  Body mass index is 42.22 kg/m².      Intake/Output Summary (Last 24 hours) at 1/9/2024 1249  Last data filed at 1/9/2024 1014  Gross per 24 hour   Intake 5650.32 ml   Output 0 ml   Net 5650.32 ml        Physical Exam  Vitals and nursing note reviewed.   Constitutional:       General: He is in acute distress.      Appearance: He is ill-appearing and toxic-appearing. He is not diaphoretic.   HENT:      Head: Normocephalic.      Mouth/Throat:      Comments: intubated  Eyes:      Pupils: Pupils are equal, round, and reactive to light.   Cardiovascular:      Rate and Rhythm: Tachycardia present.      Pulses: Normal pulses.   Pulmonary:      Effort: Respiratory distress present.      Breath sounds: Rhonchi and rales present.   Abdominal:      General: Bowel sounds are normal.   Skin:     Capillary Refill: Capillary refill takes less than 2 seconds.   Neurological:      General: No focal deficit present.           Review of  Systems    Vents:  Vent Mode: A/C (01/09/24 1145)  Set Rate: 24 BPM (01/09/24 1145)  Vt Set: 550 mL (01/09/24 1145)  PEEP/CPAP: 10 cmH20 (01/09/24 1145)  Oxygen Concentration (%): 90 (01/09/24 1145)  Peak Airway Pressure: 29 cmH20 (01/09/24 1145)  Plateau Pressure: 21 cmH20 (01/09/24 1145)  Total Ve: 13.5 L/m (01/09/24 1145)  Negative Inspiratory Force (cm H2O): 0 (01/09/24 1145)  F/VT Ratio<105 (RSBI): (!) 42.7 (01/09/24 1145)    Lines/Drains/Airways       Central Venous Catheter Line  Duration             Percutaneous Central Line Insertion/Assessment - Triple Lumen  01/08/24 1815 Internal Jugular Right <1 day    Trialysis (Dialysis) Catheter 01/09/24 1116 left internal jugular <1 day              Drain  Duration                  NG/OG Tube 01/08/24 1811 [facility] mouth <1 day         Urethral Catheter 01/08/24 1813 Temperature probe 16 Fr. <1 day              Airway  Duration                  Airway - Non-Surgical 01/08/24 1758 Endotracheal Tube <1 day              Arterial Line  Duration             Arterial Line 01/08/24 2142 Right Radial <1 day              Peripheral Intravenous Line  Duration                  Peripheral IV - Single Lumen 01/04/24 1434 20 G Anterior;Proximal;Right Forearm 4 days         Peripheral IV - Single Lumen 01/05/24 0123 22 G Left;Posterior Hand 4 days                    Significant Labs:    CBC/Anemia Profile:  Recent Labs   Lab 01/08/24  0557 01/08/24  1005 01/08/24  1834 01/09/24  0431   WBC 6.08 5.20  --  8.71  8.71   HGB 12.6* 12.2*  --  13.1*  13.1*   HCT 40.4 38.7* 46 38.8*  38.8*   * 98*  --  125*  125*   * 109*  --  101*  101*   RDW 14.9* 14.7*  --  15.1*  15.1*        Chemistries:  Recent Labs   Lab 01/08/24  0557 01/08/24  1333 01/08/24 2005 01/09/24  0035 01/09/24  0431   *   < > 122* 120* 120*  120*   K 5.3*   < > 4.8 4.7 4.6  4.6   CL 89*   < > 90* 91* 90*  90*   CO2 19*   < > 16* 17* 18*  18*   BUN 39*   < > 41* 44* 44*  44*    CREATININE 5.3*   < > 5.6* 5.5* 5.5*  5.5*   CALCIUM 7.9*   < > 7.7* 7.7* 7.7*  7.7*   ALBUMIN 2.4*  --   --   --  2.3*   PROT  --   --   --   --  5.3*   BILITOT  --   --   --   --  8.7*   ALKPHOS  --   --   --   --  232*   ALT  --   --   --   --  63*   AST  --   --   --   --  125*   MG 2.0  --   --   --  1.5*   PHOS 4.3  --   --   --   --     < > = values in this interval not displayed.       All pertinent labs within the past 24 hours have been reviewed.    Significant Imaging:  I have reviewed all pertinent imaging results/findings within the past 24 hours.    ABG  Recent Labs   Lab 01/09/24  0325   PH 7.330*   PO2 108*   PCO2 38.4   HCO3 20.3*   BE -6*     Assessment/Plan:     Psychiatric  Alcohol use disorder, severe, dependence  Active use    Pulmonary  * PNA (pneumonia)  - see plan for resp failure     1/9   Likely has pneumonia and fluid overload.  Will consider CT post a few episodes of CRRT    Acute respiratory failure with hypoxia and hypercarbia  Patient with Hypercapnic and Hypoxic Respiratory failure which is Acute.  he is not on home oxygen. Supplemental oxygen was provided and noted- Vent Mode: A/C  Oxygen Concentration (%):  [] 90  Resp Rate Total:  [24 br/min-26 br/min] 24 br/min  Vt Set:  [460 mL-550 mL] 550 mL  PEEP/CPAP:  [5 cmH20-10 cmH20] 10 cmH20  Mean Airway Pressure:  [0 hhD55-08 cmH20] 17 cmH20    Signs/symptoms of respiratory failure include- tachypnea, increased work of breathing, and respiratory distress. Contributing diagnoses includes - Pneumonia Labs and images were reviewed. Patient Has recent ABG, which has been reviewed. Will treat underlying causes and adjust management of respiratory failure as follows    - intubated upon arrival to the ICU  - ABG and CXR pending   - vent bundle in place  - collect sputum culture   - on zyvox and cefepime  - add fungal workup in immunocompromised pt     1/9 Continue Abx.  Serial labs  Fungetell assay  May consider bronch when on low  settings.     Cardiac/Vascular  Chronic diastolic congestive heart failure  Patient is identified as having diastolicheart failure that is Acute on chronic. CHF is currently controlled. Latest ECHO performed and demonstrates- Results for orders placed during the hospital encounter of 01/04/24    Echo    Interpretation Summary    Left Ventricle: The left ventricle is normal in size. Mildly increased ventricular mass. Mildly increased wall thickness. There is concentric hypertrophy. Normal wall motion. There is normal systolic function with a visually estimated ejection fraction of 55 - 60%. There is normal diastolic function.    Right Ventricle: Normal right ventricular cavity size. Wall thickness is normal. Right ventricle wall motion  is normal. Systolic function is normal.    IVC/SVC: Normal venous pressure at 3 mmHg.  . Continue Furosemide and monitor clinical status closely. Monitor on telemetry. Patient is on CHF pathway.  Monitor strict Is&Os and daily weights.  Place on fluid restriction of 1.5 L. Cardiology has been consulted. Continue to stress to patient importance of self efficacy and  on diet for CHF. Last BNP reviewed- and noted below   Recent Labs   Lab 01/04/24  1435   *       Atrial fibrillation with rapid ventricular response  - telemetry  - hold BB while on pressors  - monitor     1/9 Rate controlled  Heparin infusion - follow protocol.    Renal/  Hyperkalemia  -Sec to renal failure  -Will repleat labs  -NG in place  -Will initiate Lokelma    1/9 Lactulose and CRRT      Hyponatremia  -On saline  -S/p 2 L boluses  -Repeat labs  -May need CRRT  -Resume heparin  -Anuric / Will likely get worsening of the renal function.     1/9  Needs 3% saline with CRRT.  D/w nephrology  Agree with placement of trilysis catheter.   D/w brother. Prognosis guarded.  On pressors  Will initiate RRT.      Hematology  Thrombocytopenia  - monitor     GI  Elevated liver enzymes  - trend     S/P liver  transplant  - hepatology consulted and following  - follow LFTs/ammonia     1/9.  Hold immunosuppression and do serial levels.   Lactulose and initiate trophic feeding.   Active alcohol abuse  Suprisingly liver function are slightly high. Certain concerns about worsening due to shock;        Chronic Hepatic encephalopathy  1/9 Acute on chronic   Lactulose   May consider hepatology consult in am       Critical Care Daily Checklist:    A: Awake: RASS Goal/Actual Goal: RASS Goal: -2-->light sedation  Actual:     B: Spontaneous Breathing Trial Performed?     C: SAT & SBT Coordinated?  yes                      D: Delirium: CAM-ICU Overall CAM-ICU: Positive   E: Early Mobility Performed? yes   F: Feeding Goal:    Status:     Current Diet Order   Procedures    Diet NPO      AS: Analgesia/Sedation yes   T: Thromboembolic Prophylaxis yes   H: HOB > 300 yes   U: Stress Ulcer Prophylaxis (if needed) yes   G: Glucose Control yes   B: Bowel Function Stool Occurrence: 0   I: Indwelling Catheter (Lines & Duran) Necessity yes   D: De-escalation of Antimicrobials/Pharmacotherapies yes    Plan for the day/ETD yes    Code Status:  Family/Goals of Care: Full Code       Critical Care Time: 42 minutes  Critical secondary to Patient has a condition that poses threat to life and bodily function: Severe Respiratory Distress      Critical care was time spent personally by me on the following activities: development of treatment plan with patient or surrogate and bedside caregivers, discussions with consultants, evaluation of patient's response to treatment, examination of patient, ordering and performing treatments and interventions, ordering and review of laboratory studies, ordering and review of radiographic studies, pulse oximetry, re-evaluation of patient's condition. This critical care time did not overlap with that of any other provider or involve time for any procedures.     Jeremiah Santillan MD  Critical Care Medicine  '[place] -  Intensive Care ([facility])

## 2024-01-09 NOTE — SUBJECTIVE & OBJECTIVE
Objective:     Vital Signs (Most Recent):  Temp: 99.1 °F (37.3 °C) (01/09/24 1145)  Pulse: 93 (01/09/24 1145)  Resp: (!) 24 (01/09/24 1145)  BP: 108/61 (01/09/24 0900)  SpO2: 95 % (01/09/24 1145) Vital Signs (24h Range):  Temp:  [90.3 °F (32.4 °C)-99.3 °F (37.4 °C)] 99.1 °F (37.3 °C)  Pulse:  [66-93] 93  Resp:  [18-34] 24  SpO2:  [86 %-100 %] 95 %  BP: ()/(31-76) 108/61  Arterial Line BP: ()/(44-70) 109/65     Weight: (!) 141.2 kg (311 lb 4.6 oz)  Body mass index is 42.22 kg/m².      Intake/Output Summary (Last 24 hours) at 1/9/2024 1249  Last data filed at 1/9/2024 1014  Gross per 24 hour   Intake 5650.32 ml   Output 0 ml   Net 5650.32 ml        Physical Exam  Vitals and nursing note reviewed.   Constitutional:       General: He is in acute distress.      Appearance: He is ill-appearing and toxic-appearing. He is not diaphoretic.   HENT:      Head: Normocephalic.      Mouth/Throat:      Comments: intubated  Eyes:      Pupils: Pupils are equal, round, and reactive to light.   Cardiovascular:      Rate and Rhythm: Tachycardia present.      Pulses: Normal pulses.   Pulmonary:      Effort: Respiratory distress present.      Breath sounds: Rhonchi and rales present.   Abdominal:      General: Bowel sounds are normal.   Skin:     Capillary Refill: Capillary refill takes less than 2 seconds.   Neurological:      General: No focal deficit present.           Review of Systems    Vents:  Vent Mode: A/C (01/09/24 1145)  Set Rate: 24 BPM (01/09/24 1145)  Vt Set: 550 mL (01/09/24 1145)  PEEP/CPAP: 10 cmH20 (01/09/24 1145)  Oxygen Concentration (%): 90 (01/09/24 1145)  Peak Airway Pressure: 29 cmH20 (01/09/24 1145)  Plateau Pressure: 21 cmH20 (01/09/24 1145)  Total Ve: 13.5 L/m (01/09/24 1145)  Negative Inspiratory Force (cm H2O): 0 (01/09/24 1145)  F/VT Ratio<105 (RSBI): (!) 42.7 (01/09/24 1145)    Lines/Drains/Airways       Central Venous Catheter Line  Duration             Percutaneous Central Line  Insertion/Assessment - Triple Lumen  01/08/24 1815 Internal Jugular Right <1 day    Trialysis (Dialysis) Catheter 01/09/24 1116 left internal jugular <1 day              Drain  Duration                  NG/OG Tube 01/08/24 1811 [facility] mouth <1 day         Urethral Catheter 01/08/24 1813 Temperature probe 16 Fr. <1 day              Airway  Duration                  Airway - Non-Surgical 01/08/24 1758 Endotracheal Tube <1 day              Arterial Line  Duration             Arterial Line 01/08/24 2142 Right Radial <1 day              Peripheral Intravenous Line  Duration                  Peripheral IV - Single Lumen 01/04/24 1434 20 G Anterior;Proximal;Right Forearm 4 days         Peripheral IV - Single Lumen 01/05/24 0123 22 G Left;Posterior Hand 4 days                    Significant Labs:    CBC/Anemia Profile:  Recent Labs   Lab 01/08/24  0557 01/08/24  1005 01/08/24  1834 01/09/24  0431   WBC 6.08 5.20  --  8.71  8.71   HGB 12.6* 12.2*  --  13.1*  13.1*   HCT 40.4 38.7* 46 38.8*  38.8*   * 98*  --  125*  125*   * 109*  --  101*  101*   RDW 14.9* 14.7*  --  15.1*  15.1*        Chemistries:  Recent Labs   Lab 01/08/24  0557 01/08/24  1333 01/08/24  2005 01/09/24  0035 01/09/24  0431   *   < > 122* 120* 120*  120*   K 5.3*   < > 4.8 4.7 4.6  4.6   CL 89*   < > 90* 91* 90*  90*   CO2 19*   < > 16* 17* 18*  18*   BUN 39*   < > 41* 44* 44*  44*   CREATININE 5.3*   < > 5.6* 5.5* 5.5*  5.5*   CALCIUM 7.9*   < > 7.7* 7.7* 7.7*  7.7*   ALBUMIN 2.4*  --   --   --  2.3*   PROT  --   --   --   --  5.3*   BILITOT  --   --   --   --  8.7*   ALKPHOS  --   --   --   --  232*   ALT  --   --   --   --  63*   AST  --   --   --   --  125*   MG 2.0  --   --   --  1.5*   PHOS 4.3  --   --   --   --     < > = values in this interval not displayed.       All pertinent labs within the past 24 hours have been reviewed.    Significant Imaging:  I have reviewed all pertinent imaging results/findings  within the past 24 hours.

## 2024-01-09 NOTE — PROCEDURES
Jordan Altman is a 48 y.o. male patient.    Temp: 99.1 °F (37.3 °C) (01/09/24 1145)  Pulse: 93 (01/09/24 1145)  Resp: (!) 24 (01/09/24 1145)  BP: 108/61 (01/09/24 0900)  SpO2: 95 % (01/09/24 1145)  Weight: (!) 141.2 kg (311 lb 4.6 oz) (01/09/24 0600)  Height: 6' (182.9 cm) (01/05/24 0740)       Central Line    Date/Time: 1/9/2024 12:47 PM    Performed by: Jeremiah Santillan MD  Authorized by: Jeremiah Santillan MD    Location procedure was performed:  [facility] INTENSIVE CARE UNIT  Consent Done ?:  Yes  Time out complete?: Verified correct patient, procedure, equipment, staff, and site/side    Indications:  Hemodialysis  Anesthesia:  Local infiltration  Local anesthetic:  Lidocaine 1% with epinephrine  Preparation:  Skin prepped with chlorhexidine (without alcohol)  Skin prep agent dried: Skin prep agent completely dried prior to procedure    Sterile barriers: All five maximal sterile barriers used - gloves, gown, cap, mask and large sterile sheet    Hand hygiene: Hand hygiene performed immediately prior to central venous catheter insertion    Location:  Left internal jugular  Catheter type:  Trialysis  Catheter size:  8 Fr  Ultrasound guidance: Yes    Vessel Caliber:  Medium  Comprressibility:  Normal  Needle advanced into vessel with real time ultrasound guidance.    Guidewire confirmed in vessel.    Image recorded and saved.    Steril sheath on probe.    Sterile gel used.  Manometry: No    Number of attempts:  1  Securement:  Line sutured, chlorhexidine patch, sterile dressing applied and blood return through all ports  Complications: No    Specimens: No    Implants: No    XRay:  Placement verified by x-ray  Adverse Events:  NoneTermination Site: superior vena cava      1/9/2024

## 2024-01-09 NOTE — ASSESSMENT & PLAN NOTE
-Sec to renal failure  -Will repleat labs  -NG in place  -Will initiate Lokelma    1/9 Lactulose and CRRT

## 2024-01-09 NOTE — ASSESSMENT & PLAN NOTE
-Sec to renal failure  -Will repleat labs  -NG in place  -Will initiate [facility]

## 2024-01-09 NOTE — PROGRESS NOTES
ST swallowing evaluation orders received and chart reviewed.  Pt with medical/respiratory decline on floor and transferred to ICU, intubated over night.  MD to reconsult ST once extubated and medically indicated for assessment.

## 2024-01-10 NOTE — ASSESSMENT & PLAN NOTE
Patient with Hypercapnic and Hypoxic Respiratory failure which is Acute.  he is not on home oxygen. Supplemental oxygen was provided and noted- Vent Mode: A/C  Oxygen Concentration (%):  [60-85] 65  Resp Rate Total:  [24 br/min-30 br/min] 24 br/min  Vt Set:  [550 mL] 550 mL  PEEP/CPAP:  [10 cmH20] 10 cmH20  Mean Airway Pressure:  [16 odF87-59 cmH20] 18 cmH20    Signs/symptoms of respiratory failure include- tachypnea, increased work of breathing, and respiratory distress. Contributing diagnoses includes - Pneumonia Labs and images were reviewed. Patient Has recent ABG, which has been reviewed. Will treat underlying causes and adjust management of respiratory failure as follows    - intubated upon arrival to the ICU  - ABG and CXR pending   - vent bundle in place  - collect sputum culture   - on zyvox and cefepime  - add fungal workup in immunocompromised pt     1/9 Continue Abx.  Serial labs  Fungetell assay  May consider bronch when on low settings.     1/10 wean as able

## 2024-01-10 NOTE — CONSULTS
[facility]  Nephrology  Consult Note    Patient Name: Jordan Altman  MRN: [id number]  Admission Date: 1/4/2024  Hospital Length of Stay: 6 days  Attending Provider: Jeremiah Santillan MD   Primary Care Physician: Zohra Walters FNP  Principal Problem:PNA (pneumonia)    Consults  Subjective:     HPI:  48-year-old male with history of end-stage liver disease status post transplant in 2020.  Admitted with pneumonia.  Noted to have an acute rise in creatinine over the past several days.  Nephrology has been consulted for evaluation.  The patient was seen in his hospital room.  In bed resting comfortably.  No acute distress noted.  He relates that he has had decreased p.o. intake for the past several days as well as intermittent nausea.  He feels like his mouth is dry and that he feels dehydrated.  No diarrhea related.    On admission his creatinine was 1.1.  Over the past several days it increased from 1.6-2.9 now 4.1 this morning.    01/08/2024:  Patient was seen in his hospital room.  In bed resting comfortably.  No acute distress noted.  He is however somewhat tremulous on exam.  No new complaints from overnight.  States that he has had very little urine output and does not have the urge to go to urinate.    01/09/2024:  Events from yesterday evening and last night reviewed.  Patient was transferred to the intensive care unit and intubated.  He is currently requiring pressor support for hypotension.  Patient was seen in the ICU.  His brother was at the bedside.  All nephrology related questions were answered to his satisfaction.    01/10/2024:  Patient was seen in the intensive care unit.  Remains intubated and sedated.  On CRRT running smoothly.    Past Medical History:   Diagnosis Date    Decompensated hepatic cirrhosis     HCV acquired through organ donation, treated / cured     svr12 - 6/2021    Hypertension     SBP (spontaneous bacterial peritonitis)        Past Surgical History:   Procedure  Laterality Date    ERCP N/A 10/1/2020    Procedure: ERCP (ENDOSCOPIC RETROGRADE CHOLANGIOPANCREATOGRAPHY);  Surgeon: Marcos Ramirez MD;  Location: [facility] ENDO (2ND FLR);  Service: Endoscopy;  Laterality: N/A;    ESOPHAGOGASTRODUODENOSCOPY N/A 10/13/2020    Procedure: EGD (ESOPHAGOGASTRODUODENOSCOPY);  Surgeon: Prasanth Jerry MD;  Location: [facility] ENDO (2ND FLR);  Service: Endoscopy;  Laterality: N/A;    EXPLORATORY LAPAROTOMY AFTER LIVER TRANSPLANTATION N/A 10/15/2020    Procedure: LAPAROTOMY, EXPLORATORY, AFTER LIVER TRANSPLANT, Possible redo of artery, possible portal thrombectomy. IntraOperative US.;  Surgeon: Curtis Zavaleta MD;  Location: [facility] OR [facility] FLR;  Service: Transplant;  Laterality: N/A;  With intraoperative ultrasound    LIVER TRANSPLANT N/A 10/14/2020    Procedure: TRANSPLANT, LIVER;  Surgeon: Curtis Zavaleta MD;  [facility];  Service: Transplant;  Laterality: N/A;  Intra op HD    RIGHT HEART CATHETERIZATION Right 9/23/2020    Procedure: INSERTION, CATHETER, RIGHT HEART;  Surgeon: Mikel Costa Jr., MD;  Location: [facility] CATH LAB;  Service: Cardiology;  Laterality: Right;    THORACENTESIS  2011    THROMBECTOMY  10/15/2020    Procedure: THROMBECTOMY portal vein;  Surgeon: Curtis Zavaleta MD;  [facility];  Service: Transplant;;       Review of patient's allergies indicates:   Allergen Reactions    Latex, natural rubber      Current Facility-Administered Medications   Medication Frequency    acetaminophen oral solution 650 mg Q4H PRN    albuterol-ipratropium 2.5 mg-0.5 mg/3 mL nebulizer solution 3 mL Q6H WAKE    ceFEPIme (MAXIPIME) 2 g in dextrose 5 % in water (D5W) 100 mL IVPB (MB+) Q24H    chlorhexidine 0.12 % solution 15 mL BID    dextrose 10% bolus 125 mL 125 mL PRN    dextrose 10% bolus 250 mL 250 mL PRN    famotidine (PF) injection 20 mg Daily    fentaNYL 2500 mcg in 0.9% sodium chloride 250 mL infusion premix (titrating) Continuous    glucagon (human  recombinant) injection 1 mg PRN    heparin 25,000 units in dextrose 5% (100 units/ml) IV bolus from bag - ADDITIONAL PRN BOLUS - 30 units/kg PRN    heparin 25,000 units in dextrose 5% (100 units/ml) IV bolus from bag - ADDITIONAL PRN BOLUS - 60 units/kg PRN    heparin 25,000 units in dextrose 5% 250 mL (100 units/mL) infusion LOW INTENSITY nomogram - [facility] Continuous    influenza (QUADRIVALENT PF) vaccine 0.5 mL vaccine x 1 dose    insulin aspart U-100 pen 0-10 Units Q6H PRN    lactulose 20 gram/30 mL solution Soln 30 g TID    linezolid tablet 600 mg Q12H    mupirocin 2 % ointment BID    naloxone 0.4 mg/mL injection 0.02 mg PRN    NORepinephrine 32 mg in dextrose 5 % (D5W) 250 mL infusion Continuous    ondansetron disintegrating tablet 8 mg Q6H PRN    oxyCODONE immediate release tablet 5 mg Q6H PRN    pneumoc 20-jovita conj-dip cr(PF) (PREVNAR-20 (PF)) injection Syrg 0.5 mL vaccine x 1 dose    promethazine (PHENERGAN) 25 mg in dextrose 5 % (D5W) 50 mL IVPB Q6H PRN    propofol (DIPRIVAN) 10 mg/mL infusion Continuous    rifAXIMin tablet 550 mg BID    senna-docusate 8.6-50 mg per tablet 1 tablet Daily PRN    sodium bicarbonate 150 mEq in sterile water 1,150 mL infusion Continuous    sodium chloride 0.9% bolus 1,000 mL 1,000 mL Once    sodium chloride 0.9% flush 10 mL Q12H PRN    traZODone tablet 50 mg Nightly PRN     Family History       Problem Relation (Age of Onset)    COPD Mother          Tobacco Use    Smoking status: Some Days    Smokeless tobacco: Current   Substance and Sexual Activity    Alcohol use: Yes     Alcohol/week: 112.0 standard drinks of alcohol     Types: 112 Shots of liquor per week     Comment: fifth of vodka a day. LAST DRINK 7/25/2020 per pt     Drug use: Yes     Frequency: 3.0 times per week     Types: Marijuana    Sexual activity: Yes     Review of Systems   Constitutional: Negative.    HENT: Negative.     Respiratory: Negative.     Cardiovascular: Negative.    Gastrointestinal:  Positive for  nausea.   Genitourinary: Negative.    Musculoskeletal: Negative.    Skin: Negative.      Objective:     Vital Signs (Most Recent):  Temp: 98.1 °F (36.7 °C) (01/10/24 0918)  Pulse: 98 (01/10/24 0918)  Resp: (!) 24 (01/10/24 0918)  BP: 110/65 (01/10/24 0700)  SpO2: (!) 90 % (01/10/24 0918) Vital Signs (24h Range):  Temp:  [97.3 °F (36.3 °C)-99.3 °F (37.4 °C)] 98.1 °F (36.7 °C)  Pulse:  [] 98  Resp:  [18-32] 24  SpO2:  [89 %-98 %] 90 %  BP: ()/(51-79) 110/65  Arterial Line BP: ()/(54-86) 103/69     Weight: (!) 142.8 kg (314 lb 13.1 oz) (01/10/24 0600)  Body mass index is 42.7 kg/m².  Body surface area is 2.69 meters squared.    I/O last 3 completed shifts:  In: 5709.6 [I.V.:4772.8; NG/GT:590; IV Piggyback:346.8]  Out: 1700 [Urine:195; Other:1505]    Physical Exam  Constitutional:       Appearance: Normal appearance.   HENT:      Head: Normocephalic and atraumatic.   Eyes:      General: No scleral icterus.     Extraocular Movements: Extraocular movements intact.      Pupils: Pupils are equal, round, and reactive to light.   Cardiovascular:      Rate and Rhythm: Tachycardia present. Rhythm irregular.   Pulmonary:      Effort: Pulmonary effort is normal.      Breath sounds: No stridor.   Musculoskeletal:      Right lower leg: No edema.      Left lower leg: No edema.   Skin:     General: Skin is warm and dry.   Neurological:      General: No focal deficit present.      Mental Status: He is alert and oriented to person, place, and time.   Psychiatric:         Mood and Affect: Mood normal.         Behavior: Behavior normal.         Significant Labs:  BMP:   Recent Labs   Lab 01/10/24  0443   GLU 89  89   *  125*   K 4.5  4.5   CL 94*  94*   CO2 16*  16*   BUN 30*  30*   CREATININE 3.0*  3.0*   CALCIUM 7.6*  7.6*   MG 2.1       CMP:   Recent Labs   Lab 01/10/24  0443   GLU 89  89   CALCIUM 7.6*  7.6*   ALBUMIN 2.0*   PROT 5.2*   *  125*   K 4.5  4.5   CO2 16*  16*   CL 94*  94*    BUN 30*  30*   CREATININE 3.0*  3.0*   ALKPHOS 286*   ALT 61*   *   BILITOT 10.6*       All labs within the past 24 hours have been reviewed.    Significant Imaging:  Labs: Reviewed      Assessment/Plan:     Active Diagnoses:    Diagnosis Date Noted POA    PRINCIPAL PROBLEM:  PNA (pneumonia) [J18.9] 01/04/2024 Yes    Acute respiratory failure with hypoxia and hypercarbia [J96.01, J96.02] 01/08/2024 No    Hyperkalemia [E87.5] 01/08/2024 Yes    Chest pain [R07.9] 01/05/2024 No    Elevated liver enzymes [R74.8] 01/05/2024 Yes    Chronic diastolic congestive heart failure [I50.32] 01/05/2024 Yes    Atrial fibrillation with rapid ventricular response [I48.91] 07/30/2023 Yes    S/P liver transplant [Z94.4] 10/14/2020 Not Applicable    Alcohol use disorder, severe, dependence [F10.20] 09/21/2020 Yes     Chronic    Thrombocytopenia [D69.6] 09/19/2020 Yes    Hyponatremia [E87.1] 09/19/2020 Yes    Chronic Hepatic encephalopathy [K76.82] 09/19/2020 Yes      Problems Resolved During this Admission:       Assessment and plan:    1. JENN:  On CRRT, running smoothly.  Will continue CRRT.  Will increase net ultrafiltration to 50 cc an hour with close monitoring of blood pressure.    2. CKD 2: Baseline creatinine is around 1.1.  Only 1 previous urinalysis showed evidence of proteinuria however was a concentrated specimen which is likely resulting in a false-positive.  Previous UAs have not shown evidence of proteinuria.    3. Electrolytes:  Potassium is stable at 4.5.    4. Acid-base: Serum bicarbonate remains low at 16 despite being on CRRT.  Will continue to monitor.  We may need to consider adding a sodium bicarbonate infusion as well if his bicarb does not improve.    5. Volume:  As per above.  Will try to increase ultrafiltration slowly.        Thank you for your consult.     Cm Camacho MD  Nephrology  [facility]

## 2024-01-10 NOTE — PROGRESS NOTES
[facility]  Hepatology  Consult Note    Patient Name: Jordan Altman  MRN: [id number]  Admission Date: 1/4/2024  Hospital Length of Stay: 6 days  Attending Provider: Jeremiah Santillan MD   Primary Care Physician: Zohra Walters FNP  Principal Problem:PNA (pneumonia)    Consults  Subjective:     Transplant status: Post-transplant    HPI: Mr. Altman is a 48 year old male with a history of  alcoholic liver cirrhosis s/p liver transplant [facility] 10/20, CKD, PAF(on eliquis) admitted with Pneumonia. Hepatology consulted for Post transplant status.      In the ED he was noted to be in afib RVR (HR in the 140's), given Cardizem IVP and placed on gtt.      Lab work notable for INR 1.5, Alk phos 169, , ALT 82, . Chest xray showed new low-grade infiltrate or atypical pneumonia LLL.  Patient  on Levaquin.      Transplant Date: 10/14/2020  UNOS Native Liver Dx: Alcoholic Cirrhosis     Jordan is here for follow up of his liver transplant.     ORGAN: LIVER  Whole or Partial: whole liver  Donor Type: donation after brain death  CDC High Risk: yes  Donor CMV Status: Positive  Donor HCV Status: Positive  Donor HBcAb: Negative  Biliary Anastomosis: end to end  Arterial Anatomy: standard  IVC reconstruction: end to end ivc  Portal vein status: patent    Interval history  SOB is better  Afib RVR controlled. Echo unremarkable.   CXR  atypical pneumonia involving the left lung        1/7--Afib controlled. Hypotensive. LFT trending down. Still  req. 2 L O2. Worsening JENN    1/8--Worsening JENN and Hyperkalemia       1/9--Patient was transferred to the intensive care unit and intubated for hypoxia.  He is currently requiring pressor support for hypotension.  Discussed with primary team regarding Cardizem likely causing Prograf metabolism inhibition. Prograf on Hold    1/10- Continue to be on pressor support and intubated    Review of Systems  Patient intubated and sedated    Past Medical History:   Diagnosis Date     Decompensated hepatic cirrhosis     HCV acquired through organ donation, treated / cured     svr12 - 6/2021    Hypertension     SBP (spontaneous bacterial peritonitis)        Past Surgical History:   Procedure Laterality Date    ERCP N/A 10/1/2020    Procedure: ERCP (ENDOSCOPIC RETROGRADE CHOLANGIOPANCREATOGRAPHY);  Surgeon: Marcos Ramirez MD;  Location: [facility] ([address] [place] [address]);  Service: Endoscopy;  Laterality: N/A;    ESOPHAGOGASTRODUODENOSCOPY N/A 10/13/2020    Procedure: EGD (ESOPHAGOGASTRODUODENOSCOPY);  Surgeon: Prasanth Jerry MD;  [facility]  Service: Endoscopy;  Laterality: N/A;    EXPLORATORY LAPAROTOMY AFTER LIVER TRANSPLANTATION N/A 10/15/2020    Procedure: LAPAROTOMY, EXPLORATORY, AFTER LIVER TRANSPLANT, Possible redo of artery, possible portal thrombectomy. IntraOperative US.;  Surgeon: Curtis Zavaleta MD;  Location: [facility] OR [address] [place] [address];  Service: Transplant;  Laterality: N/A;  With intraoperative ultrasound    LIVER TRANSPLANT N/A 10/14/2020    Procedure: TRANSPLANT, LIVER;  Surgeon: Curtis Zavaleta MD;  Location: [facility] OR [address] [place] [address];  Service: Transplant;  Laterality: N/A;  Intra op HD    RIGHT HEART CATHETERIZATION Right 9/23/2020    Procedure: INSERTION, CATHETER, RIGHT HEART;  Surgeon: Mikel Costa Jr., MD;  Location: [facility] CATH LAB;  Service: Cardiology;  Laterality: Right;    THORACENTESIS  2011    THROMBECTOMY  10/15/2020    Procedure: THROMBECTOMY portal vein;  Surgeon: Curtis Zavaleta MD;  Location: [address];  Service: Transplant;;       Family history of liver disease: No    Review of patient's allergies indicates:   Allergen Reactions    Latex, natural rubber          Tobacco Use    Smoking status: Some Days    Smokeless tobacco: Current   Substance and Sexual Activity    Alcohol use: Yes     Alcohol/week: 112.0 standard drinks of alcohol     Types: 112 Shots of liquor per week     Comment: fifth of vodka a day. LAST DRINK 7/25/2020 per pt     Drug use: Yes      Frequency: 3.0 times per week     Types: Marijuana    Sexual activity: Yes       Facility-Administered Medications Prior to Admission   Medication Dose Route Frequency Provider Last Rate Last Admin    acetaminophen tablet 650 mg  650 mg Oral Once PRN Leo Ghotra MD        albuterol inhaler 2 puff  2 puff Inhalation Q20 Min PRN Leo Ghotra MD        [DISCONTINUED] diphenhydrAMINE injection 25 mg  25 mg Intravenous Once PRN Leo Ghotra MD        [DISCONTINUED] EPINEPHrine (EPIPEN) 0.3 mg/0.3 mL pen injection 0.3 mg  0.3 mg Intramuscular PRN Leo Ghotra MD        [DISCONTINUED] methylPREDNISolone sodium succinate injection 40 mg  40 mg Intravenous Once PRN Leo Ghotra MD        [DISCONTINUED] ondansetron disintegrating tablet 4 mg  4 mg Oral Once PRN Leo Ghotra MD        [DISCONTINUED] sodium chloride 0.9% 500 mL flush bag   Intravenous PRLeo Bhatt MD        [DISCONTINUED] sodium chloride 0.9% flush 10 mL  10 mL Intravenous PRN Leo Ghotra MD         Medications Prior to Admission   Medication Sig Dispense Refill Last Dose    amLODIPine (NORVASC) 10 MG tablet Take 1 tablet by mouth in the morning. 30 tablet 1 1/4/2024    apixaban (ELIQUIS) 5 mg Tab Take 1 tablet (5 mg total) by mouth 2 (two) times a day. 60 tablet 11 1/3/2024    buPROPion (WELLBUTRIN SR) 150 MG TBSR 12 hr tablet Take 150 mg by mouth 2 (two) times daily.       fluticasone-salmeterol diskus inhaler 250-50 mcg Inhale 1 puff into the lungs 2 (two) times a day.   1/3/2024    fluticasone-umeclidin-vilanter (TRELEGY ELLIPTA) 200-62.5-25 mcg inhaler Inhale 1 puff into the lungs once daily.   1/3/2024    gabapentin (NEURONTIN) 300 MG capsule Take 2 capsules by mouth 2 (two) times daily.       loratadine (CLARITIN) 10 mg tablet Take 10 mg by mouth once daily.   1/3/2024    losartan (COZAAR) 25 MG tablet Take 25 mg by mouth once daily.   1/4/2024    mycophenolate (CELLCEPT) 250 mg Cap Take 2 capsules  (500 mg total) by mouth 2 (two) times daily. 120 capsule 11 1/4/2024    tacrolimus (PROGRAF) 1 MG Cap Take 1 capsule (1 mg total) by mouth every morning AND 1 capsule (1 mg total) every evening. 60 capsule 11 1/4/2024    testosterone cypionate (DEPOTESTOTERONE CYPIONATE) 200 mg/mL injection Inject 0.75 mLs (150 mg total) into the muscle once a week. 5 mL 0 Past Week    tiZANidine (ZANAFLEX) 4 MG tablet Take 4 mg by mouth 2 (two) times a day.   1/3/2024    traZODone (DESYREL) 50 MG tablet Take  mg by mouth every evening.   1/3/2024    albuterol (PROVENTIL/VENTOLIN HFA) 90 mcg/actuation inhaler Inhale 2 puffs into the lungs every 6 (six) hours as needed for Wheezing or Shortness of Breath. Rescue          Objective:     Vital Signs (Most Recent):  Temp: 97.7 °F (36.5 °C) (01/10/24 1145)  Pulse: 88 (01/10/24 1145)  Resp: (!) 24 (01/10/24 1145)  BP: 111/63 (01/10/24 1100)  SpO2: (!) 92 % (01/10/24 1145) Vital Signs (24h Range):  Temp:  [97.3 °F (36.3 °C)-99.1 °F (37.3 °C)] 97.7 °F (36.5 °C)  Pulse:  [] 88  Resp:  [18-32] 24  SpO2:  [88 %-97 %] 92 %  BP: ()/(51-79) 111/63  Arterial Line BP: ()/(54-85) 108/67     Weight: (!) 142.8 kg (314 lb 13.1 oz) (01/10/24 0600)  Body mass index is 42.7 kg/m².    Physical Exam  Vitals and nursing note reviewed.   Constitutional:       General: intubated  HENT:      Head: Normocephalic.   Eyes:      Pupils: Pupils are equal, round, and reactive to light.   Cardiovascular:      Rate and Rhythm: Tachycardia present.      Heart sounds: Normal heart sounds.   Pulmonary:      Effort: Respiratory distress present.      Breath sounds: Rhonchi present.   Abdominal:      General: Bowel sounds are normal.   Skin:     Capillary Refill: Capillary refill takes less than 2 seconds.   Neurological:      General: No focal deficit present.      Mental Status: He is sedated        Significant Labs:  CBC:   Recent Labs   Lab 01/10/24  0443   WBC 9.88   RBC 4.18*   HGB 13.9*   HCT  40.7   PLT 92*       CMP:   Recent Labs   Lab 01/10/24  0443   GLU 89  89   CALCIUM 7.6*  7.6*   ALBUMIN 2.0*   PROT 5.2*   *  125*   K 4.5  4.5   CO2 16*  16*   CL 94*  94*   BUN 30*  30*   CREATININE 3.0*  3.0*   ALKPHOS 286*   ALT 61*   *   BILITOT 10.6*       Coagulation:   Recent Labs   Lab 01/09/24  0431 01/09/24  1250 01/10/24  0803   INR 1.4*  --   --    APTT 36.7*   < > 61.5*    < > = values in this interval not displayed.         Significant Imaging:  X-Ray: Reviewed      Assessment/Plan:     Active Diagnoses:    Diagnosis Date Noted POA    PRINCIPAL PROBLEM:  PNA (pneumonia) [J18.9] 01/04/2024 Yes    Acute respiratory failure with hypoxia and hypercarbia [J96.01, J96.02] 01/08/2024 No    Hyperkalemia [E87.5] 01/08/2024 Yes    Chest pain [R07.9] 01/05/2024 No    Elevated liver enzymes [R74.8] 01/05/2024 Yes    Chronic diastolic congestive heart failure [I50.32] 01/05/2024 Yes    Atrial fibrillation with rapid ventricular response [I48.91] 07/30/2023 Yes    S/P liver transplant [Z94.4] 10/14/2020 Not Applicable    Alcohol use disorder, severe, dependence [F10.20] 09/21/2020 Yes     Chronic    Thrombocytopenia [D69.6] 09/19/2020 Yes    Hyponatremia [E87.1] 09/19/2020 Yes    Chronic Hepatic encephalopathy [K76.82] 09/19/2020 Yes      Problems Resolved During this Admission:       Elevated Liver tests  At present likely from underlying infection/pneumonia and hypotension  Plan       -Monitor Liver enzymes and INR       -avoid Hepatotoxic drugs/ Hypotension    Post Liver transplant 3 years  Tac levels goal 5-8 ng/ml  Tac levels dropping but still > 10  Plan-Hold Prograf and check levels daily           Hold Cellcept due to infection           Restart Prograf 0.5 mg mg AM and 0.5 mg PM once level drops below 8 ng/ml          Hyperammonemia  Continue lactulose  ? Chronic liver disease related      Acute respiratory failure   -Atypical Pneumonia ? Fluid overload  -Intubated   Plan--Broad cover  considering Immunosuppressed state  Consult ID  Pulmonology on Board       JENN  Baseline creat 1.1  Worsening likely Prograf with ATN/Infection  Patient has oliguria  Plan--On Board nephrology  Prograf dose on hold   CRRT      Atrial fibrillation with rapid ventricular response        Converted with cardizem  Cardiology on Board. ECHO normal       Thank you for your consult. I will follow-up with patient. Please contact us if you have any additional questions.    Juanjose Kwong MD  Hepatology  [facility]

## 2024-01-10 NOTE — ASSESSMENT & PLAN NOTE
- hepatology consulted and following  - follow LFTs/ammonia     1/9.  Hold immunosuppression and do serial levels.   Lactulose and initiate trophic feeding.   Active alcohol abuse  Suprisingly liver function are slightly high. Certain concerns about worsening due to shock;    1/10- watch bili  Seen by hepatology

## 2024-01-10 NOTE — PLAN OF CARE
Nutrition Recs: 1/10/24  1. Recommend pt be initiated onto EN when medically appropriate.   - Peptamen Intense VHP. Initiate at 15 mL/hr, advance as pt tolerates or per MD/NP. Aim for goal rate of 50 mL/hr.   - Formula provides 1200 Kcal (67% of EEN w/ Propofol), 110.4 g Protein (90% EPN), 1008 mL free water.   - 130 mL fwf q4hr (780 mL) or per MD/NP.   - FWF + Free water = 1788 mL/day (100% EFN).   - Check Mg, Na, Phos, and Glu before and during initiation, correct as indicated.   - Re-consult for updated TF recs if propofol is d/c, increased or decreased.  2. Weigh daily s/p CRRT.     Goals:   1. Pt will be initiated onto EN within 24 hr.   2. Pt will consume and tolerate >60% EEN/EPN prior to RD follow up.  Nutrition Goal Status: new  Communication of RD Recs: other (comment) (POC: Sticky Note)  Carlos Augustin, Registration Eligible, Provisional LDN

## 2024-01-10 NOTE — SUBJECTIVE & OBJECTIVE
Interval History: no  acute events  Bloody urine  Remains ill on vent / pressors       Objective:     Vital Signs (Most Recent):  Temp: 97.7 °F (36.5 °C) (01/10/24 1545)  Pulse: 86 (01/10/24 1545)  Resp: (!) 24 (01/10/24 1545)  BP: 122/73 (01/10/24 1500)  SpO2: (!) 93 % (01/10/24 1545) Vital Signs (24h Range):  Temp:  [97.3 °F (36.3 °C)-98.8 °F (37.1 °C)] 97.7 °F (36.5 °C)  Pulse:  [] 86  Resp:  [21-32] 24  SpO2:  [88 %-97 %] 93 %  BP: ()/(51-80) 122/73  Arterial Line BP: ()/(54-85) 96/61     Weight: (!) 142.8 kg (314 lb 13.1 oz)  Body mass index is 42.7 kg/m².      Intake/Output Summary (Last 24 hours) at 1/10/2024 1550  Last data filed at 1/10/2024 1515  Gross per 24 hour   Intake 2390.35 ml   Output 2232 ml   Net 158.35 ml        Physical Exam  Vitals and nursing note reviewed.   Constitutional:       General: He is not in acute distress.     Comments: Sedated on vent   HENT:      Head: Normocephalic and atraumatic.   Eyes:      General:         Right eye: No discharge.         Left eye: No discharge.   Cardiovascular:      Rate and Rhythm: Normal rate and regular rhythm.      Heart sounds: No murmur heard.  Pulmonary:      Effort: No respiratory distress.      Breath sounds: No wheezing or rales.   Abdominal:      Palpations: Abdomen is soft.      Tenderness: There is no abdominal tenderness.   Musculoskeletal:         General: Swelling present. No tenderness.      Cervical back: Neck supple.   Neurological:      Comments: sedated           Review of Systems   Reason unable to perform ROS: on vent.       Vents:  Vent Mode: A/C (01/10/24 1543)  Set Rate: 24 BPM (01/10/24 1543)  Vt Set: 550 mL (01/10/24 1543)  PEEP/CPAP: 10 cmH20 (01/10/24 1543)  Oxygen Concentration (%): 65 (01/10/24 1545)  Peak Airway Pressure: 31 cmH20 (01/10/24 1543)  Plateau Pressure: 20 cmH20 (01/10/24 1543)  Total Ve: 13.5 L/m (01/10/24 1543)  Negative Inspiratory Force (cm H2O): 0 (01/10/24 1543)  F/VT Ratio<105 (RSBI):  (!) 42.63 (01/10/24 1543)    Lines/Drains/Airways       Central Venous Catheter Line  Duration             Percutaneous Central Line Insertion/Assessment - Triple Lumen  01/08/24 1815 Internal Jugular Right 1 day    Trialysis (Dialysis) Catheter 01/09/24 1116 left internal jugular 1 day              Drain  Duration                  NG/OG Tube 01/08/24 1811 [facility] mouth 1 day         Urethral Catheter 01/08/24 1813 Temperature probe 16 Fr. 1 day         Rectal Tube 01/10/24 1015 rectal tube w/ balloon (indicate number of mLs) <1 day              Airway  Duration                  Airway - Non-Surgical 01/08/24 1758 Endotracheal Tube 1 day              Arterial Line  Duration             Arterial Line 01/08/24 2142 Right Radial 1 day              Peripheral Intravenous Line  Duration                  Peripheral IV - Single Lumen 01/04/24 1434 20 G Anterior;Proximal;Left Forearm 6 days                    Significant Labs:    CBC/Anemia Profile:  Recent Labs   Lab 01/08/24  1834 01/09/24  0431 01/10/24  0443   WBC  --  8.71  8.71 9.88   HGB  --  13.1*  13.1* 13.9*   HCT 46 38.8*  38.8* 40.7   PLT  --  125*  125* 92*   MCV  --  101*  101* 97   RDW  --  15.1*  15.1* 16.8*        Chemistries:  Recent Labs   Lab 01/09/24  0431 01/09/24  1250 01/09/24  1728 01/09/24  2349 01/10/24  0443   *  120*   < > 121* 125* 125*  125*   K 4.6  4.6   < > 4.6 4.4 4.5  4.5   CL 90*  90*   < > 90* 94* 94*  94*   CO2 18*  18*   < > 15* 16* 16*  16*   BUN 44*  44*   < > 40* 34* 30*  30*   CREATININE 5.5*  5.5*   < > 4.4* 3.4* 3.0*  3.0*   CALCIUM 7.7*  7.7*   < > 7.6* 7.7* 7.6*  7.6*   ALBUMIN 2.3*  --   --   --  2.0*   PROT 5.3*  --   --   --  5.2*   BILITOT 8.7*  --   --   --  10.6*   ALKPHOS 232*  --   --   --  286*   ALT 63*  --   --   --  61*   *  --   --   --  131*   MG 1.5*  --   --   --  2.1   PHOS  --   --   --   --  1.8*    < > = values in this interval not displayed.       All pertinent  labs within the past 24 hours have been reviewed.    Significant Imaging:  I have reviewed all pertinent imaging results/findings within the past 24 hours.

## 2024-01-10 NOTE — CONSULTS
[facility] - [facility] ([facility])  Adult Nutrition  Consult Note    SUMMARY     Recommendations  1. Recommend pt be initiated onto EN when medically appropriate.   - Peptamen Intense VHP. Initiate at 15 mL/hr, advance as pt tolerates or per MD/NP. Aim for goal rate of 50 mL/hr.   - Formula provides 1200 Kcal (67% of EEN w/ Propofol), 110.4 g Protein (90% EPN), 1008 mL free water.   - 130 mL fwf q4hr (780 mL) or per MD/NP.   - FWF + Free water = 1788 mL/day (100% EFN).   - Check Mg, Na, Phos, and Glu before and during initiation, correct as indicated.   - Re-consult for updated TF recs if propofol is d/c, increased or decreased.  2. Weigh daily s/p CRRT.    Goals:   1. Pt will be initiated onto EN within 24 hr.   2. Pt will consume and tolerate >60% EEN/EPN prior to RD follow up.  Nutrition Goal Status: new  Communication of RD Recs: other (comment) (POC: Sticky Note)    Assessment and Plan    Nutrition Problem  Inadequate PO intake    Related to (etiology):   Decreased ability to consume sufficient nutrition    Signs and Symptoms (as evidenced by):   NPO/ Vent    Interventions(treatment strategy):  1. Enteral Nutrition  2. Collaboration with other providers    Nutrition Diagnosis Status:   New       Malnutrition Assessment     Skin (Micronutrient): dry, yellow (Humberto = 11)                                 Reason for Assessment    Reason For Assessment: consult, new tube feeding  Diagnosis: pulmonary disease  Relevant Medical History: PNA (pneumonia), Chronic Hepatic encephalopathy, Alcohol use disorder, severe, dependence (Chronic), A-Fib, CHF, Acute respiratory failure with hypoxia and hypercarbia, Hyperkalemia, HTN, Decompensated hepatic cirrhosis   Interdisciplinary Rounds: did not attend  General Information Comments: 48 y.o male admitted with active principal problem of PNA (pneumonia). Pt transferred to the ICU evening of 1/8 for hyponatremia, resp failure on BIPAP, hypothermia, and septic shock. Upon  arrival pt was intubated (Total ve: 13.5 L/M) and sedated (Propofol 30.7 mL/hr) and CVL was placed for pressor support. Pt currently receiving CRRT and going smoothly. NFPE not performed. Dietitian will assess the need for NFPE during follow up. Pt currently charted to weigh 314 lb 13.1 oz, BMI 42.70 (Morbidly Obese) Pt LBM: 1/10/24, liquid. K+ WDL during last lab draw. Pt with decreased Phos lab.  Nutrition Discharge Planning: Cardiac    Nutrition Risk Screen    Nutrition Risk Screen: tube feeding or parenteral nutrition    Nutrition/Diet History    Spiritual, Cultural Beliefs, Confucianist Practices, Values that Affect Care:  (MAY)  Food Allergies:  (Latex)  Factors Affecting Nutritional Intake: NPO, on mechanical ventilation    Anthropometrics    Temp: 97.7 °F (36.5 °C)  Height Method: Stated  Height: 6' (182.9 cm)  Height (inches): 72 in  Weight Method: Bed Scale  Weight: (!) 142.8 kg (314 lb 13.1 oz)  Weight (lb): (!) 314.82 lb  Ideal Body Weight (IBW), Male: 178 lb  % Ideal Body Weight, Male (lb): 176.87 %  BMI (Calculated): 42.7  BMI Grade: greater than 40 - morbid obesity     Wt Readings from Last 20 Encounters:   01/10/24 (!) 142.8 kg (314 lb 13.1 oz)   12/12/23 127 kg (279 lb 15.8 oz)   09/12/23 129.8 kg (286 lb 2.5 oz)   07/29/23 133.8 kg (295 lb)   11/12/22 128.2 kg (282 lb 10.1 oz)   08/10/22 124.6 kg (274 lb 11.1 oz)   02/02/22 121.9 kg (268 lb 11.9 oz)   06/18/21 111.3 kg (245 lb 6 oz)   03/29/21 110 kg (242 lb 8.1 oz)   01/27/21 92.5 kg (203 lb 14.8 oz)   12/23/20 98.4 kg (216 lb 14.9 oz)   12/11/20 92.5 kg (203 lb 14.8 oz)   11/21/20 98 kg (216 lb 0.8 oz)   11/10/20 97 kg (213 lb 13.5 oz)   11/03/20 107 kg (235 lb 14.3 oz)   10/28/20 110.3 kg (243 lb 2.7 oz)   10/28/20 110.3 kg (243 lb 2.7 oz)   10/27/20 102 kg (224 lb 13.9 oz)   10/10/20 113.7 kg (250 lb 10.6 oz)   10/07/20 113.7 kg (250 lb 10.6 oz)   ]  Lab/Procedures/Meds  BMP  Lab Results   Component Value Date     (L) 01/10/2024      (L) 01/10/2024    K 4.5 01/10/2024    K 4.5 01/10/2024    CL 94 (L) 01/10/2024    CL 94 (L) 01/10/2024    CO2 16 (L) 01/10/2024    CO2 16 (L) 01/10/2024    BUN 30 (H) 01/10/2024    BUN 30 (H) 01/10/2024    CREATININE 3.0 (H) 01/10/2024    CREATININE 3.0 (H) 01/10/2024    CALCIUM 7.6 (L) 01/10/2024    CALCIUM 7.6 (L) 01/10/2024    ANIONGAP 15 01/10/2024    ANIONGAP 15 01/10/2024    EGFRNORACEVR 25 (A) 01/10/2024    EGFRNORACEVR 25 (A) 01/10/2024     Lab Results   Component Value Date    CALCIUM 7.6 (L) 01/10/2024    CALCIUM 7.6 (L) 01/10/2024    PHOS 1.8 (L) 01/10/2024     Recent Labs   Lab 01/10/24  1222   POCTGLUCOSE 88     Lab Results   Component Value Date    ALT 61 (H) 01/10/2024     (H) 01/10/2024    ALKPHOS 286 (H) 01/10/2024    BILITOT 10.6 (H) 01/10/2024      Pertinent Labs Reviewed: reviewed  Pertinent Medications Reviewed: reviewed  Scheduled Meds:   albuterol-ipratropium  3 mL Nebulization Q6H WAKE    cefTRIAXone (ROCEPHIN) IVPB  1 g Intravenous Q24H    chlorhexidine  15 mL Mouth/Throat BID    famotidine (PF)  20 mg Intravenous Daily    lactulose  30 g Per OG tube TID    linezolid  600 mg Per OG tube Q12H    mupirocin   Nasal BID    rifAXIMin  550 mg Per OG tube BID    sodium chloride 0.9%  1,000 mL Intravenous Once    thiamine (B-1) 100 mg in dextrose 5 % (D5W) 100 mL IVPB  100 mg Intravenous Daily     Continuous Infusions:   sodium chloride 0.9%      fentanyl 150 mcg/hr (01/10/24 1100)    heparin (porcine) in D5W 10 Units/kg/hr (01/10/24 1113)    NORepinephrine bitartrate-D5W 0.48 mcg/kg/min (01/10/24 1100)    propofoL 40 mcg/kg/min (01/10/24 1302)    sodium bicarbonate 150 mEq in sterile water 1,150 mL infusion Stopped (01/09/24 1130)     PRN Meds:.acetaminophen, dextrose 10%, dextrose 10%, glucagon (human recombinant), heparin (PORCINE), heparin (PORCINE), influenza, insulin aspart U-100, magnesium sulfate IVPB, naloxone, ondansetron, oxyCODONE, pneumoc 20-jovita conj-dip cr(PF), promethazine  (PHENERGAN) 25 mg in dextrose 5 % (D5W) 50 mL IVPB, senna-docusate 8.6-50 mg, sodium chloride 0.9%, sodium phosphate 20.01 mmol in dextrose 5 % (D5W) 250 mL IVPB, sodium phosphate 30 mmol in dextrose 5 % (D5W) 250 mL IVPB, sodium phosphate 39.99 mmol in dextrose 5 % (D5W) 250 mL IVPB, traZODone      Estimated/Assessed Needs    Weight Used For Calorie Calculations: (!) 142.8 kg (314 lb 13.1 oz)  Energy Calorie Requirements (kcal): 2599 ([place] [employer] (modified))  Energy Need Method: [facility] (modified)  Protein Requirements: 122-138 (1.5-1.7 g/kg per JENN/CRRT vs Vent/Obese)  Weight Used For Protein Calculations: 80.9 kg (178 lb 6 oz) (IBW used per BMI of 42.70)  Fluid Requirements (mL): 500 + total output     RDA Method (mL): 2599  CHO Requirement: 325      Nutrition Prescription Ordered    Current Diet Order: NPO    Evaluation of Received Nutrient/Fluid Intake    I/O: +9257.6 Since Admit  Energy Calories Required: not meeting needs  Protein Required: not meeting needs  Fluid Required: exceeds needs (Through IV fluids)  Tolerance: not tolerating (NPO/Intubated)  % Intake of Estimated Energy Needs: 0 - 25 %  % Meal Intake: NPO Intubated    Nutrition Risk    Level of Risk/Frequency of Follow-up: high (x2 weekly)       Monitor and Evaluation    Food and Nutrient Intake: energy intake, enteral nutrition intake  Food and Nutrient Adminstration: enteral and parenteral nutrition administration  Anthropometric Measurements: weight, weight change, body mass index  Biochemical Data, Medical Tests and Procedures: electrolyte and renal panel, gastrointestinal profile, glucose/endocrine profile  Nutrition-Focused Physical Findings: overall appearance, extremities, muscles and bones, head and eyes, skin       Nutrition Follow-Up    RD Follow-up?: Yes  Carlos Augustin, Registration Eligible, Provisional LDN

## 2024-01-10 NOTE — PROGRESS NOTES
[facility] - Int[facility] Care ([facility])  Critical Care Medicine  Progress Note    Patient Name: Jordan Altman  MRN: [id number]  Admission Date: 1/4/2024  Hospital Length of Stay: 6 days  Code Status: Full Code  Attending Provider: Jeremiah Santillan MD  Primary Care Provider: Zohra Walters FNP   Principal Problem: PNA (pneumonia)    Subjective:     HPI:  48 year old male with a known past history of alcoholic liver cirrhosis s/p [facility], CKD, PAF (on eliquis) who presented to the ED 1/4 with complaints of shortness of breath, fatigue, intermittent fever over the last 4 weeks. Stated he was dx over a week ago with flu as well. He also complains of intermittent N/V. In the ED, he was noted to be in afib RVR (HR in the 140's), given Cardizem IVP and placed on gtt.  Lab work notable for INR 1.5, Alk phos 169, , ALT 82, .  FLU and covid negative. Chest xray showed new low-grade infiltrate or atypical pneumonia LLL.  Patient was started on Levaquin.  Patient will be admitted to observation for afib rvr/pna.  Cardiology consulted. 01/05/2024: Afib RVR marginally controlled on non-titratable drip. Cardiology consulted, recommendations pending. 01/06/2024: Afib RVR controlled. Echo grossly unremarkable. Still on Cardizem, pending transition to PO by Cardiology. Hepatology recommends continuing Prograf but to hold cellcept due to infection. 01/07/2024: Afib resolved. In SR. Afebrile, clinically unchanged. However, was notably hypotensive this AM. Renal function sharply worsened over the past 24 hours as well. Nephrology consulted for recommendations. 1/8/24: Patient tremulous this morning, with hypothermia, hypotension, hyponatremia, elevated serum creatinine, minimal urine output--> ?  Prograf toxicity versus sepsis,--> ordered urinalysis, urine lytes, chest x-ray, procalcitonin, blood cultures, broaden antibiotics, IV fluids, hold Prograf,--hepatology, Nephrology agreed with the  plan.    Eliquis discontinued, changed to heparin, given worsening GFR, also for possible need for renal biopsy.    Pt transferred to the ICU evening of 1/8 for hyponatremia, resp failure on BIPAP, hypothermia, and septic shock. Upon arrival pt was intubated and CVL was placed for pressor support.     Hospital/ICU Course:  1/9. No improvement in Na despite hydration. Anuric. Worsening hypoxia. Sedated and intermittent paralytics given for vent dyssynchrony during suctioning and episodes of cough while he is on 90% and PEEP 10  1/10 - no acute events  On levo , on fio2 65 peep 10    Interval History: no  acute events  Bloody urine  Remains ill on vent / pressors       Objective:     Vital Signs (Most Recent):  Temp: 97.7 °F (36.5 °C) (01/10/24 1545)  Pulse: 86 (01/10/24 1545)  Resp: (!) 24 (01/10/24 1545)  BP: 122/73 (01/10/24 1500)  SpO2: (!) 93 % (01/10/24 1545) Vital Signs (24h Range):  Temp:  [97.3 °F (36.3 °C)-98.8 °F (37.1 °C)] 97.7 °F (36.5 °C)  Pulse:  [] 86  Resp:  [21-32] 24  SpO2:  [88 %-97 %] 93 %  BP: ()/(51-80) 122/73  Arterial Line BP: ()/(54-85) 96/61     Weight: (!) 142.8 kg (314 lb 13.1 oz)  Body mass index is 42.7 kg/m².      Intake/Output Summary (Last 24 hours) at 1/10/2024 1550  Last data filed at 1/10/2024 1515  Gross per 24 hour   Intake 2390.35 ml   Output 2232 ml   Net 158.35 ml        Physical Exam  Vitals and nursing note reviewed.   Constitutional:       General: He is not in acute distress.     Comments: Sedated on vent   HENT:      Head: Normocephalic and atraumatic.   Eyes:      General:         Right eye: No discharge.         Left eye: No discharge.   Cardiovascular:      Rate and Rhythm: Normal rate and regular rhythm.      Heart sounds: No murmur heard.  Pulmonary:      Effort: No respiratory distress.      Breath sounds: No wheezing or rales.   Abdominal:      Palpations: Abdomen is soft.      Tenderness: There is no abdominal tenderness.   Musculoskeletal:          General: Swelling present. No tenderness.      Cervical back: Neck supple.   Neurological:      Comments: sedated           Review of Systems   Reason unable to perform ROS: on vent.       Vents:  Vent Mode: A/C (01/10/24 1543)  Set Rate: 24 BPM (01/10/24 1543)  Vt Set: 550 mL (01/10/24 1543)  PEEP/CPAP: 10 cmH20 (01/10/24 1543)  Oxygen Concentration (%): 65 (01/10/24 1545)  Peak Airway Pressure: 31 cmH20 (01/10/24 1543)  Plateau Pressure: 20 cmH20 (01/10/24 1543)  Total Ve: 13.5 L/m (01/10/24 1543)  Negative Inspiratory Force (cm H2O): 0 (01/10/24 1543)  F/VT Ratio<105 (RSBI): (!) 42.63 (01/10/24 1543)    Lines/Drains/Airways       Central Venous Catheter Line  Duration             Percutaneous Central Line Insertion/Assessment - Triple Lumen  01/08/24 1815 Internal Jugular Right 1 day    Trialysis (Dialysis) Catheter 01/09/24 1116 left internal jugular 1 day              Drain  Duration                  NG/OG Tube 01/08/24 1811 [facility] [place] [facility] mouth 1 day         Urethral Catheter 01/08/24 1813 Temperature probe 16 Fr. 1 day         Rectal Tube 01/10/24 1015 rectal tube w/ balloon (indicate number of mLs) <1 day              Airway  Duration                  Airway - Non-Surgical 01/08/24 1758 Endotracheal Tube 1 day              Arterial Line  Duration             Arterial Line 01/08/24 2142 Right Radial 1 day              Peripheral Intravenous Line  Duration                  Peripheral IV - Single Lumen 01/04/24 1434 20 G Anterior;Proximal;Left Forearm 6 days                    Significant Labs:    CBC/Anemia Profile:  Recent Labs   Lab 01/08/24  1834 01/09/24  0431 01/10/24  0443   WBC  --  8.71  8.71 9.88   HGB  --  13.1*  13.1* 13.9*   HCT 46 38.8*  38.8* 40.7   PLT  --  125*  125* 92*   MCV  --  101*  101* 97   RDW  --  15.1*  15.1* 16.8*        Chemistries:  Recent Labs   Lab 01/09/24  0431 01/09/24  1250 01/09/24  1728 01/09/24  2349 01/10/24  0443   *  120*   < > 121* 125* 125*  125*    K 4.6  4.6   < > 4.6 4.4 4.5  4.5   CL 90*  90*   < > 90* 94* 94*  94*   CO2 18*  18*   < > 15* 16* 16*  16*   BUN 44*  44*   < > 40* 34* 30*  30*   CREATININE 5.5*  5.5*   < > 4.4* 3.4* 3.0*  3.0*   CALCIUM 7.7*  7.7*   < > 7.6* 7.7* 7.6*  7.6*   ALBUMIN 2.3*  --   --   --  2.0*   PROT 5.3*  --   --   --  5.2*   BILITOT 8.7*  --   --   --  10.6*   ALKPHOS 232*  --   --   --  286*   ALT 63*  --   --   --  61*   *  --   --   --  131*   MG 1.5*  --   --   --  2.1   PHOS  --   --   --   --  1.8*    < > = values in this interval not displayed.       All pertinent labs within the past 24 hours have been reviewed.    Significant Imaging:  I have reviewed all pertinent imaging results/findings within the past 24 hours.    ABG  Recent Labs   Lab 01/10/24  0315   PH 7.330*   PO2 71*   PCO2 41.2   HCO3 21.8*   BE -4*     Assessment/Plan:     Psychiatric  Alcohol use disorder, severe, dependence  Active use  Thiamine     Pulmonary  * PNA (pneumonia)  - see plan for resp failure     1/9   Likely has pneumonia and fluid overload.  Will consider CT post a few episodes of CRRT    Acute respiratory failure with hypoxia and hypercarbia  Patient with Hypercapnic and Hypoxic Respiratory failure which is Acute.  he is not on home oxygen. Supplemental oxygen was provided and noted- Vent Mode: A/C  Oxygen Concentration (%):  [60-85] 65  Resp Rate Total:  [24 br/min-30 br/min] 24 br/min  Vt Set:  [550 mL] 550 mL  PEEP/CPAP:  [10 cmH20] 10 cmH20  Mean Airway Pressure:  [16 flQ40-07 cmH20] 18 cmH20    Signs/symptoms of respiratory failure include- tachypnea, increased work of breathing, and respiratory distress. Contributing diagnoses includes - Pneumonia Labs and images were reviewed. Patient Has recent ABG, which has been reviewed. Will treat underlying causes and adjust management of respiratory failure as follows    - intubated upon arrival to the ICU  - ABG and CXR pending   - vent bundle in place  - collect sputum  culture   - on zyvox and cefepime  - add fungal workup in immunocompromised pt     1/9 Continue Abx.  Serial labs  [place] assay  May consider bronch when on low settings.     1/10 wean as able     Cardiac/Vascular  Chronic diastolic congestive heart failure  Patient is identified as having diastolicheart failure that is Acute on chronic. CHF is currently controlled. Latest ECHO performed and demonstrates- Results for orders placed during the hospital encounter of 01/04/24    Echo    Interpretation Summary    Left Ventricle: The left ventricle is normal in size. Mildly increased ventricular mass. Mildly increased wall thickness. There is concentric hypertrophy. Normal wall motion. There is normal systolic function with a visually estimated ejection fraction of 55 - 60%. There is normal diastolic function.    Right Ventricle: Normal right ventricular cavity size. Wall thickness is normal. Right ventricle wall motion  is normal. Systolic function is normal.    IVC/SVC: Normal venous pressure at 3 mmHg.  . Continue Furosemide and monitor clinical status closely. Monitor on telemetry. Patient is on CHF pathway.  Monitor strict Is&Os and daily weights.  Place on fluid restriction of 1.5 L. Cardiology has been consulted. Continue to stress to patient importance of self efficacy and  on diet for CHF. Last BNP reviewed- and noted below   Recent Labs   Lab 01/04/24  1435   *       Atrial fibrillation with rapid ventricular response  - telemetry  - hold BB while on pressors  - monitor     1/9 Rate controlled  Heparin infusion - follow protocol.  1/10 if bleeding dc , follow labs    Renal/  Hyperkalemia  -Sec to renal failure  -Will repleat labs  -NG in place  -Will initiate Lokelma    1/9 Lactulose and CRRT      Hyponatremia  -On saline  -S/p 2 L boluses  -Repeat labs  -May need CRRT  -Resume heparin  -Anuric / Will likely get worsening of the renal function.     1/9  Needs 3% saline with CRRT.  D/w  nephrology  Agree with placement of trilysis catheter.   D/w brother. Prognosis guarded.  On pressors  Will initiate RRT.      Hematology  Thrombocytopenia  - monitor     GI  Elevated liver enzymes  - trend     S/P liver transplant  - hepatology consulted and following  - follow LFTs/ammonia     1/9.  Hold immunosuppression and do serial levels.   Lactulose and initiate trophic feeding.   Active alcohol abuse  Suprisingly liver function are slightly high. Certain concerns about worsening due to shock;    1/10- watch bili  Seen by hepatology         Chronic Hepatic encephalopathy  1/9 Acute on chronic   Lactulose   Rifaximin as well           Critical Care Time: 33 minutes  Critical secondary to Patient has a condition that poses threat to life and bodily function: vent / levo/ sedation       Critical care was time spent personally by me on the following activities: development of treatment plan with patient or surrogate and bedside caregivers, discussions with consultants, evaluation of patient's response to treatment, examination of patient, ordering and performing treatments and interventions, ordering and review of laboratory studies, ordering and review of radiographic studies, pulse oximetry, re-evaluation of patient's condition. This critical care time did not overlap with that of any other provider or involve time for any procedures.     Elisha Moya MD  Critical Care Medicine  [facility] - Intensive Care ([facility])

## 2024-01-10 NOTE — ASSESSMENT & PLAN NOTE
- telemetry  - hold BB while on pressors  - monitor     1/9 Rate controlled  Heparin infusion - follow protocol.  1/10 if bleeding dc , follow labs

## 2024-01-10 NOTE — PLAN OF CARE
CRRT: CVVHD 4K in use    Uncomplicated/complicated: stable    Ultrafiltration volume: Total UF 107cc/hr    Notes: Tolerating, Lines secure, Supplies at bedside.

## 2024-01-10 NOTE — PLAN OF CARE
Titrated levophed drip to maintain MAP of 65 mmhg.   Was able to put some urine beginning 3 am, please see documentation.  Sedation maximized to promote ventilator synchrony and optimal patient comfort.  No bleeding noted.

## 2024-01-11 NOTE — CONSULTS
[facility] S[facility]  Nephrology  Consult Note    Patient Name: Jordan Altman  MRN: [id number]  Admission Date: 1/4/2024  Hospital Length of Stay: 7 days  Attending Provider: Jeremiah Santillan MD   Primary Care Physician: Zohra Walters FNP  Principal Problem:PNA (pneumonia)    Consults  Subjective:     HPI:  48-year-old male with history of end-stage liver disease status post transplant in 2020.  Admitted with pneumonia.  Noted to have an acute rise in creatinine over the past several days.  Nephrology has been consulted for evaluation.  The patient was seen in his hospital room.  In bed resting comfortably.  No acute distress noted.  He relates that he has had decreased p.o. intake for the past several days as well as intermittent nausea.  He feels like his mouth is dry and that he feels dehydrated.  No diarrhea related.    On admission his creatinine was 1.1.  Over the past several days it increased from 1.6-2.9 now 4.1 this morning.    01/08/2024:  Patient was seen in his hospital room.  In bed resting comfortably.  No acute distress noted.  He is however somewhat tremulous on exam.  No new complaints from overnight.  States that he has had very little urine output and does not have the urge to go to urinate.    01/09/2024:  Events from yesterday evening and last night reviewed.  Patient was transferred to the intensive care unit and intubated.  He is currently requiring pressor support for hypotension.  Patient was seen in the ICU.  His brother was at the bedside.  All nephrology related questions were answered to his satisfaction.    01/10/2024:  Patient was seen in the intensive care unit.  Remains intubated and sedated.  On CRRT running smoothly.    01/11/2024:  Patient was seen in the ICU.  In bed resting comfortably.  Remains intubated and sedated.  CRRT is running smoothly.    Past Medical History:   Diagnosis Date    Decompensated hepatic cirrhosis     HCV acquired through organ donation, treated  / cured     svr12 - 6/2021    Hypertension     SBP (spontaneous bacterial peritonitis)        Past Surgical History:   Procedure Laterality Date    ERCP N/A 10/1/2020    Procedure: ERCP (ENDOSCOPIC RETROGRADE CHOLANGIOPANCREATOGRAPHY);  Surgeon: Marcos Ramirez MD;  Location: [facility] (2ND FLR);  Service: Endoscopy;  Laterality: N/A;    ESOPHAGOGASTRODUODENOSCOPY N/A 10/13/2020    Procedure: EGD (ESOPHAGOGASTRODUODENOSCOPY);  Surgeon: Prasanth Jerry MD;  Location: [facility] (2ND FLR);  Service: Endoscopy;  Laterality: N/A;    EXPLORATORY LAPAROTOMY AFTER LIVER TRANSPLANTATION N/A 10/15/2020    Procedure: LAPAROTOMY, EXPLORATORY, AFTER LIVER TRANSPLANT, Possible redo of artery, possible portal thrombectomy. IntraOperative US.;  Surgeon: Curtis Zavaleta MD;  [facility];  Service: Transplant;  Laterality: N/A;  With intraoperative ultrasound    LIVER TRANSPLANT N/A 10/14/2020    Procedure: TRANSPLANT, LIVER;  Surgeon: Curtis Zavaleta MD;  [facility];  Service: Transplant;  Laterality: N/A;  Intra op HD    RIGHT HEART CATHETERIZATION Right 9/23/2020    Procedure: INSERTION, CATHETER, RIGHT HEART;  Surgeon: Mikel Costa Jr., MD;  Location: [facility] CATH LAB;  Service: Cardiology;  Laterality: Right;    THORACENTESIS  2011    THROMBECTOMY  10/15/2020    Procedure: THROMBECTOMY portal vein;  Surgeon: Curtis Zavaleta MD;  [facility];  Service: Transplant;;       Review of patient's allergies indicates:   Allergen Reactions    Latex, natural rubber      Current Facility-Administered Medications   Medication Frequency    0.9% NaCl infusion (CRRT USE ONLY) Continuous    acetaminophen oral solution 650 mg Q4H PRN    albuterol-ipratropium 2.5 mg-0.5 mg/3 mL nebulizer solution 3 mL Q6H WAKE    chlorhexidine 0.12 % solution 15 mL BID    dextrose 10% bolus 125 mL 125 mL PRN    dextrose 10% bolus 250 mL 250 mL PRN    famotidine (PF) injection 20 mg Daily    fentaNYL 2500 mcg in  0.9% sodium chloride 250 mL infusion premix (titrating) Continuous    folic acid 1 mg in dextrose 5 % (D5W) 100 mL IVPB Once    glucagon (human recombinant) injection 1 mg PRN    influenza (QUADRIVALENT PF) vaccine 0.5 mL vaccine x 1 dose    insulin aspart U-100 pen 0-10 Units Q6H PRN    lactulose 20 gram/30 mL solution Soln 30 g TID    magnesium sulfate 2g in water 50mL IVPB (premix) PRN    micafungin 100 mg in sodium chloride 0.9 % 100 mL IVPB (MB+) Q24H    mupirocin 2 % ointment BID    naloxone 0.4 mg/mL injection 0.02 mg PRN    NORepinephrine 32 mg in dextrose 5 % (D5W) 250 mL infusion Continuous    ondansetron disintegrating tablet 8 mg Q6H PRN    oxyCODONE immediate release tablet 5 mg Q6H PRN    pneumoc 20-jovita conj-dip cr(PF) (PREVNAR-20 (PF)) injection Syrg 0.5 mL vaccine x 1 dose    promethazine (PHENERGAN) 25 mg in dextrose 5 % (D5W) 50 mL IVPB Q6H PRN    propofol (DIPRIVAN) 10 mg/mL infusion Continuous    rifAXIMin tablet 550 mg BID    senna-docusate 8.6-50 mg per tablet 1 tablet Daily PRN    sodium bicarbonate 150 mEq in sterile water 1,150 mL infusion Continuous    sodium chloride 0.9% bolus 1,000 mL 1,000 mL Once    sodium chloride 0.9% flush 10 mL Q12H PRN    sodium phosphate 20.01 mmol in dextrose 5 % (D5W) 250 mL IVPB PRN    sodium phosphate 30 mmol in dextrose 5 % (D5W) 250 mL IVPB PRN    sodium phosphate 39.99 mmol in dextrose 5 % (D5W) 250 mL IVPB PRN    thiamine (B-1) 100 mg in dextrose 5 % (D5W) 100 mL IVPB Daily    traZODone tablet 50 mg Nightly PRN     Family History       Problem Relation (Age of Onset)    COPD Mother          Tobacco Use    Smoking status: Some Days    Smokeless tobacco: Current   Substance and Sexual Activity    Alcohol use: Yes     Alcohol/week: 112.0 standard drinks of alcohol     Types: 112 Shots of liquor per week     Comment: fifth of vodka a day. LAST DRINK 7/25/2020 per pt     Drug use: Yes     Frequency: 3.0 times per week     Types: Marijuana    Sexual activity:  Yes     Review of Systems   Constitutional: Negative.    HENT: Negative.     Respiratory: Negative.     Cardiovascular: Negative.    Gastrointestinal:  Positive for nausea.   Genitourinary: Negative.    Musculoskeletal: Negative.    Skin: Negative.      Objective:     Vital Signs (Most Recent):  Temp: 96.6 °F (35.9 °C) (01/11/24 1045)  Pulse: 78 (01/11/24 1045)  Resp: (!) 24 (01/11/24 1045)  BP: 114/72 (01/11/24 1000)  SpO2: 100 % (01/11/24 1045) Vital Signs (24h Range):  Temp:  [96.3 °F (35.7 °C)-97.9 °F (36.6 °C)] 96.6 °F (35.9 °C)  Pulse:  [68-98] 78  Resp:  [24-25] 24  SpO2:  [91 %-100 %] 100 %  BP: ()/(51-86) 114/72  Arterial Line BP: ()/(47-91) 130/73     Weight: (!) 142.8 kg (314 lb 13.1 oz) (01/10/24 1222)  Body mass index is 42.7 kg/m².  Body surface area is 2.69 meters squared.    I/O last 3 completed shifts:  In: 3599.3 [I.V.:3008; NG/GT:50; IV Piggyback:541.3]  Out: 6245 [Urine:720; Other:4465; Stool:1060]    Physical Exam  Constitutional:       Appearance: Normal appearance.   HENT:      Head: Normocephalic and atraumatic.   Eyes:      General: No scleral icterus.     Extraocular Movements: Extraocular movements intact.      Pupils: Pupils are equal, round, and reactive to light.   Cardiovascular:      Rate and Rhythm: Tachycardia present. Rhythm irregular.   Pulmonary:      Effort: Pulmonary effort is normal.      Breath sounds: No stridor.   Musculoskeletal:      Right lower leg: Edema present.      Left lower leg: Edema present.   Skin:     General: Skin is warm and dry.   Neurological:      General: No focal deficit present.      Mental Status: He is alert and oriented to person, place, and time.   Psychiatric:         Mood and Affect: Mood normal.         Behavior: Behavior normal.         Significant Labs:  BMP:   Recent Labs   Lab 01/11/24  0415   GLU 87  87   *  130*   K 4.2  4.2   CL 99  99   CO2 17*  17*   BUN 19  19   CREATININE 1.7*  1.7*   CALCIUM 7.6*  7.7*   MG  2.3       CMP:   Recent Labs   Lab 01/11/24  0415   GLU 87  87   CALCIUM 7.6*  7.7*   ALBUMIN 1.8*  1.8*   PROT 5.1*   *  130*   K 4.2  4.2   CO2 17*  17*   CL 99  99   BUN 19  19   CREATININE 1.7*  1.7*   ALKPHOS 382*   ALT 55*   *   BILITOT 10.7*       All labs within the past 24 hours have been reviewed.    Significant Imaging:  Labs: Reviewed      Assessment/Plan:     Active Diagnoses:    Diagnosis Date Noted POA    PRINCIPAL PROBLEM:  PNA (pneumonia) [J18.9] 01/04/2024 Yes    Acute respiratory failure with hypoxia and hypercarbia [J96.01, J96.02] 01/08/2024 No    Hyperkalemia [E87.5] 01/08/2024 Yes    Chest pain [R07.9] 01/05/2024 No    Elevated liver enzymes [R74.8] 01/05/2024 Yes    Chronic diastolic congestive heart failure [I50.32] 01/05/2024 Yes    Atrial fibrillation with rapid ventricular response [I48.91] 07/30/2023 Yes    S/P liver transplant [Z94.4] 10/14/2020 Not Applicable    Alcohol use disorder, severe, dependence [F10.20] 09/21/2020 Yes     Chronic    Thrombocytopenia [D69.6] 09/19/2020 Yes    Hyponatremia [E87.1] 09/19/2020 Yes    Chronic Hepatic encephalopathy [K76.82] 09/19/2020 Yes      Problems Resolved During this Admission:       Assessment and plan:    1. JENN:  On CRRT, running smoothly.  Will increase ultrafiltration to 75 cc an hour net.    2. CKD 2: Baseline creatinine is around 1.1.      3. Electrolytes:  Potassium is stable at 4.2    4. Acid-base: Serum bicarbonate remains low but has increased slightly to 17.    5. Volume:  He was nearly even yesterday for eyes nose.  He does have some lower extremity edema developing.  Will try to increase ultrafiltration slowly.        Thank you for your consult.     Cm Camacho MD  Nephrology  [facility] - Med S[facility]

## 2024-01-11 NOTE — PLAN OF CARE
OETT to vent at documented settings. Pt sedated via prop and fent drips, see MAR; at RASS goal. Bilateral soft wrist restraints in place with current order; no injuries noted. BP supported by levo drip, see MAR. Duran in place; minimal blood tinged UOP; MD's aware. CRRT ongoing as ordered; UF increased to net 75cc/hour; well-tolerated. Flexi in place draining brown, liquid stool. Tube feedings initiated per order. Pt's wife updated on pt status and plan of care.

## 2024-01-11 NOTE — ASSESSMENT & PLAN NOTE
- see plan for resp failure     1/9   Likely has pneumonia and fluid overload.  Will consider CT post a few episodes of CRRT  1/10 completed antibiotics

## 2024-01-11 NOTE — PROGRESS NOTES
[facility] - Intensive Care ([facility])  Critical Care Medicine  Progress Note    Patient Name: Jordan Altman  MRN: [id number]  Admission Date: 1/4/2024  Hospital Length of Stay: 7 days  Code Status: Full Code  Attending Provider: Jeremiah Santillan MD  Primary Care Provider: Zohra Walters FNP   Principal Problem: PNA (pneumonia)    Subjective:     HPI:  48 year old male with a known past history of alcoholic liver cirrhosis s/p [facility], CKD, PAF (on eliquis) who presented to the ED 1/4 with complaints of shortness of breath, fatigue, intermittent fever over the last 4 weeks. Stated he was dx over a week ago with flu as well. He also complains of intermittent N/V. In the ED, he was noted to be in afib RVR (HR in the 140's), given Cardizem IVP and placed on gtt.  Lab work notable for INR 1.5, Alk phos 169, , ALT 82, .  FLU and covid negative. Chest xray showed new low-grade infiltrate or atypical pneumonia LLL.  Patient was started on Levaquin.  Patient will be admitted to observation for afib rvr/pna.  Cardiology consulted. 01/05/2024: Afib RVR marginally controlled on non-titratable drip. Cardiology consulted, recommendations pending. 01/06/2024: Afib RVR controlled. Echo grossly unremarkable. Still on Cardizem, pending transition to PO by Cardiology. Hepatology recommends continuing Prograf but to hold cellcept due to infection. 01/07/2024: Afib resolved. In SR. Afebrile, clinically unchanged. However, was notably hypotensive this AM. Renal function sharply worsened over the past 24 hours as well. Nephrology consulted for recommendations. 1/8/24: Patient tremulous this morning, with hypothermia, hypotension, hyponatremia, elevated serum creatinine, minimal urine output--> ?  Prograf toxicity versus sepsis,--> ordered urinalysis, urine lytes, chest x-ray, procalcitonin, blood cultures, broaden antibiotics, IV fluids, hold Prograf,--hepatology, Nephrology agreed with the  plan.    Eliquis discontinued, changed to heparin, given worsening GFR, also for possible need for renal biopsy.    Pt transferred to the ICU evening of 1/8 for hyponatremia, resp failure on BIPAP, hypothermia, and septic shock. Upon arrival pt was intubated and CVL was placed for pressor support.     Hospital/ICU Course:  1/9. No improvement in Na despite hydration. Anuric. Worsening hypoxia. Sedated and intermittent paralytics given for vent dyssynchrony during suctioning and episodes of cough while he is on 90% and PEEP 10  1/10 - no acute events  On levo , on fio2 65 peep 10  1/11- on levo   Peep 10     Interval History: no acute events        Objective:     Vital Signs (Most Recent):  Temp: 96.3 °F (35.7 °C) (01/11/24 1245)  Pulse: 80 (01/11/24 1245)  Resp: (!) 24 (01/11/24 1245)  BP: 119/69 (01/11/24 1200)  SpO2: 100 % (01/11/24 1245) Vital Signs (24h Range):  Temp:  [96.3 °F (35.7 °C)-97.7 °F (36.5 °C)] 96.3 °F (35.7 °C)  Pulse:  [68-93] 80  Resp:  [24-29] 24  SpO2:  [92 %-100 %] 100 %  BP: ()/(51-86) 119/69  Arterial Line BP: ()/(47-91) 108/67     Weight: (!) 142.8 kg (314 lb 13.1 oz)  Body mass index is 42.7 kg/m².      Intake/Output Summary (Last 24 hours) at 1/11/2024 1325  Last data filed at 1/11/2024 1200  Gross per 24 hour   Intake 2564.72 ml   Output 4713 ml   Net -2148.28 ml        Physical Exam  Vitals and nursing note reviewed.   Constitutional:       General: He is not in acute distress.     Appearance: He is ill-appearing.   Eyes:      General:         Right eye: No discharge.         Left eye: No discharge.   Cardiovascular:      Rate and Rhythm: Normal rate and regular rhythm.   Pulmonary:      Effort: No respiratory distress.      Breath sounds: No wheezing or rales.   Abdominal:      General: There is distension.      Palpations: Abdomen is soft.      Tenderness: There is no abdominal tenderness.   Musculoskeletal:         General: Swelling present. No tenderness.      Cervical  back: Neck supple.   Neurological:      Comments: On vent           Review of Systems   Reason unable to perform ROS: on vent.       Vents:  Vent Mode: A/C (01/11/24 1123)  Set Rate: 24 BPM (01/11/24 1123)  Vt Set: 550 mL (01/11/24 1123)  PEEP/CPAP: 8 cmH20 (01/11/24 1123)  Oxygen Concentration (%): 50 (01/11/24 1245)  Peak Airway Pressure: 26 cmH20 (01/11/24 1123)  Plateau Pressure: 20 cmH20 (01/11/24 1123)  Total Ve: 13.1 L/m (01/11/24 1123)  Negative Inspiratory Force (cm H2O): 0 (01/11/24 1105)  F/VT Ratio<105 (RSBI): (!) 50.63 (01/11/24 1123)    Lines/Drains/Airways       Central Venous Catheter Line  Duration             Percutaneous Central Line Insertion/Assessment - Triple Lumen  01/08/24 1815 Internal Jugular Right 2 days    Trialysis (Dialysis) Catheter 01/09/24 1116 left internal jugular 2 days              Drain  Duration                  NG/OG Tube 01/08/24 1811 [place] sump Center mouth 2 days         Urethral Catheter 01/08/24 1813 Temperature probe 16 Fr. 2 days         Rectal Tube 01/10/24 1015 rectal tube w/ balloon (indicate number of mLs) 1 day              Airway  Duration                  Airway - Non-Surgical 01/08/24 1758 Endotracheal Tube 2 days              Arterial Line  Duration             Arterial Line 01/08/24 2142 Right Radial 2 days              Peripheral Intravenous Line  Duration                  Peripheral IV - Single Lumen 01/04/24 1434 20 G Anterior;Proximal;Left Forearm 6 days                    Significant Labs:    CBC/Anemia Profile:  Recent Labs   Lab 01/10/24  0443 01/10/24  0803 01/11/24  0415   WBC 9.88  --  9.02   HGB 13.9*  --  14.2   HCT 40.7  --  41.5   PLT 92*  --  81*   MCV 97  --  99*   RDW 16.8*  --  17.5*   FOLATE  --  <2.2*  --         Chemistries:  Recent Labs   Lab 01/10/24  0443 01/10/24  1528 01/10/24  2147 01/11/24  0415   *  125* 128* 130* 131*  130*   K 4.5  4.5 4.4 4.3 4.2  4.2   CL 94*  94* 96 97 99  99   CO2 16*  16* 18* 18* 17*  17*    BUN 30*  30* 23* 20 19  19   CREATININE 3.0*  3.0* 2.1* 2.0* 1.7*  1.7*   CALCIUM 7.6*  7.6* 7.6* 7.8* 7.6*  7.7*   ALBUMIN 2.0* 1.9* 1.9* 1.8*  1.8*   PROT 5.2*  --   --  5.1*   BILITOT 10.6*  --   --  10.7*   ALKPHOS 286*  --   --  382*   ALT 61*  --   --  55*   *  --   --  144*   MG 2.1 2.3 2.2 2.3   PHOS 1.8* 3.9 3.0 2.3*  2.3*       All pertinent labs within the past 24 hours have been reviewed.    Significant Imaging:  I have reviewed all pertinent imaging results/findings within the past 24 hours.    ABG  Recent Labs   Lab 01/11/24  0423   PH 7.370   PO2 76*   PCO2 37.1   HCO3 21.5*   BE -4*     Assessment/Plan:     Psychiatric  Alcohol use disorder, severe, dependence  Active use  Thiamine / folate    Pulmonary  * PNA (pneumonia)  - see plan for resp failure     1/9   Likely has pneumonia and fluid overload.  Will consider CT post a few episodes of CRRT  1/10 completed antibiotics       Acute respiratory failure with hypoxia and hypercarbia  Patient with Hypercapnic and Hypoxic Respiratory failure which is Acute.  he is not on home oxygen. Supplemental oxygen was provided and noted- Vent Mode: A/C  Oxygen Concentration (%):  [] 50  Resp Rate Total:  [23 br/min-26 br/min] 24 br/min  Vt Set:  [550 mL] 550 mL  PEEP/CPAP:  [8 cmH20-10 cmH20] 8 cmH20  Mean Airway Pressure:  [15 suJ74-56 cmH20] 15 cmH20    Signs/symptoms of respiratory failure include- tachypnea, increased work of breathing, and respiratory distress. Contributing diagnoses includes - Pneumonia Labs and images were reviewed. Patient Has recent ABG, which has been reviewed. Will treat underlying causes and adjust management of respiratory failure as follows    - intubated upon arrival to the ICU  - ABG and CXR pending   - vent bundle in place  - collect sputum culture   - on zyvox and cefepime  - add fungal workup in immunocompromised pt     1/9 Continue Abx.  Serial labs  [place] assay  May consider bronch when on low  settings.     1/10 wean as able   1/11 wean 02 and peep     Cardiac/Vascular  Chronic diastolic congestive heart failure  Patient is identified as having diastolicheart failure that is Acute on chronic. CHF is currently controlled. Latest ECHO performed and demonstrates- Results for orders placed during the hospital encounter of 01/04/24    Echo    Interpretation Summary    Left Ventricle: The left ventricle is normal in size. Mildly increased ventricular mass. Mildly increased wall thickness. There is concentric hypertrophy. Normal wall motion. There is normal systolic function with a visually estimated ejection fraction of 55 - 60%. There is normal diastolic function.    Right Ventricle: Normal right ventricular cavity size. Wall thickness is normal. Right ventricle wall motion  is normal. Systolic function is normal.    IVC/SVC: Normal venous pressure at 3 mmHg.  . Continue Furosemide and monitor clinical status closely. Monitor on telemetry. Patient is on CHF pathway.  Monitor strict Is&Os and daily weights.  Place on fluid restriction of 1.5 L. Cardiology has been consulted. Continue to stress to patient importance of self efficacy and  on diet for CHF. Last BNP reviewed- and noted below   Recent Labs   Lab 01/04/24  1435   *       Atrial fibrillation with rapid ventricular response  - telemetry  - hold BB while on pressors  - monitor     1/9 Rate controlled  Heparin infusion - follow protocol.  1/10 if bleeding dc , follow labs    Renal/  Hyperkalemia  -Sec to renal failure  -Will repleat labs  -NG in place  -Will initiate Lokelma    1/9 Lactulose and CRRT      Hyponatremia  -On saline  -S/p 2 L boluses  -Repeat labs  -May need CRRT  -Resume heparin  -Anuric / Will likely get worsening of the renal function.     1/9  Needs 3% saline with CRRT.  D/w nephrology  Agree with placement of trilysis catheter.   D/w brother. Prognosis guarded.  On pressors  Will initiate  RRT.      Hematology  Thrombocytopenia  - monitor     GI  Elevated liver enzymes  - trend     S/P liver transplant  - hepatology consulted and following  - follow LFTs/ammonia     1/9.  Hold immunosuppression and do serial levels.   Lactulose and initiate trophic feeding.   Active alcohol abuse  Suprisingly liver function are slightly high. Certain concerns about worsening due to shock;    1/10- watch bili  Seen by hepatology   1/11 per hepatology  awaiting prograf        Chronic Hepatic encephalopathy  1/9 Acute on chronic   Lactulose   Rifaximin          Critical Care Time: 33 minutes  Critical secondary to Patient has a condition that poses threat to life and bodily function: vent / sedation / pressors       Critical care was time spent personally by me on the following activities: development of treatment plan with patient or surrogate and bedside caregivers, discussions with consultants, evaluation of patient's response to treatment, examination of patient, ordering and performing treatments and interventions, ordering and review of laboratory studies, ordering and review of radiographic studies, pulse oximetry, re-evaluation of patient's condition. This critical care time did not overlap with that of any other provider or involve time for any procedures.     Elisha Moya MD  Critical Care Medicine  [facility] - Intensive Care ([facility])

## 2024-01-11 NOTE — PLAN OF CARE
[facility] - Intensive Care (Hospital)  Discharge Reassessment    Primary Care Provider: Zohra Walters FNP    Expected Discharge Date:     Reassessment (most recent)       Discharge Reassessment - 01/11/24 1500          Discharge Reassessment    Assessment Type Discharge Planning Reassessment     Did the patient's condition or plan change since previous assessment? Yes     Communicated JACK with patient/caregiver Date not available/Unable to determine     Discharge Plan A Other   TBD    Transition of Care Barriers Does not adhere to care plan     Why the patient remains in the hospital Requires continued medical care                   Discharge planning reassessment complete. Patient remains hospitalized in critical condition. Prognosis undetermined due to clinical status. Currently intubated for hypoxia. On levophed. On CRRT.

## 2024-01-11 NOTE — SUBJECTIVE & OBJECTIVE
Interval History: no acute events        Objective:     Vital Signs (Most Recent):  Temp: 96.3 °F (35.7 °C) (01/11/24 1245)  Pulse: 80 (01/11/24 1245)  Resp: (!) 24 (01/11/24 1245)  BP: 119/69 (01/11/24 1200)  SpO2: 100 % (01/11/24 1245) Vital Signs (24h Range):  Temp:  [96.3 °F (35.7 °C)-97.7 °F (36.5 °C)] 96.3 °F (35.7 °C)  Pulse:  [68-93] 80  Resp:  [24-29] 24  SpO2:  [92 %-100 %] 100 %  BP: ()/(51-86) 119/69  Arterial Line BP: ()/(47-91) 108/67     Weight: (!) 142.8 kg (314 lb 13.1 oz)  Body mass index is 42.7 kg/m².      Intake/Output Summary (Last 24 hours) at 1/11/2024 1325  Last data filed at 1/11/2024 1200  Gross per 24 hour   Intake 2564.72 ml   Output 4713 ml   Net -2148.28 ml        Physical Exam  Vitals and nursing note reviewed.   Constitutional:       General: He is not in acute distress.     Appearance: He is ill-appearing.   Eyes:      General:         Right eye: No discharge.         Left eye: No discharge.   Cardiovascular:      Rate and Rhythm: Normal rate and regular rhythm.   Pulmonary:      Effort: No respiratory distress.      Breath sounds: No wheezing or rales.   Abdominal:      General: There is distension.      Palpations: Abdomen is soft.      Tenderness: There is no abdominal tenderness.   Musculoskeletal:         General: Swelling present. No tenderness.      Cervical back: Neck supple.   Neurological:      Comments: On vent           Review of Systems   Reason unable to perform ROS: on vent.       Vents:  Vent Mode: A/C (01/11/24 1123)  Set Rate: 24 BPM (01/11/24 1123)  Vt Set: 550 mL (01/11/24 1123)  PEEP/CPAP: 8 cmH20 (01/11/24 1123)  Oxygen Concentration (%): 50 (01/11/24 1245)  Peak Airway Pressure: 26 cmH20 (01/11/24 1123)  Plateau Pressure: 20 cmH20 (01/11/24 1123)  Total Ve: 13.1 L/m (01/11/24 1123)  Negative Inspiratory Force (cm H2O): 0 (01/11/24 1105)  F/VT Ratio<105 (RSBI): (!) 50.63 (01/11/24 1123)    Lines/Drains/Airways       Central Venous Catheter Line   Duration             Percutaneous Central Line Insertion/Assessment - Triple Lumen  01/08/24 1815 Internal Jugular Right 2 days    Trialysis (Dialysis) Catheter 01/09/24 1116 left internal jugular 2 days              Drain  Duration                  NG/OG Tube 01/08/24 1811 [facility] mouth 2 days         Urethral Catheter 01/08/24 1813 Temperature probe 16 Fr. 2 days         Rectal Tube 01/10/24 1015 rectal tube w/ balloon (indicate number of mLs) 1 day              Airway  Duration                  Airway - Non-Surgical 01/08/24 1758 Endotracheal Tube 2 days              Arterial Line  Duration             Arterial Line 01/08/24 2142 Right Radial 2 days              Peripheral Intravenous Line  Duration                  Peripheral IV - Single Lumen 01/04/24 1434 20 G Anterior;Proximal;Left Forearm 6 days                    Significant Labs:    CBC/Anemia Profile:  Recent Labs   Lab 01/10/24  0443 01/10/24  0803 01/11/24  0415   WBC 9.88  --  9.02   HGB 13.9*  --  14.2   HCT 40.7  --  41.5   PLT 92*  --  81*   MCV 97  --  99*   RDW 16.8*  --  17.5*   FOLATE  --  <2.2*  --         Chemistries:  Recent Labs   Lab 01/10/24  0443 01/10/24  1528 01/10/24  2147 01/11/24  0415   *  125* 128* 130* 131*  130*   K 4.5  4.5 4.4 4.3 4.2  4.2   CL 94*  94* 96 97 99  99   CO2 16*  16* 18* 18* 17*  17*   BUN 30*  30* 23* 20 19  19   CREATININE 3.0*  3.0* 2.1* 2.0* 1.7*  1.7*   CALCIUM 7.6*  7.6* 7.6* 7.8* 7.6*  7.7*   ALBUMIN 2.0* 1.9* 1.9* 1.8*  1.8*   PROT 5.2*  --   --  5.1*   BILITOT 10.6*  --   --  10.7*   ALKPHOS 286*  --   --  382*   ALT 61*  --   --  55*   *  --   --  144*   MG 2.1 2.3 2.2 2.3   PHOS 1.8* 3.9 3.0 2.3*  2.3*       All pertinent labs within the past 24 hours have been reviewed.    Significant Imaging:  I have reviewed all pertinent imaging results/findings within the past 24 hours.

## 2024-01-11 NOTE — PROGRESS NOTES
[facility] S[facility]  Hepatology  Consult Note    Patient Name: Jordan Altman  MRN: [id number]  Admission Date: 1/4/2024  Hospital Length of Stay: 7 days  Attending Provider: Jeremiah Santillan MD   Primary Care Physician: Zohra Walters FNP  Principal Problem:PNA (pneumonia)    Consults  Subjective:     Transplant status: Post-transplant    HPI: Mr. Altman is a 48 year old male with a history of  alcoholic liver cirrhosis s/p liver transplant [facility] 10/20, CKD, PAF(on eliquis) admitted with Pneumonia. Hepatology consulted for Post transplant status.      In the ED he was noted to be in afib RVR (HR in the 140's), given Cardizem IVP and placed on gtt.      Lab work notable for INR 1.5, Alk phos 169, , ALT 82, . Chest xray showed new low-grade infiltrate or atypical pneumonia LLL.  Patient  on Levaquin.      Transplant Date: 10/14/2020  UNOS Native Liver Dx: Alcoholic Cirrhosis     Jordan is here for follow up of his liver transplant.     ORGAN: LIVER  Whole or Partial: whole liver  Donor Type: donation after brain death  CDC High Risk: yes  Donor CMV Status: Positive  Donor HCV Status: Positive  Donor HBcAb: Negative  Biliary Anastomosis: end to end  Arterial Anatomy: standard  IVC reconstruction: end to end ivc  Portal vein status: patent    Interval history  SOB is better  Afib RVR controlled. Echo unremarkable.   CXR  atypical pneumonia involving the left lung        1/7--Afib controlled. Hypotensive. LFT trending down. Still  req. 2 L O2. Worsening JENN    1/8--Worsening JENN and Hyperkalemia       1/9--Patient was transferred to the intensive care unit and intubated for hypoxia.  He is currently requiring pressor support for hypotension.  Discussed with primary team regarding Cardizem likely causing Prograf metabolism inhibition. Prograf on Hold    1/10- Continue to be on pressor support and intubated    1/11--  Remains intubated and sedated.  On CRRT     Review of Systems  Patient intubated and  sedated    Past Medical History:   Diagnosis Date    Decompensated hepatic cirrhosis     HCV acquired through organ donation, treated / cured     svr12 - 6/2021    Hypertension     SBP (spontaneous bacterial peritonitis)        Past Surgical History:   Procedure Laterality Date    ERCP N/A 10/1/2020    Procedure: ERCP (ENDOSCOPIC RETROGRADE CHOLANGIOPANCREATOGRAPHY);  Surgeon: Marcos Ramirez MD;  [facility]  Service: Endoscopy;  Laterality: N/A;    ESOPHAGOGASTRODUODENOSCOPY N/A 10/13/2020    Procedure: EGD (ESOPHAGOGASTRODUODENOSCOPY);  Surgeon: Prasanth Jerry MD;  [facility]  Service: Endoscopy;  Laterality: N/A;    EXPLORATORY LAPAROTOMY AFTER LIVER TRANSPLANTATION N/A 10/15/2020    Procedure: LAPAROTOMY, EXPLORATORY, AFTER LIVER TRANSPLANT, Possible redo of artery, possible portal thrombectomy. IntraOperative US.;  Surgeon: Curtis Zavaleta MD;  Location: [facility] [place] [address] [place] [address];  Service: Transplant;  Laterality: N/A;  With intraoperative ultrasound    LIVER TRANSPLANT N/A 10/14/2020    Procedure: TRANSPLANT, LIVER;  Surgeon: Curtis Zavaleta MD;  Location: [facility] OR [address] [place] [address];  Service: Transplant;  Laterality: N/A;  Intra op HD    RIGHT HEART CATHETERIZATION Right 9/23/2020    Procedure: INSERTION, CATHETER, RIGHT HEART;  Surgeon: Mikel Costa Jr., MD;  Location: [facility] CATH LAB;  Service: Cardiology;  Laterality: Right;    THORACENTESIS  2011    THROMBECTOMY  10/15/2020    Procedure: THROMBECTOMY portal vein;  Surgeon: Curtis Zavaleta MD;  Location: [facility] OR [address] [place] [address];  Service: Transplant;;       Family history of liver disease: No    Review of patient's allergies indicates:   Allergen Reactions    Latex, natural rubber          Tobacco Use    Smoking status: Some Days    Smokeless tobacco: Current   Substance and Sexual Activity    Alcohol use: Yes     Alcohol/week: 112.0 standard drinks of alcohol     Types: 112 Shots of liquor per week     Comment: fifth of vodka a day.  LAST DRINK 7/25/2020 per pt     Drug use: Yes     Frequency: 3.0 times per week     Types: Marijuana    Sexual activity: Yes       Facility-Administered Medications Prior to Admission   Medication Dose Route Frequency Provider Last Rate Last Admin    acetaminophen tablet 650 mg  650 mg Oral Once PRN Leo Ghotra MD        albuterol inhaler 2 puff  2 puff Inhalation Q20 Min PRN Leo Ghotra MD        [DISCONTINUED] diphenhydrAMINE injection 25 mg  25 mg Intravenous Once PRN Leo Ghotra MD        [DISCONTINUED] EPINEPHrine (EPIPEN) 0.3 mg/0.3 mL pen injection 0.3 mg  0.3 mg Intramuscular PRN Leo Ghotra MD        [DISCONTINUED] methylPREDNISolone sodium succinate injection 40 mg  40 mg Intravenous Once PRN Leo Ghotra MD        [DISCONTINUED] ondansetron disintegrating tablet 4 mg  4 mg Oral Once PRN Leo Ghotra MD        [DISCONTINUED] sodium chloride 0.9% 500 mL flush bag   Intravenous PRLeo Bhatt MD        [DISCONTINUED] sodium chloride 0.9% flush 10 mL  10 mL Intravenous PRN Leo Ghotra MD         Medications Prior to Admission   Medication Sig Dispense Refill Last Dose    amLODIPine (NORVASC) 10 MG tablet Take 1 tablet by mouth in the morning. 30 tablet 1 1/4/2024    apixaban (ELIQUIS) 5 mg Tab Take 1 tablet (5 mg total) by mouth 2 (two) times a day. 60 tablet 11 1/3/2024    buPROPion (WELLBUTRIN SR) 150 MG TBSR 12 hr tablet Take 150 mg by mouth 2 (two) times daily.       fluticasone-salmeterol diskus inhaler 250-50 mcg Inhale 1 puff into the lungs 2 (two) times a day.   1/3/2024    fluticasone-umeclidin-vilanter (TRELEGY ELLIPTA) 200-62.5-25 mcg inhaler Inhale 1 puff into the lungs once daily.   1/3/2024    gabapentin (NEURONTIN) 300 MG capsule Take 2 capsules by mouth 2 (two) times daily.       loratadine (CLARITIN) 10 mg tablet Take 10 mg by mouth once daily.   1/3/2024    losartan (COZAAR) 25 MG tablet Take 25 mg by mouth once daily.   1/4/2024     mycophenolate (CELLCEPT) 250 mg Cap Take 2 capsules (500 mg total) by mouth 2 (two) times daily. 120 capsule 11 1/4/2024    tacrolimus (PROGRAF) 1 MG Cap Take 1 capsule (1 mg total) by mouth every morning AND 1 capsule (1 mg total) every evening. 60 capsule 11 1/4/2024    testosterone cypionate (DEPOTESTOTERONE CYPIONATE) 200 mg/mL injection Inject 0.75 mLs (150 mg total) into the muscle once a week. 5 mL 0 Past Week    tiZANidine (ZANAFLEX) 4 MG tablet Take 4 mg by mouth 2 (two) times a day.   1/3/2024    traZODone (DESYREL) 50 MG tablet Take  mg by mouth every evening.   1/3/2024    albuterol (PROVENTIL/VENTOLIN HFA) 90 mcg/actuation inhaler Inhale 2 puffs into the lungs every 6 (six) hours as needed for Wheezing or Shortness of Breath. Rescue          Objective:     Vital Signs (Most Recent):  Temp: 96.3 °F (35.7 °C) (01/11/24 1245)  Pulse: 80 (01/11/24 1245)  Resp: (!) 24 (01/11/24 1245)  BP: 119/69 (01/11/24 1200)  SpO2: 100 % (01/11/24 1245) Vital Signs (24h Range):  Temp:  [96.3 °F (35.7 °C)-97.7 °F (36.5 °C)] 96.3 °F (35.7 °C)  Pulse:  [68-93] 80  Resp:  [24-29] 24  SpO2:  [92 %-100 %] 100 %  BP: ()/(51-86) 119/69  Arterial Line BP: ()/(47-91) 108/67     Weight: (!) 142.8 kg (314 lb 13.1 oz) (01/10/24 1222)  Body mass index is 42.7 kg/m².    Physical Exam  Vitals and nursing note reviewed.   Constitutional:       General: intubated  HENT:      Head: Normocephalic.   Eyes:      Pupils: Pupils are equal, round, and reactive to light.   Cardiovascular:      Rate and Rhythm: Tachycardia present.      Heart sounds: Normal heart sounds.   Pulmonary:      Effort: Respiratory distress present.      Breath sounds: Rhonchi present.   Abdominal:      General: Bowel sounds are normal.   Skin:     Capillary Refill: Capillary refill takes less than 2 seconds.   Neurological:      General: No focal deficit present.      Mental Status: He is sedated        Significant Labs:  CBC:   Recent Labs   Lab  01/11/24 0415   WBC 9.02   RBC 4.21*   HGB 14.2   HCT 41.5   PLT 81*       CMP:   Recent Labs   Lab 01/11/24 0415   GLU 87  87   CALCIUM 7.6*  7.7*   ALBUMIN 1.8*  1.8*   PROT 5.1*   *  130*   K 4.2  4.2   CO2 17*  17*   CL 99  99   BUN 19  19   CREATININE 1.7*  1.7*   ALKPHOS 382*   ALT 55*   *   BILITOT 10.7*       Coagulation:   Recent Labs   Lab 01/11/24 0415   INR 1.3*   APTT 60.1*         Significant Imaging:  X-Ray: Reviewed      Assessment/Plan:     Active Diagnoses:    Diagnosis Date Noted POA    PRINCIPAL PROBLEM:  PNA (pneumonia) [J18.9] 01/04/2024 Yes    Acute respiratory failure with hypoxia and hypercarbia [J96.01, J96.02] 01/08/2024 No    Hyperkalemia [E87.5] 01/08/2024 Yes    Chest pain [R07.9] 01/05/2024 No    Elevated liver enzymes [R74.8] 01/05/2024 Yes    Chronic diastolic congestive heart failure [I50.32] 01/05/2024 Yes    Atrial fibrillation with rapid ventricular response [I48.91] 07/30/2023 Yes    S/P liver transplant [Z94.4] 10/14/2020 Not Applicable    Alcohol use disorder, severe, dependence [F10.20] 09/21/2020 Yes     Chronic    Thrombocytopenia [D69.6] 09/19/2020 Yes    Hyponatremia [E87.1] 09/19/2020 Yes    Chronic Hepatic encephalopathy [K76.82] 09/19/2020 Yes      Problems Resolved During this Admission:       Elevated Liver tests  Hyperbilirubinemia --likely from underlying infection/pneumonia and hypotension  Plan       -Monitor Liver enzymes and INR       -avoid Hepatotoxic drugs/ Hypotension    Post Liver transplant 3 years  Tac levels goal 5-8 ng/ml  Tac levels dropping but still > 10  Plan-Hold Prograf and check levels daily           Hold Cellcept due to infection           Restart Prograf 0.5 mg mg AM and 0.5 mg PM once level drops below 8 ng/ml          Hyperammonemia  Continue lactulose  Levels dropped  No coagulopathy/ Acute liver failure  ? Chronic liver disease related      Acute respiratory failure   -Atypical Pneumonia ? Fluid  overload  -Intubated   Plan--Broad cover considering Immunosuppressed state  Consult ID  Pulmonology on Board       JENN  Baseline creat 1.1  Worsening likely Prograf with ATN/Infection  Patient has oliguria  Plan--On Board nephrology  Prograf dose on hold   CRRT      Atrial fibrillation with rapid ventricular response        Converted with cardizem  Cardiology on Board. ECHO normal       Thank you for your consult. I will follow-up with patient. Please contact us if you have any additional questions.      The patient location is: ([place])  The chief complaint leading to consultation is:  Immunosuppression     Visit type: audiovisual     Face to Face time with patient: 25     40 minutes of total time spent on the encounter, which includes face to face time and non-face to face time preparing to see the patient (eg, review of tests), Obtaining and/or reviewing separately obtained history, Documenting clinical information in the electronic or other health record, Independently interpreting results (not separately reported) and communicating results to the patient/family/caregiver, or Care coordination (not separately reported).     Each patient to whom he or she provides medical services by telemedicine is:  (1) informed of the relationship between the physician and patient and the respective role of any other health care provider with respect to management of the patient; and (2) notified that he or she may decline to receive medical services by telemedicine and may withdraw from such care at any time.    Juanjose Kwong MD  Hepatology  [facility]

## 2024-01-11 NOTE — ASSESSMENT & PLAN NOTE
- hepatology consulted and following  - follow LFTs/ammonia     1/9.  Hold immunosuppression and do serial levels.   Lactulose and initiate trophic feeding.   Active alcohol abuse  Suprisingly liver function are slightly high. Certain concerns about worsening due to shock;    1/10- watch bili  Seen by hepatology   1/11 per hepatology  awaiting prograf

## 2024-01-11 NOTE — NURSING
Dx: PNA (pneumonia)    Shift Events: levo titrated to maintain MAP>65, sedation titrated to maintain RASS goal. Bloody output from bourne, patient on CRRT    Goals of Care: MAP>65    Neuro: Sedated    Vital Signs: /70 (BP Location: Left arm, Patient Position: Lying)   Pulse 77   Temp 97.5 °F (36.4 °C)   Resp (!) 24   Ht 6' (1.829 m)   Wt (!) 142.8 kg (314 lb 13.1 oz)   SpO2 (!) 93%   BMI 42.70 kg/m²     Respiratory: Ventilator    Diet: NPO    Gtts: Propfol, Fentanyl, Norepinephrine, and Heparin    Urine Output: Urinary Catheter 275 cc/shift    Drains: NG/OG Tube 0 cc /  shift    CRRT       Labs/Accuchecks: q8hr RFP, routine/q6hr accuchecks.    Skin: see flowsheet

## 2024-01-11 NOTE — NURSING
Pt. Ventilated , intubated, sedated to maintain   RASS Goal -2. Pt. Remains NPO, hypoactive bowel sounds.  Bloody Red urine output to bourne catheter. Maintain Pressor Support to MAP >65. Pt. Tolerating CRRT overnight.  Medication given IV and Via OG Tube. Afebrile overnight.

## 2024-01-11 NOTE — ASSESSMENT & PLAN NOTE
Patient with Hypercapnic and Hypoxic Respiratory failure which is Acute.  he is not on home oxygen. Supplemental oxygen was provided and noted- Vent Mode: A/C  Oxygen Concentration (%):  [] 50  Resp Rate Total:  [23 br/min-26 br/min] 24 br/min  Vt Set:  [550 mL] 550 mL  PEEP/CPAP:  [8 cmH20-10 cmH20] 8 cmH20  Mean Airway Pressure:  [15 mrS90-73 cmH20] 15 cmH20    Signs/symptoms of respiratory failure include- tachypnea, increased work of breathing, and respiratory distress. Contributing diagnoses includes - Pneumonia Labs and images were reviewed. Patient Has recent ABG, which has been reviewed. Will treat underlying causes and adjust management of respiratory failure as follows    - intubated upon arrival to the ICU  - ABG and CXR pending   - vent bundle in place  - collect sputum culture   - on zyvox and cefepime  - add fungal workup in immunocompromised pt     1/9 Continue Abx.  Serial labs  [place] assay  May consider bronch when on low settings.     1/10 wean as able   1/11 wean 02 and peep

## 2024-01-11 NOTE — PLAN OF CARE
CRRT: CVVHD 4K in progress    Uncomplicated/complicated: stable    Ultrafiltration volume: Total UF 137cc/hr     Notes: Tolerating, Lines secure, Supplies at bedside, Reported to primary nurse.

## 2024-01-12 NOTE — NURSING
CRRT: Cartridge , blood reperfused and new circuit primed and CRRT/CVVHD restarted as ordered for Dr Mcmahon.

## 2024-01-12 NOTE — PLAN OF CARE
Remains intubated.  Sedation turned off at 0945 and eventually Dc'd.  Pt opened eyes; deviated upward and then closes; not following commands.  Weaning levophed down while maintaining MAP >65. CRRT bath changed to 2k d/t K+ increasing to 5 and UF changed to 150 mL/hr

## 2024-01-12 NOTE — PROGRESS NOTES
[facility] ([facility])  Critical Care Medicine  Progress Note    Patient Name: Jordan Altman  MRN: [id number]  Admission Date: 1/4/2024  Hospital Length of Stay: 8 days  Code Status: Full Code  Attending Provider: Jeremiah Santillan MD  Primary Care Provider: Zohra Walters FNP   Principal Problem: PNA (pneumonia)    Subjective:     HPI:  48 year old male with a known past history of alcoholic liver cirrhosis s/[facility], CKD, PAF (on eliquis) who presented to the ED 1/4 with complaints of shortness of breath, fatigue, intermittent fever over the last 4 weeks. Stated he was dx over a week ago with flu as well. He also complains of intermittent N/V. In the ED, he was noted to be in afib RVR (HR in the 140's), given Cardizem IVP and placed on gtt.  Lab work notable for INR 1.5, Alk phos 169, , ALT 82, .  FLU and covid negative. Chest xray showed new low-grade infiltrate or atypical pneumonia LLL.  Patient was started on Levaquin.  Patient will be admitted to observation for afib rvr/pna.  Cardiology consulted. 01/05/2024: Afib RVR marginally controlled on non-titratable drip. Cardiology consulted, recommendations pending. 01/06/2024: Afib RVR controlled. Echo grossly unremarkable. Still on Cardizem, pending transition to PO by Cardiology. Hepatology recommends continuing Prograf but to hold cellcept due to infection. 01/07/2024: Afib resolved. In SR. Afebrile, clinically unchanged. However, was notably hypotensive this AM. Renal function sharply worsened over the past 24 hours as well. Nephrology consulted for recommendations. 1/8/24: Patient tremulous this morning, with hypothermia, hypotension, hyponatremia, elevated serum creatinine, minimal urine output--> ?  Prograf toxicity versus sepsis,--> ordered urinalysis, urine lytes, chest x-ray, procalcitonin, blood cultures, broaden antibiotics, IV fluids, hold Prograf,--hepatology, Nephrology agreed with the  plan.    Eliquis discontinued, changed to heparin, given worsening GFR, also for possible need for renal biopsy.    Pt transferred to the ICU evening of 1/8 for hyponatremia, resp failure on BIPAP, hypothermia, and septic shock. Upon arrival pt was intubated and CVL was placed for pressor support.     Hospital/ICU Course:  1/9. No improvement in Na despite hydration. Anuric. Worsening hypoxia. Sedated and intermittent paralytics given for vent dyssynchrony during suctioning and episodes of cough while he is on 90% and PEEP 10  1/10 - no acute events  On levo , on fio2 65 peep 10  1/11- on levo   Peep 10   1/12 - on 50/8, events noted last pm , with bath dropped saturations/ bp , on vaso     Interval History: no acute events  Remains ill on vent        Objective:     Vital Signs (Most Recent):  Temp: 98.2 °F (36.8 °C) (01/12/24 1045)  Pulse: 90 (01/12/24 1045)  Resp: (!) 24 (01/12/24 1045)  BP: 113/69 (01/12/24 1000)  SpO2: 95 % (01/12/24 1045) Vital Signs (24h Range):  Temp:  [95.7 °F (35.4 °C)-98.2 °F (36.8 °C)] 98.2 °F (36.8 °C)  Pulse:  [69-98] 90  Resp:  [24-37] 24  SpO2:  [88 %-100 %] 95 %  BP: ()/(49-83) 113/69  Arterial Line BP: ()/() 103/64     Weight: (!) 142.8 kg (314 lb 13.1 oz)  Body mass index is 42.7 kg/m².      Intake/Output Summary (Last 24 hours) at 1/12/2024 1105  Last data filed at 1/12/2024 1100  Gross per 24 hour   Intake 2694.72 ml   Output 4494 ml   Net -1799.28 ml        Physical Exam  Vitals and nursing note reviewed.   Constitutional:       General: He is not in acute distress.     Appearance: He is ill-appearing.   HENT:      Head: Normocephalic and atraumatic.   Eyes:      General: Scleral icterus present.         Right eye: No discharge.         Left eye: No discharge.      Pupils: Pupils are equal, round, and reactive to light.   Cardiovascular:      Rate and Rhythm: Normal rate and regular rhythm.      Heart sounds: No murmur heard.  Pulmonary:      Effort: No  respiratory distress.      Breath sounds: No wheezing or rales.   Abdominal:      General: There is distension.      Tenderness: There is no abdominal tenderness.   Musculoskeletal:         General: Swelling present. No tenderness.      Cervical back: Neck supple. No rigidity.   Neurological:      Comments: Not follow commands off sedation            Review of Systems   Reason unable to perform ROS: on vent.       Vents:  Vent Mode: A/C (01/12/24 0914)  Set Rate: 24 BPM (01/12/24 0914)  Vt Set: 550 mL (01/12/24 0914)  PEEP/CPAP: 5 cmH20 (01/12/24 0914)  Oxygen Concentration (%): 40 (01/12/24 1045)  Peak Airway Pressure: 24 cmH20 (01/12/24 0914)  Plateau Pressure: 20 cmH20 (01/12/24 0914)  Total Ve: 13.4 L/m (01/12/24 0914)  Negative Inspiratory Force (cm H2O): 0 (01/12/24 0914)  F/VT Ratio<105 (RSBI): (!) 42.93 (01/12/24 0914)    Lines/Drains/Airways       Central Venous Catheter Line  Duration             Percutaneous Central Line Insertion/Assessment - Triple Lumen  01/08/24 1815 Internal Jugular Right 3 days    Trialysis (Dialysis) Catheter 01/09/24 1116 left internal jugular 2 days              Drain  Duration                  NG/OG Tube 01/08/24 1811 [place] [facility] mouth 3 days         Urethral Catheter 01/08/24 1813 Temperature probe 16 Fr. 3 days         Rectal Tube 01/10/24 1015 rectal tube w/ balloon (indicate number of mLs) 2 days              Airway  Duration                  Airway - Non-Surgical 01/08/24 1758 Endotracheal Tube 3 days              Arterial Line  Duration             Arterial Line 01/08/24 2142 Right Radial 3 days              Peripheral Intravenous Line  Duration                  Peripheral IV - Single Lumen 01/04/24 1434 20 G Anterior;Proximal;Left Forearm 7 days                    Significant Labs:    CBC/Anemia Profile:  Recent Labs   Lab 01/11/24  0415 01/12/24  0341   WBC 9.02 7.77   HGB 14.2 14.5   HCT 41.5 43.1   PLT 81* 73*   MCV 99* 101*   RDW 17.5* 17.9*         Chemistries:  Recent Labs   Lab 01/11/24  0415 01/11/24  1352 01/11/24  2118 01/12/24  0341   *  130* 130* 131* 130*  131*   K 4.2  4.2 4.3 4.4 4.5  4.5   CL 99  99 99 100 100  100   CO2 17*  17* 17* 17* 18*  18*   BUN 19  19 16 15 15  15   CREATININE 1.7*  1.7* 1.5* 1.6* 1.8*  1.8*   CALCIUM 7.6*  7.7* 7.5* 7.6* 7.6*  7.5*   ALBUMIN 1.8*  1.8* 1.8* 1.8* 1.8*  1.8*   PROT 5.1*  --   --  5.3*   BILITOT 10.7*  --   --  8.4*   ALKPHOS 382*  --   --  393*   ALT 55*  --   --  54*   *  --   --  158*   MG 2.3 2.3 2.3 2.3   PHOS 2.3*  2.3* 4.7* 3.3 3.1  3.1       All pertinent labs within the past 24 hours have been reviewed.    Significant Imaging:  I have reviewed all pertinent imaging results/findings within the past 24 hours.    ABG  Recent Labs   Lab 01/12/24  0344   PH 7.348*   PO2 65*   PCO2 37.5   HCO3 20.6*   BE -5*     Assessment/Plan:     Psychiatric  Alcohol use disorder, severe, dependence  Active use  Thiamine / folate    Pulmonary  * PNA (pneumonia)  - see plan for resp failure     1/9   Likely has pneumonia and fluid overload.  Will consider CT post a few episodes of CRRT  1/10 completed antibiotics   1/12 finished antibiotic, on empiric micafungin, immunodiffusion pending         Acute respiratory failure with hypoxia and hypercarbia  Patient with Hypercapnic and Hypoxic Respiratory failure which is Acute.  he is not on home oxygen. Supplemental oxygen was provided and noted- Vent Mode: A/C  Oxygen Concentration (%):  [40-50] 40  Resp Rate Total:  [24 br/min] 24 br/min  Vt Set:  [550 mL] 550 mL  PEEP/CPAP:  [5 cmH20-8 cmH20] 5 cmH20  Mean Airway Pressure:  [12 cmH20-15 cmH20] 12 cmH20    Signs/symptoms of respiratory failure include- tachypnea, increased work of breathing, and respiratory distress. Contributing diagnoses includes - Pneumonia Labs and images were reviewed. Patient Has recent ABG, which has been reviewed. Will treat underlying causes and adjust management of  respiratory failure as follows    - intubated upon arrival to the ICU  - ABG and CXR pending   - vent bundle in place  - collect sputum culture   - on zyvox and cefepime  - add fungal workup in immunocompromised pt     1/9 Continue Abx.  Serial labs  Fungetell assay  May consider bronch when on low settings.     1/10 wean as able   1/11 wean 02 and peep   1/12 holding sedation , on fio2 40/5     Cardiac/Vascular  Chronic diastolic congestive heart failure  Patient is identified as having diastolicheart failure that is Acute on chronic. CHF is currently controlled. Latest ECHO performed and demonstrates- Results for orders placed during the hospital encounter of 01/04/24    Echo    Interpretation Summary    Left Ventricle: The left ventricle is normal in size. Mildly increased ventricular mass. Mildly increased wall thickness. There is concentric hypertrophy. Normal wall motion. There is normal systolic function with a visually estimated ejection fraction of 55 - 60%. There is normal diastolic function.    Right Ventricle: Normal right ventricular cavity size. Wall thickness is normal. Right ventricle wall motion  is normal. Systolic function is normal.    IVC/SVC: Normal venous pressure at 3 mmHg.  . Continue Furosemide and monitor clinical status closely. Monitor on telemetry. Patient is on CHF pathway.  Monitor strict Is&Os and daily weights.  Place on fluid restriction of 1.5 L. Cardiology has been consulted. Continue to stress to patient importance of self efficacy and  on diet for CHF. Last BNP reviewed- and noted below   Recent Labs   Lab 01/04/24  1435   *       Atrial fibrillation with rapid ventricular response  - telemetry  - hold BB while on pressors  - monitor     1/9 Rate controlled  Heparin infusion - follow protocol.  1/10 if bleeding dc , follow labs    1/11 - heparin off with plt    Renal/  Hyperkalemia  -Sec to renal failure  -Will repleat labs  -NG in place  -Will initiate  German    1/9 Lactulose and CRRT      Hyponatremia  -On saline  -S/p 2 L boluses  -Repeat labs  -May need CRRT  -Resume heparin  -Anuric / Will likely get worsening of the renal function.     1/9  Needs 3% saline with CRRT.  D/w nephrology  Agree with placement of trilysis catheter.   D/w brother. Prognosis guarded.  On pressors  Will initiate RRT.    1/12 crrt       JENN (acute kidney injury)  Per nephrology    Hematology  Thrombocytopenia  - monitor   Plt trending down , heparin is held     GI  Elevated liver enzymes  - trend     S/P liver transplant  - hepatology consulted and following  - follow LFTs/ammonia     1/9.  Hold immunosuppression and do serial levels.   Lactulose and initiate trophic feeding.   Active alcohol abuse  Suprisingly liver function are slightly high. Certain concerns about worsening due to shock;    1/10- watch bili  Seen by hepatology   1/11 per hepatology  awaiting prograf    1/11 -per hepatology , nh3 elevated  Add back prograf per notes if level 8, last 8.5 today level pending      Chronic Hepatic encephalopathy  Acute on chronic   Lactulose   Rifaximin  Remains elevated         Scds with plt  Tube feeds low rate   Critical Care Time: 35 minutes  Critical secondary to Patient has a condition that poses threat to life and bodily function: vent / pressors       Critical care was time spent personally by me on the following activities: development of treatment plan with patient or surrogate and bedside caregivers, discussions with consultants, evaluation of patient's response to treatment, examination of patient, ordering and performing treatments and interventions, ordering and review of laboratory studies, ordering and review of radiographic studies, pulse oximetry, re-evaluation of patient's condition. This critical care time did not overlap with that of any other provider or involve time for any procedures.     Elisha Moya MD  Critical Care Medicine  [facility]  ([facility])

## 2024-01-12 NOTE — PROGRESS NOTES
01/12/24 0956   Treatment   Treatment Type CVVHD   Treatment Status Equipment change   Dialysis Machine Number [id number]   Solutions Labeled and Current  Yes   Access Temporary Cath;Left;IJ   Catheter Dressing Intact  Yes   Alarms Engaged Yes   CRRT Comments CRRT restarted.   $ CRRT Charges   $ CRRT Charges Restart;CRRT Cartridge   $ CRRT Daily Maintenance Complete   Prescription   Time (Hours) Continuous   Dialysate K + (mEq/L) 4   Dialysate CA + (mEq/L) 2.5   Dialysate HCO3 - (Bicarb) (mEq/L) 35   Dialysate Na + (mEq/L) 140   Cartridge Type [id number]   Dialysate Flow Rate (mL/min) Other  (2L/hr)   UF Goal Rate 100 mL/hr   CRRT Hourly Documentation   Blood Flow (mL/min) 300   UF Rate 100 cc/hr   Arterial Pressure (mmHg) -98 mmHg   Venous Pressure (mmHg) 187 mmHg   Effluent Pressure (EP) (mmHg) 218 mmHg   Balance Chamber  71   Total UF (Hourly Cleared) (mL) 0   CRRT Fluid Management Calculator   (A) All Patient Intake 321 mL   (B) All Patient Output 0 mL   (C) Patient Fluid Status 321 mL   (D) Target Hourly Net Fluid Removal Rate 100 mL   (E) Patient Fluid Removal 421     CRRT restarted and in progress

## 2024-01-12 NOTE — ASSESSMENT & PLAN NOTE
- telemetry  - hold BB while on pressors  - monitor     1/9 Rate controlled  Heparin infusion - follow protocol.  1/10 if bleeding dc , follow labs    1/11 - heparin off with plt

## 2024-01-12 NOTE — ASSESSMENT & PLAN NOTE
- hepatology consulted and following  - follow LFTs/ammonia     1/9.  Hold immunosuppression and do serial levels.   Lactulose and initiate trophic feeding.   Active alcohol abuse  Suprisingly liver function are slightly high. Certain concerns about worsening due to shock;    1/10- watch bili  Seen by hepatology   1/11 per hepatology  awaiting prograf    1/11 -per hepatology , nh3 elevated  Add back prograf per notes if level 8, last 8.5 today level pending

## 2024-01-12 NOTE — PLAN OF CARE
CRRT/CVVHD ongoing, remains on a 4K bath, no issues reported overnight. DFR2L/hr. Supplies left at bedside/on unit. Continue with plan

## 2024-01-12 NOTE — NURSING
Pt. Ventilated , intubated, sedated to maintain   RASS Goal -2. Pt. Tolerating TF at 15 ml/hr. Active bowel sounds.  Bloody Red urine output to bourne catheter. Maintain Pressor Support to MAP >65. Vasopressin added today.  Pt. Tolerating CRRT overnight. UF rate increased to 100 ml this AM per Dr. Mcmahon.   Medication given IV and Via OG Tube. Afebrile overnight.

## 2024-01-12 NOTE — ASSESSMENT & PLAN NOTE
Patient with Hypercapnic and Hypoxic Respiratory failure which is Acute.  he is not on home oxygen. Supplemental oxygen was provided and noted- Vent Mode: A/C  Oxygen Concentration (%):  [40-50] 40  Resp Rate Total:  [24 br/min] 24 br/min  Vt Set:  [550 mL] 550 mL  PEEP/CPAP:  [5 cmH20-8 cmH20] 5 cmH20  Mean Airway Pressure:  [12 cmH20-15 cmH20] 12 cmH20    Signs/symptoms of respiratory failure include- tachypnea, increased work of breathing, and respiratory distress. Contributing diagnoses includes - Pneumonia Labs and images were reviewed. Patient Has recent ABG, which has been reviewed. Will treat underlying causes and adjust management of respiratory failure as follows    - intubated upon arrival to the ICU  - ABG and CXR pending   - vent bundle in place  - collect sputum culture   - on zyvox and cefepime  - add fungal workup in immunocompromised pt     1/9 Continue Abx.  Serial labs  Fungetell assay  May consider bronch when on low settings.     1/10 wean as able   1/11 wean 02 and peep   1/12 holding sedation , on fio2 40/5

## 2024-01-12 NOTE — SIGNIFICANT EVENT
Brother is POA.   Discussed with him and other brother. They did discuss with wife   Dnr but cont aggressive care

## 2024-01-12 NOTE — ASSESSMENT & PLAN NOTE
JENN with septic shock  Oliguic  Pt is CRRT dependent for volume mgmt,   CRRT mgmt reviewed, current UF only 75 ml/hr  Pt is edematous, pulm edema, and is volume overloaded  Hypotension is the challenge to increasing UF  K normal  Hyponatremia has improved  Metabolic acidosis has improved    Discussed with ICU:  Continue levophed  Will add vasopressor  Will increase UF to 100 ml/hr and later to 200 ml/hr as tolerated  Will d/c Na bicarb drip (already has been d/carla)

## 2024-01-12 NOTE — SUBJECTIVE & OBJECTIVE
Interval History: Pt was seen and examined in ICU. Labs and meds reviewed. Discussed with other providers. Discussed with ICU team. Pt is a 49 y/o male with h/o of liver transplant who oprsented with pneumonia and AF c RVR. Pt developed septic shock, is intubated, has JENN with volume overload and required dialysis support. Pt receiving CRRT. CRRT mgmt, volume gain, UF, pressor support discussed with ICU. Pt received multiple visits and evaluation.      Review of patient's allergies indicates:   Allergen Reactions    Latex, natural rubber      Current Facility-Administered Medications   Medication Frequency    acetaminophen oral solution 650 mg Q4H PRN    albuterol-ipratropium 2.5 mg-0.5 mg/3 mL nebulizer solution 3 mL Q6H WAKE    chlorhexidine 0.12 % solution 15 mL BID    dextrose 10% bolus 125 mL 125 mL PRN    dextrose 10% bolus 250 mL 250 mL PRN    famotidine (PF) injection 20 mg Daily    fentaNYL 2500 mcg in 0.9% sodium chloride 250 mL infusion premix (titrating) Continuous    glucagon (human recombinant) injection 1 mg PRN    influenza (QUADRIVALENT PF) vaccine 0.5 mL vaccine x 1 dose    insulin aspart U-100 pen 0-10 Units Q6H PRN    lactulose 20 gram/30 mL solution Soln 30 g TID    magnesium sulfate 2g in water 50mL IVPB (premix) PRN    micafungin 100 mg in sodium chloride 0.9 % 100 mL IVPB (MB+) Q24H    mupirocin 2 % ointment BID    naloxone 0.4 mg/mL injection 0.02 mg PRN    NORepinephrine 32 mg in dextrose 5 % (D5W) 250 mL infusion Continuous    ondansetron disintegrating tablet 8 mg Q6H PRN    oxyCODONE immediate release tablet 5 mg Q6H PRN    pneumoc 20-jovita conj-dip cr(PF) (PREVNAR-20 (PF)) injection Syrg 0.5 mL vaccine x 1 dose    promethazine (PHENERGAN) 25 mg in dextrose 5 % (D5W) 50 mL IVPB Q6H PRN    propofol (DIPRIVAN) 10 mg/mL infusion Continuous    rifAXIMin tablet 550 mg BID    senna-docusate 8.6-50 mg per tablet 1 tablet Daily PRN    sodium chloride 0.9% bolus 1,000 mL 1,000 mL Once    sodium  chloride 0.9% flush 10 mL Q12H PRN    sodium phosphate 20.01 mmol in dextrose 5 % (D5W) 250 mL IVPB PRN    sodium phosphate 30 mmol in dextrose 5 % (D5W) 250 mL IVPB PRN    sodium phosphate 39.99 mmol in dextrose 5 % (D5W) 250 mL IVPB PRN    thiamine (B-1) 100 mg in dextrose 5 % (D5W) 100 mL IVPB Daily    traZODone tablet 50 mg Nightly PRN    vasopressin (PITRESSIN) 0.2 Units/mL in dextrose 5 % (D5W) 100 mL infusion Continuous       Objective:     Vital Signs (Most Recent):  Temp: 98.1 °F (36.7 °C) (01/12/24 0615)  Pulse: 92 (01/12/24 0615)  Resp: (!) 24 (01/12/24 0615)  BP: (!) 70/49 (01/12/24 0600)  SpO2: (!) 93 % (01/12/24 0615) Vital Signs (24h Range):  Temp:  [95.7 °F (35.4 °C)-98.2 °F (36.8 °C)] 98.1 °F (36.7 °C)  Pulse:  [69-93] 92  Resp:  [24-37] 24  SpO2:  [88 %-100 %] 93 %  BP: ()/(49-83) 70/49  Arterial Line BP: ()/(47-83) 97/64     Weight: (!) 142.8 kg (314 lb 13.1 oz) (01/10/24 1222)  Body mass index is 42.7 kg/m².  Body surface area is 2.69 meters squared.    I/O last 3 completed shifts:  In: 4071 [I.V.:2873.8; NG/GT:110; IV Piggyback:1087.2]  Out: 7108 [Urine:605; Other:5143; Stool:1360]     Physical Exam  Vitals and nursing note reviewed.   Cardiovascular:      Rate and Rhythm: Normal rate and regular rhythm.      Pulses: Normal pulses.      Heart sounds: Normal heart sounds.   Pulmonary:      Breath sounds: Rales present.      Comments: intubated  Abdominal:      General: There is distension.      Palpations: Abdomen is soft.   Musculoskeletal:      Right lower leg: Edema present.      Left lower leg: Edema present.          Significant Labs: reviewed  BMP  Lab Results   Component Value Date     (L) 01/12/2024     (L) 01/12/2024    K 4.5 01/12/2024    K 4.5 01/12/2024     01/12/2024     01/12/2024    CO2 18 (L) 01/12/2024    CO2 18 (L) 01/12/2024    BUN 15 01/12/2024    BUN 15 01/12/2024    CREATININE 1.8 (H) 01/12/2024    CREATININE 1.8 (H) 01/12/2024    CALCIUM  7.6 (L) 01/12/2024    CALCIUM 7.5 (L) 01/12/2024    ANIONGAP 12 01/12/2024    ANIONGAP 13 01/12/2024    EGFRNORACEVR 45 (A) 01/12/2024    EGFRNORACEVR 45 (A) 01/12/2024     Lab Results   Component Value Date    WBC 7.77 01/12/2024    HGB 14.5 01/12/2024    HCT 43.1 01/12/2024     (H) 01/12/2024    PLT 73 (L) 01/12/2024       ABG  Recent Labs   Lab 01/12/24  0344   PH 7.348*   PO2 65*   PCO2 37.5   HCO3 20.6*   BE -5*       Significant Imaging: reviewed CXR, c/w pulm edema

## 2024-01-12 NOTE — ASSESSMENT & PLAN NOTE
- see plan for resp failure     1/9   Likely has pneumonia and fluid overload.  Will consider CT post a few episodes of CRRT  1/10 completed antibiotics   1/12 finished antibiotic, on empiric micafungin, immunodiffusion pending

## 2024-01-12 NOTE — ASSESSMENT & PLAN NOTE
-On saline  -S/p 2 L boluses  -Repeat labs  -May need CRRT  -Resume heparin  -Anuric / Will likely get worsening of the renal function.     1/9  Needs 3% saline with CRRT.  D/w nephrology  Agree with placement of trilysis catheter.   D/w brother. Prognosis guarded.  On pressors  Will initiate RRT.    1/12 crrt

## 2024-01-12 NOTE — SUBJECTIVE & OBJECTIVE
Interval History: no acute events  Remains ill on vent        Objective:     Vital Signs (Most Recent):  Temp: 98.2 °F (36.8 °C) (01/12/24 1045)  Pulse: 90 (01/12/24 1045)  Resp: (!) 24 (01/12/24 1045)  BP: 113/69 (01/12/24 1000)  SpO2: 95 % (01/12/24 1045) Vital Signs (24h Range):  Temp:  [95.7 °F (35.4 °C)-98.2 °F (36.8 °C)] 98.2 °F (36.8 °C)  Pulse:  [69-98] 90  Resp:  [24-37] 24  SpO2:  [88 %-100 %] 95 %  BP: ()/(49-83) 113/69  Arterial Line BP: ()/() 103/64     Weight: (!) 142.8 kg (314 lb 13.1 oz)  Body mass index is 42.7 kg/m².      Intake/Output Summary (Last 24 hours) at 1/12/2024 1105  Last data filed at 1/12/2024 1100  Gross per 24 hour   Intake 2694.72 ml   Output 4494 ml   Net -1799.28 ml        Physical Exam  Vitals and nursing note reviewed.   Constitutional:       General: He is not in acute distress.     Appearance: He is ill-appearing.   HENT:      Head: Normocephalic and atraumatic.   Eyes:      General: Scleral icterus present.         Right eye: No discharge.         Left eye: No discharge.      Pupils: Pupils are equal, round, and reactive to light.   Cardiovascular:      Rate and Rhythm: Normal rate and regular rhythm.      Heart sounds: No murmur heard.  Pulmonary:      Effort: No respiratory distress.      Breath sounds: No wheezing or rales.   Abdominal:      General: There is distension.      Tenderness: There is no abdominal tenderness.   Musculoskeletal:         General: Swelling present. No tenderness.      Cervical back: Neck supple. No rigidity.   Neurological:      Comments: Not follow commands off sedation            Review of Systems   Reason unable to perform ROS: on vent.       Vents:  Vent Mode: A/C (01/12/24 0914)  Set Rate: 24 BPM (01/12/24 0914)  Vt Set: 550 mL (01/12/24 0914)  PEEP/CPAP: 5 cmH20 (01/12/24 0914)  Oxygen Concentration (%): 40 (01/12/24 1045)  Peak Airway Pressure: 24 cmH20 (01/12/24 0914)  Plateau Pressure: 20 cmH20 (01/12/24 0914)  Total Ve:  13.4 L/m (01/12/24 0914)  Negative Inspiratory Force (cm H2O): 0 (01/12/24 0914)  F/VT Ratio<105 (RSBI): (!) 42.93 (01/12/24 0914)    Lines/Drains/Airways       Central Venous Catheter Line  Duration             Percutaneous Central Line Insertion/Assessment - Triple Lumen  01/08/24 1815 Internal Jugular Right 3 days    Trialysis (Dialysis) Catheter 01/09/24 1116 left internal jugular 2 days              Drain  Duration                  NG/OG Tube 01/08/24 1811 [place] [facility] mouth 3 days         Urethral Catheter 01/08/24 1813 Temperature probe 16 Fr. 3 days         Rectal Tube 01/10/24 1015 rectal tube w/ balloon (indicate number of mLs) 2 days              Airway  Duration                  Airway - Non-Surgical 01/08/24 1758 Endotracheal Tube 3 days              Arterial Line  Duration             Arterial Line 01/08/24 2142 Right Radial 3 days              Peripheral Intravenous Line  Duration                  Peripheral IV - Single Lumen 01/04/24 1434 20 G Anterior;Proximal;Left Forearm 7 days                    Significant Labs:    CBC/Anemia Profile:  Recent Labs   Lab 01/11/24  0415 01/12/24  0341   WBC 9.02 7.77   HGB 14.2 14.5   HCT 41.5 43.1   PLT 81* 73*   MCV 99* 101*   RDW 17.5* 17.9*        Chemistries:  Recent Labs   Lab 01/11/24  0415 01/11/24  1352 01/11/24 2118 01/12/24  0341   *  130* 130* 131* 130*  131*   K 4.2  4.2 4.3 4.4 4.5  4.5   CL 99  99 99 100 100  100   CO2 17*  17* 17* 17* 18*  18*   BUN 19  19 16 15 15  15   CREATININE 1.7*  1.7* 1.5* 1.6* 1.8*  1.8*   CALCIUM 7.6*  7.7* 7.5* 7.6* 7.6*  7.5*   ALBUMIN 1.8*  1.8* 1.8* 1.8* 1.8*  1.8*   PROT 5.1*  --   --  5.3*   BILITOT 10.7*  --   --  8.4*   ALKPHOS 382*  --   --  393*   ALT 55*  --   --  54*   *  --   --  158*   MG 2.3 2.3 2.3 2.3   PHOS 2.3*  2.3* 4.7* 3.3 3.1  3.1       All pertinent labs within the past 24 hours have been reviewed.    Significant Imaging:  I have reviewed all pertinent imaging  results/findings within the past 24 hours.

## 2024-01-12 NOTE — PROGRESS NOTES
[facility] ([facility])  Nephrology  Progress Note    Patient Name: Jordan Altman  MRN: [id number]  Admission Date: 1/4/2024  Hospital Length of Stay: 8 days  Attending Provider: Jeremiah Santillan MD   Primary Care Physician: Zohra Walters FNP  Principal Problem:PNA (pneumonia)    Subjective:     HPI: Noted    Interval History: Pt was seen and examined in ICU. Labs and meds reviewed. Discussed with other providers. Discussed with ICU team. Pt is a 49 y/o male with h/o of liver transplant who oprsented with pneumonia and AF c RVR. Pt developed septic shock, is intubated, has JENN with volume overload and required dialysis support. Pt receiving CRRT. CRRT mgmt, volume gain, UF, pressor support discussed with ICU. Pt received multiple visits and evaluation.      Review of patient's allergies indicates:   Allergen Reactions    Latex, natural rubber      Current Facility-Administered Medications   Medication Frequency    acetaminophen oral solution 650 mg Q4H PRN    albuterol-ipratropium 2.5 mg-0.5 mg/3 mL nebulizer solution 3 mL Q6H WAKE    chlorhexidine 0.12 % solution 15 mL BID    dextrose 10% bolus 125 mL 125 mL PRN    dextrose 10% bolus 250 mL 250 mL PRN    famotidine (PF) injection 20 mg Daily    fentaNYL 2500 mcg in 0.9% sodium chloride 250 mL infusion premix (titrating) Continuous    glucagon (human recombinant) injection 1 mg PRN    influenza (QUADRIVALENT PF) vaccine 0.5 mL vaccine x 1 dose    insulin aspart U-100 pen 0-10 Units Q6H PRN    lactulose 20 gram/30 mL solution Soln 30 g TID    magnesium sulfate 2g in water 50mL IVPB (premix) PRN    micafungin 100 mg in sodium chloride 0.9 % 100 mL IVPB (MB+) Q24H    mupirocin 2 % ointment BID    naloxone 0.4 mg/mL injection 0.02 mg PRN    NORepinephrine 32 mg in dextrose 5 % (D5W) 250 mL infusion Continuous    ondansetron disintegrating tablet 8 mg Q6H PRN    oxyCODONE immediate release tablet 5 mg Q6H PRN    pneumoc 20-jovita conj-dip cr(PF)  (PREVNAR-20 (PF)) injection Syrg 0.5 mL vaccine x 1 dose    promethazine (PHENERGAN) 25 mg in dextrose 5 % (D5W) 50 mL IVPB Q6H PRN    propofol (DIPRIVAN) 10 mg/mL infusion Continuous    rifAXIMin tablet 550 mg BID    senna-docusate 8.6-50 mg per tablet 1 tablet Daily PRN    sodium chloride 0.9% bolus 1,000 mL 1,000 mL Once    sodium chloride 0.9% flush 10 mL Q12H PRN    sodium phosphate 20.01 mmol in dextrose 5 % (D5W) 250 mL IVPB PRN    sodium phosphate 30 mmol in dextrose 5 % (D5W) 250 mL IVPB PRN    sodium phosphate 39.99 mmol in dextrose 5 % (D5W) 250 mL IVPB PRN    thiamine (B-1) 100 mg in dextrose 5 % (D5W) 100 mL IVPB Daily    traZODone tablet 50 mg Nightly PRN    vasopressin (PITRESSIN) 0.2 Units/mL in dextrose 5 % (D5W) 100 mL infusion Continuous       Objective:     Vital Signs (Most Recent):  Temp: 98.1 °F (36.7 °C) (01/12/24 0615)  Pulse: 92 (01/12/24 0615)  Resp: (!) 24 (01/12/24 0615)  BP: (!) 70/49 (01/12/24 0600)  SpO2: (!) 93 % (01/12/24 0615) Vital Signs (24h Range):  Temp:  [95.7 °F (35.4 °C)-98.2 °F (36.8 °C)] 98.1 °F (36.7 °C)  Pulse:  [69-93] 92  Resp:  [24-37] 24  SpO2:  [88 %-100 %] 93 %  BP: ()/(49-83) 70/49  Arterial Line BP: ()/(47-83) 97/64     Weight: (!) 142.8 kg (314 lb 13.1 oz) (01/10/24 1222)  Body mass index is 42.7 kg/m².  Body surface area is 2.69 meters squared.    I/O last 3 completed shifts:  In: 4071 [I.V.:2873.8; NG/GT:110; IV Piggyback:1087.2]  Out: 7108 [Urine:605; Other:5143; Stool:1360]     Physical Exam  Vitals and nursing note reviewed.   Cardiovascular:      Rate and Rhythm: Normal rate and regular rhythm.      Pulses: Normal pulses.      Heart sounds: Normal heart sounds.   Pulmonary:      Breath sounds: Rales present.      Comments: intubated  Abdominal:      General: There is distension.      Palpations: Abdomen is soft.   Musculoskeletal:      Right lower leg: Edema present.      Left lower leg: Edema present.          Significant Labs:  reviewed  BMP  Lab Results   Component Value Date     (L) 01/12/2024     (L) 01/12/2024    K 4.5 01/12/2024    K 4.5 01/12/2024     01/12/2024     01/12/2024    CO2 18 (L) 01/12/2024    CO2 18 (L) 01/12/2024    BUN 15 01/12/2024    BUN 15 01/12/2024    CREATININE 1.8 (H) 01/12/2024    CREATININE 1.8 (H) 01/12/2024    CALCIUM 7.6 (L) 01/12/2024    CALCIUM 7.5 (L) 01/12/2024    ANIONGAP 12 01/12/2024    ANIONGAP 13 01/12/2024    EGFRNORACEVR 45 (A) 01/12/2024    EGFRNORACEVR 45 (A) 01/12/2024     Lab Results   Component Value Date    WBC 7.77 01/12/2024    HGB 14.5 01/12/2024    HCT 43.1 01/12/2024     (H) 01/12/2024    PLT 73 (L) 01/12/2024       ABG  Recent Labs   Lab 01/12/24  0344   PH 7.348*   PO2 65*   PCO2 37.5   HCO3 20.6*   BE -5*       Significant Imaging: reviewed CXR, c/w pulm edema  Assessment/Plan:     47 y/o male with h/o of liver transplant presented with pneumonia and JENN:    JENN (acute kidney injury)  JENN with septic shock  Oliguic  Pt is CRRT dependent for volume mgmt,   CRRT mgmt reviewed, current UF only 75 ml/hr  Pt is edematous, pulm edema, and is volume overloaded  Hypotension is the challenge to increasing UF  K normal  Hyponatremia has improved  Metabolic acidosis has improved    Discussed with ICU:  Continue levophed  Will add vasopressor  Will increase UF to 100 ml/hr and later to 200 ml/hr as tolerated  Will d/c Na bicarb drip (already has been d/carla)    S/P liver transplant  Reviewed the h/o and the chart  Alcohol use despite liver tx noted  Will defer mgmt of prograf dosing to hepatology    Acute respiratory failure with hypoxia and hypercarbia  Intubated  Mgmt reviewed    Atrial fibrillation with rapid ventricular response  Reviewed the chart      Plans and recommendations:  As discussed above  Total critical care time spent 45 minutes including time needed to review the records, the   patient evaluation, documentation, discussion with ICU.  more than 50%  of the time was spent on coordination of care.    Level V visit.  Multiple medical issues were addressed, as documented. Medical care provided was in addition to providing dialysis. Pt received multiple visits and evaluations for the reason of severe critical illness.        Thank you for your consult.     Robin Mcmahon MD  Nephrology  [facility] ([facility])

## 2024-01-12 NOTE — ASSESSMENT & PLAN NOTE
Reviewed the h/o and the chart  Alcohol use despite liver tx noted  Will defer mgmt of prograf dosing to hepatology

## 2024-01-13 NOTE — ASSESSMENT & PLAN NOTE
47 y/o male with h/o of liver transplant presented with pneumonia and JENN:     JENN (acute kidney injury)  JENN with septic shock  Oliguic  Pt is CRRT dependent for volume mgmt,   CRRT mgmt reviewed, current UF only 75 ml/hr  Pt is edematous, pulm edema, and is volume overloaded  Hypotension is the challenge to increasing UF  K normal  Hyponatremia has improved  Metabolic acidosis has improved     Discussed with ICU:  Continue levophed  Will add vasopressor  Will increase UF to 100 ml/hr and later to 200 ml/hr as tolerated  Will d/c Na bicarb drip (already has been d/carla)     S/P liver transplant  Reviewed the h/o and the chart  Alcohol use despite liver tx noted  Will defer mgmt of prograf dosing to hepatology     Acute respiratory failure with hypoxia and hypercarbia  Intubated  Mgmt reviewed     Atrial fibrillation with rapid ventricular response  Reviewed the chart        Plans and recommendations:  As discussed above  Total critical care time spent 45 minutes including time needed to review the records, the   patient evaluation, documentation, discussion with ICU.  more than 50% of the time was spent on coordination of care.    Level V visit.  Multiple medical issues were addressed, as documented. Medical care provided was in addition to providing dialysis. Pt received multiple visits and evaluations for the reason of severe critical illness.

## 2024-01-13 NOTE — NURSING
Pt. Remains intubated at this time, FiO2/PEEP 40/5. Pt. Remains on two pressors. Levo able to be weaned down to maintain MAP >65 mmHg. Vaso remains at ordered rate. Pt. Unable to follow commands at this time. Pt. Is able to open eyes to painful stimuli and occasionally spontaneously.  CRRT continued at UF rate of 150 ml/hr. Pt still in restraints at this time. Fecal Management System in place, Duran Cathter in place with minimal output overnight. 35 ml. With irrigation of 10-15 mls.

## 2024-01-13 NOTE — PROGRESS NOTES
[facility] ([facility])  Nephrology  Progress Note    Patient Name: Jordan Altman  MRN: [id number]  Admission Date: 1/4/2024  Hospital Length of Stay: 9 days  Attending Provider: Jeremiah Santillan MD   Primary Care Physician: Zohra Walters FNP  Principal Problem:PNA (pneumonia)    Subjective:     HPI: noted    Interval History: Pt was seen and examined in ICU. Labs and meds reviewed. Discussed with other providers. Pt remained on CRRT last pm and this am. Vas cath became poorly functional and then clotted. CRRT was stopped. Pt received multiple visits and evaluations.       Review of patient's allergies indicates:   Allergen Reactions    Latex, natural rubber      Current Facility-Administered Medications   Medication Frequency    acetaminophen oral solution 650 mg Q4H PRN    albuterol-ipratropium 2.5 mg-0.5 mg/3 mL nebulizer solution 3 mL Q6H WAKE    chlorhexidine 0.12 % solution 15 mL BID    dextrose 10% bolus 125 mL 125 mL PRN    dextrose 10% bolus 250 mL 250 mL PRN    famotidine (PF) injection 20 mg Daily    glucagon (human recombinant) injection 1 mg PRN    heparin (porcine) injection 3,600 Units PRN    influenza (QUADRIVALENT PF) vaccine 0.5 mL vaccine x 1 dose    insulin aspart U-100 pen 0-10 Units Q6H PRN    lactulose 20 gram/30 mL solution Soln 30 g TID    magnesium sulfate 2g in water 50mL IVPB (premix) PRN    micafungin 100 mg in sodium chloride 0.9 % 100 mL IVPB (MB+) Q24H    naloxone 0.4 mg/mL injection 0.02 mg PRN    NORepinephrine 32 mg in dextrose 5 % (D5W) 250 mL infusion Continuous    ondansetron disintegrating tablet 8 mg Q6H PRN    oxyCODONE immediate release tablet 5 mg Q6H PRN    pneumoc 20-jovita conj-dip cr(PF) (PREVNAR-20 (PF)) injection Syrg 0.5 mL vaccine x 1 dose    promethazine (PHENERGAN) 25 mg in dextrose 5 % (D5W) 50 mL IVPB Q6H PRN    rifAXIMin tablet 550 mg BID    senna-docusate 8.6-50 mg per tablet 1 tablet Daily PRN    sodium chloride 0.9% bolus 1,000 mL 1,000  mL Once    sodium chloride 0.9% flush 10 mL Q12H PRN    sodium phosphate 20.01 mmol in dextrose 5 % (D5W) 250 mL IVPB PRN    sodium phosphate 30 mmol in dextrose 5 % (D5W) 250 mL IVPB PRN    sodium phosphate 39.99 mmol in dextrose 5 % (D5W) 250 mL IVPB PRN    tacrolimus capsule 0.5 mg BID    thiamine (B-1) 100 mg in dextrose 5 % (D5W) 100 mL IVPB Daily    traZODone tablet 50 mg Nightly PRN    vasopressin (PITRESSIN) 0.2 Units/mL in dextrose 5% 100 mL infusion Continuous       Objective:     Vital Signs (Most Recent):  Temp: 98.6 °F (37 °C) (01/13/24 1100)  Pulse: 95 (01/13/24 1100)  Resp: (!) 24 (01/13/24 1100)  BP: 108/67 (01/13/24 1100)  SpO2: 99 % (01/13/24 1100) Vital Signs (24h Range):  Temp:  [96.8 °F (36 °C)-98.6 °F (37 °C)] 98.6 °F (37 °C)  Pulse:  [73-95] 95  Resp:  [24] 24  SpO2:  [97 %-100 %] 99 %  BP: ()/(56-76) 108/67  Arterial Line BP: ()/(55-89) 118/65     Weight: (!) 137.3 kg (302 lb 11.1 oz) (01/13/24 0458)  Body mass index is 41.05 kg/m².  Body surface area is 2.64 meters squared.    I/O last 3 completed shifts:  In: 7404.3 [I.V.:5962.3; NG/GT:1245; IV Piggyback:197.1]  Out: 03813 [Urine:158; Other:9667; Stool:2500]     Physical Exam  Vitals and nursing note reviewed.   Cardiovascular:      Rate and Rhythm: Normal rate and regular rhythm.      Pulses: Normal pulses.      Heart sounds: Normal heart sounds.   Pulmonary:      Breath sounds: Rales present.      Comments: intubated  Abdominal:      Palpations: Abdomen is soft.   Musculoskeletal:      Right lower leg: No edema.      Left lower leg: No edema.          Significant Labs: reviewed  BMP  Lab Results   Component Value Date     (L) 01/13/2024     (L) 01/13/2024    K 4.2 01/13/2024    K 4.2 01/13/2024     01/13/2024     01/13/2024    CO2 18 (L) 01/13/2024    CO2 18 (L) 01/13/2024    BUN 17 01/13/2024    BUN 17 01/13/2024    CREATININE 2.1 (H) 01/13/2024    CREATININE 2.1 (H) 01/13/2024    CALCIUM 8.3 (L)  01/13/2024    CALCIUM 8.3 (L) 01/13/2024    ANIONGAP 11 01/13/2024    ANIONGAP 11 01/13/2024    EGFRNORACEVR 38 (A) 01/13/2024    EGFRNORACEVR 38 (A) 01/13/2024     Lab Results   Component Value Date    WBC 6.19 01/13/2024    HGB 14.3 01/13/2024    HCT 42.5 01/13/2024     (H) 01/13/2024    PLT 49 (L) 01/13/2024     ABG  Recent Labs   Lab 01/13/24  0350   PH 7.320*   PO2 83   PCO2 36.9   HCO3 19.0*   BE -7*       Significant Imaging: reviewed CXR's  Assessment/Plan:     49 y/o male with h/o of liver transplant presented with pneumonia and JENN:     JENN (acute kidney injury)  JENN with septic shock  Oliguic  On CRRT for volume mgmt  Has tolerated dialysis well with UF increased to 150 ml/hr yesterday  The line clotted this am  CRRT was stopped  Will monitor any intrinsic renal function and further need for RRT  Will consider adding diuretics    K normal  O2 sat good (intubated)  Hyponatremia, stable  Metabolic acidosis has improved     Hypotension, stable, improved  On levoped  Vasopressin was added yesterday     S/P liver transplant  Reviewed the h/o and the chart  Alcohol use despite liver tx noted  Will defer mgmt of prograf dosing to hepatology     Acute respiratory failure with hypoxia and hypercarbia  Intubated  Mgmt reviewed     Atrial fibrillation with rapid ventricular response  Reviewed the chart        Plans and recommendations:  As discussed above  Total critical care time spent 45 minutes including time needed to review the records, the   patient evaluation, documentation, discussion with ICU.  more than 50% of the time was spent on coordination of care.    Level V visit.  Multiple medical issues were addressed, as documented. Medical care provided was in addition to providing dialysis. Pt received multiple visits and evaluations for the reason of severe critical illness.        Thank you for your consult.     Robin Mcmahon MD  Nephrology  [facility] ([facility])

## 2024-01-13 NOTE — SUBJECTIVE & OBJECTIVE
Interval History: Pt was seen and examined in ICU. Labs and meds reviewed. Discussed with other providers. Pt remained on CRRT last pm and this am. Vas cath became poorly functional and then clotted. CRRT was stopped. Pt received multiple visits and evaluations.       Review of patient's allergies indicates:   Allergen Reactions    Latex, natural rubber      Current Facility-Administered Medications   Medication Frequency    acetaminophen oral solution 650 mg Q4H PRN    albuterol-ipratropium 2.5 mg-0.5 mg/3 mL nebulizer solution 3 mL Q6H WAKE    chlorhexidine 0.12 % solution 15 mL BID    dextrose 10% bolus 125 mL 125 mL PRN    dextrose 10% bolus 250 mL 250 mL PRN    famotidine (PF) injection 20 mg Daily    glucagon (human recombinant) injection 1 mg PRN    heparin (porcine) injection 3,600 Units PRN    influenza (QUADRIVALENT PF) vaccine 0.5 mL vaccine x 1 dose    insulin aspart U-100 pen 0-10 Units Q6H PRN    lactulose 20 gram/30 mL solution Soln 30 g TID    magnesium sulfate 2g in water 50mL IVPB (premix) PRN    micafungin 100 mg in sodium chloride 0.9 % 100 mL IVPB (MB+) Q24H    naloxone 0.4 mg/mL injection 0.02 mg PRN    NORepinephrine 32 mg in dextrose 5 % (D5W) 250 mL infusion Continuous    ondansetron disintegrating tablet 8 mg Q6H PRN    oxyCODONE immediate release tablet 5 mg Q6H PRN    pneumoc 20-jovita conj-dip cr(PF) (PREVNAR-20 (PF)) injection Syrg 0.5 mL vaccine x 1 dose    promethazine (PHENERGAN) 25 mg in dextrose 5 % (D5W) 50 mL IVPB Q6H PRN    rifAXIMin tablet 550 mg BID    senna-docusate 8.6-50 mg per tablet 1 tablet Daily PRN    sodium chloride 0.9% bolus 1,000 mL 1,000 mL Once    sodium chloride 0.9% flush 10 mL Q12H PRN    sodium phosphate 20.01 mmol in dextrose 5 % (D5W) 250 mL IVPB PRN    sodium phosphate 30 mmol in dextrose 5 % (D5W) 250 mL IVPB PRN    sodium phosphate 39.99 mmol in dextrose 5 % (D5W) 250 mL IVPB PRN    tacrolimus capsule 0.5 mg BID    thiamine (B-1) 100 mg in dextrose 5 %  (D5W) 100 mL IVPB Daily    traZODone tablet 50 mg Nightly PRN    vasopressin (PITRESSIN) 0.2 Units/mL in dextrose 5% 100 mL infusion Continuous       Objective:     Vital Signs (Most Recent):  Temp: 98.6 °F (37 °C) (01/13/24 1100)  Pulse: 95 (01/13/24 1100)  Resp: (!) 24 (01/13/24 1100)  BP: 108/67 (01/13/24 1100)  SpO2: 99 % (01/13/24 1100) Vital Signs (24h Range):  Temp:  [96.8 °F (36 °C)-98.6 °F (37 °C)] 98.6 °F (37 °C)  Pulse:  [73-95] 95  Resp:  [24] 24  SpO2:  [97 %-100 %] 99 %  BP: ()/(56-76) 108/67  Arterial Line BP: ()/(55-89) 118/65     Weight: (!) 137.3 kg (302 lb 11.1 oz) (01/13/24 0458)  Body mass index is 41.05 kg/m².  Body surface area is 2.64 meters squared.    I/O last 3 completed shifts:  In: 7404.3 [I.V.:5962.3; NG/GT:1245; IV Piggyback:197.1]  Out: 33789 [Urine:158; Other:9667; Stool:2500]     Physical Exam  Vitals and nursing note reviewed.   Cardiovascular:      Rate and Rhythm: Normal rate and regular rhythm.      Pulses: Normal pulses.      Heart sounds: Normal heart sounds.   Pulmonary:      Breath sounds: Rales present.      Comments: intubated  Abdominal:      Palpations: Abdomen is soft.   Musculoskeletal:      Right lower leg: No edema.      Left lower leg: No edema.          Significant Labs: reviewed  BMP  Lab Results   Component Value Date     (L) 01/13/2024     (L) 01/13/2024    K 4.2 01/13/2024    K 4.2 01/13/2024     01/13/2024     01/13/2024    CO2 18 (L) 01/13/2024    CO2 18 (L) 01/13/2024    BUN 17 01/13/2024    BUN 17 01/13/2024    CREATININE 2.1 (H) 01/13/2024    CREATININE 2.1 (H) 01/13/2024    CALCIUM 8.3 (L) 01/13/2024    CALCIUM 8.3 (L) 01/13/2024    ANIONGAP 11 01/13/2024    ANIONGAP 11 01/13/2024    EGFRNORACEVR 38 (A) 01/13/2024    EGFRNORACEVR 38 (A) 01/13/2024     Lab Results   Component Value Date    WBC 6.19 01/13/2024    HGB 14.3 01/13/2024    HCT 42.5 01/13/2024     (H) 01/13/2024    PLT 49 (L) 01/13/2024     ABG  Recent  Labs   Lab 01/13/24  0350   PH 7.320*   PO2 83   PCO2 36.9   HCO3 19.0*   BE -7*       Significant Imaging: reviewed CXR's

## 2024-01-13 NOTE — NURSING
Venous line of trialysis clotted off.  Attempted to aspirate clot and unable to fully pull out clot.  Arterial line of trialysis is able aspirate blood and heparin locked access of arterial side.  Notified dialysis RNChester.

## 2024-01-13 NOTE — PROGRESS NOTES
[facility] - [facility] ([facility])  Critical Care - Medicine  Progress Note    Patient Name: Jordan Altman  MRN: [id number]  Admission Date: 1/4/2024  Hospital Length of Stay: 9 days  Code Status: DNR  Attending Provider: Jeremiah Santillan MD  Primary Care Provider: Zhora Walters FNP   Principal Problem: PNA (pneumonia)    Subjective:     HPI: 48 year old male with a known past history of alcoholic liver cirrhosis s/p liver transplant [facility] 10/20, CKD, PAF (on eliquis) who presented to the ED 1/4 with complaints of shortness of breath, fatigue, intermittent fever over the last 4 weeks. Stated he was dx over a week ago with flu as well. He also complains of intermittent N/V. In the ED, he was noted to be in afib RVR (HR in the 140's), given Cardizem IVP and placed on gtt.  Lab work notable for INR 1.5, Alk phos 169, , ALT 82, .  FLU and covid negative. Chest xray showed new low-grade infiltrate or atypical pneumonia LLL.  Patient was started on Levaquin.  Patient will be admitted to observation for afib rvr/pna.  Cardiology consulted. 01/05/2024: Afib RVR marginally controlled on non-titratable drip. Cardiology consulted, recommendations pending. 01/06/2024: Afib RVR controlled. Echo grossly unremarkable. Still on Cardizem, pending transition to PO by Cardiology. Hepatology recommends continuing Prograf but to hold cellcept due to infection. 01/07/2024: Afib resolved. In SR. Afebrile, clinically unchanged. However, was notably hypotensive this AM. Renal function sharply worsened over the past 24 hours as well. Nephrology consulted for recommendations. 1/8/24: Patient tremulous this morning, with hypothermia, hypotension, hyponatremia, elevated serum creatinine, minimal urine output--> ?  Prograf toxicity versus sepsis,--> ordered urinalysis, urine lytes, chest x-ray, procalcitonin, blood cultures, broaden antibiotics, IV fluids, hold Prograf,--hepatology, Nephrology agreed with the plan.  Eliquis discontinued, changed to heparin, given worsening GFR, also for possible need for renal biopsy. Pt transferred to the ICU evening of 1/8 for hyponatremia, resp failure on BIPAP, hypothermia, and septic shock. Upon arrival pt was intubated and CVL was placed for pressor support    Hospital/ICU Course:   1/9. No improvement in Na despite hydration. Anuric. Worsening hypoxia. Sedated and intermittent paralytics given for vent dyssynchrony during suctioning and episodes of cough while he is on 90% and PEEP 10  1/10 - no acute events  On levo , on fio2 65 peep 10  1/11- on levo Peep 10   1/12 - on 50/8, events noted last pm , with bath dropped saturations/ bp , on vaso     Interval History/Significant Events:   1/13: No events last 24 hours. Remains unresponsive/comatose. Trialysis cath clotted, removed by RN. No acute need for HD per renal    Review of Systems as per HPI otherwise negative    Objective:     Vital Signs (Most Recent):  Temp: 98.1 °F (36.7 °C) (01/13/24 1000)  Pulse: 93 (01/13/24 1000)  Resp: (!) 24 (01/13/24 1000)  BP: 106/69 (01/13/24 1000)  SpO2: 100 % (01/13/24 1000) Vital Signs (24h Range):  Temp:  [96.8 °F (36 °C)-98.2 °F (36.8 °C)] 98.1 °F (36.7 °C)  Pulse:  [73-98] 93  Resp:  [24] 24  SpO2:  [95 %-100 %] 100 %  BP: ()/(56-76) 106/69  Arterial Line BP: ()/(55-89) 123/67     Weight: (!) 137.3 kg (302 lb 11.1 oz)  Body mass index is 41.05 kg/m².      Intake/Output Summary (Last 24 hours) at 1/13/2024 1023  Last data filed at 1/13/2024 1000  Gross per 24 hour   Intake 6261.56 ml   Output 54922 ml   Net -3958.44 ml       Physical Exam  Vitals and nursing note reviewed.   Constitutional:       General: He is not in acute distress.     Appearance: He is ill-appearing.   HENT:      Head: Normocephalic and atraumatic.   Eyes:      General: Scleral icterus present.         Right eye: No discharge.         Left eye: No discharge.      Pupils: Pupils are equal, round, and reactive to  light.   Cardiovascular:      Rate and Rhythm: Normal rate and regular rhythm.      Heart sounds: No murmur heard.  Pulmonary:      Effort: No respiratory distress.      Breath sounds: No wheezing or rales.   Abdominal:      General: There is distension.      Tenderness: There is no abdominal tenderness.   Musculoskeletal:         General: Swelling present. No tenderness.      Cervical back: Neck supple. No rigidity.   Neurological:      Comments: Not following commands off sedation     Vents:  Vent Mode: A/C (01/13/24 0944)  Set Rate: 24 BPM (01/13/24 0944)  Vt Set: 550 mL (01/13/24 0944)  PEEP/CPAP: 5 cmH20 (01/13/24 0944)  Oxygen Concentration (%): 40 (01/13/24 1000)  Peak Airway Pressure: 27 cmH20 (01/13/24 0944)  Plateau Pressure: 15 cmH20 (01/13/24 0944)  Total Ve: 13.4 L/m (01/13/24 0944)  Negative Inspiratory Force (cm H2O): 0 (01/13/24 0944)  F/VT Ratio<105 (RSBI): (!) 43.01 (01/13/24 0944)    Lines/Drains/Airways       Central Venous Catheter Line  Duration             Percutaneous Central Line Insertion/Assessment - Triple Lumen  01/08/24 1815 Internal Jugular Right 4 days              Drain  Duration                  NG/OG Tube 01/08/24 1811 [place] sump [facility] mouth 4 days         Urethral Catheter 01/08/24 1813 Temperature probe 16 Fr. 4 days         Rectal Tube 01/10/24 1015 rectal tube w/ balloon (indicate number of mLs) 3 days              Airway  Duration                  Airway - Non-Surgical 01/08/24 1758 Endotracheal Tube 4 days              Arterial Line  Duration             Arterial Line 01/08/24 2142 Right Radial 4 days              Peripheral Intravenous Line  Duration                  Peripheral IV - Single Lumen 01/04/24 1434 20 G Anterior;Proximal;Left Forearm 8 days                    Significant Labs:    CBC/Anemia Profile:  Recent Labs   Lab 01/12/24  0341 01/13/24  0349   WBC 7.77 6.19   HGB 14.5 14.3   HCT 43.1 42.5   PLT 73* 49*   * 101*   RDW 17.9* 17.7*         Chemistries:  Recent Labs   Lab 01/12/24  0341 01/12/24  1458 01/12/24  1557 01/12/24  2103 01/12/24  2341 01/13/24  0349 01/13/24  0916   *  131* 133*  --  133*  --  132*  132*  --    K 4.5  4.5 5.0  --  4.6  --  4.2  4.2  --      100 102  --  101  --  103  103  --    CO2 18*  18* 17*  --  19*  --  18*  18*  --    BUN 15  15 16  --  16  --  17  17  --    CREATININE 1.8*  1.8* 1.9*  --  2.0*  --  2.1*  2.1*  --    CALCIUM 7.6*  7.5* 7.6*  --  8.1*  --  8.3*  8.3*  --    ALBUMIN 1.8*  1.8* 1.8*  --  1.7*  --  1.7*  1.7*  --    PROT 5.3*  --   --   --   --  5.2*  --    BILITOT 8.4*  --   --   --   --  8.2*  --    ALKPHOS 393*  --   --   --   --  378*  --    ALT 54*  --   --   --   --  53*  --    *  --   --   --   --  164*  --    MG 2.3  --    < >  --  2.0 2.0 2.0   PHOS 3.1  3.1 3.2  --  3.3  --  3.3  --     < > = values in this interval not displayed.       Assessment/Plan:     Active Diagnoses:    Diagnosis Date Noted POA    PRINCIPAL PROBLEM:  PNA (pneumonia) [J18.9] 01/04/2024 Yes    Acute respiratory failure with hypoxia and hypercarbia [J96.01, J96.02] 01/08/2024 No    Hyperkalemia [E87.5] 01/08/2024 Yes    Chest pain [R07.9] 01/05/2024 No    Elevated liver enzymes [R74.8] 01/05/2024 Yes    Chronic diastolic congestive heart failure [I50.32] 01/05/2024 Yes    Atrial fibrillation with rapid ventricular response [I48.91] 07/30/2023 Yes    S/P liver transplant [Z94.4] 10/14/2020 Not Applicable    Alcohol use disorder, severe, dependence [F10.20] 09/21/2020 Yes     Chronic    Thrombocytopenia [D69.6] 09/19/2020 Yes    Hyponatremia [E87.1] 09/19/2020 Yes    Chronic Hepatic encephalopathy [K76.82] 09/19/2020 Yes    JENN (acute kidney injury) [N17.9] 09/19/2020 Yes      Problems Resolved During this Admission:       Pulmonary  * PNA (pneumonia)  - 1/9 Likely has pneumonia and fluid overload.Will consider CT post a few episodes of CRRT  - 1/10 completed antibiotics   - 1/12 finished  antibiotic, on empiric micafungin, immunodiffusion pending     - Cultures from 1/5 (sputum) remains negative      Acute respiratory failure with hypoxia and hypercarbia  - Patient with Hypercapnic and Hypoxic Respiratory failure which is Acute.  he is not on home oxygen. Supplemental oxygen was provided and noted             Vent Mode: A/C  Oxygen Concentration (%):  [40-50] 40  Resp Rate Total:  [24 br/min] 24 br/min  Vt Set:  [550 mL] 550 mL  PEEP/CPAP:  [5 cmH20-8 cmH20] 5 cmH20  Mean Airway Pressure:  [12 cmH20-15 cmH20] 12 cmH20     Signs/symptoms of respiratory failure include- tachypnea, increased work of breathing, and respiratory distress. Contributing diagnoses includes - Pneumonia Labs and images were reviewed. Patient Has recent ABG, which has been reviewed. Will treat underlying causes and adjust management of respiratory failure as follows  - 1/12 holding sedation on fio2 40/5   - ventilator settings remain appropriate  - Neuro status precludes extubation     Cardiac/Vascular  Chronic diastolic congestive heart failure  - Patient is identified as having diastolic heart failure that is Acute on chronic. CHF is currently controlled. Latest ECHO performed and demonstrates   Interpretation Summary    Left Ventricle: The left ventricle is normal in size. Mildly increased ventricular mass. Mildly increased wall thickness. There is concentric hypertrophy. Normal wall motion. There is normal systolic function with a visually estimated ejection fraction of 55 - 60%. There is normal diastolic function.    Right Ventricle: Normal right ventricular cavity size. Wall thickness is normal. Right ventricle wall motion  is normal. Systolic function is normal.    IVC/SVC: Normal venous pressure at 3 mmHg.  - CHF not an active issue at this time. Maintain volume status      Atrial fibrillation with rapid ventricular response  - Rate controlled  - Remains on pressors  - No anticoagulation    Hyponatremia  - Resolved  - Na  132  - Per renal     JENN (acute kidney injury)  - hepatorenal failure  - HD per renal  - Discussed with nephrology, no acute indication for HD today  - Will need new access if it is required     Hematology  Thrombocytopenia  - Continues to trend down  - Off heparin  - 49K today      S/P liver transplant  - Marginal function  - Ammonia remains high but trending down  - Continue lactulose    Updated family at bedside  Brother is POA  DNR  Supportive care    Critical Care Time: 36 minutes  Critical secondary to respiratory failure/mechanical ventilator, renal failure, high risk meds infusion, high risk of acute decline/death     Critical care was time spent personally by me on the following activities: development of treatment plan with patient or surrogate and bedside caregivers, discussions with consultants, evaluation of patient's response to treatment, examination of patient, ordering and performing treatments and interventions, ordering and review of laboratory studies, ordering and review of radiographic studies, pulse oximetry, re-evaluation of patient's condition.  This critical care time did not overlap with that of any other provider or involve time for any procedures.     Gilbert Ruiz MD  Critical Care - Medicine  [facility] - [facility] ([facility])

## 2024-01-14 NOTE — PLAN OF CARE
Remains intubated on minimal settings.  Remains not following commands; off of sedation x 3 days.  Family updated at bedside.

## 2024-01-14 NOTE — PROGRESS NOTES
[facility] - [facility]ensive Care ([facility])  Critical Care - Medicine  Progress Note    Patient Name: Jordna Altman  MRN: [id number]  Admission Date: 1/4/2024  Hospital Length of Stay: 10 days  Code Status: DNR  Attending Provider: Jeremiah Santillan MD  Primary Care Provider: Zohra Walters FNP   Principal Problem: PNA (pneumonia)    Subjective:     HPI: 48 year old male with a known past history of alcoholic liver cirrhosis s/p liver transplant [facility] 10/20, CKD, PAF (on eliquis) who presented to the ED 1/4 with complaints of shortness of breath, fatigue, intermittent fever over the last 4 weeks. Stated he was dx over a week ago with flu as well. He also complains of intermittent N/V. In the ED, he was noted to be in afib RVR (HR in the 140's), given Cardizem IVP and placed on gtt.  Lab work notable for INR 1.5, Alk phos 169, , ALT 82, .  FLU and covid negative. Chest xray showed new low-grade infiltrate or atypical pneumonia LLL.  Patient was started on Levaquin.  Patient will be admitted to observation for afib rvr/pna.  Cardiology consulted. 01/05/2024: Afib RVR marginally controlled on non-titratable drip. Cardiology consulted, recommendations pending. 01/06/2024: Afib RVR controlled. Echo grossly unremarkable. Still on Cardizem, pending transition to PO by Cardiology. Hepatology recommends continuing Prograf but to hold cellcept due to infection. 01/07/2024: Afib resolved. In SR. Afebrile, clinically unchanged. However, was notably hypotensive this AM. Renal function sharply worsened over the past 24 hours as well. Nephrology consulted for recommendations. 1/8/24: Patient tremulous this morning, with hypothermia, hypotension, hyponatremia, elevated serum creatinine, minimal urine output--> ?  Prograf toxicity versus sepsis,--> ordered urinalysis, urine lytes, chest x-ray, procalcitonin, blood cultures, broaden antibiotics, IV fluids, hold Prograf,--hepatology, Nephrology agreed with the plan.  Eliquis discontinued, changed to heparin, given worsening GFR, also for possible need for renal biopsy. Pt transferred to the ICU evening of 1/8 for hyponatremia, resp failure on BIPAP, hypothermia, and septic shock. Upon arrival pt was intubated and CVL was placed for pressor support       Hospital/ICU Course:   1/9. No improvement in Na despite hydration. Anuric. Worsening hypoxia. Sedated and intermittent paralytics given for vent dyssynchrony during suctioning and episodes of cough while he is on 90% and PEEP 10  1/10 - no acute events  On levo , on fio2 65 peep 10  1/11- on levo Peep 10   1/12 - on 50/8, events noted last pm , with bath dropped saturations/ bp , on vaso   1/13: No events last 24 hours. Remains unresponsive/comatose. Trialysis cath clotted, removed by RN. No acute need for HD per renal     Interval History/Significant Events:   1/14: Unchanged. Remains on ventilator. Unresponsive. Remains on pressors     Review of Systems unobtainable due to intubation    Objective:     Vital Signs (Most Recent):  Temp: 100.2 °F (37.9 °C) (01/14/24 1130)  Pulse: 95 (01/14/24 1130)  Resp: (!) 24 (01/14/24 1130)  BP: 110/69 (01/14/24 1100)  SpO2: 100 % (01/14/24 1130) Vital Signs (24h Range):  Temp:  [98.8 °F (37.1 °C)-100.4 °F (38 °C)] 100.2 °F (37.9 °C)  Pulse:  [] 95  Resp:  [23-34] 24  SpO2:  [94 %-100 %] 100 %  BP: (101-118)/(62-75) 110/69  Arterial Line BP: (110-136)/(59-94) 111/66     Weight: 135 kg (297 lb 9.9 oz)  Body mass index is 40.36 kg/m².      Intake/Output Summary (Last 24 hours) at 1/14/2024 1148  Last data filed at 1/14/2024 1100  Gross per 24 hour   Intake 1790 ml   Output 2310 ml   Net -520 ml       Physical Exam  Vitals and nursing note reviewed.   Constitutional:       General: He is not in acute distress.     Appearance: He is ill-appearing.   HENT:      Head: Normocephalic and atraumatic.   Eyes:      General: Scleral icterus present.         Right eye: No discharge.         Left  eye: No discharge.      Pupils: Pupils are equal, round, and reactive to light.   Cardiovascular:      Rate and Rhythm: Normal rate and regular rhythm.      Heart sounds: No murmur heard.  Pulmonary:      Effort: No respiratory distress.      Breath sounds: No wheezing or rales.   Abdominal:      General: There is distension.      Tenderness: There is no abdominal tenderness.   Musculoskeletal:         General: Swelling present. No tenderness.      Cervical back: Neck supple. No rigidity.   Neurological:      Comments: Not following commands off sedation     Vents:  Vent Mode: A/C (01/14/24 1119)  Set Rate: 24 BPM (01/14/24 1119)  Vt Set: 550 mL (01/14/24 1119)  PEEP/CPAP: 5 cmH20 (01/14/24 1119)  Oxygen Concentration (%): 40 (01/14/24 1130)  Peak Airway Pressure: 28 cmH20 (01/14/24 1119)  Plateau Pressure: 15 cmH20 (01/14/24 1119)  Total Ve: 13.4 L/m (01/14/24 1119)  Negative Inspiratory Force (cm H2O): 0 (01/14/24 1119)  F/VT Ratio<105 (RSBI): (!) 43.09 (01/14/24 1119)    Lines/Drains/Airways       Central Venous Catheter Line  Duration             Percutaneous Central Line Insertion/Assessment - Triple Lumen  01/08/24 1815 Internal Jugular Right 5 days              Drain  Duration                  NG/OG Tube 01/08/24 1811 [place] sump Center mouth 5 days         Urethral Catheter 01/08/24 1813 Temperature probe 16 Fr. 5 days         Rectal Tube 01/10/24 1015 rectal tube w/ balloon (indicate number of mLs) 4 days              Airway  Duration                  Airway - Non-Surgical 01/08/24 1758 Endotracheal Tube 5 days              Arterial Line  Duration             Arterial Line 01/08/24 2142 Right Radial 5 days              Peripheral Intravenous Line  Duration                  Peripheral IV - Single Lumen 01/04/24 1434 20 G Anterior;Proximal;Left Forearm 9 days                    Significant Labs:    CBC/Anemia Profile:  Recent Labs   Lab 01/13/24  0349 01/14/24  0405   WBC 6.19 6.53   HGB 14.3 12.8*   HCT 42.5  38.2*   PLT 49* 32*   * 100*   RDW 17.7* 17.7*        Chemistries:  Recent Labs   Lab 01/12/24  1458 01/12/24  1557 01/12/24  2103 01/12/24  2341 01/13/24  0349 01/13/24  0916 01/14/24  0405   *  --  133*  --  132*  132*  --  133*   K 5.0  --  4.6  --  4.2  4.2  --  4.0     --  101  --  103  103  --  107   CO2 17*  --  19*  --  18*  18*  --  21*   BUN 16  --  16  --  17  17  --  36*   CREATININE 1.9*  --  2.0*  --  2.1*  2.1*  --  3.7*   CALCIUM 7.6*  --  8.1*  --  8.3*  8.3*  --  8.4*   ALBUMIN 1.8*  --  1.7*  --  1.7*  1.7*  --  1.6*   PROT  --   --   --   --  5.2*  --  4.8*   BILITOT  --   --   --   --  8.2*  --  6.9*   ALKPHOS  --   --   --   --  378*  --  309*   ALT  --   --   --   --  53*  --  50*   AST  --   --   --   --  164*  --  178*   MG  --    < >  --  2.0 2.0 2.0  --    PHOS 3.2  --  3.3  --  3.3  --   --     < > = values in this interval not displayed.       Assessment/Plan:     Active Diagnoses:    Diagnosis Date Noted POA    PRINCIPAL PROBLEM:  PNA (pneumonia) [J18.9] 01/04/2024 Yes    Acute respiratory failure with hypoxia and hypercarbia [J96.01, J96.02] 01/08/2024 No    Hyperkalemia [E87.5] 01/08/2024 Yes    Chest pain [R07.9] 01/05/2024 No    Elevated liver enzymes [R74.8] 01/05/2024 Yes    Chronic diastolic congestive heart failure [I50.32] 01/05/2024 Yes    Atrial fibrillation with rapid ventricular response [I48.91] 07/30/2023 Yes    S/P liver transplant [Z94.4] 10/14/2020 Not Applicable    Alcohol use disorder, severe, dependence [F10.20] 09/21/2020 Yes     Chronic    Thrombocytopenia [D69.6] 09/19/2020 Yes    Hyponatremia [E87.1] 09/19/2020 Yes    Chronic Hepatic encephalopathy [K76.82] 09/19/2020 Yes    JENN (acute kidney injury) [N17.9] 09/19/2020 Yes      Problems Resolved During this Admission:     * PNA (pneumonia)- Likely bacterial  - Completed abx  - Cultures remain negative      Acute respiratory failure with hypoxia and hypercarbia  - Patient with  Hypercapnic and Hypoxic Respiratory failure which is Acute. He is not on home oxygen.             Vent Mode: A/C  Oxygen Concentration (%):  [40-50] 40  Resp Rate Total:  [24 br/min] 24 br/min  Vt Set:  [550 mL] 550 mL  PEEP/CPAP:  [5 cmH20-8 cmH20] 5 cmH20  Mean Airway Pressure:  [12 cmH20-15 cmH20] 12 cmH20  - ventilator settings remain appropriate  - Neuro status precludes extubation     Cardiac/Vascular  Chronic diastolic congestive heart failure  - Patient is identified as having diastolic heart failure that is Acute on chronic. CHF is currently controlled. Latest ECHO performed and demonstrates   Interpretation Summary    Left Ventricle: The left ventricle is normal in size. Mildly increased ventricular mass. Mildly increased wall thickness. There is concentric hypertrophy. Normal wall motion. There is normal systolic function with a visually estimated ejection fraction of 55 - 60%. There is normal diastolic function.    Right Ventricle: Normal right ventricular cavity size. Wall thickness is normal. Right ventricle wall motion  is normal. Systolic function is normal.    IVC/SVC: Normal venous pressure at 3 mmHg.  - CHF not an active issue at this time. Maintain volume status      Atrial fibrillation with rapid ventricular response  - Rate controlled  - Remains on pressors  - No anticoagulation     Hyponatremia  - Resolved  - Na 132  - Per renal     JENN (acute kidney injury)  - hepatorenal failure  - HD per renal  - Discussed with nephrology, no acute indication for HD today  - Will need new access if it is required     Hematology  Thrombocytopenia  - Continues to trend down  - Off heparin  - 49K today      S/P liver transplant  - Marginal function  - Ammonia remains high but trending down  - Continue lactulose     Updated family at bedside  Brother is POA  DNR  Supportive care    Critical Care Time: 36 minutes  Critical secondary to mechanical ventilation, high risk medication infusion, severe  encephalopathy     Critical care was time spent personally by me on the following activities: development of treatment plan with patient or surrogate and bedside caregivers, discussions with consultants, evaluation of patient's response to treatment, examination of patient, ordering and performing treatments and interventions, ordering and review of laboratory studies, ordering and review of radiographic studies, pulse oximetry, re-evaluation of patient's condition.  This critical care time did not overlap with that of any other provider or involve time for any procedures.     Gilbert Ruiz MD  Critical Care - Medicine  [facility] - Intensive Care [facility])

## 2024-01-14 NOTE — PLAN OF CARE
Pt intubated; MAY orientation. GCS 6. VSS with BP MAP maintained >65 on vaso and levo gtts. Tmax 100.2; provider aware. SR on monitor. Vent settings maintained at FiO2 40, PEEP 5, RR 24, and . Duran in place; 420mL dark brown urine this shift. Flexiseal in place; 1000mL output this shift. Accuchecks Q6; coverage needed.     Pt resting comfortably in bed with side rails up x3; locked and lowered with alarm set. Monitor alarms on and audible.

## 2024-01-14 NOTE — PLAN OF CARE
Hepatology Chart Review    Patient is still critically ill therefore would continue to limit immunosuppression, goal tacro 3-5. Tacro levels reordered and tacro dosing decreased to daily dosing.    Theresa Askew MD

## 2024-01-14 NOTE — PROGRESS NOTES
[facility] ([facility])  Nephrology  Progress Note    Patient Name: Jordan Altman  MRN: [id number]  Admission Date: 1/4/2024  Hospital Length of Stay: 10 days  Attending Provider: Jeremiah Santillan MD   Primary Care Physician: Zohra Walters FNP  Principal Problem:PNA (pneumonia)    Subjective:     HPI: noted    Interval History: Pt was seen and examined in ICU. Labs and meds reviewed. Discussed with other providers. No new events, remains critically ill. Intubated. CRRT was placed on hold yesterday because the line clotted.      Review of patient's allergies indicates:   Allergen Reactions    Latex, natural rubber      Current Facility-Administered Medications   Medication Frequency    acetaminophen oral solution 650 mg Q4H PRN    albuterol-ipratropium 2.5 mg-0.5 mg/3 mL nebulizer solution 3 mL Q6H WAKE    chlorhexidine 0.12 % solution 15 mL BID    dextrose 10% bolus 125 mL 125 mL PRN    dextrose 10% bolus 250 mL 250 mL PRN    famotidine (PF) injection 20 mg Daily    glucagon (human recombinant) injection 1 mg PRN    heparin (porcine) injection 3,600 Units PRN    influenza (QUADRIVALENT PF) vaccine 0.5 mL vaccine x 1 dose    insulin aspart U-100 pen 0-10 Units Q6H PRN    lactulose 20 gram/30 mL solution Soln 30 g TID    magnesium sulfate 2g in water 50mL IVPB (premix) PRN    micafungin 100 mg in sodium chloride 0.9 % 100 mL IVPB (MB+) Q24H    naloxone 0.4 mg/mL injection 0.02 mg PRN    NORepinephrine 32 mg in dextrose 5 % (D5W) 250 mL infusion Continuous    ondansetron disintegrating tablet 8 mg Q6H PRN    oxyCODONE immediate release tablet 5 mg Q6H PRN    pneumoc 20-jovita conj-dip cr(PF) (PREVNAR-20 (PF)) injection Syrg 0.5 mL vaccine x 1 dose    promethazine (PHENERGAN) 25 mg in dextrose 5 % (D5W) 50 mL IVPB Q6H PRN    rifAXIMin tablet 550 mg BID    senna-docusate 8.6-50 mg per tablet 1 tablet Daily PRN    sodium chloride 0.9% bolus 1,000 mL 1,000 mL Once    sodium chloride 0.9% flush 10 mL  Q12H PRN    sodium phosphate 20.01 mmol in dextrose 5 % (D5W) 250 mL IVPB PRN    sodium phosphate 30 mmol in dextrose 5 % (D5W) 250 mL IVPB PRN    sodium phosphate 39.99 mmol in dextrose 5 % (D5W) 250 mL IVPB PRN    [START ON 1/15/2024] tacrolimus capsule 0.5 mg Daily AM    thiamine (B-1) 100 mg in dextrose 5 % (D5W) 100 mL IVPB Daily    traZODone tablet 50 mg Nightly PRN    vasopressin (PITRESSIN) 0.2 Units/mL in dextrose 5 % (D5W) 100 mL infusion Continuous       Objective:     Vital Signs (Most Recent):  Temp: 100 °F (37.8 °C) (01/14/24 1545)  Pulse: 84 (01/14/24 1545)  Resp: (!) 24 (01/14/24 1545)  BP: 115/77 (01/14/24 1500)  SpO2: 98 % (01/14/24 1545) Vital Signs (24h Range):  Temp:  [98.8 °F (37.1 °C)-100.4 °F (38 °C)] 100 °F (37.8 °C)  Pulse:  [] 84  Resp:  [20-39] 24  SpO2:  [89 %-100 %] 98 %  BP: (102-118)/(64-77) 115/77  Arterial Line BP: (102-136)/(58-94) 121/68     Weight: 135 kg (297 lb 9.9 oz) (01/14/24 0649)  Body mass index is 40.36 kg/m².  Body surface area is 2.62 meters squared.    I/O last 3 completed shifts:  In: 5016.9 [I.V.:3559.8; NG/GT:1260; IV Piggyback:197.1]  Out: 8035 [Urine:460; Other:4625; Stool:2950]     Physical Exam  Vitals and nursing note reviewed.   Cardiovascular:      Rate and Rhythm: Normal rate and regular rhythm.      Pulses: Normal pulses.      Heart sounds: Normal heart sounds.   Pulmonary:      Breath sounds: Rales present.   Abdominal:      Palpations: Abdomen is soft.   Musculoskeletal:      Right lower leg: Edema present.      Left lower leg: Edema present.          Significant Labs: reviewed  BMP  Lab Results   Component Value Date     (L) 01/14/2024    K 4.0 01/14/2024     01/14/2024    CO2 21 (L) 01/14/2024    BUN 36 (H) 01/14/2024    CREATININE 3.7 (H) 01/14/2024    CALCIUM 8.4 (L) 01/14/2024    ANIONGAP 5 (L) 01/14/2024    EGFRNORACEVR 19 (A) 01/14/2024     Lab Results   Component Value Date    WBC 6.53 01/14/2024    HGB 12.8 (L) 01/14/2024     HCT 38.2 (L) 01/14/2024     (H) 01/14/2024    PLT 32 (LL) 01/14/2024       ABG  Recent Labs   Lab 01/13/24  0350   PH 7.320*   PO2 83   PCO2 36.9   HCO3 19.0*   BE -7*       Significant Imaging: CXR reviewed, has pulm edema  Assessment/Plan:     47 y/o male with h/o of liver transplant presented with pneumonia and JENN:     JENN (acute kidney injury)  No sign of renal recovery  JENN with septic shock  Oliguic  Was on CRRT for volume mgmt, placed on hold yesterday, because the line clotted  Had o/w tolerated dialysis well, was on  ml/hr   K normal  O2 OK  No acute indications for RRT at present  Will monitor any intrinsic renal function and need for RRT  Will add diuretics IV     Hyponatremia, stable  Metabolic acidosis has improved     Hypotension, stable, improved  On levoped  Vasopressin was added yesterday     S/P liver transplant  Reviewed the h/o and the chart  Alcohol use despite liver tx noted  Will defer mgmt of prograf dosing to hepatology     Acute respiratory failure with hypoxia and hypercarbia  Intubated  Mgmt reviewed     Atrial fibrillation with rapid ventricular response  Reviewed the chart        Plans and recommendations:  As discussed above  Total critical care time spent 40 minutes including time needed to review the records, the   patient evaluation, documentation, face-to-face discussion with the patient,   more than 50% of the time was spent on coordination of care and counseling.              Thank you for your consult.     Robin Mcmahon MD  Nephrology  [facility] ([facility])

## 2024-01-14 NOTE — ASSESSMENT & PLAN NOTE
49 y/o male with h/o of liver transplant presented with pneumonia and JENN:     JENN (acute kidney injury)  JENN with septic shock  Oliguic  On CRRT for volume mgmt  Has tolerated dialysis well with UF increased to 150 ml/hr yesterday  The line clotted this am  CRRT was stopped  Will monitor any intrinsic renal function and further need for RRT  Will consider adding diuretics     K normal  O2 sat good (intubated)  Hyponatremia, stable  Metabolic acidosis has improved     Hypotension, stable, improved  On levoped  Vasopressin was added yesterday     S/P liver transplant  Reviewed the h/o and the chart  Alcohol use despite liver tx noted  Will defer mgmt of prograf dosing to hepatology     Acute respiratory failure with hypoxia and hypercarbia  Intubated  Mgmt reviewed     Atrial fibrillation with rapid ventricular response  Reviewed the chart        Plans and recommendations:  As discussed above

## 2024-01-14 NOTE — SUBJECTIVE & OBJECTIVE
Interval History: Pt was seen and examined in ICU. Labs and meds reviewed. Discussed with other providers. No new events, remains critically ill. Intubated. CRRT was placed on hold yesterday because the line clotted.      Review of patient's allergies indicates:   Allergen Reactions    Latex, natural rubber      Current Facility-Administered Medications   Medication Frequency    acetaminophen oral solution 650 mg Q4H PRN    albuterol-ipratropium 2.5 mg-0.5 mg/3 mL nebulizer solution 3 mL Q6H WAKE    chlorhexidine 0.12 % solution 15 mL BID    dextrose 10% bolus 125 mL 125 mL PRN    dextrose 10% bolus 250 mL 250 mL PRN    famotidine (PF) injection 20 mg Daily    glucagon (human recombinant) injection 1 mg PRN    heparin (porcine) injection 3,600 Units PRN    influenza (QUADRIVALENT PF) vaccine 0.5 mL vaccine x 1 dose    insulin aspart U-100 pen 0-10 Units Q6H PRN    lactulose 20 gram/30 mL solution Soln 30 g TID    magnesium sulfate 2g in water 50mL IVPB (premix) PRN    micafungin 100 mg in sodium chloride 0.9 % 100 mL IVPB (MB+) Q24H    naloxone 0.4 mg/mL injection 0.02 mg PRN    NORepinephrine 32 mg in dextrose 5 % (D5W) 250 mL infusion Continuous    ondansetron disintegrating tablet 8 mg Q6H PRN    oxyCODONE immediate release tablet 5 mg Q6H PRN    pneumoc 20-jovita conj-dip cr(PF) (PREVNAR-20 (PF)) injection Syrg 0.5 mL vaccine x 1 dose    promethazine (PHENERGAN) 25 mg in dextrose 5 % (D5W) 50 mL IVPB Q6H PRN    rifAXIMin tablet 550 mg BID    senna-docusate 8.6-50 mg per tablet 1 tablet Daily PRN    sodium chloride 0.9% bolus 1,000 mL 1,000 mL Once    sodium chloride 0.9% flush 10 mL Q12H PRN    sodium phosphate 20.01 mmol in dextrose 5 % (D5W) 250 mL IVPB PRN    sodium phosphate 30 mmol in dextrose 5 % (D5W) 250 mL IVPB PRN    sodium phosphate 39.99 mmol in dextrose 5 % (D5W) 250 mL IVPB PRN    [START ON 1/15/2024] tacrolimus capsule 0.5 mg Daily AM    thiamine (B-1) 100 mg in dextrose 5 % (D5W) 100 mL IVPB Daily     traZODone tablet 50 mg Nightly PRN    vasopressin (PITRESSIN) 0.2 Units/mL in dextrose 5 % (D5W) 100 mL infusion Continuous       Objective:     Vital Signs (Most Recent):  Temp: 100 °F (37.8 °C) (01/14/24 1545)  Pulse: 84 (01/14/24 1545)  Resp: (!) 24 (01/14/24 1545)  BP: 115/77 (01/14/24 1500)  SpO2: 98 % (01/14/24 1545) Vital Signs (24h Range):  Temp:  [98.8 °F (37.1 °C)-100.4 °F (38 °C)] 100 °F (37.8 °C)  Pulse:  [] 84  Resp:  [20-39] 24  SpO2:  [89 %-100 %] 98 %  BP: (102-118)/(64-77) 115/77  Arterial Line BP: (102-136)/(58-94) 121/68     Weight: 135 kg (297 lb 9.9 oz) (01/14/24 0649)  Body mass index is 40.36 kg/m².  Body surface area is 2.62 meters squared.    I/O last 3 completed shifts:  In: 5016.9 [I.V.:3559.8; NG/GT:1260; IV Piggyback:197.1]  Out: 8035 [Urine:460; Other:4625; Stool:2950]     Physical Exam  Vitals and nursing note reviewed.   Cardiovascular:      Rate and Rhythm: Normal rate and regular rhythm.      Pulses: Normal pulses.      Heart sounds: Normal heart sounds.   Pulmonary:      Breath sounds: Rales present.   Abdominal:      Palpations: Abdomen is soft.   Musculoskeletal:      Right lower leg: Edema present.      Left lower leg: Edema present.          Significant Labs: reviewed  BMP  Lab Results   Component Value Date     (L) 01/14/2024    K 4.0 01/14/2024     01/14/2024    CO2 21 (L) 01/14/2024    BUN 36 (H) 01/14/2024    CREATININE 3.7 (H) 01/14/2024    CALCIUM 8.4 (L) 01/14/2024    ANIONGAP 5 (L) 01/14/2024    EGFRNORACEVR 19 (A) 01/14/2024     Lab Results   Component Value Date    WBC 6.53 01/14/2024    HGB 12.8 (L) 01/14/2024    HCT 38.2 (L) 01/14/2024     (H) 01/14/2024    PLT 32 (LL) 01/14/2024       ABG  Recent Labs   Lab 01/13/24  0350   PH 7.320*   PO2 83   PCO2 36.9   HCO3 19.0*   BE -7*       Significant Imaging: CXR reviewed, has pulm edema

## 2024-01-14 NOTE — PLAN OF CARE
CRRT clotted this morning; dc'd trialysis d/t venous port clotted and no further need for HD at this time.  If pt is to need HD, new access will be placed.  Remains off of sedation; minimal eye awakening but not following commands and easily drifts off to sleep without any tracking.  No urine output today.  Total of 800 cc in rectal tube bag.  Family updated at bedside.  Multiple visitors today.

## 2024-01-15 NOTE — ASSESSMENT & PLAN NOTE
Per nephrology  Dialysis catheter removed due to clotting  No indication for further dialysis at this time

## 2024-01-15 NOTE — ASSESSMENT & PLAN NOTE
Patient with Hypercapnic and Hypoxic Respiratory failure which is Acute.  he is not on home oxygen. Supplemental oxygen was provided and noted- Vent Mode: A/C  Oxygen Concentration (%):  [40] 40  Resp Rate Total:  [24 br/min-79 br/min] 24 br/min  Vt Set:  [550 mL] 550 mL  PEEP/CPAP:  [5 cmH20] 5 cmH20  Mean Airway Pressure:  [12 dmU91-20 cmH20] 13 cmH20    Signs/symptoms of respiratory failure include- tachypnea, increased work of breathing, and respiratory distress. Contributing diagnoses includes - Pneumonia Labs and images were reviewed. Patient Has recent ABG, which has been reviewed. Will treat underlying causes and adjust management of respiratory failure as follows    - intubated upon arrival to the ICU  - ABG and CXR pending   - vent bundle in place  - on zyvox and cefepime  - add fungal workup in immunocompromised pt     Palliative extubation today, 1/15, with full comfort measures

## 2024-01-15 NOTE — PROGRESS NOTES
[facility] - Intensive Care ([facility])  Nephrology  Progress Note    Patient Name: Jordan Altman  MRN: [id number]  Admission Date: 1/4/2024  Hospital Length of Stay: 11 days  Attending Provider: Heath Mcdonnell MD   Primary Care Physician: Zohra Walters FNP  Principal Problem:PNA (pneumonia)    Subjective:     HPI: reviewed    Interval History: Pt was evaluated in ICU. Labs and meds reviewed. Discussed with ICU. Noted pt is on comfort care now.    Review of patient's allergies indicates:   Allergen Reactions    Latex, natural rubber      Current Facility-Administered Medications   Medication Frequency    midazolam (VERSED) 1 mg/mL injection 4 mg Q15 Min PRN    morphine injection 4 mg Q15 Min PRN    sodium chloride 0.9% bolus 1,000 mL 1,000 mL Once       Objective:     Vital Signs (Most Recent):  Temp: 99.7 °F (37.6 °C) (01/15/24 1147)  Pulse: 86 (01/15/24 1147)  Resp: (!) 31 (01/15/24 1353)  BP: 129/88 (01/15/24 1100)  SpO2: 100 % (01/15/24 1147) Vital Signs (24h Range):  Temp:  [99.7 °F (37.6 °C)-100.9 °F (38.3 °C)] 99.7 °F (37.6 °C)  Pulse:  [] 86  Resp:  [23-31] 31  SpO2:  [95 %-100 %] 100 %  BP: ()/(42-88) 129/88  Arterial Line BP: ()/(49-91) 91/51     Weight: 135 kg (297 lb 9.9 oz) (01/14/24 0649)  Body mass index is 40.36 kg/m².  Body surface area is 2.62 meters squared.    I/O last 3 completed shifts:  In: 2421.2 [I.V.:873; NG/GT:1350; IV Piggyback:198.2]  Out: 3170 [Urine:870; Stool:2300]     Physical Exam  Vitals and nursing note reviewed.   Constitutional:       Appearance: He is ill-appearing.   Cardiovascular:      Rate and Rhythm: Normal rate and regular rhythm.   Pulmonary:      Breath sounds: Rales present.   Musculoskeletal:      Right lower leg: Edema present.      Left lower leg: Edema present.          Significant Labs: reviewed  BMP  Lab Results   Component Value Date     (L) 01/15/2024    K 4.1 01/15/2024     01/15/2024    CO2 22 (L) 01/15/2024    BUN 59 (H)  01/15/2024    CREATININE 4.3 (H) 01/15/2024    CALCIUM 8.1 (L) 01/15/2024    ANIONGAP 9 01/15/2024    EGFRNORACEVR 16 (A) 01/15/2024     Lab Results   Component Value Date    WBC 5.70 01/15/2024    HGB 11.2 (L) 01/15/2024    HCT 33.6 (L) 01/15/2024     (H) 01/15/2024    PLT 23 (LL) 01/15/2024       Significant Imaging: reviewed  Assessment/Plan:     47 y/o male with h/o of liver transplant presented with pneumonia and JENN:     JENN (acute kidney injury)  No sign of renal recovery. JENN due to septic shock  Remains critically ill with poor prognosis  S/p CRRT for volume mgmt  Agree with comfort care     S/P liver transplant  Alcohol use despite liver tx noted  Will defer mgmt      Acute respiratory failure with hypoxia and hypercarbia  Intubated  Mgmt reviewed     Atrial fibrillation with rapid ventricular response  Reviewed the chart        Plans and recommendations:  As discussed above  Total critical care time spent 40 minutes including time needed to review the records, the   patient evaluation, documentation, face-to-face discussion with the patient,   more than 50% of the time was spent on coordination of care and counseling.        Thank you for your consult.     Robin Mcmahon MD  Nephrology  [facility] - Intensive [facility] ([facility])

## 2024-01-15 NOTE — PLAN OF CARE
Patient intubated, no sedation  Afib to NSR on monitor  Duran in place with marginal dark brown output  OG tube with peptamen intense VHP @ 15 ml/hr  R IJ TLC with vasopressin, levophed and lasix infusing  R radial arterial line  Flexi in place  Heel boots, turned Q2    Problem: Restraint, Nonbehavioral (Nonviolent)  Goal: Absence of Harm or Injury  Outcome: Ongoing, Progressing     Problem: Glycemic Control Impaired (Sepsis/Septic Shock)  Goal: Blood Glucose Level Within Desired Range  Outcome: Ongoing, Progressing  Intervention: Optimize Glycemic Control  Flowsheets (Taken 1/15/2024 0442)  Glycemic Management: blood glucose monitored

## 2024-01-15 NOTE — PLAN OF CARE
Nutrition Recommendations 1/15/24:  1. Recommend re-consult if pt transitioned to comfort care and nutrition is warranted    2. Collaboration with other providers    Goals:   1. Pt will be initiated onto EN within 24 hr (Resolved)   2. Pt will consume and tolerate >60% EEN/EPN prior to RD follow up   (Not met)    Juliet Alves, Registration Eligible, Provisional LDN

## 2024-01-15 NOTE — DISCHARGE SUMMARY
[facility] ([facility])  Critical Care Medicine  Discharge Summary      Patient Name: Jordan Altman  MRN: [id number]  Admission Date: 1/4/2024  Hospital Length of Stay: 11 days  Discharge Date and Time: No discharge date for patient encounter.  Attending Physician: Heath Mcdonnell MD   Discharging Provider: Heath Mcdonnell MD  Primary Care Provider: Zohra Walters FNP  Reason for Admission: SOB    HPI:   48 year old male with a known past history of alcoholic liver cirrhosis s/p liver transplant [facility] 10/20, CKD, PAF (on eliquis) who presented to the ED 1/4 with complaints of shortness of breath, fatigue, intermittent fever over the last 4 weeks. Stated he was dx over a week ago with flu as well. He also complains of intermittent N/V. In the ED, he was noted to be in afib RVR (HR in the 140's), given Cardizem IVP and placed on gtt.  Lab work notable for INR 1.5, Alk phos 169, , ALT 82, .  FLU and covid negative. Chest xray showed new low-grade infiltrate or atypical pneumonia LLL.  Patient was started on Levaquin.  Patient will be admitted to observation for afib rvr/pna.  Cardiology consulted. 01/05/2024: Afib RVR marginally controlled on non-titratable drip. Cardiology consulted, recommendations pending. 01/06/2024: Afib RVR controlled. Echo grossly unremarkable. Still on Cardizem, pending transition to PO by Cardiology. Hepatology recommends continuing Prograf but to hold cellcept due to infection. 01/07/2024: Afib resolved. In SR. Afebrile, clinically unchanged. However, was notably hypotensive this AM. Renal function sharply worsened over the past 24 hours as well. Nephrology consulted for recommendations. 1/8/24: Patient tremulous this morning, with hypothermia, hypotension, hyponatremia, elevated serum creatinine, minimal urine output--> ?  Prograf toxicity versus sepsis,--> ordered urinalysis, urine lytes, chest x-ray, procalcitonin, blood cultures, broaden antibiotics,  IV fluids, hold Prograf,--hepatology, Nephrology agreed with the plan.    Eliquis discontinued, changed to heparin, given worsening GFR, also for possible need for renal biopsy.    Pt transferred to the ICU evening of 1/8 for hyponatremia, resp failure on BIPAP, hypothermia, and septic shock. Upon arrival pt was intubated and CVL was placed for pressor support.     * No surgery found *    Indwelling Lines/Drains at Time of Discharge:   Lines/Drains/Airways       Central Venous Catheter Line  Duration             Percutaneous Central Line Insertion/Assessment - Triple Lumen  01/08/24 1815 Internal Jugular Right 6 days              Drain  Duration                  NG/OG Tube 01/08/24 1811 [facility] mouth 6 days         Urethral Catheter 01/08/24 1813 Temperature probe 16 Fr. 6 days         Rectal Tube 01/10/24 1015 rectal tube w/ balloon (indicate number of mLs) 5 days              Airway  Duration                  Airway - Non-Surgical 01/08/24 1758 Endotracheal Tube 6 days              Arterial Line  Duration             Arterial Line 01/08/24 2142 Right Radial 6 days                  Hospital Course:   1/9. No improvement in Na despite hydration. Anuric. Worsening hypoxia. Sedated and intermittent paralytics given for vent dyssynchrony during suctioning and episodes of cough while he is on 90% and PEEP 10  1/10 - no acute events  On levo , on fio2 65 peep 10  1/11- on levo   Peep 10   1/12 - on 50/8, events noted last pm , with bath dropped saturations/ bp , on vaso   1/15 - family has elected for palliative withdrawal of advanced support today.  He was extubated and pressors discontinued with full comfort measures in place. He passed peacefully with his family by his side.    On my exam, the patient did not respond to verbal or physical stimuli. Absent heart and breath sounds. Absent peripheral pulses. Pupils are fixed and dilated. Patient pronounced dead at 17:28 by Dr Heath Mcdonnell MD.     Prelim COD:  Pneumonia    Consults (From admission, onward)          Status Ordering Provider     Inpatient consult to Registered Dietitian/Nutritionist  Once        Provider:  (Not yet assigned)    Completed CRISTY MORELOS     Inpatient consult to Critical Care Medicine  Once        Provider:  (Not yet assigned)    ZEINA Chamberlain     Inpatient consult to Nephrology  Once        Provider:  Cm Camacho MD    Completed IVANIA MOORE     Inpatient Consult to Telemedicine - Hepatology  Once        Provider:  Juanjose Kwong MD    Completed KAYLEE CORRAL     Inpatient consult to Cardiology  Once        Provider:  Ania Ramírez MD    Completed KAYLEE CORRAL          Significant Labs:  All pertinent labs within the past 24 hours have been reviewed.    Significant Imaging:  I have reviewed all pertinent imaging results/findings within the past 24 hours.    Pending Diagnostic Studies:       Procedure Component Value Units Date/Time    Blastomyces Antigen, Urine [[id number]] Collected: 01/13/24 2120    Order Status: Sent Lab Status: In process Updated: 01/14/24 0139    Specimen: Urine     Histoplasma antigen, urine [[id number]] Collected: 01/13/24 2119    Order Status: Sent Lab Status: In process Updated: 01/14/24 0139    Specimen: Urine, Clean Catch     Histoplasma/Blastomyces antigen, serum [[id number]] Collected: 01/12/24 1150    Order Status: Sent Lab Status: In process Updated: 01/12/24 2059    Specimen: Blood           Final Active Diagnoses:    Diagnosis Date Noted POA    PRINCIPAL PROBLEM:  PNA (pneumonia) [J18.9] 01/04/2024 Yes    Acute respiratory failure with hypoxia and hypercarbia [J96.01, J96.02] 01/08/2024 No    Hyperkalemia [E87.5] 01/08/2024 Yes    Chest pain [R07.9] 01/05/2024 No    Elevated liver enzymes [R74.8] 01/05/2024 Yes    Chronic diastolic congestive heart failure [I50.32] 01/05/2024 Yes    Atrial fibrillation with rapid ventricular response  [I48.91] 07/30/2023 Yes    S/P liver transplant [Z94.4] 10/14/2020 Not Applicable    Alcohol use disorder, severe, dependence [F10.20] 09/21/2020 Yes     Chronic    Thrombocytopenia [D69.6] 09/19/2020 Yes    Hyponatremia [E87.1] 09/19/2020 Yes    Chronic Hepatic encephalopathy [K76.82] 09/19/2020 Yes    JENN (acute kidney injury) [N17.9] 09/19/2020 Yes      Problems Resolved During this Admission:     Psychiatric  Alcohol use disorder, severe, dependence  Active use  Thiamine / folate    Pulmonary  * PNA (pneumonia)  - see plan for resp failure     1/9   Likely has pneumonia and fluid overload.  Will consider CT post a few episodes of CRRT  1/10 completed antibiotics   1/12 finished antibiotic, on empiric micafungin, immunodiffusion pending         Acute respiratory failure with hypoxia and hypercarbia  Patient with Hypercapnic and Hypoxic Respiratory failure which is Acute.  he is not on home oxygen. Supplemental oxygen was provided and noted- Vent Mode: A/C  Oxygen Concentration (%):  [40] 40  Resp Rate Total:  [24 br/min-79 br/min] 24 br/min  Vt Set:  [550 mL] 550 mL  PEEP/CPAP:  [5 cmH20] 5 cmH20  Mean Airway Pressure:  [12 peT77-72 cmH20] 13 cmH20    Signs/symptoms of respiratory failure include- tachypnea, increased work of breathing, and respiratory distress. Contributing diagnoses includes - Pneumonia Labs and images were reviewed. Patient Has recent ABG, which has been reviewed. Will treat underlying causes and adjust management of respiratory failure as follows    - intubated upon arrival to the ICU  - ABG and CXR pending   - vent bundle in place  - on zyvox and cefepime  - add fungal workup in immunocompromised pt     Palliative extubation today, 1/15, with full comfort measures    Cardiac/Vascular  Chronic diastolic congestive heart failure  Patient is identified as having diastolicheart failure that is Acute on chronic. CHF is currently controlled. Latest ECHO performed and demonstrates- Results for  "orders placed during the hospital encounter of 24    Echo    Interpretation Summary    Left Ventricle: The left ventricle is normal in size. Mildly increased ventricular mass. Mildly increased wall thickness. There is concentric hypertrophy. Normal wall motion. There is normal systolic function with a visually estimated ejection fraction of 55 - 60%. There is normal diastolic function.    Right Ventricle: Normal right ventricular cavity size. Wall thickness is normal. Right ventricle wall motion  is normal. Systolic function is normal.    IVC/SVC: Normal venous pressure at 3 mmHg.  . Continue Furosemide and monitor clinical status closely. Monitor on telemetry. Patient is on CHF pathway.  Monitor strict Is&Os and daily weights.  Place on fluid restriction of 1.5 L. Cardiology has been consulted. Continue to stress to patient importance of self efficacy and  on diet for CHF. Last BNP reviewed- and noted below   No results for input(s): "BNP", "BNPTRIAGEBLO" in the last 168 hours.      Atrial fibrillation with rapid ventricular response  - telemetry  - hold BB while on pressors  - monitor      - heparin off with plt    Renal/  JENN (acute kidney injury)  Per nephrology  Dialysis catheter removed due to clotting  No indication for further dialysis at this time    Hematology  Thrombocytopenia  - monitor   Plt trending down , heparin is held     GI  S/P liver transplant  - hepatology consulted and following  - follow LFTs/ammonia   Active alcohol abuse      Chronic Hepatic encephalopathy  Acute on chronic   Lactulose   Rifaximin          Discharged Condition:     Disposition:    Home    Patient Instructions:   No discharge procedures on file.  Medications:  None     Heath Mcdonnell MD  Critical Care Medicine  [facility] ([facility])  "

## 2024-01-15 NOTE — SUBJECTIVE & OBJECTIVE
Interval History: Pt was evaluated in ICU. Labs and meds reviewed. Discussed with ICU. Noted pt is on comfort care now.    Review of patient's allergies indicates:   Allergen Reactions    Latex, natural rubber      Current Facility-Administered Medications   Medication Frequency    midazolam (VERSED) 1 mg/mL injection 4 mg Q15 Min PRN    morphine injection 4 mg Q15 Min PRN    sodium chloride 0.9% bolus 1,000 mL 1,000 mL Once       Objective:     Vital Signs (Most Recent):  Temp: 99.7 °F (37.6 °C) (01/15/24 1147)  Pulse: 86 (01/15/24 1147)  Resp: (!) 31 (01/15/24 1353)  BP: 129/88 (01/15/24 1100)  SpO2: 100 % (01/15/24 1147) Vital Signs (24h Range):  Temp:  [99.7 °F (37.6 °C)-100.9 °F (38.3 °C)] 99.7 °F (37.6 °C)  Pulse:  [] 86  Resp:  [23-31] 31  SpO2:  [95 %-100 %] 100 %  BP: ()/(42-88) 129/88  Arterial Line BP: ()/(49-91) 91/51     Weight: 135 kg (297 lb 9.9 oz) (01/14/24 0649)  Body mass index is 40.36 kg/m².  Body surface area is 2.62 meters squared.    I/O last 3 completed shifts:  In: 2421.2 [I.V.:873; NG/GT:1350; IV Piggyback:198.2]  Out: 3170 [Urine:870; Stool:2300]     Physical Exam  Vitals and nursing note reviewed.   Constitutional:       Appearance: He is ill-appearing.   Cardiovascular:      Rate and Rhythm: Normal rate and regular rhythm.   Pulmonary:      Breath sounds: Rales present.   Musculoskeletal:      Right lower leg: Edema present.      Left lower leg: Edema present.          Significant Labs: reviewed  BMP  Lab Results   Component Value Date     (L) 01/15/2024    K 4.1 01/15/2024     01/15/2024    CO2 22 (L) 01/15/2024    BUN 59 (H) 01/15/2024    CREATININE 4.3 (H) 01/15/2024    CALCIUM 8.1 (L) 01/15/2024    ANIONGAP 9 01/15/2024    EGFRNORACEVR 16 (A) 01/15/2024     Lab Results   Component Value Date    WBC 5.70 01/15/2024    HGB 11.2 (L) 01/15/2024    HCT 33.6 (L) 01/15/2024     (H) 01/15/2024    PLT 23 (LL) 01/15/2024       Significant Imaging:  reviewed

## 2024-01-15 NOTE — PROGRESS NOTES
[facility] ([facility])  Adult Nutrition  Progress Note    SUMMARY       Recommendations    Recommendation/Intervention:   1. Recommend re-consult if pt transitioned to comfort care and nutrition is warranted      Goals:   1. Pt will be initiated onto EN within 24 hr (Resolved)   2. Pt will consume and tolerate >60% EEN/EPN prior to RD follow up   (Not met)  Nutrition Goal Status: resolved, not met   Communication of RD Recs: other (comment) (POC: Sticky Note)    Assessment and Plan    Nutrition Problem  Inadequate PO intake     Related to (etiology):   Decreased ability to consume sufficient nutrition     Signs and Symptoms (as evidenced by):   NPO     Interventions(treatment strategy):  1. Collaboration with other providers     Nutrition Diagnosis Status:   Continues     Malnutrition Assessment     Skin (Micronutrient): yellow, dry, edema (Humberto score = 12 (high risk))       Fluid Accumulation (Malnutrition):  (1+ trace)                         Reason for Assessment    Reason For Assessment: consult, new tube feeding  Diagnosis: pulmonary disease  Relevant Medical History: PNA (pneumonia), Chronic Hepatic encephalopathy, Alcohol use disorder, severe, dependence (Chronic), A-Fib, CHF, Acute respiratory failure with hypoxia and hypercarbia, Hyperkalemia, HTN  Interdisciplinary Rounds: did not attend  General Information Comments: 48 y.o male admitted with active principal problem of PNA (pneumonia). Pt transferred to the ICU evening of 1/8 for hyponatremia, resp failure on BIPAP, hypothermia, and septic shock. Upon arrival pt was intubated (Total ve: 13.5 L/M) and sedated (Propofol 30.7 mL/hr) and CVL was placed for pressor support. Pt currently receiving CRRT and going smoothly. NFPE not performed. Dietitian will assess the need for NFPE during follow up. Pt currently charted to weigh 314 lb 13.1 oz, BMI 42.70 (Morbidly Obese) Pt LBM: 1/10/24, liquid. K+ WDL during last lab draw. Pt with decreased Phos  "lab.    Nutrition Discharge Planning: Cardiac diet    Follow up:  1/15/24: RD follow up. RD remote. Pt currently charted receiving Peptamen Intense VHP via NG tube @ 15 mL/hr. EMR noted no acute indications for RRT at present. Per RT note 1/15 "Patient extubated per Dr. Mcdonnell. Withdrawal of care". Reviewed chart: LBM 1/14 (liquid); Skin: yellow, dry; Humberto score: 12 (high risk); Edema: 1+ trace generalized. Labs, meds, weight reviewed. Weight charted 1/10 314 lbs, 1/14 297 lbs (BMI 40.36, Morbid obesity), -17 lb wt loss (5% wt change) x 4 days. No NFPE warranted at this time d/t withdrawal of care. RD will continue to monitor pt's nutritional status if warranted.       Nutrition Risk Screen    Nutrition Risk Screen: tube feeding or parenteral nutrition    Nutrition/Diet History    Spiritual, Cultural Beliefs, Hoahaoism Practices, Values that Affect Care:  (MAY)  Food Allergies:  (Latex)  Factors Affecting Nutritional Intake: NPO, on mechanical ventilation    Anthropometrics    Temp: 99.7 °F (37.6 °C)  Height Method: Stated  Height: 6' (182.9 cm)  Height (inches): 72 in  Weight Method: Bed Scale  Weight: 135 kg (297 lb 9.9 oz)  Weight (lb): 297.62 lb  Ideal Body Weight (IBW), Male: 178 lb  % Ideal Body Weight, Male (lb): 176.87 %  BMI (Calculated): 40.4  BMI Grade: greater than 40 - morbid obesity     Wt Readings from Last 15 Encounters:   01/14/24 135 kg (297 lb 9.9 oz)   12/12/23 127 kg (279 lb 15.8 oz)   09/12/23 129.8 kg (286 lb 2.5 oz)   07/29/23 133.8 kg (295 lb)   11/12/22 128.2 kg (282 lb 10.1 oz)   08/10/22 124.6 kg (274 lb 11.1 oz)   02/02/22 121.9 kg (268 lb 11.9 oz)   06/18/21 111.3 kg (245 lb 6 oz)   03/29/21 110 kg (242 lb 8.1 oz)   01/27/21 92.5 kg (203 lb 14.8 oz)   12/23/20 98.4 kg (216 lb 14.9 oz)   12/11/20 92.5 kg (203 lb 14.8 oz)   11/21/20 98 kg (216 lb 0.8 oz)   11/10/20 97 kg (213 lb 13.5 oz)   11/03/20 107 kg (235 lb 14.3 oz)     Lab/Procedures/Meds    Pertinent Labs Reviewed: " reviewed  Pertinent Medications Reviewed: reviewed  BMP  Lab Results   Component Value Date     (L) 01/15/2024    K 4.1 01/15/2024     01/15/2024    CO2 22 (L) 01/15/2024    BUN 59 (H) 01/15/2024    CREATININE 4.3 (H) 01/15/2024    CALCIUM 8.1 (L) 01/15/2024    ANIONGAP 9 01/15/2024    EGFRNORACEVR 16 (A) 01/15/2024     Lab Results   Component Value Date    ALT 50 (H) 01/15/2024     (H) 01/15/2024    ALKPHOS 241 (H) 01/15/2024    BILITOT 6.6 (H) 01/15/2024     Lab Results   Component Value Date    ALBUMIN 1.6 (L) 01/15/2024      Lab Results   Component Value Date    CALCIUM 8.1 (L) 01/15/2024    PHOS 3.3 01/13/2024     Recent Labs   Lab 01/15/24  0020   POCTGLUCOSE 144*     Lab Results   Component Value Date    HGBA1C <4.0 (A) 10/13/2020     Lab Results   Component Value Date    WBC 5.70 01/15/2024    HGB 11.2 (L) 01/15/2024    HCT 33.6 (L) 01/15/2024     (H) 01/15/2024    PLT 23 (LL) 01/15/2024       Scheduled Meds:   sodium chloride 0.9%  1,000 mL Intravenous Once     Continuous Infusions:  PRN Meds:.midazolam, morphine    Physical Findings/Assessment         Estimated/Assessed Needs    Weight Used For Calorie Calculations: 135 kg (297 lb 9.9 oz)  Energy Calorie Requirements (kcal): 2248 kcals (MSJ x no AF (Obese, BMI 40.36)  Energy Need Method: [place]  Protein Requirements: 108-135 g (0.8-1.0 g/kg ABW (JENN, no dialysis or CRRT vs Critical care, obese)  Weight Used For Protein Calculations: 135 kg (297 lb 9.9 oz)  Fluid Requirements (mL): 500 mL + total output (JENN)  Estimated Fluid Requirement Method: other (see comments)  RDA Method (mL): 2248  CHO Requirement: 281 g (2248 kcals/8)      Nutrition Prescription Ordered    Current Diet Order: NPO  Current Nutrition Support Formula Ordered: Peptamen Intense VHP  Current Nutrition Support Rate Ordered: 15 (ml)  Current Nutrition Support Frequency Ordered: continious    Evaluation of Received Nutrient/Fluid Intake  I/O: (Net since  admit)  1/10: +9257.6 mL  1/15: +218.6 mL     Enteral Calories (kcal): 360  Enteral Protein (gm): 33  Enteral (Free Water) Fluid (mL): 302  % Kcal Needs: 16%  % Protein Needs: 31%    Energy Calories Required: not meeting needs  Protein Required: not meeting needs  Fluid Required: not meeting needs  Tolerance: not tolerating (NPO/Intubated)  % Intake of Estimated Energy Needs: 0 - 25 %  % Meal Intake: NPO    Nutrition Risk    Level of Risk/Frequency of Follow-up: high (x2 weekly)     Monitor and Evaluation    Food and Nutrient Intake: energy intake, enteral nutrition intake  Food and Nutrient Adminstration: enteral and parenteral nutrition administration  Anthropometric Measurements: weight, weight change, body mass index  Biochemical Data, Medical Tests and Procedures: electrolyte and renal panel, gastrointestinal profile, glucose/endocrine profile  Nutrition-Focused Physical Findings: overall appearance, extremities, muscles and bones, head and eyes, skin     Nutrition Follow-Up    RD Follow-up?: Yes  Juliet Alves, Registration Eligible, Provisional LDN

## 2024-01-15 NOTE — ASSESSMENT & PLAN NOTE
47 y/o male with h/o of liver transplant presented with pneumonia and JENN:     JENN (acute kidney injury)  No sign of renal recovery  JENN with septic shock  Oliguic  Was on CRRT for volume mgmt, placed on hold yesterday, because the line clotted  Had o/w tolerated dialysis well, was on  ml/hr   K normal  O2 OK  No acute indications for RRT at present  Will monitor any intrinsic renal function and need for RRT  Will add diuretics IV     Hyponatremia, stable  Metabolic acidosis has improved     Hypotension, stable, improved  On levoped  Vasopressin was added yesterday     S/P liver transplant  Reviewed the h/o and the chart  Alcohol use despite liver tx noted  Will defer mgmt of prograf dosing to hepatology     Acute respiratory failure with hypoxia and hypercarbia  Intubated  Mgmt reviewed     Atrial fibrillation with rapid ventricular response  Reviewed the chart        Plans and recommendations:  As discussed above  Total critical care time spent 40 minutes including time needed to review the records, the   patient evaluation, documentation, face-to-face discussion with the patient,   more than 50% of the time was spent on coordination of care and counseling.

## 2024-01-15 NOTE — ASSESSMENT & PLAN NOTE
"Patient is identified as having diastolicheart failure that is Acute on chronic. CHF is currently controlled. Latest ECHO performed and demonstrates- Results for orders placed during the hospital encounter of 01/04/24    Echo    Interpretation Summary    Left Ventricle: The left ventricle is normal in size. Mildly increased ventricular mass. Mildly increased wall thickness. There is concentric hypertrophy. Normal wall motion. There is normal systolic function with a visually estimated ejection fraction of 55 - 60%. There is normal diastolic function.    Right Ventricle: Normal right ventricular cavity size. Wall thickness is normal. Right ventricle wall motion  is normal. Systolic function is normal.    IVC/SVC: Normal venous pressure at 3 mmHg.  . Continue Furosemide and monitor clinical status closely. Monitor on telemetry. Patient is on CHF pathway.  Monitor strict Is&Os and daily weights.  Place on fluid restriction of 1.5 L. Cardiology has been consulted. Continue to stress to patient importance of self efficacy and  on diet for CHF. Last BNP reviewed- and noted below   No results for input(s): "BNP", "BNPTRIAGEBLO" in the last 168 hours.    "

## 2024-01-16 ENCOUNTER — POST MORTEM DOCUMENTATION (OUTPATIENT)
Dept: TRANSPLANT | Facility: CLINIC | Age: 49
End: 2024-01-16
Payer: MEDICAID

## 2024-01-16 LAB
HISTOPLASMA/BLASTOMYCES AG VALUE: NOT DETECTED NG/ML
HISTOPLASMA/BLASTOMYCES RESULT: NOT DETECTED

## 2024-01-17 LAB
B DERMAT AG UR QL IA: NORMAL
BLASTOMYCES AG RESULT: NORMAL
H CAPSUL AG UR-MCNC: NORMAL NG/ML
HISTOPLASMA ANTIGEN URINE: NORMAL

## 2024-01-25 NOTE — TELEPHONE ENCOUNTER
Pt called no answer no voice mail. Call pts brother Didier listed as emergency contact.  lvm to have pt call me re appts.     [phone]

## 2024-08-02 NOTE — TELEPHONE ENCOUNTER
[employer] message sent instructing pt to repeat labs on 9/28/22.  ----- Message from Theresa Titus MD sent at 9/16/2022  9:18 AM CDT -----  AST trending up, repeat labs in 2 weeks   No

## 2025-06-15 NOTE — PLAN OF CARE
[facility]  Physical Therapy Initial Evaluation    Name: Jordan Altman  Clinic Number: [id number]    Therapy Diagnosis:   Encounter Diagnoses   Name Primary?    Status post liver transplant Yes    Generalized weakness      Physician: Awa Sun, NP    Physician Orders: PT Eval and Treat   Medical Diagnosis from Referral: Evaluation Date: Z94.4 (ICD-10-CM) - Status post liver transplant  Authorization Period Expiration: 12/11/20  Plan of Care Expiration: 12/17/20  Visit # / Visits authorized: 1/ 1    Time In: 9:40  Time Out: 10:20  Total Appointment Time (timed & untimed codes): 8 minutes     Precautions: organ transplant    Subjective   Date of onset: 10/14/20  History of current condition - Jordan reports: having liver transplant now needs to get stronger.  Pt. Reports unable to get up from floor. Pt. Reports difficulty with squatting.  Pt. Reports feeling weak.  Pt. Reports pain in lower back secondary DDD.  Pt. Reports pain at incision site as well.       Pain:  Low Back Pain: 5/10, usually   Incision site: 6/10 varies throughout day better in P.M.      Prior Therapy: none  Social History: lives with wife, no stairs  Occupation: unemployed  Prior Level of Function: Independent prior transfer  Current Level of Function: limited with getting up from floor and squatting, feels weak with activity    Imaging: NA     Medical History:   Past Medical History:   Diagnosis Date    Decompensated hepatic cirrhosis     Hypertension     SBP (spontaneous bacterial peritonitis)        Surgical History:   Jordan Altman  has a past surgical history that includes Thoracentesis (2011); Right heart catheterization (Right, 9/23/2020); ERCP (N/A, 10/1/2020); Esophagogastroduodenoscopy (N/A, 10/13/2020); Liver transplant (N/A, 10/14/2020); Exploratory laparotomy after liver transplantation (N/A, 10/15/2020); and Thrombectomy (10/15/2020).    Medications:   Jordan has a current medication list  which includes the following prescription(s): albuterol, amlodipine, apixaban, bisacodyl, cetirizine, docusate sodium, fluconazole, mycophenolate, opw transplant care kit, oxycodone, pantoprazole, prednisone, sodium bicarbonate, sulfamethoxazole-trimethoprim 400-80mg, tacrolimus, and valganciclovir.    Allergies:   Review of patient's allergies indicates:   Allergen Reactions    Latex, natural rubber           Pts goals: build arm strength to push up for standing    Objective     Posture/Structure:  Pt presents with FHP    Gait: WNL    DL Squat: B shakiness in LEs, decreased B knee flexion, use of UE support    Balance: Pt with SLB R=3 sec, L=3 sec    Neuro/Sensation:    Reflexes:  NT  Sensation: NT      ROM   %    Pain Y/N   LB flex Reach to ankles    LB ext 100%    LB SB R Reach to prox. Fibula head    LB SB L Reach to prox. Fibula head    LB Rot R 100% crepitus   LB Rot L 100% crepitus   Cervical Flexion 100%    Cervical Extension 100%    Cervical SB R 100%    Cervical SB L 100%    Cervical Rotation R 100%    Cervical Rotation L 100%    Shoulder Flexion  R/L (degrees) B 100%    Shoulder Abduction R/L (degrees) B 100%    Shoulder Internal Rotation R/L (degrees) B 100%    Shoulder External Rotation R/L (degrees) B 100%              Strength:   Muscle (Myotome) Right Left   Cervical SB 4/5 4/5   Shoulder Abduction 3+/5 3+/5   Elbow Flexors (C5) 5/5 5/5   Wrist Extensors (C6) NT/5 NT/5   Elbow Extensors (C7) 5/5 5/5   Finger Flexors (C8) NT/5 NT/5   Hand Intrinsics (T1) NT/5 NT/5   Hip flexors (L2) 3/5 3/5   Quadriceps (L3) 5/5 5/5   Hamstrings (S2) 5/5 5/5   Anterior Tibialis (L4) 5/5 5/5   Gastrocnemius (S1) 1/5 1/5   Great toe extension (L5) NT/5 NT/5   Gluteus Medius 3+/5 3+/5   Gluteus Josue 3+/5 3+/5    strength NT NT   Ext. Rot 3+/5   4/5  Int. Rot  3+/5   4/5     Peroneals 5/5   5/5     Post. Tibialis 4/5   4/5     Hip Adductors 1+/5   1+/5      Forward Plank: modified >60 sec.     CMS  Impairment/Limitation/Restriction for FOTO Ribs- Trunk Survey    Therapist reviewed FOTO scores for Jordan Altman on 11/17/2020.   FOTO documents entered into [employer] - see Media section.    Limitation Score: 56%         TREATMENT   Treatment Time In: 10:00  Treatment Time Out: 10:15  Total Treatment time (time-based codes) separate from Evaluation: 8 minutes     Jordan received therapeutic exercises to develop strength and core stabilization for 8 minutes including: plank- forward/modified x 2 min, bridging x 2 min., clams x 2 min., hip add x 2 min.        Home Exercises and Patient Education Provided    Education provided:   -Education on condition, HEP, and - activity    Written Home Z94.4 (ICD-10-CM) - Status post liver transplant  Exercises Provided: yes.  Exercises were reviewed and Jordan was able to demonstrate them prior to the end of the session.  Jordan demonstrated good  understanding of the education provided.     See EMR under Patient Instructions for exercises provided 11/17/2020.    Assessment   Jordan is a 45 y.o. male referred to outpatient Physical Therapy with a medical diagnosis of Z94.4 (ICD-10-CM) - Status post liver transplant.  Pt presents with signs and symptoms consistent with diagnosis along with weakness in core, and hips, and ankles.  The patient presents with impairments which include decreased strength.  These impairments are limiting patient's ability to perform usual activities.     Pt prognosis is Excellent due to personal factors and co-morbidities listed below.   Pt will benefit from skilled outpatient Physical Therapy to address the deficits stated above and in the chart below, provide pt/family education, and to maximize pt's level of independence.     Plan of care discussed with patient: Yes  Pt's spiritual, cultural and educational needs considered and patient is agreeable to the plan of care and goals as stated below:     Anticipated Barriers for therapy: none    Medical Necessity  is demonstrated by the following  History  Co-morbidities and personal factors that may impact the plan of care Co-morbidities:   organ transplant    Personal Factors:   no deficits     low   Examination  Body Structures and Functions, activity limitations and participation restrictions that may impact the plan of care Body Regions:   back  lower extremities  upper extremities  trunk    Body Systems:    strength  gross coordinated movement  balance  gait  motor control  motor learning    Participation Restrictions:   Limited with squatting and getting up from floor, and difficulty doing artwork because of shakiness    Activity limitations:   Learning and applying knowledge  no deficits    General Tasks and Commands  no deficits    Communication  no deficits    Mobility  lifting and carrying objects  walking    Self care  no deficits    Domestic Life  shopping  doing house work (cleaning house, washing dishes, laundry)    Interactions/Relationships  no deficits    Life Areas  no deficits    Community and Social Life  community life  recreation and leisure         moderate   Clinical Presentation stable and uncomplicated low   Decision Making/ Complexity Score: low     Goals:  Short Term Goals:   Short Term Goals: In 4 weeks   1.Pt to be educated on HEP.  2.Patient to B scapular/UE strength to 5/5 grossly.  3.Patient to increase B hip MMT strength by 1 grade.  4.Patient to have pain less than 3/10 in low back at all times.   5. Pt to be educated on postural/body mechanics awareness.    Long Term Goals: In 8 weeks  1. Patient to improve score on the FOTO to less than 10% impaired.  2. Patient to demo increase in LE strength to 5/5 gross MMT to perform transfers from lower surface with ease.  3. Patient to have very good core strength to perform lifting activities with proper posture at all times.  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       Plan   Plan of care Certification:  11/17/2020 to 12/17/20.    Outpatient Physical Therapy 2 times weekly for 8 weeks to include the following interventions: Gait Training, Manual Therapy, Moist Heat/ Ice, Neuromuscular Re-ed, Patient Education, Self Care, Therapeutic Activites and Therapeutic Exercise, e-stim.    Anne-Marie Lockwood, PT    Thank you for this referral.    These services are reasonable and necessary for the conditions set forth above while under my care.     DISCHARGE

## (undated) DEVICE — DRAPE INCISE IOBAN 2 23X17IN

## (undated) DEVICE — SUT SILK 0 STRANDS 30IN BLK

## (undated) DEVICE — SET IV ADMIN 15DROP 3 CARESITE

## (undated) DEVICE — SEE MEDLINE ITEM 152622

## (undated) DEVICE — SUT 3-0 12-18IN SILK

## (undated) DEVICE — COVER CLAMP FABRIC RADIOPAQUE

## (undated) DEVICE — SEE MEDLINE ITEM 156901

## (undated) DEVICE — SUT ETHILON 3-0 PS2 18 BLK

## (undated) DEVICE — VAC WOUND ULTRA THERAPY

## (undated) DEVICE — SYS PANNUS RETENTION

## (undated) DEVICE — SUT SILK 2-0 STRANDS 30IN

## (undated) DEVICE — DRESSING ADH ISLAND 3.6 X 14

## (undated) DEVICE — SYR SLIP TIP 1CC

## (undated) DEVICE — SUT PROLENE 6-0 BV-1 30IN

## (undated) DEVICE — SUT SILK 3-0 STRANDS 30IN

## (undated) DEVICE — SUT PROLENE 3-0 SH DA 36 BL

## (undated) DEVICE — SUT 1 36IN PDS II VIO MONO

## (undated) DEVICE — SUT 4-0 12-18IN SILK BLACK

## (undated) DEVICE — SUT 2-0 12-18IN SILK

## (undated) DEVICE — COVER LIGHT HANDLE 80/CA

## (undated) DEVICE — SUT SILK 3-0 SH 18IN BLACK

## (undated) DEVICE — SEE MEDLINE ITEM 146416

## (undated) DEVICE — SEE MEDLINE ITEM 156894

## (undated) DEVICE — ELECTRODE REM PLYHSV RETURN 9

## (undated) DEVICE — PACK UNIVERSAL SPLIT II

## (undated) DEVICE — SUT PROLENE 4-0 SH BLU 36IN

## (undated) DEVICE — SEE MEDLINE ITEM 156902

## (undated) DEVICE — KIT SAHARA DRAPE DRAW/LIFT

## (undated) DEVICE — SUT SILK 3-0 SH DETACH 30IN

## (undated) DEVICE — SUT 4-0 12-30IN SILK

## (undated) DEVICE — PAD K-THERMIA 24IN X 60IN

## (undated) DEVICE — BOOT AIR FLUID HEEL ADLT STD

## (undated) DEVICE — SUTURE PROLENE 5-0

## (undated) DEVICE — FIBRILLAR ABS HEMOSTAT 4X4

## (undated) DEVICE — GOWN SURGICAL X-LARGE

## (undated) DEVICE — TRAY FOLEY 16FR INFECTION CONT

## (undated) DEVICE — KIT SURGIFLO HEMOSTATIC MATRIX

## (undated) DEVICE — KIT PROBE COVER WITH GEL

## (undated) DEVICE — KIT COLLECTION E SWAB REGULAR

## (undated) DEVICE — CONTAINER SPECIMEN STRL 4OZ

## (undated) DEVICE — HANDSET ARGON PLUS

## (undated) DEVICE — BAG BILE DRAINAGE 19OZ 9.5IN

## (undated) DEVICE — SEE MEDLINE ITEM 152512

## (undated) DEVICE — SYR ONLY LUER LOCK 20CC

## (undated) DEVICE — SUT 2/0 30IN SILK BLK BRAI

## (undated) DEVICE — SEALER LIGASURE MARYLAND 23CM

## (undated) DEVICE — APPLICATOR CHLORAPREP ORN 26ML

## (undated) DEVICE — WIPE ESENTA BARR STNG FREE 3ML

## (undated) DEVICE — STOPCOCK 3-WAY

## (undated) DEVICE — CLIP SPRING 6MM

## (undated) DEVICE — SEE MEDLINE ITEM 146417

## (undated) DEVICE — CATH URETHRAL RED RUBBER 18FR

## (undated) DEVICE — SYR 30CC LUER LOCK

## (undated) DEVICE — SUT PROLENE 5-0 36IN C-1

## (undated) DEVICE — DRESSING ABTHERA SENSA TRAC

## (undated) DEVICE — CATH SWAN GANZ STND 7FR

## (undated) DEVICE — WARMER DRAPE STERILE LF

## (undated) DEVICE — EVACUATOR WOUND BULB 100CC

## (undated) DEVICE — TUBE FEEDING PURPLE 8FRX40CM

## (undated) DEVICE — SUT PDS BV 6-0

## (undated) DEVICE — STAPLER SKIN PROXIMATE WIDE

## (undated) DEVICE — SPONGE LAP 18X18 PREWASHED

## (undated) DEVICE — SET DECANTER MEDICHOICE

## (undated) DEVICE — NDL MONOPTY BIOPSY 14GX10CM

## (undated) DEVICE — HEMOSTAT SURGICEL NU-KNIT 6X9

## (undated) DEVICE — CANISTER INFOV.A.C WOUND 500ML

## (undated) DEVICE — KIT MICROINTRODUCE MINI 5X10CM

## (undated) DEVICE — SOL NS 1000CC

## (undated) DEVICE — SEE MEDLINE ITEM 146420

## (undated) DEVICE — SHEATH INTRODUCER 7FR 11CM

## (undated) DEVICE — SEE MEDLINE ITEM 156911

## (undated) DEVICE — SET EXTENSION STERILE 30IN

## (undated) DEVICE — TOWEL OR XRAY WHITE 17X26IN

## (undated) DEVICE — REMOVER STAPLE SKIN DISPOSABLE

## (undated) DEVICE — DRAPE BAG ISOLATION 20 X 20

## (undated) DEVICE — DRAIN CHANNEL ROUND 19FR

## (undated) DEVICE — SUT PROLENE 6-0 BV-1

## (undated) DEVICE — DRESSING AQUACEL SACRAL 9 X 9

## (undated) DEVICE — ELECTRODE PAD DEFIB STERILE

## (undated) DEVICE — SUT CTD VICRYL VIL BR SH 27

## (undated) DEVICE — LINE 60IN PRESSURE MON.

## (undated) DEVICE — BOOT SUTURE AID